# Patient Record
Sex: FEMALE | Race: OTHER | HISPANIC OR LATINO | Employment: OTHER | URBAN - METROPOLITAN AREA
[De-identification: names, ages, dates, MRNs, and addresses within clinical notes are randomized per-mention and may not be internally consistent; named-entity substitution may affect disease eponyms.]

---

## 2018-07-24 ENCOUNTER — APPOINTMENT (EMERGENCY)
Dept: CT IMAGING | Facility: HOSPITAL | Age: 83
DRG: 643 | End: 2018-07-24
Payer: MEDICARE

## 2018-07-24 ENCOUNTER — HOSPITAL ENCOUNTER (INPATIENT)
Facility: HOSPITAL | Age: 83
LOS: 2 days | Discharge: HOME | DRG: 643 | End: 2018-07-27
Attending: EMERGENCY MEDICINE | Admitting: INTERNAL MEDICINE
Payer: MEDICARE

## 2018-07-24 DIAGNOSIS — I10 ESSENTIAL HYPERTENSION: ICD-10-CM

## 2018-07-24 DIAGNOSIS — E87.1 HYPONATREMIA: Primary | ICD-10-CM

## 2018-07-24 DIAGNOSIS — G93.40 ACUTE ENCEPHALOPATHY: ICD-10-CM

## 2018-07-24 DIAGNOSIS — F41.9 ANXIETY: ICD-10-CM

## 2018-07-24 PROBLEM — E78.5 HYPERLIPIDEMIA: Status: ACTIVE | Noted: 2018-07-24

## 2018-07-24 LAB
ALBUMIN SERPL BCP-MCNC: 4.3 G/DL (ref 3.5–5)
ALP SERPL-CCNC: 95 U/L (ref 46–116)
ALT SERPL W P-5'-P-CCNC: 18 U/L (ref 12–78)
ANION GAP SERPL CALCULATED.3IONS-SCNC: 8 MMOL/L (ref 4–13)
AST SERPL W P-5'-P-CCNC: 33 U/L (ref 5–45)
BASOPHILS # BLD AUTO: 0 THOUSANDS/ΜL (ref 0–0.1)
BASOPHILS NFR BLD AUTO: 0 % (ref 0–1)
BILIRUB SERPL-MCNC: 0.72 MG/DL (ref 0.2–1)
BILIRUB UR QL STRIP: NEGATIVE
BILIRUB UR QL STRIP: NEGATIVE
BUN SERPL-MCNC: 12 MG/DL (ref 5–25)
CALCIUM SERPL-MCNC: 9.5 MG/DL (ref 8.3–10.1)
CHLORIDE SERPL-SCNC: 89 MMOL/L (ref 100–108)
CLARITY UR: CLEAR
CLARITY UR: CLEAR
CO2 SERPL-SCNC: 26 MMOL/L (ref 21–32)
COLOR UR: YELLOW
COLOR UR: YELLOW
CREAT SERPL-MCNC: 0.88 MG/DL (ref 0.6–1.3)
EOSINOPHIL # BLD AUTO: 0.05 THOUSAND/ΜL (ref 0–0.61)
EOSINOPHIL NFR BLD AUTO: 1 % (ref 0–6)
ERYTHROCYTE [DISTWIDTH] IN BLOOD BY AUTOMATED COUNT: 12.9 % (ref 11.6–15.1)
GFR SERPL CREATININE-BSD FRML MDRD: 60 ML/MIN/1.73SQ M
GLUCOSE SERPL-MCNC: 108 MG/DL (ref 65–140)
GLUCOSE UR STRIP-MCNC: NEGATIVE MG/DL
GLUCOSE UR STRIP-MCNC: NEGATIVE MG/DL
HCT VFR BLD AUTO: 38 % (ref 34.8–46.1)
HGB BLD-MCNC: 13.4 G/DL (ref 11.5–15.4)
HGB UR QL STRIP.AUTO: NEGATIVE
HGB UR QL STRIP.AUTO: NEGATIVE
KETONES UR STRIP-MCNC: NEGATIVE MG/DL
KETONES UR STRIP-MCNC: NEGATIVE MG/DL
LEUKOCYTE ESTERASE UR QL STRIP: NEGATIVE
LEUKOCYTE ESTERASE UR QL STRIP: NEGATIVE
LYMPHOCYTES # BLD AUTO: 1.32 THOUSANDS/ΜL (ref 0.6–4.47)
LYMPHOCYTES NFR BLD AUTO: 20 % (ref 14–44)
MCH RBC QN AUTO: 29.9 PG (ref 26.8–34.3)
MCHC RBC AUTO-ENTMCNC: 35.3 G/DL (ref 31.4–37.4)
MCV RBC AUTO: 85 FL (ref 82–98)
MONOCYTES # BLD AUTO: 0.47 THOUSAND/ΜL (ref 0.17–1.22)
MONOCYTES NFR BLD AUTO: 7 % (ref 4–12)
NEUTROPHILS # BLD AUTO: 4.66 THOUSANDS/ΜL (ref 1.85–7.62)
NEUTS SEG NFR BLD AUTO: 72 % (ref 43–75)
NITRITE UR QL STRIP: NEGATIVE
NITRITE UR QL STRIP: NEGATIVE
NRBC BLD AUTO-RTO: 0 /100 WBCS
PH UR STRIP.AUTO: 6 [PH] (ref 4.5–8)
PH UR STRIP.AUTO: 7 [PH] (ref 4.5–8)
PLATELET # BLD AUTO: 209 THOUSANDS/UL (ref 149–390)
PMV BLD AUTO: 9.2 FL (ref 8.9–12.7)
POTASSIUM SERPL-SCNC: 4.3 MMOL/L (ref 3.5–5.3)
PROT SERPL-MCNC: 7.9 G/DL (ref 6.4–8.2)
PROT UR STRIP-MCNC: NEGATIVE MG/DL
PROT UR STRIP-MCNC: NEGATIVE MG/DL
RBC # BLD AUTO: 4.48 MILLION/UL (ref 3.81–5.12)
SODIUM SERPL-SCNC: 123 MMOL/L (ref 136–145)
SP GR UR STRIP.AUTO: 1.01 (ref 1–1.03)
SP GR UR STRIP.AUTO: <=1.005 (ref 1–1.03)
TROPONIN I SERPL-MCNC: <0.02 NG/ML
UROBILINOGEN UR QL STRIP.AUTO: 0.2 E.U./DL
UROBILINOGEN UR QL STRIP.AUTO: 0.2 E.U./DL
WBC # BLD AUTO: 6.5 THOUSAND/UL (ref 4.31–10.16)

## 2018-07-24 PROCEDURE — 93005 ELECTROCARDIOGRAM TRACING: CPT

## 2018-07-24 PROCEDURE — 80053 COMPREHEN METABOLIC PANEL: CPT | Performed by: PHYSICIAN ASSISTANT

## 2018-07-24 PROCEDURE — 84484 ASSAY OF TROPONIN QUANT: CPT | Performed by: PHYSICIAN ASSISTANT

## 2018-07-24 PROCEDURE — 70450 CT HEAD/BRAIN W/O DYE: CPT

## 2018-07-24 PROCEDURE — 36415 COLL VENOUS BLD VENIPUNCTURE: CPT | Performed by: PHYSICIAN ASSISTANT

## 2018-07-24 PROCEDURE — 81003 URINALYSIS AUTO W/O SCOPE: CPT | Performed by: PHYSICIAN ASSISTANT

## 2018-07-24 PROCEDURE — 85025 COMPLETE CBC W/AUTO DIFF WBC: CPT | Performed by: PHYSICIAN ASSISTANT

## 2018-07-24 PROCEDURE — 81003 URINALYSIS AUTO W/O SCOPE: CPT

## 2018-07-24 PROCEDURE — 99285 EMERGENCY DEPT VISIT HI MDM: CPT

## 2018-07-24 RX ORDER — METOPROLOL SUCCINATE 100 MG/1
100 TABLET, EXTENDED RELEASE ORAL DAILY
COMMUNITY
End: 2018-09-14 | Stop reason: SDUPTHER

## 2018-07-24 RX ORDER — AMLODIPINE BESYLATE 5 MG/1
5 TABLET ORAL DAILY
Status: DISCONTINUED | OUTPATIENT
Start: 2018-07-25 | End: 2018-07-26

## 2018-07-24 RX ORDER — ASPIRIN 81 MG/1
81 TABLET ORAL DAILY
Status: DISCONTINUED | OUTPATIENT
Start: 2018-07-25 | End: 2018-07-27 | Stop reason: HOSPADM

## 2018-07-24 RX ORDER — FOLIC ACID/MULTIVIT,IRON,MINER .4-18-35
1 TABLET,CHEWABLE ORAL DAILY
COMMUNITY
End: 2020-05-26 | Stop reason: HOSPADM

## 2018-07-24 RX ORDER — METOPROLOL SUCCINATE 50 MG/1
100 TABLET, EXTENDED RELEASE ORAL DAILY
Status: DISCONTINUED | OUTPATIENT
Start: 2018-07-25 | End: 2018-07-27 | Stop reason: HOSPADM

## 2018-07-24 RX ORDER — AMLODIPINE BESYLATE 5 MG/1
5 TABLET ORAL DAILY
COMMUNITY
End: 2018-07-27 | Stop reason: HOSPADM

## 2018-07-24 RX ORDER — LOSARTAN POTASSIUM 50 MG/1
100 TABLET ORAL DAILY
Status: DISCONTINUED | OUTPATIENT
Start: 2018-07-25 | End: 2018-07-27 | Stop reason: HOSPADM

## 2018-07-24 RX ORDER — ATORVASTATIN CALCIUM 20 MG/1
20 TABLET, FILM COATED ORAL DAILY
COMMUNITY
End: 2018-10-18

## 2018-07-24 RX ORDER — ACETAMINOPHEN 325 MG/1
650 TABLET ORAL EVERY 6 HOURS PRN
Status: DISCONTINUED | OUTPATIENT
Start: 2018-07-24 | End: 2018-07-27 | Stop reason: HOSPADM

## 2018-07-24 RX ORDER — OLANZAPINE 5 MG/1
5 TABLET ORAL
Status: DISCONTINUED | OUTPATIENT
Start: 2018-07-24 | End: 2018-07-26

## 2018-07-24 RX ORDER — ACETAMINOPHEN 325 MG/1
650 TABLET ORAL EVERY 4 HOURS PRN
Status: DISCONTINUED | OUTPATIENT
Start: 2018-07-24 | End: 2018-07-25

## 2018-07-24 RX ORDER — AMOXICILLIN 250 MG
1 CAPSULE ORAL 2 TIMES DAILY
COMMUNITY
End: 2018-09-14 | Stop reason: SDUPTHER

## 2018-07-24 RX ORDER — AMOXICILLIN 250 MG
1 CAPSULE ORAL 2 TIMES DAILY
Status: DISCONTINUED | OUTPATIENT
Start: 2018-07-25 | End: 2018-07-27 | Stop reason: HOSPADM

## 2018-07-24 RX ORDER — OLANZAPINE 5 MG/1
5 TABLET ORAL
COMMUNITY
End: 2018-07-27 | Stop reason: HOSPADM

## 2018-07-24 RX ORDER — DULOXETIN HYDROCHLORIDE 60 MG/1
20 CAPSULE, DELAYED RELEASE ORAL DAILY
COMMUNITY
End: 2018-07-27 | Stop reason: HOSPADM

## 2018-07-24 RX ORDER — LABETALOL HYDROCHLORIDE 5 MG/ML
10 INJECTION, SOLUTION INTRAVENOUS ONCE
Status: COMPLETED | OUTPATIENT
Start: 2018-07-24 | End: 2018-07-24

## 2018-07-24 RX ORDER — DULOXETIN HYDROCHLORIDE 20 MG/1
20 CAPSULE, DELAYED RELEASE ORAL DAILY
Status: DISCONTINUED | OUTPATIENT
Start: 2018-07-25 | End: 2018-07-26

## 2018-07-24 RX ORDER — ASPIRIN 81 MG/1
81 TABLET ORAL DAILY
COMMUNITY
End: 2018-09-14 | Stop reason: SDUPTHER

## 2018-07-24 RX ORDER — ACETAMINOPHEN 325 MG/1
650 TABLET ORAL 2 TIMES DAILY PRN
COMMUNITY
End: 2019-07-31 | Stop reason: ALTCHOICE

## 2018-07-24 RX ORDER — LOSARTAN POTASSIUM 100 MG/1
100 TABLET ORAL DAILY
COMMUNITY
End: 2018-09-14 | Stop reason: SDUPTHER

## 2018-07-24 RX ORDER — ATORVASTATIN CALCIUM 20 MG/1
20 TABLET, FILM COATED ORAL DAILY
Status: DISCONTINUED | OUTPATIENT
Start: 2018-07-25 | End: 2018-07-27 | Stop reason: HOSPADM

## 2018-07-24 RX ORDER — ONDANSETRON 2 MG/ML
4 INJECTION INTRAMUSCULAR; INTRAVENOUS EVERY 6 HOURS PRN
Status: DISCONTINUED | OUTPATIENT
Start: 2018-07-24 | End: 2018-07-27

## 2018-07-24 RX ADMIN — ACETAMINOPHEN 650 MG: 325 TABLET, FILM COATED ORAL at 22:17

## 2018-07-24 RX ADMIN — LABETALOL 20 MG/4 ML (5 MG/ML) INTRAVENOUS SYRINGE 10 MG: at 21:43

## 2018-07-24 RX ADMIN — OLANZAPINE 5 MG: 5 TABLET, FILM COATED ORAL at 23:26

## 2018-07-24 NOTE — ED NOTES
Patient has had a recent medication change, klonopin discontinued and increased cymbalta to 60mg        Oksana Coy RN  07/24/18 5758

## 2018-07-25 LAB
ANION GAP SERPL CALCULATED.3IONS-SCNC: 10 MMOL/L (ref 4–13)
ANION GAP SERPL CALCULATED.3IONS-SCNC: 6 MMOL/L (ref 4–13)
ATRIAL RATE: 80 BPM
BUN SERPL-MCNC: 17 MG/DL (ref 5–25)
BUN SERPL-MCNC: 9 MG/DL (ref 5–25)
CALCIUM SERPL-MCNC: 8.7 MG/DL (ref 8.3–10.1)
CALCIUM SERPL-MCNC: 9 MG/DL (ref 8.3–10.1)
CHLORIDE SERPL-SCNC: 91 MMOL/L (ref 100–108)
CHLORIDE SERPL-SCNC: 93 MMOL/L (ref 100–108)
CO2 SERPL-SCNC: 27 MMOL/L (ref 21–32)
CO2 SERPL-SCNC: 29 MMOL/L (ref 21–32)
CREAT SERPL-MCNC: 0.9 MG/DL (ref 0.6–1.3)
CREAT SERPL-MCNC: 0.96 MG/DL (ref 0.6–1.3)
ERYTHROCYTE [DISTWIDTH] IN BLOOD BY AUTOMATED COUNT: 13 % (ref 11.6–15.1)
GFR SERPL CREATININE-BSD FRML MDRD: 54 ML/MIN/1.73SQ M
GFR SERPL CREATININE-BSD FRML MDRD: 58 ML/MIN/1.73SQ M
GLUCOSE SERPL-MCNC: 114 MG/DL (ref 65–140)
GLUCOSE SERPL-MCNC: 99 MG/DL (ref 65–140)
HCT VFR BLD AUTO: 37.3 % (ref 34.8–46.1)
HGB BLD-MCNC: 12.9 G/DL (ref 11.5–15.4)
MAGNESIUM SERPL-MCNC: 2.1 MG/DL (ref 1.6–2.6)
MCH RBC QN AUTO: 29.5 PG (ref 26.8–34.3)
MCHC RBC AUTO-ENTMCNC: 34.6 G/DL (ref 31.4–37.4)
MCV RBC AUTO: 85 FL (ref 82–98)
OSMOLALITY UR: 176 MMOL/KG
P AXIS: 57 DEGREES
PHOSPHATE SERPL-MCNC: 3.7 MG/DL (ref 2.3–4.1)
PLATELET # BLD AUTO: 223 THOUSANDS/UL (ref 149–390)
PMV BLD AUTO: 9.3 FL (ref 8.9–12.7)
POTASSIUM SERPL-SCNC: 3.9 MMOL/L (ref 3.5–5.3)
POTASSIUM SERPL-SCNC: 4.3 MMOL/L (ref 3.5–5.3)
PR INTERVAL: 182 MS
QRS AXIS: 70 DEGREES
QRSD INTERVAL: 86 MS
QT INTERVAL: 352 MS
QTC INTERVAL: 405 MS
RBC # BLD AUTO: 4.38 MILLION/UL (ref 3.81–5.12)
SODIUM 24H UR-SCNC: 57 MOL/L
SODIUM SERPL-SCNC: 128 MMOL/L (ref 136–145)
SODIUM SERPL-SCNC: 128 MMOL/L (ref 136–145)
T WAVE AXIS: 36 DEGREES
VENTRICULAR RATE: 80 BPM
WBC # BLD AUTO: 7.15 THOUSAND/UL (ref 4.31–10.16)

## 2018-07-25 PROCEDURE — G8988 SELF CARE GOAL STATUS: HCPCS

## 2018-07-25 PROCEDURE — G8987 SELF CARE CURRENT STATUS: HCPCS

## 2018-07-25 PROCEDURE — 99232 SBSQ HOSP IP/OBS MODERATE 35: CPT | Performed by: PSYCHIATRY & NEUROLOGY

## 2018-07-25 PROCEDURE — 84300 ASSAY OF URINE SODIUM: CPT | Performed by: PHYSICIAN ASSISTANT

## 2018-07-25 PROCEDURE — 80048 BASIC METABOLIC PNL TOTAL CA: CPT | Performed by: PHYSICIAN ASSISTANT

## 2018-07-25 PROCEDURE — 85027 COMPLETE CBC AUTOMATED: CPT | Performed by: PHYSICIAN ASSISTANT

## 2018-07-25 PROCEDURE — 97166 OT EVAL MOD COMPLEX 45 MIN: CPT

## 2018-07-25 PROCEDURE — 84100 ASSAY OF PHOSPHORUS: CPT | Performed by: PHYSICIAN ASSISTANT

## 2018-07-25 PROCEDURE — 83735 ASSAY OF MAGNESIUM: CPT | Performed by: PHYSICIAN ASSISTANT

## 2018-07-25 PROCEDURE — 93010 ELECTROCARDIOGRAM REPORT: CPT | Performed by: INTERNAL MEDICINE

## 2018-07-25 PROCEDURE — 99223 1ST HOSP IP/OBS HIGH 75: CPT | Performed by: INTERNAL MEDICINE

## 2018-07-25 PROCEDURE — 83935 ASSAY OF URINE OSMOLALITY: CPT | Performed by: PHYSICIAN ASSISTANT

## 2018-07-25 RX ORDER — HYDRALAZINE HYDROCHLORIDE 20 MG/ML
5 INJECTION INTRAMUSCULAR; INTRAVENOUS EVERY 6 HOURS PRN
Status: DISCONTINUED | OUTPATIENT
Start: 2018-07-25 | End: 2018-07-27

## 2018-07-25 RX ORDER — CLONAZEPAM 0.5 MG/1
0.5 TABLET ORAL 2 TIMES DAILY
Status: DISCONTINUED | OUTPATIENT
Start: 2018-07-25 | End: 2018-07-26

## 2018-07-25 RX ORDER — SODIUM CHLORIDE 1000 MG
1 TABLET, SOLUBLE MISCELLANEOUS
Status: DISCONTINUED | OUTPATIENT
Start: 2018-07-25 | End: 2018-07-27

## 2018-07-25 RX ORDER — SODIUM CHLORIDE, SODIUM LACTATE, POTASSIUM CHLORIDE, CALCIUM CHLORIDE 600; 310; 30; 20 MG/100ML; MG/100ML; MG/100ML; MG/100ML
75 INJECTION, SOLUTION INTRAVENOUS CONTINUOUS
Status: DISCONTINUED | OUTPATIENT
Start: 2018-07-25 | End: 2018-07-25

## 2018-07-25 RX ADMIN — Medication 1 TABLET: at 08:35

## 2018-07-25 RX ADMIN — AMLODIPINE BESYLATE 5 MG: 5 TABLET ORAL at 08:37

## 2018-07-25 RX ADMIN — SODIUM CHLORIDE TAB 1 GM 1 G: 1 TAB at 17:20

## 2018-07-25 RX ADMIN — METOPROLOL SUCCINATE 100 MG: 50 TABLET, EXTENDED RELEASE ORAL at 08:37

## 2018-07-25 RX ADMIN — SODIUM CHLORIDE, SODIUM LACTATE, POTASSIUM CHLORIDE, AND CALCIUM CHLORIDE 75 ML/HR: .6; .31; .03; .02 INJECTION, SOLUTION INTRAVENOUS at 04:45

## 2018-07-25 RX ADMIN — SODIUM CHLORIDE TAB 1 GM 1 G: 1 TAB at 13:00

## 2018-07-25 RX ADMIN — Medication 1 TABLET: at 17:20

## 2018-07-25 RX ADMIN — CLONAZEPAM 0.5 MG: 0.5 TABLET ORAL at 09:17

## 2018-07-25 RX ADMIN — SODIUM CHLORIDE TAB 1 GM 1 G: 1 TAB at 09:17

## 2018-07-25 RX ADMIN — LOSARTAN POTASSIUM 100 MG: 50 TABLET, FILM COATED ORAL at 08:37

## 2018-07-25 RX ADMIN — CLONAZEPAM 0.5 MG: 0.5 TABLET ORAL at 17:20

## 2018-07-25 RX ADMIN — DULOXETINE 20 MG: 20 CAPSULE, DELAYED RELEASE ORAL at 08:35

## 2018-07-25 RX ADMIN — ENOXAPARIN SODIUM 40 MG: 40 INJECTION SUBCUTANEOUS at 08:35

## 2018-07-25 RX ADMIN — ATORVASTATIN CALCIUM 20 MG: 20 TABLET, FILM COATED ORAL at 08:35

## 2018-07-25 RX ADMIN — ASPIRIN 81 MG: 81 TABLET, COATED ORAL at 08:35

## 2018-07-25 NOTE — PLAN OF CARE
Problem: OCCUPATIONAL THERAPY ADULT  Goal: Performs self-care activities at highest level of function for planned discharge setting  See evaluation for individualized goals  Treatment Interventions: ADL retraining, Functional transfer training, UE strengthening/ROM, Endurance training, Patient/family training, Equipment evaluation/education, Compensatory technique education, Continued evaluation, Energy conservation, Activityengagement          See flowsheet documentation for full assessment, interventions and recommendations  Limitation: Decreased ADL status, Decreased UE strength, Decreased endurance, Decreased self-care trans, Decreased high-level ADLs  Prognosis: Fair  Assessment: Pt is a 80 y o  female seen for OT evaluation s/p adm to [facility] [address] on 7/24/2018 feeling shaky, nauseated, and having auditory hallucinations and dx'd w/ Hyponatremia  Comorbidities affecting pts functional performance include a significant PMH of anxiety, HLD, and HTN  Pt with active OT orders and activity orders for Up with assistance  Pt lives alone in a one level apt with 0 POOJA and elevator access  Pt reports local family that is able to assist as needed  At baseline, pt was I w/ ADLs and functional mobility/transfers with use of SPC for household distances and RW for community mobility, required assist for IADLs, (-) , and reports 1 fall PTA  Upon evaluation, pt currently requires Mod-Min A for LB ADLs, Supervision for UB ADLs, Min A for toileting, Supervision for bed mobility, and Min A for functional mobility/transfers 2* the following deficits impacting occupational performance: weakness, decreased strength, decreased balance and decreased tolerance   These impairments, as well at pts limited home support, difficulty performing ADLS and difficulty performing IADLS  limit pts ability to safely engage in all baseline areas of occupation, including grooming, bathing, dressing, toileting, functional mobility/transfers, community mobility, house maintenance, social participation  and leisure activities   Pt scored overall 55/100 on the Barthel Index  Based on the aforementioned OT evaluation, functional performance deficits, and assessments, pt has been identified as a moderate complexity evaluation  Pt to continue to benefit from continued acute OT services during hospital stay to address defined deficits and to maximize level of functional independence in the following Occupational Performance areas: grooming, bathing/shower, toilet hygiene, dressing, socialization, health maintenance, functional mobility, community mobility, clothing management, social participation and transfers to common household surfaces  From OT standpoint, recommend STR vs Home OT pending progress upon D/C   OT will continue to follow pt 3-5x/wk to address the following goals to  w/in 10-14 days:     OT Discharge Recommendation: Short Term Rehab (vs Home OT pending progress)  OT - OK to Discharge: Yes (when medically cleared to STR)

## 2018-07-25 NOTE — H&P
History and Physical - [facility] Internal Medicine    Patient Information: Rin Gonzalez 80 y o  female MRN: [id number]  Unit/Bed#: E5 -01 Encounter: [id number]  Admitting Physician: Swapna Thurston PA-C  PCP: No primary care provider on file  Date of Admission:  07/25/18      Assessment:    Hyponatremia    Plan:    Hyponatremia  · Will slowly correct sodium  · Check urine sodium, serum osmolality, urine osmolality    Anxiety  · Poss  Due to hyponatremia vs  Medication change  · Attempt to clarify medication regimen in the am     Essential hypertension  · Continue Cozaar, Toprol  · Add hydralazine IV prn    Anxiety/depression  · Continue clonidine, Zyprexa  · Attempt to clarify doses in the a m  Hyperlipidemia  · Continue atorvastatin      HPI:   Jesusita Ruth is a 80 y o  female who reportedly was brought in by her daughter, which turned out to be a friend from [employer]  He was reporting that she was feeling shaky, nauseated and having auditory hallucinations  She does not speak [place]  It was reported by her friend as well as her son that her Klonopin was discontinued somewhere between 1 and 3 weeks ago and her Cymbalta was decreased  Her son reports that around the same time she began to have increasing anxiety  (history obtained from son and chart and patient is non-english speaking and functioning  phone could not be obtained)    Denies: Chest pain, Shortness of Breath, Nausea, Vomiting, Diarrhea, Dysuria    ROS:  A 12-point review of systems was done  Please see the HPI for the full details  All other systems negative  PMH:  Principal Problem:    Hyponatremia  Active Problems:    Hypertension    Anxiety    Hyperlipidemia    Acute encephalopathy      Past Medical History:   Diagnosis Date    Anxiety     Hyperlipidemia     Hypertension      Past Surgical History:   Procedure Laterality Date    CHOLECYSTECTOMY       Social History     Social History    Marital status:   Spouse name: N/A    Number of children: N/A    Years of education: N/A     Social History Main Topics    Smoking status: Never Smoker    Smokeless tobacco: Never Used    Alcohol use No    Drug use: No    Sexual activity: Not Asked     Other Topics Concern    None     Social History Narrative    None     History reviewed  No pertinent family history      MED/ALLERGIES:  Current Facility-Administered Medications   Medication Dose Route Frequency Provider Last Rate Last Dose    acetaminophen (TYLENOL) tablet 650 mg  650 mg Oral Q4H PRN STEPHEN Prieto-VICTOR MANUEL        acetaminophen (TYLENOL) tablet 650 mg  650 mg Oral Q6H PRN STEPHEN Prieto-C   650 mg at 07/24/18 2217    amLODIPine (NORVASC) tablet 5 mg  5 mg Oral Daily STEPHEN Miller-C        aspirin (ECOTRIN LOW STRENGTH) EC tablet 81 mg  81 mg Oral Daily STEPHEN Miller-C        atorvastatin (LIPITOR) tablet 20 mg  20 mg Oral Daily STEPHEN Miller-C        DULoxetine (CYMBALTA) delayed release capsule 20 mg  20 mg Oral Daily STEPHEN Miller-C        enoxaparin (LOVENOX) subcutaneous injection 40 mg  40 mg Subcutaneous Daily STEPHEN Miller-C        losartan (COZAAR) tablet 100 mg  100 mg Oral Daily STEPHEN Miller-C        metoprolol succinate (TOPROL-XL) 24 hr tablet 100 mg  100 mg Oral Daily STEPHEN Miller-C        OLANZapine (ZyPREXA) tablet 5 mg  5 mg Oral HS Gabi Chapin PA-C   5 mg at 07/24/18 2326    ondansetron (ZOFRAN) injection 4 mg  4 mg Intravenous Q6H PRN STEPHEN Miller-C        senna-docusate sodium (SENOKOT S) 8 6-50 mg per tablet 1 tablet  1 tablet Oral BID Gabi Chapin PA-C         No Known Allergies    OBJECTIVE:    Current Vitals:   Blood Pressure: (!) 178/88 (07/24/18 2342)  Pulse: 78 (07/24/18 2342)  Temperature: 98 °F (36 7 °C) (07/24/18 2342)  Temp Source: Temporal (07/24/18 1848)  Respirations: 18 (07/24/18 2342)  Height: 5' (152 4 cm) (07/24/18 2247)  Weight - Scale: 58 1 kg (128 lb 1 4 oz) (07/24/18 2247)  SpO2: 95 % (07/24/18 2342)    No intake or output data in the 24 hours ending 07/25/18 0312    Invasive Devices     Peripheral Intravenous Line            Peripheral IV 07/24/18 Left Antecubital less than 1 day                  Physical Exam   Constitutional: She appears well-developed and well-nourished  HENT:   Head: Normocephalic and atraumatic  Right Ear: External ear normal    Left Ear: External ear normal    Eyes: No scleral icterus  Neck: Normal range of motion  Neck supple  No thyromegaly present  Cardiovascular: Normal rate, regular rhythm and normal heart sounds  Pulmonary/Chest: Effort normal and breath sounds normal    Abdominal: Soft  Bowel sounds are normal  She exhibits no distension  There is no tenderness  Musculoskeletal: Normal range of motion  She exhibits no edema  Lymphadenopathy:     She has no cervical adenopathy  Neurological: She exhibits normal muscle tone  Skin: Skin is warm and dry  No erythema  Psychiatric: She has a normal mood and affect  Her behavior is normal                                                      Lab Results:   Results from last 7 days  Lab Units 07/24/18 2040   WBC Thousand/uL 6 50   HEMOGLOBIN g/dL 13 4   HEMATOCRIT % 38 0   PLATELETS Thousands/uL 209      Results from last 7 days  Lab Units 07/24/18 2040   SODIUM mmol/L 123*   POTASSIUM mmol/L 4 3   CHLORIDE mmol/L 89*   CO2 mmol/L 26   BUN mg/dL 12   CREATININE mg/dL 0 88   CALCIUM mg/dL 9 5   TOTAL PROTEIN g/dL 7 9   BILIRUBIN TOTAL mg/dL 0 72   ALK PHOS U/L 95   ALT U/L 18   AST U/L 33   GLUCOSE RANDOM mg/dL 108     Lab Results   Component Value Date    CKTOTAL 119 06/22/2014    TROPONINI <0 02 07/24/2018         Imaging:     CT BRAIN - WITHOUT CONTRAST:  No acute intracranial abnormality  Chronic microangiopathic changes  VTE Prophylaxis: Enoxaparin (Lovenox)    Code Status: FULL    Counseling / Coordination of Care:    Total floor / unit time spent today 45 minutes  Anticipated Length of Stay will be: MORE THAN 2 (TWO) Midnights:     Kymberly Spaulding PA-C    This note has been constructed using a voice recognition system

## 2018-07-25 NOTE — PROGRESS NOTES
Patient came out of room and was walking around the floor  Informed PCA that she was looking for someone  She is hard to redirect back to her room and is acting very confused  Patient does not know that she is in the hospital and where she is  Dr Annamarie banda and informed about changes  He is coming up to assess the patient

## 2018-07-25 NOTE — ED NOTES
Patient's son name Melvin Ulloa can be reached at [address] [place] [address]  07/24/18 2120

## 2018-07-25 NOTE — CASE MANAGEMENT
Initial Clinical Review    Admission: Date/Time/Statement:   OBSERVATION WRITTEN 07/24/18 @ 2137 CONVERTED TO INPATIENT ADMISSION 07/25/18 @ 0837 DUE TO FURTHER DIAGNOSTIC WORKUP REQUIRED FOR METABOLIC ENCEPHALOPATHY , REQUIRING AT LEAST A 2 MIDNIGHT STAY  Orders Placed This Encounter   Procedures     Admitting Physician DONNY BONNER    Level of Care Med Surg    Estimated length of stay More than 2 Midnights    Certification I certify that inpatient services are medically necessary for this patient for a duration of greater than two midnights  See H&P and MD Progress Notes for additional information about the patient's course of treatment  ED: Date/Time/Mode of Arrival:   ED Arrival Information     Expected Arrival Acuity Means of Arrival Escorted By Service Admission Type    - 7/24/2018 18:37 Urgent Walk-In Family Member General Medicine Urgent    Arrival Complaint    anxiety          Chief Complaint:   Chief Complaint   Patient presents with    Anxiety     Patient reports feeling anxious  x 3 days worsening  Denies cp  Reports sob  Reports recent medication change in cymbalta and klonopin x 3 weeks ago  Hx of anxiety  History of Illness: 81 yo F with PMH anxiety, HTN and hyperlipidemia presenting with SOB and 'feeling shaky all over'  Pt daughter is at bedside who provides history  Daughter reports that pt has been feeling like this for approximately 3 weeks when her pcp took her off of her 0 5mg klonipin daily and increased her cymbalta from 30mg to 60mg  Pt has been c/o SOB and feeling anxious  She reports she has been having new onset auditory hallucinations who have been calling her name  She denies the voices telling her to hurt herself or others  Daughter reports pt stating that she was going to run away because she was tired of the voices which prompted their visit to the ED  She denies any abdominal pain, n/v/d, fevers, chills, sweats, CP or palpitations      ED Vital Signs:   ED Triage Vitals   Temperature Pulse Respirations Blood Pressure SpO2   07/24/18 1848 07/24/18 1848 07/24/18 1848 07/24/18 1848 07/24/18 1848   98 4 °F (36 9 °C) 85 18 (!) 172/84 99 %      Temp Source Heart Rate Source Patient Position - Orthostatic VS BP Location FiO2 (%)   07/24/18 1848 07/24/18 2030 07/24/18 2030 07/24/18 2030 --   Temporal Monitor Lying Right arm       Pain Score       07/24/18 1848       No Pain        Wt Readings from Last 1 Encounters:   07/24/18 58 1 kg (128 lb 1 4 oz)       Vital Signs (abnormal):   Date/Time  BP   07/24/18 2342   178/88   07/24/18 2217   188/81   07/24/18 2145   178/76   07/24/18 2030   190/77     Abnormal Labs/Diagnostic Test Results:   SODIUM 123*   CHLORIDE 89*     CT BRAIN - WITHOUT CONTRAST:  No acute intracranial abnormality   Chronic microangiopathic changes  ED Treatment:   Medication Administration from 07/24/2018 1837 to 07/24/2018 2244       Date/Time Order Dose Route Action     07/24/2018 2143 labetalol (NORMODYNE) injection 10 mg 10 mg Intravenous Given     07/24/2018 2217 acetaminophen (TYLENOL) tablet 650 mg 650 mg Oral Given          Past Medical/Surgical History: Active Ambulatory Problems     Diagnosis Date Noted    No Active Ambulatory Problems     Resolved Ambulatory Problems     Diagnosis Date Noted    No Resolved Ambulatory Problems     Past Medical History:   Diagnosis Date    Anxiety     Hyperlipidemia     Hypertension        Admitting Diagnosis: Anxiety [F41 9]  Hyponatremia [E87 1]    Age/Sex: 80 y o  female    Assessment:  Hyponatremia     Plan:  Hyponatremia  · Will slowly correct sodium  · Check urine sodium, serum osmolality, urine osmolality     Anxiety  · Poss  Due to hyponatremia vs  Medication change    · Attempt to clarify medication regimen in the am      Essential hypertension  · Continue Cozaar, Toprol  · Add hydralazine IV prn     Anxiety/depression  · Continue clonidine, Zyprexa  · Attempt to clarify doses in the a m      Hyperlipidemia  · Continue atorvastatin  Expect greater than 2 midnight stay       Admission Orders:  Regular diet  OOB with assist  Pt, ot  Consult cm  Sequential compression device    Scheduled Meds:   Current Facility-Administered Medications:  acetaminophen 650 mg Oral Q4H PRN   acetaminophen 650 mg Oral Q6H PRN   amLODIPine 5 mg Oral Daily   aspirin 81 mg Oral Daily   atorvastatin 20 mg Oral Daily   DULoxetine 20 mg Oral Daily   enoxaparin 40 mg Subcutaneous Daily   hydrALAZINE 5 mg Intravenous Q6H PRN   lactated ringers 75 mL/hr Intravenous Continuous   losartan 100 mg Oral Daily   metoprolol succinate 100 mg Oral Daily   OLANZapine 5 mg Oral HS   ondansetron 4 mg Intravenous Q6H PRN   senna-docusate sodium 1 tablet Oral BID     Continuous Infusions:   lactated ringers 75 mL/hr Last Rate: 75 mL/hr (07/25/18 4975)     PRN Meds:   acetaminophen    acetaminophen    hydrALAZINE    ondansetron    Thank you,  Karl Meyers  [facility] Utilization Review Department  Phone: [phone]; Fax [phone]  ATTENTION: The Network Utilization Review Department is now centralized for our 9 Facilities  Make a note that we have a new phone and fax numbers for our Department  Please call with any questions or concerns to [phone] and carefully follow the prompts so that you are directed to the right person  All voicemails are confidential  Fax any determinations, approvals, denials, and requests for initial or continue stay review clinical to [phone]  Due to HIGH CALL volume, it would be easier if you could please send faxed requests to expedite your requests and in part, help us provide discharge notifications faster

## 2018-07-25 NOTE — ED PROVIDER NOTES
History  Chief Complaint   Patient presents with    Anxiety     Patient reports feeling anxious  x 3 days worsening  Denies cp  Reports sob  Reports recent medication change in cymbalta and klonopin x 3 weeks ago  Hx of anxiety  79 yo F with PMH anxiety, HTN and hyperlipidemia presenting with SOB and 'feeling shaky all over'  Pt daughter is at bedside who provides history  Daughter reports that pt has been feeling like this for approximately 3 weeks when her pcp took her off of her 0 5mg klonipin daily and increased her cymbalta from 30mg to 60mg  Pt has been c/o SOB and feeling anxious  She reports she has been having new onset auditory hallucinations who have been calling her name  She denies the voices telling her to hurt herself or others  Daughter reports pt stating that she was going to run away because she was tired of the voices which prompted their visit to the ED  She denies any abdominal pain, n/v/d, fevers, chills, sweats, CP or palpitations  Prior to Admission Medications   Prescriptions Last Dose Informant Patient Reported? Taking?    DULoxetine (CYMBALTA) 60 mg delayed release capsule   Yes Yes   Sig: Take 20 mg by mouth daily   OLANZapine (ZyPREXA) 5 mg tablet   Yes Yes   Sig: Take 5 mg by mouth daily at bedtime   acetaminophen (TYLENOL) 325 mg tablet   Yes Yes   Sig: Take 650 mg by mouth 2 (two) times a day as needed for mild pain   amLODIPine (NORVASC) 5 mg tablet   Yes Yes   Sig: Take 5 mg by mouth daily   aspirin (ECOTRIN LOW STRENGTH) 81 mg EC tablet   Yes Yes   Sig: Take 81 mg by mouth daily   atorvastatin (LIPITOR) 20 mg tablet   Yes Yes   Sig: Take 20 mg by mouth daily   losartan (COZAAR) 100 MG tablet   Yes Yes   Sig: Take 100 mg by mouth daily   metoprolol succinate (TOPROL-XL) 100 mg 24 hr tablet   Yes Yes   Sig: Take 100 mg by mouth daily   multivitamin-iron-minerals-folic acid (CENTRUM) chewable tablet   Yes Yes   Sig: Chew 1 tablet daily   senna-docusate sodium (SENOKOT S) 8 6-50 mg per tablet   Yes Yes   Sig: Take 1 tablet by mouth 2 (two) times a day      Facility-Administered Medications: None       Past Medical History:   Diagnosis Date    Anxiety     Hyperlipidemia     Hypertension        Past Surgical History:   Procedure Laterality Date    CHOLECYSTECTOMY         History reviewed  No pertinent family history  I have reviewed and agree with the history as documented  Social History   Substance Use Topics    Smoking status: Never Smoker    Smokeless tobacco: Never Used    Alcohol use No        Review of Systems   All other systems reviewed and are negative  Physical Exam  Physical Exam   Constitutional: She is oriented to person, place, and time  She appears well-developed and well-nourished  No distress  Pleasantly lying in bed, smiling   HENT:   Head: Normocephalic and atraumatic  Eyes: Conjunctivae are normal    EOM grossly intact   Neck: Normal range of motion  Neck supple  No JVD present  Cardiovascular: Normal rate  Pulmonary/Chest: Effort normal  No respiratory distress  She has no wheezes  She has no rales  Abdominal: Soft  Bowel sounds are normal    Musculoskeletal:   FROM, steady gait, cap refill brisk, strength and sensation grossly intact throughout   Neurological: She is alert and oriented to person, place, and time  Skin: Skin is warm and dry  Capillary refill takes less than 2 seconds  She is not diaphoretic  Psychiatric: She has a normal mood and affect  Her behavior is normal    Nursing note and vitals reviewed        Vital Signs  ED Triage Vitals   Temperature Pulse Respirations Blood Pressure SpO2   07/24/18 1848 07/24/18 1848 07/24/18 1848 07/24/18 1848 07/24/18 1848   98 4 °F (36 9 °C) 85 18 (!) 172/84 99 %      Temp Source Heart Rate Source Patient Position - Orthostatic VS BP Location FiO2 (%)   07/24/18 1848 07/24/18 2030 07/24/18 2030 07/24/18 2030 --   Temporal Monitor Lying Right arm       Pain Score 07/24/18 1848       No Pain           Vitals:    07/24/18 1848 07/24/18 2030   BP: (!) 172/84 (!) 190/77   Pulse: 85 73   Patient Position - Orthostatic VS:  Lying       Visual Acuity      ED Medications  Medications   labetalol (NORMODYNE) injection 10 mg (not administered)       Diagnostic Studies  Results Reviewed     Procedure Component Value Units Date/Time    UA w Reflex to Microscopic w Reflex to Culture [[id number]] Collected:  07/24/18 2119    Lab Status:  Final result Specimen:  Urine from Urine, Other Updated:  07/24/18 2125     Color, UA Yellow     Clarity, UA Clear     Specific Gravity, UA <=1 005     pH, UA 6 0     Leukocytes, UA Negative     Nitrite, UA Negative     Protein, UA Negative mg/dl      Glucose, UA Negative mg/dl      Ketones, UA Negative mg/dl      Urobilinogen, UA 0 2 E U /dl      Bilirubin, UA Negative     Blood, UA Negative    Troponin I [[id number]]  (Normal) Collected:  07/24/18 2040    Lab Status:  Final result Specimen:  Blood from Arm, Left Updated:  07/24/18 2108     Troponin I <0 02 ng/mL     Comprehensive metabolic panel [[id number]]  (Abnormal) Collected:  07/24/18 2040    Lab Status:  Final result Specimen:  Blood from Arm, Left Updated:  07/24/18 2108     Sodium 123 (L) mmol/L      Potassium 4 3 mmol/L      Chloride 89 (L) mmol/L      CO2 26 mmol/L      Anion Gap 8 mmol/L      BUN 12 mg/dL      Creatinine 0 88 mg/dL      Glucose 108 mg/dL      Calcium 9 5 mg/dL      AST 33 U/L      ALT 18 U/L      Alkaline Phosphatase 95 U/L      Total Protein 7 9 g/dL      Albumin 4 3 g/dL      Total Bilirubin 0 72 mg/dL      eGFR 60 ml/min/1 73sq m     Narrative:         National Kidney Disease Education Program recommendations are as follows:  GFR calculation is accurate only with a steady state creatinine  Chronic Kidney disease less than 60 ml/min/1 73 sq  meters  Kidney failure less than 15 ml/min/1 73 sq  meters      CBC and differential [[id number]] Collected:  07/24/18 2040    Lab Status: Final result Specimen:  Blood from Arm, Left Updated:  07/24/18 2056     WBC 6 50 Thousand/uL      RBC 4 48 Million/uL      Hemoglobin 13 4 g/dL      Hematocrit 38 0 %      MCV 85 fL      MCH 29 9 pg      MCHC 35 3 g/dL      RDW 12 9 %      MPV 9 2 fL      Platelets 487 Thousands/uL      nRBC 0 /100 WBCs      Neutrophils Relative 72 %      Lymphocytes Relative 20 %      Monocytes Relative 7 %      Eosinophils Relative 1 %      Basophils Relative 0 %      Neutrophils Absolute 4 66 Thousands/µL      Lymphocytes Absolute 1 32 Thousands/µL      Monocytes Absolute 0 47 Thousand/µL      Eosinophils Absolute 0 05 Thousand/µL      Basophils Absolute 0 00 Thousands/µL     ED Urine Macroscopic [[id number]] Collected:  07/24/18 2035    Lab Status:  Final result Specimen:  Urine Updated:  07/24/18 2029     Color, UA Yellow     Clarity, UA Clear     pH, UA 7 0     Leukocytes, UA Negative     Nitrite, UA Negative     Protein, UA Negative mg/dl      Glucose, UA Negative mg/dl      Ketones, UA Negative mg/dl      Urobilinogen, UA 0 2 E U /dl      Bilirubin, UA Negative     Blood, UA Negative     Specific Gravity, UA 1 010    Narrative:       [facility] RESULT                 CT head without contrast   Final Result by Yoel Blank MD (07/24 2101)      No acute intracranial abnormality  Chronic microangiopathic changes  Workstation performed: [id number]                    Procedures  Procedures       Phone Contacts  ED Phone Contact    ED Course                               MDM  Number of Diagnoses or Management Options  Hyponatremia:   Diagnosis management comments: Pt with sodium of 123, previous labs from 2014 show Na 139, will admit for hyponatremia  Discussed with family at bedside and they are agreeable to staying  All labs and imaging discussed with patient, Pt verbalizes understanding and agrees with plan         CritCare Time    Disposition  Final diagnoses:   Hyponatremia     Time reflects when diagnosis was documented in both MDM as applicable and the Disposition within this note     Time User Action Codes Description Comment    7/24/2018  9:25 PM Angela Hernandez Add [E87 1] Hyponatremia       ED Disposition     ED Disposition Condition Comment    Admit  Case was discussed with SLIM PA and the patient's admission status was agreed to be Admission Status: observation status to the service of Dr Cheryl Felix   Follow-up Information    None         Patient's Medications   Discharge Prescriptions    No medications on file     No discharge procedures on file      ED Provider  Electronically Signed by           Davida Chávez PA-C  07/24/18 7125

## 2018-07-25 NOTE — OCCUPATIONAL THERAPY NOTE
[address]  Evaluation     Patient Name: Catherine FAM Date: 7/25/2018  Problem List  Patient Active Problem List   Diagnosis    Hypertension    Anxiety    Hyperlipidemia    Hyponatremia    Acute encephalopathy     Past Medical History  Past Medical History:   Diagnosis Date    Anxiety     Hyperlipidemia     Hypertension      Past Surgical History  Past Surgical History:   Procedure Laterality Date    CHOLECYSTECTOMY           07/25/18 1119   Note Type   Note type Eval only   Restrictions/Precautions   Weight Bearing Precautions Per Order No   Other Precautions Fall Risk; Chair Alarm; Bed Alarm   Pain Assessment   Pain Assessment No/denies pain   Pain Score No Pain   Home Living   Type of Home Apartment   Home Layout One level;Elevator   Bathroom Shower/Tub Tub/shower unit   Bathroom Toilet Standard   Bathroom Equipment Grab bars in shower; Shower chair   Bathroom Accessibility Accessible   Home Equipment Walker;Cane   Additional Comments Pt lives alone in a one level apt with 0 POOJA and elevator access  Pt reports local family that is able to assist as needed  Prior Function   Level of [place] Independent with ADLs and functional mobility; Needs assistance with IADLs   Lives With Alone   Receives Help From Family   ADL Assistance Independent   IADLs Needs assistance   Falls in the last 6 months 1 to 4  (1 per pt report)   Vocational Retired   Comments At baseline, pt was I w/ ADLs and functional mobility/transfers with use of SPC for household distances and RW for community mobility, required assist for IADLs, (-) , and reports 1 fall PTA  Lifestyle   Autonomy At baseline, pt was I w/ ADLs and functional mobility/transfers with use of SPC for household distances and RW for community mobility, required assist for IADLs, (-) , and reports 1 fall PTA     Reciprocal Relationships Lives alone   Service to Others Retired   Intrinsic Gratification Going to [employer] [facility]   ADL   Eating Assistance 5  Supervision/Setup   Grooming Assistance 5  Supervision/Setup   UB Bathing Assistance 5  Supervision/Setup   LB Bathing Assistance 4  Minimal Assistance   UB Dressing Assistance 5  Supervision/Setup   LB Dressing Assistance 3  Moderate Assistance   Toileting Assistance  4  Minimal Assistance   Bed Mobility   Supine to Sit 5  Supervision   Additional items Assist x 1;HOB elevated; Bedrails; Increased time required;Verbal cues   Sit to Supine Unable to assess  (Pt seated OOB in chair at end of session)   Additional Comments Pt seated OOB in chair at end of session with call bell and phone within reach  All needs met and pt reports no further questions for OT at this time   Transfers   Sit to Stand 4  Minimal assistance   Additional items Assist x 1;Bedrails; Increased time required;Verbal cues   Stand to Sit 4  Minimal assistance   Additional items Assist x 1; Armrests; Increased time required;Verbal cues   Additional Comments Cues for safe technique and placement of hands during transfers   Functional Mobility   Functional Mobility 4  Minimal assistance   Additional Comments Assist x1   Additional items SPC   Balance   Static Sitting Good   Dynamic Sitting Fair +   Static Standing Fair   Dynamic Standing Fair -   Ambulatory Fair -   Activity Tolerance   Activity Tolerance Patient limited by fatigue   Nurse Made Aware Pt appropriate to be seen per ANJELICA Henry   RUE Assessment   RUE Assessment WFL   RUE Strength   RUE Overall Strength Within Functional Limits - able to perform ADL tasks with strength  (3+/5)   LUE Assessment   LUE Assessment WFL   LUE Strength   LUE Overall Strength Within Functional Limits - able to perform ADL tasks with strength  (3+/5)   Hand Function   Gross Motor Coordination Functional   Fine Motor Coordination Functional   Sensation   Light Touch No apparent deficits   Sharp/Dull No apparent deficits   Proprioception   Proprioception No apparent deficits   Vision - Complex Assessment   Ocular Range of Motion [facility]   Acuity Able to read clock/calendar on wall without difficulty   Perception   Inattention/Neglect Appears intact   Cognition   Overall Cognitive Status [facility]   Arousal/Participation Alert; Cooperative   Attention Within functional limits   Orientation Level Oriented X4   Memory Within functional limits   Following Commands Follows one step commands without difficulty   Assessment   Limitation Decreased ADL status; Decreased UE strength;Decreased endurance;Decreased self-care trans;Decreased high-level ADLs   Prognosis Fair   Assessment Pt is a 80 y o  female seen for OT evaluation s/p adm to [address] on 7/24/2018 feeling shaky, nauseated, and having auditory hallucinations and dx'd w/ Hyponatremia  Comorbidities affecting pts functional performance include a significant PMH of anxiety, HLD, and HTN  Pt with active OT orders and activity orders for Up with assistance  Pt lives alone in a one level apt with 0 POOJA and elevator access  Pt reports local family that is able to assist as needed  At baseline, pt was I w/ ADLs and functional mobility/transfers with use of SPC for household distances and RW for community mobility, required assist for IADLs, (-) , and reports 1 fall PTA  Upon evaluation, pt currently requires Mod-Min A for LB ADLs, Supervision for UB ADLs, Min A for toileting, Supervision for bed mobility, and Min A for functional mobility/transfers 2* the following deficits impacting occupational performance: weakness, decreased strength, decreased balance and decreased tolerance   These impairments, as well at pts limited home support, difficulty performing ADLS and difficulty performing IADLS  limit pts ability to safely engage in all baseline areas of occupation, including grooming, bathing, dressing, toileting, functional mobility/transfers, community mobility, house maintenance, social participation  and leisure activities   Pt scored overall 55/100 on the Barthel Index  Based on the aforementioned OT evaluation, functional performance deficits, and assessments, pt has been identified as a moderate complexity evaluation  Pt to continue to benefit from continued acute OT services during hospital stay to address defined deficits and to maximize level of functional independence in the following Occupational Performance areas: grooming, bathing/shower, toilet hygiene, dressing, socialization, health maintenance, functional mobility, community mobility, clothing management, social participation and transfers to common household surfaces  From OT standpoint, recommend STR vs Home OT pending progress upon D/C  OT will continue to follow pt 3-5x/wk to address the following goals to  w/in 10-14 days:   Goals   Patient Goals to get stronger   LTG Time Frame 10-   Long Term Goal Please refer to LTGs listed below   Plan   Treatment Interventions ADL retraining;Functional transfer training;UE strengthening/ROM; Endurance training;Patient/family training;Equipment evaluation/education; Compensatory technique education;Continued evaluation; Energy conservation; Activityengagement   Goal Expiration Date 18   Treatment Day 0   OT Frequency 3-5x/wk   Recommendation   OT Discharge Recommendation Short Term Rehab  (vs Home OT pending progress)   OT - OK to Discharge Yes  (when medically cleared to STR)   Barthel Index   Feeding 10   Bathing 0   Grooming Score 5   Dressing Score 5   Bladder Score 10   Bowels Score 10   Toilet Use Score 5   Transfers (Bed/Chair) Score 10   Mobility (Level Surface) Score 0   Stairs Score 0   Barthel Index Score 55   Modified [place] Scale   Modified [place] Scale 4        GOALS    1) Pt will improve activity tolerance to G for min 30 min txment sessions    2) Pt will complete UB/LB dressing/self care w/ mod I using adaptive device and DME as needed    3) Pt will complete bathing w/ Mod I w/ use of AE and DME as needed    4) Pt will complete toileting w/ mod I w/ G hygiene/thoroughness using DME as needed    5) Pt will improve functional transfers to Mod I on/off all surfaces using DME as needed w/ G balance/safety     6) Pt will improve functional mobility during ADL/IADL/leisure tasks to Mod I using DME as needed w/ G balance/safety     7) Pt will increase UE strength by 1 MM grade to increase independence in ADLs and transfers     8) Pt will engage in ongoing cognitive assessment w/ G participation w/ mod I to assist w/ safe d/c planning/recommendations    9) Pt will demonstrate G carryover of pt/caregiver education and training as appropriate w/ mod I w/o cues w/ good tolerance    10) Pt will demonstrate 100% carryover of energy conservation techniques w/ mod I t/o functional I/ADL/leisure tasks w/o cues s/p skilled education     11) Pt will be able to state 3 potential fall hazards in home environment and appropriate compensatory techniques to decrease fall risk in home environment      Ambrocio Tamez, OTR/L

## 2018-07-26 LAB
ANION GAP SERPL CALCULATED.3IONS-SCNC: 7 MMOL/L (ref 4–13)
BUN SERPL-MCNC: 20 MG/DL (ref 5–25)
CALCIUM SERPL-MCNC: 8.7 MG/DL (ref 8.3–10.1)
CHLORIDE SERPL-SCNC: 95 MMOL/L (ref 100–108)
CO2 SERPL-SCNC: 26 MMOL/L (ref 21–32)
CREAT SERPL-MCNC: 0.9 MG/DL (ref 0.6–1.3)
GFR SERPL CREATININE-BSD FRML MDRD: 58 ML/MIN/1.73SQ M
GLUCOSE SERPL-MCNC: 103 MG/DL (ref 65–140)
OSMOLALITY UR/SERPL-RTO: 268 MMOL/KG (ref 282–298)
POTASSIUM SERPL-SCNC: 3.7 MMOL/L (ref 3.5–5.3)
SODIUM SERPL-SCNC: 128 MMOL/L (ref 136–145)

## 2018-07-26 PROCEDURE — G8979 MOBILITY GOAL STATUS: HCPCS

## 2018-07-26 PROCEDURE — 83930 ASSAY OF BLOOD OSMOLALITY: CPT | Performed by: PHYSICIAN ASSISTANT

## 2018-07-26 PROCEDURE — 97163 PT EVAL HIGH COMPLEX 45 MIN: CPT

## 2018-07-26 PROCEDURE — 80048 BASIC METABOLIC PNL TOTAL CA: CPT | Performed by: PHYSICIAN ASSISTANT

## 2018-07-26 PROCEDURE — G8978 MOBILITY CURRENT STATUS: HCPCS

## 2018-07-26 PROCEDURE — 97116 GAIT TRAINING THERAPY: CPT

## 2018-07-26 PROCEDURE — 99232 SBSQ HOSP IP/OBS MODERATE 35: CPT | Performed by: INTERNAL MEDICINE

## 2018-07-26 RX ORDER — DULOXETIN HYDROCHLORIDE 30 MG/1
30 CAPSULE, DELAYED RELEASE ORAL DAILY
Status: DISCONTINUED | OUTPATIENT
Start: 2018-07-27 | End: 2018-07-27 | Stop reason: HOSPADM

## 2018-07-26 RX ORDER — AMLODIPINE BESYLATE 10 MG/1
10 TABLET ORAL DAILY
Status: DISCONTINUED | OUTPATIENT
Start: 2018-07-27 | End: 2018-07-27 | Stop reason: HOSPADM

## 2018-07-26 RX ORDER — OLANZAPINE 5 MG/1
7.5 TABLET ORAL
Status: DISCONTINUED | OUTPATIENT
Start: 2018-07-26 | End: 2018-07-27 | Stop reason: HOSPADM

## 2018-07-26 RX ADMIN — AMLODIPINE BESYLATE 5 MG: 5 TABLET ORAL at 08:01

## 2018-07-26 RX ADMIN — ATORVASTATIN CALCIUM 20 MG: 20 TABLET, FILM COATED ORAL at 08:01

## 2018-07-26 RX ADMIN — SODIUM CHLORIDE TAB 1 GM 1 G: 1 TAB at 18:15

## 2018-07-26 RX ADMIN — LOSARTAN POTASSIUM 100 MG: 50 TABLET, FILM COATED ORAL at 08:01

## 2018-07-26 RX ADMIN — DULOXETINE 20 MG: 20 CAPSULE, DELAYED RELEASE ORAL at 08:01

## 2018-07-26 RX ADMIN — CLONAZEPAM 0.5 MG: 0.5 TABLET ORAL at 08:01

## 2018-07-26 RX ADMIN — SODIUM CHLORIDE TAB 1 GM 1 G: 1 TAB at 12:23

## 2018-07-26 RX ADMIN — Medication 1 TABLET: at 08:01

## 2018-07-26 RX ADMIN — OLANZAPINE 7.5 MG: 5 TABLET, FILM COATED ORAL at 21:34

## 2018-07-26 RX ADMIN — SODIUM CHLORIDE TAB 1 GM 1 G: 1 TAB at 08:01

## 2018-07-26 RX ADMIN — METOPROLOL SUCCINATE 100 MG: 50 TABLET, EXTENDED RELEASE ORAL at 08:01

## 2018-07-26 RX ADMIN — ASPIRIN 81 MG: 81 TABLET, COATED ORAL at 07:57

## 2018-07-26 RX ADMIN — ENOXAPARIN SODIUM 40 MG: 40 INJECTION SUBCUTANEOUS at 07:57

## 2018-07-26 NOTE — CASE MANAGEMENT
Notification of Inpatient Admission/Inpatient Authorization Request  This is a Notification of Inpatient Admission/Request for Inpatient Authorization to our facility [address]  Please be advised that this patient is currently in our facility under Inpatient Status  Below you will find the Attending Physician and Facilitys information including NPI# and contact information for the Utilization  assigned to the [facility] where the patient is receiving services  Please feel free to contact the Utilization Review Department with any questions  Patient Information:  PATIENT NAME: Rom Fry  MRN: [id number]  YOB: 1931    PRESENTATION DATE: 7/24/2018  6:49 PM  IP ADMISSION DATE: 7/25/18 0836  DISCHARGE DATE: No discharge date for patient encounter  DISPOSITION: Home with [address] [facility]    Attending Physician:    Facility:  [address] [place] [address]  [phone]  Tax ID: [id number]  NPI: [id number]    Thank you,  Karl Herq  291 Utilization Review Department  Phone: [phone]; Fax [phone]  ATTENTION: The Network Utilization Review Department is now centralized for our 9 Facilities  Make a note that we have a new phone and fax numbers for our Department  Please call with any questions or concerns to [phone] and carefully follow the prompts so that you are directed to the right person  All voicemails are confidential  Fax any determinations, approvals, denials, and requests for initial or continue stay review clinical to [phone]  Due to HIGH CALL volume, it would be easier if you could please send faxed requests to expedite your requests and in part, help us provide discharge notifications faster

## 2018-07-26 NOTE — CONSULTS
Psychiatric Evaluation - Behavioral Health       Assessment/Plan  Principal Problem:  F05 Delirium NOS with Psychosis  F02 Dementia NOS  PLAN:   1) This writer agree with increase in Zyprexa 7 5 mg PO Q HS  2) Increase Cymbalta to 30 mg Po Q AM for depression  Herman Dawson 3) Patient may follow up with her out patient psychiatrist    4) Communicated with patients' medical team       Chief Complaint: "I am having hallucinations   "    History of Present Illness: This is a 61-year-old  female who was admitted with the complaints of shortness of breath and feeling shaky  Patient has previous history of anxiety high blood pressure hyperlipidemia  Patient's daughter who later found out to be patient's friend reported that patient recently was the doubt decreased on her medication that included Klonopin and Cymbalta  Patient's son during the course of her admission to the hospital also reported that patient is becoming increasingly confused and demented patient has history of schizophrenia in the past and now she is becoming more delusional and paranoid  Patient was observed to be confused and a a disoriented and started wandering the hallways during her stay  But she was easily redirectable  When this writer talked to patient patient is primarily [place] speaking but there was a staff member available who did the interpretation  Patient says that she feels okay now  She has depression and anxiety  She said she feels that her depression is increased because of the change in medication  She had reported to this writer that she also has been hearing voices telling her her name  She said that she has felt like that long time ago when she was in [place] and was given medication it went away however now she is feeling like that again she said she feels very anxious because of that    As per her son's history which was obtained via talking to Dr Oni Gómez and other staff members has son has reported to them that patient is becoming more confused and paranoid and this is increasingly getting worse  This writer also noted that patient has hard time her recollecting dates and certain incidences however she understood that she was in the hospital she is here because of medical problems she understood that she is having problem with remembering things this writer feels that patient has been memory deficits she is having increasing dementia and the currently having hallucinations she did not demonstrate any paranoia out to this writer  PAST PSYCH HISTORY:     Patient has previoushistory of schizophrenia was not several medication which she does not recall the name has son also does not recall the name she denied being ever inpatient psychiatric hospital however she said she saw psychiatrist in [facility]   She said she is off her medication for a while but recently her symptoms are coming back  Substance Abuse History:    Denied any substance abuse  Family Psychiatric History:   Denying any family history of psychotic or hit mood disorder  Social History:  Social History     Social History    Marital status:      Spouse name: N/A    Number of children: N/A    Years of education: N/A     Occupational History    Not on file  Social History Main Topics    Smoking status: Never Smoker    Smokeless tobacco: Never Used    Alcohol use No    Drug use: No    Sexual activity: Not on file     Other Topics Concern    Not on file     Social History Narrative    No narrative on file       Traumatic History:   Abuse: None  Other Traumatic Events: None  Past Medical History:   Diagnosis Date    Anxiety     Hyperlipidemia     Hypertension        Medical Review Of Systems:  All 12 point review of system is normal except for what is mention in medical hisotry    Scheduled Meds:  Current Facility-Administered Medications:  acetaminophen 650 mg Oral Q6H PRN Too Guzmán PA-C   [START ON 7/27/2018] amLODIPine 10 mg Oral Daily Kylee Chew, DO   aspirin 81 mg Oral Daily Gabi Chapin PA-C   atorvastatin 20 mg Oral Daily Gail Schwab [place]   [START ON 7/27/2018] DULoxetine 30 mg Oral Daily Vennie Oppenheim, MD   enoxaparin 40 mg Subcutaneous Daily Gabi Chapin PA-C   hydrALAZINE 5 mg Intravenous Q6H PRN Tashi Johnston PA-C   losartan 100 mg Oral Daily Gabi Chapin PA-C   metoprolol succinate 100 mg Oral Daily Gabi Chapin PA-C   OLANZapine 7 5 mg Oral HS Kylee Chew, DO   ondansetron 4 mg Intravenous Q6H PRN Gail Schwab PA-C   senna-docusate sodium 1 tablet Oral BID Gabi Chapin PA-C   sodium chloride 1 g Oral TID With Meals Kylee Hayden, DO     Continuous Infusions:   PRN Meds:   acetaminophen    hydrALAZINE    ondansetron     No Known Allergies     Vitals:    07/26/18 1513   BP: 151/67   Pulse: 80   Resp: 22   Temp: 98 2 °F (36 8 °C)   SpO2: 96%             Mental Status Evaluation:  Appearance:   appeared of age and had good hygiene    Behavior:   behavior was cooperative    Speech:   speech is normal goal directed    Mood:  Normal    Affect Anxious that   Language: naming objects and Goal directed  Thought Process:   logical and linear    Thought Content:  Normal   Perceptual Disturbances: Hallucination   Hear someone calling her name  Risk Potential: None   Sensorium:  Gets disoriented  Cognition:  Poor at times  Consciousness:   alert, awake, and oriented ×1  Attention: Poor  Intellect: Normal   Fund of Knowledge: Fair  Insight:  Fair  Judgment: Fair  Muscle Strength and Tone: Normal    Gait/Station:  gait was not assessed    Motor Activity: no abnormal movements     Lab Results: I have personally reviewed pertinent lab results  NOTE:  Total of 40 minutes were spent in talking to patient completing this medical record reviewing medical chart medical decision making    Vennie Oppenheim, MD

## 2018-07-26 NOTE — SOCIAL WORK
Met with pt's son Morena Brooks who provided information for initial assessment  Pt is primarily [place] speaking  Pt lives alone in an apt in a senior building  Prior to admission, pt independent with ambulation and ADLs  No hx of VNA/STR  Pt is part of [employer] and goes to a day program from 9am-2pm MWF and also attends another day program for [place] speaking resident on [address]  Pt is picked up and brought by the day programs by them  Son stated that pt is disenrolling in [employer] [facility] and it will only be effective til 7/31  Pt to be getting MA and son has applied for the waiver program for the pt  Sona Drivers from [facility] area of aging [phone] came today to meet with the son and fill out paperwork for the program  She stated that pt is appropriate for the Waiver program, however, it will take about 1-3 months for approval so their will be a gap once pt is disenrolled from   Pt's son appears to be very involved/supportive  Spoke with Susan Gotti from [employer] [phone] updating her on pt's status  Informed her that pt will need some form of assistance when discharged  Pt will be assessed for it by [employer], however, with her disenrolling towards the end of the month she will not be able to get services past 7/31  Brittnee Lara will follow-up with pt's son  Will continue to follow for discharge planning needs

## 2018-07-26 NOTE — PROGRESS NOTES
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, started sodium tablets, difficult to fluid restrict an elderly demented patient with a history of schizophrenia  Schizophrenia history   discussed with son await psychiatry consult continue Zyprexa    Hypertension    increase Norvasc for better control continue losartan and beta-blocker    Dyslipidemia    continue statin for LDL control    Ambulatory dysfunction  reviewed OT PT evaluations, discussed rehab with son who would prefer patient to remain home with services    Subjective:   Confused, but feels regular, denies chest pain shortness of breath nausea vomiting diarrhea no fevers appetite stable    Objective:     Vitals: Blood pressure 169/91, pulse 100, temperature (!) 97 4 °F (36 3 °C), temperature source Temporal, resp  rate 20, height 5' (1 524 m), weight 58 1 kg (128 lb 1 4 oz), SpO2 98 %  ,Body mass index is 25 02 kg/m²  Results from last 7 days  Lab Units 07/25/18  0441   WBC Thousand/uL 7 15   HEMOGLOBIN g/dL 12 9   HEMATOCRIT % 37 3   PLATELETS Thousands/uL 223       Results from last 7 days  Lab Units 07/26/18  0029  07/24/18  2040   SODIUM mmol/L 128*  < > 123*   POTASSIUM mmol/L 3 7  < > 4 3   CHLORIDE mmol/L 95*  < > 89*   CO2 mmol/L 26  < > 26   BUN mg/dL 20  < > 12   CREATININE mg/dL 0 90  < > 0 88   CALCIUM mg/dL 8 7  < > 9 5   TOTAL PROTEIN g/dL  --   --  7 9   BILIRUBIN TOTAL mg/dL  --   --  0 72   ALK PHOS U/L  --   --  95   ALT U/L  --   --  18   AST U/L  --   --  33   GLUCOSE RANDOM mg/dL 103  < > 108   < > = values in this interval not displayed      Scheduled Meds:    Current Facility-Administered Medications:  acetaminophen 650 mg Oral Q6H PRN Jasbir Zhang PA-C   [START ON 7/27/2018] amLODIPine 10 mg Oral Daily Jeannette Alanis,    aspirin 81 mg Oral Daily Gabi Piger, SANTOS   atorvastatin 20 mg Oral Daily Gabi Piger, PA-VICTOR MANUEL   DULoxetine 20 mg Oral Daily Gabi Piger, PA-VICTOR MANUEL   enoxaparin 40 mg Subcutaneous Daily Gabi Piger, PA-VICTOR MANUEL   hydrALAZINE 5 mg Intravenous Q6H PRN Adwoa Cowan, SANTOS   losartan 100 mg Oral Daily Gabi Piger, PA-C   metoprolol succinate 100 mg Oral Daily Gabi Piger, PA-VICTOR MANUEL   OLANZapine 7 5 mg Oral HS Jeannette Alanis,    ondansetron 4 mg Intravenous Q6H PRN Gabi Piger, SANTOS   senna-docusate sodium 1 tablet Oral BID Gabi Piger, SANTOS   sodium chloride 1 g Oral TID With Meals Jeannette Alanis, DO       Continuous Infusions:     Physcal exam:  General appearance:  Alert no distress confused poor interaction  Head/Eyes:  Nonicteric PERRL EOMI    Neck:  Supple  Lungs:  Decreased BS bilateral no wheezing rhonchi or rales  Heart: normal S1 S2 regular  Abdomen: Soft nontender with bowel sounds  Extremities: no edema  Skin: no rash    Invasive Devices     Peripheral Intravenous Line            Peripheral IV 07/24/18 Left Antecubital 1 day                  Counseling / Coordination of Care  Total floor / unit time spent today  30  minutes  Greater than 50% of total time was spent with the patient and / or family counseling and / or coordination of care    A description of the counseling / coordination of care:  Long discussion with son, exploring patient's history of schizophrenia, progressing dementia

## 2018-07-26 NOTE — PLAN OF CARE
Problem: DISCHARGE PLANNING - CARE MANAGEMENT  Goal: Discharge to post-acute care or home with appropriate resources  INTERVENTIONS:  - Conduct assessment to determine patient/family and health care team treatment goals, and need for post-acute services based on payer coverage, community resources, and patient preferences, and barriers to discharge  - Address psychosocial, clinical, and financial barriers to discharge as identified in assessment in conjunction with the patient/family and health care team  - Arrange appropriate level of post-acute services according to patients   needs and preference and payer coverage in collaboration with the physician and health care team  - Communicate with and update the patient/family, physician, and health care team regarding progress on the discharge plan  - Arrange appropriate transportation to post-acute venues  Outcome: Progressing  -Discharge plan at this time is home with home care services when medically cleared  Will continue to follow for discharge planning needs

## 2018-07-26 NOTE — PLAN OF CARE
Problem: PHYSICAL THERAPY ADULT  Goal: Performs mobility at highest level of function for planned discharge setting  See evaluation for individualized goals  Treatment/Interventions: Functional transfer training, LE strengthening/ROM, Therapeutic exercise, Endurance training, Patient/family training, Equipment eval/education, Bed mobility, Gait training, Compensatory technique education, Spoke to nursing  Equipment Recommended: Other (Comment) (RW, has rollator and SPC at home)       See flowsheet documentation for full assessment, interventions and recommendations  Outcome: Progressing  Prognosis: Good  Problem List: Decreased strength, Decreased endurance, Impaired balance, Decreased mobility, Decreased cognition, Impaired judgement, Decreased safety awareness, Impaired hearing  Assessment: Patient is an 80 y o female admitted to [address] on 7/24/18 with primary diagnosis of hyponatremia  Patient noted to have shakiness, nausea and confusion upon admission  PTA patient was (I) in ADLs and functional mobility with [facility] or [facility] in a one level apartment with elevator  Patient is primarily [address] speaking and displays mild confusion with questions from therapy and was slow at times to process questions/information  Patient's son present and providing much of her history  He reports that he has observed signs of decline in function beginning four years ago and has noted further declines related to nutrition and activity levels over the past two months  Patient receives some assistance with IADLs from her friend and participates with [facility] Monday, Wednesdays and Fridays and an additional senior community center on Tuesdays and Thursdays for approximately 4 hours a day  Her son reports that she will not be attending [facility] much longer as she has difficulty communicating there   He also reports that her friend may be relocating to [facility] and not available to assist patient any longer  He reports that he is seeking arrangements so that she is not home alone as often secondary to her cognitive decline and his concerns that she is not eating/drinking enough at home  Patient presents with decreased strength, balance, endurance, functional mobility and functional activity tolerance and would benefit from skilled PT services to address these deficits prior to d/c  Recommendation at this time would be for rehab at d/c due to her functional deficits but depending on progression she may be able to d/c to home if sufficien family/caregiver support is available and home PT services  Barriers to Discharge: Decreased caregiver support  Barriers to Discharge Comments: Lives alone  Recommendation: Short-term skilled PT, Home PT, Home with family support (Dependent on her progression)     PT - OK to Discharge: Yes    See flowsheet documentation for full assessment

## 2018-07-26 NOTE — PHYSICAL THERAPY NOTE
PHYSICAL THERAPY EVALUATION    Time in: 1145  Time out: 1205  Total time: 20 minutes    PT EVALUATION    80 y o     [id number]    Anxiety [F41 9]  Hyponatremia [E87 1]    Past Medical History:   Diagnosis Date    Anxiety     Hyperlipidemia     Hypertension          Past Surgical History:   Procedure Laterality Date    CHOLECYSTECTOMY        07/26/18 1216   Note Type   Note type Eval/Treat   Pain Assessment   Pain Assessment No/denies pain   Pain Score No Pain   Home Living   Type of Home Apartment   Home Layout One level   Bathroom Shower/Tub Tub/shower unit   Bathroom Toilet Standard   Bathroom Equipment Grab bars in shower; Shower chair   [facility]; Other (Comment)  (Rollator)   Additional Comments Patient lives alone and has a son and friend nearby to assist  Her friend visits reguarly but may be relocating to [facility] soon  She has been going to [employer] and an additional [employer] Tuesdays and Thursdays [facility] for 4 hours per day  Prior Function   Level of [place] Independent with ADLs and functional mobility   Lives With Alone   Receives Help From Family;Friend(s)   ADL Assistance Independent   IADLs Needs assistance  (Some assistance with household chores)   Falls in the last 6 months 1 to 4   Comments Patient was (I) with functional mobility using a SPC in her home and rollator for community outings  Restrictions/Precautions   Weight Bearing Precautions Per Order No   Other Precautions Fall Risk;Cognitive; Bed Alarm; Chair Alarm;Hard of hearing   General   Additional Pertinent History PT consulted with orders: up with assistance  PTA patient was independent with ADLs and functional mobility with [facility] and rollator in the community   Her son was present for evaluation and reports that he brought her here from [place] 4 years ago when he first began noticing functional deficits related to medication management  Pt's son reports that over the past 2 months he feels that she has had further decline in function and is worried that she is not eating/drinking enough at home  He is seeking arrangements through the [facility] for her to not be home alone as often  Family/Caregiver Present Yes  (Son, Enrique Blandon)   Cognition   Overall Cognitive Status Impaired   Arousal/Participation Responsive   Attention Attends with cues to redirect   Orientation Level Oriented to person;Oriented to place; Disoriented to time   Memory Within functional limits   Following Commands Follows one step commands with increased time or repetition   Comments Slowed processing of information/questions throughout  RUE Assessment   RUE Assessment WFL   LUE Assessment   LUE Assessment WFL   RLE Assessment   RLE Assessment X  (Grossly 4-/5)   LLE Assessment   LLE Assessment X  (Grossly 4-/5)   Coordination   Movements are Fluid and Coordinated 1   Sensation WFL   Light Touch   RLE Light Touch Grossly intact   LLE Light Touch Grossly intact   Bed Mobility   Supine to Sit 5  Supervision   Additional items Assist x 1;HOB elevated; Bedrails; Increased time required;Verbal cues   Transfers   Sit to Stand 4  Minimal assistance   Additional items Assist x 1; Increased time required;Verbal cues   Additional Comments Cues for safe technique and hand use   Ambulation/Elevation   Gait pattern Improper Weight shift; Inconsistent dustin; Short stride; Ataxia; Excessively slow  (Mildly ataxic, moderate lateral displacement R > L)   Gait Assistance 4  Minimal assist   Additional items Assist x 1;Verbal cues; Tactile cues  (1 x LOB requiring mod A from therapist to prevent overt LOB)   Assistive Device SPC   Distance 1 x 15ft with [facility]   Stair Management Assistance Not tested   Balance   Static Sitting Good   Dynamic Sitting Fair +   Static Standing Fair   Dynamic Standing [address],Flo[address],4, & 5 - Endurance Deficit   Endurance Deficit Yes   Endurance Deficit Description Fatigue   Activity Tolerance   Activity Tolerance Patient limited by fatigue;Patient tolerated treatment well   Nurse Made Aware Yes, Marissa Gan RN ok to see patient   Assessment   Prognosis Good   Problem List Decreased strength;Decreased endurance; Impaired balance;Decreased mobility; Decreased cognition; Impaired judgement;Decreased safety awareness; Impaired hearing   Assessment Patient is an 80 y o female admitted to [facility] on 7/24/18 with primary diagnosis of hyponatremia  Patient noted to have shakiness, nausea and confusion upon admission  PTA patient was (I) in ADLs and functional mobility with [facility] or [facility] in a one level apartment with elevator  Patient is primarily [address] speaking and displays mild confusion with questions from therapy and was slow at times to process questions/information  Patient's son present and providing much of her history  He reports that he has observed signs of decline in function beginning four years ago and has noted further declines related to nutrition and activity levels over the past two months  Patient receives some assistance with IADLs from her friend and participates with [employer] Monday, Wednesdays and Fridays and an additional [employer] community center on Tuesdays and Thursdays for approximately 4 hours a day  Her son reports that she will not be attending [employer] much longer as she has difficulty communicating there  He also reports that her friend may be relocating to [facility] and not available to assist patient any longer  He reports that he is seeking arrangements so that she is not home alone as often secondary to her cognitive decline and his concerns that she is not eating/drinking enough at home   Patient presents with decreased strength, balance, endurance, functional mobility and functional activity tolerance and would benefit from skilled PT services to address these deficits prior to d/c  Recommendation at this time would be for rehab at d/c due to her functional deficits but depending on progression she may be able to d/c to home if sufficien family/caregiver support is available and home PT services  Barriers to Discharge Decreased caregiver support   Barriers to Discharge Comments Lives alone   Goals   Patient Goals to get stronger   LTG Expiration Date 08/05/18   Long Term Goal #1 Patient to perform bed mobility at mod I level in order to be able to safely get in and out of bed at home  Patient will perform functional transfers using appropriate AD at mod I level in order to be able to perform safely at  home without assistance  Patient will ambulate 300ft with appropriate AD at mod I level in order to return to [address] [facility]   [address] without assistance  Patient will increase strength and balance by 1 grade in order to improve stability with gait and reduce her risk of falling  Treatment Day 1   Plan   Treatment/Interventions Functional transfer training;LE strengthening/ROM; Therapeutic exercise; Endurance training;Patient/family training;Equipment eval/education; Bed mobility;Gait training; Compensatory technique education;Spoke to nursing   PT Frequency (4-5x/wk)   Recommendation   Recommendation Short-term skilled PT; Home PT; Home with family support  (Dependent on her progression)   Equipment Recommended Other (Comment)  (RW, has rollator and SPC at home)   PT - OK to Discharge Yes   Additional Comments Yes to rehab when medically cleared, no to home until IPPT goals have been met     Modified Tipton Scale   Modified Tipton Scale 4   Barthel Index   Feeding 10   Bathing 0   Grooming Score 5   Dressing Score 5   Bladder Score 10   Bowels Score 10   Toilet Use Score 5   Transfers (Bed/Chair) Score 10   Mobility (Level Surface) Score 0   Stairs Score 0   Barthel Index Score 55       History: lives alone fall risk, use of assistive device, assist for iadl's, recent decline in function  Exam: impairments in locomotion, musculoskeletal, balance,posture, cognition, decreased strength, decreased mobility, decreased activity tolerance  Clinical: unstable/unpredictable (fall risk, need for more restrictive AD, functioning below baseline, trending lab values, Barthel 55, cognition, primarily Nigerian speaking)  Complexity:high    Time In:1205  Time Out:1216  Total Time: 11 minutes      S:  Patient agreeable to trial use of RW for gait  O:  Patient ambulated 1 x 60ft with RW at min A level with 1 x LOB requiring min A from therapist to prevent overt LOB  Patient then performs stand to sit transfer to recliner chair with min A and verbal cueing for proper hand use  Educated patient and patient's son on rehab's role towards bringing her back to [facility]  A:  Patient tolerates ambulation with RW fairly well with improved stability with gait versus use of SPC  Therapist assistance on RW to improve dustin which seemed to help improve her stability with gait further  Patient's son agreeable to therapy recommendations at this time for rehab vs home PT services depending on her progression  He is working with the [facility] currently to have assistance available for his mom at home so she is not home alone as often  Patient seated comfortably in chair with call bell, phone in reach and chair alarm active  Patient's son remained in room with her  P:  Continue with current POC      Nikia Clayton, SPT        Nikia Clayton          Patient Name: Gifty Teran  AIXA Date: 7/26/2018

## 2018-07-27 VITALS
HEART RATE: 98 BPM | WEIGHT: 128.09 LBS | RESPIRATION RATE: 18 BRPM | OXYGEN SATURATION: 98 % | BODY MASS INDEX: 25.15 KG/M2 | TEMPERATURE: 98.9 F | HEIGHT: 60 IN | SYSTOLIC BLOOD PRESSURE: 126 MMHG | DIASTOLIC BLOOD PRESSURE: 60 MMHG

## 2018-07-27 LAB
ANION GAP SERPL CALCULATED.3IONS-SCNC: 8 MMOL/L (ref 4–13)
BUN SERPL-MCNC: 16 MG/DL (ref 5–25)
CALCIUM SERPL-MCNC: 8.5 MG/DL (ref 8.3–10.1)
CHLORIDE SERPL-SCNC: 100 MMOL/L (ref 100–108)
CO2 SERPL-SCNC: 26 MMOL/L (ref 21–32)
CREAT SERPL-MCNC: 0.86 MG/DL (ref 0.6–1.3)
GFR SERPL CREATININE-BSD FRML MDRD: 61 ML/MIN/1.73SQ M
GLUCOSE SERPL-MCNC: 89 MG/DL (ref 65–140)
POTASSIUM SERPL-SCNC: 3.8 MMOL/L (ref 3.5–5.3)
SODIUM SERPL-SCNC: 134 MMOL/L (ref 136–145)

## 2018-07-27 PROCEDURE — 97116 GAIT TRAINING THERAPY: CPT

## 2018-07-27 PROCEDURE — 80048 BASIC METABOLIC PNL TOTAL CA: CPT | Performed by: INTERNAL MEDICINE

## 2018-07-27 PROCEDURE — 99239 HOSP IP/OBS DSCHRG MGMT >30: CPT | Performed by: INTERNAL MEDICINE

## 2018-07-27 RX ORDER — SODIUM CHLORIDE 1000 MG
1 TABLET, SOLUBLE MISCELLANEOUS 2 TIMES DAILY WITH MEALS
Status: DISCONTINUED | OUTPATIENT
Start: 2018-07-27 | End: 2018-07-27 | Stop reason: HOSPADM

## 2018-07-27 RX ORDER — SODIUM CHLORIDE 1000 MG
1 TABLET, SOLUBLE MISCELLANEOUS 2 TIMES DAILY WITH MEALS
Qty: 60 TABLET | Refills: 0 | Status: SHIPPED | OUTPATIENT
Start: 2018-07-27 | End: 2018-10-11

## 2018-07-27 RX ORDER — DULOXETIN HYDROCHLORIDE 30 MG/1
30 CAPSULE, DELAYED RELEASE ORAL DAILY
Qty: 30 CAPSULE | Refills: 0 | Status: SHIPPED | OUTPATIENT
Start: 2018-07-28 | End: 2018-09-14 | Stop reason: SDUPTHER

## 2018-07-27 RX ORDER — AMLODIPINE BESYLATE 10 MG/1
10 TABLET ORAL DAILY
Qty: 30 TABLET | Refills: 0 | Status: SHIPPED | OUTPATIENT
Start: 2018-07-28 | End: 2018-09-14 | Stop reason: SDUPTHER

## 2018-07-27 RX ORDER — OLANZAPINE 7.5 MG/1
7.5 TABLET ORAL
Qty: 30 TABLET | Refills: 0 | Status: SHIPPED | OUTPATIENT
Start: 2018-07-27 | End: 2018-09-14 | Stop reason: SDUPTHER

## 2018-07-27 RX ADMIN — METOPROLOL SUCCINATE 100 MG: 50 TABLET, EXTENDED RELEASE ORAL at 08:03

## 2018-07-27 RX ADMIN — ENOXAPARIN SODIUM 40 MG: 40 INJECTION SUBCUTANEOUS at 08:03

## 2018-07-27 RX ADMIN — SODIUM CHLORIDE TAB 1 GM 1 G: 1 TAB at 08:03

## 2018-07-27 RX ADMIN — LOSARTAN POTASSIUM 100 MG: 50 TABLET, FILM COATED ORAL at 08:03

## 2018-07-27 RX ADMIN — ASPIRIN 81 MG: 81 TABLET, COATED ORAL at 08:03

## 2018-07-27 RX ADMIN — Medication 1 TABLET: at 08:03

## 2018-07-27 RX ADMIN — ATORVASTATIN CALCIUM 20 MG: 20 TABLET, FILM COATED ORAL at 08:03

## 2018-07-27 RX ADMIN — AMLODIPINE BESYLATE 10 MG: 10 TABLET ORAL at 08:03

## 2018-07-27 RX ADMIN — DULOXETINE 30 MG: 30 CAPSULE, DELAYED RELEASE ORAL at 08:03

## 2018-07-27 NOTE — PROGRESS NOTES
- 1  Progress Note - Richelle Doctor 80 y o  female MRN: [id number]    Unit/Bed#: E5 -01 Encounter: [id number]    Assessment/Plan:    Hyponatremia   appears SIADH and benefit with sodium chloride tablets sodium 134    Hypertension   elevated with sodium chloride tablets, will add clonidine patch to losartan metoprolol and Norvasc    History of schizophrenia review psychiatry consult will continue Zyprexa and Cymbalta with slightly increased dose Zyprexa 7 5 Cymbalta 30    Dyslipidemia   continue statin for LDL control    Ambulatory dysfunction use walker with ambulation, son is looking into home services after seeing year old    Metabolic encephalopathy improving with increased psychiatric medication use and correction of low-sodium    Dementia this psychiatric features continue Zyprexa and Cymbalta strongly encourage close psychiatric follow-up outpatient    Subjective:   Feels bed because blood pressures high, denies chest pain shortness of breath nausea vomiting diarrhea no fevers chills appetite is stable     Objective:     Vitals: Blood pressure 141/84, pulse 98, temperature 98 9 °F (37 2 °C), temperature source Temporal, resp  rate 18, height 5' (1 524 m), weight 58 1 kg (128 lb 1 4 oz), SpO2 98 %  ,Body mass index is 25 02 kg/m²  Results from last 7 days  Lab Units 07/25/18  0441   WBC Thousand/uL 7 15   HEMOGLOBIN g/dL 12 9   HEMATOCRIT % 37 3   PLATELETS Thousands/uL 223       Results from last 7 days  Lab Units 07/27/18  0546  07/24/18  2040   SODIUM mmol/L 134*  < > 123*   POTASSIUM mmol/L 3 8  < > 4 3   CHLORIDE mmol/L 100  < > 89*   CO2 mmol/L 26  < > 26   BUN mg/dL 16  < > 12   CREATININE mg/dL 0 86  < > 0 88   CALCIUM mg/dL 8 5  < > 9 5   TOTAL PROTEIN g/dL  --   --  7 9   BILIRUBIN TOTAL mg/dL  --   --  0 72   ALK PHOS U/L  --   --  95   ALT U/L  --   --  18   AST U/L  --   --  33   GLUCOSE RANDOM mg/dL 89  < > 108   < > = values in this interval not displayed      Scheduled Meds:    Current Facility-Administered Medications:  acetaminophen 650 mg Oral Q6H PRN Yael Piña PA-C   amLODIPine 10 mg Oral Daily Yajaira Lopez DO   aspirin 81 mg Oral Daily Gabi Chapin PA-C   atorvastatin 20 mg Oral Daily Gabi Chapin, SANTOS   cloNIDine 0 2 mg Transdermal Weekly Yajaira Lopez DO   DULoxetine 30 mg Oral Daily Silvia Read MD   enoxaparin 40 mg Subcutaneous Daily Gabi Chapin, SANTOS   hydrALAZINE 5 mg Intravenous Q6H PRN William Nailer, SANTOS   losartan 100 mg Oral Daily Gabi Chapin, SANTOS   metoprolol succinate 100 mg Oral Daily Gabi Chapin, SANTOS   OLANZapine 7 5 mg Oral HS Yajaira Lopez DO   ondansetron 4 mg Intravenous Q6H PRN Gabi Chapin PA-C   senna-docusate sodium 1 tablet Oral BID Gabi Chapin, SANTOS   sodium chloride 1 g Oral TID With Meals Yajaira Lopez DO       Continuous Infusions:     Physical exam:  General appearance:  Alert no distress confused   Head/Eyes:  Nonicteric sluggish PERRL EOMI  Neck:  Supple  Lungs:  Decreased BS bilateral no wheezing rhonchi or rales  Heart: normal S1 S2 regular  Abdomen: Soft nontender with bowel sounds  Extremities: no edema  Skin: no rash    Invasive Devices     Peripheral Intravenous Line            Peripheral IV 07/24/18 Left Antecubital 2 days                  VTE Pharmacologic Prophylaxis:  Lovenox   VTE Mechanical Prophylaxis:  SCDs                    Counseling / Coordination of Care  Total floor / unit time spent today  30  minutes  Greater than 50% of total time was spent with the patient and / or family counseling and / or coordination of care    A description of the counseling / coordination of care:

## 2018-07-27 NOTE — SOCIAL WORK
Pt is cleared for discharge to home today  Spoke with Yehuda Dutton from [employer] who was in today to speak with pt and her son Benoit Found  Pt and her son Benoit Found have decided to extend [employer] for a month or at least until pt's waiver program is approved  Informed Yehuda Dutton that pt will be needing some home care and assistance when discharged  She stated that someone will be in on Monday to assess pt for further home needs  This weekend if there are any concerns family can call the 24hr on-call line for any assistance  Family will try to assist family this weekend  New Rx and d/c summary has been faxed over to [facility] at [phone] and to be delivered to pt's home this evening

## 2018-07-27 NOTE — NURSING NOTE
Discharge instructions reviewed with patient's family  Scripts sent to [employer]  Pt's son verbalized understanding of medication changes and additions, denies any further questions at this time  Will continue to monitor patient until discharge

## 2018-07-27 NOTE — DISCHARGE SUMMARY
Discharge Summary - Medical Oleg Ramos 80 y o  female MRN: [id number]    [address] Room / Bed: [facility] [address] 559/E5 -* Encounter: [id number]    BRIEF OVERVIEW    Admitting Provider: Veena Dawson DO  Discharge Provider: Veena Dawson DO  Admission Date: 7/24/2018     Discharge Date: No discharge date for patient encounter  Primary Care Physician at Discharge: No primary care provider on file  None    Primary Discharge Diagnosis  Acute metabolic encephalopathy  Hyponatremia    Other Problems Addressed  Patient Active Problem List    Diagnosis Date Noted    Hypertension 07/24/2018    Anxiety 07/24/2018    Hyperlipidemia 07/24/2018    Hyponatremia 07/24/2018    Acute encephalopathy 07/24/2018       Consulting Providers   Psychiatry Dr Lynne Grade    Discharge Disposition: home    Allergies  No Known Allergies  Diet restrictions:  low-salt   Activity restrictions:  use walker with all ambulation  Discharge Condition:  [facility] [address]   [facility]   Follow up with consulting providers  Psychiatry schedule through [address] [facility] [address]    Presenting Problem/History of Present Illness  Hyponatremia  Hospital Course  80year-old female presents shaking nauseated hallucination, reportedly more disoriented  Metabolic encephalopathy patient on presentation with low-sodium association with a history of dementia and schizophrenia  Sodium was corrected and her antipsychotic medications increase and she returned to baseline per family (son)    Hyponatremia   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    Fluid restriction considered but impractical in a patient who is demented and schizophrenic, she was provided sodium chloride tablets sodium improved from 128 to 134 on discharge, she will continue with sodium chloride tablets twice daily    History of schizophrenia  Zyprexa was increased to 7 5 milligram and Cymbalta to 30 milligrams  Recommended close follow-up with Psychiatry to evaluate after these dosage changes and further dose changes or medications as needed    Ambulatory dysfunction  patient was seen by Physical therapy with recommendation for rehab, this was discussed with son, who assisted his mother would not do well in rehab therefore she has returned home continue with [email or web address] daily program and use walker    Hypertension   blood pressure elevated with sodium chloride tablets, Norvasc increased to 10 milligrams and clonidine1  patch was added for better pressure control  Recommend daily blood pressure checks at [facility] to evaluate these medication changes    Discharge  Statement   I spent 45 minutes discharging the patient  This time was spent on the day of discharge  I had direct contact with the patient on the day of discharge  Additional documentation is required if more than 30 minutes were spent on discharge  Discussed discharge follow-up and condition with son  Discharge instructions/Information to patient and family:   See after visit summary for information provided to patient and family

## 2018-07-27 NOTE — PHYSICAL THERAPY NOTE
PHYSICAL THERAPY NOTE          Patient Name: Vicki HENRY Date: 7/27/2018 07/27/18 1103   Pain Assessment   Pain Assessment No/denies pain   Restrictions/Precautions   Weight Bearing Precautions Per Order No   Other Precautions Cognitive; Chair Alarm; Bed Alarm; Fall Risk;Hard of hearing  (language barrier)   General   Chart Reviewed Yes   Response to Previous Treatment Patient with no complaints from previous session  Family/Caregiver Present No   Cognition   Overall Cognitive Status Impaired   Arousal/Participation Alert   Attention Attends with cues to redirect   Comments [place], slowed responses to questioning  Subjective   Subjective AGreeable to therapy  Ramila Joshua Lewis Feels "mal" ( bad)  Notes is dizzy and concerned about her blood pressure being high  Bed Mobility   Supine to Sit 5  Supervision   Additional items Increased time required; Bedrails;HOB elevated   Sit to Supine 5  Supervision   Additional items HOB elevated; Increased time required   Additional Comments self boosts using UE and LE in bed unassisted  Transfers   Sit to Stand 5  Supervision   Additional items Increased time required;Verbal cues   Stand to Sit 4  Minimal assistance   Additional items Increased time required;Verbal cues  (cues to keep RW with her and for hand placement   )   Toilet transfer 4  Minimal assistance   Additional items Assist x 1; Increased time required;Verbal cues; Other  (grab bars  Guidance needed for descent, S for sit to stand )   Additional Comments cues for transitions for safety with hand placement  Performs pericare and undergarment management unassisted  Ambulation/Elevation   Gait pattern Improper Weight shift;Decreased foot clearance; Inconsistent dustin; Short stride;Decreased R stance  (R knee valgus )   Gait Assistance 4  Minimal assist  (CG)   Additional items Assist x 1   Assistive Device Rolling walker   Distance Amb 15'x1 to bathroom, then 5'x1 for hand hygeine, then progressing to 100'x2 with RW  Balance   Static Sitting Good   Dynamic Sitting Fair +   Static Standing Fair   Dynamic Standing Fair -   Ambulatory Fair -   Endurance Deficit   Endurance Deficit Yes   Endurance Deficit Description fatigue, dizziness  /98  RA O2 sat 97%   Activity Tolerance   Activity Tolerance Patient tolerated treatment well;Patient limited by fatigue;Treatment limited secondary to medical complications (Comment)  (dizziness)   Nurse Made Aware Yes, [place]  Assessment   Prognosis Good   Problem List Decreased strength;Decreased endurance; Impaired balance;Decreased range of motion;Decreased mobility; Decreased cognition; Impaired judgement;Decreased safety awareness; Impaired hearing  (posture)   Assessment Pt seen for progression of therapy  Improve overall mobility noted  Less assistance for bed mobilty, transfers and ambulation  Improved gait stabiltiy wiht use of RW and would continue to recommend use for fall reduction  Improved tolerance to ambualtion distances despite reports of dizziness  No overt LOB with ambulation  using RW but gait with inconsistencies in dustin and foot clearance  Despite progress may continue to benefit from STR prior to home ALONE  Family desire for pt to d/c to home  Defer to OT for level of supervision  At best home PT rec if family continues to refuse rhb  PT will cont to follow and progress  RW recommended  Barriers to Discharge Decreased caregiver support   Barriers to Discharge Comments Lives alone   Goals   Patient Goals get stronger   Treatment Day 2   Plan   Treatment/Interventions Functional transfer training;LE strengthening/ROM; Elevations; Therapeutic exercise;Patient/family training; Endurance training;Equipment eval/education; Bed mobility;Gait training; Compensatory technique education;Continued evaluation;Spoke to nursing   Progress Progressing toward goals   PT Frequency Other (Comment)  (4-5x/wk)   Recommendation   Recommendation Short-term skilled PT   Equipment Recommended Walker  (RW)   PT - OK to Discharge Yes  (to rhb when medically clear)   Ilana Rdz, PT

## 2018-07-30 NOTE — SOCIAL WORK
Pt discharged home 7/27  Call received from Heidi Alegria from [facility] requesting MA-51 and a psy eval as pt's son is now considering placement  Informed Kenny Najera that the attending that was assigned last week to pt is not on this week and there is no other physician that would be able to sign off on the MA-51 and that pt should see her PCP and have them sign off on it  Informed psy eval can be faxed over as requested  Eval faxed to requested fax # [phone]

## 2018-07-31 NOTE — CASE MANAGEMENT
Notification of Discharge  This is a Notification of Discharge from our facility [address]  Please be advised that this patient has been discharge from our facility  Below you will find the admission and discharge date and time including the patients disposition  PRESENTATION DATE: 7/24/2018  6:49 PM  IP ADMISSION DATE: 7/25/18 0836  DISCHARGE DATE: 7/27/2018  2:55 PM  DISPOSITION: [address] [place] [address] in [facility]  Phone: [phone]; Fax [phone]  ATTENTION: The Network Utilization Review Department is now centralized for our 9 Facilities  Make a note that we have a new phone and fax numbers for our Department  Please call with any questions or concerns to [phone] and carefully follow the prompts so that you are directed to the right person  All voicemails are confidential  Fax any determinations, approvals, denials, and requests for initial or continue stay review clinical to [phone]  Due to HIGH CALL volume, it would be easier if you could please send faxed requests to expedite your requests and in part, help us provide discharge notifications faster

## 2018-09-14 DIAGNOSIS — F41.9 ANXIETY: ICD-10-CM

## 2018-09-14 DIAGNOSIS — I10 ESSENTIAL HYPERTENSION: ICD-10-CM

## 2018-09-14 RX ORDER — METOPROLOL SUCCINATE 100 MG/1
100 TABLET, EXTENDED RELEASE ORAL DAILY
Qty: 30 TABLET | Refills: 0 | Status: SHIPPED | OUTPATIENT
Start: 2018-09-14 | End: 2018-10-15 | Stop reason: SDUPTHER

## 2018-09-14 RX ORDER — OLANZAPINE 7.5 MG/1
7.5 TABLET ORAL
Qty: 30 TABLET | Refills: 0 | Status: SHIPPED | OUTPATIENT
Start: 2018-09-14 | End: 2018-10-16 | Stop reason: SDUPTHER

## 2018-09-14 RX ORDER — DULOXETIN HYDROCHLORIDE 30 MG/1
30 CAPSULE, DELAYED RELEASE ORAL DAILY
Qty: 30 CAPSULE | Refills: 0 | Status: SHIPPED | OUTPATIENT
Start: 2018-09-14 | End: 2018-10-15 | Stop reason: SDUPTHER

## 2018-09-14 RX ORDER — ASPIRIN 81 MG/1
81 TABLET ORAL DAILY
Qty: 30 TABLET | Refills: 0 | Status: SHIPPED | OUTPATIENT
Start: 2018-09-14 | End: 2018-10-15 | Stop reason: SDUPTHER

## 2018-09-14 RX ORDER — CLONAZEPAM 0.5 MG/1
0.5 TABLET ORAL DAILY
Qty: 30 TABLET | Refills: 0 | Status: SHIPPED | OUTPATIENT
Start: 2018-09-14 | End: 2018-10-18 | Stop reason: SDUPTHER

## 2018-09-14 RX ORDER — AMOXICILLIN 250 MG
1 CAPSULE ORAL 2 TIMES DAILY
Qty: 60 TABLET | Refills: 0 | Status: SHIPPED | OUTPATIENT
Start: 2018-09-14 | End: 2018-10-18

## 2018-09-14 RX ORDER — LOSARTAN POTASSIUM 100 MG/1
100 TABLET ORAL DAILY
Qty: 30 TABLET | Refills: 0 | Status: SHIPPED | OUTPATIENT
Start: 2018-09-14 | End: 2018-10-16 | Stop reason: SDUPTHER

## 2018-09-14 RX ORDER — AMLODIPINE BESYLATE 10 MG/1
10 TABLET ORAL DAILY
Qty: 30 TABLET | Refills: 0 | Status: SHIPPED | OUTPATIENT
Start: 2018-09-14 | End: 2018-10-15 | Stop reason: SDUPTHER

## 2018-10-11 ENCOUNTER — OFFICE VISIT (OUTPATIENT)
Dept: INTERNAL MEDICINE CLINIC | Facility: CLINIC | Age: 83
End: 2018-10-11
Payer: COMMERCIAL

## 2018-10-11 VITALS
SYSTOLIC BLOOD PRESSURE: 131 MMHG | DIASTOLIC BLOOD PRESSURE: 75 MMHG | RESPIRATION RATE: 16 BRPM | HEIGHT: 60 IN | BODY MASS INDEX: 26.42 KG/M2 | WEIGHT: 134.6 LBS | TEMPERATURE: 94.3 F | HEART RATE: 78 BPM

## 2018-10-11 DIAGNOSIS — F41.9 ANXIETY: ICD-10-CM
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
## 2018-10-11 DIAGNOSIS — H91.93 HEARING PROBLEM OF BOTH EARS: ICD-10-CM

## 2018-10-11 DIAGNOSIS — F33.9 EPISODE OF RECURRENT MAJOR DEPRESSIVE DISORDER, UNSPECIFIED DEPRESSION EPISODE SEVERITY (HCC): ICD-10-CM

## 2018-10-11 DIAGNOSIS — E78.2 MIXED HYPERLIPIDEMIA: ICD-10-CM

## 2018-10-11 DIAGNOSIS — I10 ESSENTIAL HYPERTENSION: Primary | ICD-10-CM

## 2018-10-11 DIAGNOSIS — E87.1 HYPONATREMIA: ICD-10-CM

## 2018-10-11 DIAGNOSIS — F20.9 SCHIZOPHRENIA, UNSPECIFIED TYPE (HCC): ICD-10-CM

## 2018-10-11 DIAGNOSIS — G93.40 ACUTE ENCEPHALOPATHY: ICD-10-CM

## 2018-10-11 DIAGNOSIS — Z00.00 HEALTHCARE MAINTENANCE: ICD-10-CM

## 2018-10-11 PROBLEM — F23 BRIEF PSYCHOTIC DISORDER (HCC): Status: ACTIVE | Noted: 2018-10-11

## 2018-10-11 PROCEDURE — 93000 ELECTROCARDIOGRAM COMPLETE: CPT | Performed by: INTERNAL MEDICINE

## 2018-10-11 PROCEDURE — 3725F SCREEN DEPRESSION PERFORMED: CPT | Performed by: INTERNAL MEDICINE

## 2018-10-11 PROCEDURE — 99204 OFFICE O/P NEW MOD 45 MIN: CPT | Performed by: INTERNAL MEDICINE

## 2018-10-11 NOTE — PATIENT INSTRUCTIONS
He needs to follow up with Psychiatry   Do not take the salt tablets  Will check a basic metabolic panel with visiting nurses in 1 week   Taking moderate further hearing evaluation  Will see her back in 4 weeks

## 2018-10-11 NOTE — ASSESSMENT & PLAN NOTE
Very well control will continue the same medication will include the following   Losartan 100 mg   Metoprolol succinate 100 mg   Amlodipine 10 mg

## 2018-10-11 NOTE — ASSESSMENT & PLAN NOTE
At the present time she is doing well on medications Psychiatry in the hospital did some adjustment of her medication she is presently on the following  Zyprexa 7 5   Cymbalta 30 mg  Clonazepam 0 5 mg it was felt during the review of the history and physical when she was in July when they try to take her off the clonazepam and she developed withdrawn agitation and delirium

## 2018-10-11 NOTE — ASSESSMENT & PLAN NOTE
Was on sodium tablets am not sure she is taking those or not will check with the pharmacy and we will try to fluid restrict her and follow up her electrolytes

## 2018-10-11 NOTE — LETTER
October 11, 2018     Genna Kathleen MD  [address] [address]    Patient: Eugenio Maria   YOB: 1931   Date of Visit: 10/11/2018       Dear Dr Viky Garza:     Eugenio Maria to me for evaluation  Below are my notes for this consultation  I saw this patient today which is a patient of mine that I saw about 4 years ago the family wanted me to follow her again I review the hospitalization and I know you for saw her in the hospital in consultation could you please be so kind to give her a follow-up appointment they have not made a follow-up appointment and she needs psychiatric follow-up as you know she has a history of depression and  Schizophrenia    Thank you for your attention    Bk Marte        If you have questions, please do not hesitate to call me  I look forward to following your patient along with you  Sincerely,        Eddie Rausch MD        CC: No Recipients  Eddie Rausch MD  10/11/2018  1:54 PM  Sign at close encounter  Assessment/Plan: restarted herself in her plaque this will see her back in 4 weeks will have visiting nurses see her occupational physical therapy hearing evaluation and psychiatric evaluation  I did not give her any new medication I would like to withdraw the salt tablets and check the sodium on a regular basis at least every couple of weeks    Hypertension   Very well control will continue the same medication will include the following   Losartan 100 mg   Metoprolol succinate 100 mg   Amlodipine 10 mg        Acute encephalopathy   Resolved was secondary to hyponatremia this was back in July during admission    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[address] )   At the present time she is doing well on medications Psychiatry in the hospital did some adjustment of her medication she is presently on the following  Zyprexa 7 5   Cymbalta 30 mg  Clonazepam 0 5 mg it was felt during the review of the history and physical when she was in July when they try to take her off the clonazepam and she developed withdrawn agitation and delirium  Hyponatremia    Was on sodium tablets am not sure she is taking those or not will check with the pharmacy and we will try to fluid restrict her and follow up her electrolytes  Hyperlipidemia    She is taking Lipitor 20 mg per days no myalgias  Will continue the same medication       Diagnoses and all orders for this visit:    Essential hypertension  -     CBC and differential; Future  -     Magnesium; Future  -     Basic metabolic panel; Future    Anxiety  -     Ambulatory Referral to Home Health; Future  -     Ambulatory referral to Physical Therapy; Future  -     Ambulatory referral to Occupational Therapy; Future    Mixed hyperlipidemia    Hyponatremia  -     CBC and differential; Future  -     Magnesium; Future  -     Basic metabolic panel; Future  -     Ambulatory Referral to Home Health; Future  -     Ambulatory referral to Physical Therapy; Future  -     Ambulatory referral to Occupational Therapy; Future    Episode of recurrent major depressive disorder, unspecified depression episode severity ([facility] [address] )  -     Ambulatory referral to Psychiatry; Future  -     Ambulatory Referral to Home Health; Future  -     Ambulatory referral to Physical Therapy; Future  -     Ambulatory referral to Occupational Therapy; Future    Schizophrenia, unspecified type [facility])  -     Ambulatory referral to Psychiatry; Future  -     Ambulatory Referral to Home Health; Future  -     Ambulatory referral to Physical Therapy; Future  -     Ambulatory referral to Occupational Therapy; Future    Fall, initial encounter  -     Ambulatory Referral to [address] [place] [address] Madi Hart; Future  -     Ambulatory referral to Physical Therapy; Future  -     Ambulatory referral to Occupational Therapy; Future    Hearing problem of both ears  -     Comprehensive hearing evaluation;  Future    Acute encephalopathy    Healthcare maintenance  -     POCT ECG          Subjective:      Patient ID: Miriam Jenkins Byron Stout is a 80 y o  female  Chief Complaint   Patient presents with   [facility]      3/9/15 last seen  Restablished 10/11/2018    Ankle Swelling     Bilateral    Hearing Problem     Hearing Voices         Current Outpatient Prescriptions:     acetaminophen (TYLENOL) 325 mg tablet, Take 650 mg by mouth 2 (two) times a day as needed for mild pain, Disp: , Rfl:     amLODIPine (NORVASC) 10 mg tablet, Take 1 tablet (10 mg total) by mouth daily, Disp: 30 tablet, Rfl: 0    aspirin (ECOTRIN LOW STRENGTH) 81 mg EC tablet, Take 1 tablet (81 mg total) by mouth daily, Disp: 30 tablet, Rfl: 0    atorvastatin (LIPITOR) 20 mg tablet, Take 20 mg by mouth daily, Disp: , Rfl:     clonazePAM (KlonoPIN) 0 5 mg tablet, Take 1 tablet (0 5 mg total) by mouth daily, Disp: 30 tablet, Rfl: 0    DULoxetine (CYMBALTA) 30 mg delayed release capsule, Take 1 capsule (30 mg total) by mouth daily, Disp: 30 capsule, Rfl: 0    losartan (COZAAR) 100 MG tablet, Take 1 tablet (100 mg total) by mouth daily, Disp: 30 tablet, Rfl: 0    metoprolol succinate (TOPROL-XL) 100 mg 24 hr tablet, Take 1 tablet (100 mg total) by mouth daily, Disp: 30 tablet, Rfl: 0    multivitamin-iron-minerals-folic acid (CENTRUM) chewable tablet, Chew 1 tablet daily, Disp: , Rfl:     OLANZapine (ZyPREXA) 7 5 mg tablet, Take 1 tablet (7 5 mg total) by mouth daily at bedtime, Disp: 30 tablet, Rfl: 0    senna-docusate sodium (SENOKOT S) 8 6-50 mg per tablet, Take 1 tablet by mouth 2 (two) times a day, Disp: 60 tablet, Rfl: 0     Patient came in to hers restarted herself in the practice she was in the hospital for acute metabolic encephalopathy and hyponatremia she does have a background history of depression and schizophrenia  Admission date was July 24  She wanted to restart which herself in our practice the last time we saw her was about 3 4 years ago      Depression schizophrenia    Hyponatremia    Had her tension    Anxiety  Hyperlipidemia  And she was admitted with SIADH and hyponatremia with fluid restriction improved but the fellow was in plastic also they gave her sodium tablets and her sodium went from 128-130 for the problem now she has increasing edema probably from the sodium tablets and the amlodipine that is controlling her blood pressure some going to try to stop the sodium tablets and follow the basic metabolic panel closely at least on a weekly or biweekly basis to make sure she is stable will going to have visiting nurses as well as occupational therapy come in    Schizophrenia she is on Zyprexa and Cymbalta which will continue the same she has not had any psychiatric follow-up she needs to get that she was in the hospital in July she has not seen a psychiatrist we need to get her psychiatrist for follow-up  Ambulatory dysfunction when she was in the hospital was recommended rehab but at the present time she [facility] she uses a walker she apparently fell a few months ago she has cognitive decline we can tell really when she fell but will have Occupational therapy and Physical therapy work with her at home    The psychiatric evaluation in the hospital delirium with psychosis  Dementia  Date increase the Cymbalta to 30 mg   Increase the Zyprexa to 7 5   And they wanted to follow up psychiatrically which needs to be arranged  The following portions of the patient's history were reviewed and updated as appropriate: allergies, current medications, past family history, past medical history, past social history, past surgical history and problem list     Review of Systems   Constitutional: Negative  Negative for activity change, appetite change, fatigue, fever and unexpected weight change  HENT: Negative for congestion, ear pain, hearing loss, mouth sores, postnasal drip, rhinorrhea, sore throat, trouble swallowing and voice change  Eyes: Negative for pain, redness and visual disturbance  Respiratory: Negative for cough, chest tightness, shortness of breath and wheezing  Cardiovascular: Positive for leg swelling  Negative for chest pain and palpitations  Gastrointestinal: Negative for abdominal distention, abdominal pain, blood in stool, constipation, diarrhea and nausea  Endocrine: Negative for cold intolerance, heat intolerance, polydipsia, polyphagia and polyuria  Genitourinary: Negative for difficulty urinating, dysuria, flank pain, frequency, hematuria and urgency  Musculoskeletal: Negative for arthralgias, back pain, gait problem, joint swelling and myalgias  Skin: Negative for color change and pallor  Neurological: Negative for dizziness, tremors, seizures, syncope, weakness, numbness and headaches  Hematological: Negative for adenopathy  Does not bruise/bleed easily  Psychiatric/Behavioral: Negative  Negative for sleep disturbance  The patient is not nervous/anxious            Objective:    Results for orders placed or performed during the hospital encounter of 07/24/18   CBC and differential   Result Value Ref Range    WBC 6 50 4 31 - 10 16 Thousand/uL    RBC 4 48 3 81 - 5 12 Million/uL    Hemoglobin 13 4 11 5 - 15 4 g/dL    Hematocrit 38 0 34 8 - 46 1 %    MCV 85 82 - 98 fL    MCH 29 9 26 8 - 34 3 pg    MCHC 35 3 31 4 - 37 4 g/dL    RDW 12 9 11 6 - 15 1 %    MPV 9 2 8 9 - 12 7 fL    Platelets 239 157 - 395 Thousands/uL    nRBC 0 /100 WBCs    Neutrophils Relative 72 43 - 75 %    Lymphocytes Relative 20 14 - 44 %    Monocytes Relative 7 4 - 12 %    Eosinophils Relative 1 0 - 6 %    Basophils Relative 0 0 - 1 %    Neutrophils Absolute 4 66 1 85 - 7 62 Thousands/µL    Lymphocytes Absolute 1 32 0 60 - 4 47 Thousands/µL    Monocytes Absolute 0 47 0 17 - 1 22 Thousand/µL    Eosinophils Absolute 0 05 0 00 - 0 61 Thousand/µL    Basophils Absolute 0 00 0 00 - 0 10 Thousands/µL   Comprehensive metabolic panel   Result Value Ref Range    Sodium 123 (L) 136 - 145 mmol/L    Potassium 4 3 3 5 - 5 3 mmol/L    Chloride 89 (L) 100 - 108 mmol/L    CO2 26 21 - 32 mmol/L    ANION GAP 8 4 - 13 mmol/L    BUN 12 5 - 25 mg/dL    Creatinine 0 88 0 60 - 1 30 mg/dL    Glucose 108 65 - 140 mg/dL    Calcium 9 5 8 3 - 10 1 mg/dL    AST 33 5 - 45 U/L    ALT 18 12 - 78 U/L    Alkaline Phosphatase 95 46 - 116 U/L    Total Protein 7 9 6 4 - 8 2 g/dL    Albumin 4 3 3 5 - 5 0 g/dL    Total Bilirubin 0 72 0 20 - 1 00 mg/dL    eGFR 60 ml/min/1 73sq m   UA w Reflex to Microscopic w Reflex to Culture   Result Value Ref Range    Color, UA Yellow     Clarity, UA Clear     Specific Gravity, UA <=1 005 1 003 - 1 030    pH, UA 6 0 4 5 - 8 0    Leukocytes, UA Negative Negative    Nitrite, UA Negative Negative    Protein, UA Negative Negative mg/dl    Glucose, UA Negative Negative mg/dl    Ketones, UA Negative Negative mg/dl    Urobilinogen, UA 0 2 0 2, 1 0 E U /dl E U /dl    Bilirubin, UA Negative Negative    Blood, UA Negative >=2000 (4+), Trace-Intact, Negative   Troponin I   Result Value Ref Range    Troponin I <0 02 <=0 04 ng/mL   Sodium, urine, random   Result Value Ref Range    Sodium, Ur 57    Osmolality, urine   Result Value Ref Range    Osmolality, Ur 176 (L) 250 - 900 mmol/KG   Osmolality   Result Value Ref Range    Osmolality Serum 268 (L) 282 - 298 mmol/KG   Phosphorus   Result Value Ref Range    Phosphorus 3 7 2 3 - 4 1 mg/dL   Magnesium   Result Value Ref Range    Magnesium 2 1 1 6 - 2 6 mg/dL   CBC (With Platelets)   Result Value Ref Range    WBC 7 15 4 31 - 10 16 Thousand/uL    RBC 4 38 3 81 - 5 12 Million/uL    Hemoglobin 12 9 11 5 - 15 4 g/dL    Hematocrit 37 3 34 8 - 46 1 %    MCV 85 82 - 98 fL    MCH 29 5 26 8 - 34 3 pg    MCHC 34 6 31 4 - 37 4 g/dL    RDW 13 0 11 6 - 15 1 %    Platelets 752 187 - 388 Thousands/uL    MPV 9 3 8 9 - 12 7 fL   BMP Q8 hours X 3 (Hyponatremia monitoring)   Result Value Ref Range    Sodium 128 (L) 136 - 145 mmol/L    Potassium 3 9 3 5 - 5 3 mmol/L    Chloride 91 (L) 100 - 108 mmol/L    CO2 27 21 - 32 mmol/L    ANION GAP 10 4 - 13 mmol/L    BUN 9 5 - 25 mg/dL    Creatinine 0 90 0 60 - 1 30 mg/dL    Glucose 114 65 - 140 mg/dL    Calcium 9 0 8 3 - 10 1 mg/dL    eGFR 58 ml/min/1 73sq m   BMP Q8 hours X 3 (Hyponatremia monitoring)   Result Value Ref Range    Sodium 128 (L) 136 - 145 mmol/L    Potassium 4 3 3 5 - 5 3 mmol/L    Chloride 93 (L) 100 - 108 mmol/L    CO2 29 21 - 32 mmol/L    ANION GAP 6 4 - 13 mmol/L    BUN 17 5 - 25 mg/dL    Creatinine 0 96 0 60 - 1 30 mg/dL    Glucose 99 65 - 140 mg/dL    Calcium 8 7 8 3 - 10 1 mg/dL    eGFR 54 ml/min/1 73sq m   BMP Q8 hours X 3 (Hyponatremia monitoring)   Result Value Ref Range    Sodium 128 (L) 136 - 145 mmol/L    Potassium 3 7 3 5 - 5 3 mmol/L    Chloride 95 (L) 100 - 108 mmol/L    CO2 26 21 - 32 mmol/L    ANION GAP 7 4 - 13 mmol/L    BUN 20 5 - 25 mg/dL    Creatinine 0 90 0 60 - 1 30 mg/dL    Glucose 103 65 - 140 mg/dL    Calcium 8 7 8 3 - 10 1 mg/dL    eGFR 58 ml/min/1 73sq m   Basic metabolic panel   Result Value Ref Range    Sodium 134 (L) 136 - 145 mmol/L    Potassium 3 8 3 5 - 5 3 mmol/L    Chloride 100 100 - 108 mmol/L    CO2 26 21 - 32 mmol/L    ANION GAP 8 4 - 13 mmol/L    BUN 16 5 - 25 mg/dL    Creatinine 0 86 0 60 - 1 30 mg/dL    Glucose 89 65 - 140 mg/dL    Calcium 8 5 8 3 - 10 1 mg/dL    eGFR 61 ml/min/1 73sq m   ECG 12 lead   Result Value Ref Range    Ventricular Rate 80 BPM    Atrial Rate 80 BPM    WI Interval 182 ms    QRSD Interval 86 ms    QT Interval 352 ms    QTC Interval 405 ms    P Axis 57 degrees    QRS Axis 70 degrees    T Wave Axis 36 degrees   ED Urine Macroscopic   Result Value Ref Range    Color, UA Yellow     Clarity, UA Clear     pH, UA 7 0 4 5 - 8 0    Leukocytes, UA Negative Negative    Nitrite, UA Negative Negative    Protein, UA Negative Negative mg/dl    Glucose, UA Negative Negative mg/dl    Ketones, UA Negative Negative mg/dl    Urobilinogen, UA 0 2 0 2, 1 0 E U /dl E U /dl    Bilirubin, UA Negative Negative    Blood, UA Negative Negative    Specific Gravity, UA 1 010 1 003 - 1 030       /75 (BP Location: Left arm, Patient Position: Sitting, Cuff Size: Standard)   Pulse 78   Temp (!) 94 3 °F (34 6 °C) (Tympanic)   Resp 16   Ht 5' (1 524 m)   Wt 61 1 kg (134 lb 9 6 oz)   BMI 26 29 kg/m²       Physical Exam   Constitutional: She is oriented to person, place, and time  She appears well-developed and well-nourished  Elderly woman confused flat affect but does response to questions is here with the son supportive ambulates with a walker complaining of leg swelling   HENT:   Head: Normocephalic  Right Ear: External ear normal    Left Ear: External ear normal    Nose: Nose normal    Mouth/Throat: Oropharynx is clear and moist  No oropharyngeal exudate  Eyes: Pupils are equal, round, and reactive to light  Conjunctivae and EOM are normal    Neck: Normal range of motion  Neck supple  No thyromegaly present  Cardiovascular: Normal rate, regular rhythm, normal heart sounds and intact distal pulses  Exam reveals no gallop and no friction rub  No murmur heard  S1-S2 no gallops  Plus two edema in the lower extremities  Blood pressure 120/70 bilateral is seating and 120/70 standing left arm  Pulmonary/Chest: Effort normal and breath sounds normal  No respiratory distress  She has no wheezes  She has no rales  Lungs are clear no wheezing rales or rhonchi   Abdominal: Soft  Bowel sounds are normal  She exhibits no distension and no mass  There is no tenderness  There is no rebound and no guarding  Abdomen soft nontender   Genitourinary:   Genitourinary Comments: Breast examination no masses axilla no adenopathy   Musculoskeletal: Normal range of motion  She exhibits deformity  She exhibits no edema or tenderness  She has kyphoscoliosis of the spine   Lymphadenopathy:     She has no cervical adenopathy  Neurological: She is alert and oriented to person, place, and time   No cranial nerve deficit  Coordination abnormal    Skin: Skin is warm and dry  No rash noted  No erythema  No pallor  Psychiatric: She has a normal mood and affect  Judgment normal  She is slowed  Thought content is delusional  Cognition and memory are impaired  She exhibits abnormal recent memory  Flat affect does responds to questions poor memory She is inattentive  Nursing note and vitals reviewed

## 2018-10-11 NOTE — PROGRESS NOTES
Assessment/Plan: restarted herself in her plaque this will see her back in 4 weeks will have visiting nurses see her occupational physical therapy hearing evaluation and psychiatric evaluation  I did not give her any new medication I would like to withdraw the salt tablets and check the sodium on a regular basis at least every couple of weeks    EKG done in the office shows sinus rhythm rate of 70 normal tracing  Hypertension   Very well control will continue the same medication will include the following   Losartan 100 mg   Metoprolol succinate 100 mg   Amlodipine 10 mg  Acute encephalopathy   Resolved was secondary to hyponatremia this was back in July during admission    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[address] )   At the present time she is doing well on medications Psychiatry in the hospital did some adjustment of her medication she is presently on the following  Zyprexa 7 5   Cymbalta 30 mg  Clonazepam 0 5 mg it was felt during the review of the history and physical when she was in July when they try to take her off the clonazepam and she developed withdrawn agitation and delirium  Hyponatremia    Was on sodium tablets am not sure she is taking those or not will check with the pharmacy and we will try to fluid restrict her and follow up her electrolytes  Hyperlipidemia    She is taking Lipitor 20 mg per days no myalgias  Will continue the same medication       Diagnoses and all orders for this visit:    Essential hypertension  -     CBC and differential; Future  -     Magnesium; Future  -     Basic metabolic panel; Future    Anxiety  -     Ambulatory Referral to Home Health; Future  -     Ambulatory referral to Physical Therapy; Future  -     Ambulatory referral to Occupational Therapy; Future    Mixed hyperlipidemia    Hyponatremia  -     CBC and differential; Future  -     Magnesium; Future  -     Basic metabolic panel; Future  -     Ambulatory Referral to Home Health;  Future  -     Ambulatory referral to Physical Therapy; Future  -     Ambulatory referral to Occupational Therapy; Future    Episode of recurrent major depressive disorder, unspecified depression episode severity ([facility] [address] )  -     Ambulatory referral to Psychiatry; Future  -     Ambulatory Referral to Home Health; Future  -     Ambulatory referral to Physical Therapy; Future  -     Ambulatory referral to Occupational Therapy; Future    Schizophrenia, unspecified type [facility])  -     Ambulatory referral to Psychiatry; Future  -     Ambulatory Referral to Home Health; Future  -     Ambulatory referral to Physical Therapy; Future  -     Ambulatory referral to Occupational Therapy; Future    Fall, initial encounter  -     Ambulatory Referral to  [facility]; Future  -     Ambulatory referral to Physical Therapy; Future  -     Ambulatory referral to Occupational Therapy; Future    Hearing problem of both ears  -     Comprehensive hearing evaluation; Future    Acute encephalopathy    Healthcare maintenance  -     POCT ECG          Subjective:      Patient ID: Lito Lau is a 80 y o  female  Chief Complaint   Patient presents with   [facility]      3/9/15 last seen   Restablished 10/11/2018    Ankle Swelling     Bilateral    Hearing Problem     Hearing Voices         Current Outpatient Prescriptions:     acetaminophen (TYLENOL) 325 mg tablet, Take 650 mg by mouth 2 (two) times a day as needed for mild pain, Disp: , Rfl:     amLODIPine (NORVASC) 10 mg tablet, Take 1 tablet (10 mg total) by mouth daily, Disp: 30 tablet, Rfl: 0    aspirin (ECOTRIN LOW STRENGTH) 81 mg EC tablet, Take 1 tablet (81 mg total) by mouth daily, Disp: 30 tablet, Rfl: 0    atorvastatin (LIPITOR) 20 mg tablet, Take 20 mg by mouth daily, Disp: , Rfl:     clonazePAM (KlonoPIN) 0 5 mg tablet, Take 1 tablet (0 5 mg total) by mouth daily, Disp: 30 tablet, Rfl: 0    DULoxetine (CYMBALTA) 30 mg delayed release capsule, Take 1 capsule (30 mg total) by mouth daily, Disp: 30 capsule, Rfl: 0    losartan (COZAAR) 100 MG tablet, Take 1 tablet (100 mg total) by mouth daily, Disp: 30 tablet, Rfl: 0    metoprolol succinate (TOPROL-XL) 100 mg 24 hr tablet, Take 1 tablet (100 mg total) by mouth daily, Disp: 30 tablet, Rfl: 0    multivitamin-iron-minerals-folic acid (CENTRUM) chewable tablet, Chew 1 tablet daily, Disp: , Rfl:     OLANZapine (ZyPREXA) 7 5 mg tablet, Take 1 tablet (7 5 mg total) by mouth daily at bedtime, Disp: 30 tablet, Rfl: 0    senna-docusate sodium (SENOKOT S) 8 6-50 mg per tablet, Take 1 tablet by mouth 2 (two) times a day, Disp: 60 tablet, Rfl: 0     Patient came in to hers restarted herself in the practice she was in the hospital for acute metabolic encephalopathy and hyponatremia she does have a background history of depression and schizophrenia  Admission date was July 24  She wanted to restart which herself in our practice the last time we saw her was about 3 4 years ago  Depression schizophrenia    Hyponatremia    Had her tension    Anxiety  Hyperlipidemia  And she was admitted with SIADH and hyponatremia with fluid restriction improved but the fellow was in plastic also they gave her sodium tablets and her sodium went from 128-130 for the problem now she has increasing edema probably from the sodium tablets and the amlodipine that is controlling her blood pressure some going to try to stop the sodium tablets and follow the basic metabolic panel closely at least on a weekly or biweekly basis to make sure she is stable will going to have visiting nurses as well as occupational therapy come in    Schizophrenia she is on Zyprexa and Cymbalta which will continue the same she has not had any psychiatric follow-up she needs to get that she was in the hospital in July she has not seen a psychiatrist we need to get her psychiatrist for follow-up        Ambulatory dysfunction when she was in the hospital was recommended rehab but at the present time she wanted to return to [facility] daily program she uses a walker she apparently fell a few months ago she has cognitive decline we can tell really when she fell but will have Occupational therapy and Physical therapy work with her at home    The psychiatric evaluation in the hospital delirium with psychosis  Dementia  Date increase the Cymbalta to 30 mg   Increase the Zyprexa to 7 5   And they wanted to follow up psychiatrically which needs to be arranged  The following portions of the patient's history were reviewed and updated as appropriate: allergies, current medications, past family history, past medical history, past social history, past surgical history and problem list     Review of Systems   Constitutional: Negative  Negative for activity change, appetite change, fatigue, fever and unexpected weight change  HENT: Negative for congestion, ear pain, hearing loss, mouth sores, postnasal drip, rhinorrhea, sore throat, trouble swallowing and voice change  Eyes: Negative for pain, redness and visual disturbance  Respiratory: Negative for cough, chest tightness, shortness of breath and wheezing  Cardiovascular: Positive for leg swelling  Negative for chest pain and palpitations  Gastrointestinal: Negative for abdominal distention, abdominal pain, blood in stool, constipation, diarrhea and nausea  Endocrine: Negative for cold intolerance, heat intolerance, polydipsia, polyphagia and polyuria  Genitourinary: Negative for difficulty urinating, dysuria, flank pain, frequency, hematuria and urgency  Musculoskeletal: Negative for arthralgias, back pain, gait problem, joint swelling and myalgias  Skin: Negative for color change and pallor  Neurological: Negative for dizziness, tremors, seizures, syncope, weakness, numbness and headaches  Hematological: Negative for adenopathy  Does not bruise/bleed easily  Psychiatric/Behavioral: Negative  Negative for sleep disturbance   The patient is not nervous/anxious            Objective:    Results for orders placed or performed during the hospital encounter of 07/24/18   CBC and differential   Result Value Ref Range    WBC 6 50 4 31 - 10 16 Thousand/uL    RBC 4 48 3 81 - 5 12 Million/uL    Hemoglobin 13 4 11 5 - 15 4 g/dL    Hematocrit 38 0 34 8 - 46 1 %    MCV 85 82 - 98 fL    MCH 29 9 26 8 - 34 3 pg    MCHC 35 3 31 4 - 37 4 g/dL    RDW 12 9 11 6 - 15 1 %    MPV 9 2 8 9 - 12 7 fL    Platelets 222 343 - 415 Thousands/uL    nRBC 0 /100 WBCs    Neutrophils Relative 72 43 - 75 %    Lymphocytes Relative 20 14 - 44 %    Monocytes Relative 7 4 - 12 %    Eosinophils Relative 1 0 - 6 %    Basophils Relative 0 0 - 1 %    Neutrophils Absolute 4 66 1 85 - 7 62 Thousands/µL    Lymphocytes Absolute 1 32 0 60 - 4 47 Thousands/µL    Monocytes Absolute 0 47 0 17 - 1 22 Thousand/µL    Eosinophils Absolute 0 05 0 00 - 0 61 Thousand/µL    Basophils Absolute 0 00 0 00 - 0 10 Thousands/µL   Comprehensive metabolic panel   Result Value Ref Range    Sodium 123 (L) 136 - 145 mmol/L    Potassium 4 3 3 5 - 5 3 mmol/L    Chloride 89 (L) 100 - 108 mmol/L    CO2 26 21 - 32 mmol/L    ANION GAP 8 4 - 13 mmol/L    BUN 12 5 - 25 mg/dL    Creatinine 0 88 0 60 - 1 30 mg/dL    Glucose 108 65 - 140 mg/dL    Calcium 9 5 8 3 - 10 1 mg/dL    AST 33 5 - 45 U/L    ALT 18 12 - 78 U/L    Alkaline Phosphatase 95 46 - 116 U/L    Total Protein 7 9 6 4 - 8 2 g/dL    Albumin 4 3 3 5 - 5 0 g/dL    Total Bilirubin 0 72 0 20 - 1 00 mg/dL    eGFR 60 ml/min/1 73sq m   UA w Reflex to Microscopic w Reflex to Culture   Result Value Ref Range    Color, UA Yellow     Clarity, UA Clear     Specific Gravity, UA <=1 005 1 003 - 1 030    pH, UA 6 0 4 5 - 8 0    Leukocytes, UA Negative Negative    Nitrite, UA Negative Negative    Protein, UA Negative Negative mg/dl    Glucose, UA Negative Negative mg/dl    Ketones, UA Negative Negative mg/dl    Urobilinogen, UA 0 2 0 2, 1 0 E U /dl E U /dl    Bilirubin, UA Negative Negative Blood, UA Negative >=2000 (4+), Trace-Intact, Negative   Troponin I   Result Value Ref Range    Troponin I <0 02 <=0 04 ng/mL   Sodium, urine, random   Result Value Ref Range    Sodium, Ur 57    Osmolality, urine   Result Value Ref Range    Osmolality, Ur 176 (L) 250 - 900 mmol/KG   Osmolality   Result Value Ref Range    Osmolality Serum 268 (L) 282 - 298 mmol/KG   Phosphorus   Result Value Ref Range    Phosphorus 3 7 2 3 - 4 1 mg/dL   Magnesium   Result Value Ref Range    Magnesium 2 1 1 6 - 2 6 mg/dL   CBC (With Platelets)   Result Value Ref Range    WBC 7 15 4 31 - 10 16 Thousand/uL    RBC 4 38 3 81 - 5 12 Million/uL    Hemoglobin 12 9 11 5 - 15 4 g/dL    Hematocrit 37 3 34 8 - 46 1 %    MCV 85 82 - 98 fL    MCH 29 5 26 8 - 34 3 pg    MCHC 34 6 31 4 - 37 4 g/dL    RDW 13 0 11 6 - 15 1 %    Platelets 415 242 - 989 Thousands/uL    MPV 9 3 8 9 - 12 7 fL   BMP Q8 hours X 3 (Hyponatremia monitoring)   Result Value Ref Range    Sodium 128 (L) 136 - 145 mmol/L    Potassium 3 9 3 5 - 5 3 mmol/L    Chloride 91 (L) 100 - 108 mmol/L    CO2 27 21 - 32 mmol/L    ANION GAP 10 4 - 13 mmol/L    BUN 9 5 - 25 mg/dL    Creatinine 0 90 0 60 - 1 30 mg/dL    Glucose 114 65 - 140 mg/dL    Calcium 9 0 8 3 - 10 1 mg/dL    eGFR 58 ml/min/1 73sq m   BMP Q8 hours X 3 (Hyponatremia monitoring)   Result Value Ref Range    Sodium 128 (L) 136 - 145 mmol/L    Potassium 4 3 3 5 - 5 3 mmol/L    Chloride 93 (L) 100 - 108 mmol/L    CO2 29 21 - 32 mmol/L    ANION GAP 6 4 - 13 mmol/L    BUN 17 5 - 25 mg/dL    Creatinine 0 96 0 60 - 1 30 mg/dL    Glucose 99 65 - 140 mg/dL    Calcium 8 7 8 3 - 10 1 mg/dL    eGFR 54 ml/min/1 73sq m   BMP Q8 hours X 3 (Hyponatremia monitoring)   Result Value Ref Range    Sodium 128 (L) 136 - 145 mmol/L    Potassium 3 7 3 5 - 5 3 mmol/L    Chloride 95 (L) 100 - 108 mmol/L    CO2 26 21 - 32 mmol/L    ANION GAP 7 4 - 13 mmol/L    BUN 20 5 - 25 mg/dL    Creatinine 0 90 0 60 - 1 30 mg/dL    Glucose 103 65 - 140 mg/dL Calcium 8 7 8 3 - 10 1 mg/dL    eGFR 58 ml/min/1 73sq m   Basic metabolic panel   Result Value Ref Range    Sodium 134 (L) 136 - 145 mmol/L    Potassium 3 8 3 5 - 5 3 mmol/L    Chloride 100 100 - 108 mmol/L    CO2 26 21 - 32 mmol/L    ANION GAP 8 4 - 13 mmol/L    BUN 16 5 - 25 mg/dL    Creatinine 0 86 0 60 - 1 30 mg/dL    Glucose 89 65 - 140 mg/dL    Calcium 8 5 8 3 - 10 1 mg/dL    eGFR 61 ml/min/1 73sq m   ECG 12 lead   Result Value Ref Range    Ventricular Rate 80 BPM    Atrial Rate 80 BPM    FL Interval 182 ms    QRSD Interval 86 ms    QT Interval 352 ms    QTC Interval 405 ms    P Axis 57 degrees    QRS Axis 70 degrees    T Wave Axis 36 degrees   ED Urine Macroscopic   Result Value Ref Range    Color, UA Yellow     Clarity, UA Clear     pH, UA 7 0 4 5 - 8 0    Leukocytes, UA Negative Negative    Nitrite, UA Negative Negative    Protein, UA Negative Negative mg/dl    Glucose, UA Negative Negative mg/dl    Ketones, UA Negative Negative mg/dl    Urobilinogen, UA 0 2 0 2, 1 0 E U /dl E U /dl    Bilirubin, UA Negative Negative    Blood, UA Negative Negative    Specific Gravity, UA 1 010 1 003 - 1 030       /75 (BP Location: Left arm, Patient Position: Sitting, Cuff Size: Standard)   Pulse 78   Temp (!) 94 3 °F (34 6 °C) (Tympanic)   Resp 16   Ht 5' (1 524 m)   Wt 61 1 kg (134 lb 9 6 oz)   BMI 26 29 kg/m²      Physical Exam   Constitutional: She is oriented to person, place, and time  She appears well-developed and well-nourished  Elderly woman confused flat affect but does response to questions is here with the son supportive ambulates with a walker complaining of leg swelling   HENT:   Head: Normocephalic  Right Ear: External ear normal    Left Ear: External ear normal    Nose: Nose normal    Mouth/Throat: Oropharynx is clear and moist  No oropharyngeal exudate  Eyes: Pupils are equal, round, and reactive to light  Conjunctivae and EOM are normal    Neck: Normal range of motion  Neck supple   No thyromegaly present  Cardiovascular: Normal rate, regular rhythm, normal heart sounds and intact distal pulses  Exam reveals no gallop and no friction rub  No murmur heard  S1-S2 no gallops  Plus two edema in the lower extremities  Blood pressure 120/70 bilateral is seating and 120/70 standing left arm  Pulmonary/Chest: Effort normal and breath sounds normal  No respiratory distress  She has no wheezes  She has no rales  Lungs are clear no wheezing rales or rhonchi   Abdominal: Soft  Bowel sounds are normal  She exhibits no distension and no mass  There is no tenderness  There is no rebound and no guarding  Abdomen soft nontender   Genitourinary:   Genitourinary Comments: Breast examination no masses axilla no adenopathy   Musculoskeletal: Normal range of motion  She exhibits deformity  She exhibits no edema or tenderness  She has kyphoscoliosis of the spine   Lymphadenopathy:     She has no cervical adenopathy  Neurological: She is alert and oriented to person, place, and time  No cranial nerve deficit  Coordination abnormal    Skin: Skin is warm and dry  No rash noted  No erythema  No pallor  Psychiatric: She has a normal mood and affect  Judgment normal  She is slowed  Thought content is delusional  Cognition and memory are impaired  She exhibits abnormal recent memory  Flat affect does responds to questions poor memory She is inattentive  Nursing note and vitals reviewed

## 2018-10-15 DIAGNOSIS — I10 ESSENTIAL HYPERTENSION: ICD-10-CM

## 2018-10-15 DIAGNOSIS — F41.9 ANXIETY: ICD-10-CM

## 2018-10-15 RX ORDER — DULOXETIN HYDROCHLORIDE 30 MG/1
30 CAPSULE, DELAYED RELEASE ORAL DAILY
Qty: 30 CAPSULE | Refills: 2 | Status: SHIPPED | OUTPATIENT
Start: 2018-10-15 | End: 2018-10-18 | Stop reason: SDUPTHER

## 2018-10-15 RX ORDER — METOPROLOL SUCCINATE 100 MG/1
100 TABLET, EXTENDED RELEASE ORAL DAILY
Qty: 30 TABLET | Refills: 2 | Status: SHIPPED | OUTPATIENT
Start: 2018-10-15 | End: 2018-10-18 | Stop reason: SDUPTHER

## 2018-10-15 RX ORDER — AMLODIPINE BESYLATE 10 MG/1
10 TABLET ORAL DAILY
Qty: 30 TABLET | Refills: 2 | Status: SHIPPED | OUTPATIENT
Start: 2018-10-15 | End: 2018-10-18 | Stop reason: SDUPTHER

## 2018-10-15 RX ORDER — ASPIRIN 81 MG/1
81 TABLET ORAL DAILY
Qty: 30 TABLET | Refills: 2 | Status: SHIPPED | OUTPATIENT
Start: 2018-10-15 | End: 2018-10-18 | Stop reason: SDUPTHER

## 2018-10-16 DIAGNOSIS — F41.9 ANXIETY: ICD-10-CM

## 2018-10-16 DIAGNOSIS — I10 ESSENTIAL HYPERTENSION: ICD-10-CM

## 2018-10-16 RX ORDER — LOSARTAN POTASSIUM 100 MG/1
100 TABLET ORAL DAILY
Qty: 30 TABLET | Refills: 2 | Status: SHIPPED | OUTPATIENT
Start: 2018-10-16 | End: 2018-10-18 | Stop reason: SDUPTHER

## 2018-10-16 RX ORDER — OLANZAPINE 7.5 MG/1
7.5 TABLET ORAL
Qty: 30 TABLET | Refills: 0 | Status: SHIPPED | OUTPATIENT
Start: 2018-10-16 | End: 2018-10-18 | Stop reason: SDUPTHER

## 2018-10-18 ENCOUNTER — APPOINTMENT (OUTPATIENT)
Dept: LAB | Facility: HOSPITAL | Age: 83
End: 2018-10-18
Attending: INTERNAL MEDICINE
Payer: COMMERCIAL

## 2018-10-18 DIAGNOSIS — E87.1 HYPONATREMIA: ICD-10-CM

## 2018-10-18 DIAGNOSIS — F41.9 ANXIETY: ICD-10-CM

## 2018-10-18 DIAGNOSIS — I10 ESSENTIAL HYPERTENSION: ICD-10-CM

## 2018-10-18 LAB
ANION GAP SERPL CALCULATED.3IONS-SCNC: 6 MMOL/L (ref 4–13)
BASOPHILS # BLD AUTO: 0 THOUSANDS/ΜL (ref 0–0.1)
BASOPHILS NFR BLD AUTO: 0 % (ref 0–1)
BUN SERPL-MCNC: 27 MG/DL (ref 5–25)
CALCIUM SERPL-MCNC: 8.9 MG/DL (ref 8.3–10.1)
CHLORIDE SERPL-SCNC: 103 MMOL/L (ref 100–108)
CO2 SERPL-SCNC: 29 MMOL/L (ref 21–32)
CREAT SERPL-MCNC: 1.17 MG/DL (ref 0.6–1.3)
EOSINOPHIL # BLD AUTO: 0.21 THOUSAND/ΜL (ref 0–0.61)
EOSINOPHIL NFR BLD AUTO: 3 % (ref 0–6)
ERYTHROCYTE [DISTWIDTH] IN BLOOD BY AUTOMATED COUNT: 13.1 % (ref 11.6–15.1)
GFR SERPL CREATININE-BSD FRML MDRD: 42 ML/MIN/1.73SQ M
GLUCOSE SERPL-MCNC: 102 MG/DL (ref 65–140)
HCT VFR BLD AUTO: 36.6 % (ref 34.8–46.1)
HGB BLD-MCNC: 11.8 G/DL (ref 11.5–15.4)
IMM GRANULOCYTES # BLD AUTO: 0.02 THOUSAND/UL (ref 0–0.2)
IMM GRANULOCYTES NFR BLD AUTO: 0 % (ref 0–2)
LYMPHOCYTES # BLD AUTO: 1.24 THOUSANDS/ΜL (ref 0.6–4.47)
LYMPHOCYTES NFR BLD AUTO: 17 % (ref 14–44)
MAGNESIUM SERPL-MCNC: 2.3 MG/DL (ref 1.6–2.6)
MCH RBC QN AUTO: 29.4 PG (ref 26.8–34.3)
MCHC RBC AUTO-ENTMCNC: 32.2 G/DL (ref 31.4–37.4)
MCV RBC AUTO: 91 FL (ref 82–98)
MONOCYTES # BLD AUTO: 0.57 THOUSAND/ΜL (ref 0.17–1.22)
MONOCYTES NFR BLD AUTO: 8 % (ref 4–12)
NEUTROPHILS # BLD AUTO: 5.07 THOUSANDS/ΜL (ref 1.85–7.62)
NEUTS SEG NFR BLD AUTO: 72 % (ref 43–75)
NRBC BLD AUTO-RTO: 0 /100 WBCS
PLATELET # BLD AUTO: 261 THOUSANDS/UL (ref 149–390)
PMV BLD AUTO: 9.5 FL (ref 8.9–12.7)
POTASSIUM SERPL-SCNC: 4.3 MMOL/L (ref 3.5–5.3)
RBC # BLD AUTO: 4.02 MILLION/UL (ref 3.81–5.12)
SODIUM SERPL-SCNC: 138 MMOL/L (ref 136–145)
WBC # BLD AUTO: 7.11 THOUSAND/UL (ref 4.31–10.16)

## 2018-10-18 PROCEDURE — 83735 ASSAY OF MAGNESIUM: CPT

## 2018-10-18 PROCEDURE — 80048 BASIC METABOLIC PNL TOTAL CA: CPT

## 2018-10-18 PROCEDURE — 36415 COLL VENOUS BLD VENIPUNCTURE: CPT

## 2018-10-18 PROCEDURE — 85025 COMPLETE CBC W/AUTO DIFF WBC: CPT

## 2018-10-18 RX ORDER — CLONAZEPAM 0.5 MG/1
0.5 TABLET ORAL DAILY
Qty: 90 TABLET | Refills: 0 | Status: SHIPPED | OUTPATIENT
Start: 2018-10-18 | End: 2019-01-11 | Stop reason: SDUPTHER

## 2018-10-18 RX ORDER — OLANZAPINE 7.5 MG/1
7.5 TABLET ORAL
Qty: 90 TABLET | Refills: 0 | Status: SHIPPED | OUTPATIENT
Start: 2018-10-18 | End: 2019-01-11 | Stop reason: SDUPTHER

## 2018-10-18 RX ORDER — DULOXETIN HYDROCHLORIDE 30 MG/1
30 CAPSULE, DELAYED RELEASE ORAL DAILY
Qty: 90 CAPSULE | Refills: 1 | Status: SHIPPED | OUTPATIENT
Start: 2018-10-18 | End: 2020-05-26 | Stop reason: HOSPADM

## 2018-10-18 RX ORDER — METOPROLOL SUCCINATE 100 MG/1
100 TABLET, EXTENDED RELEASE ORAL DAILY
Qty: 90 TABLET | Refills: 1 | Status: SHIPPED | OUTPATIENT
Start: 2018-10-18 | End: 2019-03-08 | Stop reason: SDUPTHER

## 2018-10-18 RX ORDER — LOSARTAN POTASSIUM 100 MG/1
100 TABLET ORAL DAILY
Qty: 90 TABLET | Refills: 1 | Status: SHIPPED | OUTPATIENT
Start: 2018-10-18 | End: 2019-03-08 | Stop reason: SDUPTHER

## 2018-10-18 RX ORDER — AMLODIPINE BESYLATE 10 MG/1
10 TABLET ORAL DAILY
Qty: 90 TABLET | Refills: 1 | Status: SHIPPED | OUTPATIENT
Start: 2018-10-18 | End: 2019-03-08 | Stop reason: SDUPTHER

## 2018-10-18 RX ORDER — ASPIRIN 81 MG/1
81 TABLET ORAL DAILY
Qty: 90 TABLET | Refills: 1 | Status: SHIPPED | OUTPATIENT
Start: 2018-10-18 | End: 2019-03-08 | Stop reason: SDUPTHER

## 2018-10-23 ENCOUNTER — TELEPHONE (OUTPATIENT)
Dept: INTERNAL MEDICINE CLINIC | Facility: CLINIC | Age: 83
End: 2018-10-23

## 2018-10-23 ENCOUNTER — CLINICAL SUPPORT (OUTPATIENT)
Dept: INTERNAL MEDICINE CLINIC | Facility: CLINIC | Age: 83
End: 2018-10-23
Payer: COMMERCIAL

## 2018-10-23 DIAGNOSIS — Z23 NEED FOR TUBERCULOSIS VACCINATION: Primary | ICD-10-CM

## 2018-10-23 PROCEDURE — 86580 TB INTRADERMAL TEST: CPT

## 2018-10-23 NOTE — TELEPHONE ENCOUNTER
Palma Zhang from [address] [place] [address] PT stated that he tried to go to the patients home and the first time she stated that she wasn't feeling up to par and ask that he come another day  Palma Zhang was there again today and she was still very apprehensive  He spoke to the patient's son, Lord Ceballos, and he stated they were possibly going to get her into an adult  like [facility]  If they do, she could receive PT there    If we need PT to go out again we should contact the office

## 2018-10-25 LAB
INDURATION: 0 MM
TB SKIN TEST: NEGATIVE

## 2018-11-13 ENCOUNTER — OFFICE VISIT (OUTPATIENT)
Dept: INTERNAL MEDICINE CLINIC | Facility: CLINIC | Age: 83
End: 2018-11-13
Payer: COMMERCIAL

## 2018-11-13 VITALS
WEIGHT: 131.6 LBS | SYSTOLIC BLOOD PRESSURE: 123 MMHG | BODY MASS INDEX: 24.84 KG/M2 | DIASTOLIC BLOOD PRESSURE: 68 MMHG | HEIGHT: 61 IN | HEART RATE: 83 BPM | TEMPERATURE: 98.6 F | RESPIRATION RATE: 15 BRPM

## 2018-11-13 DIAGNOSIS — Z71.89 HEARING AID CONSULTATION: ICD-10-CM

## 2018-11-13 DIAGNOSIS — I10 ESSENTIAL HYPERTENSION: Primary | ICD-10-CM

## 2018-11-13 DIAGNOSIS — H61.22 CERUMEN DEBRIS ON TYMPANIC MEMBRANE OF LEFT EAR: ICD-10-CM

## 2018-11-13 DIAGNOSIS — E87.1 HYPONATREMIA: ICD-10-CM

## 2018-11-13 DIAGNOSIS — Z23 NEED FOR INFLUENZA VACCINATION: ICD-10-CM

## 2018-11-13 DIAGNOSIS — F41.9 ANXIETY: ICD-10-CM

## 2018-11-13 DIAGNOSIS — E78.2 MIXED HYPERLIPIDEMIA: ICD-10-CM

## 2018-11-13 PROCEDURE — 99214 OFFICE O/P EST MOD 30 MIN: CPT | Performed by: INTERNAL MEDICINE

## 2018-11-13 PROCEDURE — 90662 IIV NO PRSV INCREASED AG IM: CPT

## 2018-11-13 PROCEDURE — G0008 ADMIN INFLUENZA VIRUS VAC: HCPCS

## 2018-11-13 PROCEDURE — 1160F RVW MEDS BY RX/DR IN RCRD: CPT

## 2018-11-13 PROCEDURE — 1160F RVW MEDS BY RX/DR IN RCRD: CPT | Performed by: INTERNAL MEDICINE

## 2018-11-13 NOTE — PROGRESS NOTES
Assessment/Plan:    Hypertension  Blood pressure is excellent control I got a blood pressure 130/80 she is on the following medications  Amlodipine 10 mg  Losartan 100 mg  Metoprolol succinate 100 mg she does complain of little swelling in her legs which is probably the amlodipine at the present time I will not give her diuretic she had a history hyponatremia I reassured her that that is not a problem I will see her back in 8 weeks    Anxiety  Background history of schizophrenia Psychiatry evaluation she is on close now has a pan her anxiety is much better  She was admitted in July with withdrawal symptoms when she was taking off the clonazepam     Hyponatremia  Hyponatremia resolved  Hyperlipidemia  She is diet control at this particular time no myalgias       Diagnoses and all orders for this visit:    Essential hypertension  -     CBC and differential; Future  -     Comprehensive metabolic panel; Future  -     TSH, 3rd generation with Free T4 reflex; Future  -     Magnesium; Future    Anxiety  -     CBC and differential; Future  -     Comprehensive metabolic panel; Future  -     TSH, 3rd generation with Free T4 reflex; Future  -     Magnesium; Future    Hyponatremia  -     CBC and differential; Future  -     Comprehensive metabolic panel; Future  -     TSH, 3rd generation with Free T4 reflex; Future  -     Magnesium; Future    Mixed hyperlipidemia    Need for influenza vaccination  -     influenza vaccine, [id number], high-dose, PF 0 5 mL, for patients 65 yr+ (FLUZONE HIGH-DOSE)    Hearing aid consultation  -     Ambulatory Referral to Otolaryngology; Future    Cerumen debris on tympanic membrane of left ear  -     Ambulatory Referral to Otolaryngology; Future          Subjective:      Patient ID: Ophelia Pierre is a 80 y o  female  Chief Complaint   Patient presents with    Follow-up    Leg Pain     bilateral leg pain from the knees down for 6 months            Current Outpatient Prescriptions:    amLODIPine (NORVASC) 10 mg tablet, Take 1 tablet (10 mg total) by mouth daily, Disp: 90 tablet, Rfl: 1    aspirin (ECOTRIN LOW STRENGTH) 81 mg EC tablet, Take 1 tablet (81 mg total) by mouth daily, Disp: 90 tablet, Rfl: 1    clonazePAM (KlonoPIN) 0 5 mg tablet, Take 1 tablet (0 5 mg total) by mouth daily, Disp: 90 tablet, Rfl: 0    DULoxetine (CYMBALTA) 30 mg delayed release capsule, Take 1 capsule (30 mg total) by mouth daily, Disp: 90 capsule, Rfl: 1    losartan (COZAAR) 100 MG tablet, Take 1 tablet (100 mg total) by mouth daily, Disp: 90 tablet, Rfl: 1    metoprolol succinate (TOPROL-XL) 100 mg 24 hr tablet, Take 1 tablet (100 mg total) by mouth daily, Disp: 90 tablet, Rfl: 1    OLANZapine (ZyPREXA) 7 5 mg tablet, Take 1 tablet (7 5 mg total) by mouth daily at bedtime, Disp: 90 tablet, Rfl: 0    acetaminophen (TYLENOL) 325 mg tablet, Take 650 mg by mouth 2 (two) times a day as needed for mild pain, Disp: , Rfl:     multivitamin-iron-minerals-folic acid (CENTRUM) chewable tablet, Chew 1 tablet daily, Disp: , Rfl:     Patient came in for her routine office visit her blood pressure is control no chest pain palpitation shortness of breath she has difficulty in hearing and she apparently reads lips and able to understand me  But she has not been evaluated the audiometry is wanted her ears irrigated apparently she came last week and was nervous and moving with when able to irrigate her ears will try to day if we still had difficulty will refer her to ENT for full evaluation irrigate the ears as well as a hearing evaluation  She most likely needs hearing aids  Schizophrenia psychiatric follow-up she takes the medications now she looks well she is not anxious  We gave her the influenza vaccine today  Other health maintenance issues she will need the Pred amy  vaccine and will give that in the next office visit          The following portions of the patient's history were reviewed and updated as appropriate: allergies, current medications, past family history, past medical history, past social history, past surgical history and problem list     Review of Systems   Constitutional: Negative  Negative for activity change, appetite change, fatigue, fever and unexpected weight change  HENT: Positive for hearing loss  Negative for congestion, ear pain, mouth sores, postnasal drip, rhinorrhea, sore throat, trouble swallowing and voice change  Eyes: Negative for pain, redness and visual disturbance  Respiratory: Negative for cough, chest tightness, shortness of breath and wheezing  Cardiovascular: Negative for chest pain, palpitations and leg swelling  Gastrointestinal: Negative for abdominal distention, abdominal pain, blood in stool, constipation, diarrhea and nausea  Endocrine: Negative for cold intolerance, heat intolerance, polydipsia, polyphagia and polyuria  Genitourinary: Negative for difficulty urinating, dysuria, flank pain, frequency, hematuria and urgency  Musculoskeletal: Negative for arthralgias, back pain, gait problem, joint swelling and myalgias  Skin: Negative for color change and pallor  Neurological: Negative for dizziness, tremors, seizures, syncope, weakness, numbness and headaches  Hematological: Negative for adenopathy  Does not bruise/bleed easily  Psychiatric/Behavioral: Negative  Negative for sleep disturbance  The patient is not nervous/anxious  Objective:    Results for orders placed or performed in visit on 10/23/18   TB Skin Test   Result Value Ref Range    TB Skin Test Negative     Induration 0 mm       /68 (BP Location: Left arm, Patient Position: Sitting, Cuff Size: Standard)   Pulse 83   Temp 98 6 °F (37 °C)   Resp 15   Ht 5' 1" (1 549 m)   Wt 59 7 kg (131 lb 9 6 oz)   BMI 24 87 kg/m²      Physical Exam   Constitutional: She is oriented to person, place, and time  She appears well-developed and well-nourished     HENT:   Head: Normocephalic  Right Ear: External ear normal    Left Ear: External ear normal    Nose: Nose normal    Mouth/Throat: Oropharynx is clear and moist  No oropharyngeal exudate  Tympanic membrane on the right is clear  Tympanic membrane on the left shows some cerumen  But able to see the membrane   Eyes: Pupils are equal, round, and reactive to light  Conjunctivae and EOM are normal    Neck: Normal range of motion  Neck supple  No thyromegaly present  Cardiovascular: Normal rate, regular rhythm, normal heart sounds and intact distal pulses  Exam reveals no gallop and no friction rub  No murmur heard  S1-S2 no gallops regular rhythm  Blood pressure 130/80  Trace to +1 edema in the lower extremities no calf tenderness   Pulmonary/Chest: Effort normal and breath sounds normal  No respiratory distress  She has no wheezes  She has no rales  Lungs are clear no wheezing rales or rhonchi   Abdominal: Soft  Bowel sounds are normal  She exhibits no distension and no mass  There is no tenderness  There is no rebound and no guarding  Abdomen soft nontender   Musculoskeletal: Normal range of motion  Lymphadenopathy:     She has no cervical adenopathy  Neurological: She is alert and oriented to person, place, and time  Skin: Skin is warm and dry  Psychiatric: She has a normal mood and affect  Her behavior is normal  Judgment normal    Nursing note and vitals reviewed

## 2018-11-13 NOTE — ASSESSMENT & PLAN NOTE
Blood pressure is excellent control I got a blood pressure 130/80 she is on the following medications  Amlodipine 10 mg  Losartan 100 mg  Metoprolol succinate 100 mg she does complain of little swelling in her legs which is probably the amlodipine at the present time I will not give her diuretic she had a history hyponatremia I reassured her that that is not a problem I will see her back in 8 weeks

## 2018-11-13 NOTE — PATIENT INSTRUCTIONS
Problems hearing will irrigate her ears today make an appointment with the ENT or audiologist for hearing evaluation and treatment    Blood pressure is well controlled the reason for your leg swelling is because 1 of the medications for blood pressure amlodipine can give you some edema but is more important to control blood pressure    For your generalized pain most likely from osteoarthritis that will take Tylenol which will not affect her kidney function on your blood pressure Tylenol 325 mg he can take 1 or 2 tablets 3 times a day as needed which means you 1 be taking more than 2000 mg per day    I will see her back in 8 weeks    Will check her labs next year

## 2018-11-13 NOTE — ASSESSMENT & PLAN NOTE
Background history of schizophrenia Psychiatry evaluation she is on close now has a pan her anxiety is much better    She was admitted in July with withdrawal symptoms when she was taking off the clonazepam

## 2018-11-23 ENCOUNTER — HOSPITAL ENCOUNTER (EMERGENCY)
Facility: HOSPITAL | Age: 83
Discharge: HOME | End: 2018-11-23
Attending: EMERGENCY MEDICINE
Payer: COMMERCIAL

## 2018-11-23 ENCOUNTER — APPOINTMENT (EMERGENCY)
Dept: RADIOLOGY | Facility: HOSPITAL | Age: 83
End: 2018-11-23
Payer: COMMERCIAL

## 2018-11-23 ENCOUNTER — APPOINTMENT (EMERGENCY)
Dept: CT IMAGING | Facility: HOSPITAL | Age: 83
End: 2018-11-23
Payer: COMMERCIAL

## 2018-11-23 VITALS
HEART RATE: 70 BPM | TEMPERATURE: 98.3 F | DIASTOLIC BLOOD PRESSURE: 59 MMHG | OXYGEN SATURATION: 97 % | SYSTOLIC BLOOD PRESSURE: 117 MMHG | RESPIRATION RATE: 20 BRPM

## 2018-11-23 DIAGNOSIS — S80.812A ABRASION OF LEFT LEG, INITIAL ENCOUNTER: ICD-10-CM
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
## 2018-11-23 DIAGNOSIS — R26.2 AMBULATORY DYSFUNCTION: Primary | ICD-10-CM

## 2018-11-23 PROCEDURE — 70450 CT HEAD/BRAIN W/O DYE: CPT

## 2018-11-23 PROCEDURE — 73590 X-RAY EXAM OF LOWER LEG: CPT

## 2018-11-23 PROCEDURE — 93005 ELECTROCARDIOGRAM TRACING: CPT

## 2018-11-23 PROCEDURE — 99284 EMERGENCY DEPT VISIT MOD MDM: CPT

## 2018-11-23 PROCEDURE — 72125 CT NECK SPINE W/O DYE: CPT

## 2018-11-23 NOTE — ED PROVIDER NOTES
History  Chief Complaint   Patient presents with    Fall     Patient coming from home after losing her balance with her walker and falling  Patient Denies dizziness but reports hitting her head , no LOC  No thinners     60-year-old female with past medical history of hypertension and hyperlipidemia who is presenting after a fall at home  History is obtained by myself in French as well as with the use of [employer]  phone  Patient reports that she was getting up to adjust something on her television when she tripped over her feet and fell  Patient ambulates with a walker at baseline  Patient denies hitting her head or losing consciousness  She denies that this was a syncopal event  Patient denies any prodrome prior to falling including headache, vision changes, acute extremity numbness or weakness, chest pain, palpitations, shortness of breath  Patient denies any symptoms at this time apart from a cut on her left shin from the fall and peripheral edema which she states has been going on for some time  Review of records from the patient's PCP confirms that this is the case  Her peripheral edema was attributed to amlodipine and no further workup was ordered by her PCP  Patient denies taking any antiplatelets or anticoagulants  Review of systems is otherwise negative  Patient denies fever, chills, diaphoresis, unintentional weight loss, headache, vision changes, extremity numbness or weakness, chest pain or pressure, palpitations, shortness of breath, cough, nausea, vomiting, abdominal pain, diarrhea, constipation, melena, hematochezia, dysuria, urinary frequency, and hematuria  Assessment and plan:  60-year-old female status post mechanical fall at home  We will check CT head and CT cervical spine  We will also obtain an x-ray of the left shin given that patient has an abrasion of left lateral shin    We will have the patient ambulate and if he is able to do so discharge her home             Prior to Admission Medications   Prescriptions Last Dose Informant Patient Reported? Taking? DULoxetine (CYMBALTA) 30 mg delayed release capsule   No No   Sig: Take 1 capsule (30 mg total) by mouth daily   OLANZapine (ZyPREXA) 7 5 mg tablet   No No   Sig: Take 1 tablet (7 5 mg total) by mouth daily at bedtime   acetaminophen (TYLENOL) 325 mg tablet   Yes No   Sig: Take 650 mg by mouth 2 (two) times a day as needed for mild pain   amLODIPine (NORVASC) 10 mg tablet   No No   Sig: Take 1 tablet (10 mg total) by mouth daily   aspirin (ECOTRIN LOW STRENGTH) 81 mg EC tablet   No No   Sig: Take 1 tablet (81 mg total) by mouth daily   clonazePAM (KlonoPIN) 0 5 mg tablet   No No   Sig: Take 1 tablet (0 5 mg total) by mouth daily   losartan (COZAAR) 100 MG tablet   No No   Sig: Take 1 tablet (100 mg total) by mouth daily   metoprolol succinate (TOPROL-XL) 100 mg 24 hr tablet   No No   Sig: Take 1 tablet (100 mg total) by mouth daily   multivitamin-iron-minerals-folic acid (CENTRUM) chewable tablet   Yes No   Sig: Chew 1 tablet daily      Facility-Administered Medications: None       Past Medical History:   Diagnosis Date    Anxiety     Hyperlipidemia     Hypertension        Past Surgical History:   Procedure Laterality Date    APPENDECTOMY      BLADDER SURGERY      CHOLECYSTECTOMY      TOTAL ABDOMINAL HYSTERECTOMY W/ BILATERAL SALPINGOOPHORECTOMY      TOTAL KNEE ARTHROPLASTY Left        Family History   Problem Relation Age of Onset    Heart disease Mother         Cardiac disorder    Parkinsonism Father     Heart disease Sister         Cardiac disorder    Hypertension Sister     Diabetes Child         Diabetes Mellitus    Lung disease Child         Respiratory Disorder    Diabetes Son         Diabetes Mellitus     I have reviewed and agree with the history as documented      Social History   Substance Use Topics    Smoking status: Never Smoker    Smokeless tobacco: Never Used      Comment: Former smoker per [employer]    Alcohol use No        Review of Systems   Constitutional: Negative for diaphoresis, fever and unexpected weight change  HENT: Negative for congestion, rhinorrhea and sore throat  Eyes: Negative for pain, discharge and visual disturbance  Respiratory: Negative for cough, shortness of breath and wheezing  Cardiovascular: Positive for leg swelling (bilateral, chronic)  Negative for chest pain and palpitations  Gastrointestinal: Negative for abdominal pain, blood in stool, constipation, diarrhea, nausea and vomiting  Genitourinary: Negative for dysuria, flank pain and hematuria  Musculoskeletal: Negative for arthralgias and joint swelling  Skin: Positive for wound (left shin)  Negative for rash  Allergic/Immunologic: Negative for environmental allergies and food allergies  Neurological: Negative for dizziness, seizures, weakness and numbness  Hematological: Negative for adenopathy  Psychiatric/Behavioral: Negative for confusion and hallucinations  Physical Exam  ED Triage Vitals   Temperature Pulse Respirations Blood Pressure SpO2   11/23/18 1836 11/23/18 1836 11/23/18 1836 11/23/18 1846 11/23/18 1836   98 3 °F (36 8 °C) 84 16 126/51 94 %      Temp Source Heart Rate Source Patient Position - Orthostatic VS BP Location FiO2 (%)   11/23/18 1836 11/23/18 1836 11/23/18 1836 11/23/18 1836 --   Oral Monitor Sitting Right arm       Pain Score       --                  Orthostatic Vital Signs  Vitals:    11/23/18 1836 11/23/18 1846 11/23/18 2003   BP:  126/51 117/59   Pulse: 84  70   Patient Position - Orthostatic VS: Sitting Lying Lying       Physical Exam   Constitutional: She is oriented to person, place, and time  She appears well-developed and well-nourished  No distress  HENT:   Head: Normocephalic and atraumatic  Right Ear: External ear normal    Left Ear: External ear normal    No signs of head trauma  No hemotympanum     Eyes: Pupils are equal, round, and reactive to light  Conjunctivae and EOM are normal    Neck: Normal range of motion  Neck supple  No cervical spine tenderness to palpation  Cardiovascular: Normal rate, regular rhythm and normal heart sounds  No murmur heard  DP pulses intact bilaterally  Pulmonary/Chest: Effort normal and breath sounds normal  No respiratory distress  She has no wheezes  She has no rales  No chest wall tenderness  Abdominal: Soft  Bowel sounds are normal  She exhibits no distension  There is no tenderness  There is no guarding  Musculoskeletal: Normal range of motion  She exhibits edema  She exhibits no deformity  Patient has bilateral peripheral edema  No pain on passive hip range of motion  Neurological: She is alert and oriented to person, place, and time  Patient is alert and oriented to time, person, place, and situation  Speech is fluent with no aphasia or dysarthria  CN II-XII are intact  Strength is 5/5 in the upper and lower extremities bilaterally  Sensation grossly intact  No dysmetria on finger to nose testing  No pronator drift  Skin: Skin is warm and dry  Capillary refill takes less than 2 seconds  There is a small abrasion of the left lateral shin  No laceration  Bleeding controlled  Psychiatric: She has a normal mood and affect  Her behavior is normal  Thought content normal    Nursing note and vitals reviewed  ED Medications  Medications - No data to display    Diagnostic Studies  Results Reviewed     None                 XR tibia fibula 2 views LEFT   ED Interpretation by Smita Hernandez MD (11/23 1945)   Status post TKA  No acute bony abnormality noted  Final Result by Gladis Romo DO (11/24 7485)   No acute fracture or radiopaque foreign body  Workstation performed: [id number]         CT spine cervical without contrast   Final Result by Halley Martinez MD (11/23 1945)         1  No acute cervical spine fracture or traumatic malalignment     2  Incidental 1 2 cm right thyroid nodule identified on this CT  According to guidelines published in the February 2015 white paper on incidental thyroid nodules in the Journal of the [employer] of Radiology [facility]), because the nodule(s) are less    than 1 5 cm in size and without suspicious feature, no further evaluation is recommended  Workstation performed: [id number]         CT head without contrast   Final Result by Arin Bardales MD (11/23 1940)      No acute intracranial abnormality  Workstation performed: [id number]               Procedures  Procedures      Phone Consults  ED Phone Contact    ED Course  ED Course as of Nov 24 0942   [facility] Nov 23, 2018   2582 Temperature: 98 3 °F (36 8 °C)   1838 Pulse: 84   1838 Respirations: 16   1838 SpO2: 94 %   1944 No acute intracranial abnormality  CT head without contrast   1946 No acute traumatic malalignment or fracture  CT spine cervical without contrast   2007 Will have patient ambulate at this time  2029 Patient ambulated without difficulty  However, we are having difficulty reaching the patient's son and patient does not have an alternative means of transportation back home  Will continue trying to reach the patient's son  2051 RN was able to reach patient's son  He will come and pick the patient up at this time  MDM  Number of Diagnoses or Management Options  Abrasion of left leg, initial encounter: new and requires workup  Ambulatory dysfunction: established and worsening  Fall at home, initial encounter: new and requires workup  Diagnosis management comments: For patient presented after a mechanical fall at home  There were no elements of history to suggest that fall was a syncopal event  Both prior to the fall and on arrival to the ED, patient complained only of mild pain of the left lateral shin associated with an abrasion    Neurological examination was nonfocal   There is no evidence of head trauma or traumatic injuries anywhere else  We obtained a CT head, CT cervical spine, and x-ray of the left tibia and fibula  There were no acute abnormalities on these imaging studies  Patient ambulated with a walker and had no difficulty doing so  We therefore felt that patient was safe for return home  Patient was taken home by her son  Prior to her discharge, I provided return precautions to the patient who verbalized understanding  Amount and/or Complexity of Data Reviewed  Tests in the radiology section of CPT®: ordered and reviewed  Decide to obtain previous medical records or to obtain history from someone other than the patient: yes  Review and summarize past medical records: yes  Independent visualization of images, tracings, or specimens: yes    Risk of Complications, Morbidity, and/or Mortality  Presenting problems: moderate  Diagnostic procedures: minimal  Management options: minimal    Patient Progress  Patient progress: improved    CritCare Time    Disposition  Final diagnoses:   Fall at home, initial encounter   Ambulatory dysfunction   Abrasion of left leg, initial encounter     Time reflects when diagnosis was documented in both MDM as applicable and the Disposition within this note     Time User Action Codes Description Comment    11/23/2018  8:59 PM Lynne Block Add [W19  Eleanor Cassidy,  [id number]] Fall at home, initial encounter     11/23/2018  8:59 PM Lynne Block Add [R26 2] Ambulatory dysfunction     11/23/2018  8:59 PM Kisha Hernandes [L06  Eleanor Cassidy,  [id number]] Fall at home, initial encounter     11/23/2018  8:59 PM Kisha Hernandes [R26 2] Ambulatory dysfunction     11/23/2018  8:59 PM Lynne Block Add [[id number]] Abrasion of left leg, initial encounter       ED Disposition     ED Disposition Condition Comment    Discharge  Davis Nearing discharge to home/self care      Condition at discharge: Good        Follow-up Information     Follow up With Specialties Details Why Contact Info Additional Kodi Baumann MD Internal Medicine Call As needed  [address] [facility] Emergency Department Emergency Medicine Go to If symptoms worsen  [address] [facility] ED, [address] [place], [place], [address]          Discharge Medication List as of 11/23/2018  9:04 PM      CONTINUE these medications which have NOT CHANGED    Details   acetaminophen (TYLENOL) 325 mg tablet Take 650 mg by mouth 2 (two) times a day as needed for mild pain, Historical Med      amLODIPine (NORVASC) 10 mg tablet Take 1 tablet (10 mg total) by mouth daily, Starting Thu 10/18/2018, Normal      aspirin (ECOTRIN LOW STRENGTH) 81 mg EC tablet Take 1 tablet (81 mg total) by mouth daily, Starting Thu 10/18/2018, Normal      clonazePAM (KlonoPIN) 0 5 mg tablet Take 1 tablet (0 5 mg total) by mouth daily, Starting Thu 10/18/2018, Normal      DULoxetine (CYMBALTA) 30 mg delayed release capsule Take 1 capsule (30 mg total) by mouth daily, Starting Thu 10/18/2018, Normal      losartan (COZAAR) 100 MG tablet Take 1 tablet (100 mg total) by mouth daily, Starting Thu 10/18/2018, Normal      metoprolol succinate (TOPROL-XL) 100 mg 24 hr tablet Take 1 tablet (100 mg total) by mouth daily, Starting Thu 10/18/2018, Normal      multivitamin-iron-minerals-folic acid (CENTRUM) chewable tablet Chew 1 tablet daily, Historical Med      OLANZapine (ZyPREXA) 7 5 mg tablet Take 1 tablet (7 5 mg total) by mouth daily at bedtime, Starting Thu 10/18/2018, Normal           No discharge procedures on file  ED Provider  Attending physically available and evaluated Anselmo Lau I managed the patient along with the ED Attending      Electronically Signed by         Chanda Mack MD  11/24/18 2700

## 2018-11-24 LAB
ATRIAL RATE: 68 BPM
P AXIS: 58 DEGREES
PR INTERVAL: 176 MS
QRS AXIS: 56 DEGREES
QRSD INTERVAL: 86 MS
QT INTERVAL: 384 MS
QTC INTERVAL: 408 MS
T WAVE AXIS: 38 DEGREES
VENTRICULAR RATE: 68 BPM

## 2018-11-24 PROCEDURE — 93010 ELECTROCARDIOGRAM REPORT: CPT | Performed by: INTERNAL MEDICINE

## 2018-11-24 NOTE — ED ATTENDING ATTESTATION
Bud Parent, DO, saw and evaluated the patient  I have discussed the patient with the resident/non-physician practitioner and agree with the resident's/non-physician practitioner's findings, Plan of Care, and MDM as documented in the resident's/non-physician practitioner's note, except where noted  All available labs and Radiology studies were reviewed  At this point I agree with the current assessment done in the Emergency Department  I have conducted an independent evaluation of this patient a history and physical is as follows:    80 yof p/w fall  Mechanical  Tripped in her house  Struck head/shin  Ambulatory  No thinners  No cp/sob  Exam benign  A/P: ct head/spine, left tib/fib        Critical Care Time  CritCare Time    Procedures

## 2018-11-24 NOTE — ED NOTES
Per Dr Divina Braga patient is to be walked using a walker to ensure she is able to ambulate without difficulty  Patient was able to ambulate with steady gait using walker to end of hallway and back       Dav Lainez RN  11/23/18 2012

## 2018-11-24 NOTE — DISCHARGE INSTRUCTIONS
Prevención de caídas   LO QUE NECESITA SABER:   La prevención de caídas incluye formas de hacer más seguro crowell hogar y [place]  También incluye cómo moverse más cuidadosamente para evitar rj caída  Las condiciones médicas que provocan cambios en la presión arterial, la visión o la fuerza muscular y la coordinación pueden llegar a aumentar crowell riesgo de caídas  Los [employer] pueden aumentar crowell riesgo de caídas si le provocan mareos, debilidad o somnolencia  INSTRUCCIONES SOBRE EL LESLIE HOSPITALARIA:   Llame al 911 o pídale a alguien más que lo fabiana si:   · Se ha caído y está inconsciente  · Se ha caído y no puede  parte de crowell cuerpo  Pregúntele a crowell [place] vitaminas y minerales son adecuados para usted  · Se ha caído y tiene dolor en el cuerpo o dolor de [place]  · Usted tiene preguntas o inquietudes acerca de crowell condición o cuidado  Recomendaciones para prevenir caídas:   · Póngase de pie o siéntese despacio  Martha puede ayudarlo a mantener crowell equilibrio y prevenir rj caída  · Use dispositivos de apoyo griselda se le indique  Crowell médico le puede recomendar que use un bastón o un caminador para que lo asista en crowell equilibrio  Es posible que necesite que le instalen barandas en el baño cerca al inodoro o en la ducha  · Use calzado que le quede bianca y tenga suelas antideslizantes  Use zapatos dentro y fuera de casa  Utilice pantunflas con rj suela de buen agarre  No use zapatos con tacones altos  · Utilizar un dispositivo de [employer]  Codie es un dispositivo que puede llevar puesto y le permite llamar al 369 en srinivas que se Cordella Maddox y necesite ayuda  Pídale a crowell médico más información  · Manténgase activo  El ejercicio puede ayudar a fortalecer los músculos y mejorar crowell equilibrio  Crowell médico le puede recomendar hacer ejercicios aeróbicos acuáticos o caminar  También le puede recomendar la fisioterapia para mejorar crowell coordinación   Nunca comience un programa de ejercicios sin consultarlo karen con crowell médico      · Controle mirlande afecciones médicas  Cumpla con todas las citas con mirlande médicos  Visite al [employer] se le ha indicado  Recomendaciones para la seguridad en el hogar:   · Agregue elementos para prevenir caídas en el baño  Coloque tiras antideslizantes en el piso de la ducha o de la bañera para evitar resbalones  Use rj alfombra de baño si no tiene alfombra en el cuarto de baño  Vian evitará que se caiga cuando sale de la ducha o la bañera  Use un asiento de ducha de modo que no necesitará ponerse de pie [place] se [place]  [place] en el inodoro o en rj silla en el cuarto de baño para secarse y vestirse  Vian impedirá que pierda el equilibrio [place] se seca o se viste estando de pie  · Mantener los pasillos despejados  Despeje las vías y las escaleras por donde camina retirando los libros, los zapatos u otros objetos  Coloque los cables del teléfono y las lámparas fuera de crowell samir para que no tenga que caminar Read [employer]  Si no puede moverlos, sujételos con cinta adhesiva  Retire los tapetes pequeños  Si no puede quitar un tapete, sujételo con cinta de doble jose  Lo cual evitará que se tropiece con éstos  · Instale rj buena iluminación en crowell hogar  Use lamparillas de noche para ayudar a iluminar los pasillos al baño o a la cocina  Siempre encienda la idalmis antes de empezar a caminar  · Mantenga los objetos que [place] con frecuencia en estantes dentro de crowell alcance  No use un banquito para intentar alcanzar un elemento  · Pinte o coloque cinta reflectiva en los bordes de las escaleras  Lo cual puede ayudarle a kathleen mejor las escaleras  Acuda a mirlande consultas de control con crowell médico según le indicaron  Anote mirlande preguntas para que se acuerde de hacerlas sachi mirlande visitas  © 2017 [address] Information is for End User's use only and may not be sold, redistributed or otherwise used for commercial purposes   All illustrations and images included in CareNotes® are the copyrighted property of A D A M , Inc  or Jaime Bhat  Esta información es sólo para uso en educación  Crowell intención no es darle un consejo médico sobre enfermedades o tratamientos  Colsulte con crowell Mohit Brooks farmacéutico antes de seguir cualquier régimen médico para saber si es seguro y efectivo para usted

## 2018-11-24 NOTE — ED NOTES
Son called back after multiple attempts to contact  He is coming to transport her home       Hope Engel RN  11/23/18 2055

## 2018-11-26 ENCOUNTER — TELEPHONE (OUTPATIENT)
Dept: INTERNAL MEDICINE CLINIC | Facility: CLINIC | Age: 83
End: 2018-11-26

## 2018-11-26 NOTE — TELEPHONE ENCOUNTER
I spoke with Deidre Bermudez son this morning in regard to her recent  ED visit on 11/23/2018 for s/p fall at home  As per Deidre Bermudez only complaint is that she has some left leg discomfort  X-rays in ED according to [facility] were Negative  An appointment was offered, but [facility] stated that he would call if things change  She has Caregivers at her residence  [facility] was appreciative of our follow up call  Dr Billie Daniel has been  Notified

## 2018-12-08 ENCOUNTER — APPOINTMENT (EMERGENCY)
Dept: RADIOLOGY | Facility: HOSPITAL | Age: 83
End: 2018-12-08
Payer: COMMERCIAL

## 2018-12-08 ENCOUNTER — HOSPITAL ENCOUNTER (EMERGENCY)
Facility: HOSPITAL | Age: 83
Discharge: HOME | End: 2018-12-08
Attending: EMERGENCY MEDICINE
Payer: COMMERCIAL

## 2018-12-08 VITALS
HEART RATE: 64 BPM | SYSTOLIC BLOOD PRESSURE: 146 MMHG | OXYGEN SATURATION: 99 % | TEMPERATURE: 98 F | DIASTOLIC BLOOD PRESSURE: 66 MMHG | RESPIRATION RATE: 18 BRPM

## 2018-12-08 DIAGNOSIS — L03.116 CELLULITIS OF LEFT LOWER EXTREMITY: Primary | ICD-10-CM

## 2018-12-08 PROCEDURE — 99283 EMERGENCY DEPT VISIT LOW MDM: CPT

## 2018-12-08 PROCEDURE — 73610 X-RAY EXAM OF ANKLE: CPT

## 2018-12-08 RX ORDER — CEPHALEXIN 500 MG/1
500 CAPSULE ORAL EVERY 6 HOURS SCHEDULED
Qty: 40 CAPSULE | Refills: 0 | Status: SHIPPED | OUTPATIENT
Start: 2018-12-08 | End: 2018-12-18

## 2018-12-08 RX ORDER — ACETAMINOPHEN 325 MG/1
975 TABLET ORAL ONCE
Status: COMPLETED | OUTPATIENT
Start: 2018-12-08 | End: 2018-12-08

## 2018-12-08 RX ORDER — CEPHALEXIN 250 MG/1
500 CAPSULE ORAL ONCE
Status: COMPLETED | OUTPATIENT
Start: 2018-12-08 | End: 2018-12-08

## 2018-12-08 RX ORDER — ACETAMINOPHEN 500 MG
500 TABLET ORAL EVERY 6 HOURS PRN
Qty: 30 TABLET | Refills: 0 | Status: SHIPPED | OUTPATIENT
Start: 2018-12-08 | End: 2019-03-19

## 2018-12-08 RX ADMIN — ACETAMINOPHEN 975 MG: 325 TABLET ORAL at 14:56

## 2018-12-08 RX ADMIN — CEPHALEXIN 500 MG: 250 CAPSULE ORAL at 14:40

## 2018-12-08 NOTE — ED PROVIDER NOTES
History  Chief Complaint   Patient presents with    Leg Swelling     Patient fell two weeks ago injuring left leg  Family reports swelling and redness to left leg  Three abrasions noted, with swelling, redness and warmth to area  80-year-old female Latvian-speaking only with a past medical history of hypertension, dyslipidemia, and mild dementia presents with her daughter for evaluation of left lower extremity swelling and redness  Approximately 2 weeks ago the patient had fallen and sustained a laceration on the left lateral and anterior shin for which she was evaluated in the emergency department a CT head, CT neck and plain films of the left lower extremity  All imaging was negative that time  Patient states that over the past week she has started to have increased lower extremity swelling and redness over her lower extremity  Patient is able to ambulate with her walker, which is her baseline  Denies new trauma, fever, chills, nausea, vomiting, diarrhea, dysuria, hematuria, hematochezia, melena, genital discharge, abdominal pain, flank pain, chest pain, shortness of breath, dyspnea, pleuritic-type chest pain, history DVT/PE, recent travel, history cancer, hemoptysis, cough, rash, extremity weakness, facial droop, difficulty swallowing, suicidal ideation, homicide ideation, hallucinations  Prior to Admission Medications   Prescriptions Last Dose Informant Patient Reported? Taking?    DULoxetine (CYMBALTA) 30 mg delayed release capsule   No No   Sig: Take 1 capsule (30 mg total) by mouth daily   OLANZapine (ZyPREXA) 7 5 mg tablet   No No   Sig: Take 1 tablet (7 5 mg total) by mouth daily at bedtime   acetaminophen (TYLENOL) 325 mg tablet   Yes No   Sig: Take 650 mg by mouth 2 (two) times a day as needed for mild pain   amLODIPine (NORVASC) 10 mg tablet   No No   Sig: Take 1 tablet (10 mg total) by mouth daily   aspirin (ECOTRIN LOW STRENGTH) 81 mg EC tablet   No No   Sig: Take 1 tablet (81 mg total) by mouth daily   clonazePAM (KlonoPIN) 0 5 mg tablet   No No   Sig: Take 1 tablet (0 5 mg total) by mouth daily   losartan (COZAAR) 100 MG tablet   No No   Sig: Take 1 tablet (100 mg total) by mouth daily   metoprolol succinate (TOPROL-XL) 100 mg 24 hr tablet   No No   Sig: Take 1 tablet (100 mg total) by mouth daily   multivitamin-iron-minerals-folic acid (CENTRUM) chewable tablet   Yes No   Sig: Chew 1 tablet daily      Facility-Administered Medications: None       Past Medical History:   Diagnosis Date    Anxiety     Hyperlipidemia     Hypertension        Past Surgical History:   Procedure Laterality Date    APPENDECTOMY      BLADDER SURGERY      CHOLECYSTECTOMY      TOTAL ABDOMINAL HYSTERECTOMY W/ BILATERAL SALPINGOOPHORECTOMY      TOTAL KNEE ARTHROPLASTY Left        Family History   Problem Relation Age of Onset    Heart disease Mother         Cardiac disorder    Parkinsonism Father     Heart disease Sister         Cardiac disorder    Hypertension Sister     Diabetes Child         Diabetes Mellitus    Lung disease Child         Respiratory Disorder    Diabetes Son         Diabetes Mellitus     I have reviewed and agree with the history as documented  Social History   Substance Use Topics    Smoking status: Never Smoker    Smokeless tobacco: Never Used      Comment: Former smoker per [employer]    Alcohol use No        Review of Systems   Constitutional: Negative for chills, diaphoresis, fatigue and fever  HENT: Negative for congestion, ear discharge, facial swelling, hearing loss, rhinorrhea, sinus pain, sinus pressure, sneezing, sore throat, tinnitus and trouble swallowing  Eyes: Negative for pain, discharge and redness  Respiratory: Negative for cough, choking, chest tightness, shortness of breath, wheezing and stridor  Cardiovascular: Negative for chest pain, palpitations and leg swelling     Gastrointestinal: Negative for abdominal distention, abdominal pain, blood in stool, constipation, diarrhea, nausea and vomiting  Endocrine: Negative for cold intolerance, polydipsia and polyuria  Genitourinary: Negative for difficulty urinating, dysuria, enuresis, flank pain, frequency and hematuria  Musculoskeletal: Negative for arthralgias, back pain, gait problem and neck stiffness  Skin: Positive for rash and wound  Neurological: Negative for dizziness, seizures, syncope, weakness, numbness and headaches  Hematological: Negative for adenopathy  Psychiatric/Behavioral: Negative for agitation, confusion, hallucinations, sleep disturbance and suicidal ideas  All other systems reviewed and are negative  Physical Exam  ED Triage Vitals   Temperature Pulse Respirations Blood Pressure SpO2   12/08/18 1351 12/08/18 1351 12/08/18 1351 12/08/18 1353 12/08/18 1351   98 °F (36 7 °C) 72 18 146/66 98 %      Temp Source Heart Rate Source Patient Position - Orthostatic VS BP Location FiO2 (%)   12/08/18 1351 12/08/18 1351 12/08/18 1351 12/08/18 1351 --   Oral Monitor Sitting Left arm       Pain Score       12/08/18 1353       No Pain           Orthostatic Vital Signs  Vitals:    12/08/18 1351 12/08/18 1353 12/08/18 1415 12/08/18 1430   BP:  146/66     Pulse: 72  66 64   Patient Position - Orthostatic VS: Sitting          Physical Exam   Constitutional: She is oriented to person, place, and time  She appears well-developed and well-nourished  No distress  HENT:   Head: Normocephalic and atraumatic  Right Ear: External ear normal    Left Ear: External ear normal    Nose: No sinus tenderness  No epistaxis  Mouth/Throat: No oropharyngeal exudate  Eyes: Pupils are equal, round, and reactive to light  Conjunctivae and EOM are normal  Right eye exhibits no discharge  Left eye exhibits no discharge  Neck: No JVD present  Cardiovascular: Normal rate, regular rhythm, normal heart sounds and intact distal pulses  Exam reveals no gallop and no friction rub      No murmur heard   Pulmonary/Chest: Effort normal and breath sounds normal  No stridor  No respiratory distress  She has no wheezes  She has no rales  Abdominal: Soft  Bowel sounds are normal  She exhibits no distension and no mass  There is no tenderness  There is no rebound and no guarding  Musculoskeletal: Normal range of motion  She exhibits no edema, tenderness or deformity  Lymphadenopathy:     She has no cervical adenopathy  Neurological: She is alert and oriented to person, place, and time  She has normal strength  No cranial nerve deficit or sensory deficit  GCS eye subscore is 4  GCS verbal subscore is 5  GCS motor subscore is 6  Reflex Scores:       Patellar reflexes are 2+ on the right side and 2+ on the left side  UE and LE 5/5 strength, No focal neuro deficits  Skin: Skin is warm, dry and intact  Capillary refill takes less than 2 seconds  She is not diaphoretic  Psychiatric: She has a normal mood and affect  Her speech is normal and behavior is normal  Judgment and thought content normal    Nursing note and vitals reviewed  ED Medications  Medications   acetaminophen (TYLENOL) tablet 975 mg (not administered)   cephalexin (KEFLEX) capsule 500 mg (500 mg Oral Given 12/8/18 1440)       Diagnostic Studies  Results Reviewed     None                 XR ankle 3+ views LEFT   ED Interpretation by [facility], DO (12/08 1452)   No acute fractures, free air            Procedures  Procedures      Phone Consults  ED Phone Contact    ED Course                               MDM  Number of Diagnoses or Management Options  Cellulitis of left lower extremity: new and requires workup  Diagnosis management comments: 43-year-old female for evaluation of swollen, red, warm lower extremity after sustaining lacerations on the leg approximately 10 days ago  Patient is afebrile appears well likely cellulitis secondary to previous 1 infections      1  Cellulitis of the lower left leg  -Keflex- q 6 hours times 10 days  -Tylenol for discomfort  -close follow up with primary care physician on Monday  -ED if patient develops fever, chills, nausea, vomiting, increased lower extremity pain, increased swelling, increased redness    CritCare Time    Disposition  Final diagnoses:   Cellulitis of left lower extremity     Time reflects when diagnosis was documented in both MDM as applicable and the Disposition within this note     Time User Action Codes Description Comment    12/8/2018  2:53 PM Avery Bear Add [A16 [id number]] Cellulitis of left lower extremity       ED Disposition     ED Disposition Condition Comment    Discharge  Sosa Lemme discharge to home/self care  Condition at discharge: Good        Follow-up Information     Follow up With Specialties Details Why [address] [facility] Schedule an appointment as soon as possible for a visit in 2 days  [address]  [facility] [address] [address] Emergency Department Emergency Medicine Go to If symptoms worsen, As needed [address] [facility], [address] [place] [address], [id number]          Patient's Medications   Discharge Prescriptions    ACETAMINOPHEN (TYLENOL) 500 MG TABLET    Take 1 tablet (500 mg total) by mouth every 6 (six) hours as needed for mild pain       Start Date: 12/8/2018 End Date: --       Order Dose: 500 mg       Quantity: 30 tablet    Refills: 0    CEPHALEXIN (KEFLEX) 500 MG CAPSULE    Take 1 capsule (500 mg total) by mouth every 6 (six) hours for 10 days       Start Date: 12/8/2018 End Date: 12/18/2018       Order Dose: 500 mg       Quantity: 40 capsule    Refills: 0     No discharge procedures on file  ED Provider  Attending physically available and evaluated Sosa Lemme  I managed the patient along with the ED Attending      Electronically Signed by         Alisha Valerio DO  12/08/18 1455

## 2018-12-08 NOTE — DISCHARGE INSTRUCTIONS
Celulitis   INFORMACIÓN GENERAL:   La celulitis  es rj infección en la piel causada por bacterias  Los siguientes son los síntomas más comunes:   · Genevie [place]    · Un área rojiza, caliente e inflamada en crowell piel    · Dolor al tocar el área afectada    · Vejigas o ampollas (absceso) que podrían drenar pus    · Piel elevada y desigual que se siente griselda la cáscara de rj naranja  Busque atención inmediata al presentar los siguientes síntomas:   · Aumento del dolor, enrojecimiento, calor y tamaño del área     · An romeo que salen del área infectada    · Rj secreción leve, de color timur-marrón que supura del área de la piel infectada    · Usted siente sonidos crepitantes bajo la piel al tocarla    · Usted tiene puntos o protuberancias color leonor en crowell piel o nota mariluz debajo crowell piel    · Rj nueva inflamación y dolor en mirlande piernas    · Dificultad para respirar o dolor en el pecho repentinamente  El tratamiento para la celulitis  puede incluir medicamentos para tratar la infección bacteriana o disminuir el dolor  La limpieza de la infección puede se necesaria  Es posible que tengan que cortar el tejido que esté dañado, muerto o infectado para ayudar a que crowell herida cicatrice  Controle mirlande síntomas:   · Eleve crowell herida por encima del nivel de crowell corazón  tan frecuente griselda le sea posible  Orrin le ayudará a disminuir la inflamación y el dolor  [place]oye crowell herida sobre almohadas o cobijas dobladas para mantenerla elevada y cómoda  · Limpie crowell herida según indicaciones  Es probable que usted tenga que lavarse la herida con agua y [place]  Revise por signos de infección  · Use medias de presión según indicaciones  Las medias de presión son ajustadas y ejercen presión en mirlande piernas  Orrin mejora el flujo de mariluz y disminuye la inflamación  Prevenir la celulitis:   · Lavando mirlande yves a menudo  Use agua y Sharon  [employer] las yves después de usar el baño, cambiar pañales o estornudar   Lávese las yves antes de preparar o consumir alimentos  Use rj loción para prevenir la piel seca y agrietada  · No comparta artículos personales,  griselda toallas, ropa y navajas de afeitar  · Limpie el equipo de ejercicio  con un detergente desinfectante antes y después de usarlo  Programe rj mabel con dalal proveedor de [employer] se le haya indicado: Anote mirlande preguntas para que se acuerde de hacerlas sachi mirlande visitas  ACUERDOS SOBRE DALAL CUIDADO:   Usted tiene el derecho de participar en la planificación de dalal cuidado  Aprenda todo lo que pueda sobre dalal condición y griselda darle tratamiento  Discuta con mirlande médicos mirlande opciones de tratamiento para juntos decidir el cuidado que usted quiere recibir  Usted siempre tiene el derecho a rechazar dalal tratamiento  Esta información es sólo para uso en educación  Dalal intención no es darle un consejo médico sobre enfermedades o tratamientos  Colsulte con dalal Marleni Angst farmacéutico antes de seguir cualquier régimen médico para saber si es seguro y efectivo para usted  © 2014 [address] is for End User's use only and may not be sold, redistributed or otherwise used for commercial purposes  All illustrations and images included in CareNotes® are the copyrighted property of A D A M , Inc  or Jaime Bhat

## 2018-12-08 NOTE — ED ATTENDING ATTESTATION
Ranjith Santoro MD, saw and evaluated the patient  I have discussed the patient with the resident/non-physician practitioner and agree with the resident's/non-physician practitioner's findings, Plan of Care, and MDM as documented in the resident's/non-physician practitioner's note, except where noted  All available labs and Radiology studies were reviewed  At this point I agree with the current assessment done in the Emergency Department  I have conducted an independent evaluation of this patient a history and physical is as follows:  81 yo female, c/o redness and swelling of left leg, increasing since injury 2 weeks ago, at which time she was evaluated in the ED  There are 3 abrasions which incorporate some redness, and warmth, no purulence nor fluctuance  VS normal   L left lower leg is tender, red, mild edema, pulses normal   ED exam c/w cellulitis, treat with abx    Low suspicion for DVT by history and physical       Critical Care Time  CritCare Time    Procedures

## 2018-12-10 ENCOUNTER — OFFICE VISIT (OUTPATIENT)
Dept: INTERNAL MEDICINE CLINIC | Facility: CLINIC | Age: 83
End: 2018-12-10
Payer: COMMERCIAL

## 2018-12-10 VITALS
HEART RATE: 68 BPM | TEMPERATURE: 98.2 F | DIASTOLIC BLOOD PRESSURE: 70 MMHG | BODY MASS INDEX: 24.55 KG/M2 | HEIGHT: 61 IN | RESPIRATION RATE: 15 BRPM | WEIGHT: 130 LBS | SYSTOLIC BLOOD PRESSURE: 132 MMHG

## 2018-12-10 DIAGNOSIS — F41.9 ANXIETY: ICD-10-CM

## 2018-12-10 DIAGNOSIS — I10 ESSENTIAL HYPERTENSION: Primary | ICD-10-CM

## 2018-12-10 DIAGNOSIS — L03.116 CELLULITIS OF LEFT LOWER EXTREMITY: ICD-10-CM

## 2018-12-10 PROBLEM — L03.119 CELLULITIS OF EXTREMITY: Status: ACTIVE | Noted: 2018-12-10

## 2018-12-10 PROCEDURE — 4040F PNEUMOC VAC/ADMIN/RCVD: CPT | Performed by: INTERNAL MEDICINE

## 2018-12-10 PROCEDURE — 1036F TOBACCO NON-USER: CPT | Performed by: INTERNAL MEDICINE

## 2018-12-10 PROCEDURE — 1160F RVW MEDS BY RX/DR IN RCRD: CPT | Performed by: INTERNAL MEDICINE

## 2018-12-10 PROCEDURE — 99214 OFFICE O/P EST MOD 30 MIN: CPT | Performed by: INTERNAL MEDICINE

## 2018-12-10 NOTE — ASSESSMENT & PLAN NOTE
Traumatic seen in the ER was that her Keflex Saturday 48 hr ago looks like is improving no fever chills no pain continue with the same treatment will see her in January if there is increasing pain swelling or redness or fever come back to the office or go to the ER

## 2018-12-10 NOTE — PATIENT INSTRUCTIONS
You do have cellulitis treated by Trauma you're leg appropriately treated with Keflex 500 4 times a day continue taking it for total of 10 days  Will see you your follow-up visit in January if you develop fever or increasing redness come to the office or go to the emergency room

## 2018-12-10 NOTE — ASSESSMENT & PLAN NOTE
She is on clonazepam for her anxiety she is doing quite well and she is on duloxetine for her chronic pain and her depression    She also is on Zyprexa 7 5 mg per day at the direction of her Psychiatry

## 2018-12-10 NOTE — PROGRESS NOTES
Assessment/Plan:    Cellulitis of extremity  Traumatic seen in the ER was that her Keflex Saturday 48 hr ago looks like is improving no fever chills no pain continue with the same treatment will see her in January if there is increasing pain swelling or redness or fever come back to the office or go to the ER  Hypertension  Blood pressure is well controlled no chest palpitation shortness of breath she is on amlodipine 10 mg daily she is on losartan 100 mg per day she is on metoprolol succinate 100 mg daily  No change in medication    Anxiety  She is on clonazepam for her anxiety she is doing quite well and she is on duloxetine for her chronic pain and her depression  She also is on Zyprexa 7 5 mg per day at the direction of her Psychiatry       Diagnoses and all orders for this visit:    Essential hypertension    Cellulitis of left lower extremity    Anxiety          Subjective:      Patient ID: Eugenio Maria is a 80 y o  female      Chief Complaint   Patient presents with    Follow-up     ER 12/8/2018, cellulitis left lower extremity         Current Outpatient Prescriptions:     acetaminophen (TYLENOL) 325 mg tablet, Take 650 mg by mouth 2 (two) times a day as needed for mild pain, Disp: , Rfl:     acetaminophen (TYLENOL) 500 mg tablet, Take 1 tablet (500 mg total) by mouth every 6 (six) hours as needed for mild pain, Disp: 30 tablet, Rfl: 0    amLODIPine (NORVASC) 10 mg tablet, Take 1 tablet (10 mg total) by mouth daily, Disp: 90 tablet, Rfl: 1    aspirin (ECOTRIN LOW STRENGTH) 81 mg EC tablet, Take 1 tablet (81 mg total) by mouth daily, Disp: 90 tablet, Rfl: 1    cephalexin (KEFLEX) 500 mg capsule, Take 1 capsule (500 mg total) by mouth every 6 (six) hours for 10 days, Disp: 40 capsule, Rfl: 0    clonazePAM (KlonoPIN) 0 5 mg tablet, Take 1 tablet (0 5 mg total) by mouth daily, Disp: 90 tablet, Rfl: 0    DULoxetine (CYMBALTA) 30 mg delayed release capsule, Take 1 capsule (30 mg total) by mouth daily, Disp: 90 capsule, Rfl: 1    losartan (COZAAR) 100 MG tablet, Take 1 tablet (100 mg total) by mouth daily, Disp: 90 tablet, Rfl: 1    metoprolol succinate (TOPROL-XL) 100 mg 24 hr tablet, Take 1 tablet (100 mg total) by mouth daily, Disp: 90 tablet, Rfl: 1    multivitamin-iron-minerals-folic acid (CENTRUM) chewable tablet, Chew 1 tablet daily, Disp: , Rfl:     OLANZapine (ZyPREXA) 7 5 mg tablet, Take 1 tablet (7 5 mg total) by mouth daily at bedtime, Disp: 90 tablet, Rfl: 0    This is a follow-up from 2 ER visit the 1st visit she tripped and fell when she went to the ER the 1st time the did a CT scan of the neck which showed no fracture knows my alignment of the cervical spine the CT scan of the brain was unremarkable she was discharged unfortunately because of the trauma she developed cellulitis went back to the ER 48 hr ago was started her on Keflex 500 4 times a day I saw her in follow-up today redness is there but has improved according to the aide  She denies any pain or fever or chills will continue to observe and I will see her back in follow-up in January  She is tolerating the Keflex without any problem no diarrhea nausea or vomiting  The following portions of the patient's history were reviewed and updated as appropriate: allergies, current medications, past family history, past medical history, past social history, past surgical history and problem list     Review of Systems   Constitutional: Negative  Negative for activity change, appetite change, fatigue, fever and unexpected weight change  HENT: Negative for congestion, ear pain, hearing loss, mouth sores, postnasal drip, rhinorrhea, sore throat, trouble swallowing and voice change  Eyes: Negative for pain, redness and visual disturbance  Respiratory: Negative for cough, chest tightness, shortness of breath and wheezing  Cardiovascular: Negative for chest pain, palpitations and leg swelling     Gastrointestinal: Negative for abdominal distention, abdominal pain, blood in stool, constipation, diarrhea and nausea  Endocrine: Negative for cold intolerance, heat intolerance, polydipsia, polyphagia and polyuria  Genitourinary: Negative for difficulty urinating, dysuria, flank pain, frequency, hematuria and urgency  Musculoskeletal: Negative for arthralgias, back pain, gait problem, joint swelling and myalgias  Skin: Positive for rash  Negative for color change, pallor and wound  Traumatic cellulitis left leg   Neurological: Negative for dizziness, tremors, seizures, syncope, weakness, numbness and headaches  Hematological: Negative for adenopathy  Does not bruise/bleed easily  Psychiatric/Behavioral: Negative  Negative for sleep disturbance  The patient is not nervous/anxious  Objective:    /70 (BP Location: Left arm, Patient Position: Sitting, Cuff Size: Standard)   Pulse 68   Temp 98 2 °F (36 8 °C) (Tympanic)   Resp 15   Ht 5' 1" (1 549 m)   Wt 59 kg (130 lb)   BMI 24 56 kg/m²      Physical Exam   Constitutional: She is oriented to person, place, and time  She appears well-developed and well-nourished  HENT:   Head: Normocephalic  Right Ear: External ear normal    Left Ear: External ear normal    Nose: Nose normal    Mouth/Throat: Oropharynx is clear and moist  No oropharyngeal exudate  Eyes: Pupils are equal, round, and reactive to light  Conjunctivae and EOM are normal    Neck: Normal range of motion  Neck supple  No thyromegaly present  Cardiovascular: Normal rate, regular rhythm, normal heart sounds and intact distal pulses  Exam reveals no gallop and no friction rub  No murmur heard  Pulmonary/Chest: Effort normal and breath sounds normal  No respiratory distress  She has no wheezes  She has no rales  Abdominal: Soft  Bowel sounds are normal  She exhibits no distension and no mass  There is no tenderness  There is no rebound and no guarding     Musculoskeletal: Normal range of motion  Lymphadenopathy:     She has no cervical adenopathy  Neurological: She is alert and oriented to person, place, and time  Skin: Skin is warm and dry  Abrasion noted  No ecchymosis, no laceration and no petechiae noted  There is erythema  Psychiatric: She has a normal mood and affect  Her behavior is normal  Judgment normal    Nursing note and vitals reviewed

## 2018-12-10 NOTE — ASSESSMENT & PLAN NOTE
Blood pressure is well controlled no chest palpitation shortness of breath she is on amlodipine 10 mg daily she is on losartan 100 mg per day she is on metoprolol succinate 100 mg daily    No change in medication

## 2018-12-22 ENCOUNTER — APPOINTMENT (EMERGENCY)
Dept: RADIOLOGY | Facility: HOSPITAL | Age: 83
End: 2018-12-22
Payer: COMMERCIAL

## 2018-12-22 ENCOUNTER — APPOINTMENT (EMERGENCY)
Dept: NON INVASIVE DIAGNOSTICS | Facility: HOSPITAL | Age: 83
End: 2018-12-22
Payer: COMMERCIAL

## 2018-12-22 ENCOUNTER — HOSPITAL ENCOUNTER (EMERGENCY)
Facility: HOSPITAL | Age: 83
Discharge: HOME | End: 2018-12-22
Attending: EMERGENCY MEDICINE | Admitting: EMERGENCY MEDICINE
Payer: COMMERCIAL

## 2018-12-22 VITALS
SYSTOLIC BLOOD PRESSURE: 128 MMHG | RESPIRATION RATE: 16 BRPM | HEART RATE: 75 BPM | TEMPERATURE: 98.1 F | DIASTOLIC BLOOD PRESSURE: 61 MMHG | OXYGEN SATURATION: 96 %

## 2018-12-22 DIAGNOSIS — R52 PAIN: ICD-10-CM

## 2018-12-22 DIAGNOSIS — R60.9 SWELLING: ICD-10-CM

## 2018-12-22 DIAGNOSIS — M79.605 LEG PAIN, LEFT: Primary | ICD-10-CM

## 2018-12-22 LAB
ALBUMIN SERPL BCP-MCNC: 4 G/DL (ref 3.5–5)
ALP SERPL-CCNC: 102 U/L (ref 46–116)
ALT SERPL W P-5'-P-CCNC: 15 U/L (ref 12–78)
ANION GAP SERPL CALCULATED.3IONS-SCNC: 9 MMOL/L (ref 4–13)
AST SERPL W P-5'-P-CCNC: 23 U/L (ref 5–45)
BASOPHILS # BLD AUTO: 0 THOUSANDS/ΜL (ref 0–0.1)
BASOPHILS NFR BLD AUTO: 0 % (ref 0–1)
BILIRUB SERPL-MCNC: 0.47 MG/DL (ref 0.2–1)
BUN SERPL-MCNC: 24 MG/DL (ref 5–25)
CALCIUM SERPL-MCNC: 9.5 MG/DL (ref 8.3–10.1)
CHLORIDE SERPL-SCNC: 101 MMOL/L (ref 100–108)
CO2 SERPL-SCNC: 29 MMOL/L (ref 21–32)
CREAT SERPL-MCNC: 1.22 MG/DL (ref 0.6–1.3)
EOSINOPHIL # BLD AUTO: 0.23 THOUSAND/ΜL (ref 0–0.61)
EOSINOPHIL NFR BLD AUTO: 4 % (ref 0–6)
ERYTHROCYTE [DISTWIDTH] IN BLOOD BY AUTOMATED COUNT: 12.8 % (ref 11.6–15.1)
GFR SERPL CREATININE-BSD FRML MDRD: 40 ML/MIN/1.73SQ M
GLUCOSE SERPL-MCNC: 162 MG/DL (ref 65–140)
HCT VFR BLD AUTO: 40.1 % (ref 34.8–46.1)
HGB BLD-MCNC: 13.1 G/DL (ref 11.5–15.4)
IMM GRANULOCYTES # BLD AUTO: 0.02 THOUSAND/UL (ref 0–0.2)
IMM GRANULOCYTES NFR BLD AUTO: 0 % (ref 0–2)
LYMPHOCYTES # BLD AUTO: 1.22 THOUSANDS/ΜL (ref 0.6–4.47)
LYMPHOCYTES NFR BLD AUTO: 19 % (ref 14–44)
MCH RBC QN AUTO: 29.4 PG (ref 26.8–34.3)
MCHC RBC AUTO-ENTMCNC: 32.7 G/DL (ref 31.4–37.4)
MCV RBC AUTO: 90 FL (ref 82–98)
MONOCYTES # BLD AUTO: 0.51 THOUSAND/ΜL (ref 0.17–1.22)
MONOCYTES NFR BLD AUTO: 8 % (ref 4–12)
NEUTROPHILS # BLD AUTO: 4.46 THOUSANDS/ΜL (ref 1.85–7.62)
NEUTS SEG NFR BLD AUTO: 69 % (ref 43–75)
NRBC BLD AUTO-RTO: 0 /100 WBCS
PLATELET # BLD AUTO: 278 THOUSANDS/UL (ref 149–390)
PMV BLD AUTO: 9.8 FL (ref 8.9–12.7)
POTASSIUM SERPL-SCNC: 3.9 MMOL/L (ref 3.5–5.3)
PROT SERPL-MCNC: 7.9 G/DL (ref 6.4–8.2)
RBC # BLD AUTO: 4.46 MILLION/UL (ref 3.81–5.12)
SODIUM SERPL-SCNC: 139 MMOL/L (ref 136–145)
WBC # BLD AUTO: 6.44 THOUSAND/UL (ref 4.31–10.16)

## 2018-12-22 PROCEDURE — 80053 COMPREHEN METABOLIC PANEL: CPT | Performed by: FAMILY MEDICINE

## 2018-12-22 PROCEDURE — 36415 COLL VENOUS BLD VENIPUNCTURE: CPT | Performed by: FAMILY MEDICINE

## 2018-12-22 PROCEDURE — 93971 EXTREMITY STUDY: CPT

## 2018-12-22 PROCEDURE — 85025 COMPLETE CBC W/AUTO DIFF WBC: CPT | Performed by: FAMILY MEDICINE

## 2018-12-22 PROCEDURE — 73590 X-RAY EXAM OF LOWER LEG: CPT

## 2018-12-22 PROCEDURE — 99284 EMERGENCY DEPT VISIT MOD MDM: CPT

## 2018-12-22 NOTE — ED NOTES
Unable to confirm medications at this time, patient does not recall her pharmacy, care giver is unaware what pharmacy she uses  Neither patient nor caregiver has list of medications        Luis Carlos Hobson RN  12/22/18 9779

## 2018-12-22 NOTE — ED PROVIDER NOTES
History  Chief Complaint   Patient presents with    Leg Pain     [place] Interrupter used for triage  Caretaker only on Saturdays reports "Her leg continues to be bad " Reports has followed up, and given abx but leg remains, "The same " Care taker states she only cares for pt on Saturdays  Caretaker is a poor historian for pt, does not know PMHx, or allergies  Reports was sent in by son, not a physician  HPI  Kyrgyz-speaking only 43-year-old female past medical history of hypertension, hyperlipidemia, anxiety and mild dementia  History obtained from by me  Patient was recently treated for cellulitis with Keflex for 10 days status post a fall on 11/23 with injury to the left lower extremity  Patient presented to the ED on the day of the fall and her workup was negative  Patient presents to the ED today with her caregiver and reports she was sent in by her son to get her left lower extremity checked  Caregiver reports she only provides care to patient on Saturdays and noticed  left lower extremity swelling  Per caregiver this was not present when she last saw her  Patient ambulates with a walker at baseline  Patient however reports no pain, and no discomfort in the left lower extremity  She denies fever, chills, nausea and vomiting  She reports she ambulates with no difficulty  Patient reports chronic discomfort in the right knee  Prior to Admission Medications   Prescriptions Last Dose Informant Patient Reported? Taking?    DULoxetine (CYMBALTA) 30 mg delayed release capsule   No No   Sig: Take 1 capsule (30 mg total) by mouth daily   OLANZapine (ZyPREXA) 7 5 mg tablet   No No   Sig: Take 1 tablet (7 5 mg total) by mouth daily at bedtime   acetaminophen (TYLENOL) 325 mg tablet   Yes No   Sig: Take 650 mg by mouth 2 (two) times a day as needed for mild pain   acetaminophen (TYLENOL) 500 mg tablet   No No   Sig: Take 1 tablet (500 mg total) by mouth every 6 (six) hours as needed for mild pain amLODIPine (NORVASC) 10 mg tablet   No No   Sig: Take 1 tablet (10 mg total) by mouth daily   aspirin (ECOTRIN LOW STRENGTH) 81 mg EC tablet   No No   Sig: Take 1 tablet (81 mg total) by mouth daily   clonazePAM (KlonoPIN) 0 5 mg tablet   No No   Sig: Take 1 tablet (0 5 mg total) by mouth daily   losartan (COZAAR) 100 MG tablet   No No   Sig: Take 1 tablet (100 mg total) by mouth daily   metoprolol succinate (TOPROL-XL) 100 mg 24 hr tablet   No No   Sig: Take 1 tablet (100 mg total) by mouth daily   multivitamin-iron-minerals-folic acid (CENTRUM) chewable tablet   Yes No   Sig: Chew 1 tablet daily      Facility-Administered Medications: None       Past Medical History:   Diagnosis Date    Anxiety     Hyperlipidemia     Hypertension        Past Surgical History:   Procedure Laterality Date    APPENDECTOMY      BLADDER SURGERY      CHOLECYSTECTOMY      TOTAL ABDOMINAL HYSTERECTOMY W/ BILATERAL SALPINGOOPHORECTOMY      TOTAL KNEE ARTHROPLASTY Left        Family History   Problem Relation Age of Onset    Heart disease Mother         Cardiac disorder    Parkinsonism Father     Heart disease Sister         Cardiac disorder    Hypertension Sister     Diabetes Child         Diabetes Mellitus    Lung disease Child         Respiratory Disorder    Diabetes Son         Diabetes Mellitus     I have reviewed and agree with the history as documented  Social History   Substance Use Topics    Smoking status: Never Smoker    Smokeless tobacco: Never Used      Comment: Former smoker per [employer]    Alcohol use No        Review of Systems   Constitutional: Negative for chills and fever  HENT: Negative for congestion  Eyes: Negative for visual disturbance  Respiratory: Negative for cough and shortness of breath  Cardiovascular: Negative for chest pain  Gastrointestinal: Negative for nausea and vomiting  Genitourinary: Negative  Neurological: Negative for light-headedness         Physical Exam  ED Triage Vitals [12/22/18 1037]   Temperature Pulse Respirations Blood Pressure SpO2   98 1 °F (36 7 °C) 71 16 139/62 98 %      Temp Source Heart Rate Source Patient Position - Orthostatic VS BP Location FiO2 (%)   Temporal Monitor Sitting Right arm --      Pain Score       No Pain           Orthostatic Vital Signs  Vitals:    12/22/18 1037   BP: 139/62   Pulse: 71   Patient Position - Orthostatic VS: Sitting       Physical Exam   Constitutional: She is oriented to person, place, and time  She appears well-developed and well-nourished  HENT:   Head: Normocephalic and atraumatic  Eyes: EOM are normal    Neck: Normal range of motion  Neck supple  Cardiovascular: Normal rate, regular rhythm and normal heart sounds  Pulmonary/Chest: Breath sounds normal    Abdominal: Soft  Bowel sounds are normal    Musculoskeletal:   Mild swelling lateral aspect of left shin  Healed wound  No redness, no warmth to touch, nontender to palpation  Dorsalis pedis pulses palpable  Neurological: She is alert and oriented to person, place, and time  Skin: Skin is warm and dry  Vitals reviewed  ED Medications  Medications - No data to display    Diagnostic Studies  Results Reviewed     None                 No orders to display         Procedures  Procedures      Phone Consults  ED Phone Contact    ED Course                               MDM  CritCare Time    Disposition  Final diagnoses:   None     ED Disposition     None      Follow-up Information    None         Patient's Medications   Discharge Prescriptions    No medications on file     No discharge procedures on file  ED Provider  Attending physically available and evaluated Robert Conway I managed the patient along with the ED Attending      Electronically Signed by         Merleen Jeans, MD  12/22/18 7688

## 2018-12-22 NOTE — DISCHARGE INSTRUCTIONS
Dolor de piernas   LO QUE NECESITA SABER:   El dolor de piernas puede ser causado por rj variedad de afecciones de [place]  [place] exámenes no mostraron ningún hueso roto ni coágulos de [place]  INSTRUCCIONES SOBRE EL [facility] HOSPITALARIA:   Regrese a la david de emergencias si:   · Usted tiene fiebre  · Crowell pierna empieza a inflamarse    · Crowell dolor de [place]  · Usted tiene entumecimiento o cosquilleo en la pierna o los dedos del pie  · Usted es incapaz de  o soportar nada de peso sobre crowell pierna  Pregúntele a crowell Jazzy Kennedy vitaminas y minerales son adecuados para usted  · Crowell dolor no disminuye, aún después del [address]  · Usted tiene preguntas o inquietudes acerca de crowell condición o cuidado  Medicamentos:   · AINEs (Analgésicos antiinflamatorios no esteroides) griselda el ibuprofeno, ayudan a disminuir la inflamación, el dolor y la fiebre  Rubina medicamento esta disponible con o sin rj receta médica  Los AINEs pueden causar sangrado estomacal o problemas renales en ciertas personas  Si usted ronald un medicamento anticoagulante, siempre pregúntele a crowell médico si los ANA son seguros para usted  Siempre angelica la etiqueta de rubina medicamento y [place] instrucciones  · [place] medicamentos griselda se le haya indicado  Consulte con crowell médico si usted taryn que crowell medicamento no le está ayudando o si presenta efectos secundarios  Infórmele si es alérgico a algún medicamento  Mantenga rj lista actualizada de los [employer], las vitaminas y los productos herbales que ronald  Incluya los siguientes datos de los medicamentos: cantidad, frecuencia y motivo de administración  Traiga con usted la lista o los envases de la píldoras a mir[place]e citas de seguimiento  Lleve la lista de los medicamentos con usted en srinivas de rj emergencia  Acuda a mir[place]e consultas de control con crowell médico según le indicaron  Es posible que necesite más exámenes para determinar la causa de crowell dolor de pierna   Usted podría necesitar fisioterapia o consultar con un ortopedista especializado  Anote mir[place]e preguntas para que se acuerde de hacerlas sachi [place]e visitas  Lidiar con el dolor de pierna:   · Descanse  crowell pierna para que se recupere  Es posible que necesite un inmovilizador, aparato ortopédico o férula para limitar el movimiento de la pierna  Posiblemente deba evitar poner peso sobre crowell pierna sachi al menos 48 horas  Regrese a [place] actividades cotidianas según las indicaciones  · El hielo  al área afectada por 20 minutos cada 4 horas por un eulogio de 24 horas o según las indicaciones  Use un paquete de hielo o ponga hielo molido dentro de [employer]  Cúbrala con rj toalla para proteger crowell piel  El hielo ayuda a evitar daño al tejido y a disminuir la inflamación y el dolor  · Eleve  crowell pierna lesionada por encima del nivel de crowell corazón con la frecuencia que pueda  [place] va a disminuir inflamación y el dolor  Apoye la pierna sobre almohadas o mantas para mantener el área elevada de rj forma cómoda, si es posible  · Use dispositivos de apoyo griselda se le indique  Es posible que usted necesite usar un bastón o [place] rousae  Los dispositivos de asistencia pueden ayudar a disminuir el dolor y la presión que se ejerce en crowell pierna al caminar  Pregunte a crowell médico por más [employer] dispositivos de asistencia y cómo se usan de forma correcta  · Mantenga un peso saludable  El peso corporal adicional puede provocar presión y dolor en las articulaciones de crowell Sanya Tan y tobzara  Consulte con crowell médico cuánto debería pesar  Pida que le ayude a crear un plan para bajar de peso si usted tiene sobrepeso  © 2017 [address] Information is for End User's use only and may not be sold, redistributed or otherwise used for commercial purposes  All illustrations and images included in CareNotes® are the copyrighted property of A D A M , Inc  or Jaime Bhat    Esta información es sólo para uso en educación  Crowell intención no es darle un consejo médico sobre enfermedades o tratamientos  Colsulte con crowell [place] farmacéutico antes de seguir cualquier régimen médico para saber si es seguro y efectivo para usted

## 2018-12-22 NOTE — ED ATTENDING ATTESTATION
Rosalie Stover MD, saw and evaluated the patient  I have discussed the patient with the resident/non-physician practitioner and agree with the resident's/non-physician practitioner's findings, Plan of Care, and MDM as documented in the resident's/non-physician practitioner's note, except where noted  All available labs and Radiology studies were reviewed  At this point I agree with the current assessment done in the Emergency Department  I have conducted an independent evaluation of this patient a history and physical is as follows:  Patient with hx of fall in November, left ankle sprain, had swelling and Cellulitis of left sin/ankle, was treated for Cellulitis; today noticed that there is more swelling over the area  On exam, mild swelling of the left lateral apsect of leg  Differential diagnosis:  Cellulitis, rule out deep infection, DVT  Will check labs, x-ray, duplex lower extremity  Labs wnl, XR NAD  Case discussed in sign out with Dr Zelda Morris; Duplex pending; case under resident care      Critical Care Time  CritCare Time    Procedures

## 2018-12-23 PROCEDURE — 93971 EXTREMITY STUDY: CPT | Performed by: SURGERY

## 2019-01-11 DIAGNOSIS — I10 ESSENTIAL HYPERTENSION: ICD-10-CM

## 2019-01-11 DIAGNOSIS — F41.9 ANXIETY: ICD-10-CM

## 2019-01-11 RX ORDER — CLONAZEPAM 0.5 MG/1
0.5 TABLET ORAL DAILY
Qty: 30 TABLET | Refills: 1 | Status: SHIPPED | OUTPATIENT
Start: 2019-01-11 | End: 2019-03-19

## 2019-01-11 RX ORDER — OLANZAPINE 7.5 MG/1
7.5 TABLET ORAL
Qty: 30 TABLET | Refills: 1 | Status: SHIPPED | OUTPATIENT
Start: 2019-01-11 | End: 2019-07-31 | Stop reason: ALTCHOICE

## 2019-01-14 ENCOUNTER — OFFICE VISIT (OUTPATIENT)
Dept: INTERNAL MEDICINE CLINIC | Facility: CLINIC | Age: 84
End: 2019-01-14
Payer: COMMERCIAL

## 2019-01-14 VITALS
WEIGHT: 131 LBS | TEMPERATURE: 98.1 F | RESPIRATION RATE: 15 BRPM | BODY MASS INDEX: 24.73 KG/M2 | HEIGHT: 61 IN | SYSTOLIC BLOOD PRESSURE: 119 MMHG | DIASTOLIC BLOOD PRESSURE: 68 MMHG | HEART RATE: 67 BPM

## 2019-01-14 DIAGNOSIS — H91.93 HEARING PROBLEM OF BOTH EARS: ICD-10-CM

## 2019-01-14 DIAGNOSIS — I10 ESSENTIAL HYPERTENSION: Primary | ICD-10-CM

## 2019-01-14 DIAGNOSIS — K59.04 CHRONIC IDIOPATHIC CONSTIPATION: ICD-10-CM

## 2019-01-14 DIAGNOSIS — R60.0 LOCALIZED EDEMA: ICD-10-CM

## 2019-01-14 DIAGNOSIS — F41.9 ANXIETY: ICD-10-CM

## 2019-01-14 DIAGNOSIS — E78.2 MIXED HYPERLIPIDEMIA: ICD-10-CM

## 2019-01-14 DIAGNOSIS — L03.116 CELLULITIS OF LEFT LOWER EXTREMITY: ICD-10-CM

## 2019-01-14 PROCEDURE — 99214 OFFICE O/P EST MOD 30 MIN: CPT | Performed by: INTERNAL MEDICINE

## 2019-01-14 PROCEDURE — 1160F RVW MEDS BY RX/DR IN RCRD: CPT | Performed by: INTERNAL MEDICINE

## 2019-01-14 RX ORDER — DOCUSATE SODIUM 100 MG/1
100 CAPSULE, LIQUID FILLED ORAL 2 TIMES DAILY
Qty: 10 CAPSULE | Refills: 0 | Status: SHIPPED | OUTPATIENT
Start: 2019-01-14 | End: 2019-03-19

## 2019-01-14 NOTE — PATIENT INSTRUCTIONS
Blood pressure very well control  For your edema will try some Miguel Angel stockings that you're I health care will help you put them on  For your hearing will refer you to ENT for an evaluation  I will see her back in 8 weeks    The medication that is given you edema and your leg is call amlodipine 10 mg    For constipation I gave you Colace he can take 1 a day    For your dry skin you may put some Aveeno or Nutri derm cream to keep your legs from drying up

## 2019-01-14 NOTE — ASSESSMENT & PLAN NOTE
No evidence of cellulitis in the lower extremities now she does have some swelling she went to the ER venous duplex was negative specially on the left leg    She has dry skiing that has the tendency for her to scratch I told the caretaker to give her some moisturizer

## 2019-01-14 NOTE — PROGRESS NOTES
Assessment/Plan:    Hypertension  Blood pressure is well control she is on the following medications for blood pressure control amlodipine 10 mg  Losartan 100 mg  Metoprolol succinate 100 mg once a day  Hearing problem of both ears  Refer to ENT for hearing evaluation    Anxiety  Seems to be doing okay with the care taker    Cellulitis of extremity  No evidence of cellulitis in the lower extremities now she does have some swelling she went to the ER venous duplex was negative specially on the left leg    She has dry skiing that has the tendency for her to scratch I told the caretaker to give her some moisturizer       Diagnoses and all orders for this visit:    Essential hypertension    Anxiety    Mixed hyperlipidemia    Cellulitis of left lower extremity    Hearing problem of both ears  -     Ambulatory Referral to Otolaryngology; Future    Localized edema  -     TEDS Stockings    Chronic idiopathic constipation  -     docusate sodium (COLACE) 100 mg capsule; Take 1 capsule (100 mg total) by mouth 2 (two) times a day          Subjective:      Patient ID: Kelly Holt is a 80 y o  female  Chief Complaint   Patient presents with    Follow-up     Meds were not verified  DISCUSS PREVNAR 13       Fall     Fell 1 month ago - has pian in her left lower leg and foot, she di go to the ER             Current Outpatient Prescriptions:     acetaminophen (TYLENOL) 325 mg tablet, Take 650 mg by mouth 2 (two) times a day as needed for mild pain, Disp: , Rfl:     acetaminophen (TYLENOL) 500 mg tablet, Take 1 tablet (500 mg total) by mouth every 6 (six) hours as needed for mild pain, Disp: 30 tablet, Rfl: 0    amLODIPine (NORVASC) 10 mg tablet, Take 1 tablet (10 mg total) by mouth daily, Disp: 90 tablet, Rfl: 1    aspirin (ECOTRIN LOW STRENGTH) 81 mg EC tablet, Take 1 tablet (81 mg total) by mouth daily, Disp: 90 tablet, Rfl: 1    clonazePAM (KlonoPIN) 0 5 mg tablet, TAKE 1 TABLET (0 5 MG TOTAL) BY MOUTH DAILY, Disp: 30 tablet, Rfl: 1    docusate sodium (COLACE) 100 mg capsule, Take 1 capsule (100 mg total) by mouth 2 (two) times a day, Disp: 10 capsule, Rfl: 0    DULoxetine (CYMBALTA) 30 mg delayed release capsule, Take 1 capsule (30 mg total) by mouth daily, Disp: 90 capsule, Rfl: 1    losartan (COZAAR) 100 MG tablet, Take 1 tablet (100 mg total) by mouth daily, Disp: 90 tablet, Rfl: 1    metoprolol succinate (TOPROL-XL) 100 mg 24 hr tablet, Take 1 tablet (100 mg total) by mouth daily, Disp: 90 tablet, Rfl: 1    multivitamin-iron-minerals-folic acid (CENTRUM) chewable tablet, Chew 1 tablet daily, Disp: , Rfl:     OLANZapine (ZyPREXA) 7 5 mg tablet, TAKE 1 TABLET (7 5 MG TOTAL) BY MOUTH DAILY AT BEDTIME, Disp: 30 tablet, Rfl: 1    Came in with her caretaker for follow-up  Problem 1  Blood pressure well control on triple medication no chest palpitation shortness of breath continue the same she does have some dependent edema which IV leave is on the basis of the amlodipine 10 mg she is controlling the blood pressure well will order some Miguel Angel stocking elevate her legs  There is no evidence of heart failure on her examination she went to the ER 1 time she has cellulitis on the left leg which she fell in and lacerated her left leg her venous duplex on December 24 showed no evidence of deep vein thrombosis there is no evidence of cellulitis doing physical examination today  Cognitive decline anxiety seems to be stable medication she has psychiatric follow-up caretaker is here with her    She only complained of constipation I gave her Colace    She had no recent falls which is good news she ambulates with a rolling walker  The following portions of the patient's history were reviewed and updated as appropriate: allergies, current medications, past family history, past medical history, past social history, past surgical history and problem list     Review of Systems   Constitutional: Negative    Negative for activity change, appetite change, fatigue, fever and unexpected weight change  HENT: Negative for congestion, ear pain, hearing loss, mouth sores, postnasal drip, rhinorrhea, sore throat, trouble swallowing and voice change  Eyes: Negative for pain, redness and visual disturbance  Respiratory: Negative for cough, chest tightness, shortness of breath and wheezing  Cardiovascular: Negative for chest pain, palpitations and leg swelling  Gastrointestinal: Negative for abdominal distention, abdominal pain, blood in stool, constipation, diarrhea and nausea  Endocrine: Negative for cold intolerance, heat intolerance, polydipsia, polyphagia and polyuria  Genitourinary: Negative for difficulty urinating, dysuria, flank pain, frequency, hematuria and urgency  Musculoskeletal: Negative for arthralgias, back pain, gait problem, joint swelling and myalgias  Skin: Negative for color change and pallor  Neurological: Negative for dizziness, tremors, seizures, syncope, weakness, numbness and headaches  Hematological: Negative for adenopathy  Does not bruise/bleed easily  Psychiatric/Behavioral: Negative  Negative for sleep disturbance  The patient is not nervous/anxious            Objective:    Results for orders placed or performed during the hospital encounter of 12/22/18   CBC and differential   Result Value Ref Range    WBC 6 44 4 31 - 10 16 Thousand/uL    RBC 4 46 3 81 - 5 12 Million/uL    Hemoglobin 13 1 11 5 - 15 4 g/dL    Hematocrit 40 1 34 8 - 46 1 %    MCV 90 82 - 98 fL    MCH 29 4 26 8 - 34 3 pg    MCHC 32 7 31 4 - 37 4 g/dL    RDW 12 8 11 6 - 15 1 %    MPV 9 8 8 9 - 12 7 fL    Platelets 346 503 - 479 Thousands/uL    nRBC 0 /100 WBCs    Neutrophils Relative 69 43 - 75 %    Immat GRANS % 0 0 - 2 %    Lymphocytes Relative 19 14 - 44 %    Monocytes Relative 8 4 - 12 %    Eosinophils Relative 4 0 - 6 %    Basophils Relative 0 0 - 1 %    Neutrophils Absolute 4 46 1 85 - 7 62 Thousands/µL    Immature Grans Absolute 0 02 0 00 - 0 20 Thousand/uL    Lymphocytes Absolute 1 22 0 60 - 4 47 Thousands/µL    Monocytes Absolute 0 51 0 17 - 1 22 Thousand/µL    Eosinophils Absolute 0 23 0 00 - 0 61 Thousand/µL    Basophils Absolute 0 00 0 00 - 0 10 Thousands/µL   Comprehensive metabolic panel   Result Value Ref Range    Sodium 139 136 - 145 mmol/L    Potassium 3 9 3 5 - 5 3 mmol/L    Chloride 101 100 - 108 mmol/L    CO2 29 21 - 32 mmol/L    ANION GAP 9 4 - 13 mmol/L    BUN 24 5 - 25 mg/dL    Creatinine 1 22 0 60 - 1 30 mg/dL    Glucose 162 (H) 65 - 140 mg/dL    Calcium 9 5 8 3 - 10 1 mg/dL    AST 23 5 - 45 U/L    ALT 15 12 - 78 U/L    Alkaline Phosphatase 102 46 - 116 U/L    Total Protein 7 9 6 4 - 8 2 g/dL    Albumin 4 0 3 5 - 5 0 g/dL    Total Bilirubin 0 47 0 20 - 1 00 mg/dL    eGFR 40 ml/min/1 73sq m       /68 (BP Location: Left arm, Patient Position: Sitting, Cuff Size: Standard)   Pulse 67   Temp 98 1 °F (36 7 °C)   Resp 15   Ht 5' 1" (1 549 m)   Wt 59 4 kg (131 lb)   BMI 24 75 kg/m²      Physical Exam   Constitutional: She is oriented to person, place, and time  She appears well-developed and well-nourished  HENT:   Head: Normocephalic  Right Ear: External ear normal    Left Ear: External ear normal    Nose: Nose normal    Mouth/Throat: Oropharynx is clear and moist  No oropharyngeal exudate  She has some cerumen in both external auditory canal will refer to ENT because she has problems hearing   Eyes: Pupils are equal, round, and reactive to light  Conjunctivae and EOM are normal    Neck: Normal range of motion  Neck supple  No thyromegaly present  Cardiovascular: Normal rate, regular rhythm, normal heart sounds and intact distal pulses  Exam reveals no gallop and no friction rub  No murmur heard  S1-S2 no gallops regular rhythm  Extremity edema +2 bilaterally more on the left than the right   Pulmonary/Chest: Effort normal and breath sounds normal  No respiratory distress  She has no wheezes   She has no rales  Lungs are clear no wheezing rales or rhonchi   Abdominal: Soft  Bowel sounds are normal  She exhibits no distension and no mass  There is no tenderness  There is no rebound and no guarding  Abdomen soft nontender   Musculoskeletal: Normal range of motion  Lymphadenopathy:     She has no cervical adenopathy  Neurological: She is alert and oriented to person, place, and time  Skin: Skin is warm and dry  Psychiatric: She has a normal mood and affect  Her behavior is normal  Judgment normal    Nursing note and vitals reviewed

## 2019-01-14 NOTE — ASSESSMENT & PLAN NOTE
Blood pressure is well control she is on the following medications for blood pressure control amlodipine 10 mg  Losartan 100 mg  Metoprolol succinate 100 mg once a day

## 2019-03-07 DIAGNOSIS — F41.9 ANXIETY: ICD-10-CM

## 2019-03-07 DIAGNOSIS — I10 ESSENTIAL HYPERTENSION: ICD-10-CM

## 2019-03-08 DIAGNOSIS — I10 ESSENTIAL HYPERTENSION: ICD-10-CM

## 2019-03-08 RX ORDER — LOSARTAN POTASSIUM 100 MG/1
100 TABLET ORAL DAILY
Qty: 90 TABLET | Refills: 0 | Status: SHIPPED | OUTPATIENT
Start: 2019-03-08 | End: 2019-06-24 | Stop reason: SDUPTHER

## 2019-03-08 RX ORDER — OLANZAPINE 7.5 MG/1
TABLET ORAL
Qty: 30 TABLET | Refills: 1 | OUTPATIENT
Start: 2019-03-08

## 2019-03-08 RX ORDER — LOSARTAN POTASSIUM 100 MG/1
100 TABLET ORAL DAILY
Qty: 90 TABLET | Refills: 1 | OUTPATIENT
Start: 2019-03-08

## 2019-03-08 RX ORDER — AMLODIPINE BESYLATE 10 MG/1
10 TABLET ORAL DAILY
Qty: 90 TABLET | Refills: 1 | OUTPATIENT
Start: 2019-03-08

## 2019-03-08 RX ORDER — METOPROLOL SUCCINATE 100 MG/1
100 TABLET, EXTENDED RELEASE ORAL DAILY
Qty: 90 TABLET | Refills: 1 | OUTPATIENT
Start: 2019-03-08

## 2019-03-08 RX ORDER — DULOXETIN HYDROCHLORIDE 30 MG/1
30 CAPSULE, DELAYED RELEASE ORAL DAILY
Qty: 90 CAPSULE | Refills: 1 | OUTPATIENT
Start: 2019-03-08

## 2019-03-08 RX ORDER — METOPROLOL SUCCINATE 100 MG/1
100 TABLET, EXTENDED RELEASE ORAL DAILY
Qty: 90 TABLET | Refills: 0 | Status: SHIPPED | OUTPATIENT
Start: 2019-03-08 | End: 2019-06-24 | Stop reason: SDUPTHER

## 2019-03-08 RX ORDER — CLONAZEPAM 0.5 MG/1
0.5 TABLET ORAL DAILY
Qty: 30 TABLET | Refills: 1 | OUTPATIENT
Start: 2019-03-08

## 2019-03-08 RX ORDER — ASPIRIN 81 MG/1
81 TABLET ORAL DAILY
Qty: 90 TABLET | Refills: 0 | Status: SHIPPED | OUTPATIENT
Start: 2019-03-08 | End: 2019-03-18 | Stop reason: SDUPTHER

## 2019-03-08 RX ORDER — ASPIRIN 81 MG/1
81 TABLET ORAL DAILY
Qty: 30 TABLET | Refills: 3 | OUTPATIENT
Start: 2019-03-08

## 2019-03-08 RX ORDER — AMLODIPINE BESYLATE 10 MG/1
10 TABLET ORAL DAILY
Qty: 90 TABLET | Refills: 0 | Status: SHIPPED | OUTPATIENT
Start: 2019-03-08 | End: 2019-06-24 | Stop reason: SDUPTHER

## 2019-03-18 ENCOUNTER — OFFICE VISIT (OUTPATIENT)
Dept: INTERNAL MEDICINE CLINIC | Facility: CLINIC | Age: 84
End: 2019-03-18
Payer: COMMERCIAL

## 2019-03-18 VITALS
DIASTOLIC BLOOD PRESSURE: 67 MMHG | RESPIRATION RATE: 14 BRPM | BODY MASS INDEX: 24.17 KG/M2 | WEIGHT: 128 LBS | HEIGHT: 61 IN | SYSTOLIC BLOOD PRESSURE: 117 MMHG | HEART RATE: 82 BPM | TEMPERATURE: 98.2 F

## 2019-03-18 DIAGNOSIS — E78.2 MIXED HYPERLIPIDEMIA: ICD-10-CM

## 2019-03-18 DIAGNOSIS — I49.9 IRREGULARLY IRREGULAR PULSE RHYTHM: ICD-10-CM

## 2019-03-18 DIAGNOSIS — I10 ESSENTIAL HYPERTENSION: Primary | ICD-10-CM

## 2019-03-18 DIAGNOSIS — F22 DELUSIONAL DISORDER (HCC): ICD-10-CM

## 2019-03-18 DIAGNOSIS — F41.9 ANXIETY: ICD-10-CM

## 2019-03-18 PROBLEM — I48.91 ATRIAL FIBRILLATION (HCC): Status: ACTIVE | Noted: 2019-03-18

## 2019-03-18 PROCEDURE — 93000 ELECTROCARDIOGRAM COMPLETE: CPT | Performed by: INTERNAL MEDICINE

## 2019-03-18 PROCEDURE — 99215 OFFICE O/P EST HI 40 MIN: CPT | Performed by: INTERNAL MEDICINE

## 2019-03-18 PROCEDURE — 1160F RVW MEDS BY RX/DR IN RCRD: CPT | Performed by: INTERNAL MEDICINE

## 2019-03-18 RX ORDER — ASPIRIN 81 MG/1
81 TABLET ORAL DAILY
Qty: 90 TABLET | Refills: 0 | Status: SHIPPED | OUTPATIENT
Start: 2019-03-18 | End: 2019-03-18

## 2019-03-18 NOTE — ASSESSMENT & PLAN NOTE
New onset atrial fibrillation  She is rate control because she is on metoprolol 100 mg per day for embolic protection will start her on Eliquis 2 5 mg twice a day  A lower dose because of less than 60 kilos in weight and age over [de-identified]

## 2019-03-18 NOTE — PROGRESS NOTES
Assessment/Plan:  New medication Eliquis 2 5 mg twice a day dose adjusted stop the aspirin Cardiology Consul 2D echo a Holter monitor will see her back in 4 weeks  EKG atrial fibrillation rate of 70 no evidence of myocardial infarction or acute ischemia a change in rhythm from previously that she was in sinus rhythm  Nothing specific T-wave abnormalities  Since I hope saw her on this visit this is a note from Thursday March 21st she has been to the ER twice she has been more delusional and psychotic so I am going to stop the Eliquis on tell she is psychiatrically stable will call the family and ask them to take that off the medication list to avoid the risk of bleeding  Anxiety  Has delusions of people  [place]  her apartment she is here with a caregiver that denies that  Apparently some months the medications with discontinue we need to call the psychiatrist and reconciliation her meds    Hyperlipidemia  Check a lipid profile when she comes back she is off medication    Atrial fibrillation ([address] )  New onset atrial fibrillation  She is rate control because she is on metoprolol 100 mg per day for embolic protection will start her on Eliquis 2 5 mg twice a day  A lower dose because of less than 60 kilos in weight and age over [de-identified]  Diagnoses and all orders for this visit:    Essential hypertension  -     POCT ECG  -     Echo complete with contrast if indicated; Future  -     apixaban (ELIQUIS) 2 5 mg; Take 1 tablet (2 5 mg total) by mouth 2 (two) times a day for 30 days  -     Ambulatory referral to Cardiology; Future  -     Holter monitor - 48 hour; Future  -     Discontinue: aspirin (ECOTRIN LOW STRENGTH) 81 mg EC tablet; Take 1 tablet (81 mg total) by mouth daily    Anxiety  -     Echo complete with contrast if indicated; Future  -     apixaban (ELIQUIS) 2 5 mg; Take 1 tablet (2 5 mg total) by mouth 2 (two) times a day for 30 days  -     Ambulatory referral to Cardiology;  Future  -     Holter monitor - 48 hour; Future    Mixed hyperlipidemia  -     Echo complete with contrast if indicated; Future  -     apixaban (ELIQUIS) 2 5 mg; Take 1 tablet (2 5 mg total) by mouth 2 (two) times a day for 30 days  -     Ambulatory referral to Cardiology; Future  -     Holter monitor - 48 hour; Future    Irregularly irregular pulse rhythm  -     POCT ECG  -     Echo complete with contrast if indicated; Future  -     apixaban (ELIQUIS) 2 5 mg; Take 1 tablet (2 5 mg total) by mouth 2 (two) times a day for 30 days  -     Ambulatory referral to Cardiology; Future  -     Holter monitor - 48 hour; Future          Subjective:      Patient ID: Wendy Hollis is a 80 y o  female      Chief Complaint   Patient presents with    Follow-up         Current Outpatient Medications:     acetaminophen (TYLENOL) 325 mg tablet, Take 650 mg by mouth 2 (two) times a day as needed for mild pain, Disp: , Rfl:     acetaminophen (TYLENOL) 500 mg tablet, Take 1 tablet (500 mg total) by mouth every 6 (six) hours as needed for mild pain, Disp: 30 tablet, Rfl: 0    amLODIPine (NORVASC) 10 mg tablet, Take 1 tablet (10 mg total) by mouth daily, Disp: 90 tablet, Rfl: 0    apixaban (ELIQUIS) 2 5 mg, Take 1 tablet (2 5 mg total) by mouth 2 (two) times a day for 30 days, Disp: 60 tablet, Rfl: 1    clonazePAM (KlonoPIN) 0 5 mg tablet, TAKE 1 TABLET (0 5 MG TOTAL) BY MOUTH DAILY, Disp: 30 tablet, Rfl: 1    docusate sodium (COLACE) 100 mg capsule, Take 1 capsule (100 mg total) by mouth 2 (two) times a day, Disp: 10 capsule, Rfl: 0    DULoxetine (CYMBALTA) 30 mg delayed release capsule, Take 1 capsule (30 mg total) by mouth daily, Disp: 90 capsule, Rfl: 1    losartan (COZAAR) 100 MG tablet, Take 1 tablet (100 mg total) by mouth daily, Disp: 90 tablet, Rfl: 0    metoprolol succinate (TOPROL-XL) 100 mg 24 hr tablet, Take 1 tablet (100 mg total) by mouth daily, Disp: 90 tablet, Rfl: 0    multivitamin-iron-minerals-folic acid (CENTRUM) chewable tablet, Chew 1 tablet daily, Disp: , Rfl:     OLANZapine (ZyPREXA) 7 5 mg tablet, TAKE 1 TABLET (7 5 MG TOTAL) BY MOUTH DAILY AT BEDTIME, Disp: 30 tablet, Rfl: 1    Problem 1  High blood pressure very well controlled she actually on the low side is on multiple medications amlodipine 10 mg metoprolol succinate 100 mg losartan 100 mg will check an EKG today the rhythm is regular regular 1 make sure is not in AFib    Psychosis schizophrenia feels a people rubbing her apartment caretaker denies that apparently medications were changed for her  Psychiatrist we need to get the psychiatrist's name and get medications    Hyperlipidemia she is not taking any medications    Chart review a couple of visits in the ER 1 for leg pain the other 1 for fall of denies any discomfort now  The following portions of the patient's history were reviewed and updated as appropriate: allergies, current medications, past family history, past medical history, past social history, past surgical history and problem list     Review of Systems   Constitutional: Negative  Negative for activity change, appetite change, fatigue, fever and unexpected weight change  HENT: Negative for congestion, ear pain, hearing loss, mouth sores, postnasal drip, rhinorrhea, sore throat, trouble swallowing and voice change  Eyes: Negative for pain, redness and visual disturbance  Respiratory: Negative for cough, chest tightness, shortness of breath and wheezing  Cardiovascular: Negative for chest pain, palpitations and leg swelling  Gastrointestinal: Negative for abdominal distention, abdominal pain, blood in stool, constipation, diarrhea and nausea  Endocrine: Negative for cold intolerance, heat intolerance, polydipsia, polyphagia and polyuria  Genitourinary: Negative for difficulty urinating, dysuria, flank pain, frequency, hematuria and urgency  Musculoskeletal: Negative for arthralgias, back pain, gait problem, joint swelling and myalgias     Skin: Negative for color change and pallor  Neurological: Negative for dizziness, tremors, seizures, syncope, weakness, numbness and headaches  Hematological: Negative for adenopathy  Does not bruise/bleed easily  Psychiatric/Behavioral: Negative  Negative for sleep disturbance  The patient is not nervous/anxious            Objective:    Results for orders placed or performed during the hospital encounter of 12/22/18   CBC and differential   Result Value Ref Range    WBC 6 44 4 31 - 10 16 Thousand/uL    RBC 4 46 3 81 - 5 12 Million/uL    Hemoglobin 13 1 11 5 - 15 4 g/dL    Hematocrit 40 1 34 8 - 46 1 %    MCV 90 82 - 98 fL    MCH 29 4 26 8 - 34 3 pg    MCHC 32 7 31 4 - 37 4 g/dL    RDW 12 8 11 6 - 15 1 %    MPV 9 8 8 9 - 12 7 fL    Platelets 706 209 - 072 Thousands/uL    nRBC 0 /100 WBCs    Neutrophils Relative 69 43 - 75 %    Immat GRANS % 0 0 - 2 %    Lymphocytes Relative 19 14 - 44 %    Monocytes Relative 8 4 - 12 %    Eosinophils Relative 4 0 - 6 %    Basophils Relative 0 0 - 1 %    Neutrophils Absolute 4 46 1 85 - 7 62 Thousands/µL    Immature Grans Absolute 0 02 0 00 - 0 20 Thousand/uL    Lymphocytes Absolute 1 22 0 60 - 4 47 Thousands/µL    Monocytes Absolute 0 51 0 17 - 1 22 Thousand/µL    Eosinophils Absolute 0 23 0 00 - 0 61 Thousand/µL    Basophils Absolute 0 00 0 00 - 0 10 Thousands/µL   Comprehensive metabolic panel   Result Value Ref Range    Sodium 139 136 - 145 mmol/L    Potassium 3 9 3 5 - 5 3 mmol/L    Chloride 101 100 - 108 mmol/L    CO2 29 21 - 32 mmol/L    ANION GAP 9 4 - 13 mmol/L    BUN 24 5 - 25 mg/dL    Creatinine 1 22 0 60 - 1 30 mg/dL    Glucose 162 (H) 65 - 140 mg/dL    Calcium 9 5 8 3 - 10 1 mg/dL    AST 23 5 - 45 U/L    ALT 15 12 - 78 U/L    Alkaline Phosphatase 102 46 - 116 U/L    Total Protein 7 9 6 4 - 8 2 g/dL    Albumin 4 0 3 5 - 5 0 g/dL    Total Bilirubin 0 47 0 20 - 1 00 mg/dL    eGFR 40 ml/min/1 73sq m       /67 (BP Location: Left arm, Patient Position: Sitting, Cuff Size: Standard)   Pulse 82   Temp 98 2 °F (36 8 °C)   Resp 14   Ht 5' 1" (1 549 m)   Wt 58 1 kg (128 lb)   BMI 24 19 kg/m²      Physical Exam   Constitutional: She is oriented to person, place, and time  She appears well-developed and well-nourished  HENT:   Head: Normocephalic  Right Ear: External ear normal    Left Ear: External ear normal    Nose: Nose normal    Mouth/Throat: Oropharynx is clear and moist  No oropharyngeal exudate  Eyes: Pupils are equal, round, and reactive to light  Conjunctivae and EOM are normal    Neck: Normal range of motion  Neck supple  No thyromegaly present  Cardiovascular: Normal rate, regular rhythm, normal heart sounds and intact distal pulses  Exam reveals no gallop and no friction rub  No murmur heard  Blood pressure left arm sitting 100/60    Rhythm S1-S2 regular regular  Extremities no edema   Pulmonary/Chest: Effort normal and breath sounds normal  No respiratory distress  She has no wheezes  She has no rales  Lungs are clear no wheezing rales or rhonchi   Abdominal: Soft  Bowel sounds are normal  She exhibits no distension and no mass  There is no tenderness  There is no rebound and no guarding  Abdomen soft nontender   Musculoskeletal: Normal range of motion  Lymphadenopathy:     She has no cervical adenopathy  Neurological: She is alert and oriented to person, place, and time  Skin: Skin is warm and dry  Psychiatric: She has a normal mood and affect  Her behavior is normal  Judgment normal    Nursing note and vitals reviewed          Results for orders placed or performed during the hospital encounter of 12/22/18   CBC and differential   Result Value Ref Range    WBC 6 44 4 31 - 10 16 Thousand/uL    RBC 4 46 3 81 - 5 12 Million/uL    Hemoglobin 13 1 11 5 - 15 4 g/dL    Hematocrit 40 1 34 8 - 46 1 %    MCV 90 82 - 98 fL    MCH 29 4 26 8 - 34 3 pg    MCHC 32 7 31 4 - 37 4 g/dL    RDW 12 8 11 6 - 15 1 %    MPV 9 8 8 9 - 12 7 fL    Platelets 948 075 - 390 Thousands/uL    nRBC 0 /100 WBCs    Neutrophils Relative 69 43 - 75 %    Immat GRANS % 0 0 - 2 %    Lymphocytes Relative 19 14 - 44 %    Monocytes Relative 8 4 - 12 %    Eosinophils Relative 4 0 - 6 %    Basophils Relative 0 0 - 1 %    Neutrophils Absolute 4 46 1 85 - 7 62 Thousands/µL    Immature Grans Absolute 0 02 0 00 - 0 20 Thousand/uL    Lymphocytes Absolute 1 22 0 60 - 4 47 Thousands/µL    Monocytes Absolute 0 51 0 17 - 1 22 Thousand/µL    Eosinophils Absolute 0 23 0 00 - 0 61 Thousand/µL    Basophils Absolute 0 00 0 00 - 0 10 Thousands/µL   Comprehensive metabolic panel   Result Value Ref Range    Sodium 139 136 - 145 mmol/L    Potassium 3 9 3 5 - 5 3 mmol/L    Chloride 101 100 - 108 mmol/L    CO2 29 21 - 32 mmol/L    ANION GAP 9 4 - 13 mmol/L    BUN 24 5 - 25 mg/dL    Creatinine 1 22 0 60 - 1 30 mg/dL    Glucose 162 (H) 65 - 140 mg/dL    Calcium 9 5 8 3 - 10 1 mg/dL    AST 23 5 - 45 U/L    ALT 15 12 - 78 U/L    Alkaline Phosphatase 102 46 - 116 U/L    Total Protein 7 9 6 4 - 8 2 g/dL    Albumin 4 0 3 5 - 5 0 g/dL    Total Bilirubin 0 47 0 20 - 1 00 mg/dL    eGFR 40 ml/min/1 73sq m

## 2019-03-18 NOTE — ASSESSMENT & PLAN NOTE
Has delusions of people  [place]  her apartment she is here with a caregiver that denies that    Apparently some months the medications with discontinue we need to call the psychiatrist and reconciliation her meds

## 2019-03-18 NOTE — PATIENT INSTRUCTIONS
Need to get the name of her psychiatrist so we know what medications you're taking reconciliation medication list here  Will see you back in 6 weeks to check her blood pressure      Your EKG shows atrial fibrillation I am going to get a cardiologist to see you will going to stop the aspirin and give you Eliquis 2 5 mg twice a day take with breakfast and dinner it will prevent her from having a stroke if you have any bleeding you call or go to the emergency room immediately    I will see her back in 4 weeks    We stop the aspirin because her taking a blood thinner

## 2019-03-19 ENCOUNTER — APPOINTMENT (EMERGENCY)
Dept: RADIOLOGY | Facility: HOSPITAL | Age: 84
End: 2019-03-19
Payer: COMMERCIAL

## 2019-03-19 ENCOUNTER — HOSPITAL ENCOUNTER (EMERGENCY)
Facility: HOSPITAL | Age: 84
Discharge: HOME | End: 2019-03-19
Attending: EMERGENCY MEDICINE
Payer: COMMERCIAL

## 2019-03-19 VITALS
HEART RATE: 70 BPM | OXYGEN SATURATION: 97 % | SYSTOLIC BLOOD PRESSURE: 155 MMHG | TEMPERATURE: 97.7 F | RESPIRATION RATE: 18 BRPM | DIASTOLIC BLOOD PRESSURE: 72 MMHG

## 2019-03-19 DIAGNOSIS — R44.0 AUDITORY HALLUCINATIONS: ICD-10-CM

## 2019-03-19 DIAGNOSIS — R41.82 ALTERED MENTAL STATUS, UNSPECIFIED ALTERED MENTAL STATUS TYPE: Primary | ICD-10-CM

## 2019-03-19 DIAGNOSIS — F22 PARANOIA (HCC): ICD-10-CM

## 2019-03-19 DIAGNOSIS — F20.9 SCHIZOPHRENIA (HCC): ICD-10-CM

## 2019-03-19 LAB
ALBUMIN SERPL BCP-MCNC: 4.6 G/DL (ref 3.5–5)
ALP SERPL-CCNC: 116 U/L (ref 46–116)
ALT SERPL W P-5'-P-CCNC: 18 U/L (ref 12–78)
ANION GAP SERPL CALCULATED.3IONS-SCNC: 16 MMOL/L (ref 4–13)
APAP SERPL-MCNC: <2 UG/ML (ref 10–30)
APTT PPP: 32 SECONDS (ref 26–38)
AST SERPL W P-5'-P-CCNC: 32 U/L (ref 5–45)
BACTERIA UR QL AUTO: ABNORMAL /HPF
BASOPHILS # BLD AUTO: 0 THOUSANDS/ΜL (ref 0–0.1)
BASOPHILS NFR BLD AUTO: 0 % (ref 0–1)
BILIRUB SERPL-MCNC: 0.56 MG/DL (ref 0.2–1)
BILIRUB UR QL STRIP: NEGATIVE
BUN SERPL-MCNC: 24 MG/DL (ref 5–25)
CALCIUM SERPL-MCNC: 9.4 MG/DL (ref 8.3–10.1)
CHLORIDE SERPL-SCNC: 96 MMOL/L (ref 100–108)
CLARITY UR: ABNORMAL
CO2 SERPL-SCNC: 22 MMOL/L (ref 21–32)
COLOR UR: YELLOW
CREAT SERPL-MCNC: 1.3 MG/DL (ref 0.6–1.3)
EOSINOPHIL # BLD AUTO: 0.07 THOUSAND/ΜL (ref 0–0.61)
EOSINOPHIL NFR BLD AUTO: 1 % (ref 0–6)
ERYTHROCYTE [DISTWIDTH] IN BLOOD BY AUTOMATED COUNT: 13.4 % (ref 11.6–15.1)
ETHANOL SERPL-MCNC: <3 MG/DL (ref 0–3)
GFR SERPL CREATININE-BSD FRML MDRD: 37 ML/MIN/1.73SQ M
GLUCOSE SERPL-MCNC: 159 MG/DL (ref 65–140)
GLUCOSE UR STRIP-MCNC: NEGATIVE MG/DL
HCT VFR BLD AUTO: 38.3 % (ref 34.8–46.1)
HGB BLD-MCNC: 12.7 G/DL (ref 11.5–15.4)
HGB UR QL STRIP.AUTO: ABNORMAL
IMM GRANULOCYTES # BLD AUTO: 0.02 THOUSAND/UL (ref 0–0.2)
IMM GRANULOCYTES NFR BLD AUTO: 0 % (ref 0–2)
INR PPP: 1 (ref 0.86–1.17)
KETONES UR STRIP-MCNC: NEGATIVE MG/DL
LEUKOCYTE ESTERASE UR QL STRIP: NEGATIVE
LYMPHOCYTES # BLD AUTO: 1.29 THOUSANDS/ΜL (ref 0.6–4.47)
LYMPHOCYTES NFR BLD AUTO: 15 % (ref 14–44)
MAGNESIUM SERPL-MCNC: 2.2 MG/DL (ref 1.6–2.6)
MCH RBC QN AUTO: 28.9 PG (ref 26.8–34.3)
MCHC RBC AUTO-ENTMCNC: 33.2 G/DL (ref 31.4–37.4)
MCV RBC AUTO: 87 FL (ref 82–98)
MONOCYTES # BLD AUTO: 0.65 THOUSAND/ΜL (ref 0.17–1.22)
MONOCYTES NFR BLD AUTO: 8 % (ref 4–12)
NEUTROPHILS # BLD AUTO: 6.32 THOUSANDS/ΜL (ref 1.85–7.62)
NEUTS SEG NFR BLD AUTO: 76 % (ref 43–75)
NITRITE UR QL STRIP: NEGATIVE
NON-SQ EPI CELLS URNS QL MICRO: ABNORMAL /HPF
NRBC BLD AUTO-RTO: 0 /100 WBCS
PH UR STRIP.AUTO: 7 [PH] (ref 4.5–8)
PLATELET # BLD AUTO: 305 THOUSANDS/UL (ref 149–390)
PMV BLD AUTO: 9.7 FL (ref 8.9–12.7)
POTASSIUM SERPL-SCNC: 4.6 MMOL/L (ref 3.5–5.3)
PROT SERPL-MCNC: 7.7 G/DL (ref 6.4–8.2)
PROT UR STRIP-MCNC: NEGATIVE MG/DL
PROTHROMBIN TIME: 13.3 SECONDS (ref 11.8–14.2)
RBC # BLD AUTO: 4.39 MILLION/UL (ref 3.81–5.12)
RBC #/AREA URNS AUTO: ABNORMAL /HPF
SALICYLATES SERPL-MCNC: <3 MG/DL (ref 3–20)
SODIUM SERPL-SCNC: 134 MMOL/L (ref 136–145)
SP GR UR STRIP.AUTO: 1.02 (ref 1–1.03)
TROPONIN I SERPL-MCNC: <0.02 NG/ML
UROBILINOGEN UR QL STRIP.AUTO: 0.2 E.U./DL
WBC # BLD AUTO: 8.35 THOUSAND/UL (ref 4.31–10.16)
WBC #/AREA URNS AUTO: ABNORMAL /HPF

## 2019-03-19 PROCEDURE — 99285 EMERGENCY DEPT VISIT HI MDM: CPT

## 2019-03-19 PROCEDURE — 80053 COMPREHEN METABOLIC PANEL: CPT | Performed by: EMERGENCY MEDICINE

## 2019-03-19 PROCEDURE — 36415 COLL VENOUS BLD VENIPUNCTURE: CPT | Performed by: EMERGENCY MEDICINE

## 2019-03-19 PROCEDURE — 96361 HYDRATE IV INFUSION ADD-ON: CPT

## 2019-03-19 PROCEDURE — 85610 PROTHROMBIN TIME: CPT | Performed by: EMERGENCY MEDICINE

## 2019-03-19 PROCEDURE — 80329 ANALGESICS NON-OPIOID 1 OR 2: CPT | Performed by: EMERGENCY MEDICINE

## 2019-03-19 PROCEDURE — 85025 COMPLETE CBC W/AUTO DIFF WBC: CPT | Performed by: EMERGENCY MEDICINE

## 2019-03-19 PROCEDURE — 83735 ASSAY OF MAGNESIUM: CPT | Performed by: EMERGENCY MEDICINE

## 2019-03-19 PROCEDURE — 80320 DRUG SCREEN QUANTALCOHOLS: CPT | Performed by: EMERGENCY MEDICINE

## 2019-03-19 PROCEDURE — 84484 ASSAY OF TROPONIN QUANT: CPT | Performed by: EMERGENCY MEDICINE

## 2019-03-19 PROCEDURE — 93005 ELECTROCARDIOGRAM TRACING: CPT

## 2019-03-19 PROCEDURE — 81003 URINALYSIS AUTO W/O SCOPE: CPT

## 2019-03-19 PROCEDURE — 85730 THROMBOPLASTIN TIME PARTIAL: CPT | Performed by: EMERGENCY MEDICINE

## 2019-03-19 PROCEDURE — 96360 HYDRATION IV INFUSION INIT: CPT

## 2019-03-19 PROCEDURE — 71045 X-RAY EXAM CHEST 1 VIEW: CPT

## 2019-03-19 PROCEDURE — 81001 URINALYSIS AUTO W/SCOPE: CPT

## 2019-03-19 RX ORDER — OLANZAPINE 5 MG/1
2.5 TABLET ORAL
Status: DISCONTINUED | OUTPATIENT
Start: 2019-03-19 | End: 2019-03-20 | Stop reason: HOSPADM

## 2019-03-19 RX ORDER — OLANZAPINE 2.5 MG/1
2.5 TABLET ORAL
Qty: 30 TABLET | Refills: 0 | Status: SHIPPED | OUTPATIENT
Start: 2019-03-19 | End: 2019-07-31 | Stop reason: ALTCHOICE

## 2019-03-19 RX ADMIN — SODIUM CHLORIDE 1000 ML: 0.9 INJECTION, SOLUTION INTRAVENOUS at 19:53

## 2019-03-19 RX ADMIN — OLANZAPINE 2.5 MG: 5 TABLET, FILM COATED ORAL at 22:59

## 2019-03-19 NOTE — ED PROVIDER NOTES
History  Chief Complaint   Patient presents with    Altered Mental Status     Patient brought by EMS  Per family patient seems anxious, confused  Hallucinating that people are following her  Pt is a 80year old female with a PMH of hypertension, hyperlipidemia, psychotic disorder, history of acute encephalopathy presenting with altered mental status  Caretaker states that the pt has been acting strange over the past 2 days, and states people are following her  Today, she returned from a program where she told the caretaker people from the group are following her and she is nervous  Caretaker states she has been going from room to room wandering, and yelled at the caretaker to stop following her  She has been "hearing a radio playing that is not actually occurring"  Caretaker states this is not baseline for the pt  She has a psychiatric disorder, but she cannot specify  She takes Zyprexa and Klonopin daily  Caretaker denies recent illnesses, fever, cough, urinary issues, vomiting, diarrhea  Prior to Admission Medications   Prescriptions Last Dose Informant Patient Reported? Taking?    DULoxetine (CYMBALTA) 30 mg delayed release capsule   No No   Sig: Take 1 capsule (30 mg total) by mouth daily   OLANZapine (ZyPREXA) 7 5 mg tablet   No No   Sig: TAKE 1 TABLET (7 5 MG TOTAL) BY MOUTH DAILY AT BEDTIME   acetaminophen (TYLENOL) 325 mg tablet   Yes No   Sig: Take 650 mg by mouth 2 (two) times a day as needed for mild pain   acetaminophen (TYLENOL) 500 mg tablet   No No   Sig: Take 1 tablet (500 mg total) by mouth every 6 (six) hours as needed for mild pain   amLODIPine (NORVASC) 10 mg tablet   No No   Sig: Take 1 tablet (10 mg total) by mouth daily   apixaban (ELIQUIS) 2 5 mg   No No   Sig: Take 1 tablet (2 5 mg total) by mouth 2 (two) times a day for 30 days   clonazePAM (KlonoPIN) 0 5 mg tablet   No No   Sig: TAKE 1 TABLET (0 5 MG TOTAL) BY MOUTH DAILY   docusate sodium (COLACE) 100 mg capsule   No No Sig: Take 1 capsule (100 mg total) by mouth 2 (two) times a day   losartan (COZAAR) 100 MG tablet   No No   Sig: Take 1 tablet (100 mg total) by mouth daily   metoprolol succinate (TOPROL-XL) 100 mg 24 hr tablet   No No   Sig: Take 1 tablet (100 mg total) by mouth daily   multivitamin-iron-minerals-folic acid (CENTRUM) chewable tablet   Yes No   Sig: Chew 1 tablet daily      Facility-Administered Medications: None       Past Medical History:   Diagnosis Date    Anxiety     Hyperlipidemia     Hypertension        Past Surgical History:   Procedure Laterality Date    APPENDECTOMY      BLADDER SURGERY      CHOLECYSTECTOMY      TOTAL ABDOMINAL HYSTERECTOMY W/ BILATERAL SALPINGOOPHORECTOMY      TOTAL KNEE ARTHROPLASTY Left        Family History   Problem Relation Age of Onset    Heart disease Mother         Cardiac disorder    Parkinsonism Father     Heart disease Sister         Cardiac disorder    Hypertension Sister     Diabetes Child         Diabetes Mellitus    Lung disease Child         Respiratory Disorder    Diabetes Son         Diabetes Mellitus     I have reviewed and agree with the history as documented  Social History     Tobacco Use    Smoking status: Never Smoker    Smokeless tobacco: Never Used    Tobacco comment: Former smoker per [employer]   Substance Use Topics    Alcohol use: No    Drug use: No        Review of Systems   Unable to perform ROS: Mental status change       Physical Exam  Physical Exam   Constitutional: She appears well-developed and well-nourished  Non-toxic appearance  She does not appear ill  No distress  HENT:   Head: Normocephalic and atraumatic  Mouth/Throat: Oropharynx is clear and moist    Eyes: Pupils are equal, round, and reactive to light  Conjunctivae and EOM are normal  No scleral icterus  Right eye exhibits normal extraocular motion and no nystagmus  Left eye exhibits normal extraocular motion and no nystagmus  Right pupil is round and reactive  Left pupil is round and reactive  Pupils are equal    Neck: Normal range of motion  Neck supple  No JVD present  No neck rigidity  No tracheal deviation present  Cardiovascular: Normal rate, regular rhythm, normal heart sounds and intact distal pulses  Pulmonary/Chest: Effort normal and breath sounds normal    Abdominal: Soft  Bowel sounds are normal  She exhibits no distension  There is no tenderness  Musculoskeletal: Normal range of motion  Lymphadenopathy:     She has no cervical adenopathy  Neurological: She is alert  She is disoriented  No cranial nerve deficit or sensory deficit  GCS eye subscore is 4  GCS verbal subscore is 5  GCS motor subscore is 6  Skin: Skin is warm and dry  Capillary refill takes less than 2 seconds  No rash noted  She is not diaphoretic  Psychiatric: Her speech is normal  Her mood appears not anxious  Her affect is not angry, not blunt, not labile and not inappropriate  She is actively hallucinating  She is not agitated, not aggressive, not hyperactive, not slowed, not withdrawn and not combative  Thought content is paranoid  Thought content is not delusional  Cognition and memory are impaired  She does not exhibit a depressed mood  She expresses no homicidal and no suicidal ideation  She expresses no suicidal plans and no homicidal plans  She is attentive         Vital Signs  ED Triage Vitals [03/19/19 1903]   Temperature Pulse Respirations Blood Pressure SpO2   97 7 °F (36 5 °C) 77 18 169/77 99 %      Temp Source Heart Rate Source Patient Position - Orthostatic VS BP Location FiO2 (%)   Oral Monitor Lying Right arm --      Pain Score       --           Vitals:    03/19/19 1903   BP: 169/77   Pulse: 77   Patient Position - Orthostatic VS: Lying         Visual Acuity      ED Medications  Medications   sodium chloride 0 9 % bolus 1,000 mL (has no administration in time range)       Diagnostic Studies  Results Reviewed     Procedure Component Value Units Date/Time Salicylate level [[id number]]     Lab Status:  No result Specimen:  Blood     Acetaminophen level [[id number]]     Lab Status:  No result Specimen:  Blood     CBC and differential [[id number]]     Lab Status:  No result Specimen:  Blood     Protime-INR [[id number]]     Lab Status:  No result Specimen:  Blood     APTT [[id number]]     Lab Status:  No result Specimen:  Blood     Comprehensive metabolic panel [[id number]]     Lab Status:  No result Specimen:  Blood     Magnesium [[id number]]     Lab Status:  No result Specimen:  Blood     Troponin I [[id number]]     Lab Status:  No result Specimen:  Blood     UA w Reflex to Microscopic w Reflex to Culture [[id number]]     Lab Status:  No result Specimen:  Urine     Ethanol [[id number]]     Lab Status:  No result Specimen:  Blood                  XR chest 1 view portable    (Results Pending)              Procedures  ECG 12 Lead Documentation  Date/Time: 3/19/2019 7:51 PM  Performed by: Kenny Noyola PA-C  Authorized by: Kenny Noyola PA-C     ECG reviewed by me, the ED Provider: yes    Patient location:  ED  Previous ECG:     Previous ECG:  Compared to current    Similarity:  Changes noted    Comparison to cardiac monitor: No    Interpretation:     Interpretation: non-specific    Quality:     Tracing quality:  Limited by artifact  Rate:     ECG rate:  82    ECG rate assessment: normal    Rhythm:     Rhythm: sinus rhythm    Ectopy:     Ectopy: none    QRS:     QRS axis:  Normal    QRS intervals:  Normal  Conduction:     Conduction: normal    ST segments:     ST segments:  Normal  T waves:     T waves: non-specific             Phone Contacts  ED Phone Contact    ED Course                               MDM  Number of Diagnoses or Management Options  Altered mental status, unspecified altered mental status type: Auditory hallucinations:   Paranoia ([facility] [address] ):   Schizophrenia [facility]):   Diagnosis management comments:  There are no infectious or metabolic reasons for the patient to be having AMS  CXR negative, UA negative  No other signs of infection  Coma panel negative  Patient has known history of schizophrenia with paranoia and hallucinations  She has recently been decreased to 5 mg Zyprexa PO from 7 5 mg 1 week prior  I will increase her medication back to 7 5, and have the patient follow up with psychiatry as outpatient  This was the recommendations of her PCP who saw her 3/18/19  She has been admitted in the past for these reasons, and her medication dosage was increased then as well  I do not feel as admission is warranted at this time  Educated on return precautions  Stable and ready for discharge  Disposition  Final diagnoses:   None     ED Disposition     None      Follow-up Information    None         Patient's Medications   Discharge Prescriptions    No medications on file     No discharge procedures on file      ED Provider  Electronically Signed by           Raquel Quan PA-C  03/19/19 9169

## 2019-03-20 ENCOUNTER — HOSPITAL ENCOUNTER (EMERGENCY)
Facility: HOSPITAL | Age: 84
Discharge: HOME | End: 2019-03-20
Attending: EMERGENCY MEDICINE
Payer: COMMERCIAL

## 2019-03-20 VITALS
TEMPERATURE: 98.5 F | HEART RATE: 89 BPM | BODY MASS INDEX: 25.03 KG/M2 | RESPIRATION RATE: 16 BRPM | SYSTOLIC BLOOD PRESSURE: 138 MMHG | OXYGEN SATURATION: 98 % | WEIGHT: 132.5 LBS | DIASTOLIC BLOOD PRESSURE: 69 MMHG

## 2019-03-20 DIAGNOSIS — F41.9 ANXIETY: Primary | ICD-10-CM

## 2019-03-20 LAB
ATRIAL RATE: 82 BPM
P AXIS: 49 DEGREES
PR INTERVAL: 150 MS
QRS AXIS: 66 DEGREES
QRSD INTERVAL: 68 MS
QT INTERVAL: 344 MS
QTC INTERVAL: 401 MS
T WAVE AXIS: 19 DEGREES
VENTRICULAR RATE: 82 BPM

## 2019-03-20 PROCEDURE — 99285 EMERGENCY DEPT VISIT HI MDM: CPT

## 2019-03-20 PROCEDURE — 93010 ELECTROCARDIOGRAM REPORT: CPT | Performed by: INTERNAL MEDICINE

## 2019-03-20 RX ORDER — CLONAZEPAM 0.5 MG/1
0.5 TABLET ORAL
Qty: 30 TABLET | Refills: 0 | Status: SHIPPED | OUTPATIENT
Start: 2019-03-20 | End: 2020-05-26 | Stop reason: HOSPADM

## 2019-03-20 NOTE — ED NOTES
The patient does not require an inpatient admissions at this time  Her previous provider did not set her up with a new psychiatrist  Family doctor has been prescribing her psychiatric medication but did not continue clonazepam  This was written by the ED physician  Instructions were written for the patient's son to call the mental health number on the back of the patient's insurance card to secure an in-network psychiatrist  The caregiver expressed understanding of these instructions

## 2019-03-20 NOTE — ED PROVIDER NOTES
History  Chief Complaint   Patient presents with    Altered Mental Status     Per aid, pt has hand increase in anxiety and aggression x two weeks since a medication change  Pt denies SI/HI/VH  Pt reports AH, says she hears somebody speaking and a radio playing  Pt calm and cooperative during triage  Patient is a 79-year-old female with history anxiety who was brought in by her caretaker for increasing agitation and aggression over last few days  Patient is actually just seen last night at [facility] on for increased anxiety  Had full medical workup there but was released  Caretaker states that she recently had 1 medications dropped by her psychiatrist, but cannot think or state which medication was changed  Since then patient has had increased episodes of agitation aggression  Denies any physical violence from the patient  Patient denies any suicidal homicidal ideations no auditory/visual hallucinations  On decreased sleep decreased appetite  Patient here for possible placement  Prior to Admission Medications   Prescriptions Last Dose Informant Patient Reported? Taking?    DULoxetine (CYMBALTA) 30 mg delayed release capsule   No No   Sig: Take 1 capsule (30 mg total) by mouth daily   OLANZapine (ZyPREXA) 2 5 mg tablet   No No   Sig: Take 1 tablet (2 5 mg total) by mouth daily at bedtime   OLANZapine (ZyPREXA) 7 5 mg tablet   No No   Sig: TAKE 1 TABLET (7 5 MG TOTAL) BY MOUTH DAILY AT BEDTIME   acetaminophen (TYLENOL) 325 mg tablet   Yes No   Sig: Take 650 mg by mouth 2 (two) times a day as needed for mild pain   amLODIPine (NORVASC) 10 mg tablet   No No   Sig: Take 1 tablet (10 mg total) by mouth daily   apixaban (ELIQUIS) 2 5 mg   No No   Sig: Take 1 tablet (2 5 mg total) by mouth 2 (two) times a day for 30 days   losartan (COZAAR) 100 MG tablet   No No   Sig: Take 1 tablet (100 mg total) by mouth daily   metoprolol succinate (TOPROL-XL) 100 mg 24 hr tablet   No No   Sig: Take 1 tablet (100 mg total) by mouth daily   multivitamin-iron-minerals-folic acid (CENTRUM) chewable tablet   Yes No   Sig: Chew 1 tablet daily      Facility-Administered Medications: None       Past Medical History:   Diagnosis Date    Anxiety     Hyperlipidemia     Hypertension        Past Surgical History:   Procedure Laterality Date    APPENDECTOMY      BLADDER SURGERY      CHOLECYSTECTOMY      TOTAL ABDOMINAL HYSTERECTOMY W/ BILATERAL SALPINGOOPHORECTOMY      TOTAL KNEE ARTHROPLASTY Left        Family History   Problem Relation Age of Onset    Heart disease Mother         Cardiac disorder    Parkinsonism Father     Heart disease Sister         Cardiac disorder    Hypertension Sister     Diabetes Child         Diabetes Mellitus    Lung disease Child         Respiratory Disorder    Diabetes Son         Diabetes Mellitus     I have reviewed and agree with the history as documented  Social History     Tobacco Use    Smoking status: Never Smoker    Smokeless tobacco: Never Used    Tobacco comment: Former smoker per [employer]   Substance Use Topics    Alcohol use: No    Drug use: No        Review of Systems   Constitutional: Negative  HENT: Negative  Eyes: Negative  Respiratory: Negative  Cardiovascular: Negative  Gastrointestinal: Negative  Endocrine: Negative  Genitourinary: Negative  Musculoskeletal: Negative  Skin: Negative  Allergic/Immunologic: Negative  Neurological: Negative  Hematological: Negative  Psychiatric/Behavioral: Positive for agitation and dysphoric mood  All other systems reviewed and are negative  Physical Exam  Physical Exam   Constitutional: She is oriented to person, place, and time  She appears well-developed and well-nourished  HENT:   Head: Normocephalic  Mouth/Throat: Oropharynx is clear and moist    Eyes: Pupils are equal, round, and reactive to light  Neck: Normal range of motion  Neck supple     Cardiovascular: Normal rate, regular rhythm, normal heart sounds and intact distal pulses  Pulmonary/Chest: Effort normal and breath sounds normal    Abdominal: Soft  Bowel sounds are normal    Musculoskeletal: Normal range of motion  Neurological: She is alert and oriented to person, place, and time  She has normal strength  GCS eye subscore is 4  GCS verbal subscore is 5  Skin: Skin is warm and dry  Capillary refill takes less than 2 seconds  Psychiatric: Her affect is blunt  Her speech is delayed  She is slowed and withdrawn  She is not actively hallucinating  She exhibits a depressed mood  She is attentive  Nursing note and vitals reviewed  Vital Signs  ED Triage Vitals [03/20/19 1458]   Temperature Pulse Respirations Blood Pressure SpO2   98 5 °F (36 9 °C) 89 16 138/69 98 %      Temp Source Heart Rate Source Patient Position - Orthostatic VS BP Location FiO2 (%)   Tympanic Monitor Sitting Left arm --      Pain Score       No Pain           Vitals:    03/20/19 1458   BP: 138/69   Pulse: 89   Patient Position - Orthostatic VS: Sitting         Visual Acuity      ED Medications  Medications - No data to display    Diagnostic Studies  Results Reviewed     None                 No orders to display              Procedures  Procedures       Phone Contacts  ED Phone Contact    ED Course  ED Course as of Mar 20 1803   Wed Mar 20, 2019   1607 Seen by [facility]  No criteria for inpatient [place]  Patient was written for Cymbalta land pain  But not read for clonazepam 0 5 mg at night  Main issues with patient are at night  Will provide script for clonazepam here  Follow up with Psychiatry                                    MDM  Number of Diagnoses or Management Options  Anxiety:      Amount and/or Complexity of Data Reviewed  Obtain history from someone other than the patient: yes  Review and summarize past medical records: yes        Disposition  Final diagnoses:   None     ED Disposition     None      Follow-up Information None         Patient's Medications   Discharge Prescriptions    No medications on file     No discharge procedures on file      ED Provider  Electronically Signed by           Cory Smith MD  03/20/19 8392

## 2019-03-21 DIAGNOSIS — Z78.9 NEED FOR FOLLOW-UP BY SOCIAL WORKER: Primary | ICD-10-CM

## 2019-04-02 ENCOUNTER — CONSULT (OUTPATIENT)
Dept: CARDIOLOGY CLINIC | Facility: CLINIC | Age: 84
End: 2019-04-02
Payer: COMMERCIAL

## 2019-04-02 VITALS
DIASTOLIC BLOOD PRESSURE: 70 MMHG | HEART RATE: 75 BPM | WEIGHT: 122 LBS | HEIGHT: 61 IN | SYSTOLIC BLOOD PRESSURE: 118 MMHG | BODY MASS INDEX: 23.03 KG/M2

## 2019-04-02 DIAGNOSIS — I49.9 IRREGULARLY IRREGULAR PULSE RHYTHM: ICD-10-CM

## 2019-04-02 DIAGNOSIS — E78.2 MIXED HYPERLIPIDEMIA: ICD-10-CM

## 2019-04-02 DIAGNOSIS — F41.9 ANXIETY: ICD-10-CM

## 2019-04-02 DIAGNOSIS — I48.0 PAROXYSMAL ATRIAL FIBRILLATION (HCC): Primary | ICD-10-CM

## 2019-04-02 DIAGNOSIS — I10 ESSENTIAL HYPERTENSION: ICD-10-CM

## 2019-04-02 PROCEDURE — 99203 OFFICE O/P NEW LOW 30 MIN: CPT | Performed by: INTERNAL MEDICINE

## 2019-04-02 PROCEDURE — 93000 ELECTROCARDIOGRAM COMPLETE: CPT | Performed by: INTERNAL MEDICINE

## 2019-04-03 ENCOUNTER — PATIENT OUTREACH (OUTPATIENT)
Dept: INTERNAL MEDICINE CLINIC | Facility: CLINIC | Age: 84
End: 2019-04-03

## 2019-04-04 ENCOUNTER — PATIENT OUTREACH (OUTPATIENT)
Dept: FAMILY MEDICINE CLINIC | Facility: CLINIC | Age: 84
End: 2019-04-04

## 2019-04-12 ENCOUNTER — HOSPITAL ENCOUNTER (EMERGENCY)
Facility: HOSPITAL | Age: 84
Discharge: HOME | End: 2019-04-12
Attending: EMERGENCY MEDICINE
Payer: COMMERCIAL

## 2019-04-12 VITALS
BODY MASS INDEX: 24.75 KG/M2 | RESPIRATION RATE: 18 BRPM | SYSTOLIC BLOOD PRESSURE: 147 MMHG | OXYGEN SATURATION: 99 % | DIASTOLIC BLOOD PRESSURE: 70 MMHG | TEMPERATURE: 97.3 F | HEART RATE: 70 BPM | WEIGHT: 131 LBS

## 2019-04-12 DIAGNOSIS — Z76.0 ENCOUNTER FOR MEDICATION REFILL: Primary | ICD-10-CM

## 2019-04-12 PROCEDURE — 99282 EMERGENCY DEPT VISIT SF MDM: CPT | Performed by: EMERGENCY MEDICINE

## 2019-04-12 PROCEDURE — 99283 EMERGENCY DEPT VISIT LOW MDM: CPT

## 2019-04-12 RX ORDER — CLONAZEPAM 0.5 MG/1
0.5 TABLET ORAL
Qty: 5 TABLET | Refills: 0 | Status: SHIPPED | OUTPATIENT
Start: 2019-04-12 | End: 2019-10-18 | Stop reason: SDUPTHER

## 2019-04-25 ENCOUNTER — HOSPITAL ENCOUNTER (OUTPATIENT)
Dept: NON INVASIVE DIAGNOSTICS | Facility: CLINIC | Age: 84
Discharge: HOME | End: 2019-04-25
Payer: COMMERCIAL

## 2019-04-25 DIAGNOSIS — I49.9 IRREGULARLY IRREGULAR PULSE RHYTHM: ICD-10-CM

## 2019-04-25 DIAGNOSIS — F41.9 ANXIETY: ICD-10-CM

## 2019-04-25 DIAGNOSIS — I10 ESSENTIAL HYPERTENSION: ICD-10-CM

## 2019-04-25 DIAGNOSIS — E78.2 MIXED HYPERLIPIDEMIA: ICD-10-CM

## 2019-04-25 PROCEDURE — 93306 TTE W/DOPPLER COMPLETE: CPT | Performed by: INTERNAL MEDICINE

## 2019-04-25 PROCEDURE — 93306 TTE W/DOPPLER COMPLETE: CPT

## 2019-04-25 PROCEDURE — 93226 XTRNL ECG REC<48 HR SCAN A/R: CPT

## 2019-04-25 PROCEDURE — 93225 XTRNL ECG REC<48 HRS REC: CPT

## 2019-04-29 ENCOUNTER — OFFICE VISIT (OUTPATIENT)
Dept: INTERNAL MEDICINE CLINIC | Facility: CLINIC | Age: 84
End: 2019-04-29
Payer: COMMERCIAL

## 2019-04-29 VITALS
HEIGHT: 61 IN | BODY MASS INDEX: 23.6 KG/M2 | SYSTOLIC BLOOD PRESSURE: 112 MMHG | DIASTOLIC BLOOD PRESSURE: 73 MMHG | TEMPERATURE: 97.8 F | WEIGHT: 125 LBS | HEART RATE: 91 BPM | RESPIRATION RATE: 14 BRPM

## 2019-04-29 DIAGNOSIS — I48.0 PAROXYSMAL ATRIAL FIBRILLATION (HCC): Primary | ICD-10-CM

## 2019-04-29 DIAGNOSIS — I10 ESSENTIAL HYPERTENSION: ICD-10-CM

## 2019-04-29 DIAGNOSIS — F23 BRIEF PSYCHOTIC DISORDER (HCC): ICD-10-CM

## 2019-04-29 DIAGNOSIS — E78.2 MIXED HYPERLIPIDEMIA: ICD-10-CM

## 2019-04-29 DIAGNOSIS — H91.93 HEARING DECREASED, BILATERAL: ICD-10-CM

## 2019-04-29 PROCEDURE — 99214 OFFICE O/P EST MOD 30 MIN: CPT | Performed by: INTERNAL MEDICINE

## 2019-04-29 PROCEDURE — 1160F RVW MEDS BY RX/DR IN RCRD: CPT | Performed by: INTERNAL MEDICINE

## 2019-04-30 ENCOUNTER — TELEPHONE (OUTPATIENT)
Dept: INTERNAL MEDICINE CLINIC | Facility: CLINIC | Age: 84
End: 2019-04-30

## 2019-04-30 PROCEDURE — 93227 XTRNL ECG REC<48 HR R&I: CPT | Performed by: INTERNAL MEDICINE

## 2019-05-16 ENCOUNTER — TELEPHONE (OUTPATIENT)
Dept: INTERNAL MEDICINE CLINIC | Facility: CLINIC | Age: 84
End: 2019-05-16

## 2019-05-16 DIAGNOSIS — M25.561 ACUTE PAIN OF RIGHT KNEE: Primary | ICD-10-CM

## 2019-06-03 ENCOUNTER — CONSULT (OUTPATIENT)
Dept: OBGYN CLINIC | Facility: MEDICAL CENTER | Age: 84
End: 2019-06-03
Payer: COMMERCIAL

## 2019-06-03 ENCOUNTER — APPOINTMENT (OUTPATIENT)
Dept: RADIOLOGY | Facility: CLINIC | Age: 84
End: 2019-06-03
Payer: COMMERCIAL

## 2019-06-03 VITALS
BODY MASS INDEX: 23.6 KG/M2 | HEIGHT: 61 IN | DIASTOLIC BLOOD PRESSURE: 64 MMHG | WEIGHT: 125 LBS | SYSTOLIC BLOOD PRESSURE: 105 MMHG | HEART RATE: 71 BPM

## 2019-06-03 DIAGNOSIS — M25.561 RIGHT KNEE PAIN, UNSPECIFIED CHRONICITY: ICD-10-CM

## 2019-06-03 DIAGNOSIS — M25.561 ACUTE PAIN OF RIGHT KNEE: ICD-10-CM

## 2019-06-03 DIAGNOSIS — M17.11 PRIMARY OSTEOARTHRITIS OF RIGHT KNEE: ICD-10-CM

## 2019-06-03 DIAGNOSIS — M25.561 RIGHT KNEE PAIN, UNSPECIFIED CHRONICITY: Primary | ICD-10-CM

## 2019-06-03 DIAGNOSIS — M21.061 ACQUIRED VALGUS DEFORMITY OF KNEE, RIGHT: ICD-10-CM

## 2019-06-03 PROCEDURE — 99203 OFFICE O/P NEW LOW 30 MIN: CPT | Performed by: ORTHOPAEDIC SURGERY

## 2019-06-03 PROCEDURE — 20610 DRAIN/INJ JOINT/BURSA W/O US: CPT | Performed by: ORTHOPAEDIC SURGERY

## 2019-06-03 PROCEDURE — 73562 X-RAY EXAM OF KNEE 3: CPT

## 2019-06-03 PROCEDURE — 73564 X-RAY EXAM KNEE 4 OR MORE: CPT

## 2019-06-03 RX ORDER — METHYLPREDNISOLONE ACETATE 40 MG/ML
2 INJECTION, SUSPENSION INTRA-ARTICULAR; INTRALESIONAL; INTRAMUSCULAR; SOFT TISSUE
Status: COMPLETED | OUTPATIENT
Start: 2019-06-03 | End: 2019-06-03

## 2019-06-03 RX ORDER — LIDOCAINE HYDROCHLORIDE 10 MG/ML
2 INJECTION, SOLUTION INFILTRATION; PERINEURAL
Status: COMPLETED | OUTPATIENT
Start: 2019-06-03 | End: 2019-06-03

## 2019-06-03 RX ADMIN — LIDOCAINE HYDROCHLORIDE 2 ML: 10 INJECTION, SOLUTION INFILTRATION; PERINEURAL at 14:35

## 2019-06-03 RX ADMIN — METHYLPREDNISOLONE ACETATE 2 ML: 40 INJECTION, SUSPENSION INTRA-ARTICULAR; INTRALESIONAL; INTRAMUSCULAR; SOFT TISSUE at 14:35

## 2019-06-11 ENCOUNTER — EVALUATION (OUTPATIENT)
Dept: PHYSICAL THERAPY | Facility: CLINIC | Age: 84
End: 2019-06-11
Payer: COMMERCIAL

## 2019-06-11 DIAGNOSIS — M21.061 ACQUIRED VALGUS DEFORMITY OF KNEE, RIGHT: ICD-10-CM

## 2019-06-11 DIAGNOSIS — M17.11 PRIMARY OSTEOARTHRITIS OF RIGHT KNEE: ICD-10-CM

## 2019-06-11 PROCEDURE — 97162 PT EVAL MOD COMPLEX 30 MIN: CPT | Performed by: PHYSICAL MEDICINE & REHABILITATION

## 2019-06-18 ENCOUNTER — OFFICE VISIT (OUTPATIENT)
Dept: PHYSICAL THERAPY | Facility: CLINIC | Age: 84
End: 2019-06-18
Payer: COMMERCIAL

## 2019-06-18 DIAGNOSIS — M17.11 PRIMARY OSTEOARTHRITIS OF RIGHT KNEE: Primary | ICD-10-CM

## 2019-06-18 DIAGNOSIS — M21.061 ACQUIRED VALGUS DEFORMITY OF KNEE, RIGHT: ICD-10-CM

## 2019-06-18 PROCEDURE — 97140 MANUAL THERAPY 1/> REGIONS: CPT | Performed by: PHYSICAL MEDICINE & REHABILITATION

## 2019-06-18 PROCEDURE — 97110 THERAPEUTIC EXERCISES: CPT | Performed by: PHYSICAL MEDICINE & REHABILITATION

## 2019-06-18 PROCEDURE — 97112 NEUROMUSCULAR REEDUCATION: CPT | Performed by: PHYSICAL MEDICINE & REHABILITATION

## 2019-06-21 ENCOUNTER — APPOINTMENT (OUTPATIENT)
Dept: PHYSICAL THERAPY | Facility: CLINIC | Age: 84
End: 2019-06-21
Payer: COMMERCIAL

## 2019-06-24 DIAGNOSIS — I10 ESSENTIAL HYPERTENSION: ICD-10-CM

## 2019-06-24 RX ORDER — LOSARTAN POTASSIUM 100 MG/1
100 TABLET ORAL DAILY
Qty: 30 TABLET | Refills: 1 | Status: SHIPPED | OUTPATIENT
Start: 2019-06-24 | End: 2019-08-15 | Stop reason: SDUPTHER

## 2019-06-24 RX ORDER — METOPROLOL SUCCINATE 100 MG/1
100 TABLET, EXTENDED RELEASE ORAL DAILY
Qty: 90 TABLET | Refills: 0 | Status: SHIPPED | OUTPATIENT
Start: 2019-06-24 | End: 2019-08-15 | Stop reason: SDUPTHER

## 2019-06-24 RX ORDER — AMLODIPINE BESYLATE 10 MG/1
10 TABLET ORAL DAILY
Qty: 90 TABLET | Refills: 0 | Status: SHIPPED | OUTPATIENT
Start: 2019-06-24 | End: 2019-08-15 | Stop reason: SDUPTHER

## 2019-06-25 ENCOUNTER — OFFICE VISIT (OUTPATIENT)
Dept: PHYSICAL THERAPY | Facility: CLINIC | Age: 84
End: 2019-06-25
Payer: COMMERCIAL

## 2019-06-25 DIAGNOSIS — M21.061 ACQUIRED VALGUS DEFORMITY OF KNEE, RIGHT: ICD-10-CM

## 2019-06-25 DIAGNOSIS — M17.11 PRIMARY OSTEOARTHRITIS OF RIGHT KNEE: Primary | ICD-10-CM

## 2019-06-25 PROCEDURE — 97112 NEUROMUSCULAR REEDUCATION: CPT

## 2019-06-25 PROCEDURE — 97110 THERAPEUTIC EXERCISES: CPT

## 2019-06-27 ENCOUNTER — APPOINTMENT (OUTPATIENT)
Dept: LAB | Facility: HOSPITAL | Age: 84
End: 2019-06-27
Payer: COMMERCIAL

## 2019-06-27 ENCOUNTER — TRANSCRIBE ORDERS (OUTPATIENT)
Dept: ADMINISTRATIVE | Facility: HOSPITAL | Age: 84
End: 2019-06-27

## 2019-06-27 DIAGNOSIS — F41.9 ANXIETY: ICD-10-CM

## 2019-06-27 DIAGNOSIS — Z79.899 ENCOUNTER FOR LONG-TERM (CURRENT) USE OF OTHER MEDICATIONS: ICD-10-CM

## 2019-06-27 DIAGNOSIS — E87.1 HYPONATREMIA: ICD-10-CM

## 2019-06-27 DIAGNOSIS — Z79.899 ENCOUNTER FOR LONG-TERM (CURRENT) USE OF OTHER MEDICATIONS: Primary | ICD-10-CM

## 2019-06-27 DIAGNOSIS — I10 ESSENTIAL HYPERTENSION: ICD-10-CM

## 2019-06-27 LAB
25(OH)D3 SERPL-MCNC: 40.4 NG/ML (ref 30–100)
ALBUMIN SERPL BCP-MCNC: 4.5 G/DL (ref 3–5.2)
ALP SERPL-CCNC: 77 U/L (ref 43–122)
ALT SERPL W P-5'-P-CCNC: 13 U/L (ref 9–52)
ANION GAP SERPL CALCULATED.3IONS-SCNC: 11 MMOL/L (ref 5–14)
AST SERPL W P-5'-P-CCNC: 24 U/L (ref 14–36)
BASOPHILS # BLD AUTO: 0 THOUSANDS/ΜL (ref 0–0.1)
BASOPHILS NFR BLD AUTO: 0 % (ref 0–1)
BILIRUB SERPL-MCNC: 0.8 MG/DL
BUN SERPL-MCNC: 18 MG/DL (ref 5–25)
CALCIUM SERPL-MCNC: 9.6 MG/DL (ref 8.4–10.2)
CHLORIDE SERPL-SCNC: 96 MMOL/L (ref 97–108)
CHOLEST SERPL-MCNC: 200 MG/DL
CO2 SERPL-SCNC: 26 MMOL/L (ref 22–30)
CREAT SERPL-MCNC: 1.04 MG/DL (ref 0.6–1.2)
EOSINOPHIL # BLD AUTO: 0.1 THOUSAND/ΜL (ref 0–0.4)
EOSINOPHIL NFR BLD AUTO: 1 % (ref 0–6)
ERYTHROCYTE [DISTWIDTH] IN BLOOD BY AUTOMATED COUNT: 14 %
EST. AVERAGE GLUCOSE BLD GHB EST-MCNC: 120 MG/DL
GFR SERPL CREATININE-BSD FRML MDRD: 48 ML/MIN/1.73SQ M
GLUCOSE SERPL-MCNC: 98 MG/DL (ref 70–99)
HBA1C MFR BLD: 5.8 % (ref 4.2–6.3)
HCT VFR BLD AUTO: 38.3 % (ref 36–46)
HDLC SERPL-MCNC: 64 MG/DL (ref 40–59)
HGB BLD-MCNC: 13.1 G/DL (ref 12–16)
LDLC SERPL CALC-MCNC: 118 MG/DL
LYMPHOCYTES # BLD AUTO: 1.3 THOUSANDS/ΜL (ref 0.5–4)
LYMPHOCYTES NFR BLD AUTO: 18 % (ref 25–45)
MAGNESIUM SERPL-MCNC: 2.1 MG/DL (ref 1.6–2.3)
MCH RBC QN AUTO: 29.4 PG (ref 26.8–34.3)
MCHC RBC AUTO-ENTMCNC: 34.2 G/DL (ref 31.4–37.4)
MCV RBC AUTO: 86 FL (ref 80–100)
MONOCYTES # BLD AUTO: 0.5 THOUSAND/ΜL (ref 0.2–0.9)
MONOCYTES NFR BLD AUTO: 7 % (ref 1–10)
NEUTROPHILS # BLD AUTO: 5.2 THOUSANDS/ΜL (ref 1.8–7.8)
NEUTS SEG NFR BLD AUTO: 73 % (ref 45–65)
NONHDLC SERPL-MCNC: 136 MG/DL
PLATELET # BLD AUTO: 324 THOUSANDS/UL (ref 150–450)
PMV BLD AUTO: 7.7 FL (ref 8.9–12.7)
POTASSIUM SERPL-SCNC: 4.3 MMOL/L (ref 3.6–5)
PROLACTIN SERPL-MCNC: 40.3 NG/ML
PROT SERPL-MCNC: 7.3 G/DL (ref 5.9–8.4)
RBC # BLD AUTO: 4.45 MILLION/UL (ref 4–5.2)
SODIUM SERPL-SCNC: 133 MMOL/L (ref 137–147)
TRIGL SERPL-MCNC: 92 MG/DL
TSH SERPL DL<=0.05 MIU/L-ACNC: 0.91 UIU/ML (ref 0.47–4.68)
WBC # BLD AUTO: 7.2 THOUSAND/UL (ref 4.31–10.16)

## 2019-06-27 PROCEDURE — 84146 ASSAY OF PROLACTIN: CPT

## 2019-06-27 PROCEDURE — 80053 COMPREHEN METABOLIC PANEL: CPT

## 2019-06-27 PROCEDURE — 85025 COMPLETE CBC W/AUTO DIFF WBC: CPT

## 2019-06-27 PROCEDURE — 83036 HEMOGLOBIN GLYCOSYLATED A1C: CPT

## 2019-06-27 PROCEDURE — 36415 COLL VENOUS BLD VENIPUNCTURE: CPT

## 2019-06-27 PROCEDURE — 80061 LIPID PANEL: CPT

## 2019-06-27 PROCEDURE — 82306 VITAMIN D 25 HYDROXY: CPT

## 2019-06-27 PROCEDURE — 84443 ASSAY THYROID STIM HORMONE: CPT

## 2019-06-27 PROCEDURE — 83735 ASSAY OF MAGNESIUM: CPT

## 2019-06-28 ENCOUNTER — APPOINTMENT (OUTPATIENT)
Dept: PHYSICAL THERAPY | Facility: CLINIC | Age: 84
End: 2019-06-28
Payer: COMMERCIAL

## 2019-07-05 ENCOUNTER — APPOINTMENT (OUTPATIENT)
Dept: PHYSICAL THERAPY | Facility: CLINIC | Age: 84
End: 2019-07-05
Payer: COMMERCIAL

## 2019-07-09 ENCOUNTER — OFFICE VISIT (OUTPATIENT)
Dept: PHYSICAL THERAPY | Facility: CLINIC | Age: 84
End: 2019-07-09
Payer: COMMERCIAL

## 2019-07-09 DIAGNOSIS — M17.11 PRIMARY OSTEOARTHRITIS OF RIGHT KNEE: Primary | ICD-10-CM

## 2019-07-09 DIAGNOSIS — M21.061 ACQUIRED VALGUS DEFORMITY OF KNEE, RIGHT: ICD-10-CM

## 2019-07-09 PROCEDURE — 97110 THERAPEUTIC EXERCISES: CPT

## 2019-07-09 NOTE — PROGRESS NOTES
Daily Note     Today's date: 2019  Patient name: Binta Castaneda  : 1931  MRN: [id number]  Referring provider: Zina Young DO  Dx:   Encounter Diagnosis     ICD-10-CM    1  Primary osteoarthritis of right knee M17 11    2  Acquired valgus deformity of knee, right M21 061        Start Time: 1455  Stop Time: 1530  Total time in clinic (min): 35 minutes    Subjective: Pt reports she is feeling better, no further subjective offered  Objective: See treatment diary below         Precautions: FALL RISK, Atrial fibrillation, HTN, Acute encephalopathy, Anxiety, Hyperlipidemia, Hyponatremia, psychosis, Schizoaffective disorder, Sleep difficulties     Manual                     PROM R knee  NC  -                                              Exercise Diary                    Seated marches  20  20x ea 20x ea                  Quad sets  10 x 10" 5"x20  20x5"                  SAQ  20 c 5" hold  5"x20 20x5"                  Heel slides  20  10"x10 10x10"                 Seated hip abd c TB  20 c 3" hold  OTB 20x GTB  GTB 20x                  Seated hip add   20 c 5" hold 20x ball   20x                 Seated HR/TR  20 HR  10 TR  20x ea 20x ea                  NBOS   5 x 10" 30"x2  2x30"                  HS curl  20 R  20x R  20x R                   Tandem on foam  5 x 10" ea 30"x2 ea  2x30"                                                                                                                                                                                                                                                                     Modalities                    MH 10'  10' pre  10'                                            Assessment: Tolerated treatment well   Patient demonstrated fatigue post treatment, exhibited good technique with therapeutic exercises and would benefit from continued PT to address continued strength and pain deficits as much as able considering valgus deformity  Pt needed VC/TCing for proper form and technique with exercises  Continue to progress as able  Plan: Continue per plan of care  Progress treatment as tolerated        Elaine Mendez

## 2019-07-11 ENCOUNTER — APPOINTMENT (OUTPATIENT)
Dept: PHYSICAL THERAPY | Facility: CLINIC | Age: 84
End: 2019-07-11
Payer: COMMERCIAL

## 2019-07-16 ENCOUNTER — OFFICE VISIT (OUTPATIENT)
Dept: PHYSICAL THERAPY | Facility: CLINIC | Age: 84
End: 2019-07-16
Payer: COMMERCIAL

## 2019-07-16 DIAGNOSIS — M21.061 ACQUIRED VALGUS DEFORMITY OF KNEE, RIGHT: ICD-10-CM

## 2019-07-16 DIAGNOSIS — M17.11 PRIMARY OSTEOARTHRITIS OF RIGHT KNEE: Primary | ICD-10-CM

## 2019-07-16 PROCEDURE — 97110 THERAPEUTIC EXERCISES: CPT

## 2019-07-16 NOTE — PROGRESS NOTES
Daily Note     Today's date: 2019  Patient name: Natalya Gallardo  : 1931  MRN: [id number]  Referring provider: Hunter Marin DO  Dx:   Encounter Diagnosis     ICD-10-CM    1  Primary osteoarthritis of right knee M17 11    2  Acquired valgus deformity of knee, right M21 061                   Subjective: Pt reports no change in knee pain since LV  Objective: See treatment diary below     Precautions: FALL RISK, Atrial fibrillation, HTN, Acute encephalopathy, Anxiety, Hyperlipidemia, Hyponatremia, psychosis, Schizoaffective disorder, Sleep difficulties     Manual                   PROM R knee  NC  -                                              Exercise Diary                   Seated marches  20  20x ea 20x ea  20x ea                Quad sets  10 x 10" 5"x20  20x5"  5"x20                SAQ  20 c 5" hold  5"x20 20x5"  5"x20                Heel slides  20  10"x10 10x10" missed                Seated hip abd c TB  20 c 3"  OTB 20x GTB  GTB 20x  GTB 5"x20                Seated hip add   20 c 5" hold 20x ball   20x 5"x20                Seated HR/TR  20 HR  10 TR  20x ea 20x ea  20x ea                NBOS   5 x 10" 30"x2  2x30" 30"x2                HS curl  20 R  20x R  20x R  20x R                 Tandem on foam  5 x 10" ea 30"x2 ea  2x30" 30"x2 ea                                                                                            Modalities                 MHP 10'  10' pre  10'  10' pre                                           Assessment: Tolerated treatment well  Patient demonstrated fatigue post treatment, exhibited good technique with therapeutic exercises and would benefit from continued PT to improve strength and mobility  Plan: Continue per plan of care  Progress treatment as tolerated      Checo Pisano PTA

## 2019-07-18 ENCOUNTER — OFFICE VISIT (OUTPATIENT)
Dept: PHYSICAL THERAPY | Facility: CLINIC | Age: 84
End: 2019-07-18
Payer: COMMERCIAL

## 2019-07-18 DIAGNOSIS — M21.061 ACQUIRED VALGUS DEFORMITY OF KNEE, RIGHT: ICD-10-CM

## 2019-07-18 DIAGNOSIS — M17.11 PRIMARY OSTEOARTHRITIS OF RIGHT KNEE: Primary | ICD-10-CM

## 2019-07-18 PROCEDURE — 97110 THERAPEUTIC EXERCISES: CPT

## 2019-07-18 PROCEDURE — 97112 NEUROMUSCULAR REEDUCATION: CPT

## 2019-07-18 NOTE — PROGRESS NOTES
Daily Note     Today's date: 2019  Patient name: Gifty Teran  : 1931  MRN: [id number]  Referring provider: Jared Vazquez DO  Dx:   Encounter Diagnosis     ICD-10-CM    1  Primary osteoarthritis of right knee M17 11    2  Acquired valgus deformity of knee, right M21 061        Start Time: 1530  Stop Time: 1600  Total time in clinic (min): 30 minutes    Subjective: Pt reports no change in knee pain  Pt states she has pain in the R knee, but did not rate it  Objective: See treatment diary below     Precautions: FALL RISK, Atrial fibrillation, HTN, Acute encephalopathy, Anxiety, Hyperlipidemia, Hyponatremia, psychosis, Schizoaffective disorder, Sleep difficulties     Manual                 PROM R knee  NC  -                                              Exercise Diary                Seated marches  20  20x ea 20x ea  20x ea  20x ea              Quad sets  10 x 10" 5"x20  20x5"  5"x20  20x5"              SAQ  20 c 5" hold  5"x20 20x5"  5"x20  20x5"             Heel slides  20  10"x10 10x10" missed                Seated hip abd c TB  20 c 3"  OTB 20x GTB  GTB 20x  GTB 5"x20  GTB 5"x20              Seated hip add   20 c 5" hold 20x ball   20x 5"x20  20x5"              Seated HR/TR  20 HR  10 TR  20x ea 20x ea  20x ea  20x ea             NBOS   5 x 10" 30"x2  2x30" 30"x2 2x30"               HS curl  20 R  20x R  20x R  20x R  20x R               Tandem on foam  5 x 10" ea 30"x2 ea  2x30" 30"x2 ea  2x30"                                                                                          Modalities                MHP 10'  10' pre  10'  10' pre  10' pre                                         Assessment: Tolerated treatment well  Patient demonstrated fatigue post treatment, exhibited good technique with therapeutic exercises and would benefit from continued PT to improve strength and mobility   Pt reports reduction in symptoms post Tx  Pt shows increased function with reduced time of exercises  Plan: Continue per plan of care  Progress treatment as tolerated      Tamika Cruz

## 2019-07-23 ENCOUNTER — OFFICE VISIT (OUTPATIENT)
Dept: PHYSICAL THERAPY | Facility: CLINIC | Age: 84
End: 2019-07-23
Payer: COMMERCIAL

## 2019-07-23 DIAGNOSIS — M21.061 ACQUIRED VALGUS DEFORMITY OF KNEE, RIGHT: ICD-10-CM

## 2019-07-23 DIAGNOSIS — M17.11 PRIMARY OSTEOARTHRITIS OF RIGHT KNEE: Primary | ICD-10-CM

## 2019-07-23 PROCEDURE — 97112 NEUROMUSCULAR REEDUCATION: CPT

## 2019-07-23 PROCEDURE — 97110 THERAPEUTIC EXERCISES: CPT

## 2019-07-23 NOTE — PROGRESS NOTES
Daily Note     Today's date: 2019  Patient name: Danelle Pérez  : 1931  MRN: [id number]  Referring provider: Ronda Calzada DO  Dx:   Encounter Diagnosis     ICD-10-CM    1  Primary osteoarthritis of right knee M17 11    2  Acquired valgus deformity of knee, right M21 061                   Subjective: Pt reports increased R medial knee pain that is exacerbated by walking  She notes slight decrease in pain following session  Objective: See treatment diary below     Precautions: FALL RISK, Atrial fibrillation, HTN, Acute encephalopathy, Anxiety, Hyperlipidemia, Hyponatremia, psychosis, Schizoaffective disorder, Sleep difficulties     Manual                [facility]  -                                              Exercise Diary               Seated marches  20  20x ea 20x ea  20x ea  20x ea  20x ea           Quad sets  10 x 10" 5"x20  20x5"  5"x20  20x5"  5"x20           SAQ  20 c 5" hold  5"x20 20x5"  5"x20  20x5" LAQ 5"x10 ea           Heel slides  20  10"x10 10x10" missed   - -           Seated hip abd c TB  20 c 3"  OTB 20x GTB  GTB 20x  GTB 5"x20  GTB 5"x20  GTB 5"x20           Seated hip add   20 c 5" hold 20x ball   20x 5"x20  20x5"  5"x20           Seated HR/TR  20 HR  10 TR  20x ea 20x ea  20x ea  20x ea 30x ea           NBOS   5 x 10" 30"x2  2x30" 30"x2 2x30"  -            HS curl  20 R  20x R  20x R  20x R  20x R  -            Tandem on foam  5 x 10" ea 30"x2 ea  2x30" 30"x2 ea  2x30"  -            gastroc stretch stool/strap           30"x3           HS stretch           Seated w/stool 30"x3                                       Modalities             MHP 10'  10' pre  10'  10' pre  10' pre  10' pre                                       Assessment: Tolerated treatment well   Patient demonstrated fatigue post treatment, exhibited good technique with therapeutic exercises and would benefit from continued PT to address strength and balance deficits  Plan: Continue per plan of care  Progress treatment as tolerated      Kori Certain, PTA

## 2019-07-30 ENCOUNTER — EVALUATION (OUTPATIENT)
Dept: PHYSICAL THERAPY | Facility: CLINIC | Age: 84
End: 2019-07-30
Payer: COMMERCIAL

## 2019-07-30 DIAGNOSIS — M17.11 PRIMARY OSTEOARTHRITIS OF RIGHT KNEE: Primary | ICD-10-CM

## 2019-07-30 DIAGNOSIS — M21.061 ACQUIRED VALGUS DEFORMITY OF KNEE, RIGHT: ICD-10-CM

## 2019-07-30 PROCEDURE — 97110 THERAPEUTIC EXERCISES: CPT | Performed by: PHYSICAL MEDICINE & REHABILITATION

## 2019-07-30 PROCEDURE — 97140 MANUAL THERAPY 1/> REGIONS: CPT | Performed by: PHYSICAL MEDICINE & REHABILITATION

## 2019-07-30 NOTE — PROGRESS NOTES
PT Evaluation     Today's date: 2019  Patient name: Salomón Gonzalez  : 1931  MRN: [id number]  Referring provider: Letty Ortiz DO  Dx:   Encounter Diagnosis     ICD-10-CM    1  Primary osteoarthritis of right knee M17 11    2  Acquired valgus deformity of knee, right M21 061        Start Time: 1445  Stop Time: 1520  Total time in clinic (min): 35 minutes    Assessment  Assessment details: Kyle Hazel has been attending outpatient physical therapy with Primary osteoarthritis of right knee, Acquired valgus deformity of knee, right   Since the last assessment, patient demonstrates limited improvement in pain levels  There is an improvement in strength, transfer ability, and gait mechanics  Pt provided HEP to be performed with assistance from home aide  Impairments: abnormal gait, abnormal or restricted ROM, impaired balance, impaired physical strength, lacks appropriate home exercise program, pain with function and poor posture   Other impairment: aqcuired valgus deformity  Understanding of Dx/Px/POC: poor   Prognosis: poor    Goals  Goals not met  ST  Patient will report 25% decrease in pain in 4 weeks  2  Patient will demonstrate 25% improvement in ROM in 4 weeks  3  Patient will demonstrate 1/2 grade improvement in strength in 4 weeks  LT  Patient will be able to perform IADLS without restriction or pain by discharge  2  Patient will be independent in HEP by discharge  3  Patient will be able to return to recreational/work duties without restriction or pain by discharge  Plan  Plan details: D/C to HEP  Planned therapy interventions: home exercise program  Treatment plan discussed with: patient        Subjective Evaluation    History of Present Illness  Mechanism of injury: Pt states that she has increased pain currently  She is scheduled for appointment with her PCP 19  Pt does not understand the pain scale, but reports minimal improvement in pain level   She is experiencing increased sxs while ambulating and states that there is increased "cracking" in the knee  Lying on her L side while sleeping increases sxs  Pain  Current pain ratin  At best pain ratin  At worst pain ratin  Progression: no change    Patient Goals  Patient goals for therapy: decreased pain  Patient goal: NOT MET        Objective     Static Posture     Comments  R knee valgus deformity    Passive Range of Motion     Right Hip   External rotation (90/90): 45 degrees   Internal rotation (90/90): 25 degrees     Right Knee   Flexion: 120 degrees with pain  Extension: 0 degrees with pain    Additional Passive Range of Motion Details  Pain in the posterolateral knee  Mobility   Patellar Mobility:     Right Knee   WFL: medial and lateral  Hypomobile: superior and inferior     Strength/Myotome Testing     Additional Strength Details  Pt did not understand MMT testing      Ambulation   Weight-Bearing Status   Assistive device used: two-wheeled walker    Observational Gait   Gait: antalgic   Decreased right stance time and right step length  Functional Assessment        Comments  Sit to stand IE: lateral trunk lean to L, B UE assist  (19): No UE assist, L LE pushing against seat, R LE increased valgus               Precautions: FALL RISK, Atrial fibrillation, HTN, Acute encephalopathy, Anxiety, Hyperlipidemia, Hyponatremia, psychosis, Schizoaffective disorder, Sleep difficulties     Manual             PROM R knee  NC  -          NC                                    Exercise Diary            Seated marches  20  20x ea 20x ea  20x ea  20x ea  20x ea 20 ea         Quad sets  10 x 10" 5"x20  20x5"  5"x20  20x5"  5"x20  5" x20         SAQ  20 c 5" hold  5"x20 20x5"  5"x20  20x5" LAQ 5"x10 ea           Heel slides  20  10"x10 10x10" missed   - -  seated   20          Seated hip abd c TB  20 c 3"  OTB 20x GTB  GTB 20x  GTB 5"x20  GTB 5"x20  GTB 5"x20           Seated hip add   20 c 5" hold 20x ball   20x 5"x20  20x5"  5"x20  5" x 20         Seated HR/TR  20 HR  10 TR  20x ea 20x ea  20x ea  20x ea 30x ea  30 ea         NBOS   5 x 10" 30"x2  2x30" 30"x2 2x30"  -            HS curl  20 R  20x R  20x R  20x R  20x R  -            Tandem on foam  5 x 10" ea 30"x2 ea  2x30" 30"x2 ea  2x30"  -            gastroc stretch stool/strap           30"x3  3 x 30"         HS stretch            Seated w/stool 30"x3  3 x 30"                                     Modalities  6/11 6/25  7/9   7/16 7/18   7/23           MHP 10'  10' pre  10'  10' pre  10' pre  10' pre

## 2019-07-31 ENCOUNTER — OFFICE VISIT (OUTPATIENT)
Dept: INTERNAL MEDICINE CLINIC | Facility: CLINIC | Age: 84
End: 2019-07-31
Payer: COMMERCIAL

## 2019-07-31 VITALS
TEMPERATURE: 97.8 F | HEART RATE: 76 BPM | BODY MASS INDEX: 23.03 KG/M2 | RESPIRATION RATE: 14 BRPM | HEIGHT: 61 IN | DIASTOLIC BLOOD PRESSURE: 78 MMHG | SYSTOLIC BLOOD PRESSURE: 136 MMHG | WEIGHT: 122 LBS

## 2019-07-31 DIAGNOSIS — I48.0 PAROXYSMAL ATRIAL FIBRILLATION (HCC): ICD-10-CM

## 2019-07-31 DIAGNOSIS — F23 BRIEF PSYCHOTIC DISORDER (HCC): ICD-10-CM

## 2019-07-31 DIAGNOSIS — I10 ESSENTIAL HYPERTENSION: Primary | ICD-10-CM

## 2019-07-31 DIAGNOSIS — F41.9 ANXIETY: ICD-10-CM

## 2019-07-31 DIAGNOSIS — E78.2 MIXED HYPERLIPIDEMIA: ICD-10-CM

## 2019-07-31 PROCEDURE — 1036F TOBACCO NON-USER: CPT | Performed by: INTERNAL MEDICINE

## 2019-07-31 PROCEDURE — 99214 OFFICE O/P EST MOD 30 MIN: CPT | Performed by: INTERNAL MEDICINE

## 2019-07-31 PROCEDURE — 3725F SCREEN DEPRESSION PERFORMED: CPT | Performed by: INTERNAL MEDICINE

## 2019-07-31 RX ORDER — OLANZAPINE 5 MG/1
5 TABLET ORAL
Status: ON HOLD | COMMUNITY
End: 2020-05-26 | Stop reason: SDUPTHER

## 2019-07-31 NOTE — PROGRESS NOTES
Assessment/Plan:  Not psychotic has not gone to the ER otherwise doing well blood pressure control no change in meds will see her back in 8 weeks labs to be check    Problem 1  Blood pressure very well control on a combination of amlodipine 10 mg losartan 100 mg and metoprolol succinate 100 mg  No change in medication denies any headache palpitation shortness of breath  Continue the same renal function stable was review from June that will be repeated when she comes back  Problem 2  Depression psychosis seems to be stable on Zyprexa and clonazepam   She also takes duloxetine that helps her nerve and her chronic pain she did not complain of pain at this time she wanted  move medication for her nerves but she looks good I told her to pray to use behavior modifications she looks to me much better she has psychiatric follow-up will see her back in 8 weeks    Problem 3  Hyperlipidemia at the present time will observe with her age and comorbidity will hold off on any medication intervention    Problem 4  Scoliosis ambulates with a walker has not had any ER visits or falls she does not have any tremors  Or any evidence of extrapyramidal problems from this psychotropic    No problem-specific Assessment & Plan notes found for this encounter  Diagnoses and all orders for this visit:    Essential hypertension  -     CBC and differential; Future  -     Comprehensive metabolic panel; Future  -     TSH, 3rd generation with Free T4 reflex; Future  -     Vitamin D 25 hydroxy; Future  -     Gamma GT; Future  -     Prolactin; Future    Paroxysmal atrial fibrillation (HCC)  -     CBC and differential; Future  -     Comprehensive metabolic panel; Future  -     TSH, 3rd generation with Free T4 reflex; Future  -     Vitamin D 25 hydroxy; Future  -     Gamma GT; Future  -     Prolactin; Future    Anxiety  -     CBC and differential; Future  -     Comprehensive metabolic panel;  Future  -     TSH, 3rd generation with Free T4 reflex; Future  -     Vitamin D 25 hydroxy; Future  -     Gamma GT; Future  -     Prolactin; Future    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[address] )  -     CBC and differential; Future  -     Comprehensive metabolic panel; Future  -     TSH, 3rd generation with Free T4 reflex; Future  -     Vitamin D 25 hydroxy; Future  -     Gamma GT; Future  -     Prolactin; Future    Mixed hyperlipidemia    Other orders  -     OLANZapine (ZyPREXA) 5 mg tablet; Take 5 mg by mouth daily at bedtime          Subjective:      Patient ID: Gifty Teran is a 80 y o  female  Chief Complaint   Patient presents with    Follow-up     Needs dexa scan         Current Outpatient Medications:     amLODIPine (NORVASC) 10 mg tablet, TAKE 1 TABLET (10 MG TOTAL) BY MOUTH DAILY, Disp: 90 tablet, Rfl: 0    DULoxetine (CYMBALTA) 30 mg delayed release capsule, Take 1 capsule (30 mg total) by mouth daily, Disp: 90 capsule, Rfl: 1    losartan (COZAAR) 100 MG tablet, TAKE 1 TABLET (100 MG TOTAL) BY MOUTH DAILY, Disp: 30 tablet, Rfl: 1    metoprolol succinate (TOPROL-XL) 100 mg 24 hr tablet, TAKE 1 TABLET (100 MG TOTAL) BY MOUTH DAILY, Disp: 90 tablet, Rfl: 0    OLANZapine (ZyPREXA) 5 mg tablet, Take 5 mg by mouth daily at bedtime, Disp: , Rfl:     clonazePAM (KlonoPIN) 0 5 mg tablet, Take 1 tablet (0 5 mg total) by mouth daily at bedtime, Disp: 30 tablet, Rfl: 0    clonazePAM (KlonoPIN) 0 5 mg tablet, Take 1 tablet (0 5 mg total) by mouth daily at bedtime for 5 days, Disp: 5 tablet, Rfl: 0    multivitamin-iron-minerals-folic acid (CENTRUM) chewable tablet, Chew 1 tablet daily, Disp: , Rfl:     HPI    The following portions of the patient's history were reviewed and updated as appropriate: allergies, current medications, past family history, past medical history, past social history, past surgical history and problem list     Review of Systems   Constitutional: Negative    Negative for activity change, appetite change, fatigue, fever and unexpected weight change  HENT: Negative for congestion, ear pain, hearing loss, mouth sores, postnasal drip, rhinorrhea, sore throat, trouble swallowing and voice change  Eyes: Negative for pain, redness and visual disturbance  Respiratory: Negative for cough, chest tightness, shortness of breath and wheezing  Cardiovascular: Negative for chest pain, palpitations and leg swelling  Gastrointestinal: Negative for abdominal distention, abdominal pain, blood in stool, constipation, diarrhea and nausea  Endocrine: Negative for cold intolerance, heat intolerance, polydipsia, polyphagia and polyuria  Genitourinary: Negative for difficulty urinating, dysuria, flank pain, frequency, hematuria and urgency  Musculoskeletal: Negative for arthralgias, back pain, gait problem, joint swelling and myalgias  Skin: Negative for color change and pallor  Neurological: Negative for dizziness, tremors, seizures, syncope, weakness, numbness and headaches  Hematological: Negative for adenopathy  Does not bruise/bleed easily  Psychiatric/Behavioral: Negative for sleep disturbance  The patient is nervous/anxious  Objective:    Results for orders placed or performed in visit on 06/27/19   Lipid panel   Result Value Ref Range    Cholesterol 200 (H) <200 mg/dL    Triglycerides 92 <150 mg/dL    HDL, Direct 64 (H) 40 - 59 mg/dL    LDL Calculated 118 <130 mg/dL    Non-HDL-Chol (CHOL-HDL) 136 mg/dl   Hemoglobin A1C   Result Value Ref Range    Hemoglobin A1C 5 8 4 2 - 6 3 %     mg/dl   Vitamin D 25 hydroxy   Result Value Ref Range    Vit D, 25-Hydroxy 40 4 30 0 - 100 0 ng/mL   Prolactin   Result Value Ref Range    Prolactin 40 3   ng/mL   CBC and differential   Result Value Ref Range    WBC 7 20 4  31 - 10 16 Thousand/uL    RBC 4 45 4 00 - 5 20 Million/uL    Hemoglobin 13 1 12 0 - 16 0 g/dL    Hematocrit 38 3 36 0 - 46 0 %    MCV 86 80 - 100 fL    MCH 29 4 26 8 - 34 3 pg    MCHC 34 2 31 4 - 37 4 g/dL    RDW 14 0 <15 3 % MPV 7 7 (L) 8 9 - 12 7 fL    Platelets 346 747 - 717 Thousands/uL    Neutrophils Relative 73 (H) 45 - 65 %    Lymphocytes Relative 18 (L) 25 - 45 %    Monocytes Relative 7 1 - 10 %    Eosinophils Relative 1 0 - 6 %    Basophils Relative 0 0 - 1 %    Neutrophils Absolute 5 20 1 80 - 7 80 Thousands/µL    Lymphocytes Absolute 1 30 0 50 - 4 00 Thousands/µL    Monocytes Absolute 0 50 0 20 - 0 90 Thousand/µL    Eosinophils Absolute 0 10 0 00 - 0 40 Thousand/µL    Basophils Absolute 0 00 0 00 - 0 10 Thousands/µL   Comprehensive metabolic panel   Result Value Ref Range    Sodium 133 (L) 137 - 147 mmol/L    Potassium 4 3 3 6 - 5 0 mmol/L    Chloride 96 (L) 97 - 108 mmol/L    CO2 26 22 - 30 mmol/L    ANION GAP 11 5 - 14 mmol/L    BUN 18 5 - 25 mg/dL    Creatinine 1 04 0 60 - 1 20 mg/dL    Glucose 98 70 - 99 mg/dL    Calcium 9 6 8 4 - 10 2 mg/dL    AST 24 14 - 36 U/L    ALT 13 9 - 52 U/L    Alkaline Phosphatase 77 43 - 122 U/L    Total Protein 7 3 5 9 - 8 4 g/dL    Albumin 4 5 3 0 - 5 2 g/dL    Total Bilirubin 0 80 <1 30 mg/dL    eGFR 48 (L) >60 ml/min/1 73sq m   TSH, 3rd generation with Free T4 reflex   Result Value Ref Range    TSH 3RD GENERATON 0 907 0 465 - 4 680 uIU/mL   Magnesium   Result Value Ref Range    Magnesium 2 1 1 6 - 2 3 mg/dL       /78 (BP Location: Left arm, Patient Position: Sitting, Cuff Size: Standard)   Pulse 76   Temp 97 8 °F (36 6 °C)   Resp 14   Ht 5' 1" (1 549 m)   Wt 55 3 kg (122 lb)   BMI 23 05 kg/m²      Physical Exam   Constitutional: She is oriented to person, place, and time  She appears well-developed and well-nourished  HENT:   Head: Normocephalic  Right Ear: External ear normal    Left Ear: External ear normal    Nose: Nose normal    Mouth/Throat: Oropharynx is clear and moist  No oropharyngeal exudate  Eyes: Pupils are equal, round, and reactive to light  Conjunctivae and EOM are normal    Neck: Normal range of motion  Neck supple  No thyromegaly present     Cardiovascular: Normal rate, regular rhythm, normal heart sounds and intact distal pulses  Exam reveals no gallop and no friction rub  No murmur heard  S1-S2 regular rhythm  Extremities no edema  Blood pressure 110/70 left arm sitting   Pulmonary/Chest: Effort normal and breath sounds normal  No respiratory distress  She has no wheezes  She has no rales  Lungs are clear no wheezing rales or rhonchi   Abdominal: Soft  Bowel sounds are normal  She exhibits no distension and no mass  There is no tenderness  There is no rebound and no guarding  Abdomen soft nontender   Musculoskeletal: Normal range of motion  Moderate scoliosis no spine tenderness   Lymphadenopathy:     She has no cervical adenopathy  Neurological: She is alert and oriented to person, place, and time  Skin: Skin is warm and dry  Psychiatric: She has a normal mood and affect  Her behavior is normal  Judgment normal    Anxious but appears in no distress I reassured her I told her no more medications follow-up with Psychiatry   Nursing note and vitals reviewed

## 2019-07-31 NOTE — PATIENT INSTRUCTIONS
You doing quite well at with that make any medication changes I think the medications for your nerves are appropriate continue follow-up with the psychiatrist  Your blood pressure is very well control your labs in June is are stable  Will see her back in 8 weeks and then we can give you the influenza vaccine get her ready for the winter

## 2019-08-15 DIAGNOSIS — I10 ESSENTIAL HYPERTENSION: ICD-10-CM

## 2019-08-15 RX ORDER — METOPROLOL SUCCINATE 100 MG/1
100 TABLET, EXTENDED RELEASE ORAL DAILY
Qty: 90 TABLET | Refills: 0 | Status: SHIPPED | OUTPATIENT
Start: 2019-08-15 | End: 2019-12-12 | Stop reason: SDUPTHER

## 2019-08-15 RX ORDER — LOSARTAN POTASSIUM 100 MG/1
100 TABLET ORAL DAILY
Qty: 90 TABLET | Refills: 0 | Status: SHIPPED | OUTPATIENT
Start: 2019-08-15 | End: 2019-10-18 | Stop reason: SDUPTHER

## 2019-08-15 RX ORDER — AMLODIPINE BESYLATE 10 MG/1
10 TABLET ORAL DAILY
Qty: 90 TABLET | Refills: 0 | Status: SHIPPED | OUTPATIENT
Start: 2019-08-15 | End: 2019-10-18 | Stop reason: SDUPTHER

## 2019-10-01 ENCOUNTER — OFFICE VISIT (OUTPATIENT)
Dept: INTERNAL MEDICINE CLINIC | Facility: CLINIC | Age: 84
End: 2019-10-01
Payer: COMMERCIAL

## 2019-10-01 VITALS
HEIGHT: 61 IN | WEIGHT: 124 LBS | TEMPERATURE: 97.6 F | RESPIRATION RATE: 14 BRPM | BODY MASS INDEX: 23.41 KG/M2 | DIASTOLIC BLOOD PRESSURE: 70 MMHG | HEART RATE: 67 BPM | SYSTOLIC BLOOD PRESSURE: 140 MMHG

## 2019-10-01 DIAGNOSIS — I48.0 PAROXYSMAL ATRIAL FIBRILLATION (HCC): Primary | ICD-10-CM

## 2019-10-01 DIAGNOSIS — E78.2 MIXED HYPERLIPIDEMIA: ICD-10-CM

## 2019-10-01 DIAGNOSIS — I10 ORTHOSTATIC HYPERTENSION: ICD-10-CM

## 2019-10-01 DIAGNOSIS — I10 ESSENTIAL HYPERTENSION: ICD-10-CM

## 2019-10-01 DIAGNOSIS — Z23 ENCOUNTER FOR IMMUNIZATION: ICD-10-CM

## 2019-10-01 DIAGNOSIS — F23 BRIEF PSYCHOTIC DISORDER (HCC): ICD-10-CM

## 2019-10-01 PROCEDURE — 99214 OFFICE O/P EST MOD 30 MIN: CPT | Performed by: INTERNAL MEDICINE

## 2019-10-01 PROCEDURE — 90662 IIV NO PRSV INCREASED AG IM: CPT | Performed by: INTERNAL MEDICINE

## 2019-10-01 PROCEDURE — G0008 ADMIN INFLUENZA VIRUS VAC: HCPCS | Performed by: INTERNAL MEDICINE

## 2019-10-01 NOTE — PROGRESS NOTES
Assessment/Plan:       Of problem 1  Blood pressure very well control she has orthostatic changes  I got a blood pressure 140/70 standing 120 over 70  Asymptomatic  She is on the following medications  Amlodipine 10 mg  Losartan 100  Metoprolol succinate 100 mg        Problem 2 psychosis stable has psychiatric follow-up she is on clonazepam and Zyprexa  Problem 3  Hyperlipidemia on behavior modification due to the comorbidity had a 1 S start a statin  Problem 4  Hyponatremia she is overdue for lab she will be going for labs today  Problem 5  Paroxysmal atrial fibrillation she is in sinus rhythm rate is control  Continue on beta-blockers  Review of system is negative but not reliable             No problem-specific Assessment & Plan notes found for this encounter  Diagnoses and all orders for this visit:    Paroxysmal atrial fibrillation ([facility] [address] )    Essential hypertension    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[facility] [address] )    Mixed hyperlipidemia    Encounter for immunization  -     FLUZONE HIGH-DOSE: influenza vaccine, high-dose, preservative-free 0 5 mL    Orthostatic hypertension          Subjective:      Patient ID: Anjel Mortensen is a 80 y o  female  Chief Complaint   Patient presents with    Follow-up     Meds not verified  Needs AWV, dexa scan             Current Outpatient Medications:     amLODIPine (NORVASC) 10 mg tablet, TAKE 1 TABLET (10 MG TOTAL) BY MOUTH DAILY, Disp: 90 tablet, Rfl: 0    clonazePAM (KlonoPIN) 0 5 mg tablet, Take 1 tablet (0 5 mg total) by mouth daily at bedtime, Disp: 30 tablet, Rfl: 0    clonazePAM (KlonoPIN) 0 5 mg tablet, Take 1 tablet (0 5 mg total) by mouth daily at bedtime for 5 days, Disp: 5 tablet, Rfl: 0    DULoxetine (CYMBALTA) 30 mg delayed release capsule, Take 1 capsule (30 mg total) by mouth daily, Disp: 90 capsule, Rfl: 1    losartan (COZAAR) 100 MG tablet, TAKE 1 TABLET (100 MG TOTAL) BY MOUTH DAILY, Disp: 90 tablet, Rfl: 0    metoprolol succinate (TOPROL-XL) 100 mg 24 hr tablet, TAKE 1 TABLET (100 MG TOTAL) BY MOUTH DAILY, Disp: 90 tablet, Rfl: 0    multivitamin-iron-minerals-folic acid (CENTRUM) chewable tablet, Chew 1 tablet daily, Disp: , Rfl:     OLANZapine (ZyPREXA) 5 mg tablet, Take 5 mg by mouth daily at bedtime, Disp: , Rfl:     HPI    The following portions of the patient's history were reviewed and updated as appropriate: allergies, current medications, past family history, past medical history, past social history, past surgical history and problem list     Review of Systems   Constitutional: Negative  Negative for activity change, appetite change, fatigue, fever and unexpected weight change  HENT: Negative for congestion, ear pain, hearing loss, mouth sores, postnasal drip, rhinorrhea, sore throat, trouble swallowing and voice change  Eyes: Negative for pain, redness and visual disturbance  Respiratory: Negative for cough, chest tightness, shortness of breath and wheezing  Cardiovascular: Negative for chest pain, palpitations and leg swelling  Gastrointestinal: Negative for abdominal distention, abdominal pain, blood in stool, constipation, diarrhea and nausea  Endocrine: Negative for cold intolerance, heat intolerance, polydipsia, polyphagia and polyuria  Genitourinary: Negative for difficulty urinating, dysuria, flank pain, frequency, hematuria and urgency  Musculoskeletal: Negative for arthralgias, back pain, gait problem, joint swelling and myalgias  Skin: Negative for color change and pallor  Neurological: Negative for dizziness, tremors, seizures, syncope, weakness, numbness and headaches  Hematological: Negative for adenopathy  Does not bruise/bleed easily  Psychiatric/Behavioral: Negative  Negative for sleep disturbance  The patient is not nervous/anxious            Objective:    Results for orders placed or performed in visit on 06/27/19   Lipid panel   Result Value Ref Range    Cholesterol 200 (H) <200 mg/dL Triglycerides 92 <150 mg/dL    HDL, Direct 64 (H) 40 - 59 mg/dL    LDL Calculated 118 <130 mg/dL    Non-HDL-Chol (CHOL-HDL) 136 mg/dl   Hemoglobin A1C   Result Value Ref Range    Hemoglobin A1C 5 8 4 2 - 6 3 %     mg/dl   Vitamin D 25 hydroxy   Result Value Ref Range    Vit D, 25-Hydroxy 40 4 30 0 - 100 0 ng/mL   Prolactin   Result Value Ref Range    Prolactin 40 3   ng/mL   CBC and differential   Result Value Ref Range    WBC 7 20 4  31 - 10 16 Thousand/uL    RBC 4 45 4 00 - 5 20 Million/uL    Hemoglobin 13 1 12 0 - 16 0 g/dL    Hematocrit 38 3 36 0 - 46 0 %    MCV 86 80 - 100 fL    MCH 29 4 26 8 - 34 3 pg    MCHC 34 2 31 4 - 37 4 g/dL    RDW 14 0 <15 3 %    MPV 7 7 (L) 8 9 - 12 7 fL    Platelets 003 755 - 406 Thousands/uL    Neutrophils Relative 73 (H) 45 - 65 %    Lymphocytes Relative 18 (L) 25 - 45 %    Monocytes Relative 7 1 - 10 %    Eosinophils Relative 1 0 - 6 %    Basophils Relative 0 0 - 1 %    Neutrophils Absolute 5 20 1 80 - 7 80 Thousands/µL    Lymphocytes Absolute 1 30 0 50 - 4 00 Thousands/µL    Monocytes Absolute 0 50 0 20 - 0 90 Thousand/µL    Eosinophils Absolute 0 10 0 00 - 0 40 Thousand/µL    Basophils Absolute 0 00 0 00 - 0 10 Thousands/µL   Comprehensive metabolic panel   Result Value Ref Range    Sodium 133 (L) 137 - 147 mmol/L    Potassium 4 3 3 6 - 5 0 mmol/L    Chloride 96 (L) 97 - 108 mmol/L    CO2 26 22 - 30 mmol/L    ANION GAP 11 5 - 14 mmol/L    BUN 18 5 - 25 mg/dL    Creatinine 1 04 0 60 - 1 20 mg/dL    Glucose 98 70 - 99 mg/dL    Calcium 9 6 8 4 - 10 2 mg/dL    AST 24 14 - 36 U/L    ALT 13 9 - 52 U/L    Alkaline Phosphatase 77 43 - 122 U/L    Total Protein 7 3 5 9 - 8 4 g/dL    Albumin 4 5 3 0 - 5 2 g/dL    Total Bilirubin 0 80 <1 30 mg/dL    eGFR 48 (L) >60 ml/min/1 73sq m   TSH, 3rd generation with Free T4 reflex   Result Value Ref Range    TSH 3RD GENERATON 0 907 0 465 - 4 680 uIU/mL   Magnesium   Result Value Ref Range    Magnesium 2 1 1 6 - 2 3 mg/dL       /70 (BP Location: Left arm, Patient Position: Sitting)   Pulse 67   Temp 97 6 °F (36 4 °C)   Resp 14   Ht 5' 1" (1 549 m)   Wt 56 2 kg (124 lb)   BMI 23 43 kg/m²      Physical Exam   Constitutional: She is oriented to person, place, and time  She appears well-developed and well-nourished  HENT:   Head: Normocephalic  Right Ear: External ear normal    Left Ear: External ear normal    Nose: Nose normal    Mouth/Throat: Oropharynx is clear and moist  No oropharyngeal exudate  Eyes: Pupils are equal, round, and reactive to light  Conjunctivae and EOM are normal    Neck: Normal range of motion  Neck supple  No thyromegaly present  Cardiovascular: Normal rate, regular rhythm, normal heart sounds and intact distal pulses  Exam reveals no gallop and no friction rub  No murmur heard  S1-S2 regular rhythm  Extremities no edema  Blood pressure 140/70 left arm sitting 120/70 standing asymptomatic   Pulmonary/Chest: Effort normal and breath sounds normal  No respiratory distress  She has no wheezes  She has no rales  Lungs are clear no wheezing rales or rhonchi   Abdominal: Soft  Bowel sounds are normal  She exhibits no distension and no mass  There is no tenderness  There is no rebound and no guarding  Abdomen soft nontender   Musculoskeletal: Normal range of motion  Lymphadenopathy:     She has no cervical adenopathy  Neurological: She is alert and oriented to person, place, and time  Skin: Skin is warm and dry  Psychiatric: She has a normal mood and affect  Her behavior is normal  Judgment normal    Nursing note and vitals reviewed

## 2019-10-01 NOTE — PATIENT INSTRUCTIONS
Your blood pressure is well control continue the same medications  You're psychosis is stable continue psychiatric follow-up  Urine sinus rhythm  Continue your lipids medications  Will see her back in 4 months  I will give you the flu vaccine today      Go for your lab work today

## 2019-10-18 DIAGNOSIS — I10 ESSENTIAL HYPERTENSION: ICD-10-CM

## 2019-10-18 DIAGNOSIS — Z76.0 ENCOUNTER FOR MEDICATION REFILL: ICD-10-CM

## 2019-10-19 RX ORDER — AMLODIPINE BESYLATE 10 MG/1
10 TABLET ORAL DAILY
Qty: 90 TABLET | Refills: 0 | Status: SHIPPED | OUTPATIENT
Start: 2019-10-19 | End: 2020-02-04

## 2019-10-19 RX ORDER — CLONAZEPAM 0.5 MG/1
0.5 TABLET ORAL
Qty: 5 TABLET | Refills: 0 | Status: SHIPPED | OUTPATIENT
Start: 2019-10-19 | End: 2020-05-26 | Stop reason: HOSPADM

## 2019-10-19 RX ORDER — LOSARTAN POTASSIUM 100 MG/1
100 TABLET ORAL DAILY
Qty: 90 TABLET | Refills: 0 | Status: SHIPPED | OUTPATIENT
Start: 2019-10-19 | End: 2019-12-12 | Stop reason: SDUPTHER

## 2019-10-31 ENCOUNTER — APPOINTMENT (OUTPATIENT)
Dept: LAB | Facility: HOSPITAL | Age: 84
End: 2019-10-31
Payer: COMMERCIAL

## 2019-10-31 DIAGNOSIS — F23 BRIEF PSYCHOTIC DISORDER (HCC): ICD-10-CM

## 2019-10-31 DIAGNOSIS — I48.0 PAROXYSMAL ATRIAL FIBRILLATION (HCC): ICD-10-CM

## 2019-10-31 DIAGNOSIS — I10 ESSENTIAL HYPERTENSION: ICD-10-CM

## 2019-10-31 DIAGNOSIS — F41.9 ANXIETY: ICD-10-CM

## 2019-10-31 LAB
25(OH)D3 SERPL-MCNC: 32.4 NG/ML (ref 30–100)
ALBUMIN SERPL BCP-MCNC: 4.4 G/DL (ref 3–5.2)
ALP SERPL-CCNC: 71 U/L (ref 43–122)
ALT SERPL W P-5'-P-CCNC: 11 U/L (ref 9–52)
ANION GAP SERPL CALCULATED.3IONS-SCNC: 10 MMOL/L (ref 5–14)
AST SERPL W P-5'-P-CCNC: 30 U/L (ref 14–36)
BASOPHILS # BLD AUTO: 0 THOUSANDS/ΜL (ref 0–0.1)
BASOPHILS NFR BLD AUTO: 0 % (ref 0–1)
BILIRUB SERPL-MCNC: 0.6 MG/DL
BUN SERPL-MCNC: 23 MG/DL (ref 5–25)
CALCIUM SERPL-MCNC: 9.1 MG/DL (ref 8.4–10.2)
CHLORIDE SERPL-SCNC: 100 MMOL/L (ref 97–108)
CO2 SERPL-SCNC: 27 MMOL/L (ref 22–30)
CREAT SERPL-MCNC: 1.17 MG/DL (ref 0.6–1.2)
EOSINOPHIL # BLD AUTO: 0.2 THOUSAND/ΜL (ref 0–0.4)
EOSINOPHIL NFR BLD AUTO: 3 % (ref 0–6)
ERYTHROCYTE [DISTWIDTH] IN BLOOD BY AUTOMATED COUNT: 13.6 %
GFR SERPL CREATININE-BSD FRML MDRD: 42 ML/MIN/1.73SQ M
GGT SERPL-CCNC: 11 U/L (ref 5–85)
GLUCOSE SERPL-MCNC: 69 MG/DL (ref 70–99)
HCT VFR BLD AUTO: 39.6 % (ref 36–46)
HGB BLD-MCNC: 13.4 G/DL (ref 12–16)
LYMPHOCYTES # BLD AUTO: 1.3 THOUSANDS/ΜL (ref 0.5–4)
LYMPHOCYTES NFR BLD AUTO: 24 % (ref 25–45)
MCH RBC QN AUTO: 29.5 PG (ref 26.8–34.3)
MCHC RBC AUTO-ENTMCNC: 33.8 G/DL (ref 31.4–37.4)
MCV RBC AUTO: 87 FL (ref 80–100)
MONOCYTES # BLD AUTO: 0.4 THOUSAND/ΜL (ref 0.2–0.9)
MONOCYTES NFR BLD AUTO: 8 % (ref 1–10)
NEUTROPHILS # BLD AUTO: 3.6 THOUSANDS/ΜL (ref 1.8–7.8)
NEUTS SEG NFR BLD AUTO: 65 % (ref 45–65)
PLATELET # BLD AUTO: 265 THOUSANDS/UL (ref 150–450)
PMV BLD AUTO: 7.7 FL (ref 8.9–12.7)
POTASSIUM SERPL-SCNC: 4.5 MMOL/L (ref 3.6–5)
PROLACTIN SERPL-MCNC: 32.8 NG/ML
PROT SERPL-MCNC: 7.1 G/DL (ref 5.9–8.4)
RBC # BLD AUTO: 4.53 MILLION/UL (ref 4–5.2)
SODIUM SERPL-SCNC: 137 MMOL/L (ref 137–147)
TSH SERPL DL<=0.05 MIU/L-ACNC: 0.82 UIU/ML (ref 0.47–4.68)
WBC # BLD AUTO: 5.5 THOUSAND/UL (ref 4.31–10.16)

## 2019-10-31 PROCEDURE — 80053 COMPREHEN METABOLIC PANEL: CPT

## 2019-10-31 PROCEDURE — 84443 ASSAY THYROID STIM HORMONE: CPT

## 2019-10-31 PROCEDURE — 36415 COLL VENOUS BLD VENIPUNCTURE: CPT

## 2019-10-31 PROCEDURE — 84146 ASSAY OF PROLACTIN: CPT

## 2019-10-31 PROCEDURE — 82977 ASSAY OF GGT: CPT

## 2019-10-31 PROCEDURE — 82306 VITAMIN D 25 HYDROXY: CPT

## 2019-10-31 PROCEDURE — 85025 COMPLETE CBC W/AUTO DIFF WBC: CPT

## 2019-12-12 DIAGNOSIS — I10 ESSENTIAL HYPERTENSION: ICD-10-CM

## 2019-12-12 RX ORDER — METOPROLOL SUCCINATE 100 MG/1
100 TABLET, EXTENDED RELEASE ORAL DAILY
Qty: 90 TABLET | Refills: 0 | Status: SHIPPED | OUTPATIENT
Start: 2019-12-12 | End: 2020-02-04

## 2019-12-12 RX ORDER — LOSARTAN POTASSIUM 100 MG/1
100 TABLET ORAL DAILY
Qty: 90 TABLET | Refills: 1 | Status: SHIPPED | OUTPATIENT
Start: 2019-12-12 | End: 2020-04-15 | Stop reason: HOSPADM

## 2020-02-04 ENCOUNTER — OFFICE VISIT (OUTPATIENT)
Dept: INTERNAL MEDICINE CLINIC | Facility: CLINIC | Age: 85
End: 2020-02-04
Payer: COMMERCIAL

## 2020-02-04 VITALS
WEIGHT: 124.2 LBS | RESPIRATION RATE: 14 BRPM | HEIGHT: 61 IN | SYSTOLIC BLOOD PRESSURE: 118 MMHG | HEART RATE: 60 BPM | BODY MASS INDEX: 23.45 KG/M2 | DIASTOLIC BLOOD PRESSURE: 60 MMHG | TEMPERATURE: 98 F

## 2020-02-04 DIAGNOSIS — I10 ESSENTIAL HYPERTENSION: ICD-10-CM

## 2020-02-04 DIAGNOSIS — F23 BRIEF PSYCHOTIC DISORDER (HCC): ICD-10-CM

## 2020-02-04 DIAGNOSIS — I10 ORTHOSTATIC HYPERTENSION: ICD-10-CM

## 2020-02-04 DIAGNOSIS — Z23 ENCOUNTER FOR IMMUNIZATION: ICD-10-CM

## 2020-02-04 DIAGNOSIS — I48.0 PAROXYSMAL ATRIAL FIBRILLATION (HCC): Primary | ICD-10-CM

## 2020-02-04 DIAGNOSIS — F41.9 ANXIETY: ICD-10-CM

## 2020-02-04 DIAGNOSIS — Z13.820 OSTEOPOROSIS SCREENING: ICD-10-CM

## 2020-02-04 DIAGNOSIS — K59.04 CHRONIC IDIOPATHIC CONSTIPATION: ICD-10-CM
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
## 2020-02-04 DIAGNOSIS — F20.5 RESIDUAL SCHIZOPHRENIA (HCC): ICD-10-CM

## 2020-02-04 DIAGNOSIS — E78.2 MIXED HYPERLIPIDEMIA: ICD-10-CM

## 2020-02-04 PROCEDURE — 3008F BODY MASS INDEX DOCD: CPT | Performed by: INTERNAL MEDICINE

## 2020-02-04 PROCEDURE — 1160F RVW MEDS BY RX/DR IN RCRD: CPT | Performed by: INTERNAL MEDICINE

## 2020-02-04 PROCEDURE — 4040F PNEUMOC VAC/ADMIN/RCVD: CPT | Performed by: INTERNAL MEDICINE

## 2020-02-04 PROCEDURE — 3074F SYST BP LT 130 MM HG: CPT | Performed by: INTERNAL MEDICINE

## 2020-02-04 PROCEDURE — 3078F DIAST BP <80 MM HG: CPT | Performed by: INTERNAL MEDICINE

## 2020-02-04 PROCEDURE — 90670 PCV13 VACCINE IM: CPT | Performed by: INTERNAL MEDICINE

## 2020-02-04 PROCEDURE — G0009 ADMIN PNEUMOCOCCAL VACCINE: HCPCS | Performed by: INTERNAL MEDICINE

## 2020-02-04 PROCEDURE — 3288F FALL RISK ASSESSMENT DOCD: CPT | Performed by: INTERNAL MEDICINE

## 2020-02-04 PROCEDURE — 99214 OFFICE O/P EST MOD 30 MIN: CPT | Performed by: INTERNAL MEDICINE

## 2020-02-04 PROCEDURE — 1036F TOBACCO NON-USER: CPT | Performed by: INTERNAL MEDICINE

## 2020-02-04 PROCEDURE — 1101F PT FALLS ASSESS-DOCD LE1/YR: CPT | Performed by: INTERNAL MEDICINE

## 2020-02-04 RX ORDER — AMLODIPINE BESYLATE 10 MG/1
10 TABLET ORAL DAILY
Qty: 90 TABLET | Refills: 0 | Status: SHIPPED | OUTPATIENT
Start: 2020-02-04 | End: 2020-04-15 | Stop reason: HOSPADM

## 2020-02-04 RX ORDER — SENNA AND DOCUSATE SODIUM 50; 8.6 MG/1; MG/1
1 TABLET, FILM COATED ORAL DAILY
Qty: 30 TABLET | Refills: 4 | Status: SHIPPED | OUTPATIENT
Start: 2020-02-04 | End: 2020-05-26 | Stop reason: HOSPADM

## 2020-02-04 RX ORDER — METOPROLOL SUCCINATE 100 MG/1
100 TABLET, EXTENDED RELEASE ORAL DAILY
Qty: 90 TABLET | Refills: 0 | Status: SHIPPED | OUTPATIENT
Start: 2020-02-04 | End: 2020-05-26 | Stop reason: HOSPADM

## 2020-02-04 RX ORDER — POLYETHYLENE GLYCOL 3350 17 G/17G
17 POWDER, FOR SOLUTION ORAL DAILY
Qty: 507 G | Refills: 1 | Status: SHIPPED | OUTPATIENT
Start: 2020-02-04 | End: 2020-05-26 | Stop reason: HOSPADM

## 2020-02-04 NOTE — PATIENT INSTRUCTIONS
For your constipation will going to do the following  Take Senokot 1 or 2 tablets daily  Use MiraLax daily until you move your bowels when you're having a normal bowel movement may take MiraLax Monday Wednesday and Friday weekly or as needed if you do not have a bowel movement in 2 or 3 days

## 2020-02-04 NOTE — PROGRESS NOTES
Assessment/Plan:  New medication MiraLax and Senokot    DEXA scan     Pneumococcal vaccine Prevnar 13  Problem 1  Blood pressure well controlled no chest palpitation shortness of breath  Problem 2  History of atrial fibrillation appears to be in sinus rhythm  Not anticoagulated due to the history of frequent falls he she has not had a fall since last year in October she is here with her caretaker    Problem 3  Psychosis had been in remission she is seeing a psychiatrist now shin is a combination of the following medication  Zyprexa 5 mg  Duloxetine 30 mg  Clonazepam 0 5 no change in med she is doing quite well reviewing the chart she has not had any ER visits or any other issues    Problem 4  Constipation at the present time will going to do the following she is taking milk of magnesia which is not an adequate thing for her to do since is the a moderate laxative will going to give her MiraLax if she can take until she moves her bowels she start taking Senokot on a daily basis and maybe the MiraLax she can take 2 or 3 times per week  Will check her labs and will see her back in 3 months    Abdominal exam is unremarkable I did a rectal is exam that showed no evidence of fecal impaction and her stools for COURTNEY test were negative     No problem-specific Assessment & Plan notes found for this encounter  There are no diagnoses linked to this encounter  Subjective:      Patient ID: Beck Bailon is a 80 y o  female  No chief complaint on file          Current Outpatient Medications:     amLODIPine (NORVASC) 10 mg tablet, Take 1 tablet (10 mg total) by mouth daily, Disp: 90 tablet, Rfl: 0    clonazePAM (KlonoPIN) 0 5 mg tablet, Take 1 tablet (0 5 mg total) by mouth daily at bedtime, Disp: 30 tablet, Rfl: 0    clonazePAM (KlonoPIN) 0 5 mg tablet, Take 1 tablet (0 5 mg total) by mouth daily at bedtime for 5 days, Disp: 5 tablet, Rfl: 0    DULoxetine (CYMBALTA) 30 mg delayed release capsule, Take 1 capsule (30 mg total) by mouth daily, Disp: 90 capsule, Rfl: 1    losartan (COZAAR) 100 MG tablet, TAKE 1 TABLET (100 MG TOTAL) BY MOUTH DAILY, Disp: 90 tablet, Rfl: 1    metoprolol succinate (TOPROL-XL) 100 mg 24 hr tablet, TAKE 1 TABLET (100 MG TOTAL) BY MOUTH DAILY, Disp: 90 tablet, Rfl: 0    multivitamin-iron-minerals-folic acid (CENTRUM) chewable tablet, Chew 1 tablet daily, Disp: , Rfl:     OLANZapine (ZyPREXA) 5 mg tablet, Take 5 mg by mouth daily at bedtime, Disp: , Rfl:     HPI    The following portions of the patient's history were reviewed and updated as appropriate: allergies, current medications, past family history, past medical history, past social history, past surgical history and problem list     Review of Systems   Constitutional: Negative  Negative for activity change, appetite change, fatigue, fever and unexpected weight change  HENT: Negative for congestion, ear pain, hearing loss, mouth sores, postnasal drip, rhinorrhea, sore throat, trouble swallowing and voice change  Eyes: Negative for pain, redness and visual disturbance  Respiratory: Negative for cough, chest tightness, shortness of breath and wheezing  Cardiovascular: Negative for chest pain, palpitations and leg swelling  Gastrointestinal: Negative for abdominal distention, abdominal pain, blood in stool, constipation, diarrhea and nausea  Endocrine: Negative for cold intolerance, heat intolerance, polydipsia, polyphagia and polyuria  Genitourinary: Negative for difficulty urinating, dysuria, flank pain, frequency, hematuria and urgency  Musculoskeletal: Negative for arthralgias, back pain, gait problem, joint swelling and myalgias  Skin: Negative for color change and pallor  Neurological: Negative for dizziness, tremors, seizures, syncope, weakness, numbness and headaches  Hematological: Negative for adenopathy  Does not bruise/bleed easily  Psychiatric/Behavioral: Negative    Negative for sleep disturbance  The patient is not nervous/anxious  Objective:    Results for orders placed or performed in visit on 10/31/19   CBC and differential   Result Value Ref Range    WBC 5 50 4 31 - 10 16 Thousand/uL    RBC 4 53 4 00 - 5 20 Million/uL    Hemoglobin 13 4 12 0 - 16 0 g/dL    Hematocrit 39 6 36 0 - 46 0 %    MCV 87 80 - 100 fL    MCH 29 5 26 8 - 34 3 pg    MCHC 33 8 31 4 - 37 4 g/dL    RDW 13 6 <15 3 %    MPV 7 7 (L) 8 9 - 12 7 fL    Platelets 174 695 - 866 Thousands/uL    Neutrophils Relative 65 45 - 65 %    Lymphocytes Relative 24 (L) 25 - 45 %    Monocytes Relative 8 1 - 10 %    Eosinophils Relative 3 0 - 6 %    Basophils Relative 0 0 - 1 %    Neutrophils Absolute 3 60 1 80 - 7 80 Thousands/µL    Lymphocytes Absolute 1 30 0 50 - 4 00 Thousands/µL    Monocytes Absolute 0 40 0 20 - 0 90 Thousand/µL    Eosinophils Absolute 0 20 0 00 - 0 40 Thousand/µL    Basophils Absolute 0 00 0 00 - 0 10 Thousands/µL   Comprehensive metabolic panel   Result Value Ref Range    Sodium 137 137 - 147 mmol/L    Potassium 4 5 3 6 - 5 0 mmol/L    Chloride 100 97 - 108 mmol/L    CO2 27 22 - 30 mmol/L    ANION GAP 10 5 - 14 mmol/L    BUN 23 5 - 25 mg/dL    Creatinine 1 17 0 60 - 1 20 mg/dL    Glucose 69 (L) 70 - 99 mg/dL    Calcium 9 1 8 4 - 10 2 mg/dL    AST 30 14 - 36 U/L    ALT 11 9 - 52 U/L    Alkaline Phosphatase 71 43 - 122 U/L    Total Protein 7 1 5 9 - 8 4 g/dL    Albumin 4 4 3 0 - 5 2 g/dL    Total Bilirubin 0 60 <1 30 mg/dL    eGFR 42 (L) >60 ml/min/1 73sq m   TSH, 3rd generation with Free T4 reflex   Result Value Ref Range    TSH 3RD GENERATON 0 818 0 465 - 4 680 uIU/mL   Vitamin D 25 hydroxy   Result Value Ref Range    Vit D, 25-Hydroxy 32 4 30 0 - 100 0 ng/mL   Gamma GT   Result Value Ref Range    GGT 11 5 - 85 U/L   Prolactin   Result Value Ref Range    Prolactin 32 8   ng/mL       There were no vitals taken for this visit  Physical Exam   Constitutional: She is oriented to person, place, and time   She appears well-developed and well-nourished  HENT:   Head: Normocephalic  Right Ear: External ear normal    Left Ear: External ear normal    Nose: Nose normal    Mouth/Throat: Oropharynx is clear and moist  No oropharyngeal exudate  Eyes: Pupils are equal, round, and reactive to light  Conjunctivae and EOM are normal    Neck: Normal range of motion  Neck supple  No thyromegaly present  Cardiovascular: Normal rate, regular rhythm, normal heart sounds and intact distal pulses  Exam reveals no gallop and no friction rub  No murmur heard  S1-S2 regular rhythm  Extremities no edema or calf tenderness   Pulmonary/Chest: Effort normal and breath sounds normal  No respiratory distress  She has no wheezes  She has no rales  Lungs are clear no wheezing rales or rhonchi   Abdominal: Soft  Bowel sounds are normal  She exhibits no distension and no mass  There is no tenderness  There is no rebound and no guarding  Abdomen soft nontender no masses no abdominal pulsations scars from previous surgery   Musculoskeletal: Normal range of motion  Ambulates with a walker   Lymphadenopathy:     She has no cervical adenopathy  Neurological: She is alert and oriented to person, place, and time  Skin: Skin is warm and dry  Psychiatric: She has a normal mood and affect  Her behavior is normal  Judgment normal    Nursing note and vitals reviewed

## 2020-02-10 ENCOUNTER — TELEPHONE (OUTPATIENT)
Dept: INTERNAL MEDICINE CLINIC | Facility: CLINIC | Age: 85
End: 2020-02-10

## 2020-02-10 NOTE — TELEPHONE ENCOUNTER
Phone call from Masha Hernandez:  Thelma Medellin was vomiting yesterday  Today she has diarrhea  She has no fever  I instructed him to give her clear liquids for the next 24 hours then slowly introduce solid food  He will call with any questions or concerns

## 2020-02-12 ENCOUNTER — APPOINTMENT (OUTPATIENT)
Dept: LAB | Facility: HOSPITAL | Age: 85
End: 2020-02-12
Attending: INTERNAL MEDICINE
Payer: COMMERCIAL

## 2020-02-12 DIAGNOSIS — E78.2 MIXED HYPERLIPIDEMIA: ICD-10-CM

## 2020-02-12 DIAGNOSIS — F20.5 RESIDUAL SCHIZOPHRENIA (HCC): ICD-10-CM

## 2020-02-12 DIAGNOSIS — I10 ESSENTIAL HYPERTENSION: ICD-10-CM

## 2020-02-12 DIAGNOSIS — K59.04 CHRONIC IDIOPATHIC CONSTIPATION: ICD-10-CM

## 2020-02-12 DIAGNOSIS — I48.0 PAROXYSMAL ATRIAL FIBRILLATION (HCC): ICD-10-CM

## 2020-02-12 DIAGNOSIS — I10 ORTHOSTATIC HYPERTENSION: ICD-10-CM

## 2020-02-12 LAB
25(OH)D3 SERPL-MCNC: 53.3 NG/ML (ref 30–100)
ALBUMIN SERPL BCP-MCNC: 4.2 G/DL (ref 3.5–5)
ALP SERPL-CCNC: 88 U/L (ref 46–116)
ALT SERPL W P-5'-P-CCNC: 13 U/L (ref 12–78)
ANION GAP SERPL CALCULATED.3IONS-SCNC: 11 MMOL/L (ref 4–13)
AST SERPL W P-5'-P-CCNC: 22 U/L (ref 5–45)
BASOPHILS # BLD AUTO: 0 THOUSANDS/ΜL (ref 0–0.1)
BASOPHILS NFR BLD AUTO: 0 % (ref 0–1)
BILIRUB SERPL-MCNC: 0.62 MG/DL (ref 0.2–1)
BUN SERPL-MCNC: 21 MG/DL (ref 5–25)
CALCIUM SERPL-MCNC: 9.4 MG/DL (ref 8.3–10.1)
CHLORIDE SERPL-SCNC: 102 MMOL/L (ref 100–108)
CHOLEST SERPL-MCNC: 203 MG/DL (ref 50–200)
CO2 SERPL-SCNC: 27 MMOL/L (ref 21–32)
CREAT SERPL-MCNC: 1.09 MG/DL (ref 0.6–1.3)
EOSINOPHIL # BLD AUTO: 0.2 THOUSAND/ΜL (ref 0–0.61)
EOSINOPHIL NFR BLD AUTO: 3 % (ref 0–6)
ERYTHROCYTE [DISTWIDTH] IN BLOOD BY AUTOMATED COUNT: 12.4 % (ref 11.6–15.1)
FOLATE SERPL-MCNC: 18.5 NG/ML (ref 3.1–17.5)
GFR SERPL CREATININE-BSD FRML MDRD: 45 ML/MIN/1.73SQ M
GGT SERPL-CCNC: 16 U/L (ref 5–85)
GLUCOSE P FAST SERPL-MCNC: 114 MG/DL (ref 65–99)
HCT VFR BLD AUTO: 41.5 % (ref 34.8–46.1)
HDLC SERPL-MCNC: 63 MG/DL
HGB BLD-MCNC: 13.5 G/DL (ref 11.5–15.4)
IMM GRANULOCYTES # BLD AUTO: 0.01 THOUSAND/UL (ref 0–0.2)
IMM GRANULOCYTES NFR BLD AUTO: 0 % (ref 0–2)
LDLC SERPL CALC-MCNC: 125 MG/DL (ref 0–100)
LYMPHOCYTES # BLD AUTO: 1.25 THOUSANDS/ΜL (ref 0.6–4.47)
LYMPHOCYTES NFR BLD AUTO: 18 % (ref 14–44)
MAGNESIUM SERPL-MCNC: 2 MG/DL (ref 1.6–2.6)
MCH RBC QN AUTO: 29.3 PG (ref 26.8–34.3)
MCHC RBC AUTO-ENTMCNC: 32.5 G/DL (ref 31.4–37.4)
MCV RBC AUTO: 90 FL (ref 82–98)
MONOCYTES # BLD AUTO: 0.5 THOUSAND/ΜL (ref 0.17–1.22)
MONOCYTES NFR BLD AUTO: 7 % (ref 4–12)
NEUTROPHILS # BLD AUTO: 5.02 THOUSANDS/ΜL (ref 1.85–7.62)
NEUTS SEG NFR BLD AUTO: 72 % (ref 43–75)
NRBC BLD AUTO-RTO: 0 /100 WBCS
PLATELET # BLD AUTO: 271 THOUSANDS/UL (ref 149–390)
PMV BLD AUTO: 9.3 FL (ref 8.9–12.7)
POTASSIUM SERPL-SCNC: 4.1 MMOL/L (ref 3.5–5.3)
PROT SERPL-MCNC: 7.5 G/DL (ref 6.4–8.2)
RBC # BLD AUTO: 4.6 MILLION/UL (ref 3.81–5.12)
SODIUM SERPL-SCNC: 140 MMOL/L (ref 136–145)
TRIGL SERPL-MCNC: 76 MG/DL
TSH SERPL DL<=0.05 MIU/L-ACNC: 1.47 UIU/ML (ref 0.36–3.74)
VIT B12 SERPL-MCNC: 545 PG/ML (ref 100–900)
WBC # BLD AUTO: 6.98 THOUSAND/UL (ref 4.31–10.16)

## 2020-02-12 PROCEDURE — 83735 ASSAY OF MAGNESIUM: CPT

## 2020-02-12 PROCEDURE — 85025 COMPLETE CBC W/AUTO DIFF WBC: CPT

## 2020-02-12 PROCEDURE — 84443 ASSAY THYROID STIM HORMONE: CPT

## 2020-02-12 PROCEDURE — 83918 ORGANIC ACIDS TOTAL QUANT: CPT

## 2020-02-12 PROCEDURE — 82306 VITAMIN D 25 HYDROXY: CPT

## 2020-02-12 PROCEDURE — 82977 ASSAY OF GGT: CPT

## 2020-02-12 PROCEDURE — 82607 VITAMIN B-12: CPT

## 2020-02-12 PROCEDURE — 36415 COLL VENOUS BLD VENIPUNCTURE: CPT

## 2020-02-12 PROCEDURE — 80061 LIPID PANEL: CPT

## 2020-02-12 PROCEDURE — 80053 COMPREHEN METABOLIC PANEL: CPT

## 2020-02-12 PROCEDURE — 82746 ASSAY OF FOLIC ACID SERUM: CPT

## 2020-02-13 ENCOUNTER — APPOINTMENT (OUTPATIENT)
Dept: LAB | Facility: HOSPITAL | Age: 85
End: 2020-02-13
Attending: INTERNAL MEDICINE
Payer: COMMERCIAL

## 2020-02-13 ENCOUNTER — TRANSCRIBE ORDERS (OUTPATIENT)
Dept: ADMINISTRATIVE | Facility: HOSPITAL | Age: 85
End: 2020-02-13

## 2020-02-13 LAB
BILIRUB UR QL STRIP: NEGATIVE
CLARITY UR: CLEAR
COLOR UR: YELLOW
GLUCOSE UR STRIP-MCNC: NEGATIVE MG/DL
HGB UR QL STRIP.AUTO: NEGATIVE
KETONES UR STRIP-MCNC: NEGATIVE MG/DL
LEUKOCYTE ESTERASE UR QL STRIP: NEGATIVE
NITRITE UR QL STRIP: NEGATIVE
PH UR STRIP.AUTO: 6.5 [PH]
PROT UR STRIP-MCNC: NEGATIVE MG/DL
SP GR UR STRIP.AUTO: <=1.005 (ref 1–1.03)
UROBILINOGEN UR QL STRIP.AUTO: 0.2 E.U./DL

## 2020-02-13 PROCEDURE — 81003 URINALYSIS AUTO W/O SCOPE: CPT | Performed by: INTERNAL MEDICINE

## 2020-02-14 LAB
METHYLMALONATE SERPL-SCNC: 187 NMOL/L (ref 0–378)
SL AMB DISCLAIMER: NORMAL

## 2020-02-24 ENCOUNTER — HOSPITAL ENCOUNTER (OUTPATIENT)
Dept: BONE DENSITY | Facility: CLINIC | Age: 85
Discharge: HOME | End: 2020-02-24
Payer: COMMERCIAL
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
## 2020-02-24 DIAGNOSIS — Z13.820 OSTEOPOROSIS SCREENING: ICD-10-CM

## 2020-02-24 PROCEDURE — 77080 DXA BONE DENSITY AXIAL: CPT

## 2020-04-08 ENCOUNTER — TELEMEDICINE (OUTPATIENT)
Dept: INTERNAL MEDICINE CLINIC | Facility: CLINIC | Age: 85
End: 2020-04-08
Payer: COMMERCIAL

## 2020-04-08 DIAGNOSIS — F20.5 RESIDUAL SCHIZOPHRENIA (HCC): ICD-10-CM

## 2020-04-08 DIAGNOSIS — I10 ESSENTIAL HYPERTENSION: Primary | ICD-10-CM

## 2020-04-08 DIAGNOSIS — F41.9 ANXIETY: ICD-10-CM

## 2020-04-08 DIAGNOSIS — I48.0 PAROXYSMAL ATRIAL FIBRILLATION (HCC): ICD-10-CM

## 2020-04-08 PROCEDURE — G2012 BRIEF CHECK IN BY MD/QHP: HCPCS | Performed by: INTERNAL MEDICINE

## 2020-04-10 ENCOUNTER — APPOINTMENT (EMERGENCY)
Dept: RADIOLOGY | Facility: HOSPITAL | Age: 85
DRG: 178 | End: 2020-04-10
Payer: COMMERCIAL

## 2020-04-10 ENCOUNTER — HOSPITAL ENCOUNTER (INPATIENT)
Facility: HOSPITAL | Age: 85
LOS: 5 days | Discharge: HOME | DRG: 178 | End: 2020-04-15
Attending: EMERGENCY MEDICINE | Admitting: INTERNAL MEDICINE
Payer: COMMERCIAL

## 2020-04-10 ENCOUNTER — APPOINTMENT (EMERGENCY)
Dept: CT IMAGING | Facility: HOSPITAL | Age: 85
DRG: 178 | End: 2020-04-10
Payer: COMMERCIAL

## 2020-04-10 DIAGNOSIS — M54.50 LOW BACK PAIN: ICD-10-CM

## 2020-04-10 DIAGNOSIS — R26.2 AMBULATORY DYSFUNCTION: ICD-10-CM

## 2020-04-10 DIAGNOSIS — Z20.822 SUSPECTED COVID-19 VIRUS INFECTION: ICD-10-CM

## 2020-04-10 DIAGNOSIS — N17.9 AKI (ACUTE KIDNEY INJURY) (HCC): Primary | ICD-10-CM

## 2020-04-10 DIAGNOSIS — F20.5 RESIDUAL SCHIZOPHRENIA (HCC): ICD-10-CM

## 2020-04-10 DIAGNOSIS — I47.1 SVT (SUPRAVENTRICULAR TACHYCARDIA) (HCC): ICD-10-CM

## 2020-04-10 DIAGNOSIS — R29.6 FREQUENT FALLS: ICD-10-CM

## 2020-04-10 DIAGNOSIS — I48.91 RAPID ATRIAL FIBRILLATION (HCC): ICD-10-CM

## 2020-04-10 DIAGNOSIS — S09.90XA INJURY OF HEAD, INITIAL ENCOUNTER: ICD-10-CM

## 2020-04-10 LAB
ALBUMIN SERPL BCP-MCNC: 4.6 G/DL (ref 3.5–5)
ALP SERPL-CCNC: 90 U/L (ref 46–116)
ALT SERPL W P-5'-P-CCNC: 15 U/L (ref 12–78)
ANION GAP SERPL CALCULATED.3IONS-SCNC: 15 MMOL/L (ref 4–13)
APTT PPP: 30 SECONDS (ref 23–37)
AST SERPL W P-5'-P-CCNC: 36 U/L (ref 5–45)
BACTERIA UR QL AUTO: ABNORMAL /HPF
BASOPHILS # BLD AUTO: 0.02 THOUSANDS/ΜL (ref 0–0.1)
BASOPHILS NFR BLD AUTO: 0 % (ref 0–1)
BILIRUB SERPL-MCNC: 1.48 MG/DL (ref 0.2–1)
BILIRUB UR QL STRIP: NEGATIVE
BUN SERPL-MCNC: 33 MG/DL (ref 5–25)
CALCIUM SERPL-MCNC: 9.9 MG/DL (ref 8.3–10.1)
CHLORIDE SERPL-SCNC: 94 MMOL/L (ref 100–108)
CLARITY UR: CLEAR
CO2 SERPL-SCNC: 23 MMOL/L (ref 21–32)
COLOR UR: YELLOW
COLOR, POC: NORMAL
CREAT SERPL-MCNC: 1.44 MG/DL (ref 0.6–1.3)
EOSINOPHIL # BLD AUTO: 0.03 THOUSAND/ΜL (ref 0–0.61)
EOSINOPHIL NFR BLD AUTO: 0 % (ref 0–6)
ERYTHROCYTE [DISTWIDTH] IN BLOOD BY AUTOMATED COUNT: 13 % (ref 11.6–15.1)
GFR SERPL CREATININE-BSD FRML MDRD: 32 ML/MIN/1.73SQ M
GLUCOSE SERPL-MCNC: 121 MG/DL (ref 65–140)
GLUCOSE UR STRIP-MCNC: NEGATIVE MG/DL
HCT VFR BLD AUTO: 39 % (ref 34.8–46.1)
HGB BLD-MCNC: 13.4 G/DL (ref 11.5–15.4)
HGB UR QL STRIP.AUTO: ABNORMAL
IMM GRANULOCYTES # BLD AUTO: 0.05 THOUSAND/UL (ref 0–0.2)
IMM GRANULOCYTES NFR BLD AUTO: 1 % (ref 0–2)
INR PPP: 0.98 (ref 0.84–1.19)
KETONES UR STRIP-MCNC: NEGATIVE MG/DL
LEUKOCYTE ESTERASE UR QL STRIP: ABNORMAL
LYMPHOCYTES # BLD AUTO: 0.94 THOUSANDS/ΜL (ref 0.6–4.47)
LYMPHOCYTES NFR BLD AUTO: 9 % (ref 14–44)
MCH RBC QN AUTO: 29.6 PG (ref 26.8–34.3)
MCHC RBC AUTO-ENTMCNC: 34.4 G/DL (ref 31.4–37.4)
MCV RBC AUTO: 86 FL (ref 82–98)
MONOCYTES # BLD AUTO: 0.63 THOUSAND/ΜL (ref 0.17–1.22)
MONOCYTES NFR BLD AUTO: 6 % (ref 4–12)
NEUTROPHILS # BLD AUTO: 8.76 THOUSANDS/ΜL (ref 1.85–7.62)
NEUTS SEG NFR BLD AUTO: 84 % (ref 43–75)
NITRITE UR QL STRIP: NEGATIVE
NON-SQ EPI CELLS URNS QL MICRO: ABNORMAL /HPF
NRBC BLD AUTO-RTO: 0 /100 WBCS
NT-PROBNP SERPL-MCNC: 882 PG/ML
PH UR STRIP.AUTO: 7 [PH] (ref 4.5–8)
PLATELET # BLD AUTO: 249 THOUSANDS/UL (ref 149–390)
PMV BLD AUTO: 9.2 FL (ref 8.9–12.7)
POTASSIUM SERPL-SCNC: 4 MMOL/L (ref 3.5–5.3)
PROT SERPL-MCNC: 7.8 G/DL (ref 6.4–8.2)
PROT UR STRIP-MCNC: NEGATIVE MG/DL
PROTHROMBIN TIME: 13.1 SECONDS (ref 11.6–14.5)
RBC # BLD AUTO: 4.53 MILLION/UL (ref 3.81–5.12)
RBC #/AREA URNS AUTO: ABNORMAL /HPF
SARS-COV-2 RNA RESP QL NAA+PROBE: POSITIVE
SODIUM SERPL-SCNC: 132 MMOL/L (ref 136–145)
SP GR UR STRIP.AUTO: 1.01 (ref 1–1.03)
TROPONIN I SERPL-MCNC: <0.02 NG/ML
UROBILINOGEN UR QL STRIP.AUTO: 0.2 E.U./DL
WBC # BLD AUTO: 10.43 THOUSAND/UL (ref 4.31–10.16)
WBC #/AREA URNS AUTO: ABNORMAL /HPF

## 2020-04-10 PROCEDURE — 99285 EMERGENCY DEPT VISIT HI MDM: CPT

## 2020-04-10 PROCEDURE — 72125 CT NECK SPINE W/O DYE: CPT

## 2020-04-10 PROCEDURE — 85610 PROTHROMBIN TIME: CPT | Performed by: EMERGENCY MEDICINE

## 2020-04-10 PROCEDURE — 85730 THROMBOPLASTIN TIME PARTIAL: CPT | Performed by: EMERGENCY MEDICINE

## 2020-04-10 PROCEDURE — 84300 ASSAY OF URINE SODIUM: CPT | Performed by: STUDENT IN AN ORGANIZED HEALTH CARE EDUCATION/TRAINING PROGRAM

## 2020-04-10 PROCEDURE — 99285 EMERGENCY DEPT VISIT HI MDM: CPT | Performed by: EMERGENCY MEDICINE

## 2020-04-10 PROCEDURE — 85025 COMPLETE CBC W/AUTO DIFF WBC: CPT | Performed by: EMERGENCY MEDICINE

## 2020-04-10 PROCEDURE — 81003 URINALYSIS AUTO W/O SCOPE: CPT

## 2020-04-10 PROCEDURE — 83880 ASSAY OF NATRIURETIC PEPTIDE: CPT | Performed by: EMERGENCY MEDICINE

## 2020-04-10 PROCEDURE — 81001 URINALYSIS AUTO W/SCOPE: CPT

## 2020-04-10 PROCEDURE — 99223 1ST HOSP IP/OBS HIGH 75: CPT | Performed by: PHYSICIAN ASSISTANT

## 2020-04-10 PROCEDURE — 96360 HYDRATION IV INFUSION INIT: CPT

## 2020-04-10 PROCEDURE — 70450 CT HEAD/BRAIN W/O DYE: CPT

## 2020-04-10 PROCEDURE — 71045 X-RAY EXAM CHEST 1 VIEW: CPT

## 2020-04-10 PROCEDURE — 84484 ASSAY OF TROPONIN QUANT: CPT | Performed by: EMERGENCY MEDICINE

## 2020-04-10 PROCEDURE — 82570 ASSAY OF URINE CREATININE: CPT | Performed by: STUDENT IN AN ORGANIZED HEALTH CARE EDUCATION/TRAINING PROGRAM

## 2020-04-10 PROCEDURE — 84540 ASSAY OF URINE/UREA-N: CPT | Performed by: STUDENT IN AN ORGANIZED HEALTH CARE EDUCATION/TRAINING PROGRAM

## 2020-04-10 PROCEDURE — 87635 SARS-COV-2 COVID-19 AMP PRB: CPT | Performed by: EMERGENCY MEDICINE

## 2020-04-10 PROCEDURE — 72131 CT LUMBAR SPINE W/O DYE: CPT

## 2020-04-10 PROCEDURE — 80053 COMPREHEN METABOLIC PANEL: CPT | Performed by: EMERGENCY MEDICINE

## 2020-04-10 PROCEDURE — 83935 ASSAY OF URINE OSMOLALITY: CPT | Performed by: STUDENT IN AN ORGANIZED HEALTH CARE EDUCATION/TRAINING PROGRAM

## 2020-04-10 PROCEDURE — 93005 ELECTROCARDIOGRAM TRACING: CPT

## 2020-04-10 PROCEDURE — 36415 COLL VENOUS BLD VENIPUNCTURE: CPT | Performed by: EMERGENCY MEDICINE

## 2020-04-10 RX ORDER — DULOXETIN HYDROCHLORIDE 30 MG/1
30 CAPSULE, DELAYED RELEASE ORAL DAILY
Status: DISCONTINUED | OUTPATIENT
Start: 2020-04-11 | End: 2020-04-15 | Stop reason: HOSPADM

## 2020-04-10 RX ORDER — SODIUM CHLORIDE 9 MG/ML
50 INJECTION, SOLUTION INTRAVENOUS CONTINUOUS
Status: DISCONTINUED | OUTPATIENT
Start: 2020-04-10 | End: 2020-04-14

## 2020-04-10 RX ORDER — OLANZAPINE 5 MG/1
5 TABLET ORAL
Status: DISCONTINUED | OUTPATIENT
Start: 2020-04-10 | End: 2020-04-15 | Stop reason: HOSPADM

## 2020-04-10 RX ORDER — AMLODIPINE BESYLATE 10 MG/1
10 TABLET ORAL DAILY
Status: DISCONTINUED | OUTPATIENT
Start: 2020-04-11 | End: 2020-04-13

## 2020-04-10 RX ORDER — CLONAZEPAM 0.5 MG/1
0.5 TABLET ORAL
Status: DISCONTINUED | OUTPATIENT
Start: 2020-04-10 | End: 2020-04-15 | Stop reason: HOSPADM

## 2020-04-10 RX ORDER — METOPROLOL SUCCINATE 50 MG/1
100 TABLET, EXTENDED RELEASE ORAL DAILY
Status: DISCONTINUED | OUTPATIENT
Start: 2020-04-11 | End: 2020-04-15 | Stop reason: HOSPADM

## 2020-04-10 RX ORDER — ACETAMINOPHEN 325 MG/1
650 TABLET ORAL EVERY 6 HOURS PRN
Status: DISCONTINUED | OUTPATIENT
Start: 2020-04-10 | End: 2020-04-15 | Stop reason: HOSPADM

## 2020-04-10 RX ORDER — SENNOSIDES 8.6 MG
1 TABLET ORAL DAILY
Status: DISCONTINUED | OUTPATIENT
Start: 2020-04-11 | End: 2020-04-15 | Stop reason: HOSPADM

## 2020-04-10 RX ORDER — MAGNESIUM HYDROXIDE/ALUMINUM HYDROXICE/SIMETHICONE 120; 1200; 1200 MG/30ML; MG/30ML; MG/30ML
30 SUSPENSION ORAL EVERY 6 HOURS PRN
Status: DISCONTINUED | OUTPATIENT
Start: 2020-04-10 | End: 2020-04-15 | Stop reason: HOSPADM

## 2020-04-10 RX ORDER — HEPARIN SODIUM 5000 [USP'U]/ML
5000 INJECTION, SOLUTION INTRAVENOUS; SUBCUTANEOUS EVERY 8 HOURS SCHEDULED
Status: DISCONTINUED | OUTPATIENT
Start: 2020-04-10 | End: 2020-04-15 | Stop reason: HOSPADM

## 2020-04-10 RX ADMIN — SODIUM CHLORIDE 1000 ML: 0.9 INJECTION, SOLUTION INTRAVENOUS at 18:39

## 2020-04-10 RX ADMIN — HEPARIN SODIUM 5000 UNITS: 5000 INJECTION INTRAVENOUS; SUBCUTANEOUS at 22:40

## 2020-04-10 NOTE — ED PROVIDER NOTES
History  Chief Complaint   Patient presents with    Medical Problem     per ems patient has had increasing falls  patient with bruising on face  patient has been around covid + caretakers  patient unable to communicate with ED staff or video  from [facility]  family stated to medics that patient has stated lost sense of taste and also c/o low back pain  An 63-year-old female with past medical history of AFib, dementia, depression, hyperlipidemia, hypertension and schizoaffective disorder; BIBA for an unclear reason  EMS report that the patient has had increasing falls over the past several days  Patient has been complaining of increasing low back pain secondary to fall  EMS also report that patient's caregiver tested positive for COVID, however the patient does not have a fever or cough  Upon arrival to the emergency department, the patient is awake and alert however nonverbal   Attempted to communicate with the patient via [facility] video chat however unsuccessful  Attempted to contact the patient's family however unsuccessful  Complete history review of systems are unobtainable from the patient secondary to her altered mental status, unclear if this is patient's baseline  A/P:  Altered mental status, patient with bruising over the left side of her face and bilateral upper and lower extremities  Patient otherwise in no acute distress  Vitals are within normal limits  Will check lab work for electrolyte abnormality, renal impairment, anemia and cardiac event  Will check urine for infection  Will also screen for COVID given sick contacts  Will obtain imaging to rule out traumatic injury  History provided by:  Medical records and EMS personnel  History limited by:  Mental status change  Medical Problem       Prior to Admission Medications   Prescriptions Last Dose Informant Patient Reported? Taking?    DULoxetine (CYMBALTA) 30 mg delayed release capsule  Family Member No No   Sig: Take 1 capsule (30 mg total) by mouth daily   OLANZapine (ZyPREXA) 5 mg tablet   Yes No   Sig: Take 5 mg by mouth daily at bedtime   amLODIPine (NORVASC) 10 mg tablet   No No   Sig: TAKE 1 TABLET (10 MG TOTAL) BY MOUTH DAILY   clonazePAM (KlonoPIN) 0 5 mg tablet  Family Member No No   Sig: Take 1 tablet (0 5 mg total) by mouth daily at bedtime   clonazePAM (KlonoPIN) 0 5 mg tablet   No No   Sig: Take 1 tablet (0 5 mg total) by mouth daily at bedtime for 5 days   losartan (COZAAR) 100 MG tablet   No No   Sig: TAKE 1 TABLET (100 MG TOTAL) BY MOUTH DAILY   metoprolol succinate (TOPROL-XL) 100 mg 24 hr tablet   No No   Sig: TAKE 1 TABLET (100 MG TOTAL) BY MOUTH DAILY   multivitamin-iron-minerals-folic acid (CENTRUM) chewable tablet  Family Member Yes No   Sig: Chew 1 tablet daily   polyethylene glycol (GLYCOLAX) powder   No No   Sig: Take 17 g by mouth daily   senna-docusate sodium (SENOKOT-S) 8 6-50 mg per tablet   No No   Sig: Take 1 tablet by mouth daily      Facility-Administered Medications: None       Past Medical History:   Diagnosis Date    Anxiety     Cognitive impairment     D[facility]     Depression     Hallucination     Hyperlipidemia     Hypertension     Memory loss     Psychiatric illness     Psychosis [facility]     Schizoaffective disorder ([facility]     Sleep difficulties        Past Surgical History:   Procedure Laterality Date    APPENDECTOMY      BLADDER SURGERY      CHOLECYSTECTOMY      TOTAL ABDOMINAL HYSTERECTOMY W/ BILATERAL SALPINGOOPHORECTOMY      TOTAL KNEE ARTHROPLASTY Left        Family History   Problem Relation Age of Onset    Heart disease Mother         Cardiac disorder    Parkinsonism Father     Heart disease Sister         Cardiac disorder    Hypertension Sister     Diabetes Child         Diabetes Mellitus    Lung disease Child         Respiratory Disorder    Diabetes Son         Diabetes Mellitus     I have reviewed and agree with the history as documented  E-Cigarette/Vaping     E-Cigarette/Vaping Substances     Social History     Tobacco Use    Smoking status: Never Smoker    Smokeless tobacco: Never Used    Tobacco comment: Former smoker per [employer]   Substance Use Topics    Alcohol use: No    Drug use: No       Review of Systems   Unable to perform ROS: Mental status change       Physical Exam  Physical Exam  General: awake, alert, no acute distress  Pt not verbalizing any complaints  Head: normocephalic, ecchymosis over left periorbital region and cheek  Eyes: no scleral icterus  Ears: external ears normal, hearing grossly intact  Nose: external exam grossly normal, negative nasal discharge  Neck: symmetric, No JVD noted, trachea midline  No midline cervical spine tenderness  Pulmonary: no respiratory distress, no tachypnea noted  Cardiovascular: appears well perfused  Abdomen:  Abdomen soft, no distention noted, nontender  Musculoskeletal:  Multiple areas of ecchymosis over the bilateral upper and lower extremities in various stages of healing  Extremities are nontender with full passive range of motion  No midline spinal tenderness    No deformities noted, tone normal  Neuro: pt voluntarily moving bilateral upper and lower extremities equally and purposefully  Psych:  Unable to assess secondary to patient being nonverbal      Vital Signs  ED Triage Vitals [04/10/20 1658]   Temperature Pulse Respirations Blood Pressure SpO2   98 5 °F (36 9 °C) 80 18 150/68 99 %      Temp Source Heart Rate Source Patient Position - Orthostatic VS BP Location FiO2 (%)   Oral Monitor Lying Right arm --      Pain Score       --           Vitals:    04/10/20 1658 04/10/20 1930 04/10/20 2145   BP: 150/68 150/65 157/66   Pulse: 80 84 72   Patient Position - Orthostatic VS: Lying Lying          Visual Acuity      ED Medications  Medications   amLODIPine (NORVASC) tablet 10 mg (has no administration in time range)   clonazePAM (KlonoPIN) tablet 0 5 mg (has no administration in time range)   DULoxetine (CYMBALTA) delayed release capsule 30 mg (has no administration in time range)   metoprolol succinate (TOPROL-XL) 24 hr tablet 100 mg (has no administration in time range)   OLANZapine (ZyPREXA) tablet 5 mg (has no administration in time range)   sodium chloride 0 9 % infusion (has no administration in time range)   senna (SENOKOT) tablet 8 6 mg (has no administration in time range)   aluminum-magnesium hydroxide-simethicone (MYLANTA) 200-200-20 mg/5 mL oral suspension 30 mL (has no administration in time range)   heparin (porcine) subcutaneous injection 5,000 Units (has no administration in time range)   acetaminophen (TYLENOL) tablet 650 mg (has no administration in time range)   sodium chloride 0 9 % bolus 1,000 mL (1,000 mL Intravenous New Bag 4/10/20 1839)       Diagnostic Studies  Results Reviewed     Procedure Component Value Units Date/Time    Platelet count [[id number]]     Lab Status:  No result Specimen:  Blood     Novel Coronavirus [facility] [[id number]] Collected:  04/10/20 1916    Lab Status:   In process Specimen:  Nasopharyngeal Swab Updated:  04/10/20 1932    Urine Microscopic [[id number]]  (Abnormal) Collected:  04/10/20 1852    Lab Status:  Final result Specimen:  Urine, Other Updated:  04/10/20 1930     RBC, UA 0-1 /hpf      WBC, UA 0-1 /hpf      Epithelial Cells Moderate /hpf      Bacteria, UA Occasional /hpf     POCT urinalysis dipstick [[id number]]  (Normal) Resulted:  04/10/20 1855    Lab Status:  Final result Specimen:  Urine Updated:  04/10/20 1855     Color, UA Jaleesa    Urine Macroscopic, POC [[id number]]  (Abnormal) Collected:  04/10/20 1852    Lab Status:  Final result Specimen:  Urine Updated:  04/10/20 1853     Color, UA Yellow     Clarity, UA Clear     pH, UA 7 0     Leukocytes, UA Trace     Nitrite, UA Negative     Protein, UA Negative mg/dl      Glucose, UA Negative mg/dl      Ketones, UA Negative mg/dl      Urobilinogen, UA 0 2 E U /dl      Bilirubin, UA Negative     Blood, UA Trace     Specific [place], UA 1 015    Narrative:       CLINITEK RESULT    NT-BNP PRO [[id number]]  (Abnormal) Collected:  04/10/20 1734    Lab Status:  Final result Specimen:  Blood from Arm, Left Updated:  04/10/20 1829     NT-proBNP 882 pg/mL     Troponin I [[id number]]  (Normal) Collected:  04/10/20 1734    Lab Status:  Final result Specimen:  Blood from Arm, Left Updated:  04/10/20 1822     Troponin I <0 02 ng/mL     Comprehensive metabolic panel [[id number]]  (Abnormal) Collected:  04/10/20 1734    Lab Status:  Final result Specimen:  Blood from Arm, Left Updated:  04/10/20 1822     Sodium 132 mmol/L      Potassium 4 0 mmol/L      Chloride 94 mmol/L      CO2 23 mmol/L      ANION GAP 15 mmol/L      BUN 33 mg/dL      Creatinine 1 44 mg/dL      Glucose 121 mg/dL      Calcium 9 9 mg/dL      AST 36 U/L      ALT 15 U/L      Alkaline Phosphatase 90 U/L      Total Protein 7 8 g/dL      Albumin 4 6 g/dL      Total Bilirubin 1 48 mg/dL      eGFR 32 ml/min/1 73sq m     Narrative:       [place] guidelines for Chronic Kidney Disease (CKD):     Stage 1 with normal or high GFR (GFR > 90 mL/min/1 73 square meters)    Stage 2 Mild CKD (GFR = 60-89 mL/min/1 73 square meters)    Stage 3A Moderate CKD (GFR = 45-59 mL/min/1 73 square meters)    Stage 3B Moderate CKD (GFR = 30-44 mL/min/1 73 square meters)    Stage 4 Severe CKD (GFR = 15-29 mL/min/1 73 square meters)    Stage 5 End Stage CKD (GFR <15 mL/min/1 73 square meters)  Note: GFR calculation is accurate only with a steady state creatinine    Protime-INR [[id number]]  (Normal) Collected:  04/10/20 1734    Lab Status:  Final result Specimen:  Blood from Arm, Left Updated:  04/10/20 1811     Protime 13 1 seconds      INR 0 98    APTT [[id number]]  (Normal) Collected:  04/10/20 1734    Lab Status:  Final result Specimen:  Blood from Arm, Left Updated:  04/10/20 1811     PTT 30 seconds     CBC and differential [[id number]]  (Abnormal) Collected:  04/10/20 1734    Lab Status:  Final result Specimen:  Blood from Arm, Left Updated:  04/10/20 1750     WBC 10 43 Thousand/uL      RBC 4 53 Million/uL      Hemoglobin 13 4 g/dL      Hematocrit 39 0 %      MCV 86 fL      MCH 29 6 pg      MCHC 34 4 g/dL      RDW 13 0 %      MPV 9 2 fL      Platelets 222 Thousands/uL      nRBC 0 /100 WBCs      Neutrophils Relative 84 %      Immat GRANS % 1 %      Lymphocytes Relative 9 %      Monocytes Relative 6 %      Eosinophils Relative 0 %      Basophils Relative 0 %      Neutrophils Absolute 8 76 Thousands/µL      Immature Grans Absolute 0 05 Thousand/uL      Lymphocytes Absolute 0 94 Thousands/µL      Monocytes Absolute 0 63 Thousand/µL      Eosinophils Absolute 0 03 Thousand/µL      Basophils Absolute 0 02 Thousands/µL                  CT lumbar spine without contrast   Final Result by Nathalia Hinton MD (04/10 1848)      No acute fracture  Severe scoliosis  Workstation performed: [id number]         CT head without contrast   Final Result by Nathalia Hinton MD (04/10 1837)      No acute intracranial abnormality  Workstation performed: [id number]         CT cervical spine without contrast   Final Result by Nathalia Hinton MD (04/10 1842)      No cervical spine fracture or traumatic malalignment  Workstation performed: [id number]         XR chest 1 view portable   ED Interpretation by [id number] Keith Naik DO (04/10 1725)   MARLEN opacity  Cardiomegaly  Severe scoliosis      Final Result by Nathalia Hinton MD (04/10 1812)      No acute cardiopulmonary disease              Workstation performed: [id number]                    Procedures  Procedures   ECG 12 Lead Documentation  Date/Time: today/date: 4/10/2020  Performed by: Anton     ECG reviewed by me, the ED Provider: yes    Patient location:  ED   Previous ECG:  Compared to current, no change   Rate:  78  ECG rate assessment: normal    Rhythm: sinus rhythm    Ectopy:  none    QRS axis:  Normal  Intervals: normal   Q waves: None   ST segments:  Normal  T waves: normal      Impression: Normal EKG          ED Course  ED Course as of Apr 10 2221   Fri Apr 10, 2020   1828 Slightly worse than baseline  Given elevated BUN, suspect OMEGA  Will give IV fluids   Creatinine(!): 1 44   1853 Imaging negative for acute injury      1854 Remainder of labs within normal limits      1904 Spoke with Jarret Melton, pt's son  He reports that patient lives alone and has visiting care givers that are with her during the day  Pt's primary care giver tested positive for COVID and her replacement is now experiencing symptoms  [facility] is now unable to provide any additional care givers  Pt's son was recently just discharged from the hospital with COVID, and therefore unable to safely take care of her mother  Son reports that patient has fallen several times in the past few days, and has been complaining of lack of smell/taste, low back pain and cold-like symptoms  Pt does have dementia, however is able to hold a regular conversation  Pt is hard of hearing and [place] speaking  Given lack of appropriate care and that pt is unsafe to return home alone, will proceed with admission                                            MDM      Disposition  Final diagnoses:   OMEGA (acute kidney injury) ([facility] )   Suspected Covid-19 Virus Infection   Injury of head, initial encounter   Low back pain   Frequent falls     Time reflects when diagnosis was documented in both MDM as applicable and the Disposition within this note     Time User Action Codes Description Comment    4/10/2020  7:37 PM Jeanette Guo Add [N17 9] OMEGA (acute kidney injury) ([facility] )     4/10/2020  7:37 PM Theodore Bender [R68 89] Suspected Covid-19 Virus Infection     4/10/2020  7:37 PM Theodore Bender [X31 [id number]] Injury of head, initial encounter     4/10/2020  7:37 PM Maria L Guo [address] [M54 5] Low back pain     4/10/2020  7:38 PM Jnona Guo Add [R29 6] Frequent falls       ED Disposition     ED Disposition Condition Date/Time Comment    Admit Stable Fri Apr 10, 2020  7:37 PM Case was discussed with ESTER and the patient's admission status was agreed to be Admission Status: inpatient status to the service of Dr Nils Hernandez   Follow-up Information    None         Patient's Medications   Discharge Prescriptions    No medications on file     No discharge procedures on file      PDMP Review     None          ED Provider  Electronically Signed by           Antonio Villatoro DO  04/10/20 2220

## 2020-04-11 LAB
ALBUMIN SERPL BCP-MCNC: 4.5 G/DL (ref 3.5–5)
ALP SERPL-CCNC: 94 U/L (ref 46–116)
ALT SERPL W P-5'-P-CCNC: 31 U/L (ref 12–78)
ANION GAP SERPL CALCULATED.3IONS-SCNC: 14 MMOL/L (ref 4–13)
AST SERPL W P-5'-P-CCNC: 77 U/L (ref 5–45)
ATRIAL RATE: 78 BPM
BASOPHILS # BLD AUTO: 0 THOUSANDS/ΜL (ref 0–0.1)
BASOPHILS NFR BLD AUTO: 0 % (ref 0–1)
BILIRUB SERPL-MCNC: 1.34 MG/DL (ref 0.2–1)
BUN SERPL-MCNC: 21 MG/DL (ref 5–25)
CALCIUM SERPL-MCNC: 9.3 MG/DL (ref 8.3–10.1)
CHLORIDE SERPL-SCNC: 98 MMOL/L (ref 100–108)
CO2 SERPL-SCNC: 23 MMOL/L (ref 21–32)
CREAT SERPL-MCNC: 1.15 MG/DL (ref 0.6–1.3)
CREAT UR-MCNC: 69.4 MG/DL
EOSINOPHIL # BLD AUTO: 0.01 THOUSAND/ΜL (ref 0–0.61)
EOSINOPHIL NFR BLD AUTO: 0 % (ref 0–6)
ERYTHROCYTE [DISTWIDTH] IN BLOOD BY AUTOMATED COUNT: 13.1 % (ref 11.6–15.1)
FOLATE SERPL-MCNC: >20 NG/ML (ref 3.1–17.5)
GFR SERPL CREATININE-BSD FRML MDRD: 43 ML/MIN/1.73SQ M
GLUCOSE SERPL-MCNC: 101 MG/DL (ref 65–140)
HCT VFR BLD AUTO: 41.1 % (ref 34.8–46.1)
HGB BLD-MCNC: 13.7 G/DL (ref 11.5–15.4)
IMM GRANULOCYTES # BLD AUTO: 0.02 THOUSAND/UL (ref 0–0.2)
IMM GRANULOCYTES NFR BLD AUTO: 0 % (ref 0–2)
LYMPHOCYTES # BLD AUTO: 0.65 THOUSANDS/ΜL (ref 0.6–4.47)
LYMPHOCYTES NFR BLD AUTO: 7 % (ref 14–44)
MCH RBC QN AUTO: 29.2 PG (ref 26.8–34.3)
MCHC RBC AUTO-ENTMCNC: 33.3 G/DL (ref 31.4–37.4)
MCV RBC AUTO: 88 FL (ref 82–98)
MONOCYTES # BLD AUTO: 0.47 THOUSAND/ΜL (ref 0.17–1.22)
MONOCYTES NFR BLD AUTO: 5 % (ref 4–12)
NEUTROPHILS # BLD AUTO: 8.59 THOUSANDS/ΜL (ref 1.85–7.62)
NEUTS SEG NFR BLD AUTO: 88 % (ref 43–75)
NRBC BLD AUTO-RTO: 0 /100 WBCS
OSMOLALITY UR: 344 MMOL/KG
P AXIS: 46 DEGREES
PLATELET # BLD AUTO: 264 THOUSANDS/UL (ref 149–390)
PMV BLD AUTO: 9.9 FL (ref 8.9–12.7)
POTASSIUM SERPL-SCNC: 3.3 MMOL/L (ref 3.5–5.3)
PR INTERVAL: 160 MS
PROCALCITONIN SERPL-MCNC: 0.07 NG/ML
PROT SERPL-MCNC: 8 G/DL (ref 6.4–8.2)
QRS AXIS: 53 DEGREES
QRSD INTERVAL: 74 MS
QT INTERVAL: 366 MS
QTC INTERVAL: 417 MS
RBC # BLD AUTO: 4.69 MILLION/UL (ref 3.81–5.12)
SODIUM 24H UR-SCNC: 40 MOL/L
SODIUM SERPL-SCNC: 135 MMOL/L (ref 136–145)
T WAVE AXIS: 23 DEGREES
UUN 24H UR-MCNC: 483 MG/DL
VENTRICULAR RATE: 78 BPM
VIT B12 SERPL-MCNC: 508 PG/ML (ref 100–900)
WBC # BLD AUTO: 9.74 THOUSAND/UL (ref 4.31–10.16)

## 2020-04-11 PROCEDURE — 82607 VITAMIN B-12: CPT | Performed by: INTERNAL MEDICINE

## 2020-04-11 PROCEDURE — 93010 ELECTROCARDIOGRAM REPORT: CPT | Performed by: INTERNAL MEDICINE

## 2020-04-11 PROCEDURE — 99232 SBSQ HOSP IP/OBS MODERATE 35: CPT | Performed by: STUDENT IN AN ORGANIZED HEALTH CARE EDUCATION/TRAINING PROGRAM

## 2020-04-11 PROCEDURE — 84145 PROCALCITONIN (PCT): CPT | Performed by: INTERNAL MEDICINE

## 2020-04-11 PROCEDURE — 80053 COMPREHEN METABOLIC PANEL: CPT | Performed by: INTERNAL MEDICINE

## 2020-04-11 PROCEDURE — 82652 VIT D 1 25-DIHYDROXY: CPT | Performed by: INTERNAL MEDICINE

## 2020-04-11 PROCEDURE — 93005 ELECTROCARDIOGRAM TRACING: CPT

## 2020-04-11 PROCEDURE — 83520 IMMUNOASSAY QUANT NOS NONAB: CPT | Performed by: INTERNAL MEDICINE

## 2020-04-11 PROCEDURE — 85025 COMPLETE CBC W/AUTO DIFF WBC: CPT | Performed by: INTERNAL MEDICINE

## 2020-04-11 PROCEDURE — 82746 ASSAY OF FOLIC ACID SERUM: CPT | Performed by: INTERNAL MEDICINE

## 2020-04-11 PROCEDURE — 84425 ASSAY OF VITAMIN B-1: CPT | Performed by: INTERNAL MEDICINE

## 2020-04-11 RX ADMIN — OLANZAPINE 5 MG: 5 TABLET, FILM COATED ORAL at 21:35

## 2020-04-11 RX ADMIN — STANDARDIZED SENNA CONCENTRATE 8.6 MG: 8.6 TABLET ORAL at 10:02

## 2020-04-11 RX ADMIN — HEPARIN SODIUM 5000 UNITS: 5000 INJECTION INTRAVENOUS; SUBCUTANEOUS at 05:31

## 2020-04-11 RX ADMIN — HEPARIN SODIUM 5000 UNITS: 5000 INJECTION INTRAVENOUS; SUBCUTANEOUS at 14:53

## 2020-04-11 RX ADMIN — DULOXETINE HYDROCHLORIDE 30 MG: 30 CAPSULE, DELAYED RELEASE ORAL at 10:02

## 2020-04-11 RX ADMIN — AMLODIPINE BESYLATE 10 MG: 10 TABLET ORAL at 10:02

## 2020-04-11 RX ADMIN — CLONAZEPAM 0.5 MG: 0.5 TABLET ORAL at 21:36

## 2020-04-11 RX ADMIN — HEPARIN SODIUM 5000 UNITS: 5000 INJECTION INTRAVENOUS; SUBCUTANEOUS at 21:35

## 2020-04-11 RX ADMIN — SODIUM CHLORIDE 50 ML/HR: 0.9 INJECTION, SOLUTION INTRAVENOUS at 01:20

## 2020-04-11 RX ADMIN — SODIUM CHLORIDE, SODIUM LACTATE, POTASSIUM CHLORIDE, AND CALCIUM CHLORIDE 500 ML: .6; .31; .03; .02 INJECTION, SOLUTION INTRAVENOUS at 23:57

## 2020-04-11 RX ADMIN — METOPROLOL SUCCINATE 100 MG: 50 TABLET, EXTENDED RELEASE ORAL at 10:02

## 2020-04-11 NOTE — ASSESSMENT & PLAN NOTE
Patient noted to have multiple bruises over entire body   Son notes patient frequently gets out of bed at night and may have sustained falls  Patient with care throughout the day, but alone at night  Fall precautions   Will likely benefit from PT/OT eval once COVID testing resulted

## 2020-04-11 NOTE — ASSESSMENT & PLAN NOTE
Creatinine 1 44 POA, baseline 1-1 1  Possibly due to prerenal etiology  Hold losartan  Recheck BMP in a m    Gentle IV fluid hydration

## 2020-04-11 NOTE — ASSESSMENT & PLAN NOTE
Patient present falls over the last several days with evidence of scattered ecchymoses on face and extremities  She is currently a poor historian, very hard of hearing, unable to answer many questions   Called son who reports patient was being cared for by home health caretaker who tested positive for COVID, son also discharged today following week long stay for COVID, patient unable to care for herself alone at home   Son reports he believes patient may have been experiencing some "cold symptoms" and did note lack of taste, but denies fever or shortness of breath  CT head and C-spine-  negative  CT lumbar spine- negative for fracture but show small posterior disc bulge L4-L5, L5-S1  Once COVID ruled out, would appreciate PT/OT evaluations  Check orthostatic vital signs - she does have hx of this   IV fluid hydration

## 2020-04-11 NOTE — UTILIZATION REVIEW
Initial Clinical Review    Admission: Date/Time/Statement: Admission Orders (From admission, onward)     Ordered        04/10/20 1938  Inpatient Admission  Once                   Orders Placed This Encounter   Procedures    Inpatient Admission     Standing Status:   Standing     Number of Occurrences:   1     Order Specific Question:   Admitting Physician     Answer:   Richrd Kawasaki [[id number]]     Order Specific Question:   Level of Care     Answer:   Med Surg [16]     Order Specific Question:   Estimated length of stay     Answer:   More than 2 Midnights     Order Specific Question:   Certification     Answer:   I certify that inpatient services are medically necessary for this patient for a duration of greater than two midnights  See H&P and MD Progress Notes for additional information about the patient's course of treatment  ED Arrival Information     Expected Arrival Acuity Means of Arrival Escorted By Service Admission Type    - 4/10/2020 16:46 Urgent Ambulance [facility] ([address]) General Medicine Urgent    Arrival Complaint    Fever        Chief Complaint   Patient presents with   [facility] Medical Problem     per ems patient has had increasing falls  patient with bruising on face  patient has been around covid + caretakers  patient unable to communicate with ED staff or video  from [facility]  family stated to medics that patient has stated lost sense of taste and also c/o low back pain  Assessment/Plan: 79 yo female w/hx cognitive impairment, hard of hearing, schizophrenia, a fib, htn to ED from home by EMS admitted as inpatient due to Deb 19, OMEGA, acute encephalopathy, and ambulatory dysfunction  Presented after multiple falls, low back pain, and loss of taste  Lives with son who is hospitalized due to [place], and caregiver who was exposed  Neither is able to care for her  Tele visit with PCP on 4/8 due to anxiety after son admitted    Exam reveals ability to follow commands, no verbal responses, multiple body bruises  Spoke w/son, he noted pt has cold like sx and voice change  Imaging negative, EKG nsr  IVF, COVID testing in progress with contact & airborne isolation  Holding losartan  4/11: confused & agitated, COVID testing came back positive  Lung sounds are normal  Afebrile  Requires PT/OT eval once medical condition has stabilized  Anticipating difficulty disposition due to living in an apartment with both caregivers currently sick       ED Triage Vitals [04/10/20 1658]   Temperature Pulse Respirations Blood Pressure SpO2   98 5 °F (36 9 °C) 80 18 150/68 99 %      Temp Source Heart Rate Source Patient Position - Orthostatic VS BP Location FiO2 (%)   Oral Monitor Lying Right arm --      Pain Score       --        Wt Readings from Last 1 Encounters:   04/10/20 55 kg (121 lb 4 1 oz)     Additional Vital Signs:   Date/Time  Temp  Pulse  Resp  BP  MAP (mmHg)  SpO2  O2 Device  Patient Position - Orthostatic VS   04/11/20 10:27:52  98 °F (36 7 °C)  84  17  149/98  115  99 %       04/11/20 00:48:30  98 8 °F (37 1 °C)  84  22  154/77  103  99 %  None (Room air)     04/11/20 00:03:21        127/105Abnormal   112         04/10/20 2145    72  18  157/66  95  98 %  None (Room air)     04/10/20 1930    84  18  150/65  94  98 %  None (Room air)  Lying       Pertinent Labs/Diagnostic Test Results:   4/10 PCXR: nothing acute  4/10 CT head: nothing acute  4/10 CT c spine: no fx  4/10 CT L spine: no fx, severe scoliosis  4/10 EKG: Sinus rhythm with Premature atrial complexes;   QT Interval ms 366    QTC Interval ms 417        Results from last 7 days   Lab Units 04/10/20   Last Resulted: 04/10/20 23:36  1916   SARS-COV-2  Positive*     Pending tests:  04/12/20 0600  CBC and differential Morning draw        04/12/20 6097  Basic metabolic panel Morning draw        04/11/20 1110  Creatinine, urine, random Once        04/11/20 1110  Sodium, urine, random Once        04/11/20 1110  Urea nitrogen, urine Once        04/11/20 1110  Osmolality, urine Once        04/11/20 0659  Interleukin-6,Serum Once        04/11/20 0658  Folate Once        04/11/20 0658  Procalcitonin Once        04/11/20 0657  Vitamin B12 Once        04/11/20 0656  Vitamin D 1,25 dihydroxy Once        04/11/20 0656  Vitamin B1, whole blood Once        04/10/20 2219  Platelet count (IP MEDS HEPARIN/CBC DVT/VTE PANEL) Once     "And" Linked Group Details        Results from last 7 days   Lab Units 04/11/20  1044 04/10/20  1734   WBC Thousand/uL 9 74 10 43*   HEMOGLOBIN g/dL 13 7 13 4   HEMATOCRIT % 41 1 39 0   PLATELETS Thousands/uL 264 249   NEUTROS ABS Thousands/µL 8 59* 8 76*         Results from last 7 days   Lab Units 04/11/20  1044 04/10/20  1734   SODIUM mmol/L 135* 132*   POTASSIUM mmol/L 3 3* 4 0   CHLORIDE mmol/L 98* 94*   CO2 mmol/L 23 23   ANION GAP mmol/L 14* 15*   BUN mg/dL 21 33*   CREATININE mg/dL 1 15 1 44*   EGFR ml/min/1 73sq m 43 32   CALCIUM mg/dL 9 3 9 9     Results from last 7 days   Lab Units 04/11/20  1044 04/10/20  1734   AST U/L 77* 36   ALT U/L 31 15   ALK PHOS U/L 94 90   TOTAL PROTEIN g/dL 8 0 7 8   ALBUMIN g/dL 4 5 4 6   TOTAL BILIRUBIN mg/dL 1 34* 1 48*         Results from last 7 days   Lab Units 04/11/20  1044 04/10/20  1734   GLUCOSE RANDOM mg/dL 101 121       Results from last 7 days   Lab Units 04/10/20  1734   TROPONIN I ng/mL <0 02         Results from last 7 days   Lab Units 04/10/20  1734   PROTIME seconds 13 1   INR  0 98   PTT seconds 30     Results from last 7 days   Lab Units 04/10/20  1734   NT-PRO BNP pg/mL 882*       Results from last 7 days   Lab Units 04/10/20  1855 04/10/20  1852   CLARITY UA   --  Clear   COLOR UA  Jaleesa Yellow   SPEC GRAV UA   --  1 015   PH UA   --  7 0   GLUCOSE UA mg/dl  --  Negative   KETONES UA mg/dl  --  Negative   BLOOD UA   --  Trace*   PROTEIN UA mg/dl  --  Negative   NITRITE UA   --  Negative   BILIRUBIN UA   --  Negative   UROBILINOGEN UA E U /dl  --  0 2 LEUKOCYTES UA   --  Trace*   WBC UA /hpf  --  0-1*   RBC UA /hpf  --  0-1*   BACTERIA UA /hpf  --  Occasional   EPITHELIAL CELLS WET PREP /hpf  --  Moderate*     ED Treatment:   Medication Administration from 04/10/2020 1645 to 04/10/2020 2225       Date/Time Order Dose Route Action     04/10/2020 1839 sodium chloride 0 9 % bolus 1,000 mL 1,000 mL Intravenous New Bag        Past Medical History:   Diagnosis Date    Anxiety     Cognitive impairment     Dementia ([facility] )     Depression     Hallucination     Hyperlipidemia     Hypertension     Memory loss     Psychiatric illness     Psychosis ([facility] )     Schizoaffective disorder ([place] [address] )     Sleep difficulties      Present on Admission:   Acute encephalopathy   Anxiety   Atrial fibrillation ([facility] )   Fall   Hyperlipidemia   Essential hypertension   Hyponatremia   Orthostatic hypertension   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HCC)      Admitting Diagnosis: Low back pain [M54 5]  Fever [R50 9]  OMEGA (acute kidney injury) ([facility] ) [N17 9]  Frequent falls [R29 6]  Injury of head, initial encounter [S09 90XA]  Suspected Covid-19 Virus Infection [R68 89]  Age/Sex: 80 y o  female  Admission Orders:  Scheduled Medications:  Medications:  amLODIPine 10 mg Oral Daily   clonazePAM 0 5 mg Oral HS   DULoxetine 30 mg Oral Daily   heparin (porcine) 5,000 Units Subcutaneous Q8H [facility]   metoprolol succinate 100 mg Oral Daily   OLANZapine 5 mg Oral HS   senna 1 tablet Oral Daily   Continuous IV Infusions:  sodium chloride 50 mL/hr Intravenous Continuous     PRN Meds:  acetaminophen 650 mg Oral Q6H PRN   aluminum-magnesium hydroxide-simethicone 30 mL Oral Q6H PRN       Restraints to avoid self injury  SCD's      Network Utilization Review Department  [email or web address]  ATTENTION: Please call with any questions or concerns to [phone] and carefully listen to the prompts so that you are directed to the right person   All voicemails are confidential   Mata linton requests for admission clinical reviews, approved or denied determinations and any other requests to dedicated fax number below belonging to the campus where the patient is receiving treatment   List of dedicated fax numbers for the Facilities:  [address] [place] [address] DENIALS (Administrative/Medical Necessity) [phone]   [address]  (Maternity/NICU/Pediatrics) [phone]   [facility] [phone]   [facility] [phone]   [facility] [phone]   [facility] [phone]   Formerly named [facility] [address] [phone]   [facility]  [phone]   [address] [place] [address], [facility]  [address] [facility] [address] [facility] [phone]

## 2020-04-11 NOTE — PROGRESS NOTES
Progress Note - Colon Hives 11/20/1931, 80 y o  female MRN: [id number]    Unit/Bed#: [place] [address] -01 Encounter: [id number]    Primary Care Provider: Lorrain Scheuermann, MD   Date and time admitted to hospital: 4/10/2020  4:46 PM        * Ambulatory dysfunction  Assessment & Plan  80-year-old female presenting with multiple falls  Poor historian  Spoke to her son today who expressed concerns that her caregivers including him are all corona virus positive  COVID positive  - Once medical issues resolve  PT/OT will be requested  Might be a difficult dispo  She lives in an apartment and her two caregivers are currently sick (one has confirmed, other is exposed)  Acute kidney injury [facility])  Assessment & Plan  Posssibly prerenal  Gentle IV hydration  Continue monitoring  Recent Labs     04/10/20  1734   BUN 33*   CREATININE 1 44*       Hyponatremia  Assessment & Plan  Continue monitoring    Recent Labs     04/10/20  1734   SODIUM 132*         Essential hypertension  Assessment & Plan  Continue anti-hypertensives    Hyperlipidemia  Assessment & Plan  Continue Statin    Atrial fibrillation (HCC)  Assessment & Plan  History of atrial fibrillation not on anticoagulation due to frequency of falls  - Continue rate control agents      VTE Pharmacologic Prophylaxis:   Pharmacologic: Heparin  Mechanical VTE Prophylaxis in Place: Yes    Patient Centered Rounds: I have performed bedside rounds with nursing staff today  Discussions with Specialists or Other Care Team Provider: Nursing    Education and Discussions with Family / Patient: Patient    Time Spent for Care: 30 minutes  More than 50% of total time spent on counseling and coordination of care as described above      Current Length of Stay: 1 day(s)    Current Patient Status: Inpatient   Certification Statement: The patient will continue to require additional inpatient hospital stay due to IV hydration, amber management, covid management, PT eval    Discharge Plan: Still active    Code Status: Level 1 - Full Code      Subjective:   Patient seen and examined at bedside  Confused  Objective:     Vitals:   Temp (24hrs), Av 4 °F (36 9 °C), Min:98 °F (36 7 °C), Max:98 8 °F (37 1 °C)    Temp:  [98 °F (36 7 °C)-98 8 °F (37 1 °C)] 98 °F (36 7 °C)  HR:  [72-84] 84  Resp:  [17-22] 17  BP: (127-157)/() 149/98  SpO2:  [98 %-99 %] 99 %  Body mass index is 22 91 kg/m²  Input and Output Summary (last 24 hours): Intake/Output Summary (Last 24 hours) at 2020 1113  Last data filed at 4/10/2020 2221  Gross per 24 hour   Intake 1000 ml   Output    Net 1000 ml       Physical Exam:     Physical Exam   Constitutional: No distress  HENT:   Head: Normocephalic and atraumatic  Eyes: Pupils are equal, round, and reactive to light  EOM are normal    Neck: Neck supple  Cardiovascular: Normal rate and regular rhythm  Pulmonary/Chest: Effort normal and breath sounds normal    Abdominal: Soft  Bowel sounds are normal    Neurological:   Awake alert confused   Skin: Skin is warm and dry  Psychiatric:   Agitated   Vitals reviewed  Additional Data:     Labs:    Results from last 7 days   Lab Units 20  1044   WBC Thousand/uL 9 74   HEMOGLOBIN g/dL 13 7   HEMATOCRIT % 41 1   PLATELETS Thousands/uL 264   NEUTROS PCT % 88*   LYMPHS PCT % 7*   MONOS PCT % 5   EOS PCT % 0     Results from last 7 days   Lab Units 04/10/20  1734   SODIUM mmol/L 132*   POTASSIUM mmol/L 4 0   CHLORIDE mmol/L 94*   CO2 mmol/L 23   BUN mg/dL 33*   CREATININE mg/dL 1 44*   ANION GAP mmol/L 15*   CALCIUM mg/dL 9 9   ALBUMIN g/dL 4 6   TOTAL BILIRUBIN mg/dL 1 48*   ALK PHOS U/L 90   ALT U/L 15   AST U/L 36   GLUCOSE RANDOM mg/dL 121     Results from last 7 days   Lab Units 04/10/20  1734   INR  0 98                       * I Have Reviewed All Lab Data Listed Above  * Additional Pertinent Lab Tests Reviewed:  Wolfgang [address] Admission Reviewed    Imaging:    Imaging Reports Reviewed Today Include: XR Chest    Recent Cultures (last 7 days):           Last 24 Hours Medication List:     Current Facility-Administered Medications:  acetaminophen 650 mg Oral Q6H PRN Sandro Zayas PA-C    aluminum-magnesium hydroxide-simethicone 30 mL Oral Q6H PRN Sandro Zayas, PA-[facility]    amLODIPine 10 mg Oral Daily Sandro Zayas, PA-[facility]    clonazePAM 0 5 mg Oral HS Sandro Zayas PA-C    DULoxetine 30 mg Oral Daily Sandro Zayas PA-C    heparin (porcine) 5,000 Units Subcutaneous Q8H [address] Sandro Zayas PA-C    metoprolol succinate 100 mg Oral Daily Sandro Zayas, SANTOS    OLANZapine 5 mg Oral HS Sandro Zayas PA-C    senna 1 tablet Oral Daily Sandro Zayas, PA-[facility]    sodium chloride 50 mL/hr Intravenous Continuous Sandro Zayas PA-C Last Rate: 50 mL/hr (04/11/20 0120)        Today, Patient Was Seen By: Jocelyn Cavanaugh MD    ** Please Note: Dictation voice to text software may have been used in the creation of this document   **

## 2020-04-11 NOTE — ASSESSMENT & PLAN NOTE
History of atrial fibrillation not on anticoagulation due to frequency of falls  - Continue rate control agents

## 2020-04-11 NOTE — NURSING NOTE
On admission patient confused,combative,impulsive,difficult to  Redirect  Paged Cookie MITCHELL  Order  Obtained for 4 point soft restraint  Will continue to monitor

## 2020-04-11 NOTE — ASSESSMENT & PLAN NOTE
Posssibly prerenal  Gentle IV hydration  Continue monitoring      Recent Labs     04/10/20  1734   BUN 33*   CREATININE 1 44*

## 2020-04-11 NOTE — ASSESSMENT & PLAN NOTE
51-year-old female presenting with multiple falls  Poor historian  Spoke to her son today who expressed concerns that her caregivers including him are all corona virus positive  COVID positive  - Once medical issues resolve  PT/OT will be requested  Might be a difficult dispo  She lives in an apartment and her two caregivers are currently sick (one has confirmed, other is exposed)

## 2020-04-11 NOTE — ASSESSMENT & PLAN NOTE
Patient presents with multiple falls, and altered mental status   Son reports patient is typically alert and oriented at baseline, lives alone with 18 hour care from home health aides, patient does have history of dementia, depression and schizoaffective disorder   Would continue psychotropic medications at this time  Monitor mental status closely  Due to multiple sick contacts positive for COVID-19 including her caregiver and her son, COVID-19 PCR obtained in ER  She is afebrile, leukocytosis is mild, chest x-ray is normal   If positive will initiate treatment and further workup    Contact precautions  IV fluid hydration

## 2020-04-11 NOTE — ASSESSMENT & PLAN NOTE
History of atrial fibrillation not on anticoagulation due to frequency of falls  Continue Toprol- mg daily

## 2020-04-11 NOTE — H&P
[facility]  H&P- La Nena Mcconnell 11/20/1931, 80 y o  female MRN: [id number]  Unit/Bed#: ED 23 Encounter: [id number] DOS: 4/10/2020  Primary Care Provider: Stuart Najera MD   Date and time admitted to hospital: 4/10/2020  4:46 PM    * Ambulatory dysfunction  Assessment & Plan  Patient present falls over the last several days with evidence of scattered ecchymoses on face and extremities  She is currently a poor historian and unable to get in touch with patient's family for further information  CT head and C-spine-  negative  CT lumbar spine- negative for fracture but show small posterior disc bulge L4-L5, L5-S1  Once COVID ruled out, would appreciate PT/OT evaluations  Check orthostatic vital signs - she does have hx of this   IV fluid hydration    Acute encephalopathy  Assessment & Plan  Patient presents with multiple falls, and altered mental status   Unknown baseline mental status, although patient does have history of dementia, depression and schizoaffective disorder   Would continue psychotropic medications at this time  Monitor mental status closely, consider psych c/s if no improvement   Due to multiple sick contacts positive for COVID-19 including her caregiver and her son, COVID-19 PCR obtained in ER  She is afebrile, leukocytosis is mild, chest x-ray is normal and per chart review she has not had symptoms to suggest viral infection  If positive will initiate treatment and further workup  Contact precautions  IV fluid hydration    Acute kidney injury ([facility] [address] )  Assessment & Plan  Creatinine 1 44 POA, baseline 1-1 1  Possibly due to prerenal etiology  Hold losartan  Recheck BMP in a m  Gentle IV fluid hydration    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HCC)  Assessment & Plan  History of psychosis and follow psychiatrist  Continue Cymbalta 30 mg daily, Zyprexa 5 mg q h s       Hypertension  Assessment & Plan  Continue Norvasc 10 mg daily, Toprol- mg daily  Cozaar 100 mg daily on hold due to OMEGA    Atrial fibrillation [facility])  Assessment & Plan  History of atrial fibrillation not on anticoagulation due to frequency of falls  Continue Toprol- mg daily    Hyponatremia  Assessment & Plan  Sodium 132, this appears chronic    Anxiety  Assessment & Plan  Continue Klonopin 0 5 mg q h s  P r n  VTE Prophylaxis: Heparin  / sequential compression device   Code Status: full code  POLST: POLST form is not discussed and not completed at this time  Discussion with family: discussed with Son Lyssa Latham over the phone    Anticipated Length of Stay:  Patient will be admitted on an Inpatient basis with an anticipated length of stay of  greater than 2 midnights  Justification for Hospital Stay:  Inability to care for herself at home due to sick contacts positive for COVID, a KI, altered mental status    Total Time for Visit, including Counseling / Coordination of Care: 45 minutes  Greater than 50% of this total time spent on direct patient counseling and coordination of care  Chief Complaint:  Multiple falls    History of Present Illness:    Kena Mo is a 80 y o  female with past medical history significant for cognitive impairment , anxiety, depression, psychosis/schizophrenia, atrial fibrillation, hyperlipidemia, hypertension who presents with increasing falls  Patient reportedly lives at home with caregivers however patient's caregiver as well as her son a positive for COVID and are currently unable to care for her  She presented complaining of loss sense of taste and lower back pain  She is difficult to obtain history from but per chart review, patient has not had a fever or cough, shortness of breath  Attempted to interview patient with use of , patient does not answer questions verbally but does follow commands  She was recently evaluated by her PCP 4/8 via telemedicine for routine follow-up and complained of increasing anxiety due to her son being ill and hospitalized   Per son, patient has been complaining of back pain likely from a fall as she is now home alone as company was unable to provide a new home health aide at this time  Patient does have multiple bruises over her body which he attributes to her falling  Patient notes he facetimed his mother today and believes she may be having some cold like symptoms reporting change in her voice, but denied coughing or shortness of breath  Does report she was complaining of decreased taste which is abnormal for her  Per son, patient is very hard of hearing and [place]ian speaking only  Review of Systems:    Review of Systems   Unable to perform ROS: Patient nonverbal       Past Medical and Surgical History:     Past Medical History:   Diagnosis Date    Anxiety     Cognitive impairment     Dementia ([facility] [address] )     Depression     Hallucination     Hyperlipidemia     Hypertension     Memory loss     Psychiatric illness     Psychosis ([facility] [address] )     Schizoaffective disorder ([facility] [address] )     Sleep difficulties        Past Surgical History:   Procedure Laterality Date    APPENDECTOMY      BLADDER SURGERY      CHOLECYSTECTOMY      TOTAL ABDOMINAL HYSTERECTOMY W/ BILATERAL SALPINGOOPHORECTOMY      TOTAL KNEE ARTHROPLASTY Left      Meds/Allergies:    Prior to Admission medications    Medication Sig Start Date End Date Taking?  Authorizing Provider   amLODIPine (NORVASC) 10 mg tablet TAKE 1 TABLET (10 MG TOTAL) BY MOUTH DAILY 2/4/20   Jose D Hernandez MD   clonazePAM (KlonoPIN) 0 5 mg tablet Take 1 tablet (0 5 mg total) by mouth daily at bedtime 3/20/19   Prince Fofana MD   clonazePAM (KlonoPIN) 0 5 mg tablet Take 1 tablet (0 5 mg total) by mouth daily at bedtime for 5 days 10/19/19 10/24/19  Jose D Hernandez MD   DULoxetine (CYMBALTA) 30 mg delayed release capsule Take 1 capsule (30 mg total) by mouth daily 10/18/18   Jose D Hernandez MD   losartan (COZAAR) 100 MG tablet TAKE 1 TABLET (100 MG TOTAL) BY MOUTH DAILY 12/12/19   Jose D Hernandez MD   metoprolol succinate (TOPROL-XL) 100 mg 24 hr tablet TAKE 1 TABLET (100 MG TOTAL) BY MOUTH DAILY 2/4/20   Wilfredo Escobar MD   multivitamin-iron-minerals-folic acid (CENTRUM) chewable tablet Chew 1 tablet daily    Historical Provider, MD   OLANZapine (ZyPREXA) 5 mg tablet Take 5 mg by mouth daily at bedtime    Historical Provider, MD   polyethylene glycol (GLYCOLAX) powder Take 17 g by mouth daily 2/4/20 3/5/20  Wilfredo Escobar MD   senna-docusate sodium (SENOKOT-S) 8 6-50 mg per tablet Take 1 tablet by mouth daily 2/4/20 3/5/20  Wilfredo Escobar MD     I have reviewed home medications with patient personally  Allergies: No Known Allergies    Social History:     Marital Status:    Occupation: unknown  Patient Pre-hospital Living Situation: home alone, has home health aides during daytime, alone at night  Patient Pre-hospital Level of Mobility: walker   Patient Pre-hospital Diet Restrictions: none   Substance Use History:   Social History     Substance and Sexual Activity   Alcohol Use No     Social History     Tobacco Use   Smoking Status Never Smoker   Smokeless Tobacco Never Used   Tobacco Comment    Former smoker per All[employer]     Social History     Substance and Sexual Activity   Drug Use No       Family History:    Family History   Problem Relation Age of Onset    Heart disease Mother         Cardiac disorder    Parkinsonism Father     Heart disease Sister         Cardiac disorder    Hypertension Sister     Diabetes Child         Diabetes Mellitus    Lung disease Child         Respiratory Disorder    Diabetes Son         Diabetes Mellitus       Physical Exam:     Vitals:   Blood Pressure: 150/65 (04/10/20 1930)  Pulse: 84 (04/10/20 1930)  Temperature: 98 5 °F (36 9 °C) (04/10/20 1658)  Temp Source: Oral (04/10/20 1658)  Respirations: 18 (04/10/20 1930)  Weight - Scale: 55 kg (121 lb 4 1 oz) (04/10/20 1658)  SpO2: 98 % (04/10/20 1930)    Physical Exam   Constitutional: No distress  HENT:   Head: Normocephalic and atraumatic     Eyes: Conjunctivae are normal  No scleral icterus  Neck: Neck supple  Cardiovascular: Normal rate and normal heart sounds  No murmur heard  Pulmonary/Chest: Effort normal  No respiratory distress  She has no wheezes  She has no rales  Abdominal: Soft  Bowel sounds are normal  She exhibits no distension  There is no tenderness  Musculoskeletal: Normal range of motion  She exhibits no edema  Neurological: She is alert  Follows commands to squeeze hands, appropriate strength   Skin: Skin is warm and dry  Multiple bruises over arms, legs and face   Psychiatric: She has a normal mood and affect  Her behavior is normal  Thought content normal  She is noncommunicative  Vitals reviewed  Additional Data:     Lab Results: I have personally reviewed pertinent reports  Results from last 7 days   Lab Units 04/10/20  1734   WBC Thousand/uL 10 43*   HEMOGLOBIN g/dL 13 4   HEMATOCRIT % 39 0   PLATELETS Thousands/uL 249   NEUTROS PCT % 84*   LYMPHS PCT % 9*   MONOS PCT % 6   EOS PCT % 0     Results from last 7 days   Lab Units 04/10/20  1734   SODIUM mmol/L 132*   POTASSIUM mmol/L 4 0   CHLORIDE mmol/L 94*   CO2 mmol/L 23   BUN mg/dL 33*   CREATININE mg/dL 1 44*   ANION GAP mmol/L 15*   CALCIUM mg/dL 9 9   ALBUMIN g/dL 4 6   TOTAL BILIRUBIN mg/dL 1 48*   ALK PHOS U/L 90   ALT U/L 15   AST U/L 36   GLUCOSE RANDOM mg/dL 121     Results from last 7 days   Lab Units 04/10/20  1734   INR  0 98                   Imaging: I have personally reviewed pertinent reports  CT lumbar spine without contrast   Final Result by Hugo Liz MD (04/10 1848)      No acute fracture  Severe scoliosis  Workstation performed: [id number]         CT head without contrast   Final Result by Hugo Liz MD (04/10 1837)      No acute intracranial abnormality                    Workstation performed: [id number]         CT cervical spine without contrast   Final Result by Hugo Liz MD (04/10 1842)      No cervical spine fracture or traumatic malalignment  Workstation performed: [id number]         XR chest 1 view portable   ED Interpretation by [id number] Keith Naik DO (04/10 1725)   MARLEN opacity  Cardiomegaly  Severe scoliosis      Final Result by Stormy Connelly MD (04/10 1812)      No acute cardiopulmonary disease  Workstation performed: [id number]             EKG, Pathology, and Other Studies Reviewed on Admission:   · EKG:  Normal sinus rhythm with premature atrial complexes,     Allscripts / Epic Records Reviewed: Yes     ** Please Note: This note has been constructed using a voice recognition system   **

## 2020-04-11 NOTE — PLAN OF CARE
Problem: Prexisting or High Potential for Compromised Skin Integrity  Goal: Skin integrity is maintained or improved  Description  INTERVENTIONS:  - Identify patients at risk for skin breakdown  - Assess and monitor skin integrity  - Assess and monitor nutrition and hydration status  - Monitor labs   - Assess for incontinence   - Turn and reposition patient  - Assist with mobility/ambulation  - Relieve pressure over bony prominences  - Avoid friction and shearing  - Provide appropriate hygiene as needed including keeping skin clean and dry  - Evaluate need for skin moisturizer/barrier cream  - Collaborate with interdisciplinary team   - Patient/family teaching  - Consider wound care consult   Outcome: Progressing     Problem: Potential for Falls  Goal: Patient will remain free of falls  Description  INTERVENTIONS:  - Assess patient frequently for physical needs  -  Identify cognitive and physical deficits and behaviors that affect risk of falls    -  [place] fall precautions as indicated by assessment   - Educate patient/family on patient safety including physical limitations  - Instruct patient to call for assistance with activity based on assessment  - Modify environment to reduce risk of injury  - Consider OT/PT consult to assist with strengthening/mobility  Outcome: Progressing     Problem: PAIN - ADULT  Goal: Verbalizes/displays adequate comfort level or baseline comfort level  Description  Interventions:  - Encourage patient to monitor pain and request assistance  - Assess pain using appropriate pain scale  - Administer analgesics based on type and severity of pain and evaluate response  - Implement non-pharmacological measures as appropriate and evaluate response  - Consider cultural and social influences on pain and pain management  - Notify physician/advanced practitioner if interventions unsuccessful or patient reports new pain  Outcome: Progressing     Problem: INFECTION - ADULT  Goal: Absence or prevention of progression during hospitalization  Description  INTERVENTIONS:  - Assess and monitor for signs and symptoms of infection  - Monitor lab/diagnostic results  - Monitor all insertion sites, i e  indwelling lines, tubes, and drains  - Monitor endotracheal if appropriate and nasal secretions for changes in amount and color  - [place] appropriate cooling/warming therapies per order  - Administer medications as ordered  - Instruct and encourage patient and family to use good hand hygiene technique  - Identify and instruct in appropriate isolation precautions for identified infection/condition  Outcome: Progressing  Goal: Absence of fever/infection during neutropenic period  Description  INTERVENTIONS:  - Monitor WBC    Outcome: Progressing     Problem: SAFETY ADULT  Goal: Maintain or return to baseline ADL function  Description  INTERVENTIONS:  -  Assess patient's ability to carry out ADLs; assess patient's baseline for ADL function and identify physical deficits which impact ability to perform ADLs (bathing, care of mouth/teeth, toileting, grooming, dressing, etc )  - Assess/evaluate cause of self-care deficits   - Assess range of motion  - Assess patient's mobility; develop plan if impaired  - Assess patient's need for assistive devices and provide as appropriate  - Encourage maximum independence but intervene and supervise when necessary  - Involve family in performance of ADLs  - Assess for home care needs following discharge   - Consider OT consult to assist with ADL evaluation and planning for discharge  - Provide patient education as appropriate  Outcome: Progressing  Goal: Maintain or return mobility status to optimal level  Description  INTERVENTIONS:  - Assess patient's baseline mobility status (ambulation, transfers, stairs, etc )    - Identify cognitive and physical deficits and behaviors that affect mobility  - Identify mobility aids required to assist with transfers and/or ambulation (gait belt, sit-to-stand, lift, walker, cane, etc )  - [place] fall precautions as indicated by assessment  - Record patient progress and toleration of activity level on Mobility SBAR; progress patient to next Phase/Stage  - Instruct patient to call for assistance with activity based on assessment  - Consider rehabilitation consult to assist with strengthening/weightbearing, etc   Outcome: Progressing     Problem: DISCHARGE PLANNING  Goal: Discharge to home or other facility with appropriate resources  Description  INTERVENTIONS:  - Identify barriers to discharge w/patient and caregiver  - Arrange for needed discharge resources and transportation as appropriate  - Identify discharge learning needs (meds, wound care, etc )  - Arrange for interpretive services to assist at discharge as needed  - Refer to Case Management Department for coordinating discharge planning if the patient needs post-hospital services based on physician/advanced practitioner order or complex needs related to functional status, cognitive ability, or social support system  Outcome: Progressing     Problem: Knowledge Deficit  Goal: Patient/family/caregiver demonstrates understanding of disease process, treatment plan, medications, and discharge instructions  Description  Complete learning assessment and assess knowledge base    Interventions:  - Provide teaching at level of understanding  - Provide teaching via preferred learning methods  Outcome: Progressing     Problem: NEUROSENSORY - ADULT  Goal: Achieves stable or improved neurological status  Description  INTERVENTIONS  - Monitor and report changes in neurological status  - Monitor vital signs such as temperature, blood pressure, glucose, and any other labs ordered   - Initiate measures to prevent increased intracranial pressure  - Monitor for seizure activity and implement precautions if appropriate      Outcome: Progressing     Problem: METABOLIC, FLUID AND ELECTROLYTES - ADULT  Goal: Electrolytes maintained within normal limits  Description  INTERVENTIONS:  - Monitor labs and assess patient for signs and symptoms of electrolyte imbalances  - Administer electrolyte replacement as ordered  - Monitor response to electrolyte replacements, including repeat lab results as appropriate  - Instruct patient on fluid and nutrition as appropriate  Outcome: Progressing  Goal: Fluid balance maintained  Description  INTERVENTIONS:  - Monitor labs   - Monitor I/O and WT  - Instruct patient on fluid and nutrition as appropriate  - Assess for signs & symptoms of volume excess or deficit  Outcome: Progressing

## 2020-04-12 PROBLEM — E87.1 HYPONATREMIA: Status: RESOLVED | Noted: 2018-07-24 | Resolved: 2020-04-12

## 2020-04-12 PROBLEM — N17.9 ACUTE KIDNEY INJURY (HCC): Status: RESOLVED | Noted: 2020-04-10 | Resolved: 2020-04-12

## 2020-04-12 LAB
ANION GAP SERPL CALCULATED.3IONS-SCNC: 15 MMOL/L (ref 4–13)
BASOPHILS # BLD AUTO: 0.01 THOUSANDS/ΜL (ref 0–0.1)
BASOPHILS NFR BLD AUTO: 0 % (ref 0–1)
BUN SERPL-MCNC: 20 MG/DL (ref 5–25)
CALCIUM SERPL-MCNC: 8 MG/DL (ref 8.3–10.1)
CHLORIDE SERPL-SCNC: 106 MMOL/L (ref 100–108)
CO2 SERPL-SCNC: 20 MMOL/L (ref 21–32)
CREAT SERPL-MCNC: 0.78 MG/DL (ref 0.6–1.3)
EOSINOPHIL # BLD AUTO: 0 THOUSAND/ΜL (ref 0–0.61)
EOSINOPHIL NFR BLD AUTO: 0 % (ref 0–6)
ERYTHROCYTE [DISTWIDTH] IN BLOOD BY AUTOMATED COUNT: 13.3 % (ref 11.6–15.1)
GFR SERPL CREATININE-BSD FRML MDRD: 68 ML/MIN/1.73SQ M
GLUCOSE SERPL-MCNC: 115 MG/DL (ref 65–140)
HCT VFR BLD AUTO: 36.8 % (ref 34.8–46.1)
HGB BLD-MCNC: 12.4 G/DL (ref 11.5–15.4)
IMM GRANULOCYTES # BLD AUTO: 0.01 THOUSAND/UL (ref 0–0.2)
IMM GRANULOCYTES NFR BLD AUTO: 0 % (ref 0–2)
LYMPHOCYTES # BLD AUTO: 0.6 THOUSANDS/ΜL (ref 0.6–4.47)
LYMPHOCYTES NFR BLD AUTO: 11 % (ref 14–44)
MCH RBC QN AUTO: 29.7 PG (ref 26.8–34.3)
MCHC RBC AUTO-ENTMCNC: 33.7 G/DL (ref 31.4–37.4)
MCV RBC AUTO: 88 FL (ref 82–98)
MONOCYTES # BLD AUTO: 0.91 THOUSAND/ΜL (ref 0.17–1.22)
MONOCYTES NFR BLD AUTO: 16 % (ref 4–12)
NEUTROPHILS # BLD AUTO: 4.19 THOUSANDS/ΜL (ref 1.85–7.62)
NEUTS SEG NFR BLD AUTO: 73 % (ref 43–75)
NRBC BLD AUTO-RTO: 0 /100 WBCS
PLATELET # BLD AUTO: 233 THOUSANDS/UL (ref 149–390)
PMV BLD AUTO: 9.8 FL (ref 8.9–12.7)
POTASSIUM SERPL-SCNC: 2.9 MMOL/L (ref 3.5–5.3)
QRS AXIS: 65 DEGREES
QRS AXIS: 71 DEGREES
QRSD INTERVAL: 66 MS
QRSD INTERVAL: 86 MS
QT INTERVAL: 316 MS
QT INTERVAL: 334 MS
QTC INTERVAL: 456 MS
QTC INTERVAL: 462 MS
RBC # BLD AUTO: 4.17 MILLION/UL (ref 3.81–5.12)
SODIUM SERPL-SCNC: 141 MMOL/L (ref 136–145)
T WAVE AXIS: 252 DEGREES
T WAVE AXIS: 257 DEGREES
VENTRICULAR RATE: 115 BPM
VENTRICULAR RATE: 125 BPM
WBC # BLD AUTO: 5.72 THOUSAND/UL (ref 4.31–10.16)

## 2020-04-12 PROCEDURE — 93010 ELECTROCARDIOGRAM REPORT: CPT | Performed by: INTERNAL MEDICINE

## 2020-04-12 PROCEDURE — 85025 COMPLETE CBC W/AUTO DIFF WBC: CPT | Performed by: STUDENT IN AN ORGANIZED HEALTH CARE EDUCATION/TRAINING PROGRAM

## 2020-04-12 PROCEDURE — 80048 BASIC METABOLIC PNL TOTAL CA: CPT | Performed by: STUDENT IN AN ORGANIZED HEALTH CARE EDUCATION/TRAINING PROGRAM

## 2020-04-12 PROCEDURE — 99232 SBSQ HOSP IP/OBS MODERATE 35: CPT | Performed by: STUDENT IN AN ORGANIZED HEALTH CARE EDUCATION/TRAINING PROGRAM

## 2020-04-12 RX ORDER — SODIUM CHLORIDE AND POTASSIUM CHLORIDE .9; .15 G/100ML; G/100ML
75 SOLUTION INTRAVENOUS ONCE
Status: COMPLETED | OUTPATIENT
Start: 2020-04-12 | End: 2020-04-12

## 2020-04-12 RX ORDER — POTASSIUM CHLORIDE 20MEQ/15ML
20 LIQUID (ML) ORAL
Status: DISCONTINUED | OUTPATIENT
Start: 2020-04-12 | End: 2020-04-12

## 2020-04-12 RX ADMIN — SODIUM CHLORIDE AND POTASSIUM CHLORIDE 75 ML/HR: .9; .15 SOLUTION INTRAVENOUS at 12:39

## 2020-04-12 RX ADMIN — HEPARIN SODIUM 5000 UNITS: 5000 INJECTION INTRAVENOUS; SUBCUTANEOUS at 14:26

## 2020-04-12 RX ADMIN — CLONAZEPAM 0.5 MG: 0.5 TABLET ORAL at 21:06

## 2020-04-12 RX ADMIN — SODIUM CHLORIDE 50 ML/HR: 0.9 INJECTION, SOLUTION INTRAVENOUS at 23:15

## 2020-04-12 RX ADMIN — HEPARIN SODIUM 5000 UNITS: 5000 INJECTION INTRAVENOUS; SUBCUTANEOUS at 06:09

## 2020-04-12 RX ADMIN — OLANZAPINE 5 MG: 5 TABLET, FILM COATED ORAL at 21:06

## 2020-04-12 RX ADMIN — HEPARIN SODIUM 5000 UNITS: 5000 INJECTION INTRAVENOUS; SUBCUTANEOUS at 21:06

## 2020-04-12 NOTE — ASSESSMENT & PLAN NOTE
Resolved    Recent Labs     04/10/20  1734 04/11/20  1044 04/12/20  0608   BUN 33* 21 20   CREATININE 1 44* 1 15 0 78

## 2020-04-12 NOTE — ASSESSMENT & PLAN NOTE
Resolved with IV hydration    Recent Labs     04/10/20  1734 04/11/20  1044 04/12/20  0608   SODIUM 132* 135* 141

## 2020-04-12 NOTE — PROGRESS NOTES
Progress Note - Thomas Harrison 11/20/1931, 80 y o  female MRN: [id number]    Unit/Bed#: [place] [address] -01 Encounter: [id number]    Primary Care Provider: Richard Lewis MD   Date and time admitted to hospital: 4/10/2020  4:46 PM        * Ambulatory dysfunction  Assessment & Plan  19-year-old female presenting with multiple falls  Poor historian  Spoke to her son today who expressed concerns that her caregivers including him are all corona virus positive  COVID positive   - PT/OT consulted  Awaiting discharge recommendations  - Might be a difficult dispo  She lives in an apartment and her two caregivers are currently sick (one has confirmed, other is exposed)  Acute kidney injury (HCC)resolved as of 4/12/2020  Assessment & Plan  Resolved    Recent Labs     04/10/20  1734 04/11/20  1044 04/12/20  0608   BUN 33* 21 20   CREATININE 1 44* 1 15 0 78       Hyponatremiaresolved as of 4/12/2020  Assessment & Plan  Resolved with IV hydration    Recent Labs     04/10/20  1734 04/11/20  1044 04/12/20  0608   SODIUM 132* 135* 141         Essential hypertension  Assessment & Plan  Continue anti-hypertensives    Atrial fibrillation (HCC)  Assessment & Plan  History of atrial fibrillation not on anticoagulation due to frequency of falls  - Continue rate control agents      VTE Pharmacologic Prophylaxis:   Pharmacologic: Heparin  Mechanical VTE Prophylaxis in Place: Yes    Patient Centered Rounds: I have performed bedside rounds with nursing staff today  Discussions with Specialists or Other Care Team Provider: Nursing    Education and Discussions with Family / Patient: Patient    Time Spent for Care: 30 minutes  More than 50% of total time spent on counseling and coordination of care as described above      Current Length of Stay: 2 day(s)    Current Patient Status: Inpatient   Certification Statement: The patient will continue to require additional inpatient hospital stay due to PT/OT eval, DC    Discharge Plan: Awaiting PT OT eval    Code Status: Level 1 - Full Code      Subjective:   Patient seen and examined at bedside  No acute events or complaints overnight  Currently breathing room air  Objective:     Vitals:   Temp (24hrs), Av 5 °F (36 4 °C), Min:96 5 °F (35 8 °C), Max:98 4 °F (36 9 °C)    Temp:  [96 5 °F (35 8 °C)-98 4 °F (36 9 °C)] 98 4 °F (36 9 °C)  HR:  [] 67  Resp:  [17-19] 19  BP: (138-153)/(69-75) 141/69  SpO2:  [98 %-100 %] 98 %  Body mass index is 22 91 kg/m²  Input and Output Summary (last 24 hours):     No intake or output data in the 24 hours ending 20 1113    Physical Exam:     Physical Exam   Constitutional: No distress  Comfortable   HENT:   Head: Normocephalic  Eyes: Pupils are equal, round, and reactive to light  EOM are normal    Neck: Neck supple  Cardiovascular: Normal rate  Pulmonary/Chest: Effort normal  No respiratory distress  Neurological: She is alert  Skin: Skin is warm and dry  Vitals reviewed  Additional Data:     Labs:    Results from last 7 days   Lab Units 20  0608   WBC Thousand/uL 5 72   HEMOGLOBIN g/dL 12 4   HEMATOCRIT % 36 8   PLATELETS Thousands/uL 233   NEUTROS PCT % 73   LYMPHS PCT % 11*   MONOS PCT % 16*   EOS PCT % 0     Results from last 7 days   Lab Units 20  0608 20  1044   SODIUM mmol/L 141 135*   POTASSIUM mmol/L 2 9* 3 3*   CHLORIDE mmol/L 106 98*   CO2 mmol/L 20* 23   BUN mg/dL 20 21   CREATININE mg/dL 0 78 1 15   ANION GAP mmol/L 15* 14*   CALCIUM mg/dL 8 0* 9 3   ALBUMIN g/dL  --  4 5   TOTAL BILIRUBIN mg/dL  --  1 34*   ALK PHOS U/L  --  94   ALT U/L  --  31   AST U/L  --  77*   GLUCOSE RANDOM mg/dL 115 101     Results from last 7 days   Lab Units 04/10/20  1734   INR  0 98             Results from last 7 days   Lab Units 20  1044   PROCALCITONIN ng/ml 0 07           * I Have Reviewed All Lab Data Listed Above  * Additional Pertinent Lab Tests Reviewed:  [address] Admission Reviewed    Imaging:    Imaging Reports Reviewed Today Include: No new imaging    Recent Cultures (last 7 days):           Last 24 Hours Medication List:     Current Facility-Administered Medications:  acetaminophen 650 mg Oral Q6H PRN Sandro Zayas PA-C    aluminum-magnesium hydroxide-simethicone 30 mL Oral Q6H PRN STEPHEN Hunt-VICTOR MANUEL    amLODIPine 10 mg Oral Daily STEPHEN Hunt-VICTOR MANUEL    clonazePAM 0 5 mg Oral HS Sandro Zayas PA-C    DULoxetine 30 mg Oral Daily STEPHEN Hunt-VICTOR MANUEL    heparin (porcine) 5,000 Units Subcutaneous Q8H [address] Sandro Zayas PA-C    metoprolol succinate 100 mg Oral Daily Sandro Zayas PA-C    OLANZapine 5 mg Oral HS Sandro Zayas PA-C    senna 1 tablet Oral Daily STEPHEN Hunt-VICTOR MANUEL    sodium chloride 50 mL/hr Intravenous Continuous Sandro Zayas PA-C Last Rate: 50 mL/hr (04/11/20 0120)   sodium chloride 0 9 % with KCl 20 mEq/L 75 mL/hr Intravenous Once Tip Izaguirre MD         Today, Patient Was Seen By: Ludmila Richmond MD    ** Please Note: Dictation voice to text software may have been used in the creation of this document   **

## 2020-04-12 NOTE — QUICK NOTE
Urinary retention of 700+ml  Straight cath requested  Spoke to [email or web address] who will pass on to the night staff later to bladder scan her again

## 2020-04-12 NOTE — ASSESSMENT & PLAN NOTE
72-year-old female presenting with multiple falls  Poor historian  Spoke to her son today who expressed concerns that her caregivers including him are all corona virus positive  COVID positive   - PT/OT consulted  Awaiting discharge recommendations  - Might be a difficult dispo  She lives in an apartment and her two caregivers are currently sick (one has confirmed, other is exposed)

## 2020-04-13 PROBLEM — U07.1 COVID-19 VIRUS INFECTION: Status: ACTIVE | Noted: 2020-04-13

## 2020-04-13 LAB
1,25(OH)2D3 SERPL-MCNC: 66.3 PG/ML (ref 19.9–79.3)
ANION GAP SERPL CALCULATED.3IONS-SCNC: 12 MMOL/L (ref 4–13)
BUN SERPL-MCNC: 20 MG/DL (ref 5–25)
CALCIUM SERPL-MCNC: 8.9 MG/DL (ref 8.3–10.1)
CHLORIDE SERPL-SCNC: 105 MMOL/L (ref 100–108)
CO2 SERPL-SCNC: 23 MMOL/L (ref 21–32)
CREAT SERPL-MCNC: 0.91 MG/DL (ref 0.6–1.3)
GFR SERPL CREATININE-BSD FRML MDRD: 57 ML/MIN/1.73SQ M
GLUCOSE SERPL-MCNC: 125 MG/DL (ref 65–140)
IL6 SERPL-MCNC: 30.1 PG/ML (ref 0–15.5)
MAGNESIUM SERPL-MCNC: 2 MG/DL (ref 1.6–2.6)
POTASSIUM SERPL-SCNC: 3.6 MMOL/L (ref 3.5–5.3)
SODIUM SERPL-SCNC: 140 MMOL/L (ref 136–145)

## 2020-04-13 PROCEDURE — 97167 OT EVAL HIGH COMPLEX 60 MIN: CPT

## 2020-04-13 PROCEDURE — 97163 PT EVAL HIGH COMPLEX 45 MIN: CPT

## 2020-04-13 PROCEDURE — 83735 ASSAY OF MAGNESIUM: CPT | Performed by: PHYSICIAN ASSISTANT

## 2020-04-13 PROCEDURE — 80048 BASIC METABOLIC PNL TOTAL CA: CPT | Performed by: STUDENT IN AN ORGANIZED HEALTH CARE EDUCATION/TRAINING PROGRAM

## 2020-04-13 PROCEDURE — 99222 1ST HOSP IP/OBS MODERATE 55: CPT | Performed by: PHYSICIAN ASSISTANT

## 2020-04-13 PROCEDURE — 97530 THERAPEUTIC ACTIVITIES: CPT

## 2020-04-13 PROCEDURE — 99232 SBSQ HOSP IP/OBS MODERATE 35: CPT | Performed by: FAMILY MEDICINE

## 2020-04-13 RX ORDER — AMLODIPINE BESYLATE 10 MG/1
10 TABLET ORAL DAILY
Status: DISCONTINUED | OUTPATIENT
Start: 2020-04-14 | End: 2020-04-13

## 2020-04-13 RX ORDER — METOPROLOL TARTRATE 5 MG/5ML
5 INJECTION INTRAVENOUS ONCE
Status: COMPLETED | OUTPATIENT
Start: 2020-04-13 | End: 2020-04-13

## 2020-04-13 RX ADMIN — DILTIAZEM HYDROCHLORIDE 30 MG: 30 TABLET, FILM COATED ORAL at 23:21

## 2020-04-13 RX ADMIN — CLONAZEPAM 0.5 MG: 0.5 TABLET ORAL at 21:37

## 2020-04-13 RX ADMIN — STANDARDIZED SENNA CONCENTRATE 8.6 MG: 8.6 TABLET ORAL at 09:45

## 2020-04-13 RX ADMIN — METOPROLOL TARTRATE 5 MG: 5 INJECTION INTRAVENOUS at 10:39

## 2020-04-13 RX ADMIN — AMLODIPINE BESYLATE 10 MG: 10 TABLET ORAL at 09:45

## 2020-04-13 RX ADMIN — HEPARIN SODIUM 5000 UNITS: 5000 INJECTION INTRAVENOUS; SUBCUTANEOUS at 21:37

## 2020-04-13 RX ADMIN — METOPROLOL SUCCINATE 100 MG: 50 TABLET, EXTENDED RELEASE ORAL at 09:45

## 2020-04-13 RX ADMIN — DULOXETINE HYDROCHLORIDE 30 MG: 30 CAPSULE, DELAYED RELEASE ORAL at 09:45

## 2020-04-13 RX ADMIN — OLANZAPINE 5 MG: 5 TABLET, FILM COATED ORAL at 21:37

## 2020-04-13 RX ADMIN — HEPARIN SODIUM 5000 UNITS: 5000 INJECTION INTRAVENOUS; SUBCUTANEOUS at 13:58

## 2020-04-13 RX ADMIN — HEPARIN SODIUM 5000 UNITS: 5000 INJECTION INTRAVENOUS; SUBCUTANEOUS at 06:05

## 2020-04-13 RX ADMIN — DILTIAZEM HYDROCHLORIDE 30 MG: 30 TABLET, FILM COATED ORAL at 10:39

## 2020-04-13 RX ADMIN — METOPROLOL TARTRATE 5 MG: 5 INJECTION INTRAVENOUS at 09:44

## 2020-04-13 RX ADMIN — DILTIAZEM HYDROCHLORIDE 30 MG: 30 TABLET, FILM COATED ORAL at 17:01

## 2020-04-13 NOTE — PHYSICAL THERAPY NOTE
PHYSICAL THERAPY EVALUATION          Patient Name: Kena Mo  [id number] Date: 4/13/2020   PT EVALUATION  Time In: 200 Time Out: 1211  80 y o     [id number]    Low back pain [M54 5]  Fever [R50 9]  OMEGA (acute kidney injury) ([facility] [address] ) [N17 9]  Frequent falls [R29 6]  Injury of head, initial encounter [S09 90XA]  Suspected Covid-19 Virus Infection [R68 89]    Past Medical History:   Diagnosis Date    Anxiety     Cognitive impairment     [facility]     Depression     Hallucination     Hyperlipidemia     Hypertension     Memory loss     Psychiatric illness     Psychosis ([facility] )     Schizoaffective disorder ([facility] )     Sleep difficulties      Past Surgical History:   Procedure Laterality Date    APPENDECTOMY      BLADDER SURGERY      CHOLECYSTECTOMY      TOTAL ABDOMINAL HYSTERECTOMY W/ BILATERAL SALPINGOOPHORECTOMY      TOTAL KNEE ARTHROPLASTY Left         04/13/20 1219   Note Type   Note type Eval/Treat   Pain Assessment   Pain Assessment Tool FLACC   Pain Rating: FLACC (Rest) - Face 0   Pain Rating: FLACC (Rest) - Legs 0   Pain Rating: FLACC (Rest) - Activity 0   Pain Rating: FLACC (Rest) - Cry 1  (regarding IV site)   Pain Rating: FLACC (Rest) - Consolability 0   Score: FLACC (Rest) 1   Pain Rating: FLACC (Activity) - Face 0   Pain Rating: FLACC (Activity) - Legs 0   Pain Rating: FLACC (Activity) - Activity 0   Pain Rating: FLACC (Activity) - Cry 1   Pain Rating: FLACC (Activity) - Consolability 0   Score: FLACC (Activity) 1   Home Living   Type of Home Apartment   Home Equipment Other (Comment)  (Rollator?)   Additional Comments brief social hx obtained via chart review   Prior Function   Receives Help From Personal care attendant   Falls in the last 6 months 1 to 4   Comments brief PLOF obtained via chart reivew   Restrictions/Precautions   Weight Bearing Precautions Per Order No   Other Precautions Contact/isolation; Airborne/isolation; Chair Alarm;Cognitive; Bed Alarm; Restraints;Multiple lines;Telemetry; Fall Risk   General   Additional Pertinent History Telugu speaking only  hx of psych disorder  poor historian   Cognition   Overall Cognitive Status Impaired   Arousal/Participation Responsive   Orientation Level Oriented to person; Unable to assess   Memory Unable to assess   Following Commands Follows one step commands with increased time or repetition   RLE Assessment   RLE Assessment X  (PROM hip flexion, knee flex/ext, ankle DF/PF wnl)   Strength RLE   RLE Overall Strength 3/5  (via functional assessment)   LLE Assessment   LLE Assessment X  (PROM hip flexion, knee flex/ext, ankle DF/PF wnl)   Strength LLE   LLE Overall Strength 3/5  (via functional assessment)   Bed Mobility   Supine to Sit 3  Moderate assistance   Additional items Assist x 2; Increased time required;HOB elevated; Other  (trunk support)   Additional Comments A to reposition at EOB  use of gesturing given difficulty communicating w pt due to face mask, language barrier, noise from air vent   Transfers   Sit to Stand 4  Minimal assistance   Additional items Assist x 2; Increased time required;Verbal cues; Other  (RW)   Stand to Sit 4  Minimal assistance   Additional items Assist x 2; Increased time required;Verbal cues; Other  (RW)   Additional Comments pt tolerated standing 1' w RW for support and min A for safety/support  Ambulation/Elevation   Gait pattern Not appropriate  (pt tachycardic standing at EOB)   Balance   Static Sitting Fair -   Static Standing Poor +   Endurance Deficit   Endurance Deficit Yes   Endurance Deficit Description O2 desat to 70s w static standing-?accuracy given monitor on pt's foot  episodes of brief tachycardia, fluctuating to 150s   pt appeared in no physical distress, A back to bed   Activity Tolerance   Activity Tolerance Treatment limited secondary to medical complications (Comment)  (tachycardic)   Medical Staff [facility] OT   Nurse Made Aware Miguel Angel Restrepo RN   Assessment   Prognosis Fair   Problem List Decreased strength;Decreased endurance; Impaired balance;Decreased mobility; Decreased cognition; Impaired judgement;Decreased safety awareness;Decreased skin integrity   Assessment Kim East is a 80 y o  female admitted to [facility] on 4/10/2020 for Ambulatory dysfunction  +covid  Pt  has a past medical history of Anxiety, Cognitive impairment, Dementia ([facility] [address] ), Depression, Hallucination, Hyperlipidemia, Hypertension, Memory loss, Psychiatric illness, Psychosis ([facility] [address] ), Schizoaffective disorder ([facility] [address] ), and Sleep difficulties    PT was consulted and pt was seen on 4/13/2020 for mobility assessment and d/c planning  Pt presents high fall risk, contact and airborne precautions, PIV, telemetry, soft restraints, monitoring abn vitals (tachycardic)  Pt is currently functioning at a moderate assistance x2 level for bed mobility, minimum assistance x2 level for transfers  ? Impaired cognition compared to baseline vs additional impact of language barrier  Pt mildly tachycardic in supine position, upon standing EOB pt w increased episodes of tachycardia fluctuating to 150s and O2 desat to 70s (?accuracy given location of monitor)  Pt will benefit from continued skilled IP PT to address the above mentioned impairments  in order to maximize recovery and increase functional independence when completing mobility and ADLs  At this time PT recommendations for d/c are STR v home w family support, 24/7 supervision and HHPT  Barriers to Discharge Inaccessible home environment;Decreased caregiver support   Barriers to Discharge Comments per chart review caregivers unwell and unable to A, decreased mobility   Goals   Patient Goals none stated   [facility] Expiration Date 04/23/20   Short Term Goal #1 1)  Pt will perform bed mobility with min A demonstrating appropriate technique 100% of the time in order to improve function  2)  Perform all transfers with Gia- supervision demonstrating safe and appropriate technique 100% of the time in order to improve ability to negotiate safely in home environment  3) PT to see to assess amb and update goals as appropriate  4)  Improve overall strength and balance 1/2 grade in order to optimize ability to perform functional tasks and reduce fall risk  5) Increase activity tolerance to 45 minutes in order to improve endurance to functional tasks  6) PT for ongoing patient and family/caregiver education, DME needs and d/c planning in order to promote highest level of function in least restrictive environment  PT Treatment Day 1   Plan   Treatment/Interventions Functional transfer training;LE strengthening/ROM; Therapeutic exercise; Endurance training;Cognitive reorientation;Patient/family training;Equipment eval/education; Bed mobility;Gait training;Continued evaluation;Spoke to nursing;OT   PT Frequency Other (Comment)  (3-5/wk)   Recommendation   Recommendation Short-term skilled PT; Home with family support;24 hour supervision/assist;Home PT  (STR v home w 24/7 sup and HHPT)   PT - OK to Discharge Yes   Additional Comments to STR, no to home pending progress   Modified Gardner Scale   Modified Domo Scale 4   Barthel Index   Feeding 5   Bathing 0   Grooming Score 0   Dressing Score 5   Bladder Score 0   Bowels Score 0   Toilet Use Score 5   Transfers (Bed/Chair) Score 10   Mobility (Level Surface) Score 0   Stairs Score 0   Barthel Index Score 25   History: co - morbidities, age, social background, fall risk, use of assistive device, assist for adl's, cognition, multiple lines  Exam: impairments in systems including musculoskeletal (ROM, strength, posture), neuromuscular (balance, transfers, motor function), integumentary (facial bruising) cardiac, pulmonary, cognition  Clinical: unstable/unpredictable  Complexity:high Valentín Guzman, PT     PT Progress Note  Time In: 1211 Time Out: 1219  S: Pt w episode of bowel incontinence, additional time spent to assist w personal hygiene and sanaz care  O: Pt returned to supine position from EOB w mod Ax2  Able to roll L/R w mod Ax1  End of session pt repositioned in bed, bed alarm engaged, soft restraints in place and call bell in reach  A: Pt w decreased activity tolerance due to abn vitals, fatigue  ?if pt is functioning near to baseline however w limited standing tolerance and not appropriate for further OOB mobility this session  Frequent cues needed for safety  A to perform sanaz care and personal hygiene, limited participation due to cognition  P: See IE for POC and d/c recommendations

## 2020-04-13 NOTE — PROGRESS NOTES
Progress Note - Kena Pert 11/20/1931, 80 y o  female MRN: [id number]    Unit/Bed#: [place] [address] -01 Encounter: [id number]    Primary Care Provider: Honorio Atwood MD   Date and time admitted to hospital: 4/10/2020  4:46 PM        * COVID-19 virus infection  Assessment & Plan  Detected 4/10  Not on treatment - will d/w ID if role to initiate  No oxygen requirements  Monitor CBC, CMP, obtain inflammatory markers     Ambulatory dysfunction  Assessment & Plan  59-year-old female presenting with multiple falls  Poor historian  Spoke to her son today who expressed concerns that her caregivers including him are all corona virus positive  COVID positive   - PT/OT consulted  Awaiting discharge recommendations  - Might be a difficult dispo  She lives in an apartment and her two caregivers are currently sick (one has confirmed, other is exposed)  Acute encephalopathy  Assessment & Plan  Patient presents with multiple falls, and altered mental status   Son reports patient is typically alert and oriented at baseline, lives alone with 18 hour care from home health aides, patient does have history of dementia, depression and schizoaffective disorder   Continue psychotropic medications at this time  Monitor mental status closely  Due to multiple sick contacts positive for COVID-19 including her caregiver and her son, COVID-19 PCR obtained in ER  She is afebrile, leukocytosis is mild, chest x-ray is normal   If positive will initiate treatment and further workup  Contact precautions  IV fluid hydration    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[facility] [address] )  Assessment & Plan  History of psychosis and follow psychiatrist  Continue Cymbalta 30 mg daily, Zyprexa 5 mg q h s       Rapid atrial fibrillation (HCC)  Assessment & Plan  Developed rapid a fib HR reaching 180's  Not on anticoagulation due to frequency of falls  Cardiology input appreciated, continue metoprolol, diltiazem added to therapy     Essential hypertension  Assessment & Plan  Due to episode of rapid afib today, amlodipine discontinued and Cardizem added to therapy, metoprolol dosing increased  Monitor BP trend       VTE Pharmacologic Prophylaxis:   Pharmacologic: Heparin  Mechanical VTE Prophylaxis in Place: Yes    Patient Centered Rounds: I have performed bedside rounds with nursing staff today  Discussions with Specialists or Other Care Team Provider: SARAN    Education and Discussions with Family / Patient: karri John    Time Spent for Care: 30 minutes  More than 50% of total time spent on counseling and coordination of care as described above  Current Length of Stay: 3 day(s)    Current Patient Status: Inpatient   Certification Statement: The patient will continue to require additional inpatient hospital stay due to need for close monitoring     Discharge Plan: TBD    Code Status: Level 1 - Full Code      Subjective:   Patient seen and examined  She does not appear in distress  No complaints  Noted for rapid a fib of which she was not symptomatic  Objective:     Vitals:   Temp (24hrs), Av 4 °F (36 9 °C), Min:97 9 °F (36 6 °C), Max:98 7 °F (37 1 °C)    Temp:  [97 9 °F (36 6 °C)-98 7 °F (37 1 °C)] 98 5 °F (36 9 °C)  HR:  [] 95  Resp:  [20] 20  BP: (113-148)/() 134/68  SpO2:  [96 %-100 %] 96 %  Body mass index is 22 91 kg/m²  Input and Output Summary (last 24 hours): Intake/Output Summary (Last 24 hours) at 2020 1607  Last data filed at 2020 1300  Gross per 24 hour   Intake 1020 ml   Output 1200 ml   Net -180 ml       Physical Exam:     Physical Exam   Constitutional: No distress  HENT:   Head: Normocephalic and atraumatic  Eyes: Conjunctivae are normal    Neck: No JVD present  Cardiovascular: An irregularly irregular rhythm present  Tachycardia present  No murmur heard  Pulmonary/Chest: Effort normal  No respiratory distress  She has no wheezes  She has no rales  Abdominal: Soft  She exhibits no distension   There is no tenderness  There is no guarding  Musculoskeletal: She exhibits no edema  Neurological: She is alert  Skin: Skin is warm and dry  Psychiatric: Cognition and memory are impaired  Additional Data:     Labs:    Results from last 7 days   Lab Units 04/12/20  0608   WBC Thousand/uL 5 72   HEMOGLOBIN g/dL 12 4   HEMATOCRIT % 36 8   PLATELETS Thousands/uL 233   NEUTROS PCT % 73   LYMPHS PCT % 11*   MONOS PCT % 16*   EOS PCT % 0     Results from last 7 days   Lab Units 04/13/20  0604  04/11/20  1044   SODIUM mmol/L 140   < > 135*   POTASSIUM mmol/L 3 6   < > 3 3*   CHLORIDE mmol/L 105   < > 98*   CO2 mmol/L 23   < > 23   BUN mg/dL 20   < > 21   CREATININE mg/dL 0 91   < > 1 15   ANION GAP mmol/L 12   < > 14*   CALCIUM mg/dL 8 9   < > 9 3   ALBUMIN g/dL  --   --  4 5   TOTAL BILIRUBIN mg/dL  --   --  1 34*   ALK PHOS U/L  --   --  94   ALT U/L  --   --  31   AST U/L  --   --  77*   GLUCOSE RANDOM mg/dL 125   < > 101    < > = values in this interval not displayed  Results from last 7 days   Lab Units 04/10/20  1734   INR  0 98             Results from last 7 days   Lab Units 04/11/20  1044   PROCALCITONIN ng/ml 0 07         * I Have Reviewed All Lab Data Listed Above  * Additional Pertinent Lab Tests Reviewed:  [address] Admission Reviewed    Imaging:    Imaging Reports Reviewed Today Include: no new    Recent Cultures (last 7 days):           Last 24 Hours Medication List:     Current Facility-Administered Medications:  acetaminophen 650 mg Oral Q6H PRN Sandro Zayas PA-C    aluminum-magnesium hydroxide-simethicone 30 mL Oral Q6H PRN Sandro Zayas PA-C    clonazePAM 0 5 mg Oral HS Sandro Zayas PA-C    diltiazem 30 mg Oral Q6H [address] Superiorkeyla Hurst DO    DULoxetine 30 mg Oral Daily Sandro Zayas PA-C    heparin (porcine) 5,000 Units Subcutaneous Q8H [address] Sandro Zayas PA-C    metoprolol succinate 100 mg Oral Daily Sandro Zayas PA-C    OLANZapine 5 mg Oral HS Sandro Zayas SANTOS    senna 1 tablet Oral Daily Sandro Zayas PA-C    sodium chloride 50 mL/hr Intravenous Continuous Sandro Zayas PA-C Last Rate: 50 mL/hr (04/12/20 8403)        Today, Patient Was Seen By: Gabrielle Kennedy MD    ** Please Note: Dictation voice to text software may have been used in the creation of this document   **

## 2020-04-13 NOTE — PLAN OF CARE
Problem: Prexisting or High Potential for Compromised Skin Integrity  Goal: Skin integrity is maintained or improved  Description  INTERVENTIONS:  - Identify patients at risk for skin breakdown  - Assess and monitor skin integrity  - Assess and monitor nutrition and hydration status  - Monitor labs   - Assess for incontinence   - Turn and reposition patient  - Assist with mobility/ambulation  - Relieve pressure over bony prominences  - Avoid friction and shearing  - Provide appropriate hygiene as needed including keeping skin clean and dry  - Evaluate need for skin moisturizer/barrier cream  - Collaborate with interdisciplinary team   - Patient/family teaching  - Consider wound care consult   Outcome: Progressing     Problem: Potential for Falls  Goal: Patient will remain free of falls  Description  INTERVENTIONS:  - Assess patient frequently for physical needs  -  Identify cognitive and physical deficits and behaviors that affect risk of falls    -  [place] fall precautions as indicated by assessment   - Educate patient/family on patient safety including physical limitations  - Instruct patient to call for assistance with activity based on assessment  - Modify environment to reduce risk of injury  - Consider OT/PT consult to assist with strengthening/mobility  Outcome: Progressing     Problem: PAIN - ADULT  Goal: Verbalizes/displays adequate comfort level or baseline comfort level  Description  Interventions:  - Encourage patient to monitor pain and request assistance  - Assess pain using appropriate pain scale  - Administer analgesics based on type and severity of pain and evaluate response  - Implement non-pharmacological measures as appropriate and evaluate response  - Consider cultural and social influences on pain and pain management  - Notify physician/advanced practitioner if interventions unsuccessful or patient reports new pain  Outcome: Progressing     Problem: INFECTION - ADULT  Goal: Absence or prevention of progression during hospitalization  Description  INTERVENTIONS:  - Assess and monitor for signs and symptoms of infection  - Monitor lab/diagnostic results  - Monitor all insertion sites, i e  indwelling lines, tubes, and drains  - Monitor endotracheal if appropriate and nasal secretions for changes in amount and color  - [place] appropriate cooling/warming therapies per order  - Administer medications as ordered  - Instruct and encourage patient and family to use good hand hygiene technique  - Identify and instruct in appropriate isolation precautions for identified infection/condition  Outcome: Progressing  Goal: Absence of fever/infection during neutropenic period  Description  INTERVENTIONS:  - Monitor WBC    Outcome: Progressing     Problem: SAFETY ADULT  Goal: Maintain or return to baseline ADL function  Description  INTERVENTIONS:  -  Assess patient's ability to carry out ADLs; assess patient's baseline for ADL function and identify physical deficits which impact ability to perform ADLs (bathing, care of mouth/teeth, toileting, grooming, dressing, etc )  - Assess/evaluate cause of self-care deficits   - Assess range of motion  - Assess patient's mobility; develop plan if impaired  - Assess patient's need for assistive devices and provide as appropriate  - Encourage maximum independence but intervene and supervise when necessary  - Involve family in performance of ADLs  - Assess for home care needs following discharge   - Consider OT consult to assist with ADL evaluation and planning for discharge  - Provide patient education as appropriate  Outcome: Progressing  Goal: Maintain or return mobility status to optimal level  Description  INTERVENTIONS:  - Assess patient's baseline mobility status (ambulation, transfers, stairs, etc )    - Identify cognitive and physical deficits and behaviors that affect mobility  - Identify mobility aids required to assist with transfers and/or ambulation (gait belt, sit-to-stand, lift, walker, cane, etc )  - [place] fall precautions as indicated by assessment  - Record patient progress and toleration of activity level on Mobility SBAR; progress patient to next Phase/Stage  - Instruct patient to call for assistance with activity based on assessment  - Consider rehabilitation consult to assist with strengthening/weightbearing, etc   Outcome: Progressing     Problem: DISCHARGE PLANNING  Goal: Discharge to home or other facility with appropriate resources  Description  INTERVENTIONS:  - Identify barriers to discharge w/patient and caregiver  - Arrange for needed discharge resources and transportation as appropriate  - Identify discharge learning needs (meds, wound care, etc )  - Arrange for interpretive services to assist at discharge as needed  - Refer to Case Management Department for coordinating discharge planning if the patient needs post-hospital services based on physician/advanced practitioner order or complex needs related to functional status, cognitive ability, or social support system  Outcome: Progressing     Problem: Knowledge Deficit  Goal: Patient/family/caregiver demonstrates understanding of disease process, treatment plan, medications, and discharge instructions  Description  Complete learning assessment and assess knowledge base    Interventions:  - Provide teaching at level of understanding  - Provide teaching via preferred learning methods  Outcome: Progressing     Problem: NEUROSENSORY - ADULT  Goal: Achieves stable or improved neurological status  Description  INTERVENTIONS  - Monitor and report changes in neurological status  - Monitor vital signs such as temperature, blood pressure, glucose, and any other labs ordered   - Initiate measures to prevent increased intracranial pressure  - Monitor for seizure activity and implement precautions if appropriate      Outcome: Progressing     Problem: METABOLIC, FLUID AND ELECTROLYTES - ADULT  Goal: Electrolytes maintained within normal limits  Description  INTERVENTIONS:  - Monitor labs and assess patient for signs and symptoms of electrolyte imbalances  - Administer electrolyte replacement as ordered  - Monitor response to electrolyte replacements, including repeat lab results as appropriate  - Instruct patient on fluid and nutrition as appropriate  Outcome: Progressing  Goal: Fluid balance maintained  Description  INTERVENTIONS:  - Monitor labs   - Monitor I/O and WT  - Instruct patient on fluid and nutrition as appropriate  - Assess for signs & symptoms of volume excess or deficit  Outcome: Progressing     Problem: Nutrition/Hydration-ADULT  Goal: Nutrient/Hydration intake appropriate for improving, restoring or maintaining nutritional needs  Description  Monitor and assess patient's nutrition/hydration status for malnutrition  Collaborate with interdisciplinary team and initiate plan and interventions as ordered  Monitor patient's weight and dietary intake as ordered or per policy  Utilize nutrition screening tool and intervene as necessary  Determine patient's food preferences and provide high-protein, high-caloric foods as appropriate       INTERVENTIONS:  - Monitor oral intake, urinary output, labs, and treatment plans  - Assess nutrition and hydration status and recommend course of action  - Evaluate amount of meals eaten  - Assist patient with eating if necessary   - Allow adequate time for meals  - Recommend/ encourage appropriate diets, oral nutritional supplements, and vitamin/mineral supplements  - Order, calculate, and assess calorie counts as needed  - Recommend, monitor, and adjust tube feedings and TPN/PPN based on assessed needs  - Assess need for intravenous fluids  - Provide specific nutrition/hydration education as appropriate  - Include patient/family/caregiver in decisions related to nutrition  Outcome: Progressing

## 2020-04-13 NOTE — PLAN OF CARE
Problem: Prexisting or High Potential for Compromised Skin Integrity  Goal: Skin integrity is maintained or improved  Description  INTERVENTIONS:  - Identify patients at risk for skin breakdown  - Assess and monitor skin integrity  - Assess and monitor nutrition and hydration status  - Monitor labs   - Assess for incontinence   - Turn and reposition patient  - Assist with mobility/ambulation  - Relieve pressure over bony prominences  - Avoid friction and shearing  - Provide appropriate hygiene as needed including keeping skin clean and dry  - Evaluate need for skin moisturizer/barrier cream  - Collaborate with interdisciplinary team   - Patient/family teaching  - Consider wound care consult   Outcome: Progressing     Problem: Potential for Falls  Goal: Patient will remain free of falls  Description  INTERVENTIONS:  - Assess patient frequently for physical needs  -  Identify cognitive and physical deficits and behaviors that affect risk of falls    -  [place] fall precautions as indicated by assessment   - Educate patient/family on patient safety including physical limitations  - Instruct patient to call for assistance with activity based on assessment  - Modify environment to reduce risk of injury  - Consider OT/PT consult to assist with strengthening/mobility  Outcome: Progressing     Problem: PAIN - ADULT  Goal: Verbalizes/displays adequate comfort level or baseline comfort level  Description  Interventions:  - Encourage patient to monitor pain and request assistance  - Assess pain using appropriate pain scale  - Administer analgesics based on type and severity of pain and evaluate response  - Implement non-pharmacological measures as appropriate and evaluate response  - Consider cultural and social influences on pain and pain management  - Notify physician/advanced practitioner if interventions unsuccessful or patient reports new pain  Outcome: Progressing     Problem: INFECTION - ADULT  Goal: Absence or prevention of progression during hospitalization  Description  INTERVENTIONS:  - Assess and monitor for signs and symptoms of infection  - Monitor lab/diagnostic results  - Monitor all insertion sites, i e  indwelling lines, tubes, and drains  - Monitor endotracheal if appropriate and nasal secretions for changes in amount and color  - [place] appropriate cooling/warming therapies per order  - Administer medications as ordered  - Instruct and encourage patient and family to use good hand hygiene technique  - Identify and instruct in appropriate isolation precautions for identified infection/condition  Outcome: Progressing  Goal: Absence of fever/infection during neutropenic period  Description  INTERVENTIONS:  - Monitor WBC    Outcome: Progressing     Problem: SAFETY ADULT  Goal: Maintain or return to baseline ADL function  Description  INTERVENTIONS:  -  Assess patient's ability to carry out ADLs; assess patient's baseline for ADL function and identify physical deficits which impact ability to perform ADLs (bathing, care of mouth/teeth, toileting, grooming, dressing, etc )  - Assess/evaluate cause of self-care deficits   - Assess range of motion  - Assess patient's mobility; develop plan if impaired  - Assess patient's need for assistive devices and provide as appropriate  - Encourage maximum independence but intervene and supervise when necessary  - Involve family in performance of ADLs  - Assess for home care needs following discharge   - Consider OT consult to assist with ADL evaluation and planning for discharge  - Provide patient education as appropriate  Outcome: Progressing  Goal: Maintain or return mobility status to optimal level  Description  INTERVENTIONS:  - Assess patient's baseline mobility status (ambulation, transfers, stairs, etc )    - Identify cognitive and physical deficits and behaviors that affect mobility  - Identify mobility aids required to assist with transfers and/or ambulation (gait belt, sit-to-stand, lift, walker, cane, etc )  - [place] fall precautions as indicated by assessment  - Record patient progress and toleration of activity level on Mobility SBAR; progress patient to next Phase/Stage  - Instruct patient to call for assistance with activity based on assessment  - Consider rehabilitation consult to assist with strengthening/weightbearing, etc   Outcome: Progressing     Problem: DISCHARGE PLANNING  Goal: Discharge to home or other facility with appropriate resources  Description  INTERVENTIONS:  - Identify barriers to discharge w/patient and caregiver  - Arrange for needed discharge resources and transportation as appropriate  - Identify discharge learning needs (meds, wound care, etc )  - Arrange for interpretive services to assist at discharge as needed  - Refer to Case Management Department for coordinating discharge planning if the patient needs post-hospital services based on physician/advanced practitioner order or complex needs related to functional status, cognitive ability, or social support system  Outcome: Progressing     Problem: Knowledge Deficit  Goal: Patient/family/caregiver demonstrates understanding of disease process, treatment plan, medications, and discharge instructions  Description  Complete learning assessment and assess knowledge base    Interventions:  - Provide teaching at level of understanding  - Provide teaching via preferred learning methods  Outcome: Progressing     Problem: NEUROSENSORY - ADULT  Goal: Achieves stable or improved neurological status  Description  INTERVENTIONS  - Monitor and report changes in neurological status  - Monitor vital signs such as temperature, blood pressure, glucose, and any other labs ordered   - Initiate measures to prevent increased intracranial pressure  - Monitor for seizure activity and implement precautions if appropriate      Outcome: Progressing     Problem: METABOLIC, FLUID AND ELECTROLYTES - ADULT  Goal: Electrolytes maintained within normal limits  Description  INTERVENTIONS:  - Monitor labs and assess patient for signs and symptoms of electrolyte imbalances  - Administer electrolyte replacement as ordered  - Monitor response to electrolyte replacements, including repeat lab results as appropriate  - Instruct patient on fluid and nutrition as appropriate  Outcome: Progressing  Goal: Fluid balance maintained  Description  INTERVENTIONS:  - Monitor labs   - Monitor I/O and WT  - Instruct patient on fluid and nutrition as appropriate  - Assess for signs & symptoms of volume excess or deficit  Outcome: Progressing     Problem: Nutrition/Hydration-ADULT  Goal: Nutrient/Hydration intake appropriate for improving, restoring or maintaining nutritional needs  Description  Monitor and assess patient's nutrition/hydration status for malnutrition  Collaborate with interdisciplinary team and initiate plan and interventions as ordered  Monitor patient's weight and dietary intake as ordered or per policy  Utilize nutrition screening tool and intervene as necessary  Determine patient's food preferences and provide high-protein, high-caloric foods as appropriate       INTERVENTIONS:  - Monitor oral intake, urinary output, labs, and treatment plans  - Assess nutrition and hydration status and recommend course of action  - Evaluate amount of meals eaten  - Assist patient with eating if necessary   - Allow adequate time for meals  - Recommend/ encourage appropriate diets, oral nutritional supplements, and vitamin/mineral supplements  - Order, calculate, and assess calorie counts as needed  - Recommend, monitor, and adjust tube feedings and TPN/PPN based on assessed needs  - Assess need for intravenous fluids  - Provide specific nutrition/hydration education as appropriate  - Include patient/family/caregiver in decisions related to nutrition  Outcome: Progressing

## 2020-04-13 NOTE — ASSESSMENT & PLAN NOTE
Developed rapid a fib HR reaching 180's  Not on anticoagulation due to frequency of falls  Cardiology input appreciated, continue metoprolol, diltiazem added to therapy

## 2020-04-13 NOTE — ASSESSMENT & PLAN NOTE
Patient presents with multiple falls, and altered mental status   Son reports patient is typically alert and oriented at baseline, lives alone with 18 hour care from home health aides, patient does have history of dementia, depression and schizoaffective disorder   Continue psychotropic medications at this time  Monitor mental status closely  Due to multiple sick contacts positive for COVID-19 including her caregiver and her son, COVID-19 PCR obtained in ER  She is afebrile, leukocytosis is mild, chest x-ray is normal   If positive will initiate treatment and further workup    Contact precautions  IV fluid hydration

## 2020-04-13 NOTE — UTILIZATION REVIEW
Initial Clinical Review    Admission: Date/Time/Statement:   Admission Orders (From admission, onward)     Ordered        04/10/20 1938  Inpatient Admission  Once                   Orders Placed This Encounter   Procedures    Inpatient Admission     Standing Status:   Standing     Number of Occurrences:   1     Order Specific Question:   Admitting Physician     Answer:   Milana Olszewski [[id number]]     Order Specific Question:   Level of Care     Answer:   Med Surg [[id number]]     Order Specific Question:   Estimated length of stay     Answer:   More than 2 Midnights     Order Specific Question:   Certification     Answer:   I certify that inpatient services are medically necessary for this patient for a duration of greater than two midnights  See H&P and MD Progress Notes for additional information about the patient's course of treatment  ED Arrival Information     Expected Arrival Acuity Means of Arrival Escorted By Service Admission Type    - 4/10/2020 16:46 Urgent Ambulance [facility] ([address]) General Medicine Urgent    Arrival Complaint    Fever        Chief Complaint   Patient presents with   Erin Haley Medical Problem     per ems patient has had increasing falls  patient with bruising on face  patient has been around covid + caretakers  patient unable to communicate with ED staff or video  from [employer]  family stated to medics that patient has stated lost sense of taste and also c/o low back pain  Assessment/Plan: 79 yo female w/hx cognitive impairment, hard of hearing, schizophrenia, a fib, htn to ED from home by EMS admitted as inpatient due to Marianna Gonzalez 19, OMEGA, acute encephalopathy, and ambulatory dysfunction  Presented after multiple falls, low back pain, and loss of taste  Lives with son who is hospitalized due to Marianna Gonzalez, and caregiver who was exposed  Neither is able to care for her  Tele visit with PCP on 4/8 due to anxiety after son admitted    Exam reveals ability to follow commands, no verbal responses, multiple body bruises  Spoke w/son, he noted pt has cold like sx and voice change  Imaging negative, EKG nsr  IVF, COVID testing in progress with contact & airborne isolation  Holding losartan  4/11: confused & agitated, COVID testing came back positive  Lung sounds are normal  Afebrile  Requires PT/OT eval once medical condition has stabilized  Anticipating difficulty disposition due to living in an apartment with both caregivers currently sick       ED Triage Vitals   Temperature Pulse Respirations Blood Pressure SpO2   04/10/20 1658 04/10/20 1658 04/10/20 1658 04/10/20 1658 04/10/20 1658   98 5 °F (36 9 °C) 80 18 150/68 99 %      Temp Source Heart Rate Source Patient Position - Orthostatic VS BP Location FiO2 (%)   04/10/20 1658 04/10/20 1658 04/10/20 1658 04/10/20 1658 --   Oral Monitor Lying Right arm       Pain Score       04/11/20 0930       No Pain          Wt Readings from Last 1 Encounters:   04/10/20 55 kg (121 lb 4 1 oz)     Additional Vital Signs:   Date/Time  Temp  Pulse  Resp  BP  MAP (mmHg)  SpO2  O2 Device  Patient Position - Orthostatic VS   04/11/20 10:27:52  98 °F (36 7 °C)  84  17  149/98  115  99 %       04/11/20 00:48:30  98 8 °F (37 1 °C)  84  22  154/77  103  99 %  None (Room air)     04/11/20 00:03:21        127/105Abnormal   112         04/10/20 2145    72  18  157/66  95  98 %  None (Room air)     04/10/20 1930    84  18  150/65  94  98 %  None (Room air)  Lying       Pertinent Labs/Diagnostic Test Results:   4/10 PCXR: nothing acute  4/10 CT head: nothing acute  4/10 CT c spine: no fx  4/10 CT L spine: no fx, severe scoliosis  4/10 EKG: Sinus rhythm with Premature atrial complexes;   QT Interval ms 366    QTC Interval ms 417        Results from last 7 days   Lab Units 04/10/20   Last Resulted: 04/10/20 23:36  1916   SARS-COV-2  Positive*     Pending tests:  04/12/20 0600  CBC and differential Morning draw        04/12/20 2150  Basic metabolic panel Morning draw        04/11/20 1110  Creatinine, urine, random Once        04/11/20 1110  Sodium, urine, random Once        04/11/20 1110  Urea nitrogen, urine Once        04/11/20 1110  Osmolality, urine Once        04/11/20 0659  Interleukin-6,Serum Once        04/11/20 0658  Folate Once        04/11/20 0658  Procalcitonin Once        04/11/20 0657  Vitamin B12 Once        04/11/20 0656  Vitamin D 1,25 dihydroxy Once        04/11/20 0656  Vitamin B1, whole blood Once        04/10/20 2219  Platelet count (IP MEDS HEPARIN/CBC DVT/VTE PANEL) Once     "And" Linked Group Details        Results from last 7 days   Lab Units 04/12/20  0608 04/11/20  1044 04/10/20  1734   WBC Thousand/uL 5 72 9 74 10 43*   HEMOGLOBIN g/dL 12 4 13 7 13 4   HEMATOCRIT % 36 8 41 1 39 0   PLATELETS Thousands/uL 233 264 249   NEUTROS ABS Thousands/µL 4 19 8 59* 8 76*         Results from last 7 days   Lab Units 04/13/20  0604 04/12/20  0608 04/11/20  1044 04/10/20  1734   SODIUM mmol/L 140 141 135* 132*   POTASSIUM mmol/L 3 6 2 9* 3 3* 4 0   CHLORIDE mmol/L 105 106 98* 94*   CO2 mmol/L 23 20* 23 23   ANION GAP mmol/L 12 15* 14* 15*   BUN mg/dL 20 20 21 33*   CREATININE mg/dL 0 91 0 78 1 15 1 44*   EGFR ml/min/1 73sq m 57 68 43 32   CALCIUM mg/dL 8 9 8 0* 9 3 9 9   MAGNESIUM mg/dL 2 0  --   --   --      Results from last 7 days   Lab Units 04/11/20  1044 04/10/20  1734   AST U/L 77* 36   ALT U/L 31 15   ALK PHOS U/L 94 90   TOTAL PROTEIN g/dL 8 0 7 8   ALBUMIN g/dL 4 5 4 6   TOTAL BILIRUBIN mg/dL 1 34* 1 48*         Results from last 7 days   Lab Units 04/13/20  0604 04/12/20  0608 04/11/20  1044 04/10/20  1734   GLUCOSE RANDOM mg/dL 125 115 101 121       Results from last 7 days   Lab Units 04/10/20  1734   TROPONIN I ng/mL <0 02         Results from last 7 days   Lab Units 04/10/20  1734   PROTIME seconds 13 1   INR  0 98   PTT seconds 30     Results from last 7 days   Lab Units 04/10/20  1734   NT-PRO BNP pg/mL 882*       Results from last 7 days   Lab Units 04/10/20  1855 04/10/20  1852   CLARITY UA   --  Clear   COLOR UA  [place]ale[place] Yellow   SPEC GRAV UA   --  1 015   PH UA   --  7 0   GLUCOSE UA mg/dl  --  Negative   KETONES UA mg/dl  --  Negative   BLOOD UA   --  Trace*   PROTEIN UA mg/dl  --  Negative   NITRITE UA   --  Negative   BILIRUBIN UA   --  Negative   UROBILINOGEN UA E U /dl  --  0 2   LEUKOCYTES UA   --  Trace*   WBC UA /hpf  --  0-1*   RBC UA /hpf  --  0-1*   BACTERIA UA /hpf  --  Occasional   EPITHELIAL CELLS WET PREP /hpf  --  Moderate*   SODIUM UR   --  40   CREATININE UR mg/dL  --  69 4     ED Treatment:   Medication Administration from 04/10/2020 1645 to 04/10/2020 2225       Date/Time Order Dose Route Action     04/10/2020 1839 sodium chloride 0 9 % bolus 1,000 mL 1,000 mL Intravenous New Bag        Past Medical History:   Diagnosis Date    Anxiety     Cognitive impairment     Dementia ([facility] )     Depression     Hallucination     Hyperlipidemia     Hypertension     Memory loss     Psychiatric illness     Psychosis ([facility]     Schizoaffective disorder ([facility] )     Sleep difficulties      Present on Admission:   Acute encephalopathy   Anxiety   Rapid atrial fibrillation (HCC)   Fall   Hyperlipidemia   Essential hypertension   (Resolved) Hyponatremia   Orthostatic hypertension   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HCC)      Admitting Diagnosis: Low back pain [M54 5]  Fever [R50 9]  OMEGA (acute kidney injury) ([facility] ) [N17 9]  Frequent falls [R29 6]  Injury of head, initial encounter [S09 90XA]  Suspected Covid-19 Virus Infection [R68 89]  Age/Sex: 80 y o  female  Admission Orders:  Scheduled Medications:    Medications:  clonazePAM 0 5 mg Oral HS   diltiazem 30 mg Oral Q6H LYNDSEY   DULoxetine 30 mg Oral Daily   heparin (porcine) 5,000 Units Subcutaneous Q8H [address]   metoprolol succinate 100 mg Oral Daily   OLANZapine 5 mg Oral HS   senna 1 tablet Oral Daily   Continuous IV Infusions:    sodium chloride 50 mL/hr Intravenous Continuous     PRN Meds:    acetaminophen 650 mg Oral Q6H PRN   aluminum-magnesium hydroxide-simethicone 30 mL Oral Q6H PRN       Restraints to avoid self injury  SCD's      Network Utilization Review Department  [email or web address]  ATTENTION: Please call with any questions or concerns to [phone] and carefully listen to the prompts so that you are directed to the right person  All voicemails are confidential   Myke Briceno all requests for admission clinical reviews, approved or denied determinations and any other requests to dedicated fax number below belonging to the campus where the patient is receiving treatment   List of dedicated fax numbers for the Facilities:  [address] DENIALS (Administrative/Medical Necessity) [phone]   [address] (Maternity/NICU/Pediatrics) [phone]   Godwin Ha [phone]     [address] [phone]   Salvatore Roman [phone]   Carissa Suggs [phone]   [address] [place] [address] [phone]   [facility]  [phone]   [address] [facility]  [address] [facility] [address] [facility] [phone]

## 2020-04-13 NOTE — PLAN OF CARE
Problem: OCCUPATIONAL THERAPY ADULT  Goal: Performs self-care activities at highest level of function for planned discharge setting  See evaluation for individualized goals  Description  Treatment Interventions: ADL retraining, Functional transfer training, Cognitive reorientation, Equipment evaluation/education, Patient/family training, Endurance training, UE strengthening/ROM, Compensatory technique education, Energy conservation, Activityengagement          See flowsheet documentation for full assessment, interventions and recommendations  Note:   Limitation: Decreased ADL status, Decreased UE strength, Decreased Safe judgement during ADL, Decreased cognition, Decreased endurance, Decreased high-level ADLs, Decreased self-care trans, Decreased sensation  Prognosis: Fair  Assessment: Pt is a 80 y o  female seen for OT evaluation s/p admit to [facility] on [facility]/10[facility]2020 w/ Ambulatory dysfunction and fall, cold like symptoms  Ct head and spine (-) CT lumbar spine- negative for fracture but show small posterior disc bulge L4-L5, L5-S1  Pt currently w/ soft wrist restraints and in place end of session  Comorbidities affecting pt's functional performance at time of assessment include: COVID-19, anxiety, HTN, a-fib, residual schizophrenia, OMEGA, encephalopathy, h/o Dementia  Personal factors affecting pt at time of IE include: pt son and 2 caregivers are also positive w/ COVID-19 and unable to assist her  Prior to admission: lives alone w/ caregivers from 9am-6pm to assist w/ LB ADLS, supervision w/ toileting, supervision w/ functional transfers and mobility w/ Rw, assist w/ IADLs   Upon evaluation: Pt requires MOD assist x2 supine>sit bed mobility w/ increased time to complete, MIN assist x2 sit<>stand w/ VCs for hand placement and positioning, MIN assist x2 functional mobility w/ sidestepping to [facility], MAX assist LB ADLs, MOD assist UB ADLs, MAX assist toileting 2* the following deficits impacting occupational performance: decreased strength and endurance, impaired balance, impaired activity tolerance, impaired cognition (impaired insight and safety awareness, decreased STM), multiple lines, [place] speaking only, tachycardia, dyspnea on exertion  Pt to benefit from continued skilled OT tx while in the hospital to address deficits as defined above and maximize level of functional independence w ADL's and functional mobility  Occupational Performance areas to address include: grooming, bathing/shower, toilet hygiene, dressing, health maintenance, functional mobility, clothing management, cleaning and meal prep, formal cognitive assessment, EC education  From OT standpoint, recommendation at time of d/c would be short term rehab       OT Discharge Recommendation: Short Term Rehab  OT - OK to Discharge: (to rehab when medically stable)

## 2020-04-13 NOTE — OCCUPATIONAL THERAPY NOTE
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[facility] )    Past Medical History  Past Medical History:   Diagnosis Date    Anxiety     Cognitive impairment     Dementia ([facility] )     Depression     Hallucination     Hyperlipidemia     Hypertension     Memory loss     Psychiatric illness     Psychosis [facility]     [facility] disorder ([facility] )     Sleep difficulties      Past Surgical History  Past Surgical History:   Procedure Laterality Date    APPENDECTOMY      BLADDER SURGERY      CHOLECYSTECTOMY      TOTAL ABDOMINAL HYSTERECTOMY W/ BILATERAL SALPINGOOPHORECTOMY      TOTAL KNEE ARTHROPLASTY Left              04/13/20 1218   Note Type   Note type Eval/Treat   Restrictions/Precautions   Weight Bearing Precautions Per Order No   Other Precautions Contact/isolation; Airborne/isolation;Cognitive; Chair Alarm; Bed Alarm; Fall Risk;Pain;Restraints  (soft wrist restraints, [place]er speaking only)   Pain Assessment   Pain Assessment Tool 0-10   Pain Score No Pain   Home Living   Type of Home Apartment   Home Layout One level;Elevator   Bathroom Shower/Tub Tub/shower unit   Bathroom Toilet Standard   Bathroom Equipment Shower chair;Grab bars in shower   [address] Walker;Cane   Additional Comments pt poor historian and [place] speaking only; information taken from chart previously; pt has homeaides 9am-6pm and then home alone at night   Prior Function   Level of [place] Independent with ADLs and functional mobility; Needs assistance with IADLs; Needs assistance with ADLs and functional mobility   Receives Help From Personal care attendant   ADL Assistance Needs assistance   IADLs Needs assistance   Falls in the last 6 months 1 to 4   Vocational Retired   Comments per chart pt has 2 caregivers and one has COVID and the other was exposed and pt son recently w/ COVID   Lifestyle   Autonomy per chart pt independent w/ grooming and UB ADLS, assist w/ LB ADLS and bathing, supervision mobility w/ RW, assist w/ IADLS   Reciprocal Relationships son and caregivers   Service to Others retired    Intrinsic Gratification watching tv   ADL   Where Assessed Chair   Eating Assistance 5  [address] [facility] 4  [address] [facility] 3  Moderate Assistance   LB [address] 2  [address] 3  Moderate Assistance   [address] 2  [address]  2  [address]; Increased time to complete;Clothing management up;Clothing management down;Perineal hygiene   Additional Comments incontinent of bowel upon arrival   Bed Mobility   Rolling R 3  Moderate assistance   Additional items Assist x 1; Increased time required;Verbal cues;LE management; Bedrails   Rolling L 3  Moderate assistance   Additional items Assist x 1; Increased time required;LE management;Verbal cues; Bedrails   Supine to Sit 3  Moderate assistance   Additional items Assist x 2; Increased time required;Verbal cues;LE management; Bedrails;HOB elevated   Sit to Supine 3  Moderate assistance   Additional items Assist x 2; Increased time required;Verbal cues;LE management; Bedrails   Additional Comments increased time to complete; cues for safety   Transfers   Sit to Stand 4  Minimal assistance   Additional items Assist x 2; Increased time required;Verbal cues;Armrests   Stand to Sit 4  Minimal assistance   Additional items Assist x 2; Increased time required;Armrests; Verbal cues   Additional Comments pt w/ min assist steadying assist support w/ RW while 2nd person completed hygiene cleanup w/ MAX assist    Functional Mobility   Functional Mobility 4  Minimal assistance   Additional Comments assist x2 side steps to [facility]   Additional items Rolling walker   Balance   Static Sitting Fair -   Dynamic Sitting Fair -   Static Standing Poor +   Dynamic Standing Poor   Ambulatory Poor   Activity Tolerance   Activity Tolerance Patient limited by fatigue;Treatment limited secondary to medical complications (Comment)  (tachycardia)   Medical Staff Made Aware PT [facility]   Nurse Made Aware appropriate to see per Arianne DEJESUS Assessment   RUE Assessment WFL  (grossly 3+/5)   LUE Assessment   LUE Assessment WFL  (grossly 3+/5)   Hand Function   Gross Motor Coordination Functional   Fine Motor Coordination Functional   Sensation   Light Touch No apparent deficits   Proprioception   Proprioception No apparent deficits   Vision-Basic Assessment   Current Vision No visual deficits   Vision - Complex Assessment   Ocular Range of Motion WFL   Acuity Able to read clock/calendar on wall without difficulty   Cognition   Overall Cognitive Status Impaired   Arousal/Participation Responsive; Cooperative   Attention Difficulty attending to directions   Orientation Level Oriented to person;Disoriented to situation;Disoriented to time;Disoriented to place   Memory Unable to assess   Following Commands Follows one step commands with increased time or repetition   Comments pt primarily [place] citizen speaking, difficulty understanding simple commands in [place] citizen cues for safety awareness; Dementia At baseline   Assessment   Limitation Decreased ADL status; Decreased UE strength;Decreased Safe judgement during ADL;Decreased cognition;Decreased endurance;Decreased high-level ADLs; Decreased self-care trans;Decreased sensation   Prognosis Fair   Assessment Pt is a 80 y o  female seen for OT evaluation s/p admit to [facility] on 4/10/2020 w/ Ambulatory dysfunction and fall, cold like symptoms    Ct head and spine (-) CT lumbar spine- negative for fracture but show small posterior disc bulge L4-L5, L5-S1  Pt currently w/ soft wrist restraints  Comorbidities affecting pt's functional performance at time of assessment include: COVID-19, anxiety, HTN, a-fib, residual schizophrenia, OMEGA, encephalopathy, h/o Dementia  Personal factors affecting pt at time of IE include: pt son and 2 caregivers are also positive w/ COVID-19 and unable to assist her  Prior to admission: lives alone w/ caregivers from 9am-6pm to assist w/ LB ADLS, supervision w/ toileting, supervision w/ functional transfers and mobility w/ Rw, assist w/ IADLs  Upon evaluation: Pt requires MOD assist x2 supine>sit bed mobility w/ increased time to complete, MIN assist x2 sit<>stand w/ VCs for hand placement and positioning, MIN assist x2 functional mobility w/ sidestepping to [facility], MAX assist LB ADLs, MOD assist UB ADLs, MAX assist toileting 2* the following deficits impacting occupational performance: decreased strength and endurance, impaired balance, impaired activity tolerance, impaired cognition (impaired insight and safety awareness, decreased STM), multiple lines, [place] speaking only, tachycardia, dyspnea on exertion  Pt to benefit from continued skilled OT tx while in the hospital to address deficits as defined above and maximize level of functional independence w ADL's and functional mobility  Occupational Performance areas to address include: grooming, bathing/shower, toilet hygiene, dressing, health maintenance, functional mobility, clothing management, cleaning and meal prep, formal cognitive assessment, EC education  From OT standpoint, recommendation at time of d/c would be short term rehab  Goals   Patient Goals none expressed at this time   LTG Time Frame 10-14   Long Term Goal please see below goals   Plan   Treatment Interventions ADL retraining;Functional transfer training;Cognitive reorientation;Equipment evaluation/education;Patient/family training; Endurance training;UE strengthening/ROM; Compensatory technique education; Energy conservation; Activityengagement   Goal Expiration Date 04/27/20   OT Frequency 3-5x/wk   Recommendation   OT Discharge Recommendation Short Term Rehab   OT - OK to Discharge   (to rehab when medically stable)   Barthel Index   Feeding 5   Bathing 0   Grooming Score 0   Dressing Score 5   Bladder Score 0   Bowels Score 0   Toilet Use Score 5   Transfers (Bed/Chair) Score 5   Mobility (Level Surface) Score 0   Stairs Score 0   Barthel Index Score 20   Modified [place] Scale   Modified [place] Scale 4     Occupational Therapy Goals to be met in 10-14 days:  1) Pt will improve activity tolerance to G for 30 min txment sessions to enhance ADLs  2) Pt will complete UB ADLs/self care w/ setup and min assist LB ADLs  3) Pt will complete toileting w/ min assist w/ G hygiene/thoroughness using DME PRN  4) Pt will improve functional transfers on/off all surfaces using DME PRN w/ G balance/safety including toileting w/ supervision  5) Pt will improve fx'l mobility during I/ADl/leisure tasks using DME PRN w/ g balance/safety w/ supervision  6) Pt will engage in ongoing cognitive assessment w/ G participation to A w/ safe d/c planning/recommendations  7) Pt will demonstrate G carryover of pt/caregiver education and training as appropriate w/ mod I  w/ G tolerance  8) Pt will engage in depression screen/leisure interest checklist w/ G participation to monitor s/s depression and ID 3 positive coping strategies to A w/ emotional regulation and management  9) Pt will demonstrate 100% carryover of E C  techniques w/ mod I t/o fx'l I/ADL/leisure tasks w/o cues s/p skilled education  10) Pt will demonstrate improved bed mobility to supervision to enhance ADLs  11) Pt will demonstrate improved dynamic standing balance to Fair + during functional tasks  to enhance ADL performance  11) Pt will demonstrate improved b/l UE strength by 1 MMT grade to enhance ADLS and functional transfers     Documentation completed by: Constantin Swift MS, OTR/L

## 2020-04-13 NOTE — ASSESSMENT & PLAN NOTE
Detected 4/10  Not on treatment - will d/w ID if role to initiate  No oxygen requirements  Monitor CBC, CMP, obtain inflammatory markers

## 2020-04-13 NOTE — ASSESSMENT & PLAN NOTE
Due to episode of rapid afib today, amlodipine discontinued and Cardizem added to therapy, metoprolol dosing increased  Monitor BP trend

## 2020-04-13 NOTE — PLAN OF CARE
Problem: PHYSICAL THERAPY ADULT  Goal: Performs mobility at highest level of function for planned discharge setting  See evaluation for individualized goals  Description  Treatment/Interventions: Functional transfer training, LE strengthening/ROM, Therapeutic exercise, Endurance training, Cognitive reorientation, Patient/family training, Equipment eval/education, Bed mobility, Gait training, Continued evaluation, Spoke to nursing, OT          See flowsheet documentation for full assessment, interventions and recommendations  Note:   Prognosis: Fair  Problem List: Decreased strength, Decreased endurance, Impaired balance, Decreased mobility, Decreased cognition, Impaired judgement, Decreased safety awareness, Decreased skin integrity  Assessment: Warren Smith is a 80 y o  female admitted to [facility] on 4/10/2020 for Ambulatory dysfunction  +covid  Pt  has a past medical history of Anxiety, Cognitive impairment, Dementia ([facility] ), Depression, Hallucination, Hyperlipidemia, Hypertension, Memory loss, Psychiatric illness, Psychosis ([facility] ), [facility] ([facility] ), and Sleep difficulties    PT was consulted and pt was seen on 4/13/2020 for mobility assessment and d/c planning  Pt presents high fall risk, contact and airborne precautions, PIV, telemetry, soft restraints, monitoring abn vitals (tachycardic)  Pt is currently functioning at a moderate assistance x2 level for bed mobility, minimum assistance x2 level for transfers  ? Impaired cognition compared to baseline vs additional impact of language barrier  Pt mildly tachycardic in supine position, upon standing EOB pt w increased episodes of tachycardia fluctuating to 150s and O2 desat to 70s (?accuracy given location of monitor)  Pt will benefit from continued skilled IP PT to address the above mentioned impairments  in order to maximize recovery and increase functional independence when completing mobility and ADLs   At this time PT recommendations for d/c are STR v home w family support, 24/7 supervision and HHPT  Barriers to Discharge: Inaccessible home environment, Decreased caregiver support  Barriers to Discharge Comments: per chart review caregivers unwell and unable to A, decreased mobility  Recommendation: Short-term skilled PT, Home with family support, 24 hour supervision/assist, Home PT(STR v home w 24/7 sup and HHPT)     PT - OK to Discharge: Yes    See flowsheet documentation for full assessment

## 2020-04-13 NOTE — NURSING NOTE
Pt 's on monitor  On assessment asymptomatic  Dr Sarita Amaral notified  Pt given 5mg IV Metoprolol  's  Second dose of 5Mg IV metoprolol given at 1039 along with 30mg PO cardizem  Heart rate in 80's  Addendum: 0453 HR  at rest  HR increase to 150's with activity  Will continue to monitor

## 2020-04-13 NOTE — CONSULTS
Consult - Cardiology   Matti Flor 80 y o  female MRN: [id number]  Unit/Bed#: [facility] 2 -01 Encounter: [id number]        Reason For Consult: SVT vs Rapid atrial fibrillation                 ASSESSMENT:  1  Tachycardia, probably rapid A-fib, maybe SVT:    SVT? Too quick to tell if fib vs svt      After 5 mg IV Lopressor- atrial fibrillation      2  Acute CoVid 19 infection:   -fortunately oxygenating well on room air   -she came to the hospital for unrelated reasons   -she got tested due to multiple sick contacts    3  History of paroxysmal atrial fibrillation:   -no pre-hospital AC due to frequent falls   -rate control with metoprolol succinate 100 mg daily    4  Frequent falls, ambulatory dysfunction:   -primary reason she came to the ED    5  Dementia, psychiatric issues (psychosis?)    6  TTE 04/2019:   -LVEF 70%, hyperdynamic function   -mildly dilated left atrium   -moderate aortic regurgitation    7  Holter monitor 48 hours 04/2019:   -prominently NSR   -No SVT, atrial fibrillation, or atrial flutter   -brief SVT, longest 3 beats      PLAN/ DISCUSSION:     Her heart rate responded favorably to IV Lopressor decreasing down to the 120-130 range showing rapid atrial fibrillation  She does have a history of paroxysmal atrial fibrillation  She is Co-Vid 19 positive however came to the hospital mostly for unrelated reasons (frequent falls, ambulatory dysfunction)    1  Agree with IV Lopressor (5 mg) this morning and give her usual dose of metoprolol succinate (100 mg)    2  Will keep an eye on telemetry over the next hour and if she does not respond well will start Cardizem 30 mg Q 6    3  Pending heart rate trend will consider adding an evening dose of metoprolol succinate    4  No anticoagulation due to frequent falls    5   Increase parameter for amlodipine (hold if SBP < 130) to keep her BP a little bit higher so she can tolerate increase AV jostin blocking medications    *due to the coronavirus epidemic the patient was not personally examined by me today in an effort to conserve PPE  Case was discussed with hospitalist and RN*    History Of Present Illness: This is an 60-year-old female with a cardiac past medical history significant for hypertension, dyslipidemia, and paroxysmal atrial fibrillation  In April of 2019 she was seen in consultation by Dr Mary Baker for newly discovered atrial fibrillation on a routine echocardiogram at her primary care office in March 2019  She was for a brief period of time put on anticoagulation  She has for the most part been maintaining sinus rhythm since her diagnosis  She did have a Holter monitor which showed no AFib  She has had multiple EKGs which up until this hospitalization showed normal sinus rhythm with frequent PACs  She has some psychiatric issues, history of dementia, and frequent falls  For all of these reasons she was taken off of anticoagulation due to bleeding risk by her primary care physician  She has not followed up with Cardiology  This patient was brought to the hospital on April 10, 2020 via ambulance  It is unclear who actually called the ambulance  Communication seems to be limited as the patient is English-speaking only, has dementia, and has some underlying psychiatric issues  According to charts she has been having increasing falls at home and multiple family members and caregivers have tested positive for CoVid 19  Out of concern that she is falling and has been around a lot of sick contacts someone (family?)  Called the ambulance to go to the hospital   On the morning of April 13th 2020 she went into rapid atrial fibrillation for which we are asked to see her in consultation  She is reportedly not symptomatic from this      *HPI was constructed from charts and communication with hospitalist*    Past Medical History:        Past Medical History:   Diagnosis Date    Anxiety     Cognitive impairment     Dementia [facility] [address] [facility]     Depression     Hallucination     Hyperlipidemia     Hypertension     Memory loss     Psychiatric illness     Psychosis ([facility]     Schizoaffective disorder ([facility] [facility]     Sleep difficulties       Past Surgical History:   Procedure Laterality Date    APPENDECTOMY      BLADDER SURGERY      CHOLECYSTECTOMY      TOTAL ABDOMINAL HYSTERECTOMY W/ BILATERAL SALPINGOOPHORECTOMY      TOTAL KNEE ARTHROPLASTY Left         Allergy:        No Known Allergies    Medications:       Prior to Admission medications    Medication Sig Start Date End Date Taking?  Authorizing Provider   amLODIPine (NORVASC) 10 mg tablet TAKE 1 TABLET (10 MG TOTAL) BY MOUTH DAILY 2/4/20   Slava Perez MD   clonazePAM (KlonoPIN) 0 5 mg tablet Take 1 tablet (0 5 mg total) by mouth daily at bedtime 3/20/19   Milly Meadows MD   clonazePAM (KlonoPIN) 0 5 mg tablet Take 1 tablet (0 5 mg total) by mouth daily at bedtime for 5 days 10/19/19 10/24/19  Slava Perez MD   DULoxetine (CYMBALTA) 30 mg delayed release capsule Take 1 capsule (30 mg total) by mouth daily 10/18/18   Slava Perez MD   losartan (COZAAR) 100 MG tablet TAKE 1 TABLET (100 MG TOTAL) BY MOUTH DAILY 12/12/19   Slava Perez MD   metoprolol succinate (TOPROL-XL) 100 mg 24 hr tablet TAKE 1 TABLET (100 MG TOTAL) BY MOUTH DAILY 2/4/20   Slava Perez MD   multivitamin-iron-minerals-folic acid (CENTRUM) chewable tablet Chew 1 tablet daily    Historical Provider, MD   OLANZapine (ZyPREXA) 5 mg tablet Take 5 mg by mouth daily at bedtime    Historical Provider, MD   polyethylene glycol (GLYCOLAX) powder Take 17 g by mouth daily 2/4/20 3/5/20  Slava Perez MD   senna-docusate sodium (SENOKOT-S) 8 6-50 mg per tablet Take 1 tablet by mouth daily 2/4/20 3/5/20  Slava Perez MD       Family History:     Family History   Problem Relation Age of Onset    Heart disease Mother         Cardiac disorder    Parkinsonism Father     Heart disease Sister         Cardiac disorder    Hypertension Sister    [facility] Diabetes Child         Diabetes Mellitus    Lung disease Child         Respiratory Disorder    Diabetes Son         Diabetes Mellitus        Social History:       Social History     Socioeconomic History    Marital status:      Spouse name: None    Number of children: None    Years of education: None    Highest education level: None   Occupational History    None   Social Needs    Financial resource strain: None    Food insecurity:     Worry: None     Inability: None    Transportation needs:     Medical: None     Non-medical: None   Tobacco Use    Smoking status: Never Smoker    Smokeless tobacco: Never Used    Tobacco comment: Former smoker per [employer]   Substance and Sexual Activity    Alcohol use: No    Drug use: No    Sexual activity: None   Lifestyle    Physical activity:     Days per week: None     Minutes per session: None    Stress: None   Relationships    Social connections:     Talks on phone: None     Gets together: None     Attends [employer] service: None     Active member of club or organization: None     Attends meetings of clubs or organizations: None     Relationship status: None    Intimate partner violence:     Fear of current or ex partner: None     Emotionally abused: None     Physically abused: None     Forced sexual activity: None   Other Topics Concern    None   Social History Narrative    Drinks coffee       ROS:  Review of systems was not performed for the reasons mentioned above under assessment plan    Exam:  Deferred    DATA:      ECG:      Atrial fibrillation with rapid ventricular response                 Telemetry: Atrial fibrillation  -170  See telemetry strips above          Echocardiogram:         April 2019:  SUMMARY     LEFT VENTRICLE:  Systolic function was hyperdynamic  Ejection fraction was estimated to be 72 %    There were no regional wall motion abnormalities      LEFT ATRIUM:  The atrium was mildly dilated      AORTIC VALVE:  There was mild to moderate regurgitation      HISTORY: PRIOR HISTORY: HTN, AFIB, HLD, Dementia, fam Hx, former smoker  HTN, AFIB, HLD, dementia, fam hx, former smoker      PROCEDURE: The study was performed in the [address] [place] [address]  This was a routine study  The transthoracic approach was used  The study included complete 2D imaging, M-mode, complete spectral Doppler, and color Doppler  The  heart rate was 74 bpm, at the start of the study  Images were obtained from the parasternal, apical, subcostal, and suprasternal notch acoustic windows  Echocardiographic views were limited due to poor patient compliance, high windows, and  lung interference  This was a technically difficult study      LEFT VENTRICLE: Size was normal  Systolic function was hyperdynamic  Ejection fraction was estimated to be 72 %  There were no regional wall motion abnormalities  Wall thickness was normal  DOPPLER: There was an increased relative  contribution of atrial contraction to ventricular filling  The deceleration time of the early transmitral flow velocity was increased      RIGHT VENTRICLE: The size was normal  Systolic function was normal  Wall thickness was normal      LEFT ATRIUM: The atrium was mildly dilated      RIGHT ATRIUM: Size was normal      MITRAL VALVE: There was annular calcification  DOPPLER: There was no evidence for stenosis  There was no significant regurgitation      AORTIC VALVE: The valve was trileaflet  Leaflets exhibited mildly increased thickness  DOPPLER: There was no evidence for stenosis  There was mild to moderate regurgitation      TRICUSPID VALVE: The valve structure was normal  There was normal leaflet separation  DOPPLER: The transtricuspid velocity was within the normal range  There was no evidence for stenosis  There was no significant regurgitation      PULMONIC VALVE: Leaflets exhibited normal thickness, no calcification, and normal cuspal separation   DOPPLER: The transpulmonic velocity was within the normal range  There was no regurgitation      PERICARDIUM: There was no pericardial effusion  The pericardium was normal in appearance      AORTA: The root exhibited normal size      SYSTEMIC VEINS: IVC: The inferior vena cava was not well visualized      MEASUREMENT TABLES     2D MEASUREMENTS  Aorta   (Reference normals)  AAo AP diam   33 mm   (--)     SYSTEM MEASUREMENT TABLES     2D  Ao Diam: 3 2 cm  IVSd: 0 9 cm  LVIDd: 3 cm  LVIDs: 2 1 cm  LVPWd: 0 9 cm     CW  AR Dec [place]: 2 1 m/s2  AR Dec Time: 1370 1 ms  AR PHT: 397 3 ms  AR Vmax: 2 7 m/s  AR maxP mmHg     PW  MV A [place]: 0 9 m/s  MV Dec [place]: 2 5 m/s2  MV DecT: 217 5 ms  MV E [place]: 0 5 m/s  MV E/A Ratio: 0 6  MV PHT: 63 1 ms  MVA By PHT: 3 5 cm2         Weights: Wt Readings from Last 3 Encounters:   04/10/20 55 kg (121 lb 4 1 oz)   20 56 3 kg (124 lb 3 2 oz)   10/01/19 56 2 kg (124 lb)   , Body mass index is 22 91 kg/m²           Lab Studies:    Results from last 7 days   Lab Units 04/10/20  1734   TROPONIN I ng/mL <0 02          Results from last 7 days   Lab Units 20  0608 20  1044 04/10/20  1734   WBC Thousand/uL 5 72 9 74 10 43*   HEMOGLOBIN g/dL 12 4 13 7 13 4   HEMATOCRIT % 36 8 41 1 39 0   PLATELETS Thousands/uL 233 264 249   ,   Results from last 7 days   Lab Units 20  0604 20  0608 20  1044 04/10/20  1734   POTASSIUM mmol/L 3 6 2 9* 3 3* 4 0   CHLORIDE mmol/L 105 106 98* 94*   CO2 mmol/L 23 20* 23 23   BUN mg/dL 20 20 21 33*   CREATININE mg/dL 0 91 0 78 1 15 1 44*   CALCIUM mg/dL 8 9 8 0* 9 3 9 9   ALK PHOS U/L  --   --  94 90   ALT U/L  --   --  31 15   AST U/L  --   --  77* 36

## 2020-04-13 NOTE — SOCIAL WORK
CM called the patients son, Mandeep Forbes, to do a general SW assessment:     The patient lives alone in an apartment  She has HHA services from 9a-6p everyday, the son is not sure which HHA agency provides the services but provided the name and number for the woman who schedules the HHA to go out to the pts home  The patient receives assistance with all ADLs from 9a-6p  No VNA  NO hx of STR  Her PCP is Dr Candice Steen  She uses the [facility] on [address] for rx needs  No formal POA  Her son reports he is the only child, but that the patient is independent in making her own decisions  He would be her healthcare agent in the event that she were not able to make decisions for herself  No D&A history  The pt has a dx of schizophrenia, he reports one stay at [facility] ago for suicidal ideations  PT recommendations noted today to be "Short-term skilled PT; Home with family support;24 hour supervision/assist;Home PT"     CM attempted to discuss recommendations with the patients son, he notes "the best place my mom can be is in her apartment, her caretgivers are infected, so it won't make anyone any worse for them to come out to care for her once they are better " he reports requesting this from the [facility] provider - Baldo Shaw [phone]  CM called Dahlia Holter and left a  requesting a return call  About SNF he says "my mom is not meant to be in a place like that, the best thing for her is to return to her apartment "     The son was recently d/c from our facility with [address] and is still quarantined for approx 1wk and a half and on home O2 and new insulin  He is not able to assist as a caregiver to his mother at this time in the evening hours when there is no HHA in the home  CM notified him that we will leave all options for discharge open at this time and Cm will be in touch with Dahlia Holter  Cm following

## 2020-04-13 NOTE — PLAN OF CARE
Problem: Prexisting or High Potential for Compromised Skin Integrity  Goal: Skin integrity is maintained or improved  Description  INTERVENTIONS:  - Identify patients at risk for skin breakdown  - Assess and monitor skin integrity  - Assess and monitor nutrition and hydration status  - Monitor labs   - Assess for incontinence   - Turn and reposition patient  - Assist with mobility/ambulation  - Relieve pressure over bony prominences  - Avoid friction and shearing  - Provide appropriate hygiene as needed including keeping skin clean and dry  - Evaluate need for skin moisturizer/barrier cream  - Collaborate with interdisciplinary team   - Patient/family teaching  - Consider wound care consult   4/13/2020 1150 by Majo Toth RN  Outcome: Progressing  4/13/2020 1135 by Mjao Toth RN  Outcome: Progressing     Problem: Potential for Falls  Goal: Patient will remain free of falls  Description  INTERVENTIONS:  - Assess patient frequently for physical needs  -  Identify cognitive and physical deficits and behaviors that affect risk of falls    -  [place] fall precautions as indicated by assessment   - Educate patient/family on patient safety including physical limitations  - Instruct patient to call for assistance with activity based on assessment  - Modify environment to reduce risk of injury  - Consider OT/PT consult to assist with strengthening/mobility  4/13/2020 1150 by Majo Toth RN  Outcome: Progressing  4/13/2020 1135 by Majo Toth RN  Outcome: Progressing     Problem: PAIN - ADULT  Goal: Verbalizes/displays adequate comfort level or baseline comfort level  Description  Interventions:  - Encourage patient to monitor pain and request assistance  - Assess pain using appropriate pain scale  - Administer analgesics based on type and severity of pain and evaluate response  - Implement non-pharmacological measures as appropriate and evaluate response  - Consider cultural and social influences on pain and pain management  - Notify physician/advanced practitioner if interventions unsuccessful or patient reports new pain  4/13/2020 1150 by Fidel Serrano RN  Outcome: Progressing  4/13/2020 1135 by Fidel Serrano RN  Outcome: Progressing     Problem: INFECTION - ADULT  Goal: Absence or prevention of progression during hospitalization  Description  INTERVENTIONS:  - Assess and monitor for signs and symptoms of infection  - Monitor lab/diagnostic results  - Monitor all insertion sites, i e  indwelling lines, tubes, and drains  - Monitor endotracheal if appropriate and nasal secretions for changes in amount and color  - [place] appropriate cooling/warming therapies per order  - Administer medications as ordered  - Instruct and encourage patient and family to use good hand hygiene technique  - Identify and instruct in appropriate isolation precautions for identified infection/condition  4/13/2020 1150 by Fidel Serrano RN  Outcome: Progressing  4/13/2020 1135 by Fidel Serrano RN  Outcome: Progressing  Goal: Absence of fever/infection during neutropenic period  Description  INTERVENTIONS:  - Monitor WBC    4/13/2020 1150 by Fidel Serrano RN  Outcome: Progressing  4/13/2020 1135 by Fidel Serrano RN  Outcome: Progressing     Problem: SAFETY ADULT  Goal: Maintain or return to baseline ADL function  Description  INTERVENTIONS:  -  Assess patient's ability to carry out ADLs; assess patient's baseline for ADL function and identify physical deficits which impact ability to perform ADLs (bathing, care of mouth/teeth, toileting, grooming, dressing, etc )  - Assess/evaluate cause of self-care deficits   - Assess range of motion  - Assess patient's mobility; develop plan if impaired  - Assess patient's need for assistive devices and provide as appropriate  - Encourage maximum independence but intervene and supervise when necessary  - Involve family in performance of ADLs  - Assess for home care needs following discharge   - Consider OT consult to assist with ADL evaluation and planning for discharge  - Provide patient education as appropriate  4/13/2020 1150 by Candice Tompkins RN  Outcome: Progressing  4/13/2020 1135 by Candice Tompkins RN  Outcome: Progressing  Goal: Maintain or return mobility status to optimal level  Description  INTERVENTIONS:  - Assess patient's baseline mobility status (ambulation, transfers, stairs, etc )    - Identify cognitive and physical deficits and behaviors that affect mobility  - Identify mobility aids required to assist with transfers and/or ambulation (gait belt, sit-to-stand, lift, walker, cane, etc )  - [place] fall precautions as indicated by assessment  - Record patient progress and toleration of activity level on Mobility SBAR; progress patient to next Phase/Stage  - Instruct patient to call for assistance with activity based on assessment  - Consider rehabilitation consult to assist with strengthening/weightbearing, etc   4/13/2020 1150 by Candice Tompkins RN  Outcome: Progressing  4/13/2020 1135 by Candice Tompkins RN  Outcome: Progressing     Problem: DISCHARGE PLANNING  Goal: Discharge to home or other facility with appropriate resources  Description  INTERVENTIONS:  - Identify barriers to discharge w/patient and caregiver  - Arrange for needed discharge resources and transportation as appropriate  - Identify discharge learning needs (meds, wound care, etc )  - Arrange for interpretive services to assist at discharge as needed  - Refer to Case Management Department for coordinating discharge planning if the patient needs post-hospital services based on physician/advanced practitioner order or complex needs related to functional status, cognitive ability, or social support system  4/13/2020 1150 by Candice Tompkins RN  Outcome: Progressing  4/13/2020 1135 by Candice Tompkins RN  Outcome: Progressing     Problem: Knowledge Deficit  Goal: Patient/family/caregiver demonstrates understanding of disease process, treatment plan, medications, and discharge instructions  Description  Complete learning assessment and assess knowledge base  Interventions:  - Provide teaching at level of understanding  - Provide teaching via preferred learning methods  4/13/2020 1150 by Brittnee Abraham RN  Outcome: Progressing  4/13/2020 1135 by Brittnee Abraham RN  Outcome: Progressing     Problem: METABOLIC, FLUID AND ELECTROLYTES - ADULT  Goal: Electrolytes maintained within normal limits  Description  INTERVENTIONS:  - Monitor labs and assess patient for signs and symptoms of electrolyte imbalances  - Administer electrolyte replacement as ordered  - Monitor response to electrolyte replacements, including repeat lab results as appropriate  - Instruct patient on fluid and nutrition as appropriate  4/13/2020 1150 by Brittnee Abraham RN  Outcome: Progressing  4/13/2020 1135 by Brittnee Abraham RN  Outcome: Progressing  Goal: Fluid balance maintained  Description  INTERVENTIONS:  - Monitor labs   - Monitor I/O and WT  - Instruct patient on fluid and nutrition as appropriate  - Assess for signs & symptoms of volume excess or deficit  4/13/2020 1150 by Brittnee Abraham RN  Outcome: Progressing  4/13/2020 1135 by Brittnee Abraham RN  Outcome: Progressing     Problem: Nutrition/Hydration-ADULT  Goal: Nutrient/Hydration intake appropriate for improving, restoring or maintaining nutritional needs  Description  Monitor and assess patient's nutrition/hydration status for malnutrition  Collaborate with interdisciplinary team and initiate plan and interventions as ordered  Monitor patient's weight and dietary intake as ordered or per policy  Utilize nutrition screening tool and intervene as necessary  Determine patient's food preferences and provide high-protein, high-caloric foods as appropriate       INTERVENTIONS:  - Monitor oral intake, urinary output, labs, and treatment plans  - Assess nutrition and hydration status and recommend course of action  - Evaluate amount of meals eaten  - Assist patient with eating if necessary   - Allow adequate time for meals  - Recommend/ encourage appropriate diets, oral nutritional supplements, and vitamin/mineral supplements  - Order, calculate, and assess calorie counts as needed  - Recommend, monitor, and adjust tube feedings and TPN/PPN based on assessed needs  - Assess need for intravenous fluids  - Provide specific nutrition/hydration education as appropriate  - Include patient/family/caregiver in decisions related to nutrition  4/13/2020 1150 by Rick Bryant RN  Outcome: Progressing  4/13/2020 1135 by Rick Bryant RN  Outcome: Progressing     Problem: COPING  Goal: Pt/Family able to verbalize concerns and demonstrate effective coping strategies  Description  INTERVENTIONS:  - Assist patient/family to identify coping skills, available support systems and cultural and spiritual values  - Provide emotional support, including active listening and acknowledgement of concerns of patient and caregivers  - Reduce environmental stimuli, as able  - Provide patient education  - Assess for spiritual pain/suffering and initiate spiritual care, including notification of Pastoral Care or yusuf based community as needed  - Assess effectiveness of coping strategies  Outcome: Progressing  Goal: Will report anxiety at manageable levels  Description  INTERVENTIONS:  - Administer medication as ordered  - Teach and encourage coping skills  - Provide emotional support  - Assess patient/family for anxiety and ability to cope  Outcome: Progressing     Problem: CONFUSION/THOUGHT DISTURBANCE  Goal: Thought disturbances (confusion, delirium, depression, dementia or psychosis) are managed to maintain or return to baseline mental status and functional level  Description  INTERVENTIONS:  - Assess for possible contributors to thought disturbance, including but not limited to medications, infection, impaired vision or hearing, underlying metabolic abnormalities, dehydration, respiratory compromise,  psychiatric diagnoses and notify attending PHYSICAN/AP  - Monitor and intervene to maintain adequate nutrition, hydration, elimination, sleep and activity  - Decrease environmental stimuli, including noise as appropriate  - Provide frequent contacts to provide refocusing, direction and reassurance as needed  Approach patient calmly with eye contact and at their level  - [place] high risk fall precautions, aspiration precautions and other safety measures, as indicated  - If delirium suspected, notify physician/AP of change in condition and request immediate in-person evaluation  - Pursue consults as appropriate including Geriatric (campus dependent), OT for cognitive evaluation/activity planning, psychiatric, pastoral care, etc   Outcome: Progressing     Problem: BEHAVIOR  Goal: Pt/Family maintain appropriate behavior and adhere to behavioral management agreement, if implemented  Description  INTERVENTIONS:  - Assess the family dynamic   - Encourage verbalization of thoughts and concerns in a socially appropriate manner  - Assess patient/family's coping skills and non-compliant behavior (including use of illegal substances)  - Utilize positive, consistent limit setting strategies supporting safety of patient, staff and others  - Initiate consult with Case Management, Spiritual Care or other ancillary services as appropriate  - If a patient's/visitor's behavior jeopardizes the safety of the patient, staff, or others, refer to organization procedure     - Notify Security of behavior or suspected illegal substances which indicate the need for search of the patient and/or belongings  - Encourage participation in the decision making process about a behavioral management agreement; implement if patient meets criteria  Outcome: Progressing

## 2020-04-13 NOTE — ASSESSMENT & PLAN NOTE
80-year-old female presenting with multiple falls  Poor historian  Spoke to her son today who expressed concerns that her caregivers including him are all corona virus positive  COVID positive   - PT/OT consulted  Awaiting discharge recommendations  - Might be a difficult dispo  She lives in an apartment and her two caregivers are currently sick (one has confirmed, other is exposed)

## 2020-04-14 LAB
ALBUMIN SERPL BCP-MCNC: 2.6 G/DL (ref 3.5–5)
ALP SERPL-CCNC: 58 U/L (ref 46–116)
ALT SERPL W P-5'-P-CCNC: 22 U/L (ref 12–78)
ANION GAP SERPL CALCULATED.3IONS-SCNC: 10 MMOL/L (ref 4–13)
AST SERPL W P-5'-P-CCNC: 40 U/L (ref 5–45)
BASOPHILS # BLD AUTO: 0.01 THOUSANDS/ΜL (ref 0–0.1)
BASOPHILS NFR BLD AUTO: 0 % (ref 0–1)
BILIRUB SERPL-MCNC: 0.38 MG/DL (ref 0.2–1)
BUN SERPL-MCNC: 18 MG/DL (ref 5–25)
CALCIUM SERPL-MCNC: 8.1 MG/DL (ref 8.3–10.1)
CHLORIDE SERPL-SCNC: 106 MMOL/L (ref 100–108)
CO2 SERPL-SCNC: 22 MMOL/L (ref 21–32)
CREAT SERPL-MCNC: 1.03 MG/DL (ref 0.6–1.3)
CRP SERPL QL: 67.4 MG/L
EOSINOPHIL # BLD AUTO: 0.06 THOUSAND/ΜL (ref 0–0.61)
EOSINOPHIL NFR BLD AUTO: 1 % (ref 0–6)
ERYTHROCYTE [DISTWIDTH] IN BLOOD BY AUTOMATED COUNT: 13.7 % (ref 11.6–15.1)
FERRITIN SERPL-MCNC: 188 NG/ML (ref 8–388)
GFR SERPL CREATININE-BSD FRML MDRD: 49 ML/MIN/1.73SQ M
GLUCOSE SERPL-MCNC: 86 MG/DL (ref 65–140)
HCT VFR BLD AUTO: 36.7 % (ref 34.8–46.1)
HGB BLD-MCNC: 11.8 G/DL (ref 11.5–15.4)
IMM GRANULOCYTES # BLD AUTO: 0.04 THOUSAND/UL (ref 0–0.2)
IMM GRANULOCYTES NFR BLD AUTO: 1 % (ref 0–2)
LYMPHOCYTES # BLD AUTO: 1.4 THOUSANDS/ΜL (ref 0.6–4.47)
LYMPHOCYTES NFR BLD AUTO: 29 % (ref 14–44)
MCH RBC QN AUTO: 29.6 PG (ref 26.8–34.3)
MCHC RBC AUTO-ENTMCNC: 32.2 G/DL (ref 31.4–37.4)
MCV RBC AUTO: 92 FL (ref 82–98)
MONOCYTES # BLD AUTO: 0.64 THOUSAND/ΜL (ref 0.17–1.22)
MONOCYTES NFR BLD AUTO: 13 % (ref 4–12)
NEUTROPHILS # BLD AUTO: 2.66 THOUSANDS/ΜL (ref 1.85–7.62)
NEUTS SEG NFR BLD AUTO: 56 % (ref 43–75)
NRBC BLD AUTO-RTO: 0 /100 WBCS
PLATELET # BLD AUTO: 242 THOUSANDS/UL (ref 149–390)
PMV BLD AUTO: 10.4 FL (ref 8.9–12.7)
POTASSIUM SERPL-SCNC: 3.6 MMOL/L (ref 3.5–5.3)
PROT SERPL-MCNC: 5.7 G/DL (ref 6.4–8.2)
RBC # BLD AUTO: 3.98 MILLION/UL (ref 3.81–5.12)
SODIUM SERPL-SCNC: 138 MMOL/L (ref 136–145)
VIT B1 BLD-SCNC: 181.4 NMOL/L (ref 66.5–200)
WBC # BLD AUTO: 4.81 THOUSAND/UL (ref 4.31–10.16)

## 2020-04-14 PROCEDURE — 99232 SBSQ HOSP IP/OBS MODERATE 35: CPT | Performed by: FAMILY MEDICINE

## 2020-04-14 PROCEDURE — 86140 C-REACTIVE PROTEIN: CPT | Performed by: FAMILY MEDICINE

## 2020-04-14 PROCEDURE — 80053 COMPREHEN METABOLIC PANEL: CPT | Performed by: FAMILY MEDICINE

## 2020-04-14 PROCEDURE — 85025 COMPLETE CBC W/AUTO DIFF WBC: CPT | Performed by: FAMILY MEDICINE

## 2020-04-14 PROCEDURE — 99232 SBSQ HOSP IP/OBS MODERATE 35: CPT

## 2020-04-14 PROCEDURE — 82728 ASSAY OF FERRITIN: CPT | Performed by: FAMILY MEDICINE

## 2020-04-14 RX ORDER — DILTIAZEM HYDROCHLORIDE 120 MG/1
120 CAPSULE, COATED, EXTENDED RELEASE ORAL DAILY
Status: DISCONTINUED | OUTPATIENT
Start: 2020-04-14 | End: 2020-04-15 | Stop reason: HOSPADM

## 2020-04-14 RX ORDER — HYDROXYCHLOROQUINE SULFATE 200 MG/1
200 TABLET, FILM COATED ORAL 2 TIMES DAILY
Status: DISCONTINUED | OUTPATIENT
Start: 2020-04-15 | End: 2020-04-14

## 2020-04-14 RX ORDER — DILTIAZEM HYDROCHLORIDE 120 MG/1
120 CAPSULE, COATED, EXTENDED RELEASE ORAL DAILY
Qty: 30 CAPSULE | Refills: 0 | Status: SHIPPED | OUTPATIENT
Start: 2020-04-15 | End: 2020-05-26 | Stop reason: HOSPADM

## 2020-04-14 RX ORDER — HYDROXYCHLOROQUINE SULFATE 200 MG/1
400 TABLET, FILM COATED ORAL 2 TIMES DAILY
Status: DISCONTINUED | OUTPATIENT
Start: 2020-04-14 | End: 2020-04-14

## 2020-04-14 RX ADMIN — HEPARIN SODIUM 5000 UNITS: 5000 INJECTION INTRAVENOUS; SUBCUTANEOUS at 21:10

## 2020-04-14 RX ADMIN — CLONAZEPAM 0.5 MG: 0.5 TABLET ORAL at 21:10

## 2020-04-14 RX ADMIN — OLANZAPINE 5 MG: 5 TABLET, FILM COATED ORAL at 21:10

## 2020-04-14 RX ADMIN — SODIUM CHLORIDE 50 ML/HR: 0.9 INJECTION, SOLUTION INTRAVENOUS at 02:40

## 2020-04-14 RX ADMIN — HEPARIN SODIUM 5000 UNITS: 5000 INJECTION INTRAVENOUS; SUBCUTANEOUS at 05:22

## 2020-04-14 RX ADMIN — HEPARIN SODIUM 5000 UNITS: 5000 INJECTION INTRAVENOUS; SUBCUTANEOUS at 13:40

## 2020-04-14 RX ADMIN — DILTIAZEM HYDROCHLORIDE 30 MG: 30 TABLET, FILM COATED ORAL at 05:22

## 2020-04-14 NOTE — ASSESSMENT & PLAN NOTE
Developed rapid a fib HR reaching 180's 4/13 - HR now controlled   Not on anticoagulation due to frequency of falls  Cardiology input appreciated, continue metoprolol, diltiazem added to therapy

## 2020-04-14 NOTE — PLAN OF CARE
Problem: Prexisting or High Potential for Compromised Skin Integrity  Goal: Skin integrity is maintained or improved  Description  INTERVENTIONS:  - Identify patients at risk for skin breakdown  - Assess and monitor skin integrity  - Assess and monitor nutrition and hydration status  - Monitor labs   - Assess for incontinence   - Turn and reposition patient  - Assist with mobility/ambulation  - Relieve pressure over bony prominences  - Avoid friction and shearing  - Provide appropriate hygiene as needed including keeping skin clean and dry  - Evaluate need for skin moisturizer/barrier cream  - Collaborate with interdisciplinary team   - Patient/family teaching  - Consider wound care consult   Outcome: Progressing     Problem: Potential for Falls  Goal: Patient will remain free of falls  Description  INTERVENTIONS:  - Assess patient frequently for physical needs  -  Identify cognitive and physical deficits and behaviors that affect risk of falls    -  [place] fall precautions as indicated by assessment   - Educate patient/family on patient safety including physical limitations  - Instruct patient to call for assistance with activity based on assessment  - Modify environment to reduce risk of injury  - Consider OT/PT consult to assist with strengthening/mobility  Outcome: Progressing     Problem: PAIN - ADULT  Goal: Verbalizes/displays adequate comfort level or baseline comfort level  Description  Interventions:  - Encourage patient to monitor pain and request assistance  - Assess pain using appropriate pain scale  - Administer analgesics based on type and severity of pain and evaluate response  - Implement non-pharmacological measures as appropriate and evaluate response  - Consider cultural and social influences on pain and pain management  - Notify physician/advanced practitioner if interventions unsuccessful or patient reports new pain  Outcome: Progressing     Problem: INFECTION - ADULT  Goal: Absence or prevention of progression during hospitalization  Description  INTERVENTIONS:  - Assess and monitor for signs and symptoms of infection  - Monitor lab/diagnostic results  - Monitor all insertion sites, i e  indwelling lines, tubes, and drains  - Monitor endotracheal if appropriate and nasal secretions for changes in amount and color  - [place] appropriate cooling/warming therapies per order  - Administer medications as ordered  - Instruct and encourage patient and family to use good hand hygiene technique  - Identify and instruct in appropriate isolation precautions for identified infection/condition  Outcome: Progressing  Goal: Absence of fever/infection during neutropenic period  Description  INTERVENTIONS:  - Monitor WBC    Outcome: Progressing     Problem: SAFETY ADULT  Goal: Maintain or return to baseline ADL function  Description  INTERVENTIONS:  -  Assess patient's ability to carry out ADLs; assess patient's baseline for ADL function and identify physical deficits which impact ability to perform ADLs (bathing, care of mouth/teeth, toileting, grooming, dressing, etc )  - Assess/evaluate cause of self-care deficits   - Assess range of motion  - Assess patient's mobility; develop plan if impaired  - Assess patient's need for assistive devices and provide as appropriate  - Encourage maximum independence but intervene and supervise when necessary  - Involve family in performance of ADLs  - Assess for home care needs following discharge   - Consider OT consult to assist with ADL evaluation and planning for discharge  - Provide patient education as appropriate  Outcome: Progressing  Goal: Maintain or return mobility status to optimal level  Description  INTERVENTIONS:  - Assess patient's baseline mobility status (ambulation, transfers, stairs, etc )    - Identify cognitive and physical deficits and behaviors that affect mobility  - Identify mobility aids required to assist with transfers and/or ambulation (gait belt, sit-to-stand, lift, walker, cane, etc )  - [place] fall precautions as indicated by assessment  - Record patient progress and toleration of activity level on Mobility SBAR; progress patient to next Phase/Stage  - Instruct patient to call for assistance with activity based on assessment  - Consider rehabilitation consult to assist with strengthening/weightbearing, etc   Outcome: Progressing     Problem: DISCHARGE PLANNING  Goal: Discharge to home or other facility with appropriate resources  Description  INTERVENTIONS:  - Identify barriers to discharge w/patient and caregiver  - Arrange for needed discharge resources and transportation as appropriate  - Identify discharge learning needs (meds, wound care, etc )  - Arrange for interpretive services to assist at discharge as needed  - Refer to Case Management Department for coordinating discharge planning if the patient needs post-hospital services based on physician/advanced practitioner order or complex needs related to functional status, cognitive ability, or social support system  Outcome: Progressing     Problem: Knowledge Deficit  Goal: Patient/family/caregiver demonstrates understanding of disease process, treatment plan, medications, and discharge instructions  Description  Complete learning assessment and assess knowledge base    Interventions:  - Provide teaching at level of understanding  - Provide teaching via preferred learning methods  Outcome: Progressing     Problem: NEUROSENSORY - ADULT  Goal: Achieves stable or improved neurological status  Description  INTERVENTIONS  - Monitor and report changes in neurological status  - Monitor vital signs such as temperature, blood pressure, glucose, and any other labs ordered   - Initiate measures to prevent increased intracranial pressure  - Monitor for seizure activity and implement precautions if appropriate      Outcome: Progressing     Problem: METABOLIC, FLUID AND ELECTROLYTES - ADULT  Goal: Electrolytes maintained within normal limits  Description  INTERVENTIONS:  - Monitor labs and assess patient for signs and symptoms of electrolyte imbalances  - Administer electrolyte replacement as ordered  - Monitor response to electrolyte replacements, including repeat lab results as appropriate  - Instruct patient on fluid and nutrition as appropriate  Outcome: Progressing  Goal: Fluid balance maintained  Description  INTERVENTIONS:  - Monitor labs   - Monitor I/O and WT  - Instruct patient on fluid and nutrition as appropriate  - Assess for signs & symptoms of volume excess or deficit  Outcome: Progressing     Problem: Nutrition/Hydration-ADULT  Goal: Nutrient/Hydration intake appropriate for improving, restoring or maintaining nutritional needs  Description  Monitor and assess patient's nutrition/hydration status for malnutrition  Collaborate with interdisciplinary team and initiate plan and interventions as ordered  Monitor patient's weight and dietary intake as ordered or per policy  Utilize nutrition screening tool and intervene as necessary  Determine patient's food preferences and provide high-protein, high-caloric foods as appropriate       INTERVENTIONS:  - Monitor oral intake, urinary output, labs, and treatment plans  - Assess nutrition and hydration status and recommend course of action  - Evaluate amount of meals eaten  - Assist patient with eating if necessary   - Allow adequate time for meals  - Recommend/ encourage appropriate diets, oral nutritional supplements, and vitamin/mineral supplements  - Order, calculate, and assess calorie counts as needed  - Recommend, monitor, and adjust tube feedings and TPN/PPN based on assessed needs  - Assess need for intravenous fluids  - Provide specific nutrition/hydration education as appropriate  - Include patient/family/caregiver in decisions related to nutrition  Outcome: Progressing     Problem: COPING  Goal: Pt/Family able to verbalize concerns and demonstrate effective coping strategies  Description  INTERVENTIONS:  - Assist patient/family to identify coping skills, available support systems and cultural and spiritual values  - Provide emotional support, including active listening and acknowledgement of concerns of patient and caregivers  - Reduce environmental stimuli, as able  - Provide patient education  - Assess for spiritual pain/suffering and initiate spiritual care, including notification of Pastoral Care or yusuf based community as needed  - Assess effectiveness of coping strategies  Outcome: Progressing  Goal: Will report anxiety at manageable levels  Description  INTERVENTIONS:  - Administer medication as ordered  - Teach and encourage coping skills  - Provide emotional support  - Assess patient/family for anxiety and ability to cope  Outcome: Progressing     Problem: CONFUSION/THOUGHT DISTURBANCE  Goal: Thought disturbances (confusion, delirium, depression, dementia or psychosis) are managed to maintain or return to baseline mental status and functional level  Description  INTERVENTIONS:  - Assess for possible contributors to  thought disturbance, including but not limited to medications, infection, impaired vision or hearing, underlying metabolic abnormalities, dehydration, respiratory compromise,  psychiatric diagnoses and notify attending PHYSICAN/AP  - Monitor and intervene to maintain adequate nutrition, hydration, elimination, sleep and activity  - Decrease environmental stimuli, including noise as appropriate  - Provide frequent contacts to provide refocusing, direction and reassurance as needed  Approach patient calmly with eye contact and at their level    - [place] high risk fall precautions, aspiration precautions and other safety measures, as indicated  - If delirium suspected, notify physician/AP of change in condition and request immediate in-person evaluation  - Pursue consults as appropriate including Geriatric (campus dependent), OT for cognitive evaluation/activity planning, psychiatric, pastoral care, etc   Outcome: Progressing     Problem: BEHAVIOR  Goal: Pt/Family maintain appropriate behavior and adhere to behavioral management agreement, if implemented  Description  INTERVENTIONS:  - Assess the family dynamic   - Encourage verbalization of thoughts and concerns in a socially appropriate manner  - Assess patient/family's coping skills and non-compliant behavior (including use of illegal substances)  - Utilize positive, consistent limit setting strategies supporting safety of patient, staff and others  - Initiate consult with Case Management, Spiritual Care or other ancillary services as appropriate  - If a patient's/visitor's behavior jeopardizes the safety of the patient, staff, or others, refer to organization procedure     - Notify Security of behavior or suspected illegal substances which indicate the need for search of the patient and/or belongings  - Encourage participation in the decision making process about a behavioral management agreement; implement if patient meets criteria  Outcome: Progressing

## 2020-04-14 NOTE — SOCIAL WORK
Informed anticipated d/c for Wednesday if pt remains stable- While son opted for pt to return home he also voiced inability to be the caregiver for pt  Talked to Dahlia Holter with HHA's and pt's HHA is currently ill and unable to care for pt nor are others permitted to enter pt's home to provide care  There is possibility of providing meals for pt and setting her ADL needs  Since therapies have recommended STR will make referrals proactively should they be able to accept and pts son being agreeable to same  Will continue to follow to assist with dc poc  Addendum [phone]:  Son called this CM and discussed dc planning anticipated for Wednesday- after in depth discussion, he made arrangements with the [facility] service to have ample care in the home for pt and asks transportation be arranged for after lunch  Discussed IMM#2- verbal agreement given to have signed  Transportation arranged for Wednesday 4/15/2020 via [facility] @ 1300- son/attending/RN made aware of same  To obtain dc medications from [facility] prior to pt's d/c (milestone added to EHR)  No further d/c needs identified

## 2020-04-14 NOTE — PROGRESS NOTES
Progress Note - Nilton Flood 11/20/1931, 80 y o  female MRN: [id number]    Unit/Bed#: [place] [address] -01 Encounter: [id number]    Primary Care Provider: Didi Sams MD   Date and time admitted to hospital: 4/10/2020  4:46 PM        * COVID-19 virus infection  Assessment & Plan  Detected 4/10  Not on treatment - discussed with ID - not recommending initiating at this time   No oxygen requirements  Monitor symptoms    Ambulatory dysfunction  Assessment & Plan  - At baseline  - Patient will be discharged home with 18 hour/day services tomorrow         Acute encephalopathy  Assessment & Plan  Patient presents with multiple falls, and altered mental status - currently appears at baseline  Son reports patient is typically alert and oriented at baseline, lives alone with 18 hour care from home health aides, patient does have history of dementia, depression and schizoaffective disorder   Continue psychotropic medications per home regimen  Due to multiple sick contacts positive for COVID-19 including her caregiver and her son, COVID-19 PCR obtained in ER, noted to be positive 4/10  She is afebrile, leukocytosis is mild, chest x-ray is normal  Discussed with ID - will not be initiating treatment at this time  COVID precautions added to discharge instructions       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[facility])  Assessment & Plan  History of psychosis and follow psychiatrist  Continue Cymbalta 30 mg daily, Zyprexa 5 mg q h s     Noted that the patient was refusing to take pills this morning - continue to encourage per patient's home care     Rapid atrial fibrillation [facility])  Assessment & Plan  Developed rapid a fib HR reaching 180's 4/13 - HR now controlled   Not on anticoagulation due to frequency of falls  Cardiology input appreciated, continue metoprolol, diltiazem added to therapy     Essential hypertension  Assessment & Plan  Due to episode of rapid afib 4/13, amlodipine discontinued and Cardizem added to therapy, metoprolol continued Monitor BP upon discharge        VTE Pharmacologic Prophylaxis:   Pharmacologic: Heparin  Mechanical VTE Prophylaxis in Place: Yes    Patient Centered Rounds: I have performed bedside rounds with nursing staff today  Discussions with Specialists or Other Care Team Provider: BERNA NOONAN    Education and Discussions with Family / Patient: ongoing update of patient's son    Time Spent for Care: 30 minutes  More than 50% of total time spent on counseling and coordination of care as described above  Current Length of Stay: 4 day(s)    Current Patient Status: Inpatient   Certification Statement: The patient will continue to require additional inpatient hospital stay due to need for close monitoring     Discharge Plan: tomorrow    Code Status: Level 1 - Full Code      Subjective:   Patient seen and examined  She voices no complaints  No o/n events  Objective:     Vitals:   Temp (24hrs), Av 1 °F (36 7 °C), Min:97 8 °F (36 6 °C), Max:98 5 °F (36 9 °C)    Temp:  [97 8 °F (36 6 °C)-98 5 °F (36 9 °C)] 98 °F (36 7 °C)  HR:  [] 118  Resp:  [16-20] 16  BP: (122-144)/(64-69) 144/65  SpO2:  [94 %-98 %] 98 %  Body mass index is 22 91 kg/m²  Input and Output Summary (last 24 hours): Intake/Output Summary (Last 24 hours) at 2020 1413  Last data filed at 2020 0240  Gross per 24 hour   Intake 1370 83 ml   Output 406 ml   Net 964 83 ml       Physical Exam:     Physical Exam   Constitutional: She is oriented to person, place, and time  No distress  HENT:   Head: Normocephalic and atraumatic  Eyes: Conjunctivae are normal    Neck: No JVD present  Cardiovascular: Normal rate  An irregularly irregular rhythm present  No murmur heard  Pulmonary/Chest: Effort normal  No respiratory distress  She has no wheezes  She has no rales  Abdominal: Soft  She exhibits no distension  There is no tenderness  There is no guarding  Musculoskeletal: She exhibits no edema     Neurological: She is alert and oriented to person, place, and time  Skin: Skin is warm and dry  Psychiatric: She has a normal mood and affect  Additional Data:     Labs:    Results from last 7 days   Lab Units 04/14/20  0521   WBC Thousand/uL 4 81   HEMOGLOBIN g/dL 11 8   HEMATOCRIT % 36 7   PLATELETS Thousands/uL 242   NEUTROS PCT % 56   LYMPHS PCT % 29   MONOS PCT % 13*   EOS PCT % 1     Results from last 7 days   Lab Units 04/14/20  0521   SODIUM mmol/L 138   POTASSIUM mmol/L 3 6   CHLORIDE mmol/L 106   CO2 mmol/L 22   BUN mg/dL 18   CREATININE mg/dL 1 03   ANION GAP mmol/L 10   CALCIUM mg/dL 8 1*   ALBUMIN g/dL 2 6*   TOTAL BILIRUBIN mg/dL 0 38   ALK PHOS U/L 58   ALT U/L 22   AST U/L 40   GLUCOSE RANDOM mg/dL 86     Results from last 7 days   Lab Units 04/10/20  1734   INR  0 98             Results from last 7 days   Lab Units 04/11/20  1044   PROCALCITONIN ng/ml 0 07           * I Have Reviewed All Lab Data Listed Above  * Additional Pertinent Lab Tests Reviewed:  Wolfgang 66 Admission Reviewed    Imaging:    Imaging Reports Reviewed Today Include: No new      Recent Cultures (last 7 days):           Last 24 Hours Medication List:     Current Facility-Administered Medications:  acetaminophen 650 mg Oral Q6H PRN Sandro Zayas PA-C    aluminum-magnesium hydroxide-simethicone 30 mL Oral Q6H PRN Sandro Zayas PA-C    clonazePAM 0 5 mg Oral HS Sandro Zayas PA-C    diltiazem 120 mg Oral Daily Maricruz Nguyen DO    DULoxetine 30 mg Oral Daily Sandro Zayas PA-C    heparin (porcine) 5,000 Units Subcutaneous Q8H [facility] Sandro Zayas PA-C    metoprolol succinate 100 mg Oral Daily Sandro Zayas PA-C    OLANZapine 5 mg Oral HS Sandro Zayas PA-C    senna 1 tablet Oral Daily Sandro Zayas PA-C    sodium chloride 50 mL/hr Intravenous Continuous Sandro Zayas PA-C Last Rate: 50 mL/hr (04/14/20 0240)        Today, Patient Was Seen By: Shayla Donato MD    ** Please Note: Dictation voice to text software may have been used in the creation of this document   **

## 2020-04-14 NOTE — PROGRESS NOTES
Progress Note - Cardiology   Cesar Sandhu 80 y o  female MRN: [id number]  Unit/Bed#: [place] [address] -01 Encounter: [id number]      Assessment/Recommendations/Discussion:   1  Paroxysmal atrial fibrillation, resolved, now sinus rhythm, not on anticoagulation due to recurring falls  2  Acute COVID 19 infection  3  EF 70% with hyperdynamic LV function  4  Moderate aortic regurgitation  5  Ambulatory dysfunction with recurrent falls  6  Acute kidney injury resolved  7  Hypertension  8  Hypernatremia, resolved  9  Dementia    PLAN   She has converted back to sinus rhythm   Convert diltiazem to long-acting dosage, 120 mg daily   Continue p o  Metoprolol succinate 100 mg daily   Will defer anticoagulation because of high fall risk   Would keep on telemetry for now as she has active COVID 19 infection, is on hydroxychloroquine as well as multiple other QT prolonging medications  QTC yesterday was calculated at 462 milliseconds        Subjective:   HPI  Tele was reviewed, she converted to sinus rhythm at 6:40 a m  Review of Systems: As noted in HPI  Rest of ROS is negative  Vitals:   /65   Pulse 85   Temp 98 °F (36 7 °C)   Resp 16   Wt 55 kg (121 lb 4 1 oz)   SpO2 97%   BMI 22 91 kg/m²   Vitals:    04/10/20 1658   Weight: 55 kg (121 lb 4 1 oz)       Intake/Output Summary (Last 24 hours) at 4/14/2020 0859  Last data filed at 4/14/2020 0240  Gross per 24 hour   Intake 2090 83 ml   Output 406 ml   Net 1684 83 ml       Physical Exam:  Physical exam was deferred today in order to save exposure and PPE during the coronavirus pandemic  Please see the hospitalist's note for physical exam today  TELEMETRY:  Overnight atrial fibrillation with controlled ventricular response, converted to sinus rhythm at 6:40 a m   Today    Lab Results:  Results from last 7 days   Lab Units 04/14/20  0521   WBC Thousand/uL 4 81   HEMOGLOBIN g/dL 11 8   HEMATOCRIT % 36 7   PLATELETS Thousands/uL 242     Results from last 7 days   Lab Units 04/14/20  0521   POTASSIUM mmol/L 3 6   CHLORIDE mmol/L 106   CO2 mmol/L 22   BUN mg/dL 18   CREATININE mg/dL 1 03   CALCIUM mg/dL 8 1*   ALK PHOS U/L 58   ALT U/L 22   AST U/L 40     Results from last 7 days   Lab Units 04/14/20  0521   POTASSIUM mmol/L 3 6   CHLORIDE mmol/L 106   CO2 mmol/L 22   BUN mg/dL 18   CREATININE mg/dL 1 03   CALCIUM mg/dL 8 1*           Medications:    Current Facility-Administered Medications:     acetaminophen (TYLENOL) tablet 650 mg, 650 mg, Oral, Q6H PRN, Sandro Zayas PA-C    aluminum-magnesium hydroxide-simethicone (MYLANTA) 200-200-20 mg/5 mL oral suspension 30 mL, 30 mL, Oral, Q6H PRN, Sandro Zayas PA-C    clonazePAM (KlonoPIN) tablet 0 5 mg, 0 5 mg, Oral, HS, Sandro Zayas PA-C, 0 5 mg at 04/13/20 2137    diltiazem (CARDIZEM) tablet 30 mg, 30 mg, Oral, Q6H [address], Wash Beath, DO, 30 mg at 04/14/20 0522    DULoxetine (CYMBALTA) delayed release capsule 30 mg, 30 mg, Oral, Daily, Sandro Zayas PA-C, 30 mg at 04/13/20 0945    heparin (porcine) subcutaneous injection 5,000 Units, 5,000 Units, Subcutaneous, Q8H [address], 5,000 Units at 04/14/20 0522 **AND** Platelet count, , , Once, Sandro Zayas PA-C    [START ON 4/15/2020] hydroxychloroquine (PLAQUENIL) tablet 200 mg, 200 mg, Oral, BID, Luis A Davidson MD    hydroxychloroquine (PLAQUENIL) tablet 400 mg, 400 mg, Oral, BID, Luis A Davidson MD    metoprolol succinate (TOPROL-XL) 24 hr tablet 100 mg, 100 mg, Oral, Daily, Sandro Zayas PA-C, 100 mg at 04/13/20 0945    OLANZapine (ZyPREXA) tablet 5 mg, 5 mg, Oral, HS, Sandro Zayas PA-C, 5 mg at 04/13/20 2137    senna (SENOKOT) tablet 8 6 mg, 1 tablet, Oral, Daily, Sandro Zayas PA-C, 8 6 mg at 04/13/20 0945    sodium chloride 0 9 % infusion, 50 mL/hr, Intravenous, Continuous, Sandro Zayas PA-C, Last Rate: 50 mL/hr at 04/14/20 0240, 50 mL/hr at 04/14/20 0240    This note was completed in part utilizing [employer] Direct Software    Grammatical errors, random word insertions, spelling mistakes, and incomplete sentences may be an occasional consequence of this system secondary to software limitations, ambient noise, and hardware issues  If you have any questions or concerns about the content, text, or information contained within the body of this dictation, please contact the provider for clarification        Bri Meyers DO, [facility]  4/14/2020 8:59 AM

## 2020-04-14 NOTE — ASSESSMENT & PLAN NOTE
Patient presents with multiple falls, and altered mental status - currently appears at baseline  Son reports patient is typically alert and oriented at baseline, lives alone with 18 hour care from home health aides, patient does have history of dementia, depression and schizoaffective disorder   Continue psychotropic medications per home regimen  Due to multiple sick contacts positive for COVID-19 including her caregiver and her son, COVID-19 PCR obtained in ER, noted to be positive 4/10    She is afebrile, leukocytosis is mild, chest x-ray is normal  Discussed with ID - will not be initiating treatment at this time  COVID precautions added to discharge instructions

## 2020-04-14 NOTE — DISCHARGE INSTRUCTIONS

## 2020-04-14 NOTE — ASSESSMENT & PLAN NOTE
Detected 4/10  Not on treatment - discussed with ID - not recommending initiating at this time   No oxygen requirements  Monitor symptoms

## 2020-04-14 NOTE — DISCHARGE SUMMARY
Discharge- Arnulfo Queenie 11/20/1931, 80 y o  female MRN: [id number]    Unit/Bed#: [place] -01 Encounter: [id number]    Primary Care Provider: Deb Salcedo MD   Date and time admitted to hospital: 4/10/2020  4:46 PM        * COVID-19 virus infection  Assessment & Plan  Detected 4/10  Not on treatment - discussed with ID - initiated on Plaquenil - hoping patient would be agreeable to take  No oxygen requirements  Monitor symptoms/return precautions information provided    Ambulatory dysfunction  Assessment & Plan  - At baseline  - Patient will be discharged home with 18 hour/day services        Acute encephalopathy  Assessment & Plan  Patient presents with multiple falls, and altered mental status - currently appears at baseline  Son reports patient is typically alert and oriented at baseline, lives alone with 18 hour care from home health aides, patient does have history of dementia, depression and schizoaffective disorder   Continue psychotropic medications per home regimen  Due to multiple sick contacts positive for COVID-19 including her caregiver and her son, COVID-19 PCR obtained in ER, noted to be positive 4/10  She is afebrile, leukocytosis is mild, chest x-ray is normal  Discussed with ID - will not be initiating treatment at this time  COVID precautions added to discharge instructions       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[facility])  Assessment & Plan  History of psychosis and follow psychiatrist  Continue Cymbalta 30 mg daily, Zyprexa 5 mg q h s     Noted that the patient was refusing to take pills this morning - continue to encourage per patient's home care     Rapid atrial fibrillation [facility])  Assessment & Plan  Developed rapid a fib HR reaching 180's 4/13 - HR now controlled   Not on anticoagulation due to frequency of falls  Cardiology input appreciated, continue metoprolol, diltiazem added to therapy     Anxiety  Assessment & Plan  Continue home regimen     Essential hypertension  Assessment & Plan  Due to episode of rapid afib 4/13, amlodipine discontinued and Cardizem added to therapy, metoprolol continued   Monitor BP upon discharge          Discharging Physician / Practitioner: Taylor Rowe MD  PCP: Zak Abreu MD  Admission Date:   Admission Orders (From admission, onward)     Ordered        04/10/20 1938  Inpatient Admission  Once                   Discharge Date: 04/15/20    Resolved Problems  Date Reviewed: 4/14/2020          Resolved    Hyponatremia 4/12/2020     Resolved by  Jennifer Guadarrama MD    Acute kidney injury [facility]) 4/12/2020     Resolved by  Jennifer Guadarrama MD          Consultations During Hospital Stay:  · Cardiology, Case management    Procedures Performed:   CT lumbar spine without contrast   Final Result by Leidy Riddle MD (04/10 1848)      No acute fracture  Severe scoliosis  Workstation performed: [id number]         CT head without contrast   Final Result by Leidy Riddle MD (04/10 1837)      No acute intracranial abnormality  Workstation performed: [id number]         CT cervical spine without contrast   Final Result by Leidy Riddle MD (04/10 1842)      No cervical spine fracture or traumatic malalignment  Workstation performed: [id number]         XR chest 1 view portable   ED Interpretation by García Acosta DO (04/10 1725)   MARLEN opacity  Cardiomegaly  Severe scoliosis      Final Result by Leidy Riddle MD (04/10 1812)      No acute cardiopulmonary disease              Workstation performed: [id number]               Significant Findings / Test Results:   Results from last 7 days   Lab Units 04/14/20  0521   WBC Thousand/uL 4 81   HEMOGLOBIN g/dL 11 8   HEMATOCRIT % 36 7   PLATELETS Thousands/uL 242     Results from last 7 days   Lab Units 04/14/20  0521   SODIUM mmol/L 138   POTASSIUM mmol/L 3 6   CHLORIDE mmol/L 106   CO2 mmol/L 22   BUN mg/dL 18   CREATININE mg/dL 1 03   CALCIUM mg/dL 8 1*       Incidental Findings: · None     Test Results Pending at Discharge (will require follow up): · None     Outpatient Tests Requested:  · None    Complications:  None    Reason for Admission: frequent falls     Hospital Course:     Magno Shannon is a 80 y o  female patient with history of schizophrenia, dementia, a fib and ambulatory dysfunction who originally presented to the hospital on 4/10/2020 due to frequent falls  Due to known exposure to COVID-19, the patient was tested with the test returning positive for COVID-19  The patient remained asymptomatic of the virus with normal oxygen saturations on room air and afebrile, therefore, no treatment was initiated  During patient's hospitalization she was noted for an episode of asymptomatic rapid afib, she was evaluated by Cardiology and diltiazem was added to the regimen  The patient's heart rate subsequently remained controlled as she remained hemodynamically stable  Prior to discharge the patient was evaluated by PT/OT and felt safe to discharge home with services  Case management arranged for safe discharge planning  Please see above list of diagnoses and related plan for additional information  Condition at Discharge: stable     Discharge Day Visit / Exam:     Subjective:  Patient seen and examined  She reports feeling well and voices no complaints  Afebrile  No o/n events  Refused to take medications this morning  Vitals: Blood Pressure: 144/65 (04/14/20 0702)  Pulse: 85 (04/14/20 0702)  Temperature: 98 °F (36 7 °C) (04/14/20 0702)  Temp Source: Oral (04/13/20 2330)  Respirations: 16 (04/14/20 0702)  Weight - Scale: 55 kg (121 lb 4 1 oz) (04/10/20 1658)  SpO2: 97 % (04/14/20 0702)    Exam:     Physical Exam   Constitutional: She is oriented to person, place, and time  No distress  HENT:   Head: Normocephalic and atraumatic  Eyes: Conjunctivae are normal    Neck: No JVD present  Cardiovascular: Normal rate  An irregularly irregular rhythm present     No murmur heard   Pulmonary/Chest: Effort normal  No respiratory distress  She has no wheezes  She has no rales  Abdominal: Soft  She exhibits no distension  There is no tenderness  There is no guarding  Musculoskeletal: She exhibits no edema  Neurological: She is alert and oriented to person, place, and time  Skin: Skin is warm and dry  Psychiatric: She has a normal mood and affect  Discussion with Family: son earlier    Discharge instructions/Information to patient and family:   See after visit summary for information provided to patient and family  Provisions for Follow-Up Care:  See after visit summary for information related to follow-up care and any pertinent home health orders  Disposition:     Home with VNA Services (Reminder: Complete face to face encounter)    For Discharges to [facility] SNF:   · Not Applicable to this Patient - Not Applicable to this Patient    Planned Readmission: No     Discharge Statement:  I spent 35 minutes discharging the patient  This time was spent on the day of discharge  I had direct contact with the patient on the day of discharge  Greater than 50% of the total time was spent examining patient, answering all patient questions, arranging and discussing plan of care with patient as well as directly providing post-discharge instructions  Additional time then spent on discharge activities  Discharge Medications:  See after visit summary for reconciled discharge medications provided to patient and family        ** Please Note: This note has been constructed using a voice recognition system **

## 2020-04-14 NOTE — ASSESSMENT & PLAN NOTE
History of psychosis and follow psychiatrist  Continue Cymbalta 30 mg daily, Zyprexa 5 mg q h s     Noted that the patient was refusing to take pills this morning - continue to encourage per patient's home care

## 2020-04-14 NOTE — ASSESSMENT & PLAN NOTE
Due to episode of rapid afib 4/13, amlodipine discontinued and Cardizem added to therapy, metoprolol continued   Monitor BP upon discharge Telephone Encounter by Mariel Colbert DO at 10/22/18 10:50 AM     Author:  Mariel Colbert DO Service:  (none) Author Type:  Physician     Filed:  10/22/18 10:53 AM Encounter Date:  10/22/2018 Status:  Signed     :  Mariel Colbert DO (Physician)            Mr. Allen was seen urgently during stress test today due to his angina equivalent symptoms   EKG changes seen, treated with sl ntg with SE of transient hypotension. Aspirin given   Perfusion images are wnl, no gating due to frequent ectopy seen  Per patient and Dr. Dias, his symptoms of angina are chronic and stable  He has no occlusive CAD per cath 1/2018  Advised follow-up with Dr. Dias this week, may need further titration of his medical regimen, symptoms maybe due to micro-vascular disease     FYI to Dr. Dias, Idalmis and Alejandro[FH1.1M]    Electronically Signed by:    Mariel Colbert DO , 10/22/2018[FH1.2T]          Revision History        User Key Date/Time User Provider Type Action    > FH1.2 10/22/18 10:53 AM Mariel Colbert DO Physician Sign     FH1.1 10/22/18 10:50 AM Mariel Colbert DO Physician     M - Manual, T - Template

## 2020-04-14 NOTE — ASSESSMENT & PLAN NOTE
Due to episode of rapid afib 4/13, amlodipine discontinued and Cardizem added to therapy, metoprolol continued   Monitor BP upon discharge

## 2020-04-15 VITALS
TEMPERATURE: 98.4 F | RESPIRATION RATE: 18 BRPM | SYSTOLIC BLOOD PRESSURE: 145 MMHG | HEART RATE: 89 BPM | WEIGHT: 121.25 LBS | DIASTOLIC BLOOD PRESSURE: 75 MMHG | BODY MASS INDEX: 22.91 KG/M2 | OXYGEN SATURATION: 95 %

## 2020-04-15 LAB
ANION GAP SERPL CALCULATED.3IONS-SCNC: 12 MMOL/L (ref 4–13)
BASOPHILS # BLD AUTO: 0.01 THOUSANDS/ΜL (ref 0–0.1)
BASOPHILS NFR BLD AUTO: 0 % (ref 0–1)
BUN SERPL-MCNC: 12 MG/DL (ref 5–25)
CALCIUM SERPL-MCNC: 8.3 MG/DL (ref 8.3–10.1)
CHLORIDE SERPL-SCNC: 103 MMOL/L (ref 100–108)
CO2 SERPL-SCNC: 23 MMOL/L (ref 21–32)
CREAT SERPL-MCNC: 0.84 MG/DL (ref 0.6–1.3)
EOSINOPHIL # BLD AUTO: 0.05 THOUSAND/ΜL (ref 0–0.61)
EOSINOPHIL NFR BLD AUTO: 1 % (ref 0–6)
ERYTHROCYTE [DISTWIDTH] IN BLOOD BY AUTOMATED COUNT: 13.2 % (ref 11.6–15.1)
GFR SERPL CREATININE-BSD FRML MDRD: 62 ML/MIN/1.73SQ M
GLUCOSE SERPL-MCNC: 91 MG/DL (ref 65–140)
HCT VFR BLD AUTO: 37.9 % (ref 34.8–46.1)
HGB BLD-MCNC: 12.7 G/DL (ref 11.5–15.4)
IMM GRANULOCYTES # BLD AUTO: 0.09 THOUSAND/UL (ref 0–0.2)
IMM GRANULOCYTES NFR BLD AUTO: 2 % (ref 0–2)
LYMPHOCYTES # BLD AUTO: 1.21 THOUSANDS/ΜL (ref 0.6–4.47)
LYMPHOCYTES NFR BLD AUTO: 21 % (ref 14–44)
MAGNESIUM SERPL-MCNC: 1.6 MG/DL (ref 1.6–2.6)
MCH RBC QN AUTO: 29.5 PG (ref 26.8–34.3)
MCHC RBC AUTO-ENTMCNC: 33.5 G/DL (ref 31.4–37.4)
MCV RBC AUTO: 88 FL (ref 82–98)
MONOCYTES # BLD AUTO: 0.79 THOUSAND/ΜL (ref 0.17–1.22)
MONOCYTES NFR BLD AUTO: 14 % (ref 4–12)
NEUTROPHILS # BLD AUTO: 3.62 THOUSANDS/ΜL (ref 1.85–7.62)
NEUTS SEG NFR BLD AUTO: 62 % (ref 43–75)
NRBC BLD AUTO-RTO: 0 /100 WBCS
PLATELET # BLD AUTO: 247 THOUSANDS/UL (ref 149–390)
PMV BLD AUTO: 9.5 FL (ref 8.9–12.7)
POTASSIUM SERPL-SCNC: 3 MMOL/L (ref 3.5–5.3)
RBC # BLD AUTO: 4.31 MILLION/UL (ref 3.81–5.12)
SODIUM SERPL-SCNC: 138 MMOL/L (ref 136–145)
WBC # BLD AUTO: 5.77 THOUSAND/UL (ref 4.31–10.16)

## 2020-04-15 PROCEDURE — 80048 BASIC METABOLIC PNL TOTAL CA: CPT | Performed by: FAMILY MEDICINE

## 2020-04-15 PROCEDURE — 83735 ASSAY OF MAGNESIUM: CPT | Performed by: FAMILY MEDICINE

## 2020-04-15 PROCEDURE — 85025 COMPLETE CBC W/AUTO DIFF WBC: CPT | Performed by: FAMILY MEDICINE

## 2020-04-15 RX ORDER — POTASSIUM CHLORIDE 20 MEQ/1
40 TABLET, EXTENDED RELEASE ORAL ONCE
Status: COMPLETED | OUTPATIENT
Start: 2020-04-15 | End: 2020-04-15

## 2020-04-15 RX ADMIN — DULOXETINE HYDROCHLORIDE 30 MG: 30 CAPSULE, DELAYED RELEASE ORAL at 08:42

## 2020-04-15 RX ADMIN — DILTIAZEM HYDROCHLORIDE 120 MG: 120 CAPSULE, COATED, EXTENDED RELEASE ORAL at 08:42

## 2020-04-15 RX ADMIN — HEPARIN SODIUM 5000 UNITS: 5000 INJECTION INTRAVENOUS; SUBCUTANEOUS at 05:32

## 2020-04-15 RX ADMIN — METOPROLOL SUCCINATE 100 MG: 50 TABLET, EXTENDED RELEASE ORAL at 08:42

## 2020-04-15 RX ADMIN — POTASSIUM CHLORIDE 40 MEQ: 1500 TABLET, EXTENDED RELEASE ORAL at 08:42

## 2020-04-15 NOTE — NURSING NOTE
Pt  is confused and is constantly getting up, walking into hallway  Several attempts made to reorient pt  Bathroom and snack offered  Anthony Jerez, RN assisted and spoke to pt  in [address]  Zyprexa 5 mg scheduled at bedtime already administered  Spoke to son Morro Wagoner  Updated on care  He agreed with RN to place soft limb restraint back on pt  SLIM also made aware  Will continue to monitor

## 2020-04-15 NOTE — PLAN OF CARE
Problem: Prexisting or High Potential for Compromised Skin Integrity  Goal: Skin integrity is maintained or improved  Description  INTERVENTIONS:  - Identify patients at risk for skin breakdown  - Assess and monitor skin integrity  - Assess and monitor nutrition and hydration status  - Monitor labs   - Assess for incontinence   - Turn and reposition patient  - Assist with mobility/ambulation  - Relieve pressure over bony prominences  - Avoid friction and shearing  - Provide appropriate hygiene as needed including keeping skin clean and dry  - Evaluate need for skin moisturizer/barrier cream  - Collaborate with interdisciplinary team   - Patient/family teaching  - Consider wound care consult   Outcome: Progressing     Problem: Potential for Falls  Goal: Patient will remain free of falls  Description  INTERVENTIONS:  - Assess patient frequently for physical needs  -  Identify cognitive and physical deficits and behaviors that affect risk of falls    -  [place] fall precautions as indicated by assessment   - Educate patient/family on patient safety including physical limitations  - Instruct patient to call for assistance with activity based on assessment  - Modify environment to reduce risk of injury  - Consider OT/PT consult to assist with strengthening/mobility  Outcome: Progressing     Problem: PAIN - ADULT  Goal: Verbalizes/displays adequate comfort level or baseline comfort level  Description  Interventions:  - Encourage patient to monitor pain and request assistance  - Assess pain using appropriate pain scale  - Administer analgesics based on type and severity of pain and evaluate response  - Implement non-pharmacological measures as appropriate and evaluate response  - Consider cultural and social influences on pain and pain management  - Notify physician/advanced practitioner if interventions unsuccessful or patient reports new pain  Outcome: Progressing     Problem: INFECTION - ADULT  Goal: Absence or prevention of progression during hospitalization  Description  INTERVENTIONS:  - Assess and monitor for signs and symptoms of infection  - Monitor lab/diagnostic results  - Monitor all insertion sites, i e  indwelling lines, tubes, and drains  - Monitor endotracheal if appropriate and nasal secretions for changes in amount and color  - [place] appropriate cooling/warming therapies per order  - Administer medications as ordered  - Instruct and encourage patient and family to use good hand hygiene technique  - Identify and instruct in appropriate isolation precautions for identified infection/condition  Outcome: Progressing  Goal: Absence of fever/infection during neutropenic period  Description  INTERVENTIONS:  - Monitor WBC    Outcome: Progressing     Problem: SAFETY ADULT  Goal: Maintain or return to baseline ADL function  Description  INTERVENTIONS:  -  Assess patient's ability to carry out ADLs; assess patient's baseline for ADL function and identify physical deficits which impact ability to perform ADLs (bathing, care of mouth/teeth, toileting, grooming, dressing, etc )  - Assess/evaluate cause of self-care deficits   - Assess range of motion  - Assess patient's mobility; develop plan if impaired  - Assess patient's need for assistive devices and provide as appropriate  - Encourage maximum independence but intervene and supervise when necessary  - Involve family in performance of ADLs  - Assess for home care needs following discharge   - Consider OT consult to assist with ADL evaluation and planning for discharge  - Provide patient education as appropriate  Outcome: Progressing  Goal: Maintain or return mobility status to optimal level  Description  INTERVENTIONS:  - Assess patient's baseline mobility status (ambulation, transfers, stairs, etc )    - Identify cognitive and physical deficits and behaviors that affect mobility  - Identify mobility aids required to assist with transfers and/or ambulation (gait belt, sit-to-stand, lift, walker, cane, etc )  - [place] fall precautions as indicated by assessment  - Record patient progress and toleration of activity level on Mobility SBAR; progress patient to next Phase/Stage  - Instruct patient to call for assistance with activity based on assessment  - Consider rehabilitation consult to assist with strengthening/weightbearing, etc   Outcome: Progressing     Problem: DISCHARGE PLANNING  Goal: Discharge to home or other facility with appropriate resources  Description  INTERVENTIONS:  - Identify barriers to discharge w/patient and caregiver  - Arrange for needed discharge resources and transportation as appropriate  - Identify discharge learning needs (meds, wound care, etc )  - Arrange for interpretive services to assist at discharge as needed  - Refer to Case Management Department for coordinating discharge planning if the patient needs post-hospital services based on physician/advanced practitioner order or complex needs related to functional status, cognitive ability, or social support system  Outcome: Progressing     Problem: Knowledge Deficit  Goal: Patient/family/caregiver demonstrates understanding of disease process, treatment plan, medications, and discharge instructions  Description  Complete learning assessment and assess knowledge base    Interventions:  - Provide teaching at level of understanding  - Provide teaching via preferred learning methods  Outcome: Progressing     Problem: NEUROSENSORY - ADULT  Goal: Achieves stable or improved neurological status  Description  INTERVENTIONS  - Monitor and report changes in neurological status  - Monitor vital signs such as temperature, blood pressure, glucose, and any other labs ordered   - Initiate measures to prevent increased intracranial pressure  - Monitor for seizure activity and implement precautions if appropriate      Outcome: Progressing     Problem: METABOLIC, FLUID AND ELECTROLYTES - ADULT  Goal: Electrolytes maintained within normal limits  Description  INTERVENTIONS:  - Monitor labs and assess patient for signs and symptoms of electrolyte imbalances  - Administer electrolyte replacement as ordered  - Monitor response to electrolyte replacements, including repeat lab results as appropriate  - Instruct patient on fluid and nutrition as appropriate  Outcome: Progressing  Goal: Fluid balance maintained  Description  INTERVENTIONS:  - Monitor labs   - Monitor I/O and WT  - Instruct patient on fluid and nutrition as appropriate  - Assess for signs & symptoms of volume excess or deficit  Outcome: Progressing     Problem: Nutrition/Hydration-ADULT  Goal: Nutrient/Hydration intake appropriate for improving, restoring or maintaining nutritional needs  Description  Monitor and assess patient's nutrition/hydration status for malnutrition  Collaborate with interdisciplinary team and initiate plan and interventions as ordered  Monitor patient's weight and dietary intake as ordered or per policy  Utilize nutrition screening tool and intervene as necessary  Determine patient's food preferences and provide high-protein, high-caloric foods as appropriate       INTERVENTIONS:  - Monitor oral intake, urinary output, labs, and treatment plans  - Assess nutrition and hydration status and recommend course of action  - Evaluate amount of meals eaten  - Assist patient with eating if necessary   - Allow adequate time for meals  - Recommend/ encourage appropriate diets, oral nutritional supplements, and vitamin/mineral supplements  - Order, calculate, and assess calorie counts as needed  - Recommend, monitor, and adjust tube feedings and TPN/PPN based on assessed needs  - Assess need for intravenous fluids  - Provide specific nutrition/hydration education as appropriate  - Include patient/family/caregiver in decisions related to nutrition  Outcome: Progressing     Problem: COPING  Goal: Pt/Family able to verbalize concerns and demonstrate effective coping strategies  Description  INTERVENTIONS:  - Assist patient/family to identify coping skills, available support systems and cultural and spiritual values  - Provide emotional support, including active listening and acknowledgement of concerns of patient and caregivers  - Reduce environmental stimuli, as able  - Provide patient education  - Assess for spiritual pain/suffering and initiate spiritual care, including notification of Pastoral Care or yusuf based community as needed  - Assess effectiveness of coping strategies  Outcome: Progressing  Goal: Will report anxiety at manageable levels  Description  INTERVENTIONS:  - Administer medication as ordered  - Teach and encourage coping skills  - Provide emotional support  - Assess patient/family for anxiety and ability to cope  Outcome: Progressing     Problem: CONFUSION/THOUGHT DISTURBANCE  Goal: Thought disturbances (confusion, delirium, depression, dementia or psychosis) are managed to maintain or return to baseline mental status and functional level  Description  INTERVENTIONS:  - Assess for possible contributors to  thought disturbance, including but not limited to medications, infection, impaired vision or hearing, underlying metabolic abnormalities, dehydration, respiratory compromise,  psychiatric diagnoses and notify attending PHYSICAN/AP  - Monitor and intervene to maintain adequate nutrition, hydration, elimination, sleep and activity  - Decrease environmental stimuli, including noise as appropriate  - Provide frequent contacts to provide refocusing, direction and reassurance as needed  Approach patient calmly with eye contact and at their level    - [place] high risk fall precautions, aspiration precautions and other safety measures, as indicated  - If delirium suspected, notify physician/AP of change in condition and request immediate in-person evaluation  - Pursue consults as appropriate including Geriatric (campus dependent), OT for cognitive evaluation/activity planning, psychiatric, pastoral care, etc   Outcome: Progressing     Problem: BEHAVIOR  Goal: Pt/Family maintain appropriate behavior and adhere to behavioral management agreement, if implemented  Description  INTERVENTIONS:  - Assess the family dynamic   - Encourage verbalization of thoughts and concerns in a socially appropriate manner  - Assess patient/family's coping skills and non-compliant behavior (including use of illegal substances)  - Utilize positive, consistent limit setting strategies supporting safety of patient, staff and others  - Initiate consult with Case Management, Spiritual Care or other ancillary services as appropriate  - If a patient's/visitor's behavior jeopardizes the safety of the patient, staff, or others, refer to organization procedure     - Notify Security of behavior or suspected illegal substances which indicate the need for search of the patient and/or belongings  - Encourage participation in the decision making process about a behavioral management agreement; implement if patient meets criteria  Outcome: Progressing

## 2020-04-16 ENCOUNTER — APPOINTMENT (EMERGENCY)
Dept: RADIOLOGY | Facility: HOSPITAL | Age: 85
DRG: 308 | End: 2020-04-16
Payer: COMMERCIAL

## 2020-04-16 ENCOUNTER — TRANSITIONAL CARE MANAGEMENT (OUTPATIENT)
Dept: INTERNAL MEDICINE CLINIC | Facility: CLINIC | Age: 85
End: 2020-04-16

## 2020-04-16 ENCOUNTER — TELEMEDICINE (OUTPATIENT)
Dept: INTERNAL MEDICINE CLINIC | Facility: CLINIC | Age: 85
End: 2020-04-16
Payer: COMMERCIAL

## 2020-04-16 ENCOUNTER — HOSPITAL ENCOUNTER (INPATIENT)
Facility: HOSPITAL | Age: 85
LOS: 2 days | DRG: 308 | End: 2020-04-18
Attending: EMERGENCY MEDICINE | Admitting: FAMILY MEDICINE
Payer: COMMERCIAL

## 2020-04-16 DIAGNOSIS — F03.90 DEMENTIA (HCC): ICD-10-CM

## 2020-04-16 DIAGNOSIS — R41.82 ALTERED MENTAL STATUS: ICD-10-CM

## 2020-04-16 DIAGNOSIS — F23 BRIEF PSYCHOTIC DISORDER (HCC): ICD-10-CM

## 2020-04-16 DIAGNOSIS — F41.9 ANXIETY: ICD-10-CM

## 2020-04-16 DIAGNOSIS — I48.91 ATRIAL FIBRILLATION WITH RVR (HCC): Primary | ICD-10-CM

## 2020-04-16 DIAGNOSIS — G93.40 ACUTE ENCEPHALOPATHY: ICD-10-CM

## 2020-04-16 DIAGNOSIS — U07.1 COVID-19 VIRUS INFECTION: ICD-10-CM

## 2020-04-16 DIAGNOSIS — I48.91 RAPID ATRIAL FIBRILLATION (HCC): ICD-10-CM

## 2020-04-16 DIAGNOSIS — E78.2 MIXED HYPERLIPIDEMIA: ICD-10-CM

## 2020-04-16 DIAGNOSIS — I10 ESSENTIAL HYPERTENSION: Primary | ICD-10-CM

## 2020-04-16 DIAGNOSIS — R77.8 ELEVATED TROPONIN: ICD-10-CM

## 2020-04-16 PROBLEM — R79.89 ELEVATED TROPONIN: Status: ACTIVE | Noted: 2020-04-16

## 2020-04-16 PROBLEM — R32 URINARY INCONTINENCE: Status: ACTIVE | Noted: 2020-04-16

## 2020-04-16 LAB
ALBUMIN SERPL BCP-MCNC: 3.6 G/DL (ref 3–5.2)
ALP SERPL-CCNC: 64 U/L (ref 43–122)
ALT SERPL W P-5'-P-CCNC: 39 U/L (ref 9–52)
ANION GAP SERPL CALCULATED.3IONS-SCNC: 19 MMOL/L (ref 5–14)
APTT PPP: 35 SECONDS (ref 23–37)
AST SERPL W P-5'-P-CCNC: 63 U/L (ref 14–36)
BASOPHILS # BLD AUTO: 0 THOUSANDS/ΜL (ref 0–0.1)
BASOPHILS NFR BLD AUTO: 0 % (ref 0–1)
BILIRUB SERPL-MCNC: 0.5 MG/DL
BUN SERPL-MCNC: 15 MG/DL (ref 5–25)
CALCIUM SERPL-MCNC: 8.3 MG/DL (ref 8.4–10.2)
CHLORIDE SERPL-SCNC: 104 MMOL/L (ref 97–108)
CO2 SERPL-SCNC: 16 MMOL/L (ref 22–30)
CREAT SERPL-MCNC: 1.11 MG/DL (ref 0.6–1.2)
EOSINOPHIL # BLD AUTO: 0 THOUSAND/ΜL (ref 0–0.4)
EOSINOPHIL NFR BLD AUTO: 0 % (ref 0–6)
ERYTHROCYTE [DISTWIDTH] IN BLOOD BY AUTOMATED COUNT: 13.6 %
GFR SERPL CREATININE-BSD FRML MDRD: 44 ML/MIN/1.73SQ M
GLUCOSE SERPL-MCNC: 91 MG/DL (ref 70–99)
HCT VFR BLD AUTO: 39.2 % (ref 36–46)
HGB BLD-MCNC: 13.5 G/DL (ref 12–16)
INR PPP: 0.98 (ref 0.84–1.19)
LACTATE SERPL-SCNC: 1.7 MMOL/L (ref 0.7–2)
LYMPHOCYTES # BLD AUTO: 1.3 THOUSANDS/ΜL (ref 0.5–4)
LYMPHOCYTES NFR BLD AUTO: 17 % (ref 25–45)
MCH RBC QN AUTO: 30.1 PG (ref 26.8–34.3)
MCHC RBC AUTO-ENTMCNC: 34.5 G/DL (ref 31.4–37.4)
MCV RBC AUTO: 87 FL (ref 80–100)
MONOCYTES # BLD AUTO: 0.7 THOUSAND/ΜL (ref 0.2–0.9)
MONOCYTES NFR BLD AUTO: 10 % (ref 1–10)
NEUTROPHILS # BLD AUTO: 5.4 THOUSANDS/ΜL (ref 1.8–7.8)
NEUTS SEG NFR BLD AUTO: 73 % (ref 45–65)
NT-PROBNP SERPL-MCNC: 941 PG/ML (ref 0–299)
PLATELET # BLD AUTO: 266 THOUSANDS/UL (ref 150–450)
PMV BLD AUTO: 7.8 FL (ref 8.9–12.7)
POTASSIUM SERPL-SCNC: 3.4 MMOL/L (ref 3.6–5)
PROT SERPL-MCNC: 6.2 G/DL (ref 5.9–8.4)
PROTHROMBIN TIME: 12.4 SECONDS (ref 11.6–14.5)
RBC # BLD AUTO: 4.5 MILLION/UL (ref 4–5.2)
SODIUM SERPL-SCNC: 139 MMOL/L (ref 137–147)
TROPONIN I SERPL-MCNC: 0.04 NG/ML (ref 0–0.03)
TROPONIN I SERPL-MCNC: 0.05 NG/ML (ref 0–0.03)
WBC # BLD AUTO: 7.4 THOUSAND/UL (ref 4.31–10.16)

## 2020-04-16 PROCEDURE — 80053 COMPREHEN METABOLIC PANEL: CPT | Performed by: PHYSICIAN ASSISTANT

## 2020-04-16 PROCEDURE — 85730 THROMBOPLASTIN TIME PARTIAL: CPT | Performed by: PHYSICIAN ASSISTANT

## 2020-04-16 PROCEDURE — 99223 1ST HOSP IP/OBS HIGH 75: CPT | Performed by: PHYSICIAN ASSISTANT

## 2020-04-16 PROCEDURE — 99214 OFFICE O/P EST MOD 30 MIN: CPT | Performed by: INTERNAL MEDICINE

## 2020-04-16 PROCEDURE — 1111F DSCHRG MED/CURRENT MED MERGE: CPT | Performed by: INTERNAL MEDICINE

## 2020-04-16 PROCEDURE — 36415 COLL VENOUS BLD VENIPUNCTURE: CPT | Performed by: PHYSICIAN ASSISTANT

## 2020-04-16 PROCEDURE — 87040 BLOOD CULTURE FOR BACTERIA: CPT | Performed by: PHYSICIAN ASSISTANT

## 2020-04-16 PROCEDURE — 84484 ASSAY OF TROPONIN QUANT: CPT | Performed by: PHYSICIAN ASSISTANT

## 2020-04-16 PROCEDURE — 96376 TX/PRO/DX INJ SAME DRUG ADON: CPT

## 2020-04-16 PROCEDURE — 85610 PROTHROMBIN TIME: CPT | Performed by: PHYSICIAN ASSISTANT

## 2020-04-16 PROCEDURE — 85025 COMPLETE CBC W/AUTO DIFF WBC: CPT | Performed by: PHYSICIAN ASSISTANT

## 2020-04-16 PROCEDURE — 83605 ASSAY OF LACTIC ACID: CPT | Performed by: PHYSICIAN ASSISTANT

## 2020-04-16 PROCEDURE — 96365 THER/PROPH/DIAG IV INF INIT: CPT

## 2020-04-16 PROCEDURE — 99285 EMERGENCY DEPT VISIT HI MDM: CPT | Performed by: PHYSICIAN ASSISTANT

## 2020-04-16 PROCEDURE — 71045 X-RAY EXAM CHEST 1 VIEW: CPT

## 2020-04-16 PROCEDURE — 83880 ASSAY OF NATRIURETIC PEPTIDE: CPT | Performed by: PHYSICIAN ASSISTANT

## 2020-04-16 PROCEDURE — 93005 ELECTROCARDIOGRAM TRACING: CPT

## 2020-04-16 PROCEDURE — 99285 EMERGENCY DEPT VISIT HI MDM: CPT

## 2020-04-16 RX ORDER — DOCUSATE SODIUM 100 MG/1
100 CAPSULE, LIQUID FILLED ORAL 2 TIMES DAILY
Status: DISCONTINUED | OUTPATIENT
Start: 2020-04-16 | End: 2020-04-18 | Stop reason: HOSPADM

## 2020-04-16 RX ORDER — DILTIAZEM HYDROCHLORIDE 120 MG/1
120 CAPSULE, COATED, EXTENDED RELEASE ORAL DAILY
Status: DISCONTINUED | OUTPATIENT
Start: 2020-04-17 | End: 2020-04-18 | Stop reason: HOSPADM

## 2020-04-16 RX ORDER — CLONAZEPAM 0.5 MG/1
0.5 TABLET ORAL
Status: DISCONTINUED | OUTPATIENT
Start: 2020-04-16 | End: 2020-04-17

## 2020-04-16 RX ORDER — OLANZAPINE 5 MG/1
5 TABLET ORAL
Status: DISCONTINUED | OUTPATIENT
Start: 2020-04-16 | End: 2020-04-18 | Stop reason: HOSPADM

## 2020-04-16 RX ORDER — OLANZAPINE 10 MG/1
5 INJECTION, POWDER, LYOPHILIZED, FOR SOLUTION INTRAMUSCULAR
Status: DISCONTINUED | OUTPATIENT
Start: 2020-04-16 | End: 2020-04-18 | Stop reason: HOSPADM

## 2020-04-16 RX ORDER — ONDANSETRON 2 MG/ML
4 INJECTION INTRAMUSCULAR; INTRAVENOUS EVERY 6 HOURS PRN
Status: DISCONTINUED | OUTPATIENT
Start: 2020-04-16 | End: 2020-04-18 | Stop reason: HOSPADM

## 2020-04-16 RX ORDER — DILTIAZEM HYDROCHLORIDE 5 MG/ML
10 INJECTION INTRAVENOUS ONCE
Status: COMPLETED | OUTPATIENT
Start: 2020-04-16 | End: 2020-04-16

## 2020-04-16 RX ORDER — METOPROLOL TARTRATE 5 MG/5ML
5 INJECTION INTRAVENOUS EVERY 6 HOURS PRN
Status: DISCONTINUED | OUTPATIENT
Start: 2020-04-16 | End: 2020-04-18 | Stop reason: HOSPADM

## 2020-04-16 RX ORDER — ACETAMINOPHEN 325 MG/1
650 TABLET ORAL EVERY 6 HOURS PRN
Status: DISCONTINUED | OUTPATIENT
Start: 2020-04-16 | End: 2020-04-18 | Stop reason: HOSPADM

## 2020-04-16 RX ORDER — METOPROLOL SUCCINATE 50 MG/1
100 TABLET, EXTENDED RELEASE ORAL DAILY
Status: DISCONTINUED | OUTPATIENT
Start: 2020-04-17 | End: 2020-04-18 | Stop reason: HOSPADM

## 2020-04-16 RX ORDER — CLONAZEPAM 0.5 MG/1
0.5 TABLET ORAL
Status: DISCONTINUED | OUTPATIENT
Start: 2020-04-16 | End: 2020-04-17 | Stop reason: SDUPTHER

## 2020-04-16 RX ORDER — AMOXICILLIN 250 MG
1 CAPSULE ORAL DAILY
Status: DISCONTINUED | OUTPATIENT
Start: 2020-04-17 | End: 2020-04-18 | Stop reason: HOSPADM

## 2020-04-16 RX ADMIN — DILTIAZEM HYDROCHLORIDE 10 MG: 5 INJECTION INTRAVENOUS at 16:33

## 2020-04-16 RX ADMIN — DILTIAZEM HYDROCHLORIDE 3 MG/HR: 5 INJECTION INTRAVENOUS at 16:35

## 2020-04-16 RX ADMIN — CLONAZEPAM 0.5 MG: 0.5 TABLET ORAL at 23:06

## 2020-04-16 RX ADMIN — DOCUSATE SODIUM 100 MG: 100 CAPSULE, LIQUID FILLED ORAL at 23:06

## 2020-04-17 LAB
ANION GAP SERPL CALCULATED.3IONS-SCNC: 13 MMOL/L (ref 5–14)
ATRIAL RATE: 100 BPM
ATRIAL RATE: 122 BPM
ATRIAL RATE: 156 BPM
BUN SERPL-MCNC: 17 MG/DL (ref 5–25)
CALCIUM SERPL-MCNC: 8.2 MG/DL (ref 8.4–10.2)
CHLORIDE SERPL-SCNC: 105 MMOL/L (ref 97–108)
CO2 SERPL-SCNC: 19 MMOL/L (ref 22–30)
CREAT SERPL-MCNC: 1.09 MG/DL (ref 0.6–1.2)
ERYTHROCYTE [DISTWIDTH] IN BLOOD BY AUTOMATED COUNT: 13.6 %
FERRITIN SERPL-MCNC: 286 NG/ML (ref 8–388)
GFR SERPL CREATININE-BSD FRML MDRD: 45 ML/MIN/1.73SQ M
GLUCOSE SERPL-MCNC: 69 MG/DL (ref 70–99)
HCT VFR BLD AUTO: 37.1 % (ref 36–46)
HGB BLD-MCNC: 12.5 G/DL (ref 12–16)
LYMPHOCYTES # BLD AUTO: 1.22 THOUSAND/UL (ref 0.5–4)
LYMPHOCYTES # BLD AUTO: 24 % (ref 25–45)
MAGNESIUM SERPL-MCNC: 1.8 MG/DL (ref 1.6–2.3)
MCH RBC QN AUTO: 29 PG (ref 26.8–34.3)
MCHC RBC AUTO-ENTMCNC: 33.8 G/DL (ref 31.4–37.4)
MCV RBC AUTO: 86 FL (ref 80–100)
MONOCYTES # BLD AUTO: 0.26 THOUSAND/UL (ref 0.2–0.9)
MONOCYTES NFR BLD AUTO: 5 % (ref 1–10)
NEUTS BAND NFR BLD MANUAL: 3 % (ref 0–8)
NEUTS SEG # BLD: 3.62 THOUSAND/UL (ref 1.8–7.8)
NEUTS SEG NFR BLD AUTO: 68 %
P AXIS: 38 DEGREES
PHOSPHATE SERPL-MCNC: 4.1 MG/DL (ref 2.5–4.8)
PLATELET # BLD AUTO: 236 THOUSANDS/UL (ref 150–450)
PLATELET # BLD AUTO: 264 THOUSANDS/UL (ref 150–450)
PLATELET BLD QL SMEAR: ADEQUATE
PMV BLD AUTO: 8.1 FL (ref 8.9–12.7)
POTASSIUM SERPL-SCNC: 3.2 MMOL/L (ref 3.6–5)
PR INTERVAL: 162 MS
PROCALCITONIN SERPL-MCNC: 0.1 NG/ML
QRS AXIS: 50 DEGREES
QRS AXIS: 51 DEGREES
QRS AXIS: 52 DEGREES
QRSD INTERVAL: 80 MS
QRSD INTERVAL: 86 MS
QRSD INTERVAL: 88 MS
QT INTERVAL: 308 MS
QT INTERVAL: 336 MS
QT INTERVAL: 354 MS
QTC INTERVAL: 430 MS
QTC INTERVAL: 433 MS
QTC INTERVAL: 482 MS
RBC # BLD AUTO: 4.32 MILLION/UL (ref 4–5.2)
RBC MORPH BLD: NORMAL
SODIUM SERPL-SCNC: 137 MMOL/L (ref 137–147)
T WAVE AXIS: -88 DEGREES
T WAVE AXIS: 173 DEGREES
T WAVE AXIS: 226 DEGREES
TOTAL CELLS COUNTED SPEC: 100
VENTRICULAR RATE: 100 BPM
VENTRICULAR RATE: 147 BPM
VENTRICULAR RATE: 89 BPM
WBC # BLD AUTO: 5.1 THOUSAND/UL (ref 4.31–10.16)

## 2020-04-17 PROCEDURE — 83735 ASSAY OF MAGNESIUM: CPT | Performed by: PHYSICIAN ASSISTANT

## 2020-04-17 PROCEDURE — 93005 ELECTROCARDIOGRAM TRACING: CPT

## 2020-04-17 PROCEDURE — NC001 PR NO CHARGE: Performed by: PSYCHIATRY & NEUROLOGY

## 2020-04-17 PROCEDURE — 82728 ASSAY OF FERRITIN: CPT | Performed by: INTERNAL MEDICINE

## 2020-04-17 PROCEDURE — 99232 SBSQ HOSP IP/OBS MODERATE 35: CPT | Performed by: INTERNAL MEDICINE

## 2020-04-17 PROCEDURE — 93010 ELECTROCARDIOGRAM REPORT: CPT | Performed by: INTERNAL MEDICINE

## 2020-04-17 PROCEDURE — 85027 COMPLETE CBC AUTOMATED: CPT | Performed by: PHYSICIAN ASSISTANT

## 2020-04-17 PROCEDURE — 80048 BASIC METABOLIC PNL TOTAL CA: CPT | Performed by: PHYSICIAN ASSISTANT

## 2020-04-17 PROCEDURE — 85049 AUTOMATED PLATELET COUNT: CPT | Performed by: PHYSICIAN ASSISTANT

## 2020-04-17 PROCEDURE — 99221 1ST HOSP IP/OBS SF/LOW 40: CPT | Performed by: INTERNAL MEDICINE

## 2020-04-17 PROCEDURE — 85007 BL SMEAR W/DIFF WBC COUNT: CPT | Performed by: PHYSICIAN ASSISTANT

## 2020-04-17 PROCEDURE — 84100 ASSAY OF PHOSPHORUS: CPT | Performed by: PHYSICIAN ASSISTANT

## 2020-04-17 PROCEDURE — 84145 PROCALCITONIN (PCT): CPT | Performed by: PHYSICIAN ASSISTANT

## 2020-04-17 RX ORDER — POTASSIUM CHLORIDE 20 MEQ/1
40 TABLET, EXTENDED RELEASE ORAL ONCE
Status: COMPLETED | OUTPATIENT
Start: 2020-04-17 | End: 2020-04-17

## 2020-04-17 RX ADMIN — SENNOSIDES AND DOCUSATE SODIUM 1 TABLET: 8.6; 5 TABLET ORAL at 09:04

## 2020-04-17 RX ADMIN — OLANZAPINE 5 MG: 5 TABLET, FILM COATED ORAL at 00:53

## 2020-04-17 RX ADMIN — DILTIAZEM HYDROCHLORIDE 120 MG: 120 CAPSULE, COATED, EXTENDED RELEASE ORAL at 09:14

## 2020-04-17 RX ADMIN — METOPROLOL SUCCINATE 100 MG: 50 TABLET, EXTENDED RELEASE ORAL at 09:13

## 2020-04-17 RX ADMIN — POTASSIUM CHLORIDE 40 MEQ: 20 TABLET, EXTENDED RELEASE ORAL at 12:16

## 2020-04-17 RX ADMIN — DOCUSATE SODIUM 100 MG: 100 CAPSULE, LIQUID FILLED ORAL at 09:03

## 2020-04-17 RX ADMIN — ENOXAPARIN SODIUM 40 MG: 100 INJECTION SUBCUTANEOUS at 09:03

## 2020-04-17 RX ADMIN — ACETAMINOPHEN 650 MG: 325 TABLET ORAL at 12:34

## 2020-04-17 RX ADMIN — OLANZAPINE 5 MG: 5 TABLET, FILM COATED ORAL at 23:43

## 2020-04-18 ENCOUNTER — HOSPITAL ENCOUNTER (INPATIENT)
Facility: HOSPITAL | Age: 85
LOS: 38 days | Discharge: HOME HEALTH | DRG: 885 | End: 2020-05-26
Attending: PSYCHIATRY & NEUROLOGY | Admitting: PSYCHIATRY & NEUROLOGY
Payer: COMMERCIAL

## 2020-04-18 VITALS
HEART RATE: 100 BPM | BODY MASS INDEX: 21.29 KG/M2 | RESPIRATION RATE: 20 BRPM | OXYGEN SATURATION: 100 % | WEIGHT: 112.66 LBS | SYSTOLIC BLOOD PRESSURE: 155 MMHG | DIASTOLIC BLOOD PRESSURE: 75 MMHG | TEMPERATURE: 98.7 F

## 2020-04-18 DIAGNOSIS — E87.1 HYPONATREMIA: ICD-10-CM

## 2020-04-18 DIAGNOSIS — F20.5 RESIDUAL SCHIZOPHRENIA (HCC): Primary | Chronic | ICD-10-CM

## 2020-04-18 DIAGNOSIS — J98.01 BRONCHOSPASM: ICD-10-CM

## 2020-04-18 DIAGNOSIS — G93.40 ACUTE ENCEPHALOPATHY: ICD-10-CM

## 2020-04-18 DIAGNOSIS — F03.90 MAJOR NEUROCOGNITIVE DISORDER (HCC): Chronic | ICD-10-CM

## 2020-04-18 DIAGNOSIS — I10 ESSENTIAL HYPERTENSION: ICD-10-CM

## 2020-04-18 DIAGNOSIS — R41.82 ALTERED MENTAL STATUS: ICD-10-CM

## 2020-04-18 DIAGNOSIS — U07.1 COVID-19 VIRUS INFECTION: ICD-10-CM

## 2020-04-18 DIAGNOSIS — Z78.9 TAKES DIETARY SUPPLEMENTS: ICD-10-CM

## 2020-04-18 DIAGNOSIS — L60.9 NAIL ABNORMALITIES: ICD-10-CM

## 2020-04-18 DIAGNOSIS — K59.00 CONSTIPATION: ICD-10-CM

## 2020-04-18 DIAGNOSIS — F41.9 ANXIETY: ICD-10-CM

## 2020-04-18 DIAGNOSIS — I48.91 ATRIAL FIBRILLATION WITH RVR (HCC): ICD-10-CM

## 2020-04-18 LAB
ALBUMIN SERPL BCP-MCNC: 3 G/DL (ref 3–5.2)
ALP SERPL-CCNC: 62 U/L (ref 43–122)
ALT SERPL W P-5'-P-CCNC: 35 U/L (ref 9–52)
ANION GAP SERPL CALCULATED.3IONS-SCNC: 7 MMOL/L (ref 5–14)
AST SERPL W P-5'-P-CCNC: 51 U/L (ref 14–36)
BACTERIA UR QL AUTO: ABNORMAL /HPF
BASOPHILS # BLD AUTO: 0 THOUSANDS/ΜL (ref 0–0.1)
BASOPHILS NFR BLD AUTO: 0 % (ref 0–1)
BILIRUB SERPL-MCNC: 0.3 MG/DL
BILIRUB UR QL STRIP: NEGATIVE
BUN SERPL-MCNC: 17 MG/DL (ref 5–25)
CALCIUM SERPL-MCNC: 8.2 MG/DL (ref 8.4–10.2)
CHLORIDE SERPL-SCNC: 103 MMOL/L (ref 97–108)
CLARITY UR: ABNORMAL
CO2 SERPL-SCNC: 25 MMOL/L (ref 22–30)
COLOR UR: YELLOW
CREAT SERPL-MCNC: 0.86 MG/DL (ref 0.6–1.2)
EOSINOPHIL # BLD AUTO: 0.2 THOUSAND/ΜL (ref 0–0.4)
EOSINOPHIL NFR BLD AUTO: 4 % (ref 0–6)
ERYTHROCYTE [DISTWIDTH] IN BLOOD BY AUTOMATED COUNT: 13.9 %
GFR SERPL CREATININE-BSD FRML MDRD: 61 ML/MIN/1.73SQ M
GLUCOSE SERPL-MCNC: 89 MG/DL (ref 70–99)
GLUCOSE UR STRIP-MCNC: NEGATIVE MG/DL
HCT VFR BLD AUTO: 34.7 % (ref 36–46)
HGB BLD-MCNC: 11.9 G/DL (ref 12–16)
HGB UR QL STRIP.AUTO: 25
KETONES UR STRIP-MCNC: ABNORMAL MG/DL
LEUKOCYTE ESTERASE UR QL STRIP: ABNORMAL
LYMPHOCYTES # BLD AUTO: 0.9 THOUSANDS/ΜL (ref 0.5–4)
LYMPHOCYTES NFR BLD AUTO: 20 % (ref 25–45)
MCH RBC QN AUTO: 29.3 PG (ref 26.8–34.3)
MCHC RBC AUTO-ENTMCNC: 34.2 G/DL (ref 31.4–37.4)
MCV RBC AUTO: 86 FL (ref 80–100)
MONOCYTES # BLD AUTO: 0.5 THOUSAND/ΜL (ref 0.2–0.9)
MONOCYTES NFR BLD AUTO: 11 % (ref 1–10)
NEUTROPHILS # BLD AUTO: 2.9 THOUSANDS/ΜL (ref 1.8–7.8)
NEUTS SEG NFR BLD AUTO: 65 % (ref 45–65)
NITRITE UR QL STRIP: POSITIVE
NON-SQ EPI CELLS URNS QL MICRO: ABNORMAL /HPF
PH UR STRIP.AUTO: 6.5 [PH]
PLATELET # BLD AUTO: 211 THOUSANDS/UL (ref 150–450)
PMV BLD AUTO: 7.8 FL (ref 8.9–12.7)
POTASSIUM SERPL-SCNC: 3.5 MMOL/L (ref 3.6–5)
PROT SERPL-MCNC: 5.5 G/DL (ref 5.9–8.4)
PROT UR STRIP-MCNC: ABNORMAL MG/DL
RBC # BLD AUTO: 4.05 MILLION/UL (ref 4–5.2)
RBC #/AREA URNS AUTO: ABNORMAL /HPF
SODIUM SERPL-SCNC: 135 MMOL/L (ref 137–147)
SP GR UR STRIP.AUTO: 1.01 (ref 1–1.03)
UROBILINOGEN UR QL STRIP.AUTO: 0.2 E.U./DL
WBC # BLD AUTO: 4.5 THOUSAND/UL (ref 4.31–10.16)
WBC #/AREA URNS AUTO: ABNORMAL /HPF

## 2020-04-18 PROCEDURE — 99232 SBSQ HOSP IP/OBS MODERATE 35: CPT | Performed by: INTERNAL MEDICINE

## 2020-04-18 PROCEDURE — 85025 COMPLETE CBC W/AUTO DIFF WBC: CPT | Performed by: INTERNAL MEDICINE

## 2020-04-18 PROCEDURE — 87186 SC STD MICRODIL/AGAR DIL: CPT | Performed by: INTERNAL MEDICINE

## 2020-04-18 PROCEDURE — NC001 PR NO CHARGE: Performed by: PSYCHIATRY & NEUROLOGY

## 2020-04-18 PROCEDURE — 87077 CULTURE AEROBIC IDENTIFY: CPT | Performed by: INTERNAL MEDICINE

## 2020-04-18 PROCEDURE — 80053 COMPREHEN METABOLIC PANEL: CPT | Performed by: INTERNAL MEDICINE

## 2020-04-18 PROCEDURE — 87086 URINE CULTURE/COLONY COUNT: CPT | Performed by: INTERNAL MEDICINE

## 2020-04-18 PROCEDURE — 81001 URINALYSIS AUTO W/SCOPE: CPT | Performed by: INTERNAL MEDICINE

## 2020-04-18 PROCEDURE — 94761 N-INVAS EAR/PLS OXIMETRY MLT: CPT

## 2020-04-18 RX ORDER — METOPROLOL SUCCINATE 50 MG/1
100 TABLET, EXTENDED RELEASE ORAL DAILY
Status: DISCONTINUED | OUTPATIENT
Start: 2020-04-19 | End: 2020-05-26 | Stop reason: HOSPADM

## 2020-04-18 RX ORDER — TRAZODONE HYDROCHLORIDE 50 MG/1
25 TABLET ORAL
Status: DISCONTINUED | OUTPATIENT
Start: 2020-04-18 | End: 2020-04-29

## 2020-04-18 RX ORDER — POTASSIUM CHLORIDE 20 MEQ/1
40 TABLET, EXTENDED RELEASE ORAL ONCE
Status: COMPLETED | OUTPATIENT
Start: 2020-04-18 | End: 2020-04-18

## 2020-04-18 RX ORDER — OLANZAPINE 5 MG/1
5 TABLET ORAL
Status: DISCONTINUED | OUTPATIENT
Start: 2020-04-18 | End: 2020-04-20

## 2020-04-18 RX ORDER — ACETAMINOPHEN 325 MG/1
650 TABLET ORAL EVERY 6 HOURS PRN
Status: CANCELLED | OUTPATIENT
Start: 2020-04-18

## 2020-04-18 RX ORDER — AMOXICILLIN 250 MG
1 CAPSULE ORAL DAILY
Status: DISCONTINUED | OUTPATIENT
Start: 2020-04-19 | End: 2020-04-30

## 2020-04-18 RX ORDER — ACETAMINOPHEN 325 MG/1
975 TABLET ORAL EVERY 6 HOURS PRN
Status: CANCELLED | OUTPATIENT
Start: 2020-04-18

## 2020-04-18 RX ORDER — OLANZAPINE 10 MG/1
2.5 INJECTION, POWDER, LYOPHILIZED, FOR SOLUTION INTRAMUSCULAR
Status: CANCELLED | OUTPATIENT
Start: 2020-04-18

## 2020-04-18 RX ORDER — ACETAMINOPHEN 325 MG/1
650 TABLET ORAL EVERY 6 HOURS PRN
Status: DISCONTINUED | OUTPATIENT
Start: 2020-04-18 | End: 2020-05-26 | Stop reason: HOSPADM

## 2020-04-18 RX ORDER — DILTIAZEM HYDROCHLORIDE 120 MG/1
120 CAPSULE, COATED, EXTENDED RELEASE ORAL DAILY
Status: DISCONTINUED | OUTPATIENT
Start: 2020-04-19 | End: 2020-04-24

## 2020-04-18 RX ORDER — ONDANSETRON 2 MG/ML
4 INJECTION INTRAMUSCULAR; INTRAVENOUS EVERY 6 HOURS PRN
Status: DISCONTINUED | OUTPATIENT
Start: 2020-04-18 | End: 2020-05-20

## 2020-04-18 RX ORDER — OLANZAPINE 10 MG/1
2.5 INJECTION, POWDER, LYOPHILIZED, FOR SOLUTION INTRAMUSCULAR
Status: DISCONTINUED | OUTPATIENT
Start: 2020-04-18 | End: 2020-04-19

## 2020-04-18 RX ORDER — HYDROXYZINE HYDROCHLORIDE 10 MG/1
10 TABLET, FILM COATED ORAL EVERY 6 HOURS PRN
Status: CANCELLED | OUTPATIENT
Start: 2020-04-18

## 2020-04-18 RX ORDER — ONDANSETRON 2 MG/ML
4 INJECTION INTRAMUSCULAR; INTRAVENOUS EVERY 6 HOURS PRN
Status: CANCELLED | OUTPATIENT
Start: 2020-04-18

## 2020-04-18 RX ORDER — METOPROLOL TARTRATE 5 MG/5ML
5 INJECTION INTRAVENOUS EVERY 6 HOURS PRN
Status: CANCELLED | OUTPATIENT
Start: 2020-04-18

## 2020-04-18 RX ORDER — OLANZAPINE 5 MG/1
5 TABLET ORAL
Status: CANCELLED | OUTPATIENT
Start: 2020-04-18

## 2020-04-18 RX ORDER — OLANZAPINE 5 MG/1
5 TABLET ORAL
Status: DISCONTINUED | OUTPATIENT
Start: 2020-04-18 | End: 2020-05-26 | Stop reason: HOSPADM

## 2020-04-18 RX ORDER — METOPROLOL TARTRATE 5 MG/5ML
5 INJECTION INTRAVENOUS EVERY 6 HOURS PRN
Status: DISCONTINUED | OUTPATIENT
Start: 2020-04-18 | End: 2020-04-19

## 2020-04-18 RX ORDER — TRAZODONE HYDROCHLORIDE 50 MG/1
25 TABLET ORAL
Status: CANCELLED | OUTPATIENT
Start: 2020-04-18

## 2020-04-18 RX ORDER — ACETAMINOPHEN 325 MG/1
650 TABLET ORAL EVERY 4 HOURS PRN
Status: CANCELLED | OUTPATIENT
Start: 2020-04-18

## 2020-04-18 RX ORDER — ACETAMINOPHEN 325 MG/1
650 TABLET ORAL EVERY 4 HOURS PRN
Status: DISCONTINUED | OUTPATIENT
Start: 2020-04-18 | End: 2020-05-26 | Stop reason: HOSPADM

## 2020-04-18 RX ORDER — METOPROLOL SUCCINATE 50 MG/1
100 TABLET, EXTENDED RELEASE ORAL DAILY
Status: CANCELLED | OUTPATIENT
Start: 2020-04-19

## 2020-04-18 RX ORDER — HYDROXYZINE HYDROCHLORIDE 10 MG/1
10 TABLET, FILM COATED ORAL EVERY 6 HOURS PRN
Status: DISCONTINUED | OUTPATIENT
Start: 2020-04-18 | End: 2020-05-04

## 2020-04-18 RX ORDER — AMOXICILLIN 250 MG
1 CAPSULE ORAL DAILY
Status: CANCELLED | OUTPATIENT
Start: 2020-04-19

## 2020-04-18 RX ORDER — ACETAMINOPHEN 325 MG/1
975 TABLET ORAL EVERY 6 HOURS PRN
Status: DISCONTINUED | OUTPATIENT
Start: 2020-04-18 | End: 2020-05-26 | Stop reason: HOSPADM

## 2020-04-18 RX ORDER — DOCUSATE SODIUM 100 MG/1
100 CAPSULE, LIQUID FILLED ORAL 2 TIMES DAILY
Status: DISCONTINUED | OUTPATIENT
Start: 2020-04-19 | End: 2020-04-30

## 2020-04-18 RX ORDER — DOCUSATE SODIUM 100 MG/1
100 CAPSULE, LIQUID FILLED ORAL 2 TIMES DAILY
Status: CANCELLED | OUTPATIENT
Start: 2020-04-18

## 2020-04-18 RX ORDER — DILTIAZEM HYDROCHLORIDE 120 MG/1
120 CAPSULE, COATED, EXTENDED RELEASE ORAL DAILY
Status: CANCELLED | OUTPATIENT
Start: 2020-04-19

## 2020-04-18 RX ADMIN — METOPROLOL SUCCINATE 100 MG: 50 TABLET, EXTENDED RELEASE ORAL at 09:07

## 2020-04-18 RX ADMIN — ENOXAPARIN SODIUM 40 MG: 100 INJECTION SUBCUTANEOUS at 09:06

## 2020-04-18 RX ADMIN — DILTIAZEM HYDROCHLORIDE 120 MG: 120 CAPSULE, COATED, EXTENDED RELEASE ORAL at 09:09

## 2020-04-18 RX ADMIN — POTASSIUM CHLORIDE 40 MEQ: 20 TABLET, EXTENDED RELEASE ORAL at 12:26

## 2020-04-18 RX ADMIN — TRAZODONE HYDROCHLORIDE 25 MG: 50 TABLET ORAL at 21:03

## 2020-04-18 RX ADMIN — ACETAMINOPHEN 650 MG: 325 TABLET ORAL at 09:07

## 2020-04-18 RX ADMIN — OLANZAPINE 5 MG: 5 TABLET, FILM COATED ORAL at 21:02

## 2020-04-18 RX ADMIN — DOCUSATE SODIUM 100 MG: 100 CAPSULE, LIQUID FILLED ORAL at 09:06

## 2020-04-18 RX ADMIN — SENNOSIDES AND DOCUSATE SODIUM 1 TABLET: 8.6; 5 TABLET ORAL at 09:06

## 2020-04-19 PROBLEM — F03.90 MAJOR NEUROCOGNITIVE DISORDER (HCC): Chronic | Status: ACTIVE | Noted: 2020-04-19

## 2020-04-19 PROBLEM — F20.5 RESIDUAL SCHIZOPHRENIA (HCC): Chronic | Status: ACTIVE | Noted: 2020-02-04

## 2020-04-19 PROBLEM — F20.3 UNDIFFERENTIATED SCHIZOPHRENIA (HCC): Chronic | Status: ACTIVE | Noted: 2020-04-19

## 2020-04-19 PROBLEM — N30.00 ACUTE CYSTITIS WITHOUT HEMATURIA: Status: ACTIVE | Noted: 2020-04-19

## 2020-04-19 LAB
ANION GAP SERPL CALCULATED.3IONS-SCNC: 8 MMOL/L (ref 5–14)
BUN SERPL-MCNC: 11 MG/DL (ref 5–25)
CALCIUM SERPL-MCNC: 8.8 MG/DL (ref 8.4–10.2)
CHLORIDE SERPL-SCNC: 102 MMOL/L (ref 97–108)
CO2 SERPL-SCNC: 26 MMOL/L (ref 22–30)
CREAT SERPL-MCNC: 0.76 MG/DL (ref 0.6–1.2)
ERYTHROCYTE [DISTWIDTH] IN BLOOD BY AUTOMATED COUNT: 13.6 %
GFR SERPL CREATININE-BSD FRML MDRD: 70 ML/MIN/1.73SQ M
GLUCOSE SERPL-MCNC: 113 MG/DL (ref 70–99)
HCT VFR BLD AUTO: 38.9 % (ref 36–46)
HGB BLD-MCNC: 13.3 G/DL (ref 12–16)
MCH RBC QN AUTO: 29.6 PG (ref 26.8–34.3)
MCHC RBC AUTO-ENTMCNC: 34.1 G/DL (ref 31.4–37.4)
MCV RBC AUTO: 87 FL (ref 80–100)
PLATELET # BLD AUTO: 240 THOUSANDS/UL (ref 150–450)
PMV BLD AUTO: 7.7 FL (ref 8.9–12.7)
POTASSIUM SERPL-SCNC: 3.7 MMOL/L (ref 3.6–5)
RBC # BLD AUTO: 4.49 MILLION/UL (ref 4–5.2)
SODIUM SERPL-SCNC: 136 MMOL/L (ref 137–147)
WBC # BLD AUTO: 6.4 THOUSAND/UL (ref 4.31–10.16)

## 2020-04-19 PROCEDURE — 99222 1ST HOSP IP/OBS MODERATE 55: CPT | Performed by: PSYCHIATRY & NEUROLOGY

## 2020-04-19 PROCEDURE — 99253 IP/OBS CNSLTJ NEW/EST LOW 45: CPT | Performed by: FAMILY MEDICINE

## 2020-04-19 PROCEDURE — 85027 COMPLETE CBC AUTOMATED: CPT | Performed by: FAMILY MEDICINE

## 2020-04-19 PROCEDURE — 80048 BASIC METABOLIC PNL TOTAL CA: CPT | Performed by: FAMILY MEDICINE

## 2020-04-19 RX ORDER — OLANZAPINE 10 MG/1
5 INJECTION, POWDER, LYOPHILIZED, FOR SOLUTION INTRAMUSCULAR EVERY 6 HOURS PRN
Status: DISCONTINUED | OUTPATIENT
Start: 2020-04-19 | End: 2020-04-29

## 2020-04-19 RX ORDER — CEPHALEXIN 500 MG/1
500 CAPSULE ORAL EVERY 8 HOURS SCHEDULED
Status: DISCONTINUED | OUTPATIENT
Start: 2020-04-19 | End: 2020-04-21

## 2020-04-19 RX ORDER — LORAZEPAM 1 MG/1
1 TABLET ORAL EVERY 8 HOURS PRN
Status: DISCONTINUED | OUTPATIENT
Start: 2020-04-19 | End: 2020-04-29 | Stop reason: DRUGHIGH

## 2020-04-19 RX ORDER — LORAZEPAM 2 MG/ML
1 INJECTION INTRAMUSCULAR EVERY 8 HOURS PRN
Status: DISCONTINUED | OUTPATIENT
Start: 2020-04-19 | End: 2020-04-29 | Stop reason: DRUGHIGH

## 2020-04-19 RX ADMIN — ACETAMINOPHEN 650 MG: 325 TABLET ORAL at 22:45

## 2020-04-19 RX ADMIN — OLANZAPINE 2.5 MG: 10 INJECTION, POWDER, FOR SOLUTION INTRAMUSCULAR at 01:02

## 2020-04-19 RX ADMIN — ACETAMINOPHEN 650 MG: 325 TABLET ORAL at 07:58

## 2020-04-19 RX ADMIN — CEPHALEXIN 500 MG: 500 CAPSULE ORAL at 21:16

## 2020-04-19 RX ADMIN — DILTIAZEM HYDROCHLORIDE 120 MG: 120 CAPSULE, COATED, EXTENDED RELEASE ORAL at 08:13

## 2020-04-19 RX ADMIN — CEPHALEXIN 500 MG: 500 CAPSULE ORAL at 08:18

## 2020-04-19 RX ADMIN — TRAZODONE HYDROCHLORIDE 25 MG: 50 TABLET ORAL at 21:16

## 2020-04-19 RX ADMIN — OLANZAPINE 5 MG: 10 INJECTION, POWDER, FOR SOLUTION INTRAMUSCULAR at 13:32

## 2020-04-19 RX ADMIN — ENOXAPARIN SODIUM 40 MG: 100 INJECTION SUBCUTANEOUS at 08:13

## 2020-04-19 RX ADMIN — OLANZAPINE 5 MG: 5 TABLET, FILM COATED ORAL at 21:15

## 2020-04-19 RX ADMIN — METOPROLOL SUCCINATE 100 MG: 50 TABLET, EXTENDED RELEASE ORAL at 08:13

## 2020-04-19 RX ADMIN — LORAZEPAM 1 MG: 1 TABLET ORAL at 14:32

## 2020-04-19 RX ADMIN — DOCUSATE SODIUM 100 MG: 100 CAPSULE, LIQUID FILLED ORAL at 08:13

## 2020-04-19 RX ADMIN — CEPHALEXIN 500 MG: 500 CAPSULE ORAL at 14:32

## 2020-04-19 RX ADMIN — SENNOSIDES AND DOCUSATE SODIUM 1 TABLET: 8.6; 5 TABLET ORAL at 08:13

## 2020-04-20 PROCEDURE — 99232 SBSQ HOSP IP/OBS MODERATE 35: CPT | Performed by: PSYCHIATRY & NEUROLOGY

## 2020-04-20 RX ADMIN — CEPHALEXIN 500 MG: 500 CAPSULE ORAL at 21:22

## 2020-04-20 RX ADMIN — ENOXAPARIN SODIUM 40 MG: 100 INJECTION SUBCUTANEOUS at 10:27

## 2020-04-20 RX ADMIN — METOPROLOL SUCCINATE 100 MG: 50 TABLET, EXTENDED RELEASE ORAL at 10:29

## 2020-04-20 RX ADMIN — TRAZODONE HYDROCHLORIDE 25 MG: 50 TABLET ORAL at 21:22

## 2020-04-20 RX ADMIN — DILTIAZEM HYDROCHLORIDE 120 MG: 120 CAPSULE, COATED, EXTENDED RELEASE ORAL at 10:27

## 2020-04-20 RX ADMIN — OLANZAPINE 5 MG: 5 TABLET, FILM COATED ORAL at 21:22

## 2020-04-20 RX ADMIN — SENNOSIDES AND DOCUSATE SODIUM 1 TABLET: 8.6; 5 TABLET ORAL at 10:28

## 2020-04-20 RX ADMIN — CEPHALEXIN 500 MG: 500 CAPSULE ORAL at 10:27

## 2020-04-20 RX ADMIN — DOCUSATE SODIUM 100 MG: 100 CAPSULE, LIQUID FILLED ORAL at 10:29

## 2020-04-20 RX ADMIN — DOCUSATE SODIUM 100 MG: 100 CAPSULE, LIQUID FILLED ORAL at 18:09

## 2020-04-20 RX ADMIN — ACETAMINOPHEN 650 MG: 325 TABLET ORAL at 21:28

## 2020-04-21 ENCOUNTER — TRANSITIONAL CARE MANAGEMENT (OUTPATIENT)
Dept: INTERNAL MEDICINE CLINIC | Facility: CLINIC | Age: 85
End: 2020-04-21

## 2020-04-21 LAB
BACTERIA BLD CULT: NORMAL
BACTERIA BLD CULT: NORMAL
BACTERIA UR CULT: ABNORMAL
BACTERIA UR CULT: ABNORMAL

## 2020-04-21 PROCEDURE — 99232 SBSQ HOSP IP/OBS MODERATE 35: CPT | Performed by: FAMILY MEDICINE

## 2020-04-21 PROCEDURE — 99232 SBSQ HOSP IP/OBS MODERATE 35: CPT | Performed by: PSYCHIATRY & NEUROLOGY

## 2020-04-21 RX ORDER — CEPHALEXIN 500 MG/1
500 CAPSULE ORAL EVERY 12 HOURS SCHEDULED
Status: DISCONTINUED | OUTPATIENT
Start: 2020-04-21 | End: 2020-04-25

## 2020-04-21 RX ORDER — SODIUM CHLORIDE 9 MG/ML
75 INJECTION, SOLUTION INTRAVENOUS ONCE
Status: DISCONTINUED | OUTPATIENT
Start: 2020-04-21 | End: 2020-04-21

## 2020-04-21 RX ORDER — PREDNISONE 20 MG/1
40 TABLET ORAL DAILY
Status: DISCONTINUED | OUTPATIENT
Start: 2020-04-21 | End: 2020-04-25

## 2020-04-21 RX ORDER — ZINC SULFATE 50(220)MG
220 CAPSULE ORAL DAILY
Status: DISCONTINUED | OUTPATIENT
Start: 2020-04-21 | End: 2020-05-05

## 2020-04-21 RX ORDER — CEFAZOLIN SODIUM 1 G/50ML
1000 SOLUTION INTRAVENOUS EVERY 8 HOURS
Status: DISCONTINUED | OUTPATIENT
Start: 2020-04-21 | End: 2020-04-21

## 2020-04-21 RX ORDER — ASCORBIC ACID 500 MG
1000 TABLET ORAL 2 TIMES DAILY
Status: DISCONTINUED | OUTPATIENT
Start: 2020-04-21 | End: 2020-05-26 | Stop reason: HOSPADM

## 2020-04-21 RX ORDER — ZINC SULFATE 50(220)MG
220 CAPSULE ORAL DAILY
Status: DISCONTINUED | OUTPATIENT
Start: 2020-04-21 | End: 2020-04-21 | Stop reason: SDUPTHER

## 2020-04-21 RX ORDER — CEPHALEXIN 500 MG/1
500 CAPSULE ORAL EVERY 12 HOURS SCHEDULED
Status: DISCONTINUED | OUTPATIENT
Start: 2020-04-21 | End: 2020-04-21 | Stop reason: SDUPTHER

## 2020-04-21 RX ORDER — PREDNISONE 20 MG/1
40 TABLET ORAL DAILY
Status: DISCONTINUED | OUTPATIENT
Start: 2020-04-21 | End: 2020-04-21 | Stop reason: SDUPTHER

## 2020-04-21 RX ADMIN — METOPROLOL SUCCINATE 100 MG: 50 TABLET, EXTENDED RELEASE ORAL at 08:58

## 2020-04-21 RX ADMIN — Medication 220 MG: at 20:35

## 2020-04-21 RX ADMIN — CEPHALEXIN 500 MG: 500 CAPSULE ORAL at 06:27

## 2020-04-21 RX ADMIN — CEPHALEXIN 500 MG: 500 CAPSULE ORAL at 13:45

## 2020-04-21 RX ADMIN — SENNOSIDES AND DOCUSATE SODIUM 1 TABLET: 8.6; 5 TABLET ORAL at 08:58

## 2020-04-21 RX ADMIN — ZINC SULFATE 220 MG (50 MG) CAPSULE 220 MG: CAPSULE at 17:09

## 2020-04-21 RX ADMIN — Medication 1000 MG: at 17:09

## 2020-04-21 RX ADMIN — DILTIAZEM HYDROCHLORIDE 120 MG: 120 CAPSULE, COATED, EXTENDED RELEASE ORAL at 08:58

## 2020-04-21 RX ADMIN — DOCUSATE SODIUM 100 MG: 100 CAPSULE, LIQUID FILLED ORAL at 17:09

## 2020-04-21 RX ADMIN — PREDNISONE 40 MG: 20 TABLET ORAL at 17:10

## 2020-04-21 RX ADMIN — ENOXAPARIN SODIUM 40 MG: 100 INJECTION SUBCUTANEOUS at 08:58

## 2020-04-21 RX ADMIN — DOCUSATE SODIUM 100 MG: 100 CAPSULE, LIQUID FILLED ORAL at 08:58

## 2020-04-21 RX ADMIN — PREDNISONE 40 MG: 20 TABLET ORAL at 20:34

## 2020-04-21 RX ADMIN — TRAZODONE HYDROCHLORIDE 25 MG: 50 TABLET ORAL at 21:02

## 2020-04-21 RX ADMIN — ACETAMINOPHEN 650 MG: 325 TABLET ORAL at 08:57

## 2020-04-21 RX ADMIN — OLANZAPINE 5 MG: 5 TABLET, FILM COATED ORAL at 21:01

## 2020-04-22 LAB
ALBUMIN SERPL BCP-MCNC: 3.3 G/DL (ref 3–5.2)
ALP SERPL-CCNC: 77 U/L (ref 43–122)
ALT SERPL W P-5'-P-CCNC: 41 U/L (ref 9–52)
ANION GAP SERPL CALCULATED.3IONS-SCNC: 7 MMOL/L (ref 5–14)
AST SERPL W P-5'-P-CCNC: 58 U/L (ref 14–36)
BASOPHILS # BLD AUTO: 0 THOUSANDS/ΜL (ref 0–0.1)
BASOPHILS NFR BLD AUTO: 2 % (ref 0–1)
BILIRUB SERPL-MCNC: 0.4 MG/DL
BUN SERPL-MCNC: 14 MG/DL (ref 5–25)
CALCIUM SERPL-MCNC: 8.3 MG/DL (ref 8.4–10.2)
CHLORIDE SERPL-SCNC: 101 MMOL/L (ref 97–108)
CO2 SERPL-SCNC: 27 MMOL/L (ref 22–30)
CREAT SERPL-MCNC: 0.84 MG/DL (ref 0.6–1.2)
CRP SERPL QL: 63.6 MG/L
EOSINOPHIL # BLD AUTO: 0 THOUSAND/ΜL (ref 0–0.4)
EOSINOPHIL NFR BLD AUTO: 0 % (ref 0–6)
ERYTHROCYTE [DISTWIDTH] IN BLOOD BY AUTOMATED COUNT: 13.7 %
ERYTHROCYTE [DISTWIDTH] IN BLOOD BY AUTOMATED COUNT: 13.8 %
FERRITIN SERPL-MCNC: 271 NG/ML (ref 8–388)
GFR SERPL CREATININE-BSD FRML MDRD: 62 ML/MIN/1.73SQ M
GLUCOSE SERPL-MCNC: 154 MG/DL (ref 70–99)
HCT VFR BLD AUTO: 34.4 % (ref 36–46)
HCT VFR BLD AUTO: 37.3 % (ref 36–46)
HGB BLD-MCNC: 11.8 G/DL (ref 12–16)
HGB BLD-MCNC: 12.6 G/DL (ref 12–16)
LYMPHOCYTES # BLD AUTO: 0.6 THOUSANDS/ΜL (ref 0.5–4)
LYMPHOCYTES NFR BLD AUTO: 20 % (ref 25–45)
MCH RBC QN AUTO: 29.5 PG (ref 26.8–34.3)
MCH RBC QN AUTO: 29.8 PG (ref 26.8–34.3)
MCHC RBC AUTO-ENTMCNC: 33.9 G/DL (ref 31.4–37.4)
MCHC RBC AUTO-ENTMCNC: 34.3 G/DL (ref 31.4–37.4)
MCV RBC AUTO: 87 FL (ref 80–100)
MCV RBC AUTO: 87 FL (ref 80–100)
MONOCYTES # BLD AUTO: 0.2 THOUSAND/ΜL (ref 0.2–0.9)
MONOCYTES NFR BLD AUTO: 9 % (ref 1–10)
NEUTROPHILS # BLD AUTO: 1.9 THOUSANDS/ΜL (ref 1.8–7.8)
NEUTS SEG NFR BLD AUTO: 69 % (ref 45–65)
PLATELET # BLD AUTO: 209 THOUSANDS/UL (ref 150–450)
PLATELET # BLD AUTO: 211 THOUSANDS/UL (ref 150–450)
PMV BLD AUTO: 7.5 FL (ref 8.9–12.7)
PMV BLD AUTO: 8.2 FL (ref 8.9–12.7)
POTASSIUM SERPL-SCNC: 4.1 MMOL/L (ref 3.6–5)
PROT SERPL-MCNC: 6.2 G/DL (ref 5.9–8.4)
RBC # BLD AUTO: 3.96 MILLION/UL (ref 4–5.2)
RBC # BLD AUTO: 4.28 MILLION/UL (ref 4–5.2)
SODIUM SERPL-SCNC: 135 MMOL/L (ref 137–147)
WBC # BLD AUTO: 1.6 THOUSAND/UL (ref 4.31–10.16)
WBC # BLD AUTO: 2.7 THOUSAND/UL (ref 4.31–10.16)

## 2020-04-22 PROCEDURE — 99232 SBSQ HOSP IP/OBS MODERATE 35: CPT | Performed by: PSYCHIATRY & NEUROLOGY

## 2020-04-22 PROCEDURE — 85027 COMPLETE CBC AUTOMATED: CPT | Performed by: FAMILY MEDICINE

## 2020-04-22 PROCEDURE — 82728 ASSAY OF FERRITIN: CPT | Performed by: FAMILY MEDICINE

## 2020-04-22 PROCEDURE — 85025 COMPLETE CBC W/AUTO DIFF WBC: CPT | Performed by: PSYCHIATRY & NEUROLOGY

## 2020-04-22 PROCEDURE — 86140 C-REACTIVE PROTEIN: CPT | Performed by: FAMILY MEDICINE

## 2020-04-22 PROCEDURE — 80053 COMPREHEN METABOLIC PANEL: CPT | Performed by: FAMILY MEDICINE

## 2020-04-22 RX ADMIN — DILTIAZEM HYDROCHLORIDE 120 MG: 120 CAPSULE, COATED, EXTENDED RELEASE ORAL at 08:21

## 2020-04-22 RX ADMIN — OLANZAPINE 5 MG: 5 TABLET, FILM COATED ORAL at 21:16

## 2020-04-22 RX ADMIN — Medication 1000 MG: at 17:11

## 2020-04-22 RX ADMIN — ENOXAPARIN SODIUM 40 MG: 100 INJECTION SUBCUTANEOUS at 08:22

## 2020-04-22 RX ADMIN — CEPHALEXIN 500 MG: 500 CAPSULE ORAL at 08:22

## 2020-04-22 RX ADMIN — SENNOSIDES AND DOCUSATE SODIUM 1 TABLET: 8.6; 5 TABLET ORAL at 08:22

## 2020-04-22 RX ADMIN — DOCUSATE SODIUM 100 MG: 100 CAPSULE, LIQUID FILLED ORAL at 08:21

## 2020-04-22 RX ADMIN — PREDNISONE 40 MG: 20 TABLET ORAL at 08:22

## 2020-04-22 RX ADMIN — ZINC SULFATE 220 MG (50 MG) CAPSULE 220 MG: CAPSULE at 08:21

## 2020-04-22 RX ADMIN — TRAZODONE HYDROCHLORIDE 25 MG: 50 TABLET ORAL at 21:16

## 2020-04-22 RX ADMIN — Medication 1000 MG: at 08:22

## 2020-04-22 RX ADMIN — DOCUSATE SODIUM 100 MG: 100 CAPSULE, LIQUID FILLED ORAL at 17:11

## 2020-04-22 RX ADMIN — METOPROLOL SUCCINATE 100 MG: 50 TABLET, EXTENDED RELEASE ORAL at 08:21

## 2020-04-22 RX ADMIN — CEPHALEXIN 500 MG: 500 CAPSULE ORAL at 21:16

## 2020-04-23 PROCEDURE — 99232 SBSQ HOSP IP/OBS MODERATE 35: CPT | Performed by: PSYCHIATRY & NEUROLOGY

## 2020-04-23 RX ADMIN — Medication 1000 MG: at 08:48

## 2020-04-23 RX ADMIN — ZINC SULFATE 220 MG (50 MG) CAPSULE 220 MG: CAPSULE at 08:48

## 2020-04-23 RX ADMIN — CEPHALEXIN 500 MG: 500 CAPSULE ORAL at 21:27

## 2020-04-23 RX ADMIN — PREDNISONE 40 MG: 20 TABLET ORAL at 08:47

## 2020-04-23 RX ADMIN — OLANZAPINE 5 MG: 5 TABLET, FILM COATED ORAL at 21:27

## 2020-04-23 RX ADMIN — METOPROLOL SUCCINATE 100 MG: 50 TABLET, EXTENDED RELEASE ORAL at 08:47

## 2020-04-23 RX ADMIN — DOCUSATE SODIUM 100 MG: 100 CAPSULE, LIQUID FILLED ORAL at 08:48

## 2020-04-23 RX ADMIN — Medication 1000 MG: at 17:04

## 2020-04-23 RX ADMIN — TRAZODONE HYDROCHLORIDE 25 MG: 50 TABLET ORAL at 21:27

## 2020-04-23 RX ADMIN — SENNOSIDES AND DOCUSATE SODIUM 1 TABLET: 8.6; 5 TABLET ORAL at 08:48

## 2020-04-23 RX ADMIN — DILTIAZEM HYDROCHLORIDE 120 MG: 120 CAPSULE, COATED, EXTENDED RELEASE ORAL at 08:48

## 2020-04-23 RX ADMIN — CEPHALEXIN 500 MG: 500 CAPSULE ORAL at 08:48

## 2020-04-23 RX ADMIN — DOCUSATE SODIUM 100 MG: 100 CAPSULE, LIQUID FILLED ORAL at 17:04

## 2020-04-23 RX ADMIN — ENOXAPARIN SODIUM 40 MG: 100 INJECTION SUBCUTANEOUS at 08:47

## 2020-04-24 PROBLEM — D72.819 LEUKOPENIA: Status: ACTIVE | Noted: 2020-04-24

## 2020-04-24 LAB
BASOPHILS # BLD AUTO: 0.1 THOUSANDS/ΜL (ref 0–0.1)
BASOPHILS NFR BLD AUTO: 1 % (ref 0–1)
EOSINOPHIL # BLD AUTO: 0 THOUSAND/ΜL (ref 0–0.4)
EOSINOPHIL NFR BLD AUTO: 0 % (ref 0–6)
ERYTHROCYTE [DISTWIDTH] IN BLOOD BY AUTOMATED COUNT: 13.8 %
GLUCOSE SERPL-MCNC: 221 MG/DL (ref 65–140)
HCT VFR BLD AUTO: 39.3 % (ref 36–46)
HGB BLD-MCNC: 13.4 G/DL (ref 12–16)
LYMPHOCYTES # BLD AUTO: 0.6 THOUSANDS/ΜL (ref 0.5–4)
LYMPHOCYTES NFR BLD AUTO: 7 % (ref 25–45)
MCH RBC QN AUTO: 29.5 PG (ref 26.8–34.3)
MCHC RBC AUTO-ENTMCNC: 34.2 G/DL (ref 31.4–37.4)
MCV RBC AUTO: 86 FL (ref 80–100)
MONOCYTES # BLD AUTO: 0.3 THOUSAND/ΜL (ref 0.2–0.9)
MONOCYTES NFR BLD AUTO: 3 % (ref 1–10)
NEUTROPHILS # BLD AUTO: 7 THOUSANDS/ΜL (ref 1.8–7.8)
NEUTS SEG NFR BLD AUTO: 88 % (ref 45–65)
PLATELET # BLD AUTO: 565 THOUSANDS/UL (ref 150–450)
PMV BLD AUTO: 7.4 FL (ref 8.9–12.7)
RBC # BLD AUTO: 4.55 MILLION/UL (ref 4–5.2)
WBC # BLD AUTO: 7.9 THOUSAND/UL (ref 4.31–10.16)

## 2020-04-24 PROCEDURE — 82948 REAGENT STRIP/BLOOD GLUCOSE: CPT

## 2020-04-24 PROCEDURE — 99232 SBSQ HOSP IP/OBS MODERATE 35: CPT | Performed by: FAMILY MEDICINE

## 2020-04-24 PROCEDURE — 85025 COMPLETE CBC W/AUTO DIFF WBC: CPT | Performed by: PSYCHIATRY & NEUROLOGY

## 2020-04-24 PROCEDURE — 99232 SBSQ HOSP IP/OBS MODERATE 35: CPT | Performed by: PSYCHIATRY & NEUROLOGY

## 2020-04-24 RX ORDER — DILTIAZEM HYDROCHLORIDE 180 MG/1
180 CAPSULE, COATED, EXTENDED RELEASE ORAL DAILY
Status: DISCONTINUED | OUTPATIENT
Start: 2020-04-25 | End: 2020-05-26 | Stop reason: HOSPADM

## 2020-04-24 RX ADMIN — SENNOSIDES AND DOCUSATE SODIUM 1 TABLET: 8.6; 5 TABLET ORAL at 08:24

## 2020-04-24 RX ADMIN — PREDNISONE 40 MG: 20 TABLET ORAL at 08:24

## 2020-04-24 RX ADMIN — CEPHALEXIN 500 MG: 500 CAPSULE ORAL at 08:24

## 2020-04-24 RX ADMIN — DOCUSATE SODIUM 100 MG: 100 CAPSULE, LIQUID FILLED ORAL at 08:24

## 2020-04-24 RX ADMIN — METOPROLOL SUCCINATE 100 MG: 50 TABLET, EXTENDED RELEASE ORAL at 08:24

## 2020-04-24 RX ADMIN — ENOXAPARIN SODIUM 40 MG: 100 INJECTION SUBCUTANEOUS at 08:26

## 2020-04-24 RX ADMIN — TRAZODONE HYDROCHLORIDE 25 MG: 50 TABLET ORAL at 21:04

## 2020-04-24 RX ADMIN — Medication 1000 MG: at 08:24

## 2020-04-24 RX ADMIN — OLANZAPINE 5 MG: 5 TABLET, FILM COATED ORAL at 21:04

## 2020-04-24 RX ADMIN — DILTIAZEM HYDROCHLORIDE 120 MG: 120 CAPSULE, COATED, EXTENDED RELEASE ORAL at 08:24

## 2020-04-24 RX ADMIN — ZINC SULFATE 220 MG (50 MG) CAPSULE 220 MG: CAPSULE at 08:24

## 2020-04-24 RX ADMIN — CEPHALEXIN 500 MG: 500 CAPSULE ORAL at 20:56

## 2020-04-25 PROCEDURE — 99232 SBSQ HOSP IP/OBS MODERATE 35: CPT | Performed by: PSYCHIATRY & NEUROLOGY

## 2020-04-25 PROCEDURE — 99232 SBSQ HOSP IP/OBS MODERATE 35: CPT | Performed by: FAMILY MEDICINE

## 2020-04-25 RX ADMIN — PREDNISONE 40 MG: 20 TABLET ORAL at 08:01

## 2020-04-25 RX ADMIN — DOCUSATE SODIUM 100 MG: 100 CAPSULE, LIQUID FILLED ORAL at 17:28

## 2020-04-25 RX ADMIN — METOPROLOL SUCCINATE 100 MG: 50 TABLET, EXTENDED RELEASE ORAL at 08:02

## 2020-04-25 RX ADMIN — DILTIAZEM HYDROCHLORIDE 180 MG: 180 CAPSULE, COATED, EXTENDED RELEASE ORAL at 08:01

## 2020-04-25 RX ADMIN — Medication 1000 MG: at 17:27

## 2020-04-25 RX ADMIN — CEPHALEXIN 500 MG: 500 CAPSULE ORAL at 08:02

## 2020-04-26 LAB
ALBUMIN SERPL BCP-MCNC: 3.5 G/DL (ref 3–5.2)
ALP SERPL-CCNC: 83 U/L (ref 43–122)
ALT SERPL W P-5'-P-CCNC: 39 U/L (ref 9–52)
ANION GAP SERPL CALCULATED.3IONS-SCNC: 7 MMOL/L (ref 5–14)
AST SERPL W P-5'-P-CCNC: 37 U/L (ref 14–36)
BILIRUB SERPL-MCNC: 0.5 MG/DL
BUN SERPL-MCNC: 23 MG/DL (ref 5–25)
CALCIUM SERPL-MCNC: 8.5 MG/DL (ref 8.4–10.2)
CHLORIDE SERPL-SCNC: 99 MMOL/L (ref 97–108)
CO2 SERPL-SCNC: 28 MMOL/L (ref 22–30)
CREAT SERPL-MCNC: 1.01 MG/DL (ref 0.6–1.2)
CRP SERPL QL: 40.6 MG/L
ERYTHROCYTE [DISTWIDTH] IN BLOOD BY AUTOMATED COUNT: 13.7 %
GFR SERPL CREATININE-BSD FRML MDRD: 50 ML/MIN/1.73SQ M
GLUCOSE SERPL-MCNC: 108 MG/DL (ref 70–99)
HCT VFR BLD AUTO: 35.6 % (ref 36–46)
HGB BLD-MCNC: 12.2 G/DL (ref 12–16)
MCH RBC QN AUTO: 29.7 PG (ref 26.8–34.3)
MCHC RBC AUTO-ENTMCNC: 34.3 G/DL (ref 31.4–37.4)
MCV RBC AUTO: 87 FL (ref 80–100)
PLATELET # BLD AUTO: 457 THOUSANDS/UL (ref 150–450)
PMV BLD AUTO: 7.2 FL (ref 8.9–12.7)
POTASSIUM SERPL-SCNC: 3.5 MMOL/L (ref 3.6–5)
PROT SERPL-MCNC: 6.5 G/DL (ref 5.9–8.4)
RBC # BLD AUTO: 4.11 MILLION/UL (ref 4–5.2)
SODIUM SERPL-SCNC: 134 MMOL/L (ref 137–147)
WBC # BLD AUTO: 8.4 THOUSAND/UL (ref 4.31–10.16)

## 2020-04-26 PROCEDURE — 85027 COMPLETE CBC AUTOMATED: CPT | Performed by: FAMILY MEDICINE

## 2020-04-26 PROCEDURE — 80053 COMPREHEN METABOLIC PANEL: CPT | Performed by: FAMILY MEDICINE

## 2020-04-26 PROCEDURE — 99232 SBSQ HOSP IP/OBS MODERATE 35: CPT | Performed by: PSYCHIATRY & NEUROLOGY

## 2020-04-26 PROCEDURE — 86140 C-REACTIVE PROTEIN: CPT | Performed by: FAMILY MEDICINE

## 2020-04-26 RX ORDER — POTASSIUM CHLORIDE 20 MEQ/1
20 TABLET, EXTENDED RELEASE ORAL ONCE
Status: COMPLETED | OUTPATIENT
Start: 2020-04-26 | End: 2020-04-26

## 2020-04-26 RX ADMIN — METOPROLOL SUCCINATE 100 MG: 50 TABLET, EXTENDED RELEASE ORAL at 08:49

## 2020-04-26 RX ADMIN — Medication 1000 MG: at 18:57

## 2020-04-26 RX ADMIN — TRAZODONE HYDROCHLORIDE 25 MG: 50 TABLET ORAL at 21:08

## 2020-04-26 RX ADMIN — ZINC SULFATE 220 MG (50 MG) CAPSULE 220 MG: CAPSULE at 12:00

## 2020-04-26 RX ADMIN — OLANZAPINE 5 MG: 5 TABLET, FILM COATED ORAL at 21:08

## 2020-04-26 RX ADMIN — DOCUSATE SODIUM 100 MG: 100 CAPSULE, LIQUID FILLED ORAL at 18:57

## 2020-04-26 RX ADMIN — Medication 1000 MG: at 08:48

## 2020-04-26 RX ADMIN — POTASSIUM CHLORIDE 20 MEQ: 1500 TABLET, EXTENDED RELEASE ORAL at 11:33

## 2020-04-26 RX ADMIN — PREDNISONE 30 MG: 20 TABLET ORAL at 08:48

## 2020-04-26 RX ADMIN — DOCUSATE SODIUM 100 MG: 100 CAPSULE, LIQUID FILLED ORAL at 08:49

## 2020-04-26 RX ADMIN — SENNOSIDES AND DOCUSATE SODIUM 1 TABLET: 8.6; 5 TABLET ORAL at 08:49

## 2020-04-26 RX ADMIN — DILTIAZEM HYDROCHLORIDE 180 MG: 180 CAPSULE, COATED, EXTENDED RELEASE ORAL at 08:48

## 2020-04-27 PROCEDURE — 99232 SBSQ HOSP IP/OBS MODERATE 35: CPT | Performed by: PSYCHIATRY & NEUROLOGY

## 2020-04-27 RX ADMIN — OLANZAPINE 5 MG: 5 TABLET, FILM COATED ORAL at 21:00

## 2020-04-27 RX ADMIN — METOPROLOL SUCCINATE 100 MG: 50 TABLET, EXTENDED RELEASE ORAL at 08:42

## 2020-04-27 RX ADMIN — ZINC SULFATE 220 MG (50 MG) CAPSULE 220 MG: CAPSULE at 12:49

## 2020-04-27 RX ADMIN — ACETAMINOPHEN 650 MG: 325 TABLET ORAL at 19:54

## 2020-04-27 RX ADMIN — PREDNISONE 30 MG: 20 TABLET ORAL at 08:42

## 2020-04-27 RX ADMIN — DOCUSATE SODIUM 100 MG: 100 CAPSULE, LIQUID FILLED ORAL at 17:21

## 2020-04-27 RX ADMIN — TRAZODONE HYDROCHLORIDE 25 MG: 50 TABLET ORAL at 21:00

## 2020-04-27 RX ADMIN — ENOXAPARIN SODIUM 40 MG: 100 INJECTION SUBCUTANEOUS at 09:08

## 2020-04-27 RX ADMIN — DILTIAZEM HYDROCHLORIDE 180 MG: 180 CAPSULE, COATED, EXTENDED RELEASE ORAL at 08:43

## 2020-04-27 RX ADMIN — SENNOSIDES AND DOCUSATE SODIUM 1 TABLET: 8.6; 5 TABLET ORAL at 08:43

## 2020-04-27 RX ADMIN — Medication 1000 MG: at 08:43

## 2020-04-27 RX ADMIN — Medication 1000 MG: at 17:21

## 2020-04-27 RX ADMIN — DOCUSATE SODIUM 100 MG: 100 CAPSULE, LIQUID FILLED ORAL at 09:02

## 2020-04-28 PROBLEM — E44.0 MODERATE PROTEIN-CALORIE MALNUTRITION (HCC): Status: ACTIVE | Noted: 2020-04-28

## 2020-04-28 PROCEDURE — 99232 SBSQ HOSP IP/OBS MODERATE 35: CPT | Performed by: PSYCHIATRY & NEUROLOGY

## 2020-04-28 RX ORDER — BISACODYL 10 MG
10 SUPPOSITORY, RECTAL RECTAL DAILY PRN
Status: DISCONTINUED | OUTPATIENT
Start: 2020-04-28 | End: 2020-05-26 | Stop reason: HOSPADM

## 2020-04-28 RX ORDER — ALBUTEROL SULFATE 90 UG/1
2 AEROSOL, METERED RESPIRATORY (INHALATION) EVERY 4 HOURS PRN
Status: DISCONTINUED | OUTPATIENT
Start: 2020-04-28 | End: 2020-05-04

## 2020-04-28 RX ADMIN — BISACODYL 10 MG: 10 SUPPOSITORY RECTAL at 18:52

## 2020-04-28 RX ADMIN — Medication 1000 MG: at 09:59

## 2020-04-28 RX ADMIN — DILTIAZEM HYDROCHLORIDE 180 MG: 180 CAPSULE, COATED, EXTENDED RELEASE ORAL at 09:59

## 2020-04-28 RX ADMIN — DOCUSATE SODIUM 100 MG: 100 CAPSULE, LIQUID FILLED ORAL at 09:59

## 2020-04-28 RX ADMIN — Medication 1000 MG: at 18:39

## 2020-04-28 RX ADMIN — SENNOSIDES AND DOCUSATE SODIUM 1 TABLET: 8.6; 5 TABLET ORAL at 09:59

## 2020-04-28 RX ADMIN — TRAZODONE HYDROCHLORIDE 25 MG: 50 TABLET ORAL at 21:44

## 2020-04-28 RX ADMIN — OLANZAPINE 5 MG: 5 TABLET, FILM COATED ORAL at 21:44

## 2020-04-28 RX ADMIN — LORAZEPAM 1 MG: 1 TABLET ORAL at 01:05

## 2020-04-28 RX ADMIN — DOCUSATE SODIUM 100 MG: 100 CAPSULE, LIQUID FILLED ORAL at 18:39

## 2020-04-28 RX ADMIN — ZINC SULFATE 220 MG (50 MG) CAPSULE 220 MG: CAPSULE at 13:00

## 2020-04-28 RX ADMIN — METOPROLOL SUCCINATE 100 MG: 50 TABLET, EXTENDED RELEASE ORAL at 09:59

## 2020-04-28 RX ADMIN — ENOXAPARIN SODIUM 40 MG: 100 INJECTION SUBCUTANEOUS at 09:59

## 2020-04-29 PROCEDURE — 99232 SBSQ HOSP IP/OBS MODERATE 35: CPT | Performed by: PSYCHIATRY & NEUROLOGY

## 2020-04-29 RX ORDER — TRAZODONE HYDROCHLORIDE 50 MG/1
50 TABLET ORAL
Status: DISCONTINUED | OUTPATIENT
Start: 2020-04-29 | End: 2020-04-29

## 2020-04-29 RX ORDER — OLANZAPINE 10 MG/1
5 INJECTION, POWDER, LYOPHILIZED, FOR SOLUTION INTRAMUSCULAR EVERY 6 HOURS PRN
Status: DISCONTINUED | OUTPATIENT
Start: 2020-04-29 | End: 2020-05-26 | Stop reason: HOSPADM

## 2020-04-29 RX ORDER — OLANZAPINE 5 MG/1
5 TABLET, ORALLY DISINTEGRATING ORAL EVERY 8 HOURS PRN
Status: DISCONTINUED | OUTPATIENT
Start: 2020-04-29 | End: 2020-05-26 | Stop reason: HOSPADM

## 2020-04-29 RX ORDER — TRAZODONE HYDROCHLORIDE 50 MG/1
75 TABLET ORAL
Status: DISCONTINUED | OUTPATIENT
Start: 2020-04-29 | End: 2020-05-07

## 2020-04-29 RX ORDER — LORAZEPAM 1 MG/1
1 TABLET ORAL EVERY 8 HOURS PRN
Status: DISCONTINUED | OUTPATIENT
Start: 2020-04-29 | End: 2020-05-04

## 2020-04-29 RX ORDER — LORAZEPAM 2 MG/ML
1 INJECTION INTRAMUSCULAR EVERY 6 HOURS PRN
Status: DISCONTINUED | OUTPATIENT
Start: 2020-04-29 | End: 2020-05-04

## 2020-04-29 RX ORDER — LORAZEPAM 1 MG/1
1 TABLET ORAL EVERY 8 HOURS PRN
Status: DISCONTINUED | OUTPATIENT
Start: 2020-04-29 | End: 2020-05-10

## 2020-04-29 RX ADMIN — DOCUSATE SODIUM 100 MG: 100 CAPSULE, LIQUID FILLED ORAL at 08:16

## 2020-04-29 RX ADMIN — DILTIAZEM HYDROCHLORIDE 180 MG: 180 CAPSULE, COATED, EXTENDED RELEASE ORAL at 08:16

## 2020-04-29 RX ADMIN — ZINC SULFATE 220 MG (50 MG) CAPSULE 220 MG: CAPSULE at 15:23

## 2020-04-29 RX ADMIN — Medication 1000 MG: at 08:16

## 2020-04-29 RX ADMIN — LORAZEPAM 1 MG: 1 TABLET ORAL at 19:35

## 2020-04-29 RX ADMIN — Medication 1000 MG: at 18:34

## 2020-04-29 RX ADMIN — ENOXAPARIN SODIUM 40 MG: 100 INJECTION SUBCUTANEOUS at 08:15

## 2020-04-29 RX ADMIN — SENNOSIDES AND DOCUSATE SODIUM 1 TABLET: 8.6; 5 TABLET ORAL at 08:16

## 2020-04-29 RX ADMIN — METOPROLOL SUCCINATE 100 MG: 50 TABLET, EXTENDED RELEASE ORAL at 08:16

## 2020-04-29 RX ADMIN — OLANZAPINE 5 MG: 5 TABLET, FILM COATED ORAL at 21:15

## 2020-04-29 RX ADMIN — TRAZODONE HYDROCHLORIDE 75 MG: 50 TABLET ORAL at 21:14

## 2020-04-29 RX ADMIN — ALBUTEROL SULFATE 2 PUFF: 90 AEROSOL, METERED RESPIRATORY (INHALATION) at 02:27

## 2020-04-30 PROCEDURE — 99231 SBSQ HOSP IP/OBS SF/LOW 25: CPT | Performed by: PSYCHIATRY & NEUROLOGY

## 2020-04-30 RX ORDER — DOCUSATE SODIUM 100 MG/1
100 CAPSULE, LIQUID FILLED ORAL 2 TIMES DAILY
Status: DISCONTINUED | OUTPATIENT
Start: 2020-04-30 | End: 2020-05-01

## 2020-04-30 RX ADMIN — DILTIAZEM HYDROCHLORIDE 180 MG: 180 CAPSULE, COATED, EXTENDED RELEASE ORAL at 08:46

## 2020-04-30 RX ADMIN — SENNOSIDES AND DOCUSATE SODIUM 1 TABLET: 8.6; 5 TABLET ORAL at 08:46

## 2020-04-30 RX ADMIN — ZINC SULFATE 220 MG (50 MG) CAPSULE 220 MG: CAPSULE at 17:59

## 2020-04-30 RX ADMIN — DOCUSATE SODIUM 100 MG: 100 CAPSULE, LIQUID FILLED ORAL at 08:46

## 2020-04-30 RX ADMIN — OLANZAPINE 5 MG: 5 TABLET, FILM COATED ORAL at 21:25

## 2020-04-30 RX ADMIN — Medication 1000 MG: at 17:58

## 2020-04-30 RX ADMIN — DOCUSATE SODIUM 100 MG: 100 CAPSULE, LIQUID FILLED ORAL at 17:58

## 2020-04-30 RX ADMIN — TRAZODONE HYDROCHLORIDE 75 MG: 50 TABLET ORAL at 21:25

## 2020-04-30 RX ADMIN — Medication 1000 MG: at 08:46

## 2020-04-30 RX ADMIN — METOPROLOL SUCCINATE 100 MG: 50 TABLET, EXTENDED RELEASE ORAL at 08:46

## 2020-05-01 PROCEDURE — 99232 SBSQ HOSP IP/OBS MODERATE 35: CPT | Performed by: PSYCHIATRY & NEUROLOGY

## 2020-05-01 RX ADMIN — OLANZAPINE 5 MG: 5 TABLET, FILM COATED ORAL at 21:01

## 2020-05-01 RX ADMIN — TRAZODONE HYDROCHLORIDE 75 MG: 50 TABLET ORAL at 21:00

## 2020-05-01 RX ADMIN — LORAZEPAM 1 MG: 1 TABLET ORAL at 16:18

## 2020-05-01 RX ADMIN — Medication 1000 MG: at 18:52

## 2020-05-01 RX ADMIN — ZINC SULFATE 220 MG (50 MG) CAPSULE 220 MG: CAPSULE at 14:59

## 2020-05-01 RX ADMIN — ALBUTEROL SULFATE 2 PUFF: 90 AEROSOL, METERED RESPIRATORY (INHALATION) at 16:41

## 2020-05-02 PROCEDURE — 99232 SBSQ HOSP IP/OBS MODERATE 35: CPT | Performed by: NURSE PRACTITIONER

## 2020-05-02 RX ADMIN — METOPROLOL SUCCINATE 100 MG: 50 TABLET, EXTENDED RELEASE ORAL at 08:27

## 2020-05-02 RX ADMIN — Medication 1000 MG: at 18:12

## 2020-05-02 RX ADMIN — ALBUTEROL SULFATE 2 PUFF: 90 AEROSOL, METERED RESPIRATORY (INHALATION) at 08:45

## 2020-05-02 RX ADMIN — DILTIAZEM HYDROCHLORIDE 180 MG: 180 CAPSULE, COATED, EXTENDED RELEASE ORAL at 08:28

## 2020-05-02 RX ADMIN — Medication 1000 MG: at 08:27

## 2020-05-02 RX ADMIN — TRAZODONE HYDROCHLORIDE 75 MG: 50 TABLET ORAL at 21:01

## 2020-05-02 RX ADMIN — OLANZAPINE 5 MG: 5 TABLET, FILM COATED ORAL at 21:01

## 2020-05-02 RX ADMIN — ZINC SULFATE 220 MG (50 MG) CAPSULE 220 MG: CAPSULE at 13:16

## 2020-05-03 PROCEDURE — 99232 SBSQ HOSP IP/OBS MODERATE 35: CPT | Performed by: NURSE PRACTITIONER

## 2020-05-03 RX ADMIN — ZINC SULFATE 220 MG (50 MG) CAPSULE 220 MG: CAPSULE at 15:30

## 2020-05-03 RX ADMIN — Medication 1000 MG: at 08:39

## 2020-05-03 RX ADMIN — DILTIAZEM HYDROCHLORIDE 180 MG: 180 CAPSULE, COATED, EXTENDED RELEASE ORAL at 08:39

## 2020-05-03 RX ADMIN — METOPROLOL SUCCINATE 100 MG: 50 TABLET, EXTENDED RELEASE ORAL at 08:39

## 2020-05-03 RX ADMIN — TRAZODONE HYDROCHLORIDE 75 MG: 50 TABLET ORAL at 21:20

## 2020-05-03 RX ADMIN — Medication 1000 MG: at 18:52

## 2020-05-03 RX ADMIN — OLANZAPINE 5 MG: 5 TABLET, FILM COATED ORAL at 21:21

## 2020-05-04 PROCEDURE — 99232 SBSQ HOSP IP/OBS MODERATE 35: CPT | Performed by: PSYCHIATRY & NEUROLOGY

## 2020-05-04 RX ADMIN — LORAZEPAM 1 MG: 1 TABLET ORAL at 22:07

## 2020-05-04 RX ADMIN — TRAZODONE HYDROCHLORIDE 75 MG: 50 TABLET ORAL at 21:00

## 2020-05-04 RX ADMIN — HYDROXYZINE HYDROCHLORIDE 10 MG: 10 TABLET ORAL at 09:14

## 2020-05-04 RX ADMIN — Medication 1000 MG: at 17:00

## 2020-05-04 RX ADMIN — ALBUTEROL SULFATE 2 PUFF: 90 AEROSOL, METERED RESPIRATORY (INHALATION) at 05:49

## 2020-05-04 RX ADMIN — ZINC SULFATE 220 MG (50 MG) CAPSULE 220 MG: CAPSULE at 13:19

## 2020-05-04 RX ADMIN — DILTIAZEM HYDROCHLORIDE 180 MG: 180 CAPSULE, COATED, EXTENDED RELEASE ORAL at 08:33

## 2020-05-04 RX ADMIN — OLANZAPINE 5 MG: 5 TABLET, FILM COATED ORAL at 21:00

## 2020-05-04 RX ADMIN — Medication 1000 MG: at 08:33

## 2020-05-04 RX ADMIN — ACETAMINOPHEN 650 MG: 325 TABLET ORAL at 22:21

## 2020-05-04 RX ADMIN — METOPROLOL SUCCINATE 100 MG: 50 TABLET, EXTENDED RELEASE ORAL at 08:33

## 2020-05-05 PROBLEM — I48.0 PAROXYSMAL A-FIB (HCC): Status: ACTIVE | Noted: 2019-03-18

## 2020-05-05 LAB
ALBUMIN SERPL BCP-MCNC: 3.2 G/DL (ref 3–5.2)
ALP SERPL-CCNC: 91 U/L (ref 43–122)
ALT SERPL W P-5'-P-CCNC: 33 U/L (ref 9–52)
ANION GAP SERPL CALCULATED.3IONS-SCNC: 6 MMOL/L (ref 5–14)
AST SERPL W P-5'-P-CCNC: 22 U/L (ref 14–36)
BILIRUB SERPL-MCNC: 0.6 MG/DL
BUN SERPL-MCNC: 22 MG/DL (ref 5–25)
CALCIUM SERPL-MCNC: 8.3 MG/DL (ref 8.4–10.2)
CHLORIDE SERPL-SCNC: 99 MMOL/L (ref 97–108)
CO2 SERPL-SCNC: 26 MMOL/L (ref 22–30)
CREAT SERPL-MCNC: 0.96 MG/DL (ref 0.6–1.2)
ERYTHROCYTE [DISTWIDTH] IN BLOOD BY AUTOMATED COUNT: 14.2 %
GFR SERPL CREATININE-BSD FRML MDRD: 53 ML/MIN/1.73SQ M
GLUCOSE SERPL-MCNC: 94 MG/DL (ref 70–99)
HCT VFR BLD AUTO: 30.4 % (ref 36–46)
HGB BLD-MCNC: 10.3 G/DL (ref 12–16)
MCH RBC QN AUTO: 29.1 PG (ref 26.8–34.3)
MCHC RBC AUTO-ENTMCNC: 33.8 G/DL (ref 31.4–37.4)
MCV RBC AUTO: 86 FL (ref 80–100)
PLATELET # BLD AUTO: 349 THOUSANDS/UL (ref 150–450)
PMV BLD AUTO: 7.8 FL (ref 8.9–12.7)
POTASSIUM SERPL-SCNC: 4.6 MMOL/L (ref 3.6–5)
PROT SERPL-MCNC: 5.9 G/DL (ref 5.9–8.4)
RBC # BLD AUTO: 3.53 MILLION/UL (ref 4–5.2)
SODIUM SERPL-SCNC: 131 MMOL/L (ref 137–147)
WBC # BLD AUTO: 7 THOUSAND/UL (ref 4.31–10.16)

## 2020-05-05 PROCEDURE — 85027 COMPLETE CBC AUTOMATED: CPT | Performed by: FAMILY MEDICINE

## 2020-05-05 PROCEDURE — 99232 SBSQ HOSP IP/OBS MODERATE 35: CPT | Performed by: PSYCHIATRY & NEUROLOGY

## 2020-05-05 PROCEDURE — 80053 COMPREHEN METABOLIC PANEL: CPT | Performed by: FAMILY MEDICINE

## 2020-05-05 PROCEDURE — 99232 SBSQ HOSP IP/OBS MODERATE 35: CPT | Performed by: FAMILY MEDICINE

## 2020-05-05 RX ORDER — SODIUM CHLORIDE 1000 MG
1 TABLET, SOLUBLE MISCELLANEOUS 2 TIMES DAILY WITH MEALS
Status: DISCONTINUED | OUTPATIENT
Start: 2020-05-05 | End: 2020-05-26 | Stop reason: HOSPADM

## 2020-05-05 RX ORDER — ZINC SULFATE 50(220)MG
220 CAPSULE ORAL DAILY
Status: DISCONTINUED | OUTPATIENT
Start: 2020-05-06 | End: 2020-05-26 | Stop reason: HOSPADM

## 2020-05-05 RX ADMIN — ZINC SULFATE 220 MG (50 MG) CAPSULE 220 MG: CAPSULE at 17:10

## 2020-05-05 RX ADMIN — Medication 1000 MG: at 08:16

## 2020-05-05 RX ADMIN — SODIUM CHLORIDE TAB 1 GM 1 G: 1 TAB at 17:11

## 2020-05-05 RX ADMIN — METOPROLOL SUCCINATE 100 MG: 50 TABLET, EXTENDED RELEASE ORAL at 08:16

## 2020-05-05 RX ADMIN — OLANZAPINE 5 MG: 5 TABLET, FILM COATED ORAL at 21:30

## 2020-05-05 RX ADMIN — TRAZODONE HYDROCHLORIDE 75 MG: 50 TABLET ORAL at 21:29

## 2020-05-05 RX ADMIN — Medication 1000 MG: at 17:11

## 2020-05-05 RX ADMIN — DILTIAZEM HYDROCHLORIDE 180 MG: 180 CAPSULE, COATED, EXTENDED RELEASE ORAL at 08:16

## 2020-05-06 PROCEDURE — 99232 SBSQ HOSP IP/OBS MODERATE 35: CPT | Performed by: PSYCHIATRY & NEUROLOGY

## 2020-05-06 RX ORDER — MAGNESIUM HYDROXIDE/ALUMINUM HYDROXICE/SIMETHICONE 120; 1200; 1200 MG/30ML; MG/30ML; MG/30ML
30 SUSPENSION ORAL EVERY 4 HOURS PRN
Status: DISCONTINUED | OUTPATIENT
Start: 2020-05-06 | End: 2020-05-26 | Stop reason: HOSPADM

## 2020-05-06 RX ADMIN — TRAZODONE HYDROCHLORIDE 75 MG: 50 TABLET ORAL at 20:58

## 2020-05-06 RX ADMIN — DILTIAZEM HYDROCHLORIDE 180 MG: 180 CAPSULE, COATED, EXTENDED RELEASE ORAL at 18:07

## 2020-05-06 RX ADMIN — ALUMINUM HYDROXIDE, MAGNESIUM HYDROXIDE, AND SIMETHICONE 30 ML: 200; 200; 20 SUSPENSION ORAL at 16:54

## 2020-05-06 RX ADMIN — SODIUM CHLORIDE TAB 1 GM 1 G: 1 TAB at 16:26

## 2020-05-06 RX ADMIN — LORAZEPAM 1 MG: 1 TABLET ORAL at 19:15

## 2020-05-06 RX ADMIN — OLANZAPINE 5 MG: 5 TABLET, FILM COATED ORAL at 20:57

## 2020-05-06 RX ADMIN — Medication 1000 MG: at 16:27

## 2020-05-06 RX ADMIN — ACETAMINOPHEN 975 MG: 325 TABLET ORAL at 06:11

## 2020-05-06 RX ADMIN — METOPROLOL SUCCINATE 100 MG: 50 TABLET, EXTENDED RELEASE ORAL at 18:07

## 2020-05-07 LAB
ANION GAP SERPL CALCULATED.3IONS-SCNC: 5 MMOL/L (ref 5–14)
BUN SERPL-MCNC: 21 MG/DL (ref 5–25)
CALCIUM SERPL-MCNC: 8.5 MG/DL (ref 8.4–10.2)
CHLORIDE SERPL-SCNC: 105 MMOL/L (ref 97–108)
CO2 SERPL-SCNC: 23 MMOL/L (ref 22–30)
CREAT SERPL-MCNC: 0.94 MG/DL (ref 0.6–1.2)
GFR SERPL CREATININE-BSD FRML MDRD: 54 ML/MIN/1.73SQ M
GLUCOSE P FAST SERPL-MCNC: 90 MG/DL (ref 70–99)
GLUCOSE SERPL-MCNC: 90 MG/DL (ref 70–99)
POTASSIUM SERPL-SCNC: 4.2 MMOL/L (ref 3.6–5)
SODIUM SERPL-SCNC: 133 MMOL/L (ref 137–147)

## 2020-05-07 PROCEDURE — 99232 SBSQ HOSP IP/OBS MODERATE 35: CPT | Performed by: PSYCHIATRY & NEUROLOGY

## 2020-05-07 PROCEDURE — 80048 BASIC METABOLIC PNL TOTAL CA: CPT | Performed by: FAMILY MEDICINE

## 2020-05-07 RX ORDER — TRAZODONE HYDROCHLORIDE 50 MG/1
50 TABLET ORAL
Status: DISCONTINUED | OUTPATIENT
Start: 2020-05-07 | End: 2020-05-13

## 2020-05-07 RX ORDER — LANOLIN ALCOHOL/MO/W.PET/CERES
3 CREAM (GRAM) TOPICAL
Status: DISCONTINUED | OUTPATIENT
Start: 2020-05-07 | End: 2020-05-26 | Stop reason: HOSPADM

## 2020-05-07 RX ADMIN — Medication 1000 MG: at 10:12

## 2020-05-07 RX ADMIN — METOPROLOL SUCCINATE 100 MG: 50 TABLET, EXTENDED RELEASE ORAL at 10:11

## 2020-05-07 RX ADMIN — ZINC SULFATE 220 MG (50 MG) CAPSULE 220 MG: CAPSULE at 10:11

## 2020-05-07 RX ADMIN — MELATONIN TAB 3 MG 3 MG: 3 TAB at 21:19

## 2020-05-07 RX ADMIN — DILTIAZEM HYDROCHLORIDE 180 MG: 180 CAPSULE, COATED, EXTENDED RELEASE ORAL at 10:11

## 2020-05-07 RX ADMIN — SODIUM CHLORIDE TAB 1 GM 1 G: 1 TAB at 06:30

## 2020-05-07 RX ADMIN — OLANZAPINE 5 MG: 5 TABLET, FILM COATED ORAL at 21:21

## 2020-05-07 RX ADMIN — TRAZODONE HYDROCHLORIDE 50 MG: 50 TABLET ORAL at 21:19

## 2020-05-07 RX ADMIN — Medication 1000 MG: at 16:48

## 2020-05-07 RX ADMIN — SODIUM CHLORIDE TAB 1 GM 1 G: 1 TAB at 16:48

## 2020-05-08 LAB — SARS-COV-2 RNA RESP QL NAA+PROBE: POSITIVE

## 2020-05-08 PROCEDURE — 87635 SARS-COV-2 COVID-19 AMP PRB: CPT | Performed by: PSYCHIATRY & NEUROLOGY

## 2020-05-08 PROCEDURE — 99232 SBSQ HOSP IP/OBS MODERATE 35: CPT | Performed by: PSYCHIATRY & NEUROLOGY

## 2020-05-08 RX ADMIN — METOPROLOL SUCCINATE 100 MG: 50 TABLET, EXTENDED RELEASE ORAL at 08:17

## 2020-05-08 RX ADMIN — Medication 1000 MG: at 08:17

## 2020-05-08 RX ADMIN — SODIUM CHLORIDE TAB 1 GM 1 G: 1 TAB at 17:16

## 2020-05-08 RX ADMIN — Medication 1000 MG: at 17:16

## 2020-05-08 RX ADMIN — OLANZAPINE 5 MG: 5 TABLET, FILM COATED ORAL at 21:00

## 2020-05-08 RX ADMIN — MELATONIN TAB 3 MG 3 MG: 3 TAB at 21:00

## 2020-05-08 RX ADMIN — SODIUM CHLORIDE TAB 1 GM 1 G: 1 TAB at 06:34

## 2020-05-08 RX ADMIN — ZINC SULFATE 220 MG (50 MG) CAPSULE 220 MG: CAPSULE at 08:18

## 2020-05-08 RX ADMIN — TRAZODONE HYDROCHLORIDE 50 MG: 50 TABLET ORAL at 21:00

## 2020-05-08 RX ADMIN — DILTIAZEM HYDROCHLORIDE 180 MG: 180 CAPSULE, COATED, EXTENDED RELEASE ORAL at 08:18

## 2020-05-09 PROCEDURE — 99232 SBSQ HOSP IP/OBS MODERATE 35: CPT | Performed by: PHYSICIAN ASSISTANT

## 2020-05-09 PROCEDURE — NC001 PR NO CHARGE: Performed by: PHYSICIAN ASSISTANT

## 2020-05-09 RX ADMIN — SODIUM CHLORIDE TAB 1 GM 1 G: 1 TAB at 08:00

## 2020-05-09 RX ADMIN — DILTIAZEM HYDROCHLORIDE 180 MG: 180 CAPSULE, COATED, EXTENDED RELEASE ORAL at 08:00

## 2020-05-09 RX ADMIN — OLANZAPINE 5 MG: 5 TABLET, FILM COATED ORAL at 21:36

## 2020-05-09 RX ADMIN — ZINC SULFATE 220 MG (50 MG) CAPSULE 220 MG: CAPSULE at 08:00

## 2020-05-09 RX ADMIN — SODIUM CHLORIDE TAB 1 GM 1 G: 1 TAB at 17:20

## 2020-05-09 RX ADMIN — METOPROLOL SUCCINATE 100 MG: 50 TABLET, EXTENDED RELEASE ORAL at 08:00

## 2020-05-09 RX ADMIN — ALUMINUM HYDROXIDE, MAGNESIUM HYDROXIDE, AND SIMETHICONE 30 ML: 200; 200; 20 SUSPENSION ORAL at 08:31

## 2020-05-09 RX ADMIN — Medication 1000 MG: at 08:00

## 2020-05-09 RX ADMIN — TRAZODONE HYDROCHLORIDE 50 MG: 50 TABLET ORAL at 21:37

## 2020-05-09 RX ADMIN — LORAZEPAM 1 MG: 1 TABLET ORAL at 08:37

## 2020-05-09 RX ADMIN — MELATONIN TAB 3 MG 3 MG: 3 TAB at 21:36

## 2020-05-09 RX ADMIN — Medication 1000 MG: at 17:20

## 2020-05-10 PROCEDURE — 99232 SBSQ HOSP IP/OBS MODERATE 35: CPT | Performed by: PHYSICIAN ASSISTANT

## 2020-05-10 RX ORDER — HYDROXYZINE HYDROCHLORIDE 10 MG/1
10 TABLET, FILM COATED ORAL EVERY 8 HOURS PRN
Status: DISCONTINUED | OUTPATIENT
Start: 2020-05-10 | End: 2020-05-13

## 2020-05-10 RX ORDER — HYDROXYZINE HYDROCHLORIDE 25 MG/1
25 TABLET, FILM COATED ORAL EVERY 8 HOURS PRN
Status: DISCONTINUED | OUTPATIENT
Start: 2020-05-10 | End: 2020-05-13

## 2020-05-10 RX ADMIN — Medication 1000 MG: at 08:39

## 2020-05-10 RX ADMIN — DILTIAZEM HYDROCHLORIDE 180 MG: 180 CAPSULE, COATED, EXTENDED RELEASE ORAL at 08:38

## 2020-05-10 RX ADMIN — METOPROLOL SUCCINATE 100 MG: 50 TABLET, EXTENDED RELEASE ORAL at 08:39

## 2020-05-10 RX ADMIN — ACETAMINOPHEN 650 MG: 325 TABLET ORAL at 09:05

## 2020-05-10 RX ADMIN — OLANZAPINE 5 MG: 5 TABLET, FILM COATED ORAL at 21:03

## 2020-05-10 RX ADMIN — MELATONIN TAB 3 MG 3 MG: 3 TAB at 21:03

## 2020-05-10 RX ADMIN — Medication 1000 MG: at 17:10

## 2020-05-10 RX ADMIN — SODIUM CHLORIDE TAB 1 GM 1 G: 1 TAB at 08:39

## 2020-05-10 RX ADMIN — ZINC SULFATE 220 MG (50 MG) CAPSULE 220 MG: CAPSULE at 08:39

## 2020-05-10 RX ADMIN — SODIUM CHLORIDE TAB 1 GM 1 G: 1 TAB at 17:11

## 2020-05-10 RX ADMIN — TRAZODONE HYDROCHLORIDE 50 MG: 50 TABLET ORAL at 21:03

## 2020-05-11 PROCEDURE — 99232 SBSQ HOSP IP/OBS MODERATE 35: CPT | Performed by: PSYCHIATRY & NEUROLOGY

## 2020-05-11 RX ADMIN — Medication 1000 MG: at 17:01

## 2020-05-11 RX ADMIN — Medication 1000 MG: at 08:17

## 2020-05-11 RX ADMIN — DILTIAZEM HYDROCHLORIDE 180 MG: 180 CAPSULE, COATED, EXTENDED RELEASE ORAL at 08:17

## 2020-05-11 RX ADMIN — OLANZAPINE 5 MG: 5 TABLET, FILM COATED ORAL at 21:42

## 2020-05-11 RX ADMIN — SODIUM CHLORIDE TAB 1 GM 1 G: 1 TAB at 08:17

## 2020-05-11 RX ADMIN — ALUMINUM HYDROXIDE, MAGNESIUM HYDROXIDE, AND SIMETHICONE 30 ML: 200; 200; 20 SUSPENSION ORAL at 12:27

## 2020-05-11 RX ADMIN — METOPROLOL SUCCINATE 100 MG: 50 TABLET, EXTENDED RELEASE ORAL at 08:17

## 2020-05-11 RX ADMIN — ZINC SULFATE 220 MG (50 MG) CAPSULE 220 MG: CAPSULE at 08:17

## 2020-05-11 RX ADMIN — MELATONIN TAB 3 MG 3 MG: 3 TAB at 21:43

## 2020-05-11 RX ADMIN — TRAZODONE HYDROCHLORIDE 50 MG: 50 TABLET ORAL at 21:42

## 2020-05-11 RX ADMIN — SODIUM CHLORIDE TAB 1 GM 1 G: 1 TAB at 17:01

## 2020-05-12 PROCEDURE — 99232 SBSQ HOSP IP/OBS MODERATE 35: CPT | Performed by: PSYCHIATRY & NEUROLOGY

## 2020-05-12 RX ADMIN — SODIUM CHLORIDE TAB 1 GM 1 G: 1 TAB at 16:52

## 2020-05-12 RX ADMIN — DILTIAZEM HYDROCHLORIDE 180 MG: 180 CAPSULE, COATED, EXTENDED RELEASE ORAL at 08:41

## 2020-05-12 RX ADMIN — OLANZAPINE 5 MG: 5 TABLET, FILM COATED ORAL at 21:12

## 2020-05-12 RX ADMIN — ACETAMINOPHEN 650 MG: 325 TABLET ORAL at 21:12

## 2020-05-12 RX ADMIN — Medication 1000 MG: at 08:40

## 2020-05-12 RX ADMIN — MELATONIN TAB 3 MG 3 MG: 3 TAB at 21:12

## 2020-05-12 RX ADMIN — ZINC SULFATE 220 MG (50 MG) CAPSULE 220 MG: CAPSULE at 08:41

## 2020-05-12 RX ADMIN — SODIUM CHLORIDE TAB 1 GM 1 G: 1 TAB at 08:40

## 2020-05-12 RX ADMIN — Medication 1000 MG: at 17:00

## 2020-05-12 RX ADMIN — TRAZODONE HYDROCHLORIDE 50 MG: 50 TABLET ORAL at 21:12

## 2020-05-12 RX ADMIN — METOPROLOL SUCCINATE 100 MG: 50 TABLET, EXTENDED RELEASE ORAL at 08:40

## 2020-05-13 LAB — GLUCOSE SERPL-MCNC: 128 MG/DL (ref 65–140)

## 2020-05-13 PROCEDURE — 99232 SBSQ HOSP IP/OBS MODERATE 35: CPT | Performed by: PSYCHIATRY & NEUROLOGY

## 2020-05-13 PROCEDURE — 82948 REAGENT STRIP/BLOOD GLUCOSE: CPT

## 2020-05-13 RX ORDER — ALBUTEROL SULFATE 2.5 MG/3ML
2.5 SOLUTION RESPIRATORY (INHALATION) EVERY 4 HOURS PRN
Status: DISCONTINUED | OUTPATIENT
Start: 2020-05-13 | End: 2020-05-26 | Stop reason: HOSPADM

## 2020-05-13 RX ORDER — TRAZODONE HYDROCHLORIDE 50 MG/1
25 TABLET ORAL
Status: DISCONTINUED | OUTPATIENT
Start: 2020-05-13 | End: 2020-05-17

## 2020-05-13 RX ORDER — DOCUSATE SODIUM 100 MG/1
100 CAPSULE, LIQUID FILLED ORAL EVERY 12 HOURS SCHEDULED
Status: DISCONTINUED | OUTPATIENT
Start: 2020-05-13 | End: 2020-05-26 | Stop reason: HOSPADM

## 2020-05-13 RX ORDER — GABAPENTIN 100 MG/1
100 CAPSULE ORAL EVERY 4 HOURS PRN
Status: DISCONTINUED | OUTPATIENT
Start: 2020-05-13 | End: 2020-05-26 | Stop reason: HOSPADM

## 2020-05-13 RX ADMIN — Medication 1000 MG: at 08:09

## 2020-05-13 RX ADMIN — HYDROXYZINE HYDROCHLORIDE 25 MG: 25 TABLET, FILM COATED ORAL at 05:21

## 2020-05-13 RX ADMIN — DILTIAZEM HYDROCHLORIDE 180 MG: 180 CAPSULE, COATED, EXTENDED RELEASE ORAL at 08:09

## 2020-05-13 RX ADMIN — DOCUSATE SODIUM 100 MG: 100 CAPSULE, LIQUID FILLED ORAL at 08:59

## 2020-05-13 RX ADMIN — SODIUM CHLORIDE TAB 1 GM 1 G: 1 TAB at 08:09

## 2020-05-13 RX ADMIN — TRAZODONE HYDROCHLORIDE 25 MG: 50 TABLET ORAL at 21:04

## 2020-05-13 RX ADMIN — SODIUM CHLORIDE TAB 1 GM 1 G: 1 TAB at 16:54

## 2020-05-13 RX ADMIN — ACETAMINOPHEN 650 MG: 325 TABLET ORAL at 21:04

## 2020-05-13 RX ADMIN — METOPROLOL SUCCINATE 100 MG: 50 TABLET, EXTENDED RELEASE ORAL at 08:09

## 2020-05-13 RX ADMIN — Medication 1000 MG: at 16:54

## 2020-05-13 RX ADMIN — ZINC SULFATE 220 MG (50 MG) CAPSULE 220 MG: CAPSULE at 08:09

## 2020-05-13 RX ADMIN — MELATONIN TAB 3 MG 3 MG: 3 TAB at 21:04

## 2020-05-13 RX ADMIN — OLANZAPINE 5 MG: 5 TABLET, FILM COATED ORAL at 21:04

## 2020-05-13 RX ADMIN — DOCUSATE SODIUM 100 MG: 100 CAPSULE, LIQUID FILLED ORAL at 21:04

## 2020-05-14 LAB
ANION GAP SERPL CALCULATED.3IONS-SCNC: 5 MMOL/L (ref 5–14)
BUN SERPL-MCNC: 20 MG/DL (ref 5–25)
CALCIUM SERPL-MCNC: 8.8 MG/DL (ref 8.4–10.2)
CHLORIDE SERPL-SCNC: 106 MMOL/L (ref 97–108)
CO2 SERPL-SCNC: 25 MMOL/L (ref 22–30)
CREAT SERPL-MCNC: 0.77 MG/DL (ref 0.6–1.2)
GFR SERPL CREATININE-BSD FRML MDRD: 69 ML/MIN/1.73SQ M
GLUCOSE P FAST SERPL-MCNC: 107 MG/DL (ref 70–99)
GLUCOSE SERPL-MCNC: 107 MG/DL (ref 70–99)
POTASSIUM SERPL-SCNC: 4 MMOL/L (ref 3.6–5)
SODIUM SERPL-SCNC: 136 MMOL/L (ref 137–147)

## 2020-05-14 PROCEDURE — 80048 BASIC METABOLIC PNL TOTAL CA: CPT | Performed by: PSYCHIATRY & NEUROLOGY

## 2020-05-14 PROCEDURE — 99232 SBSQ HOSP IP/OBS MODERATE 35: CPT | Performed by: PSYCHIATRY & NEUROLOGY

## 2020-05-14 RX ADMIN — METOPROLOL SUCCINATE 100 MG: 50 TABLET, EXTENDED RELEASE ORAL at 08:18

## 2020-05-14 RX ADMIN — DOCUSATE SODIUM 100 MG: 100 CAPSULE, LIQUID FILLED ORAL at 08:18

## 2020-05-14 RX ADMIN — SODIUM CHLORIDE TAB 1 GM 1 G: 1 TAB at 08:18

## 2020-05-14 RX ADMIN — SODIUM CHLORIDE TAB 1 GM 1 G: 1 TAB at 17:00

## 2020-05-14 RX ADMIN — MELATONIN TAB 3 MG 3 MG: 3 TAB at 21:00

## 2020-05-14 RX ADMIN — TRAZODONE HYDROCHLORIDE 25 MG: 50 TABLET ORAL at 21:01

## 2020-05-14 RX ADMIN — Medication 1000 MG: at 17:00

## 2020-05-14 RX ADMIN — DILTIAZEM HYDROCHLORIDE 180 MG: 180 CAPSULE, COATED, EXTENDED RELEASE ORAL at 08:18

## 2020-05-14 RX ADMIN — DOCUSATE SODIUM 100 MG: 100 CAPSULE, LIQUID FILLED ORAL at 21:00

## 2020-05-14 RX ADMIN — ZINC SULFATE 220 MG (50 MG) CAPSULE 220 MG: CAPSULE at 08:18

## 2020-05-14 RX ADMIN — OLANZAPINE 5 MG: 5 TABLET, FILM COATED ORAL at 21:00

## 2020-05-14 RX ADMIN — ACETAMINOPHEN 650 MG: 325 TABLET ORAL at 21:29

## 2020-05-14 RX ADMIN — Medication 1000 MG: at 08:18

## 2020-05-15 PROCEDURE — 99232 SBSQ HOSP IP/OBS MODERATE 35: CPT | Performed by: PSYCHIATRY & NEUROLOGY

## 2020-05-15 RX ORDER — ALBUTEROL SULFATE 90 UG/1
2 AEROSOL, METERED RESPIRATORY (INHALATION) EVERY 4 HOURS PRN
Status: DISCONTINUED | OUTPATIENT
Start: 2020-05-15 | End: 2020-05-26 | Stop reason: HOSPADM

## 2020-05-15 RX ADMIN — Medication 1000 MG: at 09:05

## 2020-05-15 RX ADMIN — DOCUSATE SODIUM 100 MG: 100 CAPSULE, LIQUID FILLED ORAL at 21:23

## 2020-05-15 RX ADMIN — OLANZAPINE 5 MG: 5 TABLET, FILM COATED ORAL at 21:23

## 2020-05-15 RX ADMIN — ALBUTEROL SULFATE 2 PUFF: 90 AEROSOL, METERED RESPIRATORY (INHALATION) at 09:42

## 2020-05-15 RX ADMIN — GABAPENTIN 100 MG: 100 CAPSULE ORAL at 09:58

## 2020-05-15 RX ADMIN — ZINC SULFATE 220 MG (50 MG) CAPSULE 220 MG: CAPSULE at 09:05

## 2020-05-15 RX ADMIN — MELATONIN TAB 3 MG 3 MG: 3 TAB at 21:23

## 2020-05-15 RX ADMIN — TRAZODONE HYDROCHLORIDE 25 MG: 50 TABLET ORAL at 21:23

## 2020-05-15 RX ADMIN — SODIUM CHLORIDE TAB 1 GM 1 G: 1 TAB at 17:11

## 2020-05-15 RX ADMIN — Medication 1000 MG: at 17:11

## 2020-05-15 RX ADMIN — DOCUSATE SODIUM 100 MG: 100 CAPSULE, LIQUID FILLED ORAL at 09:05

## 2020-05-15 RX ADMIN — SODIUM CHLORIDE TAB 1 GM 1 G: 1 TAB at 09:05

## 2020-05-16 PROCEDURE — 99232 SBSQ HOSP IP/OBS MODERATE 35: CPT | Performed by: PSYCHIATRY & NEUROLOGY

## 2020-05-16 PROCEDURE — 93005 ELECTROCARDIOGRAM TRACING: CPT

## 2020-05-16 PROCEDURE — NC001 PR NO CHARGE: Performed by: PSYCHIATRY & NEUROLOGY

## 2020-05-16 RX ADMIN — METOPROLOL SUCCINATE 100 MG: 50 TABLET, EXTENDED RELEASE ORAL at 08:16

## 2020-05-16 RX ADMIN — DOCUSATE SODIUM 100 MG: 100 CAPSULE, LIQUID FILLED ORAL at 08:16

## 2020-05-16 RX ADMIN — Medication 1000 MG: at 17:04

## 2020-05-16 RX ADMIN — MELATONIN TAB 3 MG 3 MG: 3 TAB at 21:18

## 2020-05-16 RX ADMIN — TRAZODONE HYDROCHLORIDE 25 MG: 50 TABLET ORAL at 21:18

## 2020-05-16 RX ADMIN — DOCUSATE SODIUM 100 MG: 100 CAPSULE, LIQUID FILLED ORAL at 21:18

## 2020-05-16 RX ADMIN — SODIUM CHLORIDE TAB 1 GM 1 G: 1 TAB at 08:16

## 2020-05-16 RX ADMIN — SODIUM CHLORIDE TAB 1 GM 1 G: 1 TAB at 17:05

## 2020-05-16 RX ADMIN — Medication 1000 MG: at 08:16

## 2020-05-16 RX ADMIN — ZINC SULFATE 220 MG (50 MG) CAPSULE 220 MG: CAPSULE at 08:16

## 2020-05-16 RX ADMIN — DILTIAZEM HYDROCHLORIDE 180 MG: 180 CAPSULE, COATED, EXTENDED RELEASE ORAL at 08:17

## 2020-05-16 RX ADMIN — OLANZAPINE 5 MG: 5 TABLET, FILM COATED ORAL at 21:18

## 2020-05-17 LAB
ATRIAL RATE: 84 BPM
P AXIS: 9 DEGREES
PR INTERVAL: 130 MS
QRS AXIS: 48 DEGREES
QRSD INTERVAL: 70 MS
QT INTERVAL: 382 MS
QTC INTERVAL: 451 MS
T WAVE AXIS: 53 DEGREES
VENTRICULAR RATE: 84 BPM

## 2020-05-17 PROCEDURE — 93010 ELECTROCARDIOGRAM REPORT: CPT | Performed by: INTERNAL MEDICINE

## 2020-05-17 PROCEDURE — 99232 SBSQ HOSP IP/OBS MODERATE 35: CPT | Performed by: PSYCHIATRY & NEUROLOGY

## 2020-05-17 RX ADMIN — DOCUSATE SODIUM 100 MG: 100 CAPSULE, LIQUID FILLED ORAL at 21:04

## 2020-05-17 RX ADMIN — MELATONIN TAB 3 MG 3 MG: 3 TAB at 21:04

## 2020-05-17 RX ADMIN — ZINC SULFATE 220 MG (50 MG) CAPSULE 220 MG: CAPSULE at 08:23

## 2020-05-17 RX ADMIN — Medication 1000 MG: at 17:06

## 2020-05-17 RX ADMIN — OLANZAPINE 5 MG: 5 TABLET, FILM COATED ORAL at 21:04

## 2020-05-17 RX ADMIN — Medication 1000 MG: at 08:22

## 2020-05-17 RX ADMIN — DILTIAZEM HYDROCHLORIDE 180 MG: 180 CAPSULE, COATED, EXTENDED RELEASE ORAL at 08:23

## 2020-05-17 RX ADMIN — DOCUSATE SODIUM 100 MG: 100 CAPSULE, LIQUID FILLED ORAL at 08:22

## 2020-05-17 RX ADMIN — SODIUM CHLORIDE TAB 1 GM 1 G: 1 TAB at 08:23

## 2020-05-17 RX ADMIN — METOPROLOL SUCCINATE 100 MG: 50 TABLET, EXTENDED RELEASE ORAL at 08:23

## 2020-05-17 RX ADMIN — SODIUM CHLORIDE TAB 1 GM 1 G: 1 TAB at 17:06

## 2020-05-18 PROCEDURE — 99232 SBSQ HOSP IP/OBS MODERATE 35: CPT | Performed by: PSYCHIATRY & NEUROLOGY

## 2020-05-18 RX ADMIN — MELATONIN TAB 3 MG 3 MG: 3 TAB at 21:09

## 2020-05-18 RX ADMIN — SODIUM CHLORIDE TAB 1 GM 1 G: 1 TAB at 17:06

## 2020-05-18 RX ADMIN — OLANZAPINE 5 MG: 5 TABLET, FILM COATED ORAL at 21:09

## 2020-05-18 RX ADMIN — GABAPENTIN 100 MG: 100 CAPSULE ORAL at 01:30

## 2020-05-18 RX ADMIN — Medication 1000 MG: at 17:06

## 2020-05-18 RX ADMIN — ZINC SULFATE 220 MG (50 MG) CAPSULE 220 MG: CAPSULE at 09:09

## 2020-05-18 RX ADMIN — DOCUSATE SODIUM 100 MG: 100 CAPSULE, LIQUID FILLED ORAL at 21:09

## 2020-05-18 RX ADMIN — ALBUTEROL SULFATE 2 PUFF: 90 AEROSOL, METERED RESPIRATORY (INHALATION) at 01:25

## 2020-05-18 RX ADMIN — DILTIAZEM HYDROCHLORIDE 180 MG: 180 CAPSULE, COATED, EXTENDED RELEASE ORAL at 09:09

## 2020-05-18 RX ADMIN — SODIUM CHLORIDE TAB 1 GM 1 G: 1 TAB at 09:10

## 2020-05-18 RX ADMIN — Medication 1000 MG: at 09:10

## 2020-05-18 RX ADMIN — METOPROLOL SUCCINATE 100 MG: 50 TABLET, EXTENDED RELEASE ORAL at 09:09

## 2020-05-18 RX ADMIN — DOCUSATE SODIUM 100 MG: 100 CAPSULE, LIQUID FILLED ORAL at 09:09

## 2020-05-19 PROCEDURE — 99232 SBSQ HOSP IP/OBS MODERATE 35: CPT | Performed by: PSYCHIATRY & NEUROLOGY

## 2020-05-19 RX ADMIN — DOCUSATE SODIUM 100 MG: 100 CAPSULE, LIQUID FILLED ORAL at 21:16

## 2020-05-19 RX ADMIN — Medication 1000 MG: at 17:26

## 2020-05-19 RX ADMIN — ZINC SULFATE 220 MG (50 MG) CAPSULE 220 MG: CAPSULE at 08:56

## 2020-05-19 RX ADMIN — METOPROLOL SUCCINATE 100 MG: 50 TABLET, EXTENDED RELEASE ORAL at 08:55

## 2020-05-19 RX ADMIN — Medication 1000 MG: at 08:55

## 2020-05-19 RX ADMIN — DILTIAZEM HYDROCHLORIDE 180 MG: 180 CAPSULE, COATED, EXTENDED RELEASE ORAL at 08:56

## 2020-05-19 RX ADMIN — BISACODYL 10 MG: 10 SUPPOSITORY RECTAL at 15:47

## 2020-05-19 RX ADMIN — MELATONIN TAB 3 MG 3 MG: 3 TAB at 21:16

## 2020-05-19 RX ADMIN — SODIUM CHLORIDE TAB 1 GM 1 G: 1 TAB at 17:27

## 2020-05-19 RX ADMIN — OLANZAPINE 5 MG: 5 TABLET, FILM COATED ORAL at 21:16

## 2020-05-19 RX ADMIN — DOCUSATE SODIUM 100 MG: 100 CAPSULE, LIQUID FILLED ORAL at 08:55

## 2020-05-19 RX ADMIN — SODIUM CHLORIDE TAB 1 GM 1 G: 1 TAB at 08:55

## 2020-05-20 PROCEDURE — 99231 SBSQ HOSP IP/OBS SF/LOW 25: CPT | Performed by: PSYCHIATRY & NEUROLOGY

## 2020-05-20 RX ORDER — POLYETHYLENE GLYCOL 3350 17 G/17G
17 POWDER, FOR SOLUTION ORAL DAILY PRN
Status: DISCONTINUED | OUTPATIENT
Start: 2020-05-20 | End: 2020-05-26 | Stop reason: HOSPADM

## 2020-05-20 RX ADMIN — DOCUSATE SODIUM 100 MG: 100 CAPSULE, LIQUID FILLED ORAL at 21:25

## 2020-05-20 RX ADMIN — DILTIAZEM HYDROCHLORIDE 180 MG: 180 CAPSULE, COATED, EXTENDED RELEASE ORAL at 08:30

## 2020-05-20 RX ADMIN — ACETAMINOPHEN 650 MG: 325 TABLET ORAL at 16:15

## 2020-05-20 RX ADMIN — MELATONIN TAB 3 MG 3 MG: 3 TAB at 21:25

## 2020-05-20 RX ADMIN — ZINC SULFATE 220 MG (50 MG) CAPSULE 220 MG: CAPSULE at 08:29

## 2020-05-20 RX ADMIN — METOPROLOL SUCCINATE 100 MG: 50 TABLET, EXTENDED RELEASE ORAL at 08:30

## 2020-05-20 RX ADMIN — SODIUM CHLORIDE TAB 1 GM 1 G: 1 TAB at 08:29

## 2020-05-20 RX ADMIN — ACETAMINOPHEN 650 MG: 325 TABLET ORAL at 10:53

## 2020-05-20 RX ADMIN — Medication 1000 MG: at 18:12

## 2020-05-20 RX ADMIN — DOCUSATE SODIUM 100 MG: 100 CAPSULE, LIQUID FILLED ORAL at 08:30

## 2020-05-20 RX ADMIN — SODIUM CHLORIDE TAB 1 GM 1 G: 1 TAB at 16:12

## 2020-05-20 RX ADMIN — Medication 1000 MG: at 08:30

## 2020-05-20 RX ADMIN — OLANZAPINE 5 MG: 5 TABLET, FILM COATED ORAL at 21:25

## 2020-05-21 LAB — SARS-COV-2 RNA RESP QL NAA+PROBE: NEGATIVE

## 2020-05-21 PROCEDURE — 87635 SARS-COV-2 COVID-19 AMP PRB: CPT | Performed by: PSYCHIATRY & NEUROLOGY

## 2020-05-21 PROCEDURE — 99232 SBSQ HOSP IP/OBS MODERATE 35: CPT | Performed by: PSYCHIATRY & NEUROLOGY

## 2020-05-21 RX ORDER — OXYMETAZOLINE HYDROCHLORIDE 0.05 G/100ML
2 SPRAY NASAL EVERY 12 HOURS SCHEDULED
Status: DISPENSED | OUTPATIENT
Start: 2020-05-21 | End: 2020-05-24

## 2020-05-21 RX ADMIN — Medication 1000 MG: at 08:03

## 2020-05-21 RX ADMIN — DOCUSATE SODIUM 100 MG: 100 CAPSULE, LIQUID FILLED ORAL at 08:03

## 2020-05-21 RX ADMIN — ZINC SULFATE 220 MG (50 MG) CAPSULE 220 MG: CAPSULE at 08:04

## 2020-05-21 RX ADMIN — METOPROLOL SUCCINATE 100 MG: 50 TABLET, EXTENDED RELEASE ORAL at 08:03

## 2020-05-21 RX ADMIN — SODIUM CHLORIDE TAB 1 GM 1 G: 1 TAB at 08:04

## 2020-05-21 RX ADMIN — DILTIAZEM HYDROCHLORIDE 180 MG: 180 CAPSULE, COATED, EXTENDED RELEASE ORAL at 08:04

## 2020-05-22 PROBLEM — D72.819 LEUKOPENIA: Status: RESOLVED | Noted: 2020-04-24 | Resolved: 2020-05-22

## 2020-05-22 PROBLEM — N30.00 ACUTE CYSTITIS WITHOUT HEMATURIA: Status: RESOLVED | Noted: 2020-04-19 | Resolved: 2020-05-22

## 2020-05-22 LAB — SARS-COV-2 RNA RESP QL NAA+PROBE: NEGATIVE

## 2020-05-22 PROCEDURE — 99232 SBSQ HOSP IP/OBS MODERATE 35: CPT | Performed by: PSYCHIATRY & NEUROLOGY

## 2020-05-22 PROCEDURE — 87635 SARS-COV-2 COVID-19 AMP PRB: CPT | Performed by: FAMILY MEDICINE

## 2020-05-22 RX ADMIN — MELATONIN TAB 3 MG 3 MG: 3 TAB at 21:14

## 2020-05-22 RX ADMIN — DOCUSATE SODIUM 100 MG: 100 CAPSULE, LIQUID FILLED ORAL at 21:15

## 2020-05-22 RX ADMIN — OLANZAPINE 5 MG: 5 TABLET, FILM COATED ORAL at 21:15

## 2020-05-22 RX ADMIN — OXYMETAZOLINE HYDROCHLORIDE 2 SPRAY: 0.05 SPRAY NASAL at 08:15

## 2020-05-22 RX ADMIN — GABAPENTIN 100 MG: 100 CAPSULE ORAL at 00:49

## 2020-05-23 PROCEDURE — 99232 SBSQ HOSP IP/OBS MODERATE 35: CPT | Performed by: PSYCHIATRY & NEUROLOGY

## 2020-05-23 RX ADMIN — OLANZAPINE 5 MG: 5 TABLET, FILM COATED ORAL at 21:06

## 2020-05-23 RX ADMIN — ACETAMINOPHEN 975 MG: 325 TABLET ORAL at 08:16

## 2020-05-23 RX ADMIN — MELATONIN TAB 3 MG 3 MG: 3 TAB at 21:06

## 2020-05-23 RX ADMIN — DOCUSATE SODIUM 100 MG: 100 CAPSULE, LIQUID FILLED ORAL at 21:06

## 2020-05-23 RX ADMIN — OXYMETAZOLINE HYDROCHLORIDE 2 SPRAY: 0.05 SPRAY NASAL at 08:16

## 2020-05-24 PROCEDURE — 99232 SBSQ HOSP IP/OBS MODERATE 35: CPT | Performed by: PSYCHIATRY & NEUROLOGY

## 2020-05-24 RX ADMIN — METOPROLOL SUCCINATE 100 MG: 50 TABLET, EXTENDED RELEASE ORAL at 08:22

## 2020-05-24 RX ADMIN — ALUMINUM HYDROXIDE, MAGNESIUM HYDROXIDE, AND SIMETHICONE 30 ML: 200; 200; 20 SUSPENSION ORAL at 17:08

## 2020-05-24 RX ADMIN — DILTIAZEM HYDROCHLORIDE 180 MG: 180 CAPSULE, COATED, EXTENDED RELEASE ORAL at 08:22

## 2020-05-25 PROCEDURE — 99232 SBSQ HOSP IP/OBS MODERATE 35: CPT | Performed by: PSYCHIATRY & NEUROLOGY

## 2020-05-25 RX ADMIN — METOPROLOL SUCCINATE 100 MG: 50 TABLET, EXTENDED RELEASE ORAL at 08:42

## 2020-05-25 RX ADMIN — OLANZAPINE 5 MG: 5 TABLET, FILM COATED ORAL at 21:03

## 2020-05-25 RX ADMIN — SODIUM CHLORIDE TAB 1 GM 1 G: 1 TAB at 16:53

## 2020-05-25 RX ADMIN — ZINC SULFATE 220 MG (50 MG) CAPSULE 220 MG: CAPSULE at 08:41

## 2020-05-25 RX ADMIN — SODIUM CHLORIDE TAB 1 GM 1 G: 1 TAB at 08:43

## 2020-05-25 RX ADMIN — MELATONIN TAB 3 MG 3 MG: 3 TAB at 21:04

## 2020-05-25 RX ADMIN — DILTIAZEM HYDROCHLORIDE 180 MG: 180 CAPSULE, COATED, EXTENDED RELEASE ORAL at 08:41

## 2020-05-25 RX ADMIN — Medication 1000 MG: at 08:43

## 2020-05-25 RX ADMIN — DOCUSATE SODIUM 100 MG: 100 CAPSULE, LIQUID FILLED ORAL at 08:43

## 2020-05-25 RX ADMIN — DOCUSATE SODIUM 100 MG: 100 CAPSULE, LIQUID FILLED ORAL at 21:03

## 2020-05-25 RX ADMIN — ACETAMINOPHEN 650 MG: 325 TABLET ORAL at 16:53

## 2020-05-26 VITALS
RESPIRATION RATE: 18 BRPM | BODY MASS INDEX: 24.22 KG/M2 | HEIGHT: 59 IN | OXYGEN SATURATION: 97 % | SYSTOLIC BLOOD PRESSURE: 149 MMHG | WEIGHT: 120.15 LBS | TEMPERATURE: 98.5 F | HEART RATE: 75 BPM | DIASTOLIC BLOOD PRESSURE: 65 MMHG

## 2020-05-26 PROCEDURE — 99238 HOSP IP/OBS DSCHRG MGMT 30/<: CPT | Performed by: PSYCHIATRY & NEUROLOGY

## 2020-05-26 RX ORDER — ALBUTEROL SULFATE 2.5 MG/3ML
2.5 SOLUTION RESPIRATORY (INHALATION) EVERY 4 HOURS PRN
Qty: 1 VIAL | Refills: 0 | Status: SHIPPED | OUTPATIENT
Start: 2020-05-26 | End: 2020-06-17 | Stop reason: SDUPTHER

## 2020-05-26 RX ORDER — SODIUM CHLORIDE 1000 MG
1 TABLET, SOLUBLE MISCELLANEOUS 2 TIMES DAILY WITH MEALS
Qty: 60 TABLET | Refills: 0 | Status: SHIPPED | OUTPATIENT
Start: 2020-05-26 | End: 2020-06-04

## 2020-05-26 RX ORDER — LANOLIN ALCOHOL/MO/W.PET/CERES
3 CREAM (GRAM) TOPICAL
Qty: 30 TABLET | Refills: 0 | Status: SHIPPED | OUTPATIENT
Start: 2020-05-26 | End: 2020-05-29 | Stop reason: ALTCHOICE

## 2020-05-26 RX ORDER — POLYETHYLENE GLYCOL 3350 17 G/17G
17 POWDER, FOR SOLUTION ORAL DAILY PRN
Qty: 14 EACH | Refills: 0 | Status: SHIPPED | OUTPATIENT
Start: 2020-05-26 | End: 2020-06-17 | Stop reason: SDUPTHER

## 2020-05-26 RX ORDER — METOPROLOL SUCCINATE 100 MG/1
100 TABLET, EXTENDED RELEASE ORAL DAILY
Qty: 30 TABLET | Refills: 0 | Status: SHIPPED | OUTPATIENT
Start: 2020-05-27 | End: 2020-06-17 | Stop reason: SDUPTHER

## 2020-05-26 RX ORDER — ALBUTEROL SULFATE 90 UG/1
2 AEROSOL, METERED RESPIRATORY (INHALATION) EVERY 4 HOURS PRN
Qty: 1 INHALER | Refills: 0 | Status: SHIPPED | OUTPATIENT
Start: 2020-05-26 | End: 2020-06-17 | Stop reason: SDUPTHER

## 2020-05-26 RX ORDER — OLANZAPINE 5 MG/1
5 TABLET ORAL
Qty: 30 TABLET | Refills: 1 | Status: SHIPPED | OUTPATIENT
Start: 2020-05-26

## 2020-05-26 RX ORDER — ZINC SULFATE 50(220)MG
220 CAPSULE ORAL DAILY
Qty: 30 CAPSULE | Refills: 0 | Status: SHIPPED | OUTPATIENT
Start: 2020-05-27 | End: 2020-06-04

## 2020-05-26 RX ORDER — DOCUSATE SODIUM 100 MG/1
100 CAPSULE, LIQUID FILLED ORAL EVERY 12 HOURS SCHEDULED
Qty: 10 CAPSULE | Refills: 0 | Status: SHIPPED | OUTPATIENT
Start: 2020-05-26 | End: 2021-08-27

## 2020-05-26 RX ORDER — DILTIAZEM HYDROCHLORIDE 180 MG/1
180 CAPSULE, COATED, EXTENDED RELEASE ORAL DAILY
Qty: 30 CAPSULE | Refills: 0 | Status: SHIPPED | OUTPATIENT
Start: 2020-05-27 | End: 2020-06-17 | Stop reason: SDUPTHER

## 2020-05-26 RX ADMIN — DOCUSATE SODIUM 100 MG: 100 CAPSULE, LIQUID FILLED ORAL at 08:42

## 2020-05-26 RX ADMIN — DILTIAZEM HYDROCHLORIDE 180 MG: 180 CAPSULE, COATED, EXTENDED RELEASE ORAL at 08:42

## 2020-05-26 RX ADMIN — ZINC SULFATE 220 MG (50 MG) CAPSULE 220 MG: CAPSULE at 08:42

## 2020-05-26 RX ADMIN — SODIUM CHLORIDE TAB 1 GM 1 G: 1 TAB at 08:42

## 2020-05-26 RX ADMIN — METOPROLOL SUCCINATE 100 MG: 50 TABLET, EXTENDED RELEASE ORAL at 08:42

## 2020-05-26 RX ADMIN — Medication 1000 MG: at 08:42

## 2020-05-28 ENCOUNTER — TELEMEDICINE (OUTPATIENT)
Dept: INTERNAL MEDICINE CLINIC | Facility: CLINIC | Age: 85
End: 2020-05-28
Payer: COMMERCIAL

## 2020-05-28 DIAGNOSIS — I10 ESSENTIAL HYPERTENSION: Primary | ICD-10-CM

## 2020-05-28 DIAGNOSIS — F03.90 MAJOR NEUROCOGNITIVE DISORDER (HCC): Chronic | ICD-10-CM

## 2020-05-28 DIAGNOSIS — R26.2 AMBULATORY DYSFUNCTION: ICD-10-CM

## 2020-05-28 DIAGNOSIS — I48.0 PAROXYSMAL A-FIB (HCC): ICD-10-CM

## 2020-05-28 DIAGNOSIS — F20.5 RESIDUAL SCHIZOPHRENIA (HCC): Chronic | ICD-10-CM

## 2020-05-28 PROCEDURE — 3078F DIAST BP <80 MM HG: CPT | Performed by: INTERNAL MEDICINE

## 2020-05-28 PROCEDURE — 99214 OFFICE O/P EST MOD 30 MIN: CPT | Performed by: INTERNAL MEDICINE

## 2020-05-28 PROCEDURE — 4040F PNEUMOC VAC/ADMIN/RCVD: CPT | Performed by: INTERNAL MEDICINE

## 2020-05-28 PROCEDURE — 1036F TOBACCO NON-USER: CPT | Performed by: INTERNAL MEDICINE

## 2020-05-28 PROCEDURE — 1160F RVW MEDS BY RX/DR IN RCRD: CPT | Performed by: INTERNAL MEDICINE

## 2020-05-28 PROCEDURE — 3077F SYST BP >= 140 MM HG: CPT | Performed by: INTERNAL MEDICINE

## 2020-05-28 PROCEDURE — 1111F DSCHRG MED/CURRENT MED MERGE: CPT | Performed by: INTERNAL MEDICINE

## 2020-05-29 RX ORDER — DULOXETIN HYDROCHLORIDE 30 MG/1
30 CAPSULE, DELAYED RELEASE ORAL
COMMUNITY

## 2020-05-29 RX ORDER — LANOLIN ALCOHOL/MO/W.PET/CERES
3 CREAM (GRAM) TOPICAL
COMMUNITY
End: 2022-04-05

## 2020-06-02 ENCOUNTER — TELEPHONE (OUTPATIENT)
Dept: INTERNAL MEDICINE CLINIC | Facility: CLINIC | Age: 85
End: 2020-06-02

## 2020-06-02 ENCOUNTER — HOSPITAL ENCOUNTER (EMERGENCY)
Facility: HOSPITAL | Age: 85
Discharge: HOME | End: 2020-06-02
Attending: EMERGENCY MEDICINE | Admitting: EMERGENCY MEDICINE
Payer: COMMERCIAL

## 2020-06-02 ENCOUNTER — APPOINTMENT (EMERGENCY)
Dept: RADIOLOGY | Facility: HOSPITAL | Age: 85
End: 2020-06-02
Payer: COMMERCIAL

## 2020-06-02 VITALS
HEART RATE: 71 BPM | OXYGEN SATURATION: 97 % | DIASTOLIC BLOOD PRESSURE: 98 MMHG | WEIGHT: 118 LBS | RESPIRATION RATE: 19 BRPM | TEMPERATURE: 97.8 F | SYSTOLIC BLOOD PRESSURE: 185 MMHG | BODY MASS INDEX: 23.83 KG/M2

## 2020-06-02 DIAGNOSIS — R06.02 SOB (SHORTNESS OF BREATH): ICD-10-CM

## 2020-06-02 DIAGNOSIS — R42 LIGHTHEADEDNESS: Primary | ICD-10-CM

## 2020-06-02 LAB
ANION GAP SERPL CALCULATED.3IONS-SCNC: 11 MMOL/L (ref 5–14)
BASOPHILS # BLD AUTO: 0 THOUSANDS/ΜL (ref 0–0.1)
BASOPHILS NFR BLD AUTO: 1 % (ref 0–1)
BUN SERPL-MCNC: 17 MG/DL (ref 5–25)
CALCIUM SERPL-MCNC: 9.6 MG/DL (ref 8.4–10.2)
CHLORIDE SERPL-SCNC: 99 MMOL/L (ref 97–108)
CO2 SERPL-SCNC: 26 MMOL/L (ref 22–30)
CREAT SERPL-MCNC: 0.85 MG/DL (ref 0.6–1.2)
EOSINOPHIL # BLD AUTO: 0.1 THOUSAND/ΜL (ref 0–0.4)
EOSINOPHIL NFR BLD AUTO: 1 % (ref 0–6)
ERYTHROCYTE [DISTWIDTH] IN BLOOD BY AUTOMATED COUNT: 14.8 %
GFR SERPL CREATININE-BSD FRML MDRD: 61 ML/MIN/1.73SQ M
GLUCOSE SERPL-MCNC: 111 MG/DL (ref 70–99)
HCT VFR BLD AUTO: 37.8 % (ref 36–46)
HGB BLD-MCNC: 12.7 G/DL (ref 12–16)
LYMPHOCYTES # BLD AUTO: 1.2 THOUSANDS/ΜL (ref 0.5–4)
LYMPHOCYTES NFR BLD AUTO: 22 % (ref 25–45)
MCH RBC QN AUTO: 28.8 PG (ref 26.8–34.3)
MCHC RBC AUTO-ENTMCNC: 33.7 G/DL (ref 31.4–37.4)
MCV RBC AUTO: 86 FL (ref 80–100)
MONOCYTES # BLD AUTO: 0.5 THOUSAND/ΜL (ref 0.2–0.9)
MONOCYTES NFR BLD AUTO: 9 % (ref 1–10)
NEUTROPHILS # BLD AUTO: 3.8 THOUSANDS/ΜL (ref 1.8–7.8)
NEUTS SEG NFR BLD AUTO: 68 % (ref 45–65)
PLATELET # BLD AUTO: 311 THOUSANDS/UL (ref 150–450)
PMV BLD AUTO: 8.1 FL (ref 8.9–12.7)
POTASSIUM SERPL-SCNC: 4.3 MMOL/L (ref 3.6–5)
RBC # BLD AUTO: 4.42 MILLION/UL (ref 4–5.2)
SODIUM SERPL-SCNC: 136 MMOL/L (ref 137–147)
TROPONIN I SERPL-MCNC: <0.01 NG/ML (ref 0–0.03)
WBC # BLD AUTO: 5.6 THOUSAND/UL (ref 4.31–10.16)

## 2020-06-02 PROCEDURE — 84484 ASSAY OF TROPONIN QUANT: CPT | Performed by: EMERGENCY MEDICINE

## 2020-06-02 PROCEDURE — 71045 X-RAY EXAM CHEST 1 VIEW: CPT

## 2020-06-02 PROCEDURE — 85025 COMPLETE CBC W/AUTO DIFF WBC: CPT | Performed by: EMERGENCY MEDICINE

## 2020-06-02 PROCEDURE — 93005 ELECTROCARDIOGRAM TRACING: CPT

## 2020-06-02 PROCEDURE — 99285 EMERGENCY DEPT VISIT HI MDM: CPT

## 2020-06-02 PROCEDURE — 80048 BASIC METABOLIC PNL TOTAL CA: CPT | Performed by: EMERGENCY MEDICINE

## 2020-06-02 PROCEDURE — 36415 COLL VENOUS BLD VENIPUNCTURE: CPT | Performed by: EMERGENCY MEDICINE

## 2020-06-02 PROCEDURE — 99285 EMERGENCY DEPT VISIT HI MDM: CPT | Performed by: EMERGENCY MEDICINE

## 2020-06-02 RX ORDER — OLANZAPINE 5 MG/1
5 TABLET, ORALLY DISINTEGRATING ORAL ONCE
Status: COMPLETED | OUTPATIENT
Start: 2020-06-02 | End: 2020-06-02

## 2020-06-02 RX ADMIN — OLANZAPINE 5 MG: 5 TABLET, ORALLY DISINTEGRATING ORAL at 12:51

## 2020-06-03 LAB
ATRIAL RATE: 66 BPM
P AXIS: 34 DEGREES
PR INTERVAL: 158 MS
QRS AXIS: 51 DEGREES
QRSD INTERVAL: 70 MS
QT INTERVAL: 404 MS
QTC INTERVAL: 423 MS
T WAVE AXIS: 62 DEGREES
VENTRICULAR RATE: 66 BPM

## 2020-06-03 PROCEDURE — 93010 ELECTROCARDIOGRAM REPORT: CPT | Performed by: INTERNAL MEDICINE

## 2020-06-04 ENCOUNTER — OFFICE VISIT (OUTPATIENT)
Dept: INTERNAL MEDICINE CLINIC | Facility: CLINIC | Age: 85
End: 2020-06-04
Payer: COMMERCIAL

## 2020-06-04 VITALS
TEMPERATURE: 98.3 F | RESPIRATION RATE: 12 BRPM | DIASTOLIC BLOOD PRESSURE: 78 MMHG | HEART RATE: 80 BPM | BODY MASS INDEX: 24.12 KG/M2 | SYSTOLIC BLOOD PRESSURE: 162 MMHG | WEIGHT: 119.4 LBS

## 2020-06-04 DIAGNOSIS — U07.1 COVID-19 VIRUS INFECTION: ICD-10-CM

## 2020-06-04 DIAGNOSIS — G93.40 ACUTE ENCEPHALOPATHY: ICD-10-CM

## 2020-06-04 DIAGNOSIS — F03.90 MAJOR NEUROCOGNITIVE DISORDER (HCC): Chronic | ICD-10-CM

## 2020-06-04 DIAGNOSIS — I10 ESSENTIAL HYPERTENSION: ICD-10-CM

## 2020-06-04 DIAGNOSIS — I48.0 PAROXYSMAL A-FIB (HCC): Primary | ICD-10-CM

## 2020-06-04 PROCEDURE — 4040F PNEUMOC VAC/ADMIN/RCVD: CPT | Performed by: INTERNAL MEDICINE

## 2020-06-04 PROCEDURE — 1160F RVW MEDS BY RX/DR IN RCRD: CPT | Performed by: INTERNAL MEDICINE

## 2020-06-04 PROCEDURE — 3078F DIAST BP <80 MM HG: CPT | Performed by: INTERNAL MEDICINE

## 2020-06-04 PROCEDURE — 99215 OFFICE O/P EST HI 40 MIN: CPT | Performed by: INTERNAL MEDICINE

## 2020-06-04 PROCEDURE — 3077F SYST BP >= 140 MM HG: CPT | Performed by: INTERNAL MEDICINE

## 2020-06-04 PROCEDURE — 1111F DSCHRG MED/CURRENT MED MERGE: CPT | Performed by: INTERNAL MEDICINE

## 2020-06-04 PROCEDURE — 1036F TOBACCO NON-USER: CPT | Performed by: INTERNAL MEDICINE

## 2020-06-05 ENCOUNTER — TELEPHONE (OUTPATIENT)
Dept: LAB | Facility: HOSPITAL | Age: 85
End: 2020-06-05

## 2020-06-08 ENCOUNTER — TELEMEDICINE (OUTPATIENT)
Dept: INTERNAL MEDICINE CLINIC | Facility: CLINIC | Age: 85
End: 2020-06-08
Payer: COMMERCIAL

## 2020-06-08 ENCOUNTER — TELEPHONE (OUTPATIENT)
Dept: LAB | Facility: HOSPITAL | Age: 85
End: 2020-06-08

## 2020-06-08 DIAGNOSIS — U07.1 COVID-19 VIRUS INFECTION: ICD-10-CM

## 2020-06-08 DIAGNOSIS — I10 ESSENTIAL HYPERTENSION: ICD-10-CM

## 2020-06-08 DIAGNOSIS — F20.3 UNDIFFERENTIATED SCHIZOPHRENIA (HCC): Chronic | ICD-10-CM

## 2020-06-08 DIAGNOSIS — M81.0 AGE-RELATED OSTEOPOROSIS WITHOUT CURRENT PATHOLOGICAL FRACTURE: ICD-10-CM

## 2020-06-08 DIAGNOSIS — I48.0 PAF (PAROXYSMAL ATRIAL FIBRILLATION) (HCC): Primary | ICD-10-CM

## 2020-06-08 DIAGNOSIS — F41.9 ANXIETY: ICD-10-CM

## 2020-06-08 DIAGNOSIS — E78.2 MIXED HYPERLIPIDEMIA: ICD-10-CM

## 2020-06-08 PROCEDURE — 99214 OFFICE O/P EST MOD 30 MIN: CPT | Performed by: INTERNAL MEDICINE

## 2020-06-08 PROCEDURE — 4040F PNEUMOC VAC/ADMIN/RCVD: CPT | Performed by: INTERNAL MEDICINE

## 2020-06-08 PROCEDURE — 1160F RVW MEDS BY RX/DR IN RCRD: CPT | Performed by: INTERNAL MEDICINE

## 2020-06-10 ENCOUNTER — APPOINTMENT (OUTPATIENT)
Dept: LAB | Facility: HOSPITAL | Age: 85
End: 2020-06-10
Attending: INTERNAL MEDICINE
Payer: COMMERCIAL

## 2020-06-10 ENCOUNTER — TELEPHONE (OUTPATIENT)
Dept: INTERNAL MEDICINE CLINIC | Facility: CLINIC | Age: 85
End: 2020-06-10

## 2020-06-10 DIAGNOSIS — I48.0 PAROXYSMAL A-FIB (HCC): ICD-10-CM

## 2020-06-10 DIAGNOSIS — U07.1 COVID-19 VIRUS INFECTION: ICD-10-CM

## 2020-06-10 DIAGNOSIS — I10 ESSENTIAL HYPERTENSION: ICD-10-CM

## 2020-06-10 DIAGNOSIS — F03.90 MAJOR NEUROCOGNITIVE DISORDER (HCC): Chronic | ICD-10-CM

## 2020-06-10 DIAGNOSIS — G93.40 ACUTE ENCEPHALOPATHY: ICD-10-CM

## 2020-06-10 LAB
25(OH)D3 SERPL-MCNC: 36.4 NG/ML (ref 30–100)
ALBUMIN SERPL BCP-MCNC: 3.5 G/DL (ref 3.5–5)
ALP SERPL-CCNC: 89 U/L (ref 46–116)
ALT SERPL W P-5'-P-CCNC: 14 U/L (ref 12–78)
ANION GAP SERPL CALCULATED.3IONS-SCNC: 5 MMOL/L (ref 4–13)
AST SERPL W P-5'-P-CCNC: 16 U/L (ref 5–45)
BASOPHILS # BLD AUTO: 0 THOUSANDS/ΜL (ref 0–0.1)
BASOPHILS NFR BLD AUTO: 0 % (ref 0–1)
BILIRUB SERPL-MCNC: 0.52 MG/DL (ref 0.2–1)
BUN SERPL-MCNC: 19 MG/DL (ref 5–25)
CALCIUM SERPL-MCNC: 9 MG/DL (ref 8.3–10.1)
CHLORIDE SERPL-SCNC: 102 MMOL/L (ref 100–108)
CO2 SERPL-SCNC: 27 MMOL/L (ref 21–32)
CREAT SERPL-MCNC: 0.91 MG/DL (ref 0.6–1.3)
CRP SERPL QL: <3 MG/L
EOSINOPHIL # BLD AUTO: 0.1 THOUSAND/ΜL (ref 0–0.61)
EOSINOPHIL NFR BLD AUTO: 2 % (ref 0–6)
ERYTHROCYTE [DISTWIDTH] IN BLOOD BY AUTOMATED COUNT: 13.8 % (ref 11.6–15.1)
ERYTHROCYTE [SEDIMENTATION RATE] IN BLOOD: 4 MM/HOUR (ref 0–20)
FERRITIN SERPL-MCNC: 37 NG/ML (ref 8–388)
GFR SERPL CREATININE-BSD FRML MDRD: 57 ML/MIN/1.73SQ M
GLUCOSE SERPL-MCNC: 80 MG/DL (ref 65–140)
HCT VFR BLD AUTO: 37.7 % (ref 34.8–46.1)
HGB BLD-MCNC: 12 G/DL (ref 11.5–15.4)
IMM GRANULOCYTES # BLD AUTO: 0.02 THOUSAND/UL (ref 0–0.2)
IMM GRANULOCYTES NFR BLD AUTO: 0 % (ref 0–2)
LDH SERPL-CCNC: 220 U/L (ref 81–234)
LYMPHOCYTES # BLD AUTO: 1.26 THOUSANDS/ΜL (ref 0.6–4.47)
LYMPHOCYTES NFR BLD AUTO: 22 % (ref 14–44)
MAGNESIUM SERPL-MCNC: 2.4 MG/DL (ref 1.6–2.6)
MCH RBC QN AUTO: 28.6 PG (ref 26.8–34.3)
MCHC RBC AUTO-ENTMCNC: 31.8 G/DL (ref 31.4–37.4)
MCV RBC AUTO: 90 FL (ref 82–98)
MONOCYTES # BLD AUTO: 0.58 THOUSAND/ΜL (ref 0.17–1.22)
MONOCYTES NFR BLD AUTO: 10 % (ref 4–12)
NEUTROPHILS # BLD AUTO: 3.8 THOUSANDS/ΜL (ref 1.85–7.62)
NEUTS SEG NFR BLD AUTO: 66 % (ref 43–75)
NRBC BLD AUTO-RTO: 0 /100 WBCS
PLATELET # BLD AUTO: 245 THOUSANDS/UL (ref 149–390)
PMV BLD AUTO: 9.9 FL (ref 8.9–12.7)
POTASSIUM SERPL-SCNC: 4.2 MMOL/L (ref 3.5–5.3)
PROT SERPL-MCNC: 6.5 G/DL (ref 6.4–8.2)
RBC # BLD AUTO: 4.2 MILLION/UL (ref 3.81–5.12)
SODIUM SERPL-SCNC: 134 MMOL/L (ref 136–145)
TSH SERPL DL<=0.05 MIU/L-ACNC: 0.67 UIU/ML (ref 0.36–3.74)
WBC # BLD AUTO: 5.76 THOUSAND/UL (ref 4.31–10.16)

## 2020-06-10 PROCEDURE — 82306 VITAMIN D 25 HYDROXY: CPT

## 2020-06-10 PROCEDURE — 83615 LACTATE (LD) (LDH) ENZYME: CPT

## 2020-06-10 PROCEDURE — 84443 ASSAY THYROID STIM HORMONE: CPT

## 2020-06-10 PROCEDURE — 80053 COMPREHEN METABOLIC PANEL: CPT

## 2020-06-10 PROCEDURE — 36415 COLL VENOUS BLD VENIPUNCTURE: CPT

## 2020-06-10 PROCEDURE — 86140 C-REACTIVE PROTEIN: CPT

## 2020-06-10 PROCEDURE — 85025 COMPLETE CBC W/AUTO DIFF WBC: CPT

## 2020-06-10 PROCEDURE — 83735 ASSAY OF MAGNESIUM: CPT

## 2020-06-10 PROCEDURE — 82728 ASSAY OF FERRITIN: CPT

## 2020-06-10 PROCEDURE — 85652 RBC SED RATE AUTOMATED: CPT

## 2020-06-16 ENCOUNTER — TRANSCRIBE ORDERS (OUTPATIENT)
Dept: ADMINISTRATIVE | Facility: HOSPITAL | Age: 85
End: 2020-06-16

## 2020-06-16 ENCOUNTER — APPOINTMENT (OUTPATIENT)
Dept: LAB | Facility: HOSPITAL | Age: 85
End: 2020-06-16
Attending: INTERNAL MEDICINE
Payer: COMMERCIAL

## 2020-06-16 DIAGNOSIS — I10 ESSENTIAL HYPERTENSION: ICD-10-CM

## 2020-06-16 DIAGNOSIS — U07.1 INFECTION DUE TO WUHAN CORONAVIRUS: ICD-10-CM

## 2020-06-16 DIAGNOSIS — F01.50 VASCULAR DEMENTIA, UNCOMPLICATED (HCC): ICD-10-CM

## 2020-06-16 DIAGNOSIS — I10 ESSENTIAL HYPERTENSION, MALIGNANT: ICD-10-CM

## 2020-06-16 DIAGNOSIS — I48.0 PAROXYSMAL ATRIAL FIBRILLATION (HCC): Primary | ICD-10-CM

## 2020-06-16 DIAGNOSIS — G93.40 ENCEPHALOPATHY, UNSPECIFIED: ICD-10-CM

## 2020-06-16 DIAGNOSIS — J98.01 BRONCHOSPASM: ICD-10-CM

## 2020-06-16 DIAGNOSIS — K59.00 CONSTIPATION: ICD-10-CM

## 2020-06-16 LAB
BACTERIA UR QL AUTO: ABNORMAL /HPF
BILIRUB UR QL STRIP: NEGATIVE
CLARITY UR: ABNORMAL
COLOR UR: YELLOW
GLUCOSE UR STRIP-MCNC: NEGATIVE MG/DL
HGB UR QL STRIP.AUTO: NEGATIVE
KETONES UR STRIP-MCNC: NEGATIVE MG/DL
LEUKOCYTE ESTERASE UR QL STRIP: NEGATIVE
NITRITE UR QL STRIP: POSITIVE
NON-SQ EPI CELLS URNS QL MICRO: ABNORMAL /HPF
PH UR STRIP.AUTO: 5.5 [PH]
PROT UR STRIP-MCNC: NEGATIVE MG/DL
RBC #/AREA URNS AUTO: ABNORMAL /HPF
SP GR UR STRIP.AUTO: 1.01 (ref 1–1.03)
UROBILINOGEN UR QL STRIP.AUTO: 0.2 E.U./DL
WBC #/AREA URNS AUTO: ABNORMAL /HPF

## 2020-06-16 PROCEDURE — 81001 URINALYSIS AUTO W/SCOPE: CPT | Performed by: INTERNAL MEDICINE

## 2020-06-16 RX ORDER — MULTIVIT WITH MINERALS/LUTEIN
1000 TABLET ORAL 2 TIMES DAILY
COMMUNITY
End: 2020-06-17 | Stop reason: ALTCHOICE

## 2020-06-16 RX ORDER — SODIUM CHLORIDE 1000 MG
1 TABLET, SOLUBLE MISCELLANEOUS 3 TIMES DAILY
COMMUNITY
End: 2020-06-17 | Stop reason: ALTCHOICE

## 2020-06-16 RX ORDER — ZINC SULFATE 50(220)MG
220 CAPSULE ORAL DAILY
COMMUNITY
End: 2020-06-17 | Stop reason: ALTCHOICE

## 2020-06-17 RX ORDER — DILTIAZEM HYDROCHLORIDE 180 MG/1
180 CAPSULE, COATED, EXTENDED RELEASE ORAL DAILY
Qty: 30 CAPSULE | Refills: 0 | OUTPATIENT
Start: 2020-06-17

## 2020-06-17 RX ORDER — ZINC SULFATE 50(220)MG
220 CAPSULE ORAL DAILY
OUTPATIENT
Start: 2020-06-17

## 2020-06-17 RX ORDER — ALBUTEROL SULFATE 2.5 MG/3ML
2.5 SOLUTION RESPIRATORY (INHALATION) EVERY 4 HOURS PRN
Qty: 1 VIAL | Refills: 0 | Status: SHIPPED | OUTPATIENT
Start: 2020-06-17

## 2020-06-17 RX ORDER — METOPROLOL SUCCINATE 100 MG/1
100 TABLET, EXTENDED RELEASE ORAL DAILY
Qty: 30 TABLET | Refills: 0 | OUTPATIENT
Start: 2020-06-17

## 2020-06-17 RX ORDER — POLYETHYLENE GLYCOL 3350 17 G/17G
17 POWDER, FOR SOLUTION ORAL DAILY PRN
Qty: 14 EACH | Refills: 0 | OUTPATIENT
Start: 2020-06-17

## 2020-06-17 RX ORDER — POLYETHYLENE GLYCOL 3350 17 G/17G
17 POWDER, FOR SOLUTION ORAL DAILY PRN
Qty: 14 EACH | Refills: 0 | Status: SHIPPED | OUTPATIENT
Start: 2020-06-17

## 2020-06-17 RX ORDER — MULTIVIT WITH MINERALS/LUTEIN
1000 TABLET ORAL 2 TIMES DAILY
OUTPATIENT
Start: 2020-06-17

## 2020-06-17 RX ORDER — SODIUM CHLORIDE 1000 MG
1 TABLET, SOLUBLE MISCELLANEOUS 3 TIMES DAILY
OUTPATIENT
Start: 2020-06-17

## 2020-06-17 RX ORDER — ALBUTEROL SULFATE 90 UG/1
2 AEROSOL, METERED RESPIRATORY (INHALATION) EVERY 4 HOURS PRN
Qty: 1 INHALER | Refills: 0 | Status: SHIPPED | OUTPATIENT
Start: 2020-06-17 | End: 2022-04-05

## 2020-06-17 RX ORDER — ALBUTEROL SULFATE 90 UG/1
2 AEROSOL, METERED RESPIRATORY (INHALATION) EVERY 4 HOURS PRN
Qty: 1 INHALER | Refills: 0 | OUTPATIENT
Start: 2020-06-17

## 2020-06-17 RX ORDER — DILTIAZEM HYDROCHLORIDE 180 MG/1
180 CAPSULE, COATED, EXTENDED RELEASE ORAL DAILY
Qty: 30 CAPSULE | Refills: 0 | Status: SHIPPED | OUTPATIENT
Start: 2020-06-17 | End: 2020-07-14

## 2020-06-17 RX ORDER — ALBUTEROL SULFATE 2.5 MG/3ML
2.5 SOLUTION RESPIRATORY (INHALATION) EVERY 4 HOURS PRN
Qty: 1 VIAL | Refills: 0 | OUTPATIENT
Start: 2020-06-17

## 2020-06-17 RX ORDER — METOPROLOL SUCCINATE 100 MG/1
100 TABLET, EXTENDED RELEASE ORAL DAILY
Qty: 30 TABLET | Refills: 0 | Status: SHIPPED | OUTPATIENT
Start: 2020-06-17 | End: 2020-07-14

## 2020-06-18 ENCOUNTER — TELEPHONE (OUTPATIENT)
Dept: INTERNAL MEDICINE CLINIC | Facility: CLINIC | Age: 85
End: 2020-06-18

## 2020-06-26 PROBLEM — M81.0 AGE-RELATED OSTEOPOROSIS WITHOUT CURRENT PATHOLOGICAL FRACTURE: Status: ACTIVE | Noted: 2020-06-26

## 2020-07-07 ENCOUNTER — OFFICE VISIT (OUTPATIENT)
Dept: INTERNAL MEDICINE CLINIC | Facility: CLINIC | Age: 85
End: 2020-07-07
Payer: COMMERCIAL

## 2020-07-07 VITALS
HEIGHT: 61 IN | BODY MASS INDEX: 21.83 KG/M2 | DIASTOLIC BLOOD PRESSURE: 64 MMHG | TEMPERATURE: 97.4 F | WEIGHT: 115.6 LBS | RESPIRATION RATE: 12 BRPM | SYSTOLIC BLOOD PRESSURE: 124 MMHG | HEART RATE: 60 BPM

## 2020-07-07 DIAGNOSIS — I10 ESSENTIAL HYPERTENSION: ICD-10-CM

## 2020-07-07 DIAGNOSIS — I48.0 PAF (PAROXYSMAL ATRIAL FIBRILLATION) (HCC): Primary | ICD-10-CM

## 2020-07-07 DIAGNOSIS — H61.23 CERUMEN DEBRIS ON TYMPANIC MEMBRANE OF BOTH EARS: ICD-10-CM

## 2020-07-07 DIAGNOSIS — E78.2 MIXED HYPERLIPIDEMIA: ICD-10-CM

## 2020-07-07 DIAGNOSIS — I10 ORTHOSTATIC HYPERTENSION: ICD-10-CM

## 2020-07-07 DIAGNOSIS — M81.0 AGE-RELATED OSTEOPOROSIS WITHOUT CURRENT PATHOLOGICAL FRACTURE: ICD-10-CM

## 2020-07-07 DIAGNOSIS — F20.3 UNDIFFERENTIATED SCHIZOPHRENIA (HCC): Chronic | ICD-10-CM

## 2020-07-07 DIAGNOSIS — U07.1 COVID-19 VIRUS INFECTION: ICD-10-CM

## 2020-07-07 DIAGNOSIS — H91.93 HEARING DECREASED, BILATERAL: ICD-10-CM

## 2020-07-07 PROCEDURE — 1111F DSCHRG MED/CURRENT MED MERGE: CPT | Performed by: INTERNAL MEDICINE

## 2020-07-07 PROCEDURE — 1036F TOBACCO NON-USER: CPT | Performed by: INTERNAL MEDICINE

## 2020-07-07 PROCEDURE — 3074F SYST BP LT 130 MM HG: CPT | Performed by: INTERNAL MEDICINE

## 2020-07-07 PROCEDURE — 3078F DIAST BP <80 MM HG: CPT | Performed by: INTERNAL MEDICINE

## 2020-07-07 PROCEDURE — 99214 OFFICE O/P EST MOD 30 MIN: CPT | Performed by: INTERNAL MEDICINE

## 2020-07-07 PROCEDURE — 3008F BODY MASS INDEX DOCD: CPT | Performed by: INTERNAL MEDICINE

## 2020-07-07 PROCEDURE — 4040F PNEUMOC VAC/ADMIN/RCVD: CPT | Performed by: INTERNAL MEDICINE

## 2020-07-07 PROCEDURE — 1160F RVW MEDS BY RX/DR IN RCRD: CPT | Performed by: INTERNAL MEDICINE

## 2020-07-07 NOTE — PATIENT INSTRUCTIONS
Blood pressure is control  Your labs are stable  Will continue the same medications  Will call the pharmacy to update the medications her taking from the psychiatrist  Will see her back in 3 months

## 2020-07-07 NOTE — PROGRESS NOTES
Assessment/Plan:  Decrease hearing will refer to ENT for evaluation and cerumen removal     Problem 1  Schizophrenia doing better we need to reconsider  the medication that on 0 1 she is taking last doses Zyprexa was 5 mg    Problem 2  High blood pressure very well control on metoprolol succinate and Cardizem she appears to be in sinus rhythm clinically  No tachycardia    Problem 3  Paroxysmal atrial fibrillation in sinus rhythm no embolic protection because of her schizophrenia risk of falling    Problem 4  Schizophrenia takes Zyprexa also takes Cymbalta for chronic pain  Will continue the same may take Tylenol for neck pain at times neck pain continues will get an x-ray of the C-spine CT scan of the neck done in April this year shows severe scoliosis no fractures in the lumbar area in the cervical area there was no spinal fracture or traumatic malalignment which is good news    No problem-specific Assessment & Plan notes found for this encounter  Diagnoses and all orders for this visit:    PAF (paroxysmal atrial fibrillation) ([facility] [address] )    Essential hypertension    Age-related osteoporosis without current pathological fracture    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HCC)    Orthostatic hypertension    Mixed hyperlipidemia    COVID-19 virus infection          Subjective:      Patient ID: Mirna Alegre is a 80 y o  female  No chief complaint on file          Current Outpatient Medications:     albuterol (2 5 mg/3 mL) 0 083 % nebulizer solution, Take 1 vial (2 5 mg total) by nebulization every 4 (four) hours as needed for wheezing or shortness of breath, Disp: 1 vial, Rfl: 0    albuterol (PROVENTIL HFA,VENTOLIN HFA) 90 mcg/act inhaler, Inhale 2 puffs every 4 (four) hours as needed for wheezing, Disp: 1 Inhaler, Rfl: 0    diltiazem (CARDIZEM CD) 180 mg 24 hr capsule, Take 1 capsule (180 mg total) by mouth daily, Disp: 30 capsule, Rfl: 0    docusate sodium (COLACE) 100 mg capsule, Take 1 capsule (100 mg total) by mouth every 12 (twelve) hours, Disp: 10 capsule, Rfl: 0    DULoxetine (CYMBALTA) 30 mg delayed release capsule, Take 30 mg by mouth daily at bedtime, Disp: , Rfl:     melatonin 3 mg, Take 3 mg by mouth daily at bedtime Take 2 tablets at bedtime, Disp: , Rfl:     metoprolol succinate (TOPROL-XL) 100 mg 24 hr tablet, Take 1 tablet (100 mg total) by mouth daily, Disp: 30 tablet, Rfl: 0    OLANZapine (ZyPREXA) 5 mg tablet, Take 1 tablet (5 mg total) by mouth daily at bedtime, Disp: 30 tablet, Rfl: 1    polyethylene glycol (MIRALAX) 17 g packet, Take 17 g by mouth daily as needed (constipation first line), Disp: 14 each, Rfl: 0    HPI    The following portions of the patient's history were reviewed and updated as appropriate: allergies, current medications, past family history, past medical history, past social history, past surgical history and problem list     Review of Systems   Constitutional: Negative  Negative for activity change, appetite change, fatigue, fever and unexpected weight change  HENT: Negative for congestion, ear pain, hearing loss, mouth sores, postnasal drip, rhinorrhea, sore throat, trouble swallowing and voice change  Eyes: Negative for pain, redness and visual disturbance  Respiratory: Negative for cough, chest tightness, shortness of breath and wheezing  Cardiovascular: Negative for chest pain, palpitations and leg swelling  Gastrointestinal: Negative for abdominal distention, abdominal pain, blood in stool, constipation, diarrhea and nausea  Endocrine: Negative for cold intolerance, heat intolerance, polydipsia, polyphagia and polyuria  Genitourinary: Negative for difficulty urinating, dysuria, flank pain, frequency, hematuria and urgency  Musculoskeletal: Negative for arthralgias, back pain, gait problem, joint swelling and myalgias  Skin: Negative for color change and pallor     Neurological: Negative for dizziness, tremors, seizures, syncope, weakness, numbness and headaches  Hematological: Negative for adenopathy  Does not bruise/bleed easily  Psychiatric/Behavioral: Negative  Negative for sleep disturbance  The patient is not nervous/anxious  Objective:    Results for orders placed or performed in visit on 06/16/20   UA (URINE) with reflex to Scope   Result Value Ref Range    Color, UA Yellow     Clarity, UA Slightly Cloudy     Specific [place], UA 1 015 1 003 - 1 030    pH, UA 5 5 4 5, 5 0, 5 5, 6 0, 6 5, 7 0, 7 5, 8 0    Leukocytes, UA Negative Negative    Nitrite, UA Positive (A) Negative    Protein, UA Negative Negative mg/dl    Glucose, UA Negative Negative mg/dl    Ketones, UA Negative Negative mg/dl    Urobilinogen, UA 0 2 0 2, 1 0 E U /dl E U /dl    Bilirubin, UA Negative Negative    Blood, UA Negative Negative   Urine Microscopic   Result Value Ref Range    RBC, UA 0-1 (A) None Seen, 0-5 /hpf    WBC, UA 1-2 (A) None Seen, 0-5, 5-55, 5-65 /hpf    Epithelial Cells Innumerable (A) None Seen, Occasional /hpf    Bacteria, UA Moderate (A) None Seen, Occasional /hpf       There were no vitals taken for this visit  Physical Exam   Constitutional: She is oriented to person, place, and time  She appears well-developed and well-nourished  HENT:   Head: Normocephalic  Right Ear: External ear normal    Left Ear: External ear normal    Nose: Nose normal    Mouth/Throat: Oropharynx is clear and moist  No oropharyngeal exudate  Eyes: Pupils are equal, round, and reactive to light  Conjunctivae and EOM are normal    Neck: Normal range of motion  Neck supple  No thyromegaly present  Cardiovascular: Normal rate, regular rhythm, normal heart sounds and intact distal pulses  Exam reveals no gallop and no friction rub  No murmur heard  S1-S2 regular rhythm  Extremities no edema   Pulmonary/Chest: Effort normal and breath sounds normal  No respiratory distress  She has no wheezes  She has no rales     Lungs are clear no wheezing rales or rhonchi   Abdominal: Soft  Bowel sounds are normal  She exhibits no distension and no mass  There is no tenderness  There is no rebound and no guarding  Abdomen soft nontender   Musculoskeletal: Normal range of motion  Lymphadenopathy:     She has no cervical adenopathy  Neurological: She is alert and oriented to person, place, and time  Skin: Skin is warm and dry  Psychiatric: She has a normal mood and affect  Her behavior is normal  Judgment normal    She is hard of hearing so will have ENT evaluate  She is less agitated she looks well   Nursing note and vitals reviewed

## 2020-07-13 DIAGNOSIS — I10 ESSENTIAL HYPERTENSION: ICD-10-CM

## 2020-07-14 RX ORDER — DILTIAZEM HYDROCHLORIDE 180 MG/1
180 CAPSULE, COATED, EXTENDED RELEASE ORAL DAILY
Qty: 30 CAPSULE | Refills: 0 | Status: SHIPPED | OUTPATIENT
Start: 2020-07-14 | End: 2020-08-11

## 2020-07-14 RX ORDER — METOPROLOL SUCCINATE 100 MG/1
100 TABLET, EXTENDED RELEASE ORAL DAILY
Qty: 30 TABLET | Refills: 0 | Status: SHIPPED | OUTPATIENT
Start: 2020-07-14 | End: 2020-08-12

## 2020-08-11 DIAGNOSIS — I10 ESSENTIAL HYPERTENSION: ICD-10-CM

## 2020-08-11 RX ORDER — DILTIAZEM HYDROCHLORIDE 180 MG/1
180 CAPSULE, COATED, EXTENDED RELEASE ORAL DAILY
Qty: 30 CAPSULE | Refills: 0 | Status: SHIPPED | OUTPATIENT
Start: 2020-08-11 | End: 2020-09-03

## 2020-08-12 DIAGNOSIS — I10 ESSENTIAL HYPERTENSION: ICD-10-CM

## 2020-08-12 RX ORDER — METOPROLOL SUCCINATE 100 MG/1
100 TABLET, EXTENDED RELEASE ORAL DAILY
Qty: 30 TABLET | Refills: 0 | Status: SHIPPED | OUTPATIENT
Start: 2020-08-12 | End: 2020-09-03

## 2020-08-19 DIAGNOSIS — I10 ESSENTIAL HYPERTENSION: ICD-10-CM

## 2020-08-19 RX ORDER — LOSARTAN POTASSIUM 100 MG/1
100 TABLET ORAL DAILY
Qty: 90 TABLET | Refills: 0 | Status: SHIPPED | OUTPATIENT
Start: 2020-08-19 | End: 2020-11-25

## 2020-08-23 ENCOUNTER — APPOINTMENT (EMERGENCY)
Dept: RADIOLOGY | Facility: HOSPITAL | Age: 85
End: 2020-08-23
Payer: COMMERCIAL

## 2020-08-23 ENCOUNTER — HOSPITAL ENCOUNTER (OUTPATIENT)
Facility: HOSPITAL | Age: 85
Setting detail: OBSERVATION
Discharge: HOME | End: 2020-08-23
Attending: SURGERY | Admitting: SURGERY
Payer: COMMERCIAL

## 2020-08-23 ENCOUNTER — APPOINTMENT (EMERGENCY)
Dept: CT IMAGING | Facility: HOSPITAL | Age: 85
End: 2020-08-23
Payer: COMMERCIAL

## 2020-08-23 ENCOUNTER — HOSPITAL ENCOUNTER (EMERGENCY)
Facility: HOSPITAL | Age: 85
End: 2020-08-23
Attending: EMERGENCY MEDICINE | Admitting: EMERGENCY MEDICINE
Payer: COMMERCIAL

## 2020-08-23 VITALS
WEIGHT: 126.98 LBS | HEART RATE: 69 BPM | DIASTOLIC BLOOD PRESSURE: 87 MMHG | HEIGHT: 58 IN | RESPIRATION RATE: 18 BRPM | OXYGEN SATURATION: 97 % | TEMPERATURE: 97.6 F | BODY MASS INDEX: 26.66 KG/M2 | SYSTOLIC BLOOD PRESSURE: 178 MMHG

## 2020-08-23 VITALS
HEART RATE: 87 BPM | OXYGEN SATURATION: 99 % | RESPIRATION RATE: 17 BRPM | BODY MASS INDEX: 24.04 KG/M2 | SYSTOLIC BLOOD PRESSURE: 170 MMHG | WEIGHT: 127.21 LBS | DIASTOLIC BLOOD PRESSURE: 78 MMHG | TEMPERATURE: 97.8 F

## 2020-08-23 DIAGNOSIS — S02.31XA CLOSED FRACTURE OF RIGHT ORBITAL FLOOR, INITIAL ENCOUNTER (HCC): Primary | ICD-10-CM

## 2020-08-23 DIAGNOSIS — S09.90XA CLOSED HEAD INJURY, INITIAL ENCOUNTER: ICD-10-CM
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
## 2020-08-23 DIAGNOSIS — H05.239 RETROBULBAR HEMORRHAGE: ICD-10-CM

## 2020-08-23 DIAGNOSIS — S02.31XD CLOSED FRACTURE OF RIGHT ORBITAL FLOOR WITH ROUTINE HEALING, SUBSEQUENT ENCOUNTER: ICD-10-CM
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
## 2020-08-23 LAB
ABO GROUP BLD: NORMAL
ABO GROUP BLD: NORMAL
ALBUMIN SERPL BCP-MCNC: 3.8 G/DL (ref 3.5–5)
ALP SERPL-CCNC: 134 U/L (ref 46–116)
ALT SERPL W P-5'-P-CCNC: 21 U/L (ref 12–78)
ANION GAP SERPL CALCULATED.3IONS-SCNC: 7 MMOL/L (ref 4–13)
ANION GAP SERPL CALCULATED.3IONS-SCNC: 9 MMOL/L (ref 4–13)
APTT PPP: 28 SECONDS (ref 23–37)
AST SERPL W P-5'-P-CCNC: 29 U/L (ref 5–45)
BASOPHILS # BLD AUTO: 0 THOUSANDS/ΜL (ref 0–0.1)
BASOPHILS NFR BLD AUTO: 0 % (ref 0–1)
BILIRUB SERPL-MCNC: 0.34 MG/DL (ref 0.2–1)
BLD GP AB SCN SERPL QL: NEGATIVE
BUN SERPL-MCNC: 24 MG/DL (ref 5–25)
BUN SERPL-MCNC: 32 MG/DL (ref 5–25)
CALCIUM SERPL-MCNC: 8.9 MG/DL (ref 8.3–10.1)
CALCIUM SERPL-MCNC: 9.2 MG/DL (ref 8.3–10.1)
CHLORIDE SERPL-SCNC: 102 MMOL/L (ref 100–108)
CHLORIDE SERPL-SCNC: 103 MMOL/L (ref 100–108)
CO2 SERPL-SCNC: 28 MMOL/L (ref 21–32)
CO2 SERPL-SCNC: 28 MMOL/L (ref 21–32)
CREAT SERPL-MCNC: 0.96 MG/DL (ref 0.6–1.3)
CREAT SERPL-MCNC: 1.28 MG/DL (ref 0.6–1.3)
EOSINOPHIL # BLD AUTO: 0.18 THOUSAND/ΜL (ref 0–0.61)
EOSINOPHIL NFR BLD AUTO: 3 % (ref 0–6)
ERYTHROCYTE [DISTWIDTH] IN BLOOD BY AUTOMATED COUNT: 13.8 % (ref 11.6–15.1)
GFR SERPL CREATININE-BSD FRML MDRD: 37 ML/MIN/1.73SQ M
GFR SERPL CREATININE-BSD FRML MDRD: 53 ML/MIN/1.73SQ M
GLUCOSE P FAST SERPL-MCNC: 110 MG/DL (ref 65–99)
GLUCOSE SERPL-MCNC: 110 MG/DL (ref 65–140)
GLUCOSE SERPL-MCNC: 99 MG/DL (ref 65–140)
HCT VFR BLD AUTO: 40 % (ref 34.8–46.1)
HGB BLD-MCNC: 12.9 G/DL (ref 11.5–15.4)
IMM GRANULOCYTES # BLD AUTO: 0.02 THOUSAND/UL (ref 0–0.2)
IMM GRANULOCYTES NFR BLD AUTO: 0 % (ref 0–2)
INR PPP: 0.9 (ref 0.84–1.19)
LYMPHOCYTES # BLD AUTO: 2.04 THOUSANDS/ΜL (ref 0.6–4.47)
LYMPHOCYTES NFR BLD AUTO: 32 % (ref 14–44)
MCH RBC QN AUTO: 28.4 PG (ref 26.8–34.3)
MCHC RBC AUTO-ENTMCNC: 32.3 G/DL (ref 31.4–37.4)
MCV RBC AUTO: 88 FL (ref 82–98)
MONOCYTES # BLD AUTO: 0.58 THOUSAND/ΜL (ref 0.17–1.22)
MONOCYTES NFR BLD AUTO: 9 % (ref 4–12)
NEUTROPHILS # BLD AUTO: 3.65 THOUSANDS/ΜL (ref 1.85–7.62)
NEUTS SEG NFR BLD AUTO: 56 % (ref 43–75)
NRBC BLD AUTO-RTO: 0 /100 WBCS
PLATELET # BLD AUTO: 251 THOUSANDS/UL (ref 149–390)
PMV BLD AUTO: 10 FL (ref 8.9–12.7)
POTASSIUM SERPL-SCNC: 3.9 MMOL/L (ref 3.5–5.3)
POTASSIUM SERPL-SCNC: 4.8 MMOL/L (ref 3.5–5.3)
PROT SERPL-MCNC: 6.8 G/DL (ref 6.4–8.2)
PROTHROMBIN TIME: 12 SECONDS (ref 11.6–14.5)
RBC # BLD AUTO: 4.54 MILLION/UL (ref 3.81–5.12)
RH BLD: NEGATIVE
RH BLD: NEGATIVE
SARS-COV-2 RNA RESP QL NAA+PROBE: NEGATIVE
SODIUM SERPL-SCNC: 138 MMOL/L (ref 136–145)
SODIUM SERPL-SCNC: 139 MMOL/L (ref 136–145)
SPECIMEN EXPIRATION DATE: NORMAL
WBC # BLD AUTO: 6.47 THOUSAND/UL (ref 4.31–10.16)

## 2020-08-23 PROCEDURE — 70486 CT MAXILLOFACIAL W/O DYE: CPT

## 2020-08-23 PROCEDURE — 99285 EMERGENCY DEPT VISIT HI MDM: CPT

## 2020-08-23 PROCEDURE — U0003 INFECTIOUS AGENT DETECTION BY NUCLEIC ACID (DNA OR RNA); SEVERE ACUTE RESPIRATORY SYNDROME CORONAVIRUS 2 (SARS-COV-2) (CORONAVIRUS DISEASE [COVID-19]), AMPLIFIED PROBE TECHNIQUE, MAKING USE OF HIGH THROUGHPUT TECHNOLOGIES AS DESCRIBED BY CMS-2020-01-R: HCPCS

## 2020-08-23 PROCEDURE — 73521 X-RAY EXAM HIPS BI 2 VIEWS: CPT

## 2020-08-23 PROCEDURE — 72125 CT NECK SPINE W/O DYE: CPT

## 2020-08-23 PROCEDURE — 86901 BLOOD TYPING SEROLOGIC RH(D): CPT | Performed by: EMERGENCY MEDICINE

## 2020-08-23 PROCEDURE — 97166 OT EVAL MOD COMPLEX 45 MIN: CPT

## 2020-08-23 PROCEDURE — 71045 X-RAY EXAM CHEST 1 VIEW: CPT

## 2020-08-23 PROCEDURE — 80053 COMPREHEN METABOLIC PANEL: CPT | Performed by: EMERGENCY MEDICINE

## 2020-08-23 PROCEDURE — 99285 EMERGENCY DEPT VISIT HI MDM: CPT | Performed by: EMERGENCY MEDICINE

## 2020-08-23 PROCEDURE — 97163 PT EVAL HIGH COMPLEX 45 MIN: CPT

## 2020-08-23 PROCEDURE — 85025 COMPLETE CBC W/AUTO DIFF WBC: CPT | Performed by: EMERGENCY MEDICINE

## 2020-08-23 PROCEDURE — 86900 BLOOD TYPING SEROLOGIC ABO: CPT | Performed by: EMERGENCY MEDICINE

## 2020-08-23 PROCEDURE — 85730 THROMBOPLASTIN TIME PARTIAL: CPT | Performed by: EMERGENCY MEDICINE

## 2020-08-23 PROCEDURE — 70450 CT HEAD/BRAIN W/O DYE: CPT

## 2020-08-23 PROCEDURE — 86850 RBC ANTIBODY SCREEN: CPT | Performed by: EMERGENCY MEDICINE

## 2020-08-23 PROCEDURE — NC001 PR NO CHARGE: Performed by: NURSE PRACTITIONER

## 2020-08-23 PROCEDURE — G1004 CDSM NDSC: HCPCS

## 2020-08-23 PROCEDURE — 99236 HOSP IP/OBS SAME DATE HI 85: CPT | Performed by: SURGERY

## 2020-08-23 PROCEDURE — 36415 COLL VENOUS BLD VENIPUNCTURE: CPT | Performed by: EMERGENCY MEDICINE

## 2020-08-23 PROCEDURE — 85610 PROTHROMBIN TIME: CPT | Performed by: EMERGENCY MEDICINE

## 2020-08-23 PROCEDURE — 80048 BASIC METABOLIC PNL TOTAL CA: CPT | Performed by: NURSE PRACTITIONER

## 2020-08-23 RX ORDER — ACETAMINOPHEN 325 MG/1
650 TABLET ORAL EVERY 6 HOURS PRN
Qty: 30 TABLET | Refills: 0
Start: 2020-08-23

## 2020-08-23 RX ORDER — DOCUSATE SODIUM 100 MG/1
100 CAPSULE, LIQUID FILLED ORAL EVERY 12 HOURS SCHEDULED
Status: DISCONTINUED | OUTPATIENT
Start: 2020-08-23 | End: 2020-08-23 | Stop reason: HOSPADM

## 2020-08-23 RX ORDER — LOSARTAN POTASSIUM 50 MG/1
100 TABLET ORAL DAILY
Status: DISCONTINUED | OUTPATIENT
Start: 2020-08-23 | End: 2020-08-23 | Stop reason: HOSPADM

## 2020-08-23 RX ORDER — LANOLIN ALCOHOL/MO/W.PET/CERES
3 CREAM (GRAM) TOPICAL
Status: DISCONTINUED | OUTPATIENT
Start: 2020-08-23 | End: 2020-08-23 | Stop reason: HOSPADM

## 2020-08-23 RX ORDER — DULOXETIN HYDROCHLORIDE 30 MG/1
30 CAPSULE, DELAYED RELEASE ORAL
Status: DISCONTINUED | OUTPATIENT
Start: 2020-08-23 | End: 2020-08-23 | Stop reason: HOSPADM

## 2020-08-23 RX ORDER — DILTIAZEM HYDROCHLORIDE 180 MG/1
180 CAPSULE, COATED, EXTENDED RELEASE ORAL DAILY
Status: DISCONTINUED | OUTPATIENT
Start: 2020-08-23 | End: 2020-08-23 | Stop reason: HOSPADM

## 2020-08-23 RX ORDER — POLYETHYLENE GLYCOL 3350 17 G/17G
17 POWDER, FOR SOLUTION ORAL DAILY PRN
Status: DISCONTINUED | OUTPATIENT
Start: 2020-08-23 | End: 2020-08-23 | Stop reason: HOSPADM

## 2020-08-23 RX ORDER — ACETAMINOPHEN 325 MG/1
975 TABLET ORAL EVERY 8 HOURS SCHEDULED
Status: DISCONTINUED | OUTPATIENT
Start: 2020-08-23 | End: 2020-08-23 | Stop reason: HOSPADM

## 2020-08-23 RX ORDER — ALBUTEROL SULFATE 90 UG/1
2 AEROSOL, METERED RESPIRATORY (INHALATION) EVERY 4 HOURS PRN
Status: DISCONTINUED | OUTPATIENT
Start: 2020-08-23 | End: 2020-08-23 | Stop reason: HOSPADM

## 2020-08-23 RX ORDER — AMOXICILLIN 250 MG
1 CAPSULE ORAL
Status: DISCONTINUED | OUTPATIENT
Start: 2020-08-23 | End: 2020-08-23 | Stop reason: HOSPADM

## 2020-08-23 RX ORDER — ACETAMINOPHEN 325 MG/1
975 TABLET ORAL ONCE
Status: COMPLETED | OUTPATIENT
Start: 2020-08-23 | End: 2020-08-23

## 2020-08-23 RX ORDER — OXYCODONE HYDROCHLORIDE 5 MG/1
5 TABLET ORAL EVERY 4 HOURS PRN
Status: DISCONTINUED | OUTPATIENT
Start: 2020-08-23 | End: 2020-08-23 | Stop reason: HOSPADM

## 2020-08-23 RX ORDER — OXYCODONE HYDROCHLORIDE 5 MG/1
2.5 TABLET ORAL EVERY 4 HOURS PRN
Status: DISCONTINUED | OUTPATIENT
Start: 2020-08-23 | End: 2020-08-23 | Stop reason: HOSPADM

## 2020-08-23 RX ORDER — ONDANSETRON 2 MG/ML
4 INJECTION INTRAMUSCULAR; INTRAVENOUS EVERY 4 HOURS PRN
Status: DISCONTINUED | OUTPATIENT
Start: 2020-08-23 | End: 2020-08-23 | Stop reason: HOSPADM

## 2020-08-23 RX ORDER — OLANZAPINE 5 MG/1
5 TABLET ORAL
Status: DISCONTINUED | OUTPATIENT
Start: 2020-08-23 | End: 2020-08-23 | Stop reason: HOSPADM

## 2020-08-23 RX ORDER — METOPROLOL SUCCINATE 100 MG/1
100 TABLET, EXTENDED RELEASE ORAL DAILY
Status: DISCONTINUED | OUTPATIENT
Start: 2020-08-23 | End: 2020-08-23 | Stop reason: HOSPADM

## 2020-08-23 RX ORDER — HYDROMORPHONE HCL/PF 1 MG/ML
0.2 SYRINGE (ML) INJECTION EVERY 4 HOURS PRN
Status: DISCONTINUED | OUTPATIENT
Start: 2020-08-23 | End: 2020-08-23 | Stop reason: HOSPADM

## 2020-08-23 RX ADMIN — METOPROLOL SUCCINATE 100 MG: 100 TABLET, EXTENDED RELEASE ORAL at 11:47

## 2020-08-23 RX ADMIN — LOSARTAN POTASSIUM 100 MG: 50 TABLET, FILM COATED ORAL at 11:46

## 2020-08-23 RX ADMIN — DOCUSATE SODIUM 100 MG: 100 CAPSULE, LIQUID FILLED ORAL at 11:46

## 2020-08-23 RX ADMIN — ACETAMINOPHEN 975 MG: 325 TABLET, FILM COATED ORAL at 15:15

## 2020-08-23 RX ADMIN — DILTIAZEM HYDROCHLORIDE 180 MG: 180 CAPSULE, COATED, EXTENDED RELEASE ORAL at 11:46

## 2020-08-23 RX ADMIN — ACETAMINOPHEN 975 MG: 325 TABLET ORAL at 02:39

## 2020-08-23 NOTE — DISCHARGE INSTRUCTIONS
Your eye may have some pain and swelling  WE recommend using ice for 20 minutes at a time for the first 24 - 48 hours and tylenol as needed for any pain  If you have any severe eye pain or vision changes return to the ER  Fall Prevention   WHAT YOU NEED TO KNOW:   Fall prevention includes ways to make your home and other areas safer  It also includes ways you can move more carefully to prevent a fall  Health conditions that cause changes in your blood pressure, vision, or muscle strength and coordination may increase your risk for falls  Medicines may also increase your risk for falls if they make you dizzy, weak, or sleepy  DISCHARGE INSTRUCTIONS:   Call 911 or have someone else call if:   · You have fallen and are unconscious  · You have fallen and cannot move part of your body  Contact your healthcare provider if:   · You have fallen and have pain or a headache  · You have questions or concerns about your condition or care  Fall prevention tips:   · Stand or sit up slowly  This may help you keep your balance and prevent falls  · Use assistive devices as directed  Your healthcare provider may suggest that you use a cane or walker to help you keep your balance  You may need to have grab bars put in your bathroom near the toilet or in the shower  · Wear shoes that fit well and have soles that   Wear shoes both inside and outside  Use slippers with good   Do not wear shoes with high heels  · Wear a personal alarm  This is a device that allows you to call 911 if you fall and need help  Ask your healthcare provider for more information  · Stay active  Exercise can help strengthen your muscles and improve your balance  Your healthcare provider may recommend water aerobics or walking  He or she may also recommend physical therapy to improve your coordination  Never start an exercise program without talking to your healthcare provider first      · Manage your medical conditions  Keep all appointments with your healthcare providers  Visit your eye doctor as directed  Home safety tips:   · Add items to prevent falls in the bathroom  Put nonslip strips on your bath or shower floor to prevent you from slipping  Use a bath mat if you do not have carpet in the bathroom  This will prevent you from falling when you step out of the bath or shower  Use a shower seat so you do not need to stand while you shower  Sit on the toilet or a chair in your bathroom to dry yourself and put on clothing  This will prevent you from losing your balance from drying or dressing yourself while you are standing  · Keep paths clear  Remove books, shoes, and other objects from walkways and stairs  Place cords for telephones and lamps out of the way so that you do not need to walk over them  Tape them down if you cannot move them  Remove small rugs  If you cannot remove a rug, secure it with double-sided tape  This will prevent you from tripping  · Install bright lights in your home  Use night lights to help light paths to the bathroom or kitchen  Always turn on the light before you start walking  · Keep items you use often on shelves within reach  Do not use a step stool to help you reach an item  · Paint or place reflective tape on the edges of your stairs  This will help you see the stairs better  Follow up with your healthcare provider as directed:  Write down your questions so you remember to ask them during your visits  © 2017 [address] [employer] Information is for End User's use only and may not be sold, redistributed or otherwise used for commercial purposes  All illustrations and images included in CareNotes® are the copyrighted property of [employer]  or Jaime Bhat  The above information is an  only  It is not intended as medical advice for individual conditions or treatments   Talk to your doctor, nurse or pharmacist before following any medical regimen to see if it is safe and effective for you

## 2020-08-23 NOTE — PLAN OF CARE
Problem: Potential for Falls  Goal: Patient will remain free of falls  Description: INTERVENTIONS:  - Assess patient frequently for physical needs  -  Identify cognitive and physical deficits and behaviors that affect risk of falls    -  [place] fall precautions as indicated by assessment   - Educate patient/family on patient safety including physical limitations  - Instruct patient to call for assistance with activity based on assessment  - Modify environment to reduce risk of injury  - Consider OT/PT consult to assist with strengthening/mobility  Outcome: Progressing     Problem: PAIN - ADULT  Goal: Verbalizes/displays adequate comfort level or baseline comfort level  Description: Interventions:  - Encourage patient to monitor pain and request assistance  - Assess pain using appropriate pain scale  - Administer analgesics based on type and severity of pain and evaluate response  - Implement non-pharmacological measures as appropriate and evaluate response  - Consider cultural and social influences on pain and pain management  - Notify physician/advanced practitioner if interventions unsuccessful or patient reports new pain  Outcome: Progressing     Problem: INFECTION - ADULT  Goal: Absence or prevention of progression during hospitalization  Description: INTERVENTIONS:  - Assess and monitor for signs and symptoms of infection  - Monitor lab/diagnostic results  - Monitor all insertion sites, i e  indwelling lines, tubes, and drains  - Monitor endotracheal if appropriate and nasal secretions for changes in amount and color  - [place] appropriate cooling/warming therapies per order  - Administer medications as ordered  - Instruct and encourage patient and family to use good hand hygiene technique  - Identify and instruct in appropriate isolation precautions for identified infection/condition  Outcome: Progressing  Goal: Absence of fever/infection during neutropenic period  Description: INTERVENTIONS:  - Monitor WBC    Outcome: Progressing     Problem: SAFETY ADULT  Goal: Patient will remain free of falls  Description: INTERVENTIONS:  - Assess patient frequently for physical needs  -  Identify cognitive and physical deficits and behaviors that affect risk of falls    -  [place] fall precautions as indicated by assessment   - Educate patient/family on patient safety including physical limitations  - Instruct patient to call for assistance with activity based on assessment  - Modify environment to reduce risk of injury  - Consider OT/PT consult to assist with strengthening/mobility  Outcome: Progressing  Goal: Maintain or return to baseline ADL function  Description: INTERVENTIONS:  -  Assess patient's ability to carry out ADLs; assess patient's baseline for ADL function and identify physical deficits which impact ability to perform ADLs (bathing, care of mouth/teeth, toileting, grooming, dressing, etc )  - Assess/evaluate cause of self-care deficits   - Assess range of motion  - Assess patient's mobility; develop plan if impaired  - Assess patient's need for assistive devices and provide as appropriate  - Encourage maximum independence but intervene and supervise when necessary  - Involve family in performance of ADLs  - Assess for home care needs following discharge   - Consider OT consult to assist with ADL evaluation and planning for discharge  - Provide patient education as appropriate  Outcome: Progressing  Goal: Maintain or return mobility status to optimal level  Description: INTERVENTIONS:  - Assess patient's baseline mobility status (ambulation, transfers, stairs, etc )    - Identify cognitive and physical deficits and behaviors that affect mobility  - Identify mobility aids required to assist with transfers and/or ambulation (gait belt, sit-to-stand, lift, walker, cane, etc )  - [place] fall precautions as indicated by assessment  - Record patient progress and toleration of activity level on Mobility SBAR; progress patient to next Phase/Stage  - Instruct patient to call for assistance with activity based on assessment  - Consider rehabilitation consult to assist with strengthening/weightbearing, etc   Outcome: Progressing     Problem: DISCHARGE PLANNING  Goal: Discharge to home or other facility with appropriate resources  Description: INTERVENTIONS:  - Identify barriers to discharge w/patient and caregiver  - Arrange for needed discharge resources and transportation as appropriate  - Identify discharge learning needs (meds, wound care, etc )  - Arrange for interpretive services to assist at discharge as needed  - Refer to Case Management Department for coordinating discharge planning if the patient needs post-hospital services based on physician/advanced practitioner order or complex needs related to functional status, cognitive ability, or social support system  Outcome: Progressing     Problem: Knowledge Deficit  Goal: Patient/family/caregiver demonstrates understanding of disease process, treatment plan, medications, and discharge instructions  Description: Complete learning assessment and assess knowledge base    Interventions:  - Provide teaching at level of understanding  - Provide teaching via preferred learning methods  Outcome: Progressing

## 2020-08-23 NOTE — OCCUPATIONAL THERAPY NOTE
Occupational Therapy Evaluation     Patient Name: Antonio Number  KDBUI'U Date: 8/23/2020  Problem List  Principal Problem:    Closed fracture of right orbital floor [facility])  Active Problems:    Fall from ground level    Past Medical History  Past Medical History:   Diagnosis Date    Anxiety     Cognitive impairment     D[facility] [address] [facility]     Depression     Hallucination     Hyperlipidemia     Hypertension     Memory loss     Psychiatric illness     Psychosis ([facility]     [facility] ([facility]     Sleep difficulties      Past Surgical History  Past Surgical History:   Procedure Laterality Date    APPENDECTOMY      BLADDER SURGERY      CHOLECYSTECTOMY      TOTAL ABDOMINAL HYSTERECTOMY W/ BILATERAL SALPINGOOPHORECTOMY      TOTAL KNEE ARTHROPLASTY Left            08/23/20 1441   Note Type   Note type Eval only   Restrictions/Precautions   Weight Bearing Precautions Per Order No   Other Precautions Cognitive; Chair Alarm; Bed Alarm; Fall Risk   Pain Assessment   Pain Assessment Tool FLACC   Pain Score No Pain   Pain Rating: FLACC (Rest) - Face 0   Pain Rating: FLACC (Rest) - Legs 0   Pain Rating: FLACC (Rest) - Activity 0   Pain Rating: FLACC (Rest) - Cry 0   Pain Rating: FLACC (Rest) - Consolability 0   Score: FLACC (Rest) 0   Pain Rating: FLACC (Activity) - Face 0   Pain Rating: FLACC (Activity) - Legs 0   Pain Rating: FLACC (Activity) - Activity 0   Pain Rating: FLACC (Activity) - Cry 0   Pain Rating: FLACC (Activity) - Consolability 0   Score: FLACC (Activity) 0   Home Living   Type of Home House   Additional Comments Pt unable to provide information - pt RN pt lives with son in home with 24/7 assist    Prior Function   Level of [place] Other (Comment)  (has 24/7 assist )   Lives With Awanda Bernheim Help From Personal care attendant; Family   ADL Assistance Needs assistance   IADLs Needs assistance   Falls in the last 6 months 1 to 4   Vocational Retired   Lifestyle   Autonomy unknown - pt has 24/7 caretakers (family/hired caretaker)   Reciprocal Relationships supportive family    Service to Others retired   Intrinsic Gratification unable to id    Subjective   Subjective "these apartments are grand, it must be expensive to live here"   ADL   Eating Assistance 5  Supervision/Setup   Grooming Assistance 4  Minimal Assistance   [address] 4  Minimal Assistance   [address] 4  [address] [employer] [address]  [address]  4  Minimal Assistance   Bed Mobility   Supine to Sit 4  Minimal assistance   Transfers   Sit to Stand 4  Minimal assistance   Stand to Sit 4  Minimal assistance   Stand pivot 4  Minimal assistance   Functional Mobility   Functional Mobility 4  Minimal assistance   Additional items Rolling walker   Balance   Static Sitting Fair   Dynamic Sitting Fair -   Static Standing Poor +   Dynamic Standing Poor +   Ambulatory Poor +   Activity Tolerance   Activity Tolerance Patient limited by fatigue;Patient limited by pain   RUE Assessment   RUE Assessment WFL   LUE Assessment   LUE Assessment WFL   Cognition   Overall Cognitive Status Impaired   Arousal/Participation Arousable; Cooperative   Attention Attends with cues to redirect   Orientation Level Oriented to person   Memory Decreased long term memory;Decreased recall of biographical information;Decreased short term memory;Decreased recall of recent events;Decreased recall of precautions   Following Commands Follows one step commands inconsistently   Assessment   Limitation Decreased ADL status; Decreased Safe judgement during ADL;Decreased cognition;Decreased endurance;Decreased self-care trans;Decreased high-level ADLs   Prognosis Fair   Assessment Pt is a 80 y o  female who was admitted to [facility] on 8/23/2020 with Closed fracture of right orbital floor ([facility] [address] ) s/p fall OOB   Pt's problem list also includes PMH of HTN and dementia  At baseline pt was completing adls/mobility with assist from family and caretaker - family manages iadls  Pt lives with son - has 24/7 assist from family and caretaker per reports  Currently pt requires min assist for overall ADLS and min assist for functional mobility/transfers  Pt currently presents with impairments in the following categories -difficulty performing ADLS, difficulty performing IADLS  and limited insight into deficits activity tolerance, endurance, standing balance/tolerance, memory, insight, safety , judgement , attention  and sequencing    These impairments, as well as pt's risk for falls  limit pt's ability to safely engage in all baseline areas of occupation, includingbathing, dressing, toileting, functional mobility/transfers, community mobility, social participation  and leisure activities  From OT standpoint, recommend home with family support as pt appears to be at baseline level of function and has 24/7 assist/support PTA D/C - No further acute OT needs indicated at this time - OK for d/c home when medically cleared- d/c from [facility]

## 2020-08-23 NOTE — INCIDENTAL FINDINGS
The following findings require follow up:  Radiographic finding   Finding: Incidental thyroid nodule(s) for which nonemergent thyroid ultrasound is recommended      Follow up required: routine follow-up with PCP   Follow up should be done within 2-4 week(s)

## 2020-08-23 NOTE — EMTALA/ACUTE CARE TRANSFER
[facility]  [address] [facility] [address]  Dept: [phone]      EMTALA TRANSFER CONSENT    NAME Mary Kelley                                         1931                              MRN [id number]    I have been informed of my rights regarding examination, treatment, and transfer   by Dr Billie Vicente DO    Benefits: Specialized equipment and/or services available at the receiving facility (Include comment)________________________    Risks: Potential for delay in receiving treatment      Consent for Transfer:  I acknowledge that my medical condition has been evaluated and explained to me by the emergency department physician or other qualified medical person and/or my attending physician, who has recommended that I be transferred to the service of  Accepting Physician: Dr Lion Mckee at [address] [place] [address] Name, [address] : [address]  The above potential benefits of such transfer, the potential risks associated with such transfer, and the probable risks of not being transferred have been explained to me, and I fully understand them  The doctor has explained that, in my case, the benefits of transfer outweigh the risks  I agree to be transferred  I authorize the performance of emergency medical procedures and treatments upon me in both transit and upon arrival at the receiving facility  Additionally, I authorize the release of any and all medical records to the receiving facility and request they be transported with me, if possible  I understand that the safest mode of transportation during a medical emergency is an ambulance and that the Hospital advocates the use of this mode of transport  Risks of traveling to the receiving facility by car, including absence of medical control, life sustaining equipment, such as oxygen, and medical personnel has been explained to me and I fully understand them      ([place] BELOW)  [  ]  I consent to the stated transfer and to be transported by ambulance/helicopter  [  ]  I consent to the stated transfer, but refuse transportation by ambulance and accept full responsibility for my transportation by car  I understand the risks of non-ambulance transfers and I exonerate the Hospital and its staff from any deterioration in my condition that results from this refusal     X___________________________________________    DATE  20  TIME________  Signature of patient or legally responsible individual signing on patient behalf           RELATIONSHIP TO PATIENT_________________________          Provider Certification    NAME Eugenio Maria                                        [facility] 1931                              MRN [id number]    A medical screening exam was performed on the above named patient  Based on the examination:    Condition Necessitating Transfer The primary encounter diagnosis was Closed fracture of right orbital floor, initial encounter ([facility] [address] )  Diagnoses of Retrobulbar hemorrhage, Fall, initial encounter, and Closed head injury, initial encounter were also pertinent to this visit      Patient Condition: The patient has been stabilized such that within reasonable medical probability, no material deterioration of the patient condition or the condition of the unborn child(ravi) is likely to result from the transfer    Reason for Transfer: Level of Care needed not available at this facility    Transfer Requirements: [facility]   · Space available and qualified personnel available for treatment as acknowledged by    · Agreed to accept transfer and to provide appropriate medical treatment as acknowledged by       Dr Loree Engel  · Appropriate medical records of the examination and treatment of the patient are provided at the time of transfer   [address], [address] _______  · Transfer will be performed by qualified personnel from    and appropriate transfer equipment as required, including the use of necessary and appropriate life support measures  Provider Certification: I have examined the patient and explained the following risks and benefits of being transferred/refusing transfer to the patient/family:  General risk, such as traffic hazards, adverse weather conditions, rough terrain or turbulence, possible failure of equipment (including vehicle or aircraft), or consequences of actions of persons outside the control of the transport personnel      Based on these reasonable risks and benefits to the patient and/or the unborn child(ravi), and based upon the information available at the time of the patients examination, I certify that the medical benefits reasonably to be expected from the provision of appropriate medical treatments at another medical facility outweigh the increasing risks, if any, to the individuals medical condition, and in the case of labor to the unborn child, from effecting the transfer      X____________________________________________ DATE 08/23/20        TIME_______      ORIGINAL - SEND TO MEDICAL RECORDS   COPY - SEND WITH PATIENT DURING TRANSFER

## 2020-08-23 NOTE — PHYSICAL THERAPY NOTE
PHYSICAL THERAPY EVALUATION          Patient Name: Yamilet Queen  TLUPU'M Date: 8/23/2020 08/23/20 6955   Note Type   Note type Eval only   Pain Assessment   Pain Assessment Tool FLACC   Pain Location/Orientation Location: Head   Pain Rating: FLACC (Rest) - Face 0   Pain Rating: FLACC (Rest) - Legs 0   Pain Rating: FLACC (Rest) - Activity 0   Pain Rating: FLACC (Rest) - Cry 0   Pain Rating: FLACC (Rest) - Consolability 0   Score: FLACC (Rest) 0   Pain Rating: FLACC (Activity) - Face 0   Pain Rating: FLACC (Activity) - Legs 0   Pain Rating: FLACC (Activity) - Activity 0   Pain Rating: FLACC (Activity) - Cry 0   Pain Rating: FLACC (Activity) - Consolability 0   Score: FLACC (Activity) 0   Home Living   Type of Home House   Additional Comments Pt's history limited by impaired cognition and language barrier  Pt is Salvad[place] speaking only  PT/OT attempted to utilize PCA for translation, however, PCA reports pt is answering inappropriately  PT/OT spoke with nursing who confirmed pt comes from home and has 24/7 supervision  Prior Function   Level of [place] Independent with ADLs and functional mobility   Lives With Son   Receives Help From Family   ADL Assistance Independent   IADLs Independent   Falls in the last 6 months 1 to 4   Vocational Retired   Restrictions/Precautions   [place] Bearing Precautions Per Order No   Other Precautions Cognitive; Chair Alarm; Bed Alarm; Fall Risk;Pain   General   Family/Caregiver Present No   Cognition   Overall Cognitive Status Impaired   Arousal/Participation Alert   Orientation Level Disoriented X4   Memory Unable to assess   Following Commands Follows one step commands inconsistently   RLE Assessment   RLE Assessment WFL   Strength RLE   RLE Overall Strength 3+/5   LLE Assessment   LLE Assessment WFL   Strength LLE   LLE Overall Strength 3+/5   Bed Mobility   Supine to Sit 4  Minimal assistance Additional items Assist x 1;Bedrails; Increased time required;Verbal cues   Transfers   Sit to Stand 4  Minimal assistance   Additional items Assist x 1; Increased time required;Verbal cues   Stand to Sit 4  Minimal assistance   Additional items Assist x 1; Armrests; Increased time required;Verbal cues   Additional Comments Pt performed sit to stand transfer without use of DME and stand to sit transfer using RW  Ambulation/Elevation   Gait pattern Excessively slow; Step to;Decreased foot clearance   Gait Assistance 4  Minimal assist   Additional items Assist x 1;Verbal cues; Tactile cues   Assistive Device Rolling walker   Distance 40 ft x2   Stair Management Assistance Not tested   Balance   Static Sitting Fair -   Static Standing Poor +   Ambulatory Poor +   Endurance Deficit   Endurance Deficit Yes   Endurance Deficit Description deteriorating posture, standing rest breaks   Activity Tolerance   Activity Tolerance Patient limited by fatigue;Patient limited by pain   Medical Staff Made Aware Pt ok to mobilize per nsg   Nurse Made Aware Yes   Assessment   Prognosis Fair   Problem List Decreased strength;Decreased endurance; Impaired balance;Decreased mobility; Decreased cognition; Impaired judgement;Decreased safety awareness;Pain   Assessment Pt is an 80 y o  female presenting to [place] [address] as a transfer from [facility] s/p fall at home  Pt was admitted with a primary diagnosis of closed fracture of R orbital floor  Pt's PMH affecting eval includes anxiety, dementia, depression, hallucination, hyperlipidemia, HTN, memory loss, schizoaffective disorder, sleep difficulties and personal factors including impaired cognition and assistance needed at baseline   Pt seen for high complexity PT eval due to ongoing medical management of admitting diagnosis, decreased activity tolerance and functional ability compared to baseline, increased reliance on more restrictive AD, continuous pulse oximetry monitoring, pain and fall risk  Upon eval, pt was resting in bed  Pt poor historian at this time 2* dementia  PCA attempted to translate, however, pt giving inappropriate responses to questions  Pt required Min Ax1 with bed mobility, transfers and ambulation  Pt initially ambulated w/o use of AD to bathroom and was given RW for further ambulation due to unsteadiness  Pt needed tactile cues for progressing walker forward due to decreased initiation of tasks  Based on PT eval, pt is currently limited by decreased BLE strength, decreased endurance, impaired balance, decreased mobility, impaired cognition, pain and fall risk  PT will continue to follow pt for remainder of hospital stay to address these impairments  At conclusion of session pt was left up in chair with all needs within reach  Need to clarify level of support at home  If pt has 24/7 support, pt ok to D/C home with home PT  If unable to have appropriate support, PT recommending rehab  Barriers to Discharge Decreased caregiver support; Inaccessible home environment   Goals   Patient Goals None stated 2* impaired cognition   STG Expiration Date 09/02/20   Short Term Goal #1 In 10 days, pt will  Isa Francis 1) perform all aspects of bed mobility with supervision in order to reduce caregiver burden  2) perform transfers with supervision in order to increase functional independence  3) ambulate 150 ft with supervision and least restrictive AD in order to return to PLOF  4) increase BLE strength by 1/2 grade in order to perform transfers with greater ease  5) improve ambulatory balance by 1 grade in order to reduce risk of falls  Plan   Treatment/Interventions Functional transfer training;LE strengthening/ROM; Elevations; Therapeutic exercise; Endurance training;Patient/family training;Equipment eval/education; Bed mobility;Gait training;Spoke to nursing;OT   PT Frequency Other (Comment)  (3-6x/wk)   Recommendation   PT Discharge Recommendation Other (Comment)  (If pt has 24/7 S & assist, rec HHPT, if not, rec rehab)   Equipment Recommended Walker  (RW)   Modified [place] Scale   Modified [place] Scale 4   [place] Index   Feeding 10   Bathing 0   Grooming Score 0   Dressing Score 5   Bladder Score 10   Bowels Score 10   Toilet Use Score 5   Transfers (Bed/Chair) Score 10   Mobility (Level Surface) Score 0   Stairs Score 0   Barthel Index Score 50     Jose Carlos Mark, SPT

## 2020-08-23 NOTE — EMTALA/ACUTE CARE TRANSFER
[facility]  [address] [facility] [address]  Dept: [phone]      EMTALA TRANSFER CONSENT    NAME Yamilet Queen                                         1931                              MRN [id number]    I have been informed of my rights regarding examination, treatment, and transfer   by Dr Bill Colon DO    Benefits: Specialized equipment and/or services available at the receiving facility (Include comment)________________________    Risks: Potential for delay in receiving treatment      Consent for Transfer:  I acknowledge that my medical condition has been evaluated and explained to me by the emergency department physician or other qualified medical person and/or my attending physician, who has recommended that I be transferred to the service of  Accepting Physician: Dr Alexandria Weiss at [address] Name, [address] : [facility]  The above potential benefits of such transfer, the potential risks associated with such transfer, and the probable risks of not being transferred have been explained to me, and I fully understand them  The doctor has explained that, in my case, the benefits of transfer outweigh the risks  I agree to be transferred  I authorize the performance of emergency medical procedures and treatments upon me in both transit and upon arrival at the receiving facility  Additionally, I authorize the release of any and all medical records to the receiving facility and request they be transported with me, if possible  I understand that the safest mode of transportation during a medical emergency is an ambulance and that the Hospital advocates the use of this mode of transport  Risks of traveling to the receiving facility by car, including absence of medical control, life sustaining equipment, such as oxygen, and medical personnel has been explained to me and I fully understand them      ([place] BELOW)  [  ]  I consent to the stated transfer and to be transported by ambulance/helicopter  [  ]  I consent to the stated transfer, but refuse transportation by ambulance and accept full responsibility for my transportation by car  I understand the risks of non-ambulance transfers and I exonerate the Hospital and its staff from any deterioration in my condition that results from this refusal     X___________________________________________    DATE  20  TIME________  Signature of patient or legally responsible individual signing on patient behalf           RELATIONSHIP TO PATIENT_________________________          Provider Certification    NAME Robert Conway                                         1931                              MRN [id number]    A medical screening exam was performed on the above named patient  Based on the examination:    Condition Necessitating Transfer The primary encounter diagnosis was Closed fracture of right orbital floor, initial encounter ([facility] [address] )  Diagnoses of Retrobulbar hemorrhage, Fall, initial encounter, and Closed head injury, initial encounter were also pertinent to this visit      Patient Condition: The patient has been stabilized such that within reasonable medical probability, no material deterioration of the patient condition or the condition of the unborn child(ravi) is likely to result from the transfer    Reason for Transfer: Level of Care needed not available at this facility    Transfer Requirements: [facility]   · Space available and qualified personnel available for treatment as acknowledged by    · Agreed to accept transfer and to provide appropriate medical treatment as acknowledged by       Dr Federico Jerez  · Appropriate medical records of the examination and treatment of the patient are provided at the time of transfer   [address], [address] _______  · Transfer will be performed by qualified personnel from    and appropriate transfer equipment as required, including the use of necessary and appropriate life support measures  Provider Certification: I have examined the patient and explained the following risks and benefits of being transferred/refusing transfer to the patient/family:  General risk, such as traffic hazards, adverse weather conditions, rough terrain or turbulence, possible failure of equipment (including vehicle or aircraft), or consequences of actions of persons outside the control of the transport personnel      Based on these reasonable risks and benefits to the patient and/or the unborn child(ravi), and based upon the information available at the time of the patients examination, I certify that the medical benefits reasonably to be expected from the provision of appropriate medical treatments at another medical facility outweigh the increasing risks, if any, to the individuals medical condition, and in the case of labor to the unborn child, from effecting the transfer      X____________________________________________ DATE 08/23/20        TIME_______      ORIGINAL - SEND TO MEDICAL RECORDS   COPY - SEND WITH PATIENT DURING TRANSFER

## 2020-08-23 NOTE — ED PROVIDER NOTES
History  Chief Complaint   Patient presents with    Fall     Per ems pt fell and hit head on nightstand  Pt has bruising and swelling of right eye and face  Patient presents via EMS from home for a fall  History limited due to patient's dementia  History was given to EMS by the patient's caregiver who is currently on the way  Patient does speak only Irish and  was used but limited because the patient does seem confused  Patient has obvious trauma to the face along the right eye  Was complaining of left arm discomfort which she described to us as heaviness  Patient denies any other injuries or symptoms at this time  History provided by:  Patient  History limited by:  Dementia   used: Yes    Fall   Mechanism of injury: fall    Injury location:  Head/neck and face  Facial injury location:  Face  Incident location:  Home  Arrived directly from scene: yes    Fall:     Fall occurred:  Unable to specify    Impact surface:  Unable to specify    Point of impact:  Unable to specify    Entrapped after fall: no    Protective equipment: none    Prior to arrival data:     Bystander interventions:  None    Airway interventions:  None    Breathing interventions:  None  Risk factors: no anticoagulation therapy        Prior to Admission Medications   Prescriptions Last Dose Informant Patient Reported? Taking?    DULoxetine (CYMBALTA) 30 mg delayed release capsule   Yes No   Sig: Take 30 mg by mouth daily at bedtime   OLANZapine (ZyPREXA) 5 mg tablet   No No   Sig: Take 1 tablet (5 mg total) by mouth daily at bedtime   albuterol (2 5 mg/3 mL) 0 083 % nebulizer solution   No No   Sig: Take 1 vial (2 5 mg total) by nebulization every 4 (four) hours as needed for wheezing or shortness of breath   albuterol (PROVENTIL HFA,VENTOLIN HFA) 90 mcg/act inhaler   No No   Sig: Inhale 2 puffs every 4 (four) hours as needed for wheezing   diltiazem (CARDIZEM CD) 180 mg 24 hr capsule   No No Sig: TAKE 1 CAPSULE (180 MG TOTAL) BY MOUTH DAILY   docusate sodium (COLACE) 100 mg capsule   No No   Sig: Take 1 capsule (100 mg total) by mouth every 12 (twelve) hours   losartan (COZAAR) 100 MG tablet   No No   Sig: TAKE 1 TABLET (100 MG TOTAL) BY MOUTH DAILY   melatonin 3 mg   Yes No   Sig: Take 3 mg by mouth daily at bedtime Take 2 tablets at bedtime   metoprolol succinate (TOPROL-XL) 100 mg 24 hr tablet   No No   Sig: TAKE 1 TABLET (100 MG TOTAL) BY MOUTH DAILY   polyethylene glycol (MIRALAX) 17 g packet   No No   Sig: Take 17 g by mouth daily as needed (constipation first line)      Facility-Administered Medications: None       Past Medical History:   Diagnosis Date    Anxiety     Cognitive impairment     Dementia (HCC)     Depression     Hallucination     Hyperlipidemia     Hypertension     Memory loss     Psychiatric illness     Psychosis ([facility] [address] )     Schizoaffective disorder ([facility] [address] )     Sleep difficulties        Past Surgical History:   Procedure Laterality Date    APPENDECTOMY      BLADDER SURGERY      CHOLECYSTECTOMY      TOTAL ABDOMINAL HYSTERECTOMY W/ BILATERAL SALPINGOOPHORECTOMY      TOTAL KNEE ARTHROPLASTY Left        Family History   Problem Relation Age of Onset    Heart disease Mother         Cardiac disorder    Parkinsonism Father     Heart disease Sister         Cardiac disorder    Hypertension Sister     Diabetes Child         Diabetes Mellitus    Lung disease Child         Respiratory Disorder    Diabetes Son         Diabetes Mellitus     I have reviewed and agree with the history as documented      E-Cigarette/Vaping     E-Cigarette/Vaping Substances     Social History     Tobacco Use    Smoking status: Never Smoker    Smokeless tobacco: Never Used    Tobacco comment: Former smoker per [employer]   Substance Use Topics    Alcohol use: Never    Drug use: No       Review of Systems   Unable to perform ROS: Dementia       Physical Exam  Physical Exam  Vitals signs and nursing note reviewed  Constitutional:       General: She is awake  She is not in acute distress  Appearance: She is well-developed  She is not ill-appearing, toxic-appearing or diaphoretic  HENT:      Head: Normocephalic  Abrasion and contusion present  No laceration  Jaw: No trismus, tenderness, swelling, pain on movement or malocclusion  Right Ear: External ear normal       Left Ear: External ear normal       Nose: Nose normal  No nasal deformity or laceration  Right Sinus: No maxillary sinus tenderness or frontal sinus tenderness  Left Sinus: No maxillary sinus tenderness or frontal sinus tenderness  Eyes:      General: Lids are normal  Vision grossly intact  Extraocular Movements:      Right eye: Normal extraocular motion  Left eye: Normal extraocular motion  Conjunctiva/sclera:      Right eye: Chemosis and hemorrhage present  Left eye: Left conjunctiva is not injected  Pupils: Pupils are equal, round, and reactive to light  Pupils are equal    Neck:      Musculoskeletal: Normal range of motion and neck supple  No neck rigidity, spinous process tenderness or muscular tenderness  Trachea: No tracheal tenderness or tracheal deviation  Cardiovascular:      Rate and Rhythm: Normal rate  Pulses: Normal pulses  Pulmonary:      Effort: Pulmonary effort is normal  No tachypnea, accessory muscle usage, respiratory distress or retractions  Breath sounds: Normal breath sounds  No stridor  Chest:      Chest wall: No lacerations, deformity, tenderness or crepitus  Abdominal:      General: There is no distension  Palpations: Abdomen is soft  Tenderness: There is no abdominal tenderness  There is no guarding or rebound  Musculoskeletal: Normal range of motion  General: No deformity        Right shoulder: Normal       Left shoulder: Normal       Right elbow: Normal      Left elbow: Normal       Right wrist: Normal       Left wrist: Normal       Right hip: Normal       Left hip: Normal       Right knee: Normal       Left knee: Normal       Right ankle: Normal       Left ankle: Normal       Cervical back: Normal  She exhibits normal range of motion, no tenderness, no bony tenderness, no swelling, no deformity and no laceration  Thoracic back: Normal  She exhibits normal range of motion, no tenderness, no bony tenderness, no deformity and no laceration  Lumbar back: Normal  She exhibits normal range of motion, no tenderness, no bony tenderness, no deformity and no laceration  Skin:     General: Skin is warm and dry  Coloration: Skin is not pale  Neurological:      Mental Status: She is alert and oriented to person, place, and time               Vital Signs  ED Triage Vitals   Temperature Pulse Respirations Blood Pressure SpO2   08/23/20 0419 08/23/20 0133 08/23/20 0133 08/23/20 0133 08/23/20 0133   97 8 °F (36 6 °C) 68 18 164/74 98 %      Temp Source Heart Rate Source Patient Position - Orthostatic VS BP Location FiO2 (%)   08/23/20 0419 08/23/20 0133 08/23/20 0242 08/23/20 0133 --   Temporal Monitor Sitting Left arm       Pain Score       --                  Vitals:    08/23/20 0133 08/23/20 0242 08/23/20 0315 08/23/20 0419   BP: 164/74 (!) 182/72 (!) 192/87 170/78   Pulse: 68 69 70 87   Patient Position - Orthostatic VS:  Sitting Lying Lying         Visual Acuity  Visual Acuity      Most Recent Value   L Pupil Size (mm)  3   R Pupil Size (mm)  1          ED Medications  Medications   acetaminophen (TYLENOL) tablet 975 mg (975 mg Oral Given 8/23/20 0239)       Diagnostic Studies  Results Reviewed     Procedure Component Value Units Date/Time    Comprehensive metabolic panel [[id number]]  (Abnormal) Collected:  08/23/20 0349    Lab Status:  Final result Specimen:  Blood from Arm, Right Updated:  08/23/20 0409     Sodium 139 mmol/L      Potassium 4 8 mmol/L      Chloride 102 mmol/L      CO2 28 mmol/L      ANION GAP 9 mmol/L BUN 32 mg/dL      Creatinine 1 28 mg/dL      Glucose 99 mg/dL      Calcium 8 9 mg/dL      AST 29 U/L      ALT 21 U/L      Alkaline Phosphatase 134 U/L      Total Protein 6 8 g/dL      Albumin 3 8 g/dL      Total Bilirubin 0 34 mg/dL      eGFR 37 ml/min/1 73sq m     Narrative:       National Kidney Disease Foundation guidelines for Chronic Kidney Disease (CKD):     Stage 1 with normal or high GFR (GFR > 90 mL/min/1 73 square meters)    Stage 2 Mild CKD (GFR = 60-89 mL/min/1 73 square meters)    Stage 3A Moderate CKD (GFR = 45-59 mL/min/1 73 square meters)    Stage 3B Moderate CKD (GFR = 30-44 mL/min/1 73 square meters)    Stage 4 Severe CKD (GFR = 15-29 mL/min/1 73 square meters)    Stage 5 End Stage CKD (GFR <15 mL/min/1 73 square meters)  Note: GFR calculation is accurate only with a steady state creatinine    Protime-INR [[id number]]  (Normal) Collected:  08/23/20 0349    Lab Status:  Final result Specimen:  Blood from Arm, Right Updated:  08/23/20 0404     Protime 12 0 seconds      INR 0 90    APTT [[id number]]  (Normal) Collected:  08/23/20 0349    Lab Status:  Final result Specimen:  Blood from Arm, Right Updated:  08/23/20 0404     PTT 28 seconds     CBC and differential [[id number]] Collected:  08/23/20 0349    Lab Status:  Final result Specimen:  Blood from Arm, Right Updated:  08/23/20 0354     WBC 6 47 Thousand/uL      RBC 4 54 Million/uL      Hemoglobin 12 9 g/dL      Hematocrit 40 0 %      MCV 88 fL      MCH 28 4 pg      MCHC 32 3 g/dL      RDW 13 8 %      MPV 10 0 fL      Platelets 531 Thousands/uL      nRBC 0 /100 WBCs      Neutrophils Relative 56 %      Immat GRANS % 0 %      Lymphocytes Relative 32 %      Monocytes Relative 9 %      Eosinophils Relative 3 %      Basophils Relative 0 %      Neutrophils Absolute 3 65 Thousands/µL      Immature Grans Absolute 0 02 Thousand/uL      Lymphocytes Absolute 2 04 Thousands/µL      Monocytes Absolute 0 58 Thousand/µL      Eosinophils Absolute 0 18 Thousand/µL      Basophils Absolute 0 00 Thousands/µL                  XR chest 1 view portable   ED Interpretation by Amparo Hale DO (08/23 0326)   No acute traumatic injury  Limited due to poor inspiratory effort and rotation  XR hips bilateral with ap pelvis 2 vw   ED Interpretation by Amparo Hale DO (08/23 5252)   Arthritis without definitive evidence of fracture or dislocation      CT head without contrast   Final Result by Jayne Sahni DO (08/23 1607)      No acute intracranial abnormality  Workstation performed: [id number]         CT facial bones without contrast   Final Result by Jayne Sahni DO (08/23 0300)      Mildly displaced fracture of the right inferior orbital wall  Increased density within the right retrobulbar region  Recommend direct visualization               Workstation performed: [id number]         CT cervical spine without contrast   Final Result by Jayne Sahni DO (08/23 1229)      No cervical spine fracture or traumatic malalignment  Incidental thyroid nodule(s) for which nonemergent thyroid ultrasound is recommended  Workstation performed: [id number]                    Procedures  Procedures         ED Course                                             MDM  Number of Diagnoses or Management Options  Closed fracture of right orbital floor, initial encounter [facility]): new and requires workup  Closed head injury, initial encounter: new and requires workup  Fall, initial encounter: new and requires workup  Retrobulbar hemorrhage: new and requires workup  Diagnosis management comments: Patient presents for a fall from home tonight  History limited due to dementia  Does have obvious trauma to the face and right eye with a subconjunctival hemorrhage  Will start with scans and x-rays and await for the caregiver to arrive for further history  2:01 AM  Patient's caregiver now here in at bedside    She tells me that the patient got up tonight and fell and hit her night stand  Caregiver was close by and does not report a loss of consciousness  Patient was able to get up and ambulatory  She told the caregiver that she had headache and was complaining of left arm pain  Explained to the caregiver that we are obtaining CT scans of her head, face and cervical spine  Her physical exam of the left shoulder appeared well aligned without obvious deformity  I am getting x-rays of the chest and hips as well  The fall seems mechanical in nature so I do not feel that labs are needed  Caregiver agrees with this plan of care  Will give Tylenol for headache      3:28 AM  CTs noted  Will discuss with trauma  3:40 AM  Spoke with Dr Julio Blaneknship from trauma  CT is reviewed with him  He agrees on transfer to [facility]  Amount and/or Complexity of Data Reviewed  Clinical lab tests: ordered and reviewed  Tests in the radiology section of CPT®: ordered and reviewed  Tests in the medicine section of CPT®: reviewed and ordered  Obtain history from someone other than the patient: yes  Review and summarize past medical records: yes  Independent visualization of images, tracings, or specimens: yes          Disposition  Final diagnoses:   Closed fracture of right orbital floor, initial encounter [facility])   Retrobulbar hemorrhage   Fall, initial encounter   Closed head injury, initial encounter     Time reflects when diagnosis was documented in both MDM as applicable and the Disposition within this note     Time User Action Codes Description Comment    8/23/2020  3:37 AM Tiana Mcneal Add [S02 31XA] Closed fracture of right orbital floor, initial encounter ([facility] [address] )     8/23/2020  3:38 AM Minerva Ceja Add [H05 239] Retrobulbar hemorrhage     8/23/2020  3:38 AM Tiana Mcneal Add [X34  SQPU] Fall, initial encounter     8/23/2020  3:38 AM Tiana Mcneal Add [S09 90XA] Closed head injury, initial encounter       ED Disposition     ED Disposition Condition Date/Time Comment    Transfer to Another Facility-In Network  [place] Aug 23, 2020  3:37 AM Robert Conway should be transferred out to [facility]          MD Documentation      Most Recent Value   Patient Condition  The patient has been stabilized such that within reasonable medical probability, no material deterioration of the patient condition or the condition of the unborn child(ravi) is likely to result from the transfer   Reason for Transfer  Level of Care needed not available at this facility   Benefits of Transfer  Specialized equipment and/or services available at the receiving facility (Include comment)________________________   Risks of Transfer  Potential for delay in receiving treatment   Accepting Physician  Dr John Paez Name, Behzad Lopez   Sending MD Dr Ham Coy   Provider Certification  General risk, such as traffic hazards, adverse weather conditions, rough terrain or turbulence, possible failure of equipment (including vehicle or aircraft), or consequences of actions of persons outside the control of the transport personnel      RN Documentation      [facility] Name, Behzad Lopez   Transport Mode  Ambulance   Level of Care  Basic life support      Follow-up Information    None         Discharge Medication List as of 8/23/2020  4:25 AM      CONTINUE these medications which have NOT CHANGED    Details   albuterol (2 5 mg/3 mL) 0 083 % nebulizer solution Take 1 vial (2 5 mg total) by nebulization every 4 (four) hours as needed for wheezing or shortness of breath, Starting Wed 6/17/2020, Normal      albuterol (PROVENTIL HFA,VENTOLIN HFA) 90 mcg/act inhaler Inhale 2 puffs every 4 (four) hours as needed for wheezing, Starting Wed 6/17/2020, Normal      diltiazem (CARDIZEM CD) 180 mg 24 hr capsule TAKE 1 CAPSULE (180 MG TOTAL) BY MOUTH DAILY, Starting Tue 8/11/2020, Normal docusate sodium (COLACE) 100 mg capsule Take 1 capsule (100 mg total) by mouth every 12 (twelve) hours, Starting Tue 5/26/2020, Normal      DULoxetine (CYMBALTA) 30 mg delayed release capsule Take 30 mg by mouth daily at bedtime, Historical Med      losartan (COZAAR) 100 MG tablet TAKE 1 TABLET (100 MG TOTAL) BY MOUTH DAILY, Starting Wed 8/19/2020, Normal      melatonin 3 mg Take 3 mg by mouth daily at bedtime Take 2 tablets at bedtime, Historical Med      metoprolol succinate (TOPROL-XL) 100 mg 24 hr tablet TAKE 1 TABLET (100 MG TOTAL) BY MOUTH DAILY, Starting Wed 8/12/2020, Normal      OLANZapine (ZyPREXA) 5 mg tablet Take 1 tablet (5 mg total) by mouth daily at bedtime, Starting Tue 5/26/2020, Normal      polyethylene glycol (MIRALAX) 17 g packet Take 17 g by mouth daily as needed (constipation first line), Starting Wed 6/17/2020, Normal           No discharge procedures on file      PDMP Review     None          ED Provider  Electronically Signed by           Darrold Shone , DO  08/23/20 3569

## 2020-08-23 NOTE — PLAN OF CARE
Problem: PHYSICAL THERAPY ADULT  Goal: Performs mobility at highest level of function for planned discharge setting  See evaluation for individualized goals  Description: Treatment/Interventions: Functional transfer training, LE strengthening/ROM, Elevations, Therapeutic exercise, Endurance training, Patient/family training, Equipment eval/education, Bed mobility, Gait training, Spoke to nursing, OT  Equipment Recommended: Walker(RW)       See flowsheet documentation for full assessment, interventions and recommendations  Note: Prognosis: Fair  Problem List: Decreased strength, Decreased endurance, Impaired balance, Decreased mobility, Decreased cognition, Impaired judgement, Decreased safety awareness, Pain  Assessment: Pt is an 80 y o  female presenting to [place] [address] as a transfer from [facility] s/p fall at home  Pt was admitted with a primary diagnosis of closed fracture of R orbital floor  Pt's PMH affecting eval includes anxiety, dementia, depression, hallucination, hyperlipidemia, HTN, memory loss, schizoaffective disorder, sleep difficulties and personal factors including impaired cognition and assistance needed at baseline  Pt seen for high complexity PT eval due to ongoing medical management of admitting diagnosis, decreased activity tolerance and functional ability compared to baseline, increased reliance on more restrictive AD, continuous pulse oximetry monitoring, pain and fall risk  Upon eval, pt was resting in bed  Pt poor historian at this time 2* dementia  PCA attempted to translate, however, pt giving inappropriate responses to questions  Pt required Min Ax1 with bed mobility, transfers and ambulation  Pt initially ambulated w/o use of AD to bathroom and was given RW for further ambulation due to unsteadiness  Pt needed tactile cues for progressing walker forward due to decreased initiation of tasks   Based on PT eval, pt is currently limited by decreased BLE strength, decreased endurance, impaired balance, decreased mobility, impaired cognition, pain and fall risk  PT will continue to follow pt for remainder of hospital stay to address these impairments  At conclusion of session pt was left up in chair with all needs within reach  Need to clarify level of support at home  If pt has 24/7 support, pt ok to D/C home with home PT  If unable to have appropriate support, PT recommending rehab  Barriers to Discharge: Decreased caregiver support, Inaccessible home environment     PT Discharge Recommendation: Other (Comment)(If pt has 24/7 S & assist, rec HHPT, if not, rec rehab Simultaneous filing  User may not have seen previous data )     PT - OK to Discharge: (Simultaneous filing  User may not have seen previous data )    See flowsheet documentation for full assessment

## 2020-08-23 NOTE — CONSULTS
Oral and Maxillofacial Surgery Consult    Pt seen 08/23/20 12:27 PM    Assessment  80 y o  female who presents to ED s/p facial trauma sustaining fracture of right orbital floor  On exam, patient presents with moderate right periorbital swelling and no signs of extra-occular muscle entrapment noted  CT maxillofacial shows mildly displaced right orbital floor fracture  Plan:  - No surgical intervention planned with OMFS  - HOB 30 degrees  - Sinus precautions: no nose blowing, no heavy lifting, avoid pressure to the area, head of bed elevated, decongestants as needed  - analgesia per primary team  - appreciate ophthalmology recommendations  - follow up at outpatient [facility] clinic in 1 week -- patient can call [phone] to schedule an appointment for follow-up after discharge      D/w OMFS attg on call Dr Salomón Willson consult to Oral and Maxillofacial Surgery     Performed by  Francisco Reis     Authorized by Tremaine Rodriguez MD               HPI: 80 y o  female presenting s/p facial trauma  Patient fell of her bed last night hitting on face on the floor  CT scan was taken in ER showing a mildly displaced right orbital floor fracture  Today, she complains of right periorbital pain and swelling   No reported LOC, n/v/f/c      PMH:   Past Medical History:   Diagnosis Date    Anxiety     Cognitive impairment     Dementia [facility] )     Depression     Hallucination     Hyperlipidemia     Hypertension     Memory loss     Psychiatric illness     Psychosis ([facility]     Schizoaffective disorder ([facility] )     Sleep difficulties         Allergies:   No Known Allergies    Meds:     Current Facility-Administered Medications:     acetaminophen (TYLENOL) tablet 975 mg, 975 mg, Oral, Q8H [facility], Tremaine Rodriguez MD    albuterol (PROVENTIL HFA,VENTOLIN HFA) inhaler 2 puff, 2 puff, Inhalation, Q4H PRN, Tremaine Rodriguez MD    diltiazem (CARDIZEM CD) 24 hr capsule 180 mg, 180 mg, Oral, Daily, Tremaine Rodriguez MD, 180 mg at 08/23/20 1146    docusate sodium (COLACE) capsule 100 mg, 100 mg, Oral, Q12H [address], Zhou Davis MD, 100 mg at 08/23/20 1146    DULoxetine (CYMBALTA) delayed release capsule 30 mg, 30 mg, Oral, HS, Zhou Davis MD    HYDROmorphone (DILAUDID) injection 0 2 mg, 0 2 mg, Intravenous, Q4H PRN, Zhou Davis MD    losartan (COZAAR) tablet 100 mg, 100 mg, Oral, Daily, Zhou Davis MD, 100 mg at 08/23/20 1146    melatonin tablet 3 mg, 3 mg, Oral, HS, Zhou Davis MD    metoprolol succinate (TOPROL-XL) 24 hr tablet 100 mg, 100 mg, Oral, Daily, Zhou Davis MD, 100 mg at 08/23/20 1147    OLANZapine (ZyPREXA) tablet 5 mg, 5 mg, Oral, HS, Zhou Davis MD    ondansetron [facility]) injection 4 mg, 4 mg, Intravenous, Q4H PRN, Zhou Davis MD    oxyCODONE (ROXICODONE) IR tablet 2 5 mg, 2 5 mg, Oral, Q4H PRN, Zhou Davis MD    oxyCODONE (ROXICODONE) IR tablet 5 mg, 5 mg, Oral, Q4H PRN, Zhou Davis MD    polyethylene glycol (MIRALAX) packet 17 g, 17 g, Oral, Daily PRN, Zhou Davis MD    senna-docusate sodium (SENOKOT S) 8 6-50 mg per tablet 1 tablet, 1 tablet, Oral, HS, Zhou Davis MD    PSH:   Past Surgical History:   Procedure Laterality Date    APPENDECTOMY      BLADDER SURGERY      CHOLECYSTECTOMY      TOTAL ABDOMINAL HYSTERECTOMY W/ BILATERAL SALPINGOOPHORECTOMY      TOTAL KNEE ARTHROPLASTY Left       Family History   Problem Relation Age of Onset    Heart disease Mother         Cardiac disorder    Parkinsonism Father     Heart disease Sister         Cardiac disorder    Hypertension Sister     Diabetes Child         Diabetes Mellitus    Lung disease Child         Respiratory Disorder    Diabetes Son         Diabetes Mellitus        Review of Systems   Constitutional: Negative for chills and fever  HENT: Positive for facial swelling  Negative for dental problem, trouble swallowing and voice change  Gastrointestinal: Negative for nausea and vomiting          Temp:  [97 8 °F (36 6 °C)-98 2 °F (36 8 °C)] 98 2 °F (36 8 °C)  HR:  [64-87] 74  Resp:  [16-22] 18  BP: (164-204)/(9-138) 204/95  SpO2:  [95 %-99 %] 97 %       Intake/Output Summary (Last 24 hours) at 8/23/2020 1227  Last data filed at 8/23/2020 0506  Gross per 24 hour   Intake 0 ml   Output 0 ml   Net 0 ml        Physical Exam:  Gen: AAOx3  NAD  CVS: RRR  Normal S1 S2  Resp: CTA B/L, unlabored on RA  Neuro: bilateral CN V2-V3 grossly intact  bilateral CN VII grossly intact  HEENT:   Head: moderate right facial swelling/ecchymosis   No bony step-off palpated  Mild tenderness to palpation  No  facial laceration/abrasion  Eye: EOM grossly intact bilaterally  PERRL  right subconjunctival hemorrhage  right periorbital ecchymosis/edema  Chemosis in R eye  No diplopia   Nose: no nasal dorsum deviation  No  septal hematoma  Intraoral: VIANNEY ~40mm  Dentition grossly intact (maxilalry CD, mandibular lower RPD)  Occlusion stable  No  segmental mobility  FOM soft, non-elevated, non-tender  Uvula midline  Lab Results: I have personally reviewed pertinent lab results  Imaging: I have personally reviewed pertinent films in PACS  CT maxillofacial shows mildly displaced right orbital floor fracture

## 2020-08-23 NOTE — INCIDENTAL FINDINGS
The following findings require follow up:  Radiographic finding   Finding: Incidental thyroid nodule(s) for which nonemergent thyroid ultrasound is recommended     Follow up required: thyroid ultrasound   Follow up should be done within 2 week(s)    Please notify the following clinician to assist with the follow up:   Dr Devang Doe PCP

## 2020-08-23 NOTE — ED NOTES
Report called to receiving facility, spoken to [facility], RN  08/23/20 1939

## 2020-08-23 NOTE — H&P
H&P Exam - [address] [place] [address] 80 y o  female MRN: [id number]  Unit/Bed#: ED 14 Encounter: [id number]    Assessment/Plan   Trauma Alert: Evaluation  Model of Arrival: Ambulance and Transfer [facility] [place] [facility]\""  Trauma Team: Attending Alida Negron and Residents Griselda Setter  Consultants: OMFS and Ophthalmology    Trauma Active Problems:   - R inferior orbital wall fracture    Trauma Plan:   - optho and OMFS consults  - NPO  - pain regimen    Chief Complaint: right eye pain    History of Present Illness   HPI:  Cyndi Funk is a 80 y o  female Faroese-speaking only with PMH of HTN and dementia and pafib not on any AC/AP medications who [facility] [address] for right inferior orbital wall fracture status post ground level fall  Patient reports she was sleeping and when she turned over she rolled out of bed and struck her face on the ground  No loss of consciousness  Complaining of only right orbital pain  No visual disturbance  No other complaints  Mechanism:Fall    Review of Systems   Constitutional: Negative for appetite change, chills, diaphoresis, fever and unexpected weight change  HENT: Negative for congestion and rhinorrhea  Eyes: Positive for pain and redness  Negative for photophobia and visual disturbance  Patient denies vision change   Respiratory: Negative for cough, chest tightness and shortness of breath  Cardiovascular: Negative for chest pain, palpitations and leg swelling  Gastrointestinal: Negative for abdominal distention, abdominal pain, blood in stool, constipation, diarrhea, nausea and vomiting  Genitourinary: Negative for dysuria and hematuria  Musculoskeletal: Negative for back pain, joint swelling, neck pain and neck stiffness  Skin: Negative for color change, pallor, rash and wound  Neurological: Negative for dizziness, syncope, weakness, light-headedness and headaches  Psychiatric/Behavioral: Negative for agitation     All other systems reviewed and are negative  12-point, complete review of systems was reviewed and negative except as stated above  Historical Information   History is unobtainable from the patient due to language barrier and dementia    Efforts to obtain history included the following sources: obtained from other records    Past Medical History:   Diagnosis Date    Anxiety     Cognitive impairment     Dementia ([facility] )     Depression     Hallucination     Hyperlipidemia     Hypertension     Memory loss     Psychiatric illness     [facility]     [facility]ffect[facility] disorder ([facility] )     Sleep difficulties      Past Surgical History:   Procedure Laterality Date    APPENDECTOMY      BLADDER SURGERY      CHOLECYSTECTOMY      TOTAL ABDOMINAL HYSTERECTOMY W/ BILATERAL SALPINGOOPHORECTOMY      TOTAL KNEE ARTHROPLASTY Left      Social History   Social History     Substance and Sexual Activity   Alcohol Use Never     Social History     Substance and Sexual Activity   Drug Use No     Social History     Tobacco Use   Smoking Status Never Smoker   Smokeless Tobacco Never Used   Tobacco Comment    Former smoker per [employer]     E-Cigarette/Vaping     E-Cigarette/Vaping Substances     Immunization History   Administered Date(s) Administered    INFLUENZA 02/16/2015, 11/13/2018    Influenza Quadrivalent, 6-35 Months IM 10/16/2014    Influenza, high dose seasonal 0 7 mL 11/13/2018, 10/01/2019    Pneumococcal Conjugate 13-Valent 02/04/2020    Pneumococcal Polysaccharide PPV23 07/11/2014    Tuberculin Skin Test-PPD Intradermal 10/23/2018     Last Tetanus: <5 years  Family History: Non-contributory  Unable to obtain/limited by none      Meds/Allergies   all current active meds have been reviewed, current meds:   Current Facility-Administered Medications   Medication Dose Route Frequency    acetaminophen (TYLENOL) tablet 975 mg  975 mg Oral Q8H [address]    albuterol (PROVENTIL HFA,VENTOLIN HFA) inhaler 2 puff  2 puff Inhalation Q4H PRN    diltiazem (CARDIZEM CD) 24 hr capsule 180 mg  180 mg Oral Daily    docusate sodium (COLACE) capsule 100 mg  100 mg Oral Q12H [address]    DULoxetine (CYMBALTA) delayed release capsule 30 mg  30 mg Oral HS    HYDROmorphone (DILAUDID) injection 0 2 mg  0 2 mg Intravenous Q4H PRN    losartan (COZAAR) tablet 100 mg  100 mg Oral Daily    melatonin tablet 3 mg  3 mg Oral HS    metoprolol succinate (TOPROL-XL) 24 hr tablet 100 mg  100 mg Oral Daily    OLANZapine (ZyPREXA) tablet 5 mg  5 mg Oral HS    ondansetron (ZOFRAN) injection 4 mg  4 mg Intravenous Q4H PRN    oxyCODONE (ROXICODONE) IR tablet 2 5 mg  2 5 mg Oral Q4H PRN    oxyCODONE (ROXICODONE) IR tablet 5 mg  5 mg Oral Q4H PRN    polyethylene glycol (MIRALAX) packet 17 g  17 g Oral Daily PRN    senna-docusate sodium (SENOKOT S) 8 6-50 mg per tablet 1 tablet  1 tablet Oral HS    and PTA meds:   Prior to Admission Medications   Prescriptions Last Dose Informant Patient Reported? Taking?    DULoxetine (CYMBALTA) 30 mg delayed release capsule   Yes No   Sig: Take 30 mg by mouth daily at bedtime   OLANZapine (ZyPREXA) 5 mg tablet   No No   Sig: Take 1 tablet (5 mg total) by mouth daily at bedtime   albuterol (2 5 mg/3 mL) 0 083 % nebulizer solution   No No   Sig: Take 1 vial (2 5 mg total) by nebulization every 4 (four) hours as needed for wheezing or shortness of breath   albuterol (PROVENTIL HFA,VENTOLIN HFA) 90 mcg/act inhaler   No No   Sig: Inhale 2 puffs every 4 (four) hours as needed for wheezing   diltiazem (CARDIZEM CD) 180 mg 24 hr capsule   No No   Sig: TAKE 1 CAPSULE (180 MG TOTAL) BY MOUTH DAILY   docusate sodium (COLACE) 100 mg capsule   No No   Sig: Take 1 capsule (100 mg total) by mouth every 12 (twelve) hours   losartan (COZAAR) 100 MG tablet   No No   Sig: TAKE 1 TABLET (100 MG TOTAL) BY MOUTH DAILY   melatonin 3 mg   Yes No   Sig: Take 3 mg by mouth daily at bedtime Take 2 tablets at bedtime   metoprolol succinate (TOPROL-XL) 100 mg 24 hr tablet No No   Sig: TAKE 1 TABLET (100 MG TOTAL) BY MOUTH DAILY   polyethylene glycol (MIRALAX) 17 g packet   No No   Sig: Take 17 g by mouth daily as needed (constipation first line)      Facility-Administered Medications: None       No Known Allergies      PHYSICAL EXAM    PE limited by: none    Objective   Vitals:   First set: Temperature: 98 2 °F (36 8 °C) (08/23/20 0450)  Pulse: 65 (08/23/20 0450)  Respirations: 16 (08/23/20 0450)  Blood Pressure: (!) 198/9 (08/23/20 0450)    Primary Survey:   (A) Airway: patent  (B) Breathing:  Bilateral breath sounds present  (C) Circulation: Pulses:   normal, pedal  2/4, radial  2/4 and femoral  2/4  (D) Disabliity:  GCS Total:  14, Eye Opening:   Spontaneous = 4, Motor Response: Obeys commands = 6 and Verbal Response:  Confused = 4  (E) Expose:  Completed    Secondary Survey: (Click on Physical Exam tab above)  Physical Exam  Vitals signs and nursing note reviewed  Constitutional:       Appearance: She is well-developed  She is not diaphoretic  HENT:      Head: Normocephalic  Comments: Right periorbital edema and ecchymosis with eye swollen shut  Able to manually retract eyelids     Nose: Nose normal    Eyes:      General:         Right eye: No discharge  Left eye: No discharge  Extraocular Movements: Extraocular movements intact  Pupils: Pupils are equal, round, and reactive to light  Comments: Extraocular motions intact  Pupils PERRLA  Eye pressures of right and left eye were 19 and 20 respectively  No hyphema on funduscopic exam    Neck:      Musculoskeletal: Normal range of motion and neck supple  Vascular: No JVD  Trachea: No tracheal deviation  Cardiovascular:      Rate and Rhythm: Normal rate and regular rhythm  Heart sounds: Normal heart sounds  No murmur  No friction rub  No gallop  Pulmonary:      Effort: Pulmonary effort is normal  No respiratory distress  Breath sounds: Normal breath sounds  No stridor   No wheezing or rales  Chest:      Chest wall: No tenderness  Abdominal:      General: Bowel sounds are normal  There is no distension  Palpations: Abdomen is soft  Tenderness: There is no abdominal tenderness  There is no guarding or rebound  Musculoskeletal: Normal range of motion  General: No tenderness or deformity  Lymphadenopathy:      Cervical: No cervical adenopathy  Skin:     General: Skin is warm and dry  Coloration: Skin is not pale  Findings: No erythema or rash  Neurological:      Mental Status: She is alert and oriented to person, place, and time  Cranial Nerves: No cranial nerve deficit  Sensory: No sensory deficit  Motor: No abnormal muscle tone  Coordination: Coordination normal    Psychiatric:         Behavior: Behavior normal          Thought Content:  Thought content normal          Invasive Devices     Peripheral Intravenous Line            Peripheral IV 08/23/20 Left Antecubital less than 1 day                Lab Results:   Results: I have personally reviewed pertinent reports   , BMP/CMP:   Lab Results   Component Value Date    SODIUM 139 08/23/2020    K 4 8 08/23/2020     08/23/2020    CO2 28 08/23/2020    BUN 32 (H) 08/23/2020    CREATININE 1 28 08/23/2020    CALCIUM 8 9 08/23/2020    AST 29 08/23/2020    ALT 21 08/23/2020    ALKPHOS 134 (H) 08/23/2020    EGFR 37 08/23/2020   , CBC:   Lab Results   Component Value Date    WBC 6 47 08/23/2020    HGB 12 9 08/23/2020    HCT 40 0 08/23/2020    MCV 88 08/23/2020     08/23/2020    MCH 28 4 08/23/2020    MCHC 32 3 08/23/2020    RDW 13 8 08/23/2020    MPV 10 0 08/23/2020    NRBC 0 08/23/2020   , Coagulation:   Lab Results   Component Value Date    INR 0 90 08/23/2020   , Lactate: No results found for: LACTATE, Amylase: No results found for: AMYLASE, Lipase: No results found for: LIPASE, AST:   Lab Results   Component Value Date    AST 29 08/23/2020   , ALT:   Lab Results   Component Value Date    ALT 21 08/23/2020   , Urinalysis: No results found for: Jeancarlos Mcleod, SPECGRAV, PHUR, LEUKOCYTESUR, NITRITE, PROTEINUA, GLUCOSEU, KETONESU, BILIRUBINUR, BLOODU, CK: No results found for: CKTOTAL, Troponin: No results found for: TROPONINI, EtOH: No results found for: ETOH, UDS: No components found for: RAPIDDRUGSCREEN, ABG: No results found for: PHART, PSL7GGJ, PO2ART, LAI4QDL, G5VSCQLT, BEART, SOURCE and ISTAT: No components found for: VBG  Imaging/EKG Studies: Results: I have personally reviewed pertinent reports  8/23 CTH: no acute process    8/23 CT c-spine:   No cervical spine fracture or traumatic malalignment  Incidental thyroid nodule(s) for which nonemergent thyroid ultrasound is recommended  8/23 CT facial bones:  Mildly displaced fracture of the right inferior orbital wall  Increased density within the right retrobulbar region    Recommend direct visualization    Other Studies: no new    Code Status: Level 1 - Full Code  Advance Directive and Living Will:      Power of :    POLST:

## 2020-08-23 NOTE — DISCHARGE SUMMARY
Discharge Summary - Tayler Ackerman 80 y o  female MRN: [id number]    Unit/Bed#: [facility] 803-01 Encounter: [id number]    Admission Date:   Admission Orders (From admission, onward)     Ordered        08/23/20 0507  Place in Observation  Once                     Admitting Diagnosis: Closed fracture of right orbital floor, initial encounter ([facility] [address] ) Mariajose Bruno  Unspecified multiple injuries, initial encounter [T07  XXXA]    HPI: Per Dr Glennette Felty on admission "Tayler Ackerman is a 80 y o  female [place]-speaking only with PMH of HTN and dementia and pafib not on any AC/AP medications who presents as a trauma transfer from [address] for right inferior orbital wall fracture status post ground level fall  Patient reports she was sleeping and when she turned over she rolled out of bed and struck her face on the ground  No loss of consciousness  Complaining of only right orbital pain  No visual disturbance  No other complaints "    Procedures Performed: No orders of the defined types were placed in this encounter  Summary of Hospital Course: Rowan Corado was evaluated by OMS this admission and was recommended non-operative management of her orbital fracture  She worked with PT and Ot therapy and appears to be at her baseline with no additional traumatic injury  She has no vision changes or eye pain  There was a concern for possible retrobulbar hematoma on CT scan and ophthalmology consult was requested however patient adamantly preferred to return home and follow-up as outpatient as she did not wish to remain in the hospital overnight  Her exam remains stable  Dr Tyrel Bermeo aware and agreeable to outpatient follow-up early this week  Our office will help her arrange outpatient follow-up tomorrow  Her son Ya Hansen was notified as well and agreeable to this plan  Rowan Corado has 24 hour caregiver support at her home whom also provide transportation  She will follow-up with optho and OMS as an outpatient as was recommended to follow-up with her PCP  Significant Findings, Care, Treatment and Services Provided: Xr Chest 1 View Portable    Result Date: 8/23/2020  Impression: No acute cardiopulmonary disease  Workstation performed: [id number]     Xr Hips Bilateral With Ap Pelvis 2 Vw    Result Date: 8/23/2020  Impression: No acute displaced fracture or dislocation Mild bilateral hip arthritis Workstation performed: [id number]     Ct Head Without Contrast    Result Date: 8/23/2020  Impression: No acute intracranial abnormality  Workstation performed: [id number]     Ct Facial Bones Without Contrast    Result Date: 8/23/2020  Impression: Mildly displaced fracture of the right inferior orbital wall  Increased density within the right retrobulbar region  Recommend direct visualization Workstation performed: [id number]     Ct Cervical Spine Without Contrast    Result Date: 8/23/2020  Impression: No cervical spine fracture or traumatic malalignment  Incidental thyroid nodule(s) for which nonemergent thyroid ultrasound is recommended    Workstation performed: [id number]       Complications: none    Discharge Diagnosis:   Patient Active Problem List   Diagnosis    Essential hypertension    Anxiety    Hyperlipidemia    Hyponatremia    Acute encephalopathy    Hearing problem of both ears    Fall    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[address] )    Cellulitis of extremity    Paroxysmal A-fib ([address]    Orthostatic hypertension    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[address] )    Ambulatory dysfunction    COVID-19 virus infection    Elevated troponin    Urinary incontinence    PAF (paroxysmal atrial fibrillation) ([address]    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[address] )    Major neurocognitive disorder ([address]    Moderate protein-calorie malnutrition ([address] )    Age-related osteoporosis without current pathological fracture    Closed fracture of right orbital floor ([address] )    Fall from ground level         Resolved Problems  Date Reviewed: 8/23/2020    None          Condition at Discharge: stable Discharge instructions/Information to patient and family:   See after visit summary for information provided to patient and family  Provisions for Follow-Up Care:  See after visit summary for information related to follow-up care and any pertinent home health orders  PCP: Lacho Diaz MD    Disposition: Home    Planned Readmission: No      Discharge Statement   I spent 16 minutes discharging the patient  This time was spent on the day of discharge  I had direct contact with the patient on the day of discharge  Additional documentation is required if more than 30 minutes were spent on discharge  Discharge Medications:  See after visit summary for reconciled discharge medications provided to patient and family

## 2020-08-25 ENCOUNTER — OFFICE VISIT (OUTPATIENT)
Dept: INTERNAL MEDICINE CLINIC | Facility: CLINIC | Age: 85
End: 2020-08-25
Payer: COMMERCIAL

## 2020-08-25 VITALS
WEIGHT: 117 LBS | HEIGHT: 58 IN | BODY MASS INDEX: 24.56 KG/M2 | HEART RATE: 70 BPM | TEMPERATURE: 97.4 F | RESPIRATION RATE: 14 BRPM | DIASTOLIC BLOOD PRESSURE: 64 MMHG | SYSTOLIC BLOOD PRESSURE: 110 MMHG

## 2020-08-25 DIAGNOSIS — S02.31XD CLOSED FRACTURE OF RIGHT ORBITAL FLOOR WITH ROUTINE HEALING, SUBSEQUENT ENCOUNTER: ICD-10-CM

## 2020-08-25 DIAGNOSIS — R26.2 AMBULATORY DYSFUNCTION: ICD-10-CM

## 2020-08-25 DIAGNOSIS — E78.2 MIXED HYPERLIPIDEMIA: ICD-10-CM

## 2020-08-25 DIAGNOSIS — I10 ESSENTIAL HYPERTENSION: Primary | ICD-10-CM
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
## 2020-08-25 DIAGNOSIS — I48.0 PAF (PAROXYSMAL ATRIAL FIBRILLATION) (HCC): ICD-10-CM

## 2020-08-25 DIAGNOSIS — F03.90 MAJOR NEUROCOGNITIVE DISORDER (HCC): Chronic | ICD-10-CM

## 2020-08-25 DIAGNOSIS — F20.3 UNDIFFERENTIATED SCHIZOPHRENIA (HCC): Chronic | ICD-10-CM

## 2020-08-25 PROCEDURE — 1111F DSCHRG MED/CURRENT MED MERGE: CPT | Performed by: INTERNAL MEDICINE

## 2020-08-25 PROCEDURE — 1036F TOBACCO NON-USER: CPT | Performed by: INTERNAL MEDICINE

## 2020-08-25 PROCEDURE — 3008F BODY MASS INDEX DOCD: CPT | Performed by: INTERNAL MEDICINE

## 2020-08-25 PROCEDURE — 4040F PNEUMOC VAC/ADMIN/RCVD: CPT | Performed by: INTERNAL MEDICINE

## 2020-08-25 PROCEDURE — 3078F DIAST BP <80 MM HG: CPT | Performed by: INTERNAL MEDICINE

## 2020-08-25 PROCEDURE — 99214 OFFICE O/P EST MOD 30 MIN: CPT | Performed by: INTERNAL MEDICINE

## 2020-08-25 PROCEDURE — 3074F SYST BP LT 130 MM HG: CPT | Performed by: INTERNAL MEDICINE

## 2020-08-25 PROCEDURE — 1160F RVW MEDS BY RX/DR IN RCRD: CPT | Performed by: INTERNAL MEDICINE

## 2020-08-25 RX ORDER — LORAZEPAM 0.5 MG/1
0.5 TABLET ORAL DAILY
COMMUNITY
End: 2021-07-26 | Stop reason: HOSPADM

## 2020-08-25 NOTE — PROGRESS NOTES
Assessment/Plan:  No change in meds will see her back in 4 weeks at risk for falling because of her schizophrenia and ambulatory dysfunction she ambulates with a walker and wheels    Patient came in for a follow-up in the hospital she had a closed fracture of the right orbital floor initial encounter  She was recommended to see an ophthalmologist she wanted to go home so the arrangements are will going to be made to be follow as an outpatient  Dr Hong Zelaya is aware of her orbital fracture and follow-up    Schizophrenia and psychosis she seems to be very stable here with her 8  Blood pressure is well control today  Denies any chest pain palpitation shortness of breath or headache    Paroxysmal atrial fibrillation she appears to be in sinus rhythm clinically  We order home care for physical therapy and occupational therapy she did fall at home I order a bed alarm and she needs to follow-up with ophthalmology concerning a orbital flexure we will see her back in 4 weeks    Apparently she fell at home she Rel out of bed to the floor  And she was taken to the hospital for evaluation  Reviewing the chart CT scan of the cervical spine showed no cervical spine fracture or traumatic malalignment  CT scan of the brain was unremarkable    Chest x-ray no active disease  X-ray of bilateral hips and pelvis no acute displaced fracture or dislocation she has mild bilateral arthritis of the hips  CT scan of the facial bones showed mild displaced fracture of the right inferior orbital wall increased density within the right retrobulbar region recommended direct visualization that is why she was sent to the ophthalmologist   No problem-specific Assessment & Plan notes found for this encounter         Diagnoses and all orders for this visit:    Essential hypertension    PAF (paroxysmal atrial fibrillation) (HCC)    Major neurocognitive disorder ([address] )    Closed fracture of right orbital floor with routine healing, subsequent encounter  -     Cancel: Ambulatory referral to Physical Therapy; Future  -     Cancel: Ambulatory referral to Occupational Therapy; Future  -     Bed Alarm and Pad  -     Ambulatory referral to Ophthalmology; Future  -     Ambulatory Referral to Home Health; Future    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[facility] [address] )    Mixed hyperlipidemia    Ambulatory dysfunction    Fall, initial encounter  -     Cancel: Ambulatory referral to Physical Therapy; Future  -     Cancel: Ambulatory referral to Occupational Therapy; Future  -     Bed Alarm and Pad  -     Ambulatory referral to Ophthalmology; Future  -     Ambulatory Referral to Home Health; Future    Other orders  -     LORazepam (ATIVAN) 0 5 mg tablet; Take by mouth daily          Subjective:      Patient ID: Ema Age is a 80 y o  female  Chief Complaint   Patient presents with    Follow-up     ER visit 8 23 20 for right inferior orbital wall fracture status post ground level fall           Current Outpatient Medications:     diltiazem (CARDIZEM CD) 180 mg 24 hr capsule, TAKE 1 CAPSULE (180 MG TOTAL) BY MOUTH DAILY, Disp: 30 capsule, Rfl: 0    DULoxetine (CYMBALTA) 30 mg delayed release capsule, Take 30 mg by mouth daily at bedtime, Disp: , Rfl:     LORazepam (ATIVAN) 0 5 mg tablet, Take by mouth daily, Disp: , Rfl:     metoprolol succinate (TOPROL-XL) 100 mg 24 hr tablet, TAKE 1 TABLET (100 MG TOTAL) BY MOUTH DAILY, Disp: 30 tablet, Rfl: 0    OLANZapine (ZyPREXA) 5 mg tablet, Take 1 tablet (5 mg total) by mouth daily at bedtime, Disp: 30 tablet, Rfl: 1    acetaminophen (TYLENOL) 325 mg tablet, Take 2 tablets (650 mg total) by mouth every 6 (six) hours as needed for mild pain, moderate pain or headaches (Patient not taking: Reported on 8/25/2020), Disp: 30 tablet, Rfl: 0    albuterol (2 5 mg/3 mL) 0 083 % nebulizer solution, Take 1 vial (2 5 mg total) by nebulization every 4 (four) hours as needed for wheezing or shortness of breath (Patient not taking: Reported on 8/25/2020), Disp: 1 vial, Rfl: 0    albuterol (PROVENTIL HFA,VENTOLIN HFA) 90 mcg/act inhaler, Inhale 2 puffs every 4 (four) hours as needed for wheezing (Patient not taking: Reported on 8/25/2020), Disp: 1 Inhaler, Rfl: 0    docusate sodium (COLACE) 100 mg capsule, Take 1 capsule (100 mg total) by mouth every 12 (twelve) hours (Patient not taking: Reported on 8/25/2020), Disp: 10 capsule, Rfl: 0    losartan (COZAAR) 100 MG tablet, TAKE 1 TABLET (100 MG TOTAL) BY MOUTH DAILY (Patient not taking: Reported on 8/25/2020), Disp: 90 tablet, Rfl: 0    melatonin 3 mg, Take 3 mg by mouth daily at bedtime Take 2 tablets at bedtime, Disp: , Rfl:     polyethylene glycol (MIRALAX) 17 g packet, Take 17 g by mouth daily as needed (constipation first line) (Patient not taking: Reported on 8/25/2020), Disp: 14 each, Rfl: 0    HPI    The following portions of the patient's history were reviewed and updated as appropriate: allergies, current medications, past family history, past medical history, past social history, past surgical history and problem list     Review of Systems   Constitutional: Negative  Negative for activity change, appetite change, fatigue, fever and unexpected weight change  HENT: Negative for congestion, ear pain, hearing loss, mouth sores, postnasal drip, rhinorrhea, sore throat, trouble swallowing and voice change  Eyes: Negative for pain, redness and visual disturbance  Respiratory: Negative for cough, chest tightness, shortness of breath and wheezing  Cardiovascular: Negative for chest pain, palpitations and leg swelling  Gastrointestinal: Negative for abdominal distention, abdominal pain, blood in stool, constipation, diarrhea and nausea  Endocrine: Negative for cold intolerance, heat intolerance, polydipsia, polyphagia and polyuria  Genitourinary: Negative for difficulty urinating, dysuria, flank pain, frequency, hematuria and urgency     Musculoskeletal: Negative for arthralgias, back pain, gait problem, joint swelling and myalgias  Skin: Negative for color change and pallor  Ecchymosis in the right orbital area and the right side of her face   Neurological: Negative for dizziness, tremors, seizures, syncope, weakness, numbness and headaches  Hematological: Negative for adenopathy  Does not bruise/bleed easily  Psychiatric/Behavioral: Negative  Negative for sleep disturbance  The patient is not nervous/anxious  Objective:    /64 (BP Location: Left arm, Patient Position: Sitting, Cuff Size: Standard)   Pulse 70   Temp (!) 97 4 °F (36 3 °C)   Resp 14   Ht 4' 10" (1 473 m)   Wt 53 1 kg (117 lb)   BMI 24 45 kg/m²      Physical Exam  Vitals signs and nursing note reviewed  Constitutional:       Appearance: She is well-developed  HENT:      Head: Normocephalic  Right Ear: External ear normal       Left Ear: External ear normal       Nose: Nose normal       Mouth/Throat:      Pharynx: No oropharyngeal exudate  Eyes:      Conjunctiva/sclera: Conjunctivae normal       Pupils: Pupils are equal, round, and reactive to light  Neck:      Musculoskeletal: Normal range of motion and neck supple  Thyroid: No thyromegaly  Cardiovascular:      Rate and Rhythm: Normal rate and regular rhythm  Heart sounds: Normal heart sounds  No murmur  No friction rub  No gallop  Pulmonary:      Effort: Pulmonary effort is normal  No respiratory distress  Breath sounds: Normal breath sounds  No wheezing or rales  Abdominal:      General: Bowel sounds are normal  There is no distension  Palpations: Abdomen is soft  There is no mass  Tenderness: There is no abdominal tenderness  There is no guarding or rebound  Musculoskeletal: Normal range of motion  Lymphadenopathy:      Cervical: No cervical adenopathy  Skin:     General: Skin is warm and dry  Findings: Bruising present        Comments: Ecchymosis in the right orbital area on the right side of her face  Neurological:      Mental Status: She is alert and oriented to person, place, and time     Psychiatric:         Behavior: Behavior normal          Judgment: Judgment normal

## 2020-08-25 NOTE — PATIENT INSTRUCTIONS
Follow-up with ophthalmology Dr Alison Villanueva    Will going to get occupational therapy and physical therapy the come to your house for home evaluation    No change in medication your blood pressure is control    Will see her back in 4 weeks

## 2020-09-03 DIAGNOSIS — I10 ESSENTIAL HYPERTENSION: ICD-10-CM

## 2020-09-03 RX ORDER — METOPROLOL SUCCINATE 100 MG/1
100 TABLET, EXTENDED RELEASE ORAL DAILY
Qty: 30 TABLET | Refills: 0 | Status: SHIPPED | OUTPATIENT
Start: 2020-09-03 | End: 2020-10-20 | Stop reason: SDUPTHER

## 2020-09-03 RX ORDER — DILTIAZEM HYDROCHLORIDE 180 MG/1
180 CAPSULE, COATED, EXTENDED RELEASE ORAL DAILY
Qty: 30 CAPSULE | Refills: 0 | Status: SHIPPED | OUTPATIENT
Start: 2020-09-03 | End: 2020-10-20 | Stop reason: SDUPTHER

## 2020-09-15 ENCOUNTER — APPOINTMENT (OUTPATIENT)
Dept: LAB | Facility: HOSPITAL | Age: 85
End: 2020-09-15
Attending: INTERNAL MEDICINE
Payer: COMMERCIAL

## 2020-09-15 DIAGNOSIS — U07.1 COVID-19 VIRUS INFECTION: ICD-10-CM

## 2020-09-15 DIAGNOSIS — F20.3 UNDIFFERENTIATED SCHIZOPHRENIA (HCC): Chronic | ICD-10-CM

## 2020-09-15 DIAGNOSIS — I48.0 PAF (PAROXYSMAL ATRIAL FIBRILLATION) (HCC): ICD-10-CM

## 2020-09-15 DIAGNOSIS — I10 ESSENTIAL HYPERTENSION: ICD-10-CM

## 2020-09-15 DIAGNOSIS — I10 ORTHOSTATIC HYPERTENSION: ICD-10-CM

## 2020-09-15 LAB
25(OH)D3 SERPL-MCNC: 34.8 NG/ML (ref 30–100)
ALBUMIN SERPL BCP-MCNC: 4.2 G/DL (ref 3.5–5)
ALP SERPL-CCNC: 115 U/L (ref 46–116)
ALT SERPL W P-5'-P-CCNC: 21 U/L (ref 12–78)
ANION GAP SERPL CALCULATED.3IONS-SCNC: 8 MMOL/L (ref 4–13)
AST SERPL W P-5'-P-CCNC: 24 U/L (ref 5–45)
BASOPHILS # BLD AUTO: 0.01 THOUSANDS/ΜL (ref 0–0.1)
BASOPHILS NFR BLD AUTO: 0 % (ref 0–1)
BILIRUB SERPL-MCNC: 0.61 MG/DL (ref 0.2–1)
BILIRUB UR QL STRIP: NEGATIVE
BUN SERPL-MCNC: 31 MG/DL (ref 5–25)
CALCIUM SERPL-MCNC: 9.4 MG/DL (ref 8.3–10.1)
CHLORIDE SERPL-SCNC: 99 MMOL/L (ref 100–108)
CLARITY UR: CLEAR
CO2 SERPL-SCNC: 28 MMOL/L (ref 21–32)
COLOR UR: NORMAL
CREAT SERPL-MCNC: 1.07 MG/DL (ref 0.6–1.3)
EOSINOPHIL # BLD AUTO: 0.15 THOUSAND/ΜL (ref 0–0.61)
EOSINOPHIL NFR BLD AUTO: 2 % (ref 0–6)
ERYTHROCYTE [DISTWIDTH] IN BLOOD BY AUTOMATED COUNT: 13.7 % (ref 11.6–15.1)
GFR SERPL CREATININE-BSD FRML MDRD: 46 ML/MIN/1.73SQ M
GLUCOSE P FAST SERPL-MCNC: 77 MG/DL (ref 65–99)
GLUCOSE UR STRIP-MCNC: NEGATIVE MG/DL
HCT VFR BLD AUTO: 43.9 % (ref 34.8–46.1)
HGB BLD-MCNC: 14 G/DL (ref 11.5–15.4)
HGB UR QL STRIP.AUTO: NEGATIVE
IMM GRANULOCYTES # BLD AUTO: 0.05 THOUSAND/UL (ref 0–0.2)
IMM GRANULOCYTES NFR BLD AUTO: 1 % (ref 0–2)
KETONES UR STRIP-MCNC: NEGATIVE MG/DL
LEUKOCYTE ESTERASE UR QL STRIP: NEGATIVE
LYMPHOCYTES # BLD AUTO: 1.48 THOUSANDS/ΜL (ref 0.6–4.47)
LYMPHOCYTES NFR BLD AUTO: 19 % (ref 14–44)
MAGNESIUM SERPL-MCNC: 2.4 MG/DL (ref 1.6–2.6)
MCH RBC QN AUTO: 28.8 PG (ref 26.8–34.3)
MCHC RBC AUTO-ENTMCNC: 31.9 G/DL (ref 31.4–37.4)
MCV RBC AUTO: 90 FL (ref 82–98)
MONOCYTES # BLD AUTO: 0.64 THOUSAND/ΜL (ref 0.17–1.22)
MONOCYTES NFR BLD AUTO: 8 % (ref 4–12)
NEUTROPHILS # BLD AUTO: 5.49 THOUSANDS/ΜL (ref 1.85–7.62)
NEUTS SEG NFR BLD AUTO: 70 % (ref 43–75)
NITRITE UR QL STRIP: NEGATIVE
NRBC BLD AUTO-RTO: 0 /100 WBCS
PH UR STRIP.AUTO: 6.5 [PH]
PLATELET # BLD AUTO: 297 THOUSANDS/UL (ref 149–390)
PMV BLD AUTO: 10 FL (ref 8.9–12.7)
POTASSIUM SERPL-SCNC: 4.4 MMOL/L (ref 3.5–5.3)
PROT SERPL-MCNC: 8.2 G/DL (ref 6.4–8.2)
PROT UR STRIP-MCNC: NEGATIVE MG/DL
RBC # BLD AUTO: 4.86 MILLION/UL (ref 3.81–5.12)
SODIUM SERPL-SCNC: 135 MMOL/L (ref 136–145)
SP GR UR STRIP.AUTO: 1.01 (ref 1–1.03)
TSH SERPL DL<=0.05 MIU/L-ACNC: 2.15 UIU/ML (ref 0.36–3.74)
UROBILINOGEN UR QL STRIP.AUTO: 0.2 E.U./DL
WBC # BLD AUTO: 7.82 THOUSAND/UL (ref 4.31–10.16)

## 2020-09-15 PROCEDURE — 81003 URINALYSIS AUTO W/O SCOPE: CPT | Performed by: INTERNAL MEDICINE

## 2020-09-15 PROCEDURE — 36415 COLL VENOUS BLD VENIPUNCTURE: CPT

## 2020-09-15 PROCEDURE — 84443 ASSAY THYROID STIM HORMONE: CPT

## 2020-09-15 PROCEDURE — 80053 COMPREHEN METABOLIC PANEL: CPT

## 2020-09-15 PROCEDURE — 83735 ASSAY OF MAGNESIUM: CPT

## 2020-09-15 PROCEDURE — 82306 VITAMIN D 25 HYDROXY: CPT

## 2020-09-15 PROCEDURE — 85025 COMPLETE CBC W/AUTO DIFF WBC: CPT

## 2020-09-28 ENCOUNTER — APPOINTMENT (EMERGENCY)
Dept: CT IMAGING | Facility: HOSPITAL | Age: 85
End: 2020-09-28
Payer: COMMERCIAL

## 2020-09-28 ENCOUNTER — HOSPITAL ENCOUNTER (EMERGENCY)
Facility: HOSPITAL | Age: 85
Discharge: HOME | End: 2020-09-28
Attending: EMERGENCY MEDICINE | Admitting: EMERGENCY MEDICINE
Payer: COMMERCIAL

## 2020-09-28 ENCOUNTER — APPOINTMENT (EMERGENCY)
Dept: RADIOLOGY | Facility: HOSPITAL | Age: 85
End: 2020-09-28
Payer: COMMERCIAL

## 2020-09-28 VITALS
OXYGEN SATURATION: 99 % | BODY MASS INDEX: 24.66 KG/M2 | TEMPERATURE: 98.3 F | WEIGHT: 118 LBS | DIASTOLIC BLOOD PRESSURE: 68 MMHG | HEART RATE: 59 BPM | RESPIRATION RATE: 16 BRPM | SYSTOLIC BLOOD PRESSURE: 133 MMHG
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
## 2020-09-28 DIAGNOSIS — S51.819A SKIN TEAR OF FOREARM WITHOUT COMPLICATION, INITIAL ENCOUNTER: ICD-10-CM

## 2020-09-28 PROCEDURE — 90471 IMMUNIZATION ADMIN: CPT

## 2020-09-28 PROCEDURE — 70450 CT HEAD/BRAIN W/O DYE: CPT

## 2020-09-28 PROCEDURE — 99284 EMERGENCY DEPT VISIT MOD MDM: CPT

## 2020-09-28 PROCEDURE — 90715 TDAP VACCINE 7 YRS/> IM: CPT | Performed by: PHYSICIAN ASSISTANT

## 2020-09-28 PROCEDURE — G1004 CDSM NDSC: HCPCS

## 2020-09-28 PROCEDURE — 99284 EMERGENCY DEPT VISIT MOD MDM: CPT | Performed by: PHYSICIAN ASSISTANT

## 2020-09-28 PROCEDURE — 73090 X-RAY EXAM OF FOREARM: CPT

## 2020-09-28 RX ADMIN — TETANUS TOXOID, REDUCED DIPHTHERIA TOXOID AND ACELLULAR PERTUSSIS VACCINE, ADSORBED 0.5 ML: 5; 2.5; 8; 8; 2.5 SUSPENSION INTRAMUSCULAR at 10:36

## 2020-10-07 ENCOUNTER — OFFICE VISIT (OUTPATIENT)
Dept: INTERNAL MEDICINE CLINIC | Facility: CLINIC | Age: 85
End: 2020-10-07
Payer: COMMERCIAL

## 2020-10-07 VITALS
WEIGHT: 121 LBS | TEMPERATURE: 98.5 F | RESPIRATION RATE: 13 BRPM | DIASTOLIC BLOOD PRESSURE: 70 MMHG | BODY MASS INDEX: 25.4 KG/M2 | HEART RATE: 70 BPM | HEIGHT: 58 IN | SYSTOLIC BLOOD PRESSURE: 144 MMHG

## 2020-10-07 DIAGNOSIS — E78.2 MIXED HYPERLIPIDEMIA: ICD-10-CM

## 2020-10-07 DIAGNOSIS — I48.0 PAF (PAROXYSMAL ATRIAL FIBRILLATION) (HCC): Primary | ICD-10-CM

## 2020-10-07 DIAGNOSIS — F23 BRIEF PSYCHOTIC DISORDER (HCC): ICD-10-CM

## 2020-10-07 DIAGNOSIS — U07.1 COVID-19 VIRUS INFECTION: ICD-10-CM

## 2020-10-07 DIAGNOSIS — M81.0 AGE-RELATED OSTEOPOROSIS WITHOUT CURRENT PATHOLOGICAL FRACTURE: ICD-10-CM

## 2020-10-07 PROCEDURE — 1160F RVW MEDS BY RX/DR IN RCRD: CPT | Performed by: INTERNAL MEDICINE

## 2020-10-07 PROCEDURE — 99214 OFFICE O/P EST MOD 30 MIN: CPT | Performed by: INTERNAL MEDICINE

## 2020-10-07 PROCEDURE — 1036F TOBACCO NON-USER: CPT | Performed by: INTERNAL MEDICINE

## 2020-10-20 DIAGNOSIS — I10 ESSENTIAL HYPERTENSION: ICD-10-CM

## 2020-10-20 RX ORDER — DILTIAZEM HYDROCHLORIDE 180 MG/1
180 CAPSULE, COATED, EXTENDED RELEASE ORAL DAILY
Qty: 30 CAPSULE | Refills: 1 | Status: SHIPPED | OUTPATIENT
Start: 2020-10-20 | End: 2020-12-14

## 2020-10-20 RX ORDER — METOPROLOL SUCCINATE 100 MG/1
100 TABLET, EXTENDED RELEASE ORAL DAILY
Qty: 30 TABLET | Refills: 1 | Status: SHIPPED | OUTPATIENT
Start: 2020-10-20 | End: 2020-12-14

## 2020-11-09 ENCOUNTER — LAB (OUTPATIENT)
Dept: LAB | Facility: HOSPITAL | Age: 85
End: 2020-11-09
Attending: INTERNAL MEDICINE
Payer: COMMERCIAL

## 2020-11-09 DIAGNOSIS — E78.2 MIXED HYPERLIPIDEMIA: ICD-10-CM

## 2020-11-09 DIAGNOSIS — I48.0 PAF (PAROXYSMAL ATRIAL FIBRILLATION) (HCC): ICD-10-CM

## 2020-11-09 DIAGNOSIS — F23 BRIEF PSYCHOTIC DISORDER (HCC): ICD-10-CM

## 2020-11-09 DIAGNOSIS — M81.0 AGE-RELATED OSTEOPOROSIS WITHOUT CURRENT PATHOLOGICAL FRACTURE: ICD-10-CM

## 2020-11-09 DIAGNOSIS — U07.1 COVID-19 VIRUS INFECTION: ICD-10-CM

## 2020-11-09 LAB
ALBUMIN SERPL BCP-MCNC: 3.6 G/DL (ref 3.5–5)
ALP SERPL-CCNC: 94 U/L (ref 46–116)
ALT SERPL W P-5'-P-CCNC: 20 U/L (ref 12–78)
ANION GAP SERPL CALCULATED.3IONS-SCNC: 7 MMOL/L (ref 4–13)
AST SERPL W P-5'-P-CCNC: 23 U/L (ref 5–45)
BASOPHILS # BLD AUTO: 0 THOUSANDS/ΜL (ref 0–0.1)
BASOPHILS NFR BLD AUTO: 0 % (ref 0–1)
BILIRUB SERPL-MCNC: 0.45 MG/DL (ref 0.2–1)
BUN SERPL-MCNC: 28 MG/DL (ref 5–25)
CALCIUM SERPL-MCNC: 9.1 MG/DL (ref 8.3–10.1)
CHLORIDE SERPL-SCNC: 104 MMOL/L (ref 100–108)
CO2 SERPL-SCNC: 28 MMOL/L (ref 21–32)
CREAT SERPL-MCNC: 1.18 MG/DL (ref 0.6–1.3)
EOSINOPHIL # BLD AUTO: 0.19 THOUSAND/ΜL (ref 0–0.61)
EOSINOPHIL NFR BLD AUTO: 3 % (ref 0–6)
ERYTHROCYTE [DISTWIDTH] IN BLOOD BY AUTOMATED COUNT: 13.1 % (ref 11.6–15.1)
GFR SERPL CREATININE-BSD FRML MDRD: 41 ML/MIN/1.73SQ M
GLUCOSE SERPL-MCNC: 179 MG/DL (ref 65–140)
HCT VFR BLD AUTO: 39.6 % (ref 34.8–46.1)
HGB BLD-MCNC: 12.4 G/DL (ref 11.5–15.4)
IMM GRANULOCYTES # BLD AUTO: 0.01 THOUSAND/UL (ref 0–0.2)
IMM GRANULOCYTES NFR BLD AUTO: 0 % (ref 0–2)
LYMPHOCYTES # BLD AUTO: 1.4 THOUSANDS/ΜL (ref 0.6–4.47)
LYMPHOCYTES NFR BLD AUTO: 25 % (ref 14–44)
MAGNESIUM SERPL-MCNC: 2.3 MG/DL (ref 1.6–2.6)
MCH RBC QN AUTO: 28.9 PG (ref 26.8–34.3)
MCHC RBC AUTO-ENTMCNC: 31.3 G/DL (ref 31.4–37.4)
MCV RBC AUTO: 92 FL (ref 82–98)
MONOCYTES # BLD AUTO: 0.44 THOUSAND/ΜL (ref 0.17–1.22)
MONOCYTES NFR BLD AUTO: 8 % (ref 4–12)
NEUTROPHILS # BLD AUTO: 3.68 THOUSANDS/ΜL (ref 1.85–7.62)
NEUTS SEG NFR BLD AUTO: 64 % (ref 43–75)
NRBC BLD AUTO-RTO: 0 /100 WBCS
PLATELET # BLD AUTO: 261 THOUSANDS/UL (ref 149–390)
PMV BLD AUTO: 10.6 FL (ref 8.9–12.7)
POTASSIUM SERPL-SCNC: 4 MMOL/L (ref 3.5–5.3)
PROT SERPL-MCNC: 6.7 G/DL (ref 6.4–8.2)
RBC # BLD AUTO: 4.29 MILLION/UL (ref 3.81–5.12)
SODIUM SERPL-SCNC: 139 MMOL/L (ref 136–145)
TSH SERPL DL<=0.05 MIU/L-ACNC: 1.29 UIU/ML (ref 0.36–3.74)
WBC # BLD AUTO: 5.72 THOUSAND/UL (ref 4.31–10.16)

## 2020-11-09 PROCEDURE — 80053 COMPREHEN METABOLIC PANEL: CPT

## 2020-11-09 PROCEDURE — 84443 ASSAY THYROID STIM HORMONE: CPT

## 2020-11-09 PROCEDURE — 36415 COLL VENOUS BLD VENIPUNCTURE: CPT

## 2020-11-09 PROCEDURE — 83735 ASSAY OF MAGNESIUM: CPT

## 2020-11-09 PROCEDURE — 85025 COMPLETE CBC W/AUTO DIFF WBC: CPT

## 2020-11-18 ENCOUNTER — OFFICE VISIT (OUTPATIENT)
Dept: INTERNAL MEDICINE CLINIC | Facility: CLINIC | Age: 85
End: 2020-11-18
Payer: COMMERCIAL

## 2020-11-18 VITALS
TEMPERATURE: 97.4 F | HEART RATE: 60 BPM | DIASTOLIC BLOOD PRESSURE: 64 MMHG | SYSTOLIC BLOOD PRESSURE: 116 MMHG | RESPIRATION RATE: 13 BRPM

## 2020-11-18 DIAGNOSIS — U07.1 COVID-19 VIRUS INFECTION: ICD-10-CM

## 2020-11-18 DIAGNOSIS — F03.90 MAJOR NEUROCOGNITIVE DISORDER (HCC): Chronic | ICD-10-CM

## 2020-11-18 DIAGNOSIS — Z23 FLU VACCINE NEED: ICD-10-CM

## 2020-11-18 DIAGNOSIS — M25.561 CHRONIC PAIN OF BOTH KNEES: ICD-10-CM

## 2020-11-18 DIAGNOSIS — E87.1 HYPONATREMIA: ICD-10-CM

## 2020-11-18 DIAGNOSIS — I48.0 PAROXYSMAL A-FIB (HCC): Primary | ICD-10-CM

## 2020-11-18 DIAGNOSIS — M25.562 CHRONIC PAIN OF BOTH KNEES: ICD-10-CM

## 2020-11-18 DIAGNOSIS — E78.2 MIXED HYPERLIPIDEMIA: ICD-10-CM

## 2020-11-18 DIAGNOSIS — F20.3 UNDIFFERENTIATED SCHIZOPHRENIA (HCC): Chronic | ICD-10-CM

## 2020-11-18 DIAGNOSIS — G89.29 CHRONIC PAIN OF BOTH KNEES: ICD-10-CM

## 2020-11-18 DIAGNOSIS — I10 ESSENTIAL HYPERTENSION: ICD-10-CM

## 2020-11-18 DIAGNOSIS — M81.0 AGE-RELATED OSTEOPOROSIS WITHOUT CURRENT PATHOLOGICAL FRACTURE: ICD-10-CM

## 2020-11-18 PROCEDURE — 90662 IIV NO PRSV INCREASED AG IM: CPT | Performed by: INTERNAL MEDICINE

## 2020-11-18 PROCEDURE — 1160F RVW MEDS BY RX/DR IN RCRD: CPT | Performed by: INTERNAL MEDICINE

## 2020-11-18 PROCEDURE — 1036F TOBACCO NON-USER: CPT | Performed by: INTERNAL MEDICINE

## 2020-11-18 PROCEDURE — G0008 ADMIN INFLUENZA VIRUS VAC: HCPCS | Performed by: INTERNAL MEDICINE

## 2020-11-18 PROCEDURE — 3725F SCREEN DEPRESSION PERFORMED: CPT | Performed by: INTERNAL MEDICINE

## 2020-11-18 PROCEDURE — 99214 OFFICE O/P EST MOD 30 MIN: CPT | Performed by: INTERNAL MEDICINE

## 2020-11-25 DIAGNOSIS — I10 ESSENTIAL HYPERTENSION: ICD-10-CM

## 2020-11-25 RX ORDER — LOSARTAN POTASSIUM 100 MG/1
TABLET ORAL
Qty: 90 TABLET | Refills: 1 | Status: SHIPPED | OUTPATIENT
Start: 2020-11-25 | End: 2021-05-17
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
## 2020-11-30 DIAGNOSIS — M17.0 OSTEOARTHRITIS OF BOTH KNEES, UNSPECIFIED OSTEOARTHRITIS TYPE: ICD-10-CM

## 2020-12-02 ENCOUNTER — EVALUATION (OUTPATIENT)
Dept: PHYSICAL THERAPY | Facility: CLINIC | Age: 85
End: 2020-12-02
Payer: COMMERCIAL

## 2020-12-02 DIAGNOSIS — M25.562 CHRONIC PAIN OF BOTH KNEES: ICD-10-CM

## 2020-12-02 DIAGNOSIS — G89.29 CHRONIC PAIN OF BOTH KNEES: ICD-10-CM

## 2020-12-02 DIAGNOSIS — M25.561 CHRONIC PAIN OF BOTH KNEES: ICD-10-CM

## 2020-12-02 DIAGNOSIS — I10 ESSENTIAL HYPERTENSION: ICD-10-CM

## 2020-12-02 DIAGNOSIS — M81.0 AGE-RELATED OSTEOPOROSIS WITHOUT CURRENT PATHOLOGICAL FRACTURE: ICD-10-CM

## 2020-12-02 PROCEDURE — 97161 PT EVAL LOW COMPLEX 20 MIN: CPT | Performed by: PHYSICAL THERAPIST

## 2020-12-14 DIAGNOSIS — I10 ESSENTIAL HYPERTENSION: ICD-10-CM

## 2020-12-14 RX ORDER — METOPROLOL SUCCINATE 100 MG/1
100 TABLET, EXTENDED RELEASE ORAL DAILY
Qty: 30 TABLET | Refills: 0 | Status: SHIPPED | OUTPATIENT
Start: 2020-12-14 | End: 2021-04-07

## 2020-12-14 RX ORDER — DILTIAZEM HYDROCHLORIDE 180 MG/1
180 CAPSULE, COATED, EXTENDED RELEASE ORAL DAILY
Qty: 30 CAPSULE | Refills: 0 | Status: SHIPPED | OUTPATIENT
Start: 2020-12-14 | End: 2021-01-20

## 2021-01-06 ENCOUNTER — OFFICE VISIT (OUTPATIENT)
Dept: INTERNAL MEDICINE CLINIC | Facility: CLINIC | Age: 86
End: 2021-01-06
Payer: COMMERCIAL

## 2021-01-06 VITALS
WEIGHT: 124.6 LBS | BODY MASS INDEX: 26.16 KG/M2 | HEIGHT: 58 IN | SYSTOLIC BLOOD PRESSURE: 110 MMHG | OXYGEN SATURATION: 97 % | HEART RATE: 52 BPM | DIASTOLIC BLOOD PRESSURE: 62 MMHG | RESPIRATION RATE: 16 BRPM | TEMPERATURE: 97.8 F

## 2021-01-06 DIAGNOSIS — E78.2 MIXED HYPERLIPIDEMIA: ICD-10-CM

## 2021-01-06 DIAGNOSIS — F03.90 MAJOR NEUROCOGNITIVE DISORDER (HCC): ICD-10-CM

## 2021-01-06 DIAGNOSIS — I10 ESSENTIAL HYPERTENSION: ICD-10-CM

## 2021-01-06 DIAGNOSIS — E44.0 MODERATE PROTEIN-CALORIE MALNUTRITION (HCC): ICD-10-CM

## 2021-01-06 DIAGNOSIS — I48.0 PAF (PAROXYSMAL ATRIAL FIBRILLATION) (HCC): Primary | ICD-10-CM

## 2021-01-06 DIAGNOSIS — F20.3 UNDIFFERENTIATED SCHIZOPHRENIA (HCC): ICD-10-CM

## 2021-01-06 PROCEDURE — 1160F RVW MEDS BY RX/DR IN RCRD: CPT | Performed by: INTERNAL MEDICINE

## 2021-01-06 PROCEDURE — 1036F TOBACCO NON-USER: CPT | Performed by: INTERNAL MEDICINE

## 2021-01-06 PROCEDURE — 99214 OFFICE O/P EST MOD 30 MIN: CPT | Performed by: INTERNAL MEDICINE

## 2021-01-06 RX ORDER — TRAZODONE HYDROCHLORIDE 50 MG/1
50 TABLET ORAL
COMMUNITY
End: 2021-03-10 | Stop reason: SDUPTHER

## 2021-01-06 NOTE — PROGRESS NOTES
BMI Counseling: Body mass index is 26 04 kg/m²  The BMI is above normal  Nutrition recommendations include decreasing portion sizes, encouraging healthy choices of fruits and vegetables, decreasing fast food intake, consuming healthier snacks, limiting drinks that contain sugar, moderation in carbohydrate intake, increasing intake of lean protein, reducing intake of saturated and trans fat and reducing intake of cholesterol  Exercise recommendations include exercising 3-5 times per week  No pharmacotherapy was ordered  Patient referred to PCP due to patient being overweight  Assessment/Plan:  No medication change will check lab work will see her back in 8 weeks    Problem 1  High blood pressure well controlled no chest palpitation shortness of breath she is mostly non communicative because of the hard of hearing  She is on the following medication  Losartan 100 mg  Metoprolol succinate 100 mg  Diltiazem extended release 180 mg    Problem 2  Schizophrenia and psychosis seems to be well her 8 says she has problems sleeping they need to talk to the psychiatrist concerning that she is taking Zyprexa 5 mg  Lorazepam 0 5 mg    Problem 3  Chronic pain osteoarthritis she is taking duloxetine 30 mg per day  Will check lab work before she comes back  No problem-specific Assessment & Plan notes found for this encounter  Diagnoses and all orders for this visit:    PAF (paroxysmal atrial fibrillation) ([facility] )    Essential hypertension    Major neurocognitive disorder ([facility] [place] [facility] )    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[facility] [place] [facility] )    Mixed hyperlipidemia          Subjective:      Patient ID: Arnulfo Jerome is a 80 y o  female  No chief complaint on file          Current Outpatient Medications:     acetaminophen (TYLENOL) 325 mg tablet, Take 2 tablets (650 mg total) by mouth every 6 (six) hours as needed for mild pain, moderate pain or headaches (Patient not taking: Reported on 8/25/2020), Disp: 30 tablet, Rfl: 0    albuterol (2 5 mg/3 mL) 0 083 % nebulizer solution, Take 1 vial (2 5 mg total) by nebulization every 4 (four) hours as needed for wheezing or shortness of breath (Patient not taking: Reported on 8/25/2020), Disp: 1 vial, Rfl: 0    albuterol (PROVENTIL HFA,VENTOLIN HFA) 90 mcg/act inhaler, Inhale 2 puffs every 4 (four) hours as needed for wheezing (Patient not taking: Reported on 8/25/2020), Disp: 1 Inhaler, Rfl: 0    diclofenac sodium (VOLTAREN) 1 %, Apply to the right knee and left knee 3 times a day, Disp: 240 g, Rfl: 3    diltiazem (CARDIZEM CD) 180 mg 24 hr capsule, TAKE 1 CAPSULE (180 MG TOTAL) BY MOUTH DAILY, Disp: 30 capsule, Rfl: 0    docusate sodium (COLACE) 100 mg capsule, Take 1 capsule (100 mg total) by mouth every 12 (twelve) hours (Patient not taking: Reported on 8/25/2020), Disp: 10 capsule, Rfl: 0    DULoxetine (CYMBALTA) 30 mg delayed release capsule, Take 30 mg by mouth daily at bedtime, Disp: , Rfl:     LORazepam (ATIVAN) 0 5 mg tablet, Take by mouth daily, Disp: , Rfl:     losartan (COZAAR) 100 MG tablet, TAKE ONE TABLET BY MOUTH (100 MG TOTAL) DAILY, Disp: 90 tablet, Rfl: 1    melatonin 3 mg, Take 3 mg by mouth daily at bedtime Take 2 tablets at bedtime, Disp: , Rfl:     metoprolol succinate (TOPROL-XL) 100 mg 24 hr tablet, TAKE 1 TABLET (100 MG TOTAL) BY MOUTH DAILY, Disp: 30 tablet, Rfl: 0    OLANZapine (ZyPREXA) 5 mg tablet, Take 1 tablet (5 mg total) by mouth daily at bedtime, Disp: 30 tablet, Rfl: 1    polyethylene glycol (MIRALAX) 17 g packet, Take 17 g by mouth daily as needed (constipation first line) (Patient not taking: Reported on 8/25/2020), Disp: 14 each, Rfl: 0    HPI    The following portions of the patient's history were reviewed and updated as appropriate: allergies, current medications, past family history, past medical history, past social history, past surgical history and problem list     Review of Systems   Constitutional: Negative    Negative for activity change, appetite change, fatigue, fever and unexpected weight change  HENT: Negative for congestion, ear pain, hearing loss, mouth sores, postnasal drip, rhinorrhea, sore throat, trouble swallowing and voice change  Eyes: Negative for pain, redness and visual disturbance  Respiratory: Negative for cough, chest tightness, shortness of breath and wheezing  Cardiovascular: Negative for chest pain, palpitations and leg swelling  Gastrointestinal: Negative for abdominal distention, abdominal pain, blood in stool, constipation, diarrhea and nausea  Endocrine: Negative for cold intolerance, heat intolerance, polydipsia, polyphagia and polyuria  Genitourinary: Negative for difficulty urinating, dysuria, flank pain, frequency, hematuria and urgency  Musculoskeletal: Negative for arthralgias, back pain, gait problem, joint swelling and myalgias  Skin: Negative for color change and pallor  Neurological: Negative for dizziness, tremors, seizures, syncope, weakness, numbness and headaches  Hematological: Negative for adenopathy  Does not bruise/bleed easily  Psychiatric/Behavioral: Positive for sleep disturbance  The patient is not nervous/anxious  Objective: There were no vitals taken for this visit  Physical Exam  Vitals signs and nursing note reviewed  Constitutional:       Appearance: She is well-developed  HENT:      Head: Normocephalic  Right Ear: External ear normal       Left Ear: External ear normal       Nose: Nose normal       Mouth/Throat:      Pharynx: No oropharyngeal exudate  Eyes:      Conjunctiva/sclera: Conjunctivae normal       Pupils: Pupils are equal, round, and reactive to light  Neck:      Musculoskeletal: Normal range of motion and neck supple  Thyroid: No thyromegaly  Cardiovascular:      Rate and Rhythm: Normal rate and regular rhythm  Heart sounds: Normal heart sounds  No murmur  No friction rub  No gallop         Comments: S1-S2 regular rhythm  Extremities no edema    Pulmonary:      Effort: Pulmonary effort is normal  No respiratory distress  Breath sounds: Normal breath sounds  No wheezing or rales  Comments: Lungs are clear no wheezing rales or rhonchi  Abdominal:      General: Bowel sounds are normal  There is no distension  Palpations: Abdomen is soft  There is no mass  Tenderness: There is no abdominal tenderness  There is no guarding or rebound  Musculoskeletal: Normal range of motion  Lymphadenopathy:      Cervical: No cervical adenopathy  Skin:     General: Skin is warm and dry  Neurological:      Mental Status: She is alert and oriented to person, place, and time  Psychiatric:         Behavior: Behavior normal          Judgment: Judgment normal       Comments: Not agitated looks well

## 2021-01-20 DIAGNOSIS — I10 ESSENTIAL HYPERTENSION: ICD-10-CM

## 2021-01-20 RX ORDER — DILTIAZEM HYDROCHLORIDE 180 MG/1
180 CAPSULE, COATED, EXTENDED RELEASE ORAL DAILY
Qty: 30 CAPSULE | Refills: 0 | Status: SHIPPED | OUTPATIENT
Start: 2021-01-20 | End: 2021-02-17

## 2021-02-17 DIAGNOSIS — I10 ESSENTIAL HYPERTENSION: ICD-10-CM

## 2021-02-17 RX ORDER — DILTIAZEM HYDROCHLORIDE 180 MG/1
180 CAPSULE, COATED, EXTENDED RELEASE ORAL DAILY
Qty: 30 CAPSULE | Refills: 0 | Status: SHIPPED | OUTPATIENT
Start: 2021-02-17 | End: 2021-03-17

## 2021-03-03 ENCOUNTER — LAB (OUTPATIENT)
Dept: LAB | Facility: HOSPITAL | Age: 86
End: 2021-03-03
Attending: INTERNAL MEDICINE
Payer: COMMERCIAL

## 2021-03-03 DIAGNOSIS — I10 ESSENTIAL HYPERTENSION: ICD-10-CM

## 2021-03-03 DIAGNOSIS — E78.2 MIXED HYPERLIPIDEMIA: ICD-10-CM

## 2021-03-03 DIAGNOSIS — F20.3 UNDIFFERENTIATED SCHIZOPHRENIA (HCC): Chronic | ICD-10-CM

## 2021-03-03 DIAGNOSIS — F03.90 MAJOR NEUROCOGNITIVE DISORDER (HCC): Chronic | ICD-10-CM

## 2021-03-03 DIAGNOSIS — I48.0 PAF (PAROXYSMAL ATRIAL FIBRILLATION) (HCC): ICD-10-CM

## 2021-03-03 LAB
ALBUMIN SERPL BCP-MCNC: 3.7 G/DL (ref 3.5–5)
ALP SERPL-CCNC: 100 U/L (ref 46–116)
ALT SERPL W P-5'-P-CCNC: 22 U/L (ref 12–78)
ANION GAP SERPL CALCULATED.3IONS-SCNC: 8 MMOL/L (ref 4–13)
AST SERPL W P-5'-P-CCNC: 21 U/L (ref 5–45)
BACTERIA UR QL AUTO: ABNORMAL /HPF
BASOPHILS # BLD AUTO: 0.01 THOUSANDS/ΜL (ref 0–0.1)
BASOPHILS NFR BLD AUTO: 0 % (ref 0–1)
BILIRUB SERPL-MCNC: 0.42 MG/DL (ref 0.2–1)
BILIRUB UR QL STRIP: NEGATIVE
BUN SERPL-MCNC: 32 MG/DL (ref 5–25)
CALCIUM SERPL-MCNC: 9 MG/DL (ref 8.3–10.1)
CHLORIDE SERPL-SCNC: 105 MMOL/L (ref 100–108)
CLARITY UR: ABNORMAL
CO2 SERPL-SCNC: 28 MMOL/L (ref 21–32)
COLOR UR: YELLOW
CREAT SERPL-MCNC: 1.26 MG/DL (ref 0.6–1.3)
EOSINOPHIL # BLD AUTO: 0.14 THOUSAND/ΜL (ref 0–0.61)
EOSINOPHIL NFR BLD AUTO: 3 % (ref 0–6)
ERYTHROCYTE [DISTWIDTH] IN BLOOD BY AUTOMATED COUNT: 13.2 % (ref 11.6–15.1)
GFR SERPL CREATININE-BSD FRML MDRD: 38 ML/MIN/1.73SQ M
GLUCOSE P FAST SERPL-MCNC: 190 MG/DL (ref 65–99)
GLUCOSE UR STRIP-MCNC: NEGATIVE MG/DL
HCT VFR BLD AUTO: 38.6 % (ref 34.8–46.1)
HGB BLD-MCNC: 12.5 G/DL (ref 11.5–15.4)
HGB UR QL STRIP.AUTO: NEGATIVE
IMM GRANULOCYTES # BLD AUTO: 0.02 THOUSAND/UL (ref 0–0.2)
IMM GRANULOCYTES NFR BLD AUTO: 0 % (ref 0–2)
KETONES UR STRIP-MCNC: NEGATIVE MG/DL
LEUKOCYTE ESTERASE UR QL STRIP: ABNORMAL
LYMPHOCYTES # BLD AUTO: 1.04 THOUSANDS/ΜL (ref 0.6–4.47)
LYMPHOCYTES NFR BLD AUTO: 19 % (ref 14–44)
MAGNESIUM SERPL-MCNC: 2.1 MG/DL (ref 1.6–2.6)
MCH RBC QN AUTO: 29.8 PG (ref 26.8–34.3)
MCHC RBC AUTO-ENTMCNC: 32.4 G/DL (ref 31.4–37.4)
MCV RBC AUTO: 92 FL (ref 82–98)
MONOCYTES # BLD AUTO: 0.43 THOUSAND/ΜL (ref 0.17–1.22)
MONOCYTES NFR BLD AUTO: 8 % (ref 4–12)
NEUTROPHILS # BLD AUTO: 3.97 THOUSANDS/ΜL (ref 1.85–7.62)
NEUTS SEG NFR BLD AUTO: 70 % (ref 43–75)
NITRITE UR QL STRIP: NEGATIVE
NON-SQ EPI CELLS URNS QL MICRO: ABNORMAL /HPF
NRBC BLD AUTO-RTO: 0 /100 WBCS
PH UR STRIP.AUTO: 5.5 [PH]
PLATELET # BLD AUTO: 239 THOUSANDS/UL (ref 149–390)
PMV BLD AUTO: 10.1 FL (ref 8.9–12.7)
POTASSIUM SERPL-SCNC: 4.2 MMOL/L (ref 3.5–5.3)
PROT SERPL-MCNC: 6.8 G/DL (ref 6.4–8.2)
PROT UR STRIP-MCNC: NEGATIVE MG/DL
RBC # BLD AUTO: 4.19 MILLION/UL (ref 3.81–5.12)
RBC #/AREA URNS AUTO: ABNORMAL /HPF
SODIUM SERPL-SCNC: 141 MMOL/L (ref 136–145)
SP GR UR STRIP.AUTO: 1.02 (ref 1–1.03)
TSH SERPL DL<=0.05 MIU/L-ACNC: 1.01 UIU/ML (ref 0.36–3.74)
UROBILINOGEN UR QL STRIP.AUTO: 0.2 E.U./DL
WBC # BLD AUTO: 5.61 THOUSAND/UL (ref 4.31–10.16)
WBC #/AREA URNS AUTO: ABNORMAL /HPF

## 2021-03-03 PROCEDURE — 83735 ASSAY OF MAGNESIUM: CPT

## 2021-03-03 PROCEDURE — 80053 COMPREHEN METABOLIC PANEL: CPT

## 2021-03-03 PROCEDURE — 84443 ASSAY THYROID STIM HORMONE: CPT

## 2021-03-03 PROCEDURE — 36415 COLL VENOUS BLD VENIPUNCTURE: CPT

## 2021-03-03 PROCEDURE — 81001 URINALYSIS AUTO W/SCOPE: CPT | Performed by: INTERNAL MEDICINE

## 2021-03-03 PROCEDURE — 85025 COMPLETE CBC W/AUTO DIFF WBC: CPT

## 2021-03-10 ENCOUNTER — OFFICE VISIT (OUTPATIENT)
Dept: INTERNAL MEDICINE CLINIC | Facility: CLINIC | Age: 86
End: 2021-03-10
Payer: COMMERCIAL

## 2021-03-10 VITALS
TEMPERATURE: 97.6 F | HEIGHT: 58 IN | BODY MASS INDEX: 25.82 KG/M2 | WEIGHT: 123 LBS | SYSTOLIC BLOOD PRESSURE: 140 MMHG | RESPIRATION RATE: 12 BRPM | DIASTOLIC BLOOD PRESSURE: 64 MMHG

## 2021-03-10 DIAGNOSIS — F41.9 ANXIETY: ICD-10-CM

## 2021-03-10 DIAGNOSIS — S02.31XK: ICD-10-CM

## 2021-03-10 DIAGNOSIS — I10 ESSENTIAL HYPERTENSION: ICD-10-CM

## 2021-03-10 DIAGNOSIS — E78.2 MIXED HYPERLIPIDEMIA: ICD-10-CM

## 2021-03-10 DIAGNOSIS — F20.3 UNDIFFERENTIATED SCHIZOPHRENIA (HCC): Chronic | ICD-10-CM

## 2021-03-10 DIAGNOSIS — U07.1 COVID-19 VIRUS INFECTION: ICD-10-CM

## 2021-03-10 DIAGNOSIS — H91.93 BILATERAL HEARING LOSS, UNSPECIFIED HEARING LOSS TYPE: ICD-10-CM

## 2021-03-10 DIAGNOSIS — M81.0 AGE-RELATED OSTEOPOROSIS WITHOUT CURRENT PATHOLOGICAL FRACTURE: ICD-10-CM

## 2021-03-10 DIAGNOSIS — R73.9 HYPERGLYCEMIA: ICD-10-CM

## 2021-03-10 DIAGNOSIS — I48.0 PAF (PAROXYSMAL ATRIAL FIBRILLATION) (HCC): Primary | ICD-10-CM

## 2021-03-10 LAB
SL AMB POCT GLUCOSE BLD: 113
SL AMB POCT HEMOGLOBIN AIC: 5.2 (ref ?–6.5)

## 2021-03-10 PROCEDURE — 83036 HEMOGLOBIN GLYCOSYLATED A1C: CPT | Performed by: INTERNAL MEDICINE

## 2021-03-10 PROCEDURE — 1036F TOBACCO NON-USER: CPT | Performed by: INTERNAL MEDICINE

## 2021-03-10 PROCEDURE — 1160F RVW MEDS BY RX/DR IN RCRD: CPT | Performed by: INTERNAL MEDICINE

## 2021-03-10 PROCEDURE — 99214 OFFICE O/P EST MOD 30 MIN: CPT | Performed by: INTERNAL MEDICINE

## 2021-03-10 PROCEDURE — 82948 REAGENT STRIP/BLOOD GLUCOSE: CPT | Performed by: INTERNAL MEDICINE

## 2021-03-10 RX ORDER — TRAZODONE HYDROCHLORIDE 50 MG/1
50 TABLET ORAL
Qty: 30 TABLET | Refills: 1 | Status: SHIPPED | OUTPATIENT
Start: 2021-03-10 | End: 2021-07-26 | Stop reason: HOSPADM

## 2021-03-10 NOTE — PROGRESS NOTES
Assessment/Plan:      Problem 1  Paroxysmal atrial fibrillation appears to be in sinus rhythm  She is taking the following for blood pressure control and rate control  Losartan 100 mg   Metoprolol succinate 100 mg   Diltiazem extended release 180 mg    Problem 2  Undifferentiated schizophrenia being followed by Psychiatry we order the trazodone to help her sleep and for anxiety    Problem 3  Reactive airway disease used to take inhalers not using the inhalers lungs are clear    Problem 4  Hyperglycemia doing the lab work will check an A1c and glucose make sure she is not diabetic  She has some pyuria in the urine but for what occurred understand she does not have any burning or pain when she urinates she has smell different  The urine which we do not treat for that  Does not have any fever does not look toxic  No problem-specific Assessment & Plan notes found for this encounter  Diagnoses and all orders for this visit:    PAF (paroxysmal atrial fibrillation) ([facility] [address] )    Essential hypertension    Age-related osteoporosis without current pathological fracture    Mixed hyperlipidemia    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[facility] )    COVID-19 virus infection    Anxiety          Subjective:      Patient ID: Cesar Sandhu is a 80 y o  female  No chief complaint on file          Current Outpatient Medications:     acetaminophen (TYLENOL) 325 mg tablet, Take 2 tablets (650 mg total) by mouth every 6 (six) hours as needed for mild pain, moderate pain or headaches (Patient not taking: Reported on 8/25/2020), Disp: 30 tablet, Rfl: 0    albuterol (2 5 mg/3 mL) 0 083 % nebulizer solution, Take 1 vial (2 5 mg total) by nebulization every 4 (four) hours as needed for wheezing or shortness of breath (Patient not taking: Reported on 8/25/2020), Disp: 1 vial, Rfl: 0    albuterol (PROVENTIL HFA,VENTOLIN HFA) 90 mcg/act inhaler, Inhale 2 puffs every 4 (four) hours as needed for wheezing (Patient not taking: Reported on 8/25/2020), Disp: 1 Inhaler, Rfl: 0    diclofenac sodium (VOLTAREN) 1 %, Apply to the right knee and left knee 3 times a day (Patient not taking: Reported on 1/6/2021), Disp: 240 g, Rfl: 3    diltiazem (CARDIZEM CD) 180 mg 24 hr capsule, TAKE 1 CAPSULE (180 MG TOTAL) BY MOUTH DAILY, Disp: 30 capsule, Rfl: 0    docusate sodium (COLACE) 100 mg capsule, Take 1 capsule (100 mg total) by mouth every 12 (twelve) hours (Patient not taking: Reported on 8/25/2020), Disp: 10 capsule, Rfl: 0    DULoxetine (CYMBALTA) 30 mg delayed release capsule, Take 30 mg by mouth daily at bedtime, Disp: , Rfl:     LORazepam (ATIVAN) 0 5 mg tablet, Take by mouth daily, Disp: , Rfl:     losartan (COZAAR) 100 MG tablet, TAKE ONE TABLET BY MOUTH (100 MG TOTAL) DAILY, Disp: 90 tablet, Rfl: 1    melatonin 3 mg, Take 3 mg by mouth daily at bedtime Take 2 tablets at bedtime, Disp: , Rfl:     metoprolol succinate (TOPROL-XL) 100 mg 24 hr tablet, TAKE 1 TABLET (100 MG TOTAL) BY MOUTH DAILY, Disp: 30 tablet, Rfl: 0    OLANZapine (ZyPREXA) 5 mg tablet, Take 1 tablet (5 mg total) by mouth daily at bedtime, Disp: 30 tablet, Rfl: 1    polyethylene glycol (MIRALAX) 17 g packet, Take 17 g by mouth daily as needed (constipation first line) (Patient not taking: Reported on 8/25/2020), Disp: 14 each, Rfl: 0    traZODone (DESYREL) 50 mg tablet, Take 50 mg by mouth daily at bedtime, Disp: , Rfl:     HPI    The following portions of the patient's history were reviewed and updated as appropriate: allergies, current medications, past family history, past medical history, past social history, past surgical history and problem list     Review of Systems   Constitutional: Negative  Negative for activity change, appetite change, fatigue, fever and unexpected weight change  HENT: Negative for congestion, ear pain, hearing loss, mouth sores, postnasal drip, rhinorrhea, sore throat, trouble swallowing and voice change      Eyes: Negative for pain, redness and visual disturbance  Respiratory: Negative for cough, chest tightness, shortness of breath and wheezing  Cardiovascular: Negative for chest pain, palpitations and leg swelling  Gastrointestinal: Negative for abdominal distention, abdominal pain, blood in stool, constipation, diarrhea and nausea  Endocrine: Negative for cold intolerance, heat intolerance, polydipsia, polyphagia and polyuria  Genitourinary: Negative for difficulty urinating, dysuria, flank pain, frequency, hematuria and urgency  Musculoskeletal: Negative for arthralgias, back pain, gait problem, joint swelling and myalgias  Skin: Negative for color change and pallor  Neurological: Negative for dizziness, tremors, seizures, syncope, weakness, numbness and headaches  Hematological: Negative for adenopathy  Does not bruise/bleed easily  Psychiatric/Behavioral: Negative  Negative for sleep disturbance  The patient is not nervous/anxious  Objective: There were no vitals taken for this visit  Physical Exam  Constitutional:       Appearance: She is well-developed  HENT:      Head: Normocephalic  Right Ear: External ear normal       Left Ear: External ear normal       Nose: Nose normal       Mouth/Throat:      Pharynx: No oropharyngeal exudate  Eyes:      Conjunctiva/sclera: Conjunctivae normal       Pupils: Pupils are equal, round, and reactive to light  Neck:      Musculoskeletal: Normal range of motion and neck supple  Thyroid: No thyromegaly  Cardiovascular:      Rate and Rhythm: Normal rate and regular rhythm  Heart sounds: Normal heart sounds  No murmur  No friction rub  No gallop  Comments: S1-S2 regular rhythm   Extremities no edema     Pulmonary:      Effort: Pulmonary effort is normal  No respiratory distress  Breath sounds: Normal breath sounds  No wheezing or rales        Comments: Lungs are clear no wheezing rales or rhonchi  Abdominal:      General: Bowel sounds are normal  There is no distension  Palpations: Abdomen is soft  There is no mass  Tenderness: There is no abdominal tenderness  There is no guarding or rebound  Comments: Abdomen soft nontender   Musculoskeletal: Normal range of motion  Lymphadenopathy:      Cervical: No cervical adenopathy  Skin:     General: Skin is warm and dry  Neurological:      Mental Status: She is alert and oriented to person, place, and time     Psychiatric:         Behavior: Behavior normal          Judgment: Judgment normal

## 2021-03-12 ENCOUNTER — IMMUNIZATIONS (OUTPATIENT)
Dept: FAMILY MEDICINE CLINIC | Facility: HOSPITAL | Age: 86
End: 2021-03-12

## 2021-03-12 DIAGNOSIS — Z23 ENCOUNTER FOR IMMUNIZATION: Primary | ICD-10-CM

## 2021-03-12 PROCEDURE — 0011A SARS-COV-2 / COVID-19 MRNA VACCINE (MODERNA) 100 MCG: CPT

## 2021-03-12 PROCEDURE — 91301 SARS-COV-2 / COVID-19 MRNA VACCINE (MODERNA) 100 MCG: CPT

## 2021-03-17 DIAGNOSIS — I10 ESSENTIAL HYPERTENSION: ICD-10-CM

## 2021-03-17 RX ORDER — DILTIAZEM HYDROCHLORIDE 180 MG/1
180 CAPSULE, COATED, EXTENDED RELEASE ORAL DAILY
Qty: 30 CAPSULE | Refills: 0 | Status: SHIPPED | OUTPATIENT
Start: 2021-03-17 | End: 2021-05-05

## 2021-03-25 NOTE — TELEPHONE ENCOUNTER
FYI:Patient son is calling to inform you patient is being taken to [facility] by ambulance  She is having trouble standing,dizzyness,nausea  Ms. Baez presents to clinic for follow-up.  We have been following her for ulcerative colitis.  She was last seen in a telehealth appointment in April 2020.  She has been managed on Apriso.  Patient states she is still doing well with that having a bowel movement 1-2 times a day.  She denies any rectal bleeding.  Denies any abdominal pain.  She denies any nausea.  She denies any reflux.  She is due for surveillance colonoscopy.  Colonoscopy in April 2019 was significant for tubular adenomas removed.  Her surveillance biopsies were negative for active colitis.  She denies any extraintestinal manifestations such as mouth ulcers or sores.  She has had some issues with change in her skin color around her lips.  She saw a dermatologist who gave her an ointment.  She would like a referral to another dermatologist.

## 2021-04-07 DIAGNOSIS — I10 ESSENTIAL HYPERTENSION: ICD-10-CM

## 2021-04-07 RX ORDER — METOPROLOL SUCCINATE 100 MG/1
100 TABLET, EXTENDED RELEASE ORAL DAILY
Qty: 30 TABLET | Refills: 0 | Status: SHIPPED | OUTPATIENT
Start: 2021-04-07 | End: 2021-05-17

## 2021-04-09 ENCOUNTER — IMMUNIZATIONS (OUTPATIENT)
Dept: FAMILY MEDICINE CLINIC | Facility: HOSPITAL | Age: 86
End: 2021-04-09

## 2021-04-09 DIAGNOSIS — Z23 ENCOUNTER FOR IMMUNIZATION: Primary | ICD-10-CM

## 2021-04-09 PROCEDURE — 0012A SARS-COV-2 / COVID-19 MRNA VACCINE (MODERNA) 100 MCG: CPT

## 2021-04-09 PROCEDURE — 91301 SARS-COV-2 / COVID-19 MRNA VACCINE (MODERNA) 100 MCG: CPT

## 2021-05-05 DIAGNOSIS — I10 ESSENTIAL HYPERTENSION: ICD-10-CM

## 2021-05-05 RX ORDER — DILTIAZEM HYDROCHLORIDE 180 MG/1
180 CAPSULE, COATED, EXTENDED RELEASE ORAL DAILY
Qty: 30 CAPSULE | Refills: 0 | Status: SHIPPED | OUTPATIENT
Start: 2021-05-05 | End: 2021-06-26

## 2021-05-17 DIAGNOSIS — I10 ESSENTIAL HYPERTENSION: ICD-10-CM

## 2021-05-17 RX ORDER — METOPROLOL SUCCINATE 100 MG/1
100 TABLET, EXTENDED RELEASE ORAL DAILY
Qty: 30 TABLET | Refills: 0 | Status: SHIPPED | OUTPATIENT
Start: 2021-05-17 | End: 2021-07-26 | Stop reason: HOSPADM

## 2021-05-17 RX ORDER — LOSARTAN POTASSIUM 100 MG/1
TABLET ORAL
Qty: 90 TABLET | Refills: 0 | Status: SHIPPED | OUTPATIENT
Start: 2021-05-17 | End: 2021-07-26 | Stop reason: HOSPADM

## 2021-05-21 ENCOUNTER — HOSPITAL ENCOUNTER (OUTPATIENT)
Dept: RADIOLOGY | Facility: HOSPITAL | Age: 86
Discharge: HOME | End: 2021-05-21
Attending: INTERNAL MEDICINE
Payer: COMMERCIAL

## 2021-05-21 ENCOUNTER — OFFICE VISIT (OUTPATIENT)
Dept: INTERNAL MEDICINE CLINIC | Facility: CLINIC | Age: 86
End: 2021-05-21
Payer: COMMERCIAL

## 2021-05-21 ENCOUNTER — TELEPHONE (OUTPATIENT)
Dept: INTERNAL MEDICINE CLINIC | Facility: CLINIC | Age: 86
End: 2021-05-21

## 2021-05-21 ENCOUNTER — APPOINTMENT (OUTPATIENT)
Dept: LAB | Facility: HOSPITAL | Age: 86
End: 2021-05-21
Attending: INTERNAL MEDICINE
Payer: COMMERCIAL

## 2021-05-21 VITALS
DIASTOLIC BLOOD PRESSURE: 62 MMHG | WEIGHT: 131 LBS | HEART RATE: 64 BPM | RESPIRATION RATE: 16 BRPM | BODY MASS INDEX: 27.5 KG/M2 | TEMPERATURE: 97.3 F | OXYGEN SATURATION: 98 % | SYSTOLIC BLOOD PRESSURE: 130 MMHG | HEIGHT: 58 IN

## 2021-05-21 DIAGNOSIS — M25.562 ACUTE PAIN OF BOTH KNEES: ICD-10-CM

## 2021-05-21 DIAGNOSIS — I48.0 PAF (PAROXYSMAL ATRIAL FIBRILLATION) (HCC): ICD-10-CM

## 2021-05-21 DIAGNOSIS — F20.5 RESIDUAL SCHIZOPHRENIA (HCC): Chronic | ICD-10-CM

## 2021-05-21 DIAGNOSIS — E53.8 B12 DEFICIENCY: ICD-10-CM

## 2021-05-21 DIAGNOSIS — R26.9 NEUROLOGIC GAIT DYSFUNCTION: ICD-10-CM

## 2021-05-21 DIAGNOSIS — M81.0 AGE-RELATED OSTEOPOROSIS WITHOUT CURRENT PATHOLOGICAL FRACTURE: ICD-10-CM

## 2021-05-21 DIAGNOSIS — I10 ORTHOSTATIC HYPERTENSION: ICD-10-CM

## 2021-05-21 DIAGNOSIS — M25.561 ACUTE PAIN OF BOTH KNEES: ICD-10-CM

## 2021-05-21 DIAGNOSIS — M17.0 PRIMARY OSTEOARTHRITIS OF BOTH KNEES: ICD-10-CM

## 2021-05-21 DIAGNOSIS — U07.1 COVID-19 VIRUS INFECTION: ICD-10-CM

## 2021-05-21 DIAGNOSIS — N30.00 ACUTE CYSTITIS WITHOUT HEMATURIA: Primary | ICD-10-CM

## 2021-05-21 DIAGNOSIS — E53.8 FOLATE DEFICIENCY: ICD-10-CM

## 2021-05-21 DIAGNOSIS — E78.2 MIXED HYPERLIPIDEMIA: ICD-10-CM

## 2021-05-21 DIAGNOSIS — S02.31XK: ICD-10-CM

## 2021-05-21 DIAGNOSIS — F20.5 RESIDUAL SCHIZOPHRENIA (HCC): ICD-10-CM

## 2021-05-21 DIAGNOSIS — I48.0 PAF (PAROXYSMAL ATRIAL FIBRILLATION) (HCC): Primary | ICD-10-CM

## 2021-05-21 DIAGNOSIS — I10 ESSENTIAL HYPERTENSION: ICD-10-CM

## 2021-05-21 LAB
25(OH)D3 SERPL-MCNC: 38 NG/ML (ref 30–100)
ALBUMIN SERPL BCP-MCNC: 4 G/DL (ref 3.5–5)
ALP SERPL-CCNC: 109 U/L (ref 46–116)
ALT SERPL W P-5'-P-CCNC: 25 U/L (ref 12–78)
ANION GAP SERPL CALCULATED.3IONS-SCNC: 7 MMOL/L (ref 4–13)
AST SERPL W P-5'-P-CCNC: 27 U/L (ref 5–45)
BACTERIA UR QL AUTO: ABNORMAL /HPF
BASOPHILS # BLD AUTO: 0.01 THOUSANDS/ΜL (ref 0–0.1)
BASOPHILS NFR BLD AUTO: 0 % (ref 0–1)
BILIRUB SERPL-MCNC: 0.39 MG/DL (ref 0.2–1)
BILIRUB UR QL STRIP: NEGATIVE
BUN SERPL-MCNC: 25 MG/DL (ref 5–25)
CALCIUM SERPL-MCNC: 9.2 MG/DL (ref 8.3–10.1)
CHLORIDE SERPL-SCNC: 103 MMOL/L (ref 100–108)
CLARITY UR: ABNORMAL
CO2 SERPL-SCNC: 30 MMOL/L (ref 21–32)
COLOR UR: ABNORMAL
CREAT SERPL-MCNC: 1.08 MG/DL (ref 0.6–1.3)
CRP SERPL QL: 9.6 MG/L
EOSINOPHIL # BLD AUTO: 0.09 THOUSAND/ΜL (ref 0–0.61)
EOSINOPHIL NFR BLD AUTO: 2 % (ref 0–6)
ERYTHROCYTE [DISTWIDTH] IN BLOOD BY AUTOMATED COUNT: 13 % (ref 11.6–15.1)
ERYTHROCYTE [SEDIMENTATION RATE] IN BLOOD: 2 MM/HOUR (ref 0–29)
FOLATE SERPL-MCNC: 16.8 NG/ML (ref 3.1–17.5)
GFR SERPL CREATININE-BSD FRML MDRD: 46 ML/MIN/1.73SQ M
GLUCOSE P FAST SERPL-MCNC: 120 MG/DL (ref 65–99)
GLUCOSE UR STRIP-MCNC: NEGATIVE MG/DL
HCT VFR BLD AUTO: 37.3 % (ref 34.8–46.1)
HGB BLD-MCNC: 12.1 G/DL (ref 11.5–15.4)
HGB UR QL STRIP.AUTO: NEGATIVE
IMM GRANULOCYTES # BLD AUTO: 0.01 THOUSAND/UL (ref 0–0.2)
IMM GRANULOCYTES NFR BLD AUTO: 0 % (ref 0–2)
KETONES UR STRIP-MCNC: NEGATIVE MG/DL
LEUKOCYTE ESTERASE UR QL STRIP: ABNORMAL
LYMPHOCYTES # BLD AUTO: 1.12 THOUSANDS/ΜL (ref 0.6–4.47)
LYMPHOCYTES NFR BLD AUTO: 20 % (ref 14–44)
MAGNESIUM SERPL-MCNC: 2.1 MG/DL (ref 1.6–2.6)
MCH RBC QN AUTO: 29.3 PG (ref 26.8–34.3)
MCHC RBC AUTO-ENTMCNC: 32.4 G/DL (ref 31.4–37.4)
MCV RBC AUTO: 90 FL (ref 82–98)
MONOCYTES # BLD AUTO: 0.59 THOUSAND/ΜL (ref 0.17–1.22)
MONOCYTES NFR BLD AUTO: 10 % (ref 4–12)
NEUTROPHILS # BLD AUTO: 3.88 THOUSANDS/ΜL (ref 1.85–7.62)
NEUTS SEG NFR BLD AUTO: 68 % (ref 43–75)
NITRITE UR QL STRIP: NEGATIVE
NON-SQ EPI CELLS URNS QL MICRO: ABNORMAL /HPF
NRBC BLD AUTO-RTO: 0 /100 WBCS
PH UR STRIP.AUTO: 6 [PH]
PLATELET # BLD AUTO: 211 THOUSANDS/UL (ref 149–390)
PMV BLD AUTO: 10.1 FL (ref 8.9–12.7)
POTASSIUM SERPL-SCNC: 4.4 MMOL/L (ref 3.5–5.3)
PROT SERPL-MCNC: 7.3 G/DL (ref 6.4–8.2)
PROT UR STRIP-MCNC: NEGATIVE MG/DL
RBC # BLD AUTO: 4.13 MILLION/UL (ref 3.81–5.12)
RBC #/AREA URNS AUTO: ABNORMAL /HPF
SODIUM SERPL-SCNC: 140 MMOL/L (ref 136–145)
SP GR UR STRIP.AUTO: 1.01 (ref 1–1.03)
TSH SERPL DL<=0.05 MIU/L-ACNC: 1.72 UIU/ML (ref 0.36–3.74)
UROBILINOGEN UR QL STRIP.AUTO: 0.2 E.U./DL
VIT B12 SERPL-MCNC: 455 PG/ML (ref 100–900)
WBC # BLD AUTO: 5.7 THOUSAND/UL (ref 4.31–10.16)
WBC #/AREA URNS AUTO: ABNORMAL /HPF

## 2021-05-21 PROCEDURE — 82746 ASSAY OF FOLIC ACID SERUM: CPT

## 2021-05-21 PROCEDURE — 83918 ORGANIC ACIDS TOTAL QUANT: CPT

## 2021-05-21 PROCEDURE — 36415 COLL VENOUS BLD VENIPUNCTURE: CPT

## 2021-05-21 PROCEDURE — 80053 COMPREHEN METABOLIC PANEL: CPT

## 2021-05-21 PROCEDURE — 82306 VITAMIN D 25 HYDROXY: CPT

## 2021-05-21 PROCEDURE — 85652 RBC SED RATE AUTOMATED: CPT

## 2021-05-21 PROCEDURE — 84443 ASSAY THYROID STIM HORMONE: CPT

## 2021-05-21 PROCEDURE — 85025 COMPLETE CBC W/AUTO DIFF WBC: CPT

## 2021-05-21 PROCEDURE — 81001 URINALYSIS AUTO W/SCOPE: CPT | Performed by: INTERNAL MEDICINE

## 2021-05-21 PROCEDURE — 86140 C-REACTIVE PROTEIN: CPT

## 2021-05-21 PROCEDURE — 99214 OFFICE O/P EST MOD 30 MIN: CPT | Performed by: INTERNAL MEDICINE

## 2021-05-21 PROCEDURE — 73564 X-RAY EXAM KNEE 4 OR MORE: CPT

## 2021-05-21 PROCEDURE — 83735 ASSAY OF MAGNESIUM: CPT

## 2021-05-21 PROCEDURE — 82607 VITAMIN B-12: CPT

## 2021-05-21 RX ORDER — SULFAMETHOXAZOLE AND TRIMETHOPRIM 800; 160 MG/1; MG/1
1 TABLET ORAL EVERY 12 HOURS SCHEDULED
Qty: 6 TABLET | Refills: 1 | Status: SHIPPED | OUTPATIENT
Start: 2021-05-21 | End: 2021-05-24

## 2021-05-21 NOTE — PATIENT INSTRUCTIONS
Voltaren ointment  apply 2 both knees 3 4 times a day    Tylenol 500 mg 3 times a day as needed for pain    Will get physical therapy    Will get orthopedic

## 2021-05-21 NOTE — TELEPHONE ENCOUNTER
----- Message from Nick Pat MD sent at 5/21/2021  3:46 PM EDT -----  Please call the patient regarding her abnormal result   Tx  Bactrim  Ds  1  Bid  3 days  1  Re fill

## 2021-05-21 NOTE — PROGRESS NOTES
Assessment/Plan:Voltaren ointment Tylenol orthopedic consultation physical therapy at home       problem 1  Osteoarthritis difficulty with gait because of pain she had a previous history of knee replacement on the left knee  Came in with her son for further evaluation she has also has history of schizophrenic almost not communicative  I examine him in the wheelchair today will going to do the following  Get some lab work  Voltaren to the knees 4 times a day  Tylenol 500 3 times a day   Orthopedic consultation for possibly injections     Physical therapy to be done at home with home care     Problem 2  Blood pressure is well control no chest palpitation shortness of breath she is on the following medications  Losartan 100 mg   Metoprolol succinate 100 mg   Diltiazem CD 1 80 mg     3  Schizophrenia being followed by Psychiatry on Zyprexa Cymbalta and lorazepam   Continue follow-up with psychiatry        No problem-specific Assessment & Plan notes found for this encounter  Diagnoses and all orders for this visit:    PAF (paroxysmal atrial fibrillation) ([facility] [address] )    Essential hypertension    Age-related osteoporosis without current pathological fracture    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HCC)    Orthostatic hypertension    Mixed hyperlipidemia    COVID-19 virus infection          Subjective:      Patient ID: Mary Kelley is a 80 y o  female  No chief complaint on file          Current Outpatient Medications:     acetaminophen (TYLENOL) 325 mg tablet, Take 2 tablets (650 mg total) by mouth every 6 (six) hours as needed for mild pain, moderate pain or headaches (Patient not taking: Reported on 8/25/2020), Disp: 30 tablet, Rfl: 0    albuterol (2 5 mg/3 mL) 0 083 % nebulizer solution, Take 1 vial (2 5 mg total) by nebulization every 4 (four) hours as needed for wheezing or shortness of breath (Patient not taking: Reported on 8/25/2020), Disp: 1 vial, Rfl: 0    albuterol (PROVENTIL HFA,VENTOLIN HFA) 90 mcg/act inhaler, Inhale 2 puffs every 4 (four) hours as needed for wheezing (Patient not taking: Reported on 8/25/2020), Disp: 1 Inhaler, Rfl: 0    diclofenac sodium (VOLTAREN) 1 %, Apply to the right knee and left knee 3 times a day (Patient not taking: Reported on 1/6/2021), Disp: 240 g, Rfl: 3    diltiazem (CARDIZEM CD) 180 mg 24 hr capsule, TAKE 1 CAPSULE (180 MG TOTAL) BY MOUTH DAILY, Disp: 30 capsule, Rfl: 0    docusate sodium (COLACE) 100 mg capsule, Take 1 capsule (100 mg total) by mouth every 12 (twelve) hours (Patient not taking: Reported on 8/25/2020), Disp: 10 capsule, Rfl: 0    DULoxetine (CYMBALTA) 30 mg delayed release capsule, Take 30 mg by mouth daily at bedtime, Disp: , Rfl:     LORazepam (ATIVAN) 0 5 mg tablet, Take by mouth daily, Disp: , Rfl:     losartan (COZAAR) 100 MG tablet, TAKE ONE TABLET BY MOUTH (100 MG TOTAL) DAILY, Disp: 90 tablet, Rfl: 0    melatonin 3 mg, Take 3 mg by mouth daily at bedtime Take 2 tablets at bedtime, Disp: , Rfl:     metoprolol succinate (TOPROL-XL) 100 mg 24 hr tablet, TAKE 1 TABLET (100 MG TOTAL) BY MOUTH DAILY, Disp: 30 tablet, Rfl: 0    OLANZapine (ZyPREXA) 5 mg tablet, Take 1 tablet (5 mg total) by mouth daily at bedtime, Disp: 30 tablet, Rfl: 1    polyethylene glycol (MIRALAX) 17 g packet, Take 17 g by mouth daily as needed (constipation first line) (Patient not taking: Reported on 8/25/2020), Disp: 14 each, Rfl: 0    traZODone (DESYREL) 50 mg tablet, Take 1 tablet (50 mg total) by mouth daily at bedtime, Disp: 30 tablet, Rfl: 1    HPI    The following portions of the patient's history were reviewed and updated as appropriate: allergies, current medications, past family history, past medical history, past social history, past surgical history and problem list     Review of Systems   Constitutional: Negative  Negative for activity change, appetite change, fatigue, fever and unexpected weight change     HENT: Negative for congestion, ear pain, hearing loss, mouth sores, postnasal drip, rhinorrhea, sore throat, trouble swallowing and voice change  Eyes: Negative for pain, redness and visual disturbance  Respiratory: Negative for cough, chest tightness, shortness of breath and wheezing  Cardiovascular: Negative for chest pain, palpitations and leg swelling  Gastrointestinal: Negative for abdominal distention, abdominal pain, blood in stool, constipation, diarrhea and nausea  Endocrine: Negative for cold intolerance, heat intolerance, polydipsia, polyphagia and polyuria  Genitourinary: Negative for difficulty urinating, dysuria, flank pain, frequency, hematuria and urgency  Musculoskeletal: Negative for arthralgias, back pain, gait problem, joint swelling and myalgias  Knee painDifficult to ambulate because of osteoarthritis and her psychiatric problem   Skin: Negative for color change and pallor  Neurological: Negative for dizziness, tremors, seizures, syncope, weakness, numbness and headaches  Hematological: Negative for adenopathy  Does not bruise/bleed easily  Psychiatric/Behavioral: Negative  Negative for sleep disturbance  The patient is not nervous/anxious  Objective: There were no vitals taken for this visit  Physical Exam  Vitals signs and nursing note reviewed  Constitutional:       Appearance: She is well-developed  HENT:      Head: Normocephalic  Right Ear: External ear normal       Left Ear: External ear normal       Nose: Nose normal       Mouth/Throat:      Pharynx: No oropharyngeal exudate  Eyes:      Conjunctiva/sclera: Conjunctivae normal       Pupils: Pupils are equal, round, and reactive to light  Neck:      Musculoskeletal: Normal range of motion and neck supple  Thyroid: No thyromegaly  Cardiovascular:      Rate and Rhythm: Normal rate and regular rhythm  Heart sounds: Normal heart sounds  No murmur  No friction rub  No gallop         Comments: S1-S2 regular rhythm   Extremities no edema  Pulmonary:      Effort: Pulmonary effort is normal  No respiratory distress  Breath sounds: Normal breath sounds  No wheezing or rales  Comments: Lungs are clear no wheezing rales or rhonchi  Abdominal:      General: Bowel sounds are normal  There is no distension  Palpations: Abdomen is soft  There is no mass  Tenderness: There is no abdominal tenderness  There is no guarding or rebound  Musculoskeletal: Normal range of motion  Lymphadenopathy:      Cervical: No cervical adenopathy  Skin:     General: Skin is warm and dry  Neurological:      Mental Status: She is alert and oriented to person, place, and time     Psychiatric:         Behavior: Behavior normal          Judgment: Judgment normal

## 2021-05-25 ENCOUNTER — DOCUMENTATION (OUTPATIENT)
Dept: SOCIAL WORK | Facility: HOSPITAL | Age: 86
End: 2021-05-25

## 2021-05-25 NOTE — PROGRESS NOTES
Admission Report at [facility]  [facility] has admitted your patient to [facility] service with the following disciplines:      PT  Response needed, please respond via tiger text or cell phone  Michelle Ronquillo [phone]  Primary focus of home health care OA B knees  Patient stated goals of care walk  Anticipated visit pattern and next visit date 2wk4 with visit later this wk  Significant clinical findings VS wfl  Son reports weight gain 10lb in past 6months and he reports MD aware  Mod nutritional risk with assist from caregivers  Significant R sarath vu  Request for additional disciplines na at this time  Request for medication clarification  Quita Prakash reports that pt is not taking the following, but they are  still on active med list from [facility] albuterol inhaler, albuterol nebulizer treatment, melatonin, colace  Please let me know that this is ok that these meds are no longer active and ok that she is not taking  Request for other order clarification na  Needs follow up physician appointments scheduled na  Potential barriers to goal achievement dementia [place] language age  Other pertinent information na  Thank you for allowing us to participate in the care of your patient        Mireille Lopez, PT

## 2021-06-01 ENCOUNTER — VBI (OUTPATIENT)
Dept: ADMINISTRATIVE | Facility: OTHER | Age: 86
End: 2021-06-01

## 2021-06-02 LAB
METHYLMALONATE SERPL-SCNC: 379 NMOL/L (ref 0–378)
SL AMB DISCLAIMER: ABNORMAL

## 2021-06-16 ENCOUNTER — OFFICE VISIT (OUTPATIENT)
Dept: INTERNAL MEDICINE CLINIC | Facility: CLINIC | Age: 86
End: 2021-06-16
Payer: COMMERCIAL

## 2021-06-16 VITALS
RESPIRATION RATE: 16 BRPM | TEMPERATURE: 97.1 F | WEIGHT: 130 LBS | DIASTOLIC BLOOD PRESSURE: 62 MMHG | HEART RATE: 69 BPM | OXYGEN SATURATION: 96 % | SYSTOLIC BLOOD PRESSURE: 112 MMHG | HEIGHT: 58 IN | BODY MASS INDEX: 27.29 KG/M2

## 2021-06-16 DIAGNOSIS — U07.1 COVID-19 VIRUS INFECTION: ICD-10-CM

## 2021-06-16 DIAGNOSIS — I48.0 PAF (PAROXYSMAL ATRIAL FIBRILLATION) (HCC): Primary | ICD-10-CM

## 2021-06-16 DIAGNOSIS — E53.8 B12 DEFICIENCY: ICD-10-CM

## 2021-06-16 DIAGNOSIS — I10 ORTHOSTATIC HYPERTENSION: ICD-10-CM

## 2021-06-16 DIAGNOSIS — F20.3 UNDIFFERENTIATED SCHIZOPHRENIA (HCC): Chronic | ICD-10-CM

## 2021-06-16 DIAGNOSIS — I10 ESSENTIAL HYPERTENSION: ICD-10-CM

## 2021-06-16 DIAGNOSIS — E87.1 HYPONATREMIA: ICD-10-CM

## 2021-06-16 DIAGNOSIS — E78.2 MIXED HYPERLIPIDEMIA: ICD-10-CM

## 2021-06-16 DIAGNOSIS — M81.0 AGE-RELATED OSTEOPOROSIS WITHOUT CURRENT PATHOLOGICAL FRACTURE: ICD-10-CM

## 2021-06-16 DIAGNOSIS — Z00.00 HEALTHCARE MAINTENANCE: ICD-10-CM

## 2021-06-16 DIAGNOSIS — R79.89 HIGH SERUM METHYLMALONIC ACID: ICD-10-CM

## 2021-06-16 PROCEDURE — 1170F FXNL STATUS ASSESSED: CPT | Performed by: INTERNAL MEDICINE

## 2021-06-16 PROCEDURE — 99214 OFFICE O/P EST MOD 30 MIN: CPT | Performed by: INTERNAL MEDICINE

## 2021-06-16 PROCEDURE — 3288F FALL RISK ASSESSMENT DOCD: CPT | Performed by: INTERNAL MEDICINE

## 2021-06-16 PROCEDURE — 96372 THER/PROPH/DIAG INJ SC/IM: CPT | Performed by: INTERNAL MEDICINE

## 2021-06-16 PROCEDURE — 1125F AMNT PAIN NOTED PAIN PRSNT: CPT | Performed by: INTERNAL MEDICINE

## 2021-06-16 PROCEDURE — 1160F RVW MEDS BY RX/DR IN RCRD: CPT | Performed by: INTERNAL MEDICINE

## 2021-06-16 PROCEDURE — 3725F SCREEN DEPRESSION PERFORMED: CPT | Performed by: INTERNAL MEDICINE

## 2021-06-16 PROCEDURE — 1036F TOBACCO NON-USER: CPT | Performed by: INTERNAL MEDICINE

## 2021-06-16 PROCEDURE — G0439 PPPS, SUBSEQ VISIT: HCPCS | Performed by: INTERNAL MEDICINE

## 2021-06-16 RX ORDER — CYANOCOBALAMIN 1000 UG/ML
1000 INJECTION INTRAMUSCULAR; SUBCUTANEOUS ONCE
Status: COMPLETED | OUTPATIENT
Start: 2021-06-16 | End: 2021-06-16

## 2021-06-16 RX ADMIN — CYANOCOBALAMIN 1000 MCG: 1000 INJECTION INTRAMUSCULAR; SUBCUTANEOUS at 10:52

## 2021-06-16 NOTE — PROGRESS NOTES
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)    Hyperlipidemia    Hyponatremia    Orthostatic hypertension    COVID-19 virus infection           Preventive health issues were discussed with patient, and age appropriate screening tests were ordered as noted in patient's After Visit Summary  Personalized health advice and appropriate referrals for health education or preventive services given if needed, as noted in patient's After Visit Summary       History of Present Illness:     Patient presents for Medicare Annual Wellness visit    Patient Care Team:  Berenice Ayers MD as PCP - General (Internal Medicine)  Vani Hager MD     Problem List:     Patient Active Problem List   Diagnosis    Essential hypertension    Anxiety    Hyperlipidemia    Hyponatremia    Acute encephalopathy    Hearing problem of both ears    Fall    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[address] )    Cellulitis of extremity    Paroxysmal A-fib ([address] )    Orthostatic hypertension    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[address] )    Ambulatory dysfunction    COVID-19 virus infection    Elevated troponin    Urinary incontinence    PAF (paroxysmal atrial fibrillation) ([address] )    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[address] )    Major neurocognitive disorder ([address] )    Moderate protein-calorie malnutrition ([address] )    Age-related osteoporosis without current pathological fracture    Closed fracture of right orbital floor ([address] )    Fall from ground level      Past Medical and Surgical History:     Past Medical History:   Diagnosis Date    Anxiety     Cognitive impairment     Dementia ([address] )     Depression     Hallucination     Hyperlipidemia     Hypertension     Memory loss  Psychiatric illness     Psychosis ([facility] )     Schizoaffective disorder ([facility] )     Sleep difficulties      Past Surgical History:   Procedure Laterality Date    APPENDECTOMY      BLADDER SURGERY      CHOLECYSTECTOMY      TOTAL ABDOMINAL HYSTERECTOMY W/ BILATERAL SALPINGOOPHORECTOMY      TOTAL KNEE ARTHROPLASTY Left       Family History:     Family History   Problem Relation Age of Onset    Heart disease Mother         Cardiac disorder    Parkinsonism Father     Heart disease Sister         Cardiac disorder    Hypertension Sister     Diabetes Child         Diabetes Mellitus    Lung disease Child         Respiratory Disorder    Diabetes Son         Diabetes Mellitus      Social History:     Social History     Socioeconomic History    Marital status:      Spouse name: None    Number of children: None    Years of education: None    Highest education level: None   Occupational History    None   Tobacco Use    Smoking status: Never Smoker    Smokeless tobacco: Never Used    Tobacco comment: Former smoker per [employer]   Vaping Use    Vaping Use: Never used   Substance and Sexual Activity    Alcohol use: Never    Drug use: No    Sexual activity: None   Other Topics Concern    None   Social History Narrative    Drinks coffee     Social Determinants of Health     Financial Resource Strain:     Difficulty of Paying Living Expenses:    Food Insecurity:     Worried About Running Out of Food in the Last Year:     [address] in the Last Year:    Transportation Needs:     Lack of Transportation (Medical):      Lack of Transportation (Non-Medical):    Physical Activity:     Days of Exercise per Week:     Minutes of Exercise per Session:    Stress:     Feeling of Stress :    Social Connections:     Frequency of Communication with Friends and Family:     Frequency of Social Gatherings with Friends and Family:     Attends [employer] Services:     Active Member of Clubs or Organizations:     Attends Club or Organization Meetings:     Marital Status:    Intimate Partner Violence:     Fear of Current or Ex-Partner:     Emotionally Abused:     Physically Abused:     Sexually Abused:       Medications and Allergies:     Current Outpatient Medications   Medication Sig Dispense Refill    diclofenac sodium (VOLTAREN) 1 % Apply to the right knee and left knee 3 times a day 240 g 3    diltiazem (CARDIZEM CD) 180 mg 24 hr capsule TAKE 1 CAPSULE (180 MG TOTAL) BY MOUTH DAILY 30 capsule 0    docusate sodium (COLACE) 100 mg capsule Take 1 capsule (100 mg total) by mouth every 12 (twelve) hours 10 capsule 0    DULoxetine (CYMBALTA) 30 mg delayed release capsule Take 30 mg by mouth daily at bedtime      LORazepam (ATIVAN) 0 5 mg tablet Take by mouth daily      losartan (COZAAR) 100 MG tablet TAKE ONE TABLET BY MOUTH (100 MG TOTAL) DAILY 90 tablet 0    melatonin 3 mg Take 3 mg by mouth daily at bedtime Take 2 tablets at bedtime      metoprolol succinate (TOPROL-XL) 100 mg 24 hr tablet TAKE 1 TABLET (100 MG TOTAL) BY MOUTH DAILY 30 tablet 0    OLANZapine (ZyPREXA) 5 mg tablet Take 1 tablet (5 mg total) by mouth daily at bedtime 30 tablet 1    polyethylene glycol (MIRALAX) 17 g packet Take 17 g by mouth daily as needed (constipation first line) 14 each 0    traZODone (DESYREL) 50 mg tablet Take 1 tablet (50 mg total) by mouth daily at bedtime 30 tablet 1    acetaminophen (TYLENOL) 325 mg tablet Take 2 tablets (650 mg total) by mouth every 6 (six) hours as needed for mild pain, moderate pain or headaches (Patient not taking: Reported on 5/21/2021) 30 tablet 0    albuterol (2 5 mg/3 mL) 0 083 % nebulizer solution Take 1 vial (2 5 mg total) by nebulization every 4 (four) hours as needed for wheezing or shortness of breath (Patient not taking: Reported on 5/21/2021) 1 vial 0    albuterol (PROVENTIL HFA,VENTOLIN HFA) 90 mcg/act inhaler Inhale 2 puffs every 4 (four) hours as needed for wheezing (Patient not taking: Reported on 5/21/2021) 1 Inhaler 0    Diclofenac Sodium (VOLTAREN) 1 %        No current facility-administered medications for this visit  No Known Allergies   Immunizations:     Immunization History   Administered Date(s) Administered    INFLUENZA 02/16/2015, 11/13/2018    Influenza Quadrivalent, 6-35 Months IM 10/16/2014    Influenza, high dose seasonal 0 7 mL 11/13/2018, 10/01/2019, 11/18/2020    Pneumococcal Conjugate 13-Valent 02/04/2020    Pneumococcal Polysaccharide PPV23 07/11/2014    SARS-CoV-2 / COVID-19 mRNA IM (Moderna) 03/12/2021, 04/09/2021    Tdap 09/28/2020    Tuberculin Skin Test-PPD Intradermal 10/23/2018      Health Maintenance:         Topic Date Due    DXA SCAN  02/24/2022     There are no preventive care reminders to display for this patient  Medicare Health Risk Assessment:     /62 (BP Location: Left arm, Patient Position: Sitting, Cuff Size: Standard)   Pulse 69   Temp (!) 97 1 °F (36 2 °C)   Resp 16   Ht 4' 10" (1 473 m)   Wt 59 kg (130 lb)   SpO2 96%   BMI 27 17 kg/m²      Yao Patel is here for her Subsequent Wellness visit  Health Risk Assessment:   Patient rates overall health as very good  Patient feels that their physical health rating is slightly better  Patient is very satisfied with their life  Eyesight was rated as same  Hearing was rated as slightly worse  Patient feels that their emotional and mental health rating is slightly worse  Patients states they are sometimes angry  Patient states they are always unusually tired/fatigued  Pain experienced in the last 7 days has been some  Patient's pain rating has been 7/10  Patient states that she has experienced no weight loss or gain in last 6 months  Continue Voltaren ointment for your pain in her joints    Depression Screening:   PHQ-2 Score: 2      Fall Risk Screening:    In the past year, patient has experienced: no history of falling in past year      Urinary Incontinence Screening:   Patient has not leaked urine accidently in the last six months  Home Safety:  Patient has trouble with stairs inside or outside of their home  Patient has working smoke alarms and has working carbon monoxide detector  Home safety hazards include: none  Nutrition:   Current diet is Regular and Limited junk food  Medications:   Patient is not currently taking any over-the-counter supplements  Patient is able to manage medications  Activities of Daily Living (ADLs)/Instrumental Activities of Daily Living (IADLs):   Walk and transfer into and out of bed and chair?: No  Dress and groom yourself?: No    Bathe or shower yourself?: No    Feed yourself? No  Do your laundry/housekeeping?: No  Manage your money, pay your bills and track your expenses?: No  Make your own meals?: No    Do your own shopping?: No    Previous Hospitalizations:   Any hospitalizations or ED visits within the last 12 months?: No      Advance Care Planning:   Living will: Yes    Durable POA for healthcare:  Yes    Advanced directive: Yes    End of Life Decisions reviewed with patient: Yes    Provider agrees with end of life decisions: Yes      PREVENTIVE SCREENINGS      Cardiovascular Screening:    General: Screening Not Indicated and History Lipid Disorder      Diabetes Screening:     General: Screening Current      Colorectal Cancer Screening:     General: Screening Not Indicated      Cervical Cancer Screening:    General: Screening Not Indicated      Osteoporosis Screening:    General: Screening Not Indicated and History Osteoporosis      Abdominal Aortic Aneurysm (AAA) Screening:        General: Screening Not Indicated      Lung Cancer Screening:     General: Screening Not Indicated      Hepatitis C Screening:    General: Risks and Benefits Discussed    Hep C Screening Accepted: Yes      Screening, Brief Intervention, and Referral to Treatment (SBIRT)    Screening  Typical number of drinks in a day: 0  Typical number of drinks in a week: 0  Interpretation: Low risk drinking behavior  Single Item Drug Screening:  How often have you used an illegal drug (including marijuana) or a prescription medication for non-medical reasons in the past year? never    Single Item Drug Screen Score: 0  Interpretation: Negative screen for possible drug use disorder    Other Counseling Topics:   Car/seat belt/driving safety, skin self-exam, sunscreen and regular weightbearing exercise and calcium and vitamin D intake         Titi Fofana MD

## 2021-06-16 NOTE — PROGRESS NOTES
Assessment/Plan:      1  Blood pressure very well control no chest palpitation shortness of breath she is on the following medications  Losartan 100 mg  Metoprolol succinate 100 mg  Diltiazem 180 mg    2  B12 deficiency just recently diagnosed with elevated methylmalonic acid level will start B12 replacement will give her a B12 injection today I will start her on B12 a 1000 units daily  3  Paroxysmal atrial fibrillation appears to be in sinus rhythm clinically her rhythm is regular her blood pressure is control she is on beta-blocker and calcium channel blockers  4  Psychosis and depression she seemed to be emotionally stable here with her ex caretaker  But does follow-up with psychiatry    Her liver function studies and renal function were within normal limits    Results for orders placed or performed in visit on 05/21/21   CBC and differential   Result Value Ref Range    WBC 5 70 4 31 - 10 16 Thousand/uL    RBC 4 13 3 81 - 5 12 Million/uL    Hemoglobin 12 1 11 5 - 15 4 g/dL    Hematocrit 37 3 34 8 - 46 1 %    MCV 90 82 - 98 fL    MCH 29 3 26 8 - 34 3 pg    MCHC 32 4 31 4 - 37 4 g/dL    RDW 13 0 11 6 - 15 1 %    MPV 10 1 8 9 - 12 7 fL    Platelets 583 113 - 185 Thousands/uL    nRBC 0 /100 WBCs    Neutrophils Relative 68 43 - 75 %    Immat GRANS % 0 0 - 2 %    Lymphocytes Relative 20 14 - 44 %    Monocytes Relative 10 4 - 12 %    Eosinophils Relative 2 0 - 6 %    Basophils Relative 0 0 - 1 %    Neutrophils Absolute 3 88 1 85 - 7 62 Thousands/µL    Immature Grans Absolute 0 01 0 00 - 0 20 Thousand/uL    Lymphocytes Absolute 1 12 0 60 - 4 47 Thousands/µL    Monocytes Absolute 0 59 0 17 - 1 22 Thousand/µL    Eosinophils Absolute 0 09 0 00 - 0 61 Thousand/µL    Basophils Absolute 0 01 0 00 - 0 10 Thousands/µL   Comprehensive metabolic panel   Result Value Ref Range    Sodium 140 136 - 145 mmol/L    Potassium 4 4 3 5 - 5 3 mmol/L    Chloride 103 100 - 108 mmol/L    CO2 30 21 - 32 mmol/L    ANION GAP 7 4 - 13 mmol/L BUN 25 5 - 25 mg/dL    Creatinine 1 08 0 60 - 1 30 mg/dL    Glucose, Fasting 120 (H) 65 - 99 mg/dL    Calcium 9 2 8 3 - 10 1 mg/dL    AST 27 5 - 45 U/L    ALT 25 12 - 78 U/L    Alkaline Phosphatase 109 46 - 116 U/L    Total Protein 7 3 6 4 - 8 2 g/dL    Albumin 4 0 3 5 - 5 0 g/dL    Total Bilirubin 0 39 0 20 - 1 00 mg/dL    eGFR 46 ml/min/1 73sq m   TSH, 3rd generation with Free T4 reflex   Result Value Ref Range    TSH 3RD GENERATON 1 724 0 358 - 3 740 uIU/mL   Vitamin D 25 hydroxy   Result Value Ref Range    Vit D, 25-Hydroxy 38 0 30 0 - 100 0 ng/mL   Magnesium   Result Value Ref Range    Magnesium 2 1 1 6 - 2 6 mg/dL   C-reactive protein   Result Value Ref Range    CRP 9 6 (H) <3 0 mg/L   Sedimentation rate, automated   Result Value Ref Range    Sed Rate 2 0 - 29 mm/hour   Vitamin B12   Result Value Ref Range    Vitamin B-12 455 100 - 900 pg/mL   Methylmalonic acid, serum   Result Value Ref Range    Methylmalonic Acid, S 379 (H) 0 - 378 nmol/L    Disclaimer: Comment    Folate   Result Value Ref Range    Folate 16 8 3 1 - 17 5 ng/mL          No problem-specific Assessment & Plan notes found for this encounter  Diagnoses and all orders for this visit:    PAF (paroxysmal atrial fibrillation) ([facility] )    Essential hypertension    Age-related osteoporosis without current pathological fracture    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HCC)    Orthostatic hypertension    Mixed hyperlipidemia    Hyponatremia    COVID-19 virus infection          Subjective:      Patient ID: Robert Conway is a 80 y o  female  No chief complaint on file          Current Outpatient Medications:     acetaminophen (TYLENOL) 325 mg tablet, Take 2 tablets (650 mg total) by mouth every 6 (six) hours as needed for mild pain, moderate pain or headaches (Patient not taking: Reported on 5/21/2021), Disp: 30 tablet, Rfl: 0    albuterol (2 5 mg/3 mL) 0 083 % nebulizer solution, Take 1 vial (2 5 mg total) by nebulization every 4 (four) hours as needed for wheezing or shortness of breath (Patient not taking: Reported on 5/21/2021), Disp: 1 vial, Rfl: 0    albuterol (PROVENTIL HFA,VENTOLIN HFA) 90 mcg/act inhaler, Inhale 2 puffs every 4 (four) hours as needed for wheezing (Patient not taking: Reported on 5/21/2021), Disp: 1 Inhaler, Rfl: 0    diclofenac sodium (VOLTAREN) 1 %, Apply to the right knee and left knee 3 times a day, Disp: 240 g, Rfl: 3    diltiazem (CARDIZEM CD) 180 mg 24 hr capsule, TAKE 1 CAPSULE (180 MG TOTAL) BY MOUTH DAILY, Disp: 30 capsule, Rfl: 0    docusate sodium (COLACE) 100 mg capsule, Take 1 capsule (100 mg total) by mouth every 12 (twelve) hours, Disp: 10 capsule, Rfl: 0    DULoxetine (CYMBALTA) 30 mg delayed release capsule, Take 30 mg by mouth daily at bedtime, Disp: , Rfl:     LORazepam (ATIVAN) 0 5 mg tablet, Take by mouth daily, Disp: , Rfl:     losartan (COZAAR) 100 MG tablet, TAKE ONE TABLET BY MOUTH (100 MG TOTAL) DAILY, Disp: 90 tablet, Rfl: 0    melatonin 3 mg, Take 3 mg by mouth daily at bedtime Take 2 tablets at bedtime, Disp: , Rfl:     metoprolol succinate (TOPROL-XL) 100 mg 24 hr tablet, TAKE 1 TABLET (100 MG TOTAL) BY MOUTH DAILY, Disp: 30 tablet, Rfl: 0    OLANZapine (ZyPREXA) 5 mg tablet, Take 1 tablet (5 mg total) by mouth daily at bedtime, Disp: 30 tablet, Rfl: 1    polyethylene glycol (MIRALAX) 17 g packet, Take 17 g by mouth daily as needed (constipation first line), Disp: 14 each, Rfl: 0    traZODone (DESYREL) 50 mg tablet, Take 1 tablet (50 mg total) by mouth daily at bedtime, Disp: 30 tablet, Rfl: 1    HPI    The following portions of the patient's history were reviewed and updated as appropriate: allergies, current medications, past family history, past medical history, past social history, past surgical history and problem list     Review of Systems   Constitutional: Negative  Negative for activity change, appetite change, fatigue, fever and unexpected weight change     HENT: Negative for congestion, ear pain, hearing loss, mouth sores, postnasal drip, rhinorrhea, sore throat, trouble swallowing and voice change  Eyes: Negative for pain, redness and visual disturbance  Respiratory: Negative for cough, chest tightness, shortness of breath and wheezing  Cardiovascular: Negative for chest pain, palpitations and leg swelling  Gastrointestinal: Negative for abdominal distention, abdominal pain, blood in stool, constipation, diarrhea and nausea  Endocrine: Negative for cold intolerance, heat intolerance, polydipsia, polyphagia and polyuria  Genitourinary: Negative for difficulty urinating, dysuria, flank pain, frequency, hematuria and urgency  Musculoskeletal: Negative for arthralgias, back pain, gait problem, joint swelling and myalgias  Skin: Negative for color change and pallor  Neurological: Negative for dizziness, tremors, seizures, syncope, weakness, numbness and headaches  Hematological: Negative for adenopathy  Does not bruise/bleed easily  Psychiatric/Behavioral: Negative  Negative for sleep disturbance  The patient is not nervous/anxious  Objective: There were no vitals taken for this visit  Physical Exam  Vitals and nursing note reviewed  Constitutional:       Appearance: She is well-developed  HENT:      Head: Normocephalic  Right Ear: External ear normal       Left Ear: External ear normal       Nose: Nose normal       Mouth/Throat:      Pharynx: No oropharyngeal exudate  Eyes:      Conjunctiva/sclera: Conjunctivae normal       Pupils: Pupils are equal, round, and reactive to light  Neck:      Thyroid: No thyromegaly  Cardiovascular:      Rate and Rhythm: Normal rate and regular rhythm  Heart sounds: Normal heart sounds  No murmur heard  No friction rub  No gallop  Comments: S1-S2 regular rhythm  Extremities no edema  Pulmonary:      Effort: Pulmonary effort is normal  No respiratory distress        Breath sounds: Normal breath sounds  No wheezing or rales  Comments: Lungs are clear no wheezing rales or rhonchi  Abdominal:      General: Bowel sounds are normal  There is no distension  Palpations: Abdomen is soft  There is no mass  Tenderness: There is no abdominal tenderness  There is no guarding or rebound  Musculoskeletal:         General: Normal range of motion  Cervical back: Normal range of motion and neck supple  Lymphadenopathy:      Cervical: No cervical adenopathy  Skin:     General: Skin is warm and dry  Neurological:      Mental Status: She is alert and oriented to person, place, and time     Psychiatric:         Behavior: Behavior normal          Judgment: Judgment normal

## 2021-06-16 NOTE — PATIENT INSTRUCTIONS
Medicare Preventive Visit Patient Instructions  Thank you for completing your Welcome to Medicare Visit or Medicare Annual Wellness Visit today  Your next wellness visit will be due in one year (6/17/2022)  The screening/preventive services that you may require over the next 5-10 years are detailed below  Some tests may not apply to you based off risk factors and/or age  Screening tests ordered at today's visit but not completed yet may show as past due  Also, please note that scanned in results may not display below  Preventive Screenings:  Service Recommendations Previous Testing/Comments   Colorectal Cancer Screening  * Colonoscopy    * Fecal Occult Blood Test (FOBT)/Fecal Immunochemical Test (FIT)  * Fecal DNA/Cologuard Test  * Flexible Sigmoidoscopy Age: 54-65 years old   Colonoscopy: every 10 years (may be performed more frequently if at higher risk)  OR  FOBT/FIT: every 1 year  OR  Cologuard: every 3 years  OR  Sigmoidoscopy: every 5 years  Screening may be recommended earlier than age 48 if at higher risk for colorectal cancer  Also, an individualized decision between you and your healthcare provider will decide whether screening between the ages of 74-80 would be appropriate  Colonoscopy: Not on file  FOBT/FIT: Not on file  Cologuard: Not on file  Sigmoidoscopy: Not on file          Breast Cancer Screening Age: 36 years old  Frequency: every 1-2 years  Not required if history of left and right mastectomy Mammogram: Not on file        Cervical Cancer Screening Between the ages of 21-29, pap smear recommended once every 3 years  Between the ages of 33-67, can perform pap smear with HPV co-testing every 5 years     Recommendations may differ for women with a history of total hysterectomy, cervical cancer, or abnormal pap smears in past  Pap Smear: Not on file        Hepatitis C Screening Once for adults born between [facility]  More frequently in patients at high risk for Hepatitis C Hep C Antibody: Not on file        Diabetes Screening 1-2 times per year if you're at risk for diabetes or have pre-diabetes Fasting glucose: 120 mg/dL   A1C: 5 2        Cholesterol Screening Once every 5 years if you don't have a lipid disorder  May order more often based on risk factors  Lipid panel: 02/12/2020          Other Preventive Screenings Covered by Medicare:  1  Abdominal Aortic Aneurysm (AAA) Screening: covered once if your at risk  You're considered to be at risk if you have a family history of AAA  2  Lung Cancer Screening: covers low dose CT scan once per year if you meet all of the following conditions: (1) Age 50-69; (2) No signs or symptoms of lung cancer; (3) Current smoker or have quit smoking within the last 15 years; (4) You have a tobacco smoking history of at least 30 pack years (packs per day multiplied by number of years you smoked); (5) You get a written order from a healthcare provider  3  Glaucoma Screening: covered annually if you're considered high risk: (1) You have diabetes OR (2) Family history of glaucoma OR (3)  aged 48 and older OR (3)  American aged 72 and older  3  Osteoporosis Screening: covered every 2 years if you meet one of the following conditions: (1) You're estrogen deficient and at risk for osteoporosis based off medical history and other findings; (2) Have a vertebral abnormality; (3) On glucocorticoid therapy for more than 3 months; (4) Have primary hyperparathyroidism; (5) On osteoporosis medications and need to assess response to drug therapy  · Last bone density test (DXA Scan): 02/24/2020   5  HIV Screening: covered annually if you're between the age of 15-65  Also covered annually if you are younger than 13 and older than 72 with risk factors for HIV infection  For pregnant patients, it is covered up to 3 times per pregnancy      Immunizations:  Immunization Recommendations   Influenza Vaccine Annual influenza vaccination during flu season is recommended for all persons aged >= 6 months who do not have contraindications   Pneumococcal Vaccine (Prevnar and Pneumovax)  * Prevnar = PCV13  * Pneumovax = PPSV23   Adults 25-60 years old: 1-3 doses may be recommended based on certain risk factors  Adults 72 years old: Prevnar (PCV13) vaccine recommended followed by Pneumovax (PPSV23) vaccine  If already received PPSV23 since turning 65, then PCV13 recommended at least one year after PPSV23 dose  Hepatitis B Vaccine 3 dose series if at intermediate or high risk (ex: diabetes, end stage renal disease, liver disease)   Tetanus (Td) Vaccine - COST NOT COVERED BY MEDICARE PART B Following completion of primary series, a booster dose should be given every 10 years to maintain immunity against tetanus  Td may also be given as tetanus wound prophylaxis  Tdap Vaccine - COST NOT COVERED BY MEDICARE PART B Recommended at least once for all adults  For pregnant patients, recommended with each pregnancy  Shingles Vaccine (Shingrix) - COST NOT COVERED BY MEDICARE PART B  2 shot series recommended in those aged 48 and above     Health Maintenance Due:      Topic Date Due    DXA SCAN  02/24/2022     Immunizations Due:  There are no preventive care reminders to display for this patient  Advance Directives   What are advance directives? Advance directives are legal documents that state your wishes and plans for medical care  These plans are made ahead of time in case you lose your ability to make decisions for yourself  Advance directives can apply to any medical decision, such as the treatments you want, and if you want to donate organs  What are the types of advance directives? There are many types of advance directives, and each state has rules about how to use them  You may choose a combination of any of the following:  · Living will: This is a written record of the treatment you want   You can also choose which treatments you do not want, which to limit, and which to stop at a certain time  This includes surgery, medicine, IV fluid, and tube feedings  · Durable power of  for healthcare [facility]): This is a written record that states who you want to make healthcare choices for you when you are unable to make them for yourself  This person, called a proxy, is usually a family member or a friend  You may choose more than 1 proxy  · Do not resuscitate (DNR) order:  A DNR order is used in case your heart stops beating or you stop breathing  It is a request not to have certain forms of treatment, such as CPR  A DNR order may be included in other types of advance directives  · Medical directive: This covers the care that you want if you are in a coma, near death, or unable to make decisions for yourself  You can list the treatments you want for each condition  Treatment may include pain medicine, surgery, blood transfusions, dialysis, IV or tube feedings, and a ventilator (breathing machine)  · Values history: This document has questions about your views, beliefs, and how you feel and think about life  This information can help others choose the care that you would choose  Why are advance directives important? An advance directive helps you control your care  Although spoken wishes may be used, it is better to have your wishes written down  Spoken wishes can be misunderstood, or not followed  Treatments may be given even if you do not want them  An advance directive may make it easier for your family to make difficult choices about your care  Urinary Incontinence   Urinary incontinence (UI)  is when you lose control of your bladder  UI develops because your bladder cannot store or empty urine properly  The 3 most common types of UI are stress incontinence, urge incontinence, or both  Medicines:   · May be given to help strengthen your bladder control  Report any side effects of medication to your healthcare provider    Do pelvic muscle exercises often:  Your pelvic muscles help you stop urinating  Squeeze these muscles tight for 5 seconds, then relax for 5 seconds  Gradually work up to squeezing for 10 seconds  Do 3 sets of 15 repetitions a day, or as directed  This will help strengthen your pelvic muscles and improve bladder control  Train your bladder:  Go to the bathroom at set times, such as every 2 hours, even if you do not feel the urge to go  You can also try to hold your urine when you feel the urge to go  For example, hold your urine for 5 minutes when you feel the urge to go  As that becomes easier, hold your urine for 10 minutes  Self-care:   · Keep a UI record  Write down how often you leak urine and how much you leak  Make a note of what you were doing when you leaked urine  · Drink liquids as directed  You may need to limit the amount of liquid you drink to help control your urine leakage  Do not drink any liquid right before you go to bed  Limit or do not have drinks that contain caffeine or alcohol  · Prevent constipation  Eat a variety of high-fiber foods  Good examples are high-fiber cereals, beans, vegetables, and whole-grain breads  Walking is the best way to trigger your intestines to have a bowel movement  · Exercise regularly and maintain a healthy weight  Weight loss and exercise will decrease pressure on your bladder and help you control your leakage  · Use a catheter as directed  to help empty your bladder  A catheter is a tiny, plastic tube that is put into your bladder to drain your urine  · Go to behavior therapy as directed  Behavior therapy may be used to help you learn to control your urge to urinate  Weight Management   Why it is important to manage your weight:  Being overweight increases your risk of health conditions such as heart disease, high blood pressure, type 2 diabetes, and certain types of cancer  It can also increase your risk for osteoarthritis, sleep apnea, and other respiratory problems   Aim for a slow, steady weight loss  Even a small amount of weight loss can lower your risk of health problems  How to lose weight safely:  A safe and healthy way to lose weight is to eat fewer calories and get regular exercise  You can lose up about 1 pound a week by decreasing the number of calories you eat by 500 calories each day  Healthy meal plan for weight management:  A healthy meal plan includes a variety of foods, contains fewer calories, and helps you stay healthy  A healthy meal plan includes the following:  · Eat whole-grain foods more often  A healthy meal plan should contain fiber  Fiber is the part of grains, fruits, and vegetables that is not broken down by your body  Whole-grain foods are healthy and provide extra fiber in your diet  Some examples of whole-grain foods are whole-wheat breads and pastas, oatmeal, brown rice, and bulgur  · Eat a variety of vegetables every day  Include dark, leafy greens such as spinach, kale, sara greens, and mustard greens  Eat yellow and orange vegetables such as carrots, sweet potatoes, and winter squash  · Eat a variety of fruits every day  Choose fresh or canned fruit (canned in its own juice or light syrup) instead of juice  Fruit juice has very little or no fiber  · Eat low-fat dairy foods  Drink fat-free (skim) milk or 1% milk  Eat fat-free yogurt and low-fat cottage cheese  Try low-fat cheeses such as mozzarella and other reduced-fat cheeses  · Choose meat and other protein foods that are low in fat  Choose beans or other legumes such as split peas or lentils  Choose fish, skinless poultry (chicken or turkey), or lean cuts of red meat (beef or pork)  Before you cook meat or poultry, cut off any visible fat  · Use less fat and oil  Try baking foods instead of frying them  Add less fat, such as margarine, sour cream, regular salad dressing and mayonnaise to foods  Eat fewer high-fat foods   Some examples of high-fat foods include french fries, doughnuts, ice cream, and cakes  · Eat fewer sweets  Limit foods and drinks that are high in sugar  This includes candy, cookies, regular soda, and sweetened drinks  Exercise:  Exercise at least 30 minutes per day on most days of the week  Some examples of exercise include walking, biking, dancing, and swimming  You can also fit in more physical activity by taking the stairs instead of the elevator or parking farther away from stores  Ask your healthcare provider about the best exercise plan for you  © Copyright [address] 2018 Information is for End User's use only and may not be sold, redistributed or otherwise used for commercial purposes   All illustrations and images included in CareNotes® are the copyrighted property of A D A M , Inc  or [facility] B12 replacement a 1000 units daily no other change in medications  Will give your B12 injection today Ambulatory

## 2021-06-22 ENCOUNTER — TELEPHONE (OUTPATIENT)
Dept: INTERNAL MEDICINE CLINIC | Facility: CLINIC | Age: 86
End: 2021-06-22

## 2021-06-22 NOTE — TELEPHONE ENCOUNTER
FYI:Milena from [facility] is calling to state Penny Andrea is being discharged today from Physical Therapy

## 2021-06-24 ENCOUNTER — OFFICE VISIT (OUTPATIENT)
Dept: AUDIOLOGY | Facility: CLINIC | Age: 86
End: 2021-06-24
Payer: COMMERCIAL

## 2021-06-24 ENCOUNTER — OFFICE VISIT (OUTPATIENT)
Dept: OTOLARYNGOLOGY | Facility: CLINIC | Age: 86
End: 2021-06-24
Payer: COMMERCIAL

## 2021-06-24 VITALS — RESPIRATION RATE: 15 BRPM | WEIGHT: 130 LBS | BODY MASS INDEX: 27.29 KG/M2 | HEIGHT: 58 IN | TEMPERATURE: 97.5 F

## 2021-06-24 DIAGNOSIS — H91.93 BILATERAL HEARING LOSS, UNSPECIFIED HEARING LOSS TYPE: Primary | ICD-10-CM

## 2021-06-24 DIAGNOSIS — H61.23 BILATERAL IMPACTED CERUMEN: ICD-10-CM

## 2021-06-24 DIAGNOSIS — H74.8X1 TYPE B TYMPANOGRAM, RIGHT: ICD-10-CM

## 2021-06-24 PROCEDURE — 1160F RVW MEDS BY RX/DR IN RCRD: CPT | Performed by: OTOLARYNGOLOGY

## 2021-06-24 PROCEDURE — 1036F TOBACCO NON-USER: CPT | Performed by: OTOLARYNGOLOGY

## 2021-06-24 PROCEDURE — 99203 OFFICE O/P NEW LOW 30 MIN: CPT | Performed by: OTOLARYNGOLOGY

## 2021-06-24 PROCEDURE — 92557 COMPREHENSIVE HEARING TEST: CPT | Performed by: AUDIOLOGIST

## 2021-06-24 PROCEDURE — 92567 TYMPANOMETRY: CPT | Performed by: AUDIOLOGIST

## 2021-06-24 PROCEDURE — 69210 REMOVE IMPACTED EAR WAX UNI: CPT | Performed by: OTOLARYNGOLOGY

## 2021-06-24 NOTE — PROGRESS NOTES
Anselmo Lau 80 y o  female MRN: [id number]  Unit/Bed#:  Encounter: [id number]            History of Present Illness     Reason for Visit[de-identified] Hearing loss  HPI: Asnelmo Lau is a 80y o  year old female who presents with caretaker for evaluation of hearing loss  Caretaker reports gradual hearing loss in both ears  Patient is largely non verbal       Review of Systems   Constitutional: Positive for fatigue  HENT: Positive for hearing loss  Eyes: Positive for visual disturbance  Respiratory: Negative  Cardiovascular: Negative  Gastrointestinal: Negative  Endocrine: Negative  Genitourinary: Negative  Musculoskeletal: Negative  Skin: Negative  Allergic/Immunologic: Negative  Neurological: Positive for dizziness, light-headedness and headaches  Hematological: Negative  Psychiatric/Behavioral: Negative        Revision of Systems:    Complete review done, only positive for the symptoms described in the H&P section above      Historical Information   Past Medical History:   Diagnosis Date    Anxiety     Cognitive impairment     D[facility] [address] [facility]     Depression     Hallucination     Hyperlipidemia     Hypertension     Memory loss     Psychiatric illness     Psychosis [facility] [address] [facility]     Schizoaffective disorder ([facility] [address] [facility]     Sleep difficulties      Past Surgical History:   Procedure Laterality Date    APPENDECTOMY      BLADDER SURGERY      CHOLECYSTECTOMY      TOTAL ABDOMINAL HYSTERECTOMY W/ BILATERAL SALPINGOOPHORECTOMY      TOTAL KNEE ARTHROPLASTY Left      Social History   Social History     Substance and Sexual Activity   Alcohol Use Never     Social History     Substance and Sexual Activity   Drug Use No     Social History     Tobacco Use   Smoking Status Never Smoker   Smokeless Tobacco Never Used   Tobacco Comment    Former smoker per [employer]     Family History:   Family History   Problem Relation Age of Onset    Heart disease Mother         Cardiac disorder    Parkinsonism Father     Heart disease Sister         Cardiac disorder    Hypertension Sister     Diabetes Child         Diabetes Mellitus    Lung disease Child         Respiratory Disorder    Diabetes Son         Diabetes Mellitus       Meds/Allergies   No current facility-administered medications for this visit  No Known Allergies    Objective       Physical Exam   Temperature 97 5 °F (36 4 °C), temperature source Temporal, resp  rate 15, height 4' 10" (1 473 m), weight 59 kg (130 lb)  Constitutional: Well-developed and well-nourished, no apparent distress, non-toxic appearance  Head: Normocephalic, atraumatic  No scars, masses or lesions  Face: Symmetric, no edema, no sinus tenderness  Eyes: Vision grossly intact, extra-ocular movement intact  Right Ear: External ear normal   Auditory canal w/ cerumen  Tympanic membrane well-appearing, without retraction or scarring  No fluid present  No post-auricular erythema or tenderness  Left Ear: External ear normal   Auditory canal w/ cerumen  Tympanic membrane well-appearing, without retraction or scarring  No fluid present  No post-auricular erythema or tenderness  Pulmonary/Chest: Normal effort and rate  No respiratory distress  Musculoskeletal: Normal range of motion  Neurological: Cranial nerves 2-12 intact  Skin: Skin is warm and dry  Psychiatric: Normal mood and affect  Procedure: Cerumen debridement bilateral    Indications: Cerumen impaction bilateral    Procedure in detail: After informed verbal consent was obtained the ear was visualized using microscopy  Using cerumen loop, suction, and alligator forceps the cerumen was debrided and the findings below were seen  The patient tolerated the procedure well  FINDINGS: Bilateral TM itnact and clear        Lab Results: CBC: No results found for: WBC, HGB, HCT, MCV, PLT, ADJUSTEDWBC, MCH, MCHC, RDW, MPV, NRBC, CMP: No results found for: NA, K, CL, CO2, ANIONGAP, BUN, CREATININE, GLUCOSE, CALCIUM, AST, ALT, ALKPHOS, PROT, BILITOT, EGFR  Imaging Studies: I have personally reviewed images on the PACS system and :   EKG, Pathology, and   Other Studies: I have personally reviewed pertinent reports and     Assessment:  Bilateral cerumen  Hearing loss bilateral    Plan:  Cerumen impaction:  We discussed the nature of cerumen impaction  We discussed appropriate treat with hydrogen peroxide 4-5 drops once per week  We discussed discontinuing the use of any Q-Tips or other objects into the ear  Audiogram today was unable to be completed  Patient could not understand instructions  She was however noted to have a right type B tymp, left type A  At this point I am unable to recommend hearing aids due to poor quality study  Recommend [facility] at [place] [id number]  Type B may simply be from thickened right TM  Difficult to determine if there is fluid without appropriate air/bone pure tones

## 2021-06-26 DIAGNOSIS — I10 ESSENTIAL HYPERTENSION: ICD-10-CM

## 2021-06-26 RX ORDER — DILTIAZEM HYDROCHLORIDE 180 MG/1
180 CAPSULE, COATED, EXTENDED RELEASE ORAL DAILY
Qty: 30 CAPSULE | Refills: 0 | Status: ON HOLD | OUTPATIENT
Start: 2021-06-26 | End: 2021-07-25

## 2021-07-02 ENCOUNTER — VBI (OUTPATIENT)
Dept: ADMINISTRATIVE | Facility: OTHER | Age: 86
End: 2021-07-02

## 2021-07-15 ENCOUNTER — ANESTHESIA (INPATIENT)
Dept: PERIOP | Facility: HOSPITAL | Age: 86
DRG: 480 | End: 2021-07-15
Payer: COMMERCIAL

## 2021-07-15 ENCOUNTER — APPOINTMENT (INPATIENT)
Dept: RADIOLOGY | Facility: HOSPITAL | Age: 86
DRG: 480 | End: 2021-07-15
Payer: COMMERCIAL

## 2021-07-15 ENCOUNTER — ANESTHESIA EVENT (INPATIENT)
Dept: PERIOP | Facility: HOSPITAL | Age: 86
DRG: 480 | End: 2021-07-15
Payer: COMMERCIAL

## 2021-07-15 ENCOUNTER — APPOINTMENT (EMERGENCY)
Dept: RADIOLOGY | Facility: HOSPITAL | Age: 86
DRG: 480 | End: 2021-07-15
Payer: COMMERCIAL

## 2021-07-15 ENCOUNTER — HOSPITAL ENCOUNTER (INPATIENT)
Facility: HOSPITAL | Age: 86
LOS: 11 days | Discharge: NURSING, REHAB OR LONG-TERM CARE | DRG: 480 | End: 2021-07-26
Attending: EMERGENCY MEDICINE | Admitting: INTERNAL MEDICINE
Payer: COMMERCIAL

## 2021-07-15 DIAGNOSIS — I10 HTN (HYPERTENSION): ICD-10-CM

## 2021-07-15 DIAGNOSIS — S72.001A CLOSED FRACTURE OF RIGHT HIP, INITIAL ENCOUNTER (HCC): ICD-10-CM

## 2021-07-15 DIAGNOSIS — I48.0 PAROXYSMAL A-FIB (HCC): ICD-10-CM

## 2021-07-15 DIAGNOSIS — I48.0 PAF (PAROXYSMAL ATRIAL FIBRILLATION) (HCC): ICD-10-CM

## 2021-07-15 DIAGNOSIS — F03.90 MAJOR NEUROCOGNITIVE DISORDER (HCC): ICD-10-CM

## 2021-07-15 DIAGNOSIS — F20.3 UNDIFFERENTIATED SCHIZOPHRENIA (HCC): ICD-10-CM

## 2021-07-15 DIAGNOSIS — S02.31XD CLOSED FRACTURE OF RIGHT ORBITAL FLOOR WITH ROUTINE HEALING, SUBSEQUENT ENCOUNTER: ICD-10-CM

## 2021-07-15 DIAGNOSIS — S72.001A CLOSED RIGHT HIP FRACTURE, INITIAL ENCOUNTER (HCC): Primary | ICD-10-CM

## 2021-07-15 DIAGNOSIS — F03.90 DEMENTIA (HCC): ICD-10-CM
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
## 2021-07-15 DIAGNOSIS — M25.561 RIGHT KNEE PAIN: ICD-10-CM

## 2021-07-15 DIAGNOSIS — N18.31 STAGE 3A CHRONIC KIDNEY DISEASE (HCC): ICD-10-CM

## 2021-07-15 LAB
ABO GROUP BLD: NORMAL
ANION GAP SERPL CALCULATED.3IONS-SCNC: 6 MMOL/L (ref 4–13)
APTT PPP: 28 SECONDS (ref 23–37)
ATRIAL RATE: 54 BPM
BASOPHILS # BLD AUTO: 0 THOUSANDS/ΜL (ref 0–0.1)
BASOPHILS NFR BLD AUTO: 0 % (ref 0–1)
BLD GP AB SCN SERPL QL: NEGATIVE
BUN SERPL-MCNC: 30 MG/DL (ref 5–25)
CALCIUM SERPL-MCNC: 8.9 MG/DL (ref 8.3–10.1)
CHLORIDE SERPL-SCNC: 106 MMOL/L (ref 100–108)
CO2 SERPL-SCNC: 29 MMOL/L (ref 21–32)
CREAT SERPL-MCNC: 1.12 MG/DL (ref 0.6–1.3)
EOSINOPHIL # BLD AUTO: 0.19 THOUSAND/ΜL (ref 0–0.61)
EOSINOPHIL NFR BLD AUTO: 3 % (ref 0–6)
ERYTHROCYTE [DISTWIDTH] IN BLOOD BY AUTOMATED COUNT: 13.1 % (ref 11.6–15.1)
GFR SERPL CREATININE-BSD FRML MDRD: 44 ML/MIN/1.73SQ M
GLUCOSE SERPL-MCNC: 126 MG/DL (ref 65–140)
HCT VFR BLD AUTO: 36.3 % (ref 34.8–46.1)
HGB BLD-MCNC: 12 G/DL (ref 11.5–15.4)
IMM GRANULOCYTES # BLD AUTO: 0.03 THOUSAND/UL (ref 0–0.2)
IMM GRANULOCYTES NFR BLD AUTO: 1 % (ref 0–2)
INR PPP: 0.96 (ref 0.84–1.19)
LYMPHOCYTES # BLD AUTO: 1.63 THOUSANDS/ΜL (ref 0.6–4.47)
LYMPHOCYTES NFR BLD AUTO: 28 % (ref 14–44)
MCH RBC QN AUTO: 29.4 PG (ref 26.8–34.3)
MCHC RBC AUTO-ENTMCNC: 33.1 G/DL (ref 31.4–37.4)
MCV RBC AUTO: 89 FL (ref 82–98)
MONOCYTES # BLD AUTO: 0.6 THOUSAND/ΜL (ref 0.17–1.22)
MONOCYTES NFR BLD AUTO: 10 % (ref 4–12)
NEUTROPHILS # BLD AUTO: 3.34 THOUSANDS/ΜL (ref 1.85–7.62)
NEUTS SEG NFR BLD AUTO: 58 % (ref 43–75)
NRBC BLD AUTO-RTO: 0 /100 WBCS
P AXIS: 118 DEGREES
PLATELET # BLD AUTO: 226 THOUSANDS/UL (ref 149–390)
PMV BLD AUTO: 11.6 FL (ref 8.9–12.7)
POTASSIUM SERPL-SCNC: 4.5 MMOL/L (ref 3.5–5.3)
PR INTERVAL: 180 MS
PROTHROMBIN TIME: 12.6 SECONDS (ref 11.6–14.5)
QRS AXIS: 74 DEGREES
QRSD INTERVAL: 88 MS
QT INTERVAL: 436 MS
QTC INTERVAL: 413 MS
RBC # BLD AUTO: 4.08 MILLION/UL (ref 3.81–5.12)
RH BLD: NEGATIVE
SODIUM SERPL-SCNC: 141 MMOL/L (ref 136–145)
SPECIMEN EXPIRATION DATE: NORMAL
T WAVE AXIS: 77 DEGREES
VENTRICULAR RATE: 54 BPM
WBC # BLD AUTO: 5.79 THOUSAND/UL (ref 4.31–10.16)

## 2021-07-15 PROCEDURE — 86901 BLOOD TYPING SEROLOGIC RH(D): CPT | Performed by: EMERGENCY MEDICINE

## 2021-07-15 PROCEDURE — 86923 COMPATIBILITY TEST ELECTRIC: CPT

## 2021-07-15 PROCEDURE — 73560 X-RAY EXAM OF KNEE 1 OR 2: CPT

## 2021-07-15 PROCEDURE — 85610 PROTHROMBIN TIME: CPT | Performed by: EMERGENCY MEDICINE

## 2021-07-15 PROCEDURE — 80048 BASIC METABOLIC PNL TOTAL CA: CPT | Performed by: EMERGENCY MEDICINE

## 2021-07-15 PROCEDURE — 93005 ELECTROCARDIOGRAM TRACING: CPT

## 2021-07-15 PROCEDURE — 85025 COMPLETE CBC W/AUTO DIFF WBC: CPT | Performed by: EMERGENCY MEDICINE

## 2021-07-15 PROCEDURE — C1769 GUIDE WIRE: HCPCS | Performed by: ORTHOPAEDIC SURGERY

## 2021-07-15 PROCEDURE — 73552 X-RAY EXAM OF FEMUR 2/>: CPT | Performed by: ORTHOPAEDIC SURGERY

## 2021-07-15 PROCEDURE — 93010 ELECTROCARDIOGRAM REPORT: CPT | Performed by: INTERNAL MEDICINE

## 2021-07-15 PROCEDURE — 99285 EMERGENCY DEPT VISIT HI MDM: CPT

## 2021-07-15 PROCEDURE — 99223 1ST HOSP IP/OBS HIGH 75: CPT | Performed by: INTERNAL MEDICINE

## 2021-07-15 PROCEDURE — 99285 EMERGENCY DEPT VISIT HI MDM: CPT | Performed by: EMERGENCY MEDICINE

## 2021-07-15 PROCEDURE — 86850 RBC ANTIBODY SCREEN: CPT | Performed by: EMERGENCY MEDICINE

## 2021-07-15 PROCEDURE — 86900 BLOOD TYPING SEROLOGIC ABO: CPT | Performed by: EMERGENCY MEDICINE

## 2021-07-15 PROCEDURE — 36415 COLL VENOUS BLD VENIPUNCTURE: CPT | Performed by: EMERGENCY MEDICINE

## 2021-07-15 PROCEDURE — 99223 1ST HOSP IP/OBS HIGH 75: CPT | Performed by: ORTHOPAEDIC SURGERY

## 2021-07-15 PROCEDURE — 73610 X-RAY EXAM OF ANKLE: CPT

## 2021-07-15 PROCEDURE — 96374 THER/PROPH/DIAG INJ IV PUSH: CPT

## 2021-07-15 PROCEDURE — 73502 X-RAY EXAM HIP UNI 2-3 VIEWS: CPT

## 2021-07-15 PROCEDURE — 73552 X-RAY EXAM OF FEMUR 2/>: CPT

## 2021-07-15 PROCEDURE — C1713 ANCHOR/SCREW BN/BN,TIS/BN: HCPCS | Performed by: ORTHOPAEDIC SURGERY

## 2021-07-15 PROCEDURE — 85730 THROMBOPLASTIN TIME PARTIAL: CPT | Performed by: EMERGENCY MEDICINE

## 2021-07-15 PROCEDURE — 27245 TREAT THIGH FRACTURE: CPT | Performed by: PHYSICIAN ASSISTANT

## 2021-07-15 PROCEDURE — 0QS606Z REPOSITION RIGHT UPPER FEMUR WITH INTRAMEDULLARY INTERNAL FIXATION DEVICE, OPEN APPROACH: ICD-10-PCS | Performed by: ORTHOPAEDIC SURGERY

## 2021-07-15 PROCEDURE — 27245 TREAT THIGH FRACTURE: CPT | Performed by: ORTHOPAEDIC SURGERY

## 2021-07-15 DEVICE — TFNA FENESTRATED SCREW 90MM - STERILE
Type: IMPLANTABLE DEVICE | Site: LEG | Status: FUNCTIONAL
Brand: TFN-ADVANCE

## 2021-07-15 DEVICE — 5.0MM TI LOCKING SCREW W/T25 STARDRIVE 30MM F/IM NAIL-STER: Type: IMPLANTABLE DEVICE | Site: LEG | Status: FUNCTIONAL

## 2021-07-15 DEVICE — 11MM/125 DEG TI CANN TFNA 340MM/RIGHT - STERILE
Type: IMPLANTABLE DEVICE | Site: LEG | Status: FUNCTIONAL
Brand: TFN-ADVANCE

## 2021-07-15 RX ORDER — LANOLIN ALCOHOL/MO/W.PET/CERES
3 CREAM (GRAM) TOPICAL
Status: DISCONTINUED | OUTPATIENT
Start: 2021-07-15 | End: 2021-07-26 | Stop reason: HOSPADM

## 2021-07-15 RX ORDER — CEFAZOLIN SODIUM 2 G/50ML
2000 SOLUTION INTRAVENOUS ONCE
Status: CANCELLED | OUTPATIENT
Start: 2021-07-15 | End: 2021-07-15

## 2021-07-15 RX ORDER — HYDROMORPHONE HCL/PF 1 MG/ML
0.2 SYRINGE (ML) INJECTION ONCE
Status: COMPLETED | OUTPATIENT
Start: 2021-07-15 | End: 2021-07-15

## 2021-07-15 RX ORDER — ONDANSETRON 2 MG/ML
INJECTION INTRAMUSCULAR; INTRAVENOUS AS NEEDED
Status: DISCONTINUED | OUTPATIENT
Start: 2021-07-15 | End: 2021-07-15

## 2021-07-15 RX ORDER — METOPROLOL SUCCINATE 50 MG/1
50 TABLET, EXTENDED RELEASE ORAL DAILY
Status: DISCONTINUED | OUTPATIENT
Start: 2021-07-16 | End: 2021-07-18

## 2021-07-15 RX ORDER — ALBUTEROL SULFATE 90 UG/1
2 AEROSOL, METERED RESPIRATORY (INHALATION) EVERY 4 HOURS PRN
Status: DISCONTINUED | OUTPATIENT
Start: 2021-07-15 | End: 2021-07-26 | Stop reason: HOSPADM

## 2021-07-15 RX ORDER — OXYCODONE HYDROCHLORIDE 5 MG/1
5 TABLET ORAL EVERY 4 HOURS PRN
Status: DISCONTINUED | OUTPATIENT
Start: 2021-07-15 | End: 2021-07-26 | Stop reason: HOSPADM

## 2021-07-15 RX ORDER — CEFAZOLIN SODIUM 2 G/50ML
2000 SOLUTION INTRAVENOUS EVERY 8 HOURS
Status: DISCONTINUED | OUTPATIENT
Start: 2021-07-15 | End: 2021-07-15

## 2021-07-15 RX ORDER — HYDROMORPHONE HCL IN WATER/PF 6 MG/30 ML
0.2 PATIENT CONTROLLED ANALGESIA SYRINGE INTRAVENOUS EVERY 4 HOURS PRN
Status: DISCONTINUED | OUTPATIENT
Start: 2021-07-15 | End: 2021-07-26 | Stop reason: HOSPADM

## 2021-07-15 RX ORDER — METOPROLOL SUCCINATE 50 MG/1
100 TABLET, EXTENDED RELEASE ORAL DAILY
Status: DISCONTINUED | OUTPATIENT
Start: 2021-07-15 | End: 2021-07-15

## 2021-07-15 RX ORDER — LORAZEPAM 0.5 MG/1
0.5 TABLET ORAL
Status: DISCONTINUED | OUTPATIENT
Start: 2021-07-15 | End: 2021-07-18

## 2021-07-15 RX ORDER — CEFAZOLIN SODIUM 1 G/50ML
SOLUTION INTRAVENOUS AS NEEDED
Status: DISCONTINUED | OUTPATIENT
Start: 2021-07-15 | End: 2021-07-15

## 2021-07-15 RX ORDER — LIDOCAINE HYDROCHLORIDE 20 MG/ML
INJECTION, SOLUTION EPIDURAL; INFILTRATION; INTRACAUDAL; PERINEURAL AS NEEDED
Status: DISCONTINUED | OUTPATIENT
Start: 2021-07-15 | End: 2021-07-15

## 2021-07-15 RX ORDER — DULOXETIN HYDROCHLORIDE 30 MG/1
30 CAPSULE, DELAYED RELEASE ORAL
Status: DISCONTINUED | OUTPATIENT
Start: 2021-07-15 | End: 2021-07-26 | Stop reason: HOSPADM

## 2021-07-15 RX ORDER — MAGNESIUM HYDROXIDE 1200 MG/15ML
LIQUID ORAL AS NEEDED
Status: DISCONTINUED | OUTPATIENT
Start: 2021-07-15 | End: 2021-07-15 | Stop reason: HOSPADM

## 2021-07-15 RX ORDER — POLYETHYLENE GLYCOL 3350 17 G/17G
17 POWDER, FOR SOLUTION ORAL DAILY PRN
Status: DISCONTINUED | OUTPATIENT
Start: 2021-07-15 | End: 2021-07-26 | Stop reason: HOSPADM

## 2021-07-15 RX ORDER — ACETAMINOPHEN 325 MG/1
975 TABLET ORAL EVERY 8 HOURS SCHEDULED
Status: DISCONTINUED | OUTPATIENT
Start: 2021-07-15 | End: 2021-07-17

## 2021-07-15 RX ORDER — CALCIUM CHLORIDE 100 MG/ML
INJECTION INTRAVENOUS; INTRAVENTRICULAR AS NEEDED
Status: DISCONTINUED | OUTPATIENT
Start: 2021-07-15 | End: 2021-07-15

## 2021-07-15 RX ORDER — CEFAZOLIN SODIUM 2 G/50ML
2000 SOLUTION INTRAVENOUS EVERY 8 HOURS
Status: COMPLETED | OUTPATIENT
Start: 2021-07-16 | End: 2021-07-16

## 2021-07-15 RX ORDER — ONDANSETRON 2 MG/ML
4 INJECTION INTRAMUSCULAR; INTRAVENOUS ONCE AS NEEDED
Status: DISCONTINUED | OUTPATIENT
Start: 2021-07-15 | End: 2021-07-15 | Stop reason: HOSPADM

## 2021-07-15 RX ORDER — FENTANYL CITRATE 50 UG/ML
INJECTION, SOLUTION INTRAMUSCULAR; INTRAVENOUS AS NEEDED
Status: DISCONTINUED | OUTPATIENT
Start: 2021-07-15 | End: 2021-07-15

## 2021-07-15 RX ORDER — CEFAZOLIN SODIUM 2 G/50ML
2000 SOLUTION INTRAVENOUS ONCE
Status: COMPLETED | OUTPATIENT
Start: 2021-07-15 | End: 2021-07-16

## 2021-07-15 RX ORDER — OXYCODONE HYDROCHLORIDE 5 MG/1
2.5 TABLET ORAL EVERY 4 HOURS PRN
Status: DISCONTINUED | OUTPATIENT
Start: 2021-07-15 | End: 2021-07-26 | Stop reason: HOSPADM

## 2021-07-15 RX ORDER — MAGNESIUM HYDROXIDE/ALUMINUM HYDROXICE/SIMETHICONE 120; 1200; 1200 MG/30ML; MG/30ML; MG/30ML
30 SUSPENSION ORAL EVERY 6 HOURS PRN
Status: DISCONTINUED | OUTPATIENT
Start: 2021-07-15 | End: 2021-07-26 | Stop reason: HOSPADM

## 2021-07-15 RX ORDER — ACETAMINOPHEN 325 MG/1
650 TABLET ORAL ONCE
Status: COMPLETED | OUTPATIENT
Start: 2021-07-15 | End: 2021-07-15

## 2021-07-15 RX ORDER — EPHEDRINE SULFATE 50 MG/ML
INJECTION INTRAVENOUS AS NEEDED
Status: DISCONTINUED | OUTPATIENT
Start: 2021-07-15 | End: 2021-07-15

## 2021-07-15 RX ORDER — BUPIVACAINE HYDROCHLORIDE 2.5 MG/ML
INJECTION, SOLUTION EPIDURAL; INFILTRATION; INTRACAUDAL AS NEEDED
Status: DISCONTINUED | OUTPATIENT
Start: 2021-07-15 | End: 2021-07-15 | Stop reason: HOSPADM

## 2021-07-15 RX ORDER — ONDANSETRON 2 MG/ML
4 INJECTION INTRAMUSCULAR; INTRAVENOUS EVERY 6 HOURS PRN
Status: DISCONTINUED | OUTPATIENT
Start: 2021-07-15 | End: 2021-07-26 | Stop reason: HOSPADM

## 2021-07-15 RX ORDER — DILTIAZEM HYDROCHLORIDE 120 MG/1
120 CAPSULE, COATED, EXTENDED RELEASE ORAL DAILY
Status: DISCONTINUED | OUTPATIENT
Start: 2021-07-16 | End: 2021-07-18

## 2021-07-15 RX ORDER — DOCUSATE SODIUM 100 MG/1
100 CAPSULE, LIQUID FILLED ORAL EVERY 12 HOURS SCHEDULED
Status: DISCONTINUED | OUTPATIENT
Start: 2021-07-15 | End: 2021-07-26 | Stop reason: HOSPADM

## 2021-07-15 RX ORDER — DILTIAZEM HYDROCHLORIDE 180 MG/1
180 CAPSULE, COATED, EXTENDED RELEASE ORAL DAILY
Status: DISCONTINUED | OUTPATIENT
Start: 2021-07-15 | End: 2021-07-15

## 2021-07-15 RX ORDER — OLANZAPINE 5 MG/1
5 TABLET ORAL
Status: DISCONTINUED | OUTPATIENT
Start: 2021-07-15 | End: 2021-07-18

## 2021-07-15 RX ORDER — PROPOFOL 10 MG/ML
INJECTION, EMULSION INTRAVENOUS AS NEEDED
Status: DISCONTINUED | OUTPATIENT
Start: 2021-07-15 | End: 2021-07-15

## 2021-07-15 RX ORDER — SODIUM CHLORIDE 9 MG/ML
INJECTION, SOLUTION INTRAVENOUS CONTINUOUS PRN
Status: DISCONTINUED | OUTPATIENT
Start: 2021-07-15 | End: 2021-07-15

## 2021-07-15 RX ORDER — SIMETHICONE 80 MG
80 TABLET,CHEWABLE ORAL 4 TIMES DAILY PRN
Status: DISCONTINUED | OUTPATIENT
Start: 2021-07-15 | End: 2021-07-26 | Stop reason: HOSPADM

## 2021-07-15 RX ORDER — FENTANYL CITRATE/PF 50 MCG/ML
25 SYRINGE (ML) INJECTION
Status: DISCONTINUED | OUTPATIENT
Start: 2021-07-15 | End: 2021-07-15 | Stop reason: HOSPADM

## 2021-07-15 RX ORDER — HEPARIN SODIUM 5000 [USP'U]/ML
5000 INJECTION, SOLUTION INTRAVENOUS; SUBCUTANEOUS EVERY 8 HOURS SCHEDULED
Status: DISCONTINUED | OUTPATIENT
Start: 2021-07-15 | End: 2021-07-16

## 2021-07-15 RX ORDER — ROCURONIUM BROMIDE 10 MG/ML
INJECTION, SOLUTION INTRAVENOUS AS NEEDED
Status: DISCONTINUED | OUTPATIENT
Start: 2021-07-15 | End: 2021-07-15

## 2021-07-15 RX ADMIN — CALCIUM CHLORIDE 0.5 G: 100 INJECTION INTRAVENOUS; INTRAVENTRICULAR at 20:33

## 2021-07-15 RX ADMIN — CYANOCOBALAMIN TAB 500 MCG 1000 MCG: 500 TAB at 08:00

## 2021-07-15 RX ADMIN — DILTIAZEM HYDROCHLORIDE 180 MG: 180 CAPSULE, COATED, EXTENDED RELEASE ORAL at 08:00

## 2021-07-15 RX ADMIN — ACETAMINOPHEN 975 MG: 325 TABLET, FILM COATED ORAL at 07:59

## 2021-07-15 RX ADMIN — DOCUSATE SODIUM 100 MG: 100 CAPSULE ORAL at 08:00

## 2021-07-15 RX ADMIN — ROCURONIUM BROMIDE 30 MG: 50 INJECTION, SOLUTION INTRAVENOUS at 19:13

## 2021-07-15 RX ADMIN — ACETAMINOPHEN 975 MG: 325 TABLET, FILM COATED ORAL at 14:46

## 2021-07-15 RX ADMIN — EPHEDRINE SULFATE 5 MG: 50 INJECTION, SOLUTION INTRAVENOUS at 20:29

## 2021-07-15 RX ADMIN — HEPARIN SODIUM 5000 UNITS: 5000 INJECTION INTRAVENOUS; SUBCUTANEOUS at 23:45

## 2021-07-15 RX ADMIN — HYDROMORPHONE HYDROCHLORIDE 0.2 MG: 1 INJECTION, SOLUTION INTRAMUSCULAR; INTRAVENOUS; SUBCUTANEOUS at 05:14

## 2021-07-15 RX ADMIN — SODIUM CHLORIDE: 0.9 INJECTION, SOLUTION INTRAVENOUS at 19:02

## 2021-07-15 RX ADMIN — CEFAZOLIN SODIUM 1000 MG: 1 SOLUTION INTRAVENOUS at 19:07

## 2021-07-15 RX ADMIN — CALCIUM CHLORIDE 0.5 G: 100 INJECTION INTRAVENOUS; INTRAVENTRICULAR at 20:26

## 2021-07-15 RX ADMIN — ONDANSETRON 4 MG: 2 INJECTION INTRAMUSCULAR; INTRAVENOUS at 23:22

## 2021-07-15 RX ADMIN — CEFAZOLIN SODIUM 1000 MG: 1 SOLUTION INTRAVENOUS at 19:02

## 2021-07-15 RX ADMIN — DULOXETINE HYDROCHLORIDE 30 MG: 30 CAPSULE, DELAYED RELEASE ORAL at 23:42

## 2021-07-15 RX ADMIN — EPHEDRINE SULFATE 5 MG: 50 INJECTION, SOLUTION INTRAVENOUS at 20:33

## 2021-07-15 RX ADMIN — LIDOCAINE HYDROCHLORIDE 40 MG: 20 INJECTION, SOLUTION EPIDURAL; INFILTRATION; INTRACAUDAL; PERINEURAL at 19:13

## 2021-07-15 RX ADMIN — OLANZAPINE 5 MG: 5 TABLET, FILM COATED ORAL at 23:41

## 2021-07-15 RX ADMIN — EPHEDRINE SULFATE 2.5 MG: 50 INJECTION, SOLUTION INTRAVENOUS at 20:26

## 2021-07-15 RX ADMIN — PROPOFOL 50 MG: 10 INJECTION, EMULSION INTRAVENOUS at 20:46

## 2021-07-15 RX ADMIN — FENTANYL CITRATE 50 MCG: 50 INJECTION INTRAMUSCULAR; INTRAVENOUS at 19:13

## 2021-07-15 RX ADMIN — CEFAZOLIN SODIUM 2000 MG: 2 SOLUTION INTRAVENOUS at 07:58

## 2021-07-15 RX ADMIN — ONDANSETRON 4 MG: 2 INJECTION INTRAMUSCULAR; INTRAVENOUS at 19:13

## 2021-07-15 RX ADMIN — ACETAMINOPHEN 650 MG: 325 TABLET, FILM COATED ORAL at 04:55

## 2021-07-15 RX ADMIN — METOPROLOL SUCCINATE 100 MG: 50 TABLET, EXTENDED RELEASE ORAL at 08:00

## 2021-07-15 RX ADMIN — PROPOFOL 100 MG: 10 INJECTION, EMULSION INTRAVENOUS at 19:13

## 2021-07-15 RX ADMIN — LORAZEPAM 0.5 MG: 0.5 TABLET ORAL at 23:43

## 2021-07-15 NOTE — PLAN OF CARE
Problem: MOBILITY - ADULT  Goal: Maintain or return to baseline ADL function  Description: INTERVENTIONS:  -  Assess patient's ability to carry out ADLs; assess patient's baseline for ADL function and identify physical deficits which impact ability to perform ADLs (bathing, care of mouth/teeth, toileting, grooming, dressing, etc )  - Assess/evaluate cause of self-care deficits   - Assess range of motion  - Assess patient's mobility; develop plan if impaired  - Assess patient's need for assistive devices and provide as appropriate  - Encourage maximum independence but intervene and supervise when necessary  - Involve family in performance of ADLs  - Assess for home care needs following discharge   - Consider OT consult to assist with ADL evaluation and planning for discharge  - Provide patient education as appropriate  7/15/2021 1821 by Rianna Mendiola  Outcome: Progressing  7/15/2021 1821 by Kalyn Setih  Outcome: Progressing  Goal: Maintains/Returns to pre admission functional level  Description: INTERVENTIONS:  - Perform BMAT or MOVE assessment daily    - Set and communicate daily mobility goal to care team and patient/family/caregiver     - Collaborate with rehabilitation services on mobility goals if consulted  - Out of bed for toileting  - Record patient progress and toleration of activity level   7/15/2021 1821 by Rianna Mendiola  Outcome: Progressing  7/15/2021 1821 by Rianna Mendiola  Outcome: Progressing     Problem: Prexisting or High Potential for Compromised Skin Integrity  Goal: Skin integrity is maintained or improved  Description: INTERVENTIONS:  - Identify patients at risk for skin breakdown  - Assess and monitor skin integrity  - Assess and monitor nutrition and hydration status  - Monitor labs   - Assess for incontinence   - Turn and reposition patient  - Assist with mobility/ambulation  - Relieve pressure over bony prominences  - Avoid friction and shearing  - Provide appropriate hygiene as needed including keeping skin clean and dry  - Evaluate need for skin moisturizer/barrier cream  - Collaborate with interdisciplinary team   - Patient/family teaching  - Consider wound care consult   7/15/2021 1821 by Venancio Holland  Outcome: Progressing  7/15/2021 1821 by Venancio Holland  Outcome: Progressing

## 2021-07-15 NOTE — ED PROVIDER NOTES
History  Chief Complaint   Patient presents with    Fall     pt arrived via ems from home  per ems, daughter said pt fell and rolled R ankle, swelling noted  hx dementia  HPI and ROS limited secondary to dementia  81 y/o female with hx of HTN, dementia, and schizophrenia presents to the ED via EMS for evaluation of right leg pain after falling tonight at home  Per home health aide, the patient has chronic difficulty sleeping and tonight was awake and asking to go to the bathroom; the health aide assisted the patient from bed (normally uses a walker or assistance to ambulate) and the patient tripped and fell, landing on her right side  After the fall she c/o right leg pain, specifically the right knee  No head strike or LOC with this fall  Prior to Admission Medications   Prescriptions Last Dose Informant Patient Reported? Taking?    DULoxetine (CYMBALTA) 30 mg delayed release capsule   Yes No   Sig: Take 30 mg by mouth daily at bedtime   Diclofenac Sodium (VOLTAREN) 1 %   Yes No   LORazepam (ATIVAN) 0 5 mg tablet   Yes No   Sig: Take by mouth daily   OLANZapine (ZyPREXA) 5 mg tablet   No No   Sig: Take 1 tablet (5 mg total) by mouth daily at bedtime   acetaminophen (TYLENOL) 325 mg tablet   No No   Sig: Take 2 tablets (650 mg total) by mouth every 6 (six) hours as needed for mild pain, moderate pain or headaches   Patient not taking: Reported on 5/21/2021   albuterol (2 5 mg/3 mL) 0 083 % nebulizer solution   No No   Sig: Take 1 vial (2 5 mg total) by nebulization every 4 (four) hours as needed for wheezing or shortness of breath   Patient not taking: Reported on 5/21/2021   albuterol (PROVENTIL HFA,VENTOLIN HFA) 90 mcg/act inhaler   No No   Sig: Inhale 2 puffs every 4 (four) hours as needed for wheezing   Patient not taking: Reported on 5/21/2021   cyanocobalamin (VITAMIN B-12) 1000 MCG tablet   No No   Sig: Take 1 tablet (1,000 mcg total) by mouth daily   diclofenac sodium (VOLTAREN) 1 %   No No Sig: Apply to the right knee and left knee 3 times a day   diltiazem (CARDIZEM CD) 180 mg 24 hr capsule   No No   Sig: TAKE 1 CAPSULE (180 MG TOTAL) BY MOUTH DAILY   docusate sodium (COLACE) 100 mg capsule   No No   Sig: Take 1 capsule (100 mg total) by mouth every 12 (twelve) hours   losartan (COZAAR) 100 MG tablet   No No   Sig: TAKE ONE TABLET BY MOUTH (100 MG TOTAL) DAILY   melatonin 3 mg   Yes No   Sig: Take 3 mg by mouth daily at bedtime Take 2 tablets at bedtime   metoprolol succinate (TOPROL-XL) 100 mg 24 hr tablet   No No   Sig: TAKE 1 TABLET (100 MG TOTAL) BY MOUTH DAILY   polyethylene glycol (MIRALAX) 17 g packet   No No   Sig: Take 17 g by mouth daily as needed (constipation first line)   traZODone (DESYREL) 50 mg tablet   No No   Sig: Take 1 tablet (50 mg total) by mouth daily at bedtime      Facility-Administered Medications: None       Past Medical History:   Diagnosis Date    Anxiety     Cognitive impairment     Dementia (HCC)     Depression     Hallucination     Hyperlipidemia     Hypertension     Memory loss     Psychiatric illness     [facility] [address] [facility]     [facility]     Sleep difficulties        Past Surgical History:   Procedure Laterality Date    APPENDECTOMY      BLADDER SURGERY      CHOLECYSTECTOMY      TOTAL ABDOMINAL HYSTERECTOMY W/ BILATERAL SALPINGOOPHORECTOMY      TOTAL KNEE ARTHROPLASTY Left        Family History   Problem Relation Age of Onset    Heart disease Mother         Cardiac disorder    Parkinsonism Father     Heart disease Sister         Cardiac disorder    Hypertension Sister     Diabetes Child         Diabetes Mellitus    Lung disease Child         Respiratory Disorder    Diabetes Son         Diabetes Mellitus     I have reviewed and agree with the history as documented      E-Cigarette/Vaping    E-Cigarette Use Never User      E-Cigarette/Vaping Substances    Nicotine No     THC No     CBD No     Flavoring No     Other No  Unknown No      Social History     Tobacco Use    Smoking status: Never Smoker    Smokeless tobacco: Never Used    Tobacco comment: Former smoker per [employer]   Vaping Use    Vaping Use: Never used   Substance Use Topics    Alcohol use: Never    Drug use: No        Review of Systems   Unable to perform ROS: Dementia       Physical Exam  ED Triage Vitals   Temperature Pulse Respirations Blood Pressure SpO2   07/15/21 0359 07/15/21 0359 07/15/21 0359 07/15/21 0359 07/15/21 0359   97 8 °F (36 6 °C) 60 18 (!) 183/70 98 %      Temp Source Heart Rate Source Patient Position - Orthostatic VS BP Location FiO2 (%)   07/15/21 0359 07/15/21 0359 07/15/21 0359 07/15/21 0359 --   Oral Monitor Lying Right arm       Pain Score       07/15/21 0455       Worst Possible Pain             Orthostatic Vital Signs  Vitals:    07/15/21 0359 07/15/21 0430 07/15/21 0524 07/15/21 0530   BP: (!) 183/70 (!) 173/90 162/72 153/68   Pulse: 60 (!) 54 (!) 51    Patient Position - Orthostatic VS: Lying Lying Lying Lying       Physical Exam  Vitals and nursing note reviewed  Constitutional:       Comments: Elderly female, awake but appears demented, lying in bed  Appears uncomfortable when right leg is moved but otherwise in no acute distress  HENT:      Head: Normocephalic and atraumatic  Right Ear: External ear normal       Left Ear: External ear normal       Nose: Nose normal  No congestion or rhinorrhea  Mouth/Throat:      Mouth: Mucous membranes are moist       Pharynx: Oropharynx is clear  No oropharyngeal exudate or posterior oropharyngeal erythema  Eyes:      Extraocular Movements: Extraocular movements intact  Conjunctiva/sclera: Conjunctivae normal       Pupils: Pupils are equal, round, and reactive to light  Cardiovascular:      Rate and Rhythm: Normal rate and regular rhythm  Pulses: Normal pulses  Heart sounds: Normal heart sounds        Comments: 2+ DP pulses bilaterally  Pulmonary: Effort: Pulmonary effort is normal  No respiratory distress  Breath sounds: Normal breath sounds  No wheezing or rales  Chest:      Chest wall: No tenderness  Abdominal:      General: Abdomen is flat  Bowel sounds are normal  There is no distension  Palpations: Abdomen is soft  Tenderness: There is no abdominal tenderness  There is no guarding  Comments: No abdominal bruising  Musculoskeletal:      Cervical back: Normal range of motion and neck supple  No tenderness  Comments: No grimacing or apparent tenderness of the bilateral hips on palpation/lateral compression  Tenderness over the right anterior knee on palpation  No ankle tenderness  No joint effusion or overlying skin changes  Compartments soft  No midline CTLS spine tenderness or step-offs  Skin:     General: Skin is warm and dry  Capillary Refill: Capillary refill takes less than 2 seconds  Findings: No bruising or erythema  Neurological:      Comments: Awake  Dementia, intermittently responds to questions/follows commands from caretaker but does not do so for me  Moving bilateral upper extremities spontaneously  Flexes the left leg in bed spontaneously  Wiggles toes of the right foot spontaneously but otherwise no other spontaneous movement of the right leg           ED Medications  Medications   acetaminophen (TYLENOL) tablet 650 mg (650 mg Oral Given 7/15/21 0455)   HYDROmorphone (DILAUDID) injection 0 2 mg (0 2 mg Intravenous Given 7/15/21 0514)       Diagnostic Studies  Results Reviewed     Procedure Component Value Units Date/Time    Basic metabolic panel [[id number]]  (Abnormal) Collected: 07/15/21 0509    Lab Status: Final result Specimen: Blood from Arm, Left Updated: 07/15/21 0530     Sodium 141 mmol/L      Potassium 4 5 mmol/L      Chloride 106 mmol/L      CO2 29 mmol/L      ANION GAP 6 mmol/L      BUN 30 mg/dL      Creatinine 1 12 mg/dL      Glucose 126 mg/dL      Calcium 8 9 mg/dL      eGFR 44 ml/min/1 73sq m     Narrative:      [employer] guidelines for Chronic Kidney Disease (CKD):     Stage 1 with normal or high GFR (GFR > 90 mL/min/1 73 square meters)    Stage 2 Mild CKD (GFR = 60-89 mL/min/1 73 square meters)    Stage 3A Moderate CKD (GFR = 45-59 mL/min/1 73 square meters)    Stage 3B Moderate CKD (GFR = 30-44 mL/min/1 73 square meters)    Stage 4 Severe CKD (GFR = 15-29 mL/min/1 73 square meters)    Stage 5 End Stage CKD (GFR <15 mL/min/1 73 square meters)  Note: GFR calculation is accurate only with a steady state creatinine    Protime-INR [[id number]]  (Normal) Collected: 07/15/21 0509    Lab Status: Final result Specimen: Blood from Arm, Left Updated: 07/15/21 0528     Protime 12 6 seconds      INR 0 96    APTT [[id number]]  (Normal) Collected: 07/15/21 0509    Lab Status: Final result Specimen: Blood from Arm, Left Updated: 07/15/21 0528     PTT 28 seconds     CBC and differential [[id number]] Collected: 07/15/21 0509    Lab Status: Final result Specimen: Blood from Arm, Left Updated: 07/15/21 0514     WBC 5 79 Thousand/uL      RBC 4 08 Million/uL      Hemoglobin 12 0 g/dL      Hematocrit 36 3 %      MCV 89 fL      MCH 29 4 pg      MCHC 33 1 g/dL      RDW 13 1 %      MPV 11 6 fL      Platelets 640 Thousands/uL      nRBC 0 /100 WBCs      Neutrophils Relative 58 %      Immat GRANS % 1 %      Lymphocytes Relative 28 %      Monocytes Relative 10 %      Eosinophils Relative 3 %      Basophils Relative 0 %      Neutrophils Absolute 3 34 Thousands/µL      Immature Grans Absolute 0 03 Thousand/uL      Lymphocytes Absolute 1 63 Thousands/µL      Monocytes Absolute 0 60 Thousand/µL      Eosinophils Absolute 0 19 Thousand/µL      Basophils Absolute 0 00 Thousands/µL                  XR hip/pelv 2-3 vws right   ED Interpretation by Nery Baird MD (07/15 3768)   Right hip fracture      XR ankle 3+ views RIGHT   ED Interpretation by Nery Baird MD (07/15 1824)   No acute fracture or dislocation      XR knee 1 or 2 vw right   ED Interpretation by Kaylee Garcia MD (07/15 0559)   Degenerative changes, no clear fracture or dislocation  Procedures  Procedures      ED Course  ED Course as of Jul 15 0601   Thu Jul 15, 2021   0559 Procedure Note: EKG  Date/Time: 07/15/21 5:59 AM   Interpreted by: Kaylee Garcia MD  Indications / Diagnosis: Hip fx, Pre-OP  ECG reviewed by me, the ED Physician: yes   The EKG demonstrates:  Rhythm: sinus bradycardia 54 bpm  Intervals: normal intervals  Axis: normal axis  QRS/Blocks: LVH with repolarization abnormality  ST Changes: Nonspecific ST-T wave changes  No STEMI  No significant change compared to EKG from 06/02/2020  SBIRT 20yo+      Most Recent Value   SBIRT (22 yo +)   In order to provide better care to our patients, we are screening all of our patients for alcohol and drug use  Would it be okay to ask you these screening questions? Unable to answer at this time Filed at: 07/15/2021 0404                MDM  Number of Diagnoses or Management Options  Closed right hip fracture, initial encounter ([facility] [address] )  [facility]  Fall from standing, initial encounter  HTN (hypertension)  Right knee pain  Diagnosis management comments: 81 y/o female with hx of HTN, dementia, and schizophrenia presents to the ED via EMS for evaluation of right leg pain after falling tonight at home  Per home health aide, the patient has chronic difficulty sleeping and tonight was awake and asking to go to the bathroom; the health aide assisted the patient from bed (normally uses a walker or assistance to ambulate) and the patient tripped and fell, landing on her right side  After the fall she c/o right leg pain, specifically the right knee  No head strike or LOC with this fall  HPI and ROS limited secondary to dementia  Afebrile, hypertensive but otherwise VSS   Heart RRR, lungs cta b/l, abdomen soft/non-tender/non-distended, no abdominal bruising  No grimacing or apparent tenderness of the bilateral hips on palpation/lateral compression  Tenderness over the right anterior knee on palpation  No ankle tenderness  No joint effusion or overlying skin changes  Compartments soft  No midline CTLS spine tenderness or step-offs  Awake  Dementia, intermittently responds to questions/follows commands from caretaker but does not do so for me  Moving bilateral upper extremities spontaneously  Flexes the left leg in bed spontaneously  Wiggles toes of the right foot spontaneously but otherwise no other spontaneous movement of the right leg  Obtained X-rays of the right hip, knee, and ankle  Found to have a right hip fracture  Pre-Op labs, IV, EKG, ordered/sent  I discussed with her son (decision maker) over the phone and updated him with the findings; he is flying to [facility] this morning and will be unavailable by phone during the 4 hour flight (8:30am - 12:30pm approximately)  Case discussed with ESTER Byers PA-C, who accepts the patient to the service of Gl[facility] Valade[facility]  Disposition  Final diagnoses:   Closed right hip fracture, initial encounter ([place] [address] )   Fall from standing, initial encounter   Right knee pain   Dementia ([place] [address] )   HTN (hypertension)     Time reflects when diagnosis was documented in both MDM as applicable and the Disposition within this note     Time User Action Codes Description Comment    7/15/2021  5:44 AM Pavithra Killer Add [S72 001A] Closed right hip fracture, initial encounter ([place] [address] )     7/15/2021  5:44 AM Pavithra Killer Add [P78  XXXA] Fall from standing, initial encounter     7/15/2021  5:44 AM Pavithra Killer Add [U83 569] Right knee pain     7/15/2021  5:44 AM Pavithra Killer Add [F03 90] Dementia ([place] [address] )     7/15/2021  5:44 AM Pavithra Killer Add [I10] HTN (hypertension)       ED Disposition     ED Disposition Condition Date/Time Comment    Admit Stable u Jul 15, 2021  5:44 AM Case was discussed with Kely Keith SANTOS, SLIM, and the patient's admission status was agreed to be Admission Status: inpatient status to the service of Dr Elsi Wall, [facility]  Follow-up Information    None         Patient's Medications   Discharge Prescriptions    No medications on file     No discharge procedures on file  PDMP Review     None           ED Provider  Attending physically available and evaluated Merrick Baron  ALFRED managed the patient along with the ED Attending      Electronically Signed by         Roseline Young MD  07/15/21 4336

## 2021-07-15 NOTE — ED ATTENDING ATTESTATION
7/15/2021  IYehuda DO, saw and evaluated the patient  I have discussed the patient with the resident/non-physician practitioner and agree with the resident's/non-physician practitioner's findings, Plan of Care, and MDM as documented in the resident's/non-physician practitioner's note, except where noted  All available labs and Radiology studies were reviewed  I was present for key portions of any procedure(s) performed by the resident/non-physician practitioner and I was immediately available to provide assistance  At this point I agree with the current assessment done in the Emergency Department  I have conducted an independent evaluation of this patient a history and physical is as follows:    79 yo F h/o neurocognitive disorder presenting for evaluation after a fall  Lives at home, brought in by EMS, accompanied by home health aide  Fall was witnessed by health aide  Pt was getting up to use bathroom with her walker and with assistance of health aide when she fell onto R leg  Did not strike head  No LOC  No AC  C/o pain to R leg, worse with movement  No other complaints  No recent illness      MDM: 79 yo F s/p mechanical fall- x-rays to assess for injuries    ED Course         Critical Care Time  Procedures

## 2021-07-15 NOTE — CONSULTS
Chief Complaint     Right hip pain      History of the Present Illness     Vicki Arnett is a 80 y o  female with history of dementia who presented to the ED early this morning with right leg pain after falling at home  History is unable to be obtained from the patient and no family members present  History obtained by previous notes  Patient reportedly normally uses a walker for ambulation  There are reports of patient describing pain in multiple areas of the right leg  No anticoagulation on record  Past Medical History:   Diagnosis Date    Anxiety     Cognitive impairment     Dementia ([facility] [address] )     Depression     Hallucination     Hyperlipidemia     Hypertension     Memory loss     Psychiatric illness     [facility]     [facility] disorder ([facility] )     Sleep difficulties        Past Surgical History:   Procedure Laterality Date    APPENDECTOMY      BLADDER SURGERY      CHOLECYSTECTOMY      TOTAL ABDOMINAL HYSTERECTOMY W/ BILATERAL SALPINGOOPHORECTOMY      TOTAL KNEE ARTHROPLASTY Left        No Known Allergies    No current facility-administered medications on file prior to encounter       Current Outpatient Medications on File Prior to Encounter   Medication Sig Dispense Refill    acetaminophen (TYLENOL) 325 mg tablet Take 2 tablets (650 mg total) by mouth every 6 (six) hours as needed for mild pain, moderate pain or headaches (Patient not taking: Reported on 5/21/2021) 30 tablet 0    albuterol (2 5 mg/3 mL) 0 083 % nebulizer solution Take 1 vial (2 5 mg total) by nebulization every 4 (four) hours as needed for wheezing or shortness of breath (Patient not taking: Reported on 5/21/2021) 1 vial 0    albuterol (PROVENTIL HFA,VENTOLIN HFA) 90 mcg/act inhaler Inhale 2 puffs every 4 (four) hours as needed for wheezing (Patient not taking: Reported on 5/21/2021) 1 Inhaler 0    cyanocobalamin (VITAMIN B-12) 1000 MCG tablet Take 1 tablet (1,000 mcg total) by mouth daily 30 tablet 2    diclofenac sodium (VOLTAREN) 1 % Apply to the right knee and left knee 3 times a day 240 g 3    Diclofenac Sodium (VOLTAREN) 1 %       diltiazem (CARDIZEM CD) 180 mg 24 hr capsule TAKE 1 CAPSULE (180 MG TOTAL) BY MOUTH DAILY 30 capsule 0    docusate sodium (COLACE) 100 mg capsule Take 1 capsule (100 mg total) by mouth every 12 (twelve) hours 10 capsule 0    DULoxetine (CYMBALTA) 30 mg delayed release capsule Take 30 mg by mouth daily at bedtime      LORazepam (ATIVAN) 0 5 mg tablet Take 0 5 mg by mouth daily       losartan (COZAAR) 100 MG tablet TAKE ONE TABLET BY MOUTH (100 MG TOTAL) DAILY 90 tablet 0    melatonin 3 mg Take 3 mg by mouth daily at bedtime Take 2 tablets at bedtime      metoprolol succinate (TOPROL-XL) 100 mg 24 hr tablet TAKE 1 TABLET (100 MG TOTAL) BY MOUTH DAILY 30 tablet 0    OLANZapine (ZyPREXA) 5 mg tablet Take 1 tablet (5 mg total) by mouth daily at bedtime 30 tablet 1    polyethylene glycol (MIRALAX) 17 g packet Take 17 g by mouth daily as needed (constipation first line) 14 each 0    traZODone (DESYREL) 50 mg tablet Take 1 tablet (50 mg total) by mouth daily at bedtime 30 tablet 1       Social History     Tobacco Use    Smoking status: Never Smoker    Smokeless tobacco: Never Used    Tobacco comment: Former smoker per [employer]   Vaping Use    Vaping Use: Never used   Substance Use Topics    Alcohol use: Never    Drug use: No       Family History   Problem Relation Age of Onset    Heart disease Mother         Cardiac disorder    Parkinsonism Father     Heart disease Sister         Cardiac disorder    Hypertension Sister     Diabetes Child         Diabetes Mellitus    Lung disease Child         Respiratory Disorder    Diabetes Son         Diabetes Mellitus       Review of Systems     As stated in the HPI  All other systems were reviewed and are negative        Physical Exam     /57   Pulse (!) 53   Temp (!) 96 °F (35 6 °C) (Temporal)   Resp 18   SpO2 96% GENERAL: This is a well-developed, well-nourished, age-appropriate patient in no acute distress  The patient tends to just look at me during the exam, she will occasionally states things such as "Ellender [place]" but does not answer questions or perform commands  Eyes: Anicteric sclerae  Extraocular movements appear intact  HENT: Nares are patent with no drainage  Lungs: There is equal chest rise on inspection  Breathing is non-labored with no audible wheezing  Cardiovascular: No cyanosis  No upper extremity lymphadema  Skin: Skin is warm to touch  No obvious skin lesions or rashes other than described below  Neurologic: No ataxia  Psychiatric: Mood and affect are appropriate  Right Lower Extremity:  Leg appears to be shortened compared to contralateral side  Skin intact  Edema to the right ankle  Patient does not appear to be in pain when knee, ankle or foot is palpated     Patient grimaces with log roll  Patient does not respond when asking about sensation  Patient does not follow commands when trying to test motor function  Toes are warm and well perfused      Data Review     Results Reviewed     Procedure Component Value Units Date/Time    Basic metabolic panel [[id number]]  (Abnormal) Collected: 07/15/21 0509    Lab Status: Final result Specimen: Blood from Arm, Left Updated: 07/15/21 0530     Sodium 141 mmol/L      Potassium 4 5 mmol/L      Chloride 106 mmol/L      CO2 29 mmol/L      ANION GAP 6 mmol/L      BUN 30 mg/dL      Creatinine 1 12 mg/dL      Glucose 126 mg/dL      Calcium 8 9 mg/dL      eGFR 44 ml/min/1 73sq m     Narrative:      [place] guidelines for Chronic Kidney Disease (CKD):     Stage 1 with normal or high GFR (GFR > 90 mL/min/1 73 square meters)    Stage 2 Mild CKD (GFR = 60-89 mL/min/1 73 square meters)    Stage 3A Moderate CKD (GFR = 45-59 mL/min/1 73 square meters)    Stage 3B Moderate CKD (GFR = 30-44 mL/min/1 73 square meters)    Stage 4 Severe CKD (GFR = 15-29 mL/min/1 73 square meters)    Stage 5 End Stage CKD (GFR <15 mL/min/1 73 square meters)  Note: GFR calculation is accurate only with a steady state creatinine    Protime-INR [[id number]]  (Normal) Collected: 07/15/21 0509    Lab Status: Final result Specimen: Blood from Arm, Left Updated: 07/15/21 0528     Protime 12 6 seconds      INR 0 96    APTT [[id number]]  (Normal) Collected: 07/15/21 0509    Lab Status: Final result Specimen: Blood from Arm, Left Updated: 07/15/21 0528     PTT 28 seconds     CBC and differential [[id number]] Collected: 07/15/21 0509    Lab Status: Final result Specimen: Blood from Arm, Left Updated: 07/15/21 0514     WBC 5 79 Thousand/uL      RBC 4 08 Million/uL      Hemoglobin 12 0 g/dL      Hematocrit 36 3 %      MCV 89 fL      MCH 29 4 pg      MCHC 33 1 g/dL      RDW 13 1 %      MPV 11 6 fL      Platelets 603 Thousands/uL      nRBC 0 /100 WBCs      Neutrophils Relative 58 %      Immat GRANS % 1 %      Lymphocytes Relative 28 %      Monocytes Relative 10 %      Eosinophils Relative 3 %      Basophils Relative 0 %      Neutrophils Absolute 3 34 Thousands/µL      Immature Grans Absolute 0 03 Thousand/uL      Lymphocytes Absolute 1 63 Thousands/µL      Monocytes Absolute 0 60 Thousand/µL      Eosinophils Absolute 0 19 Thousand/µL      Basophils Absolute 0 00 Thousands/µL              Imaging:  Xrays of the right ankle, knee, hip and pelvis were reviewed and show a displaced intertrochanteric femur fracture on the right side  Patient with significant degenerative changes about the knee  Patient's ankle sits in an inverted position, no obvious knee or ankle fractures appreciated  Assessment and Plan      81 yo female with right hip intertrochanteric fracture  Unable to obtain history from the patient so patient's family will have to be contacted  Patient also with dementia and is not able to provide consent, so will have to discuss this with family/POA as well   Plan would be surgical fixation of this, likely femoral IMN  Patient should remain NPO and NWB RLE  Will need medical clearance prior to surgery

## 2021-07-15 NOTE — ANESTHESIA PREPROCEDURE EVALUATION
Procedure:  INSERTION NAIL IM FEMUR ANTEGRADE (TROCHANTERIC) right (Right Leg Upper)    Relevant Problems   CARDIO   (+) Essential hypertension   (+) Hyperlipidemia   (+) PAF (paroxysmal atrial fibrillation) (HCC)   (+) Paroxysmal A-fib (HCC)      /RENAL   (+) Stage 3a chronic kidney disease (HCC)      NEURO/PSYCH   (+) Anxiety   (+) Dementia (HCC)   (+) 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HCC)   (+) 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HCC)      Other   (+) Moderate protein-calorie malnutrition (HCC)        Physical Exam    Airway    Mallampati score: III  TM Distance: <3 FB  Neck ROM: limited     Dental   upper dentures and lower dentures,     Cardiovascular  Rhythm: regular, Rate: normal, Cardiovascular exam normal    Pulmonary  Pulmonary exam normal Breath sounds clear to auscultation,     Other Findings        Anesthesia Plan  ASA Score- 3     Anesthesia Type- general with ASA Monitors  Additional Monitors:   Airway Plan: ETT  Plan Factors-    Chart reviewed  EKG reviewed  Imaging results reviewed  Existing labs reviewed  Patient summary reviewed  Induction- intravenous  Postoperative Plan- Plan for postoperative opioid use  Informed Consent- Anesthetic plan and risks discussed with patient and son

## 2021-07-15 NOTE — PHYSICAL THERAPY NOTE
Physical Therapy Cancellation Note    PT Orders received, EMR reviewed  Note pt with IT fx R hip and plan for OR per Ortho note  PT cancelled for today pending surgery  Will f/u to complete evals s/p surgery      Jessi Tilley Page, PT

## 2021-07-15 NOTE — H&P
[address] [facility] 11/20/1931, 80 y o  female MRN: [id number]  Unit/Bed#: E4 -01 Encounter: [id number]  Primary Care Provider: Leeanna Campbell MD   Date and time admitted to hospital: 7/15/2021  3:52 AM    * Closed right hip fracture [facility])  Assessment & Plan  · S/p fall  Preliminary XR shows right hip fracture; unable to obtain HPI due to dementia  As per ED resident, caregiver reported assisted fall  · NPO  · Pain control  · NWB RLE  · Start DVT prophylaxis postsurgery  Scheduled sq heparin to begin tonight 7/15 at 22:00  · PT OT consult  · May need post acute rehab, will consult Case Management for disposition  · Orthopedic surgery consult    Stage 3a chronic kidney disease [facility])  Assessment & Plan  Lab Results   Component Value Date    EGFR 44 07/15/2021    EGFR 46 05/21/2021    EGFR 38 03/03/2021    CREATININE 1 12 07/15/2021    CREATININE 1 08 05/21/2021    CREATININE 1 26 03/03/2021     · Creatinine at baseline  Avoid NSAIDs and nephrotoxins  · Outpatient follow-up with Nephrology    Bilateral hearing loss  Assessment & Plan  · Followed outpatient by ENT for bilateral hearing loss    Essential hypertension  Assessment & Plan  · On Toprol, diltiazem and losartan  · Hold losartan for anticipated surgery    Major neurocognitive disorder ([facility] [address] )  Assessment & Plan  · History of schizophrenia and dementia  · Has 24hr caregiver at home  · Supportive care    Paroxysmal A-fib (HCC)  Assessment & Plan  · PAF on rate control with diltiazem and Toprol    VTE Prophylaxis: Heparin  / sequential compression device   Code Status: DNR/I  POLST: POLST is not applicable to this patient  Discussion with family: son KEMAL Stoner at bedside    Anticipated Length of Stay:  Patient will be admitted on an Inpatient basis with an anticipated length of stay of  > 2 midnights     Justification for Hospital Stay: hip fx    Total Time for Visit, including Counseling / Coordination of Care: 60 minutes  Greater than 50% of this total time spent on direct patient counseling and coordination of care  Chief Complaint:   fall    History of Present Illness:    Gifty Teran is a 80 y o  female who presents s/p fall  H/o schizophrenia and dementia, unable to obtain HPI from patient, oriented to person only  As per son at bedside, patient was ambulating to bathroom with assistance from caregiver, caregiver reported patient tripped, assisted fall  Patient screamed of pain so 911 was called  Review of Systems:    Review of Systems   Unable to perform ROS: Dementia       Past Medical and Surgical History:     Past Medical History:   Diagnosis Date    Anxiety     Cognitive impairment     Dementia ([facility] [address] )     Depression     Hallucination     Hyperlipidemia     Hypertension     Memory loss     Psychiatric illness     Psychosis ([facility] [address] )     Schizoaffective disorder ([facility] [address] )     Sleep difficulties        Past Surgical History:   Procedure Laterality Date    APPENDECTOMY      BLADDER SURGERY      CHOLECYSTECTOMY      TOTAL ABDOMINAL HYSTERECTOMY W/ BILATERAL SALPINGOOPHORECTOMY      TOTAL KNEE ARTHROPLASTY Left        Meds/Allergies:    Prior to Admission medications    Medication Sig Start Date End Date Taking?  Authorizing Provider   acetaminophen (TYLENOL) 325 mg tablet Take 2 tablets (650 mg total) by mouth every 6 (six) hours as needed for mild pain, moderate pain or headaches  Patient not taking: Reported on 5/21/2021 8/23/20   BEV Bautista   albuterol (2 5 mg/3 mL) 0 083 % nebulizer solution Take 1 vial (2 5 mg total) by nebulization every 4 (four) hours as needed for wheezing or shortness of breath  Patient not taking: Reported on 5/21/2021 6/17/20   Calderon Murcia MD   albuterol (PROVENTIL HFA,VENTOLIN HFA) 90 mcg/act inhaler Inhale 2 puffs every 4 (four) hours as needed for wheezing  Patient not taking: Reported on 5/21/2021 6/17/20   Calderon Murcia MD   cyanocobalamin (VITAMIN B-12) 1000 MCG tablet Take 1 tablet (1,000 mcg total) by mouth daily 6/16/21 9/14/21  Enrique Turner MD   diclofenac sodium (VOLTAREN) 1 % Apply to the right knee and left knee 3 times a day 11/18/20   Enrique Turner MD   Diclofenac Sodium (VOLTAREN) 1 %  5/24/21   Historical Provider, MD   diltiazem (CARDIZEM CD) 180 mg 24 hr capsule TAKE 1 CAPSULE (180 MG TOTAL) BY MOUTH DAILY 6/26/21   Enrique Turner MD   docusate sodium (COLACE) 100 mg capsule Take 1 capsule (100 mg total) by mouth every 12 (twelve) hours 5/26/20   Nils Ho MD   DULoxetine (CYMBALTA) 30 mg delayed release capsule Take 30 mg by mouth daily at bedtime    Historical Provider, MD   LORazepam (ATIVAN) 0 5 mg tablet Take by mouth daily    Historical Provider, MD   losartan (COZAAR) 100 MG tablet TAKE ONE TABLET BY MOUTH (100 MG TOTAL) DAILY 5/17/21   Enrique Turner MD   melatonin 3 mg Take 3 mg by mouth daily at bedtime Take 2 tablets at bedtime    Historical Provider, MD   metoprolol succinate (TOPROL-XL) 100 mg 24 hr tablet TAKE 1 TABLET (100 MG TOTAL) BY MOUTH DAILY 5/17/21   Enrique Turner MD   OLANZapine (ZyPREXA) 5 mg tablet Take 1 tablet (5 mg total) by mouth daily at bedtime 5/26/20   Nils Ho MD   polyethylene glycol (MIRALAX) 17 g packet Take 17 g by mouth daily as needed (constipation first line) 6/17/20   Enrique Turner MD   traZODone (DESYREL) 50 mg tablet Take 1 tablet (50 mg total) by mouth daily at bedtime 3/10/21   Enrique Turner MD     I have reviewed home medications using allscripts  Allergies: No Known Allergies    Social History:     Marital Status:     Occupation: retired  Patient Pre-hospital Living Situation: resides alone, 24hr caregiver  Patient Pre-hospital Level of Mobility: walker  Patient Pre-hospital Diet Restrictions:   Substance Use History:   Social History     Substance and Sexual Activity   Alcohol Use Never     Social History     Tobacco Use   Smoking Status Never Smoker   Smokeless Tobacco Never Used   Tobacco Comment    Former smoker per Allscript     Social History     Substance and Sexual Activity   Drug Use No       Family History:    Family History   Problem Relation Age of Onset    Heart disease Mother         Cardiac disorder    Parkinsonism Father     Heart disease Sister         Cardiac disorder    Hypertension Sister     Diabetes Child         Diabetes Mellitus    Lung disease Child         Respiratory Disorder    Diabetes Son         Diabetes Mellitus       Physical Exam:     Vitals:   Blood Pressure: (!) 176/74 (07/15/21 1978)  Pulse: (!) 52 (07/15/21 0614)  Temperature: 97 6 °F (36 4 °C) (07/15/21 0614)  Temp Source: Tympanic (07/15/21 5367)  Respirations: 16 (07/15/21 0614)  SpO2: 94 % (07/15/21 6975)    Physical Exam  Constitutional:       General: She is not in acute distress  Appearance: Normal appearance  She is normal weight  She is not ill-appearing, toxic-appearing or diaphoretic  HENT:      Head: Normocephalic and atraumatic  Comments: Hard of hearing     Nose: No congestion or rhinorrhea  Mouth/Throat:      Mouth: Mucous membranes are dry  Eyes:      Conjunctiva/sclera: Conjunctivae normal    Cardiovascular:      Rate and Rhythm: Normal rate and regular rhythm  Pulmonary:      Effort: Pulmonary effort is normal       Breath sounds: Normal breath sounds  Abdominal:      General: Bowel sounds are normal       Palpations: Abdomen is soft  Musculoskeletal:         General: No swelling or deformity  Right lower leg: No edema  Left lower leg: No edema  Skin:     General: Skin is warm  Findings: No bruising  Neurological:      Mental Status: She is alert  She is disoriented  Psychiatric:      Comments: Oriented to person only; disoriented speech       Additional Data:     Lab Results: I have personally reviewed pertinent reports        Results from last 7 days   Lab Units 07/15/21  0509   WBC Thousand/uL 5 79   HEMOGLOBIN g/dL 12 0   HEMATOCRIT % 36 3 PLATELETS Thousands/uL 226   NEUTROS PCT % 58   LYMPHS PCT % 28   MONOS PCT % 10   EOS PCT % 3     Results from last 7 days   Lab Units 07/15/21  0509   SODIUM mmol/L 141   POTASSIUM mmol/L 4 5   CHLORIDE mmol/L 106   CO2 mmol/L 29   BUN mg/dL 30*   CREATININE mg/dL 1 12   ANION GAP mmol/L 6   CALCIUM mg/dL 8 9   GLUCOSE RANDOM mg/dL 126     Results from last 7 days   Lab Units 07/15/21  0509   INR  0 96                   Imaging: I have personally reviewed pertinent reports  XR hip/pelv 2-3 vws right   ED Interpretation by Philip Mares MD (07/15 3813)   Right hip fracture      XR ankle 3+ views RIGHT   ED Interpretation by Philip Mares MD (07/15 9459)   No acute fracture or dislocation      XR knee 1 or 2 vw right   ED Interpretation by Philip Mares MD (07/15 8542)   Degenerative changes, no clear fracture or dislocation  EKG, Pathology, and Other Studies Reviewed on Admission:   · EKG: xr ECHO LVEF 72%    Allscripts / Epic Records Reviewed: Yes     ** Please Note: This note has been constructed using a voice recognition system   **

## 2021-07-15 NOTE — OCCUPATIONAL THERAPY NOTE
Occupational Therapy         Patient Name: Jared Hermosillo  UEQKE'U Date: 7/15/2021      MD's orders received  Pt planned for OR 2* hip fx  Will continue when appropriate   Toby Navarro OT

## 2021-07-16 PROBLEM — D62 ABLA (ACUTE BLOOD LOSS ANEMIA): Status: ACTIVE | Noted: 2021-07-16

## 2021-07-16 LAB
ANION GAP SERPL CALCULATED.3IONS-SCNC: 14 MMOL/L (ref 4–13)
ANION GAP SERPL CALCULATED.3IONS-SCNC: 8 MMOL/L (ref 4–13)
BASOPHILS # BLD AUTO: 0.01 THOUSANDS/ΜL (ref 0–0.1)
BASOPHILS NFR BLD AUTO: 0 % (ref 0–1)
BUN SERPL-MCNC: 30 MG/DL (ref 5–25)
BUN SERPL-MCNC: 40 MG/DL (ref 5–25)
CALCIUM SERPL-MCNC: 8.4 MG/DL (ref 8.3–10.1)
CALCIUM SERPL-MCNC: 8.7 MG/DL (ref 8.3–10.1)
CHLORIDE SERPL-SCNC: 103 MMOL/L (ref 100–108)
CHLORIDE SERPL-SCNC: 108 MMOL/L (ref 100–108)
CO2 SERPL-SCNC: 23 MMOL/L (ref 21–32)
CO2 SERPL-SCNC: 23 MMOL/L (ref 21–32)
CREAT SERPL-MCNC: 1.23 MG/DL (ref 0.6–1.3)
CREAT SERPL-MCNC: 2.31 MG/DL (ref 0.6–1.3)
EOSINOPHIL # BLD AUTO: 0.01 THOUSAND/ΜL (ref 0–0.61)
EOSINOPHIL NFR BLD AUTO: 0 % (ref 0–6)
ERYTHROCYTE [DISTWIDTH] IN BLOOD BY AUTOMATED COUNT: 13.4 % (ref 11.6–15.1)
ERYTHROCYTE [DISTWIDTH] IN BLOOD BY AUTOMATED COUNT: 13.7 % (ref 11.6–15.1)
GFR SERPL CREATININE-BSD FRML MDRD: 18 ML/MIN/1.73SQ M
GFR SERPL CREATININE-BSD FRML MDRD: 39 ML/MIN/1.73SQ M
GLUCOSE SERPL-MCNC: 140 MG/DL (ref 65–140)
GLUCOSE SERPL-MCNC: 201 MG/DL (ref 65–140)
HCT VFR BLD AUTO: 26.5 % (ref 34.8–46.1)
HCT VFR BLD AUTO: 26.7 % (ref 34.8–46.1)
HGB BLD-MCNC: 8.6 G/DL (ref 11.5–15.4)
HGB BLD-MCNC: 8.7 G/DL (ref 11.5–15.4)
IMM GRANULOCYTES # BLD AUTO: 0.03 THOUSAND/UL (ref 0–0.2)
IMM GRANULOCYTES NFR BLD AUTO: 0 % (ref 0–2)
LYMPHOCYTES # BLD AUTO: 1.03 THOUSANDS/ΜL (ref 0.6–4.47)
LYMPHOCYTES NFR BLD AUTO: 15 % (ref 14–44)
MCH RBC QN AUTO: 29.6 PG (ref 26.8–34.3)
MCH RBC QN AUTO: 29.7 PG (ref 26.8–34.3)
MCHC RBC AUTO-ENTMCNC: 32.5 G/DL (ref 31.4–37.4)
MCHC RBC AUTO-ENTMCNC: 32.6 G/DL (ref 31.4–37.4)
MCV RBC AUTO: 91 FL (ref 82–98)
MCV RBC AUTO: 91 FL (ref 82–98)
MONOCYTES # BLD AUTO: 0.67 THOUSAND/ΜL (ref 0.17–1.22)
MONOCYTES NFR BLD AUTO: 10 % (ref 4–12)
NEUTROPHILS # BLD AUTO: 5.15 THOUSANDS/ΜL (ref 1.85–7.62)
NEUTS SEG NFR BLD AUTO: 75 % (ref 43–75)
NRBC BLD AUTO-RTO: 0 /100 WBCS
PLATELET # BLD AUTO: 168 THOUSANDS/UL (ref 149–390)
PLATELET # BLD AUTO: 177 THOUSANDS/UL (ref 149–390)
PMV BLD AUTO: 10.8 FL (ref 8.9–12.7)
PMV BLD AUTO: 10.8 FL (ref 8.9–12.7)
POTASSIUM SERPL-SCNC: 4.2 MMOL/L (ref 3.5–5.3)
POTASSIUM SERPL-SCNC: 4.9 MMOL/L (ref 3.5–5.3)
RBC # BLD AUTO: 2.91 MILLION/UL (ref 3.81–5.12)
RBC # BLD AUTO: 2.93 MILLION/UL (ref 3.81–5.12)
SODIUM SERPL-SCNC: 139 MMOL/L (ref 136–145)
SODIUM SERPL-SCNC: 140 MMOL/L (ref 136–145)
WBC # BLD AUTO: 6.9 THOUSAND/UL (ref 4.31–10.16)
WBC # BLD AUTO: 8.09 THOUSAND/UL (ref 4.31–10.16)

## 2021-07-16 PROCEDURE — 97167 OT EVAL HIGH COMPLEX 60 MIN: CPT

## 2021-07-16 PROCEDURE — 99232 SBSQ HOSP IP/OBS MODERATE 35: CPT | Performed by: PHYSICIAN ASSISTANT

## 2021-07-16 PROCEDURE — 80048 BASIC METABOLIC PNL TOTAL CA: CPT | Performed by: PHYSICIAN ASSISTANT

## 2021-07-16 PROCEDURE — 97163 PT EVAL HIGH COMPLEX 45 MIN: CPT

## 2021-07-16 PROCEDURE — 99024 POSTOP FOLLOW-UP VISIT: CPT | Performed by: ORTHOPAEDIC SURGERY

## 2021-07-16 PROCEDURE — 85027 COMPLETE CBC AUTOMATED: CPT | Performed by: PHYSICIAN ASSISTANT

## 2021-07-16 PROCEDURE — 85025 COMPLETE CBC W/AUTO DIFF WBC: CPT | Performed by: PHYSICIAN ASSISTANT

## 2021-07-16 RX ORDER — HEPARIN SODIUM 5000 [USP'U]/ML
5000 INJECTION, SOLUTION INTRAVENOUS; SUBCUTANEOUS EVERY 8 HOURS SCHEDULED
Status: COMPLETED | OUTPATIENT
Start: 2021-07-16 | End: 2021-07-16

## 2021-07-16 RX ADMIN — DULOXETINE HYDROCHLORIDE 30 MG: 30 CAPSULE, DELAYED RELEASE ORAL at 23:11

## 2021-07-16 RX ADMIN — OXYCODONE HYDROCHLORIDE 2.5 MG: 5 TABLET ORAL at 03:12

## 2021-07-16 RX ADMIN — ACETAMINOPHEN 975 MG: 325 TABLET, FILM COATED ORAL at 16:03

## 2021-07-16 RX ADMIN — ACETAMINOPHEN 975 MG: 325 TABLET, FILM COATED ORAL at 07:49

## 2021-07-16 RX ADMIN — OXYCODONE HYDROCHLORIDE 5 MG: 5 TABLET ORAL at 10:51

## 2021-07-16 RX ADMIN — HEPARIN SODIUM 5000 UNITS: 5000 INJECTION INTRAVENOUS; SUBCUTANEOUS at 07:48

## 2021-07-16 RX ADMIN — CYANOCOBALAMIN TAB 500 MCG 1000 MCG: 500 TAB at 09:37

## 2021-07-16 RX ADMIN — MELATONIN 3 MG: at 23:23

## 2021-07-16 RX ADMIN — SODIUM CHLORIDE 500 ML: 0.9 INJECTION, SOLUTION INTRAVENOUS at 21:01

## 2021-07-16 RX ADMIN — LORAZEPAM 0.5 MG: 0.5 TABLET ORAL at 23:14

## 2021-07-16 RX ADMIN — OLANZAPINE 5 MG: 5 TABLET, FILM COATED ORAL at 23:12

## 2021-07-16 RX ADMIN — ACETAMINOPHEN 975 MG: 325 TABLET, FILM COATED ORAL at 23:15

## 2021-07-16 RX ADMIN — DOCUSATE SODIUM 100 MG: 100 CAPSULE ORAL at 09:37

## 2021-07-16 RX ADMIN — CEFAZOLIN SODIUM 2000 MG: 2 SOLUTION INTRAVENOUS at 02:58

## 2021-07-16 RX ADMIN — HEPARIN SODIUM 5000 UNITS: 5000 INJECTION INTRAVENOUS; SUBCUTANEOUS at 16:03

## 2021-07-16 RX ADMIN — CEFAZOLIN SODIUM 2000 MG: 2 SOLUTION INTRAVENOUS at 11:05

## 2021-07-16 RX ADMIN — CEFAZOLIN SODIUM 2000 MG: 2 SOLUTION INTRAVENOUS at 19:20

## 2021-07-16 RX ADMIN — HEPARIN SODIUM 5000 UNITS: 5000 INJECTION INTRAVENOUS; SUBCUTANEOUS at 23:36

## 2021-07-16 NOTE — CASE MANAGEMENT
LOS: 1 GMLOS: 2 9 RISK OF READMISSION: 17 BUNDLE: NO    CM met with pt and son at bedside to discuss discharge needs  Pt lives in a one level apartment alone  She does have 24hr care givers  ADL's are completed with assistance; uses a RW for ambulation  PCP identified as Dr Marbin Norris  [facility] identified as [facility]  Pt has hx of SL-VNA; no hx of STR  Caregivers provide transportation  POA identified as pt's son, Ab AGUILERA([phone])  Pt had a recent psych stay on 4/18/2020  Pt goes to [facility]  If STR is needed after PT/OT eval pt would need to be optioned  CM will complete paperwork with son and physician  CM will advise once a determination has been made  CM reviewed d/c planning process including the following: identifying help at home, patient preference for d/c planning needs, Discharge Lounge, [facility] Meds to Bed program, availability of treatment team to discuss questions or concerns patient and/or family may have regarding understanding medications and recognizing signs and symptoms once discharged  CM also encouraged patient to follow up with all recommended appointments after discharge  Patient advised of importance for patient and family to participate in managing patients medical well being

## 2021-07-16 NOTE — PHYSICAL THERAPY NOTE
PHYSICAL THERAPY EVALUATION          Patient Name: Rom Fry  RFQCC'O Date: 7/16/2021   PT EVALUATION    80 y o     [id number]    Leg pain [M79 606]  HTN (hypertension) [I10]  Dementia ([place] [address] ) [F03 90]  Right knee pain [M25 561]  Closed right hip fracture, initial encounter ([place] [address] ) [S72 001A]    Past Medical History:   Diagnosis Date    Anxiety     Cognitive impairment     Dementia ([place] [address] )     Depression     Hallucination     Hyperlipidemia     Hypertension     Memory loss     Psychiatric illness     Psychosis ([place] [address] )     Schizoaffective disorder ([place] [address] )     Sleep difficulties      Past Surgical History:   Procedure Laterality Date    APPENDECTOMY      BLADDER SURGERY      CHOLECYSTECTOMY      NH OPEN RX FEMUR FX+INTRAMED BRYSON Right 7/15/2021    Procedure: INSERTION NAIL IM FEMUR ANTEGRADE (TROCHANTERIC) right;  Surgeon: Jil Daniels MD;  Location: [facility];  Service: Orthopedics    TOTAL ABDOMINAL HYSTERECTOMY W/ BILATERAL SALPINGOOPHORECTOMY      TOTAL KNEE ARTHROPLASTY Left         07/16/21 0859   PT Last Visit   PT Visit Date 07/16/21   Note Type   Note type Evaluation   Pain Assessment   Pain Assessment Tool FLACC   Pain Location/Orientation Orientation: Right;Location: Hip   Hospital Pain Intervention(s) Repositioned; Ambulation/increased activity; Rest   Pain Rating: FLACC (Rest) - Face 0   Pain Rating: FLACC (Rest) - Legs 0   Pain Rating: FLACC (Rest) - Activity 0   Pain Rating: FLACC (Rest) - Cry 0   Pain Rating: FLACC (Rest) - Consolability 0   Score: FLACC (Rest) 0   Pain Rating: FLACC (Activity) - Face 0   Pain Rating: FLACC (Activity) - Legs 1   Pain Rating: FLACC (Activity) - Activity 1   Pain Rating: FLACC (Activity) - Cry 1   Pain Rating: FLACC (Activity) - Consolability 1   Score: FLACC (Activity) 4   Home Living   Type of Home Apartment   Home Layout One level   Home Equipment Walker   Additional Comments obtained via chart review   Prior Function   Level of [place] Needs assistance with ADLs and functional mobility; Needs assistance with IADLs   Lives With Alone   Receives Help From Family;Personal care attendant   ADL Assistance Needs assistance   IADLs Needs assistance   Falls in the last 6 months 1 to 4  (at least one related to admission)   Comments obtained via chart review, pt has 24/7 caregivers and requires A for ADLs  use of RW for ambulation   Restrictions/Precautions   Weight Bearing Precautions Per Order Yes   RLE Weight Bearing Per Order WBAT   Other Precautions Cognitive; Chair Alarm; Bed Alarm; Fall Risk;Pain  (limited communication)   General   Additional Pertinent History pt admitted 7/15/21 for closed R hip fx, sp IM nailing  wbat per ortho  Tunisian speaking   Family/Caregiver Present No   Cognition   Overall Cognitive Status Impaired   Arousal/Participation Alert   Attention Difficulty attending to directions   Orientation Level Unable to assess  (responds to name via head movement)   Memory Unable to assess   Following Commands Follows one step commands inconsistently   Comments hx of dementia  primarily non verbal during session, but would occ speak short incomprehensible words   RLE Assessment   RLE Assessment X  (PROM difficult 2* pain)   LLE Assessment   LLE Assessment   (prom wnl, leg held in IR/add )   Bed Mobility   Rolling R 2  Maximal assistance   Additional items Assist x 2   Rolling L 2  Maximal assistance   Additional items Assist x 2   Supine to Sit 2  Maximal assistance   Additional items Assist x 2;HOB elevated; Increased time required;LE management; Other   Sit to Supine 2  Maximal assistance   Additional items Assist x 2; Increased time required;LE management; Other   Transfers   Sit to Stand 2  Maximal assistance   Additional items Assist x 2; Increased time required;Verbal cues; Other  (RW)   Stand to Sit 2  Maximal assistance   Additional items Assist x 2; Increased time required;Verbal cues; Other;Impulsive  (RW)   Balance   Dynamic Sitting Fair -   Static Standing Zero  (Ax2)   Endurance Deficit   Endurance Deficit Yes   Endurance Deficit Description BP EOB 78/54  assisted back to supine and /55   Activity Tolerance   Activity Tolerance Patient limited by pain;Treatment limited secondary to medical complications (Comment); Other (Comment)  (cognition, hypotension)   Medical Staff Made Aware Benita OT   Nurse Made Aware Brittany DEJESUS   Assessment   Prognosis Guarded   Problem List Decreased strength;Decreased range of motion;Decreased endurance; Impaired balance;Decreased mobility; Decreased cognition; Impaired judgement;Decreased safety awareness;Pain;Decreased skin integrity   Assessment Rin Gonzalez is a 80 y o  female admitted [facility] on 7/15/2021 for Closed right hip fracture ([facility] [address] )  Pt  has a past medical history of Anxiety, Cognitive impairment, Dementia ([facility] [address] ), Depression, Hallucination, Memory loss, Psychiatric illness, Psychosis ([facility] [address] ), Schizoaffective disorder ([facility] [address] )  PT was consulted and pt was seen on 7/16/2021 for mobility assessment and d/c planning  Pt presents wbat RLE per ortho, pain  Pt poor historian, obtained via chart review  At baseline has 24/7 caregivers and uses RW for ambulation Pt is currently functioning at a maximum assistance x2 level for bed mobility and transfers  Pt demonstrated impaired cognition impacting participation in therapy  In no obvious distress during EOB sitting and standing, however standing tolerance limited by LE weakness and poor UE support  Given hypotensive BP repositioned to supine for safety  Pt will benefit from continued skilled IP PT to address the above mentioned impairments  in order to maximize recovery and increase functional independence when completing mobility and ADLs  Currently PT recommendations for DME include quick move OOB when medically appropriate   At this time PT recommendations for d/c are STR given social barriers  Barriers to Discharge Decreased caregiver support   Barriers to Discharge Comments ?caregiver abilities at current LOF   Goals   Patient Goals non stated 2* cognition   STG Expiration Date 08/06/21   Short Term Goal #1 1)  Pt will perform bed mobility with mod Ax1 demonstrating appropriate technique 100% of the time in order to improve function and decrease caregiver burden  2)  Perform all transfers with mod Ax1 demonstrating safe and appropriate technique 100% of the time in order to improve ability to negotiate safely in home environment and decrease caregiver burden  3) PT for ambulation when appropriate  4)  Improve overall strength and balance 1/2 grade in order to optimize ability to perform functional tasks and reduce fall risk  5) Increase activity tolerance to 45 minutes in order to improve endurance to functional tasks  6)   PT for ongoing patient and family/caregiver education, DME needs and d/c planning in order to promote highest level of function in least restrictive environment  PT Treatment Day 0   Plan   Treatment/Interventions Functional transfer training;LE strengthening/ROM; Therapeutic exercise;Cognitive reorientation;Patient/family training;Equipment eval/education; Bed mobility;Gait training; Compensatory technique education;Continued evaluation;Spoke to nursing;OT   PT Frequency Other (Comment)  (3-5x)   Recommendation   PT Discharge Recommendation Post acute rehabilitation services   PT - OK to Discharge Yes   Additional Comments The patient's AM-PAC Basic Mobility Inpatient Short Form Low Function Raw Score 10 , Standardized Score is 14 65  A standardized score less 42 9 suggests the patient may benefit from discharge to post-acute rehab services  Please also refer to the recommendation of the Physical Therapist for safe discharge planning     AM-PAC Basic Mobility Inpatient   Turning in Bed Without Bedrails 1   Lying on Back to Sitting on Edge of Flat Bed 1   Moving Bed to Chair 1   Standing Up From Chair 1   Walk in Room 1   Climb 3-5 Stairs 1   Basic Mobility Inpatient Raw Score 6   Turning Head Towards Sound 3   Follow Simple Instructions 1   Low Function Basic Mobility Raw Score 10   Low Function Basic Mobility Standardized Score 14 65   History: co - morbidities, age, social background, fall risk, use of assistive device, assist for adl's, cognition, wbat rle  Exam: impairments in systems including musculoskeletal (PROM), neuromuscular (balance, transfers, motor function), joint integrity, cardiOpulmonary, cognition, am-pac  Clinical: unstable/unpredictable  Complexity:high      Alexander Payan, PT

## 2021-07-16 NOTE — PROGRESS NOTES
[address] [place] [address]  Progress Note - Marichuy Crockett 11/20/1931, 80 y o  female MRN: [id number]  Unit/Bed#: E2 -01 Encounter: [id number]  Primary Care Provider: Ana Clemente MD   Date and time admitted to hospital: 7/15/2021  3:52 AM    * Closed right hip fracture [facility])  Assessment & Plan  · S/p fall when ambulating to the bathroom with assistance from caregiver  Reportedly tripped and had an assisted fall  · XR shows right femoral neck fracture   · Patient seen in consultation with Orthopedics status post intramedullary nail fixation right intertrochanteric femur fracture on 07/15/2021 by Dr Yumiko Singletary   · DVT prophylaxis will switch to lovenox   · Pain control  · WBAT RLE   · PT OT consult  · Likely will need post acute rehab    ABLA (acute blood loss anemia)  Assessment & Plan  · Drop in hemoglobin from 12-8 7 overnight postoperatively  · No evidence of ongoing bleeding   · Monitor hemodynamics and H&H for need for transfusion     Lab Results   Component Value Date    HGB 8 7 (L) 07/16/2021    HGB 12 0 07/15/2021    HGB 12 1 05/21/2021    HGB 12 5 03/03/2021    HGB 12 4 11/09/2020         Stage 3a chronic kidney disease [facility])  Assessment & Plan  Lab Results   Component Value Date    EGFR 39 07/16/2021    EGFR 44 07/15/2021    EGFR 46 05/21/2021    CREATININE 1 23 07/16/2021    CREATININE 1 12 07/15/2021    CREATININE 1 08 05/21/2021     · Creatinine at baseline; Cr 1 0-1 2  · Avoid NSAIDs and nephrotoxins    · Outpatient follow-up with Nephrology     Major neurocognitive disorder [facility])  Assessment & Plan  · History of schizophrenia and dementia  · Has 24hr caregiver at home  · Supportive care  · PT/OT consult   · continue ativan, zyprexa, cymbalta   · PDMP reviewed   · Monitor for hospital delirium     Paroxysmal A-fib ([facility] [address] )  Assessment & Plan  · PAF on rate control with diltiazem and Toprol XL  · No longer on anticoagulation due to fall risk given dementia and schizophrenia       Bilateral hearing loss  Assessment & Plan  · Followed outpatient by ENT for bilateral hearing loss    Essential hypertension  Assessment & Plan  · On Toprol, diltiazem and losartan per home regimen   · Hold losartan - resume if blood pressure tolerates   · Currently running low normal       VTE Pharmacologic Prophylaxis:   Pharmacologic: Heparin  Mechanical VTE Prophylaxis in Place: Yes    Patient Centered Rounds: I have performed bedside rounds with nursing staff today  Discussions with Specialists or Other Care Team Provider: reviewed Dr Zoey Murcia notes     Education and Discussions with Family / Patient: patient at bedside with nursing staff, spoke with son Morena Brooks at the side of the bed and reviewd plan of care    Time Spent for Care: 20 minutes  More than 50% of total time spent on counseling and coordination of care as described above  Current Length of Stay: 1 day(s)    Current Patient Status: Inpatient   Certification Statement: The patient will continue to require additional inpatient hospital stay due to s/p right hip fx repair     Discharge Plan:  Pending PT/OT eval likely needs placement    Code Status: Level 3 - DNAR and DNI      Subjective:   Patient is resting in bed  Limited review of systems Given schizophrenia and dementia as well as language barrier  She is currently being fed breakfast by nursing staff  She smiles during the exam   She does not look any acute distress  Objective:     Vitals:   Temp (24hrs), Av 2 °F (36 2 °C), Min:96 1 °F (35 6 °C), Max:98 8 °F (37 1 °C)    Temp:  [96 1 °F (35 6 °C)-98 8 °F (37 1 °C)] 96 1 °F (35 6 °C)  HR:  [52-62] 60  Resp:  [16-20] 18  BP: (108-149)/(43-65) 111/55  SpO2:  [91 %-100 %] 95 %  There is no height or weight on file to calculate BMI  Input and Output Summary (last 24 hours):        Intake/Output Summary (Last 24 hours) at 2021 0857  Last data filed at 2021 0328  Gross per 24 hour   Intake 1590 ml   Output 538 ml   Net 1052 ml Physical Exam:     Physical Exam  Vitals and nursing note reviewed  HENT:      Head: Normocephalic  Eyes:      Conjunctiva/sclera: Conjunctivae normal    Cardiovascular:      Rate and Rhythm: Normal rate and regular rhythm  Pulmonary:      Effort: Pulmonary effort is normal  No respiratory distress  Breath sounds: Normal breath sounds  Comments: Decreased breath sounds throughout, no adventitious sounds present, on room air  Abdominal:      General: Bowel sounds are normal       Palpations: Abdomen is soft  Tenderness: There is no abdominal tenderness  Musculoskeletal:      Right lower leg: No edema  Left lower leg: No edema  Skin:     Comments: Dressing present over right hip   Neurological:      Mental Status: She is alert  Mental status is at baseline  Psychiatric:         Speech: She is noncommunicative  Behavior: Behavior is not agitated  Cognition and Memory: Cognition is impaired  Memory is impaired  Additional Data:     Labs:    Results from last 7 days   Lab Units 07/16/21  0710   WBC Thousand/uL 6 90   HEMOGLOBIN g/dL 8 7*   HEMATOCRIT % 26 7*   PLATELETS Thousands/uL 168   NEUTROS PCT % 75   LYMPHS PCT % 15   MONOS PCT % 10   EOS PCT % 0     Results from last 7 days   Lab Units 07/16/21  0538   SODIUM mmol/L 139   POTASSIUM mmol/L 4 9   CHLORIDE mmol/L 108   CO2 mmol/L 23   BUN mg/dL 30*   CREATININE mg/dL 1 23   ANION GAP mmol/L 8   CALCIUM mg/dL 8 7   GLUCOSE RANDOM mg/dL 140     Results from last 7 days   Lab Units 07/15/21  0509   INR  0 96                       * I Have Reviewed All Lab Data Listed Above  * Additional Pertinent Lab Tests Reviewed:  All Labs Within Last 24 Hours Reviewed    Imaging:    Imaging Reports Reviewed Today Include: reviewed  Imaging Personally Reviewed by Myself Includes:      Recent Cultures (last 7 days):           Last 24 Hours Medication List:   Current Facility-Administered Medications   Medication Dose Route Frequency Provider Last Rate    acetaminophen  975 mg Oral Q8H [address] Jeanette Rivero PA-C      albuterol  2 puff Inhalation Q4H PRN Maddy Jefferson PA-C      aluminum-magnesium hydroxide-simethicone  30 mL Oral Q6H PRN Maddy Jefferson PA-C      cefazolin  2,000 mg Intravenous Q8H Maddy Jefferson PA-C Stopped (07/16/21 0328)    cyanocobalamin  1,000 mcg Oral Daily Maddy Jefferson PA-C      diltiazem  120 mg Oral Daily Maddy Jefferson PA-C      docusate sodium  100 mg Oral Q12H [address] Maddy Jefferson PA-C      DULoxetine  30 mg Oral HS Maddy Jefferson PA-C      [START ON 7/17/2021] enoxaparin  30 mg Subcutaneous Q24H [address] Azalea Robertson PA-C      heparin (porcine)  5,000 Units Subcutaneous Q8H [address] Azalea Robertson PA-C      HYDROmorphone  0 2 mg Intravenous Q4H PRN Maddy Jefferson PA-C      LORazepam  0 5 mg Oral HS Maddy Jefferson PA-C      melatonin  3 mg Oral HS Maddy Jefferson PA-C      metoprolol succinate  50 mg Oral Daily Maddy Jefferson PA-C      OLANZapine  5 mg Oral HS Maddy Jefferson PA-C      ondansetron  4 mg Intravenous Q6H PRN Maddy Jefferson PA-C      oxyCODONE  2 5 mg Oral Q4H PRN Maddy Jefferson PA-C      oxyCODONE  5 mg Oral Q4H PRN Maddy Jefferson PA-C      polyethylene glycol  17 g Oral Daily PRN Maddy Jefferson PA-C      simethicone  80 mg Oral 4x Daily PRN Maddy Jefferson PA-C          Today, Patient Was Seen By: Magui Hernandez PA-C    ** Please Note: Dictation voice to text software may have been used in the creation of this document   **

## 2021-07-16 NOTE — OP NOTE
DATE OF SURGERY: 7/15/2021    SURGEON: Janet Sweeney MD    PREOPERATIVE DIAGNOSIS: right intertrochanteric femur fracture    POSTOPERATIVE DIAGNOSIS: same    PROCEDURE: Intramedullary nail fixation right intertrochanteric femur fracture    IMPLANTS:   Implant Name Type Inv  Item Serial No   Lot No  LRB No  Used Action   SCREW LCK 5 X 30MM T25 STRDRV RECES STRL - [id number]  SCREW LCK 5 X 30MM T25 STRDRV RECES STRL  Synthes [id number] Right 1 Implanted   NAIL IM 11MM 125 DEG TI HUNTER TFNA 340MM RT  STRL - [id number]  NAIL IM 11MM 125 DEG TI HUNTER TFNA 340MM RT  STRL  Synthes [id number] Right 1 Implanted   SCREW TFNA FENESTRATED 90MM  STRL - [id number]  SCREW TFNA FENESTRATED 90MM  STRL  Synthes [id number] Right 1 Implanted   katena synthes 2 5mm reaming channing with ball   [id number]  [id number] Right 1 Implanted        ASSISTANTS:      SANTOS Delarosa       ANESTHESIA: General    ESTIMATED BLOOD LOSS: 200 mL     INTRAVENOUS FLUIDS: Per anesthesia    URINE OUTPUT: No rooney    TOURNIQUET TIME: * No tourniquets in log *      COMPLICATIONS: None    ANTIBIOTICS: 2g Ancef    SPECIMENS: * No specimens in log *         INDICATIONS: Ying Singh is a 80y o  year old female who sustained the above conditions  The indications for operative intervention were a displaced intertrochanteric femur fracture and the need for early mobilization and mortality reduction  The alternatives to operative intervention included nonoperative management with bedrest and mobilization as pain tolerates  The risks of the operative procedure were discussed in detail with the patient and her son including but not limited to the risks of anesthesia, infection, injury to blood vessel/nerve, pain, malunion, nonunion, painful implants, stiffness, changes in sensation, and the need for repeat surgery  The patient understood these risks and alternatives and elected to proceed with surgery       DESCRIPTION OF PROCEDURE:   The patient was seen in the preoperative holding area and identification was performed as per the institution's protocol  The patient was then brought to the operating room, a briefing was held and prophylactic antibiotics were given  Anesthesia was induced  The patient was placed onto the [facility] table ensuring the post was stable and the feet were secure  The bump was placed under the ipsilateral susi sacrum and the ipsilateral arm was draped over the chest and well padded  X-rays with the C-arm were taken before prepping to ensure that appropriate images could be obtained and adjustments were made with the [place] table to affect as much reduction as possible  The site was pre-scrubbed with chlorhexidine and prepped with ChloraPrep and draped in the usual sterile fashion  Following a timeout procedure, an incision was made about 2 cm proximal to the greater trochanter the lateral hip  Dissection was carried down through skin and subcutaneous tissue to the iliotibial band  This was incised with a knife longitudinally  Blunt dissection was then used to access the tip of the greater trochanter  This was palpated and then a threaded guide pin was placed under fluoroscopy through the tip of the greater trochanter  It was noted to be in axis with the femoral shaft both on the AP and lateral x-ray views  An opening reamer was then used to access the femoral canal   A ball-tipped guidewire was then placed through the trochanter and into the femoral shaft  Biplanar fluoroscopy was utilized to ensure appropriate trajectory down the femoral shaft as well as ensuring that it was not placed anteriorly in the distal femur  Subsequent reaming commenced down the femoral shaft to ensure an opening at least 1 5 mm greater than the with of the selected nail  Estimated nail size was obtained from the length of the implanted guidewire using the measuring tool  The nail was then selected and implanted taking care to not displace the fracture  Fine adjustments were made to ensure the depth of the nail was appropriate to allow for a lag screw into the inferior aspect of the femoral neck  A triple sleeve was then introduced through a small stab incision through the skin subcutaneous tissue and iliotibial band distally  The trocar was brought to the lateral cortex and a threaded guide pin was then placed through this trocar into the femoral neck and head  AP and lateral fluoroscopy was used to ensure appropriate trajectory of the guidewire allowing for a small tip apex distance as well as keeping the nail inferior in the femoral neck  Estimated lag screw length was then obtained from the guide pin  The outer cortical reamer was then used and subsequently, the smaller reamer was then used into the femoral head  The selected screw was then placed  Set screw was then tightened down into the lag screw in static fashion  We then turned our attention distally and 1 lag screw was placed through the nail in static fashion through stab incisions distally using perfect [place] technique under fluoroscopy  Final fluoroscopic images were obtained of the entirety of the femur and we were satisfied with both the reduction of the fracture as well as the orientation of the implant  The wounds were copiously irrigated and closed in layers including an 0 Vicryl in the iliotibial band, 3-0 monocryl in the subcutaneous tissue, and 3-0 Monocryl in the subcuticular layer  Steri-Strips were applied, local analgesia was infused into the wounds, and a sterile dressings were applied  All sharps and sponge counts were correct and there were no complications  I attest that I, Beatrice Guzmán, was present and scrubbed for the entirety of the procedure  No qualified resident was available to assist with this case  and A physician assistant was required during the procedure for retraction, tissue handling, dissection and suturing  The assistant was critical for helping to hold reduction while the nail was placed  The patient was awoken from anesthesia in good condition and taken to the PACU  PLAN: The patient will be weight bearing as tolerated on the affected extremity  DVT prophylaxis can commence tonight  PT consult will be placed

## 2021-07-16 NOTE — UTILIZATION REVIEW
Inpatient Admission Authorization Request   NOTIFICATION OF INPATIENT ADMISSION/INPATIENT AUTHORIZATION REQUEST   SERVICING FACILITY:   [address] [place] [address], [facility], [address] [facility]  Tax ID: [id number]  NPI: [id number]  Place of Service: Inpatient [address] [facility]  [address]  Place of Service Code: 24     ATTENDING PROVIDER:  Attending Name and NPI#: Luz Marina Mesama [[id number]]  Address: [address] [place] [address], [facility], [facility] [address] [facility]  Phone: [phone]     UTILIZATION REVIEW CONTACT:  Dorita Valverde, Utilization   Network Utilization Review Department  Phone: [phone]  Fax: [phone]  Email: [email or web address]     PHYSICIAN ADVISORY SERVICES:  FOR CTNO-NV-BUNW REVIEW - MEDICAL NECESSITY DENIAL  Phone: [phone]  Fax: [phone]  Email: [email or web address]     TYPE OF REQUEST:  Inpatient Status     ADMISSION INFORMATION:  ADMISSION DATE/TIME: 7/15/21  5:45 AM  PATIENT DIAGNOSIS CODE/DESCRIPTION:  Leg pain [M79 606]  HTN (hypertension) [I10]  Dementia ([facility] [address] ) [F03 90]  Right knee pain [M25 561]  [facility] [S72 001A]  DISCHARGE DATE/TIME: No discharge date for patient encounter  DISCHARGE DISPOSITION (IF DISCHARGED): Home/Self Care     IMPORTANT INFORMATION:  Please contact the Antel[facility] Gutierres directly with any questions or concerns regarding this request  Department voicemails are confidential     Send requests for admission clinical reviews, concurrent reviews, approvals, and administrative denials due to lack of clinical to fax [phone]

## 2021-07-16 NOTE — ANESTHESIA POSTPROCEDURE EVALUATION
Post-Op Assessment Note    CV Status:  Stable    Pain management: adequate     Mental Status:  Awake, confused and alert   Hydration Status:  Euvolemic   PONV Controlled:  Controlled   Airway Patency:  Patent      Post Op Vitals Reviewed: Yes            No complications documented      BP      Temp      Pulse     Resp      SpO2

## 2021-07-16 NOTE — PROGRESS NOTES
Progress Note - Orthopedics   Jammie Ransom 80 y o  female MRN: [id number]  Unit/Bed#: E2 -01 Encounter: [id number]    Assessment:  Status post right hip intramedullary nail fixation    Plan:  Weight-bearing as tolerated  PT out of bed  IV fluids until taking good p o  Follow-up on CBC  Dispo planning  Dressing change day 2  DVT prophylaxis with Lovenox ideal    Subjective:  Patient demented not responding to questions though is awake at baseline    Vitals: Blood pressure 108/52, pulse 62, temperature (!) 96 6 °F (35 9 °C), temperature source Temporal, resp  rate 18, SpO2 99 %  ,There is no height or weight on file to calculate BMI  Intake/Output Summary (Last 24 hours) at 7/16/2021 9240  Last data filed at 7/16/2021 0328  Gross per 24 hour   Intake 1590 ml   Output 538 ml   Net 1052 ml       Invasive Devices     Peripheral Intravenous Line            Peripheral IV 07/15/21 Left Antecubital 1 day                Physical Exam:  Examination right lower extremity reveals dressings that are clean dry and intact  She is moving her foot grossly  Palpable pedal pulse    Some pain and grimace with logroll

## 2021-07-16 NOTE — DISCHARGE INSTRUCTIONS
Marce Cisse - Dr Wolf Chow (Orthopedic Surgery)    Follow-up Appointments   Please call to set up/confirm your first postoperative visit with Dr Agata Gil in 4 weeks from surgery    Dressing and Hazel a dressing on your surgical incision for a total of 4 weeks  The dressing should be made of gauze and Tegaderm  Ensure that the gauze is dry every day and change the dressing if you notice any staining on the gauze   After 2 weeks you may shower  Keep the wound covered with gauze and Tegaderm during showers   Apply an ice pack over your dressing for 20 minutes of every hour for the first 3 days when you are awake  This can help to reduce swelling and inflammation  Be sure the ice pack is waterproof so it does not leak on the dressing/splint  A simple ice pack can be made by adding ten cubes and a small amount of water in a small zip-lock bag  Seal this small bag tightly  Place this small bag in a larger zip lock bag  Apply to the area in pain  ACTIVITIES:   You may put full weight on your leg   Avoid keeping your leg down for extended periods of time   You may bend your hip, knee, and toes often to help keep blood circulating      POSTOPERATIVE CARE/CONCERNS:  [facility] You may experience some temporary numbness in your toes   You should have very little to no bleeding on your dressing   Notify the office (see contact info at bottom of page) for any of the following:  o Excessive pain not relieved by rest, elevation, and pain medications  o Feeling that the dressing is too tight in spite of adequately elevating  o Active bleeding through the dressing  o Drainage from the wound site or pin sites  o Foul odor from the dressing/wound  o Temperature greater that 101? F or chills  o Blue or excessively cold fingertips  o Numbness of the fingertips that does not improve in spite of adequately elevating     PAIN MEDICATION:   Pain is a normal part of the recovery after surgery   The pain medication provided to you will help to decrease the discomfort but will not completely eliminate the pain   A prescription for a narcotic pain medicine (oxycodone or hydrocodone) and anti-inflammatory (naproxen) were called in to your pharmacy  Please take the anti-inflammatory medication AND over-the-counter acetaminophen (Tylenol) regularly  The instructions will be listed on the bottles  The narcotic pain medicine should be used for pain that is not controlled by these other medications and only for the first few days after surgery  The narcotic is HIGHLY ADDICTIVE and has many side effects such as causing constipation, dizziness, confusion, decreased breathing and more  It is safe to take for a short period of time after surgery  It is almost never prescribed for longer than a few weeks and never after one month for elective surgeries   Do not take narcotic or anti-inflammatories on an empty stomach   It is illegal to drive while taking narcotic pain medication   Your pain should decrease over the first few days after surgery which will allow you to take less pain medicine, increase the time between doses of medication, or stop taking all pain medicine        OFFICE CONTACT NUMBERS   Please call [phone] with any questions about appointments or any medical concerns

## 2021-07-16 NOTE — OCCUPATIONAL THERAPY NOTE
Occupational Therapy Evaluation     Patient Name: Solo Ramirez  KWNZT'A Date: 7/16/2021  Problem List  Principal Problem:    Closed right hip fracture ([facility] [address] )  Active Problems:    Essential hypertension    Bilateral hearing loss    Paroxysmal A-fib (HCC)    Major neurocognitive disorder (HCC)    Stage 3a chronic kidney disease (HCC)    ABLA (acute blood loss anemia)    Past Medical History  Past Medical History:   Diagnosis Date    Anxiety     Cognitive impairment     [facility]     Depression     Hallucination     Hyperlipidemia     Hypertension     Memory loss     Psychiatric illness     Psychosis ([facility] )     Schizoaffective disorder ([facility] )     Sleep difficulties      Past Surgical History  Past Surgical History:   Procedure Laterality Date    APPENDECTOMY      BLADDER SURGERY      CHOLECYSTECTOMY      NH OPEN RX FEMUR FX+INTRAMED BRYSON Right 7/15/2021    Procedure: INSERTION NAIL IM FEMUR ANTEGRADE (TROCHANTERIC) right;  Surgeon: Alex Renner MD;  Location: [facility];  Service: Orthopedics    TOTAL ABDOMINAL HYSTERECTOMY W/ BILATERAL SALPINGOOPHORECTOMY      TOTAL KNEE ARTHROPLASTY Left              07/16/21 0858   OT Last Visit   OT Visit Date 07/16/21   Note Type   Note type Evaluation   Restrictions/Precautions   Weight Bearing Precautions Per Order Yes   RLE Weight Bearing Per Order WBAT   Other Precautions Chair Alarm; Bed Alarm;Cognitive; Fall Risk;Pain  (limited verbalizations)   Pain Assessment   Pain Assessment Tool FLACC   Pain Location/Orientation Orientation: Right;Location: Leg   Hospital Pain Intervention(s) Ambulation/increased activity;Repositioned; Emotional support   Pain Rating: FLACC (Rest) - Face 1   Pain Rating: FLACC (Rest) - Legs 0   Pain Rating: FLACC (Rest) - Activity 0   Pain Rating: FLACC (Rest) - Cry 0   Pain Rating: FLACC (Rest) - Consolability 1   Score: FLACC (Rest) 2   Pain Rating: FLACC (Activity) - Face 1   Pain Rating: FLACC (Activity) - Legs 0   Pain Rating: FLACC (Activity) - Activity 0   Pain Rating: FLACC (Activity) - Cry 1   Pain Rating: FLACC (Activity) - Consolability 1   Score: FLACC (Activity) 3   Home Living   Type of Home Apartment   Home Layout One level   Home Equipment Walker   Additional Comments pt poor historian w/ minimal verbalizations in Vietnamese and unable to provide home setup or prior level; in 2020 pt w/ was living w/ son and had 24/7 support/assist from son and caregivers; 24 hour caregivers   Prior Function   Level of [place] Needs assistance with ADLs and functional mobility; Needs assistance with IADLs   Lives With Son;Facility staff   Receives Help From Family;Personal care attendant   ADL Assistance Needs assistance   IADLs Needs assistance   Falls in the last 6 months 1 to 4  (atleast 1 leading to admission)   Vocational Retired   Comments pt poor historian unable to provide history; per chart pt has caregivers who assist her at home and was ambulating w/ them when she fell   Lifestyle   Autonomy pt w/ setup self-feeding and grooming, assist w/ ADLs, ambulates w/ RW or assistance in home   Reciprocal Relationships son and caregivers   Service to Others retired   Intrinsic Gratification unable to report   ADL   Where Assessed Edge of bed   Eating Assistance 4  Minimal Assistance   Grooming Assistance 3  Moderate Assistance   UB [address] 3  Moderate Assistance   LB [address] 2  Maximal [address] 3  Moderate Assistance    [address] 1  Total Assistance   LB Dressing Deficit Don/doff L sock; Don/doff R sock   Toileting Assistance  1  Total Assistance   Functional Assistance 2  Maximal Assistance   Bed Mobility   Rolling R 2  Maximal assistance   Additional items Assist x 2; Increased time required;Verbal cues; Bedrails;LE management   Rolling L 2  Maximal assistance   Additional items Assist x 2; Increased time required; Bedrails;LE management;Verbal cues   Supine to Sit 2  Maximal assistance   Additional items Assist x 2; Increased time required;Verbal cues;LE management; Bedrails;HOB elevated   Sit to Supine 2  Maximal assistance   Additional items Assist x 2; Increased time required;Verbal cues;LE management; Bedrails   Additional Comments pt w/ increased discomfort w/ rolling in bed; pt w/ BP: 78/54 and return to bed: 104/55   Transfers   Sit to Stand 2  Maximal assistance   Additional items Assist x 2; Increased time required;Verbal cues; Bedrails   Stand to Sit 2  Maximal assistance   Additional items Assist x 2; Increased time required;Verbal cues; Bedrails   Additional Comments cues for hand placement and positioning   Functional Mobility   Additional Comments pt unable to complete functional mobility w/ impaired cognition, recommend quick move next session   Balance   Static Sitting Fair   Dynamic Sitting Fair -   Static Standing Poor +   Dynamic Standing Poor   Activity Tolerance   Activity Tolerance Patient limited by fatigue;Patient limited by pain;Treatment limited secondary to medical complications (Comment)  (cognition)   Nurse Made Aware appropriate to see per [place] DEJESUS Assessment   RUE Assessment WFL  (3+/5, w/ hand flexion of 4th&5th digits at times)   LUE Assessment   LUE Assessment WFL  (grossly 3+/5)   Hand Function   Gross Motor Coordination Functional   Fine Motor Coordination Impaired   Sensation   Light Touch Not tested   Additional Comments unable to follow commands and minimal verbalizations difficult to assess   Proprioception   Proprioception No apparent deficits   Vision-Basic Assessment   Current Vision Wears glasses all the time   Vision - Complex Assessment   Ocular Range of Motion [facility]   Acuity Able to read clock/calendar on wall without difficulty   Cognition   Overall Cognitive Status Impaired   Arousal/Participation Cooperative;Persistent stimuli required;Poorly responsive   Attention Difficulty attending to directions   Orientation Level Oriented to person;Disoriented to place; Disoriented to time;Disoriented to situation  (smiles and looks when name called, unable to state name)   Memory Unable to assess  (Dementia at baseline and minimally verbal)   Following Commands Follows one step commands inconsistently   Comments pt w/ Dementia at baseline, pt w/ random minimal verbalizations in Nepalese and at times not responding to questions   Assessment   Limitation Decreased ADL status; Decreased Safe judgement during ADL;Decreased UE strength;Decreased endurance;Decreased cognition;Decreased high-level ADLs; Decreased self-care trans   Prognosis Guarded   Assessment Pt is a 80 y o  female seen for OT evaluation s/p admit to [facility] on 7/15/2021 w/ fall resulting in Closed right hip fracture (HCC) , s/p R hip intramedullary nail fixation, WBAT R LE  Comorbidities affecting pt's functional performance at time of assessment include: CKD III, b/l hearing loss, HTN, major neurocognitive disorder w/ schizophrenia, dementia, paroxysmal a-fib  Personal factors affecting pt at time of IE include:poor historian, provided random verbalizations in Nepalese at times  Prior to admission, pt was living in apt and per chart pt has 24/7 caregivers in apt and assist w/ ADLs, functional mobility w/ RW or assistance from caregiver, assist w/ IADLS, setup self-feeding and grooming  Upon evaluation: Pt requires MAX assist x2 rolling in bed, MAX assist x2 supine<>sit bed mobility w/ increased time to complete, MAX assist x2 sit<>stand w/ VCs for hand placement and positioning, total assist LB ADLs, mod-MAX assist UB ADLs, total assist toileting 2* the following deficits impacting occupational performance: decreased strength and endurance, impaired balance, impaired activity tolerance, fall risk, increased pain in R LE w/ Movement, impaired cognition, limited verbalizations, decreased ability to follow commands, hypotension (BP: 78/54 EOB and return supine: 104/55 and RN aware)   Recommend trial of quick move for OOB  Pt to benefit from continued skilled OT tx while in the hospital to address deficits as defined above and maximize level of functional independence w ADL's and functional mobility  Occupational Performance areas to address include: eating, grooming, bathing/shower, toilet hygiene, dressing, health maintenance, functional mobility and clothing management  From OT standpoint, recommendation at time of d/c would be short term rehab  The patient's raw score on the AM-PAC Daily Activity inpatient short form low function score is 14, standardized score is Low Function Daily Activity Standardized Score: 24 79  Patients with a standardized score less than 39 4 are likely to benefit from discharge to post-acute rehab services  Please refer to the recommendation of the Occupational Therapist for safe discharge planning  Goals   Patient Goals none expressed 2* impaired cognition    LTG Time Frame 10-14   Long Term Goal please see below goals   Plan   Treatment Interventions ADL retraining;Functional transfer training;UE strengthening/ROM; Endurance training;Cognitive reorientation;Patient/family training;Equipment evaluation/education; Compensatory technique education; Activityengagement; Energy conservation   Goal Expiration Date 07/30/21   OT Frequency 3-5x/wk   Recommendation   OT Discharge Recommendation Post acute rehabilitation services   OT - OK to Discharge Yes  (to rehab when medically stable)   AM-PAC Daily Activity Inpatient   Lower Body Dressing 1   Bathing 1   Toileting 1   Upper Body Dressing 2   Grooming 2   Eating 3   Daily Activity Raw Score 10   Turning Head Towards Sound 2   Follow Simple Instructions 2   Low Function Daily Activity Raw Score 14   Low Function Daily Activity Standardized Score 24 79   AM-PAC Applied Cognition Inpatient   Following a Speech/Presentation 1   Understanding Ordinary Conversation 2   Taking Medications 1   Remembering Where Things Are Placed or Put Away 1 Remembering List of 4-5 Errands 1   Taking Care of Complicated Tasks 1   Applied Cognition Raw Score 7   Applied Cognition Standardized Score 15 17   Modified Tom Green Scale   Modified Tom Green Scale 4     Occupational Therapy Goals to be met in 10-14 days:  1) Pt will improve activity tolerance to G for 30 min txment sessions to enhance ADLs  2) Pt will complete UB ADLs/self care w/ setup and mod assist LB ADLs  3) Pt will complete toileting w/ mod assistw/ G hygiene/thoroughness using DME PRN  4) Pt will improve functional transfers on/off all surfaces using DME PRN w/ G balance/safety including toileting w/ min assist  5) OTR to assess functional mobility as appropriate to establish goals  6) Pt will engage in ongoing cognitive assessment w/ G participation to A w/ safe d/c planning/recommendations  7) Pt will demonstrate G carryover of pt/caregiver education and training as appropriate w/ mod I  w/ G tolerance  8) Pt will engage in depression screen/leisure interest checklist w/ G participation to monitor s/s depression and ID 3 positive coping strategies to A w/ emotional regulation and management  9) Pt will demonstrate 100% carryover of E C  techniques w/ mod I t/o fx'l I/ADL/leisure tasks w/o cues s/p skilled education  10) Pt will demonstrate improved bed mobility to min assist to enhance ADLs  11) Pt will demonstrate improved standing tolerance to 2-3 minutes during functional tasks w/ Fair + dynamic standing balance to enhance ADL performance  12) Pt will demonstrate improved b/l UE strength by 1 MMT grade to enhance ADLS and functional transfers  13) Pt will be A&Ox 3 w/ utilization of environmental cues  And follow simple 1 step commands w/ min cues for 75% OT sessions    Documentation completed by: Malinda Andrade MS, OTR/L

## 2021-07-16 NOTE — PLAN OF CARE
Problem: PHYSICAL THERAPY ADULT  Goal: Performs mobility at highest level of function for planned discharge setting  See evaluation for individualized goals  Description: Treatment/Interventions: Functional transfer training, LE strengthening/ROM, Therapeutic exercise, Cognitive reorientation, Patient/family training, Equipment eval/education, Bed mobility, Gait training, Compensatory technique education, Continued evaluation, Spoke to nursing, OT          See flowsheet documentation for full assessment, interventions and recommendations  Note: Prognosis: Guarded  Problem List: Decreased strength, Decreased range of motion, Decreased endurance, Impaired balance, Decreased mobility, Decreased cognition, Impaired judgement, Decreased safety awareness, Pain, Decreased skin integrity  Assessment: Yeison Jensen is a 80 y o  female admitted to [facility] on 7/15/2021 for Closed right hip fracture ([facility] [address] )  Pt  has a past medical history of Anxiety, Cognitive impairment, Dementia ([facility] [address] ), Depression, Hallucination, Memory loss, Psychiatric illness, Psychosis ([facility] [address] ), [facility] ([facility] )  PT was consulted and pt was seen on 7/16/2021 for mobility assessment and d/c planning  Pt presents wbat RLE per ortho, pain  Pt poor historian, obtained via chart review  At baseline has 24/7 caregivers and uses RW for ambulation Pt is currently functioning at a maximum assistance x2 level for bed mobility and transfers  Pt demonstrated impaired cognition impacting participation in therapy  In no obvious distress during EOB sitting and standing, however standing tolerance limited by LE weakness and poor UE support  Given hypotensive BP repositioned to supine for safety  Pt will benefit from continued skilled IP PT to address the above mentioned impairments  in order to maximize recovery and increase functional independence when completing mobility and ADLs   Currently PT recommendations for DME include quick move OOB when medically appropriate  At this time PT recommendations for d/c are STR given social barriers  Barriers to Discharge: Decreased caregiver support  Barriers to Discharge Comments: ?caregiver abilities at current LOF     PT Discharge Recommendation: Post acute rehabilitation services     PT - OK to Discharge: Yes    See flowsheet documentation for full assessment

## 2021-07-16 NOTE — PLAN OF CARE
Problem: OCCUPATIONAL THERAPY ADULT  Goal: Performs self-care activities at highest level of function for planned discharge setting  See evaluation for individualized goals  Description: Treatment Interventions: ADL retraining, Functional transfer training, UE strengthening/ROM, Endurance training, Cognitive reorientation, Patient/family training, Equipment evaluation/education, Compensatory technique education, Activityengagement, Energy conservation          See flowsheet documentation for full assessment, interventions and recommendations  Note: Limitation: Decreased ADL status, Decreased Safe judgement during ADL, Decreased UE strength, Decreased endurance, Decreased cognition, Decreased high-level ADLs, Decreased self-care trans  Prognosis: Guarded  Assessment: Pt is a 80 y o  female seen for OT evaluation s/p admit to [facility] on 7/15/2021 w/ fall resulting in Closed right hip fracture (HCC) , s/p R hip intramedullary nail fixation, WBAT R LE  Comorbidities affecting pt's functional performance at time of assessment include: CKD III, b/l hearing loss, HTN, major neurocognitive disorder w/ schizophrenia, dementia, paroxysmal a-fib  Personal factors affecting pt at time of IE include:poor historian, provided random verbalizations in [place] at times  Prior to admission, pt was living in apt and per chart pt has 24/7 caregivers in apt and assist w/ ADLs, functional mobility w/ RW or assistance from caregiver, assist w/ IADLS, setup self-feeding and grooming   Upon evaluation: Pt requires MAX assist x2 rolling in bed, MAX assist x2 supine<>sit bed mobility w/ increased time to complete, MAX assist x2 sit<>stand w/ VCs for hand placement and positioning, total assist LB ADLs, mod-MAX assist UB ADLs, total assist toileting 2* the following deficits impacting occupational performance: decreased strength and endurance, impaired balance, impaired activity tolerance, fall risk, increased pain in R LE w/ Movement, impaired cognition, limited verbalizations, decreased ability to follow commands, hypotension (BP: 78/54 EOB and return supine: 104/55 and RN aware)  Recommend trial of quick move for OOB  Pt to benefit from continued skilled OT tx while in the hospital to address deficits as defined above and maximize level of functional independence w ADL's and functional mobility  Occupational Performance areas to address include: eating, grooming, bathing/shower, toilet hygiene, dressing, health maintenance, functional mobility and clothing management  From OT standpoint, recommendation at time of d/c would be short term rehab  The patient's raw score on the AM-PAC Daily Activity inpatient short form low function score is 14, standardized score is Low Function Daily Activity Standardized Score: 24 79  Patients with a standardized score less than 39 4 are likely to benefit from discharge to post-acute rehab services  Please refer to the recommendation of the Occupational Therapist for safe discharge planning       OT Discharge Recommendation: Post acute rehabilitation services  OT - OK to Discharge: Yes (to rehab when medically stable)

## 2021-07-16 NOTE — UTILIZATION REVIEW
Initial Clinical Review    Admission: Date/Time/Statement:   Admission Orders (From admission, onward)     Ordered        07/15/21 0545  Inpatient Admission  Once                   Orders Placed This Encounter   Procedures    Inpatient Admission     Standing Status:   Standing     Number of Occurrences:   1     Order Specific Question:   Level of Care     Answer:   Med Surg [16]     Order Specific Question:   Estimated length of stay     Answer:   More than 2 Midnights     Order Specific Question:   Certification     Answer:   I certify that inpatient services are medically necessary for this patient for a duration of greater than two midnights  See H&P and MD Progress Notes for additional information about the patient's course of treatment  ED Arrival Information     Expected Arrival Acuity    - 7/15/2021 03:52 Urgent         Means of arrival Escorted by Service Admission type    [facility] ([address]) Hospitalist Urgent         Arrival complaint    Leg pain        Chief Complaint   Patient presents with   [facility] Fall     pt arrived via ems from home  per ems, daughter said pt fell and rolled R ankle, swelling noted  hx dementia  Initial Presentation: 80  Y O female presents to ED  Via  EMS  From home  After a fall, patient screamed of pain  Patient  Was ambulating to bathroom with caregiver,  Tripped  PMH  Is  Schizophrenia, dementia, essential hypertension,  PAF,hearing loss  And  CKD  Stage  3   X ray shows  Right hip fracture  Admit  Ip with Closed right hip fracture and plan is  Pain control, NWB   RLE, ortho consult and likely  Surgical intervention  Ortho consult  Found with right hip intertrochanteric fracture  Need family consent  Due to dementia  Plan OR  DATE OF SURGERY: 7/15/2021  PROCEDURE: Intramedullary nail fixation right intertrochanteric femur fracture    Date:    7/16      Day 2:   POD  #  1   Continue post op care  Continue  PT/OT/WBAT  RLE  Monitor labs  Patient awake, doesn't  Respond to questions due to dementia  Continue  IVF until po intake adequate  Likely  Needs   Post acute rehab  Continue current treatment plan      ED Triage Vitals   Temperature Pulse Respirations Blood Pressure SpO2   07/15/21 0359 07/15/21 0359 07/15/21 0359 07/15/21 0359 07/15/21 0359   97 8 °F (36 6 °C) 60 18 (!) 183/70 98 %      Temp Source Heart Rate Source Patient Position - Orthostatic VS BP Location FiO2 (%)   07/15/21 0359 07/15/21 0359 07/15/21 0359 07/15/21 0359 --   Oral Monitor Lying Right arm       Pain Score       07/15/21 0455       Worst Possible Pain          Wt Readings from Last 1 Encounters:   06/24/21 59 kg (130 lb)     Additional Vital Signs:   /16/21 1100  --  --  --  118/60  --  95 %  --  --  --  None (Room air)  --  Lying   07/16/21 0901  --  --  --  104/55   --  --  --  --  --  --  --  --   BP: in bed lying with PT at 07/16/21 0901 07/16/21 0900  --  --  --  78/54Abnormal    --  --  --  --  --  --  --  --   BP: standing with PT at 07/16/21 0900   07/16/21 0721  96 1 °F (35 6 °C)Abnormal   60  18  111/55  80  95 %  28  --  2 L/min  Nasal cannula  --         07/15/21 2338  96 6 °F (35 9 °C)Abnormal   62  18  108/52  --  99 %  36  --  4 L/min  Nasal cannula  --  Lying   07/15/21 2305  96 7 °F (35 9 °C)Abnormal   58  18  108/52  --  100 %  36  --  4 L/min  Nasal cannula  --  Lying   07/15/21 2234  97 3 °F (36 3 °C)Abnormal   57  18  131/61  --  100 %  36  --  4 L/min  Nasal cannula  --  Lying   07/15/21 2208  --  58  18  144/54  --  --  --  --  --  --  --  --   07/15/21 2200  98 8 °F (37 1 °C)  54Abnormal   16  121/43Abnormal   --  93 %  --  4 L/min  --  Nasal cannula  --  --   07/15/21 2145  --  52Abnormal   20  149/49Abnormal   --  97 %  --  4 L/min  --  Nasal cannula  --  --   07/15/21 2130  --  56  18  135/65  --  95 %  --  5 L/min  --  Nasal cannula  --  --   07/15/21 2115  97 9 °F (36 6 °C)  59  18  136/43Abnormal   --  91 %  --  8 L/min  --  Simple mask WDL  --   07/15/21 0800  --  53Abnormal   --  147/57  --  --  --  --  --  --  --  --   07/15/21 0700  96 °F (35 6 °C)Abnormal   57  18  108/68  76  96 %  --  --  --  None (Room air)  --  Lying   07/15/21 0614  97 6 °F (36 4 °C)  52Abnormal   16  176/74Abnormal   106  94 %  --  --  --  None (Room air)  --  Lying   07/15/21 0530  --  --  --  153/68  98  --  --  --  --  --  --  Lying   07/15/21 0524  --  51Abnormal   16  162/72  --  96 %  --  --  --  None (Room air)  --  Lying   07/15/21 0430  --  54Abnormal   18  173/90Abnormal   125  97 %  --  --  --  None (Room air)  --  Lying   07/15/21 0359  97 8 °F (36 6 °C)  60  18  183/70Abnormal   --  98 %  --  --  --  None (Room air)  --           Pertinent Labs/Diagnostic Test Results:   X ray  R  Hip/pelvis    ( 7/15)     Impacted intertrochanteric right femoral neck fracture  X ray R  Ankle  ( 7/15)    Limited evaluation without evident acute osseous abnormality  X ray R   Knee ( 7/15)    No acute osseous abnormality   Evaluation for subtle fractures limited given extensive degenerative change  Degenerative changes as described     Results from last 7 days   Lab Units 07/16/21  0710 07/15/21  0509   WBC Thousand/uL 6 90 5 79   HEMOGLOBIN g/dL 8 7* 12 0   HEMATOCRIT % 26 7* 36 3   PLATELETS Thousands/uL 168 226   NEUTROS ABS Thousands/µL 5 15 3 34         Results from last 7 days   Lab Units 07/16/21  0538 07/15/21  0509   SODIUM mmol/L 139 141   POTASSIUM mmol/L 4 9 4 5   CHLORIDE mmol/L 108 106   CO2 mmol/L 23 29   ANION GAP mmol/L 8 6   BUN mg/dL 30* 30*   CREATININE mg/dL 1 23 1 12   EGFR ml/min/1 73sq m 39 44   CALCIUM mg/dL 8 7 8 9             Results from last 7 days   Lab Units 07/16/21  0538 07/15/21  0509   GLUCOSE RANDOM mg/dL 140 126           Results from last 7 days   Lab Units 07/15/21  0509   PROTIME seconds 12 6   INR  0 96   PTT seconds 28             ED Treatment:   Medication Administration from 07/15/2021 0352 to 07/15/2021 8159       Date/Time Order Dose Route Action Comments     07/15/2021 0455 acetaminophen (TYLENOL) tablet 650 mg 650 mg Oral Given      07/15/2021 0514 HYDROmorphone (DILAUDID) injection 0 2 mg 0 2 mg Intravenous Given         Present on Admission:   Closed right hip fracture (HCC)   Paroxysmal A-fib (HCC)   Essential hypertension   Major neurocognitive disorder (HCC)   Stage 3a chronic kidney disease (HCC)      Admitting Diagnosis: Leg pain [M79 606]  HTN (hypertension) [I10]  Dementia ([facility] ) [F03 90]  Right knee pain [M25 561]  Closed right hip fracture, initial encounter ([facility] ) [S72 001A]  Age/Sex: 80 y o  female  Admission Orders:  Scheduled Medications:  acetaminophen, 975 mg, Oral, Q8H [facility]  cefazolin, 2,000 mg, Intravenous, Q8H  cyanocobalamin, 1,000 mcg, Oral, Daily  diltiazem, 120 mg, Oral, Daily  docusate sodium, 100 mg, Oral, Q12H [facility]  DULoxetine, 30 mg, Oral, HS  [START ON 7/17/2021] enoxaparin, 30 mg, Subcutaneous, Q24H [facility]  heparin (porcine), 5,000 Units, Subcutaneous, Q8H [facility]NDSE[facility]  LORazepam, 0 5 mg, Oral, HS  melatonin, 3 mg, Oral, HS  metoprolol succinate, 50 mg, Oral, Daily  OLANZapine, 5 mg, Oral, HS      Continuous IV Infusions:     PRN Meds:  albuterol, 2 puff, Inhalation, Q4H PRN  aluminum-magnesium hydroxide-simethicone, 30 mL, Oral, Q6H PRN  HYDROmorphone, 0 2 mg, Intravenous, Q4H PRN  ondansetron, 4 mg, Intravenous, Q6H PRN  oxyCODONE, 2 5 mg, Oral, Q4H PRN  oxyCODONE, 5 mg, Oral, Q4H PRN  polyethylene glycol, 17 g, Oral, Daily PRN  simethicone, 80 mg, Oral, 4x Daily PRN        IP CONSULT TO ORTHOPEDIC SURGERY  IP CONSULT TO CASE MANAGEMENT    Network Utilization Review Department  ATTENTION: Please call with any questions or concerns to [phone] and carefully listen to the prompts so that you are directed to the right person   All voicemails are confidential   Zahra Dull all requests for admission clinical reviews, approved or denied determinations and any other requests to dedicated fax number below belonging to the [facility] where the patient is receiving treatment   List of dedicated fax numbers for the Facilities:  [address] [place] [address] DENIALS (Administrative/Medical Necessity) [phone]   [address] (Maternity/NICU/Pediatrics) [phone] [address] Yandel Mohan [phone] [place] [address] [address] [place] [address] [phone]

## 2021-07-17 PROBLEM — N39.0 UTI (URINARY TRACT INFECTION): Status: ACTIVE | Noted: 2021-07-17

## 2021-07-17 PROBLEM — R82.71 ASYMPTOMATIC BACTERIURIA: Status: ACTIVE | Noted: 2021-07-17

## 2021-07-17 LAB
ALBUMIN SERPL BCP-MCNC: 2.7 G/DL (ref 3.5–5)
ANION GAP SERPL CALCULATED.3IONS-SCNC: 12 MMOL/L (ref 4–13)
BACTERIA UR QL AUTO: ABNORMAL /HPF
BILIRUB UR QL STRIP: NEGATIVE
BUN SERPL-MCNC: 46 MG/DL (ref 5–25)
CALCIUM SERPL-MCNC: 8.3 MG/DL (ref 8.3–10.1)
CHLORIDE SERPL-SCNC: 105 MMOL/L (ref 100–108)
CLARITY UR: CLEAR
CO2 SERPL-SCNC: 22 MMOL/L (ref 21–32)
COLOR UR: YELLOW
CREAT SERPL-MCNC: 2.27 MG/DL (ref 0.6–1.3)
ERYTHROCYTE [DISTWIDTH] IN BLOOD BY AUTOMATED COUNT: 14.1 % (ref 11.6–15.1)
GFR SERPL CREATININE-BSD FRML MDRD: 19 ML/MIN/1.73SQ M
GLUCOSE SERPL-MCNC: 148 MG/DL (ref 65–140)
GLUCOSE UR STRIP-MCNC: NEGATIVE MG/DL
HCT VFR BLD AUTO: 27 % (ref 34.8–46.1)
HGB BLD-MCNC: 8.6 G/DL (ref 11.5–15.4)
HGB UR QL STRIP.AUTO: NEGATIVE
KETONES UR STRIP-MCNC: NEGATIVE MG/DL
LEUKOCYTE ESTERASE UR QL STRIP: NEGATIVE
MCH RBC QN AUTO: 30.1 PG (ref 26.8–34.3)
MCHC RBC AUTO-ENTMCNC: 31.9 G/DL (ref 31.4–37.4)
MCV RBC AUTO: 94 FL (ref 82–98)
NITRITE UR QL STRIP: POSITIVE
NON-SQ EPI CELLS URNS QL MICRO: ABNORMAL /HPF
PH UR STRIP.AUTO: 6 [PH]
PLATELET # BLD AUTO: 160 THOUSANDS/UL (ref 149–390)
PMV BLD AUTO: 10.9 FL (ref 8.9–12.7)
POTASSIUM SERPL-SCNC: 4 MMOL/L (ref 3.5–5.3)
PROT UR STRIP-MCNC: NEGATIVE MG/DL
RBC # BLD AUTO: 2.86 MILLION/UL (ref 3.81–5.12)
RBC #/AREA URNS AUTO: ABNORMAL /HPF
SODIUM SERPL-SCNC: 139 MMOL/L (ref 136–145)
SP GR UR STRIP.AUTO: 1.01 (ref 1–1.03)
UROBILINOGEN UR QL STRIP.AUTO: 0.2 E.U./DL
WBC # BLD AUTO: 8.28 THOUSAND/UL (ref 4.31–10.16)
WBC #/AREA URNS AUTO: ABNORMAL /HPF

## 2021-07-17 PROCEDURE — 85027 COMPLETE CBC AUTOMATED: CPT | Performed by: NURSE PRACTITIONER

## 2021-07-17 PROCEDURE — 82040 ASSAY OF SERUM ALBUMIN: CPT | Performed by: NURSE PRACTITIONER

## 2021-07-17 PROCEDURE — 99024 POSTOP FOLLOW-UP VISIT: CPT | Performed by: PHYSICIAN ASSISTANT

## 2021-07-17 PROCEDURE — 97530 THERAPEUTIC ACTIVITIES: CPT

## 2021-07-17 PROCEDURE — 97110 THERAPEUTIC EXERCISES: CPT

## 2021-07-17 PROCEDURE — 80048 BASIC METABOLIC PNL TOTAL CA: CPT | Performed by: NURSE PRACTITIONER

## 2021-07-17 PROCEDURE — 99223 1ST HOSP IP/OBS HIGH 75: CPT | Performed by: INTERNAL MEDICINE

## 2021-07-17 PROCEDURE — 99232 SBSQ HOSP IP/OBS MODERATE 35: CPT | Performed by: STUDENT IN AN ORGANIZED HEALTH CARE EDUCATION/TRAINING PROGRAM

## 2021-07-17 PROCEDURE — 81001 URINALYSIS AUTO W/SCOPE: CPT | Performed by: NURSE PRACTITIONER

## 2021-07-17 RX ORDER — ALBUMIN (HUMAN) 12.5 G/50ML
25 SOLUTION INTRAVENOUS ONCE
Status: COMPLETED | OUTPATIENT
Start: 2021-07-17 | End: 2021-07-17

## 2021-07-17 RX ORDER — SODIUM CHLORIDE, SODIUM LACTATE, POTASSIUM CHLORIDE, CALCIUM CHLORIDE 600; 310; 30; 20 MG/100ML; MG/100ML; MG/100ML; MG/100ML
125 INJECTION, SOLUTION INTRAVENOUS CONTINUOUS
Status: DISCONTINUED | OUTPATIENT
Start: 2021-07-17 | End: 2021-07-17

## 2021-07-17 RX ORDER — SODIUM CHLORIDE, SODIUM GLUCONATE, SODIUM ACETATE, POTASSIUM CHLORIDE, MAGNESIUM CHLORIDE, SODIUM PHOSPHATE, DIBASIC, AND POTASSIUM PHOSPHATE .53; .5; .37; .037; .03; .012; .00082 G/100ML; G/100ML; G/100ML; G/100ML; G/100ML; G/100ML; G/100ML
50 INJECTION, SOLUTION INTRAVENOUS CONTINUOUS
Status: DISCONTINUED | OUTPATIENT
Start: 2021-07-17 | End: 2021-07-19

## 2021-07-17 RX ORDER — ACETAMINOPHEN 160 MG/5ML
650 SUSPENSION, ORAL (FINAL DOSE FORM) ORAL 4 TIMES DAILY
Status: DISCONTINUED | OUTPATIENT
Start: 2021-07-17 | End: 2021-07-26 | Stop reason: HOSPADM

## 2021-07-17 RX ADMIN — ACETAMINOPHEN 650 MG: 650 SUSPENSION ORAL at 17:14

## 2021-07-17 RX ADMIN — LORAZEPAM 0.5 MG: 0.5 TABLET ORAL at 21:54

## 2021-07-17 RX ADMIN — SODIUM CHLORIDE, SODIUM LACTATE, POTASSIUM CHLORIDE, AND CALCIUM CHLORIDE 500 ML: .6; .31; .03; .02 INJECTION, SOLUTION INTRAVENOUS at 02:20

## 2021-07-17 RX ADMIN — SODIUM CHLORIDE, SODIUM LACTATE, POTASSIUM CHLORIDE, AND CALCIUM CHLORIDE 125 ML/HR: .6; .31; .03; .02 INJECTION, SOLUTION INTRAVENOUS at 12:24

## 2021-07-17 RX ADMIN — SODIUM CHLORIDE, SODIUM LACTATE, POTASSIUM CHLORIDE, AND CALCIUM CHLORIDE 125 ML/HR: .6; .31; .03; .02 INJECTION, SOLUTION INTRAVENOUS at 02:19

## 2021-07-17 RX ADMIN — ACETAMINOPHEN 649.6 MG: 650 SUSPENSION ORAL at 21:57

## 2021-07-17 RX ADMIN — OLANZAPINE 5 MG: 5 TABLET, FILM COATED ORAL at 21:54

## 2021-07-17 RX ADMIN — ENOXAPARIN SODIUM 30 MG: 30 INJECTION, SOLUTION INTRAVENOUS; SUBCUTANEOUS at 09:28

## 2021-07-17 RX ADMIN — ALBUMIN (HUMAN) 25 G: 0.25 INJECTION, SOLUTION INTRAVENOUS at 17:14

## 2021-07-17 RX ADMIN — SODIUM CHLORIDE, SODIUM GLUCONATE, SODIUM ACETATE, POTASSIUM CHLORIDE, MAGNESIUM CHLORIDE, SODIUM PHOSPHATE, DIBASIC, AND POTASSIUM PHOSPHATE 75 ML/HR: .53; .5; .37; .037; .03; .012; .00082 INJECTION, SOLUTION INTRAVENOUS at 15:25

## 2021-07-17 RX ADMIN — MELATONIN 3 MG: at 21:54

## 2021-07-17 RX ADMIN — ALBUMIN (HUMAN) 25 G: 0.25 INJECTION, SOLUTION INTRAVENOUS at 15:25

## 2021-07-17 NOTE — PHYSICAL THERAPY NOTE
Physical Therapy Progress Note     07/17/21 1145   PT Last Visit   PT Visit Date 07/17/21   Note Type   Note Type Treatment   Pain Assessment   Pain Assessment Tool FLACC   Pain Location/Orientation Orientation: Right;Location: Hip   Pain Rating: FLACC (Rest) - Face 0   Pain Rating: FLACC (Rest) - Legs 0   Pain Rating: FLACC (Rest) - Activity 0   Pain Rating: FLACC (Rest) - Cry 0   Pain Rating: FLACC (Rest) - Consolability 0   Score: FLACC (Rest) 0   Pain Rating: FLACC (Activity) - Face 1   Pain Rating: FLACC (Activity) - Legs 0   Pain Rating: FLACC (Activity) - Activity 0   Pain Rating: FLACC (Activity) - Cry 1   Pain Rating: FLACC (Activity) - Consolability 1   Score: FLACC (Activity) 3   Restrictions/Precautions   Weight Bearing Precautions Per Order Yes   RLE Weight Bearing Per Order WBAT   Other Precautions Cognitive; Chair Alarm; Bed Alarm; Fall Risk;Pain   General   Chart Reviewed Yes   Response to Previous Treatment Patient unable to report, no changes reported from family or staff   Family/Caregiver Present No   Subjective   Subjective None stated by pt  Bed Mobility   Rolling R 2  Maximal assistance   Additional items Assist x 2;HOB elevated; Bedrails;Leg ; Increased time required;Verbal cues;LE management   Rolling L 2  Maximal assistance   Additional items Assist x 2;HOB elevated; Bedrails; Increased time required;Leg ;LE management;Verbal cues   Endurance Deficit   Endurance Deficit Yes   Endurance Deficit Description medical/fatigue/pain   Activity Tolerance   Activity Tolerance Treatment limited secondary to medical complications (Comment); Patient limited by fatigue;Patient limited by pain   Nurse Made Aware Yes   Exercises   THR Supine;10 reps;PROM; Bilateral   Assessment   Prognosis Poor   Problem List Decreased strength;Decreased range of motion;Decreased endurance; Impaired balance;Decreased mobility; Decreased cognition; Impaired judgement;Decreased safety awareness;Decreased skin integrity;Orthopedic restrictions;Pain   Assessment Pt  supine in bed upon my arrival  Per RN, pt  with limited verbal communication this AM very lethargic  BP measured supine via dynamap at 74/48  [place] with manual BP supine at 90/50  RN made aware at end of treatment session  Performance of limited HEP supine in bed with constant cueing for participation  Performance of transfers requiring A of 2 for repositioning supine in bed  Pt  remained supine in bed at end of treatment session with HOB elevated above 30* and bed alarm active  PT will continue to recommend d/c to rehab when medically stable for continued improvement of noted impairments  Barriers to Discharge Decreased caregiver support; Inaccessible home environment   Barriers to Discharge Comments Level of support at home  Goals   Patient Goals None stated 2* to cognition   STG Expiration Date 08/06/21   PT Treatment Day 1   Plan   Treatment/Interventions Functional transfer training;LE strengthening/ROM; Therapeutic exercise; Endurance training;Bed mobility;Spoke to case management;Spoke to nursing   Progress Slow progress, medical status limitations   PT Frequency Other (Comment)  (3-5x/wk)   Recommendation   PT Discharge Recommendation Post acute rehabilitation services   PT - OK to Discharge Yes  (if d/c to rehab when medically stable  )   AM-PAC Basic Mobility Inpatient   Turning in Bed Without Bedrails 1   Lying on Back to Sitting on Edge of Flat Bed 1   Moving Bed to Chair 1   Standing Up From Chair 1   Walk in Room 1   Climb 3-5 Stairs 1   Basic Mobility Inpatient Raw Score 6   Turning Head Towards Sound 3   Follow Simple Instructions 1   Low Function Basic Mobility Raw Score 10   Low Function Basic Mobility Standardized Score 14 65     An AM-PAC Basic Mobility standardized score less than 42 9 suggests the patient may benefit from discharge to post-acute rehab services      Lani Davidson, PTA

## 2021-07-17 NOTE — PLAN OF CARE
Problem: PHYSICAL THERAPY ADULT  Goal: Performs mobility at highest level of function for planned discharge setting  See evaluation for individualized goals  Description:   Outcome: Not Progressing  Note: Prognosis: Poor  Problem List: Decreased strength, Decreased range of motion, Decreased endurance, Impaired balance, Decreased mobility, Decreased cognition, Impaired judgement, Decreased safety awareness, Decreased skin integrity, Orthopedic restrictions, Pain  Assessment: Pt  supine in bed upon my arrival  Per RN, pt  with limited verbal communication this AM very lethargic  BP measured supine via dynamap at 74/48  [place] with manual BP supine at 90/50  RN made aware at end of treatment session  Performance of limited HEP supine in bed with constant cueing for participation  Performance of transfers requiring A of 2 for repositioning supine in bed  Pt  remained supine in bed at end of treatment session with HOB elevated above 30* and bed alarm active  PT will continue to recommend d/c to rehab when medically stable for continued improvement of noted impairments  Barriers to Discharge: Decreased caregiver support, Inaccessible home environment  Barriers to Discharge Comments: Level of support at home  PT Discharge Recommendation: Post acute rehabilitation services     PT - OK to Discharge: Yes (if d/c to rehab when medically stable )    See flowsheet documentation for full assessment

## 2021-07-17 NOTE — PLAN OF CARE
Problem: MOBILITY - ADULT  Goal: Maintain or return to baseline ADL function  Description: INTERVENTIONS:  -  Assess patient's ability to carry out ADLs; assess patient's baseline for ADL function and identify physical deficits which impact ability to perform ADLs (bathing, care of mouth/teeth, toileting, grooming, dressing, etc )  - Assess/evaluate cause of self-care deficits   - Assess range of motion  - Assess patient's mobility; develop plan if impaired  - Assess patient's need for assistive devices and provide as appropriate  - Encourage maximum independence but intervene and supervise when necessary  - Involve family in performance of ADLs  - Assess for home care needs following discharge   - Consider OT consult to assist with ADL evaluation and planning for discharge  - Provide patient education as appropriate  Outcome: Progressing  Goal: Maintains/Returns to pre admission functional level  Description: INTERVENTIONS:  - Perform BMAT or MOVE assessment daily    - Set and communicate daily mobility goal to care team and patient/family/caregiver     - Collaborate with rehabilitation services on mobility goals if consulted  - Out of bed for toileting  - Record patient progress and toleration of activity level   Outcome: Progressing     Problem: Prexisting or High Potential for Compromised Skin Integrity  Goal: Skin integrity is maintained or improved  Description: INTERVENTIONS:  - Identify patients at risk for skin breakdown  - Assess and monitor skin integrity  - Assess and monitor nutrition and hydration status  - Monitor labs   - Assess for incontinence   - Turn and reposition patient  - Assist with mobility/ambulation  - Relieve pressure over bony prominences  - Avoid friction and shearing  - Provide appropriate hygiene as needed including keeping skin clean and dry  - Evaluate need for skin moisturizer/barrier cream  - Collaborate with interdisciplinary team   - Patient/family teaching  Outcome: Progressing     Problem: CARDIOVASCULAR - ADULT  Goal: Maintains optimal cardiac output and hemodynamic stability  Description: INTERVENTIONS:  - Monitor I/O, vital signs and rhythm  - Monitor for S/S and trends of decreased cardiac output  - Administer and titrate ordered vasoactive medications to optimize hemodynamic stability  - Assess quality of pulses, skin color and temperature  - Assess for signs of decreased coronary artery perfusion  - Instruct patient to report change in severity of symptoms  Outcome: Progressing     Problem: RESPIRATORY - ADULT  Goal: Achieves optimal ventilation and oxygenation  Description: INTERVENTIONS:  - Assess for changes in respiratory status  - Assess for changes in mentation and behavior  - Position to facilitate oxygenation and minimize respiratory effort  - Oxygen administered by appropriate delivery if ordered  - Encourage broncho-pulmonary hygiene including cough, deep breathe, Incentive Spirometry  - Assess the need for suctioning and aspirate as needed  - Assess for SOB or any respiratory difficulty  - Respiratory Therapy support as indicated  Outcome: Progressing     Problem: GENITOURINARY - ADULT  Goal: Urinary catheter remains patent  Description: INTERVENTIONS:  - Assess patency of urinary catheter  - Follow guidelines for intermittent irrigation of non-functioning urinary catheter  Outcome: Progressing     Problem: METABOLIC, FLUID AND ELECTROLYTES - ADULT  Goal: Electrolytes maintained within normal limits  Description: INTERVENTIONS:  - Monitor labs and assess patient for signs and symptoms of electrolyte imbalances  - Administer electrolyte replacement as ordered  - Monitor response to electrolyte replacements, including repeat lab results as appropriate  - Instruct patient on fluid and nutrition as appropriate  Outcome: Progressing     Problem: SKIN/TISSUE INTEGRITY - ADULT  Goal: Incision(s), wounds(s) or drain site(s) healing without S/S of infection  Description: INTERVENTIONS  - Assess and document dressing, incision, wound bed, drain sites and surrounding tissue  - Provide caregiver/family education  - Perform skin care/dressing changes as ordered  Outcome: Progressing     Problem: MUSCULOSKELETAL - ADULT  Goal: Maintain or return mobility to safest level of function  Description: INTERVENTIONS:  - Assess patient's ability to carry out ADLs; assess patient's baseline for ADL function and identify physical deficits which impact ability to perform ADLs (bathing, care of mouth/teeth, toileting, grooming, dressing, etc )  - Assess/evaluate cause of self-care deficits   - Assess range of motion  - Assess patient's mobility  - Assess patient's need for assistive devices and provide as appropriate  - Encourage maximum independence but intervene and supervise when necessary  - Involve family in performance of ADLs  - Assess for home care needs following discharge   - Consider OT consult to assist with ADL evaluation and planning for discharge  - Provide patient education as appropriate  Outcome: Progressing     Problem: PAIN - ADULT  Goal: Verbalizes/displays adequate comfort level or baseline comfort level  Description: Interventions:  - Encourage patient to monitor pain and request assistance  - Assess pain using appropriate pain scale  - Consider geriatric pain management order set   - Administer analgesics based on type and severity of pain and evaluate response  - Implement non-pharmacological measures as appropriate and evaluate response  - Consider cultural and social influences on pain and pain management  - Notify physician/advanced practitioner if interventions unsuccessful or patient reports new pain  Outcome: Progressing     Problem: SAFETY ADULT  Goal: Patient will remain free of falls  Description: INTERVENTIONS:  - Educate patient/family on patient safety including physical limitations  - Instruct patient to call for assistance with activity - Consult OT/PT to assist with strengthening/mobility   - Keep Call bell within reach  - Keep bed low and locked with side rails adjusted as appropriate  - Keep care items and personal belongings within reach  - Initiate and maintain comfort rounds  - Make Fall Risk Sign visible to staff  - Offer Toileting every 2 hours, in advance of need  - Initiate/Maintain bed/chair alarm  - Obtain necessary fall risk management equipment: bed alarm  - Apply yellow socks and bracelet for high fall risk patients  - Keep patient's room near nurses station  Outcome: Progressing     Problem: DISCHARGE PLANNING  Goal: Discharge to home or other facility with appropriate resources  Description: INTERVENTIONS:  - Identify barriers to discharge w/patient and caregiver  - Arrange for needed discharge resources and transportation as appropriate  - Identify discharge learning needs (meds, wound care, etc )  - Arrange for interpretive services (Uzbek) to assist at discharge as needed  - Refer to Case Management Department for coordinating discharge planning if the patient needs post-hospital services based on physician/advanced practitioner order or complex needs related to functional status, cognitive ability, or social support system  Outcome: Progressing     Problem: Knowledge Deficit  Goal: Patient/family/caregiver demonstrates understanding of disease process, treatment plan, medications, and discharge instructions  Description: Complete learning assessment and assess knowledge base    Interventions:  - Provide teaching at level of understanding  - Provide teaching via preferred learning methods  Outcome: Progressing     Problem: CONFUSION/THOUGHT DISTURBANCE  Goal: Thought disturbances (confusion, delirium, depression, dementia or psychosis) are managed to maintain or return to baseline mental status and functional level  Description: INTERVENTIONS:  - Assess for possible contributors to  thought disturbance, including but not limited to medications, infection, impaired vision or hearing, underlying metabolic abnormalities, dehydration, respiratory compromise,  psychiatric diagnoses and notify attending PHYSICAN/AP  - Monitor and intervene to maintain adequate nutrition, hydration, elimination, sleep and activity  - Decrease environmental stimuli, including noise as appropriate  - Patient is Pashto speaking only - communicate in Pashto  - Provide frequent contacts to provide refocusing, direction and reassurance as needed  Approach patient calmly with eye contact and at their level  - [place] high risk fall precautions, aspiration precautions and other safety measures, as indicated  - If delirium suspected, notify physician/AP of change in condition and request immediate in-person evaluation  - Pursue consults as appropriate including Geriatric (campus dependent), OT for cognitive evaluation/activity planning, psychiatric, pastoral care, etc   Outcome: Progressing     Problem: Nutrition/Hydration-ADULT  Goal: Nutrient/Hydration intake appropriate for improving, restoring or maintaining nutritional needs  Description: Monitor and assess patient's nutrition/hydration status for malnutrition  Collaborate with interdisciplinary team and initiate plan and interventions as ordered  Monitor patient's weight and dietary intake as ordered or per policy  Utilize nutrition screening tool and intervene as necessary  Determine patient's food preferences and provide high-protein, high-caloric foods as appropriate       INTERVENTIONS:  - Monitor oral intake, urinary output, labs, and treatment plans  - Assess nutrition and hydration status and recommend course of action  - Evaluate amount of meals eaten  - Total assist in feeding patient  - Allow adequate time for meals  - Recommend/ encourage appropriate diets, oral nutritional supplements, and vitamin/mineral supplements  - Assess need for intravenous fluids  - Provide specific nutrition/hydration education as appropriate  - Include patient/family/caregiver in decisions related to nutrition  Outcome: Progressing     Problem: Potential for Falls  Goal: Patient will remain free of falls  Description: INTERVENTIONS:  - Educate patient/family on patient safety including physical limitations  - Instruct patient to call for assistance with activity   - Consult OT/PT to assist with strengthening/mobility   - Keep Call bell within reach  - Keep bed low and locked with side rails adjusted as appropriate  - Keep care items and personal belongings within reach  - Initiate and maintain comfort rounds  - Make Fall Risk Sign visible to staff  - Offer Toileting every 2 hours, in advance of need  - Initiate/Maintain bed/chair alarm  - Obtain necessary fall risk management equipment: bed alarm  - Apply yellow socks and bracelet for high fall risk patients  - Keep patient's room near nurses station  Outcome: Progressing

## 2021-07-17 NOTE — CONSULTS
Consultation - Nephrology   Ying Singh 80 y o  female MRN: [id number]  Unit/Bed#: E2 -01 Encounter: [id number]    ASSESSMENT and PLAN:  1  Acute kidney injury on chronic kidney disease, stage III:  - etiology suspect multifactorial secondary to volume depletion with poor oral intake, hemodynamic perturbations with hypotension, acute anemia, plus or minus with progression ATN  - upon review medical records, baseline creatinine 1 1-1 2 mg/dL  - most recent creatinine 2 27 mg/dL today, peak creatinine thus far 2 31 mg/dL on 7/16/21    - currently on LR at 125 mL/hour, will discontinue and initiate isolyte for now  - continue to hold ARB  - UA, positive nitrite, innumerable bacteria  - imaging, may consider checking renal ultrasound if renal indices do not improve  - avoid NSAIDs, nephrotoxic agents, IV contrast   - adjust medications to appropriate GFR  - monitor volume status closely with strict intake/output, daily weight  2  Electrolytes, acid/base:  - electrolytes overall stable and acceptable  - low bicarbonate, most recent 22 with anion gap of 12   - will continue monitor with repeat lab studies  3  Hypertension:  - now with hypotension    - outpatient regimen includes: Losartan 100 mg daily, Toprol- mg daily  - currently on: Toprol XL 50 mg daily, continue to hold losartan  - consider midodrine however patient is not tolerating oral intake/medications, will provide albumin 25 g x 1 now  - optimize hemodynamics, avoid hypotension and fluctuations of blood pressure  4  Anemia:  - most recent hemoglobin 8 6 grams/deciliter, acute loss postoperatively  - goal hemoglobin greater than 8 grams/deciliter  - recommend PRBC transfusion for hemoglobin less than 7, per primary team   - check iron panel in a m     5  Closed right hip fracture:  - status post full when ambulating to bathroom    - status post intramedullary nail fixation right intratrochanteric femur fracture on 7/15/21   - further management and plan per Orthopedics  HISTORY OF PRESENT ILLNESS:  Requesting Physician: Sharon Pool MD  Reason for Consult:  Acute kidney injury    Reda [place] is a 80 y o  female with history of schizophrenia, dementia, hypertension, hyperlipidemia who was admitted to [facility] after presenting status post fall  At present, patient is status post long TFN for right hip fracture, postop day 2  Upon assessment and evaluation, patient resting comfortably in bed without acute distress  Given dementia, patient nonverbal however is awake with eyes opened  Unable to complete review of systems  A renal consultation is requested today for assistance in the management of acute kidney injury  PAST MEDICAL HISTORY:  Past Medical History:   Diagnosis Date    Anxiety     Cognitive impairment     Dementia [facility] [address] [facility]     Depression     Hallucination     Hyperlipidemia     Hypertension     Memory loss     Psychiatric illness     Psychosis [facility] [address] [facility]     Schizoaffective disorder ([facility] [address] [facility]     Sleep difficulties        PAST SURGICAL HISTORY:  Past Surgical History:   Procedure Laterality Date    APPENDECTOMY      BLADDER SURGERY      CHOLECYSTECTOMY      [facility] OPEN RX FEMUR FX+INTRAMED BRYSON Right 7/15/2021    Procedure: INSERTION NAIL IM FEMUR ANTEGRADE (TROCHANTERIC) right;  Surgeon:  Jaida Billy MD;  Location: [facility];  Service: Orthopedics    TOTAL ABDOMINAL HYSTERECTOMY W/ BILATERAL SALPINGOOPHORECTOMY      TOTAL KNEE ARTHROPLASTY Left        ALLERGIES:  No Known Allergies    SOCIAL HISTORY:  Social History     Substance and Sexual Activity   Alcohol Use Never     Social History     Substance and Sexual Activity   Drug Use No     Social History     Tobacco Use   Smoking Status Never Smoker   Smokeless Tobacco Never Used   Tobacco Comment    Former smoker per [employer]       FAMILY HISTORY:  Family History   Problem Relation Age of Onset    Heart disease Mother Cardiac disorder    Parkinsonism Father     Heart disease Sister         Cardiac disorder    Hypertension Sister     Diabetes Child         Diabetes Mellitus    Lung disease Child         Respiratory Disorder    Diabetes Son         Diabetes Mellitus       MEDICATIONS:    Current Facility-Administered Medications:     acetaminophen (TYLENOL) oral suspension 650 mg, 650 mg, Oral, 4x Daily, Real Calix PA-C    albuterol (PROVENTIL HFA,VENTOLIN HFA) inhaler 2 puff, 2 puff, Inhalation, Q4H PRN, Thresa Trinity, PA-C    aluminum-magnesium hydroxide-simethicone (MYLANTA) oral suspension 30 mL, 30 mL, Oral, Q6H PRN, Thresa Shock, SANTOS    cyanocobalamin (VITAMIN B-12) tablet 1,000 mcg, 1,000 mcg, Oral, Daily, Thresa Petersen, SANTOS, 1,000 mcg at 07/16/21 1151    diltiazem (CARDIZEM CD) 24 hr capsule 120 mg, 120 mg, Oral, Daily, Jeanette Callaway PA-C    docusate sodium (COLACE) capsule 100 mg, 100 mg, Oral, Q12H [address], Jeanette Callaway PA-C, 100 mg at 07/16/21 0139    DULoxetine (CYMBALTA) delayed release capsule 30 mg, 30 mg, Oral, HS, Jeanette Callaway PA-C, 30 mg at 07/16/21 2311    enoxaparin (LOVENOX) subcutaneous injection 30 mg, 30 mg, Subcutaneous, Q24H [address], Azalea Robertson PA-C, 30 mg at 07/17/21 0928    HYDROmorphone HCl (DILAUDID) injection 0 2 mg, 0 2 mg, Intravenous, Q4H PRN, Dev Petersen PA-C    lactated ringers infusion, 125 mL/hr, Intravenous, Continuous, Miguel Lack, CRNP, Last Rate: 125 mL/hr at 07/17/21 0420, 125 mL/hr at 07/17/21 0420    LORazepam (ATIVAN) tablet 0 5 mg, 0 5 mg, Oral, HS, Jeanette Callaway PA-C, 0 5 mg at 07/16/21 2314    melatonin tablet 3 mg, 3 mg, Oral, HS, Jeanette Callaway PA-C, 3 mg at 07/16/21 2323    metoprolol succinate (TOPROL-XL) 24 hr tablet 50 mg, 50 mg, Oral, Daily, Jeanette Callaway PA-C    OLANZapine (ZyPREXA) tablet 5 mg, 5 mg, Oral, HS, Jeanette Callaway PA-C, 5 mg at 07/16/21 2312    ondansetron (ZOFRAN) injection 4 mg, 4 mg, Intravenous, Q6H PRN, Ziyad Soda, PA-C, 4 mg at 07/15/21 2322    oxyCODONE (ROXICODONE) IR tablet 2 5 mg, 2 5 mg, Oral, Q4H PRN, Ziyad Soda, PA-C, 2 5 mg at 07/16/21 0732    oxyCODONE (ROXICODONE) IR tablet 5 mg, 5 mg, Oral, Q4H PRN, Ziyad Soda, PA-C, 5 mg at 07/16/21 1051    polyethylene glycol (MIRALAX) packet 17 g, 17 g, Oral, Daily PRN, Ziyad Soda, PA-C    simethicone (MYLICON) chewable tablet 80 mg, 80 mg, Oral, 4x Daily PRN, Ziyad Soda, PA-C    REVIEW OF SYSTEMS:  All the systems were reviewed and were negative except as documented on the HPI  PHYSICAL EXAM:  Current Weight: Weight - Scale: 59 7 kg (131 lb 9 8 oz)  First Weight: Weight - Scale: 60 kg (132 lb 4 4 oz)  Vitals:    07/17/21 0530 07/17/21 0538 07/17/21 0545 07/17/21 0700   BP: 97/52  (!) 94/46 (!) 91/43   BP Location: Right arm  Left arm Left arm   Pulse: 97  101 91   Resp:    16   Temp:    98 6 °F (37 °C)   TempSrc:    Temporal   SpO2: 97%   98%   Weight:  59 7 kg (131 lb 9 8 oz)         Intake/Output Summary (Last 24 hours) at 7/17/2021 1146  Last data filed at 7/17/2021 0601  Gross per 24 hour   Intake 1382 5 ml   Output 485 ml   Net 897 5 ml     General:  Not acute distress  Skin: no rash, warm, dry  Eyes: pale conjunctivae, anicteric sclerae  ENT: moist lips and dry mucous membranes  Neck: supple with trachea midline  Chest: clear breath sounds bilaterally  CVS: normal rate, regular rhythm  Abdomen: soft, nondistended  Extremities:  Trace bilateral lower extremity edema  Neuro: awake,       Invasive Devices:   Urethral Catheter Latex 16 Fr   (Active)   Amt returned on insertion(mL) 420 mL 07/17/21 0200   Site Assessment Clean;Skin intact 07/17/21 0200   Sanders Care Done 07/17/21 0848   Collection Container Standard drainage bag 07/17/21 0200   Securement Method Securing device (Describe) 07/17/21 0200   Output (mL) 15 mL 07/17/21 0601       Lab Results:   Results from last 7 days   Lab Units 07/17/21 0627 07/16/21 2046 07/16/21  0710 07/16/21  0538   WBC Thousand/uL 8 28 8 09 6 90  --    HEMOGLOBIN g/dL 8 6* 8 6* 8 7*  --    HEMATOCRIT % 27 0* 26 5* 26 7*  --    PLATELETS Thousands/uL 160 177 168  --    POTASSIUM mmol/L 4 0 4 2  --  4 9   CHLORIDE mmol/L 105 103  --  108   CO2 mmol/L 22 23  --  23   BUN mg/dL 46* 40*  --  30*   CREATININE mg/dL 2 27* 2 31*  --  1 23   CALCIUM mg/dL 8 3 8 4  --  8 7

## 2021-07-17 NOTE — PROGRESS NOTES
Progress Note - Orthopedics   Reda [place] 80 y o  female MRN: [id number]  Unit/Bed#: E2 -01 Encounter: [id number]    Assessment:  80 y o  female s/p long TFN for right hip fracture POD 2    Plan:  · Hgb stable at 8 6  patient has been hypotensive  Continue IV fluids until tolerating PO  Continue to monitor  · Dressing change completed at bedside  Incisions healing well without bleeding or drainage  · Pain control prn- switched to liquid tylenol  · PT/OT-WBAT right LE   · Lovenox/SCDs for DVT prophylaxis  Patient will require DVT prophylaxis 30 days postop  · DC planning- patient will likely require rehab at discharge  Subjective: Pt S&E  Resting comfortably  Patient does wake up to tactile stimulation but is not her conversation morning  Does grimace with pain with any movement of the right lower extremity  Per nursing, patient has required fluid bolus due to hypotension  Vitals: Blood pressure (!) 94/46, pulse 101, temperature (!) 97 4 °F (36 3 °C), temperature source Temporal, resp  rate 18, weight 59 7 kg (131 lb 9 8 oz), SpO2 97 %  ,Body mass index is 27 51 kg/m²        Intake/Output Summary (Last 24 hours) at 7/17/2021 0726  Last data filed at 7/17/2021 0601  Gross per 24 hour   Intake 1432 5 ml   Output 485 ml   Net 947 5 ml       Invasive Devices     Peripheral Intravenous Line            Peripheral IV 07/15/21 Left Antecubital 2 days    Peripheral IV 07/16/21 Right Antecubital <1 day          Drain            Urethral Catheter Latex 16 Fr  <1 day                Ortho Exam: rightLE:    Drsg:  C/D/I,   Unable to test sensation   Palpable pedal pulse  EHL/AT/GS intact, spontaneous movement noted    Lab, Imaging and other studies:   CBC:   Lab Results   Component Value Date    WBC 8 28 07/17/2021    HGB 8 6 (L) 07/17/2021    HCT 27 0 (L) 07/17/2021    MCV 94 07/17/2021     07/17/2021    MCH 30 1 07/17/2021    MCHC 31 9 07/17/2021    RDW 14 1 07/17/2021    MPV 10 9 07/17/2021 CMP:   Lab Results   Component Value Date    SODIUM 139 07/17/2021     07/17/2021    CO2 22 07/17/2021    BUN 46 (H) 07/17/2021    CREATININE 2 27 (H) 07/17/2021    CALCIUM 8 3 07/17/2021    EGFR 19 07/17/2021

## 2021-07-17 NOTE — PROGRESS NOTES
[address] [facility]  Progress Note - Beny Hogue 11/20/1931, 80 y o  female MRN: [id number]  Unit/Bed#: E2 -01 Encounter: [id number]  Primary Care Provider: Estephania Mckeon MD   Date and time admitted to hospital: 7/15/2021  3:52 AM    Asymptomatic bacteriuria  Assessment & Plan  UA shows bacteria positive nitrates with no WBCs  Low suspicions for urinary tract infection  Monitor off antibiotics    ABLA (acute blood loss anemia)  Assessment & Plan  · Drop in hemoglobin from 12 to 8 7 overnight postoperatively  · No evidence of ongoing bleeding   · Monitor hemodynamics and H&H for need for transfusion  · Check Iron panel, IV iron    Lab Results   Component Value Date    HGB 8 6 (L) 07/17/2021    HGB 8 6 (L) 07/16/2021    HGB 8 7 (L) 07/16/2021    HGB 12 0 07/15/2021    HGB 12 1 05/21/2021         Stage 3a chronic kidney disease [facility])  Assessment & Plan  Lab Results   Component Value Date    EGFR 19 07/17/2021    EGFR 18 07/16/2021    EGFR 39 07/16/2021    CREATININE 2 27 (H) 07/17/2021    CREATININE 2 31 (H) 07/16/2021    CREATININE 1 23 07/16/2021     · Creatinine at baseline; Cr 1 0-1 2  · Nephrology consulted  · Worsening creatinine, 2 3 likely prerenal vs ATN with hypotension and ABLA  · IVFs    Major neurocognitive disorder ([address] )  Assessment & Plan  · History of schizophrenia and dementia  · Has 24hr caregiver at home  · Supportive care  · PT/OT consult   · continue ativan, zyprexa, cymbalta   · PDMP reviewed   · Monitor for hospital delirium     Paroxysmal A-fib ([address] )  Assessment & Plan  · PAF on rate control with diltiazem and Toprol XL  · No longer on anticoagulation due to fall risk given dementia and schizophrenia       Bilateral hearing loss  Assessment & Plan  · Followed outpatient by ENT for bilateral hearing loss    Essential hypertension  Assessment & Plan  · On Toprol, diltiazem and losartan per home regimen   · Hold losartan - resume if blood pressure tolerates       * Closed right hip fracture [facility])  Assessment & Plan  · S/p fall when ambulating to the bathroom with assistance from caregiver  Reportedly tripped and had an assisted fall  · XR shows right femoral neck fracture   · Status post intramedullary nail fixation right intertrochanteric femur fracture on 07/15/2021  · DVT prophylaxis will switch to lovenox   · Pain control  · WBAT RLE   · PT OT consult, recommending rehab      VTE Pharmacologic Prophylaxis:   Pharmacologic: Enoxaparin (Lovenox)  Mechanical VTE Prophylaxis in Place: Yes    Patient Centered Rounds: I have performed bedside rounds with nursing staff today  Discussions with Specialists or Other Care Team Provider: Nephrology    Education and Discussions with Family / Patient: Patient, son    Time Spent for Care: 30 minutes  More than 50% of total time spent on counseling and coordination of care as described above  Current Length of Stay: 2 day(s)    Current Patient Status: Inpatient   Certification Statement: The patient will continue to require additional inpatient hospital stay due to Monitor BP, OMEGA and pending rehab    Discharge Plan: Pending    Code Status: Level 3 - DNAR and DNI      Subjective:   No events overnight  Patient blood pressure remains slightly on the lower side  Patient mainly nonverbal, blue phone was utilize but patient did not respond  Objective:     Vitals:   Temp (24hrs), Av 6 °F (36 4 °C), Min:97 2 °F (36 2 °C), Max:98 6 °F (37 °C)    Temp:  [97 2 °F (36 2 °C)-98 6 °F (37 °C)] 97 2 °F (36 2 °C)  HR:  [] 86  Resp:  [16-18] 16  BP: (84-97)/(43-55) 90/50  SpO2:  [91 %-99 %] 99 %  Body mass index is 27 51 kg/m²  Input and Output Summary (last 24 hours): Intake/Output Summary (Last 24 hours) at 2021 1301  Last data filed at 2021 0601  Gross per 24 hour   Intake 1382 5 ml   Output 485 ml   Net 897 5 ml       Physical Exam:     Physical Exam  Vitals and nursing note reviewed     HENT:      Head: Normocephalic  Eyes:      General: No scleral icterus  Cardiovascular:      Rate and Rhythm: Normal rate and regular rhythm  Pulmonary:      Effort: Pulmonary effort is normal  No respiratory distress  Comments: Decreased breath sounds throughout, no adventitious sounds present, on room air  Abdominal:      General: Bowel sounds are normal       Palpations: Abdomen is soft  Tenderness: There is no abdominal tenderness  Musculoskeletal:      Right lower leg: No edema  Left lower leg: No edema  Skin:     Comments: Dressing present over right hip   Neurological:      Mental Status: She is alert  Mental status is at baseline  Psychiatric:         Speech: She is noncommunicative  Cognition and Memory: Cognition is impaired  Additional Data:     Labs:    Results from last 7 days   Lab Units 07/17/21  0627 07/16/21  0710   WBC Thousand/uL 8 28 6 90   HEMOGLOBIN g/dL 8 6* 8 7*   HEMATOCRIT % 27 0* 26 7*   PLATELETS Thousands/uL 160 168   NEUTROS PCT %  --  75   LYMPHS PCT %  --  15   MONOS PCT %  --  10   EOS PCT %  --  0     Results from last 7 days   Lab Units 07/17/21  0627   SODIUM mmol/L 139   POTASSIUM mmol/L 4 0   CHLORIDE mmol/L 105   CO2 mmol/L 22   BUN mg/dL 46*   CREATININE mg/dL 2 27*   ANION GAP mmol/L 12   CALCIUM mg/dL 8 3   GLUCOSE RANDOM mg/dL 148*     Results from last 7 days   Lab Units 07/15/21  0509   INR  0 96                       * I Have Reviewed All Lab Data Listed Above  * Additional Pertinent Lab Tests Reviewed: All Labs For Current Hospital Admission Reviewed    Imaging:    XR hip/pelv 2-3 vws right    Result Date: 7/15/2021  Impression: Impacted intertrochanteric right femoral neck fracture  Workstation performed: [id number]     XR femur 2 vw left    Result Date: 7/17/2021  Impression: No acute osseous abnormality   Workstation performed: [id number]     XR femur 2 vw right    Result Date: 7/16/2021  Impression: Fluoroscopic guidance provided for procedure guidance  Please refer to the separate procedure notes for additional details  Workstation performed: [id number]     XR knee 1 or 2 vw left    Result Date: 7/17/2021  Impression: No acute findings  Unremarkable appearance of total knee arthroplasty  Workstation performed: [id number]     XR knee 1 or 2 vw right    Result Date: 7/15/2021  Impression: No acute osseous abnormality  Evaluation for subtle fractures limited given extensive degenerative change  Degenerative changes as described  Workstation performed: [id number]     XR ankle 3+ views RIGHT    Result Date: 7/15/2021  Impression: Limited evaluation without evident acute osseous abnormality   Workstation performed: [id number]     Recent Cultures (last 7 days):           Last 24 Hours Medication List:   Current Facility-Administered Medications   Medication Dose Route Frequency Provider Last Rate    acetaminophen  650 mg Oral 4x Daily Pearline Soulier, PA-C      albuterol  2 puff Inhalation Q4H PRN Dobarney DamicoSANTOS lafleur      aluminum-magnesium hydroxide-simethicone  30 mL Oral Q6H PRN Christian DamicoSANTOS lafleur      cyanocobalamin  1,000 mcg Oral Daily Dobarney BlanquitaSANTOS lafleur      diltiazem  120 mg Oral Daily Dobarney BlanquitaSANTOS lafleur      docusate sodium  100 mg Oral Q12H [address] Christian DamicoSANTOS lafleur      DULoxetine  30 mg Oral HS Dobarney Blanquita, SANTOS      enoxaparin  30 mg Subcutaneous Q24H [address] Azalea Robertson PA-C      HYDROmorphone  0 2 mg Intravenous Q4H PRN Christian DamicoSANTOS lafleur      lactated ringers  125 mL/hr Intravenous Continuous BEV Fonseca 125 mL/hr (07/17/21 1224)    LORazepam  0 5 mg Oral HS Dobarney Blanquita, SANTOS      melatonin  3 mg Oral HS Dobarney Blanquita, PA-VICTOR MANUEL      metoprolol succinate  50 mg Oral Daily Dobarney Blanquita, PA-VICTOR MANUEL      OLANZapine  5 mg Oral HS Dobarney Blanquita, PA-VICTOR MANUEL      ondansetron  4 mg Intravenous Q6H PRN Dobarney Blanquita, PA-VICTOR MANUEL      oxyCODONE  2 5 mg Oral Q4H PRN Dobarney Blanquita, PA-VICTOR MANUEL      oxyCODONE  5 mg Oral Q4H PRN Yanykenna Dorsey PA-C      polyethylene glycol  17 g Oral Daily PRN Yanykenna Dorsey PA-C      simethicone  80 mg Oral 4x Daily PRN Yany Dorsey PA-C          Today, Patient Was Seen By: Jennifer Downs MD    ** Please Note: Dictation voice to text software may have been used in the creation of this document   **

## 2021-07-18 PROBLEM — N30.00 ACUTE CYSTITIS WITHOUT HEMATURIA: Status: ACTIVE | Noted: 2021-07-17

## 2021-07-18 LAB
ABO GROUP BLD: NORMAL
ANION GAP SERPL CALCULATED.3IONS-SCNC: 8 MMOL/L (ref 4–13)
BASOPHILS # BLD AUTO: 0.01 THOUSANDS/ΜL (ref 0–0.1)
BASOPHILS NFR BLD AUTO: 0 % (ref 0–1)
BLD GP AB SCN SERPL QL: NEGATIVE
BUN SERPL-MCNC: 50 MG/DL (ref 5–25)
CALCIUM SERPL-MCNC: 7.9 MG/DL (ref 8.3–10.1)
CHLORIDE SERPL-SCNC: 107 MMOL/L (ref 100–108)
CK SERPL-CCNC: 87 U/L (ref 26–192)
CO2 SERPL-SCNC: 24 MMOL/L (ref 21–32)
CREAT SERPL-MCNC: 1.84 MG/DL (ref 0.6–1.3)
EOSINOPHIL # BLD AUTO: 0.05 THOUSAND/ΜL (ref 0–0.61)
EOSINOPHIL NFR BLD AUTO: 1 % (ref 0–6)
ERYTHROCYTE [DISTWIDTH] IN BLOOD BY AUTOMATED COUNT: 14 % (ref 11.6–15.1)
FERRITIN SERPL-MCNC: 136 NG/ML (ref 8–388)
GFR SERPL CREATININE-BSD FRML MDRD: 24 ML/MIN/1.73SQ M
GLUCOSE SERPL-MCNC: 115 MG/DL (ref 65–140)
HCT VFR BLD AUTO: 19.4 % (ref 34.8–46.1)
HCT VFR BLD AUTO: 30 % (ref 34.8–46.1)
HGB BLD-MCNC: 6.2 G/DL (ref 11.5–15.4)
HGB BLD-MCNC: 9.6 G/DL (ref 11.5–15.4)
IMM GRANULOCYTES # BLD AUTO: 0.02 THOUSAND/UL (ref 0–0.2)
IMM GRANULOCYTES NFR BLD AUTO: 0 % (ref 0–2)
IRON SATN MFR SERPL: 10 %
IRON SERPL-MCNC: 20 UG/DL (ref 50–170)
LYMPHOCYTES # BLD AUTO: 0.68 THOUSANDS/ΜL (ref 0.6–4.47)
LYMPHOCYTES NFR BLD AUTO: 14 % (ref 14–44)
MCH RBC QN AUTO: 29.7 PG (ref 26.8–34.3)
MCHC RBC AUTO-ENTMCNC: 32 G/DL (ref 31.4–37.4)
MCV RBC AUTO: 93 FL (ref 82–98)
MONOCYTES # BLD AUTO: 0.42 THOUSAND/ΜL (ref 0.17–1.22)
MONOCYTES NFR BLD AUTO: 9 % (ref 4–12)
NEUTROPHILS # BLD AUTO: 3.77 THOUSANDS/ΜL (ref 1.85–7.62)
NEUTS SEG NFR BLD AUTO: 76 % (ref 43–75)
NRBC BLD AUTO-RTO: 0 /100 WBCS
PLATELET # BLD AUTO: 148 THOUSANDS/UL (ref 149–390)
PMV BLD AUTO: 10.7 FL (ref 8.9–12.7)
POTASSIUM SERPL-SCNC: 4 MMOL/L (ref 3.5–5.3)
RBC # BLD AUTO: 2.09 MILLION/UL (ref 3.81–5.12)
RH BLD: NEGATIVE
SODIUM SERPL-SCNC: 139 MMOL/L (ref 136–145)
SPECIMEN EXPIRATION DATE: NORMAL
TIBC SERPL-MCNC: 194 UG/DL (ref 250–450)
WBC # BLD AUTO: 4.95 THOUSAND/UL (ref 4.31–10.16)

## 2021-07-18 PROCEDURE — 85018 HEMOGLOBIN: CPT | Performed by: STUDENT IN AN ORGANIZED HEALTH CARE EDUCATION/TRAINING PROGRAM

## 2021-07-18 PROCEDURE — 82728 ASSAY OF FERRITIN: CPT | Performed by: STUDENT IN AN ORGANIZED HEALTH CARE EDUCATION/TRAINING PROGRAM

## 2021-07-18 PROCEDURE — 99232 SBSQ HOSP IP/OBS MODERATE 35: CPT | Performed by: STUDENT IN AN ORGANIZED HEALTH CARE EDUCATION/TRAINING PROGRAM

## 2021-07-18 PROCEDURE — 99232 SBSQ HOSP IP/OBS MODERATE 35: CPT | Performed by: INTERNAL MEDICINE

## 2021-07-18 PROCEDURE — 85014 HEMATOCRIT: CPT | Performed by: STUDENT IN AN ORGANIZED HEALTH CARE EDUCATION/TRAINING PROGRAM

## 2021-07-18 PROCEDURE — 30233N1 TRANSFUSION OF NONAUTOLOGOUS RED BLOOD CELLS INTO PERIPHERAL VEIN, PERCUTANEOUS APPROACH: ICD-10-PCS | Performed by: INTERNAL MEDICINE

## 2021-07-18 PROCEDURE — 83550 IRON BINDING TEST: CPT | Performed by: STUDENT IN AN ORGANIZED HEALTH CARE EDUCATION/TRAINING PROGRAM

## 2021-07-18 PROCEDURE — 99024 POSTOP FOLLOW-UP VISIT: CPT | Performed by: PHYSICIAN ASSISTANT

## 2021-07-18 PROCEDURE — 86850 RBC ANTIBODY SCREEN: CPT | Performed by: NURSE PRACTITIONER

## 2021-07-18 PROCEDURE — 85025 COMPLETE CBC W/AUTO DIFF WBC: CPT | Performed by: STUDENT IN AN ORGANIZED HEALTH CARE EDUCATION/TRAINING PROGRAM

## 2021-07-18 PROCEDURE — P9016 RBC LEUKOCYTES REDUCED: HCPCS

## 2021-07-18 PROCEDURE — 80048 BASIC METABOLIC PNL TOTAL CA: CPT | Performed by: STUDENT IN AN ORGANIZED HEALTH CARE EDUCATION/TRAINING PROGRAM

## 2021-07-18 PROCEDURE — 86901 BLOOD TYPING SEROLOGIC RH(D): CPT | Performed by: NURSE PRACTITIONER

## 2021-07-18 PROCEDURE — 86900 BLOOD TYPING SEROLOGIC ABO: CPT | Performed by: NURSE PRACTITIONER

## 2021-07-18 PROCEDURE — 83540 ASSAY OF IRON: CPT | Performed by: STUDENT IN AN ORGANIZED HEALTH CARE EDUCATION/TRAINING PROGRAM

## 2021-07-18 PROCEDURE — 82550 ASSAY OF CK (CPK): CPT | Performed by: INTERNAL MEDICINE

## 2021-07-18 RX ORDER — METOPROLOL TARTRATE 5 MG/5ML
5 INJECTION INTRAVENOUS EVERY 6 HOURS PRN
Status: DISCONTINUED | OUTPATIENT
Start: 2021-07-18 | End: 2021-07-18

## 2021-07-18 RX ORDER — METOPROLOL SUCCINATE 50 MG/1
50 TABLET, EXTENDED RELEASE ORAL DAILY
Status: DISCONTINUED | OUTPATIENT
Start: 2021-07-19 | End: 2021-07-26 | Stop reason: HOSPADM

## 2021-07-18 RX ORDER — DILTIAZEM HYDROCHLORIDE 120 MG/1
120 CAPSULE, COATED, EXTENDED RELEASE ORAL DAILY
Status: DISCONTINUED | OUTPATIENT
Start: 2021-07-19 | End: 2021-07-23

## 2021-07-18 RX ORDER — OLANZAPINE 5 MG/1
2.5 TABLET ORAL
Status: DISCONTINUED | OUTPATIENT
Start: 2021-07-18 | End: 2021-07-26 | Stop reason: HOSPADM

## 2021-07-18 RX ORDER — METOPROLOL TARTRATE 5 MG/5ML
2.5 INJECTION INTRAVENOUS EVERY 6 HOURS PRN
Status: DISCONTINUED | OUTPATIENT
Start: 2021-07-18 | End: 2021-07-19

## 2021-07-18 RX ORDER — METOPROLOL TARTRATE 5 MG/5ML
2.5 INJECTION INTRAVENOUS ONCE
Status: COMPLETED | OUTPATIENT
Start: 2021-07-18 | End: 2021-07-18

## 2021-07-18 RX ADMIN — ACETAMINOPHEN 650 MG: 650 SUSPENSION ORAL at 18:19

## 2021-07-18 RX ADMIN — SODIUM CHLORIDE, SODIUM GLUCONATE, SODIUM ACETATE, POTASSIUM CHLORIDE, MAGNESIUM CHLORIDE, SODIUM PHOSPHATE, DIBASIC, AND POTASSIUM PHOSPHATE 75 ML/HR: .53; .5; .37; .037; .03; .012; .00082 INJECTION, SOLUTION INTRAVENOUS at 06:20

## 2021-07-18 RX ADMIN — CEFTRIAXONE SODIUM 1000 MG: 10 INJECTION, POWDER, FOR SOLUTION INTRAVENOUS at 12:40

## 2021-07-18 RX ADMIN — ENOXAPARIN SODIUM 30 MG: 30 INJECTION, SOLUTION INTRAVENOUS; SUBCUTANEOUS at 08:40

## 2021-07-18 RX ADMIN — MELATONIN 3 MG: at 22:34

## 2021-07-18 RX ADMIN — OLANZAPINE 2.5 MG: 5 TABLET, FILM COATED ORAL at 22:34

## 2021-07-18 RX ADMIN — DULOXETINE HYDROCHLORIDE 30 MG: 30 CAPSULE, DELAYED RELEASE ORAL at 22:34

## 2021-07-18 RX ADMIN — ACETAMINOPHEN 649.6 MG: 650 SUSPENSION ORAL at 22:34

## 2021-07-18 RX ADMIN — DOCUSATE SODIUM 100 MG: 100 CAPSULE ORAL at 22:34

## 2021-07-18 RX ADMIN — METOROPROLOL TARTRATE 2.5 MG: 5 INJECTION, SOLUTION INTRAVENOUS at 15:55

## 2021-07-18 RX ADMIN — IRON SUCROSE 200 MG: 20 INJECTION, SOLUTION INTRAVENOUS at 16:05

## 2021-07-18 NOTE — PLAN OF CARE
Problem: MOBILITY - ADULT  Goal: Maintain or return to baseline ADL function  Description: INTERVENTIONS:  -  Assess patient's ability to carry out ADLs; assess patient's baseline for ADL function and identify physical deficits which impact ability to perform ADLs (bathing, care of mouth/teeth, toileting, grooming, dressing, etc )  - Assess/evaluate cause of self-care deficits   - Assess range of motion  - Assess patient's mobility; develop plan if impaired  - Assess patient's need for assistive devices and provide as appropriate  - Encourage maximum independence but intervene and supervise when necessary  - Involve family in performance of ADLs  - Assess for home care needs following discharge   - Consider OT consult to assist with ADL evaluation and planning for discharge  - Provide patient education as appropriate  Outcome: Progressing  Goal: Maintains/Returns to pre admission functional level  Description: INTERVENTIONS:  - Perform BMAT or MOVE assessment daily    - Set and communicate daily mobility goal to care team and patient/family/caregiver     - Collaborate with rehabilitation services on mobility goals if consulted  - Out of bed for toileting  - Record patient progress and toleration of activity level   Outcome: Progressing     Problem: Prexisting or High Potential for Compromised Skin Integrity  Goal: Skin integrity is maintained or improved  Description: INTERVENTIONS:  - Identify patients at risk for skin breakdown  - Assess and monitor skin integrity  - Assess and monitor nutrition and hydration status  - Monitor labs   - Assess for incontinence   - Turn and reposition patient  - Assist with mobility/ambulation  - Relieve pressure over bony prominences  - Avoid friction and shearing  - Provide appropriate hygiene as needed including keeping skin clean and dry  - Evaluate need for skin moisturizer/barrier cream  - Collaborate with interdisciplinary team   - Patient/family teaching  Outcome: Progressing     Problem: CARDIOVASCULAR - ADULT  Goal: Maintains optimal cardiac output and hemodynamic stability  Description: INTERVENTIONS:  - Monitor I/O, vital signs and rhythm  - Monitor for S/S and trends of decreased cardiac output  - Administer and titrate ordered vasoactive medications to optimize hemodynamic stability  - Assess quality of pulses, skin color and temperature  - Assess for signs of decreased coronary artery perfusion  - Instruct patient to report change in severity of symptoms  Outcome: Progressing     Problem: GENITOURINARY - ADULT  Goal: Urinary catheter remains patent  Description: INTERVENTIONS:  - Assess patency of urinary catheter  - Follow guidelines for intermittent irrigation of non-functioning urinary catheter  Outcome: Progressing     Problem: METABOLIC, FLUID AND ELECTROLYTES - ADULT  Goal: Electrolytes maintained within normal limits  Description: INTERVENTIONS:  - Monitor labs and assess patient for signs and symptoms of electrolyte imbalances  - Administer electrolyte replacement as ordered  - Monitor response to electrolyte replacements, including repeat lab results as appropriate  - Instruct patient on fluid and nutrition as appropriate  Outcome: Progressing     Problem: SKIN/TISSUE INTEGRITY - ADULT  Goal: Incision(s), wounds(s) or drain site(s) healing without S/S of infection  Description: INTERVENTIONS  - Assess and document dressing, incision, wound bed, drain sites and surrounding tissue  - Provide caregiver/family education  - Perform skin care/dressing changes as ordered  Outcome: Progressing     Problem: MUSCULOSKELETAL - ADULT  Goal: Maintain or return mobility to safest level of function  Description: INTERVENTIONS:  - Assess patient's ability to carry out ADLs; assess patient's baseline for ADL function and identify physical deficits which impact ability to perform ADLs (bathing, care of mouth/teeth, toileting, grooming, dressing, etc )  - Assess/evaluate cause of self-care deficits   - Assess range of motion  - Assess patient's mobility  - Assess patient's need for assistive devices and provide as appropriate  - Encourage maximum independence but intervene and supervise when necessary  - Involve family in performance of ADLs  - Assess for home care needs following discharge   - Consider OT consult to assist with ADL evaluation and planning for discharge  - Provide patient education as appropriate  Outcome: Progressing     Problem: SAFETY ADULT  Goal: Patient will remain free of falls  Description: INTERVENTIONS:  - Educate patient/family on patient safety including physical limitations  - Instruct patient to call for assistance with activity   - Consult OT/PT to assist with strengthening/mobility   - Keep Call bell within reach  - Keep bed low and locked with side rails adjusted as appropriate  - Keep care items and personal belongings within reach  - Initiate and maintain comfort rounds  - Make Fall Risk Sign visible to staff  - Offer Toileting every 2 hours, in advance of need  - Initiate/Maintain bed/chair alarm  - Obtain necessary fall risk management equipment: bed alarm  - Apply yellow socks and bracelet for high fall risk patients  - Keep patient's room near nurses station  Outcome: Progressing     Problem: DISCHARGE PLANNING  Goal: Discharge to home or other facility with appropriate resources  Description: INTERVENTIONS:  - Identify barriers to discharge w/patient and caregiver  - Arrange for needed discharge resources and transportation as appropriate  - Identify discharge learning needs (meds, wound care, etc )  - Arrange for interpretive services ([place] Islander) to assist at discharge as needed  - Refer to Case Management Department for coordinating discharge planning if the patient needs post-hospital services based on physician/advanced practitioner order or complex needs related to functional status, cognitive ability, or social support system  Outcome: Progressing     Problem: Knowledge Deficit  Goal: Patient/family/caregiver demonstrates understanding of disease process, treatment plan, medications, and discharge instructions  Description: Complete learning assessment and assess knowledge base  Interventions:  - Provide teaching at level of understanding  - Provide teaching via preferred learning methods  Outcome: Progressing     Problem: CONFUSION/THOUGHT DISTURBANCE  Goal: Thought disturbances (confusion, delirium, depression, dementia or psychosis) are managed to maintain or return to baseline mental status and functional level  Description: INTERVENTIONS:  - Assess for possible contributors to  thought disturbance, including but not limited to medications, infection, impaired vision or hearing, underlying metabolic abnormalities, dehydration, respiratory compromise,  psychiatric diagnoses and notify attending PHYSICAN/AP  - Monitor and intervene to maintain adequate nutrition, hydration, elimination, sleep and activity  - Decrease environmental stimuli, including noise as appropriate  - Patient is Marshallese speaking only - communicate in Marshallese  - Provide frequent contacts to provide refocusing, direction and reassurance as needed  Approach patient calmly with eye contact and at their level  - [place] high risk fall precautions, aspiration precautions and other safety measures, as indicated  - If delirium suspected, notify physician/AP of change in condition and request immediate in-person evaluation  - Pursue consults as appropriate including Geriatric (campus dependent), OT for cognitive evaluation/activity planning, psychiatric, pastoral care, etc   Outcome: Progressing     Problem: Nutrition/Hydration-ADULT  Goal: Nutrient/Hydration intake appropriate for improving, restoring or maintaining nutritional needs  Description: Monitor and assess patient's nutrition/hydration status for malnutrition   Collaborate with interdisciplinary team and initiate plan and interventions as ordered  Monitor patient's weight and dietary intake as ordered or per policy  Utilize nutrition screening tool and intervene as necessary  Determine patient's food preferences and provide high-protein, high-caloric foods as appropriate       INTERVENTIONS:  - Monitor oral intake, urinary output, labs, and treatment plans  - Assess nutrition and hydration status and recommend course of action  - Evaluate amount of meals eaten  - Total assist in feeding patient  - Allow adequate time for meals  - Recommend/ encourage appropriate diets, oral nutritional supplements, and vitamin/mineral supplements  - Assess need for intravenous fluids  - Provide specific nutrition/hydration education as appropriate  - Include patient/family/caregiver in decisions related to nutrition  Outcome: Progressing     Problem: Potential for Falls  Goal: Patient will remain free of falls  Description: INTERVENTIONS:  - Educate patient/family on patient safety including physical limitations  - Instruct patient to call for assistance with activity   - Consult OT/PT to assist with strengthening/mobility   - Keep Call bell within reach  - Keep bed low and locked with side rails adjusted as appropriate  - Keep care items and personal belongings within reach  - Initiate and maintain comfort rounds  - Make Fall Risk Sign visible to staff  - Offer Toileting every 2 hours, in advance of need  - Initiate/Maintain bed/chair alarm  - Obtain necessary fall risk management equipment: bed alarm  - Apply yellow socks and bracelet for high fall risk patients  - Keep patient's room near nurses station  Outcome: Progressing

## 2021-07-18 NOTE — PROGRESS NOTES
[address] 48  Progress Note - Jared Hermosillo 11/20/1931, 80 y o  female MRN: [id number]  Unit/Bed#: E2 -01 Encounter: [id number]  Primary Care Provider: Marbin Norris MD   Date and time admitted to hospital: 7/15/2021  3:52 AM    Acute cystitis without hematuria  Assessment & Plan  UA shows bacteria positive nitrates with no WBCs  Low suspicions for urinary tract infection  Given poor mental status and recent fall, start IV Rocephin plan for 3 days    ABLA (acute blood loss anemia)  Assessment & Plan  · Drop in hemoglobin from 12 to 8 7 overnight postoperatively  · No evidence of ongoing bleeding   · Hemoglobin 6 2 today, transfuse 1 unit  · Moderate H&H    Lab Results   Component Value Date    HGB 6 2 (LL) 07/18/2021    HGB 8 6 (L) 07/17/2021    HGB 8 6 (L) 07/16/2021    HGB 8 7 (L) 07/16/2021    HGB 12 0 07/15/2021         Stage 3a chronic kidney disease [facility])  Assessment & Plan  Lab Results   Component Value Date    EGFR 24 07/18/2021    EGFR 19 07/17/2021    EGFR 18 07/16/2021    CREATININE 1 84 (H) 07/18/2021    CREATININE 2 27 (H) 07/17/2021    CREATININE 2 31 (H) 07/16/2021     · Creatinine at baseline; Cr 1 0-1 2  · Nephrology consulted  · Likely prerenal vs ATN with hypotension and ABLA  · IVFs per Nephrology  · Renal functions improving    Major neurocognitive disorder ([facility] [address] )  Assessment & Plan  · History of schizophrenia and dementia  · Has 24hr caregiver at home  · PT/OT consult   · Patient mental status decline further, poor appetite likely due to anesthesia and OMEGA versus UTI  · IV antibiotics  · Discontinue Ativan bedtime, decrease Zyprexa to 2 5 mg from 5 mg, continue Celexa  · Delirium precautions  · Mental status improving today, improved appetite    Paroxysmal A-fib (HCC)  Assessment & Plan  · PAF on rate control with diltiazem and Toprol XL  · No longer on anticoagulation due to fall risk given dementia and schizophrenia       Bilateral hearing loss  Assessment & Plan  · Followed outpatient by ENT for bilateral hearing loss    Essential hypertension  Assessment & Plan  · On Toprol, diltiazem and losartan per home regimen   · Multiple blood pressure medications held due to low blood pressure  · Hold losartan - resume if blood pressure tolerates       * Closed right hip fracture (HCC)  Assessment & Plan  · S/p fall when ambulating to the bathroom with assistance from caregiver  Reportedly tripped and had an assisted fall  · XR shows right femoral neck fracture   · Status post intramedullary nail fixation right intertrochanteric femur fracture on 07/15/2021  · DVT prophylaxis will switch to lovenox   · Pain control  · WBAT RLE   · PT OT consult, recommending rehab        VTE Pharmacologic Prophylaxis:   Pharmacologic: Enoxaparin (Lovenox)  Mechanical VTE Prophylaxis in Place: Yes    Patient Centered Rounds: I have performed bedside rounds with nursing staff today  Discussions with Specialists or Other Care Team Provider: None    Education and Discussions with Family / Patient: Patient, son on phone    Time Spent for Care: 30 minutes  More than 50% of total time spent on counseling and coordination of care as described above  Current Length of Stay: 3 day(s)    Current Patient Status: Inpatient   Certification Statement: The patient will continue to require additional inpatient hospital stay due to poor mental status, improve appetite and PT evaluation for rehab    Discharge Plan: 2 days, will need rehab    Code Status: Level 3 - DNAR and DNI      Subjective:   No events overnight  Continues to be lethargic in the morning, but improves throughout the day  Appetite improving with her granddaughter at bedside      Objective:     Vitals:   Temp (24hrs), Av 2 °F (36 8 °C), Min:97 5 °F (36 4 °C), Max:98 7 °F (37 1 °C)    Temp:  [97 5 °F (36 4 °C)-98 7 °F (37 1 °C)] 97 5 °F (36 4 °C)  HR:  [] 125  Resp:  [16-18] 18  BP: ()/(42-74) 93/65  SpO2:  [92 %-99 %] 93 %  Body mass index is 28 8 kg/m²  Input and Output Summary (last 24 hours): Intake/Output Summary (Last 24 hours) at 7/18/2021 1523  Last data filed at 7/18/2021 1412  Gross per 24 hour   Intake 350 ml   Output 825 ml   Net -475 ml       Physical Exam:     Physical Exam  Vitals and nursing note reviewed  HENT:      Head: Normocephalic  Eyes:      General: No scleral icterus  Cardiovascular:      Rate and Rhythm: Normal rate and regular rhythm  Pulmonary:      Effort: Pulmonary effort is normal  No respiratory distress  Comments: Decreased breath sounds  Abdominal:      General: Bowel sounds are normal       Palpations: Abdomen is soft  Tenderness: There is no abdominal tenderness  Musculoskeletal:      Right lower leg: No edema  Left lower leg: No edema  Skin:     Comments: Dressing present over right hip   Neurological:      Mental Status: She is alert  Mental status is at baseline  Psychiatric:         Speech: She is noncommunicative  Cognition and Memory: Cognition is impaired  Additional Data:     Labs:    Results from last 7 days   Lab Units 07/18/21  0439   WBC Thousand/uL 4 95   HEMOGLOBIN g/dL 6 2*   HEMATOCRIT % 19 4*   PLATELETS Thousands/uL 148*   NEUTROS PCT % 76*   LYMPHS PCT % 14   MONOS PCT % 9   EOS PCT % 1     Results from last 7 days   Lab Units 07/18/21  0439 07/17/21  0627   SODIUM mmol/L 139 139   POTASSIUM mmol/L 4 0 4 0   CHLORIDE mmol/L 107 105   CO2 mmol/L 24 22   BUN mg/dL 50* 46*   CREATININE mg/dL 1 84* 2 27*   ANION GAP mmol/L 8 12   CALCIUM mg/dL 7 9* 8 3   ALBUMIN g/dL  --  2 7*   GLUCOSE RANDOM mg/dL 115 148*     Results from last 7 days   Lab Units 07/15/21  0509   INR  0 96                       * I Have Reviewed All Lab Data Listed Above  * Additional Pertinent Lab Tests Reviewed:  All Labs For Current Hospital Admission Reviewed    Imaging:    XR hip/pelv 2-3 vws right    Result Date: 7/15/2021  Impression: Impacted intertrochanteric right femoral neck fracture  Workstation performed: [id number]     XR femur 2 vw left    Result Date: 7/17/2021  Impression: No acute osseous abnormality  Workstation performed: [id number]     XR femur 2 vw right    Result Date: 7/16/2021  Impression: Fluoroscopic guidance provided for procedure guidance  Please refer to the separate procedure notes for additional details  Workstation performed: [id number]     XR knee 1 or 2 vw left    Result Date: 7/17/2021  Impression: No acute findings  Unremarkable appearance of total knee arthroplasty  Workstation performed: [id number]     XR knee 1 or 2 vw right    Result Date: 7/15/2021  Impression: No acute osseous abnormality  Evaluation for subtle fractures limited given extensive degenerative change  Degenerative changes as described  Workstation performed: [id number]     XR ankle 3+ views RIGHT    Result Date: 7/15/2021  Impression: Limited evaluation without evident acute osseous abnormality   Workstation performed: [id number]       Recent Cultures (last 7 days):           Last 24 Hours Medication List:   Current Facility-Administered Medications   Medication Dose Route Frequency Provider Last Rate    acetaminophen  650 mg Oral 4x Daily Ella Arevalo PA-C      albuterol  2 puff Inhalation Q4H PRN Hadley Dajuan, SANTOS      aluminum-magnesium hydroxide-simethicone  30 mL Oral Q6H PRN Hadleyhal Graff PA-C      cefTRIAXone  1,000 mg Intravenous Q24H Doraine Necessary, MD Stopped (07/18/21 1315)    cyanocobalamin  1,000 mcg Oral Daily Hadley Graff PA-C      [START ON 7/19/2021] diltiazem  120 mg Oral Daily Doraine Necessary, MD      docusate sodium  100 mg Oral Q12H [address] Hadleyhal Graff PA-C      DULoxetine  30 mg Oral HS Hadleyhal Graff PA-C      enoxaparin  30 mg Subcutaneous Q24H [address] Azalea Robertson PA-C      HYDROmorphone  0 2 mg Intravenous Q4H PRN Hadleyhal Graff PA-C      iron sucrose  200 mg Intravenous Once Lucille Morales DO  melatonin  3 mg Oral HS Neno Doss PA-C      metoprolol  5 mg Intravenous Q6H PRN Lázaro Kamara MD      [START ON 7/19/2021] metoprolol succinate  50 mg Oral Daily Lázaro Kamara MD      multi-electrolyte  50 mL/hr Intravenous Continuous Krista Deeds, DO 75 mL/hr (07/18/21 0620)    OLANZapine  2 5 mg Oral HS Lázaro Kamara MD      ondansetron  4 mg Intravenous Q6H PRN Neno Doss PA-C      oxyCODONE  2 5 mg Oral Q4H PRN Neno Doss PA-C      oxyCODONE  5 mg Oral Q4H PRN Neno Doss PA-C      polyethylene glycol  17 g Oral Daily PRN Neno Doss PA-C      simethicone  80 mg Oral 4x Daily PRN Neno Doss PA-C          Today, Patient Was Seen By: Lázaro Kamara MD    ** Please Note: Dictation voice to text software may have been used in the creation of this document   **

## 2021-07-18 NOTE — PLAN OF CARE
Problem: MOBILITY - ADULT  Goal: Maintain or return to baseline ADL function  Description: INTERVENTIONS:  -  Assess patient's ability to carry out ADLs; assess patient's baseline for ADL function and identify physical deficits which impact ability to perform ADLs (bathing, care of mouth/teeth, toileting, grooming, dressing, etc )  - Assess/evaluate cause of self-care deficits   - Assess range of motion  - Assess patient's mobility; develop plan if impaired  - Assess patient's need for assistive devices and provide as appropriate  - Encourage maximum independence but intervene and supervise when necessary  - Involve family in performance of ADLs  - Assess for home care needs following discharge   - Consider OT consult to assist with ADL evaluation and planning for discharge  - Provide patient education as appropriate  Outcome: Progressing  Goal: Maintains/Returns to pre admission functional level  Description: INTERVENTIONS:  - Perform BMAT or MOVE assessment daily    - Set and communicate daily mobility goal to care team and patient/family/caregiver     - Collaborate with rehabilitation services on mobility goals if consulted  - Out of bed for toileting  - Record patient progress and toleration of activity level   Outcome: Progressing     Problem: Prexisting or High Potential for Compromised Skin Integrity  Goal: Skin integrity is maintained or improved  Description: INTERVENTIONS:  - Identify patients at risk for skin breakdown  - Assess and monitor skin integrity  - Assess and monitor nutrition and hydration status  - Monitor labs   - Assess for incontinence   - Turn and reposition patient  - Assist with mobility/ambulation  - Relieve pressure over bony prominences  - Avoid friction and shearing  - Provide appropriate hygiene as needed including keeping skin clean and dry  - Evaluate need for skin moisturizer/barrier cream  - Collaborate with interdisciplinary team   - Patient/family teaching  Outcome: Progressing     Problem: CARDIOVASCULAR - ADULT  Goal: Maintains optimal cardiac output and hemodynamic stability  Description: INTERVENTIONS:  - Monitor I/O, vital signs and rhythm  - Monitor for S/S and trends of decreased cardiac output  - Administer and titrate ordered vasoactive medications to optimize hemodynamic stability  - Assess quality of pulses, skin color and temperature  - Assess for signs of decreased coronary artery perfusion  - Instruct patient to report change in severity of symptoms  Outcome: Progressing     Problem: RESPIRATORY - ADULT  Goal: Achieves optimal ventilation and oxygenation  Description: INTERVENTIONS:  - Assess for changes in respiratory status  - Assess for changes in mentation and behavior  - Position to facilitate oxygenation and minimize respiratory effort  - Oxygen administered by appropriate delivery if ordered  - Encourage broncho-pulmonary hygiene including cough, deep breathe, Incentive Spirometry  - Assess the need for suctioning and aspirate as needed  - Assess for SOB or any respiratory difficulty  - Respiratory Therapy support as indicated  Outcome: Progressing     Problem: GENITOURINARY - ADULT  Goal: Urinary catheter remains patent  Description: INTERVENTIONS:  - Assess patency of urinary catheter  - Follow guidelines for intermittent irrigation of non-functioning urinary catheter  Outcome: Progressing     Problem: METABOLIC, FLUID AND ELECTROLYTES - ADULT  Goal: Electrolytes maintained within normal limits  Description: INTERVENTIONS:  - Monitor labs and assess patient for signs and symptoms of electrolyte imbalances  - Administer electrolyte replacement as ordered  - Monitor response to electrolyte replacements, including repeat lab results as appropriate  - Instruct patient on fluid and nutrition as appropriate  Outcome: Progressing     Problem: SKIN/TISSUE INTEGRITY - ADULT  Goal: Incision(s), wounds(s) or drain site(s) healing without S/S of infection  Description: INTERVENTIONS  - Assess and document dressing, incision, wound bed, drain sites and surrounding tissue  - Provide caregiver/family education  - Perform skin care/dressing changes as ordered  Outcome: Progressing     Problem: MUSCULOSKELETAL - ADULT  Goal: Maintain or return mobility to safest level of function  Description: INTERVENTIONS:  - Assess patient's ability to carry out ADLs; assess patient's baseline for ADL function and identify physical deficits which impact ability to perform ADLs (bathing, care of mouth/teeth, toileting, grooming, dressing, etc )  - Assess/evaluate cause of self-care deficits   - Assess range of motion  - Assess patient's mobility  - Assess patient's need for assistive devices and provide as appropriate  - Encourage maximum independence but intervene and supervise when necessary  - Involve family in performance of ADLs  - Assess for home care needs following discharge   - Consider OT consult to assist with ADL evaluation and planning for discharge  - Provide patient education as appropriate  Outcome: Progressing     Problem: PAIN - ADULT  Goal: Verbalizes/displays adequate comfort level or baseline comfort level  Description: Interventions:  - Encourage patient to monitor pain and request assistance  - Assess pain using appropriate pain scale  - Consider geriatric pain management order set   - Administer analgesics based on type and severity of pain and evaluate response  - Implement non-pharmacological measures as appropriate and evaluate response  - Consider cultural and social influences on pain and pain management  - Notify physician/advanced practitioner if interventions unsuccessful or patient reports new pain  Outcome: Progressing     Problem: SAFETY ADULT  Goal: Patient will remain free of falls  Description: INTERVENTIONS:  - Educate patient/family on patient safety including physical limitations  - Instruct patient to call for assistance with activity - Consult OT/PT to assist with strengthening/mobility   - Keep Call bell within reach  - Keep bed low and locked with side rails adjusted as appropriate  - Keep care items and personal belongings within reach  - Initiate and maintain comfort rounds  - Make Fall Risk Sign visible to staff  - Offer Toileting every 2 hours, in advance of need  - Initiate/Maintain bed/chair alarm  - Obtain necessary fall risk management equipment: bed alarm  - Apply yellow socks and bracelet for high fall risk patients  - Keep patient's room near nurses station  Outcome: Progressing     Problem: DISCHARGE PLANNING  Goal: Discharge to home or other facility with appropriate resources  Description: INTERVENTIONS:  - Identify barriers to discharge w/patient and caregiver  - Arrange for needed discharge resources and transportation as appropriate  - Identify discharge learning needs (meds, wound care, etc )  - Arrange for interpretive services (Yi) to assist at discharge as needed  - Refer to Case Management Department for coordinating discharge planning if the patient needs post-hospital services based on physician/advanced practitioner order or complex needs related to functional status, cognitive ability, or social support system  Outcome: Progressing     Problem: Knowledge Deficit  Goal: Patient/family/caregiver demonstrates understanding of disease process, treatment plan, medications, and discharge instructions  Description: Complete learning assessment and assess knowledge base    Interventions:  - Provide teaching at level of understanding  - Provide teaching via preferred learning methods  Outcome: Progressing     Problem: CONFUSION/THOUGHT DISTURBANCE  Goal: Thought disturbances (confusion, delirium, depression, dementia or psychosis) are managed to maintain or return to baseline mental status and functional level  Description: INTERVENTIONS:  - Assess for possible contributors to  thought disturbance, including but not limited to medications, infection, impaired vision or hearing, underlying metabolic abnormalities, dehydration, respiratory compromise,  psychiatric diagnoses and notify attending PHYSICAN/AP  - Monitor and intervene to maintain adequate nutrition, hydration, elimination, sleep and activity  - Decrease environmental stimuli, including noise as appropriate  - Patient is Pashto speaking only - communicate in Pashto  - Provide frequent contacts to provide refocusing, direction and reassurance as needed  Approach patient calmly with eye contact and at their level  - [place] high risk fall precautions, aspiration precautions and other safety measures, as indicated  - If delirium suspected, notify physician/AP of change in condition and request immediate in-person evaluation  - Pursue consults as appropriate including Geriatric (campus dependent), OT for cognitive evaluation/activity planning, psychiatric, pastoral care, etc   Outcome: Progressing     Problem: Nutrition/Hydration-ADULT  Goal: Nutrient/Hydration intake appropriate for improving, restoring or maintaining nutritional needs  Description: Monitor and assess patient's nutrition/hydration status for malnutrition  Collaborate with interdisciplinary team and initiate plan and interventions as ordered  Monitor patient's weight and dietary intake as ordered or per policy  Utilize nutrition screening tool and intervene as necessary  Determine patient's food preferences and provide high-protein, high-caloric foods as appropriate       INTERVENTIONS:  - Monitor oral intake, urinary output, labs, and treatment plans  - Assess nutrition and hydration status and recommend course of action  - Evaluate amount of meals eaten  - Total assist in feeding patient  - Allow adequate time for meals  - Recommend/ encourage appropriate diets, oral nutritional supplements, and vitamin/mineral supplements  - Assess need for intravenous fluids  - Provide specific nutrition/hydration education as appropriate  - Include patient/family/caregiver in decisions related to nutrition  Outcome: Progressing     Problem: Potential for Falls  Goal: Patient will remain free of falls  Description: INTERVENTIONS:  - Educate patient/family on patient safety including physical limitations  - Instruct patient to call for assistance with activity   - Consult OT/PT to assist with strengthening/mobility   - Keep Call bell within reach  - Keep bed low and locked with side rails adjusted as appropriate  - Keep care items and personal belongings within reach  - Initiate and maintain comfort rounds  - Make Fall Risk Sign visible to staff  - Offer Toileting every 2 hours, in advance of need  - Initiate/Maintain bed/chair alarm  - Obtain necessary fall risk management equipment: bed alarm  - Apply yellow socks and bracelet for high fall risk patients  - Keep patient's room near nurses station  Outcome: Progressing

## 2021-07-18 NOTE — PROGRESS NOTES
Progress Note - Orthopedics   Richelle Doctor 80 y o  female MRN: [id number]  Unit/Bed#: E2 -01 Encounter: [id number]    Assessment:  80 y o  female s/p long TFN for right hip fracture POD 3, ABLA    Plan:  · Hgb dropped to 6 2  Patient has been hypotensive  No sign of bleeding at incision sites  Patient to receive PRBC per primary team  Continue to monitor  · Pain control prn  · PT/OT-WBAT right LE   · Lovenox/SCDs for DVT prophylaxis  Patient will require DVT prophylaxis x 30 days postop  · DC planning- patient will likely require rehab at discharge  Subjective: Pt S&E  Resting comfortably  Patient does open her eyes to verbal and tactile stimuli but is not conversational  Does not follow commands to test sensation or motor  Per nursing patient was more awake earlier when getting cleaned up  She has been rather lethargic  Vitals: Blood pressure 140/64, pulse (!) 112, temperature 98 5 °F (36 9 °C), temperature source Temporal, resp  rate 16, weight 62 5 kg (137 lb 12 6 oz), SpO2 96 %  ,Body mass index is 28 8 kg/m²        Intake/Output Summary (Last 24 hours) at 7/18/2021 0810  Last data filed at 7/18/2021 0514  Gross per 24 hour   Intake --   Output 475 ml   Net -475 ml       Invasive Devices     Peripheral Intravenous Line            Peripheral IV 07/15/21 Left Antecubital 3 days    Peripheral IV 07/16/21 Right Antecubital 2 days          Drain            Urethral Catheter Latex 16 Fr  1 day                Ortho Exam: rightLE:    Drsg: gauze and tegaderm C/D/I   Thigh soft and compressible   Unable to test sensation or motor as patient not following commands  Palpable pedal pulse    Lab, Imaging and other studies:   CBC:   Lab Results   Component Value Date    WBC 4 95 07/18/2021    HGB 6 2 (LL) 07/18/2021    HCT 19 4 (L) 07/18/2021    MCV 93 07/18/2021     (L) 07/18/2021    MCH 29 7 07/18/2021    MCHC 32 0 07/18/2021    RDW 14 0 07/18/2021    MPV 10 7 07/18/2021    NRBC 0 07/18/2021     CMP: Lab Results   Component Value Date    SODIUM 139 07/18/2021     07/18/2021    CO2 24 07/18/2021    BUN 50 (H) 07/18/2021    CREATININE 1 84 (H) 07/18/2021    CALCIUM 7 9 (L) 07/18/2021    EGFR 24 07/18/2021

## 2021-07-18 NOTE — PROGRESS NOTES
NEPHROLOGY PROGRESS NOTE   Merrick Viverosman 80 y o  female MRN: [id number]  Unit/Bed#: E2 -01 Encounter: [id number]      ASSESSMENT/PLAN:  1  Acute kidney injury on chronic kidney disease, stage III:  - etiology suspect multifactorial secondary to volume depletion with poor oral intake, hemodynamic perturbations with hypotension, acute anemia, with progression ATN  To note, patient was ARB as an outpatient as well  - upon review medical records, baseline creatinine 1 1-1 2 mg/dL  - peak creatinine thus far 2 31 mg/dL on 7/16/21   - most recent creatinine 1 84 mg/dL today,               - currently on isolyte at 75 mL/hour for now  - continue to hold ARB  - UA, positive nitrite, innumerable bacteria  - imaging, may consider checking renal ultrasound if renal indices do not improve  - avoid NSAIDs, nephrotoxic agents, IV contrast   - adjust medications to appropriate GFR  - monitor volume status closely with strict intake/output, daily weight      2  Electrolytes, acid/base:  - electrolytes overall stable and acceptable  - will continue monitor with repeat lab studies      3  Hypertension:  - with lower blood pressures more recently, slightly improving post albumin   - outpatient regimen includes: Losartan 100 mg daily, Toprol- mg daily  - currently on: Toprol XL 50 mg daily, continue to hold losartan  - patient is not taking oral medications, consider IV Lopressor    - optimize hemodynamics, avoid hypotension and fluctuations of blood pressure      4  Anemia:  - most recent hemoglobin 6 2 grams/deciliter, plan for PRBC transfusion   - goal hemoglobin greater than 8 grams/deciliter  - recommend PRBC transfusion for hemoglobin less than 7, per primary team   - check iron panel in a m      5  Closed right hip fracture:  - status post full when ambulating to bathroom    - status post intramedullary nail fixation right intratrochanteric femur fracture on 7/15/21   - further management and plan per Orthopedics  SUBJECTIVE:  Patient seen and examined at the bedside  Patient is resting comfortably in bed  Patient does not open her eyes to verbal stimuli  OBJECTIVE:  Current Weight: Weight - Scale: 62 5 kg (137 lb 12 6 oz)  Vitals:    07/18/21 0915   BP: 96/65   Pulse: (!) 118   Resp: 18   Temp: 98 5 °F (36 9 °C)   SpO2:        Intake/Output Summary (Last 24 hours) at 7/18/2021 0946  Last data filed at 7/18/2021 0514  Gross per 24 hour   Intake --   Output 475 ml   Net -475 ml       General:  No acute distress, resting in bed  Skin:  Warm, no rash  Eyes:   Will not open eyes  ENT:  Dry lips, dry mucous membranes  Neck:  Supple trachea midline  Chest:  Clear breath sounds bilaterally   CVS:  Irregular rate, regular rhythm  Abdomen:  Soft, nontender, normoactive bowel sounds  Extremities:  Trace edema bilaterally   Neuro:  Nonverbal to stimuli, will not open eyes        Medications:  Scheduled Meds:  Current Facility-Administered Medications   Medication Dose Route Frequency Provider Last Rate    acetaminophen  650 mg Oral 4x Daily Siobhan Flores PA-C      albuterol  2 puff Inhalation Q4H PRN STEPHEN Benton-C      aluminum-magnesium hydroxide-simethicone  30 mL Oral Q6H PRN STEPHEN Benton-C      cyanocobalamin  1,000 mcg Oral Daily Jjene Belen, PA-C      diltiazem  120 mg Oral Daily Keiry Glover, PA-C      docusate sodium  100 mg Oral Q12H [address] Keiry Glover PA-C      DULoxetine  30 mg Oral HS STEPHEN Benton-VICTOR MANUEL      enoxaparin  30 mg Subcutaneous Q24H [address] Azalea Robertson PA-C      HYDROmorphone  0 2 mg Intravenous Q4H PRN Keiry Glover, SANTOS      LORazepam  0 5 mg Oral HS STEPHEN Benton-VICTOR MANUEL      melatonin  3 mg Oral HS Jjene Belen, PA-VICTOR MANUEL      metoprolol succinate  50 mg Oral Daily Keiry Glover, PA-VICTOR MANUEL      multi-electrolyte  75 mL/hr Intravenous Continuous SOUTH LoweNP 75 mL/hr (07/18/21 0620)    OLANZapine  5 mg Oral HS Jeanette Jazzy Martínez, PA-C      ondansetron  4 mg Intravenous Q6H PRN Dellis Holter, PA-C      oxyCODONE  2 5 mg Oral Q4H PRN Dellis Holter, PA-C      oxyCODONE  5 mg Oral Q4H PRN Dellis Holter, PA-C      polyethylene glycol  17 g Oral Daily PRN Dellis Holter, PA-C      simethicone  80 mg Oral 4x Daily PRN Dellis Holter, PA-C         PRN Meds: albuterol    aluminum-magnesium hydroxide-simethicone    HYDROmorphone    ondansetron    oxyCODONE    oxyCODONE    polyethylene glycol    simethicone    Continuous Infusions:multi-electrolyte, 75 mL/hr, Last Rate: 75 mL/hr (07/18/21 7909)        Laboratory Results:  Results from last 7 days   Lab Units 07/18/21  0439 07/17/21  0627 07/16/21  2043 07/16/21  0710 07/16/21  0538 07/15/21  0509   WBC Thousand/uL 4 95 8 28 8 09 6 90  --  5 79   HEMOGLOBIN g/dL 6 2* 8 6* 8 6* 8 7*  --  12 0   HEMATOCRIT % 19 4* 27 0* 26 5* 26 7*  --  36 3   PLATELETS Thousands/uL 148* 160 177 168  --  226   SODIUM mmol/L 139 139 140  --  139 141   POTASSIUM mmol/L 4 0 4 0 4 2  --  4 9 4 5   CHLORIDE mmol/L 107 105 103  --  108 106   CO2 mmol/L 24 22 23  --  23 29   BUN mg/dL 50* 46* 40*  --  30* 30*   CREATININE mg/dL 1 84* 2 27* 2 31*  --  1 23 1 12   CALCIUM mg/dL 7 9* 8 3 8 4  --  8 7 8 9

## 2021-07-19 LAB
ABO GROUP BLD BPU: NORMAL
ANION GAP SERPL CALCULATED.3IONS-SCNC: 10 MMOL/L (ref 4–13)
BASOPHILS # BLD AUTO: 0.01 THOUSANDS/ΜL (ref 0–0.1)
BASOPHILS NFR BLD AUTO: 0 % (ref 0–1)
BPU ID: NORMAL
BUN SERPL-MCNC: 38 MG/DL (ref 5–25)
CALCIUM SERPL-MCNC: 8.5 MG/DL (ref 8.3–10.1)
CHLORIDE SERPL-SCNC: 109 MMOL/L (ref 100–108)
CO2 SERPL-SCNC: 25 MMOL/L (ref 21–32)
CREAT SERPL-MCNC: 1.24 MG/DL (ref 0.6–1.3)
CROSSMATCH: NORMAL
EOSINOPHIL # BLD AUTO: 0.02 THOUSAND/ΜL (ref 0–0.61)
EOSINOPHIL NFR BLD AUTO: 0 % (ref 0–6)
ERYTHROCYTE [DISTWIDTH] IN BLOOD BY AUTOMATED COUNT: 14.2 % (ref 11.6–15.1)
GFR SERPL CREATININE-BSD FRML MDRD: 39 ML/MIN/1.73SQ M
GLUCOSE SERPL-MCNC: 143 MG/DL (ref 65–140)
HCT VFR BLD AUTO: 25.9 % (ref 34.8–46.1)
HGB BLD-MCNC: 8.4 G/DL (ref 11.5–15.4)
IMM GRANULOCYTES # BLD AUTO: 0.04 THOUSAND/UL (ref 0–0.2)
IMM GRANULOCYTES NFR BLD AUTO: 1 % (ref 0–2)
LYMPHOCYTES # BLD AUTO: 0.67 THOUSANDS/ΜL (ref 0.6–4.47)
LYMPHOCYTES NFR BLD AUTO: 9 % (ref 14–44)
MCH RBC QN AUTO: 29.8 PG (ref 26.8–34.3)
MCHC RBC AUTO-ENTMCNC: 32.4 G/DL (ref 31.4–37.4)
MCV RBC AUTO: 92 FL (ref 82–98)
MONOCYTES # BLD AUTO: 0.7 THOUSAND/ΜL (ref 0.17–1.22)
MONOCYTES NFR BLD AUTO: 9 % (ref 4–12)
NEUTROPHILS # BLD AUTO: 6.01 THOUSANDS/ΜL (ref 1.85–7.62)
NEUTS SEG NFR BLD AUTO: 81 % (ref 43–75)
NRBC BLD AUTO-RTO: 0 /100 WBCS
PLATELET # BLD AUTO: 196 THOUSANDS/UL (ref 149–390)
PMV BLD AUTO: 13.5 FL (ref 8.9–12.7)
POTASSIUM SERPL-SCNC: 3.5 MMOL/L (ref 3.5–5.3)
RBC # BLD AUTO: 2.82 MILLION/UL (ref 3.81–5.12)
SODIUM SERPL-SCNC: 144 MMOL/L (ref 136–145)
UNIT DISPENSE STATUS: NORMAL
UNIT PRODUCT CODE: NORMAL
UNIT RH: NORMAL
WBC # BLD AUTO: 7.45 THOUSAND/UL (ref 4.31–10.16)

## 2021-07-19 PROCEDURE — 97110 THERAPEUTIC EXERCISES: CPT

## 2021-07-19 PROCEDURE — 97530 THERAPEUTIC ACTIVITIES: CPT

## 2021-07-19 PROCEDURE — 92610 EVALUATE SWALLOWING FUNCTION: CPT

## 2021-07-19 PROCEDURE — 97535 SELF CARE MNGMENT TRAINING: CPT

## 2021-07-19 PROCEDURE — 99232 SBSQ HOSP IP/OBS MODERATE 35: CPT | Performed by: INTERNAL MEDICINE

## 2021-07-19 PROCEDURE — 80048 BASIC METABOLIC PNL TOTAL CA: CPT | Performed by: NURSE PRACTITIONER

## 2021-07-19 PROCEDURE — 85025 COMPLETE CBC W/AUTO DIFF WBC: CPT | Performed by: STUDENT IN AN ORGANIZED HEALTH CARE EDUCATION/TRAINING PROGRAM

## 2021-07-19 PROCEDURE — 99232 SBSQ HOSP IP/OBS MODERATE 35: CPT | Performed by: PHYSICIAN ASSISTANT

## 2021-07-19 RX ORDER — LIDOCAINE 50 MG/G
1 PATCH TOPICAL DAILY
Status: COMPLETED | OUTPATIENT
Start: 2021-07-19 | End: 2021-07-19

## 2021-07-19 RX ORDER — BISACODYL 10 MG
10 SUPPOSITORY, RECTAL RECTAL DAILY PRN
Status: DISCONTINUED | OUTPATIENT
Start: 2021-07-19 | End: 2021-07-26 | Stop reason: HOSPADM

## 2021-07-19 RX ADMIN — IRON SUCROSE 300 MG: 20 INJECTION, SOLUTION INTRAVENOUS at 15:09

## 2021-07-19 RX ADMIN — ACETAMINOPHEN 650 MG: 650 SUSPENSION ORAL at 14:03

## 2021-07-19 RX ADMIN — CYANOCOBALAMIN TAB 500 MCG 1000 MCG: 500 TAB at 08:07

## 2021-07-19 RX ADMIN — ACETAMINOPHEN 650 MG: 650 SUSPENSION ORAL at 19:15

## 2021-07-19 RX ADMIN — ACETAMINOPHEN 650 MG: 650 SUSPENSION ORAL at 08:07

## 2021-07-19 RX ADMIN — CEFTRIAXONE SODIUM 1000 MG: 10 INJECTION, POWDER, FOR SOLUTION INTRAVENOUS at 12:12

## 2021-07-19 RX ADMIN — DILTIAZEM HYDROCHLORIDE 120 MG: 120 CAPSULE, COATED, EXTENDED RELEASE ORAL at 08:14

## 2021-07-19 RX ADMIN — BISACODYL 10 MG: 10 SUPPOSITORY RECTAL at 16:34

## 2021-07-19 RX ADMIN — OXYCODONE HYDROCHLORIDE 5 MG: 5 TABLET ORAL at 15:39

## 2021-07-19 RX ADMIN — POLYETHYLENE GLYCOL 3350 17 G: 17 POWDER, FOR SOLUTION ORAL at 21:03

## 2021-07-19 RX ADMIN — LIDOCAINE 1 PATCH: 50 PATCH CUTANEOUS at 11:18

## 2021-07-19 RX ADMIN — ENOXAPARIN SODIUM 30 MG: 30 INJECTION, SOLUTION INTRAVENOUS; SUBCUTANEOUS at 08:08

## 2021-07-19 RX ADMIN — METOPROLOL SUCCINATE 50 MG: 50 TABLET, EXTENDED RELEASE ORAL at 08:14

## 2021-07-19 RX ADMIN — DOCUSATE SODIUM 100 MG: 100 CAPSULE ORAL at 08:08

## 2021-07-19 NOTE — QUICK NOTE
As patient's renal function is back to baseline, Nephrology will sign off  OMEGA likely prerenal as significant improvement on IV fluids  Please call or text with questions  Serum sodium uptrending, would consider DC IV fluids and encourage free water intake

## 2021-07-19 NOTE — OCCUPATIONAL THERAPY NOTE
Occupational Therapy Treatment Note:         07/19/21 1539   OT Last Visit   OT Visit Date 07/18/21   Note Type   Note Type Treatment   Restrictions/Precautions   Weight Bearing Precautions Per Order Yes   RLE Weight Bearing Per Order WBAT   Other Precautions Fall Risk;Pain; Chair Alarm;Cognitive; Bed Alarm   Pain Assessment   Pain Assessment Tool FLACC   Pain Score 7   Pain Location/Orientation Orientation: Right;Location: Hip   ADL   Where Assessed Supine, bed  (HOB elevated  )   Grooming Assistance 2  Maximal Assistance   Grooming Deficit Setup;Supervision/safety;Verbal cueing; Increased time to complete;Wash/dry hands; Wash/dry face;Brushing hair   Grooming Comments Pt without response following hand over hand initiation to tasks  Several attempts trailed  UB Bathing Assistance Unable to assess   LB Bathing Assistance Unable to assess   UB Dressing Assistance 2  Maximal Assistance   UB Dressing Deficit Setup;Verbal cueing;Supervision/safety; Increased time to complete; Thread RUE; Thread LUE   UB Dressing Comments increased A required due to lack of attention to tasks  LB Dressing Assistance 1  Total Assistance   LB Dressing Deficit Don/doff R sock; Don/doff L sock   LB Dressing Comments Pt with lmited attention to tasks  Functional Standing Tolerance   Time 30 secs   Activity static stand balance activities  Comments increased A required to achieve full stance  Bed Mobility   Rolling R 1  Dependent   Additional items Assist x 2;Bedrails; Increased time required;Verbal cues;LE management   Rolling L 1  Dependent   Additional items Assist x 2;Bedrails; Increased time required;Verbal cues;LE management   Supine to Sit 2  Maximal assistance   Additional items Assist x 2;Bedrails; Increased time required;Verbal cues;LE management   Sit to Supine 2  Maximal assistance   Additional items Assist x 2;Bedrails; Increased time required;Verbal cues;LE management   Additional Comments Pt able to tolerate EOB positioning with Min A to stabilize for safety  Increased fatigue and signs of pain noted    Transfers   Sit to Stand 2  Maximal assistance   Additional items Assist x 2;Armrests; Increased time required;Verbal cues   Stand to Sit 2  Maximal assistance   Additional items Assist x 2;Bedrails;Armrests; Increased time required;Verbal cues   Additional Comments both tactile and verbal cues required to maintain upright standing positioning  Cognition   Overall Cognitive Status Impaired   Arousal/Participation Responsive   Attention Difficulty attending to directions   Orientation Level Oriented to person;Disoriented to place; Disoriented to time;Disoriented to situation   Memory Unable to assess   Following Commands Follows one step commands with increased time or repetition   Comments Pt unable to follow simple commands  Hand over hand initiation required without carry over  Additional Activities   Additional Activities Other (Comment)  (reviewed basic orientation)   Additional Activities Comments Pt with limited following of simple conversation/commands  Activity Tolerance   Activity Tolerance Patient limited by fatigue;Patient limited by pain   Medical Staff Made Aware reported all findings to nursing staff  Assessment   Assessment Pt was seen for skilled OT with focus on completion of bed mobility, light grooming, sit to stand transfers and basic orientation  Pt with limited focus to tasks  Close, simple, loud 1 and 2 word communication provided without carry over in attempt to complete light grooming activity with both verbal and tactile cues  See above levels of A required for all functional tasks  Pt able to tolerate trial of sit to stand at [facility] and quick move device with Max A x2 required  Signs of increased pain noted with activities  Pt returned to supine positioning due to noted fatigue and pain levels  Pt is responsive to name only   Pt with tangential verbalization via translation without purpose or correct response  The patient's raw score on the AM-PAC Daily Activity inpatient short form is 10, standardized score is  , less than 39 4  Patients at this level are likely to benefit from discharge to post-acute rehabilitation services  Plan   Treatment Interventions ADL retraining;Functional transfer training;UE strengthening/ROM; Endurance training;Cognitive reorientation;Patient/family training   Goal Expiration Date 07/19/21   OT Treatment Day 1   OT Frequency 3-5x/wk   Recommendation   OT Discharge Recommendation Post acute rehabilitation services   OT - OK to Discharge Yes  (when medically cleared   )   AM-[facility] Daily Activity Inpatient   Lower Body Dressing 1   Bathing 1   Toileting 1   Upper Body Dressing 2   Grooming 2   Eating 3   Daily Activity Raw Score 10   Turning Head Towards Sound 2   Follow Simple Instructions 2   Low Function Daily Activity Raw Score 14   Low Function Daily Activity Standardized Score 24 79   AM-PAC Applied Cognition Inpatient   Following a Speech/Presentation 1   Understanding Ordinary Conversation 2   Taking Medications 1   Remembering Where Things Are Placed or Put Away 1   Remembering List of 4-5 Errands 1   Taking Care of Complicated Tasks 1   Applied Cognition Raw Score 7   Applied Cognition Standardized Score 15 17   Keiryneto Toussaint, [address]

## 2021-07-19 NOTE — PROGRESS NOTES
NEPHROLOGY PROGRESS NOTE   Reda [place] Juan 80 y o  female MRN: [id number]  Unit/Bed#: E2 -01 Encounter: [id number]  Reason for Consult: OMEGA    ASSESSMENT/PLAN:  1  Acute Kidney Injury- improving with most recent creatinine down to 1 3 from peak 2 3  - hold ARB  - on isolyte at 50ml/hr, can discontinue and encourage oral intake  2  Chronic Kidney Disease stage III- Baseline creatinine 1 1-1 2     3  Hypertension- BP acceptable  - holding ARB  4  Anemia, iron deficient- received 200mg IV venofer and will give another 300mg today for 500mg total dose this admission  5  Close right hip fracture- per orthopedics    Disposition: Nephrology will sign off  Please call with questions or concerns  Thank you  SUBJECTIVE:  Patient in no acute distress  Denies pain      OBJECTIVE:  Current Weight: Weight - Scale: 63 1 kg (139 lb 1 8 oz)  Vitals:    07/19/21 0535 07/19/21 0700 07/19/21 0737 07/19/21 1000   BP:  145/98     BP Location:  Right arm     Pulse:  (!) 140 (!) 128 88   Resp:  18     Temp:  (!) 97 1 °F (36 2 °C)     TempSrc:  Temporal     SpO2:  96% 95% 97%   Weight: 63 1 kg (139 lb 1 8 oz)          Intake/Output Summary (Last 24 hours) at 7/19/2021 1334  Last data filed at 7/19/2021 0754  Gross per 24 hour   Intake 220 ml   Output 1300 ml   Net -1080 ml     General: NAD  Skin: no rash  Eyes: anicteric  ENMT: mm moist  Neck: no masses  Respiratory: CTAB  Cardiac: RRR  Extremities: no edema  GI: soft nt nd  Neuro: alert awake  Psych: mood and affect appropriate    Medications:    Current Facility-Administered Medications:     acetaminophen (TYLENOL) oral suspension 650 mg, 650 mg, Oral, 4x Daily, Jaimie Grant PA-C, 650 mg at 07/19/21 0807    albuterol (PROVENTIL HFA,VENTOLIN HFA) inhaler 2 puff, 2 puff, Inhalation, Q4H PRN, Geeta Steele PA-C    aluminum-magnesium hydroxide-simethicone (MYLANTA) oral suspension 30 mL, 30 mL, Oral, Q6H PRN, Geeta Steele PA-C    cefTRIAXone (ROCEPHIN) 1,000 mg in dextrose 5 % 50 mL IVPB, 1,000 mg, Intravenous, Q24H, Chelsy Braun MD, Last Rate: 100 mL/hr at 07/19/21 1212, 1,000 mg at 07/19/21 1212    cyanocobalamin (VITAMIN B-12) tablet 1,000 mcg, 1,000 mcg, Oral, Daily, Abram Elias PA-C, 1,000 mcg at 07/19/21 0807    diltiazem (CARDIZEM CD) 24 hr capsule 120 mg, 120 mg, Oral, Daily, Chelsy Braun MD, 120 mg at 07/19/21 0814    docusate sodium (COLACE) capsule 100 mg, 100 mg, Oral, Q12H [facility], Abram Elias PA-C, 100 mg at 07/19/21 0808    DULoxetine (CYMBALTA) delayed release capsule 30 mg, 30 mg, Oral, HS, Jeanette Callaway PA-C, 30 mg at 07/18/21 2234    enoxaparin (LOVENOX) subcutaneous injection 30 mg, 30 mg, Subcutaneous, Q24H [facility], Azalea Robertson PA-C, 30 mg at 07/19/21 0808    HYDROmorphone HCl (DILAUDID) injection 0 2 mg, 0 2 mg, Intravenous, Q4H PRN, Abram Elias PA-C    lidocaine (LIDODERM) 5 % patch 1 patch, 1 patch, Topical, Daily, Tuesday M BEV Aburto, 1 patch at 07/19/21 1118    melatonin tablet 3 mg, 3 mg, Oral, HS, Jeanette Callaway PA-C, 3 mg at 07/18/21 2234    metoprolol (LOPRESSOR) injection 2 5 mg, 2 5 mg, Intravenous, Q6H PRN, Chelsy Braun MD    metoprolol succinate (TOPROL-XL) 24 hr tablet 50 mg, 50 mg, Oral, Daily, Chelsy Braun MD, 50 mg at 07/19/21 0814    multi-electrolyte (PLASMALYTE-A/ISOLYTE-S PH 7 4) IV solution, 50 mL/hr, Intravenous, Continuous, Stephie Moreno DO, Last Rate: 50 mL/hr at 07/19/21 0815, 50 mL/hr at 07/19/21 0815    OLANZapine (ZyPREXA) tablet 2 5 mg, 2 5 mg, Oral, HS, Chelsy Braun MD, 2 5 mg at 07/18/21 2234    ondansetron (ZOFRAN) injection 4 mg, 4 mg, Intravenous, Q6H PRN, Abram Elias PA-C, 4 mg at 07/15/21 2322    oxyCODONE (ROXICODONE) IR tablet 2 5 mg, 2 5 mg, Oral, Q4H PRN, Abram Elias PA-C, 2 5 mg at 07/16/21 0377    oxyCODONE (ROXICODONE) IR tablet 5 mg, 5 mg, Oral, Q4H PRN, Abram Elias PA-C, 5 mg at 07/16/21 1051    polyethylene glycol (MIRALAX) packet 17 g, 17 g, Oral, Daily PRN, Dellis Holter, PA-C    [facility]) chewable tablet 80 mg, 80 mg, Oral, 4x Daily PRN, Dellis Holter, PA-C    Laboratory Results:  Results from last 7 days   Lab Units 07/19/21  0427 07/18/21  1556 07/18/21  0439 07/17/21  0627 07/16/21  2043 07/16/21  0710 07/16/21  0538 07/15/21  0509   WBC Thousand/uL 7 45  --  4 95 8 28 8 09 6 90  --  5 79   HEMOGLOBIN g/dL 8 4* 9 6* 6 2* 8 6* 8 6* 8 7*  --  12 0   HEMATOCRIT % 25 9* 30 0* 19 4* 27 0* 26 5* 26 7*  --  36 3   PLATELETS Thousands/uL 196  --  148* 160 177 168  --  226   POTASSIUM mmol/L 3 5  --  4 0 4 0 4 2  --  4 9 4 5   CHLORIDE mmol/L 109*  --  107 105 103  --  108 106   CO2 mmol/L 25  --  24 22 23  --  23 29   BUN mg/dL 38*  --  50* 46* 40*  --  30* 30*   CREATININE mg/dL 1 24  --  1 84* 2 27* 2 31*  --  1 23 1 12   CALCIUM mg/dL 8 5  --  7 9* 8 3 8 4  --  8 7 8 9     I have personally reviewed the blood work as stated above and in my note  I have personally reviewed hospitalist and renal note

## 2021-07-19 NOTE — PLAN OF CARE
Problem: MOBILITY - ADULT  Goal: Maintain or return to baseline ADL function  Description: INTERVENTIONS:  -  Assess patient's ability to carry out ADLs; assess patient's baseline for ADL function and identify physical deficits which impact ability to perform ADLs (bathing, care of mouth/teeth, toileting, grooming, dressing, etc )  - Assess/evaluate cause of self-care deficits   - Assess range of motion  - Assess patient's mobility; develop plan if impaired  - Assess patient's need for assistive devices and provide as appropriate  - Encourage maximum independence but intervene and supervise when necessary  - Involve family in performance of ADLs  - Assess for home care needs following discharge   - Consider OT consult to assist with ADL evaluation and planning for discharge  - Provide patient education as appropriate  Outcome: Progressing  Goal: Maintains/Returns to pre admission functional level  Description: INTERVENTIONS:  - Perform BMAT or MOVE assessment daily    - Set and communicate daily mobility goal to care team and patient/family/caregiver     - Collaborate with rehabilitation services on mobility goals if consulted  - Out of bed for toileting  - Record patient progress and toleration of activity level   Outcome: Progressing     Problem: Prexisting or High Potential for Compromised Skin Integrity  Goal: Skin integrity is maintained or improved  Description: INTERVENTIONS:  - Identify patients at risk for skin breakdown  - Assess and monitor skin integrity  - Assess and monitor nutrition and hydration status  - Monitor labs   - Assess for incontinence   - Turn and reposition patient  - Assist with mobility/ambulation  - Relieve pressure over bony prominences  - Avoid friction and shearing  - Provide appropriate hygiene as needed including keeping skin clean and dry  - Evaluate need for skin moisturizer/barrier cream  - Collaborate with interdisciplinary team   - Patient/family teaching  Outcome: Progressing     Problem: CARDIOVASCULAR - ADULT  Goal: Maintains optimal cardiac output and hemodynamic stability  Description: INTERVENTIONS:  - Monitor I/O, vital signs and rhythm  - Monitor for S/S and trends of decreased cardiac output  - Administer and titrate ordered vasoactive medications to optimize hemodynamic stability  - Assess quality of pulses, skin color and temperature  - Assess for signs of decreased coronary artery perfusion  - Instruct patient to report change in severity of symptoms  Outcome: Progressing     Problem: RESPIRATORY - ADULT  Goal: Achieves optimal ventilation and oxygenation  Description: INTERVENTIONS:  - Assess for changes in respiratory status  - Assess for changes in mentation and behavior  - Position to facilitate oxygenation and minimize respiratory effort  - Oxygen administered by appropriate delivery if ordered  - Encourage broncho-pulmonary hygiene including cough, deep breathe, Incentive Spirometry  - Assess the need for suctioning and aspirate as needed  - Assess for SOB or any respiratory difficulty  - Respiratory Therapy support as indicated  Outcome: Progressing     Problem: GENITOURINARY - ADULT  Goal: Urinary catheter remains patent  Description: INTERVENTIONS:  - Assess patency of urinary catheter  - Follow guidelines for intermittent irrigation of non-functioning urinary catheter  Outcome: Progressing     Problem: METABOLIC, FLUID AND ELECTROLYTES - ADULT  Goal: Electrolytes maintained within normal limits  Description: INTERVENTIONS:  - Monitor labs and assess patient for signs and symptoms of electrolyte imbalances  - Administer electrolyte replacement as ordered  - Monitor response to electrolyte replacements, including repeat lab results as appropriate  - Instruct patient on fluid and nutrition as appropriate  Outcome: Progressing     Problem: SKIN/TISSUE INTEGRITY - ADULT  Goal: Incision(s), wounds(s) or drain site(s) healing without S/S of infection  Description: INTERVENTIONS  - Assess and document dressing, incision, wound bed, drain sites and surrounding tissue  - Provide caregiver/family education  - Perform skin care/dressing changes as ordered  Outcome: Progressing     Problem: MUSCULOSKELETAL - ADULT  Goal: Maintain or return mobility to safest level of function  Description: INTERVENTIONS:  - Assess patient's ability to carry out ADLs; assess patient's baseline for ADL function and identify physical deficits which impact ability to perform ADLs (bathing, care of mouth/teeth, toileting, grooming, dressing, etc )  - Assess/evaluate cause of self-care deficits   - Assess range of motion  - Assess patient's mobility  - Assess patient's need for assistive devices and provide as appropriate  - Encourage maximum independence but intervene and supervise when necessary  - Involve family in performance of ADLs  - Assess for home care needs following discharge   - Consider OT consult to assist with ADL evaluation and planning for discharge  - Provide patient education as appropriate  Outcome: Progressing     Problem: PAIN - ADULT  Goal: Verbalizes/displays adequate comfort level or baseline comfort level  Description: Interventions:  - Encourage patient to monitor pain and request assistance  - Assess pain using appropriate pain scale  - Consider geriatric pain management order set   - Administer analgesics based on type and severity of pain and evaluate response  - Implement non-pharmacological measures as appropriate and evaluate response  - Consider cultural and social influences on pain and pain management  - Notify physician/advanced practitioner if interventions unsuccessful or patient reports new pain  Outcome: Progressing     Problem: SAFETY ADULT  Goal: Patient will remain free of falls  Description: INTERVENTIONS:  - Educate patient/family on patient safety including physical limitations  - Instruct patient to call for assistance with activity - Consult OT/PT to assist with strengthening/mobility   - Keep Call bell within reach  - Keep bed low and locked with side rails adjusted as appropriate  - Keep care items and personal belongings within reach  - Initiate and maintain comfort rounds  - Make Fall Risk Sign visible to staff  - Offer Toileting every 2 hours, in advance of need  - Initiate/Maintain bed/chair alarm  - Obtain necessary fall risk management equipment: bed alarm  - Apply yellow socks and bracelet for high fall risk patients  - Keep patient's room near nurses station  Outcome: Progressing     Problem: DISCHARGE PLANNING  Goal: Discharge to home or other facility with appropriate resources  Description: INTERVENTIONS:  - Identify barriers to discharge w/patient and caregiver  - Arrange for needed discharge resources and transportation as appropriate  - Identify discharge learning needs (meds, wound care, etc )  - Arrange for interpretive services (Slov[place]) to assist at discharge as needed  - Refer to Case Management Department for coordinating discharge planning if the patient needs post-hospital services based on physician/advanced practitioner order or complex needs related to functional status, cognitive ability, or social support system  Outcome: Progressing     Problem: Knowledge Deficit  Goal: Patient/family/caregiver demonstrates understanding of disease process, treatment plan, medications, and discharge instructions  Description: Complete learning assessment and assess knowledge base    Interventions:  - Provide teaching at level of understanding  - Provide teaching via preferred learning methods  Outcome: Progressing     Problem: CONFUSION/THOUGHT DISTURBANCE  Goal: Thought disturbances (confusion, delirium, depression, dementia or psychosis) are managed to maintain or return to baseline mental status and functional level  Description: INTERVENTIONS:  - Assess for possible contributors to  thought disturbance, including but not limited to medications, infection, impaired vision or hearing, underlying metabolic abnormalities, dehydration, respiratory compromise,  psychiatric diagnoses and notify attending PHYSICAN/AP  - Monitor and intervene to maintain adequate nutrition, hydration, elimination, sleep and activity  - Decrease environmental stimuli, including noise as appropriate  - Patient is German speaking only - communicate in German  - Provide frequent contacts to provide refocusing, direction and reassurance as needed  Approach patient calmly with eye contact and at their level  - [place] high risk fall precautions, aspiration precautions and other safety measures, as indicated  - If delirium suspected, notify physician/AP of change in condition and request immediate in-person evaluation  - Pursue consults as appropriate including Geriatric (campus dependent), OT for cognitive evaluation/activity planning, psychiatric, pastoral care, etc   Outcome: Progressing     Problem: Nutrition/Hydration-ADULT  Goal: Nutrient/Hydration intake appropriate for improving, restoring or maintaining nutritional needs  Description: Monitor and assess patient's nutrition/hydration status for malnutrition  Collaborate with interdisciplinary team and initiate plan and interventions as ordered  Monitor patient's weight and dietary intake as ordered or per policy  Utilize nutrition screening tool and intervene as necessary  Determine patient's food preferences and provide high-protein, high-caloric foods as appropriate       INTERVENTIONS:  - Monitor oral intake, urinary output, labs, and treatment plans  - Assess nutrition and hydration status and recommend course of action  - Evaluate amount of meals eaten  - Total assist in feeding patient  - Allow adequate time for meals  - Recommend/ encourage appropriate diets, oral nutritional supplements, and vitamin/mineral supplements  - Assess need for intravenous fluids  - Provide specific nutrition/hydration education as appropriate  - Include patient/family/caregiver in decisions related to nutrition  Outcome: Progressing     Problem: Potential for Falls  Goal: Patient will remain free of falls  Description: INTERVENTIONS:  - Educate patient/family on patient safety including physical limitations  - Instruct patient to call for assistance with activity   - Consult OT/PT to assist with strengthening/mobility   - Keep Call bell within reach  - Keep bed low and locked with side rails adjusted as appropriate  - Keep care items and personal belongings within reach  - Initiate and maintain comfort rounds  - Make Fall Risk Sign visible to staff  - Offer Toileting every 2 hours, in advance of need  - Initiate/Maintain bed/chair alarm  - Obtain necessary fall risk management equipment: bed alarm  - Apply yellow socks and bracelet for high fall risk patients  - Keep patient's room near nurses station  Outcome: Progressing

## 2021-07-19 NOTE — PLAN OF CARE
Problem: OCCUPATIONAL THERAPY ADULT  Goal: Performs self-care activities at highest level of function for planned discharge setting  See evaluation for individualized goals  Description: Treatment Interventions: ADL retraining, Functional transfer training, UE strengthening/ROM, Endurance training, Cognitive reorientation, Patient/family training, Equipment evaluation/education, Compensatory technique education, Activityengagement, Energy conservation          See flowsheet documentation for full assessment, interventions and recommendations  Outcome: Progressing  Note: Limitation: Decreased ADL status, Decreased Safe judgement during ADL, Decreased UE strength, Decreased endurance, Decreased cognition, Decreased high-level ADLs, Decreased self-care trans  Prognosis: Guarded  Assessment: Pt was seen for skilled OT with focus on completion of bed mobility, light grooming, sit to stand transfers and basic orientation  Pt with limited focus to tasks  Close, simple, loud 1 and 2 word communication provided without carry over in attempt to complete light grooming activity with both verbal and tactile cues  See above levels of A required for all functional tasks  Pt able to tolerate trial of sit to stand at [facility] and quick move device with Max A x2 required  Signs of increased pain noted with activities  Pt returned to supine positioning due to noted fatigue and pain levels  Pt is responsive to name only  Pt with tangential verbalization via translation without purpose or correct response  The patient's raw score on the AM-PAC Daily Activity inpatient short form is 10, standardized score is  , less than 39 4  Patients at this level are likely to benefit from discharge to post-acute rehabilitation services        OT Discharge Recommendation: Post acute rehabilitation services  OT - OK to Discharge: Yes (when medically cleared  )

## 2021-07-19 NOTE — PHYSICAL THERAPY NOTE
PT Treatment Note     07/19/21 1451   Pain Assessment   Pain Assessment Tool FLACC   Pain Location/Orientation Orientation: Right;Location: Hip   Pain Rating: FLACC (Rest) - Face 0   Pain Rating: FLACC (Rest) - Legs 0   Pain Rating: FLACC (Rest) - Activity 0   Pain Rating: FLACC (Rest) - Cry 0   Pain Rating: FLACC (Rest) - Consolability 0   Score: FLACC (Rest) 0   Pain Rating: FLACC (Activity) - Face 2   Pain Rating: FLACC (Activity) - Legs 1   Pain Rating: FLACC (Activity) - Activity 1   Pain Rating: FLACC (Activity) - Cry 0   Pain Rating: FLACC (Activity) - Consolability 0   Score: FLACC (Activity) 4   Restrictions/Precautions   Weight Bearing Precautions Per Order Yes   RLE Weight Bearing Per Order WBAT   Other Precautions Cognitive; Chair Alarm; Bed Alarm; Fall Risk;Pain  (limited verbalization)   General   Chart Reviewed Yes   Additional Pertinent History pt admitted 7/15/21 for closed R hip fx, sp IM nailing  wbat per ortho  Armenian speaking  Pt  supine in bed, has not been OOB due to difficulty transfering and hypotension as of 7/15/21   Response to Previous Treatment Patient unable to report, no changes reported from family or staff   Family/Caregiver Present No   Cognition   Overall Cognitive Status Impaired   Arousal/Participation Cooperative;Persistent stimuli required;Poorly responsive   Attention Difficulty attending to directions   Comments hx dementia   Subjective   Subjective None stated   Bed Mobility   Rolling R 1  Dependent   Additional items Assist x 2;HOB elevated;LE management  (encouraged use of bedrails, unable to assist)   Rolling L 1  Dependent   Additional items Assist x 2; Increased time required; Bedrails;LE management   Supine to Sit 2  Maximal assistance   Additional items Assist x 2; Increased time required;Verbal cues;LE management; Bedrails;HOB elevated   Sit to Supine 2  Maximal assistance   Additional items Assist x 2; Increased time required;Verbal cues;LE management; Bedrails Additional Comments Sitting EOB pt  able to use R UE to assist with maintianing upright position, required min A to sit upright consistently  Transfers   Sit to Stand 2  Maximal assistance   Additional items Assist x 2; Increased time required;Verbal cues; Bedrails   Stand to Sit 2  Maximal assistance   Additional items Assist x 2; Increased time required;Verbal cues; Bedrails   Additional Comments cues for hand placement and positioning  Pt  able to achieve full stand with RW, Max A x2  Unable to advance LE for ambulation, 20 seconds standing tolerance  Pt  grimaced indicating pain sx   Attempted sit to stand lift, unable to achieve full stand or follow consistent directions, A x3 to complete transfer onto lift  Due to poor ability to follow directions, sit to stand (quick move) not appropriate at this time  Recommend Adithya lift OOB  Unable to weight shift  Pt  required skilled A of two for treatment to facilitate mobility for posture, positioning, and assessment of transfer methods/balance  Ambulation/Elevation   Gait pattern Not appropriate; Not tested   Balance   Static Sitting Fair   Dynamic Sitting Fair -   Static Standing Poor -   Endurance Deficit   Endurance Deficit Yes   Endurance Deficit Description pain  /58 supine hob elevated, sitting 137/67   Activity Tolerance   Activity Tolerance Patient limited by fatigue;Patient limited by pain;Treatment limited secondary to medical complications (Comment)   Medical Staff Made Aware OT Cornelius Mcpherson   Nurse Made Aware yes   Exercises   Hip Flexion Supine;Right;5 reps  (65-80 degrees to tolerance)   Knee AROM Short Arc Quad Sitting;AAROM;10 reps;Bilateral   Knee AROM Long Arc Quad Sitting;Right;10 reps;PROM  (occasional AAROM, mostly PROM)   Ankle Pumps Supine;10 reps;Bilateral;PROM   Assessment   Prognosis Poor   Problem List Decreased strength;Decreased range of motion;Decreased endurance;Decreased mobility; Decreased cognition; Impaired judgement;Decreased safety awareness;Pain;Decreased skin integrity   Barriers to Discharge Decreased caregiver support  (decline in functional mobility)   Barriers to Discharge Comments Pt  with PCA at home however dependent to Max A x2 currently   Goals   Patient Goals none stated 2* to impaired cognition   STG Expiration Date 08/06/21   Short Term Goal #1 1)  Pt will perform bed mobility with mod Ax1 demonstrating appropriate technique 100% of the time in order to improve function and decrease caregiver burden  2)  Perform all transfers with mod Ax1 demonstrating safe and appropriate technique 100% of the time in order to improve ability to negotiate safely in home environment and decrease caregiver burden  3) PT for ambulation when appropriate  4)  Improve overall strength and balance 1/2 grade in order to optimize ability to perform functional tasks and reduce fall risk  5) Increase activity tolerance to 45 minutes in order to improve endurance to functional tasks  6)   PT for ongoing patient and family/caregiver education, DME needs and d/c planning in order to promote highest level of function in least restrictive environment  PT Treatment Day 1   Plan   Treatment/Interventions Functional transfer training;LE strengthening/ROM; Therapeutic exercise;Cognitive reorientation;Patient/family training;Equipment eval/education; Bed mobility;Gait training; Compensatory technique education;Continued evaluation;Spoke to nursing;OT   Progress Slow progress, medical status limitations   PT Frequency   (4-5x/wk, per nursing com  w son-pt  ambulatory PTA w assist)   Recommendation   PT Discharge Recommendation Post acute rehabilitation services   PT - OK to Discharge Yes   Additional Comments The patient's AM-PAC Basic Mobility Inpatient Short Form Low Function Raw Score 10 , Standardized Score is 14 65  A standardized score less 42 9 suggests the patient may benefit from discharge to post-acute rehab services   Please also refer to the recommendation of the Physical Therapist for safe discharge planning     AM-PAC Basic Mobility Inpatient   Turning in Bed Without Bedrails 1   Lying on Back to Sitting on Edge of Flat Bed 1   Moving Bed to Chair 1   Standing Up From Chair 1   Walk in Room 1   Climb 3-5 Stairs 1   Basic Mobility Inpatient Raw Score 6   Turning Head Towards Sound 3   Follow Simple Instructions 1   Low Function Basic Mobility Raw Score 10   Low Function Basic Mobility Standardized Score 14 65      Michael Pereira, PT

## 2021-07-19 NOTE — PROGRESS NOTES
Progress Note - Salomón Parcel 80 y o  female MRN: [id number]    Unit/Bed#: E2 -01 Encounter: [id number]      Subjective: The patient feels reasonably well  She is not having much pain  She denies chest pain, shortness of breath, abdominal pain, nausea, or vomiting  Physical Exam:   Temp:  [96 8 °F (36 °C)-97 3 °F (36 3 °C)] 97 1 °F (36 2 °C)  HR:  [] 82  Resp:  [18] 18  BP: (120-145)/(57-98) 126/59    Gen:  Well-developed, well-nourished, in no distress  Neck:  Supple  No lymphadenopathy, goiter, or bruit  Heart:  Regular rhythm  I heard no murmur, gallop, or rub  Lungs:  Clear to auscultation and percussion  No wheezing, rales, or rhonchi    Abd:  Soft with active bowel sounds  No mass, tenderness, or organomegaly  Extremities:  No clubbing, cyanosis, or edema  No calf tenderness  Neuro:  Alert    No focal sign  Skin:  Warm and dry      LABS:   CBC:   Lab Results   Component Value Date    WBC 7 45 07/19/2021    HGB 8 4 (L) 07/19/2021    HCT 25 9 (L) 07/19/2021    MCV 92 07/19/2021     07/19/2021    MCH 29 8 07/19/2021    MCHC 32 4 07/19/2021    RDW 14 2 07/19/2021    MPV 13 5 (H) 07/19/2021    NRBC 0 07/19/2021   , CMP:   Lab Results   Component Value Date    SODIUM 144 07/19/2021    K 3 5 07/19/2021     (H) 07/19/2021    CO2 25 07/19/2021    BUN 38 (H) 07/19/2021    CREATININE 1 24 07/19/2021    CALCIUM 8 5 07/19/2021    EGFR 39 07/19/2021           Patient Active Problem List   Diagnosis    Essential hypertension    Anxiety    Hyperlipidemia    Hyponatremia    Acute encephalopathy    Bilateral hearing loss    Fall    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[facility] )    Cellulitis of extremity    Paroxysmal A-fib (HCC)    Orthostatic hypertension    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[facility] )    Ambulatory dysfunction    COVID-19 virus infection    Elevated troponin    Urinary incontinence    PAF (paroxysmal atrial fibrillation) ([facility] )    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[facility] )    Major neurocognitive disorder ([facility] )    Moderate protein-calorie malnutrition ([facility] )    Age-related osteoporosis without current pathological fracture    Closed fracture of right orbital floor ([facility] )    Fall from ground level    Closed right hip fracture (HCC)    Stage 3a chronic kidney disease (HCC)    Dementia (HCC)    ABLA (acute blood loss anemia)    Acute cystitis without hematuria       Assessment/Plan:  1  Right hip fracture status post intramedullary nail fixation  2  Acute blood loss anemia secondary to fracture and surgery  3  Dementia  4  History of schizophrenia  5  Paroxysmal atrial fibrillation  6  Acute kidney injury superimposed on chronic kidney disease stage 3, resolved  7  Possible urinary tract infection (doubt), DC ceftriaxone    The patient is reasonably stable at present  She will require inpatient rehab  Because of her psychiatric history, the option and process is required  Psychiatric evaluation will be needed to facilitate this        VTE Pharmacologic Prophylaxis: Enoxaparin (Lovenox)  VTE Mechanical Prophylaxis: sequential compression device

## 2021-07-19 NOTE — CONSULTS
Consultation - Anay Schulz 80 y o  female MRN: [id number]  Unit/Bed#: E2 -01 Encounter: [id number]      Assessment/Plan  1  Ambulatory dysfunction  At risk for falls secondary to hx of previous falls, age, gait instability, dementia, polypharmacy, visual impairment  PT/OT  Rehab post hospitalization    2  Acute pain due to fracture  Geriatric pain protocol  Scheduled acetaminophen  P r n  Oxycodone low-dose:  2 5 mg p o  q 4 hours p r n  Moderate pain/5 mg p o  Q 4 hours p r n  Severe pain  Monitor for constipation    3  Hearing loss  Hearing impairment strongly correlated with depression, cognitive impairment, delirium and falls in the older adult  Use hearing aids or sound amplifier  Speaking face to face  Use clear dictation, enunciation of words    4  Dementia  Continue vitamin B12 supplementation  Continue 24/7 care    5  Psychosis and depression  Follows with Psychiatry as outpatient  Patient on Cymbalta, trazodone, Zyprexa, Ativan  Continue 24/7 care    6  Insomnia  Patient on trazodone, Ativan and Zyprexa at bedtime as outpatient  zyprexa was reduced on 7/18, due to increased lethargy, trazodone and ativan not ordered on admission  Recommend order home trazadone QHS prn to help with sleep   Continue to monitor ativan, although benzodiazepines not recommended in the older adult, barb have withdrawal, increased agitation, confusion  First line is behavioral therapy  Avoid sedative hypnotics such as benzodiazepines and benadryl  Encourage staying awake during the day  Encourage daytime activity, morning exercise  Decrease or eliminate day time naps  Establish a night time routine    7   Delirium precautions  Alert and oriented x 1  Provide frequent redirection, reorientation, distraction techniques  Avoid deliriogenic medications such as tramadol, benzodiazepines, anticholinergics,  Benadryl  Treat pain, See geriatric pain protocol  Monitor for constipation and urinary retention  Encourage early and frequent moblization, OOB  Encourage Hydration/ Nutrition  Implement sleep hygiene, limit night time interuptions, group activities    8  Urinary retention  Sanders placed  Monitor for constipation, colace ordered, prn miralax  Recommend addition of scheduled senna    9  Right hip fracture  Ortho on consult  Status post long TFN right hip  PT/OT  Geriatric pain protocol    10  Home medication review  [facility]  Vitamin B12 1000 mcg p o  Daily  Duloxetine 30 mg p o  Daily  Ativan 0 5 mg p o  Daily  Losartan 100 mg p o  Daily  Metoprolol 100 mg p o  Daily  Olanzapine 5 mg p o  Daily  Diltiazem 180 mg daily  Trazodone 50 mg daily            History of Present Illness   Physician Requesting Consult: Colton Lal MD  Reason for Consult / Principal Problem: fall  Hx and PE limited by: NA  HPI: Jared Hermosillo is a 80y o  year old female who presents with fall  Patient was ambulating to the bathroom with assistance from caregiver  Patient tripped and fell  Patient was screaming in pain so 911 was called  She has dementia, schizophrenia, hypertension, CKD  Prior to arrival patient lives at home  She has 24 hour caregivers to assist with IADLs and ADLs  She wears glasses  She is hard of hearing  She is incontinent of urine  Upon exam pt is lying in bed  She is calm and cooperative  Rounded with nursing, pt tolerating po well, did not sleep well overnight, no BM  Attempted to call son to review HPI, no answer       Inpatient consult to Gerontology  Consult performed by: BEV Melendez  Consult ordered by: Mark Hogue MD          Review of Systems   Unable to perform ROS: Dementia       Historical Information   Past Medical History:   Diagnosis Date    Anxiety     Cognitive impairment     [facility]     Depression     Hallucination     Hyperlipidemia     Hypertension     Memory loss     Psychiatric illness     Psychosis [facility]     [facility]     Sleep difficulties      Past Surgical History:   Procedure Laterality Date    APPENDECTOMY      BLADDER SURGERY      CHOLECYSTECTOMY      [place] OPEN RX FEMUR FX+INTRAMED BRYSON Right 7/15/2021    Procedure: INSERTION NAIL IM FEMUR ANTEGRADE (TROCHANTERIC) right;  Surgeon:  Greg Gonzalez MD;  Location: [facility];  Service: Orthopedics    TOTAL ABDOMINAL HYSTERECTOMY W/ BILATERAL SALPINGOOPHORECTOMY      TOTAL KNEE ARTHROPLASTY Left      Social History   Social History     Substance and Sexual Activity   Alcohol Use Never     Social History     Substance and Sexual Activity   Drug Use No     Social History     Tobacco Use   Smoking Status Never Smoker   Smokeless Tobacco Never Used   Tobacco Comment    Former smoker per [employer]         Family History:   Family History   Problem Relation Age of Onset    Heart disease Mother         Cardiac disorder    Parkinsonism Father     Heart disease Sister         Cardiac disorder    Hypertension Sister     Diabetes Child         Diabetes Mellitus    Lung disease Child         Respiratory Disorder    Diabetes Son         Diabetes Mellitus       Meds/Allergies   Current meds:   Current Facility-Administered Medications   Medication Dose Route Frequency    acetaminophen (TYLENOL) oral suspension 650 mg  650 mg Oral 4x Daily    albuterol (PROVENTIL HFA,VENTOLIN HFA) inhaler 2 puff  2 puff Inhalation Q4H PRN    aluminum-magnesium hydroxide-simethicone (MYLANTA) oral suspension 30 mL  30 mL Oral Q6H PRN    cefTRIAXone (ROCEPHIN) 1,000 mg in dextrose 5 % 50 mL IVPB  1,000 mg Intravenous Q24H    cyanocobalamin (VITAMIN B-12) tablet 1,000 mcg  1,000 mcg Oral Daily    diltiazem (CARDIZEM CD) 24 hr capsule 120 mg  120 mg Oral Daily    docusate sodium (COLACE) capsule 100 mg  100 mg Oral Q12H [address]    DULoxetine (CYMBALTA) delayed release capsule 30 mg  30 mg Oral HS    enoxaparin (LOVENOX) subcutaneous injection 30 mg  30 mg Subcutaneous Q24H LYNDSEY    HYDROmorphone HCl (DILAUDID) injection 0 2 mg  0 2 mg Intravenous Q4H PRN    melatonin tablet 3 mg  3 mg Oral HS    metoprolol (LOPRESSOR) injection 2 5 mg  2 5 mg Intravenous Q6H PRN    metoprolol succinate (TOPROL-XL) 24 hr tablet 50 mg  50 mg Oral Daily    multi-electrolyte (PLASMALYTE-A/ISOLYTE-S PH 7 4) IV solution  50 mL/hr Intravenous Continuous    OLANZapine (ZyPREXA) tablet 2 5 mg  2 5 mg Oral HS    ondansetron (ZOFRAN) injection 4 mg  4 mg Intravenous Q6H PRN    oxyCODONE (ROXICODONE) IR tablet 2 5 mg  2 5 mg Oral Q4H PRN    oxyCODONE (ROXICODONE) IR tablet 5 mg  5 mg Oral Q4H PRN    polyethylene glycol (MIRALAX) packet 17 g  17 g Oral Daily PRN    simethicone (MYLICON) chewable tablet 80 mg  80 mg Oral 4x Daily PRN      Current PTA meds:  Medications Prior to Admission   Medication    acetaminophen (TYLENOL) 325 mg tablet    albuterol (2 5 mg/3 mL) 0 083 % nebulizer solution    albuterol (PROVENTIL HFA,VENTOLIN HFA) 90 mcg/act inhaler    cyanocobalamin (VITAMIN B-12) 1000 MCG tablet    diclofenac sodium (VOLTAREN) 1 %    Diclofenac Sodium (VOLTAREN) 1 %    diltiazem (CARDIZEM CD) 180 mg 24 hr capsule    docusate sodium (COLACE) 100 mg capsule    DULoxetine (CYMBALTA) 30 mg delayed release capsule    LORazepam (ATIVAN) 0 5 mg tablet    losartan (COZAAR) 100 MG tablet    melatonin 3 mg    metoprolol succinate (TOPROL-XL) 100 mg 24 hr tablet    OLANZapine (ZyPREXA) 5 mg tablet    polyethylene glycol (MIRALAX) 17 g packet    traZODone (DESYREL) 50 mg tablet        No Known Allergies    Objective   Vitals: Blood pressure 145/98, pulse 88, temperature (!) 97 1 °F (36 2 °C), temperature source Temporal, resp  rate 18, weight 63 1 kg (139 lb 1 8 oz), SpO2 97 %  ,Body mass index is 29 07 kg/m²  Physical Exam  Vitals and nursing note reviewed  HENT:      Head: Normocephalic        Right Ear: Tympanic membrane normal       Nose: Nose normal       Mouth/Throat:      Mouth: Mucous membranes are moist  Eyes:      General:         Right eye: No discharge  Left eye: No discharge  Cardiovascular:      Rate and Rhythm: Normal rate and regular rhythm  Pulses: Normal pulses  Pulmonary:      Effort: No respiratory distress  Breath sounds: Normal breath sounds  Abdominal:      General: Bowel sounds are normal       Palpations: Abdomen is soft  Musculoskeletal:      Cervical back: Normal range of motion  Comments: Right hip dressing CDI   Skin:     Comments: Ecchymosis to right hip   Neurological:      Mental Status: She is alert  Mental status is at baseline  Psychiatric:         Mood and Affect: Mood normal       Comments: Calm and cooperative         Lab Results:   Results from last 7 days   Lab Units 07/19/21  0427   WBC Thousand/uL 7 45   HEMOGLOBIN g/dL 8 4*   HEMATOCRIT % 25 9*   PLATELETS Thousands/uL 196        Results from last 7 days   Lab Units 07/19/21  0427   POTASSIUM mmol/L 3 5   CHLORIDE mmol/L 109*   CO2 mmol/L 25   BUN mg/dL 38*   CREATININE mg/dL 1 24   CALCIUM mg/dL 8 5       Imaging Studies: I have personally reviewed pertinent reports  EKG, Pathology, and Other Studies: I have personally reviewed pertinent reports      VTE Prophylaxis: Sequential compression device (Venodyne)     Code Status: Level 3 - DNAR and DNI

## 2021-07-19 NOTE — SPEECH THERAPY NOTE
Speech Language/Pathology  Speech/Language Pathology  Assessment    Patient Name: Rachel Gan  HQATN'O Date: 7/19/2021     Problem List  Principal Problem:    Closed right hip fracture ([facility] [address] )  Active Problems:    Essential hypertension    Bilateral hearing loss    Paroxysmal A-fib (HCC)    Major neurocognitive disorder (HCC)    Stage 3a chronic kidney disease ([facility] [address] )    ABLA (acute blood loss anemia)    Acute cystitis without hematuria    Past Medical History  Past Medical History:   Diagnosis Date    Anxiety     Cognitive impairment     [facility]     Depression     Hallucination     Hyperlipidemia     Hypertension     Memory loss     Psychiatric illness     [facility]     [facility]     Sleep difficulties      Past Surgical History  Past Surgical History:   Procedure Laterality Date    APPENDECTOMY      BLADDER SURGERY      CHOLECYSTECTOMY      [place] OPEN RX FEMUR FX+INTRAMED BRYSON Right 7/15/2021    Procedure: INSERTION NAIL IM FEMUR ANTEGRADE (TROCHANTERIC) right;  Surgeon: Zoey Dixon MD;  Location: [facility];  Service: Orthopedics    TOTAL ABDOMINAL HYSTERECTOMY W/ BILATERAL SALPINGOOPHORECTOMY      TOTAL KNEE ARTHROPLASTY Left           Bedside Swallow Evaluation:    Summary:  Upper dentures were placed  She refused her lower dentures  "only one"  Pt presents w/ refusals for po at lunch other than "agua"  Refused the penne, broccoli, chocolate pudding, and juice  I offered to get her something else  Stated she didn't want to eat now  Swallows were prompt w/ multiple consecutive sips of water  Good laryngeal rise  No cough or wet voice  Reportedly ate cream of wheat and a puree type item for breakfast      Recommendations:  Diet: softer foods for now  ? If appropriate for regular which she is currently on  Sign posted re softer foods  Liquid:thin  Meds: as tolerated  Has been taking wnl per nurse  Supervision: close  Encourage intake  Set up tray as needed  Positioning:Upright  Strategies: Pt to take PO/Meds only when fully alert and upright  Oral care  Aspiration precautions  Reflux precautions  Therapy Prognosis: ? fair  Prognosis considerations: unable to say for certain at this time due to refusal for lunch  Frequency:2-5x/wk    Goal(s):  Dysphagia LTG  -Patient will demonstrate safe and effective oral intake (without overt s/s significant oral/pharyngeal dysphagia including s/s penetration or aspiration) for the highest appropriate diet level  1 Pt will tolerate least restrictive diet w/out s/s aspiration or oral/pharyngeal difficulties  2 Pt will will effectively masticate and transfer solids w/out s/s dysphagia/aspiration  3 Pt will tolerate thin liquids w/out s/s aspiration  Patient's goal: none stated    Consider consult w/:  Rehab  Nutrition    Reason for consult:  R/o aspiration  Determine safest and least restrictive diet  H/o neurological disease  Current diet:  regular  Premorbid diet[de-identified]  ? Regular  Unknown  Previous VBS:  None known  O2 requirement:  none  Voice/Speech:  Min Chilean/english  Makes needs known  Social:  Home alone  Follows commands:  basic              Cognitive Status:  alert  Oral mech exam:  Dentition:upper pl;ate, bottom partial  Labial strength and ROM:wfl   Lingual strength and ROM:wfl  Mandibular strength and ROM: unable to assess  Secretion management:wnl:    Items administered: Only agreeable to water  No overt s/s aspiration    Esophageal stage:  No s/s reported    Aspiration precautions posted    Results d/w:  Pt, nursing    7/18  80 y o  female s/p long TFN for right hip fracture POD 3, ABLA    Chief Complaint:   fall  History of Present Illness:  Erasto Jones is a 80 y o  female who presents s/p fall  H/o schizophrenia and dementia, unable to obtain HPI from patient, oriented to person only   As per son at bedside, patient was ambulating to bathroom with assistance from caregiver, caregiver reported patient tripped, assisted fall  Patient screamed of pain so 911 was called

## 2021-07-20 PROCEDURE — 97535 SELF CARE MNGMENT TRAINING: CPT

## 2021-07-20 PROCEDURE — 97530 THERAPEUTIC ACTIVITIES: CPT

## 2021-07-20 PROCEDURE — 92526 ORAL FUNCTION THERAPY: CPT

## 2021-07-20 PROCEDURE — 99221 1ST HOSP IP/OBS SF/LOW 40: CPT | Performed by: PSYCHIATRY & NEUROLOGY

## 2021-07-20 PROCEDURE — 99232 SBSQ HOSP IP/OBS MODERATE 35: CPT | Performed by: INTERNAL MEDICINE

## 2021-07-20 PROCEDURE — 97116 GAIT TRAINING THERAPY: CPT

## 2021-07-20 PROCEDURE — 97110 THERAPEUTIC EXERCISES: CPT

## 2021-07-20 PROCEDURE — 99232 SBSQ HOSP IP/OBS MODERATE 35: CPT | Performed by: NURSE PRACTITIONER

## 2021-07-20 RX ORDER — LIDOCAINE 50 MG/G
1 PATCH TOPICAL DAILY
Status: DISCONTINUED | OUTPATIENT
Start: 2021-07-20 | End: 2021-07-26 | Stop reason: HOSPADM

## 2021-07-20 RX ORDER — BISACODYL 10 MG
10 SUPPOSITORY, RECTAL RECTAL ONCE
Status: DISCONTINUED | OUTPATIENT
Start: 2021-07-20 | End: 2021-07-26 | Stop reason: HOSPADM

## 2021-07-20 RX ADMIN — ENOXAPARIN SODIUM 30 MG: 30 INJECTION, SOLUTION INTRAVENOUS; SUBCUTANEOUS at 09:26

## 2021-07-20 RX ADMIN — LIDOCAINE 1 PATCH: 50 PATCH CUTANEOUS at 10:39

## 2021-07-20 RX ADMIN — DOCUSATE SODIUM 100 MG: 100 CAPSULE ORAL at 09:26

## 2021-07-20 RX ADMIN — DILTIAZEM HYDROCHLORIDE 120 MG: 120 CAPSULE, COATED, EXTENDED RELEASE ORAL at 09:26

## 2021-07-20 RX ADMIN — ACETAMINOPHEN 650 MG: 650 SUSPENSION ORAL at 21:18

## 2021-07-20 RX ADMIN — CYANOCOBALAMIN TAB 500 MCG 1000 MCG: 500 TAB at 09:26

## 2021-07-20 RX ADMIN — MELATONIN 3 MG: at 21:18

## 2021-07-20 RX ADMIN — OLANZAPINE 2.5 MG: 5 TABLET, FILM COATED ORAL at 21:18

## 2021-07-20 RX ADMIN — ACETAMINOPHEN 649.6 MG: 650 SUSPENSION ORAL at 13:19

## 2021-07-20 RX ADMIN — METOPROLOL SUCCINATE 50 MG: 50 TABLET, EXTENDED RELEASE ORAL at 09:26

## 2021-07-20 RX ADMIN — DULOXETINE HYDROCHLORIDE 30 MG: 30 CAPSULE, DELAYED RELEASE ORAL at 21:18

## 2021-07-20 RX ADMIN — ACETAMINOPHEN 649.6 MG: 650 SUSPENSION ORAL at 09:26

## 2021-07-20 RX ADMIN — ACETAMINOPHEN 650 MG: 650 SUSPENSION ORAL at 17:53

## 2021-07-20 RX ADMIN — DOCUSATE SODIUM 100 MG: 100 CAPSULE ORAL at 21:18

## 2021-07-20 NOTE — PHYSICAL THERAPY NOTE
07/20/21 1110   PT Last Visit   PT Visit Date 07/20/21   Note Type   Note Type Treatment   Pain Assessment   Pain Assessment Tool FLACC   Pain Location/Orientation Orientation: Right;Location: Hip   Effect of Pain on Daily Activities limits activity   Hospital Pain Intervention(s) Repositioned; Ambulation/increased activity; Rest   Pain Rating: FLACC (Rest) - Face 0   Pain Rating: FLACC (Rest) - Legs 0   Pain Rating: FLACC (Rest) - Activity 0   Pain Rating: FLACC (Rest) - Cry 0   Pain Rating: FLACC (Rest) - Consolability 0   Score: FLACC (Rest) 0   Pain Rating: FLACC (Activity) - Face 2   Pain Rating: FLACC (Activity) - Legs 1   Pain Rating: FLACC (Activity) - Activity 1   Pain Rating: FLACC (Activity) - Cry 0   Pain Rating: FLACC (Activity) - Consolability 0   Score: FLACC (Activity) 4   Restrictions/Precautions   Weight Bearing Precautions Per Order Yes   RLE Weight Bearing Per Order WBAT   Other Precautions Cognitive; Chair Alarm; Bed Alarm; Fall Risk;Pain   General   Chart Reviewed Yes   Family/Caregiver Present No   Cognition   Overall Cognitive Status Impaired   Arousal/Participation Responsive   Attention Attends with cues to redirect   Orientation Level Oriented to person   Memory Unable to assess   Following Commands Follows one step commands with increased time or repetition   Comments improved ability to participate in PT session/follow directions today vs yesterday   Bed Mobility   Supine to Sit 3  Moderate assistance   Additional items Assist x 2;HOB elevated; Bedrails; Increased time required;Verbal cues;LE management   Additional Comments pt sat at eob x 5 min with min assist for safety prior to transfer to standing  Sat OOB in recliner to end session, alarm & SCDs on, all needs in reach to end session   Transfers   Sit to Stand 2  Maximal assistance   Additional items Assist x 2;Bedrails;Armrests; Increased time required;Verbal cues   Stand to Sit 2  Maximal assistance   Additional items Assist x 2;Armrests; Increased time required;Verbal cues   Stand pivot 2  Maximal assistance   Additional items Assist x 2; Increased time required;Verbal cues   Toilet transfer 2  Maximal assistance   Additional items Assist x 2;Armrests; Increased time required;Verbal cues; Commode  (max x 1 for hygiene, mod x 1 for standing with RW 2 mins)   Additional Comments max cueing for all mobility, stood for 2 min with RW for hygiene post BSC use, stood 1 min with RW mod assist for standing tolerance   Ambulation/Elevation   Gait pattern Poor UE support; Improper Weight shift; Antalgic;Narrow ABBEY; Forward Flexion;Decreased foot clearance;Decreased R stance;Shuffling; Inconsistent dustin; Short stride; Step to;Excessively slow   Gait Assistance 2  Maximal assist   Additional items Assist x 2;Verbal cues; Tactile cues   Assistive Device Rolling walker   Distance 4 feet bed>BSC, 4 feet BSC>recliner   Stair Management Assistance Not tested   Balance   Static Sitting Fair   Dynamic Sitting Fair -   Static Standing Poor   Dynamic Standing Poor   Ambulatory Poor -   Endurance Deficit   Endurance Deficit Yes   Endurance Deficit Description pain  /68 supine, 161/104 having BM-told not to push so hard, 126/74 resting in chair post transfers   Activity Tolerance   Activity Tolerance Patient limited by fatigue;Patient limited by pain;Treatment limited secondary to medical complications (Comment)   Medical Staff Made Aware Benita OTR   Nurse Made Aware ANJELICA Erickson   Exercises   Hip Flexion Sitting;10 reps;PROM; Bilateral   Hip Abduction Sitting;10 reps;PROM; Bilateral   Knee AROM Long Arc Quad Sitting;10 reps;AROM; Left;AAROM;PROM; Right   Ankle Pumps Sitting;15 reps;PROM; Bilateral   Balance training  sitting EOB x 5 min, standing with RW 2 min & 1 min   Assessment   Prognosis Poor   Problem List Decreased strength;Decreased range of motion;Decreased endurance; Impaired balance;Decreased mobility; Decreased cognition; Impaired judgement;Decreased safety awareness;Decreased skin integrity;Orthopedic restrictions;Pain   Assessment Pt seen for PT treatment session this date with interventions consisting of gait training w/ emphasis on improving pt's ability to ambulate level surfaces x 4 feet x 2 with max A of 2 provided by therapist with RW, Therapeutic exercise consisting of: AROM, AAROM and PROM 10-15 reps B LE in sitting position and therapeutic activity consisting of training: supine<>sit transfers, sit<>stand transfers, static sitting tolerance at EOB for 5 minutes w/ B UE support, static standing tolerance for 1-2 x 2 minutes w/ B UE support and toilet transfer  Pt agreeable to PT treatment session upon arrival, pt found supine in bed w/ HOB elevated, in no apparent distress  In comparison to previous session, pt with improvements in ability to participate in therapy session & transfer OOB to chair, take some steps with RW & assistance  Post session: pt returned back to recliner, chair alarm engaged, all needs in reach and RN notified of session findings/recommendations  Continue to recommend post acute rehabilitation services at time of d/c in order to maximize pt's functional independence and safety w/ mobility  Pt continues to be functioning below baseline level, and remains limited 2* factors listed above and including decreased strength, endurance & safe functional mobility  PT will continue to see pt during current hospitalization in order to address the deficits listed above and provide interventions consistent w/ POC in effort to achieve STGs  Barriers to Discharge Decreased caregiver support  (decline in functional mobility)   Barriers to Discharge Comments pt max x 2 for mobility   Goals   Patient Goals none stated due to cognition   PT Treatment Day 2   Plan   Treatment/Interventions Functional transfer training;LE strengthening/ROM; Therapeutic exercise; Endurance training;Cognitive reorientation;Patient/family training;Equipment eval/education; Bed mobility;Gait training;Spoke to nursing;OT   Progress Slow progress, medical status limitations   PT Frequency   (4-5 x week)   Recommendation   PT Discharge Recommendation Post acute rehabilitation services   PT - OK to Discharge Yes  (when med cleared to STR)   AM-PAC Basic Mobility Inpatient   Turning in Bed Without Bedrails 1   Lying on Back to Sitting on Edge of Flat Bed 1   Moving Bed to Chair 1   Standing Up From Chair 1   Walk in Room 1   Climb 3-5 Stairs 1   Basic Mobility Inpatient Raw Score 6   The patient's AM-PAC Basic Mobility Inpatient Short Form Low Function Raw Score 6 , Standardized Score is 14 65  A standardized score less 42 9 suggests the patient may benefit from discharge to post-acute rehab services   Please also refer to the recommendation of the Physical Therapist for safe discharge planning as discussed with Physical Therapist   Levy Belcher PTA

## 2021-07-20 NOTE — PROGRESS NOTES
Progress Note - Alicia Urbina 80 y o  female MRN: [id number]    Unit/Bed#: E2 -01 Encounter: [id number]      Assessment/Plan:  1  Ambulatory dysfunction  At risk for falls secondary to hx of previous falls, age, gait instability, dementia, polypharmacy, visual impairment  PT/OT  Rehab post hospitalization     2  Acute pain due to fracture  Geriatric pain protocol  Scheduled acetaminophen  lidoderm patch ordered  P r n  Oxycodone low-dose:  2 5 mg p o  q 4 hours p r n  Moderate pain/5 mg p o  Q 4 hours p r n  Severe pain  Monitor for constipation, no BM since admission, ordered suppository x1 now, continue senna and prn miralax     3  Hearing loss  Hearing impairment strongly correlated with depression, cognitive impairment, delirium and falls in the older adult  Use hearing aids or sound amplifier  Speaking face to face  Use clear dictation, enunciation of words     4  Dementia  Continue vitamin B12 supplementation  Continue 24/7 care     5  Psychosis and depression  Follows with Psychiatry as outpatient  Patient on Cymbalta, trazodone, Zyprexa, Ativan  Continue 24/7 care     6  Insomnia  Patient on trazodone, Ativan and Zyprexa at bedtime as outpatient  zyprexa was reduced on 7/18, due to increased lethargy, trazodone and ativan not ordered on admission  Recommend order home trazadone QHS prn to help with sleep   Continue to monitor ativan, although benzodiazepines not recommended in the older adult, barb have withdrawal, increased agitation, confusion  First line is behavioral therapy  Avoid sedative hypnotics such as benzodiazepines and benadryl  Encourage staying awake during the day  Encourage daytime activity, morning exercise  Decrease or eliminate day time naps  Establish a night time routine     7   Delirium precautions  Alert and oriented x 1  Provide frequent redirection, reorientation, distraction techniques  Avoid deliriogenic medications such as tramadol, benzodiazepines, anticholinergics, Benadryl  Treat pain, See geriatric pain protocol  Monitor for constipation and urinary retention  Encourage early and frequent moblization, OOB  Encourage Hydration/ Nutrition  Implement sleep hygiene, limit night time interuptions, group activities     8  Urinary retention  Sanders placed  Monitor for constipation, colace ordered, prn miralax  Recommend addition of scheduled senna     9  Right hip fracture  Ortho on consult  Status post long TFN right hip  PT/OT  Geriatric pain protocol       Subjective:   Upon exam pt is lying in bed  She is alert, calm cooperative  Offered ensure, initially refuses, when asked "por favor" pt completed ensure  No recorded BM since admission  Objective:     Vitals: Blood pressure 116/51, pulse 91, temperature (!) 96 9 °F (36 1 °C), temperature source Temporal, resp  rate 16, weight 61 kg (134 lb 7 7 oz), SpO2 94 %  ,Body mass index is 28 11 kg/m²  Intake/Output Summary (Last 24 hours) at 7/20/2021 0919  Last data filed at 7/20/2021 0540  Gross per 24 hour   Intake 120 ml   Output 900 ml   Net -780 ml       Physical Exam:   General : NAD  HEENT : MMM   Heart : Normal rate, no murmur rub or gallop  Lungs : CTA no wheezes, rales or rhonchi  Abdomen : Soft, NT/ND, BS auscultated in all 4 quads  Ext :  no edema, left hip dressing CDI  Skin : Pink, warm, dry, age appropriate turgor and mobility  Neuro : Nonfocal  Psych : Alert       Invasive Devices     Peripheral Intravenous Line            Peripheral IV 07/19/21 Dorsal (posterior); Left Forearm <1 day    Peripheral IV 07/19/21 Right Antecubital <1 day          Drain            Urethral Catheter Latex 16 Fr  3 days                Lab, Imaging and other studies: I have personally reviewed pertinent reports      VTE Pharmacologic Prophylaxis: Sequential compression device (Venodyne)   VTE Mechanical Prophylaxis: sequential compression device

## 2021-07-20 NOTE — SPEECH THERAPY NOTE
Speech Language/Pathology    Speech/Language Pathology Progress Note    Patient Name: Jammie Price  [id number] Date: 7/20/2021     Problem List  Principal Problem:    Closed right hip fracture [facility])  Active Problems:    Essential hypertension    Bilateral hearing loss    Paroxysmal A-fib (HCC)    Major neurocognitive disorder (HCC)    Stage 3a chronic kidney disease ([facility] )    ABLA (acute blood loss anemia)    Acute cystitis without hematuria       Past Medical History  Past Medical History:   Diagnosis Date    Anxiety     Cognitive impairment     Dementia ([facility] )     Depression     Hallucination     Hyperlipidemia     Hypertension     Memory loss     Psychiatric illness     Psychosis ([facility] )     Schizoaffective disorder ([facility] )     Sleep difficulties         Past Surgical History  Past Surgical History:   Procedure Laterality Date    APPENDECTOMY      BLADDER SURGERY      CHOLECYSTECTOMY      HI OPEN RX FEMUR FX+INTRAMED BRYSON Right 7/15/2021    Procedure: INSERTION NAIL IM FEMUR ANTEGRADE (TROCHANTERIC) right;  Surgeon: Mulugeta Hess MD;  Location: [facility] OR;  Service: Orthopedics    TOTAL ABDOMINAL HYSTERECTOMY W/ BILATERAL SALPINGOOPHORECTOMY      TOTAL KNEE ARTHROPLASTY Left          Subjective:  Pt alert , seated in chair  Objective:  Pt seen for po tolerance and further recommendations  She was holding a  in her hand and had taken a bite  Her upper dentures were not in  Placed her upper dentures, as these fit adequately, though the lower partial does not  Very prolonged mastication, 5-6 minutes  I cut the bites much smaller  Prolonged mastication, Trialed one bite of the mac and cheese, at least mildly prolonged mastication  Gave drinks to help clear the oral cavity  Pt had thin water and thin apple juice w/ prompt swallow  No cough or wet voice  Noted pt had a muffin that I suspect family had brought  There was a small chunk missing but not eaten  It was in the pt's lap  Assessment:  Weak mastication  tolerated thin liquids  Reduced intake  Plan/Recommendations:  PLACE UPPER DENTURES FOR MEALS  Posted on sign and spoke w/ student nurse and nurse  Assist w/ tray set up and encourage intake  Downgrade to dysphagia 2 mechanical and thin  meds as tolerated  May need to break or crush /give in puree  Will f/u  Tiger text Dr Rupal Baird at 12:18 to downgrade diet

## 2021-07-20 NOTE — UTILIZATION REVIEW
Continued Stay Review    Date:   7/20/21                          Current Patient Class:    Inpatient  Current Level of Care:   Med surg    HPI:89 y o  female initially admitted on    7/15  with closed right hip fracture    7/18   Hemoglobin dropped to  6 2 and plan PRBC  Assessment/Plan:   POD   #  5      IMN   Right femur  7/20     Continue  PT/OT  Needs  Rehab on  D/c  Continue pain control if needed  Monitor labs and renal function  IVF  D/c  719  As  Sodium trending up  Renal function at baseline      Vital Signs:   96 9 °F (36 1 °C)Abnormal   91  16  116/51  --  94 %  --  --  None (Room air)  Lying   07/20/21 0533  97 6 °F (36 4 °C)  91  16  143/62  89  96 %  --  --  None (Room air)         Pertinent Labs/Diagnostic Results:       Results from last 7 days   Lab Units 07/19/21 0427 07/18/21  1556 07/18/21  0439 07/17/21  0627 07/16/21  2043 07/16/21  0710   WBC Thousand/uL 7 45  --  4 95 8 28 8 09 6 90   HEMOGLOBIN g/dL 8 4* 9 6* 6 2* 8 6* 8 6* 8 7*   HEMATOCRIT % 25 9* 30 0* 19 4* 27 0* 26 5* 26 7*   PLATELETS Thousands/uL 196  --  148* 160 177 168   NEUTROS ABS Thousands/µL 6 01  --  3 77  --   --  5 15         Results from last 7 days   Lab Units 07/19/21 0427 07/18/21  0439 07/17/21  0627 07/16/21  2043 07/16/21  0538   SODIUM mmol/L 144 139 139 140 139   POTASSIUM mmol/L 3 5 4 0 4 0 4 2 4 9   CHLORIDE mmol/L 109* 107 105 103 108   CO2 mmol/L 25 24 22 23 23   ANION GAP mmol/L 10 8 12 14* 8   BUN mg/dL 38* 50* 46* 40* 30*   CREATININE mg/dL 1 24 1 84* 2 27* 2 31* 1 23   EGFR ml/min/1 73sq m 39 24 19 18 39   CALCIUM mg/dL 8 5 7 9* 8 3 8 4 8 7     Results from last 7 days   Lab Units 07/17/21  0627   ALBUMIN g/dL 2 7*         Results from last 7 days   Lab Units 07/19/21 0427 07/18/21  0439 07/17/21  0627 07/16/21  2043 07/16/21  0538 07/15/21  0509   GLUCOSE RANDOM mg/dL 143* 115 148* 201* 140 126               Results from last 7 days   Lab Units 07/18/21  0439   CK TOTAL U/L 87 Results from last 7 days   Lab Units 07/15/21  0509   PROTIME seconds 12 6   INR  0 96   PTT seconds 28                             Results from last 7 days   Lab Units 07/18/21  0439   FERRITIN ng/mL 136                     Results from last 7 days   Lab Units 07/17/21  0224   CLARITY UA  Clear   COLOR UA  Yellow   SPEC GRAV UA  1 015   PH UA  6 0   GLUCOSE UA mg/dl Negative   KETONES UA mg/dl Negative   BLOOD UA  Negative   PROTEIN UA mg/dl Negative   NITRITE UA  Positive*   BILIRUBIN UA  Negative   UROBILINOGEN UA E U /dl 0 2   LEUKOCYTES UA  Negative   WBC UA /hpf None Seen   RBC UA /hpf None Seen   BACTERIA UA /hpf Innumerable*   EPITHELIAL CELLS WET PREP /hpf Occasional           Medications:   Scheduled Medications:  acetaminophen, 650 mg, Oral, 4x Daily  bisacodyl, 10 mg, Rectal, Once  cyanocobalamin, 1,000 mcg, Oral, Daily  diltiazem, 120 mg, Oral, Daily  docusate sodium, 100 mg, Oral, Q12H LYNDSEY  DULoxetine, 30 mg, Oral, HS  enoxaparin, 30 mg, Subcutaneous, Q24H LYNDSEY  lidocaine, 1 patch, Topical, Daily  melatonin, 3 mg, Oral, HS  metoprolol succinate, 50 mg, Oral, Daily  OLANZapine, 2 5 mg, Oral, HS      Continuous IV Infusions:     PRN Meds:  albuterol, 2 puff, Inhalation, Q4H PRN  aluminum-magnesium hydroxide-simethicone, 30 mL, Oral, Q6H PRN  bisacodyl, 10 mg, Rectal, Daily PRN  HYDROmorphone, 0 2 mg, Intravenous, Q4H PRN  ondansetron, 4 mg, Intravenous, Q6H PRN  oxyCODONE, 2 5 mg, Oral, Q4H PRN  oxyCODONE, 5 mg, Oral, Q4H PRN  polyethylene glycol, 17 g, Oral, Daily PRN  simethicone, 80 mg, Oral, 4x Daily PRN        Discharge Plan:   D    Network Utilization Review Department  ATTENTION: Please call with any questions or concerns to [phone] and carefully listen to the prompts so that you are directed to the right person   All voicemails are confidential   Sushma Fisher all requests for admission clinical reviews, approved or denied determinations and any other requests to dedicated fax number below belonging to the campus where the patient is receiving treatment   List of dedicated fax numbers for the Facilities:  [address] [place] [address] DENIALS (Administrative/Medical Necessity) [phone]   [address]  (Maternity/NICU/Pediatrics) [phone]   [address] Moh[address] [address] [place] [address] [address] [facility] [place], [place] [address] [phone]

## 2021-07-20 NOTE — PLAN OF CARE
Problem: MOBILITY - ADULT  Goal: Maintain or return to baseline ADL function  Description: INTERVENTIONS:  -  Assess patient's ability to carry out ADLs; assess patient's baseline for ADL function and identify physical deficits which impact ability to perform ADLs (bathing, care of mouth/teeth, toileting, grooming, dressing, etc )  - Assess/evaluate cause of self-care deficits   - Assess range of motion  - Assess patient's mobility; develop plan if impaired  - Assess patient's need for assistive devices and provide as appropriate  - Encourage maximum independence but intervene and supervise when necessary  - Involve family in performance of ADLs  - Assess for home care needs following discharge   - Consider OT consult to assist with ADL evaluation and planning for discharge  - Provide patient education as appropriate  Outcome: Progressing  Goal: Maintains/Returns to pre admission functional level  Description: INTERVENTIONS:  - Perform BMAT or MOVE assessment daily    - Set and communicate daily mobility goal to care team and patient/family/caregiver     - Collaborate with rehabilitation services on mobility goals if consulted  - Out of bed for toileting  - Record patient progress and toleration of activity level   Outcome: Progressing     Problem: Prexisting or High Potential for Compromised Skin Integrity  Goal: Skin integrity is maintained or improved  Description: INTERVENTIONS:  - Identify patients at risk for skin breakdown  - Assess and monitor skin integrity  - Assess and monitor nutrition and hydration status  - Monitor labs   - Assess for incontinence   - Turn and reposition patient  - Assist with mobility/ambulation  - Relieve pressure over bony prominences  - Avoid friction and shearing  - Provide appropriate hygiene as needed including keeping skin clean and dry  - Evaluate need for skin moisturizer/barrier cream  - Collaborate with interdisciplinary team   - Patient/family teaching  Outcome: Progressing     Problem: CARDIOVASCULAR - ADULT  Goal: Maintains optimal cardiac output and hemodynamic stability  Description: INTERVENTIONS:  - Monitor I/O, vital signs and rhythm  - Monitor for S/S and trends of decreased cardiac output  - Administer and titrate ordered vasoactive medications to optimize hemodynamic stability  - Assess quality of pulses, skin color and temperature  - Assess for signs of decreased coronary artery perfusion  - Instruct patient to report change in severity of symptoms  Outcome: Progressing     Problem: RESPIRATORY - ADULT  Goal: Achieves optimal ventilation and oxygenation  Description: INTERVENTIONS:  - Assess for changes in respiratory status  - Assess for changes in mentation and behavior  - Position to facilitate oxygenation and minimize respiratory effort  - Oxygen administered by appropriate delivery if ordered  - Encourage broncho-pulmonary hygiene including cough, deep breathe, Incentive Spirometry  - Assess the need for suctioning and aspirate as needed  - Assess for SOB or any respiratory difficulty  - Respiratory Therapy support as indicated  Outcome: Progressing     Problem: GENITOURINARY - ADULT  Goal: Urinary catheter remains patent  Description: INTERVENTIONS:  - Assess patency of urinary catheter  - Follow guidelines for intermittent irrigation of non-functioning urinary catheter  Outcome: Progressing     Problem: METABOLIC, FLUID AND ELECTROLYTES - ADULT  Goal: Electrolytes maintained within normal limits  Description: INTERVENTIONS:  - Monitor labs and assess patient for signs and symptoms of electrolyte imbalances  - Administer electrolyte replacement as ordered  - Monitor response to electrolyte replacements, including repeat lab results as appropriate  - Instruct patient on fluid and nutrition as appropriate  Outcome: Progressing     Problem: SKIN/TISSUE INTEGRITY - ADULT  Goal: Incision(s), wounds(s) or drain site(s) healing without S/S of infection  Description: INTERVENTIONS  - Assess and document dressing, incision, wound bed, drain sites and surrounding tissue  - Provide caregiver/family education  - Perform skin care/dressing changes as ordered  Outcome: Progressing     Problem: MUSCULOSKELETAL - ADULT  Goal: Maintain or return mobility to safest level of function  Description: INTERVENTIONS:  - Assess patient's ability to carry out ADLs; assess patient's baseline for ADL function and identify physical deficits which impact ability to perform ADLs (bathing, care of mouth/teeth, toileting, grooming, dressing, etc )  - Assess/evaluate cause of self-care deficits   - Assess range of motion  - Assess patient's mobility  - Assess patient's need for assistive devices and provide as appropriate  - Encourage maximum independence but intervene and supervise when necessary  - Involve family in performance of ADLs  - Assess for home care needs following discharge   - Consider OT consult to assist with ADL evaluation and planning for discharge  - Provide patient education as appropriate  Outcome: Progressing     Problem: PAIN - ADULT  Goal: Verbalizes/displays adequate comfort level or baseline comfort level  Description: Interventions:  - Encourage patient to monitor pain and request assistance  - Assess pain using appropriate pain scale  - Consider geriatric pain management order set   - Administer analgesics based on type and severity of pain and evaluate response  - Implement non-pharmacological measures as appropriate and evaluate response  - Consider cultural and social influences on pain and pain management  - Notify physician/advanced practitioner if interventions unsuccessful or patient reports new pain  Outcome: Progressing     Problem: SAFETY ADULT  Goal: Patient will remain free of falls  Description: INTERVENTIONS:  - Educate patient/family on patient safety including physical limitations  - Instruct patient to call for assistance with activity - Consult OT/PT to assist with strengthening/mobility   - Keep Call bell within reach  - Keep bed low and locked with side rails adjusted as appropriate  - Keep care items and personal belongings within reach  - Initiate and maintain comfort rounds  - Make Fall Risk Sign visible to staff  - Offer Toileting every 2 hours, in advance of need  - Initiate/Maintain bed/chair alarm  - Obtain necessary fall risk management equipment: bed alarm  - Apply yellow socks and bracelet for high fall risk patients  - Keep patient's room near nurses station  Outcome: Progressing     Problem: DISCHARGE PLANNING  Goal: Discharge to home or other facility with appropriate resources  Description: INTERVENTIONS:  - Identify barriers to discharge w/patient and caregiver  - Arrange for needed discharge resources and transportation as appropriate  - Identify discharge learning needs (meds, wound care, etc )  - Arrange for interpretive services (Yi) to assist at discharge as needed  - Refer to Case Management Department for coordinating discharge planning if the patient needs post-hospital services based on physician/advanced practitioner order or complex needs related to functional status, cognitive ability, or social support system  Outcome: Progressing     Problem: Knowledge Deficit  Goal: Patient/family/caregiver demonstrates understanding of disease process, treatment plan, medications, and discharge instructions  Description: Complete learning assessment and assess knowledge base    Interventions:  - Provide teaching at level of understanding  - Provide teaching via preferred learning methods  Outcome: Progressing     Problem: CONFUSION/THOUGHT DISTURBANCE  Goal: Thought disturbances (confusion, delirium, depression, dementia or psychosis) are managed to maintain or return to baseline mental status and functional level  Description: INTERVENTIONS:  - Assess for possible contributors to  thought disturbance, including but not limited to medications, infection, impaired vision or hearing, underlying metabolic abnormalities, dehydration, respiratory compromise,  psychiatric diagnoses and notify attending PHYSICAN/AP  - Monitor and intervene to maintain adequate nutrition, hydration, elimination, sleep and activity  - Decrease environmental stimuli, including noise as appropriate  - Patient is Serbian speaking only - communicate in Serbian  - Provide frequent contacts to provide refocusing, direction and reassurance as needed  Approach patient calmly with eye contact and at their level  - [place] high risk fall precautions, aspiration precautions and other safety measures, as indicated  - If delirium suspected, notify physician/AP of change in condition and request immediate in-person evaluation  - Pursue consults as appropriate including Geriatric (campus dependent), OT for cognitive evaluation/activity planning, psychiatric, pastoral care, etc   Outcome: Progressing     Problem: Nutrition/Hydration-ADULT  Goal: Nutrient/Hydration intake appropriate for improving, restoring or maintaining nutritional needs  Description: Monitor and assess patient's nutrition/hydration status for malnutrition  Collaborate with interdisciplinary team and initiate plan and interventions as ordered  Monitor patient's weight and dietary intake as ordered or per policy  Utilize nutrition screening tool and intervene as necessary  Determine patient's food preferences and provide high-protein, high-caloric foods as appropriate       INTERVENTIONS:  - Monitor oral intake, urinary output, labs, and treatment plans  - Assess nutrition and hydration status and recommend course of action  - Evaluate amount of meals eaten  - Total assist in feeding patient  - Allow adequate time for meals  - Recommend/ encourage appropriate diets, oral nutritional supplements, and vitamin/mineral supplements  - Assess need for intravenous fluids  - Provide specific nutrition/hydration education as appropriate  - Include patient/family/caregiver in decisions related to nutrition  Outcome: Progressing     Problem: Potential for Falls  Goal: Patient will remain free of falls  Description: INTERVENTIONS:  - Educate patient/family on patient safety including physical limitations  - Instruct patient to call for assistance with activity   - Consult OT/PT to assist with strengthening/mobility   - Keep Call bell within reach  - Keep bed low and locked with side rails adjusted as appropriate  - Keep care items and personal belongings within reach  - Initiate and maintain comfort rounds  - Make Fall Risk Sign visible to staff  - Offer Toileting every 2 hours, in advance of need  - Initiate/Maintain bed/chair alarm  - Obtain necessary fall risk management equipment: bed alarm  - Apply yellow socks and bracelet for high fall risk patients  - Keep patient's room near nurses station  Outcome: Progressing

## 2021-07-20 NOTE — PLAN OF CARE
Problem: PHYSICAL THERAPY ADULT  Goal: Performs mobility at highest level of function for planned discharge setting  See evaluation for individualized goals  Outcome: Progressing  Note: Prognosis: Poor  Problem List: Decreased strength, Decreased range of motion, Decreased endurance, Impaired balance, Decreased mobility, Decreased cognition, Impaired judgement, Decreased safety awareness, Decreased skin integrity, Orthopedic restrictions, Pain  Assessment: Pt seen for PT treatment session this date with interventions consisting of gait training w/ emphasis on improving pt's ability to ambulate level surfaces x 4 feet x 2 with max A of 2 provided by therapist with RW, Therapeutic exercise consisting of: AROM, AAROM and PROM 10-15 reps B LE in sitting position and therapeutic activity consisting of training: supine<>sit transfers, sit<>stand transfers, static sitting tolerance at EOB for 5 minutes w/ B UE support, static standing tolerance for 1-2 x 2 minutes w/ B UE support and toilet transfer  Pt agreeable to PT treatment session upon arrival, pt found supine in bed w/ HOB elevated, in no apparent distress  In comparison to previous session, pt with improvements in ability to participate in therapy session & transfer OOB to chair, take some steps with RW & assistance  Post session: pt returned back to recliner, chair alarm engaged, all needs in reach and RN notified of session findings/recommendations  Continue to recommend post acute rehabilitation services at time of d/c in order to maximize pt's functional independence and safety w/ mobility  Pt continues to be functioning below baseline level, and remains limited 2* factors listed above and including decreased strength, endurance & safe functional mobility  PT will continue to see pt during current hospitalization in order to address the deficits listed above and provide interventions consistent w/ POC in effort to achieve STGs    Barriers to Discharge: Decreased caregiver support (decline in functional mobility)  Barriers to Discharge Comments: pt max x 2 for mobility     PT Discharge Recommendation: Post acute rehabilitation services     PT - OK to Discharge: Yes (when med cleared to STR)    See flowsheet documentation for full assessment

## 2021-07-20 NOTE — PLAN OF CARE
Problem: OCCUPATIONAL THERAPY ADULT  Goal: Performs self-care activities at highest level of function for planned discharge setting  See evaluation for individualized goals  Description: Treatment Interventions: ADL retraining, Functional transfer training, UE strengthening/ROM, Endurance training, Cognitive reorientation, Patient/family training, Equipment evaluation/education, Compensatory technique education, Activityengagement, Energy conservation          See flowsheet documentation for full assessment, interventions and recommendations  Note: Limitation: Decreased ADL status, Decreased Safe judgement during ADL, Decreased UE strength, Decreased endurance, Decreased cognition, Decreased high-level ADLs, Decreased self-care trans  Prognosis: Guarded  Assessment: Pt seen for skilled OT session focused on ADLs, functional transfers and mobility, bed mobility  Pt w/ increased alertness and verbalizations in [address]  and [place]ian and following minimal simple commands during ADLs and functional transfers this session and expressing pain in R LE, RN provided pain medication prior to session  Pt w/ MOD assist x2 supine>Sit bed mobility w/ increased time to complete  Pt w/ MAX assist x2 sit>stand w/ VCs for hand placement and positioning  Pt w/ MAX assist x2 SPT to [facility] w/ RW w/ increased time to complete  Pt w/ MAX assist x2 sit>Stand from [facility] and MOD assist steadying support w/ RW and 2 minute standing tolerance w/ MAX assist toileting hygiene  Pt w/ MAX assist x2 functional mobility w/ RW w/ increased time to bedside recliner w/ increased time to complete  Pt MOD assist UB ADLs, pt w/ min assist to wash face  Pt w/ MAX assist LB Bathing w/ impaired functional reach  Pt w/ MAX assist x2 sit<>Stand w/ VCs for hand placement and positioning  Pt MAX assist to wash bottom w/ 1 min standing tolerance w/ RW  Pt repositioned upright in recliner w/ LEs elevated at end of session w/ all needs met and alarm intact   Pt continues to be limited due to decreased strength and endurance, impaired balance, impaired activity tolerance, fall risk, multiple lines,increased pain, impaired cognition, impaired functional reach all causing a decline in ADLs, functional transfers and mobility  Recommend STR when medically stable  Will continue to follow to address OT POC  The patient's raw score on the AM-PAC Daily Activity inpatient short form is 11, standardized score is 29 04, less than 39 4  Patients at this level are likely to benefit from discharge to post-acute rehabilitation services  Please refer to the recommendation of the Occupational Therapist for safe discharge planning       OT Discharge Recommendation: Post acute rehabilitation services  OT - OK to Discharge:  (to rehab when medically stable)

## 2021-07-20 NOTE — CONSULTS
TELEConsultation [address] 80 y o  female MRN: [id number]  Unit/Bed#: E2 -01 Encounter: [id number]    REQUIRED DOCUMENTATION:     1  This service was provided via Telemedicine  2  Provider located at [facility]  3  TeleMed provider: Nino Abbott MD  4  Identify all parties in room with patient during tele consult: Paolo Alba and Pennie Campos RN  5  After connecting through televideo, patient was identified by name and date of birth  Parent/patient was then informed that this was being conducted confidentially over secure lines  My office door was closed  The patient was notified the following individuals were present in the room: attending and resident  Patient acknowledged consent and understanding of privacy and security of the Telemedicine visit  I informed the patient that I have reviewed their record in Epic and presented the opportunity for them to ask any questions regarding the visit today  The patient agreed to participate  Chief Complaint:  Pt was unable to report a chief complaint, primary team reports that this consult is to psychiatrically clear pt for transfer to [facility]    History of Present Illness   Physician Requesting Consult: Westley Elliott MD  Reason for Consult / Principal Problem: Schizophrenia/ Psych consult is required for placement to SNF    Paolo Alba is a 80 y o  female with PMHx of CKDIII, dementia, AFib, hypertension, recent right hip fracture, and schizophrenia currently admitted to [facility] due to fall resulting in right hip fracture  Psychiatry consult is required by [facility] to clear pt for placement to a physical rehab  At baseline pt is only oriented to person, and does not have capacity to make medical decisions  Patient's appetite has fluctuated during this admission, at times she is able to eat all of her meals but recently has refused to eat and also at times has refused medications      During interview Valentino Cohn is able to provide brief periods of eye contact but does not respond to this note writer  This note writer attempted to speak with Valentino Cohn in [facility] [place] however she did not respond  She did continue to ask who was on the video call and it was repeated multiple times that she was on a virtual consult with the psychiatry team   Patient's nurse was in the room with her and states that this has been her baseline while in the hospital   Patient's nurse states that usually she is not engaged with others, except when family is visiting  According to patient's nurse Valentino Cohn has been sleeping, has had no behavioral issues, no aggression, and has been eating on and off  According to nursing staff pt usually will take her meds but at times refuses for unknown reasons  Collateral from pt's granddaughter Jazmin Adams):  Patient's granddaughter reports Valentino Cohn has had dementia for a couple years and sometimes will talk incoherently  She does still recognize her family and is able to express her needs to them  Patient's granddaughter does state that when Valentino Cohn is in a strange environment she becomes very confused and has difficulty engaging with others  When patient's granddaughter has visited Valentino Cohn in the hospital, Valentino Cohn eats and takes her pills  Patient's granddaughter at this time has no concerns for Valentino Cohn to get aggressive, no safety concerns, and has not seen pt responding to internal stimuli  Patient's granddaughter states that Valentino Cohn has not expressed depression or suicidal thoughts  Psychiatric Review Of Systems:  Pt was unable to participate in psychiatric ROS, but according to nursing staff in the room pt has been sleeping adequately, eating most times, and calm and cooperative with staff      Historical Information   Prior psychiatric diagnoses: per chart, Pt has had history of delusions and hallucinations, has been diagnosed with schizophrenia in the past  Inpatient hospitalizations: per chart, she has never been hospitalized  Suicide attempts: per chart, none  Self-harm behaviors: per chart, none  Violent behavior: per chart, none  Outpatient treatment: unknown  Psychiatric medication trial:  Cymbalta, Zyprexa, Ativan, trazodone    Substance Abuse History:  Social History     Tobacco Use    Smoking status: Never Smoker    Smokeless tobacco: Never Used    Tobacco comment: Former smoker per [employer]   Vaping Use    Vaping Use: Never used   Substance Use Topics    Alcohol use: Never    Drug use: No      I am unable to assess the patient for substance use within the past 12 months as they are unable or unwilling to answer    Family Psychiatric History:   Per chart review, no history    Social History  Education: Unknown  Marital history:   Children:  Has a supportive son  Living arrangement:  Per chart, pt lives at home with a caregiver  Occupational History: on permanent disability  Functioning Relationships:  Good family support    Other Pertinent History: None      Traumatic History:   Abuse: Per chart, none reported  Other Traumatic Events: Per chart, none reported    Past Medical History:   Diagnosis Date    Anxiety     Cognitive impairment     [facility]     Depression     Hallucination     Hyperlipidemia     Hypertension     Memory loss     Psychiatric illness     Psychosis [facility]     Schizoaffective disorder ([facility] )     Sleep difficulties        Medical Review Of Systems:  Review of Systems - Unable to gather review of symptoms from pt    Meds/Allergies   all current active meds have been reviewed  No Known Allergies    Objective   Vital signs in last 24 hours:  Temp:  [96 4 °F (35 8 °C)-97 6 °F (36 4 °C)] 96 9 °F (36 1 °C)  HR:  [80-91] 91  Resp:  [16-18] 16  BP: (101-143)/(44-62) 116/51    Mental Status Exam:  Appearance:  alert, appears stated age, casually dressed, appropriate grooming and hygiene, overweight and Pt had periods of eye contact, appeared confused at times   Behavior:  limited cooperativity, sitting comfortably and Aloof and confused   Motor: Did not visually see any tremors or abnormal movements through iPad   Speech:  scant and poverty of content   Mood:  Unable to obtain due to patient factors   Affect:  constricted   Thought Process:  unable to assess due to patient factors   Thought Content: unable to assess due to patient factors   Perceptual disturbances: does not appear to be responding to internal stimuli at this time and Cannot be assessed due to patient factors   Risk Potential: Low potential for aggression based on previous behavior, Unable to assess   Cognition: disoriented   Insight:  Poor   Judgment: Poor     Laboratory results:  I have personally reviewed all pertinent laboratory/tests results  Assessment/Plan   Varun Billings is a 80 y o  female with PMHx of CKDIII, dementia, AFib, hypertension, recent right hip fracture, and schizophrenia currently admitted to [facility] due to fall resulting in right hip fracture  Psychiatry consult is required so that pt can be placed in a STR  Based on chart review pt was diagnosed with schizophrenia and Jennifer and has had outpatient treatment, she has never been inpatient and according to family has never tried to hurt herself or end her life  Pt at baseline is oriented to herself and recognizes family members, however many times will speak incoherently  After speaking with patient's granddaughter there are no safety concerns from the family and pt is currently psychiatrically stable  Diagnosis:  Major neuro cognitive disorder, Schizophrenia by history    Recommended Treatment:    No indication for inpatient psychiatry at this time   Patient is stable for discharge pending medical clearance  Referrals will be made for outpatient psychiatric follow-up as needed     The patient does not meet criteria for inpatient psychiatric hospitalization and will be discharged home at their request   The patient has capacity to understand the risks and benefits of the different types of psychiatric treatment available (including inpatient, outpatient, and partial hospitalization) and has verbalized understanding of the same   Continue home medications   Discussed with primary team   Psychiatry will sign off  Please call or TigerText the on-call team with any questions or concerns          Dora Ivory,

## 2021-07-20 NOTE — PROGRESS NOTES
Progress Note - Jammie Valenzuela 80 y o  female MRN: [id number]    Unit/Bed#: E2 -01 Encounter: [id number]      Subjective: The patient appears comfortable  She denies pain or shortness of breath  She had a large bowel movement earlier today  Physical Exam:   Temp:  [96 4 °F (35 8 °C)-97 6 °F (36 4 °C)] 96 9 °F (36 1 °C)  HR:  [80-91] 91  Resp:  [16-18] 16  BP: (101-143)/(44-62) 116/51    Gen:  Well-developed, well-nourished, in no distress  Neck:  Supple  No lymphadenopathy, goiter, or bruit  Heart:  Regular rhythm  No murmur, gallop, or rub  Lungs:  Clear to auscultation and percussion  No wheezing, rales, or rhonchi    Abd:  Soft with active bowel sounds  No mass, tenderness, organomegaly  Extremities:  No clubbing, cyanosis, or edema  No calf tenderness  Neuro:  Alert and calm  No focal sign  Skin:  Warm and dry      LABS:  No new labs          Assessment/Plan:  1  Right hip fracture, status post intramedullary nail fixation  2  Acute blood loss anemia  3  Dementia  4  History of schizophrenia  5  Paroxysmal atrial fibrillation  6  Acute kidney injury superimposed on chronic kidney disease stage 3  7  Possible urinary tract infection, treated    The patient has remained stable  Psychiatric evaluation was completed earlier this afternoon  Geriatrics input is greatly appreciated  We will continue supportive postoperative care  Efforts for placement in a rehab facility are underway        VTE Pharmacologic Prophylaxis: Enoxaparin (Lovenox)  VTE Mechanical Prophylaxis: sequential compression device

## 2021-07-20 NOTE — OCCUPATIONAL THERAPY NOTE
[address]  Progress Note     Patient Name: Ying Singh  [id number] Date: 7/20/2021  Problem List  Principal Problem:    Closed right hip fracture [facility])  Active Problems:    Essential hypertension    Bilateral hearing loss    Paroxysmal A-fib (HCC)    Major neurocognitive disorder (HCC)    Stage 3a chronic kidney disease ([facility] )    ABLA (acute blood loss anemia)    Acute cystitis without hematuria            07/20/21 1111   OT Last Visit   OT Visit Date 07/20/21   Note Type   Note Type Treatment   Restrictions/Precautions   Weight Bearing Precautions Per Order Yes   RLE Weight Bearing Per Order WBAT   Other Precautions Cognitive; Chair Alarm; Bed Alarm;Pain; Fall Risk   Pain Assessment   Pain Assessment Tool FLACC   Pain Location/Orientation Orientation: Right;Location: Hip   Hospital Pain Intervention(s) Ambulation/increased activity;Repositioned; Emotional support   Pain Rating: FLACC (Rest) - Face 1   Pain Rating: FLACC (Rest) - Legs 0   Pain Rating: FLACC (Rest) - Activity 0   Pain Rating: FLACC (Rest) - Cry 1   Pain Rating: FLACC (Rest) - Consolability 1   Score: FLACC (Rest) 3   Pain Rating: FLACC (Activity) - Face 1   Pain Rating: FLACC (Activity) - Legs 1   Pain Rating: FLACC (Activity) - Activity 1   Pain Rating: FLACC (Activity) - Cry 1   Pain Rating: FLACC (Activity) - Consolability 1   Score: FLACC (Activity) 5   ADL   Where Assessed Supine, bed   Grooming Assistance 3  Moderate Assistance   Grooming Deficit Setup;Supervision/safety; Wash/dry hands; Wash/dry face   Grooming Comments increase time to complete to wash face, mod assist to comb hair   UB Bathing Assistance 3  Moderate Assistance   UB Bathing Deficit Setup;Verbal cueing; Increased time to complete;Supervision/safety   UB Bathing Comments increased encouragement and cues needed   LB Bathing Assistance 2  Maximal Assistance   LB Bathing Deficit Setup;Verbal cueing;Supervision/safety; Increased time to complete   LB Bathing Comments impaired functional reach and pain   UB Dressing Assistance 3  Moderate Assistance   UB Dressing Deficit Setup;Verbal cueing;Supervision/safety; Increased time to complete   LB Dressing Assistance 1  Total Assistance   LB Dressing Deficit Verbal cueing;Supervision/safety; Increased time to complete; Don/doff R sock; Don/doff L sock   LB Dressing Comments impaired functional reach   [address]  2  Maximal Assistance   Toileting Deficit Setup;Steadying;Verbal cueing;Supervison/safety; Increased time to complete;Clothing management up;Clothing management down;Perineal hygiene; Bedside commode   Toileting Comments 2min standing tolerance w/ mod assist support w/ max assist for toileting hygiene   Functional Standing Tolerance   Time 2minutes and 1 minutes   Activity during ADL tasks   Comments w/ RW support and mod assist   Bed Mobility   Supine to Sit 3  Moderate assistance   Additional items Assist x 2; Increased time required;Verbal cues;LE management; Bedrails;HOB elevated   Additional Comments increased time to complete; EOB x5 minutes w/ increased time to complete; pt w/ alarm intact end of session w/ all needs met   Transfers   Sit to Stand 2  Maximal assistance   Additional items Assist x 2; Increased time required;Verbal cues;Armrests   Stand to Sit 2  Maximal assistance   Additional items Assist x 2; Increased time required;Verbal cues;Armrests   Stand pivot 2  Maximal assistance   Additional items Assist x 2; Increased time required;Armrests; Verbal cues   Toilet transfer 2  Maximal assistance   Additional items Assist x 2; Increased time required;Verbal cues; Commode   Additional Comments MAX cues in english and Slovenian for sequencing; BP EOB: 149/68 and chair end of session: 124/74   Functional Mobility   Functional Mobility 2  Maximal assistance   Additional Comments assist x2 w/ increased time and max cues for sequencing, increased pain w/ mobility   Additional items Rolling walker   Cognition   Overall Cognitive Status Impaired   Arousal/Participation Responsive; Cooperative   Attention Difficulty attending to directions   Orientation Level Oriented to person;Disoriented to time;Disoriented to place; Disoriented to situation   Memory Unable to assess  (dEmentia at baseline)   Following Commands Follows one step commands with increased time or repetition   Comments pt w/ improvement in alertness this session, following simple commands w/ increased time; verbalizations in [address]  and [address]; able to say thank you to therapists and when therapists leaving "you both are leaving, so long", able to express pain in LEs and wanting water to drink; increased processing time and cues for sequencing during tasks   Additional Activities   Additional Activities   (safety)   Additional Activities Comments pt requires increased processing time   Activity Tolerance   Activity Tolerance Patient limited by fatigue;Patient limited by pain   Medical Staff Made Aware appropriate to see per ANJELICA [facility]   Assessment   Assessment Pt seen for skilled OT session focused on ADLs, functional transfers and mobility, bed mobility  Pt w/ increased alertness and verbalizations in [address]  and [place] and following minimal simple commands during ADLs and functional transfers this session and expressing pain in R LE, RN provided pain medication prior to session  Pt w/ MOD assist x2 supine>Sit bed mobility w/ increased time to complete  Pt w/ MAX assist x2 sit>stand w/ VCs for hand placement and positioning  Pt w/ MAX assist x2 SPT to [facility] w/ RW w/ increased time to complete  Pt w/ MAX assist x2 sit>Stand from [facility] and MOD assist steadying support w/ RW and 2 minute standing tolerance w/ MAX assist toileting hygiene  Pt w/ MAX assist x2 functional mobility w/ RW w/ increased time to bedside recliner w/ increased time to complete  Pt MOD assist UB ADLs, pt w/ min assist to wash face  Pt w/ MAX assist LB Bathing w/ impaired functional reach   Pt w/ MAX assist x2 sit<>Stand w/ VCs for hand placement and positioning  Pt MAX assist to wash bottom w/ 1 min standing tolerance w/ RW  Pt repositioned upright in recliner w/ LEs elevated at end of session w/ all needs met and alarm intact  Pt continues to be limited due to decreased strength and endurance, impaired balance, impaired activity tolerance, fall risk, multiple lines,increased pain, impaired cognition, impaired functional reach all causing a decline in ADLs, functional transfers and mobility  Recommend STR when medically stable  Will continue to follow to address OT POC  The patient's raw score on the AM-PAC Daily Activity inpatient short form is 11, standardized score is 29 04, less than 39 4  Patients at this level are likely to benefit from discharge to post-acute rehabilitation services  Please refer to the recommendation of the Occupational Therapist for safe discharge planning  Plan   Treatment Interventions ADL retraining;Functional transfer training;UE strengthening/ROM; Endurance training;Cognitive reorientation;Patient/family training;Equipment evaluation/education; Compensatory technique education; Activityengagement; Energy conservation   Goal Expiration Date 07/29/21   OT Treatment Day 2   OT Frequency 3-5x/wk   Recommendation   OT Discharge Recommendation Post acute rehabilitation services   OT - OK to Discharge   (to rehab when medically stable)   AM-PAC Daily Activity Inpatient   Lower Body Dressing 1   Bathing 2   Toileting 1   Upper Body Dressing 2   Grooming 2   Eating 3   Daily Activity Raw Score 11   Daily Activity Standardized Score (Calc for Raw Score >=11) 29 04   Turning Head Towards Sound 3   Follow Simple Instructions 2   Low Function Daily Activity Raw Score 16   Low Function Daily Activity Standardized Score 27 65   AM-PAC Applied Cognition Inpatient   Following a Speech/Presentation 1   Understanding Ordinary Conversation 2   Taking Medications 1   Remembering Where Things Are Placed or Put Away 1   Remembering List of 4-5 Errands 1   Taking Care of Complicated Tasks 1   Applied Cognition Raw Score 7   Applied Cognition Standardized Score 15 17   Modified Smith Scale   Modified Smith Scale 4     Additional OT goal: Pt will demonstrate improved functional mobility to min assist w/ good safety and AD in homelike environment     Documentation completed by: Magnolia Carroll MS, OTR/L

## 2021-07-21 PROCEDURE — 92526 ORAL FUNCTION THERAPY: CPT

## 2021-07-21 PROCEDURE — 99232 SBSQ HOSP IP/OBS MODERATE 35: CPT | Performed by: INTERNAL MEDICINE

## 2021-07-21 RX ADMIN — DULOXETINE HYDROCHLORIDE 30 MG: 30 CAPSULE, DELAYED RELEASE ORAL at 21:07

## 2021-07-21 RX ADMIN — MELATONIN 3 MG: at 21:07

## 2021-07-21 RX ADMIN — DILTIAZEM HYDROCHLORIDE 120 MG: 120 CAPSULE, COATED, EXTENDED RELEASE ORAL at 08:10

## 2021-07-21 RX ADMIN — DOCUSATE SODIUM 100 MG: 100 CAPSULE ORAL at 21:08

## 2021-07-21 RX ADMIN — METOPROLOL SUCCINATE 50 MG: 50 TABLET, EXTENDED RELEASE ORAL at 08:10

## 2021-07-21 RX ADMIN — ENOXAPARIN SODIUM 30 MG: 30 INJECTION, SOLUTION INTRAVENOUS; SUBCUTANEOUS at 08:11

## 2021-07-21 RX ADMIN — ACETAMINOPHEN 649.6 MG: 650 SUSPENSION ORAL at 08:09

## 2021-07-21 RX ADMIN — CYANOCOBALAMIN TAB 500 MCG 1000 MCG: 500 TAB at 08:10

## 2021-07-21 RX ADMIN — OLANZAPINE 2.5 MG: 5 TABLET, FILM COATED ORAL at 21:07

## 2021-07-21 RX ADMIN — DOCUSATE SODIUM 100 MG: 100 CAPSULE ORAL at 08:10

## 2021-07-21 RX ADMIN — ACETAMINOPHEN 649.6 MG: 650 SUSPENSION ORAL at 13:00

## 2021-07-21 RX ADMIN — ACETAMINOPHEN 650 MG: 650 SUSPENSION ORAL at 18:06

## 2021-07-21 RX ADMIN — LIDOCAINE 1 PATCH: 50 PATCH CUTANEOUS at 08:11

## 2021-07-21 NOTE — PROGRESS NOTES
Progress Note - Salomón Parcel 80 y o  female MRN: [id number]    Unit/Bed#: E2 -01 Encounter: [id number]      Subjective: The patient is pretty comfortable  She denies any pain at present  She has no abdominal pain, nausea, or vomiting  Physical Exam:   Temp:  [97 3 °F (36 3 °C)-97 9 °F (36 6 °C)] 97 9 °F (36 6 °C)  HR:  [86-93] 86  Resp:  [16-18] 18  BP: (126-174)/(64-72) 126/64    Gen:  Well-developed, well-nourished, in no distress  Neck:  Supple  No lymphadenopathy, goiter, or bruit  Heart:  Regular rhythm  No murmur, gallop, or rub  Lungs:  Clear to auscultation percussion  No wheezing, rales, or rhonchi    Abd:  Soft with active bowel sounds  No mass, tenderness, or organomegaly  Extremities:  No clubbing, cyanosis, or edema  No calf tenderness  Neuro:  Alert and interactive  No focal sign  Skin:  Warm and dry      LABS:  No new labs        Patient Active Problem List   Diagnosis    Essential hypertension    Anxiety    Hyperlipidemia    Hyponatremia    Acute encephalopathy    Bilateral hearing loss    Fall    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[address] )    Cellulitis of extremity    Paroxysmal A-fib (HCC)    Orthostatic hypertension    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[address]    Ambulatory dysfunction    COVID-19 virus infection    Elevated troponin    Urinary incontinence    PAF (paroxysmal atrial fibrillation) (HCC)    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HCC)    Major neurocognitive disorder (HCC)    Moderate protein-calorie malnutrition (HCC)    Age-related osteoporosis without current pathological fracture    Closed fracture of right orbital floor ([address]    Fall from ground level    Closed right hip fracture (HCC)    Stage 3a chronic kidney disease (HCC)    Dementia (HCC)    ABLA (acute blood loss anemia)    Acute cystitis without hematuria       Assessment/Plan:  1  Right hip fracture, status post intramedullary nail fixation  2  Acute blood loss anemia  3  Dementia  4   History of schizophrenia  5  Paroxysmal atrial fibrillation  6  Acute kidney injury superimposed on chronic kidney disease stage 3  7  Possible urinary tract infection, treated    The patient is doing pretty well  She is recovering from surgery uneventfully  She was evaluated by Psychiatry and was felt that she was suitable for inpatient rehab from their standpoint  The optioning process is underway  Geriatrics follow-up is much appreciated        VTE Pharmacologic Prophylaxis: Enoxaparin (Lovenox)  VTE Mechanical Prophylaxis: sequential compression device

## 2021-07-21 NOTE — SPEECH THERAPY NOTE
Speech Language/Pathology    Speech/Language Pathology Progress Note    Patient Name: Vicki ROMEROX'SORIN Date: 7/21/2021     Problem List  Principal Problem:    Closed right hip fracture [facility])  Active Problems:    Essential hypertension    Bilateral hearing loss    Paroxysmal A-fib (HCC)    Major neurocognitive disorder (HCC)    Stage 3a chronic kidney disease ([facility] )    ABLA (acute blood loss anemia)    Acute cystitis without hematuria       Past Medical History  Past Medical History:   Diagnosis Date    Anxiety     Cognitive impairment     Dementia ([facility]     Depression     Hallucination     Hyperlipidemia     Hypertension     Memory loss     Psychiatric illness     Psychosis ([facility]     Schizoaffective disorder ([facility]     Sleep difficulties         Past Surgical History  Past Surgical History:   Procedure Laterality Date    APPENDECTOMY      BLADDER SURGERY      CHOLECYSTECTOMY      [facility] OPEN RX FEMUR FX+INTRAMED BRYSON Right 7/15/2021    Procedure: INSERTION NAIL IM FEMUR ANTEGRADE (TROCHANTERIC) right;  Surgeon: Glenda Canavan, MD;  Location: [facility];  Service: Orthopedics    TOTAL ABDOMINAL HYSTERECTOMY W/ BILATERAL SALPINGOOPHORECTOMY      TOTAL KNEE ARTHROPLASTY Left          Subjective:  Pt alert "that's too much" re her food  Objective:  Top dentures were in today  Seen for po tolerance w/ downgraded level 2 dysphagia  Set up tray  Pt was able to feed self the ground beef, mashed potatoes and some of the chopped carrots  Mastication was much more functional w/ modified food texture  Still slightly prolonged  Took multiple sips of thin liquid w/ prompt swallow and no s/s aspiration  Overall ate <50%  Assessment:  Improved intake today w/ modified food texture but still somewhat reduced  Fed self after tray set up  No s/s aspiration  Plan/Recommendations:  Continue dysphagia 2 mech soft w/ thin liquids  ? Brief f/u

## 2021-07-21 NOTE — PROGRESS NOTES
Progress Note - Jose Li 80 y o  female MRN: [id number]    Unit/Bed#: E2 -01 Encounter: [id number]      Assessment/Plan:  1  Ambulatory dysfunction  At risk for falls secondary to hx of previous falls, age, gait instability, dementia, polypharmacy, visual impairment  PT/OT  Rehab post hospitalization     2  Acute pain due to fracture  Geriatric pain protocol  Scheduled acetaminophen  lidoderm patch ordered  P r n  Oxycodone low-dose:  2 5 mg p o  q 4 hours p r n  Moderate pain/5 mg p o  Q 4 hours p r n  Severe pain  Monitor for constipation,last BM 7/20/21     3  Hearing loss  Hearing impairment strongly correlated with depression, cognitive impairment, delirium and falls in the older adult  Use hearing aids or sound amplifier  Speaking face to face  Use clear dictation, enunciation of words     4  Dementia  Continue vitamin B12 supplementation  Continue 24/7 care     5  Psychosis and depression  Follows with Psychiatry as outpatient  Patient on Cymbalta, trazodone, Zyprexa, Ativan  Continue 24/7 care     6  Insomnia  Patient on trazodone, Ativan and Zyprexa at bedtime as outpatient  zyprexa was reduced on 7/18, due to increased lethargy, trazodone and ativan not ordered on admission  Recommend order home trazadone QHS prn to help with sleep   Continue to monitor ativan, although benzodiazepines not recommended in the older adult, barb have withdrawal, increased agitation, confusion  First line is behavioral therapy  Avoid sedative hypnotics such as benzodiazepines and benadryl  Encourage staying awake during the day  Encourage daytime activity, morning exercise  Decrease or eliminate day time naps  Establish a night time routine     7   Delirium precautions  Alert and oriented x 1  Provide frequent redirection, reorientation, distraction techniques  Avoid deliriogenic medications such as tramadol, benzodiazepines, anticholinergics,  Benadryl  Treat pain, See geriatric pain protocol  Monitor for constipation and urinary retention  Encourage early and frequent moblization, OOB  Encourage Hydration/ Nutrition  Implement sleep hygiene, limit night time interuptions, group activities     8  Urinary retention  Sanders placed  Monitor for constipation, last BM 7/20/21    9  Right hip fracture  Ortho on consult  Status post long TFN right hip  PT/OT  Geriatric pain protocol       Subjective:     Upon exam pt is lying in bed  Calm and cooperative  Last recorded BM 7/20/21      Objective:     Vitals: Blood pressure 156/72, pulse 93, temperature (!) 97 3 °F (36 3 °C), temperature source Temporal, resp  rate 16, weight 61 5 kg (135 lb 9 3 oz), SpO2 96 %  ,Body mass index is 28 34 kg/m²  Intake/Output Summary (Last 24 hours) at 7/21/2021 1044  Last data filed at 7/21/2021 0809  Gross per 24 hour   Intake --   Output 1200 ml   Net -1200 ml       Physical Exam:   General : NAD  HEENT : MMM   Heart : Normal rate, no murmur rub or gallop  Lungs : CTA no wheezes, rales or rhonchi  Abdomen : Soft, NT/ND, BS auscultated in all 4 quads  Ext :  no edema  Skin : Pink, warm, dry, age appropriate turgor and mobility  Neuro : Nonfocal  Psych : Alert       Invasive Devices     Peripheral Intravenous Line            Peripheral IV 07/19/21 Dorsal (posterior); Left Forearm 1 day    Peripheral IV 07/19/21 Right Antecubital 1 day          Drain            Urethral Catheter Latex 16 Fr  4 days                Lab, Imaging and other studies: I have personally reviewed pertinent reports      VTE Pharmacologic Prophylaxis: Sequential compression device (Venodyne)   VTE Mechanical Prophylaxis: sequential compression device

## 2021-07-22 LAB
ANION GAP SERPL CALCULATED.3IONS-SCNC: 7 MMOL/L (ref 4–13)
BASOPHILS # BLD AUTO: 0.01 THOUSANDS/ΜL (ref 0–0.1)
BASOPHILS NFR BLD AUTO: 0 % (ref 0–1)
BUN SERPL-MCNC: 20 MG/DL (ref 5–25)
CALCIUM SERPL-MCNC: 8.3 MG/DL (ref 8.3–10.1)
CHLORIDE SERPL-SCNC: 109 MMOL/L (ref 100–108)
CO2 SERPL-SCNC: 28 MMOL/L (ref 21–32)
CREAT SERPL-MCNC: 0.84 MG/DL (ref 0.6–1.3)
EOSINOPHIL # BLD AUTO: 0.2 THOUSAND/ΜL (ref 0–0.61)
EOSINOPHIL NFR BLD AUTO: 3 % (ref 0–6)
ERYTHROCYTE [DISTWIDTH] IN BLOOD BY AUTOMATED COUNT: 14.6 % (ref 11.6–15.1)
GFR SERPL CREATININE-BSD FRML MDRD: 62 ML/MIN/1.73SQ M
GLUCOSE SERPL-MCNC: 103 MG/DL (ref 65–140)
HCT VFR BLD AUTO: 28.4 % (ref 34.8–46.1)
HGB BLD-MCNC: 9.1 G/DL (ref 11.5–15.4)
IMM GRANULOCYTES # BLD AUTO: 0.12 THOUSAND/UL (ref 0–0.2)
IMM GRANULOCYTES NFR BLD AUTO: 2 % (ref 0–2)
LYMPHOCYTES # BLD AUTO: 1.24 THOUSANDS/ΜL (ref 0.6–4.47)
LYMPHOCYTES NFR BLD AUTO: 20 % (ref 14–44)
MCH RBC QN AUTO: 29.9 PG (ref 26.8–34.3)
MCHC RBC AUTO-ENTMCNC: 32 G/DL (ref 31.4–37.4)
MCV RBC AUTO: 93 FL (ref 82–98)
MONOCYTES # BLD AUTO: 0.69 THOUSAND/ΜL (ref 0.17–1.22)
MONOCYTES NFR BLD AUTO: 11 % (ref 4–12)
NEUTROPHILS # BLD AUTO: 4.07 THOUSANDS/ΜL (ref 1.85–7.62)
NEUTS SEG NFR BLD AUTO: 64 % (ref 43–75)
NRBC BLD AUTO-RTO: 0 /100 WBCS
PLATELET # BLD AUTO: 246 THOUSANDS/UL (ref 149–390)
PMV BLD AUTO: 9.9 FL (ref 8.9–12.7)
POTASSIUM SERPL-SCNC: 3.5 MMOL/L (ref 3.5–5.3)
RBC # BLD AUTO: 3.04 MILLION/UL (ref 3.81–5.12)
SODIUM SERPL-SCNC: 144 MMOL/L (ref 136–145)
WBC # BLD AUTO: 6.33 THOUSAND/UL (ref 4.31–10.16)

## 2021-07-22 PROCEDURE — 80048 BASIC METABOLIC PNL TOTAL CA: CPT | Performed by: INTERNAL MEDICINE

## 2021-07-22 PROCEDURE — 99232 SBSQ HOSP IP/OBS MODERATE 35: CPT | Performed by: INTERNAL MEDICINE

## 2021-07-22 PROCEDURE — 85025 COMPLETE CBC W/AUTO DIFF WBC: CPT | Performed by: INTERNAL MEDICINE

## 2021-07-22 RX ADMIN — METOPROLOL SUCCINATE 50 MG: 50 TABLET, EXTENDED RELEASE ORAL at 08:27

## 2021-07-22 RX ADMIN — MELATONIN 3 MG: at 21:04

## 2021-07-22 RX ADMIN — DOCUSATE SODIUM 100 MG: 100 CAPSULE ORAL at 08:27

## 2021-07-22 RX ADMIN — CYANOCOBALAMIN TAB 500 MCG 1000 MCG: 500 TAB at 08:27

## 2021-07-22 RX ADMIN — OLANZAPINE 2.5 MG: 5 TABLET, FILM COATED ORAL at 21:03

## 2021-07-22 RX ADMIN — ACETAMINOPHEN 650 MG: 650 SUSPENSION ORAL at 08:27

## 2021-07-22 RX ADMIN — LIDOCAINE 1 PATCH: 50 PATCH CUTANEOUS at 08:37

## 2021-07-22 RX ADMIN — DULOXETINE HYDROCHLORIDE 30 MG: 30 CAPSULE, DELAYED RELEASE ORAL at 21:04

## 2021-07-22 RX ADMIN — ENOXAPARIN SODIUM 30 MG: 30 INJECTION, SOLUTION INTRAVENOUS; SUBCUTANEOUS at 08:27

## 2021-07-22 RX ADMIN — ACETAMINOPHEN 650 MG: 650 SUSPENSION ORAL at 14:02

## 2021-07-22 RX ADMIN — DILTIAZEM HYDROCHLORIDE 120 MG: 120 CAPSULE, COATED, EXTENDED RELEASE ORAL at 08:27

## 2021-07-22 RX ADMIN — ACETAMINOPHEN 650 MG: 650 SUSPENSION ORAL at 17:55

## 2021-07-22 RX ADMIN — DOCUSATE SODIUM 100 MG: 100 CAPSULE ORAL at 21:04

## 2021-07-22 NOTE — PROGRESS NOTES
Progress Note - Beny Hogue 80 y o  female MRN: [id number]    Unit/Bed#: E2 -01 Encounter: [id number]      Assessment/Plan:  1  Ambulatory dysfunction  At risk for falls secondary to hx of previous falls, age, gait instability, dementia, polypharmacy, visual impairment  PT/OT  Rehab post hospitalization     2  Acute pain due to fracture  Geriatric pain protocol  Scheduled acetaminophen  lidoderm patch ordered  P r n  Oxycodone low-dose:  2 5 mg p o  q 4 hours p r n  Moderate pain/5 mg p o  Q 4 hours p r n  Severe pain  Monitor for constipation,last BM 7/20/21     3  Hearing loss  Hearing impairment strongly correlated with depression, cognitive impairment, delirium and falls in the older adult  Use hearing aids or sound amplifier  Speaking face to face  Use clear dictation, enunciation of words     4  Dementia  Continue vitamin B12 supplementation  Continue 24/7 care     5  Psychosis and depression  Follows with Psychiatry as outpatient  Patient on Cymbalta, trazodone, Zyprexa, Ativan  Continue 24/7 care     6  Insomnia  Patient on trazodone, Ativan and Zyprexa at bedtime as outpatient  zyprexa was reduced on 7/18, due to increased lethargy, trazodone and ativan not ordered on admission  Recommend order home trazadone QHS prn to help with sleep   Continue to monitor ativan, although benzodiazepines not recommended in the older adult, barb have withdrawal, increased agitation, confusion  First line is behavioral therapy  Avoid sedative hypnotics such as benzodiazepines and benadryl  Encourage staying awake during the day  Encourage daytime activity, morning exercise  Decrease or eliminate day time naps  Establish a night time routine     7   Delirium precautions  Alert and oriented x 1  Provide frequent redirection, reorientation, distraction techniques  Avoid deliriogenic medications such as tramadol, benzodiazepines, anticholinergics,  Benadryl  Treat pain, See geriatric pain protocol  Monitor for constipation and urinary retention  Encourage early and frequent moblization, OOB  Encourage Hydration/ Nutrition  Implement sleep hygiene, limit night time interuptions, group activities     8  Urinary retention  Sanders placed  Monitor for constipation, last BM 7/20/21     9  Right hip fracture  Ortho on consult  Status post long TFN right hip  PT/OT  Geriatric pain protocol    Subjective:   Upon exam pt is lying in bed  She is calm and cooperative  Last BM 7/20/21  Objective:     Vitals: Blood pressure 162/65, pulse 85, temperature (!) 97 4 °F (36 3 °C), temperature source Temporal, resp  rate 16, weight 62 5 kg (137 lb 12 6 oz), SpO2 96 %  ,Body mass index is 28 8 kg/m²  Intake/Output Summary (Last 24 hours) at 7/22/2021 1035  Last data filed at 7/22/2021 0400  Gross per 24 hour   Intake --   Output 650 ml   Net -650 ml       Physical Exam:   General : NAD  HEENT : MMM no erythema or exudates  EOMI, sclera anicteric  Heart : Normal rate, no murmur rub or gallop  Lungs : CTA no wheezes, rales or rhonchi  Abdomen : Soft, NT/ND, BS auscultated in all 4 quads  Ext :  no edema  Skin : Pink, warm, dry, age appropriate turgor and mobility  Neuro : Nonfocal  Psych : Alert       Invasive Devices     Peripheral Intravenous Line            Peripheral IV 07/19/21 Dorsal (posterior); Left Forearm 2 days    Peripheral IV 07/19/21 Right Antecubital 2 days          Drain            Urethral Catheter Latex 16 Fr  5 days                Lab, Imaging and other studies: I have personally reviewed pertinent reports      VTE Pharmacologic Prophylaxis: Sequential compression device (Venodyne)   VTE Mechanical Prophylaxis: sequential compression device

## 2021-07-22 NOTE — PROGRESS NOTES
Progress Note - Merrick  80 y o  female MRN: [id number]    Unit/Bed#: E2 -01 Encounter: [id number]      Subjective: The patient has remained stable overnight  She denies chest pain, shortness of breath, nausea, or vomiting  Physical Exam:   Temp:  [97 4 °F (36 3 °C)-97 8 °F (36 6 °C)] 97 4 °F (36 3 °C)  HR:  [80-85] 85  Resp:  [16-18] 16  BP: (130-162)/(65) 162/65    Gen:  Well-developed, well-nourished, in no distress  Neck:  Supple  No lymphadenopathy, goiter, or bruit  Heart:  Regular rhythm  I heard no murmur, gallop, or rub  Lungs:  Clear to auscultation and percussion  No wheezing, rales, or rhonchi    Abd:  Soft with active bowel sounds  No mass, tenderness, organomegaly  Extremities:  No clubbing, cyanosis, or edema  No calf tenderness  Neuro:  Alert and interactive  No focal sign  Skin:  Warm and dry      LABS:   CBC:   Lab Results   Component Value Date    WBC 6 33 07/22/2021    HGB 9 1 (L) 07/22/2021    HCT 28 4 (L) 07/22/2021    MCV 93 07/22/2021     07/22/2021    MCH 29 9 07/22/2021    MCHC 32 0 07/22/2021    RDW 14 6 07/22/2021    MPV 9 9 07/22/2021    NRBC 0 07/22/2021   , CMP:   Lab Results   Component Value Date    SODIUM 144 07/22/2021    K 3 5 07/22/2021     (H) 07/22/2021    CO2 28 07/22/2021    BUN 20 07/22/2021    CREATININE 0 84 07/22/2021    CALCIUM 8 3 07/22/2021    EGFR 62 07/22/2021             Assessment/Plan:  1  Right hip fracture, status post intramedullary nail fixation  2  Acute blood loss anemia secondary to fracture and surgery  3  Dementia  4  History of schizophrenia  5  Paroxysmal atrial fibrillation  6  Acute kidney injury superimposed on chronic kidney disease stage 3, resolved  7  Pyuria, treated for urinary tract infection    The patient is doing pretty well postoperatively  [facility] has approved her for transfer to a skilled nursing facility for rehabilitation  Case Management is trying to finalize the arrangements      VTE Pharmacologic Prophylaxis: Enoxaparin (Lovenox)  VTE Mechanical Prophylaxis: sequential compression device

## 2021-07-22 NOTE — CASE MANAGEMENT
Patient is medically clear to discharge  [facility] optioning paperwork received and patient is nursing facility eligible  Placed referral in [facility] to first choice, [facility]  They are reviewing bed availability and will reach out tomorrow with decision  CM Department will continue to follow

## 2021-07-23 LAB
ATRIAL RATE: 81 BPM
P AXIS: 49 DEGREES
PR INTERVAL: 156 MS
QRS AXIS: 51 DEGREES
QRSD INTERVAL: 84 MS
QT INTERVAL: 388 MS
QTC INTERVAL: 450 MS
T WAVE AXIS: 49 DEGREES
VENTRICULAR RATE: 81 BPM

## 2021-07-23 PROCEDURE — 97530 THERAPEUTIC ACTIVITIES: CPT

## 2021-07-23 PROCEDURE — 93005 ELECTROCARDIOGRAM TRACING: CPT

## 2021-07-23 PROCEDURE — 99232 SBSQ HOSP IP/OBS MODERATE 35: CPT | Performed by: INTERNAL MEDICINE

## 2021-07-23 PROCEDURE — 92526 ORAL FUNCTION THERAPY: CPT

## 2021-07-23 PROCEDURE — 97110 THERAPEUTIC EXERCISES: CPT

## 2021-07-23 PROCEDURE — 97535 SELF CARE MNGMENT TRAINING: CPT

## 2021-07-23 PROCEDURE — 93010 ELECTROCARDIOGRAM REPORT: CPT | Performed by: INTERNAL MEDICINE

## 2021-07-23 RX ORDER — DILTIAZEM HYDROCHLORIDE 180 MG/1
180 CAPSULE, COATED, EXTENDED RELEASE ORAL DAILY
Status: DISCONTINUED | OUTPATIENT
Start: 2021-07-24 | End: 2021-07-26 | Stop reason: HOSPADM

## 2021-07-23 RX ADMIN — CYANOCOBALAMIN TAB 500 MCG 1000 MCG: 500 TAB at 08:05

## 2021-07-23 RX ADMIN — DOCUSATE SODIUM 100 MG: 100 CAPSULE ORAL at 08:05

## 2021-07-23 RX ADMIN — LIDOCAINE 1 PATCH: 50 PATCH CUTANEOUS at 08:10

## 2021-07-23 RX ADMIN — METOPROLOL SUCCINATE 50 MG: 50 TABLET, EXTENDED RELEASE ORAL at 08:05

## 2021-07-23 RX ADMIN — MELATONIN 3 MG: at 21:29

## 2021-07-23 RX ADMIN — ENOXAPARIN SODIUM 30 MG: 30 INJECTION, SOLUTION INTRAVENOUS; SUBCUTANEOUS at 08:05

## 2021-07-23 RX ADMIN — ACETAMINOPHEN 650 MG: 650 SUSPENSION ORAL at 04:54

## 2021-07-23 RX ADMIN — OXYCODONE HYDROCHLORIDE 5 MG: 5 TABLET ORAL at 08:21

## 2021-07-23 RX ADMIN — OLANZAPINE 2.5 MG: 5 TABLET, FILM COATED ORAL at 21:28

## 2021-07-23 RX ADMIN — DILTIAZEM HYDROCHLORIDE 120 MG: 120 CAPSULE, COATED, EXTENDED RELEASE ORAL at 08:05

## 2021-07-23 RX ADMIN — ACETAMINOPHEN 650 MG: 650 SUSPENSION ORAL at 18:28

## 2021-07-23 RX ADMIN — DOCUSATE SODIUM 100 MG: 100 CAPSULE ORAL at 21:28

## 2021-07-23 RX ADMIN — DULOXETINE HYDROCHLORIDE 30 MG: 30 CAPSULE, DELAYED RELEASE ORAL at 21:29

## 2021-07-23 RX ADMIN — ACETAMINOPHEN 650 MG: 650 SUSPENSION ORAL at 13:48

## 2021-07-23 NOTE — PLAN OF CARE
Problem: PHYSICAL THERAPY ADULT  Goal: Performs mobility at highest level of function for planned discharge setting  See evaluation for individualized goals  Description:   Outcome: Progressing  Note: Prognosis: Fair  Problem List: Decreased strength, Decreased range of motion, Decreased endurance, Impaired balance, Decreased mobility, Decreased coordination, Decreased cognition, Impaired judgement, Decreased safety awareness, Decreased skin integrity, Pain, Orthopedic restrictions  Assessment: Pt  supine in bed upon my arrival  Discussed with RN, pt  with increased lethargic state this PM  Performance of HEP supine in bed with cues provided for proper completion  Pt  resistive to certain ROM, however when asked where pain was pt  without response  Progressed with transfers requiring A of 2 with cues for hand placement/technique  Attempted SPT with RW, however pt  with impulsive behavior with attempt to sit without support  Requiring A of therapist for return to EOB  Pt  repositioned supine in bed with alarm active and ice applied at end of treatment session  PT will continue to recommend d/c to rehab when medically stable for continued improvement of noted impairments  RN made aware and present in pt's room at end of treatment session due to noted medical status  Barriers to Discharge: Inaccessible home environment, Decreased caregiver support  Barriers to Discharge Comments: Level of support at home  PT Discharge Recommendation: Post acute rehabilitation services     PT - OK to Discharge: Yes (if d/c to rehab when medically stable )    See flowsheet documentation for full assessment

## 2021-07-23 NOTE — SPEECH THERAPY NOTE
Speech Language/Pathology    Speech/Language Pathology Progress Note    Patient Name: Kingsley Arnold  MJERN'B Date: 7/23/2021     Problem List  Principal Problem:    Closed right hip fracture [facility])  Active Problems:    Essential hypertension    Bilateral hearing loss    Paroxysmal A-fib (HCC)    Major neurocognitive disorder (HCC)    Stage 3a chronic kidney disease ([facility] [address] )    ABLA (acute blood loss anemia)    Acute cystitis without hematuria       Past Medical History  Past Medical History:   Diagnosis Date    Anxiety     Cognitive impairment     Dementia ([facility] [address] )     Depression     Hallucination     Hyperlipidemia     Hypertension     Memory loss     Psychiatric illness     Psychosis ([facility] [address] )     [facility]     Sleep difficulties         Past Surgical History  Past Surgical History:   Procedure Laterality Date    APPENDECTOMY      BLADDER SURGERY      CHOLECYSTECTOMY      [place] OPEN RX FEMUR FX+INTRAMED BRYSON Right 7/15/2021    Procedure: INSERTION NAIL IM FEMUR ANTEGRADE (TROCHANTERIC) right;  Surgeon: Finn Barrera MD;  Location: [facility];  Service: Orthopedics    TOTAL ABDOMINAL HYSTERECTOMY W/ BILATERAL SALPINGOOPHORECTOMY      TOTAL KNEE ARTHROPLASTY Left          Subjective:  Pt smiled at me but otherwise not herself  Objective: Attempted to see ta lunchtime for tolerance  Nurse checking vital due to lethargy  Reported she ate breakfast  Stated pt was also given oxy this am, which she has not had prior  Pt was nonverbal  Made eye contact x 1  Would not let me put her dentures in  Assessment:  Too lethargic for po at this time  Change in status  ? Due to oxy vs other  Nurse reported an episode of a fib this am also  Plan/Recommendations:  NPO for now  Hopefully ok to resume level 2 mechanical and thin as she becomes more alert  Supervise at meal when alert

## 2021-07-23 NOTE — NURSING NOTE
Patients HR in the 120's-140's, irregular HR, blood pressure 128/73, not symptomatic  Tiger text sent to Dr Joyce Euceda regarding elevated HR, no response  Dr Joyce Euceda on floor and advised of patients status face to face  He stated "I am not ready to discuss this right now " Yelled at RN that this is not urgent and he will get to it  Patient given her AM meds which included cardizem and metoprolol  Dr Joyce Euceda then came in to see the patient, ordered EKG and was put on telemetry  EKG normal, patient now in sinus rhythm  Also advised Dr Joyce Euceda that patients blood pressure can be elevated at times and there is not anything PRN ordered  He stated he does not want to order anything PRN at this time and to continue to monitor patient

## 2021-07-23 NOTE — PLAN OF CARE
Problem: MOBILITY - ADULT  Goal: Maintain or return to baseline ADL function  Description: INTERVENTIONS:  -  Assess patient's ability to carry out ADLs; assess patient's baseline for ADL function and identify physical deficits which impact ability to perform ADLs (bathing, care of mouth/teeth, toileting, grooming, dressing, etc )  - Assess/evaluate cause of self-care deficits   - Assess range of motion  - Assess patient's mobility; develop plan if impaired  - Assess patient's need for assistive devices and provide as appropriate  - Encourage maximum independence but intervene and supervise when necessary  - Involve family in performance of ADLs  - Assess for home care needs following discharge   - Consider OT consult to assist with ADL evaluation and planning for discharge  - Provide patient education as appropriate  Outcome: Progressing  Goal: Maintains/Returns to pre admission functional level  Description: INTERVENTIONS:  - Perform BMAT or MOVE assessment daily    - Set and communicate daily mobility goal to care team and patient/family/caregiver     - Collaborate with rehabilitation services on mobility goals if consulted  - Out of bed for toileting  - Record patient progress and toleration of activity level   Outcome: Progressing     Problem: Prexisting or High Potential for Compromised Skin Integrity  Goal: Skin integrity is maintained or improved  Description: INTERVENTIONS:  - Identify patients at risk for skin breakdown  - Assess and monitor skin integrity  - Assess and monitor nutrition and hydration status  - Monitor labs   - Assess for incontinence   - Turn and reposition patient  - Assist with mobility/ambulation  - Relieve pressure over bony prominences  - Avoid friction and shearing  - Provide appropriate hygiene as needed including keeping skin clean and dry  - Evaluate need for skin moisturizer/barrier cream  - Collaborate with interdisciplinary team   - Patient/family teaching  Outcome: Progressing     Problem: CARDIOVASCULAR - ADULT  Goal: Maintains optimal cardiac output and hemodynamic stability  Description: INTERVENTIONS:  - Monitor I/O, vital signs and rhythm  - Monitor for S/S and trends of decreased cardiac output  - Assess quality of pulses, skin color and temperature  - Assess for signs of decreased coronary artery perfusion  - Instruct patient to report change in severity of symptoms  Outcome: Progressing     Problem: RESPIRATORY - ADULT  Goal: Achieves optimal ventilation and oxygenation  Description: INTERVENTIONS:  - Assess for changes in respiratory status  - Assess for changes in mentation and behavior  - Position to facilitate oxygenation and minimize respiratory effort  - Oxygen administered by appropriate delivery if ordered  - Encourage broncho-pulmonary hygiene including cough, deep breathe, Incentive Spirometry  - Assess the need for suctioning and aspirate as needed  - Assess for SOB or any respiratory difficulty  - Respiratory Therapy support as indicated  Outcome: Progressing        Problem: METABOLIC, FLUID AND ELECTROLYTES - ADULT  Goal: Electrolytes maintained within normal limits  Description: INTERVENTIONS:  - Monitor labs and assess patient for signs and symptoms of electrolyte imbalances  - Administer electrolyte replacement as ordered  - Monitor response to electrolyte replacements, including repeat lab results as appropriate  - Instruct patient on fluid and nutrition as appropriate  Outcome: Progressing     Problem: SKIN/TISSUE INTEGRITY - ADULT  Goal: Incision(s), wounds(s) or drain site(s) healing without S/S of infection  Description: INTERVENTIONS  - Assess and document dressing, incision and surrounding tissue  - Provide caregiver/family education  - Perform skin care/dressing changes as ordered  Outcome: Progressing     Problem: MUSCULOSKELETAL - ADULT  Goal: Maintain or return mobility to safest level of function  Description: INTERVENTIONS:  - Assess patient's ability to carry out ADLs; assess patient's baseline for ADL function and identify physical deficits which impact ability to perform ADLs (bathing, care of mouth/teeth, toileting, grooming, dressing, etc )  - Assess/evaluate cause of self-care deficits   - Assess range of motion  - Assess patient's mobility  - Assess patient's need for assistive devices and provide as appropriate  - Encourage maximum independence but intervene and supervise when necessary  - Involve family in performance of ADLs  - Assess for home care needs following discharge   - Consider OT consult to assist with ADL evaluation and planning for discharge  - Provide patient education as appropriate  Outcome: Progressing     Problem: SAFETY ADULT  Goal: Patient will remain free of falls  Description: INTERVENTIONS:  - Educate patient/family on patient safety including physical limitations  - Instruct patient to call for assistance with activity   - Consult OT/PT to assist with strengthening/mobility   - Keep Call bell within reach  - Keep bed low and locked with side rails adjusted as appropriate  - Keep care items and personal belongings within reach  - Initiate and maintain comfort rounds  - Make Fall Risk Sign visible to staff  - Offer Toileting every 2 hours, in advance of need  - Initiate/Maintain bed/chair alarm  - Obtain necessary fall risk management equipment: bed alarm  - Apply yellow socks and bracelet for high fall risk patients  - Keep patient's room near nurses station  Outcome: Progressing     Problem: CONFUSION/THOUGHT DISTURBANCE  Goal: Thought disturbances (confusion, delirium, depression, dementia or psychosis) are managed to maintain or return to baseline mental status and functional level  Description: INTERVENTIONS:  - Assess for possible contributors to  thought disturbance, including but not limited to medications, infection, impaired vision or hearing, underlying metabolic abnormalities, dehydration, respiratory compromise,  psychiatric diagnoses and notify attending PHYSICAN/AP  - Monitor and intervene to maintain adequate nutrition, hydration, elimination, sleep and activity  - Decrease environmental stimuli, including noise as appropriate  - Patient is Hebrew speaking only - communicate in Hebrew  - Provide frequent contacts to provide refocusing, direction and reassurance as needed  Approach patient calmly with eye contact and at their level    - Coin high risk fall precautions, aspiration precautions and other safety measures, as indicated  - If delirium suspected, notify physician/AP of change in condition and request immediate in-person evaluation  - Pursue consults as appropriate including Geriatric (campus dependent), OT for cognitive evaluation/activity planning, psychiatric, pastoral care, etc   Outcome: Progressing     Problem: Potential for Falls  Goal: Patient will remain free of falls  Description: INTERVENTIONS:  - Educate patient/family on patient safety including physical limitations  - Instruct patient to call for assistance with activity   - Consult OT/PT to assist with strengthening/mobility   - Keep Call bell within reach  - Keep bed low and locked with side rails adjusted as appropriate  - Keep care items and personal belongings within reach  - Initiate and maintain comfort rounds  - Make Fall Risk Sign visible to staff  - Offer Toileting every 2 hours, in advance of need  - Initiate/Maintain bed/chair alarm  - Obtain necessary fall risk management equipment: bed alarm  - Apply yellow socks and bracelet for high fall risk patients  - Keep patient's room near nurses station  Outcome: Progressing

## 2021-07-23 NOTE — CASE MANAGEMENT
[facility], [place]   Psychiatric Progress Note      Impression:   This is a 15 year old female admitted for SI and running away and possible CD.  We are adjusting medications to target mood and anxiety.  We are also working with the patient on therapeutic skill building.         Diagnoses and Plan:     Principal Diagnosis: MDD, recurrent, moderate; EMILIANO  Unit: 7ITC  Attending: Roxana   Medications: risks/benefits discussed with patient and mother  - continue PTA Prozac 40mg daily mood/anxiety.   - continue PTA gabapentin 400mg BID anxiety. Consider further titration in frequency and dose.  - continue PTA trazodone 100-150mg po qhs prn insomnia  Laboratory/Imaging:  - UTOX-neg , HCG- neg  - CMP, CBC, TSH, Lipid, Vit D 31     Consults:  - CD assessment 9/6; report pending  Patient will be treated in therapeutic milieu with appropriate individual and group therapies as described.  Family Assessment reviewed     Secondary psychiatric diagnoses of concern this admission:  R/O Cannabis Usage D/O  R/O Alcohol Usage D/O  - CD assessment. Will transfer to  today  Cluster B Traits  -Firm limits and expectations.     Medical diagnoses to be addressed this admission:   none     Relevant psychosocial stressors: family dynamics     Legal Status: Voluntary     Safety Assessment:   Checks: Status 15  Precautions: Suicide  Elopement  Pt has not required locked seclusion or restraints in the past 24 hours to maintain safety, please refer to RN documentation for further details.    The risks, benefits, alternatives and side effects have been discussed and are understood by the patient and other caregivers.   Anticipated Disposition/Discharge Date:   Will transfer to [address] [place] [address] as pt does not meet criteria for ITC  Target symptoms to stabilize: SI and poor frustration tolerance  Target disposition: likely home with outpatient f/u , will discuss with mom today on plan to dc to dad    Patient has been seen  Pt has been accepted at [facility] for STR  Expecting pt to be medically stable by tomorrow   will start insurance authorization  Will need updated PT/OT notes; CM did contact therapy who will see her this afternoon  CM will advise once a determination has been made  "and evaluated by me,  Mariaelena Randolph MD with the attending, Dr Schmidt.    Physician Attestation           Interim History:   The patient's care was discussed with the treatment team and chart notes were reviewed.    Side effects to medication: denies  Sleep: slept through the night  Intake: eating/drinking without difficulty  Groups: attending groups  Peer interactions: easily agitated by peers. Received ibuprofen at 0906 and Trazodone at 2106. Per chart, pt was frustrated after talking with mom on the phone and was seen hitting the wall.    Pt seen in group and interviewed in hallway. States she is doing well, no safety or other concerns. Would like to know when she transfers to 6A for programming and is cooperative with plan to discuss with mom and get consent.     Team discussed with mom on the phone and reiterated that Lovely does not meet criteria for ITC currently and we would need her consent to either transfer to 6A for programming or discharge home. Mom does not feel that she can keep Lovely safe at home (also living with dad is not an option) and after further discussion with team on potential benefits of 6A to address substance use, teach coping skills and institute family therapy, she was agreeable to this plan and gave consent. No other questions.    The 10 point Review of Systems is negative other than noted in the HPI         Medications:       FLUoxetine  40 mg Oral Daily     gabapentin  400 mg Oral BID             Allergies:   No Known Allergies         Psychiatric Examination:   /77  Pulse 87  Temp 97.4  F (36.3  C) (Oral)  Resp 12  Ht 1.575 m (5' 2.01\")  Wt 54 kg (119 lb 2 oz)  LMP 08/28/2017  SpO2 97%  BMI 21.78 kg/m2  Weight is 119 lbs 2 oz  Body mass index is 21.78 kg/(m^2).    Appearance:  awake, alert and adequately groomed with pink Polish  Attitude:  cooperative  Eye Contact:  fair  Mood:  \"ok\"  Affect:  mood congruent and intensity is blunted  Speech:  clear, " coherent and decreased prosody  Psychomotor Behavior:  intact station, gait and muscle tone, mildly slowed  Thought Process:  goal oriented  Associations:  no loose associations  Thought Content:  no evidence of suicidal ideation or homicidal ideation and no evidence of psychotic thought  Insight:  limited  Judgment:  limited  Oriented to:  time, person, and place  Attention Span and Concentration:  intact  Recent and Remote Memory:  intact  Language: Able to name objects  Fund of Knowledge: appropriate  Muscle Strength and Tone: normal  Gait and Station: Normal         Labs:   No results found for this or any previous visit (from the past 24 hour(s)).

## 2021-07-23 NOTE — PLAN OF CARE
Problem: OCCUPATIONAL THERAPY ADULT  Goal: Performs self-care activities at highest level of function for planned discharge setting  See evaluation for individualized goals  Description: Treatment Interventions: ADL retraining, Functional transfer training, UE strengthening/ROM, Endurance training, Cognitive reorientation, Patient/family training, Equipment evaluation/education, Compensatory technique education, Activityengagement, Energy conservation          See flowsheet documentation for full assessment, interventions and recommendations  Note: Limitation: Decreased ADL status, Decreased Safe judgement during ADL, Decreased UE strength, Decreased endurance, Decreased cognition, Decreased high-level ADLs, Decreased self-care trans  Prognosis: Guarded  Assessment: Pt seen for skilled OT session focused on ADLs, functional transfers, bed mobility  Pt w/ increased lethargy and decreased safety, ability to follow commands this session  Pt w/ MIN assist x2 supine>sit bed mobility w/ increased time to complete  Pt w/ MIN assist x1 w/ SBA of 2nd sit>stand from bed and pt attempting to perform functional transfer to [facility], however pt unable to advance LEs  Pt impulsively returning to seated position EOB  Pt w/ MAX assist x2 sit>supine bed mobility w/ increased time to complete  Pt w/ MAX assist x2 rolling in bed to reposition the pad w/ increased time  Pt w/ MOD assist to wash face w/ increased time to complete and cues  Pt repositioned upright in bed at end of session w/ all needs met  Pt continues to be limited due to increased pain, impaired balance, impulsive, decreased insight and safety awareness, impaired cognition, fall risk all causing a decline in ADLs, functional transfers and mobility  Recommend sTR when medically stable  Will continue to follow to address OT POC  The patient's raw score on the AM-PAC Daily Activity inpatient short form is 11, standardized score is 29 04, less than 39 4   Patients at this level are likely to benefit from discharge to post-acute rehabilitation services  Please refer to the recommendation of the Occupational Therapist for safe discharge planning       OT Discharge Recommendation: Post acute rehabilitation services  OT - OK to Discharge:  (to rehab when medically stable)

## 2021-07-23 NOTE — OCCUPATIONAL THERAPY NOTE
[address] Progress Note     Patient Name: Jared PRIESTY Date: 7/23/2021  Problem List  Principal Problem:    Closed right hip fracture [facility])  Active Problems:    Essential hypertension    Bilateral hearing loss    Paroxysmal A-fib (HCC)    Major neurocognitive disorder (HCC)    Stage 3a chronic kidney disease ([facility] [address] )    ABLA (acute blood loss anemia)    Acute cystitis without hematuria            07/23/21 1357   OT Last Visit   OT Visit Date 07/23/21   Note Type   Note Type Treatment   Restrictions/Precautions   Weight Bearing Precautions Per Order Yes   RLE Weight Bearing Per Order WBAT   Other Precautions Chair Alarm; Bed Alarm;Cognitive; Fall Risk;Pain   Pain Assessment   Pain Assessment Tool FLACC   Pain Rating: FLACC (Rest) - Face 0   Pain Rating: FLACC (Rest) - Legs 0   Pain Rating: FLACC (Rest) - Activity 0   Pain Rating: FLACC (Rest) - Cry 1   Pain Rating: FLACC (Rest) - Consolability 1   Score: FLACC (Rest) 2   Pain Rating: FLACC (Activity) - Face 0   Pain Rating: FLACC (Activity) - Legs 0   Pain Rating: FLACC (Activity) - Activity 1   Pain Rating: FLACC (Activity) - Cry 1   Pain Rating: FLACC (Activity) - Consolability 1   Score: FLACC (Activity) 3   ADL   Where Assessed   (upright in bed)   Grooming Assistance 3  Moderate Assistance   Grooming Deficit Setup;Supervision/safety; Increased time to complete;Wash/dry hands; Wash/dry face   Grooming Comments increased time to wash face   LB Dressing Assistance 2  Maximal Assistance   LB Dressing Deficit Setup;Verbal cueing;Supervision/safety; Increased time to complete; Don/doff R sock; Don/doff L sock   Functional Standing Tolerance   Time 2 minutes    Activity w/ MIN assist steadying support w/ RW   Bed Mobility   Rolling R 2  Maximal assistance   Additional items Assist x 2; Increased time required;Verbal cues;LE management; Bedrails   Rolling L 2  Maximal assistance   Additional items Assist x 2; Increased time required;LE management;Verbal cues;Bedrails   Supine to Sit 4  Minimal assistance   Additional items Assist x 2; Increased time required;LE management;Verbal cues; Bedrails;HOB elevated   Sit to Supine 2  Maximal assistance   Additional items Assist x 2; Increased time required;LE management;Verbal cues; Bedrails;HOB elevated   Additional Comments pt impulsively sitting down from stance w/ cues for safety   Transfers   Sit to Stand 4  Minimal assistance   Additional items Assist x 1; Increased time required;Verbal cues; Bedrails  (SBA of 2nd for safety)   Stand to Sit 4  Minimal assistance   Additional items Assist x 2; Increased time required;Verbal cues;Armrests  (cues for hand placement)   Additional Comments cues for safety and positioning   Therapeutic Exercise - ROM   UE-ROM Yes   ROM- Right Upper Extremities   R Elbow Elbow flexion;Elbow extension;AAROM   R Weight/Reps/Sets 2 sets x 10reps   ROM - Left Upper Extremities    L Elbow Elbow extension;Elbow flexion;AAROM   L Weight/Reps/Sets 2 sets [id number]   Cognition   Overall Cognitive Status Impaired   Arousal/Participation Responsive; Cooperative   Attention Difficulty attending to directions   Orientation Level Oriented to person;Disoriented to time;Disoriented to place; Disoriented to situation   Memory Decreased short term memory;Decreased recall of recent events;Decreased recall of precautions; Unable to assess   Following Commands Follows one step commands inconsistently   Comments pt w/ Dementia at baseline, unable to follow simple commands this session, impulsive attempting to sit up in bed   Additional Activities   Additional Activities   (education on repositioning)   Additional Activities Comments pt requires continued education   Activity Tolerance   Activity Tolerance Patient limited by pain; Patient limited by fatigue   Medical Staff Made Aware appropriate to see per Russell DEJESUS and aware of pt feeling hot and PCA taking patient temperature   Assessment   Assessment Pt seen for skilled OT session focused on ADLs, functional transfers, bed mobility  Pt w/ increased lethargy and decreased safety, ability to follow commands this session  Pt w/ MIN assist x2 supine>sit bed mobility w/ increased time to complete  Pt w/ MIN assist x1 w/ SBA of 2nd sit>stand from bed and pt attempting to perform functional transfer to [facility], however pt unable to advance LEs  Pt impulsively returning to seated position EOB  Pt w/ MAX assist x2 sit>supine bed mobility w/ increased time to complete  Pt w/ MAX assist x2 rolling in bed to reposition the pad w/ increased time  Pt w/ MOD assist to wash face w/ increased time to complete and cues  Pt repositioned upright in bed at end of session w/ all needs met  Pt continues to be limited due to increased pain, impaired balance, impulsive, decreased insight and safety awareness, impaired cognition, fall risk all causing a decline in ADLs, functional transfers and mobility  Recommend sTR when medically stable  Will continue to follow to address OT POC  The patient's raw score on the AM-PAC Daily Activity inpatient short form is 11, standardized score is 29 04, less than 39 4  Patients at this level are likely to benefit from discharge to post-acute rehabilitation services  Please refer to the recommendation of the Occupational Therapist for safe discharge planning  Plan   Treatment Interventions ADL retraining;UE strengthening/ROM; Functional transfer training; Endurance training;Cognitive reorientation;Patient/family training;Equipment evaluation/education; Compensatory technique education; Energy conservation; Activityengagement   Goal Expiration Date 07/29/21   OT Treatment Day 3   OT Frequency 3-5x/wk   Recommendation   OT Discharge Recommendation Post acute rehabilitation services   OT - OK to Discharge   (to rehab when medically stable)   AM-PAC Daily Activity Inpatient   Lower Body Dressing 1   Bathing 2   Toileting 1   Upper Body Dressing 2   Grooming 2   Eating 3   Daily Activity Raw Score 11   Daily Activity Standardized Score (Calc for Raw Score >=11) 29 04   Turning Head Towards Sound 3   Follow Simple Instructions 2   Low Function Daily Activity Raw Score 16   Low Function Daily Activity Standardized Score 27 65   AM-PAC Applied Cognition Inpatient   Following a Speech/Presentation 1   Understanding Ordinary Conversation 2   Taking Medications 1   Remembering Where Things Are Placed or Put Away 1   Remembering List of 4-5 Errands 1   Taking Care of Complicated Tasks 1   Applied Cognition Raw Score 7   Applied Cognition Standardized Score 15 17   Modified [place] Scale   Modified [place] Scale 4     Documentation completed by: Marianne Carter MS, OTR/L

## 2021-07-23 NOTE — PHYSICAL THERAPY NOTE
Physical Therapy Progress Note     07/23/21 1345   PT Last Visit   PT Visit Date 07/23/21   Note Type   Note Type Treatment   Pain Assessment   Pain Assessment Tool FLACC   Pain Location/Orientation Location: Generalized   Hospital Pain Intervention(s) Ambulation/increased activity;Repositioned;Cold applied   Pain Rating: FLACC (Rest) - Face 0   Pain Rating: FLACC (Rest) - Legs 0   Pain Rating: FLACC (Rest) - Activity 0   Pain Rating: FLACC (Rest) - Cry 0   Pain Rating: FLACC (Rest) - Consolability 0   Score: FLACC (Rest) 0   Pain Rating: FLACC (Activity) - Face 1   Pain Rating: FLACC (Activity) - Legs 1   Pain Rating: FLACC (Activity) - Activity 1   Pain Rating: FLACC (Activity) - Cry 1   Pain Rating: FLACC (Activity) - Consolability 2   Score: FLACC (Activity) 6   Restrictions/Precautions   Weight Bearing Precautions Per Order Yes   RLE Weight Bearing Per Order WBAT   Other Precautions Cognitive; Chair Alarm; Bed Alarm; Fall Risk;Pain;Multiple lines   General   Chart Reviewed Yes   Response to Previous Treatment Patient unable to report, no changes reported from family or staff   Family/Caregiver Present No   Subjective   Subjective None stated  Bed Mobility   Rolling R 2  Maximal assistance   Additional items Assist x 2;Bedrails;Leg ; Increased time required;Verbal cues;LE management   Supine to Sit 3  Moderate assistance   Additional items Assist x 2;HOB elevated; Bedrails;Leg ; Increased time required;Verbal cues;LE management   Sit to Supine 2  Maximal assistance   Additional items Assist x 2;Bedrails;Leg ; Increased time required;Verbal cues;LE management   Transfers   Sit to Stand 4  Minimal assistance   Additional items Assist x 1;Bedrails; Increased time required;Verbal cues; Other  (second present for safety)   Stand to Sit 2  Maximal assistance   Additional items Assist x 2;Bedrails; Increased time required;Verbal cues   Stand pivot 2  Maximal assistance   Additional items Assist x 2;Bedrails;Armrests; Increased time required;Verbal cues  (Pt  attempted to sit without proper positioning)   Balance   Static Sitting Fair   Dynamic Sitting Fair -   Static Standing Poor   Dynamic Standing Poor   Endurance Deficit   Endurance Deficit Yes   Endurance Deficit Description fatigue/pain   Activity Tolerance   Activity Tolerance Patient limited by fatigue;Patient limited by pain   Medical Staff JORGE LUIS Alejandro   Nurse Made Aware yes   Exercises   THR Supine;10 reps;AAROM; Bilateral   Assessment   Prognosis Fair   Problem List Decreased strength;Decreased range of motion;Decreased endurance; Impaired balance;Decreased mobility; Decreased coordination;Decreased cognition; Impaired judgement;Decreased safety awareness;Decreased skin integrity;Pain;Orthopedic restrictions   Assessment Pt  supine in bed upon my arrival  Discussed with RN, pt  with increased lethargic state this PM  Performance of HEP supine in bed with cues provided for proper completion  Pt  resistive to certain ROM, however when asked where pain was pt  without response  Progressed with transfers requiring A of 2 with cues for hand placement/technique  Attempted SPT with RW, however pt  with impulsive behavior with attempt to sit without support  Requiring A of therapist for return to EOB  Pt  repositioned supine in bed with alarm active and ice applied at end of treatment session  PT will continue to recommend d/c to rehab when medically stable for continued improvement of noted impairments  RN made aware and present in pt's room at end of treatment session due to noted medical status  Barriers to Discharge Inaccessible home environment;Decreased caregiver support   Barriers to Discharge Comments Level of support at home  Goals   Patient Goals None stated 2* to impaired cognition   STG Expiration Date 08/06/21   PT Treatment Day 3   Plan   Treatment/Interventions Functional transfer training;LE strengthening/ROM; Therapeutic exercise; Endurance training;Bed mobility;Spoke to case management;Spoke to nursing;OT   Progress Slow progress, decreased activity tolerance   PT Frequency Other (Comment)  (3-5x/wk)   Recommendation   PT Discharge Recommendation Post acute rehabilitation services   Equipment Recommended [facility] Recommended Wheeled walker   PT - OK to Discharge Yes  (if d/c to rehab when medically stable  )   AM-PAC Basic Mobility Inpatient   Turning in Bed Without Bedrails 2   Lying on Back to Sitting on Edge of Flat Bed 3   Moving Bed to Chair 2   Standing Up From Chair 3   Walk in Room 2   Climb 3-5 Stairs 1   Basic Mobility Inpatient Raw Score 13   Basic Mobility Standardized Score 33 99     An AM-PAC Basic Mobility standardized score less than 42 9 suggests the patient may benefit from discharge to post-acute rehab services      Negar Deleon, PTA

## 2021-07-23 NOTE — PROGRESS NOTES
Progress Note - Kingsley Arnold 80 y o  female MRN: [id number]    Unit/Bed#: E2 -01 Encounter: [id number]      Assessment/Plan:  1  Ambulatory dysfunction  At risk for falls secondary to hx of previous falls, age, gait instability, dementia, polypharmacy, visual impairment  PT/OT  Rehab post hospitalization for optimization     2  Acute pain due to fracture  Geriatric pain protocol  Scheduled acetaminophen  lidoderm patch ordered  P r n  Oxycodone low-dose:  2 5 mg p o  q 4 hours p r n  Moderate pain/5 mg p o  Q 4 hours p r n  Severe pain  Monitor for constipation ,last BM 7/23/21     3  Hearing loss  Hearing impairment strongly correlated with depression, cognitive impairment, delirium and falls in the older adult  Use hearing aids or sound amplifier  Speaking face to face  Use clear dictation, enunciation of words     4  Dementia  Continue vitamin B12 supplementation  Continue 24/7 care     5  Psychosis and depression  Follows with Psychiatry as outpatient  Patient on Cymbalta, trazodone, Zyprexa, Ativan as outpatient   Current on zyprexa 2 5 mg po QHS, cymbalta QHS  Continue 24/7 care     6  Insomnia  Patient on trazodone, Ativan and Zyprexa at bedtime as outpatient  zyprexa was reduced on 7/18, due to increased lethargy, trazodone and ativan not ordered on admission  Continue melatonin  Continue to monitor ativan, although benzodiazepines not recommended in the older adult, barb have withdrawal, increased agitation, confusion  First line is behavioral therapy  Avoid sedative hypnotics such as benzodiazepines and benadryl  Encourage staying awake during the day  Encourage daytime activity, morning exercise  Decrease or eliminate day time naps  Establish a night time routine     7   Delirium precautions  Alert and oriented x 1  Provide frequent redirection, reorientation, distraction techniques  Avoid deliriogenic medications such as tramadol, benzodiazepines, anticholinergics,  Benadryl  Treat pain, See geriatric pain protocol  Monitor for constipation and urinary retention  Encourage early and frequent moblization, OOB  Encourage Hydration/ Nutrition  Implement sleep hygiene, limit night time interuptions, group activities     8  Urinary retention  Sanders placed  Monitor for constipation, last BM 7/20/21     9  Right hip fracture  Ortho on consult  Status post long TFN right hip  PT/OT  Geriatric pain protocol    Subjective:   Upon exam pt is lying in bed  She is alert, calm and cooperative  Doing well with current medication regimen  Last recorded BM 7/23/21  Objective:     Vitals: Blood pressure 128/73, pulse (!) 130, temperature (!) 97 °F (36 1 °C), temperature source Temporal, resp  rate 18, weight 62 5 kg (137 lb 12 6 oz), SpO2 96 %  ,Body mass index is 28 8 kg/m²  Intake/Output Summary (Last 24 hours) at 7/23/2021 0920  Last data filed at 7/23/2021 0401  Gross per 24 hour   Intake --   Output 1713 ml   Net -1713 ml       Physical Exam:   General : NAD  HEENT : MMM   Heart : Normal rate, no murmur rub or gallop  Lungs : CTA no wheezes, rales or rhonchi  Abdomen : Soft, NT/ND, BS auscultated in all 4 quads  Ext :  no edema  Skin : Pink, warm, dry, age appropriate turgor and mobility  Neuro : Nonfocal  Psych : Alert      Invasive Devices     Peripheral Intravenous Line            Peripheral IV 07/19/21 Dorsal (posterior); Left Forearm 3 days    Peripheral IV 07/19/21 Right Antecubital 3 days                Lab, Imaging and other studies: I have personally reviewed pertinent reports      VTE Pharmacologic Prophylaxis: Sequential compression device (Venodyne)   VTE Mechanical Prophylaxis: sequential compression device

## 2021-07-24 DIAGNOSIS — I10 ESSENTIAL HYPERTENSION: ICD-10-CM

## 2021-07-24 PROCEDURE — 99232 SBSQ HOSP IP/OBS MODERATE 35: CPT | Performed by: INTERNAL MEDICINE

## 2021-07-24 PROCEDURE — 99222 1ST HOSP IP/OBS MODERATE 55: CPT | Performed by: INTERNAL MEDICINE

## 2021-07-24 PROCEDURE — 99024 POSTOP FOLLOW-UP VISIT: CPT | Performed by: PHYSICIAN ASSISTANT

## 2021-07-24 RX ORDER — DILTIAZEM HYDROCHLORIDE 5 MG/ML
15 INJECTION INTRAVENOUS ONCE
Status: DISCONTINUED | OUTPATIENT
Start: 2021-07-24 | End: 2021-07-26 | Stop reason: HOSPADM

## 2021-07-24 RX ADMIN — OLANZAPINE 2.5 MG: 5 TABLET, FILM COATED ORAL at 20:43

## 2021-07-24 RX ADMIN — OXYCODONE HYDROCHLORIDE 5 MG: 5 TABLET ORAL at 17:42

## 2021-07-24 RX ADMIN — MELATONIN 3 MG: at 20:43

## 2021-07-24 RX ADMIN — DULOXETINE HYDROCHLORIDE 30 MG: 30 CAPSULE, DELAYED RELEASE ORAL at 20:41

## 2021-07-24 RX ADMIN — ENOXAPARIN SODIUM 30 MG: 30 INJECTION, SOLUTION INTRAVENOUS; SUBCUTANEOUS at 09:56

## 2021-07-24 RX ADMIN — DOCUSATE SODIUM 100 MG: 100 CAPSULE ORAL at 09:56

## 2021-07-24 RX ADMIN — METOPROLOL SUCCINATE 50 MG: 50 TABLET, EXTENDED RELEASE ORAL at 09:56

## 2021-07-24 RX ADMIN — ACETAMINOPHEN 650 MG: 650 SUSPENSION ORAL at 12:56

## 2021-07-24 RX ADMIN — ACETAMINOPHEN 650 MG: 650 SUSPENSION ORAL at 17:42

## 2021-07-24 RX ADMIN — ACETAMINOPHEN 650 MG: 650 SUSPENSION ORAL at 05:39

## 2021-07-24 RX ADMIN — DOCUSATE SODIUM 100 MG: 100 CAPSULE ORAL at 20:44

## 2021-07-24 RX ADMIN — LIDOCAINE 1 PATCH: 50 PATCH CUTANEOUS at 09:56

## 2021-07-24 RX ADMIN — DILTIAZEM HYDROCHLORIDE 180 MG: 180 CAPSULE, COATED, EXTENDED RELEASE ORAL at 09:56

## 2021-07-24 RX ADMIN — CYANOCOBALAMIN TAB 500 MCG 1000 MCG: 500 TAB at 09:56

## 2021-07-24 NOTE — PLAN OF CARE
Problem: MOBILITY - ADULT  Goal: Maintain or return to baseline ADL function  Description: INTERVENTIONS:  -  Assess patient's ability to carry out ADLs; assess patient's baseline for ADL function and identify physical deficits which impact ability to perform ADLs (bathing, care of mouth/teeth, toileting, grooming, dressing, etc )  - Assess/evaluate cause of self-care deficits   - Assess range of motion  - Assess patient's mobility; develop plan if impaired  - Assess patient's need for assistive devices and provide as appropriate  - Encourage maximum independence but intervene and supervise when necessary  - Involve family in performance of ADLs  - Assess for home care needs following discharge   - Consider OT consult to assist with ADL evaluation and planning for discharge  - Provide patient education as appropriate  Outcome: Progressing  Goal: Maintains/Returns to pre admission functional level  Description: INTERVENTIONS:  - Perform BMAT or MOVE assessment daily    - Set and communicate daily mobility goal to care team and patient/family/caregiver     - Collaborate with rehabilitation services on mobility goals if consulted  - Out of bed for toileting  - Record patient progress and toleration of activity level   Outcome: Progressing     Problem: Prexisting or High Potential for Compromised Skin Integrity  Goal: Skin integrity is maintained or improved  Description: INTERVENTIONS:  - Identify patients at risk for skin breakdown  - Assess and monitor skin integrity  - Assess and monitor nutrition and hydration status  - Monitor labs   - Assess for incontinence   - Turn and reposition patient  - Assist with mobility/ambulation  - Relieve pressure over bony prominences  - Avoid friction and shearing  - Provide appropriate hygiene as needed including keeping skin clean and dry  - Evaluate need for skin moisturizer/barrier cream  - Collaborate with interdisciplinary team   - Patient/family teaching  Outcome: Progressing     Problem: CARDIOVASCULAR - ADULT  Goal: Maintains optimal cardiac output and hemodynamic stability  Description: INTERVENTIONS:  - Monitor I/O, vital signs and rhythm  - Monitor for S/S and trends of decreased cardiac output  - Administer and titrate ordered vasoactive medications to optimize hemodynamic stability  - Assess quality of pulses, skin color and temperature  - Assess for signs of decreased coronary artery perfusion  - Instruct patient to report change in severity of symptoms  Outcome: Progressing     Problem: RESPIRATORY - ADULT  Goal: Achieves optimal ventilation and oxygenation  Description: INTERVENTIONS:  - Assess for changes in respiratory status  - Assess for changes in mentation and behavior  - Position to facilitate oxygenation and minimize respiratory effort  - Oxygen administered by appropriate delivery if ordered  - Encourage broncho-pulmonary hygiene including cough, deep breathe, Incentive Spirometry  - Assess the need for suctioning and aspirate as needed  - Assess for SOB or any respiratory difficulty  - Respiratory Therapy support as indicated  Outcome: Progressing     Problem: METABOLIC, FLUID AND ELECTROLYTES - ADULT  Goal: Electrolytes maintained within normal limits  Description: INTERVENTIONS:  - Monitor labs and assess patient for signs and symptoms of electrolyte imbalances  - Administer electrolyte replacement as ordered  - Monitor response to electrolyte replacements, including repeat lab results as appropriate  - Instruct patient on fluid and nutrition as appropriate  Outcome: Progressing     Problem: SKIN/TISSUE INTEGRITY - ADULT  Goal: Incision(s), wounds(s) or drain site(s) healing without S/S of infection  Description: INTERVENTIONS  - Assess and document dressing, incision, wound bed, drain sites and surrounding tissue  - Provide caregiver/family education  - Perform skin care/dressing changes as ordered  Outcome: Progressing     Problem: MUSCULOSKELETAL - ADULT  Goal: Maintain or return mobility to safest level of function  Description: INTERVENTIONS:  - Assess patient's ability to carry out ADLs; assess patient's baseline for ADL function and identify physical deficits which impact ability to perform ADLs (bathing, care of mouth/teeth, toileting, grooming, dressing, etc )  - Assess/evaluate cause of self-care deficits   - Assess range of motion  - Assess patient's mobility  - Assess patient's need for assistive devices and provide as appropriate  - Encourage maximum independence but intervene and supervise when necessary  - Involve family in performance of ADLs  - Assess for home care needs following discharge   - Consider OT consult to assist with ADL evaluation and planning for discharge  - Provide patient education as appropriate  Outcome: Progressing     Problem: PAIN - ADULT  Goal: Verbalizes/displays adequate comfort level or baseline comfort level  Description: Interventions:  - Encourage patient to monitor pain and request assistance  - Assess pain using appropriate pain scale  - Consider geriatric pain management order set   - Administer analgesics based on type and severity of pain and evaluate response  - Implement non-pharmacological measures as appropriate and evaluate response  - Consider cultural and social influences on pain and pain management  - Notify physician/advanced practitioner if interventions unsuccessful or patient reports new pain  Outcome: Progressing     Problem: SAFETY ADULT  Goal: Patient will remain free of falls  Description: INTERVENTIONS:  - Educate patient/family on patient safety including physical limitations  - Instruct patient to call for assistance with activity   - Consult OT/PT to assist with strengthening/mobility   - Keep Call bell within reach  - Keep bed low and locked with side rails adjusted as appropriate  - Keep care items and personal belongings within reach  - Initiate and maintain comfort rounds  - Make Fall Risk Sign visible to staff  - Offer Toileting every 2 hours, in advance of need  - Initiate/Maintain bed/chair alarm  - Obtain necessary fall risk management equipment: bed alarm  - Apply yellow socks and bracelet for high fall risk patients  - Keep patient's room near nurses station  Outcome: Progressing     Problem: DISCHARGE PLANNING  Goal: Discharge to home or other facility with appropriate resources  Description: INTERVENTIONS:  - Identify barriers to discharge w/patient and caregiver  - Arrange for needed discharge resources and transportation as appropriate  - Identify discharge learning needs (meds, wound care, etc )  - Arrange for interpretive services (Turkish) to assist at discharge as needed  - Refer to Case Management Department for coordinating discharge planning if the patient needs post-hospital services based on physician/advanced practitioner order or complex needs related to functional status, cognitive ability, or social support system  Outcome: Progressing     Problem: Knowledge Deficit  Goal: Patient/family/caregiver demonstrates understanding of disease process, treatment plan, medications, and discharge instructions  Description: Complete learning assessment and assess knowledge base    Interventions:  - Provide teaching at level of understanding  - Provide teaching via preferred learning methods  Outcome: Progressing     Problem: CONFUSION/THOUGHT DISTURBANCE  Goal: Thought disturbances (confusion, delirium, depression, dementia or psychosis) are managed to maintain or return to baseline mental status and functional level  Description: INTERVENTIONS:  - Assess for possible contributors to  thought disturbance, including but not limited to medications, infection, impaired vision or hearing, underlying metabolic abnormalities, dehydration, respiratory compromise,  psychiatric diagnoses and notify attending PHYSICAN/AP  - Monitor and intervene to maintain adequate nutrition, hydration, elimination, sleep and activity  - Decrease environmental stimuli, including noise as appropriate  - Patient is Surinamese speaking only - communicate in Surinamese  - Provide frequent contacts to provide refocusing, direction and reassurance as needed  Approach patient calmly with eye contact and at their level  - [place] high risk fall precautions, aspiration precautions and other safety measures, as indicated  - If delirium suspected, notify physician/AP of change in condition and request immediate in-person evaluation  - Pursue consults as appropriate including Geriatric (campus dependent), OT for cognitive evaluation/activity planning, psychiatric, pastoral care, etc   Outcome: Progressing     Problem: Nutrition/Hydration-ADULT  Goal: Nutrient/Hydration intake appropriate for improving, restoring or maintaining nutritional needs  Description: Monitor and assess patient's nutrition/hydration status for malnutrition  Collaborate with interdisciplinary team and initiate plan and interventions as ordered  Monitor patient's weight and dietary intake as ordered or per policy  Utilize nutrition screening tool and intervene as necessary  Determine patient's food preferences and provide high-protein, high-caloric foods as appropriate       INTERVENTIONS:  - Monitor oral intake, urinary output, labs, and treatment plans  - Assess nutrition and hydration status and recommend course of action  - Evaluate amount of meals eaten  - Total assist in feeding patient  - Allow adequate time for meals  - Recommend/ encourage appropriate diets, oral nutritional supplements, and vitamin/mineral supplements  - Assess need for intravenous fluids  - Provide specific nutrition/hydration education as appropriate  - Include patient/family/caregiver in decisions related to nutrition  Outcome: Progressing

## 2021-07-24 NOTE — PROGRESS NOTES
Progress Note - Carissa Hurst 80 y o  female MRN: [id number]    Unit/Bed#: E2 -01 Encounter: [id number]      Subjective: The patient appears comfortable  She denies any chest pain or shortness of breath  Physical Exam:   Temp:  [97 °F (36 1 °C)-99 °F (37 2 °C)] 97 9 °F (36 6 °C)  HR:  [] 110  Resp:  [16-18] 16  BP: (146-172)/(61-77) 146/64    Gen:  Well-developed, well-nourished, in no distress  Neck:  Supple  No lymphadenopathy, goiter, bruit  Heart:  Irregular rhythm  Tachycardic at times  Lungs:  Clear to auscultation percussion  No wheezing, rales, rhonchi  Abd:  Soft with active bowel sounds  No mass, tenderness, or organomegaly  Extremities:  No clubbing, cyanosis, or edema  No calf tenderness  Neuro:  Alert but somewhat withdrawn today  Able to move all limbs  Skin:  Warm and dry      LABS:  No new labs          Assessment/Plan:  1  Right hip fracture, status post intramedullary nail fixation  2  Acute blood loss anemia secondary to fracture plus surgery  3  Atrial fibrillation  4  Dementia  5  History of schizophrenia  6  Acute kidney injury superimposed on chronic kidney disease stage 3, resolved  7  Pyuria, status post treatment for urinary tract infection    The patient has a history of paroxysmal atrial fibrillation  She developed atrial fibrillation briefly yesterday but now has reverted to atrial fib overnight  Her heart rate was elevated earlier but spontaneously improved  She is on diltiazem and metoprolol chronically  Diltiazem was increased yesterday  I will ask Cardiology to evaluate the situation  The patient has not been anticoagulated  I presume this is related to her mental status and risk of falling      VTE Pharmacologic Prophylaxis: Enoxaparin (Lovenox)  VTE Mechanical Prophylaxis: sequential compression device

## 2021-07-24 NOTE — CONSULTS
Cardiology Consult  07/24/21     Referring Physian: MD ESTER Ibarra     Chief Complain/Reason for Referal:      IMPRESSION/RECOMMENDATIONS/DISCUSSION:  1  Paroxysmal atrial fibrillation, rate controlled at this time  2  Acute blood loss anemia secondary to hip fracture and surgery  3  Dementia  4  History of schizophrenia  5  Acute kidney injury on chronic kidney disease  6  Hypertension        · Heart rate better controlled today after receiving 180 mg, higher dose of diltiazem  Continue the same along with metoprolol  Will pursue rate control strategy at this time  If she goes back into sinus rhythm, may consider amiodarone with close monitoring of QTC interval  · Remain off anticoagulation in light of high fall risk, including recent fall with subsequent hip fracture  · Inpatient cardiac testing not recommended at this time  · Continue telemetry monitoring           ======================================================     HPI:  I am seeing this patient in cardiology consultation for:  Paroxysmal atrial fibrillation     Erasto Jones is a 80 y o  female paroxysmal atrial fibrillation, who has been on metoprolol and diltiazem, without anticoagulation secondary to high fall risk  She actually presented to the hospital with fall and subsequent hip fracture  She underwent orthopedic surgery, and has been doing well  On exam and telemetry has been noted to have atrial fibrillation intermittently  Diltiazem dosing was increased yesterday, receiving 180 mg for the 1st time today, and 120 mg yesterday  She maintains on metoprolol 50 mg daily  From a symptomatic standpoint she feels well, denying any chest pain, shortness of breath  She is a limited historian    She has been dealing with some postoperative anemia and acute blood loss            Medical History        Past Medical History:   Diagnosis Date    Anxiety      Cognitive impairment      Dementia (HCC)      Depression      Hallucination   ICD transmission showed 1 VT episode on 11/19/2020 @ 2:54pm, episode was treated successfully with ATP x 1.     Hyperlipidemia      Hypertension      Memory loss      Psychiatric illness      Psychosis ([facility] [address] )      [facility]oaffective disorder [facility]      Sleep difficulties                 Scheduled Meds:           Current Facility-Administered Medications   Medication Dose Route Frequency Provider Last Rate    acetaminophen  650 mg Oral 4x Daily Nasrin Jenkins PA-C      albuterol  2 puff Inhalation Q4H PRN Cj Solorio PA-C      aluminum-magnesium hydroxide-simethicone  30 mL Oral Q6H PRN Cj Solorio PA-C      bisacodyl  10 mg Rectal Daily PRN Kd Joyce MD      bisacodyl  10 mg Rectal Once Tuesday M Trudekenna, CRNP      cyanocobalamin  1,000 mcg Oral Daily Cj Solorio PA-C      diltiazem  180 mg Oral Daily Kd Joyce MD      diltiazem  15 mg Intravenous Once Kd Joyce MD      docusate sodium  100 mg Oral Q12H [address] Cj Solorio PA-C      DULoxetine  30 mg Oral HS Cj Solorio PA-C      enoxaparin  30 mg Subcutaneous Q24H [address] Azalea Ibarra PA-C      HYDROmorphone  0 2 mg Intravenous Q4H PRN Cj Solorio PA-C      lidocaine  1 patch Topical Daily Tuesday M BEV Aburto      melatonin  3 mg Oral HS Cj Solorio PA-C      metoprolol succinate  50 mg Oral Daily James Patten MD      OLANZapine  2 5 mg Oral HS James Patten MD      ondansetron  4 mg Intravenous Q6H PRN Cj Solorio PA-C      oxyCODONE  2 5 mg Oral Q4H PRN Cj Solorio PA-C      oxyCODONE  5 mg Oral Q4H PRN Cj Solorio PA-C      polyethylene glycol  17 g Oral Daily PRN Cj Solorio PA-C      simethicone  80 mg Oral 4x Daily PRN Cj Solorio PA-C        Continuous Infusions:   PRN Meds: albuterol    aluminum-magnesium hydroxide-simethicone    bisacodyl    HYDROmorphone    ondansetron    oxyCODONE    oxyCODONE    polyethylene glycol    simethicone  No Known Allergies  I reviewed the Home Medication list in the chart             Family History   Problem Relation Age of Onset    Heart disease Mother           Cardiac disorder    Parkinsonism Father      Heart disease Sister           Cardiac disorder    Hypertension Sister      Diabetes Child           Diabetes Mellitus    Lung disease Child           Respiratory Disorder    Diabetes Son           Diabetes Mellitus         Social History               Socioeconomic History    Marital status:        Spouse name: Not on file    Number of children: Not on file    Years of education: Not on file    Highest education level: Not on file   Occupational History    Not on file   Tobacco Use    Smoking status: Never Smoker    Smokeless tobacco: Never Used    Tobacco comment: Former smoker per [employer]   Vaping Use    Vaping Use: Never used   Substance and Sexual Activity    Alcohol use: Never    Drug use: No    Sexual activity: Not on file   Other Topics Concern    Not on file   Social History Narrative     Drinks coffee      Social Determinants of Health          Financial Resource Strain:     Difficulty of Paying Living Expenses:    Food Insecurity:     Worried About [address] in the Last Year:     [address] in the Last Year:    Transportation Needs:     Lack of Transportation (Medical):      Lack of Transportation (Non-Medical):    Physical Activity:     Days of Exercise per Week:     Minutes of Exercise per Session:    Stress:     Feeling of Stress :    Social Connections:     Frequency of Communication with Friends and Family:     Frequency of Social Gatherings with Friends and Family:     Attends Hindu Services:     Active Member of Clubs or Organizations:     Attends Club or Organization Meetings:     Marital Status:    Intimate Partner Violence:     Fear of Current or Ex-Partner:     Emotionally Abused:     Physically Abused:     Sexually Abused:             Review of Systems - as per HPI, all others reviewed and negative      Vitals:     07/24/21 0700   BP: 146/64   Pulse: (!) 110   Resp: 16   Temp: 97 9 °F (36 6 °C)   SpO2: 98%             I/O        07/22 0701 - 07/23 0700 07/23 0701 - 07/24 0700 07/24 0701 - 07/25 0700     Urine (mL/kg/hr) 1713 (1 1) 900 (0 6) 825 (3 2)     Total Output 1713 900 825     Net -1713 -900 -825                 Unmeasured Urine Occurrence   1 x 1 x                 Weight (last 2 days)      Date/Time   Weight     07/24/21 0600    62 1 (136 91)     07/23/21 0558    62 5 (137 79)     07/22/21 0552    62 5 (137 79)                   GEN: NAD, Alert  HEENT: Mucus membranes moist, pink conjunctivae  EYES: Pupils equal, sclera anicteric  NECK: No JVD/HJR, no carotid bruit  CARDIOVASCULAR:  Irregularly irregular rhythm, normal rate, No murmur, rub, gallops S1,S2, no parasternal heave/thrill  LUNGS: Clear To auscultation bilaterally  ABDOMEN: Soft, nondistended, no hepatic systolic pulsation  EXTREMITIES/VASCULAR: No edema    PSYCH: Normal Affect by limited examination  NEURO: Grossly intact by limited examination    HEME: No significant bleeding, bruising, petechia by limited examination  SKIN: Thin skin, No significant rashes by limited examination           EKG:  Sinus rhythm  TELE:  Atrial fibrillation     ECHO:  Prior echocardiogram 04/2018 reviewed, ejection fraction 72%, mild-to-moderate aortic regurgitation              Results from last 7 days   Lab Units 07/22/21  0436 07/19/21  0427 07/18/21  1556 07/18/21  0439   WBC Thousand/uL 6 33 7 45  --  4 95   HEMOGLOBIN g/dL 9 1* 8 4* 9 6* 6 2*   HEMATOCRIT % 28 4* 25 9* 30 0* 19 4*   PLATELETS Thousands/uL 246 196  --  148*   NEUTROS PCT % 64 81*  --  76*   MONOS PCT % 11 9  --  9             Results from last 7 days   Lab Units 07/22/21  0436 07/19/21  0427 07/18/21  0439   POTASSIUM mmol/L 3 5 3 5 4 0   CHLORIDE mmol/L 109* 109* 107   CO2 mmol/L 28 25 24   BUN mg/dL 20 38* 50*   CREATININE mg/dL 0 84 1 24 1 84*   CALCIUM mg/dL 8 3 8 5 7 9*             Results from last 7 days   Lab Units 07/22/21  0434 07/19/21  0427 07/18/21  0439   POTASSIUM mmol/L 3 5 3 5 4 0   CHLORIDE mmol/L 109* 109* 107   CO2 mmol/L 28 25 24   BUN mg/dL 20 38* 50*   CREATININE mg/dL 0 84 1 24 1 84*   CALCIUM mg/dL 8 3 8 5 7 9*      No results found for: TROPONINT           Results from last 7 days   Lab Units 07/18/21  0439   CK TOTAL U/L 87                           I have personally reviewed the EKG, CXR and Telemetry images directly              Patient Active Problem List     Diagnosis Date Noted    Acute cystitis without hematuria 07/17/2021    ABLA (acute blood loss anemia) 07/16/2021    Closed right hip fracture ([facility] [address] ) 07/15/2021    Stage 3a chronic kidney disease ([facility] [address] ) 07/15/2021    Dementia ([facility] [address] )      Closed fracture of right orbital floor ([facility] ) 08/23/2020    Fall from ground level 08/23/2020    Age-related osteoporosis without current pathological fracture 06/26/2020    Moderate protein-calorie malnutrition ([facility] [facility] ) 04/28/2020    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    Major neurocognitive disorder ([facility] ) 04/19/2020    PAF (paroxysmal atrial fibrillation) (HCC)      Elevated troponin 04/16/2020    Urinary incontinence 04/16/2020    COVID-19 virus infection 04/13/2020    Ambulatory dysfunction 04/10/2020    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    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    Paroxysmal A-fib ([facility] [address] ) 03/18/2019    Cellulitis of extremity 12/10/2018    Bilateral hearing loss 10/11/2018    Fall 10/11/2018    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[facility] [address] ) 10/11/2018    Essential hypertension 07/24/2018    Anxiety 07/24/2018    Hyperlipidemia 07/24/2018    Hyponatremia 07/24/2018    Acute encephalopathy 07/24/2018         Portions of the record may have been created with voice recognition software  Occasional wrong word or "sound a like" substitutions may have occurred due to the inherent limitations of voice recognition software   Read the chart carefully and recognize, using context, where substitutions have occurred

## 2021-07-24 NOTE — PROGRESS NOTES
Cardiology Consult  07/24/21    Referring Physian: MD ESTER Gómez    Chief Complain/Reason for Referal:     IMPRESSION/RECOMMENDATIONS/DISCUSSION:  1  Paroxysmal atrial fibrillation, rate controlled at this time  2  Acute blood loss anemia secondary to hip fracture and surgery  3  Dementia  4  History of schizophrenia  5  Acute kidney injury on chronic kidney disease  6  Hypertension      · Heart rate better controlled today after receiving 180 mg, higher dose of diltiazem  Continue the same along with metoprolol  Will pursue rate control strategy at this time  If she goes back into sinus rhythm, may consider amiodarone with close monitoring of QTC interval  · Remain off anticoagulation in light of high fall risk, including recent fall with subsequent hip fracture  · Inpatient cardiac testing not recommended at this time  · Continue telemetry monitoring        ======================================================    HPI:  I am seeing this patient in cardiology consultation for:  Paroxysmal atrial fibrillation    Guerrero Morrison is a 80 y o  female paroxysmal atrial fibrillation, who has been on metoprolol and diltiazem, without anticoagulation secondary to high fall risk  She actually presented to the hospital with fall and subsequent hip fracture  She underwent orthopedic surgery, and has been doing well  On exam and telemetry has been noted to have atrial fibrillation intermittently  Diltiazem dosing was increased yesterday, receiving 180 mg for the 1st time today, and 120 mg yesterday  She maintains on metoprolol 50 mg daily  From a symptomatic standpoint she feels well, denying any chest pain, shortness of breath  She is a limited historian  She has been dealing with some postoperative anemia and acute blood loss          Past Medical History:   Diagnosis Date    Anxiety     Cognitive impairment     D[facility]     Depression     Hallucination     Hyperlipidemia     Hypertension     Memory loss     Psychiatric illness     Psychosis [facility]     Schizoaffective disorder ([facility] )     Sleep difficulties          Scheduled Meds:  Current Facility-Administered Medications   Medication Dose Route Frequency Provider Last Rate    acetaminophen  650 mg Oral 4x Daily Siobhan Flores PA-C      albuterol  2 puff Inhalation Q4H PRN Jjene SANTOS Glover      aluminum-magnesium hydroxide-simethicone  30 mL Oral Q6H PRN Keiry Glover PA-C      bisacodyl  10 mg Rectal Daily PRN Kirill Wright MD      bisacodyl  10 mg Rectal Once Tuesday M Trudel, CRNP      cyanocobalamin  1,000 mcg Oral Daily Keiry Glover PA-C      diltiazem  180 mg Oral Daily Kirill Wright MD      diltiazem  15 mg Intravenous Once Kirill Wright MD      docusate sodium  100 mg Oral Q12H [address] Keiry Glover PA-C      DULoxetine  30 mg Oral HS Keiry Glover PA-C      enoxaparin  30 mg Subcutaneous Q24H [address] Fannie Bardales PA-C      HYDROmorphone  0 2 mg Intravenous Q4H PRN Keiry Glover PA-C      lidocaine  1 patch Topical Daily Tuesday M Criselda, BEV      melatonin  3 mg Oral HS Keiry Glover PA-C      metoprolol succinate  50 mg Oral Daily Александр Ramirez MD      OLANZapine  2 5 mg Oral HS Александр Ramirez MD      ondansetron  4 mg Intravenous Q6H PRN Keiry Glover PA-C      oxyCODONE  2 5 mg Oral Q4H PRN Keiry Glover PA-C      oxyCODONE  5 mg Oral Q4H PRN Keiry Glover PA-C      polyethylene glycol  17 g Oral Daily PRN Keiry Glover PA-C      simethicone  80 mg Oral 4x Daily PRN Keiry Glover PA-C       Continuous Infusions:   PRN Meds: albuterol    aluminum-magnesium hydroxide-simethicone    bisacodyl    HYDROmorphone    ondansetron    oxyCODONE    oxyCODONE    polyethylene glycol    simethicone  No Known Allergies  I reviewed the Home Medication list in the chart       Family History   Problem Relation Age of Onset    Heart disease Mother         Cardiac disorder    Parkinsonism Father  Heart disease Sister         Cardiac disorder    Hypertension Sister     Diabetes Child         Diabetes Mellitus    Lung disease Child         Respiratory Disorder    Diabetes Son         Diabetes Mellitus       Social History     Socioeconomic History    Marital status:      Spouse name: Not on file    Number of children: Not on file    Years of education: Not on file    Highest education level: Not on file   Occupational History    Not on file   Tobacco Use    Smoking status: Never Smoker    Smokeless tobacco: Never Used    Tobacco comment: Former smoker per [employer]   Vaping Use    Vaping Use: Never used   Substance and Sexual Activity    Alcohol use: Never    Drug use: No    Sexual activity: Not on file   Other Topics Concern    Not on file   Social History Narrative    Drinks coffee     Social Determinants of Health     Financial Resource Strain:     Difficulty of Paying Living Expenses:    Food Insecurity:     Worried About Running Out of Food in the Last Year:     [address] in the Last Year:    Transportation Needs:     Lack of Transportation (Medical):      Lack of Transportation (Non-Medical):    Physical Activity:     Days of Exercise per Week:     Minutes of Exercise per Session:    Stress:     Feeling of Stress :    Social Connections:     Frequency of Communication with Friends and Family:     Frequency of Social Gatherings with Friends and Family:     Attends [employer] Services:     Active Member of Clubs or Organizations:     Attends Club or Organization Meetings:     Marital Status:    Intimate Partner Violence:     Fear of Current or Ex-Partner:     Emotionally Abused:     Physically Abused:     Sexually Abused:        Review of Systems - as per HPI, all others reviewed and negative  Vitals:    07/24/21 0700   BP: 146/64   Pulse: (!) 110   Resp: 16   Temp: 97 9 °F (36 6 °C)   SpO2: 98%     I/O       07/22 0701 - 07/23 0700 07/23 0701 - 07/24 0700 07/24 0701 - 07/25 0700    Urine (mL/kg/hr) 1713 (1 1) 900 (0 6) 825 (3 2)    Total Output 1713 900 825    Net -1713 -900 -825           Unmeasured Urine Occurrence  1 x 1 x        Weight (last 2 days)     Date/Time   Weight    07/24/21 0600   62 1 (136 91)    07/23/21 0558   62 5 (137 79)    07/22/21 0552   62 5 (137 79)              GEN: NAD, Alert  HEENT: Mucus membranes moist, pink conjunctivae  EYES: Pupils equal, sclera anicteric  NECK: No JVD/HJR, no carotid bruit  CARDIOVASCULAR:  Irregularly irregular rhythm, normal rate, No murmur, rub, gallops S1,S2, no parasternal heave/thrill  LUNGS: Clear To auscultation bilaterally  ABDOMEN: Soft, nondistended, no hepatic systolic pulsation  EXTREMITIES/VASCULAR: No edema    PSYCH: Normal Affect by limited examination  NEURO: Grossly intact by limited examination    HEME: No significant bleeding, bruising, petechia by limited examination  SKIN: Thin skin, No significant rashes by limited examination        EKG:  Sinus rhythm  TELE:  Atrial fibrillation    ECHO:  Prior echocardiogram 04/2018 reviewed, ejection fraction 72%, mild-to-moderate aortic regurgitation     Results from last 7 days   Lab Units 07/22/21  0436 07/19/21  0427 07/18/21  1556 07/18/21  0439   WBC Thousand/uL 6 33 7 45  --  4 95   HEMOGLOBIN g/dL 9 1* 8 4* 9 6* 6 2*   HEMATOCRIT % 28 4* 25 9* 30 0* 19 4*   PLATELETS Thousands/uL 246 196  --  148*   NEUTROS PCT % 64 81*  --  76*   MONOS PCT % 11 9  --  9     Results from last 7 days   Lab Units 07/22/21  0436 07/19/21  0427 07/18/21  0439   POTASSIUM mmol/L 3 5 3 5 4 0   CHLORIDE mmol/L 109* 109* 107   CO2 mmol/L 28 25 24   BUN mg/dL 20 38* 50*   CREATININE mg/dL 0 84 1 24 1 84*   CALCIUM mg/dL 8 3 8 5 7 9*     Results from last 7 days   Lab Units 07/22/21  0436 07/19/21  0427 07/18/21  0439   POTASSIUM mmol/L 3 5 3 5 4 0   CHLORIDE mmol/L 109* 109* 107   CO2 mmol/L 28 25 24   BUN mg/dL 20 38* 50*   CREATININE mg/dL 0 84 1 24 1 84*   CALCIUM mg/dL 8 3 8 5 7 9*     No results found for: Val Kussmaul      Results from last 7 days   Lab Units 07/18/21  0439   CK TOTAL U/L 87                       I have personally reviewed the EKG, CXR and Telemetry images directly  Patient Active Problem List    Diagnosis Date Noted    Acute cystitis without hematuria 07/17/2021    ABLA (acute blood loss anemia) 07/16/2021    Closed right [facility] fracture ([facility] ) 07/15/2021    Stage 3a chronic kidney disease ([facility] ) 07/15/2021    Dementia ([facility] )     Closed fracture of right orbital floor ([facility] ) 08/23/2020    Fall from ground level 08/23/2020    Age-related osteoporosis without current pathological fracture 06/26/2020    Moderate protein-calorie malnutrition ([facility] ) 04/28/2020    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    Major neurocognitive disorder ([facility] ) 04/19/2020    [facility]ox[facility]     Elevated troponin 04/16/2020    Urinary incontinence 04/16/2020    COVID-19 virus infection 04/13/2020    Ambulatory dysfunction 04/10/2020    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    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    Paroxysmal A-fib ([facility] ) 03/18/2019    Cellulitis of extremity 12/10/2018    Bilateral hearing loss 10/11/2018    Fall 10/11/2018    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[facility] ) 10/11/2018    Essential hypertension 07/24/2018    Anxiety 07/24/2018    Hyperlipidemia 07/24/2018    Hyponatremia 07/24/2018    Acute encephalopathy 07/24/2018       Portions of the record may have been created with voice recognition software  Occasional wrong word or "sound a like" substitutions may have occurred due to the inherent limitations of voice recognition software  Read the chart carefully and recognize, using context, where substitutions have occurred

## 2021-07-24 NOTE — PROGRESS NOTES
Estella Wright [age] y o  female MRN: [id number]  Unit/Bed#: E2 -01      Subjective:  80 y  o female seen and evaluated at bedside this morning  Patient denies any pain currently  She is resting comfortably in no acute distress      Labs:  0   Lab Value Date/Time    HCT 28 4 (L) 07/22/2021 0436    HCT 25 9 (L) 07/19/2021 0427    HCT 30 0 (L) 07/18/2021 1556    HCT 31 6 (L) 06/23/2014 0504    HCT 36 1 06/22/2014 1652    HCT 39 5 05/27/2014 0932    HGB 9 1 (L) 07/22/2021 0436    HGB 8 4 (L) 07/19/2021 0427    HGB 9 6 (L) 07/18/2021 1556    HGB 11 0 (L) 06/23/2014 0504    HGB 12 7 06/22/2014 1652    HGB 13 1 05/27/2014 0932    INR 0 96 07/15/2021 0509    WBC 6 33 07/22/2021 0436    WBC 7 45 07/19/2021 0427    WBC 4 95 07/18/2021 0439    WBC 6 32 06/23/2014 0504    WBC 7 68 06/22/2014 1652    WBC 5 73 05/27/2014 0932    ESR 2 05/21/2021 1118    CRP 9 6 (H) 05/21/2021 1118       Meds:    Current Facility-Administered Medications:     acetaminophen (TYLENOL) oral suspension 650 mg, 650 mg, Oral, 4x Daily, Jaimie Grant PA-C, 650 mg at 07/24/21 0539    albuterol (PROVENTIL HFA,VENTOLIN HFA) inhaler 2 puff, 2 puff, Inhalation, Q4H PRN, Maddy Jefferson PA-C    aluminum-magnesium hydroxide-simethicone (MYLANTA) oral suspension 30 mL, 30 mL, Oral, Q6H PRN, Maddy Jefferson PA-C    bisacodyl (DULCOLAX) rectal suppository 10 mg, 10 mg, Rectal, Daily PRN, Rubi Howard MD, 10 mg at 07/19/21 1634    bisacodyl (DULCOLAX) rectal suppository 10 mg, 10 mg, Rectal, Once, Tuesday M BEV Aburto    cyanocobalamin (VITAMIN B-12) tablet 1,000 mcg, 1,000 mcg, Oral, Daily, Maddy Jefferson PA-C, 1,000 mcg at 07/23/21 0805    diltiazem (CARDIZEM CD) 24 hr capsule 180 mg, 180 mg, Oral, Daily, Rubi Howadr MD    diltiazem (CARDIZEM) injection 15 mg, 15 mg, Intravenous, Once, Rubi Howard MD    docusate sodium (COLACE) capsule 100 mg, 100 mg, Oral, Q12H [address], Maddy Jefferson PA-C, 100 mg at 07/23/21 2128   DULoxetine (CYMBALTA) delayed release capsule 30 mg, 30 mg, Oral, HS, Jeanette Callaway PA-C, 30 mg at 07/23/21 2129    enoxaparin (LOVENOX) subcutaneous injection 30 mg, 30 mg, Subcutaneous, Q24H [facility], Azalea Nellywell, PA-C, 30 mg at 07/23/21 0805    HYDROmorphone HCl (DILAUDID) injection 0 2 mg, 0 2 mg, Intravenous, Q4H PRN, Thresa Shock, PA-C    lidocaine (LIDODERM) 5 % patch 1 patch, 1 patch, Topical, Daily, Tuesday M BEV Aburto, 1 patch at 07/23/21 0810    melatonin tablet 3 mg, 3 mg, Oral, HS, Jeanette Callaway PA-C, 3 mg at 07/23/21 2129    metoprolol succinate (TOPROL-XL) 24 hr tablet 50 mg, 50 mg, Oral, Daily, Suly Palma MD, 50 mg at 07/23/21 0805    OLANZapine (ZyPREXA) tablet 2 5 mg, 2 5 mg, Oral, HS, Suly Palma MD, 2 5 mg at 07/23/21 2128    ondansetron (ZOFRAN) injection 4 mg, 4 mg, Intravenous, Q6H PRN, Thresa Shock, PA-C, 4 mg at 07/15/21 2322    oxyCODONE (ROXICODONE) IR tablet 2 5 mg, 2 5 mg, Oral, Q4H PRN, Thresa Shock, PA-C, 2 5 mg at 07/16/21 7204    oxyCODONE (ROXICODONE) IR tablet 5 mg, 5 mg, Oral, Q4H PRN, Thresa Shock, PA-C, 5 mg at 07/23/21 5837    polyethylene glycol (MIRALAX) packet 17 g, 17 g, Oral, Daily PRN, Thresa Shock, PA-C, 17 g at 07/19/21 2103    simethicone (MYLICON) chewable tablet 80 mg, 80 mg, Oral, 4x Daily PRN, Thresa Shock, PA-C    Blood Culture:   Lab Results   Component Value Date    BLOODCX No Growth After 5 Days  04/16/2020       Wound Culture:   No results found for: WOUNDCULT    Ins and Outs:  I/O last 24 hours:   In: -   Out: 900 [Urine:900]          Physical Exam:  Vitals:    07/24/21 0300   BP: 152/61   Pulse: 80   Resp: 16   Temp: (!) 97 °F (36 1 °C)   SpO2: 93%     right Lower Extremity extremity:  · Dressings C/D/I of the right hip  · No surrounding erythema, ecchymosis  · No palpable subcutaneous fluid collection in the lateral thigh  · Sensation motor grossly intact throughout right lower extremity  · 2+ DP pulse    Assessment: 80 y o female status post right femur short IM nail by Dr Zoey Murcia on 7/15/21    Plan:  · Weight-bearing as tolerated  · DVT prophylaxis - Lovenox for 30 days postop, SCDs  · Analgesics per primary team  · PT/OT  · Dispo: [id number] [address]ia Law for discharge from ortho perspective   · Follow-up with Dr Zoey Murcia 4 weeks from date of surgery  · Patient noted to have acute blood loss anemia due to a drop in Hbg of > 2 0g from preop levels, will monitor vital signs and resuscitate with IV fluids as needed    Jake Jane PA-C

## 2021-07-25 PROCEDURE — 99232 SBSQ HOSP IP/OBS MODERATE 35: CPT | Performed by: INTERNAL MEDICINE

## 2021-07-25 RX ORDER — DILTIAZEM HYDROCHLORIDE 180 MG/1
180 CAPSULE, COATED, EXTENDED RELEASE ORAL DAILY
Qty: 30 CAPSULE | Refills: 0 | Status: SHIPPED | OUTPATIENT
Start: 2021-07-25 | End: 2021-07-26 | Stop reason: HOSPADM

## 2021-07-25 RX ORDER — AMIODARONE HYDROCHLORIDE 200 MG/1
200 TABLET ORAL
Status: DISCONTINUED | OUTPATIENT
Start: 2021-07-25 | End: 2021-07-26 | Stop reason: HOSPADM

## 2021-07-25 RX ADMIN — LIDOCAINE 1 PATCH: 50 PATCH CUTANEOUS at 08:43

## 2021-07-25 RX ADMIN — MELATONIN 3 MG: at 22:07

## 2021-07-25 RX ADMIN — ACETAMINOPHEN 649.6 MG: 650 SUSPENSION ORAL at 01:15

## 2021-07-25 RX ADMIN — METOPROLOL SUCCINATE 50 MG: 50 TABLET, EXTENDED RELEASE ORAL at 08:43

## 2021-07-25 RX ADMIN — OLANZAPINE 2.5 MG: 5 TABLET, FILM COATED ORAL at 22:07

## 2021-07-25 RX ADMIN — AMIODARONE HYDROCHLORIDE 200 MG: 200 TABLET ORAL at 13:58

## 2021-07-25 RX ADMIN — ENOXAPARIN SODIUM 30 MG: 30 INJECTION, SOLUTION INTRAVENOUS; SUBCUTANEOUS at 08:43

## 2021-07-25 RX ADMIN — DULOXETINE HYDROCHLORIDE 30 MG: 30 CAPSULE, DELAYED RELEASE ORAL at 22:07

## 2021-07-25 RX ADMIN — OXYCODONE HYDROCHLORIDE 5 MG: 5 TABLET ORAL at 15:31

## 2021-07-25 RX ADMIN — AMIODARONE HYDROCHLORIDE 200 MG: 200 TABLET ORAL at 18:10

## 2021-07-25 RX ADMIN — ACETAMINOPHEN 650 MG: 650 SUSPENSION ORAL at 23:24

## 2021-07-25 RX ADMIN — ACETAMINOPHEN 650 MG: 650 SUSPENSION ORAL at 18:09

## 2021-07-25 RX ADMIN — DOCUSATE SODIUM 100 MG: 100 CAPSULE ORAL at 08:43

## 2021-07-25 RX ADMIN — DOCUSATE SODIUM 100 MG: 100 CAPSULE ORAL at 22:07

## 2021-07-25 RX ADMIN — CYANOCOBALAMIN TAB 500 MCG 1000 MCG: 500 TAB at 08:43

## 2021-07-25 RX ADMIN — DILTIAZEM HYDROCHLORIDE 180 MG: 180 CAPSULE, COATED, EXTENDED RELEASE ORAL at 08:43

## 2021-07-25 NOTE — PROGRESS NOTES
Cardiology   MD Hardeep Mendoza MD Ather Mansoor, MD Eligah Baars, DO, Ben Palacio DO, Governor DO Thuan, [facility] [place]  -------------------------------------------------------------------  [facility]  [address] [place] [id number]  [phone]  [phone]        Progress Note - Cardiology   Binta Castaneda 80 y o  female MRN: [id number]  Unit/Bed#: E2 -01 Encounter: [id number]        Assessment/Recommendations/Discussion:   1  Paroxysmal atrial fibrillation, rate controlled at this time  2  Acute blood loss anemia secondary to hip fracture and surgery  3  Dementia  4  History of schizophrenia  5  Acute kidney injury on chronic kidney disease  6  Hypertension    · Fortunately back in sinus rhythm today  Will start amiodarone 200 mg p o  T i d  x 2 weeks, then 100 mg once daily to maintain sinus rhythm  Continue metoprolol and diltiazem  Baseline QTC interval 450 milliseconds    ECG tomorrow morning to check QTC, since on Zyprexa  · Remain off anticoagulation in light of high fall risk including recent fall and subsequent hip fracture  · Okay to start discharge plan from cardiac standpoint        Subjective:  Patient seen and examined, no complaints          Physical Exam:  GEN:  NAD  HEENT:  MMM, NCAT, pink conjunctiva, EOMI, nonicteric sclera  CV:  NO JVD/HJR, RR, NO M/R/G, +S1/S2, NO PARASTERNAL HEAVE/THRILL, NO LE EDEMA, NO HEPATIC SYSTOLIC PULSATION, WARM EXTREMITIES  RESP:  CTAB/L  ABD:  SOFT, NT, NO GROSS ORGANOMEGALY        Vitals:   /90 (BP Location: Left arm)   Pulse 95   Temp 97 5 °F (36 4 °C) (Temporal)   Resp 20   Wt 59 5 kg (131 lb 2 8 oz)   SpO2 98%   BMI 27 42 kg/m²   Vitals:    07/24/21 0600 07/25/21 0600   Weight: 62 1 kg (136 lb 14 5 oz) 59 5 kg (131 lb 2 8 oz)       Intake/Output Summary (Last 24 hours) at 7/25/2021 1340  Last data filed at 7/25/2021 1100  Gross per 24 hour   Intake --   Output 1400 ml Net -1400 ml       TELEMETRY:  Sinus rhythm  Lab Results:  Results from last 7 days   Lab Units 07/22/21  0436   WBC Thousand/uL 6 33   HEMOGLOBIN g/dL 9 1*   HEMATOCRIT % 28 4*   PLATELETS Thousands/uL 246     Results from last 7 days   Lab Units 07/22/21  0436   POTASSIUM mmol/L 3 5   CHLORIDE mmol/L 109*   CO2 mmol/L 28   BUN mg/dL 20   CREATININE mg/dL 0 84   CALCIUM mg/dL 8 3     Results from last 7 days   Lab Units 07/22/21  0436   POTASSIUM mmol/L 3 5   CHLORIDE mmol/L 109*   CO2 mmol/L 28   BUN mg/dL 20   CREATININE mg/dL 0 84   CALCIUM mg/dL 8 3           Medications:    Current Facility-Administered Medications:     acetaminophen (TYLENOL) oral suspension 650 mg, 650 mg, Oral, 4x Daily, Sandi Santos PA-C, 649 6 mg at 07/25/21 0115    albuterol (PROVENTIL HFA,VENTOLIN HFA) inhaler 2 puff, 2 puff, Inhalation, Q4H PRN, Neno Doss PA-C    aluminum-magnesium hydroxide-simethicone (MYLANTA) oral suspension 30 mL, 30 mL, Oral, Q6H PRN, Neno Doss PA-C    bisacodyl (DULCOLAX) rectal suppository 10 mg, 10 mg, Rectal, Daily PRN, Elmo Marr MD, 10 mg at 07/19/21 1634    bisacodyl (DULCOLAX) rectal suppository 10 mg, 10 mg, Rectal, Once, Tuesday M BEV Aburto    cyanocobalamin (VITAMIN B-12) tablet 1,000 mcg, 1,000 mcg, Oral, Daily, Jeanette Callaway PA-C, 1,000 mcg at 07/25/21 0843    diltiazem (CARDIZEM CD) 24 hr capsule 180 mg, 180 mg, Oral, Daily, Elmo Marr MD, 180 mg at 07/25/21 0843    diltiazem (CARDIZEM) injection 15 mg, 15 mg, Intravenous, Once, Elmo Marr MD    docusate sodium (COLACE) capsule 100 mg, 100 mg, Oral, Q12H [address], Jeanette Callaway PA-C, 100 mg at 07/25/21 0843    DULoxetine (CYMBALTA) delayed release capsule 30 mg, 30 mg, Oral, HS, Jeanette J SANTOS Callaway, 30 mg at 07/24/21 2041    enoxaparin (LOVENOX) subcutaneous injection 30 mg, 30 mg, Subcutaneous, Q24H [address], Azalea Robertson PA-C, 30 mg at 07/25/21 0843    HYDROmorphone HCl (DILAUDID) injection 0 2 mg, 0 2 mg, Intravenous, Q4H PRN, Neno Doss PA-C    lidocaine (LIDODERM) 5 % patch 1 patch, 1 patch, Topical, Daily, Tuesday M BEV Aburto, 1 patch at 07/25/21 0843    melatonin tablet 3 mg, 3 mg, Oral, HS, Jeanette Callaway PA-C, 3 mg at 07/24/21 2043    metoprolol succinate (TOPROL-XL) 24 hr tablet 50 mg, 50 mg, Oral, Daily, Lázaro Kamara MD, 50 mg at 07/25/21 0843    OLANZapine (ZyPREXA) tablet 2 5 mg, 2 5 mg, Oral, HS, Lázaro Kamara MD, 2 5 mg at 07/24/21 2043    ondansetron (ZOFRAN) injection 4 mg, 4 mg, Intravenous, Q6H PRN, Neno Doss PA-C, 4 mg at 07/15/21 2322    oxyCODONE (ROXICODONE) IR tablet 2 5 mg, 2 5 mg, Oral, Q4H PRN, Neno Doss PA-C, 2 5 mg at 07/16/21 7977    oxyCODONE (ROXICODONE) IR tablet 5 mg, 5 mg, Oral, Q4H PRN, Neno Doss PA-C, 5 mg at 07/24/21 1742    polyethylene glycol (MIRALAX) packet 17 g, 17 g, Oral, Daily PRN, Neno Doss PA-C, 17 g at 07/19/21 2103    simethicone (MYLICON) chewable tablet 80 mg, 80 mg, Oral, 4x Daily PRN, Neno Doss PA-C    This note was completed in part utilizing [employer] Software  Grammatical errors, random word insertions, spelling mistakes, and incomplete sentences may be an occasional consequence of this system secondary to software limitations, ambient noise, and hardware issues  If you have any questions or concerns about the content, text, or information contained within the body of this dictation, please contact the provider for clarification

## 2021-07-25 NOTE — PLAN OF CARE
Problem: MOBILITY - ADULT  Goal: Maintain or return to baseline ADL function  Description: INTERVENTIONS:  -  Assess patient's ability to carry out ADLs; assess patient's baseline for ADL function and identify physical deficits which impact ability to perform ADLs (bathing, care of mouth/teeth, toileting, grooming, dressing, etc )  - Assess/evaluate cause of self-care deficits   - Assess range of motion  - Assess patient's mobility; develop plan if impaired  - Assess patient's need for assistive devices and provide as appropriate  - Encourage maximum independence but intervene and supervise when necessary  - Involve family in performance of ADLs  - Assess for home care needs following discharge   - Consider OT consult to assist with ADL evaluation and planning for discharge  - Provide patient education as appropriate  Outcome: Progressing  Goal: Maintains/Returns to pre admission functional level  Description: INTERVENTIONS:  - Perform BMAT or MOVE assessment daily    - Set and communicate daily mobility goal to care team and patient/family/caregiver     - Collaborate with rehabilitation services on mobility goals if consulted  - Out of bed for toileting  - Record patient progress and toleration of activity level   Outcome: Progressing     Problem: Prexisting or High Potential for Compromised Skin Integrity  Goal: Skin integrity is maintained or improved  Description: INTERVENTIONS:  - Identify patients at risk for skin breakdown  - Assess and monitor skin integrity  - Assess and monitor nutrition and hydration status  - Monitor labs   - Assess for incontinence   - Turn and reposition patient  - Assist with mobility/ambulation  - Relieve pressure over bony prominences  - Avoid friction and shearing  - Provide appropriate hygiene as needed including keeping skin clean and dry  - Evaluate need for skin moisturizer/barrier cream  - Collaborate with interdisciplinary team   - Patient/family teaching  Outcome: Progressing     Problem: CARDIOVASCULAR - ADULT  Goal: Maintains optimal cardiac output and hemodynamic stability  Description: INTERVENTIONS:  - Monitor I/O, vital signs and rhythm  - Monitor for S/S and trends of decreased cardiac output  - Administer and titrate ordered vasoactive medications to optimize hemodynamic stability  - Assess quality of pulses, skin color and temperature  - Assess for signs of decreased coronary artery perfusion  - Instruct patient to report change in severity of symptoms  Outcome: Progressing     Problem: RESPIRATORY - ADULT  Goal: Achieves optimal ventilation and oxygenation  Description: INTERVENTIONS:  - Assess for changes in respiratory status  - Assess for changes in mentation and behavior  - Position to facilitate oxygenation and minimize respiratory effort  - Oxygen administered by appropriate delivery if ordered  - Encourage broncho-pulmonary hygiene including cough, deep breathe, Incentive Spirometry  - Assess the need for suctioning and aspirate as needed  - Assess for SOB or any respiratory difficulty  - Respiratory Therapy support as indicated  Outcome: Progressing     Problem: METABOLIC, FLUID AND ELECTROLYTES - ADULT  Goal: Electrolytes maintained within normal limits  Description: INTERVENTIONS:  - Monitor labs and assess patient for signs and symptoms of electrolyte imbalances  - Administer electrolyte replacement as ordered  - Monitor response to electrolyte replacements, including repeat lab results as appropriate  - Instruct patient on fluid and nutrition as appropriate  Outcome: Progressing     Problem: SKIN/TISSUE INTEGRITY - ADULT  Goal: Incision(s), wounds(s) or drain site(s) healing without S/S of infection  Description: INTERVENTIONS  - Assess and document dressing, incision, wound bed, drain sites and surrounding tissue  - Provide caregiver/family education  - Perform skin care/dressing changes as ordered  Outcome: Progressing     Problem: MUSCULOSKELETAL - ADULT  Goal: Maintain or return mobility to safest level of function  Description: INTERVENTIONS:  - Assess patient's ability to carry out ADLs; assess patient's baseline for ADL function and identify physical deficits which impact ability to perform ADLs (bathing, care of mouth/teeth, toileting, grooming, dressing, etc )  - Assess/evaluate cause of self-care deficits   - Assess range of motion  - Assess patient's mobility  - Assess patient's need for assistive devices and provide as appropriate  - Encourage maximum independence but intervene and supervise when necessary  - Involve family in performance of ADLs  - Assess for home care needs following discharge   - Consider OT consult to assist with ADL evaluation and planning for discharge  - Provide patient education as appropriate  Outcome: Progressing     Problem: PAIN - ADULT  Goal: Verbalizes/displays adequate comfort level or baseline comfort level  Description: Interventions:  - Encourage patient to monitor pain and request assistance  - Assess pain using appropriate pain scale  - Consider geriatric pain management order set   - Administer analgesics based on type and severity of pain and evaluate response  - Implement non-pharmacological measures as appropriate and evaluate response  - Consider cultural and social influences on pain and pain management  - Notify physician/advanced practitioner if interventions unsuccessful or patient reports new pain  Outcome: Progressing     Problem: DISCHARGE PLANNING  Goal: Discharge to home or other facility with appropriate resources  Description: INTERVENTIONS:  - Identify barriers to discharge w/patient and caregiver  - Arrange for needed discharge resources and transportation as appropriate  - Identify discharge learning needs (meds, wound care, etc )  - Arrange for interpretive services (Slovenian) to assist at discharge as needed  - Refer to Case Management Department for coordinating discharge planning if the patient needs post-hospital services based on physician/advanced practitioner order or complex needs related to functional status, cognitive ability, or social support system  Outcome: Progressing     Problem: Knowledge Deficit  Goal: Patient/family/caregiver demonstrates understanding of disease process, treatment plan, medications, and discharge instructions  Description: Complete learning assessment and assess knowledge base  Interventions:  - Provide teaching at level of understanding  - Provide teaching via preferred learning methods  Outcome: Progressing     Problem: CONFUSION/THOUGHT DISTURBANCE  Goal: Thought disturbances (confusion, delirium, depression, dementia or psychosis) are managed to maintain or return to baseline mental status and functional level  Description: INTERVENTIONS:  - Assess for possible contributors to  thought disturbance, including but not limited to medications, infection, impaired vision or hearing, underlying metabolic abnormalities, dehydration, respiratory compromise,  psychiatric diagnoses and notify attending PHYSICAN/AP  - Monitor and intervene to maintain adequate nutrition, hydration, elimination, sleep and activity  - Decrease environmental stimuli, including noise as appropriate  - Patient is Hungarian speaking only - communicate in Hungarian  - Provide frequent contacts to provide refocusing, direction and reassurance as needed  Approach patient calmly with eye contact and at their level    - [place] high risk fall precautions, aspiration precautions and other safety measures, as indicated  - If delirium suspected, notify physician/AP of change in condition and request immediate in-person evaluation  - Pursue consults as appropriate including Geriatric (campus dependent), OT for cognitive evaluation/activity planning, psychiatric, pastoral care, etc   Outcome: Progressing     Problem: Nutrition/Hydration-ADULT  Goal: Nutrient/Hydration intake appropriate for improving, restoring or maintaining nutritional needs  Description: Monitor and assess patient's nutrition/hydration status for malnutrition  Collaborate with interdisciplinary team and initiate plan and interventions as ordered  Monitor patient's weight and dietary intake as ordered or per policy  Utilize nutrition screening tool and intervene as necessary  Determine patient's food preferences and provide high-protein, high-caloric foods as appropriate       INTERVENTIONS:  - Monitor oral intake, urinary output, labs, and treatment plans  - Assess nutrition and hydration status and recommend course of action  - Evaluate amount of meals eaten  - Total assist in feeding patient  - Allow adequate time for meals  - Recommend/ encourage appropriate diets, oral nutritional supplements, and vitamin/mineral supplements  - Assess need for intravenous fluids  - Provide specific nutrition/hydration education as appropriate  - Include patient/family/caregiver in decisions related to nutrition  Outcome: Progressing     Problem: Potential for Falls  Goal: Patient will remain free of falls  Description: INTERVENTIONS:  - Educate patient/family on patient safety including physical limitations  - Instruct patient to call for assistance with activity   - Consult OT/PT to assist with strengthening/mobility   - Keep Call bell within reach  - Keep bed low and locked with side rails adjusted as appropriate  - Keep care items and personal belongings within reach  - Initiate and maintain comfort rounds  - Make Fall Risk Sign visible to staff  - Offer Toileting every 2 hours, in advance of need  - Initiate/Maintain bed/chair alarm  - Obtain necessary fall risk management equipment: bed alarm  - Apply yellow socks and bracelet for high fall risk patients  - Keep patient's room near nurses station  Outcome: Progressing

## 2021-07-25 NOTE — PROGRESS NOTES
Progress Note - Guerrero Morrison 80 y o  female MRN: [id number]    Unit/Bed#: E2 -01 Encounter: [id number]      Subjective: The patient appears comfortable  She denies any chest pain or shortness of breath  She has been sleeping okay  Physical Exam:   Temp:  [96 6 °F (35 9 °C)-97 5 °F (36 4 °C)] 97 5 °F (36 4 °C)  HR:  [71-95] 95  Resp:  [16-20] 20  BP: (116-161)/(49-90) 116/90    Gen:  Well-developed, well-nourished, in no distress  Neck:  Supple  No lymphadenopathy, goiter, or bruit  Heart:  Regular rhythm  I heard no murmur, gallop, or rub  Lungs:  Clear to auscultation and percussion  No wheezing, rales, or rhonchi    Abd:  Soft with active bowel sounds  No mass, tenderness, or organomegaly  Extremities:  No clubbing, cyanosis, or edema  No calf tenderness  Neuro:  Alert and interactive  No focal sign  Skin:  Warm and dry      LABS:  No new labs          Assessment/Plan:  1  Right hip fracture, status post intramedullary nail fixation  2  Occluded blood-loss anemia secondary to fracture plus surgery  3  Paroxysmal atrial fibrillation, currently in sinus rhythm  4  Dementia  5  History of schizophrenia  6  Acute kidney injury superimposed on chronic kidney disease stage 3, resolved  7  Pyuria, treated for urinary tract infection    The patient has reverted to sinus rhythm  She is currently on metoprolol and diltiazem  I will discuss the situation with Cardiology  The possibility of amiodarone therapy was raised yesterday  The patient appears to be a poor candidate for anticoagulation  Arrangements are in progress for short-term rehab        VTE Pharmacologic Prophylaxis: Enoxaparin (Lovenox)  VTE Mechanical Prophylaxis: sequential compression device

## 2021-07-26 VITALS
OXYGEN SATURATION: 96 % | BODY MASS INDEX: 28.11 KG/M2 | WEIGHT: 134.48 LBS | DIASTOLIC BLOOD PRESSURE: 74 MMHG | HEART RATE: 85 BPM | RESPIRATION RATE: 18 BRPM | SYSTOLIC BLOOD PRESSURE: 135 MMHG | TEMPERATURE: 97.4 F

## 2021-07-26 PROBLEM — N18.31 STAGE 3A CHRONIC KIDNEY DISEASE (HCC): Status: ACTIVE | Noted: 2021-07-15

## 2021-07-26 PROBLEM — N30.00 ACUTE CYSTITIS WITHOUT HEMATURIA: Status: RESOLVED | Noted: 2021-07-17 | Resolved: 2021-07-26

## 2021-07-26 PROBLEM — F03.90 MAJOR NEUROCOGNITIVE DISORDER (HCC): Status: ACTIVE | Noted: 2020-04-19

## 2021-07-26 PROCEDURE — 99232 SBSQ HOSP IP/OBS MODERATE 35: CPT | Performed by: INTERNAL MEDICINE

## 2021-07-26 PROCEDURE — 97530 THERAPEUTIC ACTIVITIES: CPT

## 2021-07-26 PROCEDURE — 97116 GAIT TRAINING THERAPY: CPT

## 2021-07-26 PROCEDURE — 99239 HOSP IP/OBS DSCHRG MGMT >30: CPT | Performed by: INTERNAL MEDICINE

## 2021-07-26 PROCEDURE — 97535 SELF CARE MNGMENT TRAINING: CPT

## 2021-07-26 RX ORDER — AMIODARONE HYDROCHLORIDE 200 MG/1
200 TABLET ORAL
Refills: 0
Start: 2021-07-26 | End: 2021-08-27

## 2021-07-26 RX ORDER — SIMETHICONE 80 MG
80 TABLET,CHEWABLE ORAL 4 TIMES DAILY PRN
Qty: 30 TABLET | Refills: 0
Start: 2021-07-26 | End: 2022-04-05

## 2021-07-26 RX ORDER — DILTIAZEM HYDROCHLORIDE 120 MG/1
120 CAPSULE, COATED, EXTENDED RELEASE ORAL DAILY
Start: 2021-07-27 | End: 2021-08-27

## 2021-07-26 RX ORDER — LIDOCAINE 50 MG/G
1 PATCH TOPICAL DAILY
Refills: 0
Start: 2021-07-27 | End: 2021-09-29

## 2021-07-26 RX ORDER — BISACODYL 10 MG
10 SUPPOSITORY, RECTAL RECTAL DAILY PRN
Qty: 12 SUPPOSITORY | Refills: 0
Start: 2021-07-26

## 2021-07-26 RX ORDER — METOPROLOL SUCCINATE 50 MG/1
50 TABLET, EXTENDED RELEASE ORAL DAILY
Refills: 0
Start: 2021-07-27 | End: 2021-08-04 | Stop reason: SDUPTHER

## 2021-07-26 RX ADMIN — ACETAMINOPHEN 650 MG: 650 SUSPENSION ORAL at 12:18

## 2021-07-26 RX ADMIN — AMIODARONE HYDROCHLORIDE 200 MG: 200 TABLET ORAL at 12:18

## 2021-07-26 RX ADMIN — ACETAMINOPHEN 650 MG: 650 SUSPENSION ORAL at 05:56

## 2021-07-26 RX ADMIN — ENOXAPARIN SODIUM 30 MG: 30 INJECTION, SOLUTION INTRAVENOUS; SUBCUTANEOUS at 08:08

## 2021-07-26 RX ADMIN — DOCUSATE SODIUM 100 MG: 100 CAPSULE ORAL at 08:07

## 2021-07-26 RX ADMIN — METOPROLOL SUCCINATE 50 MG: 50 TABLET, EXTENDED RELEASE ORAL at 08:07

## 2021-07-26 RX ADMIN — CYANOCOBALAMIN TAB 500 MCG 1000 MCG: 500 TAB at 08:07

## 2021-07-26 RX ADMIN — LIDOCAINE 1 PATCH: 50 PATCH CUTANEOUS at 08:08

## 2021-07-26 RX ADMIN — DILTIAZEM HYDROCHLORIDE 180 MG: 180 CAPSULE, COATED, EXTENDED RELEASE ORAL at 08:07

## 2021-07-26 RX ADMIN — AMIODARONE HYDROCHLORIDE 200 MG: 200 TABLET ORAL at 08:08

## 2021-07-26 NOTE — PLAN OF CARE
Problem: OCCUPATIONAL THERAPY ADULT  Goal: Performs self-care activities at highest level of function for planned discharge setting  See evaluation for individualized goals  Description: Treatment Interventions: ADL retraining, Functional transfer training, UE strengthening/ROM, Endurance training, Cognitive reorientation, Patient/family training, Equipment evaluation/education, Compensatory technique education, Activityengagement, Energy conservation          See flowsheet documentation for full assessment, interventions and recommendations  Outcome: Progressing  Note: Limitation: Decreased ADL status, Decreased Safe judgement during ADL, Decreased UE strength, Decreased endurance, Decreased cognition, Decreased high-level ADLs, Decreased self-care trans  Prognosis: Guarded  Assessment: Pt was seen for skilled OT with focus on completion of self care routine, bed mobility, functional transfers, review of RW safety, basic orientation and review of current plan of care  Pt with positive results following greeting with hugging gesture and translated communication into R ear  Pt with flat affect with need for close, step by step communication  See above levels of A required for all functional tasks  Pt agreeable for completion of ADL routine  ADL routine as follows  Feeding: Min A with cues and close presentation of one item at a time  Pt enjoys water  Grooming: Min A to comb hair and wash face this tx session with initiation to tasks required  UB Bathing: Min A with cues to sequence activity and to encourage good quality  UB Dressing: S LB Bathing: Max A LB Dressing: Max A Toileting: Max A Bed Mobility: Min A x1 Transfers: Mod A x2 with early release of RW noted  Poor safety awareness  Hand over hand A with step by step verbal cues will be required to encourage carry over of appropriate techs   The patient's raw score on the AM-PAC Daily Activity inpatient short form is 13, standardized score is 32 03, less than 39 4  Patients at this level are likely to benefit from discharge to post-acute rehabilitation services        OT Discharge Recommendation: Post acute rehabilitation services  OT - OK to Discharge: Yes (to rehab when medically cleared  )

## 2021-07-26 NOTE — PLAN OF CARE
Problem: MOBILITY - ADULT  Goal: Maintain or return to baseline ADL function  Description: INTERVENTIONS:  -  Assess patient's ability to carry out ADLs; assess patient's baseline for ADL function and identify physical deficits which impact ability to perform ADLs (bathing, care of mouth/teeth, toileting, grooming, dressing, etc )  - Assess/evaluate cause of self-care deficits   - Assess range of motion  - Assess patient's mobility; develop plan if impaired  - Assess patient's need for assistive devices and provide as appropriate  - Encourage maximum independence but intervene and supervise when necessary  - Involve family in performance of ADLs  - Assess for home care needs following discharge   - Consider OT consult to assist with ADL evaluation and planning for discharge  - Provide patient education as appropriate  Outcome: Progressing  Goal: Maintains/Returns to pre admission functional level  Description: INTERVENTIONS:  - Perform BMAT or MOVE assessment daily    - Set and communicate daily mobility goal to care team and patient/family/caregiver     - Collaborate with rehabilitation services on mobility goals if consulted  - Out of bed for toileting  - Record patient progress and toleration of activity level   Outcome: Progressing     Problem: Prexisting or High Potential for Compromised Skin Integrity  Goal: Skin integrity is maintained or improved  Description: INTERVENTIONS:  - Identify patients at risk for skin breakdown  - Assess and monitor skin integrity  - Assess and monitor nutrition and hydration status  - Monitor labs   - Assess for incontinence   - Turn and reposition patient  - Assist with mobility/ambulation  - Relieve pressure over bony prominences  - Avoid friction and shearing  - Provide appropriate hygiene as needed including keeping skin clean and dry  - Evaluate need for skin moisturizer/barrier cream  - Collaborate with interdisciplinary team   - Patient/family teaching  Outcome: Progressing     Problem: CARDIOVASCULAR - ADULT  Goal: Maintains optimal cardiac output and hemodynamic stability  Description: INTERVENTIONS:  - Monitor I/O, vital signs and rhythm  - Monitor for S/S and trends of decreased cardiac output  - Administer and titrate ordered vasoactive medications to optimize hemodynamic stability  - Assess quality of pulses, skin color and temperature  - Assess for signs of decreased coronary artery perfusion  - Instruct patient to report change in severity of symptoms  Outcome: Progressing     Problem: RESPIRATORY - ADULT  Goal: Achieves optimal ventilation and oxygenation  Description: INTERVENTIONS:  - Assess for changes in respiratory status  - Assess for changes in mentation and behavior  - Position to facilitate oxygenation and minimize respiratory effort  - Oxygen administered by appropriate delivery if ordered  - Encourage broncho-pulmonary hygiene including cough, deep breathe, Incentive Spirometry  - Assess the need for suctioning and aspirate as needed  - Assess for SOB or any respiratory difficulty  - Respiratory Therapy support as indicated  Outcome: Progressing     Problem: METABOLIC, FLUID AND ELECTROLYTES - ADULT  Goal: Electrolytes maintained within normal limits  Description: INTERVENTIONS:  - Monitor labs and assess patient for signs and symptoms of electrolyte imbalances  - Administer electrolyte replacement as ordered  - Monitor response to electrolyte replacements, including repeat lab results as appropriate  - Instruct patient on fluid and nutrition as appropriate  Outcome: Progressing     Problem: SKIN/TISSUE INTEGRITY - ADULT  Goal: Incision(s), wounds(s) or drain site(s) healing without S/S of infection  Description: INTERVENTIONS  - Assess and document dressing, incision, wound bed, drain sites and surrounding tissue  - Provide caregiver/family education  - Perform skin care/dressing changes as ordered  Outcome: Progressing     Problem: MUSCULOSKELETAL - ADULT  Goal: Maintain or return mobility to safest level of function  Description: INTERVENTIONS:  - Assess patient's ability to carry out ADLs; assess patient's baseline for ADL function and identify physical deficits which impact ability to perform ADLs (bathing, care of mouth/teeth, toileting, grooming, dressing, etc )  - Assess/evaluate cause of self-care deficits   - Assess range of motion  - Assess patient's mobility  - Assess patient's need for assistive devices and provide as appropriate  - Encourage maximum independence but intervene and supervise when necessary  - Involve family in performance of ADLs  - Assess for home care needs following discharge   - Consider OT consult to assist with ADL evaluation and planning for discharge  - Provide patient education as appropriate  Outcome: Progressing     Problem: PAIN - ADULT  Goal: Verbalizes/displays adequate comfort level or baseline comfort level  Description: Interventions:  - Encourage patient to monitor pain and request assistance  - Assess pain using appropriate pain scale  - Consider geriatric pain management order set   - Administer analgesics based on type and severity of pain and evaluate response  - Implement non-pharmacological measures as appropriate and evaluate response  - Consider cultural and social influences on pain and pain management  - Notify physician/advanced practitioner if interventions unsuccessful or patient reports new pain  Outcome: Progressing     Problem: SAFETY ADULT  Goal: Patient will remain free of falls  Description: INTERVENTIONS:  - Educate patient/family on patient safety including physical limitations  - Instruct patient to call for assistance with activity   - Consult OT/PT to assist with strengthening/mobility   - Keep Call bell within reach  - Keep bed low and locked with side rails adjusted as appropriate  - Keep care items and personal belongings within reach  - Initiate and maintain comfort rounds  - Make Fall Risk Sign visible to staff  - Offer Toileting every 2 hours, in advance of need  - Initiate/Maintain bed/chair alarm  - Obtain necessary fall risk management equipment: bed alarm  - Apply yellow socks and bracelet for high fall risk patients  - Keep patient's room near nurses station  Outcome: Progressing     Problem: DISCHARGE PLANNING  Goal: Discharge to home or other facility with appropriate resources  Description: INTERVENTIONS:  - Identify barriers to discharge w/patient and caregiver  - Arrange for needed discharge resources and transportation as appropriate  - Identify discharge learning needs (meds, wound care, etc )  - Arrange for interpretive services (Polish) to assist at discharge as needed  - Refer to Case Management Department for coordinating discharge planning if the patient needs post-hospital services based on physician/advanced practitioner order or complex needs related to functional status, cognitive ability, or social support system  Outcome: Progressing     Problem: Knowledge Deficit  Goal: Patient/family/caregiver demonstrates understanding of disease process, treatment plan, medications, and discharge instructions  Description: Complete learning assessment and assess knowledge base    Interventions:  - Provide teaching at level of understanding  - Provide teaching via preferred learning methods  Outcome: Progressing     Problem: CONFUSION/THOUGHT DISTURBANCE  Goal: Thought disturbances (confusion, delirium, depression, dementia or psychosis) are managed to maintain or return to baseline mental status and functional level  Description: INTERVENTIONS:  - Assess for possible contributors to  thought disturbance, including but not limited to medications, infection, impaired vision or hearing, underlying metabolic abnormalities, dehydration, respiratory compromise,  psychiatric diagnoses and notify attending PHYSICAN/AP  - Monitor and intervene to maintain adequate nutrition, hydration, elimination, sleep and activity  - Decrease environmental stimuli, including noise as appropriate  - Patient is Dutch speaking only - communicate in Dutch  - Provide frequent contacts to provide refocusing, direction and reassurance as needed  Approach patient calmly with eye contact and at their level  - [place] high risk fall precautions, aspiration precautions and other safety measures, as indicated  - If delirium suspected, notify physician/AP of change in condition and request immediate in-person evaluation  - Pursue consults as appropriate including Geriatric (campus dependent), OT for cognitive evaluation/activity planning, psychiatric, pastoral care, etc   Outcome: Progressing     Problem: Nutrition/Hydration-ADULT  Goal: Nutrient/Hydration intake appropriate for improving, restoring or maintaining nutritional needs  Description: Monitor and assess patient's nutrition/hydration status for malnutrition  Collaborate with interdisciplinary team and initiate plan and interventions as ordered  Monitor patient's weight and dietary intake as ordered or per policy  Utilize nutrition screening tool and intervene as necessary  Determine patient's food preferences and provide high-protein, high-caloric foods as appropriate       INTERVENTIONS:  - Monitor oral intake, urinary output, labs, and treatment plans  - Assess nutrition and hydration status and recommend course of action  - Evaluate amount of meals eaten  - Total assist in feeding patient  - Allow adequate time for meals  - Recommend/ encourage appropriate diets, oral nutritional supplements, and vitamin/mineral supplements  - Assess need for intravenous fluids  - Provide specific nutrition/hydration education as appropriate  - Include patient/family/caregiver in decisions related to nutrition  Outcome: Progressing     Problem: Potential for Falls  Goal: Patient will remain free of falls  Description: INTERVENTIONS:  - Educate patient/family on patient safety including physical limitations  - Instruct patient to call for assistance with activity   - Consult OT/PT to assist with strengthening/mobility   - Keep Call bell within reach  - Keep bed low and locked with side rails adjusted as appropriate  - Keep care items and personal belongings within reach  - Initiate and maintain comfort rounds  - Make Fall Risk Sign visible to staff  - Offer Toileting every 2 hours, in advance of need  - Initiate/Maintain bed/chair alarm  - Obtain necessary fall risk management equipment: bed alarm  - Apply yellow socks and bracelet for high fall risk patients  - Keep patient's room near nurses station  Outcome: Progressing

## 2021-07-26 NOTE — PROGRESS NOTES
Cardiology   MD Essence Kirk MD Ather Mansoor, MD Flavia Colt DO, Jonathan Estrada DO, Jenny Amado DO, [facility] [place]  -------------------------------------------------------------------  [facility] ORTHOPEDIC [facility]  [address] [place] [id number] [phone]  [phone]        Progress Note - Cardiology   Solo Ramirez 80 y o  female MRN: [id number]  Unit/Bed#: E2 -01 Encounter: [id number]        Assessment/Recommendations/Discussion:     1  Paroxysmal atrial fibrillation, rate controlled at this time  2  Acute blood loss anemia secondary to hip fracture and surgery  3  Dementia  4  History of schizophrenia  5  Acute kidney injury on chronic kidney disease  6  Hypertension    · Sinus rhythm on amiodarone  Continue the same TID x 2 weeks then 100 mg daily, QTc ok  · No AC due to high fall risk  · Cardiology will sign off        Subjective: Pt seen/examined    Offers no complaints          Physical Exam:  GEN:  NAD  HEENT:  MMM, NCAT, pink conjunctiva, EOMI, nonicteric sclera  CV:  NO JVD/HJR, RR, NO M/R/G, +S1/S2, NO PARASTERNAL HEAVE/THRILL, NO LE EDEMA, NO HEPATIC SYSTOLIC PULSATION, WARM EXTREMITIES  RESP:  CTAB/L  ABD:  SOFT, NT, NO GROSS ORGANOMEGALY        Vitals:   /74 (BP Location: Left arm)   Pulse 85   Temp (!) 97 4 °F (36 3 °C) (Temporal)   Resp 18   Wt 61 kg (134 lb 7 7 oz)   SpO2 96%   BMI 28 11 kg/m²   Vitals:    07/25/21 0600 07/26/21 0539   Weight: 59 5 kg (131 lb 2 8 oz) 61 kg (134 lb 7 7 oz)       Intake/Output Summary (Last 24 hours) at 7/26/2021 1110  Last data filed at 7/25/2021 1532  Gross per 24 hour   Intake --   Output 600 ml   Net -600 ml       TELEMETRY: off  Lab Results:  Results from last 7 days   Lab Units 07/22/21  0436   WBC Thousand/uL 6 33   HEMOGLOBIN g/dL 9 1*   HEMATOCRIT % 28 4*   PLATELETS Thousands/uL 246     Results from last 7 days   Lab Units 07/22/21  0436   POTASSIUM mmol/L 3 5   CHLORIDE mmol/L 109*   CO2 mmol/L 28   BUN mg/dL 20   CREATININE mg/dL 0 84   CALCIUM mg/dL 8 3     Results from last 7 days   Lab Units 07/22/21  0436   POTASSIUM mmol/L 3 5   CHLORIDE mmol/L 109*   CO2 mmol/L 28   BUN mg/dL 20   CREATININE mg/dL 0 84   CALCIUM mg/dL 8 3           Medications:    Current Facility-Administered Medications:     acetaminophen (TYLENOL) oral suspension 650 mg, 650 mg, Oral, 4x Daily, Jaimie Grant PA-C, 650 mg at 07/26/21 0556    albuterol (PROVENTIL HFA,VENTOLIN HFA) inhaler 2 puff, 2 puff, Inhalation, Q4H PRN, Karina Mark PA-C    aluminum-magnesium hydroxide-simethicone (MYLANTA) oral suspension 30 mL, 30 mL, Oral, Q6H PRN, Karina Mark PA-C    amiodarone tablet 200 mg, 200 mg, Oral, TID With Meals, Troy Sanchez DO, 200 mg at 07/26/21 0808    bisacodyl (DULCOLAX) rectal suppository 10 mg, 10 mg, Rectal, Daily PRN, Nicolás Cerda MD, 10 mg at 07/19/21 1634    bisacodyl (DULCOLAX) rectal suppository 10 mg, 10 mg, Rectal, Once, Tuesday M BEV Aburto    cyanocobalamin (VITAMIN B-12) tablet 1,000 mcg, 1,000 mcg, Oral, Daily, Karina Mark PA-C, 1,000 mcg at 07/26/21 0807    diltiazem (CARDIZEM CD) 24 hr capsule 180 mg, 180 mg, Oral, Daily, Nicolás Cerda MD, 180 mg at 07/26/21 0807    diltiazem (CARDIZEM) injection 15 mg, 15 mg, Intravenous, Once, Nicolás Cerda MD    docusate sodium (COLACE) capsule 100 mg, 100 mg, Oral, Q12H [facility], Jeanette Quiroga PA-C, 100 mg at 07/26/21 0807    DULoxetine (CYMBALTA) delayed release capsule 30 mg, 30 mg, Oral, HS, Jeanette Callaway PA-C, 30 mg at 07/25/21 2207    enoxaparin (LOVENOX) subcutaneous injection 30 mg, 30 mg, Subcutaneous, Q24H [facility], [facility] SANTOS Robertson, 30 mg at 07/26/21 0808    HYDROmorphone HCl (DILAUDID) injection 0 2 mg, 0 2 mg, Intravenous, Q4H PRN, Karina Mark PA-C    lidocaine (LIDODERM) 5 % patch 1 patch, 1 patch, Topical, Daily, Tuesday M BEV Aburto, 1 patch at 07/26/21 0808    melatonin tablet 3 mg, 3 mg, Oral, HS, Jeanetteannie Mar PA-C, 3 mg at 07/25/21 2207    metoprolol succinate (TOPROL-XL) 24 hr tablet 50 mg, 50 mg, Oral, Daily, García Gar MD, 50 mg at 07/26/21 0807    OLANZapine (ZyPREXA) tablet 2 5 mg, 2 5 mg, Oral, HS, García Gar MD, 2 5 mg at 07/25/21 2207    ondansetron (ZOFRAN) injection 4 mg, 4 mg, Intravenous, Q6H PRN, Adeel Villavicencio PA-C, 4 mg at 07/15/21 2322    oxyCODONE (ROXICODONE) IR tablet 2 5 mg, 2 5 mg, Oral, Q4H PRN, Adeel Villavicencio PA-C, 2 5 mg at 07/16/21 3295    oxyCODONE (ROXICODONE) IR tablet 5 mg, 5 mg, Oral, Q4H PRN, Adeel Villavicencio PA-C, 5 mg at 07/25/21 1531    polyethylene glycol (MIRALAX) packet 17 g, 17 g, Oral, Daily PRN, Adeel Villavicencio PA-C, 17 g at 07/19/21 2103    simethicone (MYLICON) chewable tablet 80 mg, 80 mg, Oral, 4x Daily PRN, Adeel Villavicencio PA-C    This note was completed in part utilizing [employer] Direct Software  Grammatical errors, random word insertions, spelling mistakes, and incomplete sentences may be an occasional consequence of this system secondary to software limitations, ambient noise, and hardware issues  If you have any questions or concerns about the content, text, or information contained within the body of this dictation, please contact the provider for clarification

## 2021-07-26 NOTE — UTILIZATION REVIEW
Continued Stay Review    Date: 7/25/2021                       Current Patient Class: Inpatient  Current Level of Care: Med/Surg  HPI:89 y o  female initially admitted on 7/15 with R hip fracture s/p fall  OR IM nail fixation to right intertrochanteric femur fracture 7/15  Assessment/Plan:  POD #10 -- cardiology was consulted 7/24 for A-fib  Pt back in sinus rhythm today  Cardiology plans to start amiodarone today  Continue metoprolol and diltiazem  Baseline QTC interval 450 milliseconds  ECG tomorrow morning to check QTC, since on Zyprexa  Remain off anticoagulation d/t high fall risk  PT/OT recommendation for IP rehab on d/c  Care management working on finding accepting facility  Continue dysphagia diet with Ensure BID  SCD's   OOB with assist    Vital Signs:   Date/Time  Temp  Pulse  Resp  BP  MAP (mmHg)  SpO2  O2 Device  Patient Position - Orthostatic VS   07/25/21 2324  97 8 °F (36 6 °C)  77  20  149/67  --  93 %  --  Lying   07/25/21 1545  97 °F (36 1 °C)Abnormal   77  20  188/93Abnormal   --  98 %  None (Room air)  Lying   07/25/21 1100  97 5 °F (36 4 °C)  95  20  116/90  --  98 %  None (Room air)  Lying   07/25/21 0711  97 3 °F (36 3 °C)Abnormal   75  16  161/80  --  98 %  None (Room air)  Lying   07/24/21 2308  96 6 °F (35 9 °C)Abnormal   71  18  124/49Abnormal   --  96 %  None (Room air)  Lying   07/24/21 2041  --  --  --  --  --  --  None (Room air)  --   07/24/21 2011  96 8 °F (36 °C)Abnormal   79  18  161/87  --  96 %  None (Room air)  Lying   07/24/21 1558  97 4 °F (36 3 °C)Abnormal   85  18  150/64  --  99 %  None (Room air)  Lying   07/24/21 1100  96 7 °F (35 9 °C)Abnormal   90  18  161/75  --  97 %  None (Room air)  Lying   07/24/21 0700  97 9 °F (36 6 °C)  110Abnormal   16  146/64  --  98 %  None (Room air)  Lying   07/24/21 0300  97 °F (36 1 °C)Abnormal   80  16  152/61  --  93 %  --  Lying         Pertinent Labs/Diagnostic Results:       Results from last 7 days   Lab Units 07/22/21  4133 WBC Thousand/uL 6 33   HEMOGLOBIN g/dL 9 1*   HEMATOCRIT % 28 4*   PLATELETS Thousands/uL 246   NEUTROS ABS Thousands/µL 4 07     Results from last 7 days   Lab Units 07/22/21  0436   SODIUM mmol/L 144   POTASSIUM mmol/L 3 5   CHLORIDE mmol/L 109*   CO2 mmol/L 28   ANION GAP mmol/L 7   BUN mg/dL 20   CREATININE mg/dL 0 84   EGFR ml/min/1 73sq m 62   CALCIUM mg/dL 8 3     Results from last 7 days   Lab Units 07/22/21  0436   GLUCOSE RANDOM mg/dL 103       Medications:   Scheduled Medications:  acetaminophen, 650 mg, Oral, 4x Daily  amiodarone, 200 mg, Oral, TID With Meals  bisacodyl, 10 mg, Rectal, Once  cyanocobalamin, 1,000 mcg, Oral, Daily  diltiazem, 180 mg, Oral, Daily  diltiazem, 15 mg, Intravenous, Once  docusate sodium, 100 mg, Oral, Q12H LYNDSEY  DULoxetine, 30 mg, Oral, HS  enoxaparin, 30 mg, Subcutaneous, Q24H LYNDSEY  lidocaine, 1 patch, Topical, Daily  melatonin, 3 mg, Oral, HS  metoprolol succinate, 50 mg, Oral, Daily  OLANZapine, 2 5 mg, Oral, HS    PRN Meds:  albuterol, 2 puff, Inhalation, Q4H PRN  aluminum-magnesium hydroxide-simethicone, 30 mL, Oral, Q6H PRN  bisacodyl, 10 mg, Rectal, Daily PRN  HYDROmorphone, 0 2 mg, Intravenous, Q4H PRN  ondansetron, 4 mg, Intravenous, Q6H PRN  oxyCODONE, 2 5 mg, Oral, Q4H PRN  oxyCODONE, 5 mg, Oral, Q4H PRN 7/25 x1  polyethylene glycol, 17 g, Oral, Daily PRN  simethicone, 80 mg, Oral, 4x Daily PRN        Discharge Plan: TBD    Network Utilization Review Department  ATTENTION: Please call with any questions or concerns to [phone] and carefully listen to the prompts so that you are directed to the right person  All voicemails are confidential   Christophe Ding all requests for admission clinical reviews, approved or denied determinations and any other requests to dedicated fax number below belonging to the campus where the patient is receiving treatment   List of dedicated fax numbers for the Facilities:  FACILITY NAME UR FAX NUMBER   ADMISSION DENIALS (Administrative/Medical [facility]) [phone]   [address] (Maternity/NICU/Pediatrics) [address] [facility],[address] [facility] [address] [address]  [phone]   Abby Mohan [address] [address] [place] [address] [address]ar [address] [phone] 46[address] [facility] [phone]

## 2021-07-26 NOTE — PLAN OF CARE
Problem: PHYSICAL THERAPY ADULT  Goal: Performs mobility at highest level of function for planned discharge setting  See evaluation for individualized goals  Description:   Outcome: Progressing  Note: Prognosis: Fair  Problem List: Decreased strength, Decreased range of motion, Decreased mobility, Impaired balance, Decreased endurance, Decreased skin integrity, Pain, Impaired judgement, Decreased safety awareness, Decreased cognition, Impaired hearing  Assessment: Pt  supine in bed upon my arrival  Pt  reporting fatigue/pain, however agreeable to therapeutic intervention  Performance of HEP with cues provided for proper completion  Progressed with transfers requiring A of therapist with cues provided for proper completion/technique  Pt  progressed with a SPT with use of RW and cues provided for LE sequencing/technique  Pt  remained seated in bedside chair with alarm active at end of treatment session  PT will continue to recommend d/c to rehab when medically stable for continued improvement of noted impairments  Barriers to Discharge: Decreased caregiver support, Inaccessible home environment  Barriers to Discharge Comments: Level of support at home  PT Discharge Recommendation: Post acute rehabilitation services     PT - OK to Discharge: Yes (if d/c to rehab when medically stable )    See flowsheet documentation for full assessment

## 2021-07-26 NOTE — PLAN OF CARE
Problem: MOBILITY - ADULT  Goal: Maintain or return to baseline ADL function  Description: INTERVENTIONS:  -  Assess patient's ability to carry out ADLs; assess patient's baseline for ADL function and identify physical deficits which impact ability to perform ADLs (bathing, care of mouth/teeth, toileting, grooming, dressing, etc )  - Assess/evaluate cause of self-care deficits   - Assess range of motion  - Assess patient's mobility; develop plan if impaired  - Assess patient's need for assistive devices and provide as appropriate  - Encourage maximum independence but intervene and supervise when necessary  - Involve family in performance of ADLs  - Assess for home care needs following discharge   - Consider OT consult to assist with ADL evaluation and planning for discharge  - Provide patient education as appropriate  Outcome: Progressing  Goal: Maintains/Returns to pre admission functional level  Description: INTERVENTIONS:  - Perform BMAT or MOVE assessment daily    - Set and communicate daily mobility goal to care team and patient/family/caregiver     - Collaborate with rehabilitation services on mobility goals if consulted  - Out of bed for toileting  - Record patient progress and toleration of activity level   Outcome: Progressing     Problem: Prexisting or High Potential for Compromised Skin Integrity  Goal: Skin integrity is maintained or improved  Description: INTERVENTIONS:  - Identify patients at risk for skin breakdown  - Assess and monitor skin integrity  - Assess and monitor nutrition and hydration status  - Monitor labs   - Assess for incontinence   - Turn and reposition patient  - Assist with mobility/ambulation  - Relieve pressure over bony prominences  - Avoid friction and shearing  - Provide appropriate hygiene as needed including keeping skin clean and dry  - Evaluate need for skin moisturizer/barrier cream  - Collaborate with interdisciplinary team   - Patient/family teaching  Outcome: Progressing     Problem: CARDIOVASCULAR - ADULT  Goal: Maintains optimal cardiac output and hemodynamic stability  Description: INTERVENTIONS:  - Monitor I/O, vital signs and rhythm  - Monitor for S/S and trends of decreased cardiac output  - Administer and titrate ordered vasoactive medications to optimize hemodynamic stability  - Assess quality of pulses, skin color and temperature  - Assess for signs of decreased coronary artery perfusion  - Instruct patient to report change in severity of symptoms  Outcome: Progressing     Problem: RESPIRATORY - ADULT  Goal: Achieves optimal ventilation and oxygenation  Description: INTERVENTIONS:  - Assess for changes in respiratory status  - Assess for changes in mentation and behavior  - Position to facilitate oxygenation and minimize respiratory effort  - Oxygen administered by appropriate delivery if ordered  - Encourage broncho-pulmonary hygiene including cough, deep breathe, Incentive Spirometry  - Assess the need for suctioning and aspirate as needed  - Assess for SOB or any respiratory difficulty  - Respiratory Therapy support as indicated  Outcome: Progressing     Problem: METABOLIC, FLUID AND ELECTROLYTES - ADULT  Goal: Electrolytes maintained within normal limits  Description: INTERVENTIONS:  - Monitor labs and assess patient for signs and symptoms of electrolyte imbalances  - Administer electrolyte replacement as ordered  - Monitor response to electrolyte replacements, including repeat lab results as appropriate  - Instruct patient on fluid and nutrition as appropriate  Outcome: Progressing     Problem: SKIN/TISSUE INTEGRITY - ADULT  Goal: Incision(s), wounds(s) or drain site(s) healing without S/S of infection  Description: INTERVENTIONS  - Assess and document dressing, incision, wound bed, drain sites and surrounding tissue  - Provide caregiver/family education  - Perform skin care/dressing changes as ordered  Outcome: Progressing     Problem: MUSCULOSKELETAL - ADULT  Goal: Maintain or return mobility to safest level of function  Description: INTERVENTIONS:  - Assess patient's ability to carry out ADLs; assess patient's baseline for ADL function and identify physical deficits which impact ability to perform ADLs (bathing, care of mouth/teeth, toileting, grooming, dressing, etc )  - Assess/evaluate cause of self-care deficits   - Assess range of motion  - Assess patient's mobility  - Assess patient's need for assistive devices and provide as appropriate  - Encourage maximum independence but intervene and supervise when necessary  - Involve family in performance of ADLs  - Assess for home care needs following discharge   - Consider OT consult to assist with ADL evaluation and planning for discharge  - Provide patient education as appropriate  Outcome: Progressing     Problem: PAIN - ADULT  Goal: Verbalizes/displays adequate comfort level or baseline comfort level  Description: Interventions:  - Encourage patient to monitor pain and request assistance  - Assess pain using appropriate pain scale  - Consider geriatric pain management order set   - Administer analgesics based on type and severity of pain and evaluate response  - Implement non-pharmacological measures as appropriate and evaluate response  - Consider cultural and social influences on pain and pain management  - Notify physician/advanced practitioner if interventions unsuccessful or patient reports new pain  Outcome: Progressing     Problem: SAFETY ADULT  Goal: Patient will remain free of falls  Description: INTERVENTIONS:  - Educate patient/family on patient safety including physical limitations  - Instruct patient to call for assistance with activity   - Consult OT/PT to assist with strengthening/mobility   - Keep Call bell within reach  - Keep bed low and locked with side rails adjusted as appropriate  - Keep care items and personal belongings within reach  - Initiate and maintain comfort rounds  - Make Fall Risk Sign visible to staff  - Offer Toileting every 2 hours, in advance of need  - Initiate/Maintain bed/chair alarm  - Obtain necessary fall risk management equipment: bed alarm  - Apply yellow socks and bracelet for high fall risk patients  - Keep patient's room near nurses station  Outcome: Progressing     Problem: DISCHARGE PLANNING  Goal: Discharge to home or other facility with appropriate resources  Description: INTERVENTIONS:  - Identify barriers to discharge w/patient and caregiver  - Arrange for needed discharge resources and transportation as appropriate  - Identify discharge learning needs (meds, wound care, etc )  - Arrange for interpretive services (Polish) to assist at discharge as needed  - Refer to Case Management Department for coordinating discharge planning if the patient needs post-hospital services based on physician/advanced practitioner order or complex needs related to functional status, cognitive ability, or social support system  Outcome: Progressing     Problem: Knowledge Deficit  Goal: Patient/family/caregiver demonstrates understanding of disease process, treatment plan, medications, and discharge instructions  Description: Complete learning assessment and assess knowledge base    Interventions:  - Provide teaching at level of understanding  - Provide teaching via preferred learning methods  Outcome: Progressing     Problem: CONFUSION/THOUGHT DISTURBANCE  Goal: Thought disturbances (confusion, delirium, depression, dementia or psychosis) are managed to maintain or return to baseline mental status and functional level  Description: INTERVENTIONS:  - Assess for possible contributors to  thought disturbance, including but not limited to medications, infection, impaired vision or hearing, underlying metabolic abnormalities, dehydration, respiratory compromise,  psychiatric diagnoses and notify attending PHYSICAN/AP  - Monitor and intervene to maintain adequate nutrition, hydration, elimination, sleep and activity  - Decrease environmental stimuli, including noise as appropriate  - Patient is Vietnamese speaking only - communicate in Vietnamese  - Provide frequent contacts to provide refocusing, direction and reassurance as needed  Approach patient calmly with eye contact and at their level  - [place] high risk fall precautions, aspiration precautions and other safety measures, as indicated  - If delirium suspected, notify physician/AP of change in condition and request immediate in-person evaluation  - Pursue consults as appropriate including Geriatric (campus dependent), OT for cognitive evaluation/activity planning, psychiatric, pastoral care, etc   Outcome: Progressing     Problem: Nutrition/Hydration-ADULT  Goal: Nutrient/Hydration intake appropriate for improving, restoring or maintaining nutritional needs  Description: Monitor and assess patient's nutrition/hydration status for malnutrition  Collaborate with interdisciplinary team and initiate plan and interventions as ordered  Monitor patient's weight and dietary intake as ordered or per policy  Utilize nutrition screening tool and intervene as necessary  Determine patient's food preferences and provide high-protein, high-caloric foods as appropriate       INTERVENTIONS:  - Monitor oral intake, urinary output, labs, and treatment plans  - Assess nutrition and hydration status and recommend course of action  - Evaluate amount of meals eaten  - Total assist in feeding patient  - Allow adequate time for meals  - Recommend/ encourage appropriate diets, oral nutritional supplements, and vitamin/mineral supplements  - Assess need for intravenous fluids  - Provide specific nutrition/hydration education as appropriate  - Include patient/family/caregiver in decisions related to nutrition  Outcome: Progressing     Problem: Potential for Falls  Goal: Patient will remain free of falls  Description: INTERVENTIONS:  - Educate patient/family on patient safety including physical limitations  - Instruct patient to call for assistance with activity   - Consult OT/PT to assist with strengthening/mobility   - Keep Call bell within reach  - Keep bed low and locked with side rails adjusted as appropriate  - Keep care items and personal belongings within reach  - Initiate and maintain comfort rounds  - Make Fall Risk Sign visible to staff  - Offer Toileting every 2 hours, in advance of need  - Initiate/Maintain bed/chair alarm  - Obtain necessary fall risk management equipment: bed alarm  - Apply yellow socks and bracelet for high fall risk patients  - Keep patient's room near nurses station  Outcome: Progressing

## 2021-07-26 NOTE — PHYSICAL THERAPY NOTE
Physical Therapy Progress Note     07/26/21 1105   PT Last Visit   PT Visit Date 07/26/21   Note Type   Note Type Treatment   Pain Assessment   Pain Assessment Tool FLACC   Pain Location/Orientation Orientation: Right;Location: Leg   Hospital Pain Intervention(s) Ambulation/increased activity;Repositioned   Pain Rating: FLACC (Rest) - Face 0   Pain Rating: FLACC (Rest) - Legs 0   Pain Rating: FLACC (Rest) - Activity 0   Pain Rating: FLACC (Rest) - Cry 0   Pain Rating: FLACC (Rest) - Consolability 0   Score: FLACC (Rest) 0   Pain Rating: FLACC (Activity) - Face 1   Pain Rating: FLACC (Activity) - Legs 1   Pain Rating: FLACC (Activity) - Activity 1   Pain Rating: FLACC (Activity) - Cry 1   Pain Rating: FLACC (Activity) - Consolability 0   Score: FLACC (Activity) 4   Restrictions/Precautions   Weight Bearing Precautions Per Order Yes   RLE Weight Bearing Per Order WBAT   Other Precautions Cognitive; Chair Alarm; Bed Alarm; Fall Risk;Pain;Multiple lines   General   Chart Reviewed Yes   Response to Previous Treatment Patient unable to report, no changes reported from family or staff   Family/Caregiver Present No   Subjective   Subjective Willing to participate in therapy this AM    Bed Mobility   Supine to Sit 4  Minimal assistance   Additional items Assist x 1;HOB elevated; Bedrails;Leg ; Increased time required;Verbal cues;LE management   Additional Comments Pt  remained seated in bedside chair at end of treatment session  Transfers   Sit to Stand 3  Moderate assistance   Additional items Assist x 2;Bedrails; Increased time required;Verbal cues;Armrests   Stand to Sit 3  Moderate assistance   Additional items Assist x 2;Armrests; Increased time required;Verbal cues   Stand pivot 3  Moderate assistance   Additional items Assist x 2;Bedrails;Armrests; Increased time required;Verbal cues   Balance   Static Sitting Fair   Dynamic Sitting Fair -   Static Standing Poor   Dynamic Standing Poor   Endurance Deficit Endurance Deficit Yes   Endurance Deficit Description fatigue/weakness/pain   Activity Tolerance   Activity Tolerance Patient limited by fatigue;Patient limited by pain   Medical Staff Made Aware Akosua Harada, [address]   Nurse Made Aware Yes   Exercises   THR Supine;Sitting;10 reps;AAROM; Bilateral   Assessment   Prognosis Fair   Problem List Decreased strength;Decreased range of motion;Decreased mobility; Impaired balance;Decreased endurance;Decreased skin integrity;Pain; Impaired judgement;Decreased safety awareness;Decreased cognition; Impaired hearing   Assessment Pt  supine in bed upon my arrival  Pt  reporting fatigue/pain, however agreeable to therapeutic intervention  Performance of HEP with cues provided for proper completion  Progressed with transfers requiring A of therapist with cues provided for proper completion/technique  Pt  progressed with a SPT with use of RW and cues provided for LE sequencing/technique  Pt  remained seated in bedside chair with alarm active at end of treatment session  PT will continue to recommend d/c to rehab when medically stable for continued improvement of noted impairments  Barriers to Discharge Decreased caregiver support; Inaccessible home environment   Barriers to Discharge Comments Level of support at home  Goals   Patient Goals None stated 2* to cognition   STG Expiration Date 08/06/21   PT Treatment Day 4   Plan   Treatment/Interventions LE strengthening/ROM; Functional transfer training; Therapeutic exercise; Endurance training;Bed mobility;Spoke to nursing;Spoke to case management;OT   Progress Slow progress, decreased activity tolerance   PT Frequency Other (Comment)  (3-5x/wk)   Recommendation   PT Discharge Recommendation Post acute rehabilitation services   Equipment Recommended [facility] Recommended Wheeled walker   Change/add to [employer]? No   PT - OK to Discharge Yes  (if d/c to rehab when medically stable  )   Oz Ricci Turning in Bed Without Bedrails 2   Lying on Back to Sitting on Edge of Flat Bed 3   Moving Bed to Chair 2   Standing Up From Chair 3   Walk in Room 2   Climb 3-5 Stairs 1   Basic Mobility Inpatient Raw Score 13   Basic Mobility Standardized Score 33 99     An AM-PAC Basic Mobility standardized score less than 42 9 suggests the patient may benefit from discharge to post-acute rehab services      Shaun Conway, PTA

## 2021-07-26 NOTE — DISCHARGE INSTR - AVS FIRST PAGE
Continue Lovenox for DVT prophylaxis for 19 more days (30 days postprocedure)  Continue amiodarone 200 mg 3 times a day for 2 weeks, then 200 mg once daily starting August 9, 2021  Follow-up with orthopedic  Follow-up with PCP

## 2021-07-26 NOTE — DISCHARGE SUMMARY
[facility]  Discharge- David Carnes 11/20/1931, 80 y o  female MRN: [id number]  Unit/Bed#: E2 -01 Encounter: [id number]  Primary Care Provider: Liliana Fernandez MD   Date and time admitted to hospital: 7/15/2021  3:52 AM    * Closed right hip fracture [facility])  Assessment & Plan  · S/p fall when ambulating to the bathroom with assistance from caregiver  · XR showed right femoral neck fracture   · Status post intramedullary nail fixation right intertrochanteric femur fracture on 07/15/2021  · Continue Lovenox for 19 more days to complete 30 days postprocedure  · Pain control adequate  · Continue Lidoderm patch  · WBAT RLE   · Stable for rehab transfer today    Paroxysmal A-fib [facility])  Assessment & Plan  · Currently rate controlled and in sinus  · Discussed with cardiology Dr Berta Velasquez  · She will be discharged on amiodarone 200 mg t i d  For 2 weeks then 200 mg once daily starting August 9, 2021   Instruction placed on her AVS/med rec      ABLA (acute blood loss anemia)  Assessment & Plan  Status post 1 unit PRBC on 07/18/2021  Stable with no further need for blood transfusion    Lab Results   Component Value Date    HGB 9 1 (L) 07/22/2021    HGB 8 4 (L) 07/19/2021    HGB 9 6 (L) 07/18/2021    HGB 6 2 (LL) 07/18/2021    HGB 8 6 (L) 07/17/2021         Stage 3a chronic kidney disease [facility])  Assessment & Plan  Lab Results   Component Value Date    EGFR 62 07/22/2021    EGFR 39 07/19/2021    EGFR 24 07/18/2021    CREATININE 0 84 07/22/2021    CREATININE 1 24 07/19/2021    CREATININE 1 84 (H) 07/18/2021     · Stable and at baseline    Major neurocognitive disorder (HCC)  Assessment & Plan  · History of schizophrenia and dementia  · Has 24hr caregiver at home  · PT/OT consult   · Stable on Cymbalta 30 mg at bedtime, Zyprexa 2 5 mg at bedtime    Bilateral hearing loss  Assessment & Plan  · Followed outpatient by ENT for bilateral hearing loss    Essential hypertension  Assessment & Plan  Discussed discharge medications with Cardiology Dr Shana Currie  Will continue Toprol XL 50 mg daily  Decreased Cardizem from 180 mg to 120 mg daily  Avoid hypotension    Acute cystitis without hematuria-resolved as of 7/26/2021  Assessment & Plan  UA shows bacteria positive nitrates with no WBCs  Low suspicions for urinary tract infection  Given poor mental status and recent fall, start IV Rocephin plan for 3 days      Discharging Physician / Practitioner: Shalini Burkett MD  PCP: Estephania Mkceon MD  Admission Date:   Admission Orders (From admission, onward)     Ordered        07/15/21 0545  Inpatient Admission  Once                   Discharge Date: 07/26/21    Medical Problems     Resolved Problems  Date Reviewed: 7/26/2021        Resolved    Acute cystitis without hematuria 7/26/2021     Resolved by  Shalini Burkett MD                Consultations During Hospital Stay:  · Orthopedic  · Nephrology  · Geriatric Medicine  · Cardiology    Procedures Performed:   · 07/15/2021- intramedullary nail fixation right intertrochanteric femur fracture  · 07/18/2021-PRBC transfusion    Significant Findings / Test Results:   · Right femoral neck fracture    Incidental Findings:   · None     Test Results Pending at Discharge (will require follow up): · None     Outpatient Tests Requested:  · None    Complications:  None    Reason for Admission:  [facility] Course:     Beny Hogue is a 80 y o  female patient who originally presented to the hospital on 7/15/2021 due to fall at home  She has known history of dementia and schizophrenia  She was found to have an impacted right femoral neck fracture  She was seen by Orthopedic and this was repaired on 07/15/2021  Her postoperative course was highlighted by blood loss anemia with the hemoglobin of 6 2  This was treated by packed red blood cell transfusion on 07/18/2021  After that she has not needed transfusion since  She also developed rapid AFib on 07/24/2021    She was seen by Cardiology and she was initiated on amiodarone 200 mg t i d  Plan is to continue this for 2 weeks and then decrease dose to 200 mg once daily after  She was not placed on anticoagulation due to her dementia and high risk for falls  With the addition of amiodarone, her metoprolol was reduced to 50 mg and her Cardizem was reduced to 120 mg  [place] [facility] and she will be transferred there for rehabilitation  Please see above list of diagnoses and related plan for additional information  Condition at Discharge: good     Discharge Day Visit / Exam:     Subjective:  Patient reports feeling "regular  "She denied pain or discomfort from the hip  She denied chest pain or shortness of breath  Vitals: Blood Pressure: 135/74 (07/26/21 0700)  Pulse: 85 (07/26/21 0700)  Temperature: (!) 97 4 °F (36 3 °C) (07/26/21 0700)  Temp Source: Temporal (07/26/21 0700)  Respirations: 18 (07/26/21 0700)  Weight - Scale: 61 kg (134 lb 7 7 oz) (07/26/21 0539)  SpO2: 96 % (07/26/21 0700)  Exam:   Physical Exam  Constitutional:       Appearance: She is not ill-appearing or diaphoretic  HENT:      Head: Normocephalic and atraumatic  Eyes:      General: No scleral icterus  Cardiovascular:      Rate and Rhythm: Regular rhythm  Heart sounds: No murmur heard  No gallop  Pulmonary:      Effort: Pulmonary effort is normal  No respiratory distress  Breath sounds: No wheezing or rales  Abdominal:      General: Abdomen is flat  Palpations: Abdomen is soft  Musculoskeletal:      Cervical back: Neck supple  Comments: Mild edema on the right thigh   Skin:     Comments: Clean dry intact dressings  No bleeding  No hematoma   Neurological:      Mental Status: She is alert  Comments: Bilateral poor hearing   Psychiatric:         Behavior: Behavior normal        Discussion with Family:  Left voicemail for son and when earlier today    Spoke with grandson Chas Sams and informed him of her discharge today    Discharge instructions/Information to patient and family:   See after visit summary for information provided to patient and family  Provisions for Follow-Up Care:  See after visit summary for information related to follow-up care and any pertinent home health orders  Disposition:     Other: Rehab      Planned Readmission: no     Discharge Statement:  I spent >30 minutes discharging the patient  This time was spent on the day of discharge  I had direct contact with the patient on the day of discharge  Greater than 50% of the total time was spent examining patient, answering all patient questions, arranging and discussing plan of care with patient as well as directly providing post-discharge instructions  Additional time then spent on discharge activities  Discharge Medications:  See after visit summary for reconciled discharge medications provided to patient and family        ** Please Note: This note has been constructed using a voice recognition system **

## 2021-07-26 NOTE — CASE MANAGEMENT
Pt is medically cleared for discharge  Authorization received from [employer]  Pt requiring BLS transport  Called [facility]  BLS transport arranged with Belen Gallegos for 3:30PM   MD, RN, pt and pt's son notified  Medical Necessity completed

## 2021-07-26 NOTE — OCCUPATIONAL THERAPY NOTE
Occupational Therapy Treatment Note:         07/26/21 1110   OT Last Visit   OT Visit Date 07/26/21   Note Type   Note Type Treatment   Restrictions/Precautions   Weight Bearing Precautions Per Order Yes   RLE Weight Bearing Per Order WBAT   Other Precautions Cognitive; Chair Alarm; Bed Alarm;Pain; Fall Risk   Pain Assessment   Pain Assessment Tool FLACC   Pain Score 4   Pain Location/Orientation Orientation: Right;Location: Hip;Location: Leg   Pain Rating: FLACC (Rest) - Face 0   Pain Rating: FLACC (Rest) - Legs 0   Pain Rating: FLACC (Rest) - Activity 0   Pain Rating: FLACC (Rest) - Cry 0   Pain Rating: FLACC (Rest) - Consolability 0   Score: FLACC (Rest) 0   Pain Rating: FLACC (Activity) - Face 1   Pain Rating: FLACC (Activity) - Legs 1   Pain Rating: FLACC (Activity) - Activity 1   Pain Rating: FLACC (Activity) - Cry 1   Pain Rating: FLACC (Activity) - Consolability 0   Score: FLACC (Activity) 4   ADL   Where Assessed Supine, bed  (with bed positioned as chair  )   Grooming Assistance 4  Minimal Assistance   Grooming Deficit Setup;Supervision/safety;Verbal cueing; Increased time to complete;Wash/dry hands; Wash/dry face;Brushing hair   UB Bathing Assistance 4  Minimal Assistance   UB Bathing Deficit Setup;Verbal cueing;Supervision/safety; Increased time to complete; Chest;Right arm;Left arm; Abdomen   UB Bathing Comments step by step verbal cues required to advance steps and for thoroughness  LB Bathing Assistance 2  Maximal Assistance   LB Bathing Deficit Setup;Steadying;Verbal cueing;Supervision/safety; Increased time to complete;Perineal area; Buttocks;Right lower leg including foot; Left lower leg including foot   LB Bathing Comments increased A required for sanaz care due to limited functional balance/reach  UB Dressing Assistance 4  Minimal Assistance   UB Dressing Deficit Setup;Verbal cueing;Supervision/safety; Increased time to complete; Thread RUE; Thread LUE   UB Dressing Comments hand over hand A required  LB Dressing Assistance 2  Maximal Assistance   LB Dressing Deficit Steadying;Setup; Requires assistive device for steadying;Verbal cueing;Supervision/safety; Increased time to complete; Don/doff R sock; Don/doff L sock   LB Dressing Comments increased A due to limited functional reach  Toileting Assistance  2  Maximal Assistance   Toileting Deficit Setup;Verbal cueing;Supervison/safety; Increased time to complete;Use of bedpan/urinal setup;Perineal hygiene   Toileting Comments increased A for sanaz hygiene and simulated clothing management  Functional Standing Tolerance   Time 1-2 mins   Activity SPT, self care routine  Comments cues for RW safety required  Bed Mobility   Supine to Sit 4  Minimal assistance   Additional items Assist x 1;Bedrails; Increased time required;Verbal cues;LE management   Additional Comments Pt OOB in bedside chair upon termination of tx session  Transfers   Sit to Stand 3  Moderate assistance   Additional items Assist x 2;Bedrails;Armrests; Increased time required;Verbal cues   Stand to Sit 3  Moderate assistance   Additional items Assist x 2;Armrests; Bedrails; Increased time required;Verbal cues   Stand pivot 3  Moderate assistance   Additional items Assist x 2;Armrests; Increased time required;Verbal cues   Additional Comments both tactile and verbal cues required with activities      Functional Mobility   Functional Mobility 3  Moderate assistance   Additional Comments x2   Additional items Rolling walker   Cognition   Overall Cognitive Status Impaired   Arousal/Participation Responsive   Attention Difficulty attending to directions   Orientation Level Oriented to person;Disoriented to situation;Disoriented to time;Disoriented to place   Memory Decreased short term memory;Decreased recall of recent events;Decreased recall of precautions   Following Commands Follows one step commands without difficulty   Comments Pt looked outside without response when questioned, "what month is it"     Additional Activities   Additional Activities Other (Comment)  (basic orientation  )   Additional Activities Comments Pt oriented to name and, "hospital"   Activity Tolerance   Activity Tolerance Patient limited by fatigue;Patient limited by pain   Medical Staff Made Aware Reported all findings to nursing staff  Pt OOB in bedside chair with chair alarm activated upon termination of tx session  Assessment   Assessment Pt was seen for skilled OT with focus on completion of self care routine, bed mobility, functional transfers, review of RW safety, basic orientation and review of current plan of care  Pt with positive results following greeting with hugging gesture and translated communication into R ear  Pt with flat affect with need for close, step by step communication  See above levels of A required for all functional tasks  Pt agreeable for completion of ADL routine  ADL routine as follows  Feeding: Min A with cues and close presentation of one item at a time  Pt enjoys water  Grooming: Min A to comb hair and wash face this tx session with initiation to tasks required  UB Bathing: Min A with cues to sequence activity and to encourage good quality  UB Dressing: S LB Bathing: Max A LB Dressing: Max A Toileting: Max A Bed Mobility: Min A x1 Transfers: Mod A x2 with early release of RW noted  Poor safety awareness  Hand over hand A with step by step verbal cues will be required to encourage carry over of appropriate techs  The patient's raw score on the AM-PAC Daily Activity inpatient short form is 13, standardized score is 32 03, less than 39 4  Patients at this level are likely to benefit from discharge to post-acute rehabilitation services  Plan   Treatment Interventions ADL retraining;Functional transfer training;UE strengthening/ROM; Endurance training;Cognitive reorientation;Patient/family training   Goal Expiration Date 07/29/21   OT Treatment Day 4   OT Frequency 3-5x/wk   Recommendation   OT Discharge Recommendation Post acute rehabilitation services   OT - OK to Discharge Yes  (to rehab when medically cleared   )   AM-PAC Daily Activity Inpatient   Lower Body Dressing 1   Bathing 2   Toileting 1   Upper Body Dressing 3   Grooming 3   Eating 3   Daily Activity Raw Score 13   Daily Activity Standardized Score (Calc for Raw Score >=11) 32 03   AM-PAC Applied Cognition Inpatient   Following a Speech/Presentation 1   Understanding Ordinary Conversation 2   Taking Medications 1   Remembering Where Things Are Placed or Put Away 1   Remembering List of 4-5 Errands 1   Taking Care of Complicated Tasks 1   Applied Cognition Raw Score 7   Applied Cognition Standardized Score 15 17   Liv Casas, [address]

## 2021-07-27 ENCOUNTER — NURSING HOME VISIT (OUTPATIENT)
Dept: GERIATRICS | Facility: OTHER | Age: 86
End: 2021-07-27
Payer: COMMERCIAL

## 2021-07-27 ENCOUNTER — TRANSITIONAL CARE MANAGEMENT (OUTPATIENT)
Dept: INTERNAL MEDICINE CLINIC | Facility: CLINIC | Age: 86
End: 2021-07-27

## 2021-07-27 DIAGNOSIS — D62 ABLA (ACUTE BLOOD LOSS ANEMIA): ICD-10-CM

## 2021-07-27 DIAGNOSIS — S72.001A CLOSED FRACTURE OF RIGHT HIP, INITIAL ENCOUNTER (HCC): Primary | ICD-10-CM

## 2021-07-27 DIAGNOSIS — N18.31 STAGE 3A CHRONIC KIDNEY DISEASE (HCC): ICD-10-CM

## 2021-07-27 DIAGNOSIS — F20.3 UNDIFFERENTIATED SCHIZOPHRENIA (HCC): Chronic | ICD-10-CM

## 2021-07-27 DIAGNOSIS — R13.12 OROPHARYNGEAL DYSPHAGIA: ICD-10-CM

## 2021-07-27 DIAGNOSIS — I48.0 PAROXYSMAL A-FIB (HCC): ICD-10-CM

## 2021-07-27 DIAGNOSIS — R26.2 AMBULATORY DYSFUNCTION: ICD-10-CM

## 2021-07-27 PROBLEM — U07.1 COVID-19 VIRUS INFECTION: Status: RESOLVED | Noted: 2020-04-13 | Resolved: 2021-07-27

## 2021-07-27 PROCEDURE — 99306 1ST NF CARE HIGH MDM 50: CPT | Performed by: FAMILY MEDICINE

## 2021-07-27 NOTE — ASSESSMENT & PLAN NOTE
S/P fixation/surgery  WBAT on RLE  Pain well controlled  Lovenox for DVT prophylaxis  Will need NH care  Ordered PT/OT to improve gait, transfer, endurance, ADLs

## 2021-07-27 NOTE — PROGRESS NOTES
Steve 11  [address]  Facility: [facility] [address] [place] [address]    NAME: Silvana Comment  AGE: 80 y o  SEX: female    DATE OF ENCOUNTER: 7/27/2021    Code status:  No CPR    Assessment and Plan     Closed right hip fracture (HCC)  S/P fixation/surgery  WBAT on RLE  Pain well controlled  Lovenox for DVT prophylaxis  Will need NH care  Ordered PT/OT to improve gait, transfer, endurance, ADLs  Ambulatory dysfunction  2nd to #1, needs NH care, and therapy  Ordered PT/OT  Oropharyngeal dysphagia  On mechanical diet  Ordered ST to improve swallowing  Paroxysmal A-fib (HCC)  On Amiodarone, Metoprolol, and Diltiazem  Ordered labs  No AC per cardio due to falls and dementia  ABLA (acute blood loss anemia)  S/P PRBC transfusion, ordered labs, close monitoring  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 on 7/15 after a fall at home which ended on r femoral neck fracture, she had repaired surgery on the same day by ortho  She had blood loss anemia and received PRBC on 7/18  On 7/24 she had afib with [facility] cardiology started her on Amiodarone which is currently TID and it will be decreased daily on 8/9  Pt was not put on [facility] due ot her dementia and high risk of falls  Her metoprolol, and Cardizem were reduced in dose  Pt is now at [facility] since yesterday for STR  Pt has no specific complaint  Pt is WBAT on RLE       HISTORY:  Past Medical History:   Diagnosis Date    Anxiety     Cognitive impairment     Dementia ([facility] )     Depression     Hallucination     Hyperlipidemia     Hypertension     Memory loss     Psychiatric illness     Psychosis ([facility]     Schizoaffective disorder ([facility] )     Sleep difficulties      Family History   Problem Relation Age of Onset    Heart disease Mother         Cardiac disorder    Parkinsonism Father     Heart disease Sister         Cardiac disorder    Hypertension Sister     Diabetes Child         Diabetes Mellitus    Lung disease Child         Respiratory Disorder    Diabetes Son         Diabetes Mellitus     Social History     Socioeconomic History    Marital status:      Spouse name: None    Number of children: None    Years of education: None    Highest education level: None   Occupational History    None   Tobacco Use    Smoking status: Never Smoker    Smokeless tobacco: Never Used    Tobacco comment: Former smoker per [employer]   Vaping Use    Vaping Use: Never used   Substance and Sexual Activity    Alcohol use: Never    Drug use: No    Sexual activity: None   Other Topics Concern    None   Social History Narrative    Drinks coffee     Social Determinants of Health     Financial Resource Strain:     Difficulty of Paying Living Expenses:    Food Insecurity:     Worried About Running Out of Food in the Last Year:     [address] in the Last Year:    Transportation Needs:     Lack of Transportation (Medical):  Lack of Transportation (Non-Medical):    Physical Activity:     Days of Exercise per Week:     Minutes of Exercise per Session:    Stress:     Feeling of Stress :    Social Connections:     Frequency of Communication with Friends and Family:     Frequency of Social Gatherings with Friends and Family:     Attends [employer] Services:     Active Member of Clubs or Organizations:     Attends Club or Organization Meetings:     Marital Status:    Intimate Partner Violence:     Fear of Current or Ex-Partner:     Emotionally Abused:     Physically Abused:     Sexually Abused:         Allergies:  No Known Allergies    Review of Systems Review of Systems   Unable to perform ROS: Dementia   Musculoskeletal:        Denies having any pain        Medications and orders     All medications reviewed and updated in Nursing Home EMR  Objective     Vitals:wt:138 4Ibs         BP:159/64        Afebrile    Physical Exam  Vitals and nursing note reviewed  Constitutional:       General: She is not in acute distress  Appearance: Normal appearance  She is well-developed  She is not ill-appearing, toxic-appearing or diaphoretic  HENT:      Head: Normocephalic and atraumatic  Right Ear: External ear normal       Left Ear: External ear normal       Ears:      Comments: [place]     Nose: Nose normal  No congestion or rhinorrhea  Mouth/Throat:      Comments: Missing teeth   Eyes:      General: No scleral icterus  Right eye: No discharge  Left eye: No discharge  Conjunctiva/sclera: Conjunctivae normal       Pupils: Pupils are equal, round, and reactive to light  Cardiovascular:      Rate and Rhythm: Normal rate and regular rhythm  Heart sounds: Normal heart sounds  No murmur heard  No friction rub  No gallop  Pulmonary:      Effort: Pulmonary effort is normal  No respiratory distress  Breath sounds: Normal breath sounds  No stridor  No wheezing, rhonchi or rales  Chest:      Chest wall: No tenderness  Abdominal:      General: Abdomen is flat  Bowel sounds are normal  There is no distension  Palpations: Abdomen is soft  There is no mass  Tenderness: There is no abdominal tenderness  There is no guarding or rebound  Hernia: No hernia is present  Genitourinary:     Comments: Deferred  Musculoskeletal:         General: No swelling, tenderness, deformity or signs of injury  Cervical back: Normal range of motion and neck supple  No rigidity or tenderness  Right lower leg: Edema present  Left lower leg: Edema present  Comments: In bed, limited ROM of UEs and LEs  Lymphadenopathy:      Cervical: No cervical adenopathy  Skin:     General: Skin is warm and dry  Coloration: Skin is not jaundiced or pale  Findings: No bruising, erythema, lesion or rash  Comments: Didn't examine sacral area  R lateral thigh has 3 separates incision lines, stri-strips on, I/D/C  Old incision on R knee and mid abdomen  Neurological:      Mental Status: She is alert  Cranial Nerves: Cranial nerve deficit ([place]) present  Comments: Forgetful  Follows some of the simple commands  Psychiatric:         Behavior: Behavior normal          Pertinent Laboratory/Diagnostic Studies: The following labs/studies were reviewed please see chart or hospital paperwork for details  Ref Range & Units 7/22/21 0436    Sodium 136 - 145 mmol/L 144    Potassium 3 5 - 5 3 mmol/L 3 5    Chloride 100 - 108 mmol/L 109High     CO2 21 - 32 mmol/L 28    ANION GAP 4 - 13 mmol/L 7    BUN 5 - 25 mg/dL 20    Creatinine 0 60 - 1 30 mg/dL 0 84    Comment: Standardized to IDMS reference method   Glucose 65 - 140 mg/dL 103    Comment: If the patient is fasting, the ADA then defines impaired fasting glucose as > 100 mg/dL and diabetes as > or equal to 123 mg/dL  Specimen collection should occur prior to Sulfasalazine administration due to the potential for falsely depressed results  Specimen collection should occur prior to Sulfapyridine administration due to the potential for falsely elevated results     Calcium 8 3 - 10 1 mg/dL 8 3    eGFR ml/min/1 73sq m 62      Ref Range & Units 7/22/21 0436    WBC 4 31 - 10 16 Thousand/uL 6 33    RBC 3 81 - 5 12 Million/uL 3 04Low     Hemoglobin 11 5 - 15 4 g/dL 9 1Low     Hematocrit 34 8 - 46 1 % 28 4Low     MCV 82 - 98 fL 93    MCH 26 8 - 34 3 pg 29 9    MCHC 31 4 - 37 4 g/dL 32 0    RDW 11 6 - 15 1 % 14 6    MPV 8 9 - 12 7 fL 9 9    Platelets 359 - 375 Thousands/uL 246    nRBC /100 WBCs 0    Neutrophils Relative 43 - 75 % 64    Immat GRANS % 0 - 2 % 2 Lymphocytes Relative 14 - 44 % 20    Monocytes Relative 4 - 12 % 11    Eosinophils Relative 0 - 6 % 3    Basophils Relative 0 - 1 % 0    Neutrophils Absolute 1 85 - 7 62 Thousands/µL 4 07    Immature Grans Absolute 0 00 - 0 20 Thousand/uL 0 12    Lymphocytes Absolute 0 60 - 4 47 Thousands/µL 1 24    Monocytes Absolute 0 17 - 1 22 Thousand/µL 0 69    Eosinophils Absolute 0 00 - 0 61 Thousand/µL 0 20    Basophils Absolute 0 00 - 0 10 Thousands/µL 0 01          Specimen Collected: 07/22/21 04:36   Last Resulted: 07/22/21 04:51           Ref Range & Units 7/18/21 0439    Total CK 26 - 192 U/L 87          Specimen Collected: 07/18/21 04:39   Last Resulted: 07/18/21 05:26           Ref Range & Units 7/18/21 0439    Iron Saturation % 10    TIBC 250 - 450 ug/dL 194Low     Iron 50 - 170 ug/dL 20Low     Comment: Patients treated with metal-binding drugs (ie  Deferoxamine) may have depressed iron values  Specimen Collected: 07/18/21 04:39   Last Resulted: 07/18/21 11:56      C-ARM - right femur      INDICATION: Closed fracture of right hip, initial encounter   Procedure guidance      COMPARISON:  None     TECHNIQUE:     FLUOROSCOPY TIME:   2 min 28 sec     5 FLUOROSCOPIC IMAGES     FINDINGS:     Fluoroscopic guidance provided for procedure guidance      Osseous and soft tissue detail limited by technique      IMPRESSION:     Fluoroscopic guidance provided for procedure guidance  Please refer to the separate procedure notes for additional details  LEFT KNEE     INDICATION:  Fall      COMPARISON:  5/21/2021     VIEWS:  XR KNEE 1 OR 2 VW LEFT   Images: 1 (lateral view)     FINDINGS:     There is no acute fracture or dislocation      There is no joint effusion      Unremarkable appearance of total knee arthroplasty   No evidence of hardware complication      No lytic or blastic osseous lesion      Soft tissues are unremarkable      IMPRESSION:     No acute findings      Unremarkable appearance of total knee arthroplasty       LEFT FEMUR     INDICATION:  Fall      COMPARISON:  Left knee 5/21/2021     VIEWS:  XR FEMUR 2 VW LEFT   Images: 3     FINDINGS: Bony demineralization      There is no acute fracture or dislocation      Stable appearance of left total knee arthroplasty      No lytic or blastic osseous lesion      Soft tissues are unremarkable      IMPRESSION:     No acute osseous abnormality       RIGHT KNEE     INDICATION:   Fall, right knee pain      COMPARISON:  Right knee radiograph 5/21/2021     VIEWS:  XR KNEE 1 OR 2 VW RIGHT         FINDINGS:     There is no acute fracture or dislocation      There is no joint effusion      There are advanced tricompartmental degenerative changes similar to previous exam      No lytic or blastic osseous lesion      Soft tissues are unremarkable      IMPRESSION:     No acute osseous abnormality  Evaluation for subtle fractures limited given extensive degenerative change      Degenerative changes as described       RIGHT ANKLE     INDICATION:   Fall, right leg pain      COMPARISON:  None     VIEWS:  XR ANKLE 3+ VW RIGHT         FINDINGS:     Evaluation is limited by positioning on the lateral and frontal view      There is no acute fracture or dislocation      No significant degenerative changes      No lytic or blastic osseous lesion      Soft tissues are unremarkable      IMPRESSION:     Limited evaluation without evident acute osseous abnormality      RIGHT HIP     INDICATION:   Fall, right leg pain      COMPARISON:  Bilateral hips 8/23/2020     VIEWS:  XR HIP/PELV 2-3 VWS RIGHT W PELVIS IF PERFORMED         FINDINGS:     There is an impacted right intertrochanteric femoral neck fracture  No additional fracture or dislocation    Evaluation of the pubic ramus and pelvis is limited by overlying stool and bowel gas      Mild degenerative changes of the bilateral hips      No lytic or blastic osseous lesion      Soft tissues are unremarkable      Degenerative changes visualized lower lumbar spine      IMPRESSION:     Impacted intertrochanteric right femoral neck fracture                            Armando Hays MD  7/27/2021 1:56 PM

## 2021-08-01 ENCOUNTER — TELEPHONE (OUTPATIENT)
Dept: OTHER | Facility: OTHER | Age: 86
End: 2021-08-01

## 2021-08-02 ENCOUNTER — NURSING HOME VISIT (OUTPATIENT)
Dept: GERIATRICS | Facility: OTHER | Age: 86
End: 2021-08-02
Payer: COMMERCIAL

## 2021-08-02 DIAGNOSIS — R26.2 AMBULATORY DYSFUNCTION: Primary | ICD-10-CM

## 2021-08-02 DIAGNOSIS — N18.31 STAGE 3A CHRONIC KIDNEY DISEASE (HCC): ICD-10-CM

## 2021-08-02 DIAGNOSIS — I48.0 PAROXYSMAL A-FIB (HCC): ICD-10-CM

## 2021-08-02 DIAGNOSIS — I10 ESSENTIAL HYPERTENSION: ICD-10-CM

## 2021-08-02 DIAGNOSIS — R13.12 OROPHARYNGEAL DYSPHAGIA: ICD-10-CM

## 2021-08-02 DIAGNOSIS — F03.90 MAJOR NEUROCOGNITIVE DISORDER (HCC): ICD-10-CM

## 2021-08-02 PROCEDURE — 99316 NF DSCHRG MGMT 30 MIN+: CPT | Performed by: FAMILY MEDICINE

## 2021-08-03 ENCOUNTER — TRANSITIONAL CARE MANAGEMENT (OUTPATIENT)
Dept: INTERNAL MEDICINE CLINIC | Facility: CLINIC | Age: 86
End: 2021-08-03

## 2021-08-03 ENCOUNTER — TELEPHONE (OUTPATIENT)
Dept: OBGYN CLINIC | Facility: HOSPITAL | Age: 86
End: 2021-08-03

## 2021-08-03 ENCOUNTER — TELEPHONE (OUTPATIENT)
Dept: OBGYN CLINIC | Facility: MEDICAL CENTER | Age: 86
End: 2021-08-03

## 2021-08-03 NOTE — TELEPHONE ENCOUNTER
Dr Aguilar,       Patient's son called in requesting a call back  Patient had sx 7/15 R femur  He advised patient was at a rehab for a week when she was discharged from the hospital nd he did not like the way his mother was being treated  She suffers from dementia and he feels like she wasn't getting the proper care  Patient had fallen  He stated they took an xray and She is fine but he would like to know if  would like to follow up with her? Her son is now discharging her from the rehab and taking her home where she has 24 hour supervision         #: [phone]

## 2021-08-03 NOTE — PROGRESS NOTES
[facility]  [address]  [phone]  Facility: [place] [address]  Discharge note          NAME: Mini Grey  AGE: 80 y o  SEX: female    DATE OF ENCOUNTER: 8/2/2021    Chief Complaint   Pt is not providing any info     History of Present Illness     HPI    The following portions of the patient's history were reviewed and updated as appropriate (from facility chart and hospital records): allergies, current medications, past family history, past medical history, past social history, past surgical history and problem list   Pt was seen and examined for f/u on ambulatory dysfunction, Dysphagia, ABLA, schizophrenia, R hip fracture, CKD  Pt had a fall two days ago with no obvious injury, Xray of right hip showed intact IM , hard ware  Pt is confused and impulsive and per staff pt constantly wants to stand up  I was informed today that pt's son wants to take her home ASAP saying that pt would get better care at home! 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[address]    Ambulatory dysfunction    Elevated troponin    Urinary incontinence    PAF (paroxysmal atrial fibrillation) ([address]    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[address]    Major neurocognitive disorder ([address]    Moderate protein-calorie malnutrition ([address]    Age-related osteoporosis without current pathological fracture    Closed fracture of right orbital floor ([address]    Fall from ground level    Closed right [facility] fracture ([facility]    Stage 3a chronic kidney disease ([facility] )    D[facility]    ABLA (acute blood loss anemia)    Oropharyngeal dysphagia       Objective     Vitals: wt:137 4Ibs BP, HR log reviewed  Physical Exam  Vitals and nursing note reviewed  Constitutional:       General: She is not in acute distress  Appearance: Normal appearance  She is well-developed  She is not ill-appearing, toxic-appearing or diaphoretic  HENT:      Head: Normocephalic and atraumatic  Right Ear: External ear normal       Left Ear: External ear normal       Ears:      Comments: [place]     Nose: Nose normal  No congestion or rhinorrhea  Mouth/Throat:      Comments: Missing teeth   Eyes:      General: No scleral icterus  Right eye: No discharge  Left eye: No discharge  Extraocular Movements: Extraocular movements intact  Conjunctiva/sclera: Conjunctivae normal       Pupils: Pupils are equal, round, and reactive to light  Cardiovascular:      Rate and Rhythm: Normal rate  Rhythm irregular  Heart sounds: Normal heart sounds  No murmur heard  No friction rub  No gallop  Pulmonary:      Effort: Pulmonary effort is normal  No respiratory distress  Breath sounds: Normal breath sounds  No stridor  No wheezing, rhonchi or rales  Chest:      Chest wall: No tenderness  Abdominal:      General: Abdomen is flat  Bowel sounds are normal  There is no distension  Palpations: Abdomen is soft  There is no mass  Tenderness: There is no abdominal tenderness  There is no guarding or rebound  Hernia: No hernia is present  Genitourinary:     Comments: Deferred  Musculoskeletal:         General: Swelling (of B/L feet ) present  No tenderness, deformity or signs of injury  Cervical back: Normal range of motion and neck supple  No rigidity or tenderness  Right lower leg: Edema (R> L edema ) present  Left lower leg: Edema present     Lymphadenopathy:      Cervical: No cervical adenopathy  Skin:     General: Skin is warm and dry  Coloration: Skin is not jaundiced or pale  Findings: Erythema present  No bruising, lesion or rash  Comments: Didn't examine sacral area  Neurological:      Cranial Nerves: Cranial nerve deficit present  Comments: Confused, not following any commands  Psychiatric:         Behavior: Behavior normal          Pertinent Laboratory/Diagnostic Studies:    7/29:CBC, CMP      HIP UNI W OR W/O PELVIS 2-3 V, RIGHT  See Note  FINDINGS: There is intact-appearing femoral IM channing and interlocking screw fixation hardware traversing a non-united femoral neck fracture  Hardware appears intact without evidence of loosening or failure  There is no obvious additional acute fracture or joint dislocation  Bony mineralization is moderately decreased  Soft tissues are radiographically unremarkable  There is moderate knee and milder right hip arthrosis manifested by joint space narrowing, degenerative spurring, and subchondral sclerosis  There is a suprapatellar effusion  CONCLUSION: Intact-appearing surgical fixation hardware traverses a non-united right hip fracture  There is a non-fixated lesser trochanter avulsion  No obvious additional acute bony abnormality  ELECTRONICALLY SIGNED BY JULI Carter  7/31/2021 11:47:02 PM EDT  FEMUR MIN 2 VIEWS, RIGHT  See Note  FINDINGS: There is intact-appearing femoral IM channing and interlocking screw fixation hardware traversing a non-united femoral neck fracture  Hardware appears intact without evidence of loosening or failure  There is no obvious additional acute fracture or joint dislocation  Bony mineralization is moderately decreased  Soft tissues are radiographically unremarkable  There is moderate knee and milder right hip arthrosis manifested by joint space narrowing, degenerative spurring, and subchondral sclerosis  There is a suprapatellar effusion    CONCLUSION: Intact-appearing surgical fixation hardware traverses a non-united right hip fracture  There is a non-fixated lesser trochanter avulsion  No obvious additional acute bony abnormality  ELECTRONICALLY SIGNED BY JULI Denis  7/31/2021 11:47:02 PM EDT  Current Medications   Medication list in facility chart was reviewed and necessary changes made  Assessment and Plan   Ambulatory dysfunction  S/p fall 2 days ago, hardware intact on xray  Pt continues with WBAT  Will be DC home tomorrow per her son;s request  Will continue with therapy and care at home  Per  pt has walker and WC at home  On Lovenox till 8/15, staff to teach family how to do the injection  Referred to [facility] with PT/OT  F/u with ortho  Stage 3a chronic kidney disease ([facility] )  Labs on 7/29 stable will need f/u with her PCP  Major neurocognitive disorder [facility])  Needs 24/7 care, confuses with risk of falls  Paroxysmal A-fib (HCC)  HR is stable  On Diltiazem and Amiodarone  No AC per cardio with fall risk, age and dementia  Essential hypertension  On Toprol XL and Cardizem  Will need close monitoring  Oropharyngeal dysphagia  On mechanical soft diet  Total time spent was 38mis with more than 50% of time spent on discharge and discharge planning and counseling and coordinating care  Script for all meds written, face to face for [facility] and referral to [facility] done     Jacinto Jackson MD  4/9/543833:81 PM

## 2021-08-03 NOTE — TELEPHONE ENCOUNTER
Pt does have regularly scheduled follow up on 8/17  As long as the xrays look good this will be OK  I did call [facility] to see what we could do to get these images  She is going to try to take pictures of them and email them to us  As long as the xrays show no injury, patient can keep her routine follow up  I did try to call the number listed to let son know but no answer  Left VM for him to call back

## 2021-08-03 NOTE — TELEPHONE ENCOUNTER
Patient's son is calling back to speak with someone in reference to the patient   Myrna Mccain was transferred to the triage nurse at [id number]

## 2021-08-03 NOTE — TELEPHONE ENCOUNTER
I spoke to Son and PA's msg was relayed  He verbalized understanding  He is bringing her to appt on 8/17  Address was provided  Let him know if an earlier appt is needed

## 2021-08-03 NOTE — ASSESSMENT & PLAN NOTE
S/p fall 2 days ago, hardware intact on xray  Pt continues with WBAT  Will be DC home tomorrow per her son;s request  Will continue with therapy and care at home  Per  pt has walker and WC at home  On Lovenox till 8/15, staff to teach family how to do the injection  Referred to [facility] with PT/OT  F/u with ortho

## 2021-08-03 NOTE — UTILIZATION REVIEW
Continued Stay Review    Date: 7/25/2021                       Current Patient Class: Inpatient  Current Level of Care: Med/Surg  HPI:89 y o  female initially admitted on 7/15 with R hip fracture s/p fall  OR IM nail fixation to right intertrochanteric femur fracture 7/15  Assessment/Plan:  POD #10 -- cardiology was consulted 7/24 for A-fib  Pt back in sinus rhythm today  Cardiology plans to start amiodarone today  Continue metoprolol and diltiazem  Baseline QTC interval 450 milliseconds  ECG tomorrow morning to check QTC, since on Zyprexa  Remain off anticoagulation d/t high fall risk  PT/OT recommendation for IP rehab on d/c  Care management working on finding accepting facility  Continue dysphagia diet with Ensure BID  SCD's   OOB with assist    Vital Signs:   Date/Time  Temp  Pulse  Resp  BP  MAP (mmHg)  SpO2  O2 Device  Patient Position - Orthostatic VS   07/25/21 2324  97 8 °F (36 6 °C)  77  20  149/67  --  93 %  --  Lying   07/25/21 1545  97 °F (36 1 °C)Abnormal   77  20  188/93Abnormal   --  98 %  None (Room air)  Lying   07/25/21 1100  97 5 °F (36 4 °C)  95  20  116/90  --  98 %  None (Room air)  Lying   07/25/21 0711  97 3 °F (36 3 °C)Abnormal   75  16  161/80  --  98 %  None (Room air)  Lying   07/24/21 2308  96 6 °F (35 9 °C)Abnormal   71  18  124/49Abnormal   --  96 %  None (Room air)  Lying   07/24/21 2041  --  --  --  --  --  --  None (Room air)  --   07/24/21 2011  96 8 °F (36 °C)Abnormal   79  18  161/87  --  96 %  None (Room air)  Lying   07/24/21 1558  97 4 °F (36 3 °C)Abnormal   85  18  150/64  --  99 %  None (Room air)  Lying   07/24/21 1100  96 7 °F (35 9 °C)Abnormal   90  18  161/75  --  97 %  None (Room air)  Lying   07/24/21 0700  97 9 °F (36 6 °C)  110Abnormal   16  146/64  --  98 %  None (Room air)  Lying   07/24/21 0300  97 °F (36 1 °C)Abnormal   80  16  152/61  --  93 %  --  Lying         Pertinent Labs/Diagnostic Results:                     Medications:   Scheduled Medications:  No current facility-administered medications for this encounter  PRN Meds:  albuterol, 2 puff, Inhalation, Q4H PRN  aluminum-magnesium hydroxide-simethicone, 30 mL, Oral, Q6H PRN  bisacodyl, 10 mg, Rectal, Daily PRN  HYDROmorphone, 0 2 mg, Intravenous, Q4H PRN  ondansetron, 4 mg, Intravenous, Q6H PRN  oxyCODONE, 2 5 mg, Oral, Q4H PRN  oxyCODONE, 5 mg, Oral, Q4H PRN 7/25 x1  polyethylene glycol, 17 g, Oral, Daily PRN  simethicone, 80 mg, Oral, 4x Daily PRN        Discharge Plan: D    Network Utilization Review Department  ATTENTION: Please call with any questions or concerns to [phone] and carefully listen to the prompts so that you are directed to the right person  All voicemails are confidential   Oksana Horne all requests for admission clinical reviews, approved or denied determinations and any other requests to dedicated fax number below belonging to the campus where the patient is receiving treatment   List of dedicated fax numbers for the Facilities:  [address] DENIALS (Administrative/Medical Necessity) [phone]   [address] (Maternity/NICU/Pediatrics) [phone] [address] Yandel Mohan [address] [place] [address] [address] [facility] [place] [address] [phone]

## 2021-08-04 ENCOUNTER — HOSPITAL ENCOUNTER (EMERGENCY)
Facility: HOSPITAL | Age: 86
Discharge: HOME | End: 2021-08-04
Attending: EMERGENCY MEDICINE | Admitting: EMERGENCY MEDICINE
Payer: COMMERCIAL

## 2021-08-04 ENCOUNTER — TELEPHONE (OUTPATIENT)
Dept: INTERNAL MEDICINE CLINIC | Facility: CLINIC | Age: 86
End: 2021-08-04

## 2021-08-04 ENCOUNTER — TELEPHONE (OUTPATIENT)
Dept: OBGYN CLINIC | Facility: HOSPITAL | Age: 86
End: 2021-08-04

## 2021-08-04 ENCOUNTER — PATIENT OUTREACH (OUTPATIENT)
Dept: INTERNAL MEDICINE CLINIC | Facility: CLINIC | Age: 86
End: 2021-08-04

## 2021-08-04 VITALS
SYSTOLIC BLOOD PRESSURE: 129 MMHG | DIASTOLIC BLOOD PRESSURE: 94 MMHG | OXYGEN SATURATION: 96 % | RESPIRATION RATE: 16 BRPM | HEART RATE: 101 BPM | TEMPERATURE: 98.4 F

## 2021-08-04 DIAGNOSIS — Z71.1 PHYSICALLY WELL BUT WORRIED: Primary | ICD-10-CM

## 2021-08-04 DIAGNOSIS — N18.31 STAGE 3A CHRONIC KIDNEY DISEASE (HCC): ICD-10-CM

## 2021-08-04 DIAGNOSIS — Z71.89 COMPLEX CARE COORDINATION: Primary | ICD-10-CM

## 2021-08-04 PROCEDURE — 99282 EMERGENCY DEPT VISIT SF MDM: CPT | Performed by: INTERNAL MEDICINE

## 2021-08-04 PROCEDURE — 99283 EMERGENCY DEPT VISIT LOW MDM: CPT

## 2021-08-04 RX ORDER — OLANZAPINE 5 MG/1
5 TABLET ORAL ONCE
Status: COMPLETED | OUTPATIENT
Start: 2021-08-04 | End: 2021-08-04

## 2021-08-04 RX ADMIN — OLANZAPINE 5 MG: 5 TABLET, FILM COATED ORAL at 19:35

## 2021-08-04 NOTE — TELEPHONE ENCOUNTER
Benoit Found called looking for information about a fall Reynaldo Linda had while she was in rehab  He said she fell around 7 31 21 and hurt her ankle  According to Benoit Found, the rehab only did an x-ray of her hip and not the right ankle  He took her home because he was not happy with the care she was getting at \[facility]\""  I looked at the discharge papers from [facility] and did not see any documentation of a fall while Reynaldo Linda was there  PT is at [facility] now and does not want to start PT in case there is a break or fracture on the right ankle  I asked Benoit Found to take Reynaldo Linda to the ER for evaluation  He said he would call the squad to transport her there, he would not be able to do it himself

## 2021-08-04 NOTE — ED PROVIDER NOTES
History  Chief Complaint   Patient presents with    Wound Check     patient d/c from [facility] yesterday  nurses came and check on patient today and noticed wound on R heel that would like to be checked  hx of dementia      HPI  40-year-old female presents to the ED with her son for evaluation of right heel dressing  Patient has a history of dementia and is unable to provide history  Her son states that she recently had hip fracture and after it was fixed by Orthopedics, she was at [facility]  She was discharged from there yesterday and has had 24/7 nursing care  Today, when physical therapy went to work with patient, they stated they could not because she had a dressing over right heel  The son called the patient's PCP about it and the PCP encouraged him to go to the ER for evaluation  The son expresses concern because there is no documentation of right heel wound and he does not know why the dressing was there  He has not noticed wound himself  He has noticed that she has increased pain right heel  No other complaints or concerns at this time  Prior to Admission Medications   Prescriptions Last Dose Informant Patient Reported? Taking?    DULoxetine (CYMBALTA) 30 mg delayed release capsule   Yes Yes   Sig: Take 30 mg by mouth daily at bedtime   Metoprolol Succinate (KAPSPARGO SPRINKLE PO)   Yes Yes   Sig: Take 50 mg by mouth daily   OLANZapine (ZyPREXA) 5 mg tablet   No Yes   Sig: Take 1 tablet (5 mg total) by mouth daily at bedtime   acetaminophen (TYLENOL) 325 mg tablet   No Yes   Sig: Take 2 tablets (650 mg total) by mouth every 6 (six) hours as needed for mild pain, moderate pain or headaches   albuterol (2 5 mg/3 mL) 0 083 % nebulizer solution   No No   Sig: Take 1 vial (2 5 mg total) by nebulization every 4 (four) hours as needed for wheezing or shortness of breath   Patient not taking: Reported on 5/21/2021   albuterol (PROVENTIL HFA,VENTOLIN HFA) 90 mcg/act inhaler   No No   Sig: Inhale 2 puffs every 4 (four) hours as needed for wheezing   Patient not taking: Reported on 5/21/2021   amiodarone 200 mg tablet   No Yes   Sig: Take 1 tablet (200 mg total) by mouth 3 (three) times a day with meals for 14 days 200 mg TID x 2 weeks then once daily starting august 9, 2021   Patient taking differently: Take 200 mg by mouth daily 200 mg TID x 2 weeks then once daily starting august 9, 2021   bisacodyl (DULCOLAX) 10 mg suppository   No Yes   Sig: Insert 1 suppository (10 mg total) into the rectum daily as needed for constipation (2nd line)   cyanocobalamin (VITAMIN B-12) 1000 MCG tablet   No Yes   Sig: Take 1 tablet (1,000 mcg total) by mouth daily   diltiazem (CARDIZEM CD) 120 mg 24 hr capsule   No Yes   Sig: Take 1 capsule (120 mg total) by mouth daily   docusate sodium (COLACE) 100 mg capsule   No Yes   Sig: Take 1 capsule (100 mg total) by mouth every 12 (twelve) hours   enoxaparin (LOVENOX) 30 mg/0 3 mL   No Yes   Sig: Inject 0 3 mL (30 mg total) under the skin every 24 hours for 19 days   lidocaine (LIDODERM) 5 %   No Yes   Sig: Apply 1 patch topically daily Remove & Discard patch within 12 hours or as directed by MD   melatonin 3 mg   Yes Yes   Sig: Take 3 mg by mouth daily at bedtime Take 2 tablets at bedtime   polyethylene glycol (MIRALAX) 17 g packet   No Yes   Sig: Take 17 g by mouth daily as needed (constipation first line)   simethicone (MYLICON) 80 mg chewable tablet   No Yes   Sig: Chew 1 tablet (80 mg total) 4 (four) times a day as needed for flatulence      Facility-Administered Medications: None       Past Medical History:   Diagnosis Date    Anxiety     Cognitive impairment     COVID-19 virus infection 4/13/2020    Dementia ([facility] [address] [facility]     Depression     Hallucination     Hyperlipidemia     Hypertension     Memory loss     Psychiatric illness     Psychosis ([facility] [address] [facility]     Schizoaffective disorder ([facility] [address] [facility]     Sleep difficulties        Past Surgical History:   Procedure Laterality Date    APPENDECTOMY      BLADDER SURGERY      CHOLECYSTECTOMY      IL OPEN RX FEMUR FX+INTRAMED BRYSON Right 7/15/2021    Procedure: INSERTION NAIL IM FEMUR ANTEGRADE (TROCHANTERIC) right;  Surgeon: Alberto Ernst MD;  Location: [facility];  Service: Orthopedics    TOTAL ABDOMINAL HYSTERECTOMY W/ BILATERAL SALPINGOOPHORECTOMY      TOTAL KNEE ARTHROPLASTY Left        Family History   Problem Relation Age of Onset    Heart disease Mother         Cardiac disorder    Parkinsonism Father     Heart disease Sister         Cardiac disorder    Hypertension Sister     Diabetes Child         Diabetes Mellitus    Lung disease Child         Respiratory Disorder    Diabetes Son         Diabetes Mellitus     I have reviewed and agree with the history as documented      E-Cigarette/Vaping    E-Cigarette Use Never User      E-Cigarette/Vaping Substances    Nicotine No     THC No     CBD No     Flavoring No     Other No     Unknown No      Social History     Tobacco Use    Smoking status: Never Smoker    Smokeless tobacco: Never Used    Tobacco comment: Former smoker per [employer]   Vaping Use    Vaping Use: Never used   Substance Use Topics    Alcohol use: Never    Drug use: No        Review of Systems   Unable to perform ROS: Dementia       Physical Exam  ED Triage Vitals   Temp Pulse Respirations Blood Pressure SpO2   -- 08/04/21 1653 08/04/21 1653 08/04/21 1655 08/04/21 1653    102 17 136/82 99 %      Temp src Heart Rate Source Patient Position - Orthostatic VS BP Location FiO2 (%)   -- 08/04/21 1653 08/04/21 1655 08/04/21 1655 --    Monitor Sitting Right arm       Pain Score       --                    Orthostatic Vital Signs  Vitals:    08/04/21 1653 08/04/21 1655   BP:  136/82   Pulse: 102    Patient Position - Orthostatic VS:  Sitting       Physical Exam   PHYSICAL EXAM    Constitutional:  Well developed, well nourished, no acute distress, non-toxic appearance    HEENT:  Conjunctiva normal  Oropharynx moist  Respiratory:  No respiratory distress, normal breath sounds  Cardiovascular:  Normal rate, normal rhythm, no murmurs  GI:  Soft, nondistended, normal bowel sounds, nontender  :  No costovertebral angle tenderness   Musculoskeletal:  No edema, no tenderness, no deformities  Bilateral heels without any wounds or deformities  Bilateral feet with no ulcers, wounds bleeding, drainage or cellulitis  Integument:  Well hydrated, no rash   Lymphatic:  No lymphadenopathy noted   Neurologic:  Alert, not oriented to place or time, normal motor function, normal sensory function, no focal deficits noted   Psychiatric:  Calm      ED Medications  Medications - No data to display    Diagnostic Studies  Results Reviewed     None                 No orders to display         Procedures  Procedures      ED Course                             SBIRT 20yo+      Most Recent Value   SBIRT (22 yo +)   In order to provide better care to our patients, we are screening all of our patients for alcohol and drug use  Would it be okay to ask you these screening questions? No Filed at: 08/04/2021 1715                MDM  Number of Diagnoses or Management Options  Physically well but worried  Diagnosis management comments: 80-year-old female presents to the ED with her son for evaluation of right heel dressing  Today, when physical therapy went to work with patient, they stated they could not because she had a dressing over right heel  The son called the patient's PCP about it and the PCP encouraged him to go to the ER for evaluation  The son expresses concern because there is no documentation of right heel wound and he does not know why the dressing was there  He has not noticed wound himself  He has noticed that she has increased pain right heel  There are no wounds, ulcers, drainage, bleeding or cellulitis from bilateral heels    The son showed me a picture of the dressing his mother had, it appears to be a dressing that prevents pressure ulcers  Since her no wounds on her heels she is otherwise recovering well at home, would not recommend any further treatment this time  The son reported understanding they are discharged home  Amount and/or Complexity of Data Reviewed  Obtain history from someone other than the patient: yes  Review and summarize past medical records: yes  Discuss the patient with other providers: yes    Risk of Complications, Morbidity, and/or Mortality  Presenting problems: low  Diagnostic procedures: low  Management options: low    Patient Progress  Patient progress: improved      Disposition  Final diagnoses:   Physically well but worried     Time reflects when diagnosis was documented in both MDM as applicable and the Disposition within this note     Time User Action Codes Description Comment    8/4/2021  6:10 PM [place] Laurence Add [Z71 1] Physically well but worried       ED Disposition     ED Disposition Condition Date/Time Comment    Discharge Stable Wed Aug 4, 2021  6:10 PM Vicki Arnett discharge to home/self care  Follow-up Information     Follow up With Specialties Details Why Chadd Whatley MD Internal Medicine Call  As needed [address] [facility]  [phone]            Patient's Medications   Discharge Prescriptions    No medications on file     No discharge procedures on file  PDMP Review       Value Time User    PDMP Reviewed  Yes 7/16/2021  8:50 AM Lorena Rodriguez PA-C           ED Provider  Attending physically available and evaluated Vicki Arnett  I managed the patient along with the ED Attending      Electronically Signed by         Stalin Pritchard MD  08/04/21 4276

## 2021-08-04 NOTE — ED NOTES
Patient son left to go home  Patient placed on a bed alarm at this time and started on q15 min checks  Will continue to monitor patient        Rosa Sampson RN  08/04/21 0405

## 2021-08-04 NOTE — DISCHARGE INSTRUCTIONS
No wounds over either legs or heels  Patient make work with physical therapy  Patient develops any fevers, vomiting, diarrhea or any other physical complaints, please return to the ER evaluation

## 2021-08-04 NOTE — TELEPHONE ENCOUNTER
Dr Fatimah Frey  from [facility] is doing an eval for PT  She is asking if the patient has any weight bearing restrictions?      # [phone]

## 2021-08-04 NOTE — PROGRESS NOTES
Son Pallavi Null returned my call  Harish Bassett is home from rehab post R hip fx  and has 24/7 caregivers  She will be receiving home care services of SN and PT, initial visit today  He was not happy with her care at rehab so her brought her home  Harish Bassett has dementia  She is weak and non ambulatory, requires assistance to transfer out of bed to chair  Taking tylenol for hip discomfort, reinforced to avoid NSAIDS  Caregiver reported Harish Bassett is not eating or drinking today  Pallavi Null will be visiting mother this afternoon during VNA nurse visit to address eating issues and to review medications  He will be administering daily lovenox injections  Patient does have LE edema present but has not increased  She is not being weighed  Vitals will be checked by nurse at visit today  F/u with PCP scheduled for 8/12  Pallaviricardo Null does not feel he needs any additional assistance at this time  Encouraged him to call with any questions or concerns

## 2021-08-04 NOTE — ED NOTES
P/u at 2030 with [facility]  Son called  No answer  Message left  Will call back again        Marely Alvarado RN  08/04/21 [id number]  Edgar Ruffin RN  08/04/21 9423

## 2021-08-04 NOTE — ED NOTES
RN called [facility] for BLS ride home for patient  [facility] will call back with time        Nura Petty RN  08/04/21 4508

## 2021-08-04 NOTE — ED NOTES
RN spoke with Son  Will be there when ambulance arrives at home        Isidro Osorio RN  08/04/21 9727

## 2021-08-12 ENCOUNTER — HOSPITAL ENCOUNTER (OUTPATIENT)
Dept: RADIOLOGY | Facility: HOSPITAL | Age: 86
Discharge: HOME | End: 2021-08-12
Attending: INTERNAL MEDICINE
Payer: COMMERCIAL

## 2021-08-12 ENCOUNTER — APPOINTMENT (OUTPATIENT)
Dept: LAB | Facility: HOSPITAL | Age: 86
End: 2021-08-12
Attending: INTERNAL MEDICINE
Payer: COMMERCIAL

## 2021-08-12 ENCOUNTER — OFFICE VISIT (OUTPATIENT)
Dept: INTERNAL MEDICINE CLINIC | Facility: CLINIC | Age: 86
End: 2021-08-12
Payer: COMMERCIAL

## 2021-08-12 VITALS
SYSTOLIC BLOOD PRESSURE: 120 MMHG | BODY MASS INDEX: 28.11 KG/M2 | DIASTOLIC BLOOD PRESSURE: 70 MMHG | RESPIRATION RATE: 15 BRPM | HEART RATE: 60 BPM | TEMPERATURE: 97.2 F | HEIGHT: 58 IN

## 2021-08-12 DIAGNOSIS — E78.2 MIXED HYPERLIPIDEMIA: ICD-10-CM

## 2021-08-12 DIAGNOSIS — G89.29 CHRONIC PAIN OF RIGHT KNEE: ICD-10-CM

## 2021-08-12 DIAGNOSIS — F03.91 DEMENTIA WITH BEHAVIORAL DISTURBANCE, UNSPECIFIED DEMENTIA TYPE (HCC): Chronic | ICD-10-CM

## 2021-08-12 DIAGNOSIS — N18.31 STAGE 3A CHRONIC KIDNEY DISEASE (HCC): ICD-10-CM

## 2021-08-12 DIAGNOSIS — R26.2 AMBULATORY DYSFUNCTION: ICD-10-CM

## 2021-08-12 DIAGNOSIS — D50.9 IRON DEFICIENCY ANEMIA, UNSPECIFIED IRON DEFICIENCY ANEMIA TYPE: ICD-10-CM

## 2021-08-12 DIAGNOSIS — I48.0 PAF (PAROXYSMAL ATRIAL FIBRILLATION) (HCC): ICD-10-CM

## 2021-08-12 DIAGNOSIS — E87.1 HYPONATREMIA: ICD-10-CM

## 2021-08-12 DIAGNOSIS — I48.0 PAF (PAROXYSMAL ATRIAL FIBRILLATION) (HCC): Primary | ICD-10-CM

## 2021-08-12 DIAGNOSIS — I10 ESSENTIAL HYPERTENSION: ICD-10-CM

## 2021-08-12 DIAGNOSIS — I10 ORTHOSTATIC HYPERTENSION: ICD-10-CM

## 2021-08-12 DIAGNOSIS — S02.31XA CLOSED FRACTURE OF RIGHT ORBITAL FLOOR, INITIAL ENCOUNTER (HCC): ICD-10-CM

## 2021-08-12 DIAGNOSIS — S72.001A CLOSED FRACTURE OF RIGHT HIP, INITIAL ENCOUNTER (HCC): ICD-10-CM

## 2021-08-12 DIAGNOSIS — F20.5 RESIDUAL SCHIZOPHRENIA (HCC): Chronic | ICD-10-CM

## 2021-08-12 DIAGNOSIS — M25.561 CHRONIC PAIN OF RIGHT KNEE: ICD-10-CM

## 2021-08-12 LAB
ALBUMIN SERPL BCP-MCNC: 4 G/DL (ref 3.5–5)
ALP SERPL-CCNC: 161 U/L (ref 46–116)
ALT SERPL W P-5'-P-CCNC: 15 U/L (ref 12–78)
ANION GAP SERPL CALCULATED.3IONS-SCNC: 10 MMOL/L (ref 4–13)
AST SERPL W P-5'-P-CCNC: 26 U/L (ref 5–45)
BASOPHILS # BLD AUTO: 0.01 THOUSANDS/ΜL (ref 0–0.1)
BASOPHILS NFR BLD AUTO: 0 % (ref 0–1)
BILIRUB SERPL-MCNC: 0.53 MG/DL (ref 0.2–1)
BUN SERPL-MCNC: 28 MG/DL (ref 5–25)
CALCIUM SERPL-MCNC: 9 MG/DL (ref 8.3–10.1)
CHLORIDE SERPL-SCNC: 99 MMOL/L (ref 100–108)
CO2 SERPL-SCNC: 25 MMOL/L (ref 21–32)
CREAT SERPL-MCNC: 1.21 MG/DL (ref 0.6–1.3)
EOSINOPHIL # BLD AUTO: 0.08 THOUSAND/ΜL (ref 0–0.61)
EOSINOPHIL NFR BLD AUTO: 1 % (ref 0–6)
ERYTHROCYTE [DISTWIDTH] IN BLOOD BY AUTOMATED COUNT: 14.6 % (ref 11.6–15.1)
FERRITIN SERPL-MCNC: 516 NG/ML (ref 8–388)
GFR SERPL CREATININE-BSD FRML MDRD: 40 ML/MIN/1.73SQ M
GLUCOSE SERPL-MCNC: 110 MG/DL (ref 65–140)
HCT VFR BLD AUTO: 37.8 % (ref 34.8–46.1)
HGB BLD-MCNC: 12.3 G/DL (ref 11.5–15.4)
IMM GRANULOCYTES # BLD AUTO: 0.03 THOUSAND/UL (ref 0–0.2)
IMM GRANULOCYTES NFR BLD AUTO: 0 % (ref 0–2)
IRON SATN MFR SERPL: 25 %
IRON SERPL-MCNC: 62 UG/DL (ref 50–170)
LYMPHOCYTES # BLD AUTO: 0.72 THOUSANDS/ΜL (ref 0.6–4.47)
LYMPHOCYTES NFR BLD AUTO: 10 % (ref 14–44)
MAGNESIUM SERPL-MCNC: 2.2 MG/DL (ref 1.6–2.6)
MCH RBC QN AUTO: 30.7 PG (ref 26.8–34.3)
MCHC RBC AUTO-ENTMCNC: 32.5 G/DL (ref 31.4–37.4)
MCV RBC AUTO: 94 FL (ref 82–98)
MONOCYTES # BLD AUTO: 0.57 THOUSAND/ΜL (ref 0.17–1.22)
MONOCYTES NFR BLD AUTO: 8 % (ref 4–12)
NEUTROPHILS # BLD AUTO: 6.1 THOUSANDS/ΜL (ref 1.85–7.62)
NEUTS SEG NFR BLD AUTO: 81 % (ref 43–75)
NRBC BLD AUTO-RTO: 0 /100 WBCS
PLATELET # BLD AUTO: 376 THOUSANDS/UL (ref 149–390)
PMV BLD AUTO: 10.2 FL (ref 8.9–12.7)
POTASSIUM SERPL-SCNC: 5.3 MMOL/L (ref 3.5–5.3)
PROT SERPL-MCNC: 7.6 G/DL (ref 6.4–8.2)
RBC # BLD AUTO: 4.01 MILLION/UL (ref 3.81–5.12)
SODIUM SERPL-SCNC: 134 MMOL/L (ref 136–145)
TIBC SERPL-MCNC: 250 UG/DL (ref 250–450)
VIT B12 SERPL-MCNC: 1548 PG/ML (ref 100–900)
WBC # BLD AUTO: 7.51 THOUSAND/UL (ref 4.31–10.16)

## 2021-08-12 PROCEDURE — 36415 COLL VENOUS BLD VENIPUNCTURE: CPT

## 2021-08-12 PROCEDURE — 80053 COMPREHEN METABOLIC PANEL: CPT

## 2021-08-12 PROCEDURE — 85025 COMPLETE CBC W/AUTO DIFF WBC: CPT

## 2021-08-12 PROCEDURE — 73564 X-RAY EXAM KNEE 4 OR MORE: CPT

## 2021-08-12 PROCEDURE — 82728 ASSAY OF FERRITIN: CPT

## 2021-08-12 PROCEDURE — 83918 ORGANIC ACIDS TOTAL QUANT: CPT

## 2021-08-12 PROCEDURE — 83550 IRON BINDING TEST: CPT

## 2021-08-12 PROCEDURE — 82607 VITAMIN B-12: CPT

## 2021-08-12 PROCEDURE — 83735 ASSAY OF MAGNESIUM: CPT

## 2021-08-12 PROCEDURE — 83540 ASSAY OF IRON: CPT

## 2021-08-12 PROCEDURE — 99495 TRANSJ CARE MGMT MOD F2F 14D: CPT | Performed by: INTERNAL MEDICINE

## 2021-08-12 NOTE — PROGRESS NOTES
Assessment/Plan:  Paroxysmal atrial fib in the hospital   On amiodarone now metoprolol was decreased in dose to 50 mg daily on the cardia side was decreased to 120 mg daily    He is hemodynamically stable here going to see orthopedics next week with knee pain I order an x-ray of the right knee may be an injection with Orthopedics to evaluate will see her back in 6-7 weeks for influenza vaccine of tardive I will follow-up she is still on Lovenox 40 mg subQ daily she will need anticoagulation for her paroxysmal atrial fib she also now on amiodarone        Admitted to [facility] July 15 discharge in July 26  Closed right hip fracture  Status post intramedullary nail fixation of the right intertrochanteric femur fracture on July 15 2021    Paroxysmal atrial fib  Discharge from amiodarone 200 mg daily starting on August 9    Acute blood-loss item 1 unit of packed red blood cells last hemoglobin was 9 1 on July 22nd will ask the son to take her for the labs today    Chronic renal failure stage 3 BUN 62 creatinine 0 8 on July 22nd much improved    Psychosis card with the decline schizophrenia dementia on Cymbalta 30 mg at bedtime Zyprexa 2 5 of the time    Bilateral hearing loss she is to follow-up with ENT    Blood pressure well controlled on the following medications  Toprol XL 50 mg  Cardio send CD decreased to 120 mg daily to avoid hypotension    Maya Guerra in July 15 at home my femoral neck fracture was seen by Orthopedic blood loss hemoglobin was 6 23 with a unit of packed red blood cells the level of rapid atrial fibrillation in July 24 with initiate amiodarone metoprolol was reduced to 50 mg and her Cardizem was reduced to 120 mg    She went to nursing home under care    She is here with her son and her a examined in the wheelchair was provided a hospital bed for her at home to use the transfers and movement in the bed she denies any complain too has schizophrenia and cognitive decline for review of system is not reliable but appears in no distress  T has been some knee pain will get an x-ray of the knee and will ask Orthopedic to evaluate when he see her next week  TCM Call (since 7/12/2021)     Date and time call was made  8/3/2021 11:16 AM    Hospital care reviewed  Records reviewed    Patient was hospitialized at  Other (comment)        Comment  [place] [facility] [place]hab    Date of Admission  07/26/21    Date of discharge  08/03/21    Diagnosis  right hip fracture     Disposition  Home    Were the patients medications reviewed and updated  No    Current Symptoms  None      TCM Call (since 7/12/2021)     Post hospital issues  None    Should patient be enrolled in anticoag monitoring? No    Scheduled for follow up? Yes    Not clinically warranted  D/C to [facility]    Did you obtain your prescribed medications  No    Do you need help managing your prescriptions or medications  No    Is transportation to your appointment needed  No    I have advised the patient to call PCP with any new or worsening symptoms  vickie urbano cma     Living Arrangements  Alone    Are you recieving any outpatient services  No    Are you recieving home care services  No    Have you fallen in the last 12 months  Yes    Interperter language line needed  Yes    Counseling  Caregiver          No problem-specific Assessment & Plan notes found for this encounter  Diagnoses and all orders for this visit:    PAF (paroxysmal atrial fibrillation) ([facility] )    Essential hypertension    Dementia with behavioral disturbance, unspecified dementia type ([facility] )    Stage 3a chronic kidney disease ([facility] )    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[facility] )    Ambulatory dysfunction    Mixed hyperlipidemia    Hyponatremia    Orthostatic hypertension          Subjective:      Patient ID: Carissa Hurst is a 80 y o  female  No chief complaint on file          Current Outpatient Medications:     acetaminophen (TYLENOL) 325 mg tablet, Take 2 tablets (650 mg total) by mouth every 6 (six) hours as needed for mild pain, moderate pain or headaches, Disp: 30 tablet, Rfl: 0    albuterol (2 5 mg/3 mL) 0 083 % nebulizer solution, Take 1 vial (2 5 mg total) by nebulization every 4 (four) hours as needed for wheezing or shortness of breath (Patient not taking: Reported on 5/21/2021), Disp: 1 vial, Rfl: 0    albuterol (PROVENTIL HFA,VENTOLIN HFA) 90 mcg/act inhaler, Inhale 2 puffs every 4 (four) hours as needed for wheezing (Patient not taking: Reported on 5/21/2021), Disp: 1 Inhaler, Rfl: 0    amiodarone 200 mg tablet, Take 1 tablet (200 mg total) by mouth 3 (three) times a day with meals for 14 days 200 mg TID x 2 weeks then once daily starting august 9, 2021 (Patient taking differently: Take 200 mg by mouth daily 200 mg TID x 2 weeks then once daily starting august 9, 2021), Disp: , Rfl: 0    bisacodyl (DULCOLAX) 10 mg suppository, Insert 1 suppository (10 mg total) into the rectum daily as needed for constipation (2nd line), Disp: 12 suppository, Rfl: 0    cyanocobalamin (VITAMIN B-12) 1000 MCG tablet, Take 1 tablet (1,000 mcg total) by mouth daily, Disp: 30 tablet, Rfl: 2    diltiazem (CARDIZEM CD) 120 mg 24 hr capsule, Take 1 capsule (120 mg total) by mouth daily, Disp: , Rfl:     docusate sodium (COLACE) 100 mg capsule, Take 1 capsule (100 mg total) by mouth every 12 (twelve) hours, Disp: 10 capsule, Rfl: 0    DULoxetine (CYMBALTA) 30 mg delayed release capsule, Take 30 mg by mouth daily at bedtime, Disp: , Rfl:     enoxaparin (LOVENOX) 30 mg/0 3 mL, Inject 0 3 mL (30 mg total) under the skin every 24 hours for 19 days, Disp: , Rfl: 0    lidocaine (LIDODERM) 5 %, Apply 1 patch topically daily Remove & Discard patch within 12 hours or as directed by MD, Disp: , Rfl: 0    melatonin 3 mg, Take 3 mg by mouth daily at bedtime Take 2 tablets at bedtime, Disp: , Rfl:     Metoprolol Succinate (KAPSPARGO SPRINKLE PO), Take 50 mg by mouth daily, Disp: , Rfl:    OLANZapine (ZyPREXA) 5 mg tablet, Take 1 tablet (5 mg total) by mouth daily at bedtime, Disp: 30 tablet, Rfl: 1    polyethylene glycol (MIRALAX) 17 g packet, Take 17 g by mouth daily as needed (constipation first line), Disp: 14 each, Rfl: 0    simethicone (MYLICON) 80 mg chewable tablet, Chew 1 tablet (80 mg total) 4 (four) times a day as needed for flatulence, Disp: 30 tablet, Rfl: 0    HPI    The following portions of the patient's history were reviewed and updated as appropriate: allergies, current medications, past family history, past medical history, past social history, past surgical history and problem list     Review of Systems   Constitutional: Negative  Negative for activity change, appetite change, fatigue, fever and unexpected weight change  HENT: Negative for congestion, ear pain, hearing loss, mouth sores, postnasal drip, rhinorrhea, sore throat, trouble swallowing and voice change  Eyes: Negative for pain, redness and visual disturbance  Respiratory: Negative for cough, chest tightness, shortness of breath and wheezing  Cardiovascular: Negative for chest pain, palpitations and leg swelling  Gastrointestinal: Negative for abdominal distention, abdominal pain, blood in stool, constipation, diarrhea and nausea  Endocrine: Negative for cold intolerance, heat intolerance, polydipsia, polyphagia and polyuria  Genitourinary: Negative for difficulty urinating, dysuria, flank pain, frequency, hematuria and urgency  Musculoskeletal: Negative for arthralgias, back pain, gait problem, joint swelling and myalgias  Skin: Negative for color change and pallor  Neurological: Negative for dizziness, tremors, seizures, syncope, weakness, numbness and headaches  Hematological: Negative for adenopathy  Does not bruise/bleed easily  Psychiatric/Behavioral: Negative  Negative for sleep disturbance  The patient is not nervous/anxious  Objective:     There were no vitals taken for this visit      Physical Exam  Constitutional:       Appearance: She is well-developed  HENT:      Head: Normocephalic  Right Ear: External ear normal       Left Ear: External ear normal       Nose: Nose normal       Mouth/Throat:      Pharynx: No oropharyngeal exudate  Eyes:      Conjunctiva/sclera: Conjunctivae normal       Pupils: Pupils are equal, round, and reactive to light  Neck:      Thyroid: No thyromegaly  Cardiovascular:      Rate and Rhythm: Normal rate and regular rhythm  Heart sounds: Normal heart sounds  No murmur heard  No friction rub  No gallop  Comments: S1-S2 regular rhythm  Extremities no edema  Pulmonary:      Effort: Pulmonary effort is normal  No respiratory distress  Breath sounds: Normal breath sounds  No wheezing or rales  Comments: Lungs are clear no wheezing rales or rhonchi  Abdominal:      General: Bowel sounds are normal  There is no distension  Palpations: Abdomen is soft  There is no mass  Tenderness: There is no abdominal tenderness  There is no guarding or rebound  Comments: Abdomen soft nontender   Musculoskeletal:         General: Normal range of motion  Cervical back: Normal range of motion and neck supple  Lymphadenopathy:      Cervical: No cervical adenopathy  Skin:     General: Skin is warm and dry  Neurological:      Mental Status: She is alert and oriented to person, place, and time     Psychiatric:         Behavior: Behavior normal          Judgment: Judgment normal

## 2021-08-13 ENCOUNTER — TELEPHONE (OUTPATIENT)
Dept: INTERNAL MEDICINE CLINIC | Facility: CLINIC | Age: 86
End: 2021-08-13

## 2021-08-13 NOTE — TELEPHONE ENCOUNTER
Attending Attestation (For Attendings USE Only)... I spoke with pts son and he is aware of her results  I also received a phone call from [id number] ([facility])  She called to stated that pt had not been sleeping and is very confused and restless  I asked Tabitha Bajwa if he was aware of this matter and he stated that he thinks its due to the changes in aide that come out and visit in the home  He will call back in about a week if the problem persists to address

## 2021-08-13 NOTE — TELEPHONE ENCOUNTER
----- Message from Ana Clemente MD sent at 8/12/2021  1:58 PM EDT -----  Normal results  hemoglobin normalize good news hemoglobin is now 12 3 it was 9 1 no change in medication or plan

## 2021-08-13 NOTE — TELEPHONE ENCOUNTER
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

## 2021-08-16 LAB
METHYLMALONATE SERPL-SCNC: 159 NMOL/L (ref 0–378)
SL AMB DISCLAIMER: NORMAL

## 2021-08-17 ENCOUNTER — OFFICE VISIT (OUTPATIENT)
Dept: OBGYN CLINIC | Facility: MEDICAL CENTER | Age: 86
End: 2021-08-17

## 2021-08-17 ENCOUNTER — APPOINTMENT (OUTPATIENT)
Dept: RADIOLOGY | Facility: MEDICAL CENTER | Age: 86
End: 2021-08-17
Payer: COMMERCIAL

## 2021-08-17 VITALS
RESPIRATION RATE: 16 BRPM | WEIGHT: 134 LBS | SYSTOLIC BLOOD PRESSURE: 111 MMHG | HEART RATE: 58 BPM | DIASTOLIC BLOOD PRESSURE: 68 MMHG | BODY MASS INDEX: 28.01 KG/M2

## 2021-08-17 DIAGNOSIS — S72.001D CLOSED FRACTURE OF RIGHT HIP WITH ROUTINE HEALING, SUBSEQUENT ENCOUNTER: ICD-10-CM

## 2021-08-17 DIAGNOSIS — S02.31XD CLOSED FRACTURE OF RIGHT ORBITAL FLOOR WITH ROUTINE HEALING, SUBSEQUENT ENCOUNTER: ICD-10-CM

## 2021-08-17 DIAGNOSIS — S72.001D CLOSED FRACTURE OF RIGHT HIP WITH ROUTINE HEALING, SUBSEQUENT ENCOUNTER: Primary | ICD-10-CM

## 2021-08-17 PROCEDURE — 73552 X-RAY EXAM OF FEMUR 2/>: CPT

## 2021-08-17 PROCEDURE — 99024 POSTOP FOLLOW-UP VISIT: CPT | Performed by: ORTHOPAEDIC SURGERY

## 2021-08-17 NOTE — PROGRESS NOTES
History of the Present Illness     Christiana Russell is a 80 y o  female following up postoperatively status post IM nail right femur on 07/15/2021  Patient is not communicative but her son presents with her today  Notes that she has been having some knee pain  Has been ambulating with a cane and assistance  Therapy coming to her house        Physical Exam     /68   Pulse 58   Resp 16   Wt 60 8 kg (134 lb)   BMI 28 01 kg/m²     Examination right lower extremity reveals incisions that are clean dry and intact and healing well  She is not compliant with motor sensory exam   No distal edema to the leg  Warm well-perfused foot  No pain with logroll of the hip    Data Review       Imaging:  Multiple view imaging of the right femur taken in the office and personally reviewed by me today shows evidence of stable reduction and implant with significant tricompartmental osteoarthritis of the right knee    Assessment and Plan       80year-old female healing well status post intramedullary nail fixation of her right reverse obliquity intertrochanteric femur fracture  Patient should continue to work with therapy at home  2 times a week in house therapy should be sufficient at this point and I have advocated for her home care workers to continue to work with her to help her mobilize  Can transition outpatient when meeting all criteria to do so  Recommended vitamin-D supplementation    Son has asked for a hospital bed for her care which I have written a prescription for      Follow Up:  2 months with 1 of my partners    To Do Next Visit:     PROCEDURES PERFORMED:    No Procedures performed today

## 2021-08-19 ENCOUNTER — TELEPHONE (OUTPATIENT)
Dept: OBGYN CLINIC | Facility: HOSPITAL | Age: 86
End: 2021-08-19

## 2021-08-19 NOTE — TELEPHONE ENCOUNTER
Patient's son is calling for an updated order for home care    [employer] will approve through November, but they need a new order to go to [facility]

## 2021-08-19 NOTE — TELEPHONE ENCOUNTER
Patient sees Dr Sung Hernandez    Patient's son called requesting the DME order for a bed be sent to [facility]

## 2021-08-20 DIAGNOSIS — S72.001D CLOSED FRACTURE OF RIGHT HIP WITH ROUTINE HEALING, SUBSEQUENT ENCOUNTER: Primary | ICD-10-CM

## 2021-08-23 NOTE — TELEPHONE ENCOUNTER
[address] [address] staff - Can you fax the [address] order to [address] fax #[phone]    Thanks

## 2021-08-24 ENCOUNTER — HOSPITAL ENCOUNTER (EMERGENCY)
Facility: HOSPITAL | Age: 86
Discharge: HOME | End: 2021-08-25
Attending: EMERGENCY MEDICINE | Admitting: EMERGENCY MEDICINE
Payer: COMMERCIAL

## 2021-08-24 DIAGNOSIS — F02.81 LATE ONSET ALZHEIMER'S DEMENTIA WITH BEHAVIORAL DISTURBANCE (HCC): Primary | ICD-10-CM

## 2021-08-24 DIAGNOSIS — G30.1 LATE ONSET ALZHEIMER'S DEMENTIA WITH BEHAVIORAL DISTURBANCE (HCC): Primary | ICD-10-CM

## 2021-08-24 PROCEDURE — 99285 EMERGENCY DEPT VISIT HI MDM: CPT

## 2021-08-24 PROCEDURE — 99284 EMERGENCY DEPT VISIT MOD MDM: CPT | Performed by: EMERGENCY MEDICINE

## 2021-08-24 NOTE — TELEPHONE ENCOUNTER
Patient is calling because Emilia soto is telling him that we are not sending the fax correctly  Please call [address] to find out what system they use for faxing  Please advise      Please call Yaya Calloway [phone] as son as this has been transmitted

## 2021-08-25 VITALS
OXYGEN SATURATION: 98 % | RESPIRATION RATE: 18 BRPM | TEMPERATURE: 97.6 F | SYSTOLIC BLOOD PRESSURE: 154 MMHG | WEIGHT: 134 LBS | DIASTOLIC BLOOD PRESSURE: 71 MMHG | BODY MASS INDEX: 28.01 KG/M2 | HEART RATE: 74 BPM

## 2021-08-25 NOTE — TELEPHONE ENCOUNTER
Brett Mendosa is calling asking that we fax the script for the hospital bed directly to him so he can get it to [facility]  I let him know that we could but he will call back with his fax number

## 2021-08-25 NOTE — ED NOTES
Son called made aware pt is leaving with transport at this time       Sabi Delong, RN  08/25/21 0174
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
## 2021-08-25 NOTE — ED NOTES
Pt sat up in bed and held onto bed rail to sit up  bed alarm began to sound  RN went in and asked pt nicely to lay back  Pt let go of bed rail and bumped L arm on rail  Small skin tear noted  Bleeding controlled and ED provider notified  RN cleaned out skin tear with wound care solution and placed a bandage  Pt denies pain        Liu Davidson RN  08/24/21 2227

## 2021-08-25 NOTE — ED NOTES
RN in with pt next door when RN walked out of pt room ED tech notified RN that pt found sitting next to bed  Negative injuries noted and ED provider notified  RN assessed pt and pt denies pain and injuries and was able to stand up and sit back in bed  Pt placed on bed alarm and bed in trendelenburg position  All other fall risk precautions still in place from arrival (fall risk bracelet, yellow socks, fall risk sign, bed in lowest position, call bell in reach, and both side rails up   Pt still remains near nurses station     Fernando Riojas, [facility]  08/24/21 ANJELICA Calderon  08/24/21 2055

## 2021-08-26 DIAGNOSIS — K59.00 CONSTIPATION: ICD-10-CM

## 2021-08-26 DIAGNOSIS — I48.0 PAROXYSMAL A-FIB (HCC): ICD-10-CM

## 2021-08-27 RX ORDER — METOPROLOL SUCCINATE 50 MG/1
TABLET, EXTENDED RELEASE ORAL
Qty: 90 TABLET | Refills: 0 | Status: SHIPPED | OUTPATIENT
Start: 2021-08-27 | End: 2022-01-19

## 2021-08-27 RX ORDER — DILTIAZEM HYDROCHLORIDE 120 MG/1
CAPSULE, EXTENDED RELEASE ORAL
Qty: 90 CAPSULE | Refills: 0 | Status: SHIPPED | OUTPATIENT
Start: 2021-08-27 | End: 2021-11-22

## 2021-08-27 RX ORDER — AMIODARONE HYDROCHLORIDE 200 MG/1
200 TABLET ORAL DAILY
Qty: 90 TABLET | Refills: 0 | Status: SHIPPED | OUTPATIENT
Start: 2021-08-27 | End: 2021-11-22

## 2021-08-27 RX ORDER — DOCUSATE SODIUM 100 MG
CAPSULE ORAL
Qty: 90 CAPSULE | Refills: 0 | Status: SHIPPED | OUTPATIENT
Start: 2021-08-27

## 2021-08-27 NOTE — TELEPHONE ENCOUNTER
Fax failed to send with [phone]  Will try again, but will most likely result in failed fax again  We cannot send a fax that will not go through

## 2021-09-02 NOTE — TELEPHONE ENCOUNTER
Patient's son is calling in requesting script to be faxed over again  He states he is not received it yet         Please advise,     Fax#: [phone]

## 2021-09-03 NOTE — TELEPHONE ENCOUNTER
Done  I will be unavailable for 2 weeks   Please any further messages about this to [facility] Clinical bin

## 2021-09-03 NOTE — TELEPHONE ENCOUNTER
Patient son called with a new fax # for [facility] regarding the hospital bed       Fax # [phone]

## 2021-09-04 ENCOUNTER — HOSPITAL ENCOUNTER (EMERGENCY)
Facility: HOSPITAL | Age: 86
Discharge: HOME | End: 2021-09-05
Attending: EMERGENCY MEDICINE
Payer: COMMERCIAL

## 2021-09-04 DIAGNOSIS — N39.0 UTI (URINARY TRACT INFECTION): Primary | ICD-10-CM

## 2021-09-04 DIAGNOSIS — R33.8 ACUTE URINARY RETENTION: ICD-10-CM

## 2021-09-04 LAB
BASOPHILS # BLD AUTO: 0.01 THOUSANDS/ΜL (ref 0–0.1)
BASOPHILS NFR BLD AUTO: 0 % (ref 0–1)
EOSINOPHIL # BLD AUTO: 0.08 THOUSAND/ΜL (ref 0–0.61)
EOSINOPHIL NFR BLD AUTO: 1 % (ref 0–6)
ERYTHROCYTE [DISTWIDTH] IN BLOOD BY AUTOMATED COUNT: 14 % (ref 11.6–15.1)
HCT VFR BLD AUTO: 38.3 % (ref 34.8–46.1)
HGB BLD-MCNC: 12.5 G/DL (ref 11.5–15.4)
IMM GRANULOCYTES # BLD AUTO: 0.06 THOUSAND/UL (ref 0–0.2)
IMM GRANULOCYTES NFR BLD AUTO: 1 % (ref 0–2)
LYMPHOCYTES # BLD AUTO: 0.93 THOUSANDS/ΜL (ref 0.6–4.47)
LYMPHOCYTES NFR BLD AUTO: 10 % (ref 14–44)
MCH RBC QN AUTO: 30.6 PG (ref 26.8–34.3)
MCHC RBC AUTO-ENTMCNC: 32.6 G/DL (ref 31.4–37.4)
MCV RBC AUTO: 94 FL (ref 82–98)
MONOCYTES # BLD AUTO: 0.65 THOUSAND/ΜL (ref 0.17–1.22)
MONOCYTES NFR BLD AUTO: 7 % (ref 4–12)
NEUTROPHILS # BLD AUTO: 7.6 THOUSANDS/ΜL (ref 1.85–7.62)
NEUTS SEG NFR BLD AUTO: 81 % (ref 43–75)
NRBC BLD AUTO-RTO: 0 /100 WBCS
PLATELET # BLD AUTO: 247 THOUSANDS/UL (ref 149–390)
PMV BLD AUTO: 9.6 FL (ref 8.9–12.7)
RBC # BLD AUTO: 4.08 MILLION/UL (ref 3.81–5.12)
WBC # BLD AUTO: 9.33 THOUSAND/UL (ref 4.31–10.16)

## 2021-09-04 PROCEDURE — 85025 COMPLETE CBC W/AUTO DIFF WBC: CPT | Performed by: NURSE PRACTITIONER

## 2021-09-04 PROCEDURE — 99284 EMERGENCY DEPT VISIT MOD MDM: CPT | Performed by: NURSE PRACTITIONER

## 2021-09-04 PROCEDURE — 36415 COLL VENOUS BLD VENIPUNCTURE: CPT | Performed by: NURSE PRACTITIONER

## 2021-09-04 PROCEDURE — 99283 EMERGENCY DEPT VISIT LOW MDM: CPT

## 2021-09-05 ENCOUNTER — TELEPHONE (OUTPATIENT)
Dept: OTHER | Facility: OTHER | Age: 86
End: 2021-09-05

## 2021-09-05 VITALS
TEMPERATURE: 99.3 F | OXYGEN SATURATION: 99 % | SYSTOLIC BLOOD PRESSURE: 181 MMHG | RESPIRATION RATE: 16 BRPM | HEART RATE: 62 BPM | DIASTOLIC BLOOD PRESSURE: 69 MMHG

## 2021-09-05 LAB
ALBUMIN SERPL BCP-MCNC: 4 G/DL (ref 3.5–5)
ALP SERPL-CCNC: 155 U/L (ref 46–116)
ALT SERPL W P-5'-P-CCNC: 18 U/L (ref 12–78)
ANION GAP SERPL CALCULATED.3IONS-SCNC: 8 MMOL/L (ref 4–13)
AST SERPL W P-5'-P-CCNC: 23 U/L (ref 5–45)
BACTERIA UR QL AUTO: ABNORMAL /HPF
BILIRUB SERPL-MCNC: 0.47 MG/DL (ref 0.2–1)
BILIRUB UR QL STRIP: NEGATIVE
BUN SERPL-MCNC: 26 MG/DL (ref 5–25)
CALCIUM SERPL-MCNC: 9.3 MG/DL (ref 8.3–10.1)
CHLORIDE SERPL-SCNC: 96 MMOL/L (ref 100–108)
CLARITY UR: ABNORMAL
CO2 SERPL-SCNC: 26 MMOL/L (ref 21–32)
COLOR UR: YELLOW
CREAT SERPL-MCNC: 1.13 MG/DL (ref 0.6–1.3)
GFR SERPL CREATININE-BSD FRML MDRD: 43 ML/MIN/1.73SQ M
GLUCOSE SERPL-MCNC: 128 MG/DL (ref 65–140)
GLUCOSE UR STRIP-MCNC: NEGATIVE MG/DL
HGB UR QL STRIP.AUTO: NEGATIVE
KETONES UR STRIP-MCNC: NEGATIVE MG/DL
LEUKOCYTE ESTERASE UR QL STRIP: ABNORMAL
NITRITE UR QL STRIP: NEGATIVE
NON-SQ EPI CELLS URNS QL MICRO: ABNORMAL /HPF
PH UR STRIP.AUTO: 7 [PH] (ref 4.5–8)
POTASSIUM SERPL-SCNC: 5.1 MMOL/L (ref 3.5–5.3)
PROT SERPL-MCNC: 7.5 G/DL (ref 6.4–8.2)
PROT UR STRIP-MCNC: NEGATIVE MG/DL
RBC #/AREA URNS AUTO: ABNORMAL /HPF
SODIUM SERPL-SCNC: 130 MMOL/L (ref 136–145)
SP GR UR STRIP.AUTO: 1.02 (ref 1–1.03)
UROBILINOGEN UR QL STRIP.AUTO: 1 E.U./DL
WBC #/AREA URNS AUTO: ABNORMAL /HPF

## 2021-09-05 PROCEDURE — 81001 URINALYSIS AUTO W/SCOPE: CPT

## 2021-09-05 PROCEDURE — 80053 COMPREHEN METABOLIC PANEL: CPT | Performed by: NURSE PRACTITIONER

## 2021-09-05 PROCEDURE — 87077 CULTURE AEROBIC IDENTIFY: CPT

## 2021-09-05 PROCEDURE — 87186 SC STD MICRODIL/AGAR DIL: CPT

## 2021-09-05 PROCEDURE — 87086 URINE CULTURE/COLONY COUNT: CPT

## 2021-09-05 PROCEDURE — 96365 THER/PROPH/DIAG IV INF INIT: CPT

## 2021-09-05 PROCEDURE — 36415 COLL VENOUS BLD VENIPUNCTURE: CPT | Performed by: NURSE PRACTITIONER

## 2021-09-05 RX ORDER — CEPHALEXIN 500 MG/1
500 CAPSULE ORAL EVERY 12 HOURS SCHEDULED
Qty: 14 CAPSULE | Refills: 0 | Status: SHIPPED | OUTPATIENT
Start: 2021-09-05 | End: 2021-09-12

## 2021-09-05 RX ORDER — CEPHALEXIN 500 MG/1
500 CAPSULE ORAL EVERY 12 HOURS SCHEDULED
Qty: 14 CAPSULE | Refills: 0 | Status: SHIPPED | OUTPATIENT
Start: 2021-09-05 | End: 2021-09-05 | Stop reason: SDUPTHER

## 2021-09-05 RX ADMIN — SODIUM CHLORIDE 500 ML: 0.9 INJECTION, SOLUTION INTRAVENOUS at 01:25

## 2021-09-05 RX ADMIN — CEFTRIAXONE SODIUM 1000 MG: 10 INJECTION, POWDER, FOR SOLUTION INTRAVENOUS at 01:26

## 2021-09-05 NOTE — TELEPHONE ENCOUNTER
The patient was seen in the ER on 9/4/2021 for a UTI and she had a rooney catheter inserted  They told her to call to schedule an appointment to have the rooney catheter removed within the next 2 days, which would be Tuesday 9/7/2021

## 2021-09-05 NOTE — ED PROVIDER NOTES
History  Chief Complaint   Patient presents with    Urinary Retention     family reports pt has not voided since this morning  states this happened once before when she had an infection  also reports left flank pain  This is an 80year old female who has dementia and does very little ambulation who comes to the ED with c/o urinary retention  Son states that the care taker arrived at 7am this morning and left at 630 tonight and states that pt has not urinated in almost 12 hours  He states that pt wears a diaper and it is unknown if it has been changed  Son states pt has had urinary retention before but denies unknown why and no urologist   Care taker at bedside as well and denies fever, n/v/d  Son states she has been eating and drinking  History provided by:  Medical records and patient  History limited by:  Dementia and age   used: No        Prior to Admission Medications   Prescriptions Last Dose Informant Patient Reported? Taking?    Cartia  MG 24 hr capsule   No No   Sig: TAKE 1 CAPSULE BY MOUTH DAILY   DULoxetine (CYMBALTA) 30 mg delayed release capsule   Yes No   Sig: Take 30 mg by mouth daily at bedtime   Metoprolol Succinate (KAPSPARGO SPRINKLE PO)   Yes No   Sig: Take 50 mg by mouth daily   OLANZapine (ZyPREXA) 5 mg tablet   No No   Sig: Take 1 tablet (5 mg total) by mouth daily at bedtime   Stool Softener 100 MG capsule   No No   Sig: TAKE 1 CAPSULE BY MOUTH EVERY 12 HOURS FOR CONSTIPATION   acetaminophen (TYLENOL) 325 mg tablet   No No   Sig: Take 2 tablets (650 mg total) by mouth every 6 (six) hours as needed for mild pain, moderate pain or headaches   albuterol (2 5 mg/3 mL) 0 083 % nebulizer solution   No No   Sig: Take 1 vial (2 5 mg total) by nebulization every 4 (four) hours as needed for wheezing or shortness of breath   Patient not taking: Reported on 5/21/2021   albuterol (PROVENTIL HFA,VENTOLIN HFA) 90 mcg/act inhaler   No No   Sig: Inhale 2 puffs every 4 (four) hours as needed for wheezing   Patient not taking: Reported on 5/21/2021   amiodarone 200 mg tablet   No No   Sig: Take 1 tablet (200 mg total) by mouth daily   bisacodyl (DULCOLAX) 10 mg suppository   No No   Sig: Insert 1 suppository (10 mg total) into the rectum daily as needed for constipation (2nd line)   cyanocobalamin (VITAMIN B-12) 1000 MCG tablet   No No   Sig: Take 1 tablet (1,000 mcg total) by mouth daily   enoxaparin (LOVENOX) 30 mg/0 3 mL   No No   Sig: Inject 0 3 mL (30 mg total) under the skin every 24 hours for 19 days   lidocaine (LIDODERM) 5 %   No No   Sig: Apply 1 patch topically daily Remove & Discard patch within 12 hours or as directed by MD   melatonin 3 mg   Yes No   Sig: Take 3 mg by mouth daily at bedtime Take 2 tablets at bedtime   metoprolol succinate (TOPROL-XL) 50 mg 24 hr tablet   No No   Sig: TAKE 1 TABLET BY MOUTH DAILY   polyethylene glycol (MIRALAX) 17 g packet   No No   Sig: Take 17 g by mouth daily as needed (constipation first line)   simethicone (MYLICON) 80 mg chewable tablet   No No   Sig: Chew 1 tablet (80 mg total) 4 (four) times a day as needed for flatulence      Facility-Administered Medications: None       Past Medical History:   Diagnosis Date    Anxiety     Cognitive impairment     COVID-19 virus infection 4/13/2020    [facility]     Depression     Hallucination     Hyperlipidemia     Hypertension     Memory loss     Psychiatric illness     Psychosis [facility]     Schizoaffective disorder ([facility]     Sleep difficulties        Past Surgical History:   Procedure Laterality Date    APPENDECTOMY      BLADDER SURGERY      CHOLECYSTECTOMY      VT OPEN RX FEMUR FX+INTRAMED BRYSON Right 7/15/2021    Procedure: INSERTION NAIL IM FEMUR ANTEGRADE (TROCHANTERIC) right;  Surgeon:  Jamarcus Patricia MD;  Location: [facility];  Service: Orthopedics    TOTAL ABDOMINAL HYSTERECTOMY W/ BILATERAL SALPINGOOPHORECTOMY      TOTAL KNEE ARTHROPLASTY Left        Family History   Problem Relation Age of Onset    Heart disease Mother         Cardiac disorder    Parkinsonism Father     Heart disease Sister         Cardiac disorder    Hypertension Sister     Diabetes Child         Diabetes Mellitus    Lung disease Child         Respiratory Disorder    Diabetes Son         Diabetes Mellitus     I have reviewed and agree with the history as documented  E-Cigarette/Vaping    E-Cigarette Use Never User      E-Cigarette/Vaping Substances    Nicotine No     THC No     CBD No     Flavoring No     Other No     Unknown No      Social History     Tobacco Use    Smoking status: Never Smoker    Smokeless tobacco: Never Used    Tobacco comment: Former smoker per [employer]   Vaping Use    Vaping Use: Never used   Substance Use Topics    Alcohol use: Never    Drug use: No       Review of Systems   Constitutional: Negative  HENT: Negative  Eyes: Negative  Respiratory: Negative  Cardiovascular: Negative  Gastrointestinal: Negative  Endocrine: Negative  Genitourinary:        Urinary retention    Musculoskeletal: Negative  Skin: Negative  Allergic/Immunologic: Negative  Neurological: Negative  Hematological: Negative  Psychiatric/Behavioral: Negative  Physical Exam  Physical Exam  Vitals and nursing note reviewed  Constitutional:       General: She is not in acute distress  Appearance: Normal appearance  She is normal weight  She is not ill-appearing, toxic-appearing or diaphoretic  HENT:      Head: Normocephalic and atraumatic  Eyes:      Extraocular Movements: Extraocular movements intact  Cardiovascular:      Rate and Rhythm: Normal rate and regular rhythm  Pulses: Normal pulses  Heart sounds: Normal heart sounds  Pulmonary:      Effort: Pulmonary effort is normal       Breath sounds: Normal breath sounds  Abdominal:      General: There is no distension  Palpations: Abdomen is soft  Tenderness:  There is no abdominal tenderness  Musculoskeletal:         General: Normal range of motion  Cervical back: Normal range of motion  Skin:     General: Skin is warm and dry  Capillary Refill: Capillary refill takes less than 2 seconds  Neurological:      General: No focal deficit present  Mental Status: She is alert  Psychiatric:         Mood and Affect: Mood normal          Behavior: Behavior normal          Thought Content: Thought content normal          Judgment: Judgment normal          Vital Signs  ED Triage Vitals   Temperature Pulse Respirations Blood Pressure SpO2   09/04/21 1940 09/04/21 1939 09/04/21 1939 09/04/21 1939 09/04/21 1939   98 4 °F (36 9 °C) 67 16 (!) 190/108 94 %      Temp Source Heart Rate Source Patient Position - Orthostatic VS BP Location FiO2 (%)   09/04/21 1940 09/04/21 1939 09/04/21 1939 09/04/21 1939 --   Oral Monitor Sitting Left arm       Pain Score       --                  Vitals:    09/04/21 1939 09/04/21 2331 09/05/21 0045   BP: (!) 190/108 (!) 183/87 (!) 181/69   Pulse: 67 64 62   Patient Position - Orthostatic VS: Sitting Lying Lying         Visual Acuity      ED Medications  Medications   sodium chloride 0 9 % bolus 500 mL (0 mL Intravenous Stopped 9/5/21 0213)   ceftriaxone (ROCEPHIN) 1 g/50 mL in dextrose IVPB (0 mg Intravenous Stopped 9/5/21 0156)       Diagnostic Studies  Results Reviewed     Procedure Component Value Units Date/Time    Urine Microscopic [[id number]]  (Abnormal) Collected: 09/05/21 0043    Lab Status: Final result Specimen: Urine, Indwelling Sanders Catheter Updated: 09/05/21 0110     RBC, UA None Seen /hpf      WBC, UA 20-30 /hpf      Epithelial Cells Occasional /hpf      Bacteria, UA Moderate /hpf     Urine culture [[id number]] Collected: 09/05/21 0043    Lab Status:  In process Specimen: Urine, Indwelling Sanders Catheter Updated: 09/05/21 0110    Comprehensive metabolic panel [[id number]]  (Abnormal) Collected: 09/05/21 0025    Lab Status: Final result Specimen: Blood from Arm, Left Updated: 09/05/21 0052     Sodium 130 mmol/L      Potassium 5 1 mmol/L      Chloride 96 mmol/L      CO2 26 mmol/L      ANION GAP 8 mmol/L      BUN 26 mg/dL      Creatinine 1 13 mg/dL      Glucose 128 mg/dL      Calcium 9 3 mg/dL      AST 23 U/L      ALT 18 U/L      Alkaline Phosphatase 155 U/L      Total Protein 7 5 g/dL      Albumin 4 0 g/dL      Total Bilirubin 0 47 mg/dL      eGFR 43 ml/min/1 73sq m     Narrative:      [place] guidelines for Chronic Kidney Disease (CKD):     Stage 1 with normal or high GFR (GFR > 90 mL/min/1 73 square meters)    Stage 2 Mild CKD (GFR = 60-89 mL/min/1 73 square meters)    Stage 3A Moderate CKD (GFR = 45-59 mL/min/1 73 square meters)    Stage 3B Moderate CKD (GFR = 30-44 mL/min/1 73 square meters)    Stage 4 Severe CKD (GFR = 15-29 mL/min/1 73 square meters)    Stage 5 End Stage CKD (GFR <15 mL/min/1 73 square meters)  Note: GFR calculation is accurate only with a steady state creatinine    Urine Macroscopic, POC [[id number]]  (Abnormal) Collected: 09/05/21 0043    Lab Status: Final result Specimen: Urine Updated: 09/05/21 0044     Color, UA Yellow     Clarity, UA Cloudy     pH, UA 7 0     Leukocytes, UA Large     Nitrite, UA Negative     Protein, UA Negative mg/dl      Glucose, UA Negative mg/dl      Ketones, UA Negative mg/dl      Urobilinogen, UA 1 0 E U /dl      Bilirubin, UA Negative     Blood, UA Negative     Specific Gravity, UA 1 020    Narrative:      CLINITEK RESULT    CBC and differential [[id number]]  (Abnormal) Collected: 09/04/21 2353    Lab Status: Final result Specimen: Blood from Arm, Right Updated: 09/04/21 2359     WBC 9 33 Thousand/uL      RBC 4 08 Million/uL      Hemoglobin 12 5 g/dL      Hematocrit 38 3 %      MCV 94 fL      MCH 30 6 pg      MCHC 32 6 g/dL      RDW 14 0 %      MPV 9 6 fL      Platelets 607 Thousands/uL      nRBC 0 /100 WBCs      Neutrophils Relative 81 %      Immat GRANS % 1 %      Lymphocytes Relative 10 %      Monocytes Relative 7 %      Eosinophils Relative 1 %      Basophils Relative 0 %      Neutrophils Absolute 7 60 Thousands/µL      Immature Grans Absolute 0 06 Thousand/uL      Lymphocytes Absolute 0 93 Thousands/µL      Monocytes Absolute 0 65 Thousand/µL      Eosinophils Absolute 0 08 Thousand/µL      Basophils Absolute 0 01 Thousands/µL                  No orders to display              Procedures  Procedures         ED Course  ED Course as of Sep 05 0305   Sun Sep 05, 2021   0044 Leukocytes, UA(!): Large   0057 Labs unremarkable  Na 130 will give IVF bolus and for large leuks in urine 1 gm rocephin IV  Cr 1 13 and GFR 43 - baseline       0117 WBC, UA(!): 20-30   0117 Will discharge on keflex bacteria moderate and 20-30 WBC    Bacteria, UA(!): Moderate   0143 Call placed to karri Brantley Jozef - to  patient           discharge instructions discussed with caretaker at bedside  She states she verbalizes understanding of instructions and follow up                                          MDM  Number of Diagnoses or Management Options  Diagnosis management comments: Urinary retention  Bladder scan 493     DDX: UTI  Urinary retention due to obstructive pathology    Plan  Labs  IV  Sanders cath  Bladder scan  Urine  Monitor reassess          Amount and/or Complexity of Data Reviewed  Clinical lab tests: ordered and reviewed  Review and summarize past medical records: yes        Disposition  Final diagnoses:   Acute urinary retention   UTI (urinary tract infection)     Time reflects when diagnosis was documented in both MDM as applicable and the Disposition within this note     Time User Action Codes Description Comment    9/5/2021 12:58 AM Varun Gonzalez [R33 9] Urinary retention     9/5/2021 12:58 AM Dylan Torres Remove [R33 9] Urinary retention     9/5/2021 12:58 AM Dylan Torres Add [R33 8] Acute urinary retention     9/5/2021 12:59 AM Varun Gonzalez [N39 0] UTI (urinary tract infection)     9/5/2021  1:18 AM Dianna Cousins Modify [R33 8] Acute urinary retention     9/5/2021  1:18 AM Dianna Cousins Modify [N39 0] UTI (urinary tract infection)       ED Disposition     ED Disposition Condition Date/Time Comment    Discharge Stable Sun Sep 5, 2021  1:41 AM Christiana Russell discharge to home/self care  Follow-up Information     Follow up With Specialties Details Why Contact Info Additional [address] [facility] [place] [facility] Schedule an appointment as soon as possible for a visit in 2 days you musts follow up next week [facility] [address]  [address] [facility] [id number]  [address]  [facility] [place], [address], [place], [address] [facility], [phone] [address] [facility], MD Internal Medicine Schedule an appointment as soon as possible for a visit in 2 days  [address]   [facility] [address] [place], [place] [address]y [address] Emergency Department Emergency Medicine  If symptoms worsen [facility] [id number]  [address] [facility] Emergency Department, [address] [facility] [address]  , [place], [address] [facility], [address]          Discharge Medication List as of 9/5/2021  1:41 AM      START taking these medications    Details   cephalexin (KEFLEX) 500 mg capsule Take 1 capsule (500 mg total) by mouth every 12 (twelve) hours for 7 days, Starting Sun 9/5/2021, Until Sun 9/12/2021, Normal         CONTINUE these medications which have NOT CHANGED    Details   acetaminophen (TYLENOL) 325 mg tablet Take 2 tablets (650 mg total) by mouth every 6 (six) hours as needed for mild pain, moderate pain or headaches, Starting Sun 8/23/2020, No Print      albuterol (2 5 mg/3 mL) 0 083 % nebulizer solution Take 1 vial (2 5 mg total) by nebulization every 4 (four) hours as needed for wheezing or shortness of breath, Starting Wed 6/17/2020, Normal      albuterol (PROVENTIL HFA,VENTOLIN HFA) 90 mcg/act inhaler Inhale 2 puffs every 4 (four) hours as needed for wheezing, Starting Wed 6/17/2020, Normal      amiodarone 200 mg tablet Take 1 tablet (200 mg total) by mouth daily, Starting Fri 8/27/2021, Normal      bisacodyl (DULCOLAX) 10 mg suppository Insert 1 suppository (10 mg total) into the rectum daily as needed for constipation (2nd line), Starting Mon 7/26/2021, No Print      Cartia  MG 24 hr capsule TAKE 1 CAPSULE BY MOUTH DAILY, Normal      cyanocobalamin (VITAMIN B-12) 1000 MCG tablet Take 1 tablet (1,000 mcg total) by mouth daily, Starting Wed 6/16/2021, Until Tue 9/14/2021, Normal      DULoxetine (CYMBALTA) 30 mg delayed release capsule Take 30 mg by mouth daily at bedtime, Historical Med      enoxaparin (LOVENOX) 30 mg/0 3 mL Inject 0 3 mL (30 mg total) under the skin every 24 hours for 19 days, Starting Tue 7/27/2021, Until Tue 8/24/2021, No Print      lidocaine (LIDODERM) 5 % Apply 1 patch topically daily Remove & Discard patch within 12 hours or as directed by MD, Starting Tue 7/27/2021, No Print      melatonin 3 mg Take 3 mg by mouth daily at bedtime Take 2 tablets at bedtime, Historical Med      Metoprolol Succinate (KAPSPARGO SPRINKLE PO) Take 50 mg by mouth daily, Historical Med      metoprolol succinate (TOPROL-XL) 50 mg 24 hr tablet TAKE 1 TABLET BY MOUTH DAILY, Normal      OLANZapine (ZyPREXA) 5 mg tablet Take 1 tablet (5 mg total) by mouth daily at bedtime, Starting Tue 5/26/2020, Normal      polyethylene glycol (MIRALAX) 17 g packet Take 17 g by mouth daily as needed (constipation first line), Starting Wed 6/17/2020, Normal      simethicone (MYLICON) 80 mg chewable tablet Chew 1 tablet (80 mg total) 4 (four) times a day as needed for flatulence, Starting Mon 7/26/2021, No Print      Stool Softener 100 MG capsule TAKE 1 CAPSULE BY MOUTH EVERY 12 HOURS FOR CONSTIPATION, Normal           No discharge procedures on file     PDMP Review       Value Time User    PDMP Reviewed  Yes 7/16/2021  8:50 AM Reinier Samson PA-C          ED Provider  Electronically Signed by           Vickie Sr  09/05/21 5728

## 2021-09-05 NOTE — DISCHARGE INSTRUCTIONS
You are being prescribed keflex for a urinary tract infection  Take as prescribed  You are to make an appointment with urology to have rooney removed  You are to follow up with your PCP  Return to ED if symptoms worsen  Drink water

## 2021-09-06 ENCOUNTER — HOSPITAL ENCOUNTER (EMERGENCY)
Facility: HOSPITAL | Age: 86
Discharge: HOME | End: 2021-09-06
Attending: EMERGENCY MEDICINE | Admitting: EMERGENCY MEDICINE
Payer: COMMERCIAL

## 2021-09-06 VITALS
HEART RATE: 69 BPM | TEMPERATURE: 99 F | OXYGEN SATURATION: 100 % | RESPIRATION RATE: 16 BRPM | DIASTOLIC BLOOD PRESSURE: 86 MMHG | SYSTOLIC BLOOD PRESSURE: 196 MMHG

## 2021-09-06 DIAGNOSIS — T83.9XXA PROBLEM WITH FOLEY CATHETER, INITIAL ENCOUNTER (HCC): Primary | ICD-10-CM

## 2021-09-06 PROCEDURE — 99284 EMERGENCY DEPT VISIT MOD MDM: CPT | Performed by: EMERGENCY MEDICINE

## 2021-09-06 PROCEDURE — 99283 EMERGENCY DEPT VISIT LOW MDM: CPT

## 2021-09-06 NOTE — ED NOTES
Patient given ice water per request  Care giver to return with wheelchair in order to take patient home  Dr Jaime Corona, ANJELICA  09/06/21 3471

## 2021-09-06 NOTE — ED NOTES
New drainage bag connected to indwelling rooney at this time  Clear yellow urine noted to be draining at this time  Patient offers no complaints       Charmaine Sebastian RN  09/06/21 0876

## 2021-09-06 NOTE — ED PROVIDER NOTES
History  Chief Complaint   Patient presents with    Urinary Catheter Problem     Patient from home came in by EMS for complaint of rooney displacement  Upon arrival to ED patient noted to have rooney intact however drainage bag not connected  History provided by:  EMS personnel and caregiver  History limited by:  Dementia   used: No    Urinary Catheter Problem  Quality:  Rooney catheter not connected to bag  Severity:  Mild  Onset quality:  Unable to specify  Timing:  Sporadic  Progression:  Unchanged  Chronicity:  New  Context:  EMS were called as pt's Rooney for disconnected from bag, on arrival connected to bag and urine noted to be draining  Relieved by:  Connecting the Rooney to bag  Worsened by:  Nothing  Ineffective treatments:  None  Associated symptoms: no chest pain, no cough, no diarrhea, no fever, no headaches, no nausea, no rash, no shortness of breath, no sore throat and no vomiting    Risk factors:  Dementia      Prior to Admission Medications   Prescriptions Last Dose Informant Patient Reported? Taking?    Cartia  MG 24 hr capsule   No No   Sig: TAKE 1 CAPSULE BY MOUTH DAILY   DULoxetine (CYMBALTA) 30 mg delayed release capsule   Yes No   Sig: Take 30 mg by mouth daily at bedtime   Metoprolol Succinate (KAPSPARGO SPRINKLE PO)   Yes No   Sig: Take 50 mg by mouth daily   OLANZapine (ZyPREXA) 5 mg tablet   No No   Sig: Take 1 tablet (5 mg total) by mouth daily at bedtime   Stool Softener 100 MG capsule   No No   Sig: TAKE 1 CAPSULE BY MOUTH EVERY 12 HOURS FOR CONSTIPATION   acetaminophen (TYLENOL) 325 mg tablet   No No   Sig: Take 2 tablets (650 mg total) by mouth every 6 (six) hours as needed for mild pain, moderate pain or headaches   albuterol (2 5 mg/3 mL) 0 083 % nebulizer solution   No No   Sig: Take 1 vial (2 5 mg total) by nebulization every 4 (four) hours as needed for wheezing or shortness of breath   Patient not taking: Reported on 5/21/2021   albuterol (PROVENTIL HFA,VENTOLIN HFA) 90 mcg/act inhaler   No No   Sig: Inhale 2 puffs every 4 (four) hours as needed for wheezing   Patient not taking: Reported on 5/21/2021   amiodarone 200 mg tablet   No No   Sig: Take 1 tablet (200 mg total) by mouth daily   bisacodyl (DULCOLAX) 10 mg suppository   No No   Sig: Insert 1 suppository (10 mg total) into the rectum daily as needed for constipation (2nd line)   cephalexin (KEFLEX) 500 mg capsule   No No   Sig: Take 1 capsule (500 mg total) by mouth every 12 (twelve) hours for 7 days   cyanocobalamin (VITAMIN B-12) 1000 MCG tablet   No No   Sig: Take 1 tablet (1,000 mcg total) by mouth daily   enoxaparin (LOVENOX) 30 mg/0 3 mL   No No   Sig: Inject 0 3 mL (30 mg total) under the skin every 24 hours for 19 days   lidocaine (LIDODERM) 5 %   No No   Sig: Apply 1 patch topically daily Remove & Discard patch within 12 hours or as directed by MD   melatonin 3 mg   Yes No   Sig: Take 3 mg by mouth daily at bedtime Take 2 tablets at bedtime   metoprolol succinate (TOPROL-XL) 50 mg 24 hr tablet   No No   Sig: TAKE 1 TABLET BY MOUTH DAILY   polyethylene glycol (MIRALAX) 17 g packet   No No   Sig: Take 17 g by mouth daily as needed (constipation first line)   simethicone (MYLICON) 80 mg chewable tablet   No No   Sig: Chew 1 tablet (80 mg total) 4 (four) times a day as needed for flatulence      Facility-Administered Medications: None       Past Medical History:   Diagnosis Date    Anxiety     Cognitive impairment     COVID-19 virus infection 4/13/2020    [facility]     Depression     Hallucination     Hyperlipidemia     Hypertension     Memory loss     Psychiatric illness     Psychosis [facility]     Schizoaffective disorder [facility]     Sleep difficulties        Past Surgical History:   Procedure Laterality Date    APPENDECTOMY      BLADDER SURGERY      CHOLECYSTECTOMY      FL OPEN RX FEMUR FX+INTRAMED BRYSON Right 7/15/2021    Procedure: INSERTION NAIL IM FEMUR ANTEGRADE (TROCHANTERIC) right;  Surgeon: Sebastian Bruno MD;  Location: [facility] OR;  Service: Orthopedics    TOTAL ABDOMINAL HYSTERECTOMY W/ BILATERAL SALPINGOOPHORECTOMY      TOTAL KNEE ARTHROPLASTY Left        Family History   Problem Relation Age of Onset    Heart disease Mother         Cardiac disorder    Parkinsonism Father     Heart disease Sister         Cardiac disorder    Hypertension Sister     Diabetes Child         Diabetes Mellitus    Lung disease Child         Respiratory Disorder    Diabetes Son         Diabetes Mellitus     I have reviewed and agree with the history as documented  E-Cigarette/Vaping    E-Cigarette Use Never User      E-Cigarette/Vaping Substances    Nicotine No     THC No     CBD No     Flavoring No     Other No     Unknown No      Social History     Tobacco Use    Smoking status: Never Smoker    Smokeless tobacco: Never Used    Tobacco comment: Former smoker per [employer]   Vaping Use    Vaping Use: Never used   Substance Use Topics    Alcohol use: Never    Drug use: No       Review of Systems   Constitutional: Negative for chills and fever  HENT: Negative for facial swelling, sore throat and trouble swallowing  Eyes: Negative for pain and visual disturbance  Respiratory: Negative for cough and shortness of breath  Cardiovascular: Negative for chest pain and leg swelling  Gastrointestinal: Negative for diarrhea, nausea and vomiting  Genitourinary: Negative for dysuria and flank pain  Sanders catheter got dosconected   Musculoskeletal: Negative for back pain, neck pain and neck stiffness  Skin: Negative for pallor and rash  Allergic/Immunologic: Negative for environmental allergies and immunocompromised state  Neurological: Negative for dizziness and headaches  Hematological: Negative for adenopathy  Does not bruise/bleed easily  Psychiatric/Behavioral: Negative for agitation and behavioral problems     All other systems reviewed and are negative  Physical Exam  Physical Exam  Vitals and nursing note reviewed  Constitutional:       General: She is not in acute distress  Appearance: She is well-developed  HENT:      Head: Normocephalic and atraumatic  Eyes:      Extraocular Movements: Extraocular movements intact  Cardiovascular:      Rate and Rhythm: Normal rate and regular rhythm  Pulmonary:      Effort: Pulmonary effort is normal    Abdominal:      Palpations: Abdomen is soft  Tenderness: There is no abdominal tenderness  There is no guarding or rebound  Genitourinary:     Comments: Sanders catheter connected to bag, urine seen flowing in tubing  Musculoskeletal:         General: Normal range of motion  Cervical back: Normal range of motion and neck supple  Skin:     General: Skin is warm and dry  Neurological:      General: No focal deficit present  Mental Status: She is alert  Cranial Nerves: No cranial nerve deficit           Vital Signs  ED Triage Vitals   Temperature Pulse Respirations Blood Pressure SpO2   09/06/21 1705 09/06/21 1705 09/06/21 1705 09/06/21 1705 09/06/21 1705   99 °F (37 2 °C) 69 16 (!) 198/86 100 %      Temp Source Heart Rate Source Patient Position - Orthostatic VS BP Location FiO2 (%)   09/06/21 1705 -- 09/06/21 1816 09/06/21 1816 --   Oral  Lying Left arm       Pain Score       09/06/21 1705       No Pain           Vitals:    09/06/21 1705 09/06/21 1816   BP: (!) 198/86 (!) 196/86   Pulse: 69    Patient Position - Orthostatic VS:  Lying         Visual Acuity      ED Medications  Medications - No data to display    Diagnostic Studies  Results Reviewed     None                 No orders to display              Procedures  Procedures         ED Course  ED Course as of Sep 06 2126   Mon Sep 06, 2021   1719 Spoke with pt's Son on Ulyss[employer] phone, patient removed the bag tubing at home which fell to floor, Caregiver did not re-connect due to concern about introducing infection, called EMS and patient was brought to ER  As patient is stable, afebrile, clear yellow urine draining in bag, we will discharge back home; Son said that she has Urology jesus tomorrow  MDM  Number of Diagnoses or Management Options  Problem with Sanders catheter, initial encounter [facility])  Diagnosis management comments: 80-year-old female, brought in by EMS after her Sanders got disconnected from the bag, the tubing connecting to the bag fell on the floor, caregiver present on the scene got concerned as she did want to reconnect for risk of infection; patient is asymptomatic  Sanders was in place, connected to a new bag, noted to be draining clear urine  Discussed with son on the phone and caregiver in ER, stable for discharge, has Urology appointment tomorrow  Disposition  Final diagnoses:   Problem with Sanders catheter, initial encounter [facility])     Time reflects when diagnosis was documented in both MDM as applicable and the Disposition within this note     Time User Action Codes Description Comment    9/6/2021  5:11 PM Oz Schaeffer 3  9XXA] Problem with Sanders catheter, initial encounter [facility])       ED Disposition     ED Disposition Condition Date/Time Comment    Discharge Stable Mon Sep 6, 2021  5:22 PM Jared Hermosillo discharge to home/self care              Follow-up Information     Follow up With Specialties Details Why Contact Info Additional [address] For Urology [facility] End Urology Call   [address] [facility]  [address] [facility] [address] [phone] [facility] [address] [place], [place], [id number] [phone]          Discharge Medication List as of 9/6/2021  5:23 PM      CONTINUE these medications which have NOT CHANGED    Details   acetaminophen (TYLENOL) 325 mg tablet Take 2 tablets (650 mg total) by mouth every 6 (six) hours as needed for mild pain, moderate pain or headaches, Starting Sun 8/23/2020, No Print      albuterol (2 5 mg/3 mL) 0 083 % nebulizer solution Take 1 vial (2 5 mg total) by nebulization every 4 (four) hours as needed for wheezing or shortness of breath, Starting Wed 6/17/2020, Normal      albuterol (PROVENTIL HFA,VENTOLIN HFA) 90 mcg/act inhaler Inhale 2 puffs every 4 (four) hours as needed for wheezing, Starting Wed 6/17/2020, Normal      amiodarone 200 mg tablet Take 1 tablet (200 mg total) by mouth daily, Starting Fri 8/27/2021, Normal      bisacodyl (DULCOLAX) 10 mg suppository Insert 1 suppository (10 mg total) into the rectum daily as needed for constipation (2nd line), Starting Mon 7/26/2021, No Print      Cartia  MG 24 hr capsule TAKE 1 CAPSULE BY MOUTH DAILY, Normal      cephalexin (KEFLEX) 500 mg capsule Take 1 capsule (500 mg total) by mouth every 12 (twelve) hours for 7 days, Starting Sun 9/5/2021, Until Sun 9/12/2021, Normal      cyanocobalamin (VITAMIN B-12) 1000 MCG tablet Take 1 tablet (1,000 mcg total) by mouth daily, Starting Wed 6/16/2021, Until Tue 9/14/2021, Normal      DULoxetine (CYMBALTA) 30 mg delayed release capsule Take 30 mg by mouth daily at bedtime, Historical Med      enoxaparin (LOVENOX) 30 mg/0 3 mL Inject 0 3 mL (30 mg total) under the skin every 24 hours for 19 days, Starting Tue 7/27/2021, Until Tue 8/24/2021, No Print      lidocaine (LIDODERM) 5 % Apply 1 patch topically daily Remove & Discard patch within 12 hours or as directed by MD, Starting Tue 7/27/2021, No Print      melatonin 3 mg Take 3 mg by mouth daily at bedtime Take 2 tablets at bedtime, Historical Med      Metoprolol Succinate (KAPSPARGO SPRINKLE PO) Take 50 mg by mouth daily, Historical Med      metoprolol succinate (TOPROL-XL) 50 mg 24 hr tablet TAKE 1 TABLET BY MOUTH DAILY, Normal      OLANZapine (ZyPREXA) 5 mg tablet Take 1 tablet (5 mg total) by mouth daily at bedtime, Starting Tue 5/26/2020, Normal      polyethylene glycol (MIRALAX) 17 g packet Take 17 g by mouth daily as needed (constipation first line), Starting Wed 6/17/2020, Normal      simethicone (MYLICON) 80 mg chewable tablet Chew 1 tablet (80 mg total) 4 (four) times a day as needed for flatulence, Starting Mon 7/26/2021, No Print      Stool Softener 100 MG capsule TAKE 1 CAPSULE BY MOUTH EVERY 12 HOURS FOR CONSTIPATION, Normal           No discharge procedures on file      PDMP Review       Value Time User    PDMP Reviewed  Yes 7/16/2021  8:50 AM Farideh Mckeon PA-C          ED Provider  Electronically Signed by           Preeti Medrano MD  09/06/21 9801

## 2021-09-07 ENCOUNTER — TELEPHONE (OUTPATIENT)
Dept: UROLOGY | Facility: MEDICAL CENTER | Age: 86
End: 2021-09-07

## 2021-09-07 LAB
BACTERIA UR CULT: ABNORMAL
BACTERIA UR CULT: ABNORMAL

## 2021-09-07 NOTE — TELEPHONE ENCOUNTER
Please Triage - Daron  New Patient-     What is the reason for the patients appointment? Patient's son called stating patient was in ER on 09/04 patient has a rooney catheter which he removed yesterday and had to get it reinserted  He would like to set up appointment for rooney removal         Imaging/Lab Results:in epic       Do we accept the patient's insurance or is the patient Self-Pay? Provider & Plan: [employer]   Member ID#:       Has the patient had any previous urologist(s)?no        Have patient records been requested?in epic        Has the patient had any outside testing done?in [facility]       Does the patient have a personal history of cancer?no       Patient can be reached at :[phone]

## 2021-09-07 NOTE — TELEPHONE ENCOUNTER
Vanesa Cid 2 days ago   RH     The patient was seen in the ER on 9/4/2021 for a UTI and she had a rooney catheter inserted  They told her to call to schedule an appointment to have the rooney catheter removed within the next 2 days, which would be Tuesday 9/7/2021

## 2021-09-08 ENCOUNTER — HOSPITAL ENCOUNTER (EMERGENCY)
Facility: HOSPITAL | Age: 86
Discharge: HOME | End: 2021-09-09
Attending: EMERGENCY MEDICINE | Admitting: EMERGENCY MEDICINE
Payer: COMMERCIAL

## 2021-09-08 DIAGNOSIS — T83.9XXA FOLEY CATHETER PROBLEM (HCC): Primary | ICD-10-CM

## 2021-09-08 PROCEDURE — 99284 EMERGENCY DEPT VISIT MOD MDM: CPT | Performed by: EMERGENCY MEDICINE

## 2021-09-08 PROCEDURE — 99283 EMERGENCY DEPT VISIT LOW MDM: CPT

## 2021-09-09 ENCOUNTER — OFFICE VISIT (OUTPATIENT)
Dept: UROLOGY | Facility: CLINIC | Age: 86
End: 2021-09-09
Payer: COMMERCIAL

## 2021-09-09 VITALS
TEMPERATURE: 97.7 F | OXYGEN SATURATION: 99 % | SYSTOLIC BLOOD PRESSURE: 133 MMHG | HEART RATE: 59 BPM | RESPIRATION RATE: 16 BRPM | DIASTOLIC BLOOD PRESSURE: 51 MMHG

## 2021-09-09 VITALS — HEART RATE: 60 BPM | DIASTOLIC BLOOD PRESSURE: 60 MMHG | SYSTOLIC BLOOD PRESSURE: 130 MMHG

## 2021-09-09 DIAGNOSIS — R33.8 ACUTE URINARY RETENTION: Primary | ICD-10-CM

## 2021-09-09 LAB
BACTERIA UR QL AUTO: NORMAL /HPF
BILIRUB UR QL STRIP: NEGATIVE
CLARITY UR: CLEAR
COLOR UR: YELLOW
GLUCOSE UR STRIP-MCNC: NEGATIVE MG/DL
HGB UR QL STRIP.AUTO: NEGATIVE
KETONES UR STRIP-MCNC: NEGATIVE MG/DL
LEUKOCYTE ESTERASE UR QL STRIP: ABNORMAL
NITRITE UR QL STRIP: NEGATIVE
NON-SQ EPI CELLS URNS QL MICRO: NORMAL /HPF
PH UR STRIP.AUTO: 6 [PH] (ref 4.5–8)
PROT UR STRIP-MCNC: NEGATIVE MG/DL
RBC #/AREA URNS AUTO: NORMAL /HPF
SP GR UR STRIP.AUTO: 1.01 (ref 1–1.03)
UROBILINOGEN UR QL STRIP.AUTO: 0.2 E.U./DL
WBC #/AREA URNS AUTO: NORMAL /HPF

## 2021-09-09 PROCEDURE — 81001 URINALYSIS AUTO W/SCOPE: CPT

## 2021-09-09 PROCEDURE — 87086 URINE CULTURE/COLONY COUNT: CPT | Performed by: EMERGENCY MEDICINE

## 2021-09-09 PROCEDURE — 99204 OFFICE O/P NEW MOD 45 MIN: CPT | Performed by: PHYSICIAN ASSISTANT

## 2021-09-09 NOTE — DISCHARGE INSTRUCTIONS
Return for any trouble breathing, persistent vomiting, fever more than 101, worsening symptoms or or any concerns

## 2021-09-10 LAB — BACTERIA UR CULT: NORMAL

## 2021-09-10 NOTE — PROGRESS NOTES
9/9/2021      Chief Complaint   Patient presents with    New Patient Visit         Assessment and Plan    80 y o  female   New patient    1  Urinary retention     I recommend she complete her current course of Keflex, and return Monday for void trial   This will be one week of bladder rest and UTI treatment  Threshold for catheter replacement 300ml or no void in 6-8 hours  History of Present Illness  Paolo Alba is a 80 y o  female here for evaluation of  New patient urinary retention, urinary tract infection  Was seen emergency department on 09/04/2021 with a dry brief and no void in approximately 8 hours  Sanders catheter was placed for around 450 mL  Urinalysis showed leukocytes and blood  She was treated with Keflex and completed the antibiotics  She had some issues with catheter tubing and to repeat this is the emergency department since, she has been having regular bowel movements, the urine is clear yellow, and follow-up urinalysis demonstrates improvements in the quantity of leukocyte and blood  She otherwise feels well  She has been eating and drinking and having regular bowel movements  She presents today with a caregiver and her son  Patient has dementia and is minimally verbal   Her son also provides Hungarian translation  This is her first episode of urinary retention, she has had one prior urinary tract infection managed with oral antibiotics it never had a catheter before  Review of Systems   Constitutional: Negative for activity change, appetite change, chills, fever and unexpected weight change  HENT: Negative  Respiratory: Negative  Negative for shortness of breath  Cardiovascular: Negative  Negative for chest pain  Gastrointestinal: Negative for abdominal pain, diarrhea, nausea and vomiting  Endocrine: Negative  Genitourinary: Positive for difficulty urinating   Negative for decreased urine volume, dysuria, flank pain, frequency, hematuria, pelvic pain, urgency and vaginal pain  Musculoskeletal: Negative for back pain and gait problem  Skin: Negative  Allergic/Immunologic: Negative  Neurological: Negative  Hematological: Negative for adenopathy  Does not bruise/bleed easily  Vitals  Vitals:    09/09/21 1445   BP: 130/60   BP Location: Left arm   Patient Position: Sitting   Cuff Size: Adult   Pulse: 60       Physical Exam  Vitals and nursing note reviewed  Constitutional:       General: She is not in acute distress  Appearance: Normal appearance  She is well-developed  She is not diaphoretic  HENT:      Head: Normocephalic and atraumatic  Cardiovascular:      Rate and Rhythm: Normal rate and regular rhythm  Pulmonary:      Effort: Pulmonary effort is normal       Breath sounds: Normal breath sounds  Abdominal:      General: Bowel sounds are normal       Palpations: Abdomen is soft  There is no mass  Tenderness: There is no abdominal tenderness  There is no right CVA tenderness or left CVA tenderness  Hernia: No hernia is present  Genitourinary:     Comments: Sanders catheter with clear yellow urine  Musculoskeletal:      Right lower leg: No edema  Left lower leg: No edema  Skin:     General: Skin is warm and dry  Capillary Refill: Capillary refill takes less than 2 seconds  Neurological:      General: No focal deficit present  Mental Status: She is alert and oriented to person, place, and time        Gait: Gait normal    Psychiatric:         Mood and Affect: Mood normal          Speech: Speech normal          Behavior: Behavior normal            Past History  Past Medical History:   Diagnosis Date    Anxiety     Cognitive impairment     COVID-19 virus infection 4/13/2020    [facility] [address]     Depression     Hallucination     Hyperlipidemia     Hypertension     Memory loss     Psychiatric illness     Psychosis [facility]     [facility]     Sleep difficulties      Social History     Socioeconomic History    Marital status:      Spouse name: None    Number of children: None    Years of education: None    Highest education level: None   Occupational History    None   Tobacco Use    Smoking status: Never Smoker    Smokeless tobacco: Never Used    Tobacco comment: Former smoker per [employer]   Vaping Use    Vaping Use: Never used   Substance and Sexual Activity    Alcohol use: Never    Drug use: No    Sexual activity: None   Other Topics Concern    None   Social History Narrative    Drinks coffee     Social Determinants of Health     Financial Resource Strain:     Difficulty of Paying Living Expenses:    Food Insecurity:     Worried About Running Out of Food in the Last Year:     [address] in the Last Year:    Transportation Needs:     Lack of Transportation (Medical):      Lack of Transportation (Non-Medical):    Physical Activity:     Days of Exercise per Week:     Minutes of Exercise per Session:    Stress:     Feeling of Stress :    Social Connections:     Frequency of Communication with Friends and Family:     Frequency of Social Gatherings with Friends and Family:     Attends [employer] Services:     Active Member of Clubs or Organizations:     Attends Club or Organization Meetings:     Marital Status:    Intimate Partner Violence:     Fear of Current or Ex-Partner:     Emotionally Abused:     Physically Abused:     Sexually Abused:      Social History     Tobacco Use   Smoking Status Never Smoker   Smokeless Tobacco Never Used   Tobacco Comment    Former smoker per [employer]     Family History   Problem Relation Age of Onset    Heart disease Mother         Cardiac disorder    Parkinsonism Father     Heart disease Sister         Cardiac disorder    Hypertension Sister     Diabetes Child         Diabetes Mellitus    Lung disease Child         Respiratory Disorder    Diabetes Son         Diabetes Mellitus       The following portions of the patient's history were reviewed and updated as appropriate: allergies, current medications, past medical history, past social history, past surgical history and problem list     Results  No results found for this or any previous visit (from the past 1 hour(s))  ]  No results found for: PSA  Lab Results   Component Value Date    GLUCOSE 114 07/29/2014    CALCIUM 9 3 09/05/2021     07/29/2014    K 5 1 09/05/2021    CO2 26 09/05/2021    CL 96 (L) 09/05/2021    BUN 26 (H) 09/05/2021    CREATININE 1 13 09/05/2021     Lab Results   Component Value Date    WBC 9 33 09/04/2021    HGB 12 5 09/04/2021    HCT 38 3 09/04/2021    MCV 94 09/04/2021     09/04/2021

## 2021-09-13 ENCOUNTER — PROCEDURE VISIT (OUTPATIENT)
Dept: UROLOGY | Facility: CLINIC | Age: 86
End: 2021-09-13
Payer: COMMERCIAL

## 2021-09-13 VITALS
DIASTOLIC BLOOD PRESSURE: 80 MMHG | HEIGHT: 58 IN | SYSTOLIC BLOOD PRESSURE: 140 MMHG | BODY MASS INDEX: 28.13 KG/M2 | HEART RATE: 64 BPM | WEIGHT: 134 LBS | RESPIRATION RATE: 20 BRPM

## 2021-09-13 DIAGNOSIS — R33.8 ACUTE URINARY RETENTION: Primary | ICD-10-CM

## 2021-09-13 LAB — POST-VOID RESIDUAL VOLUME, ML POC: 146 ML

## 2021-09-13 PROCEDURE — 51798 US URINE CAPACITY MEASURE: CPT | Performed by: UROLOGY

## 2021-09-13 NOTE — PROGRESS NOTES
9/13/2021    Thamas Stamp  11/19/1931  [id number]    Diagnosis  Chief Complaint     Urinary Retention; Void Trial          Patient presents for void trial managed by Dr Aminata Nava  Patient's son does not want to schedule follow up appointment  Son will contact office if patient does not void  Procedure Sanders removal/voiding trial    Sanders catheter removed after deflation of an intact balloon  Patient tolerated well  Encouraged patient's cargiver to increase water intake and return this afternoon for post void residual   Patient's caregiver and son know they may return early if patient uncomfortable and unable to urinate  Caregiver and son agree to this plan  Patient returned this afternoon  Patient arrives this afternoon with caregiver who does not speak [place]  Attempted to contact [address] [place] [address]  using I pad but unsuccessful after 10 minutes of waiting  Caregiver call patient's son  Son unaware if patient voided or not  Bladder ultrasound performed and PVR measured 146 ml    Recent Results (from the past 1 hour(s))   POCT Measure PVR    Collection Time: 09/13/21  3:20 PM   Result Value Ref Range    POST-VOID RESIDUAL VOLUME, ML  mL             Vitals:    09/13/21 1017   BP: 140/80   Pulse: 64   Resp: 20   Weight: 60 8 kg (134 lb)   Height: 4' 10" (1 473 m)           Christiano Vieira RN

## 2021-09-22 NOTE — TELEPHONE ENCOUNTER
Per patients son, Teo martin does not have all of the information needed for hospital bed  She is in desperate need of it  Please contact them directly to see what is missing  He states he was told they called office several times   Thanks Also please return son's call ph [phone] Sruthi Mary

## 2021-09-22 NOTE — TELEPHONE ENCOUNTER
Thanks Jayashree Snowball  Please let us know what they need from us after you speak with them   Thanks

## 2021-09-22 NOTE — TELEPHONE ENCOUNTER
Called & spoke to Mr Stoner, patient's adult son  He tells me patient has dementia & the reasoning behind requesting the medical bed was to assist the caretakers that would come to patient's apartment help with transitional positions and other physical tasks  He gave me Greta Blancar phone # from the caretaker company [phone] to further inquire on what the criteria to achieve medical necessity  I will also get in in touch with Teo

## 2021-09-22 NOTE — TELEPHONE ENCOUNTER
Just an FYI    Later this afternoon, i'd like to call patient's son, or better, the facility and inquire the medical reasoning behind the need for bed that Dr Letty Mckinley had ordered but per patient's son, did not receive  This is a Dr Letty Mckinley patient originally for Insertion Nail Trochanteric SX on 07/15/2021 last seen on 07/15/2021 with plan to F/U with Dr Raul Torrez on 10/15/2021  Because this patient has not yet seen Dr Raul Torrez, it is difficult to establish medical necessity to warrant bed

## 2021-09-28 NOTE — TELEPHONE ENCOUNTER
Jeff P/O Nail [facility] follwing with Dr Mckenna Judd 10/15/2021    Son's request for medical bed for patient Hx dementia  After speaking to Jose Maria, who asked that I contact Fina from [address] [place] [address] for further assistance   Fina confirmed [facility] does supply beds  However, medical necessity criteria in the provider's office visit note must meet the following criteria:    Provider's note must mention need for bed for use for either head elevation/positioning due to pain, or mention for use of frequent/immediate repositioning  Patient's appt is not until 10/15/2021  I can call patient's son & let him know that you could better determine & evaluate when coming in for that appt to justify need for bed at that time?

## 2021-09-28 NOTE — TELEPHONE ENCOUNTER
Sounds good Phoebe Vincentyd  Please let the son know that we can either provide a note when she comes into the office or they could also try contacting the patient's PCP who may have seen her more recently and could provide the note of medical necessity  Let me know if they need anything else  Thanks!

## 2021-09-29 ENCOUNTER — OFFICE VISIT (OUTPATIENT)
Dept: INTERNAL MEDICINE CLINIC | Facility: CLINIC | Age: 86
End: 2021-09-29
Payer: COMMERCIAL

## 2021-09-29 VITALS
DIASTOLIC BLOOD PRESSURE: 70 MMHG | RESPIRATION RATE: 12 BRPM | SYSTOLIC BLOOD PRESSURE: 170 MMHG | HEIGHT: 58 IN | WEIGHT: 125 LBS | HEART RATE: 84 BPM | TEMPERATURE: 97.6 F | BODY MASS INDEX: 26.24 KG/M2

## 2021-09-29 DIAGNOSIS — E78.2 MIXED HYPERLIPIDEMIA: ICD-10-CM

## 2021-09-29 DIAGNOSIS — E87.1 HYPONATREMIA: ICD-10-CM

## 2021-09-29 DIAGNOSIS — F03.91 DEMENTIA WITH BEHAVIORAL DISTURBANCE, UNSPECIFIED DEMENTIA TYPE (HCC): Chronic | ICD-10-CM

## 2021-09-29 DIAGNOSIS — I10 ORTHOSTATIC HYPERTENSION: ICD-10-CM

## 2021-09-29 DIAGNOSIS — I10 ESSENTIAL HYPERTENSION: Primary | ICD-10-CM

## 2021-09-29 DIAGNOSIS — N18.31 STAGE 3A CHRONIC KIDNEY DISEASE (HCC): ICD-10-CM

## 2021-09-29 DIAGNOSIS — F20.3 UNDIFFERENTIATED SCHIZOPHRENIA (HCC): Chronic | ICD-10-CM

## 2021-09-29 DIAGNOSIS — I48.0 PAF (PAROXYSMAL ATRIAL FIBRILLATION) (HCC): ICD-10-CM

## 2021-09-29 PROCEDURE — 1036F TOBACCO NON-USER: CPT | Performed by: INTERNAL MEDICINE

## 2021-09-29 PROCEDURE — 99214 OFFICE O/P EST MOD 30 MIN: CPT | Performed by: INTERNAL MEDICINE

## 2021-09-29 RX ORDER — LORAZEPAM 0.5 MG/1
0.5 TABLET ORAL
COMMUNITY

## 2021-09-29 RX ORDER — TRAZODONE HYDROCHLORIDE 50 MG/1
50 TABLET ORAL
COMMUNITY

## 2021-09-29 RX ORDER — ADHESIVE BANDAGE 3/4"
BANDAGE TOPICAL DAILY
Qty: 1 EACH | Refills: 0 | Status: SHIPPED | OUTPATIENT
Start: 2021-09-29

## 2021-09-29 NOTE — TELEPHONE ENCOUNTER
Patient's son, John Paul Oropeza is full  I will attempt to call him tomorrow morning to relay message above regarding bed

## 2021-09-29 NOTE — PROGRESS NOTES
ASSESSMENT/PLAN:    Case and plan discussed with Dr Shine Maguire    Diagnoses and all orders for this visit:    Essential hypertension  Elevated today  - advised HHA to moitor her BP at home and will f/u in 7 weeks  - BP cuff ordered  - will hold off in increasing meds due to h/o orthostatic hypotension    PAF (paroxysmal atrial fibrillation) (HCC)  Continue AMiodorone 200mg HS, Cartia Xt 120, Toprol-XL 50mg  Not on anticoagulation  Will refer to cardiology for f/u      Dementia with behavioral disturbance, unspecified dementia type ([facility] [address] )  Stable   followed by behavioral health  olanzapine 5mg HS and lorazepam HS, trazodone 50mg  Stage 3a chronic kidney disease (HCC)  Stable       Hyponatremia  Likely due to low volume  Advised to maintained good hydration  Will recheck on BMP         Other orders  -     LORazepam (ATIVAN) 0 5 mg tablet  -     cyanocobalamin (VITAMIN B-12) 100 MCG tablet; Take 100 mcg by mouth daily  -     traZODone (DESYREL) 50 mg tablet; Take 50 mg by mouth daily at bedtime  -     Ambulatory referral to Cardiology; Future  -     CBC and Platelet; Future        Immunization History   Administered Date(s) Administered    INFLUENZA 02/16/2015, 11/13/2018    Influenza Quadrivalent, 6-35 Months IM 10/16/2014    Influenza, high dose seasonal 0 7 mL 11/13/2018, 10/01/2019, 11/18/2020    Pneumococcal Conjugate 13-Valent 02/04/2020    Pneumococcal Polysaccharide PPV23 07/11/2014    SARS-CoV-2 / COVID-19 mRNA IM (Moderna) 03/12/2021, 04/09/2021    Tdap 09/28/2020    Tuberculin Skin Test-PPD Intradermal 10/23/2018         HISTORY OF PRESENT ILLNESS:    Presents for  F/u accompanied by her home health aid  Last visit on 8/12  She has been to the ED [facility] times since then for UTI and urinary retention with rooney placement  Rooney removed on 9/13 by urology  Good UOP reported by her HHA  She has been eating and sleeping well  She intermittently shows sundowning   She responds well to walking and reorientation  She has been taking olanzapine 5mg HS and lorazepam HS, trazodone 50mg  She has not seen cardiology since her hospitalization in July  Lovenox injections have been stopped 30 days after her hip fracture  BP today noted to be elevated 170/70 initally and 150/60 on manual repeat  Review of Systems  Limited by patients mentation  HHA reports no complaints  OBJECTIVE:  Vitals:    09/29/21 1030   BP: 170/70   BP Location: Left arm   Patient Position: Sitting   Cuff Size: Standard   Pulse: 84   Resp: 12   Temp: 97 6 °F (36 4 °C)   Weight: 56 7 kg (125 lb)   Height: 4' 10" (1 473 m)       Physical Exam:   GENERAL: NAD, Normal appearance  Non diaphoretic, non-toxic, not ill-appearing, well-developed, well-nourished  Wheel chair dependent  Hard of hearing  NEUROLOGIC:  Alert/oriented x1  LE weakness  HEENT:  NC/AT, PERRL, EOMI, MMM, no scleral icterus  CARDIAC:  RRR, +S1/S2, no S3/S4 heard, no m/g/r  PULMONARY:  non-labored breathing, CTA B/L, no wheezing/rales/rhonci appreciated at time of encounter  ABDOMEN:  Soft, NT/ND, +BS, no rebound/guarding/rigidity  Extremities:  2+ Pulses in DP/PT   No edema, cyanosis, or clubbing  SKIN:  No rashes or erythema        Current Outpatient Medications:     amiodarone 200 mg tablet, Take 1 tablet (200 mg total) by mouth daily, Disp: 90 tablet, Rfl: 0    Cartia  MG 24 hr capsule, TAKE 1 CAPSULE BY MOUTH DAILY, Disp: 90 capsule, Rfl: 0    cyanocobalamin (VITAMIN B-12) 100 MCG tablet, Take 100 mcg by mouth daily, Disp: , Rfl:     DULoxetine (CYMBALTA) 30 mg delayed release capsule, Take 30 mg by mouth daily at bedtime, Disp: , Rfl:     LORazepam (ATIVAN) 0 5 mg tablet, , Disp: , Rfl:     Metoprolol Succinate (KAPSPARGO SPRINKLE PO), Take 50 mg by mouth daily, Disp: , Rfl:     OLANZapine (ZyPREXA) 5 mg tablet, Take 1 tablet (5 mg total) by mouth daily at bedtime, Disp: 30 tablet, Rfl: 1    acetaminophen (TYLENOL) 325 mg tablet, Take 2 tablets (650 mg total) by mouth every 6 (six) hours as needed for mild pain, moderate pain or headaches (Patient not taking: Reported on 9/29/2021), Disp: 30 tablet, Rfl: 0    albuterol (2 5 mg/3 mL) 0 083 % nebulizer solution, Take 1 vial (2 5 mg total) by nebulization every 4 (four) hours as needed for wheezing or shortness of breath (Patient not taking: Reported on 5/21/2021), Disp: 1 vial, Rfl: 0    albuterol (PROVENTIL HFA,VENTOLIN HFA) 90 mcg/act inhaler, Inhale 2 puffs every 4 (four) hours as needed for wheezing (Patient not taking: Reported on 5/21/2021), Disp: 1 Inhaler, Rfl: 0    bisacodyl (DULCOLAX) 10 mg suppository, Insert 1 suppository (10 mg total) into the rectum daily as needed for constipation (2nd line) (Patient not taking: Reported on 9/29/2021), Disp: 12 suppository, Rfl: 0    cyanocobalamin (VITAMIN B-12) 1000 MCG tablet, Take 1 tablet (1,000 mcg total) by mouth daily, Disp: 30 tablet, Rfl: 2    enoxaparin (LOVENOX) 30 mg/0 3 mL, Inject 0 3 mL (30 mg total) under the skin every 24 hours for 19 days, Disp: , Rfl: 0    lidocaine (LIDODERM) 5 %, Apply 1 patch topically daily Remove & Discard patch within 12 hours or as directed by MD (Patient not taking: Reported on 9/29/2021), Disp: , Rfl: 0    melatonin 3 mg, Take 3 mg by mouth daily at bedtime Take 2 tablets at bedtime (Patient not taking: Reported on 9/29/2021), Disp: , Rfl:     metoprolol succinate (TOPROL-XL) 50 mg 24 hr tablet, TAKE 1 TABLET BY MOUTH DAILY, Disp: 90 tablet, Rfl: 0    polyethylene glycol (MIRALAX) 17 g packet, Take 17 g by mouth daily as needed (constipation first line) (Patient not taking: Reported on 9/29/2021), Disp: 14 each, Rfl: 0    simethicone (MYLICON) 80 mg chewable tablet, Chew 1 tablet (80 mg total) 4 (four) times a day as needed for flatulence (Patient not taking: Reported on 9/29/2021), Disp: 30 tablet, Rfl: 0    Stool Softener 100 MG capsule, TAKE 1 CAPSULE BY MOUTH EVERY 12 HOURS FOR CONSTIPATION (Patient not taking: Reported on 9/29/2021), Disp: 90 capsule, Rfl: 0    Past Medical History:   Diagnosis Date    Anxiety     Cognitive impairment     COVID-19 virus infection 4/13/2020    Dementia ([facility] )     Depression     Hallucination     Hyperlipidemia     Hypertension     Memory loss     Psychiatric illness     Psychosis ([facility] )     Retention of urine     Schizoaffective disorder ([facility] )     Sleep difficulties      Past Surgical History:   Procedure Laterality Date    APPENDECTOMY      BLADDER SURGERY      CHOLECYSTECTOMY      MD OPEN RX FEMUR FX+INTRAMED BRYSON Right 7/15/2021    Procedure: INSERTION NAIL IM FEMUR ANTEGRADE (TROCHANTERIC) right;  Surgeon: Zoey Dixon MD;  Location: [facility];  Service: Orthopedics    TOTAL ABDOMINAL HYSTERECTOMY W/ BILATERAL SALPINGOOPHORECTOMY      TOTAL KNEE ARTHROPLASTY Left      Family History   Problem Relation Age of Onset    Heart disease Mother         Cardiac disorder    Parkinsonism Father     Heart disease Sister         Cardiac disorder    Hypertension Sister     Diabetes Child         Diabetes Mellitus    Lung disease Child         Respiratory Disorder    Diabetes Son         Diabetes Mellitus     Social History     Socioeconomic History    Marital status:       Spouse name: Not on file    Number of children: Not on file    Years of education: Not on file    Highest education level: Not on file   Occupational History    Not on file   Tobacco Use    Smoking status: Never Smoker    Smokeless tobacco: Never Used    Tobacco comment: Former smoker per [employer]   Vaping Use    Vaping Use: Never used   Substance and Sexual Activity    Alcohol use: Never    Drug use: No    Sexual activity: Not on file   Other Topics Concern    Not on file   Social History Narrative    Drinks coffee     Social Determinants of Health     Financial Resource Strain:     Difficulty of Paying Living Expenses:    Food Insecurity:     Worried About Running Out of Food in the Last Year:    [address] in the Last Year:    Transportation Needs:     Lack of Transportation (Medical):      Lack of Transportation (Non-Medical):    Physical Activity:     Days of Exercise per Week:     Minutes of Exercise per Session:    Stress:     Feeling of Stress :    Social Connections:     Frequency of Communication with Friends and Family:     Frequency of Social Gatherings with Friends and Family:     Attends [employer] Services:     Active Member of Clubs or Organizations:     Attends Club or Organization Meetings:     Marital Status:    Intimate Partner Violence:     Fear of Current or Ex-Partner:     Emotionally Abused:     Physically Abused:     Sexually Abused:      Social History     Tobacco Use   Smoking Status Never Smoker   Smokeless Tobacco Never Used   Tobacco Comment    Former smoker per [employer]     Social History     Substance and Sexual Activity   Alcohol Use Never     Social History     Substance and Sexual Activity   Drug Use No         José Miguel Lares MD  Internal Medicine Residency, PGY-3  [facility] [address]

## 2021-09-29 NOTE — PROGRESS NOTES
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[address] )    Mixed hyperlipidemia    Hyponatremia    Orthostatic hypertension          Subjective:      Patient ID: Janice Michaud is a 80 y o  female  No chief complaint on file          Current Outpatient Medications:     acetaminophen (TYLENOL) 325 mg tablet, Take 2 tablets (650 mg total) by mouth every 6 (six) hours as needed for mild pain, moderate pain or headaches, Disp: 30 tablet, Rfl: 0    albuterol (2 5 mg/3 mL) 0 083 % nebulizer solution, Take 1 vial (2 5 mg total) by nebulization every 4 (four) hours as needed for wheezing or shortness of breath (Patient not taking: Reported on 5/21/2021), Disp: 1 vial, Rfl: 0    albuterol (PROVENTIL HFA,VENTOLIN HFA) 90 mcg/act inhaler, Inhale 2 puffs every 4 (four) hours as needed for wheezing (Patient not taking: Reported on 5/21/2021), Disp: 1 Inhaler, Rfl: 0    amiodarone 200 mg tablet, Take 1 tablet (200 mg total) by mouth daily, Disp: 90 tablet, Rfl: 0    bisacodyl (DULCOLAX) 10 mg suppository, Insert 1 suppository (10 mg total) into the rectum daily as needed for constipation (2nd line), Disp: 12 suppository, Rfl: 0    Cartia  MG 24 hr capsule, TAKE 1 CAPSULE BY MOUTH DAILY, Disp: 90 capsule, Rfl: 0    cyanocobalamin (VITAMIN B-12) 1000 MCG tablet, Take 1 tablet (1,000 mcg total) by mouth daily, Disp: 30 tablet, Rfl: 2    DULoxetine (CYMBALTA) 30 mg delayed release capsule, Take 30 mg by mouth daily at bedtime, Disp: , Rfl:     enoxaparin (LOVENOX) 30 mg/0 3 mL, Inject 0 3 mL (30 mg total) under the skin every 24 hours for 19 days, Disp: , Rfl: 0    lidocaine (LIDODERM) 5 %, Apply 1 patch topically daily Remove & Discard patch within 12 hours or as directed by MD, Disp: , Rfl: 0    melatonin 3 mg, Take 3 mg by mouth daily at bedtime Take 2 tablets at bedtime, Disp: , Rfl:     Metoprolol Succinate (KAPSPARGO SPRINKLE PO), Take 50 mg by mouth daily, Disp: , Rfl:     metoprolol succinate (TOPROL-XL) 50 mg 24 hr tablet, TAKE 1 TABLET BY MOUTH DAILY, Disp: 90 tablet, Rfl: 0    OLANZapine (ZyPREXA) 5 mg tablet, Take 1 tablet (5 mg total) by mouth daily at bedtime, Disp: 30 tablet, Rfl: 1    polyethylene glycol (MIRALAX) 17 g packet, Take 17 g by mouth daily as needed (constipation first line), Disp: 14 each, Rfl: 0    simethicone (MYLICON) 80 mg chewable tablet, Chew 1 tablet (80 mg total) 4 (four) times a day as needed for flatulence, Disp: 30 tablet, Rfl: 0    Stool Softener 100 MG capsule, TAKE 1 CAPSULE BY MOUTH EVERY 12 HOURS FOR CONSTIPATION, Disp: 90 capsule, Rfl: 0    HPI    The following portions of the patient's history were reviewed and updated as appropriate: allergies, current medications, past family history, past medical history, past social history, past surgical history and problem list     Review of Systems      Objective: There were no vitals taken for this visit       Physical Exam

## 2021-10-06 ENCOUNTER — TELEPHONE (OUTPATIENT)
Dept: LAB | Facility: HOSPITAL | Age: 86
End: 2021-10-06

## 2021-10-13 ENCOUNTER — APPOINTMENT (OUTPATIENT)
Dept: LAB | Facility: HOSPITAL | Age: 86
End: 2021-10-13
Attending: INTERNAL MEDICINE
Payer: COMMERCIAL

## 2021-10-13 DIAGNOSIS — E87.1 HYPONATREMIA: ICD-10-CM

## 2021-10-13 DIAGNOSIS — E53.8 B12 DEFICIENCY: ICD-10-CM

## 2021-10-13 DIAGNOSIS — R79.89 HIGH SERUM METHYLMALONIC ACID: ICD-10-CM

## 2021-10-13 DIAGNOSIS — I10 ESSENTIAL HYPERTENSION: ICD-10-CM

## 2021-10-13 DIAGNOSIS — Z00.00 HEALTHCARE MAINTENANCE: ICD-10-CM

## 2021-10-13 DIAGNOSIS — I10 ORTHOSTATIC HYPERTENSION: ICD-10-CM

## 2021-10-13 LAB
ALBUMIN SERPL BCP-MCNC: 3.7 G/DL (ref 3.5–5)
ALP SERPL-CCNC: 120 U/L (ref 46–116)
ALT SERPL W P-5'-P-CCNC: 20 U/L (ref 12–78)
ANION GAP SERPL CALCULATED.3IONS-SCNC: 9 MMOL/L (ref 4–13)
AST SERPL W P-5'-P-CCNC: 21 U/L (ref 5–45)
BASOPHILS # BLD AUTO: 0.01 THOUSANDS/ΜL (ref 0–0.1)
BASOPHILS NFR BLD AUTO: 0 % (ref 0–1)
BILIRUB SERPL-MCNC: 0.52 MG/DL (ref 0.2–1)
BUN SERPL-MCNC: 18 MG/DL (ref 5–25)
CALCIUM SERPL-MCNC: 8.9 MG/DL (ref 8.3–10.1)
CHLORIDE SERPL-SCNC: 104 MMOL/L (ref 100–108)
CO2 SERPL-SCNC: 26 MMOL/L (ref 21–32)
CREAT SERPL-MCNC: 1.11 MG/DL (ref 0.6–1.3)
EOSINOPHIL # BLD AUTO: 0.1 THOUSAND/ΜL (ref 0–0.61)
EOSINOPHIL NFR BLD AUTO: 2 % (ref 0–6)
ERYTHROCYTE [DISTWIDTH] IN BLOOD BY AUTOMATED COUNT: 13.4 % (ref 11.6–15.1)
GFR SERPL CREATININE-BSD FRML MDRD: 44 ML/MIN/1.73SQ M
GLUCOSE SERPL-MCNC: 102 MG/DL (ref 65–140)
HCT VFR BLD AUTO: 39.9 % (ref 34.8–46.1)
HCV AB SER QL: NORMAL
HGB BLD-MCNC: 12.9 G/DL (ref 11.5–15.4)
IMM GRANULOCYTES # BLD AUTO: 0.03 THOUSAND/UL (ref 0–0.2)
IMM GRANULOCYTES NFR BLD AUTO: 1 % (ref 0–2)
LYMPHOCYTES # BLD AUTO: 1.05 THOUSANDS/ΜL (ref 0.6–4.47)
LYMPHOCYTES NFR BLD AUTO: 18 % (ref 14–44)
MCH RBC QN AUTO: 30.5 PG (ref 26.8–34.3)
MCHC RBC AUTO-ENTMCNC: 32.3 G/DL (ref 31.4–37.4)
MCV RBC AUTO: 94 FL (ref 82–98)
MONOCYTES # BLD AUTO: 0.52 THOUSAND/ΜL (ref 0.17–1.22)
MONOCYTES NFR BLD AUTO: 9 % (ref 4–12)
NEUTROPHILS # BLD AUTO: 4.3 THOUSANDS/ΜL (ref 1.85–7.62)
NEUTS SEG NFR BLD AUTO: 70 % (ref 43–75)
NRBC BLD AUTO-RTO: 0 /100 WBCS
PLATELET # BLD AUTO: 254 THOUSANDS/UL (ref 149–390)
PMV BLD AUTO: 11.8 FL (ref 8.9–12.7)
POTASSIUM SERPL-SCNC: 4 MMOL/L (ref 3.5–5.3)
PROT SERPL-MCNC: 7.2 G/DL (ref 6.4–8.2)
RBC # BLD AUTO: 4.23 MILLION/UL (ref 3.81–5.12)
SODIUM SERPL-SCNC: 139 MMOL/L (ref 136–145)
TSH SERPL DL<=0.05 MIU/L-ACNC: 2.03 UIU/ML (ref 0.36–3.74)
VIT B12 SERPL-MCNC: 1547 PG/ML (ref 100–900)
WBC # BLD AUTO: 6.01 THOUSAND/UL (ref 4.31–10.16)

## 2021-10-13 PROCEDURE — 83918 ORGANIC ACIDS TOTAL QUANT: CPT

## 2021-10-13 PROCEDURE — 82607 VITAMIN B-12: CPT

## 2021-10-13 PROCEDURE — 84443 ASSAY THYROID STIM HORMONE: CPT

## 2021-10-13 PROCEDURE — 80053 COMPREHEN METABOLIC PANEL: CPT

## 2021-10-13 PROCEDURE — 36415 COLL VENOUS BLD VENIPUNCTURE: CPT

## 2021-10-13 PROCEDURE — 86803 HEPATITIS C AB TEST: CPT

## 2021-10-13 PROCEDURE — 85025 COMPLETE CBC W/AUTO DIFF WBC: CPT

## 2021-10-15 ENCOUNTER — APPOINTMENT (OUTPATIENT)
Dept: RADIOLOGY | Facility: MEDICAL CENTER | Age: 86
DRG: 392 | End: 2021-10-15
Payer: COMMERCIAL

## 2021-10-15 ENCOUNTER — OFFICE VISIT (OUTPATIENT)
Dept: OBGYN CLINIC | Facility: MEDICAL CENTER | Age: 86
End: 2021-10-15
Payer: COMMERCIAL

## 2021-10-15 VITALS — DIASTOLIC BLOOD PRESSURE: 72 MMHG | HEART RATE: 63 BPM | SYSTOLIC BLOOD PRESSURE: 178 MMHG

## 2021-10-15 DIAGNOSIS — S72.001D CLOSED FRACTURE OF RIGHT HIP WITH ROUTINE HEALING, SUBSEQUENT ENCOUNTER: ICD-10-CM

## 2021-10-15 DIAGNOSIS — S72.001D CLOSED FRACTURE OF RIGHT HIP WITH ROUTINE HEALING, SUBSEQUENT ENCOUNTER: Primary | ICD-10-CM

## 2021-10-15 LAB
METHYLMALONATE SERPL-SCNC: 131 NMOL/L (ref 0–378)
SL AMB DISCLAIMER: NORMAL

## 2021-10-15 PROCEDURE — 73552 X-RAY EXAM OF FEMUR 2/>: CPT

## 2021-10-15 PROCEDURE — 1036F TOBACCO NON-USER: CPT | Performed by: ORTHOPAEDIC SURGERY

## 2021-10-15 PROCEDURE — 1160F RVW MEDS BY RX/DR IN RCRD: CPT | Performed by: ORTHOPAEDIC SURGERY

## 2021-10-15 PROCEDURE — 99214 OFFICE O/P EST MOD 30 MIN: CPT | Performed by: ORTHOPAEDIC SURGERY

## 2021-10-18 ENCOUNTER — HOSPITAL ENCOUNTER (INPATIENT)
Facility: HOSPITAL | Age: 86
LOS: 2 days | Discharge: HOME | DRG: 392 | End: 2021-10-20
Attending: EMERGENCY MEDICINE | Admitting: INTERNAL MEDICINE
Payer: COMMERCIAL

## 2021-10-18 ENCOUNTER — APPOINTMENT (EMERGENCY)
Dept: CT IMAGING | Facility: HOSPITAL | Age: 86
DRG: 392 | End: 2021-10-18
Payer: COMMERCIAL

## 2021-10-18 ENCOUNTER — APPOINTMENT (EMERGENCY)
Dept: RADIOLOGY | Facility: HOSPITAL | Age: 86
DRG: 392 | End: 2021-10-18
Payer: COMMERCIAL

## 2021-10-18 DIAGNOSIS — K59.00 CONSTIPATION: ICD-10-CM

## 2021-10-18 DIAGNOSIS — N17.9 AKI (ACUTE KIDNEY INJURY) (HCC): ICD-10-CM

## 2021-10-18 DIAGNOSIS — R39.89 SUSPECTED UTI: ICD-10-CM

## 2021-10-18 DIAGNOSIS — R10.9 LEFT FLANK PAIN: Primary | ICD-10-CM

## 2021-10-18 LAB
ALBUMIN SERPL BCP-MCNC: 4.1 G/DL (ref 3.5–5)
ALP SERPL-CCNC: 132 U/L (ref 46–116)
ALT SERPL W P-5'-P-CCNC: 17 U/L (ref 12–78)
AMORPH PHOS CRY URNS QL MICRO: ABNORMAL /HPF
ANION GAP SERPL CALCULATED.3IONS-SCNC: 9 MMOL/L (ref 4–13)
AST SERPL W P-5'-P-CCNC: 31 U/L (ref 5–45)
BACTERIA UR QL AUTO: ABNORMAL /HPF
BASOPHILS # BLD AUTO: 0.01 THOUSANDS/ΜL (ref 0–0.1)
BASOPHILS NFR BLD AUTO: 0 % (ref 0–1)
BILIRUB SERPL-MCNC: 0.7 MG/DL (ref 0.2–1)
BILIRUB UR QL STRIP: NEGATIVE
BUN SERPL-MCNC: 23 MG/DL (ref 5–25)
CALCIUM SERPL-MCNC: 9.2 MG/DL (ref 8.3–10.1)
CHLORIDE SERPL-SCNC: 99 MMOL/L (ref 100–108)
CLARITY UR: CLEAR
CO2 SERPL-SCNC: 26 MMOL/L (ref 21–32)
COLOR UR: YELLOW
CREAT SERPL-MCNC: 1.59 MG/DL (ref 0.6–1.3)
EOSINOPHIL # BLD AUTO: 0.09 THOUSAND/ΜL (ref 0–0.61)
EOSINOPHIL NFR BLD AUTO: 1 % (ref 0–6)
ERYTHROCYTE [DISTWIDTH] IN BLOOD BY AUTOMATED COUNT: 13.6 % (ref 11.6–15.1)
GFR SERPL CREATININE-BSD FRML MDRD: 29 ML/MIN/1.73SQ M
GLUCOSE SERPL-MCNC: 98 MG/DL (ref 65–140)
GLUCOSE UR STRIP-MCNC: NEGATIVE MG/DL
HCT VFR BLD AUTO: 43 % (ref 34.8–46.1)
HGB BLD-MCNC: 14.3 G/DL (ref 11.5–15.4)
HGB UR QL STRIP.AUTO: ABNORMAL
IMM GRANULOCYTES # BLD AUTO: 0.03 THOUSAND/UL (ref 0–0.2)
IMM GRANULOCYTES NFR BLD AUTO: 0 % (ref 0–2)
KETONES UR STRIP-MCNC: NEGATIVE MG/DL
LEUKOCYTE ESTERASE UR QL STRIP: NEGATIVE
LIPASE SERPL-CCNC: 66 U/L (ref 73–393)
LYMPHOCYTES # BLD AUTO: 0.85 THOUSANDS/ΜL (ref 0.6–4.47)
LYMPHOCYTES NFR BLD AUTO: 9 % (ref 14–44)
MCH RBC QN AUTO: 30.7 PG (ref 26.8–34.3)
MCHC RBC AUTO-ENTMCNC: 33.3 G/DL (ref 31.4–37.4)
MCV RBC AUTO: 92 FL (ref 82–98)
MONOCYTES # BLD AUTO: 0.6 THOUSAND/ΜL (ref 0.17–1.22)
MONOCYTES NFR BLD AUTO: 6 % (ref 4–12)
NEUTROPHILS # BLD AUTO: 7.89 THOUSANDS/ΜL (ref 1.85–7.62)
NEUTS SEG NFR BLD AUTO: 84 % (ref 43–75)
NITRITE UR QL STRIP: NEGATIVE
NON-SQ EPI CELLS URNS QL MICRO: ABNORMAL /HPF
NRBC BLD AUTO-RTO: 0 /100 WBCS
PH UR STRIP.AUTO: 8.5 [PH] (ref 4.5–8)
PLATELET # BLD AUTO: 274 THOUSANDS/UL (ref 149–390)
PMV BLD AUTO: 9.9 FL (ref 8.9–12.7)
POTASSIUM SERPL-SCNC: 5.1 MMOL/L (ref 3.5–5.3)
PROT SERPL-MCNC: 7.9 G/DL (ref 6.4–8.2)
PROT UR STRIP-MCNC: ABNORMAL MG/DL
RBC # BLD AUTO: 4.66 MILLION/UL (ref 3.81–5.12)
RBC #/AREA URNS AUTO: ABNORMAL /HPF
SODIUM SERPL-SCNC: 134 MMOL/L (ref 136–145)
SP GR UR STRIP.AUTO: 1.02 (ref 1–1.03)
TROPONIN I SERPL-MCNC: 0.02 NG/ML
UROBILINOGEN UR QL STRIP.AUTO: 1 E.U./DL
WBC # BLD AUTO: 9.47 THOUSAND/UL (ref 4.31–10.16)
WBC #/AREA URNS AUTO: ABNORMAL /HPF

## 2021-10-18 PROCEDURE — 80053 COMPREHEN METABOLIC PANEL: CPT

## 2021-10-18 PROCEDURE — 71045 X-RAY EXAM CHEST 1 VIEW: CPT

## 2021-10-18 PROCEDURE — 74176 CT ABD & PELVIS W/O CONTRAST: CPT

## 2021-10-18 PROCEDURE — 99284 EMERGENCY DEPT VISIT MOD MDM: CPT | Performed by: EMERGENCY MEDICINE

## 2021-10-18 PROCEDURE — 96372 THER/PROPH/DIAG INJ SC/IM: CPT

## 2021-10-18 PROCEDURE — 83690 ASSAY OF LIPASE: CPT

## 2021-10-18 PROCEDURE — 85025 COMPLETE CBC W/AUTO DIFF WBC: CPT

## 2021-10-18 PROCEDURE — 93005 ELECTROCARDIOGRAM TRACING: CPT

## 2021-10-18 PROCEDURE — 96361 HYDRATE IV INFUSION ADD-ON: CPT

## 2021-10-18 PROCEDURE — 99222 1ST HOSP IP/OBS MODERATE 55: CPT | Performed by: PHYSICIAN ASSISTANT

## 2021-10-18 PROCEDURE — 36415 COLL VENOUS BLD VENIPUNCTURE: CPT

## 2021-10-18 PROCEDURE — 99285 EMERGENCY DEPT VISIT HI MDM: CPT

## 2021-10-18 PROCEDURE — 84484 ASSAY OF TROPONIN QUANT: CPT

## 2021-10-18 PROCEDURE — 81001 URINALYSIS AUTO W/SCOPE: CPT

## 2021-10-18 PROCEDURE — 96360 HYDRATION IV INFUSION INIT: CPT

## 2021-10-18 RX ORDER — HALOPERIDOL 5 MG/ML
5 INJECTION INTRAMUSCULAR ONCE
Status: DISCONTINUED | OUTPATIENT
Start: 2021-10-18 | End: 2021-10-18

## 2021-10-18 RX ORDER — HALOPERIDOL 5 MG/ML
5 INJECTION INTRAMUSCULAR ONCE
Status: COMPLETED | OUTPATIENT
Start: 2021-10-18 | End: 2021-10-18

## 2021-10-18 RX ADMIN — SODIUM CHLORIDE 1000 ML: 0.9 INJECTION, SOLUTION INTRAVENOUS at 19:30

## 2021-10-18 RX ADMIN — HALOPERIDOL LACTATE 5 MG: 5 INJECTION, SOLUTION INTRAMUSCULAR at 19:17

## 2021-10-18 NOTE — ED PROVIDER NOTES
History  Chief Complaint   Patient presents with    Flank Pain     pt reports L sided flank pain starting last week  denies urinary symptoms     Patient is an 44-year-old female with past medical history of dementia, AFib, present to the ED with caretaker at bedside for evaluation of left flank/left back area pain  Patient is a poor historian at bedside secondary to dementia  Caretaker at bedside states that the patient is complaining of some intermittent left-sided flank pain for the past week  It has not resolved, so patient was taken to the ED  Caretaker states the patient has a prior history of urinary tract infections, last in September 2021, treated with antibiotics  Caretaker does not believe the patient has any problems with her urinary habits over the past week or so to coincide with the onset of pain  Caretaker notes no fevers at home, no recent cough congestion, no anterior abdominal pain apparently, no chest pain, no shortness of breath at baseline  Prior to Admission Medications   Prescriptions Last Dose Informant Patient Reported? Taking?    Blood Pressure Monitoring (Blood Pressure Cuff) MISC   No No   Sig: Use daily   Cartia  MG 24 hr capsule   No Yes   Sig: TAKE 1 CAPSULE BY MOUTH DAILY   DULoxetine (CYMBALTA) 30 mg delayed release capsule   Yes Yes   Sig: Take 30 mg by mouth daily at bedtime   LORazepam (ATIVAN) 0 5 mg tablet   Yes Yes   Sig: Take 0 5 mg by mouth    OLANZapine (ZyPREXA) 5 mg tablet   No Yes   Sig: Take 1 tablet (5 mg total) by mouth daily at bedtime   Stool Softener 100 MG capsule Not Taking at Unknown time  No No   Sig: TAKE 1 CAPSULE BY MOUTH EVERY 12 HOURS FOR CONSTIPATION   Patient not taking: Reported on 9/29/2021   acetaminophen (TYLENOL) 325 mg tablet Not Taking at Unknown time  No No   Sig: Take 2 tablets (650 mg total) by mouth every 6 (six) hours as needed for mild pain, moderate pain or headaches   Patient not taking: Reported on 9/29/2021 albuterol (2 5 mg/3 mL) 0 083 % nebulizer solution Not Taking at Unknown time  No No   Sig: Take 1 vial (2 5 mg total) by nebulization every 4 (four) hours as needed for wheezing or shortness of breath   Patient not taking: Reported on 5/21/2021   albuterol (PROVENTIL HFA,VENTOLIN HFA) 90 mcg/act inhaler Not Taking at Unknown time  No No   Sig: Inhale 2 puffs every 4 (four) hours as needed for wheezing   Patient not taking: Reported on 5/21/2021   amiodarone 200 mg tablet   No Yes   Sig: Take 1 tablet (200 mg total) by mouth daily   bisacodyl (DULCOLAX) 10 mg suppository Not Taking at Unknown time  No No   Sig: Insert 1 suppository (10 mg total) into the rectum daily as needed for constipation (2nd line)   Patient not taking: Reported on 9/29/2021   cyanocobalamin (VITAMIN B-12) 100 MCG tablet   Yes Yes   Sig: Take 100 mcg by mouth daily   melatonin 3 mg Not Taking at Unknown time  Yes No   Sig: Take 3 mg by mouth daily at bedtime Take 2 tablets at bedtime   Patient not taking: Reported on 10/19/2021   metoprolol succinate (TOPROL-XL) 50 mg 24 hr tablet   No Yes   Sig: TAKE 1 TABLET BY MOUTH DAILY   polyethylene glycol (MIRALAX) 17 g packet Not Taking at Unknown time  No No   Sig: Take 17 g by mouth daily as needed (constipation first line)   Patient not taking: Reported on 10/19/2021   simethicone (MYLICON) 80 mg chewable tablet Not Taking at Unknown time  No No   Sig: Chew 1 tablet (80 mg total) 4 (four) times a day as needed for flatulence   Patient not taking: Reported on 10/19/2021   traZODone (DESYREL) 50 mg tablet   Yes No   Sig: Take 50 mg by mouth daily at bedtime      Facility-Administered Medications: None       Past Medical History:   Diagnosis Date    Anxiety     Cognitive impairment     COVID-19 virus infection 4/13/2020    Dementia [facility] [address] [facility]     Depression     Hallucination     Hyperlipidemia     Hypertension     Memory loss     Psychiatric illness     Psychosis ([facility] [address] [facility]     Retention of urine  [facility]oaffective disorder [facility] [address] [facility]     Sleep difficulties        Past Surgical History:   Procedure Laterality Date    APPENDECTOMY      BLADDER SURGERY      CHOLECYSTECTOMY      OK OPEN RX FEMUR FX+INTRAMED BRYSON Right 7/15/2021    Procedure: INSERTION NAIL IM FEMUR ANTEGRADE (TROCHANTERIC) right;  Surgeon: Rai Thomas MD;  Location: [facility];  Service: Orthopedics    TOTAL ABDOMINAL HYSTERECTOMY W/ BILATERAL SALPINGOOPHORECTOMY      TOTAL KNEE ARTHROPLASTY Left        Family History   Problem Relation Age of Onset    Heart disease Mother         Cardiac disorder    Parkinsonism Father     Heart disease Sister         Cardiac disorder    Hypertension Sister     Diabetes Child         Diabetes Mellitus    Lung disease Child         Respiratory Disorder    Diabetes Son         Diabetes Mellitus     I have reviewed and agree with the history as documented      E-Cigarette/Vaping    E-Cigarette Use Never User      E-Cigarette/Vaping Substances    Nicotine No     THC No     CBD No     Flavoring No     Other No     Unknown No      Social History     Tobacco Use    Smoking status: Never Smoker    Smokeless tobacco: Never Used    Tobacco comment: Former smoker per [employer]   Vaping Use    Vaping Use: Never used   Substance Use Topics    Alcohol use: Never    Drug use: No        Review of Systems   Unable to perform ROS: Dementia       Physical Exam  ED Triage Vitals   Temperature Pulse Respirations Blood Pressure SpO2   10/18/21 1552 10/18/21 1554 10/18/21 1554 10/18/21 1554 10/18/21 1554   98 4 °F (36 9 °C) 70 16 133/63 99 %      Temp Source Heart Rate Source Patient Position - Orthostatic VS BP Location FiO2 (%)   10/18/21 1552 10/18/21 1554 10/18/21 1554 10/18/21 1554 --   Oral Monitor Sitting Left arm       Pain Score       10/19/21 0757       No Pain             Orthostatic Vital Signs  Vitals:    10/19/21 0530 10/19/21 0630 10/19/21 1015 10/19/21 1123   BP: (!) 184/75 139/61  (!) 178/86 Pulse: (!) 48 (!) 46 58 (!) 48   Patient Position - Orthostatic VS: Lying Lying  Lying       Physical Exam  Vitals and nursing note reviewed  Constitutional:       General: She is not in acute distress  Appearance: Normal appearance  She is well-developed  She is not ill-appearing or toxic-appearing  Comments: No signs of external trauma or bruising; patient resting comfortably in bed  HENT:      Head: Normocephalic and atraumatic  Right Ear: External ear normal       Left Ear: External ear normal       Nose: Nose normal       Mouth/Throat:      Mouth: Mucous membranes are moist       Pharynx: Oropharynx is clear  Eyes:      Extraocular Movements: Extraocular movements intact  Conjunctiva/sclera: Conjunctivae normal       Pupils: Pupils are equal, round, and reactive to light  Cardiovascular:      Rate and Rhythm: Normal rate  Rhythm irregular  Pulses: Normal pulses  Heart sounds: Normal heart sounds  Pulmonary:      Effort: Pulmonary effort is normal  No respiratory distress  Breath sounds: No wheezing, rhonchi or rales  Comments: Diminished breath sounds bilaterally  Chest:      Chest wall: No tenderness  Abdominal:      General: Bowel sounds are normal       Palpations: Abdomen is soft  Tenderness: There is no abdominal tenderness  Comments: Slight pain on palpation L flank area  No rashes or bruising over area of pain  Musculoskeletal:         General: No swelling or tenderness  Cervical back: Neck supple  No tenderness  Right lower leg: No edema  Left lower leg: No edema  Comments: +scoliosis; able to move arms and legs without pain  Skin:     General: Skin is warm and dry  Capillary Refill: Capillary refill takes less than 2 seconds  Coloration: Skin is not pale  Findings: No bruising or erythema  Neurological:      Mental Status: She is alert  Mental status is at baseline        Comments: Obeys simple commands; strength 4/5 bilaterally upper and lower extremities   Psychiatric:         Mood and Affect: Mood normal          ED Medications  Medications   diltiazem (CARDIZEM CD) 24 hr capsule 120 mg (120 mg Oral Not Given 10/19/21 0828)   cyanocobalamin (VITAMIN B-12) tablet 100 mcg (has no administration in time range)   DULoxetine (CYMBALTA) delayed release capsule 30 mg (30 mg Oral Not Given 10/19/21 0118)   melatonin tablet 3 mg (3 mg Oral Given 10/19/21 0059)   metoprolol succinate (TOPROL-XL) 24 hr tablet 50 mg (50 mg Oral Not Given 10/19/21 0828)   OLANZapine (ZyPREXA) tablet 5 mg (5 mg Oral Given 10/19/21 0056)   simethicone (MYLICON) chewable tablet 80 mg (has no administration in time range)   traZODone (DESYREL) tablet 50 mg (50 mg Oral Given 10/19/21 0057)   bisacodyl (DULCOLAX) EC tablet 5 mg (has no administration in time range)   sodium chloride 0 9 % infusion (100 mL/hr Intravenous New Bag 10/19/21 0212)   acetaminophen (TYLENOL) tablet 650 mg (has no administration in time range)   ondansetron (ZOFRAN) injection 4 mg (has no administration in time range)   aluminum-magnesium hydroxide-simethicone (MYLANTA) oral suspension 30 mL (has no administration in time range)   heparin (porcine) subcutaneous injection 5,000 Units (5,000 Units Subcutaneous Given 10/19/21 0529)   LORazepam (ATIVAN) injection 0 5 mg (0 5 mg Intravenous Given 10/19/21 0520)   haloperidol lactate (HALDOL) injection 5 mg (5 mg Intramuscular Given 10/18/21 1917)   sodium chloride 0 9 % bolus 1,000 mL (1,000 mL Intravenous New Bag 10/18/21 1930)   polyethylene glycol (MIRALAX) packet 17 g (17 g Oral Given 10/19/21 0101)       Diagnostic Studies  Results Reviewed     Procedure Component Value Units Date/Time    CBC and differential [[id number]] Collected: 10/19/21 0528    Lab Status: Final result Specimen: Blood from Arm, Left Updated: 10/19/21 0832     WBC 5 54 Thousand/uL      RBC 4 30 Million/uL      Hemoglobin 13 2 g/dL Hematocrit 39 9 %      MCV 93 fL      MCH 30 7 pg      MCHC 33 1 g/dL      RDW 13 5 %      MPV 10 5 fL      Platelets --     nRBC 0 /100 WBCs      Neutrophils Relative 68 %      Immat GRANS % 0 %      Lymphocytes Relative 20 %      Monocytes Relative 9 %      Eosinophils Relative 3 %      Basophils Relative 0 %      Neutrophils Absolute 3 73 Thousands/µL      Immature Grans Absolute 0 02 Thousand/uL      Lymphocytes Absolute 1 09 Thousands/µL      Monocytes Absolute 0 52 Thousand/µL      Eosinophils Absolute 0 17 Thousand/µL      Basophils Absolute 0 01 Thousands/µL     Osmolality-"If this is regarding a toxic alcohol, STOP  Test is not routinely indicated   Please consult medical  on call for further guidance " [[id number]]  (Normal) Collected: 10/19/21 0026    Lab Status: Final result Specimen: Blood from Arm, Left Updated: 10/19/21 0751     Osmolality Serum 290 mmol/KG     Basic metabolic panel [[id number]] Collected: 10/19/21 0528    Lab Status: Final result Specimen: Blood from Arm, Left Updated: 10/19/21 0549     Sodium 138 mmol/L      Potassium 3 7 mmol/L      Chloride 103 mmol/L      CO2 28 mmol/L      ANION GAP 7 mmol/L      BUN 22 mg/dL      Creatinine 1 26 mg/dL      Glucose 99 mg/dL      Calcium 8 8 mg/dL      eGFR 38 ml/min/1 73sq m     Narrative:      [place] guidelines for Chronic Kidney Disease (CKD):     Stage 1 with normal or high GFR (GFR > 90 mL/min/1 73 square meters)    Stage 2 Mild CKD (GFR = 60-89 mL/min/1 73 square meters)    Stage 3A Moderate CKD (GFR = 45-59 mL/min/1 73 square meters)    Stage 3B Moderate CKD (GFR = 30-44 mL/min/1 73 square meters)    Stage 4 Severe CKD (GFR = 15-29 mL/min/1 73 square meters)    Stage 5 End Stage CKD (GFR <15 mL/min/1 73 square meters)  Note: GFR calculation is accurate only with a steady state creatinine    TSH, 3rd generation [[id number]]  (Normal) Collected: 10/19/21 0026    Lab Status: Final result Specimen: Blood from Arm, Left Updated: 10/19/21 0104     TSH 3RD GENERATON 3 205 uIU/mL     Narrative:      Patients undergoing fluorescein dye angiography may retain small amounts of fluorescein in the body for 48-72 hours post procedure  Samples containing fluorescein can produce falsely depressed TSH values  If the patient had this procedure,a specimen should be resubmitted post fluorescein clearance        Platelet count [[id number]]  (Normal) Collected: 10/19/21 0026    Lab Status: Final result Specimen: Blood from Arm, Left Updated: 10/19/21 0055     Platelets 573 Thousands/uL      MPV 10 0 fL     Osmolality, urine [[id number]]     Lab Status: No result Specimen: Urine     Urine Microscopic [[id number]]  (Abnormal) Collected: 10/18/21 2123    Lab Status: Final result Specimen: Urine, Indwelling Sanders Catheter Updated: 10/18/21 2243     RBC, UA 1-2 /hpf      WBC, UA None Seen /hpf      Epithelial Cells Occasional /hpf      Bacteria, UA Occasional /hpf      AMORPH PHOSPATES Occasional /hpf     Urine Macroscopic, POC [[id number]]  (Abnormal) Collected: 10/18/21 2123    Lab Status: Final result Specimen: Urine Updated: 10/18/21 2124     Color, UA Yellow     Clarity, UA Clear     pH, UA 8 5     Leukocytes, UA Negative     Nitrite, UA Negative     Protein, UA 30 (1+) mg/dl      Glucose, UA Negative mg/dl      Ketones, UA Negative mg/dl      Urobilinogen, UA 1 0 E U /dl      Bilirubin, UA Negative     Blood, UA Trace     Specific [place], UA 1 020    Narrative:      CLINITEK RESULT    Troponin I [[id number]]  (Normal) Collected: 10/18/21 1839    Lab Status: Final result Specimen: Blood from Arm, Right Updated: 10/18/21 1908     Troponin I 0 02 ng/mL     Comprehensive metabolic panel [[id number]]  (Abnormal) Collected: 10/18/21 1836    Lab Status: Final result Specimen: Blood from Arm, Right Updated: 10/18/21 1907     Sodium 134 mmol/L      Potassium 5 1 mmol/L      Chloride 99 mmol/L      CO2 26 mmol/L      ANION GAP 9 mmol/L BUN 23 mg/dL      Creatinine 1 59 mg/dL      Glucose 98 mg/dL      Calcium 9 2 mg/dL      AST 31 U/L      ALT 17 U/L      Alkaline Phosphatase 132 U/L      Total Protein 7 9 g/dL      Albumin 4 1 g/dL      Total Bilirubin 0 70 mg/dL      eGFR 29 ml/min/1 73sq m     Narrative:      National Kid[employer] Foundation guidelines for Chronic Kidney Disease (CKD):     Stage 1 with normal or high GFR (GFR > 90 mL/min/1 73 square meters)    Stage 2 Mild CKD (GFR = 60-89 mL/min/1 73 square meters)    Stage 3A Moderate CKD (GFR = 45-59 mL/min/1 73 square meters)    Stage 3B Moderate CKD (GFR = 30-44 mL/min/1 73 square meters)    Stage 4 Severe CKD (GFR = 15-29 mL/min/1 73 square meters)    Stage 5 End Stage CKD (GFR <15 mL/min/1 73 square meters)  Note: GFR calculation is accurate only with a steady state creatinine    Lipase [[id number]]  (Abnormal) Collected: 10/18/21 1836    Lab Status: Final result Specimen: Blood from Arm, Right Updated: 10/18/21 1907     Lipase 66 u/L     CBC and differential [[id number]]  (Abnormal) Collected: 10/18/21 1836    Lab Status: Final result Specimen: Blood from Arm, Right Updated: 10/18/21 1845     WBC 9 47 Thousand/uL      RBC 4 66 Million/uL      Hemoglobin 14 3 g/dL      Hematocrit 43 0 %      MCV 92 fL      MCH 30 7 pg      MCHC 33 3 g/dL      RDW 13 6 %      MPV 9 9 fL      Platelets 242 Thousands/uL      nRBC 0 /100 WBCs      Neutrophils Relative 84 %      Immat GRANS % 0 %      Lymphocytes Relative 9 %      Monocytes Relative 6 %      Eosinophils Relative 1 %      Basophils Relative 0 %      Neutrophils Absolute 7 89 Thousands/µL      Immature Grans Absolute 0 03 Thousand/uL      Lymphocytes Absolute 0 85 Thousands/µL      Monocytes Absolute 0 60 Thousand/µL      Eosinophils Absolute 0 09 Thousand/µL      Basophils Absolute 0 01 Thousands/µL                  CT abdomen pelvis wo contrast   Final Result by Enid Cardoso MD (10/18 2050)   Moderate fecal stasis    Diverticular disease without diverticulitis  No acute findings  Workstation performed: [id number]         XR chest 1 view portable   ED Interpretation by Coni Villatoro DO (10/18 1948)   Rotated film, +scoliosis, no acute cardiopulmonary disease      Final Result by Vinh Corrales MD (10/19 5808)      No acute cardiopulmonary disease  Workstation performed: [id number]               Procedures  Procedures      ED Course  ED Course as of Oct 19 1202   Mon Oct 18, 2021   1412 Due to patient's dementia, will order broad lab workup, including CT abdomen/pelvis to look for any acute intraabdominal pathology and/or kidney stone  1849 Per chart review via epic, patient has been seen multiple times in the ER previously for urinary catheter problems  Patient does not have a catheter in place now, it was removed by Urology on 09/13/2021  Prior to that patient was admitted to the hospital for treatment of a UTI on 09/04/2021 1929 Patient has elevated creatinine compared to 5 days ago  Given NS 1 L IV  Noncontrast CT ordered due to GFR  Patient is agitated with nursing staff, attempting to get out of bed  Given Haldol 5 mg IM       2028 On re-evaluation, patient is resting comfortably  She is able to sit up and lay down in bed without significant or apparent discomfort  CT abdomen pending  IVF continued  2131 Case discussed with Dr Flora Eaton, hospitalist; arrangements made for admission  SBIRT 20yo+      Most Recent Value   SBIRT (24 yo +)   In order to provide better care to our patients, we are screening all of our patients for alcohol and drug use  Would it be okay to ask you these screening questions?   Unable to answer at this time Filed at: 10/18/2021 1826                MDM    Disposition  Final diagnoses:   Left flank pain   OMEGA (acute kidney injury) ([facility] [address] )   Constipation   Suspected UTI     Time reflects when diagnosis was documented in both MDM as applicable and the Disposition within this note     Time User Action Codes Description Comment    10/18/2021  9:22 PM Melody Hernandez Add [R10 9] Left flank pain     10/18/2021  9:22 PM Melody Hernandez Add [N17 9] OMEGA (acute kidney injury) ([facility] [address] )     10/18/2021  9:22 PM Melody Hernandez Add [K59 00] Constipation     10/18/2021  9:22 PM Melody Hernandez Add [R39 89] Suspected UTI       ED Disposition     ED Disposition Condition Date/Time Comment    Admit Stable Mon Oct 18, 2021  9:22 PM Case was discussed with Dr Chelle Bah and the patient's admission status was agreed to be Admission Status: inpatient status to the service of Dr Chelle Bah   Follow-up Information    None         Patient's Medications   Discharge Prescriptions    No medications on file     No discharge procedures on file  PDMP Review       Value Time User    PDMP Reviewed  Yes 7/16/2021  8:50 AM Jeremiah León PA-C           ED Provider  Attending physically available and evaluated Manuela Crawford I managed the patient along with the ED Attending      Electronically Signed by         Betsy Pleitez DO  10/19/21 0564

## 2021-10-19 LAB
ANION GAP SERPL CALCULATED.3IONS-SCNC: 7 MMOL/L (ref 4–13)
ATRIAL RATE: 64 BPM
BASOPHILS # BLD AUTO: 0.01 THOUSANDS/ΜL (ref 0–0.1)
BASOPHILS NFR BLD AUTO: 0 % (ref 0–1)
BUN SERPL-MCNC: 22 MG/DL (ref 5–25)
CALCIUM SERPL-MCNC: 8.8 MG/DL (ref 8.3–10.1)
CHLORIDE SERPL-SCNC: 103 MMOL/L (ref 100–108)
CO2 SERPL-SCNC: 28 MMOL/L (ref 21–32)
CREAT SERPL-MCNC: 1.26 MG/DL (ref 0.6–1.3)
EOSINOPHIL # BLD AUTO: 0.17 THOUSAND/ΜL (ref 0–0.61)
EOSINOPHIL NFR BLD AUTO: 3 % (ref 0–6)
ERYTHROCYTE [DISTWIDTH] IN BLOOD BY AUTOMATED COUNT: 13.5 % (ref 11.6–15.1)
GFR SERPL CREATININE-BSD FRML MDRD: 38 ML/MIN/1.73SQ M
GLUCOSE SERPL-MCNC: 99 MG/DL (ref 65–140)
HCT VFR BLD AUTO: 39.9 % (ref 34.8–46.1)
HGB BLD-MCNC: 13.2 G/DL (ref 11.5–15.4)
IMM GRANULOCYTES # BLD AUTO: 0.02 THOUSAND/UL (ref 0–0.2)
IMM GRANULOCYTES NFR BLD AUTO: 0 % (ref 0–2)
LYMPHOCYTES # BLD AUTO: 1.09 THOUSANDS/ΜL (ref 0.6–4.47)
LYMPHOCYTES NFR BLD AUTO: 20 % (ref 14–44)
MCH RBC QN AUTO: 30.7 PG (ref 26.8–34.3)
MCHC RBC AUTO-ENTMCNC: 33.1 G/DL (ref 31.4–37.4)
MCV RBC AUTO: 93 FL (ref 82–98)
MONOCYTES # BLD AUTO: 0.52 THOUSAND/ΜL (ref 0.17–1.22)
MONOCYTES NFR BLD AUTO: 9 % (ref 4–12)
NEUTROPHILS # BLD AUTO: 3.73 THOUSANDS/ΜL (ref 1.85–7.62)
NEUTS SEG NFR BLD AUTO: 68 % (ref 43–75)
NRBC BLD AUTO-RTO: 0 /100 WBCS
OSMOLALITY UR/SERPL-RTO: 290 MMOL/KG (ref 282–298)
P AXIS: 42 DEGREES
PLATELET # BLD AUTO: 224 THOUSANDS/UL (ref 149–390)
PMV BLD AUTO: 10 FL (ref 8.9–12.7)
PMV BLD AUTO: 10.5 FL (ref 8.9–12.7)
POTASSIUM SERPL-SCNC: 3.7 MMOL/L (ref 3.5–5.3)
PR INTERVAL: 172 MS
QRS AXIS: 67 DEGREES
QRSD INTERVAL: 88 MS
QT INTERVAL: 456 MS
QTC INTERVAL: 470 MS
RBC # BLD AUTO: 4.3 MILLION/UL (ref 3.81–5.12)
SODIUM SERPL-SCNC: 138 MMOL/L (ref 136–145)
T WAVE AXIS: 78 DEGREES
TSH SERPL DL<=0.05 MIU/L-ACNC: 3.21 UIU/ML (ref 0.36–3.74)
VENTRICULAR RATE: 64 BPM
WBC # BLD AUTO: 5.54 THOUSAND/UL (ref 4.31–10.16)

## 2021-10-19 PROCEDURE — 83930 ASSAY OF BLOOD OSMOLALITY: CPT | Performed by: PHYSICIAN ASSISTANT

## 2021-10-19 PROCEDURE — 85025 COMPLETE CBC W/AUTO DIFF WBC: CPT | Performed by: PHYSICIAN ASSISTANT

## 2021-10-19 PROCEDURE — 80048 BASIC METABOLIC PNL TOTAL CA: CPT | Performed by: PHYSICIAN ASSISTANT

## 2021-10-19 PROCEDURE — 99232 SBSQ HOSP IP/OBS MODERATE 35: CPT | Performed by: STUDENT IN AN ORGANIZED HEALTH CARE EDUCATION/TRAINING PROGRAM

## 2021-10-19 PROCEDURE — 93010 ELECTROCARDIOGRAM REPORT: CPT | Performed by: INTERNAL MEDICINE

## 2021-10-19 PROCEDURE — 85049 AUTOMATED PLATELET COUNT: CPT | Performed by: PHYSICIAN ASSISTANT

## 2021-10-19 PROCEDURE — 36415 COLL VENOUS BLD VENIPUNCTURE: CPT | Performed by: PHYSICIAN ASSISTANT

## 2021-10-19 PROCEDURE — 84443 ASSAY THYROID STIM HORMONE: CPT | Performed by: PHYSICIAN ASSISTANT

## 2021-10-19 RX ORDER — TRAZODONE HYDROCHLORIDE 50 MG/1
50 TABLET ORAL
Status: DISCONTINUED | OUTPATIENT
Start: 2021-10-19 | End: 2021-10-20 | Stop reason: HOSPADM

## 2021-10-19 RX ORDER — POLYETHYLENE GLYCOL 3350 17 G/17G
17 POWDER, FOR SOLUTION ORAL DAILY
Status: DISCONTINUED | OUTPATIENT
Start: 2021-10-19 | End: 2021-10-20 | Stop reason: HOSPADM

## 2021-10-19 RX ORDER — DILTIAZEM HYDROCHLORIDE 120 MG/1
120 CAPSULE, COATED, EXTENDED RELEASE ORAL DAILY
Status: DISCONTINUED | OUTPATIENT
Start: 2021-10-19 | End: 2021-10-20 | Stop reason: HOSPADM

## 2021-10-19 RX ORDER — METOPROLOL SUCCINATE 50 MG/1
50 TABLET, EXTENDED RELEASE ORAL DAILY
Status: DISCONTINUED | OUTPATIENT
Start: 2021-10-19 | End: 2021-10-20 | Stop reason: HOSPADM

## 2021-10-19 RX ORDER — DULOXETIN HYDROCHLORIDE 30 MG/1
30 CAPSULE, DELAYED RELEASE ORAL
Status: DISCONTINUED | OUTPATIENT
Start: 2021-10-19 | End: 2021-10-20 | Stop reason: HOSPADM

## 2021-10-19 RX ORDER — SIMETHICONE 80 MG
80 TABLET,CHEWABLE ORAL 4 TIMES DAILY PRN
Status: DISCONTINUED | OUTPATIENT
Start: 2021-10-19 | End: 2021-10-20 | Stop reason: HOSPADM

## 2021-10-19 RX ORDER — POLYETHYLENE GLYCOL 3350 17 G/17G
17 POWDER, FOR SOLUTION ORAL ONCE
Status: COMPLETED | OUTPATIENT
Start: 2021-10-19 | End: 2021-10-19

## 2021-10-19 RX ORDER — LORAZEPAM 2 MG/ML
INJECTION INTRAMUSCULAR
Status: COMPLETED
Start: 2021-10-19 | End: 2021-10-19

## 2021-10-19 RX ORDER — LANOLIN ALCOHOL/MO/W.PET/CERES
3 CREAM (GRAM) TOPICAL
Status: DISCONTINUED | OUTPATIENT
Start: 2021-10-19 | End: 2021-10-20 | Stop reason: HOSPADM

## 2021-10-19 RX ORDER — MAGNESIUM HYDROXIDE/ALUMINUM HYDROXICE/SIMETHICONE 120; 1200; 1200 MG/30ML; MG/30ML; MG/30ML
30 SUSPENSION ORAL EVERY 6 HOURS PRN
Status: DISCONTINUED | OUTPATIENT
Start: 2021-10-19 | End: 2021-10-20 | Stop reason: HOSPADM

## 2021-10-19 RX ORDER — OLANZAPINE 5 MG/1
5 TABLET ORAL
Status: DISCONTINUED | OUTPATIENT
Start: 2021-10-19 | End: 2021-10-20 | Stop reason: HOSPADM

## 2021-10-19 RX ORDER — ONDANSETRON 2 MG/ML
4 INJECTION INTRAMUSCULAR; INTRAVENOUS EVERY 6 HOURS PRN
Status: DISCONTINUED | OUTPATIENT
Start: 2021-10-19 | End: 2021-10-20 | Stop reason: HOSPADM

## 2021-10-19 RX ORDER — SODIUM CHLORIDE 9 MG/ML
100 INJECTION, SOLUTION INTRAVENOUS CONTINUOUS
Status: DISPENSED | OUTPATIENT
Start: 2021-10-19 | End: 2021-10-19

## 2021-10-19 RX ORDER — HEPARIN SODIUM 5000 [USP'U]/ML
5000 INJECTION, SOLUTION INTRAVENOUS; SUBCUTANEOUS EVERY 8 HOURS SCHEDULED
Status: DISCONTINUED | OUTPATIENT
Start: 2021-10-19 | End: 2021-10-20 | Stop reason: HOSPADM

## 2021-10-19 RX ORDER — SENNOSIDES 8.6 MG
1 TABLET ORAL
Status: DISCONTINUED | OUTPATIENT
Start: 2021-10-19 | End: 2021-10-20 | Stop reason: HOSPADM

## 2021-10-19 RX ORDER — LORAZEPAM 2 MG/ML
0.5 INJECTION INTRAMUSCULAR EVERY 6 HOURS PRN
Status: DISCONTINUED | OUTPATIENT
Start: 2021-10-19 | End: 2021-10-20 | Stop reason: HOSPADM

## 2021-10-19 RX ORDER — ACETAMINOPHEN 325 MG/1
650 TABLET ORAL EVERY 6 HOURS PRN
Status: DISCONTINUED | OUTPATIENT
Start: 2021-10-19 | End: 2021-10-20 | Stop reason: HOSPADM

## 2021-10-19 RX ADMIN — LORAZEPAM 0.5 MG: 2 INJECTION INTRAMUSCULAR at 05:20

## 2021-10-19 RX ADMIN — POLYETHYLENE GLYCOL 3350 17 G: 17 POWDER, FOR SOLUTION ORAL at 16:25

## 2021-10-19 RX ADMIN — LORAZEPAM 0.5 MG: 2 INJECTION INTRAMUSCULAR; INTRAVENOUS at 05:20

## 2021-10-19 RX ADMIN — HEPARIN SODIUM 5000 UNITS: 5000 INJECTION INTRAVENOUS; SUBCUTANEOUS at 21:12

## 2021-10-19 RX ADMIN — OLANZAPINE 5 MG: 5 TABLET, FILM COATED ORAL at 00:56

## 2021-10-19 RX ADMIN — MELATONIN 3 MG: at 00:59

## 2021-10-19 RX ADMIN — OLANZAPINE 5 MG: 5 TABLET, FILM COATED ORAL at 21:12

## 2021-10-19 RX ADMIN — SODIUM CHLORIDE 100 ML/HR: 0.9 INJECTION, SOLUTION INTRAVENOUS at 02:12

## 2021-10-19 RX ADMIN — TRAZODONE HYDROCHLORIDE 50 MG: 50 TABLET ORAL at 00:57

## 2021-10-19 RX ADMIN — STANDARDIZED SENNA CONCENTRATE 8.6 MG: 8.6 TABLET ORAL at 21:12

## 2021-10-19 RX ADMIN — MELATONIN 3 MG: at 21:12

## 2021-10-19 RX ADMIN — HEPARIN SODIUM 5000 UNITS: 5000 INJECTION INTRAVENOUS; SUBCUTANEOUS at 13:49

## 2021-10-19 RX ADMIN — HEPARIN SODIUM 5000 UNITS: 5000 INJECTION INTRAVENOUS; SUBCUTANEOUS at 05:29

## 2021-10-19 RX ADMIN — TRAZODONE HYDROCHLORIDE 50 MG: 50 TABLET ORAL at 21:12

## 2021-10-19 RX ADMIN — DULOXETINE HYDROCHLORIDE 30 MG: 30 CAPSULE, DELAYED RELEASE ORAL at 21:12

## 2021-10-19 RX ADMIN — POLYETHYLENE GLYCOL 3350 17 G: 17 POWDER, FOR SOLUTION ORAL at 01:01

## 2021-10-19 NOTE — ED NOTES
Pt's blood pressure is trending up - pt was unable to have bp meds due to heart rate being below parameters - provider has been tigerconnected at this time       Marshall Zambrano, [address] [facility]  10/19/21 122

## 2021-10-19 NOTE — ASSESSMENT & PLAN NOTE
Presents with left flank pain x1 week  History provided by caretaker, patient unable to give history despite use of translation services due to dementia/schizophrenia/language barrier  · No tenderness on palpation  · CT abdomen/pelvis - Moderate fecal stasis  Diverticular disease without diverticulitis  No acute findings      Likely due to constipation, possibly secondary to dehydration  Will give miralax x 1, start dulcolax daily  IVF hydration no

## 2021-10-19 NOTE — PHYSICAL THERAPY NOTE
Physical Therapy Cancellation Note           10/19/21 1120   PT Last Visit   PT Visit Date 10/19/21   Note Type   Note type Evaluation   Cancel Reasons Other  (Per nursing, pt is too lethargic/sedated to participate at this time )     Will f/u as able to complete eval when appropriate      Jaida Bowling Page, PT

## 2021-10-19 NOTE — ED NOTES
Pt laying on stretcher at this time  Fall bracelet and yellow socks on  Bed alarm on  Room near nurses station  Bed in lowest and locked position with 2/2 side rails up  Fall risk sign on door  Frequent comfort rounds implemented  Will continue to monitor        Mendy Ortiz RN  10/19/21 9589

## 2021-10-19 NOTE — H&P
[address]  11/19/1931, 80 y o  female MRN: [id number]  Unit/Bed#: ED 04 Encounter: [id number]  Primary Care Provider: Toney Bernal MD   Date and time admitted to hospital: 10/18/2021  6:00 PM    * OMEGA (acute kidney injury) [facility])  Assessment & Plan  Lab Results   Component Value Date    CREATININE 1 59 (H) 10/18/2021    CREATININE 1 11 10/13/2021    CREATININE 1 13 09/05/2021     Baseline creatinine around 1 1, currently 1 59  No hydronephrosis, kidney stone on CT imaging  UA without infection  Likely pre renal given dementia/schizophrenia, although son notes that she drinks adequately (around 5 12oz water bottles/day)  Received 1L in ED, will give maintenance IVF overnight  No diabetes/diuretics that would explain dehydration with adequater water intake  Possibly idiopathic diabetes insipidus? Serum and urine osm pending  Follow morning BMP  Will also check serum    Flank pain  Assessment & Plan  Presents with left flank pain x1 week  History provided by caretaker, patient unable to give history despite use of translation services due to dementia/schizophrenia/language barrier  · No tenderness on palpation  · CT abdomen/pelvis - Moderate fecal stasis  Diverticular disease without diverticulitis  No acute findings  Likely due to constipation, possibly secondary to dehydration  Will give miralax x 1, start dulcolax daily  IVF hydration        Dementia ([facility] [address] )  Assessment & Plan  Advanced    Poor historian, unable to provide any history    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HCC)  Assessment & Plan  Continue Zyprexa, trazodone, Cymbalta    PAF (paroxysmal atrial fibrillation) ([facility])  Assessment & Plan  Continue Cardizem and metoprolol    Essential hypertension  Assessment & Plan  Continue diltiazem and metoprolol      VTE Prophylaxis: Heparin  / sequential compression device   Code Status:  Level 3 DNR DNI  POLST: There is no POLST form on file for this patient (pre-hospital)  Discussion with family:  Son    Anticipated Length of Stay:  Patient will be admitted on an Inpatient basis with an anticipated length of stay of  greater 2 midnights  Justification for Hospital Stay:  OMEGA    Total Time for Visit, including Counseling / Coordination of Care: 30 minutes  Greater than 50% of this total time spent on direct patient counseling and coordination of care  Chief Complaint:   Left flank pain    History of Present Illness:    Vandana Hernandez is a 80 y o  female with PMH dementia, schizophrenia, AFib, HTN who presents with left flank pain x1 week  History was provided ED notes who received report from caretaker as patient is unable to provide history herself due to schizophrenia/dementia/the language barrier despite use of translating services  Vital signs are stable, she is mildly hyponatremic and mildly hypochloremic  Her creatinine is elevated at 1 59, baseline around 1 1  CT abdomen/pelvis shows moderate fecal stasis, no hydronephrosis, no hydroureter, no nephrolithiasis  Will be admitted for IVF and monitoring of kidney function  Review of Systems:    Review of Systems   Unable to perform ROS: Dementia       Past Medical and Surgical History:     Past Medical History:   Diagnosis Date    Anxiety     Cognitive impairment     COVID-19 virus infection 4/13/2020    Dementia ([facility] [address] )     Depression     Hallucination     Hyperlipidemia     Hypertension     Memory loss     Psychiatric illness     Psychosis ([facility] [address] )     Retention of urine     Schizoaffective disorder ([facility] [address] )     Sleep difficulties        Past Surgical History:   Procedure Laterality Date    APPENDECTOMY      BLADDER SURGERY      CHOLECYSTECTOMY      [place] OPEN RX FEMUR FX+INTRAMED BRYSON Right 7/15/2021    Procedure: INSERTION NAIL IM FEMUR ANTEGRADE (TROCHANTERIC) right;  Surgeon:  Salome Hicks MD;  Location: [facility];  Service: Orthopedics    TOTAL ABDOMINAL HYSTERECTOMY W/ BILATERAL SALPINGOOPHORECTOMY      TOTAL KNEE ARTHROPLASTY Left        Meds/Allergies:    Prior to Admission medications    Medication Sig Start Date End Date Taking?  Authorizing Provider   acetaminophen (TYLENOL) 325 mg tablet Take 2 tablets (650 mg total) by mouth every 6 (six) hours as needed for mild pain, moderate pain or headaches  Patient not taking: Reported on 9/29/2021 8/23/20   BEV Grijalva   albuterol (2 5 mg/3 mL) 0 083 % nebulizer solution Take 1 vial (2 5 mg total) by nebulization every 4 (four) hours as needed for wheezing or shortness of breath  Patient not taking: Reported on 5/21/2021 6/17/20   Deb Salcedo MD   albuterol (PROVENTIL HFA,VENTOLIN HFA) 90 mcg/act inhaler Inhale 2 puffs every 4 (four) hours as needed for wheezing  Patient not taking: Reported on 5/21/2021 6/17/20   Deb Salcedo MD   amiodarone 200 mg tablet Take 1 tablet (200 mg total) by mouth daily 8/27/21   Deb Salcedo MD   bisacodyl (DULCOLAX) 10 mg suppository Insert 1 suppository (10 mg total) into the rectum daily as needed for constipation (2nd line)  Patient not taking: Reported on 9/29/2021 7/26/21   Elle Henson MD   Blood Pressure Monitoring (Blood Pressure Cuff) MISC Use daily 9/29/21   Angie Bojorquez MD   Cartia  MG 24 hr capsule TAKE 1 CAPSULE BY MOUTH DAILY 8/27/21   Deb Salcedo MD   cyanocobalamin (VITAMIN B-12) 100 MCG tablet Take 100 mcg by mouth daily    Historical Provider, MD   DULoxetine (CYMBALTA) 30 mg delayed release capsule Take 30 mg by mouth daily at bedtime    Historical Provider, MD   LORazepam (ATIVAN) 0 5 mg tablet     Historical Provider, MD   melatonin 3 mg Take 3 mg by mouth daily at bedtime Take 2 tablets at bedtime  Patient not taking: Reported on 9/29/2021    Historical Provider, MD   metoprolol succinate (TOPROL-XL) 50 mg 24 hr tablet TAKE 1 TABLET BY MOUTH DAILY 8/27/21   Deb Salcedo MD   OLANZapine (ZyPREXA) 5 mg tablet Take 1 tablet (5 mg total) by mouth daily at bedtime 5/26/20 Diego Monreal MD   polyethylene glycol (MIRALAX) 17 g packet Take 17 g by mouth daily as needed (constipation first line)  Patient not taking: Reported on 9/29/2021 6/17/20   Sherman Betts MD   simethicone (MYLICON) 80 mg chewable tablet Chew 1 tablet (80 mg total) 4 (four) times a day as needed for flatulence  Patient not taking: Reported on 9/29/2021 7/26/21   Jacey Fontenot MD   Stool Softener 100 MG capsule TAKE 1 CAPSULE BY MOUTH EVERY 12 HOURS FOR CONSTIPATION  Patient not taking: Reported on 9/29/2021 8/27/21   Sherman Betts MD   traZODone (DESYREL) 50 mg tablet Take 50 mg by mouth daily at bedtime    Historical Provider, MD     I have reviewed home medications with patient family member  Allergies: No Known Allergies    Social History:     Marital Status:    Occupation:  Noncontributory  Patient Pre-hospital Living Situation:  Home with around the clock caretakers  Patient Pre-hospital Level of Mobility:  Unclear  Patient Pre-hospital Diet Restrictions:  None  Substance Use History:   Social History     Substance and Sexual Activity   Alcohol Use Never     Social History     Tobacco Use   Smoking Status Never Smoker   Smokeless Tobacco Never Used   Tobacco Comment    Former smoker per [employer]     Social History     Substance and Sexual Activity   Drug Use No       Family History:    non-contributory    Physical Exam:     Vitals:   Blood Pressure: (!) 171/70 (10/18/21 2124)  Pulse: 55 (10/18/21 2124)  Temperature: 98 4 °F (36 9 °C) (10/18/21 1552)  Temp Source: Oral (10/18/21 1552)  Respirations: 17 (10/18/21 2124)  SpO2: 96 % (10/18/21 2124)    Physical Exam  Vitals and nursing note reviewed  Constitutional:       Appearance: Normal appearance  HENT:      Head: Normocephalic and atraumatic  Mouth/Throat:      Mouth: Mucous membranes are dry  Pharynx: Oropharynx is clear  No oropharyngeal exudate  Eyes:      Extraocular Movements: Extraocular movements intact     Cardiovascular: Rate and Rhythm: Normal rate and regular rhythm  Pulses: Normal pulses  Heart sounds: Normal heart sounds  No murmur heard  No friction rub  No gallop  Pulmonary:      Effort: Pulmonary effort is normal  No respiratory distress  Breath sounds: Normal breath sounds  No stridor  No wheezing or rales  Abdominal:      General: Abdomen is flat  Bowel sounds are normal  There is no distension  Palpations: Abdomen is soft  Tenderness: There is no abdominal tenderness  Musculoskeletal:      Right lower leg: No edema  Left lower leg: No edema  Skin:     General: Skin is warm and dry  Neurological:      General: No focal deficit present  Mental Status: She is alert and oriented to person, place, and time  Additional Data:     Lab Results: I have personally reviewed pertinent reports  Results from last 7 days   Lab Units 10/18/21  1836   WBC Thousand/uL 9 47   HEMOGLOBIN g/dL 14 3   HEMATOCRIT % 43 0   PLATELETS Thousands/uL 274   NEUTROS PCT % 84*   LYMPHS PCT % 9*   MONOS PCT % 6   EOS PCT % 1     Results from last 7 days   Lab Units 10/18/21  1836   SODIUM mmol/L 134*   POTASSIUM mmol/L 5 1   CHLORIDE mmol/L 99*   CO2 mmol/L 26   BUN mg/dL 23   CREATININE mg/dL 1 59*   ANION GAP mmol/L 9   CALCIUM mg/dL 9 2   ALBUMIN g/dL 4 1   TOTAL BILIRUBIN mg/dL 0 70   ALK PHOS U/L 132*   ALT U/L 17   AST U/L 31   GLUCOSE RANDOM mg/dL 98                       Imaging: I have personally reviewed pertinent reports  CT abdomen pelvis wo contrast   Final Result by Viky Coon MD (10/18 2050)   Moderate fecal stasis  Diverticular disease without diverticulitis  No acute findings                 Workstation performed: [id number]         XR chest 1 view portable   ED Interpretation by Cecile Gan DO (10/18 1948)   Rotated film, +scoliosis, no acute cardiopulmonary disease          EKG, Pathology, and Other Studies Reviewed on Admission:     [facility]     ** Please Note: This note has been constructed using a voice recognition system   **

## 2021-10-19 NOTE — ED ATTENDING ATTESTATION
10/18/2021  IMilad MD, saw and evaluated the patient  I have discussed the patient with the resident/non-physician practitioner and agree with the resident's/non-physician practitioner's findings, Plan of Care, and MDM as documented in the resident's/non-physician practitioner's note, except where noted  All available labs and Radiology studies were reviewed  I was present for key portions of any procedure(s) performed by the resident/non-physician practitioner and I was immediately available to provide assistance  At this point I agree with the current assessment done in the Emergency Department  I have conducted an independent evaluation of this patient a history and physical is as follows:  27-year-old demented female presents for evaluation of right flank pain  She is unable to provide more history other than this  Unable to perform review of systems secondary to dementia  On exam no distress lungs normal cardiac normal abdomen normal, no CVA tenderness  Medical decision-will check labs, urine, CT and pelvis    ED Course  ED Course as of Oct 18 2023   Mon Oct 18, 2021   1914 Creatinine(!): 1 59         Critical Care Time  Procedures

## 2021-10-19 NOTE — UTILIZATION REVIEW
Inpatient Admission Authorization Request   NOTIFICATION OF INPATIENT ADMISSION/INPATIENT AUTHORIZATION REQUEST   SERVICING FACILITY:   [address] [place] [address], [facility], [facility] [address] [facility]  Tax ID: [id number]  NPI: [id number]  Place of Service: Inpatient [address] [facility]  [address]  Place of Service Code: 24     ATTENDING PROVIDER:  Attending Name and NPI#: Emilia Halsted [[id number]]  Address: [address] [place] [address], [place] [address] [address] [facility]  Phone: [phone]     UTILIZATION REVIEW CONTACT:  [facility] Utilization   Network Utilization Review Department  Phone: [phone]  Fax: [phone]  Email: [email or web address]     PHYSICIAN ADVISORY SERVICES:  FOR MIAM-AA-CYGY REVIEW - MEDICAL NECESSITY DENIAL  Phone: [phone]  Fax: [phone]  Email: [email or web address]     TYPE OF REQUEST:  Inpatient Status     ADMISSION INFORMATION:  ADMISSION DATE/TIME: 10/18/21  9:23 PM  PATIENT DIAGNOSIS CODE/DESCRIPTION:  Constipation [K59 00]  Flank pain [R10 9]  Left flank pain [R10 9]  OMEGA (acute kidney injury) ([facility] [address] ) [N17 9]  Suspected UTI [R39 89]  DISCHARGE DATE/TIME: No discharge date for patient encounter  DISCHARGE DISPOSITION (IF DISCHARGED): Home/Self Care     IMPORTANT INFORMATION:  Please contact the [facility] directly with any questions or concerns regarding this request  Department voicemails are confidential     Send requests for admission clinical reviews, concurrent reviews, approvals, and administrative denials due to lack of clinical to fax [phone]

## 2021-10-19 NOTE — PLAN OF CARE
Problem: MOBILITY - ADULT  Goal: Maintain or return to baseline ADL function  Description: INTERVENTIONS:  -  Assess patient's ability to carry out ADLs; assess patient's baseline for ADL function and identify physical deficits which impact ability to perform ADLs (bathing, care of mouth/teeth, toileting, grooming, dressing, etc )  - Assess/evaluate cause of self-care deficits   - Assess range of motion  - Assess patient's mobility; develop plan if impaired  - Assess patient's need for assistive devices and provide as appropriate  - Encourage maximum independence but intervene and supervise when necessary  - Involve family in performance of ADLs  - Assess for home care needs following discharge   - Consider OT consult to assist with ADL evaluation and planning for discharge  - Provide patient education as appropriate  Outcome: Progressing  Goal: Maintains/Returns to pre admission functional level  Description: INTERVENTIONS:  - Perform BMAT or MOVE assessment daily    - Set and communicate daily mobility goal to care team and patient/family/caregiver     - Collaborate with rehabilitation services on mobility goals if consulted      - Out of bed for toileting  - Record patient progress and toleration of activity level   Outcome: Progressing     Problem: Prexisting or High Potential for Compromised Skin Integrity  Goal: Skin integrity is maintained or improved  Description: INTERVENTIONS:  - Identify patients at risk for skin breakdown  - Assess and monitor skin integrity  - Assess and monitor nutrition and hydration status  - Monitor labs   - Assess for incontinence   - Turn and reposition patient  - Assist with mobility/ambulation  - Relieve pressure over bony prominences  - Avoid friction and shearing  - Provide appropriate hygiene as needed including keeping skin clean and dry  - Evaluate need for skin moisturizer/barrier cream  - Collaborate with interdisciplinary team   - Patient/family teaching  - Consider wound care consult   Outcome: Progressing     Problem: Potential for Falls  Goal: Patient will remain free of falls  Description: INTERVENTIONS:  - Educate patient/family on patient safety including physical limitations  - Instruct patient to call for assistance with activity   - Consult OT/PT to assist with strengthening/mobility   - Keep Call bell within reach  - Keep bed low and locked with side rails adjusted as appropriate  - Keep care items and personal belongings within reach  - Initiate and maintain comfort rounds  - Make Fall Risk Sign visible to staff    - Apply yellow socks and bracelet for high fall risk patients  - Consider moving patient to room near nurses station  Outcome: Progressing

## 2021-10-19 NOTE — OCCUPATIONAL THERAPY NOTE
Occupational Therapy Cancellation Note        Patient Name: Satish Tejada  UIFBW'W Date: 10/19/2021      OT consult received   Spoke to RN and pt w/ increased lethargy and sedated to participate in session, will continue to follow to address OT brandyn Comer MS, OTR/L

## 2021-10-19 NOTE — ASSESSMENT & PLAN NOTE
Presents with left flank pain x1 week  History provided by caretaker, patient unable to give history despite use of translation services due to dementia/schizophrenia/language barrier  · No tenderness on palpation  · CT abdomen/pelvis - Moderate fecal stasis  Diverticular disease without diverticulitis  No acute findings  Possibly due to dehydration  No TTP nor CVA tenderness    - bowel regimen

## 2021-10-19 NOTE — ASSESSMENT & PLAN NOTE
Lab Results   Component Value Date    CREATININE 1 59 (H) 10/18/2021    CREATININE 1 11 10/13/2021    CREATININE 1 13 09/05/2021     Baseline creatinine around 1 1, currently 1 59  No hydronephrosis, kidney stone on CT imaging  UA without infection  Likely pre renal given dementia/schizophrenia, although son notes that she drinks adequately (around 5 12oz water bottles/day)  Received 1L in ED, will give maintenance IVF overnight  No diabetes/diuretics that would explain dehydration with adequater water intake  Possibly idiopathic diabetes insipidus?   Serum and urine osm pending  Follow morning BMP  Will also check serum

## 2021-10-19 NOTE — UTILIZATION REVIEW
Initial Clinical Review    Admission: Date/Time/Statement:   Admission Orders (From admission, onward)     Ordered        10/18/21 2123  Inpatient Admission  Once                   Orders Placed This Encounter   Procedures    Inpatient Admission     Standing Status:   Standing     Number of Occurrences:   1     Order Specific Question:   Level of Care     Answer:   Med Surg [16]     Order Specific Question:   Estimated length of stay     Answer:   More than 2 Midnights     Order Specific Question:   Certification     Answer:   I certify that inpatient services are medically necessary for this patient for a duration of greater than two midnights  See H&P and MD Progress Notes for additional information about the patient's course of treatment  ED Arrival Information     Expected Arrival Acuity    - 10/18/2021 15:35 Urgent         Means of arrival Escorted by Service Admission type    Wheelchair Family Member Hospitalist Urgent         Arrival complaint    rib pain        Chief Complaint   Patient presents with    Flank Pain     pt reports L sided flank pain starting last week  denies urinary symptoms       Initial Presentation: 80  Y O female presents to ED from home  With left flank pain for 1 week  History obtained from caregiver  PMH  Is  Dementia, schizophrenia, Afib and  HTN  Labs reveal creatinine  1 59, baseline  1 1  U/A  Shows  No infection  CT scan   Shows  Fecal stasis, no hydronephrosis  Admit  Ip with  OMEGA, flank pain  And plan is  IVF, monitor labs,  PT/OT,  Continue home meds  Date:  10/19       Day 2:   Continue IVF for OMEGA  Creatinine Improved to  1 26  Monitor labs  Continue  PT/OT  Continue current  Meds  No new events      ED Triage Vitals   Temperature Pulse Respirations Blood Pressure SpO2   10/18/21 1552 10/18/21 1554 10/18/21 1554 10/18/21 1554 10/18/21 1554   98 4 °F (36 9 °C) 70 16 133/63 99 %      Temp Source Heart Rate Source Patient Position - Orthostatic VS BP Location FiO2 (%)   10/18/21 1552 10/18/21 1554 10/18/21 1554 10/18/21 1554 --   Oral Monitor Sitting Left arm       Pain Score       10/19/21 0757       No Pain          Wt Readings from Last 1 Encounters:   09/29/21 56 7 kg (125 lb)     Additional Vital Signs:   --  48Abnormal   17  178/86Abnormal   --  96 %  None (Room air)  Lying     10/19/21 1015  98 1 °F (36 7 °C)  58  16  --  --  98 %  None (Room air)  --   10/19/21 0630  --  46Abnormal   16  139/61  88  94 %  None (Room air)  Lying   10/19/21 0530  --  48Abnormal   16  184/75Abnormal   108  94 %  None (Room air)  Lying   10/19/21 0430  --  50Abnormal   22  187/81Abnormal   116  96 %  None (Room air)  Lying   10/18/21 2124  --  55  17  171/70Abnormal   --  96 %  None (Room air)  --   10/18/21 1813  --  59  16  165/73  --  97 %  None (Room air)  --   10/18/21 1554  --  70  16  133/63  --  99 %  None (Room air)  Sitting   10/18/21 1552  98 4 °F (36 9 °C)  --  --  --  --  --  --         Pertinent Labs/Diagnostic Test Results:   Ct  Abd/pelvis  (  10/18)    Moderate fecal stasis   Diverticular disease without diverticulitis   No acute findings  CXR  ( 10/19)   NAD  X ray  R  Femur  ( 10/19)     Healing intertrochanteric fracture in alignment          Results from last 7 days   Lab Units 10/19/21  0528 10/19/21  0026 10/18/21  1836 10/13/21  1102   WBC Thousand/uL 5 54  --  9 47 6 01   HEMOGLOBIN g/dL 13 2  --  14 3 12 9   HEMATOCRIT % 39 9  --  43 0 39 9   PLATELETS Thousands/uL  --  224 274 254   NEUTROS ABS Thousands/µL 3 73  --  7 89* 4 30         Results from last 7 days   Lab Units 10/19/21  0528 10/18/21  1836 10/13/21  1102   SODIUM mmol/L 138 134* 139   POTASSIUM mmol/L 3 7 5 1 4 0   CHLORIDE mmol/L 103 99* 104   CO2 mmol/L 28 26 26   ANION GAP mmol/L 7 9 9   BUN mg/dL 22 23 18   CREATININE mg/dL 1 26 1 59* 1 11   EGFR ml/min/1 73sq m 38 29 44   CALCIUM mg/dL 8 8 9 2 8 9     Results from last 7 days   Lab Units 10/18/21  1836 10/13/21  1102   AST U/L 31 21   ALT U/L 17 20   ALK PHOS U/L 132* 120*   TOTAL PROTEIN g/dL 7 9 7 2   ALBUMIN g/dL 4 1 3 7   TOTAL BILIRUBIN mg/dL 0 70 0 52         Results from last 7 days   Lab Units 10/19/21  0528 10/18/21  1836 10/13/21  1102   GLUCOSE RANDOM mg/dL 99 98 102     Results from last 7 days   Lab Units 10/19/21  0026   OSMOLALITY, SERUM mmol/               Results from last 7 days   Lab Units 10/18/21  1839   TROPONIN I ng/mL 0 02             Results from last 7 days   Lab Units 10/19/21  0026 10/13/21  1102   TSH 3RD GENERATON uIU/mL 3 205 2 033             Results from last 7 days   Lab Units 10/18/21  1836   LIPASE u/L 66*             Results from last 7 days   Lab Units 10/18/21  2123   CLARITY UA  Clear   COLOR UA  Yellow   SPEC GRAV UA  1 020   PH UA  8 5*   GLUCOSE UA mg/dl Negative   KETONES UA mg/dl Negative   BLOOD UA  Trace*   PROTEIN UA mg/dl 30 (1+)*   NITRITE UA  Negative   BILIRUBIN UA  Negative   UROBILINOGEN UA E U /dl 1 0   LEUKOCYTES UA  Negative   WBC UA /hpf None Seen   RBC UA /hpf 1-2*   BACTERIA UA /hpf Occasional   EPITHELIAL CELLS WET PREP /hpf Occasional             ED Treatment:   Medication Administration from 10/18/2021 1535 to 10/19/2021 1413       Date/Time Order Dose Route Action Comments     10/18/2021 1917 haloperidol lactate (HALDOL) injection 5 mg 5 mg Intramuscular Given      10/19/2021 1346 sodium chloride 0 9 % bolus 1,000 mL 0 mL Intravenous Stopped      10/18/2021 1930 sodium chloride 0 9 % bolus 1,000 mL 1,000 mL Intravenous New Bag      10/19/2021 0828 diltiazem (CARDIZEM CD) 24 hr capsule 120 mg 120 mg Oral Not Given hr 47     10/19/2021 1345 cyanocobalamin (VITAMIN B-12) tablet 100 mcg 100 mcg Oral Not Given PT SEDATED AT THIS TIME     10/19/2021 0118 DULoxetine (CYMBALTA) delayed release capsule 30 mg 30 mg Oral Not Given      10/19/2021 0059 melatonin tablet 3 mg 3 mg Oral Given      10/19/2021 0828 metoprolol succinate (TOPROL-XL) 24 hr tablet 50 mg 50 mg Oral Not Given 10/19/2021 0056 OLANZapine (ZyPREXA) tablet 5 mg 5 mg Oral Given      10/19/2021 0101 polyethylene glycol (MIRALAX) packet 17 g 17 g Oral Given      10/19/2021 0057 traZODone (DESYREL) tablet 50 mg 50 mg Oral Given      10/19/2021 0212 sodium chloride 0 9 % infusion 100 mL/hr Intravenous New Bag      10/19/2021 1349 heparin (porcine) subcutaneous injection 5,000 Units 5,000 Units Subcutaneous Given      10/19/2021 0529 heparin (porcine) subcutaneous injection 5,000 Units 5,000 Units Subcutaneous Given      10/19/2021 0118 heparin (porcine) subcutaneous injection 5,000 Units 5,000 Units Subcutaneous Not Given      10/19/2021 0520 LORazepam (ATIVAN) injection 0 5 mg 0 5 mg Intravenous Given           Present on Admission:   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[address] )   Essential hypertension   PAF (paroxysmal atrial fibrillation) ([facility])      Admitting Diagnosis: Flank pain [R10 9]  Age/Sex: 80 y o  female  Admission Orders:  Scheduled Medications:  cyanocobalamin, 100 mcg, Oral, Daily  diltiazem, 120 mg, Oral, Daily  DULoxetine, 30 mg, Oral, HS  heparin (porcine), 5,000 Units, Subcutaneous, Q8H [address]  melatonin, 3 mg, Oral, HS  metoprolol succinate, 50 mg, Oral, Daily  OLANZapine, 5 mg, Oral, HS  traZODone, 50 mg, Oral, HS      Continuous IV Infusions:  sodium chloride, 100 mL/hr, Intravenous, Continuous      PRN Meds:  acetaminophen, 650 mg, Oral, Q6H PRN  aluminum-magnesium hydroxide-simethicone, 30 mL, Oral, Q6H PRN  bisacodyl, 5 mg, Oral, Daily PRN  LORazepam, 0 5 mg, Intravenous, Q6H PRN  ondansetron, 4 mg, Intravenous, Q6H PRN  simethicone, 80 mg, Oral, 4x Daily PRN        Network Utilization Review Department  ATTENTION: Please call with any questions or concerns to [phone] and carefully listen to the prompts so that you are directed to the right person   All voicemails are confidential   Matthew Mendez all requests for admission clinical reviews, approved or denied determinations and any other requests to dedicated fax number below belonging to the campus where the patient is receiving treatment   List of dedicated fax numbers for the Facilities:  [address] [place] [address] DENIALS (Administrative/Medical Necessity) [phone]   [address]  (Maternity/NICU/Pediatrics) [phone]   [address]  [address] [place] [id number] [address] [place], [place] [address] [phone]

## 2021-10-19 NOTE — PROGRESS NOTES
[address]  Progress Note - Manolo Wray 11/19/1931, 80 y o  female MRN: [id number]  Unit/Bed#: E5 -01 Encounter: [id number]  Primary Care Provider: Yolanda Corrales MD   Date and time admitted to hospital: 10/18/2021  6:00 PM    * OMEGA (acute kidney injury) [facility])  Assessment & Plan  Lab Results   Component Value Date    CREATININE 1 26 10/19/2021    CREATININE 1 59 (H) 10/18/2021    CREATININE 1 11 10/13/2021     80year old female presenting with acute kidney injury  Likely pre-renal in etiology  - Improving with IV hydration  Complete IV hydration this afternoon    - Monitor BMP  - Ancticipate discharge in 24 hours    Essential hypertension  Assessment & Plan  Continue diltiazem and metoprolol    Flank pain  Assessment & Plan  Presents with left flank pain x1 week  History provided by caretaker, patient unable to give history despite use of translation services due to dementia/schizophrenia/language barrier  · No tenderness on palpation  · CT abdomen/pelvis - Moderate fecal stasis  Diverticular disease without diverticulitis  No acute findings  Possibly due to dehydration  No TTP nor CVA tenderness  - bowel regimen    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HCC)  Assessment & Plan  Continue Zyprexa, trazodone, Cymbalta    PAF (paroxysmal atrial fibrillation) (HCC)  Assessment & Plan  Continue Cardizem and metoprolol      VTE Pharmacologic Prophylaxis:   Pharmacologic: Heparin  Mechanical VTE Prophylaxis in Place: Yes    Patient Centered Rounds: I have performed bedside rounds with nursing staff today  Discussions with Specialists or Other Care Team Provider: nursing    Education and Discussions with Family / Patient: patient    Time Spent for Care: 30 minutes  More than 50% of total time spent on counseling and coordination of care as described above      Current Length of Stay: 1 day(s)    Current Patient Status: Inpatient   Certification Statement: The patient will continue to require additional inpatient hospital stay [facility], iv hydration, constipation    Discharge Plan: active    Code Status: Level 3 - DNAR and DNI      Subjective:   Patient seen and examined at bedside  No acute issues overnight  Objective:     Vitals:   Temp (24hrs), Av 3 °F (36 8 °C), Min:98 1 °F (36 7 °C), Max:98 4 °F (36 9 °C)    Temp:  [98 1 °F (36 7 °C)-98 4 °F (36 9 °C)] 98 1 °F (36 7 °C)  HR:  [46-70] 58  Resp:  [14-22] 14  BP: (133-187)/(61-87) 184/87  SpO2:  [94 %-99 %] 95 %  There is no height or weight on file to calculate BMI  Input and Output Summary (last 24 hours): Intake/Output Summary (Last 24 hours) at 10/19/2021 1545  Last data filed at 10/19/2021 1346  Gross per 24 hour   Intake 1000 ml   Output --   Net 1000 ml       Physical Exam:     Physical Exam  Vitals reviewed  Constitutional:       General: She is not in acute distress  HENT:      Head: Normocephalic  Nose: Nose normal       Mouth/Throat:      Mouth: Mucous membranes are moist    Eyes:      General: No scleral icterus  Cardiovascular:      Rate and Rhythm: Normal rate  Pulmonary:      Effort: Pulmonary effort is normal  No respiratory distress  Abdominal:      Palpations: Abdomen is soft  Tenderness: There is no abdominal tenderness  Skin:     General: Skin is warm  Neurological:      Mental Status: She is alert  Mental status is at baseline         Additional Data:     Labs:    Results from last 7 days   Lab Units 10/19/21  0528 10/19/21  0026   WBC Thousand/uL 5 54  --    HEMOGLOBIN g/dL 13 2  --    HEMATOCRIT % 39 9  --    PLATELETS Thousands/uL  --  224   NEUTROS PCT % 68  --    LYMPHS PCT % 20  --    MONOS PCT % 9  --    EOS PCT % 3  --      Results from last 7 days   Lab Units 10/19/21  0528 10/18/21  1836   SODIUM mmol/L 138 134*   POTASSIUM mmol/L 3 7 5 1   CHLORIDE mmol/L 103 99*   CO2 mmol/L 28 26   BUN mg/dL 22 23   CREATININE mg/dL 1 26 1 59*   ANION GAP mmol/L 7 9   CALCIUM mg/dL 8 8 9 2   ALBUMIN g/dL  --  4 1   TOTAL BILIRUBIN mg/dL  --  0 70   ALK PHOS U/L  --  132*   ALT U/L  --  17   AST U/L  --  31   GLUCOSE RANDOM mg/dL 99 98                           * I Have Reviewed All Lab Data Listed Above  * Additional Pertinent Lab Tests Reviewed: [address] Admission Reviewed    Imaging:    Imaging Reports Reviewed Today Include: xr chest, ct a/p    Recent Cultures (last 7 days):           Last 24 Hours Medication List:   Current Facility-Administered Medications   Medication Dose Route Frequency Provider Last Rate    acetaminophen  650 mg Oral Q6H PRN Reather Rinks, PA-C      aluminum-magnesium hydroxide-simethicone  30 mL Oral Q6H PRN Reather Rinks, PA-C      bisacodyl  5 mg Oral Daily PRN Reather Rinks, PA-C      cyanocobalamin  100 mcg Oral Daily Reather Rinks, PA-C      diltiazem  120 mg Oral Daily [facility], [place]      DULoxetine  30 mg Oral HS Reather Rinks, PA-C      heparin (porcine)  5,000 Units Subcutaneous [facility] [address], [place]      LORazepam  0 5 mg Intravenous Q6H PRN Reather Rinks, PA-C      melatonin  3 mg Oral HS [place]      metoprolol succinate  50 mg Oral Daily Reather [place], [place]      OLANZapine  5 mg Oral HS Reather Rinks, PA-C      ondansetron  4 mg Intravenous Q6H PRN Reather Rinks, PA-C      polyethylene glycol  17 g Oral Daily Blake Orr MD      senna  1 tablet Oral HS Blake Orr MD      simethicone  80 mg Oral 4x Daily PRN Reather Rinks, PA-C      sodium chloride  100 mL/hr Intravenous Continuous Reather Rinks, PA-C 100 mL/hr (10/19/21 9236)    traZODone  50 mg Oral HS Reather Rinks, PA-C          Today, Patient Was Seen By: Bassem Sanchez MD    ** Please Note: Dictation voice to text software may have been used in the creation of this document   **

## 2021-10-19 NOTE — PLAN OF CARE
Problem: MOBILITY - ADULT  Goal: Maintain or return to baseline ADL function  Description: INTERVENTIONS:  -  Assess patient's ability to carry out ADLs; assess patient's baseline for ADL function and identify physical deficits which impact ability to perform ADLs (bathing, care of mouth/teeth, toileting, grooming, dressing, etc )  - Assess/evaluate cause of self-care deficits   - Assess range of motion  - Assess patient's mobility; develop plan if impaired  - Assess patient's need for assistive devices and provide as appropriate  - Encourage maximum independence but intervene and supervise when necessary  - Involve family in performance of ADLs  - Assess for home care needs following discharge   - Consider OT consult to assist with ADL evaluation and planning for discharge  - Provide patient education as appropriate  Outcome: Progressing  Goal: Maintains/Returns to pre admission functional level  Description: INTERVENTIONS:  - Perform BMAT or MOVE assessment daily    - Set and communicate daily mobility goal to care team and patient/family/caregiver     - Collaborate with rehabilitation services on mobility goals if consulted  - Out of bed for toileting  - Record patient progress and toleration of activity level   Outcome: Progressing     Problem: Prexisting or High Potential for Compromised Skin Integrity  Goal: Skin integrity is maintained or improved  Description: INTERVENTIONS:  - Identify patients at risk for skin breakdown  - Assess and monitor skin integrity  - Assess and monitor nutrition and hydration status  - Monitor labs   - Assess for incontinence   - Turn and reposition patient  - Assist with mobility/ambulation  - Relieve pressure over bony prominences  - Avoid friction and shearing  - Provide appropriate hygiene as needed including keeping skin clean and dry  - Evaluate need for skin moisturizer/barrier cream  - Collaborate with interdisciplinary team   - Patient/family teaching  - Consider wound care consult   Outcome: Progressing     Problem: Potential for Falls  Goal: Patient will remain free of falls  Description: INTERVENTIONS:  - Educate patient/family on patient safety including physical limitations  - Instruct patient to call for assistance with activity   - Consult OT/PT to assist with strengthening/mobility   - Keep Call bell within reach  - Keep bed low and locked with side rails adjusted as appropriate  - Keep care items and personal belongings within reach  - Initiate and maintain comfort rounds  - Make Fall Risk Sign visible to staff  - Initiate/Maintain bedalarm    - Apply yellow socks and bracelet for high fall risk patients  - Consider moving patient to room near nurses station  Outcome: Progressing

## 2021-10-19 NOTE — ASSESSMENT & PLAN NOTE
Lab Results   Component Value Date    CREATININE 1 26 10/19/2021    CREATININE 1 59 (H) 10/18/2021    CREATININE 1 11 10/13/2021     80year old female presenting with acute kidney injury  Likely pre-renal in etiology  - Improving with IV hydration   Complete IV hydration this afternoon    - Monitor BMP  - Ancticipate discharge in 24 hours

## 2021-10-20 VITALS
OXYGEN SATURATION: 95 % | SYSTOLIC BLOOD PRESSURE: 164 MMHG | TEMPERATURE: 98.4 F | RESPIRATION RATE: 18 BRPM | WEIGHT: 119.93 LBS | HEART RATE: 85 BPM | HEIGHT: 58 IN | DIASTOLIC BLOOD PRESSURE: 76 MMHG | BODY MASS INDEX: 25.17 KG/M2

## 2021-10-20 LAB
ANION GAP SERPL CALCULATED.3IONS-SCNC: 9 MMOL/L (ref 4–13)
BUN SERPL-MCNC: 23 MG/DL (ref 5–25)
CALCIUM SERPL-MCNC: 9.3 MG/DL (ref 8.3–10.1)
CHLORIDE SERPL-SCNC: 106 MMOL/L (ref 100–108)
CO2 SERPL-SCNC: 25 MMOL/L (ref 21–32)
CREAT SERPL-MCNC: 1.24 MG/DL (ref 0.6–1.3)
GFR SERPL CREATININE-BSD FRML MDRD: 39 ML/MIN/1.73SQ M
GLUCOSE SERPL-MCNC: 92 MG/DL (ref 65–140)
MAGNESIUM SERPL-MCNC: 2.4 MG/DL (ref 1.6–2.6)
POTASSIUM SERPL-SCNC: 4.8 MMOL/L (ref 3.5–5.3)
SODIUM SERPL-SCNC: 140 MMOL/L (ref 136–145)

## 2021-10-20 PROCEDURE — 97163 PT EVAL HIGH COMPLEX 45 MIN: CPT

## 2021-10-20 PROCEDURE — 83735 ASSAY OF MAGNESIUM: CPT | Performed by: STUDENT IN AN ORGANIZED HEALTH CARE EDUCATION/TRAINING PROGRAM

## 2021-10-20 PROCEDURE — 99239 HOSP IP/OBS DSCHRG MGMT >30: CPT | Performed by: PHYSICIAN ASSISTANT

## 2021-10-20 PROCEDURE — 80048 BASIC METABOLIC PNL TOTAL CA: CPT | Performed by: STUDENT IN AN ORGANIZED HEALTH CARE EDUCATION/TRAINING PROGRAM

## 2021-10-20 PROCEDURE — 97166 OT EVAL MOD COMPLEX 45 MIN: CPT

## 2021-10-20 RX ADMIN — DILTIAZEM HYDROCHLORIDE 120 MG: 120 CAPSULE, COATED, EXTENDED RELEASE ORAL at 09:22

## 2021-10-20 RX ADMIN — HEPARIN SODIUM 5000 UNITS: 5000 INJECTION INTRAVENOUS; SUBCUTANEOUS at 05:12

## 2021-10-20 RX ADMIN — VITAM B12 100 MCG: 100 TAB at 09:22

## 2021-10-20 RX ADMIN — METOPROLOL SUCCINATE 50 MG: 50 TABLET, EXTENDED RELEASE ORAL at 09:22

## 2021-10-20 RX ADMIN — POLYETHYLENE GLYCOL 3350 17 G: 17 POWDER, FOR SOLUTION ORAL at 09:22

## 2021-10-20 NOTE — PLAN OF CARE
Problem: OCCUPATIONAL THERAPY ADULT  Goal: Performs self-care activities at highest level of function for planned discharge setting  See evaluation for individualized goals  Description: Treatment Interventions: ADL retraining, Endurance training, Functional transfer training, UE strengthening/ROM, Cognitive reorientation, Patient/family training, Equipment evaluation/education, Compensatory technique education, Activityengagement, Energy conservation          See flowsheet documentation for full assessment, interventions and recommendations  Note: Limitation: Decreased ADL status, Decreased Safe judgement during ADL, Decreased UE strength, Decreased cognition, Decreased endurance, Decreased self-care trans, Decreased high-level ADLs, Decreased sensation  Prognosis: Fair  Assessment: Pt is a 80 y o  female seen for OT evaluation s/p admit to [facility] on 10/18/2021 w/ OMEGA (acute kidney injury) ([facility] [address] ), left flank pain  Comorbidities affecting pt's functional performance at time of assessment include: dementia, schizophrenia, a-fib, HTN, R femur IM nail 7/15/2021  Personal factors affecting pt at time of IE include:difficulty performing ADLS, limited insight into deficits and decreased initiation and engagement , language barrier, poor historian and information taken from previous charts  Prior to admission, pt was at home w/ 24/7 support/assist caregivers and reports was setup grooming, assist UB/LB ADLs, assist x1 functional transfers and mobility w/ IADLs   Upon evaluation: Pt requires MAX assist x2 supine>sit bed mobility, MOD assist x1 sit>supine bed mobility, MOD assist x2 sit<>stand w/ VCs, MOD assist x2 SPT to BSC, MAX assist toileting, MOD assist UB ADLS, MOD assist grooming 2* the following deficits impacting occupational performance: decreased strength and endurance, impaired balance, impaired balance, impaired activity tolerance, fall risk, impaired cognition (insight, safety awareness) lethargy, R sided facial droop (RN made aware)  Pt to benefit from continued skilled OT tx while in the hospital to address deficits as defined above and maximize level of functional independence w ADL's and functional mobility  Occupational Performance areas to address include: grooming, bathing/shower, toilet hygiene, dressing, health maintenance, functional mobility and clothing management, formal cognitive assessment, safety education  From OT standpoint, recommendation at time of d/c would be home w/ continued 24/7 support/assist and HOME OT vs  STR  The patient's raw score on the -PAC Daily Activity inpatient short form is 13, standardized score is 32 03, less than 39 4  Patients at this level are likely to benefit from discharge to post-acute rehabilitation services  Please refer to the recommendation of the Occupational Therapist for safe discharge planning       OT Discharge Recommendation: Post acute rehabilitation services (24/7 care/support vs  STR)  OT - OK to Discharge:  (when medically stable)

## 2021-10-20 NOTE — TELEPHONE ENCOUNTER
Call placed to patient  She did not answer  Left a message in detail in regards to appointment scheduled for tomorrow  Informed patient that tentative appointment is scheduled with SANTOS at our [address] [address] location to establish care  Office number was provided for the patient or son to contact the office to confirm this appointment  Statement Selected

## 2021-10-20 NOTE — OCCUPATIONAL THERAPY NOTE
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[facility] )    Flank pain    Past Medical History  Past Medical History:   Diagnosis Date    Anxiety     Cognitive impairment     COVID-19 virus infection 4/13/2020    Dementia ([place] [address] )     Depression     Hallucination     Hyperlipidemia     Hypertension     Memory loss     Psychiatric illness     Psychosis ([place] [address] )     Retention of urine     Schizoaffective disorder ([place] [address] )     Sleep difficulties      Past Surgical History  Past Surgical History:   Procedure Laterality Date    APPENDECTOMY      BLADDER SURGERY      CHOLECYSTECTOMY      LA OPEN RX FEMUR FX+INTRAMED BRYSON Right 7/15/2021    Procedure: INSERTION NAIL IM FEMUR ANTEGRADE (TROCHANTERIC) right;  Surgeon: Lizzie Sexton MD;  [facility]: [facility];  Service: Orthopedics    TOTAL ABDOMINAL HYSTERECTOMY W/ BILATERAL SALPINGOOPHORECTOMY      TOTAL KNEE ARTHROPLASTY Left              10/20/21 0944   OT Last Visit   OT Visit Date 10/20/21   Note Type   Note type Evaluation   Restrictions/Precautions   Weight Bearing Precautions Per Order No   Other Precautions Cognitive; Chair Alarm; Bed Alarm; Fall Risk  (MAsimo)   Pain Assessment   Pain Assessment Tool FLACC   Pain Rating: FLACC (Rest) - Face 0   Pain Rating: FLACC (Rest) - Legs 0   Pain Rating: FLACC (Rest) - Activity 0   Pain Rating: FLACC (Rest) - Cry 0   Pain Rating: FLACC (Rest) - Consolability 0   Score: FLACC (Rest) 0   Pain Rating: FLACC (Activity) - Face 0   Pain Rating: FLACC (Activity) - Legs 0   Pain Rating: FLACC (Activity) - Activity 0   Pain Rating: FLACC (Activity) - Cry 0   Pain Rating: FLACC (Activity) - Consolability 0   Score: FLACC (Activity) 0   Home Living   Type of Home Apartment   Home Layout One level;Performs ADLs on one level; Able to live on main level with bedroom/bathroom   [address]; Wheelchair-manual  (transport chair)   Additional Comments pt poor historian w/ history of dementia and unable to obtain information and taken from previous charts   Prior Function   Level of [place] Needs assistance with IADLs; Needs assistance with ADLs and functional mobility   Lives With Other (Comment)  (caregiver 24/7)   Receives Help From Family;Personal care attendant  (son and 24/7 caregivers)   ADL Assistance Needs assistance   IADLs Needs assistance   Falls in the last 6 months 1 to 4   Vocational Retired   Comments pt poor historian; per chart pt has 24/7 caregivers and assist w/ ADLs, assist w/ functional transfers and mobility w/ Rw and w/c   Lifestyle   Autonomy per pt setup self-feeding and grooming, min assist UB ADLs, assist LB ADLs, assist toileting, assist x1 functional transfers and mobility w/ RW or w/c   Reciprocal Relationships son and home caregivers   Service to Others retired   Intrinsic Gratification watching tv   [address] "[place], [place]"   ADL   Where [phone]  Minimal Assistance   Grooming Assistance 4  [address] [facility] [facility] 3  Moderate Assistance   LB [address] 2  Maximal [address] 3  Moderate Assistance    [address] 2  Maximal [address]  2  Maximal [address] 2  Maximal Assistance   Bed Mobility   Supine to Sit 2  Maximal assistance   Additional items Assist x 2; Increased time required;LE management;Verbal cues; Bedrails;HOB elevated   Sit to Supine 3  Moderate assistance   Additional items Assist x 1; Increased time required;Verbal cues;LE management   Additional Comments increased time to complete, to return to bed only LEs assist   Transfers   Sit to Stand 3  Moderate assistance   Additional items Assist x 2; Increased time required;Verbal cues;Armrests   Stand to Sit 3  Moderate assistance   Additional items Assist x 2; Increased time required;Verbal cues;Armrests   Stand pivot 3  Moderate assistance   Additional items Assist x 2; Increased time required;Verbal cues   Toilet transfer 3  Moderate assistance   Additional items Assist x 2;Verbal cues; Increased time required;Commode   Additional Comments cues for safety and techniques   Balance   Static Sitting Fair +   Dynamic Sitting Fair   Static Standing Fair -   Dynamic Standing Poor +   Ambulatory Poor +   Activity Tolerance   Activity Tolerance Patient limited by fatigue;Treatment limited secondary to medical complications (Comment)  (cognition)   Nurse Made Aware appropriate to see per Shara DEJESUS   RUE Assessment   RUE Assessment WFL  (grossly 3+/5)   LUE Assessment   LUE Assessment WFL  (grossly 3+/5)   Hand Function   Gross Motor Coordination Functional   Proprioception   Proprioception No apparent deficits   Vision-Basic Assessment   Current Vision Wears glasses only for reading   Vision - Complex Assessment   Ocular Range of Motion [facility]   Acuity Able to read clock/calendar on wall without difficulty   Perception   Inattention/Neglect Appears intact   Cognition   Overall Cognitive Status Impaired   Arousal/Participation Responsive;Lethargic   Attention Attends with cues to redirect   Orientation Level Oriented to person;Disoriented to place; Disoriented to time;Disoriented to situation   Memory Decreased recall of precautions;Decreased recall of recent events;Decreased short term memory   Following Commands Follows one step commands with increased time or repetition   Comments pt primarily Brazilian speaking, following some simple commands in Brazilian; impaired insight and safety awareness, dementia at baseline   Assessment   Limitation Decreased ADL status; Decreased Safe judgement during ADL;Decreased UE strength;Decreased cognition;Decreased endurance;Decreased self-care trans;Decreased high-level ADLs; Decreased sensation   Prognosis Fair   Assessment Pt is a 80 y o  female seen for OT evaluation s/p admit to [facility] on 10/18/2021 w/ OMEGA (acute kidney injury) ([facility] ), left flank pain  Comorbidities affecting pt's functional performance at time of assessment include: dementia, schizophrenia, a-fib, HTN, R femur IM nail 7/15/2021  Personal factors affecting pt at time of IE include:difficulty performing ADLS, limited insight into deficits and decreased initiation and engagement , language barrier, poor historian and information taken from previous charts  Prior to admission, pt was at home w/ 24/7 support/assist caregivers and reports was setup grooming, assist UB/LB ADLs, assist x1 functional transfers and mobility w/ IADLs  Upon evaluation: Pt requires MAX assist x2 supine>sit bed mobility, MOD assist x1 sit>supine bed mobility, MOD assist x2 sit<>stand w/ VCs, MOD assist x2 SPT to BSC, MAX assist toileting, MOD assist UB ADLS, MOD assist grooming 2* the following deficits impacting occupational performance: decreased strength and endurance, impaired balance, impaired balance, impaired activity tolerance, fall risk, impaired cognition (insight, safety awareness) lethargy, R sided facial droop (RN made aware)  Pt to benefit from continued skilled OT tx while in the hospital to address deficits as defined above and maximize level of functional independence w ADL's and functional mobility  Occupational Performance areas to address include: grooming, bathing/shower, toilet hygiene, dressing, health maintenance, functional mobility and clothing management, formal cognitive assessment, safety education  From OT standpoint, recommendation at time of d/c would be home w/ continued 24/7 support/assist and HOME OT vs  STR  The patient's raw score on the AM-PAC Daily Activity inpatient short form is 13, standardized score is 32 03, less than 39 4   Patients at this level are likely to benefit from discharge to post-acute rehabilitation services  Please refer to the recommendation of the Occupational Therapist for safe discharge planning  Goals   Patient Goals none expressed 2* Dementia   LTG Time Frame 10-14   Long Term Goal please see below goals   Plan   Treatment Interventions ADL retraining; Endurance training;Functional transfer training;UE strengthening/ROM; Cognitive reorientation;Patient/family training;Equipment evaluation/education; Compensatory technique education; Activityengagement; Energy conservation   Goal Expiration Date 11/03/21   OT Frequency 2-3x/wk   Recommendation   OT Discharge Recommendation Post acute rehabilitation services  (24/7 care/support vs  STR)   OT - OK to Discharge   (when medically stable)   Additional Comments  HOME OT w/ continued 24/7 support/assist and if unable to assist STR   AM-PAC Daily Activity Inpatient   Lower Body Dressing 2   Bathing 2   Toileting 1   Upper Body Dressing 2   Grooming 3   Eating 3   Daily Activity Raw Score 13   Daily Activity Standardized Score (Calc for Raw Score >=11) 32 03   AM-PAC Applied Cognition Inpatient   Following a Speech/Presentation 2   Understanding Ordinary Conversation 2   Taking Medications 1   Remembering Where Things Are Placed or Put Away 2   Remembering List of 4-5 Errands 1   Taking Care of Complicated Tasks 1   Applied Cognition Raw Score 9   Applied Cognition Standardized Score 22 48     Occupational Therapy Goals to be met in 10-14 days:  1) Pt will improve activity tolerance to G for 30 min txment sessions to enhance ADLs  2) Pt will complete UB ADLs/self care w/ supervision and min assist LB ADLs  3) Pt will complete toileting w/ min assist w/ G hygiene/thoroughness using DME PRN  4) Pt will improve functional transfers on/off all surfaces using DME PRN w/ G balance/safety including toileting w/ min assist  5) Pt will improve fx'l mobility during I/ADl/leisure tasks using DME PRN w/ g balance/safety w/ min assist  6) Pt will engage in ongoing cognitive assessment w/ G participation to A w/ safe d/c planning/recommendations  7) Pt will demonstrate G carryover of pt/caregiver education and training as appropriate w/ mod I  w/ G tolerance  8) Pt will engage in depression screen/leisure interest checklist w/ G participation to monitor s/s depression and ID 3 positive coping strategies to A w/ emotional regulation and management  9) Pt will demonstrate 100% carryover of E C  techniques w/ mod I t/o fx'l I/ADL/leisure tasks w/o cues s/p skilled education  10) Pt will demonstrate improved bed mobility to MOD I to enhance ADLs  11) Pt will demonstrate improved standing tolerance to 3-5 minutes during functional tasks w/ Fair + dynamic standing balance to enhance ADL performance  12) Pt will demonstrate improved b/l UE strength by 1 MMT grade to enhance ADLS and functional transfers       Documentation completed by: Kassy Wray MS, OTR/L

## 2021-10-20 NOTE — PHYSICAL THERAPY NOTE
PT EVALUATION    80 y o     [id number]    Constipation [K59 00]  Flank pain [R10 9]  Left flank pain [R10 9]  OMEGA (acute kidney injury) ([facility] ) [N17 9]  Suspected UTI [R39 89]    Past Medical History:   Diagnosis Date    Anxiety     Cognitive impairment     COVID-19 virus infection 4/13/2020    Dementia ([facility] )     Depression     Hallucination     Hyperlipidemia     Hypertension     Memory loss     Psychiatric illness     Psychosis ([place] [address] )     Retention of urine     Schizoaffective disorder ([place] [address] )     Sleep difficulties          Past Surgical History:   Procedure Laterality Date    APPENDECTOMY      BLADDER SURGERY      CHOLECYSTECTOMY      [place] OPEN RX FEMUR FX+INTRAMED BRYSON Right 7/15/2021    Procedure: INSERTION NAIL IM FEMUR ANTEGRADE (TROCHANTERIC) right;  Surgeon: Naomie Benavides MD;  [facility];  Service: Orthopedics    TOTAL ABDOMINAL HYSTERECTOMY W/ BILATERAL SALPINGOOPHORECTOMY      TOTAL KNEE ARTHROPLASTY Left         10/20/21 0943   PT Last Visit   PT Visit Date 10/20/21   Note Type   Note type Evaluation   Pain Assessment   Pain Assessment Tool FLACC   Pain Rating: FLACC (Rest) - Face 0   Pain Rating: FLACC (Rest) - Legs 0   Pain Rating: FLACC (Rest) - Activity 0   Pain Rating: FLACC (Rest) - Cry 0   Pain Rating: FLACC (Rest) - Consolability 0   Score: FLACC (Rest) 0   Pain Rating: FLACC (Activity) - Face 0   Pain Rating: FLACC (Activity) - Legs 0   Pain Rating: FLACC (Activity) - Activity 0   Pain Rating: FLACC (Activity) - Cry 0   Pain Rating: FLACC (Activity) - Consolability 0   Score: FLACC (Activity) 0   Home Living   Home Equipment Walker; Wheelchair-manual   Additional Comments Unable to obtain Social history due to dementia/ language barrier  Prior Function   Level of [place] Needs assistance with IADLs; Needs assistance with ADLs and functional mobility   Receives Help From Personal care attendant  (24/7 caregivers per last admission)   ADL Assistance Needs assistance IADLs Needs assistance   Falls in the last 6 months 1 to 4   Comments Per last admission 7/2021, pt lived at home with 24/7 caregivers  Pt amb with RW and needed assist with all ADLs  Restrictions/Precautions   Weight Bearing Precautions Per Order No   Other Precautions Cognitive; Chair Alarm; Bed Alarm; Fall Risk   General   Family/Caregiver Present No   Cognition   Overall Cognitive Status Impaired   Arousal/Participation Arousable   Orientation Level Unable to assess   Memory Unable to assess   Following Commands Follows one step commands with increased time or repetition   RLE Assessment   RLE Assessment   (Unable to fully assess due to dementia/ language barrier,)   LLE Assessment   LLE Assessment   (Unable to fully assess due to dementia/ language barrier,)   Bed Mobility   Rolling R 2  Maximal assistance   Additional items Assist x 1; Increased time required;Verbal cues   Rolling L 2  Maximal assistance   Additional items Assist x 1; Increased time required;Verbal cues   Supine to Sit 2  Maximal assistance   Additional items Assist x 1;Assist x 2; Increased time required;Verbal cues;LE management   Sit to Supine 2  Maximal assistance   Additional items Assist x 1;Assist x 2; Increased time required;Verbal cues;LE management   Transfers   Sit to Stand 3  Moderate assistance   Additional items Assist x 2;Verbal cues; Increased time required   Stand to Sit 3  Moderate assistance   Additional items Assist x 2; Increased time required;Verbal cues   Stand pivot 3  Moderate assistance   Additional items Assist x 2; Increased time required;Verbal cues   Toilet transfer 3  Moderate assistance   Additional items Assist x 2; Increased time required;Verbal cues; Commode   Ambulation/Elevation   Gait pattern R Knee Laeny; Improper Weight shift; Wide ABBEY; Decreased foot clearance;Shuffling; Short stride; Excessively slow   Gait Assistance 3  Moderate assist   Additional items Assist x 2   Assistive Device None  (Hand-held assistance) Distance 3' x 2   Balance   Static Sitting Fair +   Dynamic Sitting Fair   Static Standing Fair -   Dynamic Standing Poor +   Ambulatory Poor +   Endurance Deficit   Endurance Deficit Yes   Endurance Deficit Description fatigue, lethargy   Activity Tolerance   Activity Tolerance Patient limited by fatigue   Medical Staff Made Aware KIESHA Joy   Nurse Made Aware yes, ANJELICA Crook   Assessment   Prognosis Fair   Problem List Decreased strength;Decreased endurance; Impaired balance;Decreased mobility; Impaired judgement;Decreased safety awareness   Assessment Pt is an 80 y o  female admitted to [place] [address] on 10/18/21 with c/o L flank pain x 1 wk  PT consulted with eval and treat and activity as tolerated orders  At baseline, pt needs assist with ADLs and ambulates with RW per previous admission 7/2021  Upon evaluation, pt required mod A x 2 for mobility  Pt presents with weakness (R knee buckling noted), impaired balance, impaired endurance (fatigue with transfer/ asking to return to bed), gait dysfunction and decreased safety awareness (frequent cues)  The patient's AM-PAC Basic Mobility Inpatient Short Form Raw Score is 11, Standardized Score is 30 25  A standardized score less than 42 9 suggests the patient may benefit from discharge to post-acute rehabilitation services  Please also refer to the recommendation of the Physical Therapist for safe discharge planning  PT to follow 3-5x/wk during acute stay to address deficits  Recommend STR v  Return home with home PT and continued 24 hour assist to maximize safe mobility and function  Barriers to Discharge None   Goals   Patient Goals none stated   [facility] Expiration Date 11/03/21   Short Term Goal #1 1)  Pt will perform bed mobility with min A demonstrating appropriate technique 100% of the time in order to improve function  2)  Perform all transfers with min A demonstrating safe and appropriate technique 100% of the time in order to improve ability to negotiate safely in home environment  3) Amb with least restrictive AD > 30'x1 with min A in order to demonstrate ability to negotiate in home environment  4)  Improve overall balance 1/2 grade in order to optimize ability to perform functional tasks and reduce fall risk  5) Increase activity tolerance to 45 minutes in order to improve endurance to functional tasks  6) PT for ongoing patient and family/caregiver education, DME needs and d/c planning in order to promote highest level of function in least restrictive environment  PT Treatment Day 0   Plan   Treatment/Interventions Functional transfer training;LE strengthening/ROM; Therapeutic exercise;Cognitive reorientation; Endurance training;Patient/family training;Equipment eval/education; Bed mobility;Gait training;OT;Spoke to nursing   PT Frequency Other (Comment)  (3-5x/wk)   Recommendation   PT Discharge Recommendation Post acute rehabilitation services  (STR v  return home with continued 24/7 assist)   PT - OK to Discharge Yes   [address]   Turning in Bed Without Bedrails 2   Lying on Back to Sitting on Edge of Flat Bed 2   Moving Bed to Chair 2   Standing Up From Chair 2   Walk in Room 2   Climb 3-5 Stairs 1   Basic Mobility Inpatient Raw Score 11   Basic Mobility Standardized Score 30 25     Complexity: High  History: cognition (dementia), advanced age, assistive device, assist with ADLs  Examination: impairments in musculoskeletal system, endurance, balance, locomotion  Presentation: unpredictable (ongoing medical management, fall risk)    Domenic Goldman, PT

## 2021-10-20 NOTE — NURSING NOTE
Discharge instructions reviewed with Pt's son and caregiver  Reiterated bowel medication regimen and explained nonpharmacologic interventions for bowel health  Family and caregiver have no questions or concerns  Pt transported home by son in pt's wheelchair

## 2021-10-20 NOTE — ASSESSMENT & PLAN NOTE
Presents with left flank pain x1 week  History provided by caretaker, patient unable to give history despite use of translation services due to dementia/schizophrenia/language barrier  · No tenderness on palpation  · CT abdomen/pelvis - Moderate fecal stasis  Diverticular disease without diverticulitis  No acute findings    · Started on bowel regimen   · Tolerating diet on day of discharge

## 2021-10-20 NOTE — PLAN OF CARE
Problem: MOBILITY - ADULT  Goal: Maintain or return to baseline ADL function  Description: INTERVENTIONS:  -  Assess patient's ability to carry out ADLs; assess patient's baseline for ADL function and identify physical deficits which impact ability to perform ADLs (bathing, care of mouth/teeth, toileting, grooming, dressing, etc )  - Assess/evaluate cause of self-care deficits   - Assess range of motion  - Assess patient's mobility; develop plan if impaired  - Assess patient's need for assistive devices and provide as appropriate  - Encourage maximum independence but intervene and supervise when necessary  - Involve family in performance of ADLs  - Assess for home care needs following discharge   - Consider OT consult to assist with ADL evaluation and planning for discharge  - Provide patient education as appropriate  Outcome: Progressing  Goal: Maintains/Returns to pre admission functional level  Description: INTERVENTIONS:  - Perform BMAT or MOVE assessment daily    - Set and communicate daily mobility goal to care team and patient/family/caregiver     - Collaborate with rehabilitation services on mobility goals if consulted  - Out of bed for toileting  - Record patient progress and toleration of activity level   Outcome: Progressing     Problem: Prexisting or High Potential for Compromised Skin Integrity  Goal: Skin integrity is maintained or improved  Description: INTERVENTIONS:  - Identify patients at risk for skin breakdown  - Assess and monitor skin integrity  - Assess and monitor nutrition and hydration status  - Monitor labs   - Assess for incontinence   - Turn and reposition patient  - Assist with mobility/ambulation  - Relieve pressure over bony prominences  - Avoid friction and shearing  - Provide appropriate hygiene as needed including keeping skin clean and dry  - Evaluate need for skin moisturizer/barrier cream  - Collaborate with interdisciplinary team   - Patient/family teaching  - Consider wound care consult   Outcome: Progressing     Problem: Potential for Falls  Goal: Patient will remain free of falls  Description: INTERVENTIONS:  - Educate patient/family on patient safety including physical limitations  - Instruct patient to call for assistance with activity   - Consult OT/PT to assist with strengthening/mobility   - Keep Call bell within reach  - Keep bed low and locked with side rails adjusted as appropriate  - Keep care items and personal belongings within reach  - Initiate and maintain comfort rounds  - Make Fall Risk Sign visible to staff  - Offer Toileting  in advance of need  - Initiate/Maintain bed alarm  - Obtain necessary fall risk management equipment  - Apply yellow socks and bracelet for high fall risk patients  - Consider moving patient to room near nurses station  Outcome: Progressing

## 2021-10-20 NOTE — ASSESSMENT & PLAN NOTE
Lab Results   Component Value Date    CREATININE 1 24 10/20/2021    CREATININE 1 26 10/19/2021    CREATININE 1 59 (H) 10/18/2021     80year old female presenting with acute kidney injury  Likely pre-renal in etiology    Creatinine back to baseline with IV fluids   Tolerating normal diet   Plan for discharge today on bowel regimen with outpatient follow up

## 2021-10-20 NOTE — DISCHARGE SUMMARY
Sharonda 48  Discharge- Kd Mao 11/19/1931, 80 y o  female MRN: [id number]  Unit/Bed#: E5 -01 Encounter: [id number]  Primary Care Provider: Tato Godinez MD   Date and time admitted to hospital: 10/18/2021  6:00 PM    * OMEGA (acute kidney injury) [facility])  Assessment & Plan  Lab Results   Component Value Date    CREATININE 1 24 10/20/2021    CREATININE 1 26 10/19/2021    CREATININE 1 59 (H) 10/18/2021     80year old female presenting with acute kidney injury  Likely pre-renal in etiology  Creatinine back to baseline with IV fluids   Tolerating normal diet   Plan for discharge today on bowel regimen with outpatient follow up     Flank pain  Assessment & Plan  Presents with left flank pain x1 week  History provided by caretaker, patient unable to give history despite use of translation services due to dementia/schizophrenia/language barrier  · No tenderness on palpation  · CT abdomen/pelvis - Moderate fecal stasis  Diverticular disease without diverticulitis  No acute findings  · Started on bowel regimen   · Tolerating diet on day of discharge     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[address] )  Assessment & Plan  Continue Zyprexa, trazodone, Cymbalta    PAF (paroxysmal atrial fibrillation) ([facility])  Assessment & Plan  Continue Cardizem and metoprolol    Essential hypertension  Assessment & Plan  Continue diltiazem and metoprolol      Medical Problems     Resolved Problems  Date Reviewed: 10/20/2021    None              Discharging Physician / Practitioner: Michaell Fothergill, PA-C  PCP: Tato Godinez MD  Admission Date:   Admission Orders (From admission, onward)     Ordered        10/18/21 2123  Inpatient Admission  Once                   Discharge Date: 10/20/21    Consultations During Hospital Stay:  · none    Procedures Performed:   CT abdomen pelvis wo contrast    Result Date: 10/18/2021  Impression: Moderate fecal stasis  Diverticular disease without diverticulitis    No acute findings  Workstation performed: [id number]     XR chest 1 view portable    Result Date: 10/19/2021  Impression: No acute cardiopulmonary disease  Workstation performed: [id number]       Significant Findings / Test Results:   · See above    Incidental Findings:   · See above     Test Results Pending at Discharge (will require follow up):   · none     Outpatient Tests Requested:  · Outpatient PCP follow up     Complications:  none    Reason for Admission: OMEGA     Hospital Course:   Herbie Veloz is a 80 y o  female patient who originally presented to the hospital on 10/18/2021 due to flank pain  Patient initially presented the hospital with complaint of 1 week of left flank pain  Was found to have significant constipation and was started on bowel regimen  She also was found to have OMEGA which resolved with IV fluid hydration  On day of discharge, patient was tolerating oral diet without difficulty  Please see above list of diagnoses and related plan for additional information  Condition at Discharge: stable    Discharge Day Visit / Exam:   Subjective:  Patient seen examined at bedside  Denies any complaints  Notes she is enjoying her breakfast   Vitals: Blood Pressure: 164/76 (10/20/21 0820)  Pulse: 85 (10/20/21 0820)  Temperature: 98 4 °F (36 9 °C) (10/20/21 0820)  Temp Source: Oral (10/20/21 0820)  Respirations: 18 (10/20/21 0820)  Height: 4' 10" (147 3 cm) (10/19/21 1544)  Weight - Scale: 54 4 kg (119 lb 14 9 oz) (10/19/21 1544)  SpO2: 95 % (10/20/21 0820)  Exam:   Physical Exam  Vitals reviewed  Constitutional:       General: She is not in acute distress  HENT:      Head: Normocephalic and atraumatic  Eyes:      General: No scleral icterus  Conjunctiva/sclera: Conjunctivae normal    Cardiovascular:      Rate and Rhythm: Normal rate and regular rhythm  Heart sounds: No murmur heard  Pulmonary:      Effort: Pulmonary effort is normal  No respiratory distress        Breath sounds: Normal breath sounds  Abdominal:      General: Bowel sounds are normal  There is no distension  Palpations: Abdomen is soft  Tenderness: There is no abdominal tenderness  Musculoskeletal:      Cervical back: Neck supple  Right lower leg: No edema  Left lower leg: No edema  Skin:     General: Skin is warm and dry  Neurological:      Mental Status: She is alert  She is disoriented  Psychiatric:         Mood and Affect: Mood normal          Behavior: Behavior normal           Discussion with Family: Attempted to update  (son) via phone  Unable to contact  Discharge instructions/Information to patient and family:   See after visit summary for information provided to patient and family  Provisions for Follow-Up Care:  See after visit summary for information related to follow-up care and any pertinent home health orders  Disposition:   Home    Planned Readmission:  none     Discharge Statement:  I spent 35 minutes discharging the patient  This time was spent on the day of discharge  I had direct contact with the patient on the day of discharge  Greater than 50% of the total time was spent examining patient, answering all patient questions, arranging and discussing plan of care with patient as well as directly providing post-discharge instructions  Additional time then spent on discharge activities  Discharge Medications:  See after visit summary for reconciled discharge medications provided to patient and/or family        **Please Note: This note may have been constructed using a voice recognition system**

## 2021-10-20 NOTE — PLAN OF CARE
Problem: PHYSICAL THERAPY ADULT  Goal: Performs mobility at highest level of function for planned discharge setting  See evaluation for individualized goals  Description: Treatment/Interventions: Functional transfer training, LE strengthening/ROM, Therapeutic exercise, Cognitive reorientation, Endurance training, Patient/family training, Equipment eval/education, Bed mobility, Gait training, OT, Spoke to nursing          See flowsheet documentation for full assessment, interventions and recommendations  Note: Prognosis: Fair  Problem List: Decreased strength, Decreased endurance, Impaired balance, Decreased mobility, Impaired judgement, Decreased safety awareness  Assessment: Pt is an 80 y o  female admitted to [place] [address] on 10/18/21 with c/o L flank pain x 1 wk  PT consulted with eval and treat and activity as tolerated orders  At baseline, pt needs assist with ADLs and ambulates with RW per previous admission 7/2021  Upon evaluation, pt required mod A x 2 for mobility  Pt presents with weakness (R knee buckling noted), impaired balance, impaired endurance (fatigue with transfer/ asking to return to bed), gait dysfunction and decreased safety awareness (frequent cues)  The patient's AM-PAC Basic Mobility Inpatient Short Form Raw Score is 11, Standardized Score is 30 25  A standardized score less than 42 9 suggests the patient may benefit from discharge to post-acute rehabilitation services  Please also refer to the recommendation of the Physical Therapist for safe discharge planning  PT to follow 3-5x/wk during acute stay to address deficits  Recommend STR v  Return home with home PT and continued 24 hour assist to maximize safe mobility and function  Barriers to Discharge: None        PT Discharge Recommendation: Post acute rehabilitation services (STR v  return home with continued 24/7 assist)     PT - OK to Discharge: Yes    See flowsheet documentation for full assessment

## 2021-10-21 ENCOUNTER — TRANSITIONAL CARE MANAGEMENT (OUTPATIENT)
Dept: INTERNAL MEDICINE CLINIC | Facility: CLINIC | Age: 86
End: 2021-10-21

## 2021-10-27 ENCOUNTER — OFFICE VISIT (OUTPATIENT)
Dept: INTERNAL MEDICINE CLINIC | Facility: CLINIC | Age: 86
End: 2021-10-27
Payer: COMMERCIAL

## 2021-10-27 VITALS
HEIGHT: 58 IN | TEMPERATURE: 97.4 F | WEIGHT: 119 LBS | DIASTOLIC BLOOD PRESSURE: 80 MMHG | RESPIRATION RATE: 14 BRPM | SYSTOLIC BLOOD PRESSURE: 190 MMHG | BODY MASS INDEX: 24.98 KG/M2

## 2021-10-27 DIAGNOSIS — M81.0 AGE-RELATED OSTEOPOROSIS WITHOUT CURRENT PATHOLOGICAL FRACTURE: ICD-10-CM

## 2021-10-27 DIAGNOSIS — I10 ORTHOSTATIC HYPERTENSION: ICD-10-CM

## 2021-10-27 DIAGNOSIS — I10 ESSENTIAL HYPERTENSION: Primary | ICD-10-CM

## 2021-10-27 DIAGNOSIS — F03.91 DEMENTIA WITH BEHAVIORAL DISTURBANCE, UNSPECIFIED DEMENTIA TYPE (HCC): Chronic | ICD-10-CM

## 2021-10-27 DIAGNOSIS — I48.0 PAROXYSMAL A-FIB (HCC): ICD-10-CM

## 2021-10-27 DIAGNOSIS — N18.31 STAGE 3A CHRONIC KIDNEY DISEASE (HCC): ICD-10-CM

## 2021-10-27 DIAGNOSIS — F20.3 UNDIFFERENTIATED SCHIZOPHRENIA (HCC): Chronic | ICD-10-CM

## 2021-10-27 DIAGNOSIS — E78.2 MIXED HYPERLIPIDEMIA: ICD-10-CM

## 2021-10-27 PROCEDURE — 99495 TRANSJ CARE MGMT MOD F2F 14D: CPT | Performed by: INTERNAL MEDICINE

## 2021-10-27 PROCEDURE — 1111F DSCHRG MED/CURRENT MED MERGE: CPT | Performed by: INTERNAL MEDICINE

## 2021-10-27 RX ORDER — AMLODIPINE BESYLATE 2.5 MG/1
2.5 TABLET ORAL DAILY
Qty: 30 TABLET | Refills: 5 | Status: SHIPPED | OUTPATIENT
Start: 2021-10-27 | End: 2022-05-25

## 2021-10-28 ENCOUNTER — HOSPITAL ENCOUNTER (EMERGENCY)
Facility: HOSPITAL | Age: 86
Discharge: HOME | End: 2021-10-28
Attending: EMERGENCY MEDICINE | Admitting: EMERGENCY MEDICINE
Payer: COMMERCIAL

## 2021-10-28 ENCOUNTER — APPOINTMENT (EMERGENCY)
Dept: CT IMAGING | Facility: HOSPITAL | Age: 86
End: 2021-10-28
Payer: COMMERCIAL

## 2021-10-28 VITALS
TEMPERATURE: 98.3 F | RESPIRATION RATE: 18 BRPM | DIASTOLIC BLOOD PRESSURE: 86 MMHG | HEART RATE: 56 BPM | SYSTOLIC BLOOD PRESSURE: 198 MMHG | OXYGEN SATURATION: 97 %

## 2021-10-28 DIAGNOSIS — F03.91 AGITATION DUE TO DEMENTIA (HCC): Primary | ICD-10-CM

## 2021-10-28 LAB
AMORPH URATE CRY URNS QL MICRO: ABNORMAL /HPF
ANION GAP SERPL CALCULATED.3IONS-SCNC: 6 MMOL/L (ref 4–13)
ATRIAL RATE: 55 BPM
BACTERIA UR QL AUTO: ABNORMAL /HPF
BASOPHILS # BLD AUTO: 0.02 THOUSANDS/ΜL (ref 0–0.1)
BASOPHILS NFR BLD AUTO: 0 % (ref 0–1)
BILIRUB UR QL STRIP: NEGATIVE
BUN SERPL-MCNC: 15 MG/DL (ref 5–25)
CALCIUM SERPL-MCNC: 9 MG/DL (ref 8.3–10.1)
CHLORIDE SERPL-SCNC: 99 MMOL/L (ref 100–108)
CLARITY UR: ABNORMAL
CO2 SERPL-SCNC: 31 MMOL/L (ref 21–32)
COLOR UR: YELLOW
CREAT SERPL-MCNC: 1.08 MG/DL (ref 0.6–1.3)
EOSINOPHIL # BLD AUTO: 0.1 THOUSAND/ΜL (ref 0–0.61)
EOSINOPHIL NFR BLD AUTO: 1 % (ref 0–6)
ERYTHROCYTE [DISTWIDTH] IN BLOOD BY AUTOMATED COUNT: 13.3 % (ref 11.6–15.1)
GFR SERPL CREATININE-BSD FRML MDRD: 46 ML/MIN/1.73SQ M
GLUCOSE SERPL-MCNC: 112 MG/DL (ref 65–140)
GLUCOSE SERPL-MCNC: 87 MG/DL (ref 65–140)
GLUCOSE UR STRIP-MCNC: NEGATIVE MG/DL
HCT VFR BLD AUTO: 38.5 % (ref 34.8–46.1)
HGB BLD-MCNC: 13 G/DL (ref 11.5–15.4)
HGB UR QL STRIP.AUTO: NEGATIVE
IMM GRANULOCYTES # BLD AUTO: 0.03 THOUSAND/UL (ref 0–0.2)
IMM GRANULOCYTES NFR BLD AUTO: 0 % (ref 0–2)
KETONES UR STRIP-MCNC: NEGATIVE MG/DL
LEUKOCYTE ESTERASE UR QL STRIP: ABNORMAL
LYMPHOCYTES # BLD AUTO: 1.21 THOUSANDS/ΜL (ref 0.6–4.47)
LYMPHOCYTES NFR BLD AUTO: 16 % (ref 14–44)
MCH RBC QN AUTO: 30.6 PG (ref 26.8–34.3)
MCHC RBC AUTO-ENTMCNC: 33.8 G/DL (ref 31.4–37.4)
MCV RBC AUTO: 91 FL (ref 82–98)
MONOCYTES # BLD AUTO: 0.64 THOUSAND/ΜL (ref 0.17–1.22)
MONOCYTES NFR BLD AUTO: 9 % (ref 4–12)
NEUTROPHILS # BLD AUTO: 5.41 THOUSANDS/ΜL (ref 1.85–7.62)
NEUTS SEG NFR BLD AUTO: 74 % (ref 43–75)
NITRITE UR QL STRIP: NEGATIVE
NON-SQ EPI CELLS URNS QL MICRO: ABNORMAL /HPF
NRBC BLD AUTO-RTO: 0 /100 WBCS
P AXIS: 38 DEGREES
PH UR STRIP.AUTO: 6 [PH]
PLATELET # BLD AUTO: 239 THOUSANDS/UL (ref 149–390)
PMV BLD AUTO: 9.8 FL (ref 8.9–12.7)
POTASSIUM SERPL-SCNC: 4.2 MMOL/L (ref 3.5–5.3)
PR INTERVAL: 198 MS
PROT UR STRIP-MCNC: NEGATIVE MG/DL
QRS AXIS: 62 DEGREES
QRSD INTERVAL: 88 MS
QT INTERVAL: 434 MS
QTC INTERVAL: 415 MS
RBC # BLD AUTO: 4.25 MILLION/UL (ref 3.81–5.12)
RBC #/AREA URNS AUTO: ABNORMAL /HPF
SODIUM SERPL-SCNC: 136 MMOL/L (ref 136–145)
SP GR UR STRIP.AUTO: 1.01 (ref 1–1.03)
T WAVE AXIS: 81 DEGREES
TROPONIN I SERPL-MCNC: <0.02 NG/ML
UROBILINOGEN UR QL STRIP.AUTO: 1 E.U./DL
VENTRICULAR RATE: 55 BPM
WBC # BLD AUTO: 7.41 THOUSAND/UL (ref 4.31–10.16)
WBC #/AREA URNS AUTO: ABNORMAL /HPF

## 2021-10-28 PROCEDURE — 93010 ELECTROCARDIOGRAM REPORT: CPT | Performed by: INTERNAL MEDICINE

## 2021-10-28 PROCEDURE — 99285 EMERGENCY DEPT VISIT HI MDM: CPT | Performed by: EMERGENCY MEDICINE

## 2021-10-28 PROCEDURE — 99284 EMERGENCY DEPT VISIT MOD MDM: CPT

## 2021-10-28 PROCEDURE — 70450 CT HEAD/BRAIN W/O DYE: CPT

## 2021-10-28 PROCEDURE — 81001 URINALYSIS AUTO W/SCOPE: CPT | Performed by: EMERGENCY MEDICINE

## 2021-10-28 PROCEDURE — 93005 ELECTROCARDIOGRAM TRACING: CPT

## 2021-10-28 PROCEDURE — 82948 REAGENT STRIP/BLOOD GLUCOSE: CPT

## 2021-10-28 PROCEDURE — 36415 COLL VENOUS BLD VENIPUNCTURE: CPT | Performed by: EMERGENCY MEDICINE

## 2021-10-28 PROCEDURE — 80048 BASIC METABOLIC PNL TOTAL CA: CPT | Performed by: EMERGENCY MEDICINE

## 2021-10-28 PROCEDURE — 84484 ASSAY OF TROPONIN QUANT: CPT | Performed by: EMERGENCY MEDICINE

## 2021-10-28 PROCEDURE — 85025 COMPLETE CBC W/AUTO DIFF WBC: CPT | Performed by: EMERGENCY MEDICINE

## 2021-11-11 NOTE — TELEPHONE ENCOUNTER
Patient's son Malachi Bermudez stopped in office and would like update on bed  Advised of note above  Patients son will speak with patient's PCP as well but would like a call back from Teto Samson  Please call son at [phone]  Thank you!

## 2021-11-17 ENCOUNTER — OFFICE VISIT (OUTPATIENT)
Dept: INTERNAL MEDICINE CLINIC | Facility: CLINIC | Age: 86
End: 2021-11-17
Payer: COMMERCIAL

## 2021-11-17 VITALS
DIASTOLIC BLOOD PRESSURE: 64 MMHG | BODY MASS INDEX: 25.4 KG/M2 | WEIGHT: 121 LBS | HEART RATE: 74 BPM | SYSTOLIC BLOOD PRESSURE: 136 MMHG | RESPIRATION RATE: 13 BRPM | HEIGHT: 58 IN | TEMPERATURE: 97.8 F

## 2021-11-17 DIAGNOSIS — R26.9 NEUROLOGIC GAIT DYSFUNCTION: ICD-10-CM

## 2021-11-17 DIAGNOSIS — F20.3 UNDIFFERENTIATED SCHIZOPHRENIA (HCC): Chronic | ICD-10-CM

## 2021-11-17 DIAGNOSIS — I48.0 PAF (PAROXYSMAL ATRIAL FIBRILLATION) (HCC): ICD-10-CM

## 2021-11-17 DIAGNOSIS — N18.31 STAGE 3A CHRONIC KIDNEY DISEASE (HCC): ICD-10-CM

## 2021-11-17 DIAGNOSIS — Z23 ENCOUNTER FOR IMMUNIZATION: ICD-10-CM

## 2021-11-17 DIAGNOSIS — E78.2 MIXED HYPERLIPIDEMIA: ICD-10-CM

## 2021-11-17 DIAGNOSIS — F03.91 DEMENTIA WITH BEHAVIORAL DISTURBANCE, UNSPECIFIED DEMENTIA TYPE (HCC): Chronic | ICD-10-CM

## 2021-11-17 DIAGNOSIS — M81.0 AGE-RELATED OSTEOPOROSIS WITHOUT CURRENT PATHOLOGICAL FRACTURE: ICD-10-CM

## 2021-11-17 DIAGNOSIS — I10 ESSENTIAL HYPERTENSION: Primary | ICD-10-CM

## 2021-11-17 PROCEDURE — 90662 IIV NO PRSV INCREASED AG IM: CPT | Performed by: INTERNAL MEDICINE

## 2021-11-17 PROCEDURE — 99214 OFFICE O/P EST MOD 30 MIN: CPT | Performed by: INTERNAL MEDICINE

## 2021-11-17 PROCEDURE — 1111F DSCHRG MED/CURRENT MED MERGE: CPT | Performed by: INTERNAL MEDICINE

## 2021-11-17 PROCEDURE — 1036F TOBACCO NON-USER: CPT | Performed by: INTERNAL MEDICINE

## 2021-11-17 PROCEDURE — 1160F RVW MEDS BY RX/DR IN RCRD: CPT | Performed by: INTERNAL MEDICINE

## 2021-11-17 PROCEDURE — G0008 ADMIN INFLUENZA VIRUS VAC: HCPCS | Performed by: INTERNAL MEDICINE

## 2021-11-22 ENCOUNTER — TELEPHONE (OUTPATIENT)
Dept: OBGYN CLINIC | Facility: HOSPITAL | Age: 86
End: 2021-11-22

## 2021-11-22 DIAGNOSIS — I48.0 PAROXYSMAL A-FIB (HCC): ICD-10-CM

## 2021-11-22 DIAGNOSIS — I10 ESSENTIAL HYPERTENSION: ICD-10-CM

## 2021-11-22 RX ORDER — AMIODARONE HYDROCHLORIDE 200 MG/1
200 TABLET ORAL DAILY
Qty: 90 TABLET | Refills: 0 | Status: SHIPPED | OUTPATIENT
Start: 2021-11-22 | End: 2022-05-11

## 2021-11-22 RX ORDER — DILTIAZEM HYDROCHLORIDE 120 MG/1
CAPSULE, EXTENDED RELEASE ORAL
Qty: 90 CAPSULE | Refills: 0 | Status: SHIPPED | OUTPATIENT
Start: 2021-11-22 | End: 2022-03-15

## 2021-11-22 RX ORDER — DILTIAZEM HYDROCHLORIDE 180 MG/1
180 CAPSULE, COATED, EXTENDED RELEASE ORAL DAILY
Qty: 30 CAPSULE | Refills: 0 | Status: SHIPPED | OUTPATIENT
Start: 2021-11-22 | End: 2022-04-05 | Stop reason: ALTCHOICE

## 2021-12-02 ENCOUNTER — OFFICE VISIT (OUTPATIENT)
Dept: CARDIOLOGY CLINIC | Facility: CLINIC | Age: 86
End: 2021-12-02
Payer: COMMERCIAL

## 2021-12-02 VITALS
WEIGHT: 124 LBS | DIASTOLIC BLOOD PRESSURE: 60 MMHG | HEIGHT: 58 IN | OXYGEN SATURATION: 95 % | BODY MASS INDEX: 26.03 KG/M2 | HEART RATE: 62 BPM | SYSTOLIC BLOOD PRESSURE: 130 MMHG

## 2021-12-02 DIAGNOSIS — I48.0 PAROXYSMAL A-FIB (HCC): Primary | ICD-10-CM

## 2021-12-02 DIAGNOSIS — I48.0 PAF (PAROXYSMAL ATRIAL FIBRILLATION) (HCC): ICD-10-CM

## 2021-12-02 DIAGNOSIS — I10 ESSENTIAL HYPERTENSION: ICD-10-CM

## 2021-12-02 PROCEDURE — 99213 OFFICE O/P EST LOW 20 MIN: CPT | Performed by: INTERNAL MEDICINE

## 2021-12-02 PROCEDURE — 1160F RVW MEDS BY RX/DR IN RCRD: CPT | Performed by: INTERNAL MEDICINE

## 2021-12-02 PROCEDURE — 1036F TOBACCO NON-USER: CPT | Performed by: INTERNAL MEDICINE

## 2021-12-08 ENCOUNTER — HOSPITAL ENCOUNTER (OUTPATIENT)
Dept: NON INVASIVE DIAGNOSTICS | Facility: HOSPITAL | Age: 86
Discharge: HOME | End: 2021-12-08
Attending: INTERNAL MEDICINE
Payer: COMMERCIAL

## 2021-12-08 DIAGNOSIS — I48.0 PAROXYSMAL A-FIB (HCC): ICD-10-CM

## 2021-12-08 PROCEDURE — 93225 XTRNL ECG REC<48 HRS REC: CPT

## 2021-12-08 PROCEDURE — 93226 XTRNL ECG REC<48 HR SCAN A/R: CPT

## 2021-12-15 PROCEDURE — 93227 XTRNL ECG REC<48 HR R&I: CPT

## 2022-01-05 ENCOUNTER — TELEPHONE (OUTPATIENT)
Dept: LAB | Facility: HOSPITAL | Age: 87
End: 2022-01-05

## 2022-01-05 ENCOUNTER — OFFICE VISIT (OUTPATIENT)
Dept: INTERNAL MEDICINE CLINIC | Facility: CLINIC | Age: 87
End: 2022-01-05
Payer: MEDICARE

## 2022-01-05 VITALS
RESPIRATION RATE: 12 BRPM | HEIGHT: 58 IN | SYSTOLIC BLOOD PRESSURE: 120 MMHG | DIASTOLIC BLOOD PRESSURE: 64 MMHG | TEMPERATURE: 97.9 F | WEIGHT: 123 LBS | BODY MASS INDEX: 25.82 KG/M2 | HEART RATE: 80 BPM

## 2022-01-05 DIAGNOSIS — M81.0 AGE-RELATED OSTEOPOROSIS WITHOUT CURRENT PATHOLOGICAL FRACTURE: ICD-10-CM

## 2022-01-05 DIAGNOSIS — E78.2 MIXED HYPERLIPIDEMIA: ICD-10-CM

## 2022-01-05 DIAGNOSIS — S02.31XA CLOSED FRACTURE OF RIGHT ORBITAL FLOOR, INITIAL ENCOUNTER (HCC): ICD-10-CM

## 2022-01-05 DIAGNOSIS — I10 ESSENTIAL HYPERTENSION: Primary | ICD-10-CM

## 2022-01-05 DIAGNOSIS — F20.3 UNDIFFERENTIATED SCHIZOPHRENIA (HCC): ICD-10-CM

## 2022-01-05 DIAGNOSIS — F03.91 DEMENTIA WITH BEHAVIORAL DISTURBANCE, UNSPECIFIED DEMENTIA TYPE (HCC): Chronic | ICD-10-CM

## 2022-01-05 DIAGNOSIS — E44.0 MODERATE PROTEIN-CALORIE MALNUTRITION (HCC): ICD-10-CM

## 2022-01-05 DIAGNOSIS — I48.0 PAF (PAROXYSMAL ATRIAL FIBRILLATION) (HCC): ICD-10-CM

## 2022-01-05 DIAGNOSIS — F23 BRIEF PSYCHOTIC DISORDER (HCC): ICD-10-CM

## 2022-01-05 DIAGNOSIS — N18.31 STAGE 3A CHRONIC KIDNEY DISEASE (HCC): ICD-10-CM

## 2022-01-05 PROCEDURE — 99214 OFFICE O/P EST MOD 30 MIN: CPT | Performed by: INTERNAL MEDICINE

## 2022-01-05 NOTE — PROGRESS NOTES
BMI Counseling: Body mass index is 25 71 kg/m²  The BMI is above normal  Nutrition recommendations include reducing portion sizes, decreasing overall calorie intake, 3-5 servings of fruits/vegetables daily, reducing fast food intake and consuming healthier snacks  Exercise recommendations include exercising 3-5 times per week

## 2022-01-05 NOTE — PROGRESS NOTES
Assessment/Plan:      1  Blood pressure well controlled no chest palpitation shortness of breath    2  Paroxysmal atrial fibrillation she is in sinus rhythm clinically she has seen the cardiologist because of the risk of falling she is not on anticoagulation  She is on amiodarone 1200 mg per day he has adjusted the medications    3  Schizophrenia psychosis she is very come here with the 8 today she is not communicative  But she appears in no distress  I did not make any medication changes  She is due for lab work which we will encouraged her to go to the lab today and get a done a block away from the hospital         No problem-specific Assessment & Plan notes found for this encounter  Diagnoses and all orders for this visit:    Essential hypertension    PAF (paroxysmal atrial fibrillation) ([facility] )    Dementia with behavioral disturbance, unspecified dementia type ([facility] )    Age-related osteoporosis without current pathological fracture    Stage 3a chronic kidney disease ([facility] )    Mixed hyperlipidemia    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[place] [id number] )          Subjective:      Patient ID: Antonio Number is a 80 y o  female  No chief complaint on file          Current Outpatient Medications:     acetaminophen (TYLENOL) 325 mg tablet, Take 2 tablets (650 mg total) by mouth every 6 (six) hours as needed for mild pain, moderate pain or headaches, Disp: 30 tablet, Rfl: 0    albuterol (2 5 mg/3 mL) 0 083 % nebulizer solution, Take 1 vial (2 5 mg total) by nebulization every 4 (four) hours as needed for wheezing or shortness of breath, Disp: 1 vial, Rfl: 0    albuterol (PROVENTIL HFA,VENTOLIN HFA) 90 mcg/act inhaler, Inhale 2 puffs every 4 (four) hours as needed for wheezing, Disp: 1 Inhaler, Rfl: 0    amiodarone 200 mg tablet, TAKE 1 TABLET (200 MG TOTAL) BY MOUTH DAILY, Disp: 90 tablet, Rfl: 0    amLODIPine (NORVASC) 2 5 mg tablet, Take 1 tablet (2 5 mg total) by mouth daily, Disp: 30 tablet, Rfl: 5    bisacodyl (DULCOLAX) 10 mg suppository, Insert 1 suppository (10 mg total) into the rectum daily as needed for constipation (2nd line) (Patient not taking: Reported on 9/29/2021), Disp: 12 suppository, Rfl: 0    Blood Pressure Monitoring (Blood Pressure Cuff) MISC, Use daily, Disp: 1 each, Rfl: 0    Cartia  MG 24 hr capsule, TAKE 1 CAPSULE BY MOUTH DAILY, Disp: 90 capsule, Rfl: 0    cyanocobalamin (VITAMIN B-12) 100 MCG tablet, Take 100 mcg by mouth daily, Disp: , Rfl:     diltiazem (CARDIZEM CD) 180 mg 24 hr capsule, TAKE 1 CAPSULE (180 MG TOTAL) BY MOUTH DAILY, Disp: 30 capsule, Rfl: 0    DULoxetine (CYMBALTA) 30 mg delayed release capsule, Take 30 mg by mouth daily at bedtime, Disp: , Rfl:     LORazepam (ATIVAN) 0 5 mg tablet, Take 0 5 mg by mouth , Disp: , Rfl:     melatonin 3 mg, Take 3 mg by mouth daily at bedtime Take 2 tablets at bedtime  , Disp: , Rfl:     metoprolol succinate (TOPROL-XL) 50 mg 24 hr tablet, TAKE 1 TABLET BY MOUTH DAILY, Disp: 90 tablet, Rfl: 0    OLANZapine (ZyPREXA) 5 mg tablet, Take 1 tablet (5 mg total) by mouth daily at bedtime, Disp: 30 tablet, Rfl: 1    polyethylene glycol (MIRALAX) 17 g packet, Take 17 g by mouth daily as needed (constipation first line) (Patient not taking: Reported on 12/2/2021 ), Disp: 14 each, Rfl: 0    simethicone (MYLICON) 80 mg chewable tablet, Chew 1 tablet (80 mg total) 4 (four) times a day as needed for flatulence, Disp: 30 tablet, Rfl: 0    Stool Softener 100 MG capsule, TAKE 1 CAPSULE BY MOUTH EVERY 12 HOURS FOR CONSTIPATION, Disp: 90 capsule, Rfl: 0    traZODone (DESYREL) 50 mg tablet, Take 50 mg by mouth daily at bedtime, Disp: , Rfl:     HPI    The following portions of the patient's history were reviewed and updated as appropriate: allergies, current medications, past family history, past medical history, past social history, past surgical history and problem list     Review of Systems   Constitutional: Negative    Negative for activity change, appetite change, fatigue, fever and unexpected weight change  HENT: Negative for congestion, ear pain, hearing loss, mouth sores, postnasal drip, rhinorrhea, sore throat, trouble swallowing and voice change  Eyes: Negative for pain, redness and visual disturbance  Respiratory: Negative for cough, chest tightness, shortness of breath and wheezing  Cardiovascular: Negative for chest pain, palpitations and leg swelling  Gastrointestinal: Negative for abdominal distention, abdominal pain, blood in stool, constipation, diarrhea and nausea  Endocrine: Negative for cold intolerance, heat intolerance, polydipsia, polyphagia and polyuria  Genitourinary: Negative for difficulty urinating, dysuria, flank pain, frequency, hematuria and urgency  Musculoskeletal: Negative for arthralgias, back pain, gait problem, joint swelling and myalgias  Skin: Negative for color change and pallor  Neurological: Negative for dizziness, tremors, seizures, syncope, weakness, numbness and headaches  Hematological: Negative for adenopathy  Does not bruise/bleed easily  Psychiatric/Behavioral: Negative  Negative for sleep disturbance  The patient is not nervous/anxious  Objective: There were no vitals taken for this visit  Physical Exam  Vitals and nursing note reviewed  Constitutional:       Appearance: She is well-developed  HENT:      Head: Normocephalic  Right Ear: External ear normal       Left Ear: External ear normal       Nose: Nose normal       Mouth/Throat:      Pharynx: No oropharyngeal exudate  Eyes:      Conjunctiva/sclera: Conjunctivae normal       Pupils: Pupils are equal, round, and reactive to light  Neck:      Thyroid: No thyromegaly  Cardiovascular:      Rate and Rhythm: Normal rate and regular rhythm  Heart sounds: Normal heart sounds  No murmur heard  No friction rub  No gallop         Comments: S1-S2 regular rhythm  Extremities no edema  Pulmonary:      Effort: Pulmonary effort is normal  No respiratory distress  Breath sounds: Normal breath sounds  No wheezing or rales  Comments: Lungs are clear no wheezing rales or rhonchi  Abdominal:      General: Bowel sounds are normal  There is no distension  Palpations: Abdomen is soft  There is no mass  Tenderness: There is no abdominal tenderness  There is no guarding or rebound  Comments: Soft nontender   Musculoskeletal:         General: Normal range of motion  Cervical back: Normal range of motion and neck supple  Lymphadenopathy:      Cervical: No cervical adenopathy  Skin:     General: Skin is warm and dry  Neurological:      Mental Status: She is alert and oriented to person, place, and time     Psychiatric:         Behavior: Behavior normal          Judgment: Judgment normal

## 2022-01-11 ENCOUNTER — TELEPHONE (OUTPATIENT)
Dept: LAB | Facility: HOSPITAL | Age: 87
End: 2022-01-11

## 2022-01-15 ENCOUNTER — APPOINTMENT (OUTPATIENT)
Dept: LAB | Facility: HOSPITAL | Age: 87
End: 2022-01-15
Attending: INTERNAL MEDICINE
Payer: MEDICARE

## 2022-01-15 DIAGNOSIS — F23 BRIEF PSYCHOTIC DISORDER (HCC): ICD-10-CM

## 2022-01-15 DIAGNOSIS — E78.2 MIXED HYPERLIPIDEMIA: ICD-10-CM

## 2022-01-15 DIAGNOSIS — F03.91 DEMENTIA WITH BEHAVIORAL DISTURBANCE, UNSPECIFIED DEMENTIA TYPE (HCC): Chronic | ICD-10-CM

## 2022-01-15 DIAGNOSIS — M81.0 AGE-RELATED OSTEOPOROSIS WITHOUT CURRENT PATHOLOGICAL FRACTURE: ICD-10-CM

## 2022-01-15 DIAGNOSIS — N18.31 STAGE 3A CHRONIC KIDNEY DISEASE (HCC): ICD-10-CM

## 2022-01-15 DIAGNOSIS — I10 ESSENTIAL HYPERTENSION: ICD-10-CM

## 2022-01-15 DIAGNOSIS — I48.0 PAF (PAROXYSMAL ATRIAL FIBRILLATION) (HCC): ICD-10-CM

## 2022-01-15 LAB
25(OH)D3 SERPL-MCNC: 34.1 NG/ML (ref 30–100)
ALBUMIN SERPL BCP-MCNC: 3.9 G/DL (ref 3.5–5)
ALP SERPL-CCNC: 117 U/L (ref 46–116)
ALT SERPL W P-5'-P-CCNC: 25 U/L (ref 12–78)
ANION GAP SERPL CALCULATED.3IONS-SCNC: 3 MMOL/L (ref 4–13)
AST SERPL W P-5'-P-CCNC: 26 U/L (ref 5–45)
BASOPHILS # BLD AUTO: 0.01 THOUSANDS/ΜL (ref 0–0.1)
BASOPHILS NFR BLD AUTO: 0 % (ref 0–1)
BILIRUB SERPL-MCNC: 0.54 MG/DL (ref 0.2–1)
BUN SERPL-MCNC: 24 MG/DL (ref 5–25)
CALCIUM SERPL-MCNC: 9 MG/DL (ref 8.3–10.1)
CHLORIDE SERPL-SCNC: 104 MMOL/L (ref 100–108)
CHOLEST SERPL-MCNC: 189 MG/DL
CO2 SERPL-SCNC: 29 MMOL/L (ref 21–32)
CREAT SERPL-MCNC: 1.1 MG/DL (ref 0.6–1.3)
EOSINOPHIL # BLD AUTO: 0.12 THOUSAND/ΜL (ref 0–0.61)
EOSINOPHIL NFR BLD AUTO: 3 % (ref 0–6)
ERYTHROCYTE [DISTWIDTH] IN BLOOD BY AUTOMATED COUNT: 13.3 % (ref 11.6–15.1)
EST. AVERAGE GLUCOSE BLD GHB EST-MCNC: 88 MG/DL
GFR SERPL CREATININE-BSD FRML MDRD: 44 ML/MIN/1.73SQ M
GLUCOSE P FAST SERPL-MCNC: 91 MG/DL (ref 65–99)
HBA1C MFR BLD: 4.7 %
HCT VFR BLD AUTO: 37.7 % (ref 34.8–46.1)
HDLC SERPL-MCNC: 67 MG/DL
HGB BLD-MCNC: 12 G/DL (ref 11.5–15.4)
IMM GRANULOCYTES # BLD AUTO: 0.02 THOUSAND/UL (ref 0–0.2)
IMM GRANULOCYTES NFR BLD AUTO: 1 % (ref 0–2)
LDLC SERPL CALC-MCNC: 108 MG/DL (ref 0–100)
LYMPHOCYTES # BLD AUTO: 0.64 THOUSANDS/ΜL (ref 0.6–4.47)
LYMPHOCYTES NFR BLD AUTO: 15 % (ref 14–44)
MAGNESIUM SERPL-MCNC: 2.3 MG/DL (ref 1.6–2.6)
MCH RBC QN AUTO: 30 PG (ref 26.8–34.3)
MCHC RBC AUTO-ENTMCNC: 31.8 G/DL (ref 31.4–37.4)
MCV RBC AUTO: 94 FL (ref 82–98)
MONOCYTES # BLD AUTO: 0.44 THOUSAND/ΜL (ref 0.17–1.22)
MONOCYTES NFR BLD AUTO: 10 % (ref 4–12)
NEUTROPHILS # BLD AUTO: 3.03 THOUSANDS/ΜL (ref 1.85–7.62)
NEUTS SEG NFR BLD AUTO: 71 % (ref 43–75)
NRBC BLD AUTO-RTO: 0 /100 WBCS
PLATELET # BLD AUTO: 152 THOUSANDS/UL (ref 149–390)
PMV BLD AUTO: 10.9 FL (ref 8.9–12.7)
POTASSIUM SERPL-SCNC: 4.4 MMOL/L (ref 3.5–5.3)
PROT SERPL-MCNC: 7.6 G/DL (ref 6.4–8.2)
RBC # BLD AUTO: 4 MILLION/UL (ref 3.81–5.12)
SODIUM SERPL-SCNC: 136 MMOL/L (ref 136–145)
TRIGL SERPL-MCNC: 70 MG/DL
TSH SERPL DL<=0.05 MIU/L-ACNC: 1.85 UIU/ML (ref 0.36–3.74)
WBC # BLD AUTO: 4.26 THOUSAND/UL (ref 4.31–10.16)

## 2022-01-15 PROCEDURE — 85025 COMPLETE CBC W/AUTO DIFF WBC: CPT

## 2022-01-15 PROCEDURE — 80061 LIPID PANEL: CPT

## 2022-01-15 PROCEDURE — 83036 HEMOGLOBIN GLYCOSYLATED A1C: CPT

## 2022-01-15 PROCEDURE — 83735 ASSAY OF MAGNESIUM: CPT

## 2022-01-15 PROCEDURE — 84443 ASSAY THYROID STIM HORMONE: CPT

## 2022-01-15 PROCEDURE — 36415 COLL VENOUS BLD VENIPUNCTURE: CPT

## 2022-01-15 PROCEDURE — 82306 VITAMIN D 25 HYDROXY: CPT

## 2022-01-15 PROCEDURE — 80053 COMPREHEN METABOLIC PANEL: CPT

## 2022-01-18 ENCOUNTER — TELEPHONE (OUTPATIENT)
Dept: INTERNAL MEDICINE CLINIC | Facility: CLINIC | Age: 87
End: 2022-01-18

## 2022-01-18 ENCOUNTER — NURSE TRIAGE (OUTPATIENT)
Dept: OTHER | Facility: OTHER | Age: 87
End: 2022-01-18

## 2022-01-18 DIAGNOSIS — N39.0 ACUTE UTI: Primary | ICD-10-CM

## 2022-01-18 NOTE — TELEPHONE ENCOUNTER
Regarding: URINARY RETENTION SINCE Liv Bloom   ----- Message from Rayshawn Saenz sent at 1/18/2022  3:15 PM EST -----  Patient's son called that caregiver advised him that she is having issue urinating today but it had began on Friday  He wants to know what needs to be done      Son is listed on the communication consent

## 2022-01-18 NOTE — TELEPHONE ENCOUNTER
Son is not sure exact amount care givers are saying voiding very little  Reason for Disposition   [1] Unable to urinate (or only a few drops) > 4 hours AND [2] bladder feels very full (e g , palpable bladder or strong urge to urinate)    Answer Assessment - Initial Assessment Questions  1  SYMPTOM: "What's the main symptom you're concerned about?" (e g , frequency, incontinence)      Urination retention  2  ONSET: "When did the  Friday  start?"      Today is worse she has only voided a small amount  3  PAIN: "Is there any pain?" If Yes, ask: "How bad is it?" (Scale: 1-10; mild, moderate, severe)      None reported  4  CAUSE: "What do you think is causing the symptoms?"      unsure  5  OTHER SYMPTOMS: "Do you have any other symptoms?" (e g , fever, flank pain, blood in urine, pain with urination)      No fever, no blood   She is eating and drinking well      Protocols used: [facility]

## 2022-01-18 NOTE — TELEPHONE ENCOUNTER
Phone call from Becca Partida:  He says Uriah Mckeon is having trouble urinating  He was referred to [address] [place] [address] Urology, Uriah Mckeon was seen for this on 9 9 2021

## 2022-01-18 NOTE — TELEPHONE ENCOUNTER
Please call  Son Becca Partida tomorrow regarding his mothers urine retention  He plans to visit her today and has instructions to proceed to the Ed if having discomfort , fever  as per on call Blue

## 2022-01-19 DIAGNOSIS — I48.0 PAROXYSMAL A-FIB (HCC): ICD-10-CM

## 2022-01-19 DIAGNOSIS — K59.00 CONSTIPATION: ICD-10-CM

## 2022-01-19 RX ORDER — METOPROLOL SUCCINATE 50 MG/1
TABLET, EXTENDED RELEASE ORAL
Qty: 90 TABLET | Refills: 0 | Status: SHIPPED | OUTPATIENT
Start: 2022-01-19 | End: 2022-05-11

## 2022-01-19 NOTE — TELEPHONE ENCOUNTER
Called and spoke with patients son [facility] caregivers reported patient was able to void a little bit better yesterday evening, but he is still concerned with retention  States he would be agreeable to patient coming in to the [facility] today for a nurse visit  Reached out to [facility] RN who is agreeable to PVR visit today  Logan to contact patients caregiver to determine a good time for patient to come in, then call office back

## 2022-01-19 NOTE — TELEPHONE ENCOUNTER
Spoke with son Eliza Forbes  States patient unable to come into the office today, but would prefer to come in for a PVR in the [facility] tomorrow at 0900  States patient is voiding okay at this time (still in small amounts, however has no complaints or issues at this time)  Patient scheduled for nurse visit PVR tomorrow morning  Office location provided

## 2022-01-20 ENCOUNTER — PROCEDURE VISIT (OUTPATIENT)
Dept: UROLOGY | Facility: CLINIC | Age: 87
End: 2022-01-20
Payer: MEDICARE

## 2022-01-20 VITALS — RESPIRATION RATE: 20 BRPM | HEART RATE: 73 BPM | SYSTOLIC BLOOD PRESSURE: 130 MMHG | DIASTOLIC BLOOD PRESSURE: 70 MMHG

## 2022-01-20 DIAGNOSIS — R33.8 ACUTE URINARY RETENTION: Primary | ICD-10-CM

## 2022-01-20 LAB
AMORPH URATE CRY URNS QL MICRO: ABNORMAL /HPF
BACTERIA UR QL AUTO: ABNORMAL /HPF
BILIRUB UR QL STRIP: NEGATIVE
CLARITY UR: ABNORMAL
COLOR UR: YELLOW
GLUCOSE UR STRIP-MCNC: NEGATIVE MG/DL
HGB UR QL STRIP.AUTO: NEGATIVE
KETONES UR STRIP-MCNC: NEGATIVE MG/DL
LEUKOCYTE ESTERASE UR QL STRIP: ABNORMAL
NITRITE UR QL STRIP: NEGATIVE
NON-SQ EPI CELLS URNS QL MICRO: ABNORMAL /HPF
PH UR STRIP.AUTO: 6.5 [PH]
POST-VOID RESIDUAL VOLUME, ML POC: 355 ML
PROT UR STRIP-MCNC: NEGATIVE MG/DL
RBC #/AREA URNS AUTO: ABNORMAL /HPF
SP GR UR STRIP.AUTO: 1.02 (ref 1–1.03)
UROBILINOGEN UR QL STRIP.AUTO: 0.2 E.U./DL
WBC #/AREA URNS AUTO: ABNORMAL /HPF

## 2022-01-20 PROCEDURE — 87186 SC STD MICRODIL/AGAR DIL: CPT | Performed by: PHYSICIAN ASSISTANT

## 2022-01-20 PROCEDURE — 87086 URINE CULTURE/COLONY COUNT: CPT | Performed by: PHYSICIAN ASSISTANT

## 2022-01-20 PROCEDURE — 87077 CULTURE AEROBIC IDENTIFY: CPT | Performed by: PHYSICIAN ASSISTANT

## 2022-01-20 PROCEDURE — 51798 US URINE CAPACITY MEASURE: CPT

## 2022-01-20 PROCEDURE — 51702 INSERT TEMP BLADDER CATH: CPT

## 2022-01-20 PROCEDURE — 81001 URINALYSIS AUTO W/SCOPE: CPT | Performed by: PHYSICIAN ASSISTANT

## 2022-01-20 NOTE — PROGRESS NOTES
1/20/2022  Nicole Leone is a 80 y o  female  [id number]    Diagnosis:  Chief Complaint     Urinary Retention          Patient presents for follow up post void residual  managed by our office  Patient and patient's caregiver speak Malawian so Verner Hands MA assisted with translation  Per caregiver for the past week patient experiencing foul smelling urine , voiding infrequently and small amounts on the toilet  Patient   Wears depends for incontinence  Plan:  Plan another void trial in 2 weeks  Urine testing obtained  Assessment:  Discussed with China Barriga PA-C  patient's PVR results  It was decided to place rooney and obtain urine testing  Patient draped and prepped with betadine, 16fr rooney catheter   Inserted without issue and attached to nighttime bag  Bladder drained for 350 ml foul smelling cloudy yellow urine  Urine testing obtained  Vitals:    01/20/22 1009   BP: 130/70   Pulse: 73   Resp: 20           Patient voided 0 mL in the office    Post void residual measured via bladder scan to be 355 mL    Recent Results (from the past 1 hour(s))   POCT Measure PVR    Collection Time: 01/20/22 10:12 AM   Result Value Ref Range    POST-VOID RESIDUAL VOLUME, ML  mL       Brenda Khan RN

## 2022-01-22 LAB
BACTERIA UR CULT: ABNORMAL
BACTERIA UR CULT: ABNORMAL

## 2022-01-24 ENCOUNTER — TELEPHONE (OUTPATIENT)
Dept: OTHER | Facility: OTHER | Age: 87
End: 2022-01-24

## 2022-01-24 DIAGNOSIS — N39.0 ACUTE UTI: ICD-10-CM

## 2022-01-24 RX ORDER — NITROFURANTOIN 25; 75 MG/1; MG/1
100 CAPSULE ORAL 2 TIMES DAILY
Qty: 14 CAPSULE | Refills: 0 | Status: SHIPPED | OUTPATIENT
Start: 2022-01-24 | End: 2022-01-24 | Stop reason: SDUPTHER

## 2022-01-24 RX ORDER — NITROFURANTOIN 25; 75 MG/1; MG/1
100 CAPSULE ORAL 2 TIMES DAILY
Qty: 14 CAPSULE | Refills: 0 | Status: SHIPPED | OUTPATIENT
Start: 2022-01-24 | End: 2022-01-31

## 2022-01-24 NOTE — TELEPHONE ENCOUNTER
Called and spoke with son Bladimir Snyder  He was made aware of positive urine culture and that Macrobid was ordered for patient  States he will make sure patient begins taking antibiotic today  Confirmed void trial appointment for next week

## 2022-01-24 NOTE — TELEPHONE ENCOUNTER
Seen last week for nurse visit  pvr around 350  Sanders was placed  Samples collected  Please let patient/caregiver know culture is positive for bacteria/uti  I sent macrobid to pharmacy to begin today   Void trial next week at end of abx

## 2022-01-24 NOTE — TELEPHONE ENCOUNTER
Pharmacy is requesting the script be resent over since they did not get an electronic order for nitrofurantoin (MACROBID) 100 mg capsule

## 2022-02-03 ENCOUNTER — PROCEDURE VISIT (OUTPATIENT)
Dept: UROLOGY | Facility: CLINIC | Age: 87
End: 2022-02-03
Payer: MEDICARE

## 2022-02-03 VITALS
HEART RATE: 56 BPM | DIASTOLIC BLOOD PRESSURE: 70 MMHG | WEIGHT: 123 LBS | SYSTOLIC BLOOD PRESSURE: 100 MMHG | RESPIRATION RATE: 20 BRPM | BODY MASS INDEX: 25.82 KG/M2 | HEIGHT: 58 IN

## 2022-02-03 DIAGNOSIS — R33.8 ACUTE URINARY RETENTION: Primary | ICD-10-CM

## 2022-02-03 LAB — POST-VOID RESIDUAL VOLUME, ML POC: 32 ML

## 2022-02-03 PROCEDURE — 51798 US URINE CAPACITY MEASURE: CPT

## 2022-02-03 NOTE — PROGRESS NOTES
2/3/2022    Alexis Mcdonough  11/20/1931  [id number]    Diagnosis  Chief Complaint     Urinary Retention; Void Trial          Patient presents for void trial managed by our office  Greenlandic interpretor [id number] used for today's visit  Plan  Son wishes to follow up prn  Procedure Sanders removal/voiding trial    Sanders catheter removed after deflation of an intact balloon  Patient tolerated well  Encouraged patient to hydrate well and return this afternoon for post void residual   she knows she may return early if uncomfortable and unable to urinate  Patient agrees to this plan  Patient with dementia and incontinent of urine  Patient returned this afternoon  Patient states able to void  Patient voided 0 ml while in office  Bladder ultrasound performed and PVR measured 32 ml      Recent Results (from the past 1 hour(s))   POCT Measure PVR    Collection Time: 02/03/22  3:26 PM   Result Value Ref Range    POST-VOID RESIDUAL VOLUME, ML POC 32 mL         Vitals:    02/03/22 1008   BP: 100/70   Pulse: 56   Resp: 20   Weight: 55 8 kg (123 lb)   Height: 4' 10" (1 473 m)           Yoly Bartlett RN

## 2022-03-03 ENCOUNTER — TELEPHONE (OUTPATIENT)
Dept: INTERNAL MEDICINE CLINIC | Facility: CLINIC | Age: 87
End: 2022-03-03

## 2022-03-03 DIAGNOSIS — R82.90 ABNORMAL URINE ODOR: Primary | ICD-10-CM

## 2022-03-03 NOTE — TELEPHONE ENCOUNTER
Merline Knight returned call and stated that the patient has a terrible odor to her urine  Since she has dementia, it is hard to find out if she has any other symptom    He does know that she does not have a fever    Order place for UA and Urine Culture

## 2022-03-03 NOTE — TELEPHONE ENCOUNTER
Wilmer Cooks called the office on 3/2 at 3:54pm regarding that his mother has a terrible smell to her urine    LM on VM today for son to call back so that we can get a little more information on her symptoms

## 2022-03-04 ENCOUNTER — APPOINTMENT (OUTPATIENT)
Dept: LAB | Facility: HOSPITAL | Age: 87
End: 2022-03-04
Attending: INTERNAL MEDICINE
Payer: MEDICARE

## 2022-03-04 DIAGNOSIS — R82.90 ABNORMAL URINE ODOR: ICD-10-CM

## 2022-03-04 LAB
BACTERIA UR QL AUTO: ABNORMAL /HPF
BILIRUB UR QL STRIP: NEGATIVE
CLARITY UR: ABNORMAL
COLOR UR: YELLOW
GLUCOSE UR STRIP-MCNC: NEGATIVE MG/DL
HGB UR QL STRIP.AUTO: ABNORMAL
KETONES UR STRIP-MCNC: NEGATIVE MG/DL
LEUKOCYTE ESTERASE UR QL STRIP: ABNORMAL
NITRITE UR QL STRIP: NEGATIVE
NON-SQ EPI CELLS URNS QL MICRO: ABNORMAL /HPF
PH UR STRIP.AUTO: 8.5 [PH]
PROT UR STRIP-MCNC: ABNORMAL MG/DL
RBC #/AREA URNS AUTO: ABNORMAL /HPF
SP GR UR STRIP.AUTO: 1.01 (ref 1–1.03)
TRI-PHOS CRY URNS QL MICRO: ABNORMAL /HPF
UROBILINOGEN UR QL STRIP.AUTO: 0.2 E.U./DL
WBC #/AREA URNS AUTO: ABNORMAL /HPF

## 2022-03-04 PROCEDURE — 87086 URINE CULTURE/COLONY COUNT: CPT

## 2022-03-04 PROCEDURE — 81001 URINALYSIS AUTO W/SCOPE: CPT

## 2022-03-05 LAB — BACTERIA UR CULT: NORMAL

## 2022-03-07 ENCOUNTER — TELEPHONE (OUTPATIENT)
Dept: INTERNAL MEDICINE CLINIC | Facility: CLINIC | Age: 87
End: 2022-03-07

## 2022-03-07 DIAGNOSIS — N30.00 ACUTE CYSTITIS WITHOUT HEMATURIA: Primary | ICD-10-CM

## 2022-03-07 RX ORDER — SULFAMETHOXAZOLE AND TRIMETHOPRIM 800; 160 MG/1; MG/1
1 TABLET ORAL EVERY 12 HOURS SCHEDULED
Qty: 6 TABLET | Refills: 1 | Status: SHIPPED | OUTPATIENT
Start: 2022-03-07 | End: 2022-03-10

## 2022-03-07 NOTE — TELEPHONE ENCOUNTER
Spoke to Lea, states her urine still has a fowl smell  Would like antibiotic to be sent to pharmacy    Script will be sent for the Bactrim as per Dr Erika Ruano,      ----- Message from Roman Chatterjee MD sent at 3/4/2022  4:25 PM EST -----  Please call the patient regarding her abnormal result  pyuria  Sx  +  uti  Tx  bactrim ds  1  bid  3  days

## 2022-03-26 ENCOUNTER — HOSPITAL ENCOUNTER (EMERGENCY)
Facility: HOSPITAL | Age: 87
Discharge: HOME | End: 2022-03-26
Attending: EMERGENCY MEDICINE
Payer: MEDICARE

## 2022-03-26 VITALS
SYSTOLIC BLOOD PRESSURE: 137 MMHG | TEMPERATURE: 98.3 F | HEART RATE: 61 BPM | DIASTOLIC BLOOD PRESSURE: 74 MMHG | RESPIRATION RATE: 16 BRPM | OXYGEN SATURATION: 97 %

## 2022-03-26 DIAGNOSIS — N39.0 UTI (URINARY TRACT INFECTION): Primary | ICD-10-CM

## 2022-03-26 LAB
BACTERIA UR QL AUTO: ABNORMAL /HPF
BILIRUB UR QL STRIP: NEGATIVE
CLARITY UR: ABNORMAL
COLOR UR: YELLOW
GLUCOSE UR STRIP-MCNC: NEGATIVE MG/DL
HGB UR QL STRIP.AUTO: ABNORMAL
KETONES UR STRIP-MCNC: NEGATIVE MG/DL
LEUKOCYTE ESTERASE UR QL STRIP: ABNORMAL
NITRITE UR QL STRIP: NEGATIVE
NON-SQ EPI CELLS URNS QL MICRO: ABNORMAL /HPF
PH UR STRIP.AUTO: 8.5 [PH] (ref 4.5–8)
PROT UR STRIP-MCNC: ABNORMAL MG/DL
RBC #/AREA URNS AUTO: ABNORMAL /HPF
SP GR UR STRIP.AUTO: 1.02 (ref 1–1.03)
TRI-PHOS CRY URNS QL MICRO: ABNORMAL /HPF
UROBILINOGEN UR QL STRIP.AUTO: 1 E.U./DL
WBC #/AREA URNS AUTO: ABNORMAL /HPF

## 2022-03-26 PROCEDURE — 87086 URINE CULTURE/COLONY COUNT: CPT

## 2022-03-26 PROCEDURE — 99283 EMERGENCY DEPT VISIT LOW MDM: CPT

## 2022-03-26 PROCEDURE — 87077 CULTURE AEROBIC IDENTIFY: CPT

## 2022-03-26 PROCEDURE — 87186 SC STD MICRODIL/AGAR DIL: CPT

## 2022-03-26 PROCEDURE — 99284 EMERGENCY DEPT VISIT MOD MDM: CPT | Performed by: EMERGENCY MEDICINE

## 2022-03-26 PROCEDURE — 81001 URINALYSIS AUTO W/SCOPE: CPT

## 2022-03-26 RX ORDER — AMOXICILLIN 500 MG/1
500 CAPSULE ORAL EVERY 12 HOURS SCHEDULED
Qty: 13 CAPSULE | Refills: 0 | Status: SHIPPED | OUTPATIENT
Start: 2022-03-26 | End: 2022-04-02

## 2022-03-26 RX ORDER — AMOXICILLIN 250 MG/1
500 CAPSULE ORAL ONCE
Status: COMPLETED | OUTPATIENT
Start: 2022-03-26 | End: 2022-03-26

## 2022-03-26 RX ADMIN — AMOXICILLIN 500 MG: 250 CAPSULE ORAL at 19:45

## 2022-03-26 NOTE — ED PROVIDER NOTES
History  Chief Complaint   Patient presents with    Possible UTI     Per family patient urine has a strong odor  Pt  is not urinating as normal      80-year-old woman with relevant PMH recurrent UTI presents with foul smelling urine  Her home caretaker noticed her urine smelled last Saturday and as the week progressed it has become more foul smelling  She has had this multiple times in the past  Her last culture grew mixed contaminants  The culture before grew pan sensitive Aerococcus and Enterococcus  No fevers, vomiting, abdominal pain, dysuria, hematuria, or diarrhea  Patient does defecate on the toilet but has caretakers wipe her   Faheem Campos [id number] used  Prior to Admission Medications   Prescriptions Last Dose Informant Patient Reported? Taking?    Blood Pressure Monitoring (Blood Pressure Cuff) MISC  Care Giver No No   Sig: Use daily   Cartia  MG 24 hr capsule   No No   Sig: TAKE 1 CAPSULE BY MOUTH DAILY   DULoxetine (CYMBALTA) 30 mg delayed release capsule  Care Giver Yes No   Sig: Take 30 mg by mouth daily at bedtime   LORazepam (ATIVAN) 0 5 mg tablet  Care Giver Yes No   Sig: Take 0 5 mg by mouth    OLANZapine (ZyPREXA) 5 mg tablet  Care Giver No No   Sig: Take 1 tablet (5 mg total) by mouth daily at bedtime   Stool Softener 100 MG capsule  Care Giver No No   Sig: TAKE 1 CAPSULE BY MOUTH EVERY 12 HOURS FOR CONSTIPATION   acetaminophen (TYLENOL) 325 mg tablet Not Taking at Unknown time Care Giver No No   Sig: Take 2 tablets (650 mg total) by mouth every 6 (six) hours as needed for mild pain, moderate pain or headaches   Patient not taking: Reported on 3/26/2022    albuterol (2 5 mg/3 mL) 0 083 % nebulizer solution  Care Giver No No   Sig: Take 1 vial (2 5 mg total) by nebulization every 4 (four) hours as needed for wheezing or shortness of breath   Patient not taking: Reported on 1/20/2022    albuterol (PROVENTIL HFA,VENTOLIN HFA) 90 mcg/act inhaler  Care Giver No No   Sig: Inhale 2 puffs every 4 (four) hours as needed for wheezing   Patient not taking: Reported on 1/20/2022    amLODIPine (NORVASC) 2 5 mg tablet  Care Giver No No   Sig: Take 1 tablet (2 5 mg total) by mouth daily   amiodarone 200 mg tablet Not Taking at Unknown time Care Giver No No   Sig: TAKE 1 TABLET (200 MG TOTAL) BY MOUTH DAILY   Patient not taking: Reported on 3/26/2022    bisacodyl (DULCOLAX) 10 mg suppository  Care Giver No No   Sig: Insert 1 suppository (10 mg total) into the rectum daily as needed for constipation (2nd line)   Patient not taking: Reported on 9/29/2021   cyanocobalamin (VITAMIN B-12) 100 MCG tablet  Care Giver Yes No   Sig: Take 100 mcg by mouth daily   Patient not taking: Reported on 1/20/2022    diltiazem (CARDIZEM CD) 180 mg 24 hr capsule  Care Giver No No   Sig: TAKE 1 CAPSULE (180 MG TOTAL) BY MOUTH DAILY   Patient not taking: Reported on 1/20/2022    melatonin 3 mg  Care Giver Yes No   Sig: Take 3 mg by mouth daily at bedtime Take 2 tablets at bedtime     Patient not taking: Reported on 1/20/2022    metoprolol succinate (TOPROL-XL) 50 mg 24 hr tablet  Care Giver No No   Sig: TAKE 1 TABLET BY MOUTH DAILY   polyethylene glycol (MIRALAX) 17 g packet  Care Giver No No   Sig: Take 17 g by mouth daily as needed (constipation first line)   Patient not taking: Reported on 12/2/2021    simethicone (MYLICON) 80 mg chewable tablet  Care Giver No No   Sig: Chew 1 tablet (80 mg total) 4 (four) times a day as needed for flatulence   Patient not taking: Reported on 1/20/2022    traZODone (DESYREL) 50 mg tablet  Care Giver Yes No   Sig: Take 50 mg by mouth daily at bedtime      Facility-Administered Medications: None       Past Medical History:   Diagnosis Date    Anxiety     Cognitive impairment     COVID-19 virus infection 4/13/2020    [facility]     Depression     Hallucination     Hyperlipidemia     Hypertension     Memory loss     Psychiatric illness     Psychosis [facility] )     Retention of urine     [facility]oaffect[facility] disorder [facility]     Sleep difficulties        Past Surgical History:   Procedure Laterality Date    APPENDECTOMY      BLADDER SURGERY      CHOLECYSTECTOMY      NC OPEN RX FEMUR FX+INTRAMED BRYSON Right 7/15/2021    Procedure: INSERTION NAIL IM FEMUR ANTEGRADE (TROCHANTERIC) right;  Surgeon: Kevyn Coffman MD;  Location: [facility];  Service: Orthopedics    TOTAL ABDOMINAL HYSTERECTOMY W/ BILATERAL SALPINGOOPHORECTOMY      TOTAL KNEE ARTHROPLASTY Left        Family History   Problem Relation Age of Onset    Heart disease Mother         Cardiac disorder    Parkinsonism Father     Heart disease Sister         Cardiac disorder    Hypertension Sister     Diabetes Child         Diabetes Mellitus    Lung disease Child         Respiratory Disorder    Diabetes Son         Diabetes Mellitus     I have reviewed and agree with the history as documented  E-Cigarette/Vaping    E-Cigarette Use Never User      E-Cigarette/Vaping Substances    Nicotine No     THC No     CBD No     Flavoring No     Other No     Unknown No      Social History     Tobacco Use    Smoking status: Never Smoker    Smokeless tobacco: Never Used    Tobacco comment: Former smoker per [employer]   Vaping Use    Vaping Use: Never used   Substance Use Topics    Alcohol use: Never    Drug use: No        Review of Systems   Constitutional: Negative for chills and fever  HENT: Negative for ear pain and sore throat  Eyes: Negative for pain and visual disturbance  Respiratory: Negative for cough, shortness of breath and wheezing  Cardiovascular: Negative for chest pain and palpitations  Gastrointestinal: Negative for abdominal pain, constipation, diarrhea and vomiting  Genitourinary: Negative for dysuria, frequency, hematuria and vaginal bleeding  Musculoskeletal: Negative for arthralgias and back pain  Skin: Negative for color change and rash     Neurological: Negative for seizures, syncope and headaches  Psychiatric/Behavioral: Negative for agitation and confusion  Physical Exam  ED Triage Vitals [03/26/22 1801]   Temperature Pulse Respirations Blood Pressure SpO2   98 3 °F (36 8 °C) 60 18 135/82 98 %      Temp Source Heart Rate Source Patient Position - Orthostatic VS BP Location FiO2 (%)   Oral Monitor Sitting Right arm --      Pain Score       --             Orthostatic Vital Signs  Vitals:    03/26/22 1801   BP: 135/82   Pulse: 60   Patient Position - Orthostatic VS: Sitting       Physical Exam  Vitals and nursing note reviewed  Constitutional:       General: She is not in acute distress  Appearance: Normal appearance  She is well-developed  HENT:      Head: Normocephalic and atraumatic  Right Ear: External ear normal       Left Ear: External ear normal       Nose: Nose normal  No congestion  Cardiovascular:      Rate and Rhythm: Normal rate and regular rhythm  Pulmonary:      Effort: Pulmonary effort is normal  No respiratory distress  Breath sounds: Normal breath sounds  Abdominal:      Palpations: Abdomen is soft  Tenderness: There is no abdominal tenderness  There is no guarding or rebound  Musculoskeletal:         General: Normal range of motion  Cervical back: Normal range of motion and neck supple  Right lower leg: No edema  Left lower leg: No edema  Skin:     General: Skin is warm and dry  Neurological:      General: No focal deficit present  Mental Status: She is alert and oriented to person, place, and time  Sensory: No sensory deficit  Motor: No weakness     Psychiatric:         Mood and Affect: Mood normal          Behavior: Behavior normal          ED Medications  Medications   amoxicillin (AMOXIL) capsule 500 mg (has no administration in time range)       Diagnostic Studies  Results Reviewed     Procedure Component Value Units Date/Time    Urine Microscopic [[id number]]  (Abnormal) Collected: 03/26/22 4333 Lab Status: Final result Specimen: Urine, Clean Catch Updated: 03/26/22 1854     RBC, UA       Field obscured, unable to enumerate     /hpf     WBC, UA Innumerable /hpf      Epithelial Cells Innumerable /hpf      Bacteria, UA Innumerable /hpf      Triplep Phos Evelyn, UA Occasional /hpf     Urine culture [[id number]] Collected: 03/26/22 1831    Lab Status: In process Specimen: Urine, Clean Catch Updated: 03/26/22 1854    Urine Macroscopic, POC [[id number]]  (Abnormal) Collected: 03/26/22 1831    Lab Status: Final result Specimen: Urine Updated: 03/26/22 1832     Color, UA Yellow     Clarity, UA Cloudy     pH, UA 8 5     Leukocytes, UA Large     Nitrite, UA Negative     Protein,  (2+) mg/dl      Glucose, UA Negative mg/dl      Ketones, UA Negative mg/dl      Urobilinogen, UA 1 0 E U /dl      Bilirubin, UA Negative     Blood, UA Small     Specific [place], UA 1 020    Narrative:      CLINITEK RESULT                 No orders to display         Procedures  Procedures      ED Course                             SBIRT 22yo+      Most Recent Value   SBIRT (24 yo +)    In order to provide better care to our patients, we are screening all of our patients for alcohol and drug use  Would it be okay to ask you these screening questions? Yes Filed at: 03/26/2022 4556   Initial Alcohol Screen: US AUDIT-C     1  How often do you have a drink containing alcohol? 0 Filed at: 03/26/2022 1834   2  How many drinks containing alcohol do you have on a typical day you are drinking? 0 Filed at: 03/26/2022 1834   3b  FEMALE Any Age, or MALE 65+: How often do you have 4 or more drinks on one occassion? 0 Filed at: 03/26/2022 1834   Audit-C Score 0 Filed at: 03/26/2022 0457   ROBERT: How many times in the past year have you    Used an illegal drug or used a prescription medication for non-medical reasons?  Never Filed at: 03/26/2022 1834                MDM  Number of Diagnoses or Management Options  UTI (urinary tract infection)  Diagnosis management comments: Presenting with foul smelling urine, similar to prior UTIs  Last urine culture grew mixed contaminants but the second to last grew aerococcus and enterococcus that were pan sensitive  Current specimen appears contaminated but does show signs of infection  Will prescribe amoxicillin and recommend urology follow up  No evidence of pyelo and my exam was nonfocal  Vital signs were normal  Caretaker in agreement with plan and questions were answered  Verbalized understanding of return precautions  Portions or all of this note were generated using voice recognition software  Occasional wrong word or "sound a like" substitutions may have occurred due to the inherent limitations of voice recognition software  Please interpret any errors within the intended context of the whole sentence or idea  Disposition  Final diagnoses:   UTI (urinary tract infection)     Time reflects when diagnosis was documented in both MDM as applicable and the Disposition within this note     Time User Action Codes Description Comment    3/26/2022  7:25 PM Lexx Potter Add [N39 0] UTI (urinary tract infection)       ED Disposition     ED Disposition Condition Date/Time Comment    Discharge Stable Sat Mar 26, 2022  7:25 PM Miky Olmedo discharge to home/self care              Follow-up Information     Follow up With Specialties Details Why Contact Info Additional [address] [facility] Go in 3 days  [facility] 386 [phone]  [phone]  [facility], [address] [place] [address], [id number] [phone]          Patient's Medications   Discharge Prescriptions    AMOXICILLIN (AMOXIL) 500 MG CAPSULE    Take 1 capsule (500 mg total) by mouth every 12 (twelve) hours for 7 days       Start Date: 3/26/2022 End Date: 4/2/2022       Order Dose: 500 mg Quantity: 13 capsule    Refills: 0     No discharge procedures on file  PDMP Review       Value Time User    PDMP Reviewed  Yes 7/16/2021  8:50 AM Angella Sears PA-C           ED Provider  Attending physically available and evaluated Yamilet Queen I managed the patient along with the ED Attending      Electronically Signed by         Rachna Corona MD  03/26/22 2002

## 2022-03-28 LAB
BACTERIA UR CULT: ABNORMAL
BACTERIA UR CULT: ABNORMAL

## 2022-03-31 ENCOUNTER — OFFICE VISIT (OUTPATIENT)
Dept: UROLOGY | Facility: CLINIC | Age: 87
End: 2022-03-31
Payer: COMMERCIAL

## 2022-03-31 VITALS
BODY MASS INDEX: 25.71 KG/M2 | RESPIRATION RATE: 20 BRPM | WEIGHT: 123 LBS | SYSTOLIC BLOOD PRESSURE: 110 MMHG | HEART RATE: 60 BPM | DIASTOLIC BLOOD PRESSURE: 60 MMHG

## 2022-03-31 DIAGNOSIS — R33.8 ACUTE URINARY RETENTION: Primary | ICD-10-CM

## 2022-03-31 PROCEDURE — 99214 OFFICE O/P EST MOD 30 MIN: CPT | Performed by: PHYSICIAN ASSISTANT

## 2022-03-31 RX ORDER — TERAZOSIN 1 MG/1
1 CAPSULE ORAL
Qty: 90 CAPSULE | Refills: 1 | Status: SHIPPED | OUTPATIENT
Start: 2022-03-31 | End: 2022-07-06

## 2022-03-31 NOTE — PROGRESS NOTES
3/31/2022      Chief Complaint   Patient presents with    Urinary Tract Infection    Urinary Retention         Assessment and Plan    80 y o  female managed by AP team    1  Incomplete bladder emptying  2  Bacteriuria    [employer]  services were used for the duration of today's visit  Her PVR measures 249 mL  Her last void was estimated to be 1-1 5 hours ago  I have offered trial of Hytrin 1 mg taken at bedtime over the next three months in addition to regular toileting, bowel regimen to avoid constipation to aid in improved bladder emptying at her age  Return at that time for reassessment with PVR at visit  History of Present Illness  Jamie Hernandez is a 80 y o  female here for evaluation of urinary retention follow-up  Patient seen by me in September for episode of urinary retention has successful void trial   She was doing well up until about six weeks ago she was diagnosed with urinary tract infection  She took a course of antibiotics she had a catheter placed briefly for around 250 mL with another successful void trialed a week later  She has been doing fine since that time though return to the emergency room last week with a strong odor to her urine noted by her caregivers  She was prescribed amoxicillin for possible UTI  Culture grew Enterococcus faecalis and aerococcus urinae  Again she is feeling well  She is unable to communicate any complaints to her caregivers if she is aphasic  There is also a language barrier, her caregiver with her today also does not speak [place]  Patient has not had any fevers or complain of any abdominal pain  Her urine has been yellow      Review of Systems   Constitutional: Negative for activity change, appetite change, chills, fever and unexpected weight change  HENT: Negative  Respiratory: Negative  Negative for shortness of breath  Cardiovascular: Negative  Negative for chest pain     Gastrointestinal: Negative for abdominal pain, diarrhea, nausea and vomiting  Endocrine: Negative  Genitourinary: Negative for decreased urine volume, difficulty urinating, dysuria, flank pain, frequency, hematuria, pelvic pain and urgency  Musculoskeletal: Negative for back pain and gait problem  Skin: Negative  Allergic/Immunologic: Negative  Neurological: Negative  Hematological: Negative for adenopathy  Does not bruise/bleed easily  Vitals  Vitals:    03/31/22 1005   BP: 110/60   Pulse: 60   Resp: 20   Weight: 55 8 kg (123 lb)       Physical Exam  Vitals and nursing note reviewed  Constitutional:       General: She is not in acute distress  Appearance: She is well-developed  She is not diaphoretic  HENT:      Head: Normocephalic and atraumatic  Pulmonary:      Effort: Pulmonary effort is normal    Skin:     General: Skin is warm and dry  Neurological:      Mental Status: She is alert and oriented to person, place, and time  Gait: Gait normal    Psychiatric:         Speech: Speech normal          Behavior: Behavior normal            Past History  Past Medical History:   Diagnosis Date    Anxiety     Cognitive impairment     COVID-19 virus infection 4/13/2020    [facility]     Depression     Hallucination     Hyperlipidemia     Hypertension     Memory loss     Psychiatric illness     [facility]osis [facility]     Retention of urine     [facility]     Sleep difficulties      Social History     Socioeconomic History    Marital status:       Spouse name: None    Number of children: None    Years of education: None    Highest education level: None   Occupational History    None   Tobacco Use    Smoking status: Never Smoker    Smokeless tobacco: Never Used    Tobacco comment: Former smoker per [employer]   Vaping Use    Vaping Use: Never used   Substance and Sexual Activity    Alcohol use: Never    Drug use: No    Sexual activity: None   Other Topics Concern    None Social History Narrative    Drinks coffee     Social Determinants of Health     Financial Resource Strain: Not on file   Food Insecurity: Not on file   Transportation Needs: Not on file   Physical Activity: Not on file   Stress: Not on file   Social Connections: Not on file   Intimate Partner Violence: Not on file   Housing Stability: Not on file     Social History     Tobacco Use   Smoking Status Never Smoker   Smokeless Tobacco Never Used   Tobacco Comment    Former smoker per [employer]     Family History   Problem Relation Age of Onset    Heart disease Mother         Cardiac disorder    Parkinsonism Father     Heart disease Sister         Cardiac disorder    Hypertension Sister     Diabetes Child         Diabetes Mellitus    Lung disease Child         Respiratory Disorder    Diabetes Son         Diabetes Mellitus       The following portions of the patient's history were reviewed and updated as appropriate: allergies, current medications, past medical history, past social history, past surgical history and problem list     Results  No results found for this or any previous visit (from the past 1 hour(s))  ]  No results found for: PSA  Lab Results   Component Value Date    GLUCOSE 114 07/29/2014    CALCIUM 9 0 01/15/2022     07/29/2014    K 4 4 01/15/2022    CO2 29 01/15/2022     01/15/2022    BUN 24 01/15/2022    CREATININE 1 10 01/15/2022     Lab Results   Component Value Date    WBC 4 26 (L) 01/15/2022    HGB 12 0 01/15/2022    HCT 37 7 01/15/2022    MCV 94 01/15/2022     01/15/2022

## 2022-04-05 ENCOUNTER — OFFICE VISIT (OUTPATIENT)
Dept: INTERNAL MEDICINE CLINIC | Facility: CLINIC | Age: 87
End: 2022-04-05
Payer: MEDICARE

## 2022-04-05 VITALS
TEMPERATURE: 97.6 F | HEART RATE: 70 BPM | SYSTOLIC BLOOD PRESSURE: 116 MMHG | RESPIRATION RATE: 12 BRPM | DIASTOLIC BLOOD PRESSURE: 56 MMHG

## 2022-04-05 DIAGNOSIS — F03.90 MAJOR NEUROCOGNITIVE DISORDER (HCC): ICD-10-CM

## 2022-04-05 DIAGNOSIS — I10 ESSENTIAL HYPERTENSION: Primary | ICD-10-CM

## 2022-04-05 DIAGNOSIS — E78.2 MIXED HYPERLIPIDEMIA: ICD-10-CM

## 2022-04-05 DIAGNOSIS — F03.91 DEMENTIA WITH BEHAVIORAL DISTURBANCE, UNSPECIFIED DEMENTIA TYPE (HCC): ICD-10-CM

## 2022-04-05 DIAGNOSIS — R26.2 AMBULATORY DYSFUNCTION: ICD-10-CM

## 2022-04-05 DIAGNOSIS — M81.0 AGE-RELATED OSTEOPOROSIS WITHOUT CURRENT PATHOLOGICAL FRACTURE: ICD-10-CM

## 2022-04-05 DIAGNOSIS — E53.8 B12 DEFICIENCY: ICD-10-CM

## 2022-04-05 DIAGNOSIS — S72.001A CLOSED FRACTURE OF RIGHT HIP, INITIAL ENCOUNTER (HCC): ICD-10-CM

## 2022-04-05 DIAGNOSIS — N18.31 STAGE 3A CHRONIC KIDNEY DISEASE (HCC): ICD-10-CM

## 2022-04-05 DIAGNOSIS — F20.3 UNDIFFERENTIATED SCHIZOPHRENIA (HCC): Chronic | ICD-10-CM

## 2022-04-05 DIAGNOSIS — I48.0 PAF (PAROXYSMAL ATRIAL FIBRILLATION) (HCC): ICD-10-CM

## 2022-04-05 PROCEDURE — 99214 OFFICE O/P EST MOD 30 MIN: CPT | Performed by: INTERNAL MEDICINE

## 2022-04-05 NOTE — PATIENT INSTRUCTIONS
No change in medications  Follow-up with Urology  Will see her back in 3 months    Recommend cardiology follow-up your on amiodarone 200 mg

## 2022-04-05 NOTE — PROGRESS NOTES
Assessment/Plan:      1  Schizophrenia she is very counter and smiling but not communicating very much not answering questions  She looks comfortable she is here with her aide that gives her her medications    2  Frequent urinary tract infection has been follow with urologist checking cultures  She denies any urinary symptoms now recently was treated with urinary tract infection    3  Blood pressure well control she is taking the following medications  Amlodipine 2 5 mg   Cardizem 120 mg  Metoprolol succinate 50 mg    Problems voiding with some residual urine at times urologist at put her on Hytrin 1 mg at bedtime which also can control her blood pressure  History of paroxysmal atrial fibrillation she is on amiodarone 200 mg per day continue on the same medication will check liver function studies she should follow-up with Cardiology check     A risk of falling she will benefit from a shower chair        Labs review with the aid    Results for orders placed or performed during the hospital encounter of 03/26/22   Urine culture    Specimen: Urine, Clean Catch   Result Value Ref Range    Urine Culture >100,000 cfu/ml Enterococcus faecalis (A)     Urine Culture >100,000 cfu/ml Aerococcus urinae (A)        Susceptibility    Aerococcus urinae - JESUS     ZID Performed Yes      Enterococcus faecalis - JESUS     ZID Performed Yes       Ampicillin ($$) <=2 00 Susceptible ug/ml     Levofloxacin ($) 2 00 Susceptible ug/ml     Nitrofurantoin <=32 Susceptible ug/ml     Tetracycline <=2 Susceptible ug/ml     Vancomycin ($) 1 00 Susceptible ug/ml   Urine Microscopic   Result Value Ref Range    RBC, UA Field obscured, unable to enumerate (A) None Seen, 2-4 /hpf    WBC, UA Innumerable (A) None Seen, 2-4, 5-60 /hpf    Epithelial Cells Innumerable (A) None Seen, Occasional /hpf    Bacteria, UA Innumerable (A) None Seen, Occasional /hpf    Triplep Phos Evelyn, UA Occasional /hpf   Urine Macroscopic, POC   Result Value Ref Range Color, UA Yellow     Clarity, UA Cloudy     pH, UA 8 5 (H) 4 5 - 8 0    Leukocytes, UA Large (A) Negative    Nitrite, UA Negative Negative    Protein,  (2+) (A) Negative mg/dl    Glucose, UA Negative Negative mg/dl    Ketones, UA Negative Negative mg/dl    Urobilinogen, UA 1 0 0 2, 1 0 E U /dl E U /dl    Bilirubin, UA Negative Negative    Blood, UA Small (A) Negative    Specific Gravity, UA 1 020 1 003 - 1 030           No problem-specific Assessment & Plan notes found for this encounter  Diagnoses and all orders for this visit:    Essential hypertension    PAF (paroxysmal atrial fibrillation) ([facility])    Major neurocognitive disorder ([facility] )    Stage 3a chronic kidney disease ([facility] )    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[facility] )    Mixed hyperlipidemia          Subjective:      Patient ID: Jigna Vázquez is a 80 y o  female  No chief complaint on file          Current Outpatient Medications:     acetaminophen (TYLENOL) 325 mg tablet, Take 2 tablets (650 mg total) by mouth every 6 (six) hours as needed for mild pain, moderate pain or headaches (Patient not taking: Reported on 3/26/2022 ), Disp: 30 tablet, Rfl: 0    albuterol (2 5 mg/3 mL) 0 083 % nebulizer solution, Take 1 vial (2 5 mg total) by nebulization every 4 (four) hours as needed for wheezing or shortness of breath (Patient not taking: Reported on 1/20/2022 ), Disp: 1 vial, Rfl: 0    albuterol (PROVENTIL HFA,VENTOLIN HFA) 90 mcg/act inhaler, Inhale 2 puffs every 4 (four) hours as needed for wheezing (Patient not taking: Reported on 1/20/2022 ), Disp: 1 Inhaler, Rfl: 0    amiodarone 200 mg tablet, TAKE 1 TABLET (200 MG TOTAL) BY MOUTH DAILY (Patient not taking: Reported on 3/26/2022 ), Disp: 90 tablet, Rfl: 0    amLODIPine (NORVASC) 2 5 mg tablet, Take 1 tablet (2 5 mg total) by mouth daily, Disp: 30 tablet, Rfl: 5    bisacodyl (DULCOLAX) 10 mg suppository, Insert 1 suppository (10 mg total) into the rectum daily as needed for constipation (2nd line) (Patient not taking: Reported on 9/29/2021), Disp: 12 suppository, Rfl: 0    Blood Pressure Monitoring (Blood Pressure Cuff) MISC, Use daily, Disp: 1 each, Rfl: 0    Cartia  MG 24 hr capsule, TAKE 1 CAPSULE BY MOUTH DAILY, Disp: 30 capsule, Rfl: 0    cyanocobalamin (VITAMIN B-12) 100 MCG tablet, Take 100 mcg by mouth daily (Patient not taking: Reported on 1/20/2022 ), Disp: , Rfl:     diltiazem (CARDIZEM CD) 180 mg 24 hr capsule, TAKE 1 CAPSULE (180 MG TOTAL) BY MOUTH DAILY (Patient not taking: Reported on 1/20/2022 ), Disp: 30 capsule, Rfl: 0    DULoxetine (CYMBALTA) 30 mg delayed release capsule, Take 30 mg by mouth daily at bedtime, Disp: , Rfl:     LORazepam (ATIVAN) 0 5 mg tablet, Take 0 5 mg by mouth , Disp: , Rfl:     melatonin 3 mg, Take 3 mg by mouth daily at bedtime Take 2 tablets at bedtime   (Patient not taking: Reported on 1/20/2022 ), Disp: , Rfl:     metoprolol succinate (TOPROL-XL) 50 mg 24 hr tablet, TAKE 1 TABLET BY MOUTH DAILY, Disp: 90 tablet, Rfl: 0    OLANZapine (ZyPREXA) 5 mg tablet, Take 1 tablet (5 mg total) by mouth daily at bedtime, Disp: 30 tablet, Rfl: 1    polyethylene glycol (MIRALAX) 17 g packet, Take 17 g by mouth daily as needed (constipation first line) (Patient not taking: Reported on 12/2/2021 ), Disp: 14 each, Rfl: 0    simethicone (MYLICON) 80 mg chewable tablet, Chew 1 tablet (80 mg total) 4 (four) times a day as needed for flatulence (Patient not taking: Reported on 1/20/2022 ), Disp: 30 tablet, Rfl: 0    Stool Softener 100 MG capsule, TAKE 1 CAPSULE BY MOUTH EVERY 12 HOURS FOR CONSTIPATION, Disp: 90 capsule, Rfl: 0    terazosin (HYTRIN) 1 mg capsule, Take 1 capsule (1 mg total) by mouth daily at bedtime, Disp: 90 capsule, Rfl: 1    traZODone (DESYREL) 50 mg tablet, Take 50 mg by mouth daily at bedtime, Disp: , Rfl:     HPI    The following portions of the patient's history were reviewed and updated as appropriate: allergies, current medications, past family history, past medical history, past social history, past surgical history and problem list     Review of Systems   Constitutional: Negative  Negative for activity change, appetite change, fatigue, fever and unexpected weight change  HENT: Negative for congestion, ear pain, hearing loss, mouth sores, postnasal drip, rhinorrhea, sore throat, trouble swallowing and voice change  Eyes: Negative for pain, redness and visual disturbance  Respiratory: Negative for cough, chest tightness, shortness of breath and wheezing  Cardiovascular: Negative for chest pain, palpitations and leg swelling  Gastrointestinal: Negative for abdominal distention, abdominal pain, blood in stool, constipation, diarrhea and nausea  Endocrine: Negative for cold intolerance, heat intolerance, polydipsia, polyphagia and polyuria  Genitourinary: Negative for difficulty urinating, dysuria, flank pain, frequency, hematuria and urgency  Musculoskeletal: Negative for arthralgias, back pain, gait problem, joint swelling and myalgias  Skin: Negative for color change and pallor  Neurological: Negative for dizziness, tremors, seizures, syncope, weakness, numbness and headaches  Hematological: Negative for adenopathy  Does not bruise/bleed easily  Psychiatric/Behavioral: Negative  Negative for sleep disturbance  The patient is not nervous/anxious  Objective: There were no vitals taken for this visit  Physical Exam  Vitals and nursing note reviewed  Constitutional:       Appearance: She is well-developed  HENT:      Head: Normocephalic  Right Ear: External ear normal       Left Ear: External ear normal       Nose: Nose normal       Mouth/Throat:      Pharynx: No oropharyngeal exudate  Eyes:      Conjunctiva/sclera: Conjunctivae normal       Pupils: Pupils are equal, round, and reactive to light  Neck:      Thyroid: No thyromegaly     Cardiovascular:      Rate and Rhythm: Normal rate and regular rhythm  Heart sounds: Normal heart sounds  No murmur heard  No friction rub  No gallop  Comments: S1-S2 regular rhythm  Extremities no edema  Pulmonary:      Effort: Pulmonary effort is normal  No respiratory distress  Breath sounds: Normal breath sounds  No wheezing or rales  Comments: Lungs are clear no wheezing rales or rhonchi  Abdominal:      General: Bowel sounds are normal  There is no distension  Palpations: Abdomen is soft  There is no mass  Tenderness: There is no abdominal tenderness  There is no guarding or rebound  Comments: Abdomen soft nontender   Musculoskeletal:         General: Normal range of motion  Cervical back: Normal range of motion and neck supple  Lymphadenopathy:      Cervical: No cervical adenopathy  Skin:     General: Skin is warm and dry  Neurological:      Mental Status: She is alert and oriented to person, place, and time  Psychiatric:         Behavior: Behavior is slowed and withdrawn  Thought Content: Thought content is delusional          Cognition and Memory: Cognition is impaired  Memory is impaired  Judgment: Judgment normal       Comments: S smiling does not communicate very well

## 2022-04-17 ENCOUNTER — APPOINTMENT (EMERGENCY)
Dept: RADIOLOGY | Facility: HOSPITAL | Age: 87
End: 2022-04-17
Payer: MEDICARE

## 2022-04-17 ENCOUNTER — HOSPITAL ENCOUNTER (EMERGENCY)
Facility: HOSPITAL | Age: 87
Discharge: HOME | End: 2022-04-18
Attending: EMERGENCY MEDICINE | Admitting: EMERGENCY MEDICINE
Payer: MEDICARE

## 2022-04-17 VITALS
TEMPERATURE: 98.1 F | OXYGEN SATURATION: 98 % | WEIGHT: 128.31 LBS | BODY MASS INDEX: 26.82 KG/M2 | SYSTOLIC BLOOD PRESSURE: 185 MMHG | DIASTOLIC BLOOD PRESSURE: 95 MMHG | HEART RATE: 55 BPM | RESPIRATION RATE: 16 BRPM

## 2022-04-17 DIAGNOSIS — F03.91 DEMENTIA WITH BEHAVIORAL PROBLEM (HCC): Primary | ICD-10-CM

## 2022-04-17 DIAGNOSIS — R07.89 ATYPICAL CHEST PAIN: ICD-10-CM

## 2022-04-17 LAB
2HR DELTA HS TROPONIN: 0 NG/L
ALBUMIN SERPL BCP-MCNC: 3.7 G/DL (ref 3.5–5)
ALP SERPL-CCNC: 107 U/L (ref 46–116)
ALT SERPL W P-5'-P-CCNC: 31 U/L (ref 12–78)
ANION GAP SERPL CALCULATED.3IONS-SCNC: 6 MMOL/L (ref 4–13)
AST SERPL W P-5'-P-CCNC: 30 U/L (ref 5–45)
ATRIAL RATE: 52 BPM
ATRIAL RATE: 53 BPM
ATRIAL RATE: 56 BPM
BASOPHILS # BLD AUTO: 0.01 THOUSANDS/ΜL (ref 0–0.1)
BASOPHILS NFR BLD AUTO: 0 % (ref 0–1)
BILIRUB SERPL-MCNC: 0.47 MG/DL (ref 0.2–1)
BUN SERPL-MCNC: 25 MG/DL (ref 5–25)
CALCIUM SERPL-MCNC: 8.6 MG/DL (ref 8.3–10.1)
CARDIAC TROPONIN I PNL SERPL HS: 4 NG/L
CARDIAC TROPONIN I PNL SERPL HS: 4 NG/L
CHLORIDE SERPL-SCNC: 100 MMOL/L (ref 100–108)
CO2 SERPL-SCNC: 28 MMOL/L (ref 21–32)
CREAT SERPL-MCNC: 1.17 MG/DL (ref 0.6–1.3)
EOSINOPHIL # BLD AUTO: 0.13 THOUSAND/ΜL (ref 0–0.61)
EOSINOPHIL NFR BLD AUTO: 2 % (ref 0–6)
ERYTHROCYTE [DISTWIDTH] IN BLOOD BY AUTOMATED COUNT: 13.2 % (ref 11.6–15.1)
GFR SERPL CREATININE-BSD FRML MDRD: 41 ML/MIN/1.73SQ M
GLUCOSE SERPL-MCNC: 103 MG/DL (ref 65–140)
HCT VFR BLD AUTO: 36.4 % (ref 34.8–46.1)
HGB BLD-MCNC: 12.2 G/DL (ref 11.5–15.4)
IMM GRANULOCYTES # BLD AUTO: 0.02 THOUSAND/UL (ref 0–0.2)
IMM GRANULOCYTES NFR BLD AUTO: 0 % (ref 0–2)
LIPASE SERPL-CCNC: 52 U/L (ref 73–393)
LYMPHOCYTES # BLD AUTO: 0.81 THOUSANDS/ΜL (ref 0.6–4.47)
LYMPHOCYTES NFR BLD AUTO: 13 % (ref 14–44)
MCH RBC QN AUTO: 30.9 PG (ref 26.8–34.3)
MCHC RBC AUTO-ENTMCNC: 33.5 G/DL (ref 31.4–37.4)
MCV RBC AUTO: 92 FL (ref 82–98)
MONOCYTES # BLD AUTO: 0.6 THOUSAND/ΜL (ref 0.17–1.22)
MONOCYTES NFR BLD AUTO: 9 % (ref 4–12)
NEUTROPHILS # BLD AUTO: 4.81 THOUSANDS/ΜL (ref 1.85–7.62)
NEUTS SEG NFR BLD AUTO: 76 % (ref 43–75)
NRBC BLD AUTO-RTO: 0 /100 WBCS
NT-PROBNP SERPL-MCNC: 535 PG/ML
P AXIS: 40 DEGREES
P AXIS: 64 DEGREES
P AXIS: 69 DEGREES
PLATELET # BLD AUTO: 208 THOUSANDS/UL (ref 149–390)
PMV BLD AUTO: 9.5 FL (ref 8.9–12.7)
POTASSIUM SERPL-SCNC: 4.3 MMOL/L (ref 3.5–5.3)
PR INTERVAL: 194 MS
PR INTERVAL: 206 MS
PROT SERPL-MCNC: 6.9 G/DL (ref 6.4–8.2)
QRS AXIS: 67 DEGREES
QRS AXIS: 70 DEGREES
QRS AXIS: 71 DEGREES
QRSD INTERVAL: 84 MS
QRSD INTERVAL: 84 MS
QRSD INTERVAL: 86 MS
QT INTERVAL: 430 MS
QT INTERVAL: 450 MS
QT INTERVAL: 456 MS
QTC INTERVAL: 414 MS
QTC INTERVAL: 418 MS
QTC INTERVAL: 427 MS
RBC # BLD AUTO: 3.95 MILLION/UL (ref 3.81–5.12)
SODIUM SERPL-SCNC: 134 MMOL/L (ref 136–145)
T WAVE AXIS: 71 DEGREES
T WAVE AXIS: 75 DEGREES
T WAVE AXIS: 76 DEGREES
VENTRICULAR RATE: 52 BPM
VENTRICULAR RATE: 53 BPM
VENTRICULAR RATE: 56 BPM
WBC # BLD AUTO: 6.38 THOUSAND/UL (ref 4.31–10.16)

## 2022-04-17 PROCEDURE — 80053 COMPREHEN METABOLIC PANEL: CPT | Performed by: PHYSICIAN ASSISTANT

## 2022-04-17 PROCEDURE — 93005 ELECTROCARDIOGRAM TRACING: CPT

## 2022-04-17 PROCEDURE — 99285 EMERGENCY DEPT VISIT HI MDM: CPT | Performed by: PHYSICIAN ASSISTANT

## 2022-04-17 PROCEDURE — 71045 X-RAY EXAM CHEST 1 VIEW: CPT

## 2022-04-17 PROCEDURE — 83880 ASSAY OF NATRIURETIC PEPTIDE: CPT | Performed by: PHYSICIAN ASSISTANT

## 2022-04-17 PROCEDURE — 96361 HYDRATE IV INFUSION ADD-ON: CPT

## 2022-04-17 PROCEDURE — 85025 COMPLETE CBC W/AUTO DIFF WBC: CPT | Performed by: PHYSICIAN ASSISTANT

## 2022-04-17 PROCEDURE — 99285 EMERGENCY DEPT VISIT HI MDM: CPT

## 2022-04-17 PROCEDURE — 84484 ASSAY OF TROPONIN QUANT: CPT | Performed by: PHYSICIAN ASSISTANT

## 2022-04-17 PROCEDURE — 96374 THER/PROPH/DIAG INJ IV PUSH: CPT

## 2022-04-17 PROCEDURE — 36415 COLL VENOUS BLD VENIPUNCTURE: CPT | Performed by: PHYSICIAN ASSISTANT

## 2022-04-17 PROCEDURE — 83690 ASSAY OF LIPASE: CPT | Performed by: PHYSICIAN ASSISTANT

## 2022-04-17 PROCEDURE — 93010 ELECTROCARDIOGRAM REPORT: CPT | Performed by: INTERNAL MEDICINE

## 2022-04-17 RX ORDER — HYDRALAZINE HYDROCHLORIDE 20 MG/ML
5 INJECTION INTRAMUSCULAR; INTRAVENOUS ONCE
Status: COMPLETED | OUTPATIENT
Start: 2022-04-17 | End: 2022-04-17

## 2022-04-17 RX ADMIN — HYDRALAZINE HYDROCHLORIDE 5 MG: 20 INJECTION, SOLUTION INTRAMUSCULAR; INTRAVENOUS at 20:42

## 2022-04-17 RX ADMIN — SODIUM CHLORIDE 500 ML: 0.9 INJECTION, SOLUTION INTRAVENOUS at 20:42

## 2022-04-17 NOTE — ED PROVIDER NOTES
History  Chief Complaint   Patient presents with    Chest Pain     patient arrives via ems coming from home  patient family reports chest pain and sob, per ems patient denied any complaints  patient Citizen of [place] speaking  patient dementia at baseline  Patient is a 26-year-old female with advanced dementia, schizophrenia, hypertension, hyperlipidemia, CKD, paroxysmal AFib who presents via EMS from home with unclear story  According to EMS, initial report was for chest pain and shortness of breath, however upon EMS arrival to the scene, patient denied any complaints  Patient continues to offered no complaints  Attempted to speak with patient in [facility] [place] and [place], utilizing  services  Patient repeatedly stating she wishes to go home, she is nice  When asked directly if she has any pain patient did say no once  Attempted to reach family to ascertain more information  No answer  Based on chart review, patient with minimal communicative skills at baseline  Patient is in no acute distress, resting comfortably with no tachypnea, respiratory distress or airway compromise noted  No focal deficits noted and patient able to move all extremities  Prior to Admission Medications   Prescriptions Last Dose Informant Patient Reported? Taking?    Blood Pressure Monitoring (Blood Pressure Cuff) MISC  Care Giver No No   Sig: Use daily   Cartia  MG 24 hr capsule   No No   Sig: TAKE 1 CAPSULE BY MOUTH DAILY   DULoxetine (CYMBALTA) 30 mg delayed release capsule  Care Giver Yes No   Sig: Take 30 mg by mouth daily at bedtime   LORazepam (ATIVAN) 0 5 mg tablet  Care Giver Yes No   Sig: Take 0 5 mg by mouth    OLANZapine (ZyPREXA) 5 mg tablet  Care Giver No No   Sig: Take 1 tablet (5 mg total) by mouth daily at bedtime   Patient not taking: Reported on 4/5/2022    Stool Softener 100 MG capsule  Care Giver No No   Sig: TAKE 1 CAPSULE BY MOUTH EVERY 12 HOURS FOR CONSTIPATION   acetaminophen (TYLENOL) 325 mg tablet  Care Giver No No   Sig: Take 2 tablets (650 mg total) by mouth every 6 (six) hours as needed for mild pain, moderate pain or headaches   Patient not taking: Reported on 3/26/2022    albuterol (2 5 mg/3 mL) 0 083 % nebulizer solution  Care Giver No No   Sig: Take 1 vial (2 5 mg total) by nebulization every 4 (four) hours as needed for wheezing or shortness of breath   Patient not taking: Reported on 1/20/2022    amLODIPine (NORVASC) 2 5 mg tablet  Care Giver No No   Sig: Take 1 tablet (2 5 mg total) by mouth daily   amiodarone 200 mg tablet  Care Giver No No   Sig: TAKE 1 TABLET (200 MG TOTAL) BY MOUTH DAILY   bisacodyl (DULCOLAX) 10 mg suppository  Care Giver No No   Sig: Insert 1 suppository (10 mg total) into the rectum daily as needed for constipation (2nd line)   Patient not taking: Reported on 9/29/2021   cyanocobalamin (VITAMIN B-12) 100 MCG tablet  Care Giver Yes No   Sig: Take 100 mcg by mouth daily   Patient not taking: Reported on 1/20/2022    metoprolol succinate (TOPROL-XL) 50 mg 24 hr tablet  Care Giver No No   Sig: TAKE 1 TABLET BY MOUTH DAILY   polyethylene glycol (MIRALAX) 17 g packet  Care Giver No No   Sig: Take 17 g by mouth daily as needed (constipation first line)   Patient not taking: Reported on 12/2/2021    terazosin (HYTRIN) 1 mg capsule   No No   Sig: Take 1 capsule (1 mg total) by mouth daily at bedtime   traZODone (DESYREL) 50 mg tablet  Care Giver Yes No   Sig: Take 50 mg by mouth daily at bedtime      Facility-Administered Medications: None       Past Medical History:   Diagnosis Date    Anxiety     Cognitive impairment     COVID-19 virus infection 4/13/2020    D[facility] [facility]     Depression     Hallucination     Hyperlipidemia     Hypertension     Memory loss     Psychiatric illness     Psychosis [facility] [facility]     Retention of urine     Schizoaffective disorder ([facility] [facility]     Sleep difficulties        Past Surgical History:   Procedure Laterality Date    APPENDECTOMY      BLADDER SURGERY      CHOLECYSTECTOMY      DE OPEN RX FEMUR FX+INTRAMED BRYSON Right 7/15/2021    Procedure: INSERTION NAIL IM FEMUR ANTEGRADE (TROCHANTERIC) right;  Surgeon: Ba Hernandez MD;  Location: [facility];  Service: Orthopedics    TOTAL ABDOMINAL HYSTERECTOMY W/ BILATERAL SALPINGOOPHORECTOMY      TOTAL KNEE ARTHROPLASTY Left        Family History   Problem Relation Age of Onset    Heart disease Mother         Cardiac disorder    Parkinsonism Father     Heart disease Sister         Cardiac disorder    Hypertension Sister     Diabetes Child         Diabetes Mellitus    Lung disease Child         Respiratory Disorder    Diabetes Son         Diabetes Mellitus     I have reviewed and agree with the history as documented  E-Cigarette/Vaping    E-Cigarette Use Never User      E-Cigarette/Vaping Substances    Nicotine No     THC No     CBD No     Flavoring No     Other No     Unknown No      Social History     Tobacco Use    Smoking status: Never Smoker    Smokeless tobacco: Never Used    Tobacco comment: Former smoker per [employer]   Vaping Use    Vaping Use: Never used   Substance Use Topics    Alcohol use: Never    Drug use: No       Review of Systems   Unable to perform ROS: Dementia   Cardiovascular: Negative for chest pain  Gastrointestinal: Negative for abdominal pain  Neurological: Negative for headaches  Physical Exam  Physical Exam  Vitals and nursing note reviewed  Constitutional:       General: She is awake  She is not in acute distress  Appearance: She is well-developed  She is not ill-appearing, toxic-appearing or diaphoretic  HENT:      Head: Normocephalic and atraumatic  Right Ear: External ear normal       Left Ear: External ear normal       Nose: Nose normal       Mouth/Throat:      Mouth: Mucous membranes are moist    Eyes:      Conjunctiva/sclera: Conjunctivae normal       Pupils: Pupils are equal, round, and reactive to light     Cardiovascular: Rate and Rhythm: Normal rate and regular rhythm  Pulses: Normal pulses  Heart sounds: Normal heart sounds  Pulmonary:      Effort: Pulmonary effort is normal  No respiratory distress  Breath sounds: Normal breath sounds  No stridor  No decreased breath sounds or wheezing  Abdominal:      General: Bowel sounds are normal  There is no distension  Palpations: Abdomen is soft  Tenderness: There is no abdominal tenderness  Musculoskeletal:         General: Normal range of motion  Cervical back: Normal range of motion and neck supple  Right lower leg: No edema  Left lower leg: No edema  Comments: Patient moving all extremities freely   Skin:     General: Skin is warm and dry  Capillary Refill: Capillary refill takes less than 2 seconds  Neurological:      Mental Status: She is alert  GCS: GCS eye subscore is 4  GCS verbal subscore is 4  GCS motor subscore is 5  Psychiatric:         Behavior: Behavior is cooperative           Vital Signs  ED Triage Vitals [04/17/22 1832]   Temperature Pulse Respirations Blood Pressure SpO2   98 1 °F (36 7 °C) (!) 53 16 (!) 179/76 99 %      Temp Source Heart Rate Source Patient Position - Orthostatic VS BP Location FiO2 (%)   Oral Monitor Lying Left arm --      Pain Score       --           Vitals:    04/17/22 2050 04/17/22 2131 04/17/22 2145 04/17/22 2356   BP:  (!) 195/81 (!) 192/81 (!) 185/95   Pulse: 57   55   Patient Position - Orthostatic VS:  Lying Lying Lying         Visual Acuity      ED Medications  Medications   hydrALAZINE (APRESOLINE) injection 5 mg (5 mg Intravenous Given 4/17/22 2042)   sodium chloride 0 9 % bolus 500 mL (0 mL Intravenous Stopped 4/17/22 2142)       Diagnostic Studies  Results Reviewed     Procedure Component Value Units Date/Time    HS Troponin I 2hr [[id number]]  (Normal) Collected: 04/17/22 2041    Lab Status: Final result Specimen: Blood from Arm, Left Updated: 04/17/22 2111     hs TnI 2hr 4 ng/L      Delta 2hr hsTnI 0 ng/L     HS Troponin 0hr (reflex protocol) [[id number]]  (Normal) Collected: 04/17/22 1855    Lab Status: Final result Specimen: Blood from Arm, Left Updated: 04/17/22 1927     hs TnI 0hr 4 ng/L     NT-BNP PRO [[id number]]  (Abnormal) Collected: 04/17/22 1855    Lab Status: Final result Specimen: Blood from Arm, Left Updated: 04/17/22 1924     NT-proBNP 535 pg/mL     Lipase [[id number]]  (Abnormal) Collected: 04/17/22 1855    Lab Status: Final result Specimen: Blood from Arm, Left Updated: 04/17/22 1924     Lipase 52 u/L     Comprehensive metabolic panel [[id number]]  (Abnormal) Collected: 04/17/22 1855    Lab Status: Final result Specimen: Blood from Arm, Left Updated: 04/17/22 1918     Sodium 134 mmol/L      Potassium 4 3 mmol/L      Chloride 100 mmol/L      CO2 28 mmol/L      ANION GAP 6 mmol/L      BUN 25 mg/dL      Creatinine 1 17 mg/dL      Glucose 103 mg/dL      Calcium 8 6 mg/dL      AST 30 U/L      ALT 31 U/L      Alkaline Phosphatase 107 U/L      Total Protein 6 9 g/dL      Albumin 3 7 g/dL      Total Bilirubin 0 47 mg/dL      eGFR 41 ml/min/1 73sq m     Narrative:      [facility] guidelines for Chronic Kidney Disease (CKD):     Stage 1 with normal or high GFR (GFR > 90 mL/min/1 73 square meters)    Stage 2 Mild CKD (GFR = 60-89 mL/min/1 73 square meters)    Stage 3A Moderate CKD (GFR = 45-59 mL/min/1 73 square meters)    Stage 3B Moderate CKD (GFR = 30-44 mL/min/1 73 square meters)    Stage 4 Severe CKD (GFR = 15-29 mL/min/1 73 square meters)    Stage 5 End Stage CKD (GFR <15 mL/min/1 73 square meters)  Note: GFR calculation is accurate only with a steady state creatinine    CBC and differential [[id number]]  (Abnormal) Collected: 04/17/22 1855    Lab Status: Final result Specimen: Blood from Arm, Left Updated: 04/17/22 1901     WBC 6 38 Thousand/uL      RBC 3 95 Million/uL      Hemoglobin 12 2 g/dL      Hematocrit 36 4 %      MCV 92 fL      MCH 30 9 pg      MCHC 33 5 g/dL      RDW 13 2 %      MPV 9 5 fL      Platelets 698 Thousands/uL      nRBC 0 /100 WBCs      Neutrophils Relative 76 %      Immat GRANS % 0 %      Lymphocytes Relative 13 %      Monocytes Relative 9 %      Eosinophils Relative 2 %      Basophils Relative 0 %      Neutrophils Absolute 4 81 Thousands/µL      Immature Grans Absolute 0 02 Thousand/uL      Lymphocytes Absolute 0 81 Thousands/µL      Monocytes Absolute 0 60 Thousand/µL      Eosinophils Absolute 0 13 Thousand/µL      Basophils Absolute 0 01 Thousands/µL                  XR chest 1 view portable   ED Interpretation by Bang Romero PA-C (04/17 1932)   No acute pathology noted                 Procedures  ECG 12 Lead Documentation Only    Date/Time: 4/17/2022 6:35 PM  Performed by: Bang Romero PA-C  Authorized by: Bang Romero PA-C     Indications / Diagnosis:  Chest pain  ECG reviewed by me, the ED Provider: yes    Patient location:  ED  Previous ECG:     Previous ECG:  Compared to current    Comparison ECG info:  [id number]    Similarity:  No change  Rate:     ECG rate:  56    ECG rate assessment: bradycardic    Rhythm:     Rhythm: sinus rhythm    Ectopy:     Ectopy: none    QRS:     QRS axis:  Normal  Conduction:     Conduction: normal    ST segments:     ST segments:  Normal  T waves:     T waves: normal      ECG 12 Lead Documentation Only    Date/Time: 4/17/2022 8:48 PM  Performed by: Bang Romero PA-C  Authorized by: Bang Romero PA-C     Indications / Diagnosis:  Chest pain  ECG reviewed by me, the ED Provider: yes    Patient location:  ED  Previous ECG:     Previous ECG:  Compared to current    Comparison ECG info:  Earlier today    Similarity:  No change  Rate:     ECG rate:  53    ECG rate assessment: bradycardic    Rhythm:     Rhythm: sinus rhythm    Ectopy:     Ectopy: none    QRS:     QRS axis:  Normal  Conduction:     Conduction: normal    ST segments:     ST segments:  Normal  T waves:     T waves: normal               ED Course  ED Course as of 04/18/22 0026   Pamelia Mail Apr 17, 2022   1851 Attempted to call child listed as emergency contact, phone went straight to voicemail  Left message  Will proceed with cardiac workup based on report  1902 WBC: 6 38   1902 Hemoglobin: 12 2   1929 NT-proBNP(!): 535  Improved from prior   1933 No answer again with attempt to call family   2008 Spoke with patient's son, Morro Wagoner  He states that patient typically has these reactions when there is a change in caregiver, which occurred earlier today  If patient believes the caregiver is a man, either based on general body form or hair covering, she will lash out, make complaints and yell at the caregiver  Son states that this is what occurred earlier today and he said usually when she is removed from this, she calms down, which is consistent with patient's unagitated demeanor on arrival   Awaiting delta troponin, but anticipate patient would be stable for discharge  Son is agreeable and will contact care services to ensure there will be a person at the house to care for patient  2031 Son agreeable to plan for delta trop and likely plan for d/c  Son is unsure if patient received daily meds  Patient continues to deny any complaints   2116 hs TnI 2hr: 4   2116 Delta 2hr hsTnI: 0   2118 Reviewed all results with son  He confirms there will be someone at the house when patient returns  Patient no longer uses walker, primarily wheelchair or bed-bound   Patient stable for discharge   Mon Apr 18, 2022   0025 Patient signed out to [facility], [facility] ER pending transport now planned for 0230             HEART Risk Score      Most Recent Value   Heart Score Risk Calculator    History 0 Filed at: 04/17/2022 2053   ECG 0 Filed at: 04/17/2022 2053   Age 2 Filed at: 04/17/2022 2053   Risk Factors 2 Filed at: 04/17/2022 2053   Troponin 0 Filed at: 04/17/2022 2053   HEART Score 4 Filed at: 04/17/2022 2053 MDM    Disposition  Final diagnoses:   Dementia with behavioral problem ([facility] [address] )   Atypical chest pain     Time reflects when diagnosis was documented in both MDM as applicable and the Disposition within this note     Time User Action Codes Description Comment    4/17/2022  8:57 PM Reginia Bathe Add [F43 29] Adjustment reaction with antisocial behavior     4/17/2022  8:57 PM Costlow, Adron Blow Remove [F43 29] Adjustment reaction with antisocial behavior     4/17/2022  8:58 PM Reginia Bathe Add [F03 90] Dementia ([facility] [address] )     4/17/2022  8:59 PM Reginia Bathe Add [F03 91] Dementia with behavioral problem ([facility] [address] )     4/17/2022  8:59 PM Reginia Bathe Modify [F03 91] Dementia with behavioral problem ([facility] [address] )     4/17/2022  8:59 PM Geofm [place] [F03 90] Dementia ([facility] [address] )     4/17/2022  8:59 PM Reginia Bathe Add [R07 89] Atypical chest pain       ED Disposition     ED Disposition Condition Date/Time Comment    Discharge Stable Sun Apr 17, 2022  9:19 PM Maxx Vicente discharge to home/self care  Follow-up Information     Follow up With Specialties Details Why Contact Info Additional Information    [address] [facility] Emergency Medicine  If symptoms worsen [facility] [address]  [address] [place] [address] Emergency Department, [address] [place], [place] [address]rilee Halsted, [address] Cam Awad MD Internal Medicine In 3 days  [address] [place] [address]  [phone]             Patient's Medications   Discharge Prescriptions    No medications on file       No discharge procedures on file      PDMP Review       Value Time User    PDMP Reviewed  Yes 7/16/2021  8:50 AM Mick Fregoso PA-C          ED Provider  Electronically Signed by           Saintclair Hal, PA-C  04/18/22 3240

## 2022-04-18 NOTE — ED NOTES
Caren contacted at this time for a BLS transport to Pt's residence  Awaiting a callback at this time  SANTOS Ibarra did verify with Pt's family that Pt's round-the-clock care will be at the residence when Pt arrives        Hillary Petty  54/03/24 2122

## 2022-04-18 NOTE — ED NOTES
HOB raised at this time and Pt provided with water per request  OK per provider       Micky Wright  39/16/32 2007

## 2022-04-18 NOTE — DISCHARGE INSTRUCTIONS
Continue all previously prescribed medications  Follow-up with PCP as needed  Return to ED if symptoms worsen

## 2022-04-21 ENCOUNTER — TELEPHONE (OUTPATIENT)
Dept: LAB | Facility: HOSPITAL | Age: 87
End: 2022-04-21

## 2022-04-25 ENCOUNTER — TELEPHONE (OUTPATIENT)
Dept: LAB | Facility: HOSPITAL | Age: 87
End: 2022-04-25

## 2022-04-28 DIAGNOSIS — I48.0 PAROXYSMAL A-FIB (HCC): ICD-10-CM

## 2022-04-29 ENCOUNTER — APPOINTMENT (OUTPATIENT)
Dept: LAB | Facility: HOSPITAL | Age: 87
End: 2022-04-29
Attending: INTERNAL MEDICINE
Payer: MEDICARE

## 2022-04-29 DIAGNOSIS — N18.31 STAGE 3A CHRONIC KIDNEY DISEASE (HCC): ICD-10-CM

## 2022-04-29 DIAGNOSIS — I10 ESSENTIAL HYPERTENSION: ICD-10-CM

## 2022-04-29 DIAGNOSIS — E78.2 MIXED HYPERLIPIDEMIA: ICD-10-CM

## 2022-04-29 DIAGNOSIS — F03.90 MAJOR NEUROCOGNITIVE DISORDER (HCC): ICD-10-CM

## 2022-04-29 DIAGNOSIS — E53.8 B12 DEFICIENCY: ICD-10-CM

## 2022-04-29 DIAGNOSIS — F20.3 UNDIFFERENTIATED SCHIZOPHRENIA (HCC): Chronic | ICD-10-CM

## 2022-04-29 DIAGNOSIS — I48.0 PAF (PAROXYSMAL ATRIAL FIBRILLATION) (HCC): ICD-10-CM

## 2022-04-29 LAB
25(OH)D3 SERPL-MCNC: 39.1 NG/ML (ref 30–100)
ALBUMIN SERPL BCP-MCNC: 3.8 G/DL (ref 3.5–5)
ALP SERPL-CCNC: 112 U/L (ref 46–116)
ALT SERPL W P-5'-P-CCNC: 28 U/L (ref 12–78)
ANION GAP SERPL CALCULATED.3IONS-SCNC: 4 MMOL/L (ref 4–13)
AST SERPL W P-5'-P-CCNC: 26 U/L (ref 5–45)
BASOPHILS # BLD AUTO: 0.01 THOUSANDS/ΜL (ref 0–0.1)
BASOPHILS NFR BLD AUTO: 0 % (ref 0–1)
BILIRUB SERPL-MCNC: 0.49 MG/DL (ref 0.2–1)
BUN SERPL-MCNC: 31 MG/DL (ref 5–25)
CALCIUM SERPL-MCNC: 9.1 MG/DL (ref 8.3–10.1)
CHLORIDE SERPL-SCNC: 108 MMOL/L (ref 100–108)
CO2 SERPL-SCNC: 28 MMOL/L (ref 21–32)
CREAT SERPL-MCNC: 1.24 MG/DL (ref 0.6–1.3)
EOSINOPHIL # BLD AUTO: 0.17 THOUSAND/ΜL (ref 0–0.61)
EOSINOPHIL NFR BLD AUTO: 3 % (ref 0–6)
ERYTHROCYTE [DISTWIDTH] IN BLOOD BY AUTOMATED COUNT: 13.1 % (ref 11.6–15.1)
GFR SERPL CREATININE-BSD FRML MDRD: 38 ML/MIN/1.73SQ M
GLUCOSE P FAST SERPL-MCNC: 87 MG/DL (ref 65–99)
HCT VFR BLD AUTO: 41.9 % (ref 34.8–46.1)
HGB BLD-MCNC: 13.7 G/DL (ref 11.5–15.4)
IMM GRANULOCYTES # BLD AUTO: 0.01 THOUSAND/UL (ref 0–0.2)
IMM GRANULOCYTES NFR BLD AUTO: 0 % (ref 0–2)
LYMPHOCYTES # BLD AUTO: 0.76 THOUSANDS/ΜL (ref 0.6–4.47)
LYMPHOCYTES NFR BLD AUTO: 13 % (ref 14–44)
MAGNESIUM SERPL-MCNC: 2.5 MG/DL (ref 1.6–2.6)
MCH RBC QN AUTO: 30.8 PG (ref 26.8–34.3)
MCHC RBC AUTO-ENTMCNC: 32.7 G/DL (ref 31.4–37.4)
MCV RBC AUTO: 94 FL (ref 82–98)
MONOCYTES # BLD AUTO: 0.45 THOUSAND/ΜL (ref 0.17–1.22)
MONOCYTES NFR BLD AUTO: 8 % (ref 4–12)
NEUTROPHILS # BLD AUTO: 4.51 THOUSANDS/ΜL (ref 1.85–7.62)
NEUTS SEG NFR BLD AUTO: 76 % (ref 43–75)
NRBC BLD AUTO-RTO: 0 /100 WBCS
PLATELET # BLD AUTO: 201 THOUSANDS/UL (ref 149–390)
PMV BLD AUTO: 12.4 FL (ref 8.9–12.7)
POTASSIUM SERPL-SCNC: 4.5 MMOL/L (ref 3.5–5.3)
PROT SERPL-MCNC: 7.1 G/DL (ref 6.4–8.2)
RBC # BLD AUTO: 4.45 MILLION/UL (ref 3.81–5.12)
SODIUM SERPL-SCNC: 140 MMOL/L (ref 136–145)
TSH SERPL DL<=0.05 MIU/L-ACNC: 1.13 UIU/ML (ref 0.45–4.5)
VIT B12 SERPL-MCNC: 571 PG/ML (ref 100–900)
WBC # BLD AUTO: 5.91 THOUSAND/UL (ref 4.31–10.16)

## 2022-04-29 PROCEDURE — 36415 COLL VENOUS BLD VENIPUNCTURE: CPT

## 2022-04-29 PROCEDURE — 83918 ORGANIC ACIDS TOTAL QUANT: CPT

## 2022-04-29 PROCEDURE — 80053 COMPREHEN METABOLIC PANEL: CPT

## 2022-04-29 PROCEDURE — 85025 COMPLETE CBC W/AUTO DIFF WBC: CPT

## 2022-04-29 PROCEDURE — 82607 VITAMIN B-12: CPT

## 2022-04-29 PROCEDURE — 83735 ASSAY OF MAGNESIUM: CPT

## 2022-04-29 PROCEDURE — 84443 ASSAY THYROID STIM HORMONE: CPT

## 2022-04-29 PROCEDURE — 82306 VITAMIN D 25 HYDROXY: CPT

## 2022-04-29 RX ORDER — DILTIAZEM HYDROCHLORIDE 120 MG/1
CAPSULE, COATED, EXTENDED RELEASE ORAL
Qty: 90 CAPSULE | Refills: 1 | Status: SHIPPED | OUTPATIENT
Start: 2022-04-29

## 2022-05-06 LAB — METHYLMALONATE SERPL-SCNC: 228 NMOL/L (ref 0–378)

## 2022-06-13 ENCOUNTER — TELEPHONE (OUTPATIENT)
Dept: OTHER | Facility: OTHER | Age: 87
End: 2022-06-13

## 2022-06-13 ENCOUNTER — NURSE TRIAGE (OUTPATIENT)
Dept: OTHER | Facility: OTHER | Age: 87
End: 2022-06-13

## 2022-06-13 DIAGNOSIS — N39.0 ACUTE UTI: Primary | ICD-10-CM

## 2022-06-13 NOTE — TELEPHONE ENCOUNTER
Can someone call to set up an appt pt cannot void on demand    Reason for Disposition   Bad or foul-smelling urine    Answer Assessment - Initial Assessment Questions  1  SYMPTOM: "What's the main symptom you're concerned about?" (e g , frequency, incontinence)      frequency  2  ONSET: "When did the  frequency  start?"      A few days ago  3  PAIN: "Is there any pain?" If Yes, ask: "How bad is it?" (Scale: 1-10; mild, moderate, severe)      no  4  CAUSE: "What do you think is causing the symptoms?"      uti  5  OTHER SYMPTOMS: "Do you have any other symptoms?" (e g , fever, flank pain, blood in urine, pain with urination)      no  6   PREGNANCY: "Is there any chance you are pregnant?" "When was your last menstrual period?"      no    Protocols used: [facility]

## 2022-06-14 ENCOUNTER — PROCEDURE VISIT (OUTPATIENT)
Dept: UROLOGY | Facility: CLINIC | Age: 87
End: 2022-06-14
Payer: MEDICARE

## 2022-06-14 VITALS — HEART RATE: 52 BPM | DIASTOLIC BLOOD PRESSURE: 70 MMHG | SYSTOLIC BLOOD PRESSURE: 120 MMHG

## 2022-06-14 DIAGNOSIS — N30.00 ACUTE CYSTITIS WITHOUT HEMATURIA: Primary | ICD-10-CM

## 2022-06-14 LAB
AMORPH URATE CRY URNS QL MICRO: ABNORMAL
BACTERIA UR QL AUTO: ABNORMAL /HPF
BILIRUB UR QL STRIP: NEGATIVE
CLARITY UR: ABNORMAL
COLOR UR: YELLOW
GLUCOSE UR STRIP-MCNC: NEGATIVE MG/DL
GRAN CASTS #/AREA URNS LPF: ABNORMAL /[LPF]
HGB UR QL STRIP.AUTO: NEGATIVE
KETONES UR STRIP-MCNC: NEGATIVE MG/DL
LEUKOCYTE ESTERASE UR QL STRIP: ABNORMAL
MUCOUS THREADS UR QL AUTO: ABNORMAL
NITRITE UR QL STRIP: NEGATIVE
NON-SQ EPI CELLS URNS QL MICRO: ABNORMAL /HPF
PH UR STRIP.AUTO: 7.5 [PH]
POST-VOID RESIDUAL VOLUME, ML POC: 11 ML
PROT UR STRIP-MCNC: ABNORMAL MG/DL
RBC #/AREA URNS AUTO: ABNORMAL /HPF
SL AMB  POCT GLUCOSE, UA: NORMAL
SL AMB LEUKOCYTE ESTERASE,UA: NORMAL
SL AMB POCT BILIRUBIN,UA: NORMAL
SL AMB POCT BLOOD,UA: NORMAL
SL AMB POCT CLARITY,UA: NORMAL
SL AMB POCT COLOR,UA: YELLOW
SL AMB POCT KETONES,UA: NORMAL
SL AMB POCT NITRITE,UA: NORMAL
SL AMB POCT PH,UA: 6
SL AMB POCT SPECIFIC GRAVITY,UA: 1.02
SL AMB POCT URINE PROTEIN: 30
SL AMB POCT UROBILINOGEN: NORMAL
SP GR UR STRIP.AUTO: 1.01 (ref 1–1.03)
UROBILINOGEN UR STRIP-ACNC: <2 MG/DL
WBC #/AREA URNS AUTO: ABNORMAL /HPF
WBC CLUMPS # UR AUTO: PRESENT /UL

## 2022-06-14 PROCEDURE — 87077 CULTURE AEROBIC IDENTIFY: CPT | Performed by: PHYSICIAN ASSISTANT

## 2022-06-14 PROCEDURE — 51798 US URINE CAPACITY MEASURE: CPT

## 2022-06-14 PROCEDURE — 87086 URINE CULTURE/COLONY COUNT: CPT | Performed by: PHYSICIAN ASSISTANT

## 2022-06-14 PROCEDURE — 81002 URINALYSIS NONAUTO W/O SCOPE: CPT

## 2022-06-14 PROCEDURE — 81001 URINALYSIS AUTO W/SCOPE: CPT | Performed by: PHYSICIAN ASSISTANT

## 2022-06-14 NOTE — PROGRESS NOTES
6/14/2022  Vandana Hernandez is a 80 y o  female  [id number]    Diagnosis:    Chief Complaint     Urinary Tract Infection          Patient presents for urgent visit for inability to urinate, history of incomplete bladder emptying managed by our office  Ester Garrison co worker used for translation  Plan:  Follow up on  September 30 2022 with [facility]  Await urine culture results  History:  Patient's caregiver states patient has been voiding but her urine is foul smelling beginning today  No fever/chills, hematuria or flank pain per caregiver  Patient unable to communicate any complaints, she is aphasic  Assessment:  Patient voided in the office to obtain urine testing  Patient's PVR 11 ml  Urinalysis and culture sent    Vitals:    06/14/22 1355   BP: 120/70   Pulse: (!) 52           Recent Results (from the past 1 hour(s))   POCT Measure PVR    Collection Time: 06/14/22  2:19 PM   Result Value Ref Range    POST-VOID RESIDUAL VOLUME, ML POC 11 mL   POCT urine dip    Collection Time: 06/14/22  2:19 PM   Result Value Ref Range    LEUKOCYTE ESTERASE,UA moderate     NITRITE,UA neg     SL AMB POCT UROBILINOGEN neg     POCT URINE PROTEIN 30      PH,UA 6     BLOOD,UA neg     SPECIFIC GRAVITY,UA 1 020     KETONES,UA neg     BILIRUBIN,UA neg     GLUCOSE, UA neg      COLOR,UA yellow     CLARITY,UA cloudy        Procedure:  PVR measured via bladder scan 11 ml        Hernán Davison RN

## 2022-06-17 LAB
BACTERIA UR CULT: ABNORMAL
BACTERIA UR CULT: ABNORMAL

## 2022-06-20 ENCOUNTER — TELEPHONE (OUTPATIENT)
Dept: OTHER | Facility: OTHER | Age: 87
End: 2022-06-20

## 2022-06-20 RX ORDER — CEPHALEXIN 500 MG/1
500 CAPSULE ORAL EVERY 6 HOURS SCHEDULED
Qty: 28 CAPSULE | Refills: 0 | Status: SHIPPED | OUTPATIENT
Start: 2022-06-20 | End: 2022-06-27

## 2022-06-20 NOTE — TELEPHONE ENCOUNTER
Pt's son Elizabeth Shields called and would like the results from the patient test  He has been waiting and wants to know what the next step is       Elizabeth Shields can be reached at [phone]

## 2022-06-20 NOTE — TELEPHONE ENCOUNTER
Pt s son has been calling for his mom's lab results  He's very upset that no one has called him in days to give him these results  What if my mom needs treatment? Please return the call today

## 2022-06-20 NOTE — TELEPHONE ENCOUNTER
Contacted patients son Rosa Kisha and made him aware of patients urine culture results and that Keflex was sent to her pharmacy to take 4 x;s/day for 7 days  Son verbalized understanding

## 2022-07-06 ENCOUNTER — OFFICE VISIT (OUTPATIENT)
Dept: INTERNAL MEDICINE CLINIC | Facility: CLINIC | Age: 87
End: 2022-07-06
Payer: MEDICARE

## 2022-07-06 VITALS
RESPIRATION RATE: 13 BRPM | HEIGHT: 58 IN | DIASTOLIC BLOOD PRESSURE: 60 MMHG | WEIGHT: 120 LBS | TEMPERATURE: 97.6 F | BODY MASS INDEX: 25.19 KG/M2 | SYSTOLIC BLOOD PRESSURE: 148 MMHG | HEART RATE: 80 BPM

## 2022-07-06 DIAGNOSIS — M81.0 AGE-RELATED OSTEOPOROSIS WITHOUT CURRENT PATHOLOGICAL FRACTURE: ICD-10-CM

## 2022-07-06 DIAGNOSIS — F20.3 UNDIFFERENTIATED SCHIZOPHRENIA (HCC): Chronic | ICD-10-CM

## 2022-07-06 DIAGNOSIS — N18.31 STAGE 3A CHRONIC KIDNEY DISEASE (HCC): ICD-10-CM

## 2022-07-06 DIAGNOSIS — E53.8 B12 DEFICIENCY: ICD-10-CM

## 2022-07-06 DIAGNOSIS — I10 ESSENTIAL HYPERTENSION: Primary | ICD-10-CM

## 2022-07-06 DIAGNOSIS — I48.0 PAF (PAROXYSMAL ATRIAL FIBRILLATION) (HCC): ICD-10-CM

## 2022-07-06 DIAGNOSIS — E78.2 MIXED HYPERLIPIDEMIA: ICD-10-CM

## 2022-07-06 PROCEDURE — 99214 OFFICE O/P EST MOD 30 MIN: CPT | Performed by: INTERNAL MEDICINE

## 2022-07-06 NOTE — PROGRESS NOTES
Assessment/Plan:      1  Schizophrenia she is awake today verbalizing a little bit  She did fall yesterday is is some ecchymosis in the left facial area her pupils are equally reactive extra motor call are intact no vital signs on the both ears tympanic membranes clear she never loss consciousness the aide did not take it to the ER and she denies any headache there were no change in her  baseline mental status she is also here with her granddaughter that was very helpful getting the medication she is taking from the pharmacy    She is usually nonverbal usually sleepy she is awake today       Blood pressure is well controlled no chest palpitation shortness of breath    Paroxysmal atrial fibrillation she is in sinus rhythm taking amiodarone 100 mg per day no change in medication or plan will check a thyroid function    Chronic renal insufficiency stage III A I told the granddaughter did take her for lab work  She is overdue    Did not make any medication changes    She has a history of B12 deficiency she is not taking B12 will check the B12 methylmalonic acid    No problem-specific Assessment & Plan notes found for this encounter  Diagnoses and all orders for this visit:    Essential hypertension    PAF (paroxysmal atrial fibrillation) ([facility])    Age-related osteoporosis without current pathological fracture    Stage 3a chronic kidney disease ([facility] [facility] )    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[facility] )    Mixed hyperlipidemia          Subjective:      Patient ID: Narendra Whitlock is a 80 y o  female  No chief complaint on file          Current Outpatient Medications:     acetaminophen (TYLENOL) 325 mg tablet, Take 2 tablets (650 mg total) by mouth every 6 (six) hours as needed for mild pain, moderate pain or headaches (Patient not taking: No sig reported), Disp: 30 tablet, Rfl: 0    albuterol (2 5 mg/3 mL) 0 083 % nebulizer solution, Take 1 vial (2 5 mg total) by nebulization every 4 (four) hours as needed for wheezing or shortness of breath (Patient not taking: No sig reported), Disp: 1 vial, Rfl: 0    amiodarone 200 mg tablet, TAKE 1 TABLET (200 MG TOTAL) BY MOUTH DAILY, Disp: 90 tablet, Rfl: 0    amLODIPine (NORVASC) 2 5 mg tablet, TAKE 1 TABLET (2 5 MG TOTAL) BY MOUTH DAILY, Disp: 90 tablet, Rfl: 0    bisacodyl (DULCOLAX) 10 mg suppository, Insert 1 suppository (10 mg total) into the rectum daily as needed for constipation (2nd line) (Patient not taking: No sig reported), Disp: 12 suppository, Rfl: 0    Blood Pressure Monitoring (Blood Pressure Cuff) MISC, Use daily, Disp: 1 each, Rfl: 0    cyanocobalamin (VITAMIN B-12) 100 MCG tablet, Take 100 mcg by mouth daily (Patient not taking: No sig reported), Disp: , Rfl:     diltiazem (CARDIZEM CD) 120 mg 24 hr capsule, TAKE 1 CAPSULE BY MOUTH DAILY, Disp: 90 capsule, Rfl: 1    DULoxetine (CYMBALTA) 30 mg delayed release capsule, Take 30 mg by mouth daily at bedtime, Disp: , Rfl:     LORazepam (ATIVAN) 0 5 mg tablet, Take 0 5 mg by mouth , Disp: , Rfl:     metoprolol succinate (TOPROL-XL) 50 mg 24 hr tablet, TAKE 1 TABLET BY MOUTH DAILY, Disp: 90 tablet, Rfl: 0    OLANZapine (ZyPREXA) 5 mg tablet, Take 1 tablet (5 mg total) by mouth daily at bedtime (Patient not taking: No sig reported), Disp: 30 tablet, Rfl: 1    polyethylene glycol (MIRALAX) 17 g packet, Take 17 g by mouth daily as needed (constipation first line) (Patient not taking: No sig reported), Disp: 14 each, Rfl: 0    Stool Softener 100 MG capsule, TAKE 1 CAPSULE BY MOUTH EVERY 12 HOURS FOR CONSTIPATION, Disp: 90 capsule, Rfl: 0    terazosin (HYTRIN) 1 mg capsule, Take 1 capsule (1 mg total) by mouth daily at bedtime, Disp: 90 capsule, Rfl: 1    traZODone (DESYREL) 50 mg tablet, Take 50 mg by mouth daily at bedtime, Disp: , Rfl:     HPI    The following portions of the patient's history were reviewed and updated as appropriate: allergies, current medications, past family history, past medical history, past social history, past surgical history and problem list     Review of Systems   Constitutional: Negative  Negative for activity change, appetite change, fatigue, fever and unexpected weight change  HENT: Negative for congestion, ear pain, hearing loss, mouth sores, postnasal drip, rhinorrhea, sore throat, trouble swallowing and voice change  Eyes: Negative for pain, redness and visual disturbance  Respiratory: Negative for cough, chest tightness, shortness of breath and wheezing  Cardiovascular: Negative for chest pain, palpitations and leg swelling  Gastrointestinal: Negative for abdominal distention, abdominal pain, blood in stool, constipation, diarrhea and nausea  Endocrine: Negative for cold intolerance, heat intolerance, polydipsia, polyphagia and polyuria  Genitourinary: Negative for difficulty urinating, dysuria, flank pain, frequency, hematuria and urgency  Musculoskeletal: Negative for arthralgias, back pain, gait problem, joint swelling and myalgias  Skin: Negative for color change and pallor  Neurological: Negative for dizziness, tremors, seizures, syncope, weakness, numbness and headaches  Hematological: Negative for adenopathy  Does not bruise/bleed easily  Psychiatric/Behavioral: Negative  Negative for sleep disturbance  The patient is not nervous/anxious  Objective: There were no vitals taken for this visit  Physical Exam  Vitals and nursing note reviewed  Constitutional:       Appearance: She is well-developed  HENT:      Head: Normocephalic  Right Ear: External ear normal       Left Ear: External ear normal       Nose: Nose normal       Mouth/Throat:      Pharynx: No oropharyngeal exudate  Eyes:      Extraocular Movements: Extraocular movements intact  Conjunctiva/sclera: Conjunctivae normal       Pupils: Pupils are equal, round, and reactive to light  Neck:      Thyroid: No thyromegaly     Cardiovascular:      Rate and Rhythm: Normal rate and regular rhythm  Heart sounds: Normal heart sounds  No murmur heard  No friction rub  No gallop  Comments: S1-S2 regular rhythm  Extremities no edema  Pulmonary:      Effort: Pulmonary effort is normal  No respiratory distress  Breath sounds: Normal breath sounds  No wheezing or rales  Comments: Lungs are clear no wheezing rales or rhonchi  Abdominal:      General: Bowel sounds are normal  There is no distension  Palpations: Abdomen is soft  There is no mass  Tenderness: There is no abdominal tenderness  There is no guarding or rebound  Comments: Abdomen soft nontender   Musculoskeletal:         General: Normal range of motion  Cervical back: Normal range of motion and neck supple  Lymphadenopathy:      Cervical: No cervical adenopathy  Skin:     General: Skin is warm and dry  Neurological:      Mental Status: She is alert  Cranial Nerves: No cranial nerve deficit  Motor: No weakness        Comments: Examined in the wheelchair   Psychiatric:         Behavior: Behavior normal          Judgment: Judgment normal

## 2022-07-06 NOTE — PATIENT INSTRUCTIONS
Will continue the same medications  Get her lab work done  Will see her back in the fall  I will give you the influenza vaccine    Give her the 2nd booster Gemini Paget whenever you can this month

## 2022-07-28 ENCOUNTER — HOSPITAL ENCOUNTER (EMERGENCY)
Facility: HOSPITAL | Age: 87
Discharge: HOME | End: 2022-07-29
Attending: EMERGENCY MEDICINE
Payer: MEDICARE

## 2022-07-28 ENCOUNTER — APPOINTMENT (EMERGENCY)
Dept: RADIOLOGY | Facility: HOSPITAL | Age: 87
End: 2022-07-28
Payer: MEDICARE

## 2022-07-28 DIAGNOSIS — F03.90 DEMENTIA (HCC): Primary | ICD-10-CM
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
## 2022-07-28 PROCEDURE — 73630 X-RAY EXAM OF FOOT: CPT

## 2022-07-28 PROCEDURE — 99284 EMERGENCY DEPT VISIT MOD MDM: CPT

## 2022-07-28 RX ORDER — LORAZEPAM 0.5 MG/1
0.5 TABLET ORAL ONCE
Status: COMPLETED | OUTPATIENT
Start: 2022-07-28 | End: 2022-07-29

## 2022-07-29 VITALS
SYSTOLIC BLOOD PRESSURE: 159 MMHG | DIASTOLIC BLOOD PRESSURE: 74 MMHG | RESPIRATION RATE: 18 BRPM | OXYGEN SATURATION: 98 % | HEART RATE: 75 BPM | TEMPERATURE: 98.3 F

## 2022-07-29 RX ADMIN — LORAZEPAM 0.5 MG: 0.5 TABLET ORAL at 00:00

## 2022-07-29 NOTE — ED NOTES
Phone call made to Ximena West to make her aware pt was on her way home  States she is at home and able to let her in        Thierry Castellon RN  07/29/22 2202

## 2022-07-29 NOTE — ED PROVIDER NOTES
History  Chief Complaint   Patient presents with    Fall     Pt fell today c/o right ankle pain and swelling     80 YOF with PMH schizophrenia, dementia, afib, chronic renal insufficiency stage 3 presents today via EMS after an apparent fall at home and was having right foot pain and swelling  Pt has dementia and does not answer questions  Can only state that she has pain in her right foot  Reviewed her chart  Pt's baseline seems to be nonverbal and will only answer questions occasionally  According to last ED visit- pt is mostly bed bound or wheelchair bound  Prior to Admission Medications   Prescriptions Last Dose Informant Patient Reported? Taking?    Blood Pressure Monitoring (Blood Pressure Cuff) MISC  Care Giver No No   Sig: Use daily   DULoxetine (CYMBALTA) 30 mg delayed release capsule  Care Giver Yes No   Sig: Take 30 mg by mouth daily at bedtime   LORazepam (ATIVAN) 0 5 mg tablet  Care Giver Yes No   Sig: Take 0 5 mg by mouth    OLANZapine (ZyPREXA) 5 mg tablet   No No   Sig: Take 1 tablet (5 mg total) by mouth daily at bedtime   Stool Softener 100 MG capsule  Care Giver No No   Sig: TAKE 1 CAPSULE BY MOUTH EVERY 12 HOURS FOR CONSTIPATION   Patient not taking: Reported on 7/6/2022   acetaminophen (TYLENOL) 325 mg tablet   No No   Sig: Take 2 tablets (650 mg total) by mouth every 6 (six) hours as needed for mild pain, moderate pain or headaches   Patient not taking: No sig reported   albuterol (2 5 mg/3 mL) 0 083 % nebulizer solution   No No   Sig: Take 1 vial (2 5 mg total) by nebulization every 4 (four) hours as needed for wheezing or shortness of breath   Patient not taking: No sig reported   amLODIPine (NORVASC) 2 5 mg tablet   No No   Sig: TAKE 1 TABLET (2 5 MG TOTAL) BY MOUTH DAILY   amiodarone 200 mg tablet   No No   Sig: TAKE 1 TABLET (200 MG TOTAL) BY MOUTH DAILY   bisacodyl (DULCOLAX) 10 mg suppository   No No   Sig: Insert 1 suppository (10 mg total) into the rectum daily as needed for constipation (2nd line)   Patient not taking: Reported on 7/6/2022   diltiazem (CARDIZEM CD) 120 mg 24 hr capsule   No No   Sig: TAKE 1 CAPSULE BY MOUTH DAILY   metoprolol succinate (TOPROL-XL) 50 mg 24 hr tablet   No No   Sig: TAKE 1 TABLET BY MOUTH DAILY   polyethylene glycol (MIRALAX) 17 g packet   No No   Sig: Take 17 g by mouth daily as needed (constipation first line)   Patient not taking: No sig reported   terazosin (HYTRIN) 1 mg capsule   No No   Sig: Take 1 capsule (1 mg total) by mouth daily at bedtime   traZODone (DESYREL) 50 mg tablet  Care Giver Yes No   Sig: Take 50 mg by mouth daily at bedtime      Facility-Administered Medications: None       Past Medical History:   Diagnosis Date    Anxiety     Cognitive impairment     COVID-19 virus infection 04/13/2020    Dementia [facility] [address] [facility]     Depression     Hallucination     Hyperlipidemia     Hypertension     Memory loss     Psychiatric illness     Psychosis ([facility] [address] )     Retention of urine     Schizoaffective disorder ([facility] [address] )     Sleep difficulties     Urinary tract infection        Past Surgical History:   Procedure Laterality Date    APPENDECTOMY      BLADDER SURGERY      CHOLECYSTECTOMY      CO OPEN RX FEMUR FX+INTRAMED BRYSON Right 7/15/2021    Procedure: INSERTION NAIL IM FEMUR ANTEGRADE (TROCHANTERIC) right;  Surgeon: Miguel York MD;  Location: [facility];  Service: Orthopedics    TOTAL ABDOMINAL HYSTERECTOMY W/ BILATERAL SALPINGOOPHORECTOMY      TOTAL KNEE ARTHROPLASTY Left        Family History   Problem Relation Age of Onset    Heart disease Mother         Cardiac disorder    Parkinsonism Father     Heart disease Sister         Cardiac disorder    Hypertension Sister     Diabetes Child         Diabetes Mellitus    Lung disease Child         Respiratory Disorder    Diabetes Son         Diabetes Mellitus     I have reviewed and agree with the history as documented      E-Cigarette/Vaping    E-Cigarette Use Never User E-Cigarette/Vaping Substances    Nicotine No     THC No     CBD No     Flavoring No     Other No     Unknown No      Social History     Tobacco Use    Smoking status: Never Smoker    Smokeless tobacco: Never Used    Tobacco comment: Former smoker per [employer]   Vaping Use    Vaping Use: Never used   Substance Use Topics    Alcohol use: Never    Drug use: No       Review of Systems   Unable to perform ROS: Dementia   Musculoskeletal: Positive for arthralgias (right foot)  Physical Exam  Physical Exam  Vitals and nursing note reviewed  Constitutional:       General: She is not in acute distress  Appearance: Normal appearance  She is well-developed  She is not ill-appearing  Comments: Resting comfortably  HENT:      Head: Normocephalic and atraumatic  Eyes:      Conjunctiva/sclera: Conjunctivae normal    Cardiovascular:      Rate and Rhythm: Normal rate and regular rhythm  Heart sounds: No murmur heard  Pulmonary:      Effort: Pulmonary effort is normal  No respiratory distress  Breath sounds: Normal breath sounds  Abdominal:      Palpations: Abdomen is soft  Tenderness: There is no abdominal tenderness  Musculoskeletal:         General: Tenderness present  Cervical back: Normal range of motion and neck supple  Legs:    Skin:     General: Skin is warm and dry  Capillary Refill: Capillary refill takes less than 2 seconds  Neurological:      General: No focal deficit present  Mental Status: She is alert and oriented to person, place, and time     Psychiatric:         Mood and Affect: Mood normal          Behavior: Behavior normal          Vital Signs  ED Triage Vitals [07/28/22 2237]   Temperature Pulse Respirations Blood Pressure SpO2   98 3 °F (36 8 °C) 55 16 160/69 97 %      Temp Source Heart Rate Source Patient Position - Orthostatic VS BP Location FiO2 (%)   Oral Monitor Lying Left arm --      Pain Score       --           Vitals: 07/28/22 2237   BP: 160/69   Pulse: 55   Patient Position - Orthostatic VS: Lying         Visual Acuity  Visual Acuity    Flowsheet Row Most Recent Value   L Pupil Size (mm) 2   R Pupil Size (mm) 2          ED Medications  Medications   LORazepam (ATIVAN) tablet 0 5 mg (0 5 mg Oral Given 7/29/22 0000)       Diagnostic Studies  Results Reviewed     None                 XR foot 3+ views RIGHT    (Results Pending)              Procedures  Procedures         ED Course  ED Course as of 07/29/22 0104   Thu Jul 28, 2022   2255 Attempted to call family- no answer   2335 At this point, cannot send pt home without speaking with family member  It seems pt has an aide at home- unsure if they will be there if we would discharge her     2359 Call this number when pt is ready to go home: [phone]   Fri Jul 29, 2022   0004 Spoke with son, Lolis Wallace: he reports aide was helping pt to the tub for a bath and pt had a mechanical fall  Did not hit her head or lose consciousness  Pt was then complaining of pain in the right foot  Son is okay with pt to be discharged home  She is mostly bed bound or in a wheelchair    4797 Sign out to CM: waiting for final read on Xray  Pt is mostly in bed or wheelchair- would be okay to send home with an acute fracture  Would not recommend splinting due to increased risk for DVT  Aide should be called when pt is discharged so she can unlock the door at the pt's house  MDM  Number of Diagnoses or Management Options  Dementia ([facility] [address] ): new and does not require workup  Fall, initial encounter: new and requires workup  Diagnosis management comments: 80 YOF presents after fall at home  Pt complaining of right foot pain  Spoke with son who states that pt was ambulating to the bath with her aide and she fell and complained of right foot pain  Pt has a home health aide 24/7  Son is okay with pt being discharged home ultimately   If xray were to show fracture, would not splint due to increased risk for DVT and pt would not be putting much pressure on it anyway  Sign out to KENNA AKHTAR [facility]: pending final read of XR  Amount and/or Complexity of Data Reviewed  Tests in the radiology section of CPT®: ordered and reviewed  Obtain history from someone other than the patient: yes (Son)  Independent visualization of images, tracings, or specimens: yes    Patient Progress  Patient progress: stable      Disposition  Final diagnoses:   Dementia ([facility] [address] )   Fall, initial encounter     Time reflects when diagnosis was documented in both MDM as applicable and the Disposition within this note     Time User Action Codes Description Comment    7/28/2022 11:58 PM Jamie Corona Add [F03 90] Dementia ([facility] [address] )     7/28/2022 11:58 PM Jamie Corona Add [L01  HTZQ] Fall, initial encounter       ED Disposition     None      Follow-up Information     Follow up With Specialties Details Why Contact Info Additional Information    [address]  Emergency Department Emergency Medicine  If symptoms worsen [facility] [id number]  [address] [place] [address] Emergency Department, [address] [place] [address] Obdulio Sahni MD Internal Medicine Schedule an appointment as soon as possible for a visit   [address] [place] [address]  [phone]             Patient's Medications   Discharge Prescriptions    No medications on file       No discharge procedures on file      PDMP Review       Value Time User    PDMP Reviewed  Yes 7/16/2021  8:50 AM Damon Schilder, PA-C          ED Provider  Electronically Signed by           Desiree Felty, PA-C  07/29/22 0104

## 2022-07-29 NOTE — ED NOTES
[facility] called for BLS transport back home, will call back with transport time       Lolly March RN  07/29/22 1936

## 2022-08-08 ENCOUNTER — TELEPHONE (OUTPATIENT)
Dept: LAB | Facility: HOSPITAL | Age: 87
End: 2022-08-08

## 2022-08-09 ENCOUNTER — TELEPHONE (OUTPATIENT)
Dept: LAB | Facility: HOSPITAL | Age: 87
End: 2022-08-09

## 2022-08-12 ENCOUNTER — APPOINTMENT (OUTPATIENT)
Dept: LAB | Facility: HOSPITAL | Age: 87
End: 2022-08-12
Attending: INTERNAL MEDICINE
Payer: MEDICARE

## 2022-08-12 DIAGNOSIS — N18.31 STAGE 3A CHRONIC KIDNEY DISEASE (HCC): ICD-10-CM

## 2022-08-12 DIAGNOSIS — E53.8 B12 DEFICIENCY: ICD-10-CM

## 2022-08-12 DIAGNOSIS — I10 ESSENTIAL HYPERTENSION: ICD-10-CM

## 2022-08-12 LAB
ALBUMIN SERPL BCP-MCNC: 3.2 G/DL (ref 3.5–5)
ALP SERPL-CCNC: 113 U/L (ref 46–116)
ALT SERPL W P-5'-P-CCNC: 17 U/L (ref 12–78)
ANION GAP SERPL CALCULATED.3IONS-SCNC: 3 MMOL/L (ref 4–13)
AST SERPL W P-5'-P-CCNC: 21 U/L (ref 5–45)
BASOPHILS # BLD AUTO: 0.02 THOUSANDS/ΜL (ref 0–0.1)
BASOPHILS NFR BLD AUTO: 0 % (ref 0–1)
BILIRUB SERPL-MCNC: 0.4 MG/DL (ref 0.2–1)
BUN SERPL-MCNC: 32 MG/DL (ref 5–25)
CALCIUM ALBUM COR SERPL-MCNC: 9.4 MG/DL (ref 8.3–10.1)
CALCIUM SERPL-MCNC: 8.8 MG/DL (ref 8.3–10.1)
CHLORIDE SERPL-SCNC: 106 MMOL/L (ref 96–108)
CO2 SERPL-SCNC: 28 MMOL/L (ref 21–32)
CREAT SERPL-MCNC: 1.18 MG/DL (ref 0.6–1.3)
EOSINOPHIL # BLD AUTO: 0.17 THOUSAND/ΜL (ref 0–0.61)
EOSINOPHIL NFR BLD AUTO: 3 % (ref 0–6)
ERYTHROCYTE [DISTWIDTH] IN BLOOD BY AUTOMATED COUNT: 13.4 % (ref 11.6–15.1)
GFR SERPL CREATININE-BSD FRML MDRD: 40 ML/MIN/1.73SQ M
GLUCOSE SERPL-MCNC: 86 MG/DL (ref 65–140)
HCT VFR BLD AUTO: 35 % (ref 34.8–46.1)
HGB BLD-MCNC: 11.4 G/DL (ref 11.5–15.4)
IMM GRANULOCYTES # BLD AUTO: 0.02 THOUSAND/UL (ref 0–0.2)
IMM GRANULOCYTES NFR BLD AUTO: 0 % (ref 0–2)
LYMPHOCYTES # BLD AUTO: 0.99 THOUSANDS/ΜL (ref 0.6–4.47)
LYMPHOCYTES NFR BLD AUTO: 18 % (ref 14–44)
MAGNESIUM SERPL-MCNC: 2.4 MG/DL (ref 1.6–2.6)
MCH RBC QN AUTO: 30.2 PG (ref 26.8–34.3)
MCHC RBC AUTO-ENTMCNC: 32.6 G/DL (ref 31.4–37.4)
MCV RBC AUTO: 93 FL (ref 82–98)
MONOCYTES # BLD AUTO: 0.58 THOUSAND/ΜL (ref 0.17–1.22)
MONOCYTES NFR BLD AUTO: 11 % (ref 4–12)
NEUTROPHILS # BLD AUTO: 3.62 THOUSANDS/ΜL (ref 1.85–7.62)
NEUTS SEG NFR BLD AUTO: 68 % (ref 43–75)
NRBC BLD AUTO-RTO: 0 /100 WBCS
PLATELET # BLD AUTO: 245 THOUSANDS/UL (ref 149–390)
PMV BLD AUTO: 10.7 FL (ref 8.9–12.7)
POTASSIUM SERPL-SCNC: 4.3 MMOL/L (ref 3.5–5.3)
PROT SERPL-MCNC: 6.2 G/DL (ref 6.4–8.4)
RBC # BLD AUTO: 3.78 MILLION/UL (ref 3.81–5.12)
SODIUM SERPL-SCNC: 137 MMOL/L (ref 135–147)
TSH SERPL DL<=0.05 MIU/L-ACNC: 1.52 UIU/ML (ref 0.45–4.5)
VIT B12 SERPL-MCNC: 415 PG/ML (ref 100–900)
WBC # BLD AUTO: 5.4 THOUSAND/UL (ref 4.31–10.16)

## 2022-08-12 PROCEDURE — 83918 ORGANIC ACIDS TOTAL QUANT: CPT

## 2022-08-12 PROCEDURE — 85025 COMPLETE CBC W/AUTO DIFF WBC: CPT

## 2022-08-12 PROCEDURE — 83735 ASSAY OF MAGNESIUM: CPT

## 2022-08-12 PROCEDURE — 82607 VITAMIN B-12: CPT

## 2022-08-12 PROCEDURE — 80053 COMPREHEN METABOLIC PANEL: CPT

## 2022-08-12 PROCEDURE — 36415 COLL VENOUS BLD VENIPUNCTURE: CPT

## 2022-08-12 PROCEDURE — 84443 ASSAY THYROID STIM HORMONE: CPT

## 2022-08-16 LAB — METHYLMALONATE SERPL-SCNC: 251 NMOL/L (ref 0–378)

## 2022-08-17 DIAGNOSIS — I48.0 PAROXYSMAL A-FIB (HCC): ICD-10-CM

## 2022-08-17 DIAGNOSIS — K59.00 CONSTIPATION: ICD-10-CM

## 2022-08-17 RX ORDER — METOPROLOL SUCCINATE 50 MG/1
TABLET, EXTENDED RELEASE ORAL
Qty: 90 TABLET | Refills: 0 | Status: SHIPPED | OUTPATIENT
Start: 2022-08-17

## 2022-08-17 RX ORDER — AMIODARONE HYDROCHLORIDE 200 MG/1
200 TABLET ORAL DAILY
Qty: 90 TABLET | Refills: 0 | Status: SHIPPED | OUTPATIENT
Start: 2022-08-17

## 2022-08-18 ENCOUNTER — TELEPHONE (OUTPATIENT)
Dept: INTERNAL MEDICINE CLINIC | Facility: CLINIC | Age: 87
End: 2022-08-18

## 2022-08-18 DIAGNOSIS — M79.671 RIGHT FOOT PAIN: Primary | ICD-10-CM

## 2022-08-18 NOTE — TELEPHONE ENCOUNTER
Patient's son called to inform patient has right foot swelling since fall on 7/28, went to  ER, no fracture  Dr Fausto Salmon will refer patient to Podiatry  I called Logan back, he verbalized understanding to call and schedule appointment, phone # provided to Dr Isabella Mckeon office

## 2022-08-29 NOTE — ASSESSMENT & PLAN NOTE
On Amiodarone, Metoprolol, and Diltiazem  Ordered labs  No AC per cardio due to falls and dementia  Patient is here alone today.    Patient is requesting a work excuse.    If any information or results need to be relayed from today's visit, the best way to contact the patient is via [phone] (mobile) - Patient gives verbal permission to leave a detailed voicemail at the number provided.

## 2022-09-14 DIAGNOSIS — R33.8 ACUTE URINARY RETENTION: ICD-10-CM

## 2022-09-14 RX ORDER — TERAZOSIN 1 MG/1
1 CAPSULE ORAL
Qty: 90 CAPSULE | Refills: 0 | Status: SHIPPED | OUTPATIENT
Start: 2022-09-14 | End: 2022-12-13

## 2022-09-29 ENCOUNTER — OFFICE VISIT (OUTPATIENT)
Dept: PODIATRY | Facility: CLINIC | Age: 87
End: 2022-09-29
Payer: MEDICARE

## 2022-09-29 VITALS — BODY MASS INDEX: 25.08 KG/M2 | HEIGHT: 58 IN

## 2022-09-29 DIAGNOSIS — M25.471 EDEMA OF RIGHT ANKLE: Primary | ICD-10-CM

## 2022-09-29 DIAGNOSIS — M79.671 RIGHT FOOT PAIN: ICD-10-CM

## 2022-09-29 PROCEDURE — 99203 OFFICE O/P NEW LOW 30 MIN: CPT | Performed by: PODIATRIST

## 2022-10-04 PROBLEM — M25.471 EDEMA OF RIGHT ANKLE: Status: ACTIVE | Noted: 2022-10-04

## 2022-10-04 NOTE — PROGRESS NOTES
This patient was seen on 9/29/22  My role is Foot , Ankle, and Wound Specialist    SUBJECTIVE    Chief Complaint:  Edema of ankle     Patient ID: Tayler Ackerman is a 80 y o  female  Rowan Corado is here with some caregivers at the instruction of her son (present on phone)  The reason he wanted her to be seen is due to ankle swelling Right  Rowan Corado has dementia  Her son relates no injury but he's concerned about the edema  He states she never complains of pain, doesn't limp  The following portions of the patient's history were reviewed and updated as appropriate: allergies, current medications, past family history, past medical history, past social history, past surgical history and problem list     Review of Systems   Constitutional: Negative  Cardiovascular: Positive for leg swelling  Musculoskeletal: Positive for joint swelling  Negative for arthralgias and gait problem  OBJECTIVE      Ht 4' 10" (1 473 m)   BMI 25 08 kg/m²     Foot/Ankle Musculoskeletal Exam    General        General additional comments: I note mild-to-moderate edema Right greater than Left ankle  It's pitting edema  No bruising, erythema  She doesn't demonstrate any pain on ROM nor palpation  Physical Exam  Constitutional:       General: She is not in acute distress  Appearance: Normal appearance  She is normal weight  She is not ill-appearing, toxic-appearing or diaphoretic  HENT:      Head: Normocephalic and atraumatic  Pulmonary:      Effort: Pulmonary effort is normal       Breath sounds: Normal breath sounds  Musculoskeletal:      Right lower leg: Edema present  Left lower leg: Edema present  Comments: I note mild-to-moderate edema Right greater than Left ankle  It's pitting edema  No bruising, erythema  She doesn't demonstrate any pain on ROM nor palpation               ASSESSMENT     Diagnoses and all orders for this visit:    Edema of right ankle    Right foot pain  -     Ambulatory Referral to Podiatry         Problem List Items Addressed This Visit        Other    Edema of right ankle - Primary      Other Visit Diagnoses     Right foot pain                  PLAN    At this point, I feel her edema is relatively normal given her age and dependent nature of the lower extremities much of the day  No prior injury, bruising, pain noted; so I'm not going to recommend any further interventions, imaging

## 2022-10-11 ENCOUNTER — OFFICE VISIT (OUTPATIENT)
Dept: INTERNAL MEDICINE CLINIC | Facility: CLINIC | Age: 87
End: 2022-10-11
Payer: MEDICARE

## 2022-10-11 VITALS
HEART RATE: 80 BPM | BODY MASS INDEX: 25.61 KG/M2 | WEIGHT: 122 LBS | DIASTOLIC BLOOD PRESSURE: 60 MMHG | TEMPERATURE: 97.9 F | RESPIRATION RATE: 13 BRPM | HEIGHT: 58 IN | SYSTOLIC BLOOD PRESSURE: 100 MMHG

## 2022-10-11 DIAGNOSIS — I10 ESSENTIAL HYPERTENSION: Primary | ICD-10-CM

## 2022-10-11 DIAGNOSIS — F03.C11 SEVERE DEMENTIA WITH AGITATION, UNSPECIFIED DEMENTIA TYPE: Chronic | ICD-10-CM

## 2022-10-11 DIAGNOSIS — M81.0 AGE-RELATED OSTEOPOROSIS WITHOUT CURRENT PATHOLOGICAL FRACTURE: ICD-10-CM

## 2022-10-11 DIAGNOSIS — F20.5 RESIDUAL SCHIZOPHRENIA (HCC): Chronic | ICD-10-CM

## 2022-10-11 DIAGNOSIS — Z23 ENCOUNTER FOR IMMUNIZATION: ICD-10-CM

## 2022-10-11 DIAGNOSIS — I48.0 PAF (PAROXYSMAL ATRIAL FIBRILLATION) (HCC): ICD-10-CM
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
## 2022-10-11 DIAGNOSIS — I10 ORTHOSTATIC HYPERTENSION: ICD-10-CM

## 2022-10-11 DIAGNOSIS — E78.2 MIXED HYPERLIPIDEMIA: ICD-10-CM

## 2022-10-11 DIAGNOSIS — N18.31 STAGE 3A CHRONIC KIDNEY DISEASE (HCC): ICD-10-CM

## 2022-10-11 PROCEDURE — 99214 OFFICE O/P EST MOD 30 MIN: CPT | Performed by: INTERNAL MEDICINE

## 2022-10-11 PROCEDURE — 90662 IIV NO PRSV INCREASED AG IM: CPT | Performed by: INTERNAL MEDICINE

## 2022-10-11 PROCEDURE — G0008 ADMIN INFLUENZA VIRUS VAC: HCPCS | Performed by: INTERNAL MEDICINE

## 2022-10-11 RX ORDER — BLOOD PRESSURE TEST KIT
KIT MISCELLANEOUS 2 TIMES WEEKLY
Qty: 1 KIT | Refills: 0 | Status: SHIPPED | OUTPATIENT
Start: 2022-10-13

## 2022-10-11 NOTE — PROGRESS NOTES
Assessment/Plan:      The end of the visit that check the blood pressure was 100/60 right arm sitting standing 70/50 will stop the amlodipine which will avoid swelling of the legs and avoid hypotension  In view of her history of falling    She is here for evaluation of a shower chair she does need that she does have gait dysfunction she has a history of psychosis and she has a history of multiple falls 1 of them which resulted in a fracture hip years ago so she will benefit from a shower chair    Blood pressure control I order a blood pressure monitor so she can check her blood pressure at home recorded I told that to the 8 she is on diltiazem extended release 120 and a little bit on amlodipine 2 5 mg daily    Schizophrenia above follow-up with psychiatry is some on multiple psych medication will continue with that  Paroxysmal atrial fibrillation rhythm appear regular during auscultation she is on metoprolol succinate 50 mg daily        No problem-specific Assessment & Plan notes found for this encounter  Diagnoses and all orders for this visit:    Essential hypertension    PAF (paroxysmal atrial fibrillation) ([facility])    Severe dementia with agitation, unspecified dementia type    Age-related osteoporosis without current pathological fracture  -     DXA bone density spine hip and pelvis; Future    Stage 3a chronic kidney disease ([facility] [address] )    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[facility] [address] )    Mixed hyperlipidemia    Orthostatic hypertension    Fall, initial encounter    Encounter for immunization  -     FLUZONE HIGH-DOSE: influenza vaccine, high-dose, preservative-free 0 7 mL          Subjective:      Patient ID: Lito Lau is a 80 y o  female  Chief Complaint   Patient presents with   • Follow-up     NEEDS FACE TO [id number] [facility]  Meds not verified             Current Outpatient Medications:   •  acetaminophen (TYLENOL) 325 mg tablet, Take 2 tablets (650 mg total) by mouth every 6 (six) hours as needed for mild pain, moderate pain or headaches (Patient not taking: No sig reported), Disp: 30 tablet, Rfl: 0  •  albuterol (2 5 mg/3 mL) 0 083 % nebulizer solution, Take 1 vial (2 5 mg total) by nebulization every 4 (four) hours as needed for wheezing or shortness of breath (Patient not taking: No sig reported), Disp: 1 vial, Rfl: 0  •  amiodarone 200 mg tablet, TAKE 1 TABLET (200 MG TOTAL) BY MOUTH DAILY, Disp: 90 tablet, Rfl: 0  •  amLODIPine (NORVASC) 2 5 mg tablet, TAKE 1 TABLET (2 5 MG TOTAL) BY MOUTH DAILY, Disp: 90 tablet, Rfl: 0  •  bisacodyl (DULCOLAX) 10 mg suppository, Insert 1 suppository (10 mg total) into the rectum daily as needed for constipation (2nd line) (Patient not taking: Reported on 7/6/2022), Disp: 12 suppository, Rfl: 0  •  Blood Pressure Monitoring (Blood Pressure Cuff) MISC, Use daily, Disp: 1 each, Rfl: 0  •  diltiazem (CARDIZEM CD) 120 mg 24 hr capsule, TAKE 1 CAPSULE BY MOUTH DAILY, Disp: 90 capsule, Rfl: 1  •  DULoxetine (CYMBALTA) 30 mg delayed release capsule, Take 30 mg by mouth daily at bedtime, Disp: , Rfl:   •  LORazepam (ATIVAN) 0 5 mg tablet, Take 0 5 mg by mouth , Disp: , Rfl:   •  metoprolol succinate (TOPROL-XL) 50 mg 24 hr tablet, TAKE 1 TABLET BY MOUTH DAILY, Disp: 90 tablet, Rfl: 0  •  OLANZapine (ZyPREXA) 5 mg tablet, Take 1 tablet (5 mg total) by mouth daily at bedtime, Disp: 30 tablet, Rfl: 1  •  polyethylene glycol (MIRALAX) 17 g packet, Take 17 g by mouth daily as needed (constipation first line) (Patient not taking: No sig reported), Disp: 14 each, Rfl: 0  •  Stool Softener 100 MG capsule, TAKE 1 CAPSULE BY MOUTH EVERY 12 HOURS FOR CONSTIPATION (Patient not taking: No sig reported), Disp: 90 capsule, Rfl: 0  •  terazosin (HYTRIN) 1 mg capsule, TAKE 1 CAPSULE (1 MG TOTAL) BY MOUTH DAILY AT BEDTIME, Disp: 90 capsule, Rfl: 0  •  traZODone (DESYREL) 50 mg tablet, Take 50 mg by mouth daily at bedtime, Disp: , Rfl:     HPI    The following portions of the patient's history were reviewed and updated as appropriate: allergies, current medications, past family history, past medical history, past social history, past surgical history and problem list     Review of Systems   Constitutional: Negative  Negative for activity change, appetite change, fatigue, fever and unexpected weight change  HENT: Negative for congestion, ear pain, hearing loss, mouth sores, postnasal drip, rhinorrhea, sore throat, trouble swallowing and voice change  Eyes: Negative for pain, redness and visual disturbance  Respiratory: Negative for cough, chest tightness, shortness of breath and wheezing  Cardiovascular: Negative for chest pain, palpitations and leg swelling  Gastrointestinal: Negative for abdominal distention, abdominal pain, blood in stool, constipation, diarrhea and nausea  Endocrine: Negative for cold intolerance, heat intolerance, polydipsia, polyphagia and polyuria  Genitourinary: Negative for difficulty urinating, dysuria, flank pain, frequency, hematuria and urgency  Musculoskeletal: Negative for arthralgias, back pain, gait problem, joint swelling and myalgias  Skin: Negative for color change and pallor  Neurological: Negative for dizziness, tremors, seizures, syncope, weakness, numbness and headaches  Hematological: Negative for adenopathy  Does not bruise/bleed easily  Psychiatric/Behavioral: Negative  Negative for sleep disturbance  The patient is not nervous/anxious  Objective:    /60 (BP Location: Left arm, Patient Position: Sitting, Cuff Size: Standard)   Pulse 80   Temp 97 9 °F (36 6 °C)   Resp 13   Ht 4' 10" (1 473 m)   Wt 55 3 kg (122 lb)   BMI 25 50 kg/m²      Physical Exam  Vitals and nursing note reviewed  Constitutional:       Appearance: She is well-developed  HENT:      Head: Normocephalic  Right Ear: External ear normal       Left Ear: External ear normal       Nose: Nose normal       Mouth/Throat:      Pharynx: No oropharyngeal exudate  Eyes:      Conjunctiva/sclera: Conjunctivae normal       Pupils: Pupils are equal, round, and reactive to light  Neck:      Thyroid: No thyromegaly  Cardiovascular:      Rate and Rhythm: Normal rate and regular rhythm  Heart sounds: Normal heart sounds  No murmur heard  No friction rub  No gallop  Comments: S1-S2 regular rhythm  Extremities no edema  Pulmonary:      Effort: Pulmonary effort is normal  No respiratory distress  Breath sounds: Normal breath sounds  No wheezing or rales  Comments: Lungs are clear no wheezing rales or rhonchi  Abdominal:      General: Bowel sounds are normal  There is no distension  Palpations: Abdomen is soft  There is no mass  Tenderness: There is no abdominal tenderness  There is no guarding or rebound  Musculoskeletal:         General: Normal range of motion  Cervical back: Normal range of motion and neck supple  Lymphadenopathy:      Cervical: No cervical adenopathy  Skin:     General: Skin is warm and dry  Neurological:      Mental Status: She is alert and oriented to person, place, and time     Psychiatric:         Behavior: Behavior normal          Judgment: Judgment normal

## 2022-10-11 NOTE — PATIENT INSTRUCTIONS
We order the shower share  Blood pressure is control    The blood pressure is low when she stands up so will stop the blood pressure pill amlodipine 2 5 mg  Monitor her blood pressure at home at least 2 or 3 times a week sitting and standing and call the office with the results weekly    Will see her back in 3 months

## 2022-10-25 ENCOUNTER — TELEPHONE (OUTPATIENT)
Dept: UROLOGY | Facility: MEDICAL CENTER | Age: 87
End: 2022-10-25

## 2022-10-25 DIAGNOSIS — N39.0 ACUTE UTI: ICD-10-CM

## 2022-10-25 DIAGNOSIS — R39.89 SUSPECTED UTI: Primary | ICD-10-CM

## 2022-10-25 NOTE — TELEPHONE ENCOUNTER
Returned call to patients son  He reports fowl smelling urine for at least a week  Due to her dementia they are not able to ask her about other symptoms  Requested family to get urine testing  Patient son is not sure they would be able to she has care takers and is not able to follow instructions with dementia   Appt given in [facility] 10/26/22 with site RN for strait cath for urine testing  Advised to hydrate, monitor fever, chills, pain     Will send to provider for additional recommendations

## 2022-10-25 NOTE — TELEPHONE ENCOUNTER
Patient managed by Kannan Forbes     Last Seen on 3/31/22    Patient son called due to the patient having a strong odor in her urine  Stated that the odor started about a week ago  They do have a hard time to know if she has other symptoms due to her having dementia  They do feel like they are having to change her depends more often  She is mostly incontinent  They she has caregivers 24/7  They believe she might have an infection   Denies fever chills in pt  Please call son Renée Krupa back at [phone]    SOn requested an appointment  scheudled with Sushma Hernandez on 11/23 at [facility] end

## 2022-10-25 NOTE — TELEPHONE ENCOUNTER
That sounds good   Curious what her cath residual is too, history of retention see how much she is holding on straight cath tomorrow and for specimen thanks so much

## 2022-10-26 ENCOUNTER — APPOINTMENT (OUTPATIENT)
Dept: LAB | Facility: MEDICAL CENTER | Age: 87
End: 2022-10-26

## 2022-10-26 DIAGNOSIS — R39.89 SUSPECTED UTI: ICD-10-CM

## 2022-10-26 LAB
BACTERIA UR QL AUTO: ABNORMAL /HPF
BILIRUB UR QL STRIP: NEGATIVE
CLARITY UR: ABNORMAL
COLOR UR: YELLOW
GLUCOSE UR STRIP-MCNC: ABNORMAL MG/DL
HGB UR QL STRIP.AUTO: ABNORMAL
KETONES UR STRIP-MCNC: ABNORMAL MG/DL
LEUKOCYTE ESTERASE UR QL STRIP: ABNORMAL
NITRITE UR QL STRIP: POSITIVE
NON-SQ EPI CELLS URNS QL MICRO: ABNORMAL /HPF
PH UR STRIP.AUTO: 6.5 [PH]
PROT UR STRIP-MCNC: ABNORMAL MG/DL
RBC #/AREA URNS AUTO: ABNORMAL /HPF
SP GR UR STRIP.AUTO: 1.02 (ref 1–1.03)
UROBILINOGEN UR STRIP-ACNC: <2 MG/DL
WBC #/AREA URNS AUTO: ABNORMAL /HPF
WBC CLUMPS # UR AUTO: PRESENT /UL

## 2022-10-26 PROCEDURE — 81001 URINALYSIS AUTO W/SCOPE: CPT

## 2022-10-26 NOTE — TELEPHONE ENCOUNTER
Patients son called returning a call from  Someone from this morning but I did not see any notes/encounters   I called [facility] and apparently patient did not show up to her appointment, someone from the office will call the son back    His name is Kenia Luo and his cell is [phone]

## 2022-10-26 NOTE — TELEPHONE ENCOUNTER
Called and spoke with the son Cleveland Wilson  Explained to Cleveland Wilson his mother was scheduled for an appointment today for cath specimen and PVR check  Cleveland Wilson states he did not know anything about the office appointment and took his mother to the lab for the urine testing  Await results

## 2022-10-29 LAB
BACTERIA UR CULT: ABNORMAL

## 2022-10-31 RX ORDER — NITROFURANTOIN 25; 75 MG/1; MG/1
100 CAPSULE ORAL 2 TIMES DAILY
Qty: 14 CAPSULE | Refills: 0 | Status: SHIPPED | OUTPATIENT
Start: 2022-10-31 | End: 2022-11-07

## 2022-10-31 NOTE — TELEPHONE ENCOUNTER
Called and spoke with patients son Humberto Sanchez  He was made aware of patients urine culture results and that Cheko Chavez was sent to her pharmacy to take twice a day for 7 days  Son verbalized understanding

## 2022-11-08 DIAGNOSIS — I48.0 PAROXYSMAL A-FIB (HCC): ICD-10-CM

## 2022-11-08 RX ORDER — AMIODARONE HYDROCHLORIDE 200 MG/1
200 TABLET ORAL DAILY
Qty: 90 TABLET | Refills: 0 | Status: SHIPPED | OUTPATIENT
Start: 2022-11-08

## 2022-11-17 ENCOUNTER — HOSPITAL ENCOUNTER (EMERGENCY)
Facility: HOSPITAL | Age: 87
Discharge: HOME | End: 2022-11-17
Attending: EMERGENCY MEDICINE

## 2022-11-17 VITALS
HEART RATE: 60 BPM | RESPIRATION RATE: 15 BRPM | SYSTOLIC BLOOD PRESSURE: 147 MMHG | OXYGEN SATURATION: 98 % | DIASTOLIC BLOOD PRESSURE: 48 MMHG | TEMPERATURE: 98 F

## 2022-11-17 DIAGNOSIS — R07.9 CHEST PAIN: Primary | ICD-10-CM

## 2022-11-17 DIAGNOSIS — Z86.59 HISTORY OF ANXIETY: ICD-10-CM

## 2022-11-17 LAB
2HR DELTA HS TROPONIN: 1 NG/L
ANION GAP SERPL CALCULATED.3IONS-SCNC: 6 MMOL/L (ref 4–13)
ATRIAL RATE: 51 BPM
ATRIAL RATE: 59 BPM
BASOPHILS # BLD AUTO: 0.01 THOUSANDS/ÂΜL (ref 0–0.1)
BASOPHILS NFR BLD AUTO: 0 % (ref 0–1)
BUN SERPL-MCNC: 27 MG/DL (ref 5–25)
CALCIUM SERPL-MCNC: 8.8 MG/DL (ref 8.3–10.1)
CARDIAC TROPONIN I PNL SERPL HS: 6 NG/L
CARDIAC TROPONIN I PNL SERPL HS: 7 NG/L
CHLORIDE SERPL-SCNC: 101 MMOL/L (ref 96–108)
CO2 SERPL-SCNC: 30 MMOL/L (ref 21–32)
CREAT SERPL-MCNC: 1.37 MG/DL (ref 0.6–1.3)
EOSINOPHIL # BLD AUTO: 0.12 THOUSAND/ÂΜL (ref 0–0.61)
EOSINOPHIL NFR BLD AUTO: 3 % (ref 0–6)
ERYTHROCYTE [DISTWIDTH] IN BLOOD BY AUTOMATED COUNT: 13.9 % (ref 11.6–15.1)
GFR SERPL CREATININE-BSD FRML MDRD: 33 ML/MIN/1.73SQ M
GLUCOSE SERPL-MCNC: 106 MG/DL (ref 65–140)
HCT VFR BLD AUTO: 36.8 % (ref 34.8–46.1)
HGB BLD-MCNC: 12.2 G/DL (ref 11.5–15.4)
IMM GRANULOCYTES # BLD AUTO: 0.02 THOUSAND/UL (ref 0–0.2)
IMM GRANULOCYTES NFR BLD AUTO: 0 % (ref 0–2)
LYMPHOCYTES # BLD AUTO: 0.8 THOUSANDS/ÂΜL (ref 0.6–4.47)
LYMPHOCYTES NFR BLD AUTO: 17 % (ref 14–44)
MCH RBC QN AUTO: 30.1 PG (ref 26.8–34.3)
MCHC RBC AUTO-ENTMCNC: 33.2 G/DL (ref 31.4–37.4)
MCV RBC AUTO: 91 FL (ref 82–98)
MONOCYTES # BLD AUTO: 0.44 THOUSAND/ÂΜL (ref 0.17–1.22)
MONOCYTES NFR BLD AUTO: 10 % (ref 4–12)
NEUTROPHILS # BLD AUTO: 3.24 THOUSANDS/ÂΜL (ref 1.85–7.62)
NEUTS SEG NFR BLD AUTO: 70 % (ref 43–75)
NRBC BLD AUTO-RTO: 0 /100 WBCS
P AXIS: -88 DEGREES
PLATELET # BLD AUTO: 232 THOUSANDS/UL (ref 149–390)
PMV BLD AUTO: 9.5 FL (ref 8.9–12.7)
POTASSIUM SERPL-SCNC: 4.1 MMOL/L (ref 3.5–5.3)
PR INTERVAL: 192 MS
QRS AXIS: 51 DEGREES
QRS AXIS: 58 DEGREES
QRSD INTERVAL: 92 MS
QRSD INTERVAL: 92 MS
QT INTERVAL: 446 MS
QT INTERVAL: 476 MS
QTC INTERVAL: 438 MS
QTC INTERVAL: 441 MS
RBC # BLD AUTO: 4.05 MILLION/UL (ref 3.81–5.12)
SODIUM SERPL-SCNC: 137 MMOL/L (ref 135–147)
T WAVE AXIS: -15 DEGREES
T WAVE AXIS: 21 DEGREES
VENTRICULAR RATE: 51 BPM
VENTRICULAR RATE: 59 BPM
WBC # BLD AUTO: 4.63 THOUSAND/UL (ref 4.31–10.16)

## 2022-11-17 NOTE — ED PROVIDER NOTES
History  Chief Complaint   Patient presents with   • Anxiety     Pt arrived via ems-per caregiver patient has been more easily startled today  Hx of dementia  Pt is poor historian  80 y o  F w/h/o schizophrenia, dementia, HTN, HLD p/w "easy to startle "  Pt unable to give history  Per EMS, caregiver reports pt seemed more easily startled today  There is no care giver present at bedside  History provided by:  EMS personnel  History limited by:  Dementia  Anxiety  Patient accompanied by: EMS  Timing:  Unable to specify  Progression:  Unable to specify  Chronicity:  New  Risk factors: hx of mental illness        Prior to Admission Medications   Prescriptions Last Dose Informant Patient Reported? Taking?    Blood Pressure KIT   No No   Sig: Use 2 (two) times a week   DULoxetine (CYMBALTA) 30 mg delayed release capsule   Yes No   Sig: Take 30 mg by mouth daily at bedtime   LORazepam (ATIVAN) 0 5 mg tablet   Yes No   Sig: Take 0 5 mg by mouth    OLANZapine (ZyPREXA) 5 mg tablet   No No   Sig: Take 1 tablet (5 mg total) by mouth daily at bedtime   Stool Softener 100 MG capsule   No No   Sig: TAKE 1 CAPSULE BY MOUTH EVERY 12 HOURS FOR CONSTIPATION   Patient not taking: No sig reported   acetaminophen (TYLENOL) 325 mg tablet   No No   Sig: Take 2 tablets (650 mg total) by mouth every 6 (six) hours as needed for mild pain, moderate pain or headaches   Patient not taking: No sig reported   albuterol (2 5 mg/3 mL) 0 083 % nebulizer solution   No No   Sig: Take 1 vial (2 5 mg total) by nebulization every 4 (four) hours as needed for wheezing or shortness of breath   Patient not taking: No sig reported   amiodarone 200 mg tablet   No No   Sig: TAKE 1 TABLET (200 MG TOTAL) BY MOUTH DAILY   bisacodyl (DULCOLAX) 10 mg suppository   No No   Sig: Insert 1 suppository (10 mg total) into the rectum daily as needed for constipation (2nd line)   Patient not taking: Reported on 7/6/2022   diltiazem (CARDIZEM CD) 120 mg 24 hr capsule   No No   Sig: TAKE 1 CAPSULE BY MOUTH DAILY   metoprolol succinate (TOPROL-XL) 50 mg 24 hr tablet   No No   Sig: TAKE 1 TABLET BY MOUTH DAILY   polyethylene glycol (MIRALAX) 17 g packet   No No   Sig: Take 17 g by mouth daily as needed (constipation first line)   Patient not taking: No sig reported   terazosin (HYTRIN) 1 mg capsule   No No   Sig: TAKE 1 CAPSULE (1 MG TOTAL) BY MOUTH DAILY AT BEDTIME   traZODone (DESYREL) 50 mg tablet   Yes No   Sig: Take 50 mg by mouth daily at bedtime      Facility-Administered Medications: None       Past Medical History:   Diagnosis Date   • Anxiety    • Cognitive impairment    • COVID-19 virus infection 04/13/2020   • Dementia (HCC)    • Depression    • Hallucination    • Hyperlipidemia    • Hypertension    • Memory loss    • Psychiatric illness    • Psychosis ([facility] [address] )    • Retention of urine    • Schizoaffective disorder ([facility] [address] )    • Sleep difficulties    • Urinary tract infection        Past Surgical History:   Procedure Laterality Date   • APPENDECTOMY     • BLADDER SURGERY     • CHOLECYSTECTOMY     • TN OPEN RX FEMUR FX+INTRAMED BRYSON Right 7/15/2021    Procedure: INSERTION NAIL IM FEMUR ANTEGRADE (TROCHANTERIC) right;  Surgeon: Kalyani Aj MD;  Location: [facility];  Service: Orthopedics   • TOTAL ABDOMINAL HYSTERECTOMY W/ BILATERAL SALPINGOOPHORECTOMY     • TOTAL KNEE ARTHROPLASTY Left        Family History   Problem Relation Age of Onset   • Heart disease Mother         Cardiac disorder   • Parkinsonism Father    • Heart disease Sister         Cardiac disorder   • Hypertension Sister    • Diabetes Child         Diabetes Mellitus   • Lung disease Child         Respiratory Disorder   • Diabetes Son         Diabetes Mellitus     I have reviewed and agree with the history as documented      E-Cigarette/Vaping   • E-Cigarette Use Never User      E-Cigarette/Vaping Substances   • Nicotine No    • THC No    • CBD No    • Flavoring No    • Other No    • Unknown No Social History     Tobacco Use   • Smoking status: Never   • Smokeless tobacco: Never   • Tobacco comments:     Former smoker per [employer]   Vaping Use   • Vaping Use: Never used   Substance Use Topics   • Alcohol use: Never   • Drug use: No       Review of Systems   Unable to perform ROS: Dementia       Physical Exam  Physical Exam  Vitals and nursing note reviewed  Constitutional:       General: She is not in acute distress  Appearance: She is well-developed  She is not ill-appearing, toxic-appearing or diaphoretic  Comments: Sleeping upon my arrival to room   HENT:      Head: Normocephalic and atraumatic  Eyes:      General: No scleral icterus  Conjunctiva/sclera:      Right eye: Right conjunctiva is not injected  Left eye: Left conjunctiva is not injected  Neck:      Vascular: No JVD  Trachea: Trachea normal    Cardiovascular:      Rate and Rhythm: Normal rate and regular rhythm  Pulses: Normal pulses  Heart sounds: Normal heart sounds  No murmur heard  No friction rub  Pulmonary:      Effort: Pulmonary effort is normal  No accessory muscle usage or respiratory distress  Breath sounds: Normal breath sounds  No stridor  No wheezing, rhonchi or rales  Chest:      Chest wall: No tenderness  Abdominal:      General: There is no distension  Palpations: Abdomen is soft  Tenderness: There is no abdominal tenderness  There is no guarding or rebound  Musculoskeletal:      Cervical back: Normal range of motion  Skin:     General: Skin is warm and dry  Coloration: Skin is not pale  Findings: No rash  Neurological:      Mental Status: She is alert     Psychiatric:         Behavior: Behavior normal          Vital Signs  ED Triage Vitals [11/17/22 0903]   Temperature Pulse Respirations Blood Pressure SpO2   98 °F (36 7 °C) (!) 52 16 162/70 98 %      Temp Source Heart Rate Source Patient Position - Orthostatic VS BP Location FiO2 (%)   Oral Monitor -- -- --      Pain Score       --           Vitals:    11/17/22 0903 11/17/22 1100 11/17/22 1215   BP: 162/70 (!) 147/48    Pulse: (!) 52 (!) 54 60         Visual Acuity      ED Medications  Medications - No data to display    Diagnostic Studies  Results Reviewed     Procedure Component Value Units Date/Time    HS Troponin I 2hr [[id number]]  (Normal) Collected: 11/17/22 1218    Lab Status: Final result Specimen: Blood from Arm, Right Updated: 11/17/22 1249     hs TnI 2hr 7 ng/L      Delta 2hr hsTnI 1 ng/L     CBC and differential [[id number]] Collected: 11/17/22 0955    Lab Status: Final result Specimen: Blood from Arm, Right Updated: 11/17/22 1028     WBC 4 63 Thousand/uL      RBC 4 05 Million/uL      Hemoglobin 12 2 g/dL      Hematocrit 36 8 %      MCV 91 fL      MCH 30 1 pg      MCHC 33 2 g/dL      RDW 13 9 %      MPV 9 5 fL      Platelets 296 Thousands/uL      nRBC 0 /100 WBCs      Neutrophils Relative 70 %      Immat GRANS % 0 %      Lymphocytes Relative 17 %      Monocytes Relative 10 %      Eosinophils Relative 3 %      Basophils Relative 0 %      Neutrophils Absolute 3 24 Thousands/µL      Immature Grans Absolute 0 02 Thousand/uL      Lymphocytes Absolute 0 80 Thousands/µL      Monocytes Absolute 0 44 Thousand/µL      Eosinophils Absolute 0 12 Thousand/µL      Basophils Absolute 0 01 Thousands/µL     HS Troponin 0hr (reflex protocol) [[id number]]  (Normal) Collected: 11/17/22 0955    Lab Status: Final result Specimen: Blood from Arm, Right Updated: 11/17/22 1027     hs TnI 0hr 6 ng/L     Basic metabolic panel [[id number]]  (Abnormal) Collected: 11/17/22 0955    Lab Status: Final result Specimen: Blood from Arm, Right Updated: 11/17/22 1015     Sodium 137 mmol/L      Potassium 4 1 mmol/L      Chloride 101 mmol/L      CO2 30 mmol/L      ANION GAP 6 mmol/L      BUN 27 mg/dL      Creatinine 1 37 mg/dL      Glucose 106 mg/dL      Calcium 8 8 mg/dL      eGFR 33 ml/min/1 73sq m     Narrative:      Consolidated [employer] guidelines for Chronic Kidney Disease (CKD):   •  Stage 1 with normal or high GFR (GFR > 90 mL/min/1 73 square meters)  •  Stage 2 Mild CKD (GFR = 60-89 mL/min/1 73 square meters)  •  Stage 3A Moderate CKD (GFR = 45-59 mL/min/1 73 square meters)  •  Stage 3B Moderate CKD (GFR = 30-44 mL/min/1 73 square meters)  •  Stage 4 Severe CKD (GFR = 15-29 mL/min/1 73 square meters)  •  Stage 5 End Stage CKD (GFR <15 mL/min/1 73 square meters)  Note: GFR calculation is accurate only with a steady state creatinine                 No orders to display              Procedures  ECG 12 Lead Documentation Only    Date/Time: 11/17/2022 9:33 AM  Performed by: DO Tacho  Authorized by: DO Tacho     Indications / Diagnosis: Anxiety  ECG reviewed by me, the ED Provider: yes    Patient location:  Bedside  Previous ECG:     Previous ECG:  Compared to current    Comparison ECG info:  4/17/22  Rate:     ECG rate:  51    ECG rate assessment: bradycardic    Ectopy:     Ectopy: none    QRS:     QRS axis:  Normal    QRS intervals:  Normal  ST segments:     ST segments:  Normal    ECG 12 Lead Documentation Only    Date/Time: 11/17/2022 12:29 PM  Performed by: DO Tacho  Authorized by: DO Tacho     Indications / Diagnosis: Anxiety  Previous ECG:     Previous ECG:  Compared to current    Comparison ECG info:  11/17/22    Similarity:  No change  Rate:     ECG rate:  59    ECG rate assessment: bradycardic    Ectopy:     Ectopy: none    QRS:     QRS axis:  Normal    QRS intervals:  Normal  ST segments:     ST segments:  Normal             ED Course  ED Course as of 11/17/22 1506   Thu Nov 17, 2022   0911 Called family on pt's demographics  No answer  Left message to call me back  [address] [id number], spoke with caregiver who is now at bedside  States pt is fine now  Yesterday, had a "nervous attack" and states she has these daily    Pt complained of pain on left side of chest   Lasted an hour  Seemed SOB  But states this happens daily, but hasn't followed up with PCP for it  Cardiac work up ordered  Instructed caregiver to have pt f/u with PCP if work up here is negative  1041 hs TnI 0hr: 6   1251 Delta 2hr hsTnI: 1             HEART Risk Score    Flowsheet Row Most Recent Value   Heart Score Risk Calculator    History 0 Filed at: 11/17/2022 1252   ECG 1 Filed at: 11/17/2022 1252   Age 2 Filed at: 11/17/2022 1252   Risk Factors 1 Filed at: 11/17/2022 1252   Troponin 0 Filed at: 11/17/2022 1252   HEART Score 4 Filed at: 11/17/2022 1252                                      MDM    Disposition  Final diagnoses:   Chest pain   History of anxiety     Time reflects when diagnosis was documented in both MDM as applicable and the Disposition within this note     Time User Action Codes Description Comment    11/17/2022 12:51 PM Jaison Reynoso 48 [R07 9] Chest pain     11/17/2022 12:51 PM Jaison Reynoso 48 [Z86 59] History of anxiety       ED Disposition     ED Disposition   Discharge    Condition   Stable    Date/Time   Thu Nov 17, 2022 12:52 PM    [address] discharge to home/self care  Follow-up Information     Follow up With Specialties Details Why [address] Governor Nghia MD Internal Medicine Schedule an appointment as soon as possible for a visit  For follow up [address]  [address] [place] [address]            Discharge Medication List as of 11/17/2022 12:52 PM      CONTINUE these medications which have NOT CHANGED    Details   amiodarone 200 mg tablet TAKE 1 TABLET (200 MG TOTAL) BY MOUTH DAILY, Starting Tue 11/8/2022, Normal      acetaminophen (TYLENOL) 325 mg tablet Take 2 tablets (650 mg total) by mouth every 6 (six) hours as needed for mild pain, moderate pain or headaches, Starting Sun 8/23/2020, No Print      albuterol (2 5 mg/3 mL) 0 083 % nebulizer solution Take 1 vial (2 5 mg total) by nebulization every 4 (four) hours as needed for wheezing or shortness of breath, Starting Wed 6/17/2020, Normal      bisacodyl (DULCOLAX) 10 mg suppository Insert 1 suppository (10 mg total) into the rectum daily as needed for constipation (2nd line), Starting Mon 7/26/2021, No Print      Blood Pressure KIT Use 2 (two) times a week, Starting Thu 10/13/2022, Normal      diltiazem (CARDIZEM CD) 120 mg 24 hr capsule TAKE 1 CAPSULE BY MOUTH DAILY, Normal      DULoxetine (CYMBALTA) 30 mg delayed release capsule Take 30 mg by mouth daily at bedtime, Historical Med      LORazepam (ATIVAN) 0 5 mg tablet Take 0 5 mg by mouth , Historical Med      metoprolol succinate (TOPROL-XL) 50 mg 24 hr tablet TAKE 1 TABLET BY MOUTH DAILY, Normal      OLANZapine (ZyPREXA) 5 mg tablet Take 1 tablet (5 mg total) by mouth daily at bedtime, Starting Tue 5/26/2020, Normal      polyethylene glycol (MIRALAX) 17 g packet Take 17 g by mouth daily as needed (constipation first line), Starting Wed 6/17/2020, Normal      Stool Softener 100 MG capsule TAKE 1 CAPSULE BY MOUTH EVERY 12 HOURS FOR CONSTIPATION, Normal      terazosin (HYTRIN) 1 mg capsule TAKE 1 CAPSULE (1 MG TOTAL) BY MOUTH DAILY AT BEDTIME, Starting Wed 9/14/2022, Until Tue 12/13/2022, Normal      traZODone (DESYREL) 50 mg tablet Take 50 mg by mouth daily at bedtime, Historical Med             No discharge procedures on file      PDMP Review       Value Time User    PDMP Reviewed  Yes 7/16/2021  8:50 AM Pranav Miguel PA-C          ED Provider  Electronically Signed by           [address], DO  11/17/22 8139

## 2022-11-23 ENCOUNTER — OFFICE VISIT (OUTPATIENT)
Dept: UROLOGY | Facility: CLINIC | Age: 87
End: 2022-11-23

## 2022-11-23 ENCOUNTER — TELEPHONE (OUTPATIENT)
Dept: UROLOGY | Facility: CLINIC | Age: 87
End: 2022-11-23

## 2022-11-23 VITALS
HEIGHT: 58 IN | BODY MASS INDEX: 25.5 KG/M2 | DIASTOLIC BLOOD PRESSURE: 68 MMHG | SYSTOLIC BLOOD PRESSURE: 122 MMHG | HEART RATE: 55 BPM | OXYGEN SATURATION: 99 %

## 2022-11-23 DIAGNOSIS — N39.0 URINARY TRACT INFECTION WITHOUT HEMATURIA, SITE UNSPECIFIED: Primary | ICD-10-CM

## 2022-11-23 DIAGNOSIS — N39.498 OTHER URINARY INCONTINENCE: ICD-10-CM

## 2022-11-23 DIAGNOSIS — R33.8 ACUTE URINARY RETENTION: ICD-10-CM

## 2022-11-23 LAB
POST-VOID RESIDUAL VOLUME, ML POC: 292 ML
SL AMB  POCT GLUCOSE, UA: NORMAL
SL AMB LEUKOCYTE ESTERASE,UA: NORMAL
SL AMB POCT BILIRUBIN,UA: NORMAL
SL AMB POCT BLOOD,UA: NORMAL
SL AMB POCT CLARITY,UA: NORMAL
SL AMB POCT COLOR,UA: YELLOW
SL AMB POCT KETONES,UA: NORMAL
SL AMB POCT NITRITE,UA: NORMAL
SL AMB POCT PH,UA: 5
SL AMB POCT SPECIFIC GRAVITY,UA: 1.01
SL AMB POCT URINE PROTEIN: NORMAL
SL AMB POCT UROBILINOGEN: 0.2

## 2022-11-23 RX ORDER — TERAZOSIN 1 MG/1
1 CAPSULE ORAL
Qty: 90 CAPSULE | Refills: 3 | Status: SHIPPED | OUTPATIENT
Start: 2022-11-23 | End: 2023-02-21

## 2022-11-23 NOTE — ASSESSMENT & PLAN NOTE
· , declined catheter  · Return Monday for nurse visit and PVR  · History of CVA  · Continue Hytrin 1 mg q h s    · Follow-up 6 months for recheck

## 2022-11-23 NOTE — TELEPHONE ENCOUNTER
Called LV with patient's son letting him know that his mother can wait until Wednesday for her PVR     We have 3 spots available 11:30,1:00,1:30

## 2022-11-23 NOTE — ASSESSMENT & PLAN NOTE
· Begin daily probiotic  · Begin cranberry supplement 500 mg daily  · Begin vitamin-C 1000 mg daily  · Begin D mannose 2000 mg daily  · Follow-up 6 months for recheck

## 2022-11-23 NOTE — PROGRESS NOTES
Assessment and plan:     Urinary incontinence  · Continue daily bowel regimen to prevent constipation  · Avoid bladder irritation  · Increased water intake  · Follow-up 6 months    Acute urinary retention  · , declined catheter  · Return Monday for nurse visit and PVR  · History of CVA  · Continue Hytrin 1 mg q h s  · Follow-up 6 months for recheck    Recurrent UTI  · Begin daily probiotic  · Begin cranberry supplement 500 mg daily  · Begin vitamin-C 1000 mg daily  · Begin D mannose 2000 mg daily  · Follow-up 6 months for recheck      BEV Ferrell    History of Present Illness     Lisa Pastrana is a 80 y o  female here for evaluation of urinary retention follow-up  Patient seen by Afua Willoughby in September for episode of urinary retention has successful void trial   She was doing well up until just prior to her last visit March 2022 at which time she was diagnosed with urinary tract infection  She took a course of antibiotics she had a catheter placed briefly for around 250 mL with another successful void trialed a week later  She has been doing fine since that time though return to the emergency room last week with a strong odor to her urine noted by her caregivers  She was prescribed amoxicillin for possible UTI  Culture grew Enterococcus faecalis and aerococcus urinae  She is unable to communicate any complaints to her caregivers if she is aphasic  There is also a language barrier  She was started on Hytrin q h s  With additional recommendations for timed voiding and a daily bowel regimen  She presented for follow-up June 2022 for PVR at which time her PVR was 11 mL  Has constipation, normally has bowel movement twice a week on miralax and prunes  Incontinent and uses about 5 depends a day   She can communicate that she needs to have a bowel movement, but not urination    Urine culture:   10/26/2022 >336672 enterococcus faecalis, >450370 aerococcus urinae   06/14/2022 aerococcus urinae   03/26/2022 >022206 enterococcus faecalis, >709225 aerococcus urinae   03/04/2022 >149195 mixed contaminants   01/20/2022 >429528 enterococcus faecalis, >667113 aerococcus urinae  They notice a change in her urine color and odor  The is nonverbal    Her son is translating for her today  Care giver is her with them today  Bladder surgery is listed in her hx  Pt is unable to tell us and her son does not know  Possible bladder sling, she had a hysterectomy previously  Laboratory     Lab Results   Component Value Date    BUN 27 (H) 11/17/2022    CREATININE 1 37 (H) 11/17/2022       No components found for: GFR    Lab Results   Component Value Date    GLUCOSE 114 07/29/2014    CALCIUM 8 8 11/17/2022     07/29/2014    K 4 1 11/17/2022    CO2 30 11/17/2022     11/17/2022       Lab Results   Component Value Date    WBC 4 63 11/17/2022    HGB 12 2 11/17/2022    HCT 36 8 11/17/2022    MCV 91 11/17/2022     11/17/2022       No results found for: PSA    Recent Results (from the past 1 hour(s))   POCT Measure PVR    Collection Time: 11/23/22 10:03 AM   Result Value Ref Range    POST-VOID RESIDUAL VOLUME, ML  mL       @RESULT(URINEMICROSCOPIC)@    @RESULT(URINECULTURE)@    Radiology     CT ABDOMEN AND PELVIS WITHOUT IV CONTRAST     INDICATION:   flank pain, back pain      COMPARISON:  None      TECHNIQUE:  CT examination of the abdomen and pelvis was performed without intravenous contrast   Axial, sagittal, and coronal 2D reformatted images were created from the source data and submitted for interpretation       Radiation dose length product (DLP) for this visit:  397 mGy-cm     This examination, like all CT scans performed in the [facility], was performed utilizing techniques to minimize radiation dose exposure, including the use of iterative   reconstruction and automated exposure control       Enteric contrast was administered       FINDINGS:     ABDOMEN     LOWER CHEST:  No clinically significant abnormality identified in the visualized lower chest      LIVER/BILIARY TREE:  Unremarkable      GALLBLADDER:  No calcified gallstones  No pericholecystic inflammatory change      SPLEEN:  Calcified granulomas      PANCREAS:  Unremarkable      ADRENAL GLANDS:  Unremarkable      KIDNEYS/URETERS:  Unremarkable  No hydronephrosis      STOMACH AND BOWEL:  Moderate fecal stasis  Diverticular disease without diverticulitis      APPENDIX:  No findings to suggest appendicitis      ABDOMINOPELVIC CAVITY:  No ascites  No pneumoperitoneum  No lymphadenopathy      VESSELS:  Unremarkable for patient's age      PELVIS     REPRODUCTIVE ORGANS:  Unremarkable for patient's age      URINARY BLADDER:  Unremarkable      ABDOMINAL WALL/INGUINAL REGIONS:  Left posterior lateral wall hernia containing descending colon and fat without acute findings  No obstruction      OSSEOUS STRUCTURES:  Status post ORIF for a right intertrochanteric hip fracture with near-anatomic alignment  Severe scoliosis      IMPRESSION:  Moderate fecal stasis  Diverticular disease without diverticulitis  No acute findings         Review of Systems     Review of Systems   Constitutional: Negative for activity change, appetite change, chills, fatigue, fever and unexpected weight change  HENT: Negative for facial swelling  Eyes: Negative for discharge  Respiratory: Negative  Negative for cough and shortness of breath  Cardiovascular: Negative for chest pain and leg swelling  Gastrointestinal: Positive for constipation  Negative for abdominal distention, abdominal pain, diarrhea, nausea and vomiting  Endocrine: Negative  Genitourinary: Negative  Negative for decreased urine volume, difficulty urinating, dysuria, enuresis, flank pain, frequency, genital sores, hematuria and urgency  Urinary incontinence   Musculoskeletal: Negative for back pain and myalgias  Skin: Negative for pallor and rash  Allergic/Immunologic: Negative  Negative for immunocompromised state  Neurological: Negative for facial asymmetry and speech difficulty  Psychiatric/Behavioral: Negative for agitation and confusion  Allergies     No Known Allergies    Physical Exam     Physical Exam  Vitals reviewed  Constitutional:       General: She is not in acute distress  Appearance: Normal appearance  She is normal weight  She is not ill-appearing, toxic-appearing or diaphoretic  HENT:      Head: Normocephalic and atraumatic  Eyes:      General: No scleral icterus  Cardiovascular:      Rate and Rhythm: Normal rate  Pulmonary:      Effort: Pulmonary effort is normal  No respiratory distress  Abdominal:      General: Abdomen is flat  There is no distension  Palpations: Abdomen is soft  Tenderness: There is no abdominal tenderness  There is no guarding or rebound  Musculoskeletal:         General: No swelling  Cervical back: Normal range of motion  Skin:     General: Skin is warm and dry  Coloration: Skin is not jaundiced or pale  Findings: No rash  Neurological:      General: No focal deficit present  Mental Status: She is alert and oriented to person, place, and time        Gait: Gait abnormal    Psychiatric:         Mood and Affect: Mood normal          Behavior: Behavior normal          Vital Signs     Vitals:    11/23/22 0920   BP: 122/68   BP Location: Left arm   Patient Position: Sitting   Cuff Size: Adult   Pulse: 55   SpO2: 99%   Height: 4' 10" (1 473 m)       Current Medications       Current Outpatient Medications:   •  acetaminophen (TYLENOL) 325 mg tablet, Take 2 tablets (650 mg total) by mouth every 6 (six) hours as needed for mild pain, moderate pain or headaches, Disp: 30 tablet, Rfl: 0  •  amiodarone 200 mg tablet, TAKE 1 TABLET (200 MG TOTAL) BY MOUTH DAILY, Disp: 90 tablet, Rfl: 0  •  Blood Pressure KIT, Use 2 (two) times a week, Disp: 1 kit, Rfl: 0  •  diltiazem (CARDIZEM CD) 120 mg 24 hr capsule, TAKE 1 CAPSULE BY MOUTH DAILY, Disp: 90 capsule, Rfl: 1  •  DULoxetine (CYMBALTA) 30 mg delayed release capsule, Take 30 mg by mouth daily at bedtime, Disp: , Rfl:   •  LORazepam (ATIVAN) 0 5 mg tablet, Take 0 5 mg by mouth , Disp: , Rfl:   •  metoprolol succinate (TOPROL-XL) 50 mg 24 hr tablet, TAKE 1 TABLET BY MOUTH DAILY, Disp: 90 tablet, Rfl: 0  •  OLANZapine (ZyPREXA) 5 mg tablet, Take 1 tablet (5 mg total) by mouth daily at bedtime, Disp: 30 tablet, Rfl: 1  •  polyethylene glycol (MIRALAX) 17 g packet, Take 17 g by mouth daily as needed (constipation first line), Disp: 14 each, Rfl: 0  •  terazosin (HYTRIN) 1 mg capsule, Take 1 capsule (1 mg total) by mouth daily at bedtime, Disp: 90 capsule, Rfl: 3  •  traZODone (DESYREL) 50 mg tablet, Take 50 mg by mouth daily at bedtime, Disp: , Rfl:   •  albuterol (2 5 mg/3 mL) 0 083 % nebulizer solution, Take 1 vial (2 5 mg total) by nebulization every 4 (four) hours as needed for wheezing or shortness of breath (Patient not taking: Reported on 1/20/2022), Disp: 1 vial, Rfl: 0  •  bisacodyl (DULCOLAX) 10 mg suppository, Insert 1 suppository (10 mg total) into the rectum daily as needed for constipation (2nd line) (Patient not taking: Reported on 7/6/2022), Disp: 12 suppository, Rfl: 0  •  Stool Softener 100 MG capsule, TAKE 1 CAPSULE BY MOUTH EVERY 12 HOURS FOR CONSTIPATION (Patient not taking: Reported on 7/6/2022), Disp: 90 capsule, Rfl: 0    Active Problems     Patient Active Problem List   Diagnosis   • Essential hypertension   • Anxiety   • Hyperlipidemia   • Hyponatremia   • Acute encephalopathy   • Bilateral hearing loss   • Fall   • 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HCC)   • Cellulitis of extremity   • Paroxysmal A-fib ([facility])   • Orthostatic hypertension   • 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[facility])   • Ambulatory dysfunction   • OMEGA (acute kidney injury) ([facility])   • Elevated troponin   • Urinary incontinence   • PAF (paroxysmal atrial fibrillation) [facility])   • 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[facility] )   • Major neurocognitive disorder ([place] [address] )   • Moderate protein-calorie malnutrition ([place] [address] )   • Age-related osteoporosis without current pathological fracture   • Closed fracture of right orbital floor [facility])   • Fall from ground level   • Closed right hip fracture (HCC)   • Stage 3a chronic kidney disease (HCC)   • Dementia (HCC)   • ABLA (acute blood loss anemia)   • Oropharyngeal dysphagia   • Flank pain   • Edema of right ankle   • Acute urinary retention   • Recurrent UTI       Past Medical History     Past Medical History:   Diagnosis Date   • Anxiety    • Cognitive impairment    • COVID-19 virus infection 04/13/2020   • Dementia ([place] [address] )    • Depression    • Hallucination    • Hyperlipidemia    • Hypertension    • Memory loss    • Psychiatric illness    • Psychosis ([place] [address] )    • Retention of urine    • Schizoaffective disorder ([place] [address] )    • Sleep difficulties    • Urinary tract infection        Surgical History     Past Surgical History:   Procedure Laterality Date   • APPENDECTOMY     • BLADDER SURGERY     • CHOLECYSTECTOMY     • [place] OPEN RX FEMUR FX+INTRAMED BRYSON Right 7/15/2021    Procedure: INSERTION NAIL IM FEMUR ANTEGRADE (TROCHANTERIC) right;  Surgeon:  Karyn Mcneal MD;  Location: [facility];  Service: Orthopedics   • TOTAL ABDOMINAL HYSTERECTOMY W/ BILATERAL SALPINGOOPHORECTOMY     • TOTAL KNEE ARTHROPLASTY Left        Family History     Family History   Problem Relation Age of Onset   • Heart disease Mother         Cardiac disorder   • Parkinsonism Father    • Heart disease Sister         Cardiac disorder   • Hypertension Sister    • Diabetes Child         Diabetes Mellitus   • Lung disease Child         Respiratory Disorder   • Diabetes Son         Diabetes Mellitus       Social History     Social History     Social History     Tobacco Use   Smoking Status Never   Smokeless Tobacco Never   Tobacco Comments    Former smoker per [employer]       Past Surgical History: Procedure Laterality Date   • APPENDECTOMY     • BLADDER SURGERY     • CHOLECYSTECTOMY     • [place] OPEN RX FEMUR FX+INTRAMED BRYSON Right 7/15/2021    Procedure: INSERTION NAIL IM FEMUR ANTEGRADE (TROCHANTERIC) right;  Surgeon: Collette Day, MD;  Location: [facility];  Service: Orthopedics   • TOTAL ABDOMINAL HYSTERECTOMY W/ BILATERAL SALPINGOOPHORECTOMY     • TOTAL KNEE ARTHROPLASTY Left          The following portions of the patient's history were reviewed and updated as appropriate: allergies, current medications, past family history, past medical history, past social history, past surgical history and problem list    Please note :  Voice dictation software has been used to create this document  There may be inadvertent transcription errors      [id number] [place] [id number] Samreen Leblanc

## 2022-11-23 NOTE — ASSESSMENT & PLAN NOTE
· Continue daily bowel regimen to prevent constipation  · Avoid bladder irritation  · Increased water intake  · Follow-up 6 months

## 2022-11-30 ENCOUNTER — CLINICAL SUPPORT (OUTPATIENT)
Dept: UROLOGY | Facility: CLINIC | Age: 87
End: 2022-11-30

## 2022-11-30 ENCOUNTER — TELEPHONE (OUTPATIENT)
Dept: OTHER | Facility: OTHER | Age: 87
End: 2022-11-30

## 2022-11-30 VITALS
WEIGHT: 122 LBS | HEIGHT: 58 IN | HEART RATE: 64 BPM | RESPIRATION RATE: 20 BRPM | BODY MASS INDEX: 25.61 KG/M2 | DIASTOLIC BLOOD PRESSURE: 60 MMHG | SYSTOLIC BLOOD PRESSURE: 130 MMHG

## 2022-11-30 DIAGNOSIS — R33.8 ACUTE URINARY RETENTION: Primary | ICD-10-CM

## 2022-11-30 LAB — POST-VOID RESIDUAL VOLUME, ML POC: 372 ML

## 2022-11-30 NOTE — TELEPHONE ENCOUNTER
Son stated he received a call to move his mother's appointment to a different location  Son Elizabeth Shields prefers [facility] location  In [facility]  Please call son back to confirm

## 2022-11-30 NOTE — RESULT ENCOUNTER NOTE
son is concerned about Sanders catheter placement  He is also not inclined for CIC  The currently only have caretakers and no nurses coming into the house  The caretakers are unable to perform CIC  He would like to avoid Sanders catheter placement due to the likelihood of her pulling it out and causing trauma  We discussed the possibility of renal failure related to ongoing urinary retention  He voiced understanding of same  He would like to recheck her BMP and have an ultrasound kidney and bladder in 4 weeks  If stable can continue to monitor closely

## 2022-11-30 NOTE — PROGRESS NOTES
11/30/2022  Chava Calderon is a 80 y o  female  [id number]    Diagnosis:  Chief Complaint    Incomplete bladder emptying         Patient presents for follow up post void residual  managed by our office    Plan:  Plan USK and BMP in 4 weeks  Will call with results  Assessment:      Vitals:    11/30/22 1357   BP: 130/60   Pulse: 64   Resp: 20   Weight: 55 3 kg (122 lb)   Height: 4' 10" (1 473 m)           Patient voided 0 mL in the office  Post void residual measured via bladder scan to be 372 mL    Recent Results (from the past 1 hour(s))   POCT Measure PVR    Collection Time: 11/30/22  2:19 PM   Result Value Ref Range    POST-VOID RESIDUAL VOLUME, ML  mL   Claven Pool CRNP spoke with patient's son in regards to patient's post void residual   Shan Levi expressed concern if patient does not empty her bladder well, it could lead to kidney failure  Family does not want indwelling rooney and does not have staff to perform catheterization several times a day  It was decided to obtain USK with BMP in 4 weeks  Will call son with results      Michoacano Burks RN

## 2022-11-30 NOTE — ASSESSMENT & PLAN NOTE
· Son would like to avoid Sanders catheter due to her confusion-he is aware of possibility of renal failure due to urinary retention  · Unsure if caregivers are able to straight cath patient  ·   · Continue Hytrin 1 mg daily  · Continue daily bowel regimen  · Continue timed voiding  · Schedule ultrasound kidney and bladder with PVR in 4 weeks  · Check BMP in 4 weeks

## 2022-12-07 DIAGNOSIS — I48.0 PAROXYSMAL A-FIB (HCC): ICD-10-CM

## 2022-12-07 RX ORDER — DILTIAZEM HYDROCHLORIDE 120 MG/1
CAPSULE, COATED, EXTENDED RELEASE ORAL
Qty: 90 CAPSULE | Refills: 0 | Status: SHIPPED | OUTPATIENT
Start: 2022-12-07

## 2022-12-19 ENCOUNTER — OFFICE VISIT (OUTPATIENT)
Dept: CARDIOLOGY CLINIC | Facility: CLINIC | Age: 87
End: 2022-12-19

## 2022-12-19 VITALS
SYSTOLIC BLOOD PRESSURE: 120 MMHG | BODY MASS INDEX: 26.15 KG/M2 | DIASTOLIC BLOOD PRESSURE: 62 MMHG | WEIGHT: 125.1 LBS | HEART RATE: 56 BPM

## 2022-12-19 DIAGNOSIS — I10 ESSENTIAL HYPERTENSION: ICD-10-CM

## 2022-12-19 DIAGNOSIS — I48.0 PAF (PAROXYSMAL ATRIAL FIBRILLATION) (HCC): Primary | ICD-10-CM

## 2022-12-19 NOTE — PROGRESS NOTES
Cardiology Follow Up    Manjeet Riddle  11/20/1931  [id number]  [address]  [address]  [phone]    1  PAF (paroxysmal atrial fibrillation) [facility])  Ambulatory Referral to Cardiology      2  Essential hypertension            Interval History: Cardiology follow-up  Patient was recently seen in the emergency room with vague discomfort, questionable chest discomfort, the patient has advanced dementia and is difficult to characterize her symptoms  No syncope or presyncope  She has sustained no falls    Not ambulatory    Patient Active Problem List   Diagnosis   • Essential hypertension   • Anxiety   • Hyperlipidemia   • Hyponatremia   • Acute encephalopathy   • Bilateral hearing loss   • Fall   • 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[place] [address] )   • Cellulitis of extremity   • Paroxysmal A-fib ([facility])   • Orthostatic hypertension   • 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[address] )   • Ambulatory dysfunction   • OMEGA (acute kidney injury) ([facility])   • Elevated troponin   • Urinary incontinence   • PAF (paroxysmal atrial fibrillation) ([facility])   • 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[facility])   • Major neurocognitive disorder ([facility])   • Moderate protein-calorie malnutrition ([facility])   • Age-related osteoporosis without current pathological fracture   • Closed fracture of right orbital floor [facility])   • Fall from ground level   • Closed right hip fracture ([facility])   • Stage 3a chronic kidney disease ([facility])   • Dementia ([facility])   • ABLA (acute blood loss anemia)   • Oropharyngeal dysphagia   • Flank pain   • Edema of right ankle   • Acute urinary retention   • Recurrent UTI     Past Medical History:   Diagnosis Date   • Anxiety    • Cognitive impairment    • COVID-19 virus infection 04/13/2020   • Dementia ([place] [address] )    • Depression    • Hallucination    • Hyperlipidemia    • Hypertension    • Memory loss    • Psychiatric illness    • Psychosis ([place] [address] )    • Retention of urine    • Schizoaffective disorder ([facility] [address] )    • Sleep difficulties    • Urinary tract infection      Social History     Socioeconomic History   • Marital status:      Spouse name: Not on file   • Number of children: Not on file   • Years of education: Not on file   • Highest education level: Not on file   Occupational History   • Not on file   Tobacco Use   • Smoking status: Never   • Smokeless tobacco: Never   • Tobacco comments:     Former smoker per [employer]   Vaping Use   • Vaping Use: Never used   Substance and Sexual Activity   • Alcohol use: Never   • Drug use: No   • Sexual activity: Not on file   Other Topics Concern   • Not on file   Social History Narrative    Drinks coffee     Social Determinants of Health     Financial Resource Strain: Not on file   Food Insecurity: Not on file   Transportation Needs: Not on file   Physical Activity: Not on file   Stress: Not on file   Social Connections: Not on file   Intimate Partner Violence: Not on file   Housing Stability: Not on file      Family History   Problem Relation Age of Onset   • Heart disease Mother         Cardiac disorder   • Parkinsonism Father    • Heart disease Sister         Cardiac disorder   • Hypertension Sister    • Diabetes Child         Diabetes Mellitus   • Lung disease Child         Respiratory Disorder   • Diabetes Son         Diabetes Mellitus     Past Surgical History:   Procedure Laterality Date   • APPENDECTOMY     • BLADDER SURGERY     • CHOLECYSTECTOMY     • AK OPEN RX FEMUR FX+INTRAMED BRYSON Right 7/15/2021    Procedure: INSERTION NAIL IM FEMUR ANTEGRADE (TROCHANTERIC) right;  Surgeon:  Deonna Jefferson MD;  Location: [facility] OR;  Service: Orthopedics   • TOTAL ABDOMINAL HYSTERECTOMY W/ BILATERAL SALPINGOOPHORECTOMY     • TOTAL KNEE ARTHROPLASTY Left        Current Outpatient Medications:   •  acetaminophen (TYLENOL) 325 mg tablet, Take 2 tablets (650 mg total) by mouth every 6 (six) hours as needed for mild pain, moderate pain or headaches, Disp: 30 tablet, Rfl: 0  •  albuterol (2 5 mg/3 mL) 0 083 % nebulizer solution, Take 1 vial (2 5 mg total) by nebulization every 4 (four) hours as needed for wheezing or shortness of breath (Patient not taking: Reported on 1/20/2022), Disp: 1 vial, Rfl: 0  •  amiodarone 200 mg tablet, TAKE 1 TABLET (200 MG TOTAL) BY MOUTH DAILY, Disp: 90 tablet, Rfl: 0  •  bisacodyl (DULCOLAX) 10 mg suppository, Insert 1 suppository (10 mg total) into the rectum daily as needed for constipation (2nd line) (Patient not taking: Reported on 7/6/2022), Disp: 12 suppository, Rfl: 0  •  Blood Pressure KIT, Use 2 (two) times a week, Disp: 1 kit, Rfl: 0  •  diltiazem (CARDIZEM CD) 120 mg 24 hr capsule, TAKE 1 CAPSULE BY MOUTH DAILY, Disp: 90 capsule, Rfl: 0  •  DULoxetine (CYMBALTA) 30 mg delayed release capsule, Take 30 mg by mouth daily at bedtime, Disp: , Rfl:   •  LORazepam (ATIVAN) 0 5 mg tablet, Take 0 5 mg by mouth , Disp: , Rfl:   •  metoprolol succinate (TOPROL-XL) 50 mg 24 hr tablet, TAKE 1 TABLET BY MOUTH DAILY, Disp: 90 tablet, Rfl: 0  •  OLANZapine (ZyPREXA) 5 mg tablet, Take 1 tablet (5 mg total) by mouth daily at bedtime, Disp: 30 tablet, Rfl: 1  •  polyethylene glycol (MIRALAX) 17 g packet, Take 17 g by mouth daily as needed (constipation first line), Disp: 14 each, Rfl: 0  •  Stool Softener 100 MG capsule, TAKE 1 CAPSULE BY MOUTH EVERY 12 HOURS FOR CONSTIPATION (Patient not taking: Reported on 7/6/2022), Disp: 90 capsule, Rfl: 0  •  terazosin (HYTRIN) 1 mg capsule, Take 1 capsule (1 mg total) by mouth daily at bedtime, Disp: 90 capsule, Rfl: 3  •  traZODone (DESYREL) 50 mg tablet, Take 50 mg by mouth daily at bedtime, Disp: , Rfl:   No Known Allergies    Labs:  Clinical Support on 11/30/2022   Component Date Value   • POST-VOID RESIDUAL VOLUM* 11/30/2022 372    Office Visit on 11/23/2022   Component Date Value   • LEUKOCYTE ESTERASE,UA 11/23/2022 large    • NITRITE,UA 11/23/2022 neg    • SL AMB POCT UROBILINOGEN 11/23/2022 0 2    • POCT URINE PROTEIN 11/23/2022 neg    •  PH,UA 11/23/2022 5 0    • BLOOD,UA 11/23/2022 moderate    • SPECIFIC GRAVITY,UA 11/23/2022 1 015    • KETONES,UA 11/23/2022 neg    • BILIRUBIN,UA 11/23/2022 neg    • GLUCOSE, UA 11/23/2022 neg    •  COLOR,UA 11/23/2022 yellow    • CLARITY,UA 11/23/2022 cloudy    • POST-VOID RESIDUAL VOLUM* 11/23/2022 292    Admission on 11/17/2022, Discharged on 11/17/2022   Component Date Value   • Ventricular Rate 11/17/2022 51    • Atrial Rate 11/17/2022 51    • QRSD Interval 11/17/2022 92    • QT Interval 11/17/2022 476    • QTC Interval 11/17/2022 438    • QRS Axis 11/17/2022 51    • T Wave Axis 11/17/2022 21    • WBC 11/17/2022 4 63    • RBC 11/17/2022 4 05    • Hemoglobin 11/17/2022 12 2    • Hematocrit 11/17/2022 36 8    • MCV 11/17/2022 91    • MCH 11/17/2022 30 1    • MCHC 11/17/2022 33 2    • RDW 11/17/2022 13 9    • MPV 11/17/2022 9 5    • Platelets 26/85/8483 232    • nRBC 11/17/2022 0    • Neutrophils Relative 11/17/2022 70    • Immat GRANS % 11/17/2022 0    • Lymphocytes Relative 11/17/2022 17    • Monocytes Relative 11/17/2022 10    • Eosinophils Relative 11/17/2022 3    • Basophils Relative 11/17/2022 0    • Neutrophils Absolute 11/17/2022 3 24    • Immature Grans Absolute 11/17/2022 0 02    • Lymphocytes Absolute 11/17/2022 0 80    • Monocytes Absolute 11/17/2022 0 44    • Eosinophils Absolute 11/17/2022 0 12    • Basophils Absolute 11/17/2022 0 01    • Sodium 11/17/2022 137    • Potassium 11/17/2022 4 1    • Chloride 11/17/2022 101    • CO2 11/17/2022 30    • ANION GAP 11/17/2022 6    • BUN 11/17/2022 27 (H)    • Creatinine 11/17/2022 1 37 (H)    • Glucose 11/17/2022 106    • Calcium 11/17/2022 8 8    • eGFR 11/17/2022 33    • hs TnI 0hr 11/17/2022 6    • hs TnI 2hr 11/17/2022 7    • Delta 2hr hsTnI 11/17/2022 1    • Ventricular Rate 11/17/2022 59    • Atrial Rate 11/17/2022 59    • PA Interval 11/17/2022 192    • QRSD Interval 11/17/2022 92    • QT Interval 11/17/2022 446    • QTC Interval 11/17/2022 441    • P Axis 11/17/2022 -88    • QRS Axis 11/17/2022 58    • T Wave Axis 11/17/2022 -15    Appointment on 10/26/2022   Component Date Value   • Urine Culture 10/26/2022 >100,000 cfu/ml Enterococcus faecalis (A)    • Urine Culture 10/26/2022 >100,000 cfu/ml Aerococcus urinae (A)    • Urine Culture 10/26/2022 <10,000 cfu/ml Staphylococcus species (A)    Telephone on 10/25/2022   Component Date Value   • Color, UA 10/26/2022 Yellow    • Clarity, UA 10/26/2022 Turbid    • Specific Gravity, UA 10/26/2022 1 020    • pH, UA 10/26/2022 6 5    • Leukocytes, UA 10/26/2022 Large (A)    • Nitrite, UA 10/26/2022 Positive (A)    • Protein, UA 10/26/2022 300 (3+) (A)    • Glucose, UA 10/26/2022 30 (3/100%) (A)    • Ketones, UA 10/26/2022 Trace (A)    • Urobilinogen, UA 10/26/2022 <2 0    • Bilirubin, UA 10/26/2022 Negative    • Occult Blood, UA 10/26/2022 Large (A)    • RBC, UA 10/26/2022 Field obscured, unable to enumerate (A)    • WBC, UA 10/26/2022 Innumerable (A)    • Epithelial Cells 10/26/2022 None Seen    • Bacteria, UA 10/26/2022 Innumerable (A)    • WBC Clumps 10/26/2022 present    Appointment on 08/12/2022   Component Date Value   • WBC 08/12/2022 5 40    • RBC 08/12/2022 3 78 (L)    • Hemoglobin 08/12/2022 11 4 (L)    • Hematocrit 08/12/2022 35 0    • MCV 08/12/2022 93    • MCH 08/12/2022 30 2    • MCHC 08/12/2022 32 6    • RDW 08/12/2022 13 4    • MPV 08/12/2022 10 7    • Platelets 18/01/8879 245    • nRBC 08/12/2022 0    • Neutrophils Relative 08/12/2022 68    • Immat GRANS % 08/12/2022 0    • Lymphocytes Relative 08/12/2022 18    • Monocytes Relative 08/12/2022 11    • Eosinophils Relative 08/12/2022 3    • Basophils Relative 08/12/2022 0    • Neutrophils Absolute 08/12/2022 3 62    • Immature Grans Absolute 08/12/2022 0 02    • Lymphocytes Absolute 08/12/2022 0 99    • Monocytes Absolute 08/12/2022 0 58    • Eosinophils Absolute 08/12/2022 0 17    • Basophils Absolute 08/12/2022 0 02    • Sodium 08/12/2022 137    • Potassium 08/12/2022 4 3    • Chloride 08/12/2022 106    • CO2 08/12/2022 28    • ANION GAP 08/12/2022 3 (L)    • BUN 08/12/2022 32 (H)    • Creatinine 08/12/2022 1 18    • Glucose 08/12/2022 86    • Calcium 08/12/2022 8 8    • Corrected Calcium 08/12/2022 9 4    • AST 08/12/2022 21    • ALT 08/12/2022 17    • Alkaline Phosphatase 08/12/2022 113    • Total Protein 08/12/2022 6 2 (L)    • Albumin 08/12/2022 3 2 (L)    • Total Bilirubin 08/12/2022 0 40    • eGFR 08/12/2022 40    • Magnesium 08/12/2022 2 4    • Methylmalonic Acid, S 08/12/2022 251    • Vitamin B-12 08/12/2022 415    • TSH 3RD GENERATON 08/12/2022 1 520      Imaging: No results found  Review of Systems:  Review of Systems   Respiratory: Negative for shortness of breath  Cardiovascular: Negative for chest pain  Neurological: Positive for syncope  Physical Exam:  Physical Exam  Constitutional:       General: She is not in acute distress  Appearance: She is not ill-appearing  Neck:      Vascular: No carotid bruit  Cardiovascular:      Rate and Rhythm: Regular rhythm  Bradycardia present  Heart sounds: No murmur heard  No friction rub  No gallop  Pulmonary:      Effort: Pulmonary effort is normal  No respiratory distress  Breath sounds: Normal breath sounds  No stridor  No wheezing, rhonchi or rales  Discussion/Summary: Paroxysmal atrial fibrillation  Remains clinically and electrocardiographically in sinus rhythm/sinus bradycardia, Holter monitor last year revealed resting sinus bradycardia with mild chronotropic incompetence but no high-grade block and no atrial fibrillation  She is on low-dose amiodarone we will decrease the dose to 100 mg daily, discontinue calcium channel blocker  Continue beta-blocker not a candidate for anticoagulation due to fall risk  Not a candidate for any aggressive interventions    Echocardiogram 3 years ago revealed healed normal left-ventricular Systolic function left atrial enlargement and mild to moderate aortic insufficiency  This note was completed in part utilizing m[employer]form[employer] direct voice recognition software  Grammatical errors, random word insertion, spelling mistakes, and incomplete sentences may be an occasional consequence of the system secondary to software limitations, ambient noise and hardware issues  At the time of dictation, efforts were made to edit, clarify and /or correct errors  Please read the chart carefully and recognize, using context, where substitutions have occurred  If you have any questions or concerns about the context, text or information contained within the body of this dictation, please contact myself, the provider, for further clarification

## 2022-12-30 ENCOUNTER — HOSPITAL ENCOUNTER (OUTPATIENT)
Dept: ULTRASOUND IMAGING | Facility: MEDICAL CENTER | Age: 87
Discharge: HOME | End: 2022-12-30

## 2022-12-30 DIAGNOSIS — R33.8 ACUTE URINARY RETENTION: ICD-10-CM

## 2023-01-06 DIAGNOSIS — R33.8 ACUTE URINARY RETENTION: Primary | ICD-10-CM

## 2023-01-06 RX ORDER — TERAZOSIN 2 MG/1
2 CAPSULE ORAL
Qty: 90 CAPSULE | Refills: 3 | Status: SHIPPED | OUTPATIENT
Start: 2023-01-06

## 2023-01-06 NOTE — RESULT ENCOUNTER NOTE
Please let patient's family know that her ultrasound did not reveal any swelling of her kidneys however she is not emptying her bladder and has a PVR of 568 mL  This is very concerning and can eventually cause kidney failure as well as recurrent urinary tract infections  Continue to recommend a Sanders catheter or intermittent catheterization or a suprapubic tube  I also increased her Hytrin to 2 mg  If she becomes dizzy or lightheaded from this they should notify us and be monitoring her blood pressure at home    Please make sure she is scheduled for a follow-up in 3 months

## 2023-01-11 ENCOUNTER — OFFICE VISIT (OUTPATIENT)
Dept: INTERNAL MEDICINE CLINIC | Facility: CLINIC | Age: 88
End: 2023-01-11

## 2023-01-11 VITALS
RESPIRATION RATE: 13 BRPM | HEART RATE: 80 BPM | DIASTOLIC BLOOD PRESSURE: 54 MMHG | SYSTOLIC BLOOD PRESSURE: 108 MMHG | TEMPERATURE: 97.6 F

## 2023-01-11 DIAGNOSIS — F03.90 MAJOR NEUROCOGNITIVE DISORDER (HCC): ICD-10-CM

## 2023-01-11 DIAGNOSIS — S02.31XA CLOSED FRACTURE OF RIGHT ORBITAL FLOOR, INITIAL ENCOUNTER (HCC): ICD-10-CM

## 2023-01-11 DIAGNOSIS — N18.31 STAGE 3A CHRONIC KIDNEY DISEASE (HCC): ICD-10-CM

## 2023-01-11 DIAGNOSIS — F41.9 ANXIETY: ICD-10-CM

## 2023-01-11 DIAGNOSIS — F20.5 RESIDUAL SCHIZOPHRENIA (HCC): Chronic | ICD-10-CM

## 2023-01-11 DIAGNOSIS — I48.0 PAF (PAROXYSMAL ATRIAL FIBRILLATION) (HCC): ICD-10-CM

## 2023-01-11 DIAGNOSIS — E78.2 MIXED HYPERLIPIDEMIA: ICD-10-CM

## 2023-01-11 DIAGNOSIS — E44.0 MODERATE PROTEIN-CALORIE MALNUTRITION (HCC): ICD-10-CM

## 2023-01-11 DIAGNOSIS — I10 ESSENTIAL HYPERTENSION: Primary | ICD-10-CM

## 2023-01-11 RX ORDER — AMIODARONE HYDROCHLORIDE 200 MG/1
200 TABLET ORAL DAILY
COMMUNITY

## 2023-01-11 NOTE — PATIENT INSTRUCTIONS
Audiologist change in medications  He stopped the amlodipine 2 5 mg you need to call the pharmacy and let them know that  The amiodarone 200 mg she is going to take a half a tablet  He started her on Cardizem  mg which she is taking    She has a tendency to have bladder retention with urine 500 cc which is half a liter the best thing to do is to have the caretaker remind her to empty her bladder every couple hours at least for the first week if that is working then you can increase the time to every 3-4 hours    Sure she does not get constipated that she has laxatives she has bowel movements at least every day or every other day    Continue follow-up with cardiology    Needs to stop amlodipine 2 5 mg

## 2023-01-11 NOTE — PROGRESS NOTES
Assessment/Plan:      #1 schizophrenia doing quite well on the medication she is here with her son she smiles does not communicate very much she appears in no distress and has no complaints    2  Urology note and the ultrasound of the kidney and bladder shows that she has a high residual like 500 I told the son to have the caretaker make her go to the bathroom and empty her bladder at least every couple hours for the next week Sosol and then decrease it by every 3 to 4/h while awake to see if that helps otherwise is going to end up with a Sanders catheter    3  Paroxysmal atrial fibrillation she is in sinus rhythm she saw the cardiologist he recommended to cut down the amiodarone from 200 to 100 mg/day which we did today she needs to take half a tablet she needs to inform the pharmacy also he stopped the amlodipine 2 5 mg started on Cardizem  mg her blood pressure is stable    4  Chronic renal failure we will check lab work        No problem-specific Assessment & Plan notes found for this encounter  Diagnoses and all orders for this visit:    Essential hypertension    PAF (paroxysmal atrial fibrillation) (HCC)    Major neurocognitive disorder (HCC)    Stage 3a chronic kidney disease ([facility] [address] )    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    Anxiety    Mixed hyperlipidemia          Subjective:      Patient ID: Brian Rosas is a 80 y o  female  No chief complaint on file          Current Outpatient Medications:   •  acetaminophen (TYLENOL) 325 mg tablet, Take 2 tablets (650 mg total) by mouth every 6 (six) hours as needed for mild pain, moderate pain or headaches, Disp: 30 tablet, Rfl: 0  •  albuterol (2 5 mg/3 mL) 0 083 % nebulizer solution, Take 1 vial (2 5 mg total) by nebulization every 4 (four) hours as needed for wheezing or shortness of breath (Patient not taking: Reported on 1/20/2022), Disp: 1 vial, Rfl: 0  •  amiodarone 200 mg tablet, TAKE 1 TABLET (200 MG TOTAL) BY MOUTH DAILY, Disp: 90 tablet, Rfl: 0  • bisacodyl (DULCOLAX) 10 mg suppository, Insert 1 suppository (10 mg total) into the rectum daily as needed for constipation (2nd line) (Patient not taking: Reported on 7/6/2022), Disp: 12 suppository, Rfl: 0  •  Blood Pressure KIT, Use 2 (two) times a week, Disp: 1 kit, Rfl: 0  •  diltiazem (CARDIZEM CD) 120 mg 24 hr capsule, TAKE 1 CAPSULE BY MOUTH DAILY, Disp: 90 capsule, Rfl: 0  •  DULoxetine (CYMBALTA) 30 mg delayed release capsule, Take 30 mg by mouth daily at bedtime, Disp: , Rfl:   •  LORazepam (ATIVAN) 0 5 mg tablet, Take 0 5 mg by mouth , Disp: , Rfl:   •  metoprolol succinate (TOPROL-XL) 50 mg 24 hr tablet, TAKE 1 TABLET BY MOUTH DAILY, Disp: 90 tablet, Rfl: 0  •  OLANZapine (ZyPREXA) 5 mg tablet, Take 1 tablet (5 mg total) by mouth daily at bedtime, Disp: 30 tablet, Rfl: 1  •  polyethylene glycol (MIRALAX) 17 g packet, Take 17 g by mouth daily as needed (constipation first line), Disp: 14 each, Rfl: 0  •  Stool Softener 100 MG capsule, TAKE 1 CAPSULE BY MOUTH EVERY 12 HOURS FOR CONSTIPATION (Patient not taking: Reported on 12/19/2022), Disp: 90 capsule, Rfl: 0  •  terazosin (HYTRIN) 2 mg capsule, Take 1 capsule (2 mg total) by mouth daily at bedtime, Disp: 90 capsule, Rfl: 3  •  traZODone (DESYREL) 50 mg tablet, Take 50 mg by mouth daily at bedtime, Disp: , Rfl:     HPI    The following portions of the patient's history were reviewed and updated as appropriate: allergies, current medications, past family history, past medical history, past social history, past surgical history and problem list     Review of Systems   Constitutional: Negative  Negative for activity change, appetite change, fatigue, fever and unexpected weight change  HENT: Negative for congestion, ear pain, hearing loss, mouth sores, postnasal drip, rhinorrhea, sore throat, trouble swallowing and voice change  Eyes: Negative for pain, redness and visual disturbance     Respiratory: Negative for cough, chest tightness, shortness of breath and wheezing  Cardiovascular: Negative for chest pain, palpitations and leg swelling  Gastrointestinal: Negative for abdominal distention, abdominal pain, blood in stool, constipation, diarrhea and nausea  Endocrine: Negative for cold intolerance, heat intolerance, polydipsia, polyphagia and polyuria  Genitourinary: Negative for difficulty urinating, dysuria, flank pain, frequency, hematuria and urgency  Musculoskeletal: Negative for arthralgias, back pain, gait problem, joint swelling and myalgias  Skin: Negative for color change and pallor  Neurological: Negative for dizziness, tremors, seizures, syncope, weakness, numbness and headaches  Hematological: Negative for adenopathy  Does not bruise/bleed easily  Psychiatric/Behavioral: Negative  Negative for sleep disturbance  The patient is not nervous/anxious  Objective: There were no vitals taken for this visit  Physical Exam  Vitals and nursing note reviewed  Constitutional:       Appearance: She is well-developed  HENT:      Head: Normocephalic  Right Ear: External ear normal       Left Ear: External ear normal       Nose: Nose normal       Mouth/Throat:      Pharynx: No oropharyngeal exudate  Eyes:      Conjunctiva/sclera: Conjunctivae normal       Pupils: Pupils are equal, round, and reactive to light  Neck:      Thyroid: No thyromegaly  Cardiovascular:      Rate and Rhythm: Normal rate and regular rhythm  Heart sounds: Normal heart sounds  No murmur heard  No friction rub  No gallop  Comments: S1-S2 regular rhythm  Extremities no edema  Pulmonary:      Effort: Pulmonary effort is normal  No respiratory distress  Breath sounds: Normal breath sounds  No wheezing or rales  Comments: Lungs are clear no wheezing rales or rhonchi  Abdominal:      General: Bowel sounds are normal  There is no distension  Palpations: Abdomen is soft  There is no mass  Tenderness:  There is no abdominal tenderness  There is no guarding or rebound  Comments: Abdomen soft nontender   Musculoskeletal:         General: Normal range of motion  Cervical back: Normal range of motion and neck supple  Lymphadenopathy:      Cervical: No cervical adenopathy  Skin:     General: Skin is warm and dry  Neurological:      Mental Status: She is alert and oriented to person, place, and time     Psychiatric:         Behavior: Behavior normal          Judgment: Judgment normal

## 2023-01-12 ENCOUNTER — TELEPHONE (OUTPATIENT)
Dept: CARDIOLOGY CLINIC | Facility: CLINIC | Age: 88
End: 2023-01-12

## 2023-01-12 NOTE — TELEPHONE ENCOUNTER
Pt son called was asking about when amlodipine was stopped  Advise let me look into this and will call back with information  Called and left a message to call office    PCP d/c amlodipine

## 2023-01-12 NOTE — TELEPHONE ENCOUNTER
Clemente Ferrari [phone] please call me back   Thank you    Tried to call pt and left a message to call office

## 2023-01-13 ENCOUNTER — TELEPHONE (OUTPATIENT)
Dept: CARDIOLOGY CLINIC | Facility: CLINIC | Age: 88
End: 2023-01-13

## 2023-01-13 NOTE — TELEPHONE ENCOUNTER
Son Kenyatta Catalan calling with questions  Pt saw PCP on Wednesday who advised patient Dr Ginette Murillo stopped amlodipine 2 5 mg and replaced with Cardizem 120 mg at last visit, but he did not recall Dr Ginette Murillo mentioning this change  Amiodarone was the only med adjusted and that was reduced from 200 to 100 mg during the visit w/ Dr Ginette Murillo 12/19/22  After reviewing chart PCP discontinued Amlodipine 10/11/22 due to BP 70/50 and LE edema       Left detailed message for Temple Bodily explaining his mother is not to be taking Amlodipine 2 5 mg

## 2023-01-19 NOTE — PROGRESS NOTES
Progress Note - Kingsley Arnold 80 y o  female MRN: [id number]    Unit/Bed#: E2 -01 Encounter: [id number]      Subjective:   the patient appears comfortable  She denies chest pain or shortness of breath  She has no abdominal pain  She was noted to have an irregular tachycardia today  Physical Exam:   Temp:  [96 5 °F (35 8 °C)-99 °F (37 2 °C)] 97 1 °F (36 2 °C)  HR:  [] 77  Resp:  [18] 18  BP: (128-182)/(73-80) 157/77    Gen:   Well-developed, well-nourished, in no distress  Neck: supple  No lymphadenopathy, goiter, or bruit  Heart:  Irregular rhythm  I heard no murmur, gallop, or rub  Lungs: clear to auscultation and percussion  No wheezing, rales, or rhonchi   Abd:  Soft with active bowel sounds  No mass, tenderness, or organomegaly  Extremities:  No clubbing, cyanosis, or edema  No calf tenderness  Neuro:  Awake and interactive  No focal sign  Skin: warm and dry      LABS:  EKG shows sinus rhythm without acute changes  Patient Active Problem List   Diagnosis    Essential hypertension    Anxiety    Hyperlipidemia    Hyponatremia    Acute encephalopathy    Bilateral hearing loss    Fall    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[address] )    Cellulitis of extremity    Paroxysmal A-fib (HCC)    Orthostatic hypertension    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[address] )    Ambulatory dysfunction    COVID-19 virus infection    Elevated troponin    Urinary incontinence    PAF (paroxysmal atrial fibrillation) ([address]    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[address] )    Major neurocognitive disorder ([address]    Moderate protein-calorie malnutrition ([address]    Age-related osteoporosis without current pathological fracture    Closed fracture of right orbital floor ([address] )    Fall from ground level    Closed right hip fracture (HCC)    Stage 3a chronic kidney disease (HCC)    Dementia (HCC)    ABLA (acute blood loss anemia)    Acute cystitis without hematuria       Assessment/Plan:    1   Right hip fracture, status post intramedullary nail fixation   2  Acute blood loss anemia secondary to fracture and surgery  3  Dementia   4  History of schizophrenia   5  Acute kidney injury superimposed on chronic kidney disease stage 3, resolved  6  Pyuria, status post treatment for urinary tract infection  7  Paroxysmal atrial fibrillation,    The patient was clinically and atrial fibrillation when she was examined  By the time or EKG was done she had reverted to sinus rhythm  Her blood pressure is on the high side at times  In light of this, I will increase her diltiazem dose  She seems to be a poor candidate for anticoagulation because of her dementia and fell history  We will monitor her on telemetry overnight  If he remains stable, transfer to rehab will be appropriate      VTE Pharmacologic Prophylaxis: Enoxaparin (Lovenox)  VTE Mechanical Prophylaxis: sequential compression device multiple medical complaints

## 2023-01-20 ENCOUNTER — APPOINTMENT (OUTPATIENT)
Dept: LAB | Facility: HOSPITAL | Age: 88
End: 2023-01-20

## 2023-01-20 DIAGNOSIS — E78.2 MIXED HYPERLIPIDEMIA: ICD-10-CM

## 2023-01-20 DIAGNOSIS — F20.5 RESIDUAL SCHIZOPHRENIA (HCC): Chronic | ICD-10-CM

## 2023-01-20 DIAGNOSIS — I10 ESSENTIAL HYPERTENSION: ICD-10-CM

## 2023-01-20 DIAGNOSIS — N18.31 STAGE 3A CHRONIC KIDNEY DISEASE (HCC): ICD-10-CM

## 2023-01-20 DIAGNOSIS — I48.0 PAF (PAROXYSMAL ATRIAL FIBRILLATION) (HCC): ICD-10-CM

## 2023-01-20 DIAGNOSIS — F41.9 ANXIETY: ICD-10-CM

## 2023-01-20 DIAGNOSIS — F03.90 MAJOR NEUROCOGNITIVE DISORDER (HCC): ICD-10-CM

## 2023-01-20 LAB
25(OH)D3 SERPL-MCNC: 31.7 NG/ML (ref 30–100)
ALBUMIN SERPL BCP-MCNC: 4.4 G/DL (ref 3.5–5)
ALP SERPL-CCNC: 105 U/L (ref 34–104)
ALT SERPL W P-5'-P-CCNC: 12 U/L (ref 7–52)
ANION GAP SERPL CALCULATED.3IONS-SCNC: 8 MMOL/L (ref 4–13)
AST SERPL W P-5'-P-CCNC: 18 U/L (ref 13–39)
BASOPHILS # BLD AUTO: 0.02 THOUSANDS/ÂΜL (ref 0–0.1)
BASOPHILS NFR BLD AUTO: 0 % (ref 0–1)
BILIRUB SERPL-MCNC: 0.54 MG/DL (ref 0.2–1)
BUN SERPL-MCNC: 27 MG/DL (ref 5–25)
CALCIUM SERPL-MCNC: 9.1 MG/DL (ref 8.4–10.2)
CHLORIDE SERPL-SCNC: 101 MMOL/L (ref 96–108)
CHOLEST SERPL-MCNC: 158 MG/DL
CO2 SERPL-SCNC: 27 MMOL/L (ref 21–32)
CREAT SERPL-MCNC: 1.37 MG/DL (ref 0.6–1.3)
EOSINOPHIL # BLD AUTO: 0.18 THOUSAND/ÂΜL (ref 0–0.61)
EOSINOPHIL NFR BLD AUTO: 2 % (ref 0–6)
ERYTHROCYTE [DISTWIDTH] IN BLOOD BY AUTOMATED COUNT: 13.4 % (ref 11.6–15.1)
GFR SERPL CREATININE-BSD FRML MDRD: 33 ML/MIN/1.73SQ M
GLUCOSE P FAST SERPL-MCNC: 105 MG/DL (ref 65–99)
HCT VFR BLD AUTO: 38.1 % (ref 34.8–46.1)
HDLC SERPL-MCNC: 55 MG/DL
HGB BLD-MCNC: 12.6 G/DL (ref 11.5–15.4)
IMM GRANULOCYTES # BLD AUTO: 0.03 THOUSAND/UL (ref 0–0.2)
IMM GRANULOCYTES NFR BLD AUTO: 0 % (ref 0–2)
LDLC SERPL CALC-MCNC: 82 MG/DL (ref 0–100)
LYMPHOCYTES # BLD AUTO: 1.26 THOUSANDS/ÂΜL (ref 0.6–4.47)
LYMPHOCYTES NFR BLD AUTO: 17 % (ref 14–44)
MAGNESIUM SERPL-MCNC: 2.2 MG/DL (ref 1.9–2.7)
MCH RBC QN AUTO: 30.7 PG (ref 26.8–34.3)
MCHC RBC AUTO-ENTMCNC: 33.1 G/DL (ref 31.4–37.4)
MCV RBC AUTO: 93 FL (ref 82–98)
MONOCYTES # BLD AUTO: 0.69 THOUSAND/ÂΜL (ref 0.17–1.22)
MONOCYTES NFR BLD AUTO: 9 % (ref 4–12)
NEUTROPHILS # BLD AUTO: 5.37 THOUSANDS/ÂΜL (ref 1.85–7.62)
NEUTS SEG NFR BLD AUTO: 72 % (ref 43–75)
NRBC BLD AUTO-RTO: 0 /100 WBCS
PLATELET # BLD AUTO: 262 THOUSANDS/UL (ref 149–390)
PMV BLD AUTO: 9.2 FL (ref 8.9–12.7)
POTASSIUM SERPL-SCNC: 4.5 MMOL/L (ref 3.5–5.3)
PROT SERPL-MCNC: 7.1 G/DL (ref 6.4–8.4)
RBC # BLD AUTO: 4.11 MILLION/UL (ref 3.81–5.12)
SODIUM SERPL-SCNC: 136 MMOL/L (ref 135–147)
TRIGL SERPL-MCNC: 103 MG/DL
TSH SERPL DL<=0.05 MIU/L-ACNC: 2.37 UIU/ML (ref 0.45–4.5)
WBC # BLD AUTO: 7.55 THOUSAND/UL (ref 4.31–10.16)

## 2023-02-15 ENCOUNTER — OFFICE VISIT (OUTPATIENT)
Dept: UROLOGY | Facility: CLINIC | Age: 88
End: 2023-02-15

## 2023-02-15 VITALS
WEIGHT: 126 LBS | SYSTOLIC BLOOD PRESSURE: 108 MMHG | HEART RATE: 57 BPM | OXYGEN SATURATION: 96 % | BODY MASS INDEX: 26.45 KG/M2 | DIASTOLIC BLOOD PRESSURE: 58 MMHG | HEIGHT: 58 IN

## 2023-02-15 DIAGNOSIS — N39.45 CONTINUOUS LEAKAGE OF URINE: ICD-10-CM

## 2023-02-15 DIAGNOSIS — N39.0 RECURRENT UTI: ICD-10-CM

## 2023-02-15 DIAGNOSIS — R33.8 ACUTE URINARY RETENTION: Primary | ICD-10-CM

## 2023-02-15 DIAGNOSIS — N39.0 URINARY TRACT INFECTION WITHOUT HEMATURIA, SITE UNSPECIFIED: ICD-10-CM

## 2023-02-15 LAB
POST-VOID RESIDUAL VOLUME, ML POC: 456 ML
SL AMB  POCT GLUCOSE, UA: ABNORMAL
SL AMB LEUKOCYTE ESTERASE,UA: ABNORMAL
SL AMB POCT BILIRUBIN,UA: ABNORMAL
SL AMB POCT BLOOD,UA: ABNORMAL
SL AMB POCT CLARITY,UA: ABNORMAL
SL AMB POCT COLOR,UA: YELLOW
SL AMB POCT KETONES,UA: ABNORMAL
SL AMB POCT NITRITE,UA: ABNORMAL
SL AMB POCT PH,UA: 5
SL AMB POCT SPECIFIC GRAVITY,UA: 1.01
SL AMB POCT URINE PROTEIN: ABNORMAL
SL AMB POCT UROBILINOGEN: 0.2

## 2023-02-15 NOTE — ASSESSMENT & PLAN NOTE
• Continue daily probiotic  • Continue cranberry supplement 500 mg daily  • Continue vitamin-C 1000 mg daily  • Begin/continue D mannose 2000 mg daily  • Send urine culture  • Follow-up 6 months for recheck

## 2023-02-15 NOTE — ASSESSMENT & PLAN NOTE
• PVR 456ml, declined catheter  • History of CVA  • Continue Hytrin 2 mg q h s    • Ultrasound kidney and bladder 12/30/2022:  mL, no hydronephrosis  • Follow-up 6 months for recheck

## 2023-02-15 NOTE — ASSESSMENT & PLAN NOTE
· Likely overflow incontinence  · Recommend daily bowel regimen to prevent constipation  · Continue Hytrin 2 mg daily  · Declined catheter by son  · caretakers are not allowed to perform CIC so they are not amenable to this  · Follow-up 6 months with ultrasound PVR prior

## 2023-02-15 NOTE — PATIENT INSTRUCTIONS
continue daily probiotic  continue cranberry supplement 500 mg daily  continue vitamin-C 1000 mg daily  continue D mannose 2000 mg daily  These are to help prevent infections  Timed voiding every 2 hours  Daily bowel regimen to prevent constipation    Retención urinaria aguda en mujeres   LO QUE NECESITA SABER:   ¿Qué es la retención urinaria aguda (SHELDON)? La retención urinaria aguda significa que mitchell vejiga está llena, suze no puede orinar  Esta condición sucede repentinamente, empeora con rapidez y dura poco tiempo  ¿Qué causa la retención urinaria aguda? Músculos de la vejiga débiles    Obstrucciones, griselda rj eliecer o un tumor    Daño en los nervios a causa de la diabetes, derrame cerebral o lesiones a la médula dougherty    Inflamación o infección, incluyendo rj infección debido al [place] pélvica o del tracto urinario    Ciertos medicamentos, griselda narcóticos, anestesia y antidepresivos    ¿Cuáles son los signos y síntomas de la retención urinaria aguda? Molestias o dolor en la parte inferior del estómago    Inflamación en la parte inferior del abdomen    Urgencia para orinar darcy después de hacerlo    Flujo de orina que se detiene y comienza por sí solo    ¿Cómo se diagnostica la retención urinaria aguda? Mitchell médico le preguntará acerca de mitchell historial de lacey y de los medicamentos que usted ronald  Presionará o palpará en la parte baja de mitchell abdomen  Es posible que usted necesite hacerse alguno de los siguientes estudios:  Un examen pélvico se realizará para comprobar si hay obstrucciones  Un examen pélvico también mostrará si mitchell vejiga, útero u otros órganos se unger salido de mitchell lugar  Un examen de residuo postmiccional mostrará la cantidad de orina que [place] en mitchell vejiga después de orinar  A usted le pedirán que orine  Los médicos usarán rj máquina de ultrasonido pequeña para comprobar la cantidad de [facility] en la vejiga  Análisis de [facility] para comprobar si hay mariluz o rj infección      Los análisis de mariluz para comprobar si hay rj infección y revisar la función del Algernon Merles  ¿Cómo se trata la retención urinaria aguda? Rj sonda de [place] es un tubo que se coloca en crowell vejiga para drenar la orina dentro de [place]  Mantenga la bolsa debajo del nivel de crowell cintura  [place] va a prevenir que la orina fluya de regreso a crowell vejiga y cause rj infección u otros problemas  También mantenga el tubo sin torceduras para que la orina drene apropiadamente  No jale del catéter  [place] puede provocarle dolor y sangrado y podría provocar que el catéter se salga  Los antibióticos ayudan a tratar o prevenir infecciones bacterianas  Llame al Cristobal Greenanna de emergencias local (911 en los [place]) en cualquiera de los siguientes casos:  Usted está respirando más rápido que lo normal     [place] ritmo cardíaco es más rápido de lo normal     ¿Cuándo estrellita buscar atención inmediata? Usted tiene dolor abdominal intenso  [place] jose, yves, pies o tobillos están inflamados  ¿Cuándo estrellita llamar a mi médico?  Tiene fiebre  Siente dolor al Ke[place]  Usted orina con mariluz  Usted tiene problemas con el catéter  Usted tiene preguntas o inquietudes acerca de crowell condición o cuidado  ACUERDOS SOBRE CROWELL CUIDADO:   Usted tiene el derecho de ayudar a planear crowell cuidado  Aprenda todo lo que pueda sobre crowell condición y griselda darle tratamiento  Discuta mirlande opciones de tratamiento con mirlande médicos para decidir el cuidado que usted desea recibir  Usted siempre tiene el derecho de rechazar el tratamiento  Esta información es sólo para uso en educación  [place] intención no es darle un consejo médico sobre enfermedades o tratamientos  Colsulte con crowell Juan F Larve farmacéutico antes de seguir cualquier régimen médico para saber si es seguro y efectivo para usted  © [facility] "[employer] [facility] 2022 Information is for End User's use only and may not be sold, redistributed or otherwise used for commercial purposes   All illustrations and images included in CareNotes® are the copyrighted property of A D A M , Inc  or Layla Delvalle

## 2023-02-15 NOTE — PROGRESS NOTES
Assessment and plan:     Acute urinary retention  • PVR 456ml, declined catheter  • History of CVA  • Continue Hytrin 2 mg q h s  • Ultrasound kidney and bladder 12/30/2022:  mL, no hydronephrosis  • Follow-up 6 months for recheck    Recurrent UTI  • Continue daily probiotic  • Continue cranberry supplement 500 mg daily  • Continue vitamin-C 1000 mg daily  • Begin/continue D mannose 2000 mg daily  • Send urine culture  • Follow-up 6 months for recheck    Urinary incontinence  · Likely overflow incontinence  · Recommend daily bowel regimen to prevent constipation  · Continue Hytrin 2 mg daily  · Declined catheter by son  · caretakers are not allowed to perform CIC so they are not amenable to this  · Follow-up 6 months with ultrasound PVR prior          BEV Hunter    History of Present Illness     Manjeet Riddle is a 80 y o  female here for evaluation of urinary retention follow-up   Patient seen by Salud Burtr in September for episode of urinary retention has successful void trial  Joaquina Block was doing well up until just prior to her last visit March 2022 at which time she was diagnosed with urinary tract infection   She took a course of antibiotics she had a catheter placed briefly for around 250 mL with another successful void trialed a week later  Joaquina Block has been doing fine since that time though return to the emergency room last week with a strong odor to her urine noted by her caregivers  Joaquina Block was prescribed amoxicillin for possible UTI   Culture grew Enterococcus faecalis and aerococcus urinae   She is unable to communicate any complaints to her caregivers if she is aphasic  Bekayl Bebetoard is also a language barrier  She was started on Hytrin q h s  With additional recommendations for timed voiding and a daily bowel regimen  Has constipation, normally has bowel movement twice a week on miralax and prunes  Incontinent and uses about 5 depends a day   She can communicate that she needs to have a bowel movement, but not urination  We again reviewed how unmanaged urinary retention can lead to recurrent UTI and kidney failure  They voiced understanding  Unfortunately her caregivers are not able to perform CIC and there is no additional family that can come and perform CIC for them  He is not interested in having a Sanders catheter placed in her      Urine culture:              10/26/2022 >074993 enterococcus faecalis, >076401 aerococcus urinae              06/14/2022 aerococcus urinae              03/26/2022 >968544 enterococcus faecalis, >881650 aerococcus urinae              03/04/2022 >338325 mixed contaminants              01/20/2022 >000096 enterococcus faecalis, >569847 aerococcus urinae  Her son is translating for her today  Care giver is her with them today  Bladder surgery is listed in her hx  Pt is unable to tell us and her son does not know  Possible bladder sling, she had a hysterectomy previously  Laboratory     Lab Results   Component Value Date    BUN 27 (H) 01/20/2023    CREATININE 1 37 (H) 01/20/2023       No components found for: GFR    Lab Results   Component Value Date    GLUCOSE 114 07/29/2014    CALCIUM 9 1 01/20/2023     07/29/2014    K 4 5 01/20/2023    CO2 27 01/20/2023     01/20/2023       Lab Results   Component Value Date    WBC 7 55 01/20/2023    HGB 12 6 01/20/2023    HCT 38 1 01/20/2023    MCV 93 01/20/2023     01/20/2023       No results found for: PSA    No results found for this or any previous visit (from the past 1 hour(s))  @RESULT(URINEMICROSCOPIC)@    @RESULT(URINECULTURE)@    Radiology     RENAL ULTRASOUND WITH PVR 12/30/2022     INDICATION:   R33 8: Other retention of urine      COMPARISON: None     TECHNIQUE:   Ultrasound of the retroperitoneum was performed with a curvilinear transducer utilizing volumetric sweeps and still imaging techniques       FINDINGS:     KIDNEYS:  Symmetric and normal size  Right kidney:  7 0 x 3 9 x 3 7 cm   Volume 53 7 mL  Left kidney: 7 0 x 3 7 x 4 0 cm  Volume 54 8 mL     Right kidney  Normal echogenicity and contour  No mass is identified  No hydronephrosis  No shadowing calculi  No perinephric fluid collections      Left kidney  Normal echogenicity and contour  No mass is identified  No hydronephrosis  No shadowing calculi  No perinephric fluid collections      URETERS:  Nonvisualized      BLADDER:   Normally distended  Layering debris identified  No focal thickening or mass lesions  Bilateral ureteral jets detected  Prevoid: 786  ml  Measured post void volume in mL: 568 ml        IMPRESSION:     Layering debris within the bladder with post void residual of 568 mL         Review of Systems     Review of Systems   Constitutional: Negative for activity change, appetite change, chills, fatigue, fever and unexpected weight change  HENT: Negative for facial swelling  Eyes: Negative for discharge  Respiratory: Negative  Negative for cough and shortness of breath  Cardiovascular: Negative for chest pain and leg swelling  Gastrointestinal: Positive for constipation  Negative for abdominal distention, abdominal pain, diarrhea, nausea and vomiting  Endocrine: Negative  Genitourinary: Negative  Negative for decreased urine volume, difficulty urinating, dysuria, enuresis, flank pain, frequency, genital sores, hematuria and urgency  Musculoskeletal: Negative for back pain and myalgias  Skin: Negative for pallor and rash  Allergic/Immunologic: Negative  Negative for immunocompromised state  Neurological: Negative for facial asymmetry and speech difficulty  Psychiatric/Behavioral: Negative for agitation and confusion  Allergies     No Known Allergies    Physical Exam     Physical Exam  Vitals reviewed  Constitutional:       General: She is not in acute distress  Appearance: Normal appearance  She is normal weight  She is not ill-appearing, toxic-appearing or diaphoretic     HENT:      Head: Normocephalic and atraumatic  Eyes:      General: No scleral icterus  Cardiovascular:      Rate and Rhythm: Normal rate  Pulmonary:      Effort: Pulmonary effort is normal  No respiratory distress  Abdominal:      General: Abdomen is flat  There is no distension  Palpations: Abdomen is soft  Tenderness: There is no abdominal tenderness  There is no guarding or rebound  Musculoskeletal:         General: No swelling  Cervical back: Normal range of motion  Skin:     General: Skin is warm and dry  Coloration: Skin is not jaundiced or pale  Findings: No rash  Neurological:      General: No focal deficit present  Mental Status: She is alert and oriented to person, place, and time        Gait: Gait normal    Psychiatric:         Mood and Affect: Mood normal          Behavior: Behavior normal          Vital Signs     Vitals:    02/15/23 1030   BP: 108/58   BP Location: Right arm   Patient Position: Sitting   Cuff Size: Adult   Pulse: 57   SpO2: 96%   Weight: 57 2 kg (126 lb)   Height: 4' 10" (1 473 m)       Current Medications       Current Outpatient Medications:   •  amiodarone 200 mg tablet, Take 200 mg by mouth daily Take 1/2 tablet daily, Disp: , Rfl:   •  bisacodyl (DULCOLAX) 10 mg suppository, Insert 1 suppository (10 mg total) into the rectum daily as needed for constipation (2nd line), Disp: 12 suppository, Rfl: 0  •  diltiazem (CARDIZEM CD) 120 mg 24 hr capsule, TAKE 1 CAPSULE BY MOUTH DAILY, Disp: 90 capsule, Rfl: 0  •  DULoxetine (CYMBALTA) 30 mg delayed release capsule, Take 30 mg by mouth daily at bedtime, Disp: , Rfl:   •  LORazepam (ATIVAN) 0 5 mg tablet, Take 0 5 mg by mouth , Disp: , Rfl:   •  metoprolol succinate (TOPROL-XL) 50 mg 24 hr tablet, TAKE 1 TABLET BY MOUTH DAILY, Disp: 90 tablet, Rfl: 0  •  OLANZapine (ZyPREXA) 5 mg tablet, Take 1 tablet (5 mg total) by mouth daily at bedtime, Disp: 30 tablet, Rfl: 1  •  terazosin (HYTRIN) 2 mg capsule, Take 1 capsule (2 mg total) by mouth daily at bedtime, Disp: 90 capsule, Rfl: 3  •  traZODone (DESYREL) 50 mg tablet, Take 50 mg by mouth daily at bedtime, Disp: , Rfl:   •  acetaminophen (TYLENOL) 325 mg tablet, Take 2 tablets (650 mg total) by mouth every 6 (six) hours as needed for mild pain, moderate pain or headaches (Patient not taking: Reported on 1/11/2023), Disp: 30 tablet, Rfl: 0  •  albuterol (2 5 mg/3 mL) 0 083 % nebulizer solution, Take 1 vial (2 5 mg total) by nebulization every 4 (four) hours as needed for wheezing or shortness of breath (Patient not taking: Reported on 1/11/2023), Disp: 1 vial, Rfl: 0  •  amiodarone 200 mg tablet, TAKE 1 TABLET (200 MG TOTAL) BY MOUTH DAILY (Patient not taking: Reported on 1/11/2023), Disp: 90 tablet, Rfl: 0  •  Blood Pressure KIT, Use 2 (two) times a week (Patient not taking: Reported on 1/11/2023), Disp: 1 kit, Rfl: 0  •  polyethylene glycol (MIRALAX) 17 g packet, Take 17 g by mouth daily as needed (constipation first line) (Patient not taking: Reported on 1/11/2023), Disp: 14 each, Rfl: 0  •  Stool Softener 100 MG capsule, TAKE 1 CAPSULE BY MOUTH EVERY 12 HOURS FOR CONSTIPATION (Patient not taking: Reported on 1/11/2023), Disp: 90 capsule, Rfl: 0    Active Problems     Patient Active Problem List   Diagnosis   • Essential hypertension   • Anxiety   • Hyperlipidemia   • Hyponatremia   • Acute encephalopathy   • Bilateral hearing loss   • Fall   • 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[address] )   • Cellulitis of extremity   • Paroxysmal A-fib ([address] )   • Orthostatic hypertension   • 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[address] )   • Ambulatory dysfunction   • OMEGA (acute kidney injury) ([address] )   • Elevated troponin   • Urinary incontinence   • PAF (paroxysmal atrial fibrillation) ([facility])   • 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[address] )   • Major neurocognitive disorder (HCC)   • Moderate protein-calorie malnutrition ([address] )   • Age-related osteoporosis without current pathological fracture   • Closed fracture of right orbital floor [facility])   • Fall from ground level   • Closed right hip fracture ([facility] )   • Stage 3a chronic kidney disease ([place] [address] )   • Dementia (HCC)   • ABLA (acute blood loss anemia)   • Oropharyngeal dysphagia   • Flank pain   • Edema of right ankle   • Acute urinary retention   • Recurrent UTI       Past Medical History     Past Medical History:   Diagnosis Date   • Anxiety    • Cognitive impairment    • COVID-19 virus infection 04/13/2020   • Dementia ([place] [address] )    • Depression    • Hallucination    • Hyperlipidemia    • Hypertension    • Memory loss    • Psychiatric illness    • Psychosis ([place] [address] )    • Retention of urine    • Schizoaffective disorder ([place] [address] )    • Sleep difficulties    • Urinary tract infection        Surgical History     Past Surgical History:   Procedure Laterality Date   • APPENDECTOMY     • BLADDER SURGERY     • CHOLECYSTECTOMY     • HI OPTX FEM SHFT FX W/INSJ IMED IMPLT W/WO SCREW Right 7/15/2021    Procedure: INSERTION NAIL IM FEMUR ANTEGRADE (TROCHANTERIC) right;  Surgeon:  Micah Hickey MD;  Location: [facility];  Service: Orthopedics   • TOTAL ABDOMINAL HYSTERECTOMY W/ BILATERAL SALPINGOOPHORECTOMY     • TOTAL KNEE ARTHROPLASTY Left        Family History     Family History   Problem Relation Age of Onset   • Heart disease Mother         Cardiac disorder   • Parkinsonism Father    • Heart disease Sister         Cardiac disorder   • Hypertension Sister    • Diabetes Child         Diabetes Mellitus   • Lung disease Child         Respiratory Disorder   • Diabetes Son         Diabetes Mellitus       Social History     Social History     Social History     Tobacco Use   Smoking Status Never   Smokeless Tobacco Never   Tobacco Comments    Former smoker per [employer]       Past Surgical History:   Procedure Laterality Date   • APPENDECTOMY     • BLADDER SURGERY     • CHOLECYSTECTOMY     • HI OPTX FEM SHFT FX W/INSJ IMED IMPLT W/WO SCREW Right 7/15/2021    Procedure: INSERTION NAIL IM FEMUR ANTEGRADE (TROCHANTERIC) right; Surgeon: Dayana Shelton MD;  Location: [facility];  Service: Orthopedics   • TOTAL ABDOMINAL HYSTERECTOMY W/ BILATERAL SALPINGOOPHORECTOMY     • TOTAL KNEE ARTHROPLASTY Left          The following portions of the patient's history were reviewed and updated as appropriate: allergies, current medications, past family history, past medical history, past social history, past surgical history and problem list    Please note :  Voice dictation software has been used to create this document  There may be inadvertent transcription errors      [id number] [address]

## 2023-02-17 LAB — BACTERIA UR CULT: NORMAL

## 2023-02-20 ENCOUNTER — TELEPHONE (OUTPATIENT)
Dept: UROLOGY | Facility: CLINIC | Age: 88
End: 2023-02-20

## 2023-02-20 NOTE — TELEPHONE ENCOUNTER
Patient's son answered the phone and verified with him that her urine testing showed no signs of infection

## 2023-02-20 NOTE — TELEPHONE ENCOUNTER
----- Message from [address] [place] [address] sent at 2/20/2023  1:05 PM EST -----  Please let son know that patient's urine testing did not show any signs of infection    She has mixed contaminants which is generally skin and vaginal alejo

## 2023-02-20 NOTE — RESULT ENCOUNTER NOTE
Please let son know that patient's urine testing did not show any signs of infection    She has mixed contaminants which is generally skin and vaginal alejo

## 2023-02-24 ENCOUNTER — HOSPITAL ENCOUNTER (OUTPATIENT)
Dept: BONE DENSITY | Facility: MEDICAL CENTER | Age: 88
Discharge: HOME | End: 2023-02-24

## 2023-02-24 DIAGNOSIS — M81.0 AGE-RELATED OSTEOPOROSIS WITHOUT CURRENT PATHOLOGICAL FRACTURE: ICD-10-CM

## 2023-02-28 ENCOUNTER — TELEPHONE (OUTPATIENT)
Dept: INTERNAL MEDICINE CLINIC | Facility: CLINIC | Age: 88
End: 2023-02-28

## 2023-02-28 NOTE — TELEPHONE ENCOUNTER
I spoke with Sandra Johnson and gave result  He will come to the 4 27 23 OV to discuss the result further

## 2023-02-28 NOTE — TELEPHONE ENCOUNTER
----- Message from Yancy Jain MD sent at 2/27/2023  4:40 PM EST -----  Please call the patient regarding her abnormal result  dexa  osteoporosis  rheumatology eval  ?  Tx options save ov

## 2023-03-28 DIAGNOSIS — K59.00 CONSTIPATION: ICD-10-CM

## 2023-03-28 DIAGNOSIS — I48.0 PAROXYSMAL A-FIB (HCC): ICD-10-CM

## 2023-03-28 RX ORDER — AMIODARONE HYDROCHLORIDE 200 MG/1
200 TABLET ORAL DAILY
Qty: 90 TABLET | Refills: 0 | Status: SHIPPED | OUTPATIENT
Start: 2023-03-28

## 2023-03-28 RX ORDER — METOPROLOL SUCCINATE 50 MG/1
TABLET, EXTENDED RELEASE ORAL
Qty: 90 TABLET | Refills: 0 | Status: SHIPPED | OUTPATIENT
Start: 2023-03-28

## 2023-03-28 RX ORDER — DILTIAZEM HYDROCHLORIDE 120 MG/1
CAPSULE, COATED, EXTENDED RELEASE ORAL
Qty: 90 CAPSULE | Refills: 0 | Status: SHIPPED | OUTPATIENT
Start: 2023-03-28

## 2023-04-27 ENCOUNTER — OFFICE VISIT (OUTPATIENT)
Dept: INTERNAL MEDICINE CLINIC | Facility: CLINIC | Age: 88
End: 2023-04-27

## 2023-04-27 VITALS
HEART RATE: 80 BPM | BODY MASS INDEX: 25.82 KG/M2 | SYSTOLIC BLOOD PRESSURE: 124 MMHG | HEIGHT: 58 IN | DIASTOLIC BLOOD PRESSURE: 62 MMHG | WEIGHT: 123 LBS | RESPIRATION RATE: 13 BRPM | TEMPERATURE: 97.7 F

## 2023-04-27 DIAGNOSIS — F20.3 UNDIFFERENTIATED SCHIZOPHRENIA (HCC): Chronic | ICD-10-CM

## 2023-04-27 DIAGNOSIS — M81.0 OSTEOPOROSIS, UNSPECIFIED OSTEOPOROSIS TYPE, UNSPECIFIED PATHOLOGICAL FRACTURE PRESENCE: ICD-10-CM

## 2023-04-27 DIAGNOSIS — I10 ESSENTIAL HYPERTENSION: Primary | ICD-10-CM

## 2023-04-27 DIAGNOSIS — I48.0 PAF (PAROXYSMAL ATRIAL FIBRILLATION) (HCC): ICD-10-CM

## 2023-04-27 DIAGNOSIS — N18.31 STAGE 3A CHRONIC KIDNEY DISEASE (HCC): ICD-10-CM

## 2023-04-27 DIAGNOSIS — M81.0 AGE-RELATED OSTEOPOROSIS WITHOUT CURRENT PATHOLOGICAL FRACTURE: ICD-10-CM

## 2023-04-27 DIAGNOSIS — E78.2 MIXED HYPERLIPIDEMIA: ICD-10-CM

## 2023-04-27 DIAGNOSIS — F41.9 ANXIETY: ICD-10-CM

## 2023-04-27 DIAGNOSIS — F03.C11 SEVERE DEMENTIA WITH AGITATION, UNSPECIFIED DEMENTIA TYPE (HCC): Chronic | ICD-10-CM

## 2023-04-27 DIAGNOSIS — E63.9 NUTRITION DISORDER: ICD-10-CM

## 2023-04-27 NOTE — PROGRESS NOTES
Assessment/Plan:    Patient has osteoporosis because of her age and comorbidity I will have a rheumatology evaluate that  To see what therapy is appropriate vitamin D level is normal at 32 rheumatology consult      #1 paroxysmal atrial fibrillation appears to be in sinus rhythm clinically continue on amiodarone and follow-up with cardiology  2   Schizophrenia not communicating today does response to some voice by opening the eyes she is on Zyprexa 5 mg she is on trazodone 50 mg    3  Chronic pain most likely from osteoarthritis she takes duloxetine 30 mg at bedtime    Takes lorazepam from the psychiatrist as needed 0 5 for anxiety    Takes Hytrin 2 mg at bedtime which helps with the bladder retention and also her blood pressure which is controlled  She lost about 3 pounds we will give her nutritional supplements    No other change in medications Labs were reviewed from previously    Weight is stable no need for nutritional supplements  Results for orders placed or performed in visit on 02/15/23   Urine culture    Specimen: Urine   Result Value Ref Range    Urine Culture >100,000 cfu/ml    POCT Measure PVR   Result Value Ref Range    POST-VOID RESIDUAL VOLUME, ML  mL   POCT urine dip   Result Value Ref Range    LEUKOCYTE ESTERASE,UA +++     NITRITE,UA -     SL AMB POCT UROBILINOGEN 0 2     POCT URINE PROTEIN +++      PH,UA 5 0     BLOOD,UA ++     SPECIFIC GRAVITY,UA 1 010     KETONES,UA -     BILIRUBIN,UA -     GLUCOSE, UA -      COLOR,UA yellow     CLARITY,UA cloudy      No problem-specific Assessment & Plan notes found for this encounter         Diagnoses and all orders for this visit:    Essential hypertension    PAF (paroxysmal atrial fibrillation) (HCC)    Severe dementia with agitation, unspecified dementia type ([facility] )    Stage 3a chronic kidney disease ([facility] )    Anxiety    Mixed hyperlipidemia    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[facility] )          Subjective:      Patient ID: Keyana Flores is a 80 y o  female  No chief complaint on file          Current Outpatient Medications:   •  acetaminophen (TYLENOL) 325 mg tablet, Take 2 tablets (650 mg total) by mouth every 6 (six) hours as needed for mild pain, moderate pain or headaches (Patient not taking: Reported on 1/11/2023), Disp: 30 tablet, Rfl: 0  •  albuterol (2 5 mg/3 mL) 0 083 % nebulizer solution, Take 1 vial (2 5 mg total) by nebulization every 4 (four) hours as needed for wheezing or shortness of breath (Patient not taking: Reported on 1/11/2023), Disp: 1 vial, Rfl: 0  •  amiodarone 200 mg tablet, Take 200 mg by mouth daily Take 1/2 tablet daily, Disp: , Rfl:   •  amiodarone 200 mg tablet, TAKE 1 TABLET (200 MG TOTAL) BY MOUTH DAILY, Disp: 90 tablet, Rfl: 0  •  bisacodyl (DULCOLAX) 10 mg suppository, Insert 1 suppository (10 mg total) into the rectum daily as needed for constipation (2nd line), Disp: 12 suppository, Rfl: 0  •  Blood Pressure KIT, Use 2 (two) times a week (Patient not taking: Reported on 1/11/2023), Disp: 1 kit, Rfl: 0  •  diltiazem (CARDIZEM CD) 120 mg 24 hr capsule, TAKE 1 CAPSULE BY MOUTH DAILY, Disp: 90 capsule, Rfl: 0  •  DULoxetine (CYMBALTA) 30 mg delayed release capsule, Take 30 mg by mouth daily at bedtime, Disp: , Rfl:   •  LORazepam (ATIVAN) 0 5 mg tablet, Take 0 5 mg by mouth , Disp: , Rfl:   •  metoprolol succinate (TOPROL-XL) 50 mg 24 hr tablet, TAKE 1 TABLET BY MOUTH DAILY, Disp: 90 tablet, Rfl: 0  •  OLANZapine (ZyPREXA) 5 mg tablet, Take 1 tablet (5 mg total) by mouth daily at bedtime, Disp: 30 tablet, Rfl: 1  •  polyethylene glycol (MIRALAX) 17 g packet, Take 17 g by mouth daily as needed (constipation first line) (Patient not taking: Reported on 1/11/2023), Disp: 14 each, Rfl: 0  •  Stool Softener 100 MG capsule, TAKE 1 CAPSULE BY MOUTH EVERY 12 HOURS FOR CONSTIPATION (Patient not taking: Reported on 1/11/2023), Disp: 90 capsule, Rfl: 0  •  terazosin (HYTRIN) 2 mg capsule, Take 1 capsule (2 mg total) by mouth daily at bedtime, Disp: 90 capsule, Rfl: 3  •  traZODone (DESYREL) 50 mg tablet, Take 50 mg by mouth daily at bedtime, Disp: , Rfl:     HPI    The following portions of the patient's history were reviewed and updated as appropriate: allergies, current medications, past family history, past medical history, past social history, past surgical history and problem list     Review of Systems   Constitutional: Negative  Negative for activity change, appetite change, fatigue, fever and unexpected weight change  HENT: Negative for congestion, ear pain, hearing loss, mouth sores, postnasal drip, rhinorrhea, sore throat, trouble swallowing and voice change  Eyes: Negative for pain, redness and visual disturbance  Respiratory: Negative for cough, chest tightness, shortness of breath and wheezing  Cardiovascular: Negative for chest pain, palpitations and leg swelling  Gastrointestinal: Negative for abdominal distention, abdominal pain, blood in stool, constipation, diarrhea and nausea  Endocrine: Negative for cold intolerance, heat intolerance, polydipsia, polyphagia and polyuria  Genitourinary: Negative for difficulty urinating, dysuria, flank pain, frequency, hematuria and urgency  Musculoskeletal: Negative for arthralgias, back pain, gait problem, joint swelling and myalgias  Skin: Negative for color change and pallor  Neurological: Negative for dizziness, tremors, seizures, syncope, weakness, numbness and headaches  Hematological: Negative for adenopathy  Does not bruise/bleed easily  Psychiatric/Behavioral: Negative  Negative for sleep disturbance  The patient is not nervous/anxious  Objective: There were no vitals taken for this visit  Physical Exam  Vitals and nursing note reviewed  Constitutional:       Appearance: She is well-developed  HENT:      Head: Normocephalic        Right Ear: External ear normal       Left Ear: External ear normal       Nose: Nose normal  Mouth/Throat:      Pharynx: No oropharyngeal exudate  Eyes:      Conjunctiva/sclera: Conjunctivae normal       Pupils: Pupils are equal, round, and reactive to light  Neck:      Thyroid: No thyromegaly  Cardiovascular:      Rate and Rhythm: Normal rate and regular rhythm  Heart sounds: Normal heart sounds  No murmur heard  No friction rub  No gallop  Comments: S1-S2 regular rhythm  Extremities no edema or calf tenderness  Pulmonary:      Effort: Pulmonary effort is normal  No respiratory distress  Breath sounds: Normal breath sounds  No wheezing or rales  Comments: 's are clear no wheezing rales or rhonchi  Abdominal:      General: Bowel sounds are normal  There is no distension  Palpations: Abdomen is soft  There is no mass  Tenderness: There is no abdominal tenderness  There is no guarding or rebound  Comments: Abdomen soft nontender   Musculoskeletal:         General: Normal range of motion  Cervical back: Normal range of motion and neck supple  Lymphadenopathy:      Cervical: No cervical adenopathy  Skin:     General: Skin is warm and dry  Neurological:      Mental Status: She is alert and oriented to person, place, and time  Psychiatric:         Attention and Perception: She is inattentive  Judgment: Judgment normal       Comments: Noncommunicative    Opens eyes appears in no distress she is here with her therapist

## 2023-04-27 NOTE — PROGRESS NOTES
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[facility] ) (Chronic)    Anxiety    Hyperlipidemia        Preventive health issues were discussed with patient, and age appropriate screening tests were ordered as noted in patient's After Visit Summary  Personalized health advice and appropriate referrals for health education or preventive services given if needed, as noted in patient's After Visit Summary       History of Present Illness:     Patient presents for a Medicare Wellness Visit    HPI   Patient Care Team:  Emely Mora MD as PCP - General (Internal Medicine)  Lisa Sanchez MD     Review of Systems:     Review of Systems     Problem List:     Patient Active Problem List   Diagnosis   • Essential hypertension   • Anxiety   • Hyperlipidemia   • Hyponatremia   • Acute encephalopathy   • Bilateral hearing loss   • Fall   • 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[facility])   • Cellulitis of extremity   • Paroxysmal A-fib (HCC)   • Orthostatic hypertension   • 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[facility] )   • Ambulatory dysfunction   • OMEGA (acute kidney injury) ([facility] )   • Elevated troponin   • Urinary incontinence   • PAF (paroxysmal atrial fibrillation) ([facility] )   • 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[facility] )   • Major neurocognitive disorder ([facility] )   • Moderate protein-calorie malnutrition ([facility] )   • Age-related osteoporosis without current pathological fracture   • Closed fracture of right orbital floor [facility])   • Fall from ground level   • Closed right hip fracture ([facility] )   • Stage 3a chronic kidney disease ([facility] )   • Dementia ([facility] )   • ABLA (acute blood loss anemia)   • Oropharyngeal dysphagia   • Flank pain   • Edema of right ankle   • Acute urinary retention   • Recurrent UTI      Past Medical and Surgical History:     Past Medical History:   Diagnosis Date   • Anxiety    • Cognitive impairment    • COVID-19 virus infection 04/13/2020   • Dementia ([facility] )    • Depression    • Hallucination    • Hyperlipidemia    • Hypertension    • Memory loss    • Psychiatric illness    • Psychosis ([facility] )    • Retention of urine    • Schizoaffective disorder ([facility] )    • Sleep difficulties    • Urinary tract infection      Past Surgical History:   Procedure Laterality Date   • APPENDECTOMY     • BLADDER SURGERY     • CHOLECYSTECTOMY     • KS OPTX FEM SHFT FX W/INSJ IMED IMPLT W/WO SCREW Right 7/15/2021    Procedure: INSERTION NAIL IM FEMUR ANTEGRADE (TROCHANTERIC) right;  Surgeon: Michael Brock MD;  Location: [facility];  Service: Orthopedics   • TOTAL ABDOMINAL HYSTERECTOMY W/ BILATERAL SALPINGOOPHORECTOMY     • TOTAL KNEE ARTHROPLASTY Left       Family History:     Family History   Problem Relation Age of Onset   • Heart disease Mother         Cardiac disorder   • Parkinsonism Father    • Heart disease Sister         Cardiac disorder   • Hypertension Sister    • Diabetes Child         Diabetes Mellitus   • Lung disease Child         Respiratory Disorder   • Diabetes Son         Diabetes Mellitus      Social History:     Social History     Socioeconomic History   • Marital status:       Spouse name: Not on file   • Number of children: Not on file   • Years of education: Not on file   • Highest education level: Not on file   Occupational History   • Not on file   Tobacco Use   • Smoking status: Never   • Smokeless tobacco: Never   • Tobacco comments:     Former smoker per [employer]   Vaping Use   • Vaping Use: Never used   Substance and Sexual Activity   • Alcohol use: Never   • Drug use: No   • Sexual activity: Not on file   Other Topics Concern   • Not on file   Social History Narrative    Drinks coffee     Social Determinants of Health     Financial Resource Strain: Not on file   Food Insecurity: Not on file   Transportation Needs: Not on file   Physical Activity: Not on file   Stress: Not on file   Social Connections: Not on file   Intimate Partner Violence: Not on file   Housing Stability: Not on file      Medications and Allergies:     Current Outpatient Medications   Medication Sig Dispense Refill   • acetaminophen (TYLENOL) 325 mg tablet Take 2 tablets (650 mg total) by mouth every 6 (six) hours as needed for mild pain, moderate pain or headaches (Patient not taking: Reported on 1/11/2023) 30 tablet 0   • albuterol (2 5 mg/3 mL) 0 083 % nebulizer solution Take 1 vial (2 5 mg total) by nebulization every 4 (four) hours as needed for wheezing or shortness of breath (Patient not taking: Reported on 1/11/2023) 1 vial 0   • amiodarone 200 mg tablet Take 200 mg by mouth daily Take 1/2 tablet daily     • amiodarone 200 mg tablet TAKE 1 TABLET (200 MG TOTAL) BY MOUTH DAILY 90 tablet 0   • bisacodyl (DULCOLAX) 10 mg suppository Insert 1 suppository (10 mg total) into the rectum daily as needed for constipation (2nd line) 12 suppository 0   • Blood Pressure KIT Use 2 (two) times a week (Patient not taking: Reported on 1/11/2023) 1 kit 0   • diltiazem (CARDIZEM CD) 120 mg 24 hr capsule TAKE 1 CAPSULE BY MOUTH DAILY 90 capsule 0   • DULoxetine (CYMBALTA) 30 mg delayed release capsule Take 30 mg by mouth daily at bedtime     • LORazepam (ATIVAN) 0 5 mg tablet Take 0 5 mg by mouth      • metoprolol succinate (TOPROL-XL) 50 mg 24 hr tablet TAKE 1 TABLET BY MOUTH DAILY 90 tablet 0   • OLANZapine (ZyPREXA) 5 mg tablet Take 1 tablet (5 mg total) by mouth daily at bedtime 30 tablet 1   • polyethylene glycol (MIRALAX) 17 g packet Take 17 g by mouth daily as needed (constipation first line) (Patient not taking: Reported on 1/11/2023) 14 each 0   • Stool Softener 100 MG capsule TAKE 1 CAPSULE BY MOUTH EVERY 12 HOURS FOR CONSTIPATION (Patient not taking: Reported on 1/11/2023) 90 capsule 0   • terazosin (HYTRIN) 2 mg capsule Take 1 capsule (2 mg total) by mouth daily at bedtime 90 capsule 3   • traZODone (DESYREL) 50 mg tablet Take 50 mg by mouth daily at bedtime       No current facility-administered medications for this visit  No Known Allergies   Immunizations:     Immunization History   Administered Date(s) Administered   • COVID-19 MODERNA VACC 0 25 ML IM BOOSTER 12/07/2021   • COVID-19 MODERNA VACC 0 5 ML IM 03/12/2021, 04/09/2021   • INFLUENZA 02/16/2015, 11/13/2018, 10/11/2022   • Influenza Quadrivalent, 6-35 Months IM 10/16/2014   • Influenza, high dose seasonal 0 7 mL 11/13/2018, 10/01/2019, 11/18/2020, 11/17/2021, 10/11/2022   • Pneumococcal Conjugate 13-Valent 02/04/2020   • Pneumococcal Polysaccharide PPV23 07/11/2014   • Tdap 09/28/2020   • Tuberculin Skin Test-PPD Intradermal 10/23/2018      Health Maintenance:         Topic Date Due   • DXA SCAN  02/24/2025   • Hepatitis C Screening  Completed         Topic Date Due   • COVID-19 Vaccine (4 - Booster for [place] series) 02/01/2022      Medicare Screening Tests and Risk Assessments:         Historian  Patient cannot answer questions due to cognitive impairment, intelluctual disability, or expressive limitations  Information provided by: other (DEFERRED AS PER DR Vikram Estrada)  PREVENTIVE SCREENINGS      Cardiovascular Screening:    General: Screening Not Indicated and History Lipid Disorder      Diabetes Screening:     General: Screening Current      Colorectal Cancer Screening:     General: Screening Not Indicated      Cervical Cancer Screening:    General: Screening Not Indicated      Osteoporosis Screening:    General: Screening Not Indicated and History Osteoporosis      Lung Cancer Screening:     General: Screening Not Indicated      Hepatitis C Screening:    General: Screening Current    No results found  Physical Exam:     There were no vitals taken for this visit      Physical Exam     Jase Apple MD

## 2023-04-27 NOTE — PATIENT INSTRUCTIONS
Continue same medications  Get your lab work before you come back  We will see you back in 4 to 6 months

## 2023-05-05 ENCOUNTER — APPOINTMENT (EMERGENCY)
Dept: CT IMAGING | Facility: HOSPITAL | Age: 88
End: 2023-05-05

## 2023-05-05 ENCOUNTER — HOSPITAL ENCOUNTER (EMERGENCY)
Facility: HOSPITAL | Age: 88
Discharge: HOME | End: 2023-05-05
Attending: EMERGENCY MEDICINE

## 2023-05-05 VITALS
DIASTOLIC BLOOD PRESSURE: 88 MMHG | OXYGEN SATURATION: 96 % | TEMPERATURE: 98.3 F | RESPIRATION RATE: 19 BRPM | SYSTOLIC BLOOD PRESSURE: 195 MMHG | HEART RATE: 55 BPM

## 2023-05-05 DIAGNOSIS — S00.83XA TRAUMATIC HEMATOMA OF FOREHEAD, INITIAL ENCOUNTER: ICD-10-CM
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
## 2023-05-05 DIAGNOSIS — N39.0 UTI (URINARY TRACT INFECTION): ICD-10-CM

## 2023-05-05 LAB
2HR DELTA HS TROPONIN: -3 NG/L
ANION GAP SERPL CALCULATED.3IONS-SCNC: 8 MMOL/L (ref 4–13)
APTT PPP: 27 SECONDS (ref 23–37)
ATRIAL RATE: 46 BPM
ATRIAL RATE: 51 BPM
BACTERIA UR QL AUTO: ABNORMAL /HPF
BASOPHILS # BLD AUTO: 0.01 THOUSANDS/ΜL (ref 0–0.1)
BASOPHILS NFR BLD AUTO: 0 % (ref 0–1)
BILIRUB UR QL STRIP: NEGATIVE
BUN SERPL-MCNC: 30 MG/DL (ref 5–25)
CALCIUM SERPL-MCNC: 8.8 MG/DL (ref 8.4–10.2)
CARDIAC TROPONIN I PNL SERPL HS: 12 NG/L
CARDIAC TROPONIN I PNL SERPL HS: 9 NG/L
CHLORIDE SERPL-SCNC: 105 MMOL/L (ref 96–108)
CLARITY UR: CLEAR
CO2 SERPL-SCNC: 25 MMOL/L (ref 21–32)
COLOR UR: YELLOW
CREAT SERPL-MCNC: 1.3 MG/DL (ref 0.6–1.3)
EOSINOPHIL # BLD AUTO: 0.18 THOUSAND/ΜL (ref 0–0.61)
EOSINOPHIL NFR BLD AUTO: 3 % (ref 0–6)
ERYTHROCYTE [DISTWIDTH] IN BLOOD BY AUTOMATED COUNT: 13.2 % (ref 11.6–15.1)
GFR SERPL CREATININE-BSD FRML MDRD: 35 ML/MIN/1.73SQ M
GLUCOSE SERPL-MCNC: 93 MG/DL (ref 65–140)
GLUCOSE UR STRIP-MCNC: NEGATIVE MG/DL
HCT VFR BLD AUTO: 36.7 % (ref 34.8–46.1)
HGB BLD-MCNC: 12.1 G/DL (ref 11.5–15.4)
HGB UR QL STRIP.AUTO: ABNORMAL
IMM GRANULOCYTES # BLD AUTO: 0.02 THOUSAND/UL (ref 0–0.2)
IMM GRANULOCYTES NFR BLD AUTO: 0 % (ref 0–2)
INR PPP: 1.03 (ref 0.84–1.19)
KETONES UR STRIP-MCNC: NEGATIVE MG/DL
LEUKOCYTE ESTERASE UR QL STRIP: ABNORMAL
LYMPHOCYTES # BLD AUTO: 1.4 THOUSANDS/ΜL (ref 0.6–4.47)
LYMPHOCYTES NFR BLD AUTO: 25 % (ref 14–44)
MCH RBC QN AUTO: 30.1 PG (ref 26.8–34.3)
MCHC RBC AUTO-ENTMCNC: 33 G/DL (ref 31.4–37.4)
MCV RBC AUTO: 91 FL (ref 82–98)
MONOCYTES # BLD AUTO: 0.51 THOUSAND/ΜL (ref 0.17–1.22)
MONOCYTES NFR BLD AUTO: 9 % (ref 4–12)
NEUTROPHILS # BLD AUTO: 3.41 THOUSANDS/ΜL (ref 1.85–7.62)
NEUTS SEG NFR BLD AUTO: 63 % (ref 43–75)
NITRITE UR QL STRIP: NEGATIVE
NON-SQ EPI CELLS URNS QL MICRO: ABNORMAL /HPF
NRBC BLD AUTO-RTO: 0 /100 WBCS
P AXIS: 38 DEGREES
P AXIS: 51 DEGREES
PH UR STRIP.AUTO: 6.5 [PH] (ref 4.5–8)
PLATELET # BLD AUTO: 226 THOUSANDS/UL (ref 149–390)
PMV BLD AUTO: 10.3 FL (ref 8.9–12.7)
POTASSIUM SERPL-SCNC: 4.2 MMOL/L (ref 3.5–5.3)
PR INTERVAL: 198 MS
PR INTERVAL: 206 MS
PROT UR STRIP-MCNC: ABNORMAL MG/DL
PROTHROMBIN TIME: 13.5 SECONDS (ref 11.6–14.5)
QRS AXIS: 54 DEGREES
QRS AXIS: 62 DEGREES
QRSD INTERVAL: 104 MS
QRSD INTERVAL: 84 MS
QT INTERVAL: 458 MS
QT INTERVAL: 496 MS
QTC INTERVAL: 422 MS
QTC INTERVAL: 434 MS
RBC # BLD AUTO: 4.02 MILLION/UL (ref 3.81–5.12)
RBC #/AREA URNS AUTO: ABNORMAL /HPF
SODIUM SERPL-SCNC: 138 MMOL/L (ref 135–147)
SP GR UR STRIP.AUTO: 1.01 (ref 1–1.03)
T WAVE AXIS: 0 DEGREES
T WAVE AXIS: 34 DEGREES
UROBILINOGEN UR QL STRIP.AUTO: 0.2 E.U./DL
VENTRICULAR RATE: 46 BPM
VENTRICULAR RATE: 51 BPM
WBC # BLD AUTO: 5.53 THOUSAND/UL (ref 4.31–10.16)
WBC #/AREA URNS AUTO: ABNORMAL /HPF

## 2023-05-05 RX ORDER — CEPHALEXIN 250 MG/5ML
500 POWDER, FOR SUSPENSION ORAL EVERY 12 HOURS SCHEDULED
Qty: 140 ML | Refills: 0 | Status: SHIPPED | OUTPATIENT
Start: 2023-05-05 | End: 2023-05-12

## 2023-05-05 RX ORDER — CEPHALEXIN 500 MG/1
500 CAPSULE ORAL EVERY 12 HOURS SCHEDULED
Qty: 14 CAPSULE | Refills: 0 | Status: SHIPPED | OUTPATIENT
Start: 2023-05-05 | End: 2023-05-05 | Stop reason: SDUPTHER

## 2023-05-05 RX ORDER — CEPHALEXIN 250 MG/5ML
500 POWDER, FOR SUSPENSION ORAL ONCE
Status: COMPLETED | OUTPATIENT
Start: 2023-05-05 | End: 2023-05-05

## 2023-05-05 RX ORDER — CEPHALEXIN 250 MG/1
500 CAPSULE ORAL ONCE
Status: DISCONTINUED | OUTPATIENT
Start: 2023-05-05 | End: 2023-05-05

## 2023-05-05 RX ADMIN — CEPHALEXIN 500 MG: 250 POWDER, FOR SUSPENSION ORAL at 06:44

## 2023-05-05 RX ADMIN — IOHEXOL 100 ML: 350 INJECTION, SOLUTION INTRAVENOUS at 02:09

## 2023-05-05 NOTE — ED PROVIDER NOTES
History  Chief Complaint   Patient presents with    Fall     Pt was found in front of her couch, fall was unwitnessed  Apparent head strike, - thinners, -LOC  Hx of dementia  This is a 70-year-old female presenting for evaluation of fall  According to EMS and caregiver patient had an unwitnessed fall  Patient was on the couch when the caregiver went to grab her a drink  Caregiver heard a loud bang and returned to the patient on the floor on a carpeted surface  Patient has obvious hematoma to the left side of the forehead  Caregiver is unsure if patient is on blood thinners  No history provided by patient as she is noncommunicative and has dementia  History provided by:  EMS personnel and caregiver      Prior to Admission Medications   Prescriptions Last Dose Informant Patient Reported? Taking?    Blood Pressure KIT   No No   Sig: Use 2 (two) times a week   Patient not taking: Reported on 1/11/2023   DULoxetine (CYMBALTA) 30 mg delayed release capsule   Yes No   Sig: Take 30 mg by mouth daily at bedtime   LORazepam (ATIVAN) 0 5 mg tablet   Yes No   Sig: Take 0 5 mg by mouth    OLANZapine (ZyPREXA) 5 mg tablet   No No   Sig: Take 1 tablet (5 mg total) by mouth daily at bedtime   Stool Softener 100 MG capsule   No No   Sig: TAKE 1 CAPSULE BY MOUTH EVERY 12 HOURS FOR CONSTIPATION   Patient not taking: Reported on 1/11/2023   acetaminophen (TYLENOL) 325 mg tablet   No No   Sig: Take 2 tablets (650 mg total) by mouth every 6 (six) hours as needed for mild pain, moderate pain or headaches   Patient not taking: Reported on 1/11/2023   albuterol (2 5 mg/3 mL) 0 083 % nebulizer solution   No No   Sig: Take 1 vial (2 5 mg total) by nebulization every 4 (four) hours as needed for wheezing or shortness of breath   Patient not taking: Reported on 1/11/2023   amiodarone 200 mg tablet   Yes No   Sig: Take 200 mg by mouth daily Take 1/2 tablet daily   amiodarone 200 mg tablet   No No   Sig: TAKE 1 TABLET (200 MG TOTAL) BY MOUTH DAILY   bisacodyl (DULCOLAX) 10 mg suppository   No No   Sig: Insert 1 suppository (10 mg total) into the rectum daily as needed for constipation (2nd line)   diltiazem (CARDIZEM CD) 120 mg 24 hr capsule   No No   Sig: TAKE 1 CAPSULE BY MOUTH DAILY   metoprolol succinate (TOPROL-XL) 50 mg 24 hr tablet   No No   Sig: TAKE 1 TABLET BY MOUTH DAILY   polyethylene glycol (MIRALAX) 17 g packet   No No   Sig: Take 17 g by mouth daily as needed (constipation first line)   Patient not taking: Reported on 1/11/2023   terazosin (HYTRIN) 2 mg capsule   No No   Sig: Take 1 capsule (2 mg total) by mouth daily at bedtime   traZODone (DESYREL) 50 mg tablet   Yes No   Sig: Take 50 mg by mouth daily at bedtime      Facility-Administered Medications: None       Past Medical History:   Diagnosis Date    Anxiety     Cognitive impairment     COVID-19 virus infection 04/13/2020    Dementia [facility] [address] [facility]     Depression     Hallucination     Hyperlipidemia     Hypertension     Memory loss     Psychiatric illness     Psychosis [facility] [address] [facility]     Retention of urine     Schizoaffective disorder ([facility] [address] [facility]     Sleep difficulties     Urinary tract infection        Past Surgical History:   Procedure Laterality Date    APPENDECTOMY      BLADDER SURGERY      CHOLECYSTECTOMY      WA OPTX FEM SHFT FX W/INSJ IMED IMPLT W/WO SCREW Right 7/15/2021    Procedure: INSERTION NAIL IM FEMUR ANTEGRADE (TROCHANTERIC) right;  Surgeon:  Stephon Plunkett MD;  Location: [facility];  Service: Orthopedics    TOTAL ABDOMINAL HYSTERECTOMY W/ BILATERAL SALPINGOOPHORECTOMY      TOTAL KNEE ARTHROPLASTY Left        Family History   Problem Relation Age of Onset    Heart disease Mother         Cardiac disorder    Parkinsonism Father     Heart disease Sister         Cardiac disorder    Hypertension Sister     Diabetes Child         Diabetes Mellitus    Lung disease Child         Respiratory Disorder    Diabetes Son         Diabetes Mellitus     I have reviewed and agree with the history as documented  E-Cigarette/Vaping    E-Cigarette Use Never User      E-Cigarette/Vaping Substances    Nicotine No     THC No     CBD No     Flavoring No     Other No     Unknown No      Social History     Tobacco Use    Smoking status: Never    Smokeless tobacco: Never    Tobacco comments:     Former smoker per [employer]   Vaping Use    Vaping Use: Never used   Substance Use Topics    Alcohol use: Never    Drug use: No       Review of Systems   Unable to perform ROS: Dementia       Physical Exam  Physical Exam  Vitals reviewed  Constitutional:       General: She is not in acute distress  Appearance: Normal appearance  She is well-developed and well-groomed  She is not ill-appearing, toxic-appearing or diaphoretic  HENT:      Head: Normocephalic  Contusion present  No raccoon eyes or Real's sign  Comments: Large hematoma noted to the left anterior forehead, no bony deformity noted  Right Ear: Tympanic membrane, ear canal and external ear normal  No hemotympanum  Left Ear: Tympanic membrane, ear canal and external ear normal  No hemotympanum  Ears:      Comments: No postauricular ecchymosis  Nose: No nasal tenderness, congestion or rhinorrhea  Comments: Ecchymosis to the nasal bridge  No obvious deformity  Mouth/Throat:      Lips: Pink  Mouth: Mucous membranes are moist       Pharynx: Oropharynx is clear  Uvula midline  No pharyngeal swelling, oropharyngeal exudate, posterior oropharyngeal erythema or uvula swelling  Tonsils: No tonsillar exudate or tonsillar abscesses  Eyes:      General: No scleral icterus  Right eye: No discharge  Left eye: No discharge  Conjunctiva/sclera: Conjunctivae normal       Right eye: Right conjunctiva is not injected  Left eye: Left conjunctiva is not injected        Comments: Ecchymosis noted below the left eye along the maxillary sinus   Cardiovascular: Rate and Rhythm: Normal rate and regular rhythm  Pulses: Normal pulses  Heart sounds: No murmur heard  No friction rub  No gallop  Pulmonary:      Effort: Pulmonary effort is normal  No respiratory distress  Breath sounds: Normal breath sounds  No wheezing, rhonchi or rales  Chest:      Chest wall: No deformity or tenderness  Abdominal:      General: Abdomen is flat  There is no distension  Palpations: Abdomen is soft  Tenderness: There is no abdominal tenderness  There is no right CVA tenderness, left CVA tenderness, guarding or rebound  Musculoskeletal:         General: No deformity  Normal range of motion  Cervical back: Normal range of motion and neck supple  No deformity  Thoracic back: No deformity  Lumbar back: No deformity  Right hip: No tenderness  Left hip: No tenderness  Skin:     General: Skin is warm and dry  Coloration: Skin is not jaundiced or pale  Findings: No rash  Neurological:      General: No focal deficit present  Mental Status: She is alert  GCS: GCS eye subscore is 4  GCS motor subscore is 6  Comments: Dementia, not following commands  Psychiatric:         Behavior: Behavior is uncooperative        Comments: Noncommunicative         Vital Signs  ED Triage Vitals [05/05/23 0129]   Temperature Pulse Respirations Blood Pressure SpO2   98 3 °F (36 8 °C) (!) 48 18 (!) 198/84 96 %      Temp Source Heart Rate Source Patient Position - Orthostatic VS BP Location FiO2 (%)   Oral Monitor Lying Right arm --      Pain Score       --           Vitals:    05/05/23 0129 05/05/23 0230 05/05/23 0400 05/05/23 0600   BP: (!) 198/84 (!) 190/85 (!) 194/82 (!) 190/79   Pulse: (!) 48 (!) 47 (!) 46 (!) 46   Patient Position - Orthostatic VS: Lying Lying Lying Lying         Visual Acuity      ED Medications  Medications   cephalexin (KEFLEX) oral suspension 500 mg (has no administration in time range)   iohexol (OMNIPAQUE) 350 MG/ML injection (SINGLE-DOSE) 100 mL (100 mL Intravenous Given 5/5/23 0209)       Diagnostic Studies  Results Reviewed     Procedure Component Value Units Date/Time    HS Troponin I 2hr [[id number]]  (Normal) Collected: 05/05/23 0451    Lab Status: Final result Specimen: Blood from Hand, Left Updated: 05/05/23 0518     hs TnI 2hr 9 ng/L      Delta 2hr hsTnI -3 ng/L     Urine Microscopic [[id number]]  (Abnormal) Collected: 05/05/23 0444    Lab Status: Final result Specimen: Urine, Clean Catch Updated: 05/05/23 0509     RBC, UA 1-2 /hpf      WBC, UA 30-50 /hpf      Epithelial Cells Moderate /hpf      Bacteria, UA Innumerable /hpf     Urine culture [[id number]] Collected: 05/05/23 0444    Lab Status:  In process Specimen: Urine, Clean Catch Updated: 05/05/23 0509    Urine Macroscopic, POC [[id number]]  (Abnormal) Collected: 05/05/23 0444    Lab Status: Final result Specimen: Urine Updated: 05/05/23 0445     Color, UA Yellow     Clarity, UA Clear     pH, UA 6 5     Leukocytes, UA Moderate     Nitrite, UA Negative     Protein,  (2+) mg/dl      Glucose, UA Negative mg/dl      Ketones, UA Negative mg/dl      Urobilinogen, UA 0 2 E U /dl      Bilirubin, UA Negative     Occult Blood, UA Small     Specific [place], UA 1 015    Narrative:      CLINITEK RESULT    HS Troponin 0hr (reflex protocol) [[id number]]  (Normal) Collected: 05/05/23 0220    Lab Status: Final result Specimen: Blood from Hand, Left Updated: 05/05/23 0249     hs TnI 0hr 12 ng/L     Basic metabolic panel [[id number]]  (Abnormal) Collected: 05/05/23 0220    Lab Status: Final result Specimen: Blood from Hand, Left Updated: 05/05/23 0241     Sodium 138 mmol/L      Potassium 4 2 mmol/L      Chloride 105 mmol/L      CO2 25 mmol/L      ANION GAP 8 mmol/L      BUN 30 mg/dL      Creatinine 1 30 mg/dL      Glucose 93 mg/dL      Calcium 8 8 mg/dL      eGFR 35 ml/min/1 73sq m     Narrative:      [place] guidelines for Chronic Kidney Disease (CKD):   Stage 1 with normal or high GFR (GFR > 90 mL/min/1 73 square meters)    Stage 2 Mild CKD (GFR = 60-89 mL/min/1 73 square meters)    Stage 3A Moderate CKD (GFR = 45-59 mL/min/1 73 square meters)    Stage 3B Moderate CKD (GFR = 30-44 mL/min/1 73 square meters)    Stage 4 Severe CKD (GFR = 15-29 mL/min/1 73 square meters)    Stage 5 End Stage CKD (GFR <15 mL/min/1 73 square meters)  Note: GFR calculation is accurate only with a steady state creatinine    Protime-INR [[id number]]  (Normal) Collected: 05/05/23 0220    Lab Status: Final result Specimen: Blood from Arm, Left Updated: 05/05/23 0238     Protime 13 5 seconds      INR 1 03    APTT [[id number]]  (Normal) Collected: 05/05/23 0220    Lab Status: Final result Specimen: Blood from Arm, Left Updated: 05/05/23 0238     PTT 27 seconds     CBC and differential [[id number]] Collected: 05/05/23 0220    Lab Status: Final result Specimen: Blood from Hand, Left Updated: 05/05/23 0227     WBC 5 53 Thousand/uL      RBC 4 02 Million/uL      Hemoglobin 12 1 g/dL      Hematocrit 36 7 %      MCV 91 fL      MCH 30 1 pg      MCHC 33 0 g/dL      RDW 13 2 %      MPV 10 3 fL      Platelets 731 Thousands/uL      nRBC 0 /100 WBCs      Neutrophils Relative 63 %      Immat GRANS % 0 %      Lymphocytes Relative 25 %      Monocytes Relative 9 %      Eosinophils Relative 3 %      Basophils Relative 0 %      Neutrophils Absolute 3 41 Thousands/µL      Immature Grans Absolute 0 02 Thousand/uL      Lymphocytes Absolute 1 40 Thousands/µL      Monocytes Absolute 0 51 Thousand/µL      Eosinophils Absolute 0 18 Thousand/µL      Basophils Absolute 0 01 Thousands/µL                  CT facial bones without contrast   Final Result by eGremias Garces MD (05/05 3153)      No evidence of acute traumatic injury to the facial bones                 Workstation performed: [id number]         CT head without contrast   Final Result by Geremias Garces MD (05/05 8477)      No acute intracranial abnormality  Workstation performed: [id number]         CT cervical spine without contrast   Final Result by Alana Araya MD (05/05 7011)      No cervical spine fracture or traumatic malalignment  Workstation performed: [id number]         CT chest abdomen pelvis w contrast   Final Result by Alana Araya MD (05/05 8910)      1  No traumatic abnormality   2    Moderate to severe cystitis            Workstation performed: [id number]                    Procedures  ECG 12 Lead Documentation Only    Date/Time: 5/5/2023 2:03 AM  Performed by: Titi Pagan PA-C  Authorized by: Titi Pagan PA-C     Indications / Diagnosis:  Fall  ECG reviewed by me, the ED Provider: yes (Also reviewed by Dr Fiona Singh)    Patient location:  ED  Previous ECG:     Previous ECG:  Compared to current    Similarity:  Changes noted  Interpretation:     Interpretation: abnormal    Rate:     ECG rate:  51    ECG rate assessment: bradycardic    Rhythm:     Rhythm: sinus rhythm    QRS:     QRS axis:  Normal    QRS intervals:  Normal  Conduction:     Conduction: normal    ST segments:     ST segments:  Non-specific  T waves:     T waves: normal    Other findings:     Other findings: LVH      ECG 12 Lead Documentation Only    Date/Time: 5/5/2023 4:57 AM  Performed by: Titi Pagan PA-C  Authorized by: Titi Pagan PA-C     Indications / Diagnosis:  Delta  ECG reviewed by me, the ED Provider: yes (Also reviewed by Dr Fiona Singh)    Patient location:  ED  Previous ECG:     Previous ECG:  Compared to current    Similarity:  No change  Interpretation:     Interpretation: abnormal    Rate:     ECG rate:  46    ECG rate assessment: bradycardic    Rhythm:     Rhythm: sinus rhythm    Ectopy:     Ectopy: none    QRS:     QRS axis:  Normal    QRS intervals:  Normal  Conduction:     Conduction: normal    ST segments:     ST segments:  Normal  T waves:     T waves: normal    Other findings:     Other findings: LVH ED Course  ED Course as of 05/05/23 0642   Fri May 05, 2023   3009 CT facial bones without contrast  No evidence of acute traumatic injury to the facial bones  Left frontal scalp hematoma   0307 hs TnI 0hr: 12  Will need delta   0307 CT head without contrast  No acute intracranial abnormality  9268 CT cervical spine without contrast  No cervical spine fracture or traumatic malalignment  0308 CT chest abdomen pelvis w contrast  IMPRESSION:     1  No traumatic abnormality  2  Moderate to severe cystitis   0455 Leukocytes, UA(!): Moderate   0543 WBC, UA(!): 30-50   0543 Bacteria, UA(!): Innumerable   0544 ECG 12 lead  Sinus bradycardia  Voltage criteria for left ventricular hypertrophy  Nonspecific ST abnormality  Abnormal ECG  When compared with ECG of 17-NOV-2022 12:11,  Nonspecific T wave abnormality no longer evident in Anterolateral leads  Confirmed by Carlos Cortes ([id number]) on 5/5/2023 5:22:00 AM   0544 ECG 12 lead  Sinus bradycardia  Voltage criteria for left ventricular hypertrophy  Abnormal ECG  When compared with ECG of 05-MAY-2023 01:28, (unconfirmed)  No significant change was found  Confirmed by Carlos Cortes ([id number]) on 5/5/2023 5:22:31 AM   0550 Delta 2hr hsTnI: -3   0621 Patient fighting to take Keflex, will order solution               SBIRT 20yo+    Flowsheet Row Most Recent Value   Initial Alcohol Screen: US AUDIT-C     1  How often do you have a drink containing alcohol? 0 Filed at: 05/05/2023 0134   2  How many drinks containing alcohol do you have on a typical day you are drinking? 0 Filed at: 05/05/2023 0134   3a  Male UNDER 65: How often do you have five or more drinks on one occasion? 0 Filed at: 05/05/2023 0134   3b  FEMALE Any Age, or MALE 65+: How often do you have 4 or more drinks on one occassion? 0 Filed at: 05/05/2023 0134   Audit-C Score 0 Filed at: 05/05/2023 0134   ROBERT: How many times in the past year have you        Used an illegal drug or used a prescription medication for non-medical reasons? Never Filed at: 05/05/2023 0134                    Medical Decision Making      DDx including but not limited to: intracranial injury, concussion, cervical injury, intrathoracic injury, intraabdominal injury, extremity injury--fracture, dislocation, strain, sprain, contusion  Patient presenting for evaluation status post fall  Patient has obvious hematoma to the left anterior forehead  History provided by caregiver although limited as caregiver states this is her first night with the patient  Unaware if patient is on any blood thinners  Patient is not communicative and difficult to cooperate on exam   No history provided by patient  Patient has some ecchymosis to the nasal bridge as well as over the left maxillary sinus  She is visually tracking  She is moving all 4 extremities  Will order CT head for evaluation of intracranial injury, CT facial bones for evaluation of any facial bone fractures, CT C-spine as unable to clear by [facility] as patient noncommunicative  CT chest abdomen pelvis for evaluation of any traumatic injury is unsure mechanism of fall specifically  EKG for evaluation of any arrhythmia or cardiac abnormalities as cause of fall  Troponin for evaluation of heart strain  CBC for evaluation of anemia, BMP for evaluation of kidney function and electrolyte abnormalities  aPTT, PT/INR for evaluation of coags in the event of bleed  Labs are unremarkable  Troponin of 12, patient will need delta  CT head, neck and facial bones without any acute traumatic injury  Incidentally noted on CT abdomen pelvis, patient has cystitis  Will order UA and straight cath for urine  UA with moderate leuks, micro with 30-50 WBCs and bacteria innumerable, will treat with Keflex for urinary tract infection  Reviewed information with patient caregiver  Prior to discharge, discharge instructions were discussed with patient at bedside   Patient was provided both verbal and written instructions  Patient is understanding of the discharge instructions and is agreeable to plan of care  Return precautions were discussed with patient bedside, patient verbalized understanding of signs and symptoms that would necessitate return to the ED  All questions were answered  Patient was comfortable with the plan of care and discharged to home  Dispo: discharge home with follow up to PCP  Patient stable, in no acute distress and non-toxic at discharge  Fall, initial encounter: acute illness or injury  Traumatic hematoma of forehead, initial encounter: acute illness or injury  UTI (urinary tract infection): acute illness or injury  Amount and/or Complexity of Data Reviewed  Independent Historian: caregiver  Labs: ordered  Decision-making details documented in ED Course  Radiology: ordered  Decision-making details documented in ED Course  ECG/medicine tests:  Decision-making details documented in ED Course  Risk  Prescription drug management  Disposition  Final diagnoses:   Fall, initial encounter   Traumatic hematoma of forehead, initial encounter   UTI (urinary tract infection)     Time reflects when diagnosis was documented in both MDM as applicable and the Disposition within this note     Time User Action Codes Description Comment    5/5/2023  5:49 AM Nellene Delfina Add [I51  WEOC] Fall, initial encounter     5/5/2023  5:49 AM Nellene Delfina Add [S00 83XA] Traumatic hematoma of forehead, initial encounter     5/5/2023  5:49 AM Nellene Delfina Add [N39 0] UTI (urinary tract infection)       ED Disposition     ED Disposition   Discharge    Condition   Stable    Date/Time   Fri May 5, 2023  5:49 AM    Comment   Yolis Markham discharge to home/self care  Follow-up Information     Follow up With Specialties Details Why [address] Jeronimo Cooper MD Internal Medicine Schedule an appointment as soon as possible for a visit   [address]   [place] [address]            Patient's Medications   Discharge Prescriptions    CEPHALEXIN (KEFLEX) 250 MG/5 ML SUSPENSION    Take 10 mL (500 mg total) by mouth every 12 (twelve) hours for 7 days       Start Date: 5/5/2023  End Date: 5/12/2023       Order Dose: 500 mg       Quantity: 140 mL    Refills: 0       No discharge procedures on file      PDMP Review       Value Time User    PDMP Reviewed  Yes 7/16/2021  8:50 AM Oliver Rogers PA-C          ED Provider  [facility]  05/05/23 8554

## 2023-05-06 LAB — BACTERIA UR CULT: NORMAL

## 2023-05-07 ENCOUNTER — HOSPITAL ENCOUNTER (EMERGENCY)
Facility: HOSPITAL | Age: 88
Discharge: HOME | End: 2023-05-07
Attending: EMERGENCY MEDICINE | Admitting: EMERGENCY MEDICINE

## 2023-05-07 ENCOUNTER — APPOINTMENT (EMERGENCY)
Dept: CT IMAGING | Facility: HOSPITAL | Age: 88
End: 2023-05-07

## 2023-05-07 VITALS
TEMPERATURE: 98.1 F | DIASTOLIC BLOOD PRESSURE: 82 MMHG | HEART RATE: 56 BPM | OXYGEN SATURATION: 96 % | RESPIRATION RATE: 16 BRPM | SYSTOLIC BLOOD PRESSURE: 166 MMHG
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
## 2023-05-07 RX ORDER — ACETAMINOPHEN 325 MG/1
650 TABLET ORAL ONCE
Status: COMPLETED | OUTPATIENT
Start: 2023-05-07 | End: 2023-05-07

## 2023-05-07 RX ADMIN — ACETAMINOPHEN 325MG 650 MG: 325 TABLET ORAL at 10:58

## 2023-05-07 NOTE — ED PROVIDER NOTES
History  Chief Complaint   Patient presents with   • Bleeding/Bruising     Pt seen here for fall recently  Son states at the time of the last fall aids were sleeping and son would like to verify bruising on pt's face is from last fall and not new  Pt confused; not answering questions in triage  80 YOF presents for re-eval of a fall  Pt is baseline non verbal and demented  Spoke with son who wanted pt to be re-evaluated due to new bruising that is occurring on the left  Would like to make sure that pt has not fallen again  Prior to Admission Medications   Prescriptions Last Dose Informant Patient Reported? Taking?    Blood Pressure KIT   No No   Sig: Use 2 (two) times a week   Patient not taking: Reported on 1/11/2023   DULoxetine (CYMBALTA) 30 mg delayed release capsule   Yes Yes   Sig: Take 30 mg by mouth daily at bedtime   LORazepam (ATIVAN) 0 5 mg tablet   Yes Yes   Sig: Take 0 5 mg by mouth    OLANZapine (ZyPREXA) 5 mg tablet   No Yes   Sig: Take 1 tablet (5 mg total) by mouth daily at bedtime   Stool Softener 100 MG capsule   No No   Sig: TAKE 1 CAPSULE BY MOUTH EVERY 12 HOURS FOR CONSTIPATION   Patient not taking: Reported on 1/11/2023   acetaminophen (TYLENOL) 325 mg tablet Not Taking  No No   Sig: Take 2 tablets (650 mg total) by mouth every 6 (six) hours as needed for mild pain, moderate pain or headaches   Patient not taking: Reported on 1/11/2023   albuterol (2 5 mg/3 mL) 0 083 % nebulizer solution   No Yes   Sig: Take 1 vial (2 5 mg total) by nebulization every 4 (four) hours as needed for wheezing or shortness of breath   amiodarone 200 mg tablet   Yes Yes   Sig: Take 200 mg by mouth daily Take 1/2 tablet daily   amiodarone 200 mg tablet   No Yes   Sig: TAKE 1 TABLET (200 MG TOTAL) BY MOUTH DAILY   bisacodyl (DULCOLAX) 10 mg suppository   No Yes   Sig: Insert 1 suppository (10 mg total) into the rectum daily as needed for constipation (2nd line)   cephalexin (KEFLEX) 250 mg/5 mL suspension No Yes   Sig: Take 10 mL (500 mg total) by mouth every 12 (twelve) hours for 7 days   diltiazem (CARDIZEM CD) 120 mg 24 hr capsule   No Yes   Sig: TAKE 1 CAPSULE BY MOUTH DAILY   metoprolol succinate (TOPROL-XL) 50 mg 24 hr tablet   No Yes   Sig: TAKE 1 TABLET BY MOUTH DAILY   polyethylene glycol (MIRALAX) 17 g packet   No No   Sig: Take 17 g by mouth daily as needed (constipation first line)   Patient not taking: Reported on 1/11/2023   terazosin (HYTRIN) 2 mg capsule   No No   Sig: Take 1 capsule (2 mg total) by mouth daily at bedtime   traZODone (DESYREL) 50 mg tablet   Yes No   Sig: Take 50 mg by mouth daily at bedtime      Facility-Administered Medications: None       Past Medical History:   Diagnosis Date   • Anxiety    • Cognitive impairment    • COVID-19 virus infection 04/13/2020   • Dementia ([facility] [address] )    • Depression    • Hallucination    • Hyperlipidemia    • Hypertension    • Memory loss    • Psychiatric illness    • Psychosis ([facility] [address] )    • Retention of urine    • Schizoaffective disorder ([facility] [address] )    • Sleep difficulties    • Urinary tract infection        Past Surgical History:   Procedure Laterality Date   • APPENDECTOMY     • BLADDER SURGERY     • CHOLECYSTECTOMY     • MI OPTX FEM SHFT FX W/INSJ IMED IMPLT W/WO SCREW Right 7/15/2021    Procedure: INSERTION NAIL IM FEMUR ANTEGRADE (TROCHANTERIC) right;  Surgeon: Sheryle Milroy, MD;  Location: [facility];  Service: Orthopedics   • TOTAL ABDOMINAL HYSTERECTOMY W/ BILATERAL SALPINGOOPHORECTOMY     • TOTAL KNEE ARTHROPLASTY Left        Family History   Problem Relation Age of Onset   • Heart disease Mother         Cardiac disorder   • Parkinsonism Father    • Heart disease Sister         Cardiac disorder   • Hypertension Sister    • Diabetes Child         Diabetes Mellitus   • Lung disease Child         Respiratory Disorder   • Diabetes Son         Diabetes Mellitus     I have reviewed and agree with the history as documented      E-Cigarette/Vaping   • E-Cigarette Use Never User      E-Cigarette/Vaping Substances   • Nicotine No    • THC No    • CBD No    • Flavoring No    • Other No    • Unknown No      Social History     Tobacco Use   • Smoking status: Never   • Smokeless tobacco: Never   • Tobacco comments:     Former smoker per [employer]   Vaping Use   • Vaping Use: Never used   Substance Use Topics   • Alcohol use: Never   • Drug use: No       Review of Systems   Unable to perform ROS: Dementia       Physical Exam  Physical Exam  Vitals and nursing note reviewed  Constitutional:       General: She is not in acute distress  Appearance: Normal appearance  She is well-developed  She is not ill-appearing (chronically ill appearing)  HENT:      Head: Normocephalic  No right periorbital erythema or left periorbital erythema  Comments: ecchymosis present around both eyes  Small hematoma on left side of forehead  Eyes:      Conjunctiva/sclera: Conjunctivae normal       Pupils: Pupils are equal, round, and reactive to light  Comments: Pt is visually tracking   Cardiovascular:      Rate and Rhythm: Regular rhythm  Bradycardia present  Heart sounds: No murmur heard  Pulmonary:      Effort: Pulmonary effort is normal  No respiratory distress  Breath sounds: Normal breath sounds  Abdominal:      Palpations: Abdomen is soft  Musculoskeletal:         General: Normal range of motion  Cervical back: Neck supple  Comments: Actively moving all 4 extremities   Skin:     General: Skin is warm and dry  Capillary Refill: Capillary refill takes less than 2 seconds  Neurological:      Mental Status: She is alert     Psychiatric:         Mood and Affect: Mood normal          Vital Signs  ED Triage Vitals [05/07/23 0935]   Temp Pulse Respirations Blood Pressure SpO2   -- (!) 50 17 (!) 192/84 97 %      Temp src Heart Rate Source Patient Position - Orthostatic VS BP Location FiO2 (%)   -- Monitor Lying Left arm --      Pain Score       -- Vitals:    05/07/23 0935   BP: (!) 192/84   Pulse: (!) 50   Patient Position - Orthostatic VS: Lying         Visual Acuity      ED Medications  Medications - No data to display    Diagnostic Studies  Results Reviewed     None                 No orders to display              Procedures  Procedures         ED Course  ED Course as of 05/07/23 1100   Sun May 07, 2023   1045 CT facial bones without contrast  New right facial and periorbital soft tissue swelling with suggestion of a chronic right orbital floor fracture deformity      No acute maxillofacial fracture      Improving left frontal scalp hematoma  1045 CT head without contrast  No acute intracranial abnormality  Generalized atrophy and chronic microangiopathic changes      Left frontal scalp hematoma; slightly improved  No underlying fracture      New right facial and periorbital soft tissue swelling  Please see the separate CT facial bone study report  1059 Attempted to call son with updated CT scan results however he did not answer and unable to leave message as VM is full                               SBIRT 20yo+    Flowsheet Row Most Recent Value   Initial Alcohol Screen: US AUDIT-C     1  How often do you have a drink containing alcohol? 0 Filed at: 05/07/2023 0936   2  How many drinks containing alcohol do you have on a typical day you are drinking? 0 Filed at: 05/07/2023 0936   3b  FEMALE Any Age, or MALE 65+: How often do you have 4 or more drinks on one occassion? 0 Filed at: 05/07/2023 0936   Audit-C Score 0 Filed at: 05/07/2023 0970   ROBERT: How many times in the past year have you    Used an illegal drug or used a prescription medication for non-medical reasons? Never Filed at: 05/07/2023 4110                    Medical Decision Making  80 YOF presents for re-eval of a fall  Pt is baseline non verbal and demented  Son is concerned due to developing right sided facial bruising   On physical exam pt does have large area of ecchymoses around both eyes however appear to be in the healing stage  No new signs of trauma on my exam  However given concern of repeat fall Ct head and facial bones obtained  CT head normal  CT facial bones noted right sided facial and periorbital swelling with chronic right orbital floor fracture deformity  Discussed results with the son that there is no concern for any new trauma  Okay for discharge  I have discussed the plan to discharge pt from ED  The patient was discharged in stable condition   Patient ambulated off the department   Extensive return to emergency department precautions were discussed   Follow up with appropriate providers including primary care physician was discussed   Patient and/or their  primary decision maker expressed understanding  Mike Saul remained stable during entire emergency department stay  Fall, subsequent encounter: acute illness or injury  Amount and/or Complexity of Data Reviewed  Independent Historian:      Details: Son provides all the history   Radiology: ordered  Decision-making details documented in ED Course  Risk  OTC drugs  Disposition  Final diagnoses:   None     ED Disposition     None      Follow-up Information    None         Patient's Medications   Discharge Prescriptions    No medications on file       No discharge procedures on file      PDMP Review       Value Time User    PDMP Reviewed  Yes 7/16/2021  8:50 AM Blade Fine PA-C          ED Provider  Electronically Signed by           Mehul Pate PA-C  05/07/23 1898

## 2023-05-07 NOTE — DISCHARGE INSTRUCTIONS
CT scan showed no acute intracranial abnormality  Left frontal scalp hematoma is improving, no underlying fracture  Right facial swelling and periorbital swelling  There is a right orbital floor fracture that is unchanged from previous CT scan

## 2023-05-07 NOTE — ED ATTENDING ATTESTATION
5/7/2023  IMagdalena MD, saw and evaluated the patient  I have discussed the patient with the resident/non-physician practitioner and agree with the resident's/non-physician practitioner's findings, Plan of Care, and MDM as documented in the resident's/non-physician practitioner's note, except where noted  All available labs and Radiology studies were reviewed  I was present for key portions of any procedure(s) performed by the resident/non-physician practitioner and I was immediately available to provide assistance  At this point I agree with the current assessment done in the Emergency Department  I have conducted an independent evaluation of this patient a history and physical is as follows:    Patient sent in by son  Patient had recent fall with contusion to the forehead  Today with ecchymosis and swelling to the B/L eyes  Son is concerned that patient had another fall  While bruising does appear old will re-image to determine if there is new injury since last visit  This is because patient is not able to give history      ED Course         Critical Care Time  Procedures

## 2023-05-10 ENCOUNTER — OFFICE VISIT (OUTPATIENT)
Dept: INTERNAL MEDICINE CLINIC | Facility: CLINIC | Age: 88
End: 2023-05-10

## 2023-05-10 VITALS
TEMPERATURE: 97.8 F | DIASTOLIC BLOOD PRESSURE: 78 MMHG | HEIGHT: 58 IN | BODY MASS INDEX: 24.77 KG/M2 | RESPIRATION RATE: 13 BRPM | HEART RATE: 80 BPM | SYSTOLIC BLOOD PRESSURE: 130 MMHG | WEIGHT: 118 LBS

## 2023-05-10 DIAGNOSIS — F20.5 RESIDUAL SCHIZOPHRENIA (HCC): Chronic | ICD-10-CM

## 2023-05-10 DIAGNOSIS — I48.0 PAROXYSMAL A-FIB (HCC): ICD-10-CM

## 2023-05-10 DIAGNOSIS — I10 ESSENTIAL HYPERTENSION: Primary | ICD-10-CM

## 2023-05-10 DIAGNOSIS — F23 BRIEF PSYCHOTIC DISORDER (HCC): ICD-10-CM

## 2023-05-10 DIAGNOSIS — R26.2 AMBULATORY DYSFUNCTION: ICD-10-CM

## 2023-05-10 DIAGNOSIS — M81.0 AGE-RELATED OSTEOPOROSIS WITHOUT CURRENT PATHOLOGICAL FRACTURE: ICD-10-CM

## 2023-05-10 DIAGNOSIS — F03.C11 SEVERE DEMENTIA WITH AGITATION, UNSPECIFIED DEMENTIA TYPE (HCC): Chronic | ICD-10-CM

## 2023-05-10 DIAGNOSIS — E78.2 MIXED HYPERLIPIDEMIA: ICD-10-CM

## 2023-05-10 DIAGNOSIS — I10 ORTHOSTATIC HYPERTENSION: ICD-10-CM
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
## 2023-05-10 DIAGNOSIS — N18.31 STAGE 3A CHRONIC KIDNEY DISEASE (HCC): ICD-10-CM

## 2023-05-10 DIAGNOSIS — S02.85XA CLOSED FRACTURE OF ORBIT, INITIAL ENCOUNTER (HCC): ICD-10-CM

## 2023-05-10 NOTE — PROGRESS NOTES
Assessment/Plan:      Follow-up of the ER visit that a CAT scan of the sinuses showed an orbital fracture on the right side after a fall she denies any headache she has bruising throughout her face her pupils react equally we will refer her to ophthalmology and ENT for further follow-up of the orbital fracture      She has cystitis when she went to the ER to give her Keflex 500 twice a day she is tolerating the medication    Blood pressure is well controlled denies any chest pain palpitation shortness of breath on diltiazem 120 mg metoprolol succinate 50 mg        Paroxysmal atrial fibrillation does follow-up with cardiology appears to be in sinus rhythm    Schizophrenia and psychosis seems to be stable on medications she does have psychiatric follow-up    X-rays from the ER review CAT scan of the facial bones due right facial periorbital soft tissue swelling suggestive of a chronic right orbital floor fracture deformity    No acute maxillofacial fracture    Improve left frontal scalp hematoma      CAT scan of the brain no acute intracranial abnormalities generalized atrophy and chronic microangiopathic changes left frontal scalp hematoma slightly improved    Results for orders placed or performed during the hospital encounter of 05/05/23   Urine culture    Specimen: Urine, Clean Catch   Result Value Ref Range    Urine Culture No Growth <1000 cfu/mL    CBC and differential   Result Value Ref Range    WBC 5 53 4 31 - 10 16 Thousand/uL    RBC 4 02 3 81 - 5 12 Million/uL    Hemoglobin 12 1 11 5 - 15 4 g/dL    Hematocrit 36 7 34 8 - 46 1 %    MCV 91 82 - 98 fL    MCH 30 1 26 8 - 34 3 pg    MCHC 33 0 31 4 - 37 4 g/dL    RDW 13 2 11 6 - 15 1 %    MPV 10 3 8 9 - 12 7 fL    Platelets 828 704 - 275 Thousands/uL    nRBC 0 /100 WBCs    Neutrophils Relative 63 43 - 75 %    Immat GRANS % 0 0 - 2 %    Lymphocytes Relative 25 14 - 44 %    Monocytes Relative 9 4 - 12 %    Eosinophils Relative 3 0 - 6 %    Basophils Relative 0 0 - 1 "%    Neutrophils Absolute 3 41 1 85 - 7 62 Thousands/µL    Immature Grans Absolute 0 02 0 00 - 0 20 Thousand/uL    Lymphocytes Absolute 1 40 0 60 - 4 47 Thousands/µL    Monocytes Absolute 0 51 0 17 - 1 22 Thousand/µL    Eosinophils Absolute 0 18 0 00 - 0 61 Thousand/µL    Basophils Absolute 0 01 0 00 - 0 10 Thousands/µL   Basic metabolic panel   Result Value Ref Range    Sodium 138 135 - 147 mmol/L    Potassium 4 2 3 5 - 5 3 mmol/L    Chloride 105 96 - 108 mmol/L    CO2 25 21 - 32 mmol/L    ANION GAP 8 4 - 13 mmol/L    BUN 30 (H) 5 - 25 mg/dL    Creatinine 1 30 0 60 - 1 30 mg/dL    Glucose 93 65 - 140 mg/dL    Calcium 8 8 8 4 - 10 2 mg/dL    eGFR 35 ml/min/1 73sq m   Protime-INR   Result Value Ref Range    Protime 13 5 11 6 - 14 5 seconds    INR 1 03 0 84 - 1 19   APTT   Result Value Ref Range    PTT 27 23 - 37 seconds   HS Troponin 0hr (reflex protocol)   Result Value Ref Range    hs TnI 0hr 12 \"Refer to ACS Flowchart\"- see link ng/L   HS Troponin I 2hr   Result Value Ref Range    hs TnI 2hr 9 \"Refer to ACS Flowchart\"- see link ng/L    Delta 2hr hsTnI -3 <20 ng/L   Urine Microscopic   Result Value Ref Range    RBC, UA 1-2 (A) None Seen, 2-4 /hpf    WBC, UA 30-50 (A) None Seen, 2-4, 5-60 /hpf    Epithelial Cells Moderate (A) None Seen, Occasional /hpf    Bacteria, UA Innumerable (A) None Seen, Occasional /hpf   ECG 12 lead   Result Value Ref Range    Ventricular Rate 51 BPM    Atrial Rate 51 BPM    NH Interval 198 ms    QRSD Interval 84 ms    QT Interval 458 ms    QTC Interval 422 ms    P Axis 38 degrees    QRS Axis 54 degrees    T Wave Axis 0 degrees   ECG 12 lead   Result Value Ref Range    Ventricular Rate 46 BPM    Atrial Rate 46 BPM    NH Interval 206 ms    QRSD Interval 104 ms    QT Interval 496 ms    QTC Interval 434 ms    P [place] 51 degrees    QRS Axis 62 degrees    T Wave Axis 34 degrees   Urine Macroscopic, POC   Result Value Ref Range    Color, UA Yellow     Clarity, UA Clear     pH, UA 6 5 4 5 - 8 0    " Leukocytes, UA Moderate (A) Negative    Nitrite, UA Negative Negative    Protein,  (2+) (A) Negative mg/dl    Glucose, UA Negative Negative mg/dl    Ketones, UA Negative Negative mg/dl    Urobilinogen, UA 0 2 0 2, 1 0 E U /dl E U /dl    Bilirubin, UA Negative Negative    Occult Blood, UA Small (A) Negative    Specific Gravity, UA 1 015 1 003 - 1 030     No problem-specific Assessment & Plan notes found for this encounter  Diagnoses and all orders for this visit:    Essential hypertension    Paroxysmal A-fib (HCC)    Severe dementia with agitation, unspecified dementia type (HCC)    Age-related osteoporosis without current pathological fracture    Stage 3a chronic kidney disease ([facility] [address] )    Ambulatory dysfunction    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[facility] )    Mixed hyperlipidemia    Orthostatic hypertension    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[facility] )          Subjective:      Patient ID: Brando Kathleen is a 80 y o  female  No chief complaint on file          Current Outpatient Medications:   •  acetaminophen (TYLENOL) 325 mg tablet, Take 2 tablets (650 mg total) by mouth every 6 (six) hours as needed for mild pain, moderate pain or headaches (Patient not taking: Reported on 1/11/2023), Disp: 30 tablet, Rfl: 0  •  albuterol (2 5 mg/3 mL) 0 083 % nebulizer solution, Take 1 vial (2 5 mg total) by nebulization every 4 (four) hours as needed for wheezing or shortness of breath, Disp: 1 vial, Rfl: 0  •  amiodarone 200 mg tablet, Take 200 mg by mouth daily Take 1/2 tablet daily, Disp: , Rfl:   •  amiodarone 200 mg tablet, TAKE 1 TABLET (200 MG TOTAL) BY MOUTH DAILY, Disp: 90 tablet, Rfl: 0  •  bisacodyl (DULCOLAX) 10 mg suppository, Insert 1 suppository (10 mg total) into the rectum daily as needed for constipation (2nd line), Disp: 12 suppository, Rfl: 0  •  Blood Pressure KIT, Use 2 (two) times a week (Patient not taking: Reported on 1/11/2023), Disp: 1 kit, Rfl: 0  •  cephalexin (KEFLEX) 250 mg/5 mL suspension, Take 10 mL (500 mg total) by mouth every 12 (twelve) hours for 7 days, Disp: 140 mL, Rfl: 0  •  diltiazem (CARDIZEM CD) 120 mg 24 hr capsule, TAKE 1 CAPSULE BY MOUTH DAILY, Disp: 90 capsule, Rfl: 0  •  DULoxetine (CYMBALTA) 30 mg delayed release capsule, Take 30 mg by mouth daily at bedtime, Disp: , Rfl:   •  LORazepam (ATIVAN) 0 5 mg tablet, Take 0 5 mg by mouth , Disp: , Rfl:   •  metoprolol succinate (TOPROL-XL) 50 mg 24 hr tablet, TAKE 1 TABLET BY MOUTH DAILY, Disp: 90 tablet, Rfl: 0  •  OLANZapine (ZyPREXA) 5 mg tablet, Take 1 tablet (5 mg total) by mouth daily at bedtime, Disp: 30 tablet, Rfl: 1  •  polyethylene glycol (MIRALAX) 17 g packet, Take 17 g by mouth daily as needed (constipation first line) (Patient not taking: Reported on 1/11/2023), Disp: 14 each, Rfl: 0  •  Stool Softener 100 MG capsule, TAKE 1 CAPSULE BY MOUTH EVERY 12 HOURS FOR CONSTIPATION (Patient not taking: Reported on 1/11/2023), Disp: 90 capsule, Rfl: 0  •  terazosin (HYTRIN) 2 mg capsule, Take 1 capsule (2 mg total) by mouth daily at bedtime, Disp: 90 capsule, Rfl: 3  •  traZODone (DESYREL) 50 mg tablet, Take 50 mg by mouth daily at bedtime, Disp: , Rfl:     HPI    The following portions of the patient's history were reviewed and updated as appropriate: allergies, current medications, past family history, past medical history, past social history, past surgical history and problem list     Review of Systems   Constitutional: Negative  Negative for activity change, appetite change, fatigue, fever and unexpected weight change  HENT: Negative for congestion, ear pain, hearing loss, mouth sores, postnasal drip, rhinorrhea, sore throat, trouble swallowing and voice change  Eyes: Negative for pain, redness and visual disturbance  Respiratory: Negative for cough, chest tightness, shortness of breath and wheezing  Cardiovascular: Negative for chest pain, palpitations and leg swelling     Gastrointestinal: Negative for abdominal distention, abdominal pain, blood in stool, constipation, diarrhea and nausea  Endocrine: Negative for cold intolerance, heat intolerance, polydipsia, polyphagia and polyuria  Genitourinary: Negative for difficulty urinating, dysuria, flank pain, frequency, hematuria and urgency  Musculoskeletal: Negative for arthralgias, back pain, gait problem, joint swelling and myalgias  Skin: Negative for color change and pallor  Neurological: Negative for dizziness, tremors, seizures, syncope, weakness, numbness and headaches  Hematological: Negative for adenopathy  Does not bruise/bleed easily  Psychiatric/Behavioral: Negative  Negative for sleep disturbance  The patient is not nervous/anxious  Objective: There were no vitals taken for this visit  Physical Exam  Vitals and nursing note reviewed  Constitutional:       Appearance: She is well-developed  HENT:      Head: Normocephalic  Right Ear: External ear normal       Left Ear: External ear normal       Nose: Nose normal       Mouth/Throat:      Mouth: Mucous membranes are moist       Pharynx: Oropharynx is clear  No oropharyngeal exudate  Eyes:      Conjunctiva/sclera: Conjunctivae normal       Pupils: Pupils are equal, round, and reactive to light  Neck:      Thyroid: No thyromegaly  Cardiovascular:      Rate and Rhythm: Normal rate and regular rhythm  Heart sounds: Normal heart sounds  No murmur heard  No friction rub  No gallop  Comments: S1-S2 regular rhythm  Extremities no edema  Pulmonary:      Effort: Pulmonary effort is normal  No respiratory distress  Breath sounds: Normal breath sounds  No wheezing or rales  Comments: 's are clear no wheezing rales or rhonchi  Abdominal:      General: Bowel sounds are normal  There is no distension  Palpations: Abdomen is soft  There is no mass  Tenderness: There is no abdominal tenderness  There is no guarding or rebound     Musculoskeletal:         General: Normal range of motion  Cervical back: Normal range of motion and neck supple  Lymphadenopathy:      Cervical: No cervical adenopathy  Skin:     General: Skin is warm and dry  Findings: Bruising present  Comments: Oozing throughout the forehead axillary area of the face more on the right than the left   Neurological:      Mental Status: She is alert and oriented to person, place, and time     Psychiatric:         Behavior: Behavior normal          Judgment: Judgment normal

## 2023-05-12 ENCOUNTER — APPOINTMENT (EMERGENCY)
Dept: CT IMAGING | Facility: HOSPITAL | Age: 88
End: 2023-05-12

## 2023-05-12 ENCOUNTER — HOSPITAL ENCOUNTER (INPATIENT)
Facility: HOSPITAL | Age: 88
LOS: 6 days | Discharge: HOME | End: 2023-05-19
Attending: EMERGENCY MEDICINE | Admitting: INTERNAL MEDICINE

## 2023-05-12 DIAGNOSIS — M54.9 BACK PAIN: Primary | ICD-10-CM

## 2023-05-12 DIAGNOSIS — Z91.89 AT RISK FOR ELDER ABUSE: ICD-10-CM

## 2023-05-12 DIAGNOSIS — F03.C11 SEVERE DEMENTIA WITH AGITATION, UNSPECIFIED DEMENTIA TYPE (HCC): ICD-10-CM

## 2023-05-12 DIAGNOSIS — I48.0 PAROXYSMAL A-FIB (HCC): ICD-10-CM

## 2023-05-12 LAB
2HR DELTA HS TROPONIN: 1 NG/L
ALBUMIN SERPL BCP-MCNC: 4.2 G/DL (ref 3.5–5)
ALP SERPL-CCNC: 89 U/L (ref 34–104)
ALT SERPL W P-5'-P-CCNC: 9 U/L (ref 7–52)
ANION GAP SERPL CALCULATED.3IONS-SCNC: 6 MMOL/L (ref 4–13)
AST SERPL W P-5'-P-CCNC: 21 U/L (ref 13–39)
ATRIAL RATE: 51 BPM
ATRIAL RATE: 54 BPM
BACTERIA UR QL AUTO: ABNORMAL /HPF
BASOPHILS # BLD AUTO: 0.01 THOUSANDS/ÂΜL (ref 0–0.1)
BASOPHILS NFR BLD AUTO: 0 % (ref 0–1)
BILIRUB SERPL-MCNC: 0.66 MG/DL (ref 0.2–1)
BILIRUB UR QL STRIP: NEGATIVE
BUN SERPL-MCNC: 26 MG/DL (ref 5–25)
CALCIUM SERPL-MCNC: 8.8 MG/DL (ref 8.4–10.2)
CARDIAC TROPONIN I PNL SERPL HS: 10 NG/L
CARDIAC TROPONIN I PNL SERPL HS: 11 NG/L
CHLORIDE SERPL-SCNC: 103 MMOL/L (ref 96–108)
CLARITY UR: ABNORMAL
CO2 SERPL-SCNC: 25 MMOL/L (ref 21–32)
COLOR UR: YELLOW
CREAT SERPL-MCNC: 1.32 MG/DL (ref 0.6–1.3)
EOSINOPHIL # BLD AUTO: 0.07 THOUSAND/ÂΜL (ref 0–0.61)
EOSINOPHIL NFR BLD AUTO: 1 % (ref 0–6)
ERYTHROCYTE [DISTWIDTH] IN BLOOD BY AUTOMATED COUNT: 13.4 % (ref 11.6–15.1)
GFR SERPL CREATININE-BSD FRML MDRD: 35 ML/MIN/1.73SQ M
GLUCOSE SERPL-MCNC: 96 MG/DL (ref 65–140)
GLUCOSE UR STRIP-MCNC: NEGATIVE MG/DL
HCT VFR BLD AUTO: 35 % (ref 34.8–46.1)
HGB BLD-MCNC: 11.4 G/DL (ref 11.5–15.4)
HGB UR QL STRIP.AUTO: ABNORMAL
IMM GRANULOCYTES # BLD AUTO: 0.02 THOUSAND/UL (ref 0–0.2)
IMM GRANULOCYTES NFR BLD AUTO: 0 % (ref 0–2)
KETONES UR STRIP-MCNC: NEGATIVE MG/DL
LEUKOCYTE ESTERASE UR QL STRIP: ABNORMAL
LIPASE SERPL-CCNC: 11 U/L (ref 11–82)
LYMPHOCYTES # BLD AUTO: 1.21 THOUSANDS/ÂΜL (ref 0.6–4.47)
LYMPHOCYTES NFR BLD AUTO: 19 % (ref 14–44)
MCH RBC QN AUTO: 29.5 PG (ref 26.8–34.3)
MCHC RBC AUTO-ENTMCNC: 32.6 G/DL (ref 31.4–37.4)
MCV RBC AUTO: 90 FL (ref 82–98)
MONOCYTES # BLD AUTO: 0.52 THOUSAND/ÂΜL (ref 0.17–1.22)
MONOCYTES NFR BLD AUTO: 8 % (ref 4–12)
MUCOUS THREADS UR QL AUTO: ABNORMAL
NEUTROPHILS # BLD AUTO: 4.61 THOUSANDS/ÂΜL (ref 1.85–7.62)
NEUTS SEG NFR BLD AUTO: 72 % (ref 43–75)
NITRITE UR QL STRIP: NEGATIVE
NON-SQ EPI CELLS URNS QL MICRO: ABNORMAL /HPF
NRBC BLD AUTO-RTO: 0 /100 WBCS
P AXIS: 61 DEGREES
P AXIS: 68 DEGREES
PH UR STRIP.AUTO: 7.5 [PH]
PLATELET # BLD AUTO: 228 THOUSANDS/UL (ref 149–390)
PMV BLD AUTO: 10.1 FL (ref 8.9–12.7)
POTASSIUM SERPL-SCNC: 4.3 MMOL/L (ref 3.5–5.3)
PR INTERVAL: 182 MS
PR INTERVAL: 216 MS
PROT SERPL-MCNC: 6.5 G/DL (ref 6.4–8.4)
PROT UR STRIP-MCNC: ABNORMAL MG/DL
QRS AXIS: 47 DEGREES
QRS AXIS: 59 DEGREES
QRSD INTERVAL: 92 MS
QRSD INTERVAL: 96 MS
QT INTERVAL: 440 MS
QT INTERVAL: 462 MS
QTC INTERVAL: 417 MS
QTC INTERVAL: 425 MS
RBC # BLD AUTO: 3.87 MILLION/UL (ref 3.81–5.12)
RBC #/AREA URNS AUTO: ABNORMAL /HPF
SODIUM SERPL-SCNC: 134 MMOL/L (ref 135–147)
SP GR UR STRIP.AUTO: 1.02 (ref 1–1.03)
T WAVE AXIS: 66 DEGREES
T WAVE AXIS: 67 DEGREES
UROBILINOGEN UR STRIP-ACNC: 2 MG/DL
VENTRICULAR RATE: 51 BPM
VENTRICULAR RATE: 54 BPM
WBC # BLD AUTO: 6.44 THOUSAND/UL (ref 4.31–10.16)
WBC #/AREA URNS AUTO: ABNORMAL /HPF

## 2023-05-12 RX ORDER — HYDRALAZINE HYDROCHLORIDE 20 MG/ML
10 INJECTION INTRAMUSCULAR; INTRAVENOUS ONCE
Status: CANCELLED | OUTPATIENT
Start: 2023-05-12

## 2023-05-12 RX ORDER — MAGNESIUM HYDROXIDE/ALUMINUM HYDROXICE/SIMETHICONE 120; 1200; 1200 MG/30ML; MG/30ML; MG/30ML
30 SUSPENSION ORAL EVERY 6 HOURS PRN
Status: DISCONTINUED | OUTPATIENT
Start: 2023-05-12 | End: 2023-05-19 | Stop reason: HOSPADM

## 2023-05-12 RX ORDER — TERAZOSIN 1 MG/1
2 CAPSULE ORAL
Status: DISCONTINUED | OUTPATIENT
Start: 2023-05-12 | End: 2023-05-19 | Stop reason: HOSPADM

## 2023-05-12 RX ORDER — WATER 1000 ML/1000ML
INJECTION, SOLUTION INTRAVENOUS
Status: COMPLETED
Start: 2023-05-12 | End: 2023-05-12

## 2023-05-12 RX ORDER — IODIXANOL 320 MG/ML
100 INJECTION, SOLUTION INTRAVASCULAR
Status: COMPLETED | OUTPATIENT
Start: 2023-05-12 | End: 2023-05-12

## 2023-05-12 RX ORDER — LORAZEPAM 0.5 MG/1
0.5 TABLET ORAL EVERY 8 HOURS PRN
Status: DISCONTINUED | OUTPATIENT
Start: 2023-05-12 | End: 2023-05-13

## 2023-05-12 RX ORDER — METOPROLOL SUCCINATE 50 MG/1
50 TABLET, EXTENDED RELEASE ORAL DAILY
Status: DISCONTINUED | OUTPATIENT
Start: 2023-05-13 | End: 2023-05-19 | Stop reason: HOSPADM

## 2023-05-12 RX ORDER — TRAZODONE HYDROCHLORIDE 50 MG/1
50 TABLET ORAL
Status: DISCONTINUED | OUTPATIENT
Start: 2023-05-12 | End: 2023-05-17

## 2023-05-12 RX ORDER — AMIODARONE HYDROCHLORIDE 100 MG/1
100 TABLET ORAL ONCE
Status: DISCONTINUED | OUTPATIENT
Start: 2023-05-12 | End: 2023-05-12

## 2023-05-12 RX ORDER — AMIODARONE HYDROCHLORIDE 100 MG/1
100 TABLET ORAL
Status: DISCONTINUED | OUTPATIENT
Start: 2023-05-13 | End: 2023-05-19 | Stop reason: HOSPADM

## 2023-05-12 RX ORDER — ACETAMINOPHEN 325 MG/1
650 TABLET ORAL EVERY 6 HOURS PRN
Status: DISCONTINUED | OUTPATIENT
Start: 2023-05-12 | End: 2023-05-19 | Stop reason: HOSPADM

## 2023-05-12 RX ORDER — OLANZAPINE 10 MG/1
2.5 INJECTION, POWDER, LYOPHILIZED, FOR SOLUTION INTRAMUSCULAR EVERY 8 HOURS PRN
Status: DISCONTINUED | OUTPATIENT
Start: 2023-05-12 | End: 2023-05-15

## 2023-05-12 RX ORDER — SODIUM CHLORIDE, SODIUM GLUCONATE, SODIUM ACETATE, POTASSIUM CHLORIDE, MAGNESIUM CHLORIDE, SODIUM PHOSPHATE, DIBASIC, AND POTASSIUM PHOSPHATE .53; .5; .37; .037; .03; .012; .00082 G/100ML; G/100ML; G/100ML; G/100ML; G/100ML; G/100ML; G/100ML
75 INJECTION, SOLUTION INTRAVENOUS CONTINUOUS
Status: DISCONTINUED | OUTPATIENT
Start: 2023-05-12 | End: 2023-05-14

## 2023-05-12 RX ORDER — ACETAMINOPHEN 325 MG/1
650 TABLET ORAL EVERY 8 HOURS PRN
Status: DISCONTINUED | OUTPATIENT
Start: 2023-05-12 | End: 2023-05-12

## 2023-05-12 RX ORDER — OLANZAPINE 5 MG/1
5 TABLET ORAL
Status: DISCONTINUED | OUTPATIENT
Start: 2023-05-12 | End: 2023-05-17

## 2023-05-12 RX ORDER — CEPHALEXIN 250 MG/5ML
500 POWDER, FOR SUSPENSION ORAL EVERY 12 HOURS SCHEDULED
Status: DISCONTINUED | OUTPATIENT
Start: 2023-05-12 | End: 2023-05-12

## 2023-05-12 RX ORDER — HEPARIN SODIUM 5000 [USP'U]/ML
5000 INJECTION, SOLUTION INTRAVENOUS; SUBCUTANEOUS EVERY 8 HOURS SCHEDULED
Status: DISCONTINUED | OUTPATIENT
Start: 2023-05-12 | End: 2023-05-19 | Stop reason: HOSPADM

## 2023-05-12 RX ORDER — ONDANSETRON 2 MG/ML
4 INJECTION INTRAMUSCULAR; INTRAVENOUS EVERY 6 HOURS PRN
Status: DISCONTINUED | OUTPATIENT
Start: 2023-05-12 | End: 2023-05-19 | Stop reason: HOSPADM

## 2023-05-12 RX ORDER — LIDOCAINE 50 MG/G
1 PATCH TOPICAL DAILY
Status: DISCONTINUED | OUTPATIENT
Start: 2023-05-13 | End: 2023-05-19 | Stop reason: HOSPADM

## 2023-05-12 RX ORDER — DILTIAZEM HYDROCHLORIDE 120 MG/1
120 CAPSULE, COATED, EXTENDED RELEASE ORAL DAILY
Status: DISCONTINUED | OUTPATIENT
Start: 2023-05-12 | End: 2023-05-14

## 2023-05-12 RX ORDER — MORPHINE SULFATE 4 MG/ML
4 INJECTION, SOLUTION INTRAMUSCULAR; INTRAVENOUS ONCE
Status: COMPLETED | OUTPATIENT
Start: 2023-05-12 | End: 2023-05-12

## 2023-05-12 RX ORDER — LABETALOL HYDROCHLORIDE 5 MG/ML
10 INJECTION, SOLUTION INTRAVENOUS EVERY 4 HOURS PRN
Status: DISCONTINUED | OUTPATIENT
Start: 2023-05-12 | End: 2023-05-13

## 2023-05-12 RX ADMIN — LABETALOL HYDROCHLORIDE 10 MG: 5 INJECTION, SOLUTION INTRAVENOUS at 18:53

## 2023-05-12 RX ADMIN — OLANZAPINE 2.5 MG: 10 INJECTION, POWDER, LYOPHILIZED, FOR SOLUTION INTRAMUSCULAR at 19:13

## 2023-05-12 RX ADMIN — WATER: 1 INJECTION INTRAMUSCULAR; INTRAVENOUS; SUBCUTANEOUS at 20:33

## 2023-05-12 RX ADMIN — IODIXANOL 100 ML: 320 INJECTION, SOLUTION INTRAVASCULAR at 13:31

## 2023-05-12 RX ADMIN — SODIUM CHLORIDE, SODIUM GLUCONATE, SODIUM ACETATE, POTASSIUM CHLORIDE, MAGNESIUM CHLORIDE, SODIUM PHOSPHATE, DIBASIC, AND POTASSIUM PHOSPHATE 75 ML/HR: .53; .5; .37; .037; .03; .012; .00082 INJECTION, SOLUTION INTRAVENOUS at 20:00

## 2023-05-12 RX ADMIN — HEPARIN SODIUM 5000 UNITS: 5000 INJECTION INTRAVENOUS; SUBCUTANEOUS at 21:15

## 2023-05-12 RX ADMIN — MORPHINE SULFATE 4 MG: 4 INJECTION INTRAVENOUS at 12:51

## 2023-05-12 RX ADMIN — CEFTRIAXONE SODIUM 1000 MG: 10 INJECTION, POWDER, FOR SOLUTION INTRAVENOUS at 20:00
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
## 2023-05-12 NOTE — ED PROVIDER NOTES
History  Chief Complaint   Patient presents with   • Back Pain     Per EMS, they were called for back pain but report pt has dementia and is more altered  Pt at baseline per Sharlene Minor PA-C who had pt week prior for fall  Pt is reporting left sided flank pain along with upper abdominal pain  Pt does arrive with bilateral face ecchymosis and edema from prior fall a week ago  HPI  Noam Dye is a 80 y o  female with PMH dementia who presents to the emergency department with back pain  Extremely limited ability to provide history, patient is only able to point at her left lower back and abdomen and say that she has pain  Patient was evaluated multiple times in the ER last week after falls, per staff that saw her at the prior visit she is at her baseline mental status and has the same facial ecchymosis as prior  Prior to Admission Medications   Prescriptions Last Dose Informant Patient Reported? Taking?    Blood Pressure KIT   No No   Sig: Use 2 (two) times a week   Patient not taking: Reported on 1/11/2023   DULoxetine (CYMBALTA) 30 mg delayed release capsule   Yes No   Sig: Take 30 mg by mouth daily at bedtime   LORazepam (ATIVAN) 0 5 mg tablet   Yes No   Sig: Take 0 5 mg by mouth    OLANZapine (ZyPREXA) 5 mg tablet   No No   Sig: Take 1 tablet (5 mg total) by mouth daily at bedtime   Stool Softener 100 MG capsule   No No   Sig: TAKE 1 CAPSULE BY MOUTH EVERY 12 HOURS FOR CONSTIPATION   acetaminophen (TYLENOL) 325 mg tablet   No No   Sig: Take 2 tablets (650 mg total) by mouth every 6 (six) hours as needed for mild pain, moderate pain or headaches   albuterol (2 5 mg/3 mL) 0 083 % nebulizer solution   No No   Sig: Take 1 vial (2 5 mg total) by nebulization every 4 (four) hours as needed for wheezing or shortness of breath   Patient not taking: Reported on 5/10/2023   amiodarone 200 mg tablet   Yes No   Sig: Take 200 mg by mouth daily Take 1/2 tablet daily   Patient not taking: Reported on 5/10/2023 amiodarone 200 mg tablet   No No   Sig: TAKE 1 TABLET (200 MG TOTAL) BY MOUTH DAILY   bisacodyl (DULCOLAX) 10 mg suppository   No No   Sig: Insert 1 suppository (10 mg total) into the rectum daily as needed for constipation (2nd line)   Patient not taking: Reported on 5/10/2023   cephalexin (KEFLEX) 250 mg/5 mL suspension   No No   Sig: Take 10 mL (500 mg total) by mouth every 12 (twelve) hours for 7 days   diltiazem (CARDIZEM CD) 120 mg 24 hr capsule   No No   Sig: TAKE 1 CAPSULE BY MOUTH DAILY   metoprolol succinate (TOPROL-XL) 50 mg 24 hr tablet   No No   Sig: TAKE 1 TABLET BY MOUTH DAILY   polyethylene glycol (MIRALAX) 17 g packet   No No   Sig: Take 17 g by mouth daily as needed (constipation first line)   Patient not taking: Reported on 5/10/2023   terazosin (HYTRIN) 2 mg capsule   No No   Sig: Take 1 capsule (2 mg total) by mouth daily at bedtime   traZODone (DESYREL) 50 mg tablet   Yes No   Sig: Take 50 mg by mouth daily at bedtime      Facility-Administered Medications: None       Past Medical History:   Diagnosis Date   • Anxiety    • Cognitive impairment    • COVID-19 virus infection 04/13/2020   • Dementia ([facility] )    • Depression    • Hallucination    • Hyperlipidemia    • Hypertension    • Memory loss    • Psychiatric illness    • Psychosis ([facility] )    • Retention of urine    • Schizoaffective disorder ([facility] )    • Sleep difficulties    • Urinary tract infection        Past Surgical History:   Procedure Laterality Date   • APPENDECTOMY     • BLADDER SURGERY     • CHOLECYSTECTOMY     • OR OPTX FEM SHFT FX W/INSJ IMED IMPLT W/WO SCREW Right 7/15/2021    Procedure: INSERTION NAIL IM FEMUR ANTEGRADE (TROCHANTERIC) right;  Surgeon:  Lary Finch MD;  Location: [facility];  Service: Orthopedics   • TOTAL ABDOMINAL HYSTERECTOMY W/ BILATERAL SALPINGOOPHORECTOMY     • TOTAL KNEE ARTHROPLASTY Left        Family History   Problem Relation Age of Onset   • Heart disease Mother         Cardiac disorder   • Parkinsonism Father    • Heart disease Sister         Cardiac disorder   • Hypertension Sister    • Diabetes Child         Diabetes Mellitus   • Lung disease Child         Respiratory Disorder   • Diabetes Son         Diabetes Mellitus     I have reviewed and agree with the history as documented  E-Cigarette/Vaping   • E-Cigarette Use Never User      E-Cigarette/Vaping Substances   • Nicotine No    • THC No    • CBD No    • Flavoring No    • Other No    • Unknown No      Social History     Tobacco Use   • Smoking status: Never   • Smokeless tobacco: Never   • Tobacco comments:     Former smoker per [employer]   Vaping Use   • Vaping Use: Never used   Substance Use Topics   • Alcohol use: Never   • Drug use: No       Home medications:  Prior to Admission Medications   Prescriptions Last Dose Informant Patient Reported? Taking?    Blood Pressure KIT   No No   Sig: Use 2 (two) times a week   Patient not taking: Reported on 1/11/2023   DULoxetine (CYMBALTA) 30 mg delayed release capsule   Yes No   Sig: Take 30 mg by mouth daily at bedtime   LORazepam (ATIVAN) 0 5 mg tablet   Yes No   Sig: Take 0 5 mg by mouth    OLANZapine (ZyPREXA) 5 mg tablet   No No   Sig: Take 1 tablet (5 mg total) by mouth daily at bedtime   Stool Softener 100 MG capsule   No No   Sig: TAKE 1 CAPSULE BY MOUTH EVERY 12 HOURS FOR CONSTIPATION   acetaminophen (TYLENOL) 325 mg tablet   No No   Sig: Take 2 tablets (650 mg total) by mouth every 6 (six) hours as needed for mild pain, moderate pain or headaches   albuterol (2 5 mg/3 mL) 0 083 % nebulizer solution   No No   Sig: Take 1 vial (2 5 mg total) by nebulization every 4 (four) hours as needed for wheezing or shortness of breath   Patient not taking: Reported on 5/10/2023   amiodarone 200 mg tablet   Yes No   Sig: Take 200 mg by mouth daily Take 1/2 tablet daily   Patient not taking: Reported on 5/10/2023   amiodarone 200 mg tablet   No No   Sig: TAKE 1 TABLET (200 MG TOTAL) BY MOUTH DAILY   bisacodyl (DULCOLAX) 10 mg suppository   No No   Sig: Insert 1 suppository (10 mg total) into the rectum daily as needed for constipation (2nd line)   Patient not taking: Reported on 5/10/2023   cephalexin (KEFLEX) 250 mg/5 mL suspension   No No   Sig: Take 10 mL (500 mg total) by mouth every 12 (twelve) hours for 7 days   diltiazem (CARDIZEM CD) 120 mg 24 hr capsule   No No   Sig: TAKE 1 CAPSULE BY MOUTH DAILY   metoprolol succinate (TOPROL-XL) 50 mg 24 hr tablet   No No   Sig: TAKE 1 TABLET BY MOUTH DAILY   polyethylene glycol (MIRALAX) 17 g packet   No No   Sig: Take 17 g by mouth daily as needed (constipation first line)   Patient not taking: Reported on 5/10/2023   terazosin (HYTRIN) 2 mg capsule   No No   Sig: Take 1 capsule (2 mg total) by mouth daily at bedtime   traZODone (DESYREL) 50 mg tablet   Yes No   Sig: Take 50 mg by mouth daily at bedtime      Facility-Administered Medications: None     Allergies:  No Known Allergies     Review of Systems   Unable to perform ROS: Dementia       Physical Exam  ED Triage Vitals   Temperature Pulse Respirations Blood Pressure SpO2   05/12/23 1213 05/12/23 1213 05/12/23 1213 05/12/23 1213 05/12/23 1213   98 1 °F (36 7 °C) (!) 53 20 (!) 233/94 98 %      Temp Source Heart Rate Source Patient Position - Orthostatic VS BP Location FiO2 (%)   05/12/23 1213 05/12/23 1213 05/12/23 1213 05/12/23 1213 --   Oral Monitor Lying Right arm       Pain Score       05/12/23 1251       10 - Worst Possible Pain             Orthostatic Vital Signs  Vitals:    05/12/23 1312 05/12/23 1345 05/12/23 1515 05/12/23 1545   BP: 170/72 (!) 188/77 141/60 (!) 216/86   Pulse: (!) 47 (!) 50 56 56   Patient Position - Orthostatic VS: Lying Lying Lying Lying       Physical Exam  Vitals and nursing note reviewed  Constitutional:       General: She is not in acute distress  Appearance: She is not toxic-appearing  HENT:      Head: Normocephalic        Comments: Diffuse old facial ecchymosis     Mouth/Throat: Mouth: Mucous membranes are moist    Eyes:      Pupils: Pupils are equal, round, and reactive to light  Cardiovascular:      Rate and Rhythm: Normal rate and regular rhythm  Heart sounds: No murmur heard  Pulmonary:      Effort: Pulmonary effort is normal  No respiratory distress  Breath sounds: Normal breath sounds  No wheezing or rales  Abdominal:      General: Abdomen is flat  There is no distension  Palpations: Abdomen is soft  Tenderness: There is abdominal tenderness (Mild)  There is no right CVA tenderness, left CVA tenderness, guarding or rebound  Musculoskeletal:      Comments: No midline spinal tenderness  Mild tenderness over left lumbar paraspinal muscles  Skin:     General: Skin is warm and dry  Neurological:      Mental Status: She is alert  Comments: Moving all extremities symmetrically         ED Medications  Medications   morphine injection 4 mg (4 mg Intravenous Given 5/12/23 1251)   iodixanol (VISIPAQUE) 320 MG/ML injection 100 mL (100 mL Intravenous Given 5/12/23 1331)       Diagnostic Studies  Results Reviewed     Procedure Component Value Units Date/Time    HS Troponin I 2hr [[id number]]  (Normal) Collected: 05/12/23 1447    Lab Status: Final result Specimen: Blood from Arm, Left Updated: 05/12/23 1529     hs TnI 2hr 11 ng/L      Delta 2hr hsTnI 1 ng/L     HS Troponin I 4hr [[id number]]     Lab Status: No result Specimen: Blood     Urine Microscopic [[id number]]  (Abnormal) Collected: 05/12/23 1303    Lab Status: Final result Specimen: Urine, Straight Cath Updated: 05/12/23 1337     RBC, UA 4-10 /hpf      WBC, UA Innumerable /hpf      Epithelial Cells Occasional /hpf      Bacteria, UA None Seen /hpf      MUCUS THREADS Occasional    Urine culture [[id number]] Collected: 05/12/23 1303    Lab Status:  In process Specimen: Urine, Straight Cath Updated: 05/12/23 1337    UA w Reflex to Microscopic w Reflex to Culture [[id number]]  (Abnormal) Collected: 05/12/23 1303    Lab Status: Final result Specimen: Urine, Straight Cath Updated: 05/12/23 1326     Color, UA Yellow     Clarity, UA Turbid     Specific [place], UA 1 019     pH, UA 7 5     Leukocytes, UA Large     Nitrite, UA Negative     Protein, UA 50 (1+) mg/dl      Glucose, UA Negative mg/dl      Ketones, UA Negative mg/dl      Urobilinogen, UA 2 0 mg/dl      Bilirubin, UA Negative     Occult Blood, UA Trace    HS Troponin 0hr (reflex protocol) [[id number]]  (Normal) Collected: 05/12/23 1239    Lab Status: Final result Specimen: Blood from Arm, Left Updated: 05/12/23 1318     hs TnI 0hr 10 ng/L     Comprehensive metabolic panel [[id number]]  (Abnormal) Collected: 05/12/23 1239    Lab Status: Final result Specimen: Blood from Arm, Left Updated: 05/12/23 1311     Sodium 134 mmol/L      Potassium 4 3 mmol/L      Chloride 103 mmol/L      CO2 25 mmol/L      ANION GAP 6 mmol/L      BUN 26 mg/dL      Creatinine 1 32 mg/dL      Glucose 96 mg/dL      Calcium 8 8 mg/dL      AST 21 U/L      ALT 9 U/L      Alkaline Phosphatase 89 U/L      Total Protein 6 5 g/dL      Albumin 4 2 g/dL      Total Bilirubin 0 66 mg/dL      eGFR 35 ml/min/1 73sq m     Narrative:      [place] guidelines for Chronic Kidney Disease (CKD):   •  Stage 1 with normal or high GFR (GFR > 90 mL/min/1 73 square meters)  •  Stage 2 Mild CKD (GFR = 60-89 mL/min/1 73 square meters)  •  Stage 3A Moderate CKD (GFR = 45-59 mL/min/1 73 square meters)  •  Stage 3B Moderate CKD (GFR = 30-44 mL/min/1 73 square meters)  •  Stage 4 Severe CKD (GFR = 15-29 mL/min/1 73 square meters)  •  Stage 5 End Stage CKD (GFR <15 mL/min/1 73 square meters)  Note: GFR calculation is accurate only with a steady state creatinine    Lipase [[id number]]  (Normal) Collected: 05/12/23 1239    Lab Status: Final result Specimen: Blood from Arm, Left Updated: 05/12/23 1311     Lipase 11 u/L     CBC and differential [[id number]]  (Abnormal) Collected: 05/12/23 1239    Lab Status: Final result Specimen: Blood from Arm, Left Updated: 05/12/23 1249     WBC 6 44 Thousand/uL      RBC 3 87 Million/uL      Hemoglobin 11 4 g/dL      Hematocrit 35 0 %      MCV 90 fL      MCH 29 5 pg      MCHC 32 6 g/dL      RDW 13 4 %      MPV 10 1 fL      Platelets 601 Thousands/uL      nRBC 0 /100 WBCs      Neutrophils Relative 72 %      Immat GRANS % 0 %      Lymphocytes Relative 19 %      Monocytes Relative 8 %      Eosinophils Relative 1 %      Basophils Relative 0 %      Neutrophils Absolute 4 61 Thousands/µL      Immature Grans Absolute 0 02 Thousand/uL      Lymphocytes Absolute 1 21 Thousands/µL      Monocytes Absolute 0 52 Thousand/µL      Eosinophils Absolute 0 07 Thousand/µL      Basophils Absolute 0 01 Thousands/µL                  CTA dissection protocol chest abdomen pelvis w wo contrast   Final Result by Evin De Leon MD (05/12 3666)      No new aortic findings  No acute findings in the chest       Bladder findings in keeping with persistent cystitis improved from 5/5/2023  Workstation performed: [id number]         CT recon only lumbar spine   Final Result by Evin De Leon MD (05/12 5406)      No fracture or traumatic subluxation  Workstation performed: [id number]               Procedures  Procedures      ED Course                             SBIRT 22yo+    Flowsheet Row Most Recent Value   Initial Alcohol Screen: US AUDIT-C     1  How often do you have a drink containing alcohol? 0 Filed at: 05/12/2023 1218   2  How many drinks containing alcohol do you have on a typical day you are drinking? 0 Filed at: 05/12/2023 1218   3b  FEMALE Any Age, or MALE 65+: How often do you have 4 or more drinks on one occassion? 0 Filed at: 05/12/2023 1218   Audit-C Score 0 Filed at: 05/12/2023 1218   ROBERT: How many times in the past year have you    Used an illegal drug or used a prescription medication for non-medical reasons?  Never Filed at: 05/12/2023 1218                [facility]  Medical Decision Making  Amount and/or Complexity of Data Reviewed  Labs: ordered  Radiology: ordered  Risk  Prescription drug management  Enrique Magana is a 80 y o  female with PMH dementia who presents to the emergency department with back pain and abdominal pain  Workup including vital signs, physical exam, EKG, labs, urinalysis and CT  Patient's mental status and facial ecchymosis similar to prior visit per staff that had seen her at prior visit  EKG without acute ischemic changes and labs largely unremarkable  CTA chest abdomen pelvis and lumbar recon done without acute abnormalities  Work-up largely unremarkable  UA with WBC but no bacteria, currently on Keflex for UTI although her urine culture did not grow organisms  Plan to complete Keflex course as prescribed, with primary care follow-up of repeat urine culture from today  Stable for discharge home with primary care follow up, discharge instructions and return precautions given  Disposition  Final diagnoses:   Back pain     Time reflects when diagnosis was documented in both MDM as applicable and the Disposition within this note     Time User Action Codes Description Comment    5/12/2023  2:45 PM Eboni Payton Add [M54 9] Back pain       ED Disposition     ED Disposition   Discharge    Condition   Stable    Date/Time   Fri May 12, 2023  2:45 PM    [address] discharge to home/self care  Follow-up Information    None         Patient's Medications   Discharge Prescriptions    No medications on file       No discharge procedures on file  PDMP Review       Value Time User    PDMP Reviewed  Yes 7/16/2021  8:50 AM Efraín Owens PA-C           ED Provider  Attending physically available and evaluated Enrique Magana  I managed the patient along with the ED Attending      Electronically Signed by    Portions of the record may have been created with voice recognition "software  Occasional wrong word or \"sound a like\" substitutions may have occurred due to the inherent limitations of voice recognition software    Read the chart carefully and recognize, using context, where substitutions have occurred     Gilbert Huynh MD  05/12/23 0494    "

## 2023-05-12 NOTE — DISCHARGE INSTRUCTIONS
Follow-up with the primary care doctor for results of the urine culture  You can take Tylenol as needed for back pain  Return to the emergency department if symptoms worsen or if you have any other concerns

## 2023-05-12 NOTE — ASSESSMENT & PLAN NOTE
Son reports that she is she has been to the hospital she has not slept and she has been more agitated screaming out noting pain in her back and belly  This is likely hospital induced delirium due to change in environment with underlying dementia  Supportive care, delirium precautions  Virtual one-to-one  Zyprexa as needed  Geriatrics consultation consultation

## 2023-05-12 NOTE — ASSESSMENT & PLAN NOTE
Lab Results   Component Value Date    EGFR 35 05/12/2023    EGFR 35 05/05/2023    EGFR 33 01/20/2023    CREATININE 1 32 (H) 05/12/2023    CREATININE 1 30 05/05/2023    CREATININE 1 37 (H) 01/20/2023   Renal function at baseline

## 2023-05-12 NOTE — ED NOTES
Pts bed alarm sounded, RN responded immediately to find pt at end of bed  Amaury belt requested at this time due to pts hx of dementia and falls        Audelia Duverney, ANJELICA  05/12/23 2832

## 2023-05-12 NOTE — ED NOTES
Home care agency, [phone] [facility], [facility], contacted to give update regarding transport status  Placed on hold for prolonged period of time  Will attempt to contact again        Chen Whitlock RN  05/12/23 1954

## 2023-05-12 NOTE — ASSESSMENT & PLAN NOTE
Son reports that patient tolerates regular diet normally  Monitor; aspiration precautions  Awaiting speech eval

## 2023-05-12 NOTE — ASSESSMENT & PLAN NOTE
Area of aging received a complaint against home caregivers  Case management to follow-up for safe discharge planning

## 2023-05-12 NOTE — ED NOTES
Floor made aware of pt's status, virtual 1:1 to be set up on floor        Faustino Eugene RN  05/12/23 2047

## 2023-05-12 NOTE — ED NOTES
Kevin Holland from department of aging at bedside at this time        Jasmyne Clemente RN  05/12/23 3592

## 2023-05-12 NOTE — ED PROVIDER NOTES
Signed out to me by Dr Mardy Romberg  Patient 70-year-old female with a history of dementia with back pain  It was a signed out that patient was discharged  However department of aging was in room spoke with resident stating that there is concern that patient's home health aides are not taking care of her and patient is not safe to go home       Marylen Layman,   05/12/23 1939

## 2023-05-12 NOTE — ASSESSMENT & PLAN NOTE
Elevated blood pressure on admission, patient not tolerating oral meds at this time due to acute encephalopathy  IV labetalol as needed SBP greater than 170

## 2023-05-12 NOTE — ED ATTENDING ATTESTATION
5/12/2023  I, Albertine Boeck, MD, saw and evaluated the patient  I have discussed the patient with the resident/non-physician practitioner and agree with the resident's/non-physician practitioner's findings, Plan of Care, and MDM as documented in the resident's/non-physician practitioner's note, except where noted  All available labs and Radiology studies were reviewed  I was present for key portions of any procedure(s) performed by the resident/non-physician practitioner and I was immediately available to provide assistance  At this point I agree with the current assessment done in the Emergency Department  I have conducted an independent evaluation of this patient a history and physical is as follows:  Patient is a 58-year-old female, history of dementia, comes in with complaints of back pain, described in left flank, patient has had recent falls, multiple imaging tests done recently, has pre-existing bruising over face, according to report, patient is acting at baseline, does complain of left flank pain, limited history due to dementia  On exam, patient is conscious, alert, vital signs noted for hypertension, no acute distress, lungs clear to auscultation, heart sounds regular, abdomen is soft, no anterior tenderness, left CVA tenderness is noted, no midline lumbar spine deformity or tenderness  Differential diagnosis: Back pain, left flank pain, renal colic, pyelonephritis, back injury from recent fall, will check labs, get CTA dissection study with lumbar recon, pain meds  ED Course     ER work-up results reviewed, no significant acute abnormality  Urine was noted for leukocytes, recent culture was negative, cystitis improved on CT  We will send fresh urine culture, we will defer treatment pending culture results as patient has no infectious/sepsis signs or symptoms      Critical Care Time  Procedures

## 2023-05-12 NOTE — ASSESSMENT & PLAN NOTE
Elevated blood pressure on admission, patient not tolerating oral meds at this time due to acute encephalopathy  IV hydralazine as needed SBP greater than 170

## 2023-05-12 NOTE — ED NOTES
Attempted to contact Logan Stoner, son, to inform him status of  time  No answer, unable to leave message          Qi Hale, RN  05/12/23 0555 no

## 2023-05-12 NOTE — PLAN OF CARE
Problem: Potential for Falls  Goal: Patient will remain free of falls  Description: INTERVENTIONS:  - Educate patient/family on patient safety including physical limitations  - Instruct patient to call for assistance with activity   - Consult OT/PT to assist with strengthening/mobility   - Keep Call bell within reach  - Keep bed low and locked with side rails adjusted as appropriate  - Keep care items and personal belongings within reach  - Initiate and maintain comfort rounds  - Make Fall Risk Sign visible to staff  - Offer Toileting every 2 Hours, in advance of need  - Initiate/Maintain bed alarm  - Obtain necessary fall risk management equipment: yellow bracelet  - Apply yellow socks and bracelet for high fall risk patients  - Consider moving patient to room near nurses station  Outcome: Progressing     Problem: SAFETY,RESTRAINT: NV/NON-SELF DESTRUCTIVE BEHAVIOR  Goal: Remains free of harm/injury (restraint for non violent/non self-detsructive behavior)  Description: INTERVENTIONS:  - Instruct patient/family regarding restraint use   - Assess and monitor physiologic and psychological status   - Provide interventions and comfort measures to meet assessed patient needs   - Identify and implement measures to help patient regain control  - Assess readiness for release of restraint   Outcome: Progressing  Goal: Returns to optimal restraint-free functioning  Description: INTERVENTIONS:  - Assess the patient's behavior and symptoms that indicate continued need for restraint  - Identify and implement measures to help patient regain control  - Assess readiness for release of restraint   Outcome: Progressing     Problem: MOBILITY - ADULT  Goal: Maintain or return to baseline ADL function  Description: INTERVENTIONS:  -  Assess patient's ability to carry out ADLs; assess patient's baseline for ADL function and identify physical deficits which impact ability to perform ADLs (bathing, care of mouth/teeth, toileting, grooming, dressing, etc )  - Assess/evaluate cause of self-care deficits   - Assess range of motion  - Assess patient's mobility; develop plan if impaired  - Assess patient's need for assistive devices and provide as appropriate  - Encourage maximum independence but intervene and supervise when necessary  - Involve family in performance of ADLs  - Assess for home care needs following discharge   - Consider OT consult to assist with ADL evaluation and planning for discharge  - Provide patient education as appropriate  Outcome: Progressing  Goal: Maintains/Returns to pre admission functional level  Description: INTERVENTIONS:  - Perform BMAT or MOVE assessment daily    - Set and communicate daily mobility goal to care team and patient/family/caregiver  - Collaborate with rehabilitation services on mobility goals if consulted  - Perform Range of Motion 4 times a day  - Reposition patient every 2 hours    - Dangle patient 3 times a day  - Stand patient 3 times a day  - Ambulate patient 3 times a day  - Out of bed to chair 3 times a day   - Out of bed for meals 3 times a day  - Out of bed for toileting  - Record patient progress and toleration of activity level   Outcome: Progressing

## 2023-05-12 NOTE — ASSESSMENT & PLAN NOTE
Unclear cause  CT lumbar spine, abdomen pelvis unremarkable for acute pathology  Reportedly has chronic pain  May be related to spasm  May be behavioral/functional  Supportive care, conservative measures Tylenol, medication, aqua K

## 2023-05-12 NOTE — ASSESSMENT & PLAN NOTE
Son reports that she is she has been to the hospital she has not slept and she has been more agitated screaming out noting pain in her back and belly  This is likely hospital induced delirium due to change in environment with underlying dementia  Supportive care, delirium precautions, urinary retention protocol  Virtual one-to-one  Zyprexa as needed  Geriatrics consultation  5/13-in discussions with case management, reported by APS that she cannot return to her normal environment as case has been opened against one of her caregivers  However, unknown which caregiver and APS not available on the weekends  Monitor and likely can be discharged Monday once caregiver situation addressed

## 2023-05-13 LAB
ANION GAP SERPL CALCULATED.3IONS-SCNC: 9 MMOL/L (ref 4–13)
BACTERIA UR CULT: NORMAL
BUN SERPL-MCNC: 17 MG/DL (ref 5–25)
CALCIUM SERPL-MCNC: 8.4 MG/DL (ref 8.4–10.2)
CHLORIDE SERPL-SCNC: 105 MMOL/L (ref 96–108)
CO2 SERPL-SCNC: 24 MMOL/L (ref 21–32)
CREAT SERPL-MCNC: 1.13 MG/DL (ref 0.6–1.3)
ERYTHROCYTE [DISTWIDTH] IN BLOOD BY AUTOMATED COUNT: 13.5 % (ref 11.6–15.1)
GFR SERPL CREATININE-BSD FRML MDRD: 42 ML/MIN/1.73SQ M
GLUCOSE SERPL-MCNC: 74 MG/DL (ref 65–140)
HCT VFR BLD AUTO: 37.1 % (ref 34.8–46.1)
HGB BLD-MCNC: 11.9 G/DL (ref 11.5–15.4)
MCH RBC QN AUTO: 29.5 PG (ref 26.8–34.3)
MCHC RBC AUTO-ENTMCNC: 32.1 G/DL (ref 31.4–37.4)
MCV RBC AUTO: 92 FL (ref 82–98)
PLATELET # BLD AUTO: 190 THOUSANDS/UL (ref 149–390)
PMV BLD AUTO: 9.1 FL (ref 8.9–12.7)
POTASSIUM SERPL-SCNC: 4 MMOL/L (ref 3.5–5.3)
RBC # BLD AUTO: 4.04 MILLION/UL (ref 3.81–5.12)
SODIUM SERPL-SCNC: 138 MMOL/L (ref 135–147)
WBC # BLD AUTO: 5.24 THOUSAND/UL (ref 4.31–10.16)

## 2023-05-13 RX ORDER — HYDRALAZINE HYDROCHLORIDE 20 MG/ML
10 INJECTION INTRAMUSCULAR; INTRAVENOUS EVERY 6 HOURS PRN
Status: DISCONTINUED | OUTPATIENT
Start: 2023-05-13 | End: 2023-05-19 | Stop reason: HOSPADM

## 2023-05-13 RX ORDER — AMLODIPINE BESYLATE 5 MG/1
5 TABLET ORAL DAILY
Status: DISCONTINUED | OUTPATIENT
Start: 2023-05-13 | End: 2023-05-13

## 2023-05-13 RX ORDER — WATER 1000 ML/1000ML
INJECTION, SOLUTION INTRAVENOUS
Status: COMPLETED
Start: 2023-05-13 | End: 2023-05-13

## 2023-05-13 RX ORDER — LORAZEPAM 2 MG/ML
0.5 INJECTION INTRAMUSCULAR EVERY 6 HOURS PRN
Status: DISCONTINUED | OUTPATIENT
Start: 2023-05-13 | End: 2023-05-17

## 2023-05-13 RX ADMIN — HEPARIN SODIUM 5000 UNITS: 5000 INJECTION INTRAVENOUS; SUBCUTANEOUS at 15:10

## 2023-05-13 RX ADMIN — LORAZEPAM 0.5 MG: 2 INJECTION INTRAMUSCULAR; INTRAVENOUS at 11:10

## 2023-05-13 RX ADMIN — HEPARIN SODIUM 5000 UNITS: 5000 INJECTION INTRAVENOUS; SUBCUTANEOUS at 22:06

## 2023-05-13 RX ADMIN — HEPARIN SODIUM 5000 UNITS: 5000 INJECTION INTRAVENOUS; SUBCUTANEOUS at 05:02

## 2023-05-13 RX ADMIN — OLANZAPINE 2.5 MG: 10 INJECTION, POWDER, LYOPHILIZED, FOR SOLUTION INTRAMUSCULAR at 15:49

## 2023-05-13 RX ADMIN — WATER: 1 INJECTION INTRAMUSCULAR; INTRAVENOUS; SUBCUTANEOUS at 15:53

## 2023-05-13 RX ADMIN — OLANZAPINE 2.5 MG: 10 INJECTION, POWDER, LYOPHILIZED, FOR SOLUTION INTRAMUSCULAR at 06:08

## 2023-05-13 RX ADMIN — LORAZEPAM 0.5 MG: 2 INJECTION INTRAMUSCULAR; INTRAVENOUS at 20:53

## 2023-05-13 RX ADMIN — SODIUM CHLORIDE, SODIUM GLUCONATE, SODIUM ACETATE, POTASSIUM CHLORIDE, MAGNESIUM CHLORIDE, SODIUM PHOSPHATE, DIBASIC, AND POTASSIUM PHOSPHATE 75 ML/HR: .53; .5; .37; .037; .03; .012; .00082 INJECTION, SOLUTION INTRAVENOUS at 11:12

## 2023-05-13 NOTE — ED CARE HANDOFF
Emergency Department Sign Out Note        Sign out and transfer of care from [facility] Separate Emergency Department note  The patient, Job Choudhury, was evaluated by the previous provider for back pain  ED Course / Workup Pending (followup):    Upon signout, patient was all set to go home  However, while patient was in the process of preparing to go home,  department of aging agent showed up, with a complaint against the home caregivers  Caregivers at the complaint was against were currently on shift, and as such we can't send her home  Son is unable or unwilling to come in and take care of her  Given the lack of safe discharge options, patient will be admitted                                   Procedures  MDM        Disposition  Final diagnoses:   Back pain   At risk for elder abuse     Time reflects when diagnosis was documented in both MDM as applicable and the Disposition within this note     Time User Action Codes Description Comment    5/12/2023  2:45 PM Dormitra Fontaine Add [M54 9] Back pain     5/12/2023  5:28 PM Lisadarnell Brenner Add [Z91 89] At risk for elder abuse       ED Disposition     ED Disposition   Admit    Condition   Stable    Date/Time   Fri May 12, 2023  5:28 PM    Comment   Case was discussed with Dr Sammi Yanez and the patient's admission status was agreed to be Admission Status: observation status to the service of Dr Sammi Yanez             Follow-up Information    None       Current Discharge Medication List      CONTINUE these medications which have NOT CHANGED    Details   diltiazem (CARDIZEM CD) 120 mg 24 hr capsule TAKE 1 CAPSULE BY MOUTH DAILY  Qty: 90 capsule, Refills: 0    Associated Diagnoses: Paroxysmal A-fib (HCC)      DULoxetine (CYMBALTA) 30 mg delayed release capsule Take 30 mg by mouth daily at bedtime      OLANZapine (ZyPREXA) 5 mg tablet Take 1 tablet (5 mg total) by mouth daily at bedtime  Qty: 30 tablet, Refills: 1    Associated Diagnoses: 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HCC)      terazosin (HYTRIN) 2 mg capsule Take 1 capsule (2 mg total) by mouth daily at bedtime  Qty: 90 capsule, Refills: 3    Associated Diagnoses: Acute urinary retention      traZODone (DESYREL) 50 mg tablet Take 50 mg by mouth daily at bedtime      acetaminophen (TYLENOL) 325 mg tablet Take 2 tablets (650 mg total) by mouth every 6 (six) hours as needed for mild pain, moderate pain or headaches  Qty: 30 tablet, Refills: 0    Associated Diagnoses: Closed fracture of right orbital floor with routine healing, subsequent encounter      amiodarone 200 mg tablet TAKE 1 TABLET (200 MG TOTAL) BY MOUTH DAILY  Qty: 90 tablet, Refills: 0    Associated Diagnoses: Paroxysmal A-fib (HCC)      cephalexin (KEFLEX) 250 mg/5 mL suspension Take 10 mL (500 mg total) by mouth every 12 (twelve) hours for 7 days  Qty: 140 mL, Refills: 0    Associated Diagnoses: UTI (urinary tract infection)      LORazepam (ATIVAN) 0 5 mg tablet Take 0 5 mg by mouth       metoprolol succinate (TOPROL-XL) 50 mg 24 hr tablet TAKE 1 TABLET BY MOUTH DAILY  Qty: 90 tablet, Refills: 0    Associated Diagnoses: Paroxysmal A-fib ([address] ); Constipation      Stool Softener 100 MG capsule TAKE 1 CAPSULE BY MOUTH EVERY 12 HOURS FOR CONSTIPATION  Qty: 90 capsule, Refills: 0    Associated Diagnoses: Constipation           No discharge procedures on file         ED Provider  Electronically Signed by     Maria G Garland MD  05/12/23 2039

## 2023-05-13 NOTE — UTILIZATION REVIEW
Initial Clinical Review    Admission: Date/Time/Statement:      OBSERVATION 5/12 1729 CHANGED TO INPATIENT 5/13 1104 DUE TO ACUTE ENCEPHALOPATHY  SAFE DISCHARGE PLANNING        Admission Orders (From admission, onward)     Ordered        05/13/23 1104  Inpatient Admission  Once            05/12/23 1729  Place in Observation  Once                            ED Arrival Information     Expected   -    Arrival   5/12/2023 12:05    Acuity   Urgent            Means of arrival   Ambulance    Escorted by   [facility] ([address])    Service   Hospitalist    Admission type   Emergency            Arrival complaint   Altered Mental status           Chief Complaint   Patient presents with   • Back Pain     Per EMS, they were called for back pain but report pt has dementia and is more altered  Pt at baseline per Zackary England PA-C who had pt week prior for fall  Pt is reporting left sided flank pain along with upper abdominal pain  Pt does arrive with bilateral face ecchymosis and edema from prior fall a week ago  Initial Presentation: 80 y o  female presents to ED from home via EMS due to severe back and abdominal pain  PMH of dementia Evaluated multiple times in ED last week after falls  Facial ecchymosis noted, same as previous ED visits  BP elevated 202/68 , chronically ill appearing, disoriented  agitated, restless, impulsive  Concern   that patient's home health aides are not taking care of her and patient is not safe to go home  Admitted as Observation to med surg  Due to back pain, acute encephalopathy, HTN,  at risk for elder abuse, UTI, Stage  3 CKD, PAF, Plan supportive care and conservative measures for back pain Tylenol, aqua K, One time dose Rocephin, Case management consult for safe DC planning  Delirium precautions, virtual 1:1 monitoring, Zyprexa prn, Consult geriatric medicine, Assess for urinary retention,m Continue home meds, add IV labetalol for SBP > 170          Date: 5/13 CHANGED TO INPATIENT Patient combative  disoriented to person, place and time, Nursing unable to collect am bloodwork  Not tolerating oral meds Wrist , ankle, posey belt restraints required for safety  Exam notes extensive ecchymotic lesions on face, upper extremities  Pt is unable to return to her normal environment due to a case has been opened against one of her caregivers by APS     IV Lorazepam added 5/13 @ 1102 and patient received  0 5 mg IV 5/13 x 2     5/14 DAY 2 INPATIENT   IV Lorazepam 0 5 mg given x 1 for agitation   Disoriented x 4, Virtual 1:1 changed to Continual 1:1 observation for safety   Labs and vitals stable; have increased diltiazem for better blood pressure control  Review of previous imaging negative for acute intracranial pathology (5/7/2023)  No focal neurological deficit noted and patient is very active while in restraints  Low suspicion for current cranial acute pathology; likely all secondary to known dementia  Case management working with patient's son to ensure safe discharge; medically stable for discharge; will need follow-up with APS tomorrow      ED Triage Vitals   Temperature Pulse Respirations Blood Pressure SpO2   05/12/23 1213 05/12/23 1213 05/12/23 1213 05/12/23 1213 05/12/23 1213   98 1 °F (36 7 °C) (!) 53 20 (!) 233/94 98 %      Temp Source Heart Rate Source Patient Position - Orthostatic VS BP Location FiO2 (%)   05/12/23 1213 05/12/23 1213 05/12/23 1213 05/12/23 1213 --   Oral Monitor Lying Right arm       Pain Score       05/12/23 1251       10 - Worst Possible Pain          Wt Readings from Last 1 Encounters:   05/12/23 57 1 kg (125 lb 14 1 oz)     Additional Vital Signs:        Date/Time Temp Pulse Resp BP MAP (mmHg) SpO2 O2 Device Patient Position - Orthostatic VS   05/14/23 0900 -- -- -- -- -- -- None (Room air) --   05/14/23 08:19:29 97 3 °F (36 3 °C) Abnormal  68 19 165/66 99 94 % -- --   05/13/23 22:02:24 -- 61 -- 165/64 98 96 % -- --   05/13/23 20:36:33 98 6 °F (37 °C) 80 20 173/85 Abnormal   114 97 % None (Room air) Lying   BP: patient yelling, combative at 05/13/23 2036   05/13/23 17:20:16 -- 56 -- 161/60 94 95 % -- --   05/13/23 14:40:14 97 7 °F (36 5 °C) 60 20 172/56 Abnormal  95 94 % -- --   05/13/23 07:25:24 97 3 °F (36 3 °C) Abnormal  55 -- 165/55 92 97 % -- --   05/12/23 18:43:43 97 6 °F (36 4 °C) 60 -- 202/68 Abnormal  113 98 % -- --   05/12/23 1745 -- 50 Abnormal  16 194/79 Abnormal  113 99 % None (Room air) Lying   05/12/23 1715 -- 56 16 213/86 Abnormal  124 98 % None (Room air) Lying   05/12/23 1645 -- 56 16 201/82 Abnormal  118 96 % None (Room air) Lying   05/12/23 1545 -- 56 14 216/86 Abnormal  -- -- -- Lying       Pertinent Labs/Diagnostic Test Results:     5/12 EKG    Sinus bradycardia  Voltage criteria for left ventricular hypertrophy  Abnormal ECG    5/12 EKG #2   Sinus bradycardia First degree AV block Sinus arrhythmia  Otherwise normal ECG        CTA dissection protocol chest abdomen pelvis w wo contrast   Final Result by Uri Jack MD (05/12 0461)      No new aortic findings  No acute findings in the chest       Bladder findings in keeping with persistent cystitis improved from 5/5/2023  Workstation performed: [id number]         CT recon only lumbar spine   Final Result by Uri Jack MD (05/12 1477)      No fracture or traumatic subluxation              Workstation performed: [id number]               Results from last 7 days   Lab Units 05/12/23  1239   WBC Thousand/uL 6 44   HEMOGLOBIN g/dL 11 4*   HEMATOCRIT % 35 0   PLATELETS Thousands/uL 228   NEUTROS ABS Thousands/µL 4 61         Results from last 7 days   Lab Units 05/12/23  1239   SODIUM mmol/L 134*   POTASSIUM mmol/L 4 3   CHLORIDE mmol/L 103   CO2 mmol/L 25   ANION GAP mmol/L 6   BUN mg/dL 26*   CREATININE mg/dL 1 32*   EGFR ml/min/1 73sq m 35   CALCIUM mg/dL 8 8     Results from last 7 days   Lab Units 05/12/23  1239   AST U/L 21   ALT U/L 9   ALK PHOS U/L 89   TOTAL PROTEIN g/dL 6 5   ALBUMIN g/dL 4 2   TOTAL BILIRUBIN mg/dL 0 66         Results from last 7 days   Lab Units 05/12/23  1239   GLUCOSE RANDOM mg/dL 96           Results from last 7 days   Lab Units 05/12/23  1447 05/12/23  1239   HS TNI 0HR ng/L  --  10   HS TNI 2HR ng/L 11  --    HSTNI D2 ng/L 1  --            Results from last 7 days   Lab Units 05/12/23  1239   LIPASE u/L 11                 Results from last 7 days   Lab Units 05/12/23  1303   CLARITY UA  Turbid   COLOR UA  Yellow   SPEC GRAV UA  1 019   PH UA  7 5   GLUCOSE UA mg/dl Negative   KETONES UA mg/dl Negative   BLOOD UA  Trace*   PROTEIN UA mg/dl 50 (1+)*   NITRITE UA  Negative   BILIRUBIN UA  Negative   UROBILINOGEN UA (BE) mg/dl 2 0*   LEUKOCYTES UA  Large*   WBC UA /hpf Innumerable*   RBC UA /hpf 4-10*   BACTERIA UA /hpf None Seen   EPITHELIAL CELLS WET PREP /hpf Occasional   MUCUS THREADS  Occasional*       ED Treatment:   Medication Administration from 05/12/2023 1205 to 05/12/2023 1816       Date/Time Order Dose Route Action Action by Comments     05/12/2023 1251 EDT morphine injection 4 mg 4 mg Intravenous Given Wendy Hannah RN --     05/12/2023 1327 EDT iohexol (OMNIPAQUE) 350 MG/ML injection (SINGLE-DOSE) 100 mL 100 mL Intravenous Not Given Samule Rena --     05/12/2023 1331 EDT iodixanol (VISIPAQUE) 320 MG/ML injection 100 mL 100 mL Intravenous Given Samule Rena --     05/12/2023 1754 EDT amiodarone tablet 100 mg 100 mg Oral Not Given Balbina Oliver, RN RN attempted to give pill per caregiver instrcutions  Pt hitting nurse and pinch RN  Unable to give pt oral medications at this time       05/12/2023 1753 EDT diltiazem (CARDIZEM CD) 24 hr capsule 120 mg 120 mg Oral Not Given Balbina Oliver, RN HR 50        Past Medical History:   Diagnosis Date   • Anxiety    • Cognitive impairment    • COVID-19 virus infection 04/13/2020   • Dementia ([facility] [address] )    • Depression    • Hallucination    • Hyperlipidemia    • Hypertension    • Memory loss    • Psychiatric illness    • Psychosis ([facility] )    • Retention of urine    • Schizoaffective disorder ([facility] )    • Sleep difficulties    • Urinary tract infection      Present on Admission:  • Essential hypertension  • Anxiety  • Hyperlipidemia  • Acute encephalopathy  • Paroxysmal A-fib (HCC)  • Stage 3a chronic kidney disease (HCC)  • Oropharyngeal dysphagia      Admitting Diagnosis: Back pain [M54 9]  Altered mental state [R41 82]  At risk for elder abuse [Z91 89]  Age/Sex: 80 y o  female  Admission Orders:  Scheduled Medications:  amiodarone, 100 mg, Oral, Daily With Breakfast  diltiazem, 120 mg, Oral, Daily  heparin (porcine), 5,000 Units, Subcutaneous, Q8H [facility]  lidocaine, 1 patch, Topical, Daily  metoprolol succinate, 50 mg, Oral, Daily  OLANZapine, 5 mg, Oral, HS  terazosin, 2 mg, Oral, HS  traZODone, 50 mg, Oral, HS      Continuous IV Infusions:  multi-electrolyte, 75 mL/hr, Intravenous, Continuous      PRN Meds:  acetaminophen, 650 mg, Oral, Q6H PRN  aluminum-magnesium hydroxide-simethicone, 30 mL, Oral, Q6H PRN  labetalol, 10 mg, Intravenous, Q4H PRN  LORazepam, 0 5 mg, Oral, Q8H PRN  OLANZapine, 2 5 mg, Intramuscular, Q8H PRN   X 1 5/12, x 2 5/13, x 1 5/14   ondansetron, 4 mg, Intravenous, Q6H PRN      Virtual observation for safety     CBC, CMP, MG, Phos    IP CONSULT TO CASE MANAGEMENT    Network Utilization Review Department  ATTENTION: Please call with any questions or concerns to [phone] and carefully listen to the prompts so that you are directed to the right person  All voicemails are confidential   Evette Ogden all requests for admission clinical reviews, approved or denied determinations and any other requests to dedicated fax number below belonging to the campus where the patient is receiving treatment   List of dedicated fax numbers for the Facilities:  FACILITY NAME CLEMENTE Begum 25 DENIALS (Administrative/Medical Necessity) [phone] [address] (Maternity/NICU/Pediatrics) [address] [facility] [address] [place] [address] [phone]   [address] [place] [address] [facility] 310 [facility] [address] [phone]

## 2023-05-13 NOTE — QUICK NOTE
Nursing reported patient requiring bilateral ankle and wrist restraints as well as belt restraint  Posey bed ordered

## 2023-05-13 NOTE — CASE MANAGEMENT
Case Management Progress Note    Patient name Madai Wilkerson  Location [facility] 2 /[facility] 2 Bella Good* MRN [id number]  : 1931 Date 2023       LOS (days): 0  Geometric Mean LOS (GMLOS) (days):   Days to [facility]:        OBJECTIVE:        Current admission status: Inpatient  Preferred Pharmacy:   [address]  [address] [facility] [address]  Phone: [phone] Fax: [address] [place], [place] - [address] ,  [address] ,  [address] [facility]  Phone: [phone] Fax: [phone]    [address] [facility], [address] [place] [address]  Phone: [phone] Fax: [phone]    Primary Care Provider: Inder Daniel MD    Primary Insurance: Golda Punt MEDICARE [facility] REP  Secondary Insurance: [address] [place] [address] NOTE:      CM contacted [facility] [employer] to discuss potentially changing pt's overnight caregiver  Pt has three different caregivers that provide care throughout the week  Pt's caregivers are: Shen Milian, and [facility] [employer] would need to know which caregiver would need to be removed but pt unable to provide name  CM will have to [facility] with [facility] [place] [address] on Monday

## 2023-05-13 NOTE — PROGRESS NOTES
[facility]  Progress Note  Name: Valentine Eduardo  MRN: [id number]  Unit/Bed#: [facility] 68 2 -01 I Date of Admission: 5/12/2023   Date of Service: 5/13/2023 I Hospital Day: 0    Assessment/Plan   At risk for elder abuse  Assessment & Plan  Area of aging received a complaint against home caregivers  Case management to follow-up for safe discharge planning    UTI (urinary tract infection)  Assessment & Plan  Recent UTI plan to complete Keflex course  One-time dose of Rocephin to complete course    Oropharyngeal dysphagia  Assessment & Plan  Son reports that patient tolerates regular diet normally  Monitor; aspiration precautions  Awaiting speech eval    Stage 3a chronic kidney disease ([facility] [address] )  Assessment & Plan  Lab Results   Component Value Date    EGFR 35 05/12/2023    EGFR 35 05/05/2023    EGFR 33 01/20/2023    CREATININE 1 32 (H) 05/12/2023    CREATININE 1 30 05/05/2023    CREATININE 1 37 (H) 01/20/2023   Renal function at baseline    Paroxysmal A-fib (HCC)  Assessment & Plan  Continue amiodarone 100 mg daily, metoprolol 50 mg daily, Cardizem 120 mg daily    Acute encephalopathy  Assessment & Plan  Son reports that she is she has been to the hospital she has not slept and she has been more agitated screaming out noting pain in her back and belly  This is likely hospital induced delirium due to change in environment with underlying dementia  Supportive care, delirium precautions, urinary retention protocol  Virtual one-to-one  Zyprexa as needed  Geriatrics consultation  5/13-in discussions with case management, reported by APS that she cannot return to her normal environment as case has been opened against one of her caregivers  However, unknown which caregiver and APS not available on the weekends  Monitor and likely can be discharged Monday once caregiver situation addressed      Essential hypertension  Assessment & Plan  Elevated blood pressure on admission, patient not tolerating oral meds at this time due to acute encephalopathy  IV hydralazine as needed SBP greater than 170    * Back pain  Assessment & Plan  Unclear cause  CT lumbar spine, abdomen pelvis unremarkable for acute pathology  Reportedly has chronic pain  May be related to spasm  May be behavioral/functional  Supportive care, conservative measures Tylenol, medication, aqua K           VTE Pharmacologic Prophylaxis:   Pharmacologic: Heparin  Mechanical VTE Prophylaxis in Place: Yes    Patient Centered Rounds: I have performed bedside rounds with nursing staff today  Discussions with Specialists or Other Care Team Provider:     Education and Discussions with Family / Patient: Discussed treatment plan with family and patient who agree with current plan; encouraged to ask questions and participate  Time Spent for Care: 30 minutes  More than 50% of total time spent on counseling and coordination of care as described above  Current Length of Stay: 0 day(s)    Current Patient Status: Inpatient   Certification Statement: The patient will continue to require additional inpatient hospital stay due to Awaiting placement    Discharge Plan: To be determined    Code Status: Level 3 - DNAR and DNI      Subjective:   Patient seen and examined bedside, found resting comfortably and offers no complaints at this time  Objective:     Vitals:   Temp (24hrs), Av 5 °F (36 4 °C), Min:97 3 °F (36 3 °C), Max:97 7 °F (36 5 °C)    Temp:  [97 3 °F (36 3 °C)-97 7 °F (36 5 °C)] 97 7 °F (36 5 °C)  HR:  [50-60] 60  Resp:  [16-20] 20  BP: (165-213)/(55-86) 172/56  SpO2:  [94 %-99 %] 94 %  Body mass index is 26 31 kg/m²  Input and Output Summary (last 24 hours):     No intake or output data in the 24 hours ending 23 1703    Physical Exam:     Physical Exam  Vitals and nursing note reviewed  Constitutional:       General: She is not in acute distress  Appearance: She is well-developed  HENT:      Head: Normocephalic and atraumatic  Eyes:      Conjunctiva/sclera: Conjunctivae normal    Cardiovascular:      Rate and Rhythm: Normal rate  Heart sounds: No murmur heard  Pulmonary:      Effort: Pulmonary effort is normal  No respiratory distress  Abdominal:      Palpations: Abdomen is soft  Tenderness: There is no abdominal tenderness  Musculoskeletal:         General: No swelling  Cervical back: Neck supple  Skin:     General: Skin is warm and dry  Comments: Extensive ecchymotic lesions on forehead and face; also on upper extremities   Neurological:      Mental Status: She is alert  Comments: Alert and oriented x 0   Psychiatric:         Mood and Affect: Mood normal            Additional Data:     Labs:    Results from last 7 days   Lab Units 05/13/23  1150 05/12/23  1239   WBC Thousand/uL 5 24 6 44   HEMOGLOBIN g/dL 11 9 11 4*   HEMATOCRIT % 37 1 35 0   PLATELETS Thousands/uL 190 228   NEUTROS PCT %  --  72   LYMPHS PCT %  --  19   MONOS PCT %  --  8   EOS PCT %  --  1     Results from last 7 days   Lab Units 05/13/23  1150 05/12/23  1239   SODIUM mmol/L 138 134*   POTASSIUM mmol/L 4 0 4 3   CHLORIDE mmol/L 105 103   CO2 mmol/L 24 25   BUN mg/dL 17 26*   CREATININE mg/dL 1 13 1 32*   ANION GAP mmol/L 9 6   CALCIUM mg/dL 8 4 8 8   ALBUMIN g/dL  --  4 2   TOTAL BILIRUBIN mg/dL  --  0 66   ALK PHOS U/L  --  89   ALT U/L  --  9   AST U/L  --  21   GLUCOSE RANDOM mg/dL 74 96                           * I Have Reviewed All Lab Data Listed Above  * Additional Pertinent Lab Tests Reviewed:  All Labs Within Last 24 Hours Reviewed    Imaging:    Imaging Reports Reviewed Today Include: CT head, CT facial bones, CT chest abdomen pelvis  Imaging Personally Reviewed by Myself Includes:      Recent Cultures (last 7 days):     Results from last 7 days   Lab Units 05/12/23  1303   URINE CULTURE  No Growth <1000 cfu/mL       Last 24 Hours Medication List:   Current Facility-Administered Medications   Medication Dose Route Frequency Provider Last Rate   • acetaminophen  650 mg Oral Q6H PRN Perkins Billow, DO     • aluminum-magnesium hydroxide-simethicone  30 mL Oral Q6H PRN Perkins Billow, DO     • amiodarone  100 mg Oral Daily With Breakfast Perkins Billow, DO     • diltiazem  120 mg Oral Daily Perkins Billow, DO     • heparin (porcine)  5,000 Units Subcutaneous [facility] Perkins Billow, DO     • hydrALAZINE  10 mg Intravenous Q6H PRN Jerman Guevara MD     • lidocaine  1 patch Topical Daily Perkins Billow, DO     • LORazepam  0 5 mg Intravenous Q6H PRN Jerman Guevara MD     • metoprolol succinate  50 mg Oral Daily Perkins Billow, DO     • multi-electrolyte  75 mL/hr Intravenous Continuous Perkins Billow, DO 75 mL/hr (05/13/23 1112)   • OLANZapine  2 5 mg Intramuscular Q8H PRN Perkins Billow, DO     • OLANZapine  5 mg Oral HS Perkins Billow, DO     • ondansetron  4 mg Intravenous Q6H PRN Perkins Billow, DO     • terazosin  2 mg Oral HS Perkins Billow, DO     • traZODone  50 mg Oral HS Perkins Billow, DO          Today, Patient Was Seen By: Salud Damico MD    ** Please Note: Dictation voice to text software may have been used in the creation of this document   **

## 2023-05-13 NOTE — PLAN OF CARE
Problem: Potential for Falls  Goal: Patient will remain free of falls  Description: INTERVENTIONS:  - Educate patient/family on patient safety including physical limitations  - Instruct patient to call for assistance with activity   - Consult OT/PT to assist with strengthening/mobility   - Keep Call bell within reach  - Keep bed low and locked with side rails adjusted as appropriate  - Keep care items and personal belongings within reach  - Initiate and maintain comfort rounds  - Make Fall Risk Sign visible to staff  - Offer Toileting every 2 Hours, in advance of need  - Initiate/Maintain bed alarm  - Obtain necessary fall risk management equipment: yellow bracelet  - Apply yellow socks and bracelet for high fall risk patients  - Consider moving patient to room near nurses station  5/13/2023 0917 by Vivienne Trevino RN  Outcome: Progressing  5/12/2023 1928 by Vivienne Trevino RN  Outcome: Progressing     Problem: SAFETY,RESTRAINT: NV/NON-SELF DESTRUCTIVE BEHAVIOR  Goal: Remains free of harm/injury (restraint for non violent/non self-detsructive behavior)  Description: INTERVENTIONS:  - Instruct patient/family regarding restraint use   - Assess and monitor physiologic and psychological status   - Provide interventions and comfort measures to meet assessed patient needs   - Identify and implement measures to help patient regain control  - Assess readiness for release of restraint   5/13/2023 0917 by Vivienne Trevino RN  Outcome: Progressing  5/12/2023 1928 by Vivienne Trevino RN  Outcome: Progressing  Goal: Returns to optimal restraint-free functioning  Description: INTERVENTIONS:  - Assess the patient's behavior and symptoms that indicate continued need for restraint  - Identify and implement measures to help patient regain control  - Assess readiness for release of restraint   5/13/2023 0917 by Vivienne Trevino RN  Outcome: Progressing  5/12/2023 1928 by Vivienne Trevino RN  Outcome: Progressing     Problem: MOBILITY - ADULT  Goal: Maintain or return to baseline ADL function  Description: INTERVENTIONS:  -  Assess patient's ability to carry out ADLs; assess patient's baseline for ADL function and identify physical deficits which impact ability to perform ADLs (bathing, care of mouth/teeth, toileting, grooming, dressing, etc )  - Assess/evaluate cause of self-care deficits   - Assess range of motion  - Assess patient's mobility; develop plan if impaired  - Assess patient's need for assistive devices and provide as appropriate  - Encourage maximum independence but intervene and supervise when necessary  - Involve family in performance of ADLs  - Assess for home care needs following discharge   - Consider OT consult to assist with ADL evaluation and planning for discharge  - Provide patient education as appropriate  5/13/2023 0917 by Donte Sr RN  Outcome: Progressing  5/12/2023 1928 by Donte Sr RN  Outcome: Progressing  Goal: Maintains/Returns to pre admission functional level  Description: INTERVENTIONS:  - Perform BMAT or MOVE assessment daily    - Set and communicate daily mobility goal to care team and patient/family/caregiver  - Collaborate with rehabilitation services on mobility goals if consulted  - Perform Range of Motion 4 times a day  - Reposition patient every 2 hours    - Dangle patient 3 times a day  - Stand patient 3 times a day  - Ambulate patient 3 times a day  - Out of bed to chair 3 times a day   - Out of bed for meals 3 times a day  - Out of bed for toileting  - Record patient progress and toleration of activity level   5/13/2023 0917 by Donte Sr RN  Outcome: Progressing  5/12/2023 1928 by Donte Sr RN  Outcome: Progressing

## 2023-05-14 LAB
ALBUMIN SERPL BCP-MCNC: 3.8 G/DL (ref 3.5–5)
ALP SERPL-CCNC: 128 U/L (ref 34–104)
ALT SERPL W P-5'-P-CCNC: 16 U/L (ref 7–52)
ANION GAP SERPL CALCULATED.3IONS-SCNC: 9 MMOL/L (ref 4–13)
AST SERPL W P-5'-P-CCNC: 25 U/L (ref 13–39)
BASOPHILS # BLD AUTO: 0.01 THOUSANDS/ÂΜL (ref 0–0.1)
BASOPHILS NFR BLD AUTO: 0 % (ref 0–1)
BILIRUB SERPL-MCNC: 0.57 MG/DL (ref 0.2–1)
BUN SERPL-MCNC: 19 MG/DL (ref 5–25)
CALCIUM SERPL-MCNC: 8.6 MG/DL (ref 8.4–10.2)
CHLORIDE SERPL-SCNC: 104 MMOL/L (ref 96–108)
CO2 SERPL-SCNC: 27 MMOL/L (ref 21–32)
CREAT SERPL-MCNC: 1.3 MG/DL (ref 0.6–1.3)
EOSINOPHIL # BLD AUTO: 0.16 THOUSAND/ÂΜL (ref 0–0.61)
EOSINOPHIL NFR BLD AUTO: 3 % (ref 0–6)
ERYTHROCYTE [DISTWIDTH] IN BLOOD BY AUTOMATED COUNT: 13.6 % (ref 11.6–15.1)
GFR SERPL CREATININE-BSD FRML MDRD: 35 ML/MIN/1.73SQ M
GLUCOSE SERPL-MCNC: 81 MG/DL (ref 65–140)
HCT VFR BLD AUTO: 36.5 % (ref 34.8–46.1)
HGB BLD-MCNC: 12 G/DL (ref 11.5–15.4)
IMM GRANULOCYTES # BLD AUTO: 0.02 THOUSAND/UL (ref 0–0.2)
IMM GRANULOCYTES NFR BLD AUTO: 0 % (ref 0–2)
LYMPHOCYTES # BLD AUTO: 1.39 THOUSANDS/ÂΜL (ref 0.6–4.47)
LYMPHOCYTES NFR BLD AUTO: 27 % (ref 14–44)
MAGNESIUM SERPL-MCNC: 2.7 MG/DL (ref 1.9–2.7)
MCH RBC QN AUTO: 29.6 PG (ref 26.8–34.3)
MCHC RBC AUTO-ENTMCNC: 32.9 G/DL (ref 31.4–37.4)
MCV RBC AUTO: 90 FL (ref 82–98)
MONOCYTES # BLD AUTO: 0.57 THOUSAND/ÂΜL (ref 0.17–1.22)
MONOCYTES NFR BLD AUTO: 11 % (ref 4–12)
NEUTROPHILS # BLD AUTO: 2.92 THOUSANDS/ÂΜL (ref 1.85–7.62)
NEUTS SEG NFR BLD AUTO: 59 % (ref 43–75)
NRBC BLD AUTO-RTO: 0 /100 WBCS
PHOSPHATE SERPL-MCNC: 2.9 MG/DL (ref 2.3–4.1)
PLATELET # BLD AUTO: 212 THOUSANDS/UL (ref 149–390)
PMV BLD AUTO: 11.6 FL (ref 8.9–12.7)
POTASSIUM SERPL-SCNC: 3.9 MMOL/L (ref 3.5–5.3)
PROT SERPL-MCNC: 6.2 G/DL (ref 6.4–8.4)
RBC # BLD AUTO: 4.05 MILLION/UL (ref 3.81–5.12)
SODIUM SERPL-SCNC: 140 MMOL/L (ref 135–147)
WBC # BLD AUTO: 5.07 THOUSAND/UL (ref 4.31–10.16)

## 2023-05-14 RX ORDER — DILTIAZEM HYDROCHLORIDE 180 MG/1
180 CAPSULE, COATED, EXTENDED RELEASE ORAL DAILY
Status: DISCONTINUED | OUTPATIENT
Start: 2023-05-15 | End: 2023-05-19 | Stop reason: HOSPADM

## 2023-05-14 RX ADMIN — TERAZOSIN HYDROCHLORIDE 2 MG: 1 CAPSULE ORAL at 21:48

## 2023-05-14 RX ADMIN — LORAZEPAM 0.5 MG: 2 INJECTION INTRAMUSCULAR; INTRAVENOUS at 21:56

## 2023-05-14 RX ADMIN — OLANZAPINE 2.5 MG: 10 INJECTION, POWDER, LYOPHILIZED, FOR SOLUTION INTRAMUSCULAR at 15:14

## 2023-05-14 RX ADMIN — HEPARIN SODIUM 5000 UNITS: 5000 INJECTION INTRAVENOUS; SUBCUTANEOUS at 06:16

## 2023-05-14 RX ADMIN — LIDOCAINE 1 PATCH: 700 PATCH TOPICAL at 08:31

## 2023-05-14 RX ADMIN — DILTIAZEM HYDROCHLORIDE 120 MG: 120 CAPSULE, COATED, EXTENDED RELEASE ORAL at 08:37

## 2023-05-14 RX ADMIN — OLANZAPINE 2.5 MG: 10 INJECTION, POWDER, LYOPHILIZED, FOR SOLUTION INTRAMUSCULAR at 00:50

## 2023-05-14 RX ADMIN — HEPARIN SODIUM 5000 UNITS: 5000 INJECTION INTRAVENOUS; SUBCUTANEOUS at 13:36

## 2023-05-14 RX ADMIN — METOPROLOL SUCCINATE 50 MG: 50 TABLET, EXTENDED RELEASE ORAL at 08:37

## 2023-05-14 RX ADMIN — TRAZODONE HYDROCHLORIDE 50 MG: 50 TABLET ORAL at 21:48

## 2023-05-14 RX ADMIN — HEPARIN SODIUM 5000 UNITS: 5000 INJECTION INTRAVENOUS; SUBCUTANEOUS at 21:48

## 2023-05-14 RX ADMIN — SODIUM CHLORIDE, SODIUM GLUCONATE, SODIUM ACETATE, POTASSIUM CHLORIDE, MAGNESIUM CHLORIDE, SODIUM PHOSPHATE, DIBASIC, AND POTASSIUM PHOSPHATE 75 ML/HR: .53; .5; .37; .037; .03; .012; .00082 INJECTION, SOLUTION INTRAVENOUS at 00:50

## 2023-05-14 RX ADMIN — LORAZEPAM 0.5 MG: 2 INJECTION INTRAMUSCULAR; INTRAVENOUS at 14:12

## 2023-05-14 RX ADMIN — OLANZAPINE 5 MG: 5 TABLET, FILM COATED ORAL at 21:48

## 2023-05-14 RX ADMIN — AMIODARONE HYDROCHLORIDE 100 MG: 100 TABLET ORAL at 08:37

## 2023-05-14 RX ADMIN — LORAZEPAM 0.5 MG: 2 INJECTION INTRAMUSCULAR; INTRAVENOUS at 03:42

## 2023-05-14 NOTE — ASSESSMENT & PLAN NOTE
Elevated blood pressure on admission, patient not tolerating oral meds at this time due to acute encephalopathy  IV hydralazine as needed SBP greater than 170  5/14-overall, p o  tolerance has improved; I have increased diltiazem to 180 mg daily for better blood pressure control
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
## 2023-05-14 NOTE — NURSING NOTE
Visited by son at 0025 D[address]   Very confused  Seen by SPEECH today  Better oral intake  IV fluid discontinued by SLIM  Assisted oral feeds  Very low safety awareness    Disoriented x 4

## 2023-05-14 NOTE — ASSESSMENT & PLAN NOTE
Son reports that patient tolerates regular diet normally  Monitor; aspiration precautions  Appreciate speech eval-level 3 with thins
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
## 2023-05-14 NOTE — PLAN OF CARE
Problem: SAFETY,RESTRAINT: NV/NON-SELF DESTRUCTIVE BEHAVIOR  Goal: Remains free of harm/injury (restraint for non violent/non self-detsructive behavior)  Description: INTERVENTIONS:  - Instruct patient/family regarding restraint use   - Assess and monitor physiologic and psychological status   - Provide interventions and comfort measures to meet assessed patient needs   - Identify and implement measures to help patient regain control  - Assess readiness for release of restraint   Outcome: Progressing  Goal: Returns to optimal restraint-free functioning  Description: INTERVENTIONS:  - Assess the patient's behavior and symptoms that indicate continued need for restraint  - Identify and implement measures to help patient regain control  - Assess readiness for release of restraint   Outcome: Progressing     Problem: Nutrition/Hydration-ADULT  Goal: Nutrient/Hydration intake appropriate for improving, restoring or maintaining nutritional needs  Description: Monitor and assess patient's nutrition/hydration status for malnutrition  Collaborate with interdisciplinary team and initiate plan and interventions as ordered  Monitor patient's weight and dietary intake as ordered or per policy  Utilize nutrition screening tool and intervene as necessary  Determine patient's food preferences and provide high-protein, high-caloric foods as appropriate       INTERVENTIONS:  - Monitor oral intake, urinary output, labs, and treatment plans  - Assess nutrition and hydration status and recommend course of action  - Evaluate amount of meals eaten  - Assist patient with eating if necessary   - Allow adequate time for meals  - Recommend/ encourage appropriate diets, oral nutritional supplements, and vitamin/mineral supplements  - Order, calculate, and assess calorie counts as needed  - Recommend, monitor, and adjust tube feedings and TPN/PPN based on assessed needs  - Assess need for intravenous fluids  - Provide specific nutrition/hydration education as appropriate  - Include patient/family/caregiver in decisions related to nutrition  Outcome: Progressing     Problem: METABOLIC, FLUID AND ELECTROLYTES - ADULT  Goal: Electrolytes maintained within normal limits  Description: INTERVENTIONS:  - Monitor labs and assess patient for signs and symptoms of electrolyte imbalances  - Administer electrolyte replacement as ordered  - Monitor response to electrolyte replacements, including repeat lab results as appropriate  - Instruct patient on fluid and nutrition as appropriate  Outcome: Progressing     Problem: SKIN/TISSUE INTEGRITY - ADULT  Goal: Pressure injury heals and does not worsen  Description: Interventions:  - Implement low air loss mattress or specialty surface (Criteria met)  - Apply silicone foam dressing  - Instruct/assist with weight shifting every 120 minutes when in chair   - Limit chair time to 1 hour intervals  - Use special pressure reducing interventions such as waffle cushion when in chair   - Apply fecal or urinary incontinence containment device   - Perform passive or active ROM every 2 hours  - Turn and reposition patient & offload bony prominences every 2 hours   - Utilize friction reducing device or surface for transfers   - Consider consults to  interdisciplinary teams such as PT  - Use incontinent care products after each incontinent episode such as inco pad  - Consider nutrition services referral as needed  Outcome: Progressing     Problem: DISCHARGE PLANNING  Goal: Discharge to home or other facility with appropriate resources  Description: INTERVENTIONS:  - Identify barriers to discharge w/patient and caregiver  - Arrange for needed discharge resources and transportation as appropriate  - Identify discharge learning needs (meds, wound care, etc )  - Arrange for interpretive services to assist at discharge as needed  - Refer to Case Management Department for coordinating discharge planning if the patient needs post-hospital services based on physician/advanced practitioner order or complex needs related to functional status, cognitive ability, or social support system  Outcome: Progressing     Problem: METABOLIC, FLUID AND ELECTROLYTES - ADULT  Goal: Fluid balance maintained  Description: INTERVENTIONS:  - Monitor labs   - Monitor I/O and WT  - Instruct patient on fluid and nutrition as appropriate  - Assess for signs & symptoms of volume excess or deficit  Outcome: Progressing

## 2023-05-14 NOTE — ASSESSMENT & PLAN NOTE
Recent UTI plan to complete Keflex course  One-time dose of Rocephin to complete course on admission  Monitor off antibiotics at this time

## 2023-05-14 NOTE — ASSESSMENT & PLAN NOTE
Lab Results   Component Value Date    EGFR 35 05/14/2023    EGFR 42 05/13/2023    EGFR 35 05/12/2023    CREATININE 1 30 05/14/2023    CREATININE 1 13 05/13/2023    CREATININE 1 32 (H) 05/12/2023   Renal function at baseline

## 2023-05-15 RX ADMIN — AMIODARONE HYDROCHLORIDE 100 MG: 100 TABLET ORAL at 09:15

## 2023-05-15 RX ADMIN — HEPARIN SODIUM 5000 UNITS: 5000 INJECTION INTRAVENOUS; SUBCUTANEOUS at 22:52

## 2023-05-15 RX ADMIN — TRAZODONE HYDROCHLORIDE 50 MG: 50 TABLET ORAL at 22:52

## 2023-05-15 RX ADMIN — HEPARIN SODIUM 5000 UNITS: 5000 INJECTION INTRAVENOUS; SUBCUTANEOUS at 05:21

## 2023-05-15 RX ADMIN — LORAZEPAM 0.5 MG: 2 INJECTION INTRAMUSCULAR; INTRAVENOUS at 22:52

## 2023-05-15 RX ADMIN — HEPARIN SODIUM 5000 UNITS: 5000 INJECTION INTRAVENOUS; SUBCUTANEOUS at 14:41

## 2023-05-15 RX ADMIN — TERAZOSIN HYDROCHLORIDE 2 MG: 1 CAPSULE ORAL at 22:53

## 2023-05-15 RX ADMIN — OLANZAPINE 5 MG: 5 TABLET, FILM COATED ORAL at 22:52

## 2023-05-15 RX ADMIN — DILTIAZEM HYDROCHLORIDE 180 MG: 180 CAPSULE, COATED, EXTENDED RELEASE ORAL at 09:15

## 2023-05-15 RX ADMIN — LIDOCAINE 1 PATCH: 700 PATCH TOPICAL at 09:15

## 2023-05-15 NOTE — SPEECH THERAPY NOTE
Speech Language/Pathology  Attempted to see pt at lunch  Lethargic  Unableto alert for PO  Has 1:1 and is in wrist restraints  Room was dark  Turned the lights on and recommended to keep them on during the day  Suspect pt may be up during the night  Will f/u as able

## 2023-05-15 NOTE — CASE MANAGEMENT
Case Management Discharge Planning Note    Patient name Mark Gordon  Location [place] [address] 2 /[address] 2 Thalia Prime* MRN [id number]  : 1931 Date 5/15/2023       Current Admission Date: 2023  Current Admission Diagnosis:Back pain   Patient Active Problem List    Diagnosis Date Noted   • Back pain 2023   • At risk for elder abuse 2023   • Acute urinary retention 2022   • UTI (urinary tract infection) 2022   • Edema of right ankle 10/04/2022   • Flank pain 10/18/2021   • Oropharyngeal dysphagia 2021   • ABLA (acute blood loss anemia) 2021   • Closed right hip fracture ([facility] ) 07/15/2021   • Stage 3a chronic kidney disease ([facility] ) 07/15/2021   • Dementia ([facility] )    • Closed fracture of right orbital floor ([facility] ) 2020   • Fall from ground level 2020   • Age-related osteoporosis without current pathological fracture 2020   • Moderate protein-calorie malnutrition ([facility] ) 2020   • 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   • Major neurocognitive disorder ([facility] ) 2020   • PAF (paroxysmal atrial fibrillation) ([facility])    • Elevated troponin 2020   • Urinary incontinence 2020   • Ambulatory dysfunction 04/10/2020   • OMEGA (acute kidney injury) ([facility] ) 04/10/2020   • 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   • 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   • Paroxysmal A-fib ([address] ) 2019   • Cellulitis of extremity 12/10/2018   • Bilateral hearing loss 10/11/2018   • Fall 10/11/2018   • 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[address] ) 10/11/2018   • Essential hypertension 2018   • Anxiety 2018   • Hyperlipidemia 2018   • Hyponatremia 2018   • Acute encephalopathy 2018      LOS (days): 2  Geometric Mean LOS (GMLOS) (days):   Days to GMLOS:     OBJECTIVE:  Risk of Unplanned Readmission Score: 19 81         Current admission status: Inpatient   Preferred Pharmacy:   [address] [place] [address]  [address] [address]  Phone: [phone] Fax: [address] [place], [place] - [address] ,  [address] ,  [address] [facility] [address]  Phone: [phone] Fax: [phone] - [place], [place] - [address] [facility] [address] [place] [address] Mobile  [address] [address]  Phone: [phone] Fax: [phone]    Primary Care Provider: Denzel Fletcher MD    Primary Insurance: [employer] MEDICARE [facility] REP  Secondary Insurance: [address] [place] [address] DETAILS:                           Contacts  Patient Contacts: Amaryllis Fret - son  Relationship to Patient[de-identified] Family  Contact Method: Phone  Phone Number: [phone] (Voicemail left for return call so to complete assessment as well as discuss APS situation with HHA's- await call back)                                                                     Additional Comments: CM Call to [address] with VM left for [facility] not available this day)- informed of pt being medically cleared and need to obtain alternate HHA through [facility] [phone] however need to know as to which current HHA is being investigated- awaiting call back

## 2023-05-15 NOTE — ASSESSMENT & PLAN NOTE
Son reports that she is she has been to the hospital she has not slept and she has been more agitated screaming out noting pain in her back and belly  This is likely hospital induced delirium due to change in environment with underlying dementia  Supportive care, delirium precautions, urinary retention protocol  Virtual one-to-one  Zyprexa as needed  Geriatrics consultation  5/13-in discussions with case management, reported by APS that she cannot return to her normal environment as case has been opened against one of her caregivers  However, unknown which caregiver and APS not available on the weekends  Monitor and likely can be discharged Monday once caregiver situation addressed      Discussed with case management-trying to arrange home health care with another agency or she may need nursing home placement-Case management is working on discharge planning

## 2023-05-15 NOTE — PLAN OF CARE
Problem: Potential for Falls  Goal: Patient will remain free of falls  Description: INTERVENTIONS:  - Educate patient/family on patient safety including physical limitations  - Instruct patient to call for assistance with activity   - Consult OT/PT to assist with strengthening/mobility   - Keep Call bell within reach  - Keep bed low and locked with side rails adjusted as appropriate  - Keep care items and personal belongings within reach  - Initiate and maintain comfort rounds  - Make Fall Risk Sign visible to staff  - Offer Toileting every 2 Hours, in advance of need  - Initiate/Maintain bed alarm  - Apply yellow socks and bracelet for high fall risk patients  - Consider moving patient to room near nurses station  Outcome: Progressing     Problem: SAFETY,RESTRAINT: NV/NON-SELF DESTRUCTIVE BEHAVIOR  Goal: Remains free of harm/injury (restraint for non violent/non self-detsructive behavior)  Description: INTERVENTIONS:  - Instruct patient/family regarding restraint use   - Assess and monitor physiologic and psychological status   - Provide interventions and comfort measures to meet assessed patient needs   - Identify and implement measures to help patient regain control  - Assess readiness for release of restraint   Outcome: Progressing  Goal: Returns to optimal restraint-free functioning  Description: INTERVENTIONS:  - Assess the patient's behavior and symptoms that indicate continued need for restraint  - Identify and implement measures to help patient regain control  - Assess readiness for release of restraint   Outcome: Progressing     Problem: MOBILITY - ADULT  Goal: Maintain or return to baseline ADL function  Description: INTERVENTIONS:  -  Assess patient's ability to carry out ADLs; assess patient's baseline for ADL function and identify physical deficits which impact ability to perform ADLs (bathing, care of mouth/teeth, toileting, grooming, dressing, etc )  - Assess/evaluate cause of self-care deficits - Assess range of motion  - Assess patient's mobility; develop plan if impaired  - Assess patient's need for assistive devices and provide as appropriate  - Encourage maximum independence but intervene and supervise when necessary  - Involve family in performance of ADLs  - Assess for home care needs following discharge   - Consider OT consult to assist with ADL evaluation and planning for discharge  - Provide patient education as appropriate  Outcome: Progressing  Goal: Maintains/Returns to pre admission functional level  Description: INTERVENTIONS:  - Perform BMAT or MOVE assessment daily    - Set and communicate daily mobility goal to care team and patient/family/caregiver  - Collaborate with rehabilitation services on mobility goals if consulted  - Perform Range of Motion 3 times a day  - Reposition patient every 2 hours    - Dangle patient 3 times a day  - Out of bed for toileting  - Record patient progress and toleration of activity level   Outcome: Progressing     Problem: PAIN - ADULT  Goal: Verbalizes/displays adequate comfort level or baseline comfort level  Description: Interventions:  - Encourage patient to monitor pain and request assistance  - Assess pain using appropriate pain scale  - Administer analgesics based on type and severity of pain and evaluate response  - Implement non-pharmacological measures as appropriate and evaluate response  - Consider cultural and social influences on pain and pain management  - Notify physician/advanced practitioner if interventions unsuccessful or patient reports new pain  Outcome: Progressing     Problem: DISCHARGE PLANNING  Goal: Discharge to home or other facility with appropriate resources  Description: INTERVENTIONS:  - Identify barriers to discharge w/patient and caregiver  - Arrange for needed discharge resources and transportation as appropriate  - Identify discharge learning needs (meds, wound care, etc )  - Arrange for interpretive services to assist at discharge as needed  - Refer to Case Management Department for coordinating discharge planning if the patient needs post-hospital services based on physician/advanced practitioner order or complex needs related to functional status, cognitive ability, or social support system  Outcome: Progressing     Problem: GENITOURINARY - ADULT  Goal: Absence of urinary retention  Description: INTERVENTIONS:  - Assess patient's ability to void and empty bladder  - Monitor I/O  - Bladder scan as needed  - Discuss with physician/AP medications to alleviate retention as needed  - Discuss catheterization for long term situations as appropriate  Outcome: Progressing     Problem: METABOLIC, FLUID AND ELECTROLYTES - ADULT  Goal: Electrolytes maintained within normal limits  Description: INTERVENTIONS:  - Monitor labs and assess patient for signs and symptoms of electrolyte imbalances  - Administer electrolyte replacement as ordered  - Monitor response to electrolyte replacements, including repeat lab results as appropriate  - Instruct patient on fluid and nutrition as appropriate  Outcome: Progressing  Goal: Fluid balance maintained  Description: INTERVENTIONS:  - Monitor labs   - Monitor I/O and WT  - Instruct patient on fluid and nutrition as appropriate  - Assess for signs & symptoms of volume excess or deficit  Outcome: Progressing  Goal: Glucose maintained within target range  Description: INTERVENTIONS:  - Monitor Blood Glucose as ordered  - Assess for signs and symptoms of hyperglycemia and hypoglycemia  - Administer ordered medications to maintain glucose within target range  - Assess nutritional intake and initiate nutrition service referral as needed  Outcome: Progressing     Problem: SKIN/TISSUE INTEGRITY - ADULT  Goal: Pressure injury heals and does not worsen  Description: Interventions:  - Implement low air loss mattress or specialty surface (Criteria met)  - Apply silicone foam dressing  - Instruct/assist with weight shifting every 30 minutes when in chair   - Limit chair time to 3 hour intervals  - Use special pressure reducing interventions such as cushion when in chair   - Apply fecal or urinary incontinence containment device   - Perform passive or active ROM every shift  - Turn and reposition patient & offload bony prominences every 2 hours   - Utilize friction reducing device or surface for transfers   - Consider nutrition services referral as needed  Outcome: Progressing     Problem: Nutrition/Hydration-ADULT  Goal: Nutrient/Hydration intake appropriate for improving, restoring or maintaining nutritional needs  Description: Monitor and assess patient's nutrition/hydration status for malnutrition  Collaborate with interdisciplinary team and initiate plan and interventions as ordered  Monitor patient's weight and dietary intake as ordered or per policy  Utilize nutrition screening tool and intervene as necessary  Determine patient's food preferences and provide high-protein, high-caloric foods as appropriate       INTERVENTIONS:  - Monitor oral intake, urinary output, labs, and treatment plans  - Assess nutrition and hydration status and recommend course of action  - Evaluate amount of meals eaten  - Assist patient with eating if necessary   - Allow adequate time for meals  - Recommend/ encourage appropriate diets, oral nutritional supplements, and vitamin/mineral supplements  - Order, calculate, and assess calorie counts as needed  - Recommend, monitor, and adjust tube feedings and TPN/PPN based on assessed needs  - Assess need for intravenous fluids  - Provide specific nutrition/hydration education as appropriate  - Include patient/family/caregiver in decisions related to nutrition  Outcome: Progressing

## 2023-05-15 NOTE — PROGRESS NOTES
[facility]  Progress Note  Name: Tracy Francis  MRN: [id number]  Unit/Bed#: [address] I Date of Admission: 5/12/2023   Date of Service: 5/15/2023 I Hospital Day: 2    Assessment/Plan   * Back pain  Assessment & Plan  Unclear cause  CT lumbar spine, abdomen pelvis unremarkable for acute pathology  Reportedly has chronic pain  May be related to spasm  May be behavioral/functional  Supportive care, conservative measures Tylenol, medication, aqua K    At risk for elder abuse  Assessment & Plan  Area of aging received a complaint against home caregivers  Case management to follow-up for safe discharge planning-discussed with case management        UTI (urinary tract infection)  Assessment & Plan  Recent UTI plan to complete Keflex course  One-time dose of Rocephin to complete course on admission  Monitor off antibiotics at this time    Oropharyngeal dysphagia  Assessment & Plan  Son reports that patient tolerates regular diet normally  Monitor; aspiration precautions  Appreciate speech eval-level 3 with thins    Stage 3a chronic kidney disease [facility])  Assessment & Plan  Lab Results   Component Value Date    EGFR 35 05/14/2023    EGFR 42 05/13/2023    EGFR 35 05/12/2023    CREATININE 1 30 05/14/2023    CREATININE 1 13 05/13/2023    CREATININE 1 32 (H) 05/12/2023   Renal function at baseline    Paroxysmal A-fib [facility])  Assessment & Plan  Continue amiodarone 100 mg daily, metoprolol 50 mg daily, Cardizem 180 mg daily    Acute encephalopathy  Assessment & Plan  Son reports that she is she has been to the hospital she has not slept and she has been more agitated screaming out noting pain in her back and belly  This is likely hospital induced delirium due to change in environment with underlying dementia  Supportive care, delirium precautions, urinary retention protocol  Virtual one-to-one  Zyprexa as needed  Geriatrics consultation  5/13-in discussions with case management, reported by APS that she cannot return to her normal environment as case has been opened against one of her caregivers  However, unknown which caregiver and APS not available on the weekends  Monitor and likely can be discharged Monday once caregiver situation addressed  Discussed with case management-trying to arrange home health care with another agency or she may need nursing home placement-Case management is working on discharge planning    Essential hypertension  Assessment & Plan  Elevated blood pressure on admission, patient not tolerating oral meds at this time due to acute encephalopathy  IV hydralazine as needed SBP greater than 170  5/14-overall, p o  tolerance has improved; I have increased diltiazem to 180 mg daily for better blood pressure control           VTE Pharmacologic Prophylaxis: VTE Score: 4 Moderate Risk (Score 3-4) - Pharmacological DVT Prophylaxis Ordered: heparin  Patient Centered Rounds: I performed bedside rounds with nursing staff today  Discussions with Specialists or Other Care Team Provider:     Education and Discussions with Family / Patient: Updated  (son) via phone  Total Time Spent on Date of Encounter in care of patient: 35 minutes This time was spent on one or more of the following: performing physical exam; counseling and coordination of care; obtaining or reviewing history; documenting in the medical record; reviewing/ordering tests, medications or procedures; communicating with other healthcare professionals and discussing with patient's family/caregivers  Current Length of Stay: 2 day(s)  Current Patient Status: Inpatient   Certification Statement: The patient will continue to require additional inpatient hospital stay due to Safe discharge plan  Discharge Plan: Anticipate discharge in 24-48 hrs to home with home services  Code Status: Level 3 - DNAR and DNI    Subjective:   Patient seen and examined  Patient was sleeping most of the day    Was able to arouse her  Received Zyprexa overnight  Discussed with case management  Working on setting up outpatient home health care agency-    Objective:     Vitals:   Temp (24hrs), Av 3 °F (36 3 °C), Min:97 3 °F (36 3 °C), Max:97 3 °F (36 3 °C)    Temp:  [97 3 °F (36 3 °C)] 97 3 °F (36 3 °C)  HR:  [57-78] 58  Resp:  [16-20] 16  BP: (108-164)/(47-94) 117/59  SpO2:  [93 %-98 %] 97 %  Body mass index is 26 31 kg/m²  Input and Output Summary (last 24 hours): Intake/Output Summary (Last 24 hours) at 5/15/2023 1656  Last data filed at 5/15/2023 0900  Gross per 24 hour   Intake 60 ml   Output --   Net 60 ml       Physical Exam:   Physical Exam  Constitutional:       General: She is not in acute distress  Appearance: She is not ill-appearing  Comments: Sleeping but arousable   HENT:      Head: Normocephalic  Comments: Ecchymosis of the face     Nose: Nose normal    Eyes:      General: No scleral icterus  Cardiovascular:      Rate and Rhythm: Normal rate and regular rhythm  Abdominal:      General: Abdomen is flat  Skin:     General: Skin is warm  Neurological:      Cranial Nerves: No cranial nerve deficit  Additional Data:     Labs:  Results from last 7 days   Lab Units 23  0602   WBC Thousand/uL 5 07   HEMOGLOBIN g/dL 12 0   HEMATOCRIT % 36 5   PLATELETS Thousands/uL 212   NEUTROS PCT % 59   LYMPHS PCT % 27   MONOS PCT % 11   EOS PCT % 3     Results from last 7 days   Lab Units 23  0602   SODIUM mmol/L 140   POTASSIUM mmol/L 3 9   CHLORIDE mmol/L 104   CO2 mmol/L 27   BUN mg/dL 19   CREATININE mg/dL 1 30   ANION GAP mmol/L 9   CALCIUM mg/dL 8 6   ALBUMIN g/dL 3 8   TOTAL BILIRUBIN mg/dL 0 57   ALK PHOS U/L 128*   ALT U/L 16   AST U/L 25   GLUCOSE RANDOM mg/dL 81                       Lines/Drains:  Invasive Devices     Peripheral Intravenous Line  Duration           Peripheral IV 23 Dorsal (posterior); Left Forearm <1 day                      Imaging: Reviewed radiology reports from this admission including: CT spine    Recent Cultures (last 7 days):   Results from last 7 days   Lab Units 05/12/23  1303   URINE CULTURE  No Growth <1000 cfu/mL       Last 24 Hours Medication List:   Current Facility-Administered Medications   Medication Dose Route Frequency Provider Last Rate   • acetaminophen  650 mg Oral Q6H PRN Gael Pablo, DO     • aluminum-magnesium hydroxide-simethicone  30 mL Oral Q6H PRN Gael Pablo, DO     • amiodarone  100 mg Oral Daily With Breakfast Gael Pablo, DO     • diltiazem  180 mg Oral Daily Malinda Morelos MD     • heparin (porcine)  5,000 Units Subcutaneous [facility] Gael Pablo, DO     • hydrALAZINE  10 mg Intravenous Q6H PRN Malinda Morelos MD     • lidocaine  1 patch Topical Daily Gael Pablo DO     • LORazepam  0 5 mg Intravenous Q6H PRN Malinda Morelos MD     • metoprolol succinate  50 mg Oral Daily Gael Pablo, DO     • OLANZapine  5 mg Oral HS Gael Pablo, DO     • ondansetron  4 mg Intravenous Q6H PRN Gael Pablo DO     • terazosin  2 mg Oral HS Gael Pablo, DO     • traZODone  50 mg Oral HS Gael Pablo, DO          Today, Patient Was Seen By: Beverly Joiner MD    **Please Note: This note may have been constructed using a voice recognition system  **

## 2023-05-15 NOTE — ASSESSMENT & PLAN NOTE
Area of aging received a complaint against home caregivers  Case management to follow-up for safe discharge planning-discussed with case management

## 2023-05-15 NOTE — PLAN OF CARE
Problem: Potential for Falls  Goal: Patient will remain free of falls  Description: INTERVENTIONS:  - Educate patient/family on patient safety including physical limitations  - Instruct patient to call for assistance with activity   - Consult OT/PT to assist with strengthening/mobility   - Keep Call bell within reach  - Keep bed low and locked with side rails adjusted as appropriate  - Keep care items and personal belongings within reach  - Initiate and maintain comfort rounds  - Make Fall Risk Sign visible to staff  - Offer Toileting every 2 Hours, in advance of need  - Initiate/Maintain bed alarm  - Obtain necessary fall risk management equipment:   - Apply yellow socks and bracelet for high fall risk patients  - Consider moving patient to room near nurses station  Outcome: Progressing     Problem: SAFETY,RESTRAINT: NV/NON-SELF DESTRUCTIVE BEHAVIOR  Goal: Remains free of harm/injury (restraint for non violent/non self-detsructive behavior)  Description: INTERVENTIONS:  - Instruct patient/family regarding restraint use   - Assess and monitor physiologic and psychological status   - Provide interventions and comfort measures to meet assessed patient needs   - Identify and implement measures to help patient regain control  - Assess readiness for release of restraint   Outcome: Progressing     Problem: SKIN/TISSUE INTEGRITY - ADULT  Goal: Pressure injury heals and does not worsen  Description: Interventions:  - Implement low air loss mattress or specialty surface (Criteria met)  - Apply silicone foam dressing  - Perform passive or active ROM every shift  - Turn and reposition patient & offload bony prominences every 2 hours   - Utilize friction reducing device or surface for transfers   - Consider nutrition services referral as needed  Outcome: Progressing

## 2023-05-16 RX ADMIN — HEPARIN SODIUM 5000 UNITS: 5000 INJECTION INTRAVENOUS; SUBCUTANEOUS at 22:15

## 2023-05-16 RX ADMIN — TRAZODONE HYDROCHLORIDE 50 MG: 50 TABLET ORAL at 23:35

## 2023-05-16 RX ADMIN — LORAZEPAM 0.5 MG: 2 INJECTION INTRAMUSCULAR; INTRAVENOUS at 06:30

## 2023-05-16 RX ADMIN — HEPARIN SODIUM 5000 UNITS: 5000 INJECTION INTRAVENOUS; SUBCUTANEOUS at 06:30

## 2023-05-16 RX ADMIN — LORAZEPAM 0.5 MG: 2 INJECTION INTRAMUSCULAR; INTRAVENOUS at 20:05

## 2023-05-16 RX ADMIN — OLANZAPINE 5 MG: 5 TABLET, FILM COATED ORAL at 22:11

## 2023-05-16 RX ADMIN — TERAZOSIN HYDROCHLORIDE 2 MG: 1 CAPSULE ORAL at 23:35

## 2023-05-16 RX ADMIN — HEPARIN SODIUM 5000 UNITS: 5000 INJECTION INTRAVENOUS; SUBCUTANEOUS at 14:33

## 2023-05-16 NOTE — ASSESSMENT & PLAN NOTE
· Recent UTI plan to complete Keflex course  · One-time dose of Rocephin to complete course on admission  · Monitor off antibiotics at this time

## 2023-05-16 NOTE — PLAN OF CARE
Problem: Potential for Falls  Goal: Patient will remain free of falls  Description: INTERVENTIONS:  - Educate patient/family on patient safety including physical limitations  - Instruct patient to call for assistance with activity   - Consult OT/PT to assist with strengthening/mobility   - Keep Call bell within reach  - Keep bed low and locked with side rails adjusted as appropriate  - Keep care items and personal belongings within reach  - Initiate and maintain comfort rounds  - Make Fall Risk Sign visible to staff  - Offer Toileting every 2 Hours, in advance of need  - Initiate/Maintain bed alarm  - Apply yellow socks and bracelet for high fall risk patients  - Consider moving patient to room near nurses station  Outcome: Progressing     Problem: SAFETY,RESTRAINT: NV/NON-SELF DESTRUCTIVE BEHAVIOR  Goal: Remains free of harm/injury (restraint for non violent/non self-detsructive behavior)  Description: INTERVENTIONS:  - Instruct patient/family regarding restraint use   - Assess and monitor physiologic and psychological status   - Provide interventions and comfort measures to meet assessed patient needs   - Identify and implement measures to help patient regain control  - Assess readiness for release of restraint   Outcome: Progressing  Goal: Returns to optimal restraint-free functioning  Description: INTERVENTIONS:  - Assess the patient's behavior and symptoms that indicate continued need for restraint  - Identify and implement measures to help patient regain control  - Assess readiness for release of restraint   Outcome: Progressing     Problem: MOBILITY - ADULT  Goal: Maintain or return to baseline ADL function  Description: INTERVENTIONS:  -  Assess patient's ability to carry out ADLs; assess patient's baseline for ADL function and identify physical deficits which impact ability to perform ADLs (bathing, care of mouth/teeth, toileting, grooming, dressing, etc )  - Assess/evaluate cause of self-care deficits - Assess range of motion  - Assess patient's mobility; develop plan if impaired  - Assess patient's need for assistive devices and provide as appropriate  - Encourage maximum independence but intervene and supervise when necessary  - Involve family in performance of ADLs  - Assess for home care needs following discharge   - Consider OT consult to assist with ADL evaluation and planning for discharge  - Provide patient education as appropriate  Outcome: Progressing  Goal: Maintains/Returns to pre admission functional level  Description: INTERVENTIONS:  - Perform BMAT or MOVE assessment daily    - Set and communicate daily mobility goal to care team and patient/family/caregiver  - Collaborate with rehabilitation services on mobility goals if consulted  - Perform Range of Motion 3 times a day  - Reposition patient every 2 hours    - Dangle patient 3 times a day  - Out of bed for toileting  - Record patient progress and toleration of activity level   Outcome: Progressing     Problem: PAIN - ADULT  Goal: Verbalizes/displays adequate comfort level or baseline comfort level  Description: Interventions:  - Encourage patient to monitor pain and request assistance  - Assess pain using appropriate pain scale  - Administer analgesics based on type and severity of pain and evaluate response  - Implement non-pharmacological measures as appropriate and evaluate response  - Consider cultural and social influences on pain and pain management  - Notify physician/advanced practitioner if interventions unsuccessful or patient reports new pain  Outcome: Progressing     Problem: DISCHARGE PLANNING  Goal: Discharge to home or other facility with appropriate resources  Description: INTERVENTIONS:  - Identify barriers to discharge w/patient and caregiver  - Arrange for needed discharge resources and transportation as appropriate  - Identify discharge learning needs (meds, wound care, etc )  - Arrange for interpretive services to assist at discharge as needed  - Refer to Case Management Department for coordinating discharge planning if the patient needs post-hospital services based on physician/advanced practitioner order or complex needs related to functional status, cognitive ability, or social support system  Outcome: Progressing     Problem: GENITOURINARY - ADULT  Goal: Absence of urinary retention  Description: INTERVENTIONS:  - Assess patient's ability to void and empty bladder  - Monitor I/O  - Bladder scan as needed  - Discuss with physician/AP medications to alleviate retention as needed  - Discuss catheterization for long term situations as appropriate  Outcome: Progressing     Problem: METABOLIC, FLUID AND ELECTROLYTES - ADULT  Goal: Electrolytes maintained within normal limits  Description: INTERVENTIONS:  - Monitor labs and assess patient for signs and symptoms of electrolyte imbalances  - Administer electrolyte replacement as ordered  - Monitor response to electrolyte replacements, including repeat lab results as appropriate  - Instruct patient on fluid and nutrition as appropriate  Outcome: Progressing  Goal: Fluid balance maintained  Description: INTERVENTIONS:  - Monitor labs   - Monitor I/O and WT  - Instruct patient on fluid and nutrition as appropriate  - Assess for signs & symptoms of volume excess or deficit  Outcome: Progressing  Goal: Glucose maintained within target range  Description: INTERVENTIONS:  - Monitor Blood Glucose as ordered  - Assess for signs and symptoms of hyperglycemia and hypoglycemia  - Administer ordered medications to maintain glucose within target range  - Assess nutritional intake and initiate nutrition service referral as needed  Outcome: Progressing     Problem: SKIN/TISSUE INTEGRITY - ADULT  Goal: Pressure injury heals and does not worsen  Description: Interventions:  - Implement low air loss mattress or specialty surface (Criteria met)  - Apply silicone foam dressing  - Instruct/assist with weight shifting every 30 minutes when in chair   - Limit chair time to 3 hour intervals  - Use special pressure reducing interventions such as cushion when in chair   - Apply fecal or urinary incontinence containment device   - Perform passive or active ROM every shift  - Turn and reposition patient & offload bony prominences every 2 hours   - Utilize friction reducing device or surface for transfers   - Consider nutrition services referral as needed  Outcome: Progressing     Problem: Nutrition/Hydration-ADULT  Goal: Nutrient/Hydration intake appropriate for improving, restoring or maintaining nutritional needs  Description: Monitor and assess patient's nutrition/hydration status for malnutrition  Collaborate with interdisciplinary team and initiate plan and interventions as ordered  Monitor patient's weight and dietary intake as ordered or per policy  Utilize nutrition screening tool and intervene as necessary  Determine patient's food preferences and provide high-protein, high-caloric foods as appropriate       INTERVENTIONS:  - Monitor oral intake, urinary output, labs, and treatment plans  - Assess nutrition and hydration status and recommend course of action  - Evaluate amount of meals eaten  - Assist patient with eating if necessary   - Allow adequate time for meals  - Recommend/ encourage appropriate diets, oral nutritional supplements, and vitamin/mineral supplements  - Order, calculate, and assess calorie counts as needed  - Recommend, monitor, and adjust tube feedings and TPN/PPN based on assessed needs  - Assess need for intravenous fluids  - Provide specific nutrition/hydration education as appropriate  - Include patient/family/caregiver in decisions related to nutrition  Outcome: Progressing     Problem: Prexisting or High Potential for Compromised Skin Integrity  Goal: Skin integrity is maintained or improved  Description: INTERVENTIONS:  - Identify patients at risk for skin breakdown  - Assess and monitor skin integrity  - Assess and monitor nutrition and hydration status  - Monitor labs   - Assess for incontinence   - Turn and reposition patient  - Assist with mobility/ambulation  - Relieve pressure over bony prominences  - Avoid friction and shearing  - Provide appropriate hygiene as needed including keeping skin clean and dry  - Evaluate need for skin moisturizer/barrier cream  - Collaborate with interdisciplinary team   - Patient/family teaching  - Consider wound care consult   Outcome: Progressing

## 2023-05-16 NOTE — CASE MANAGEMENT
Case Management Discharge Planning Note    Patient name Birch Harbor Block  Location [address]/[facility] 2 Coral Moder* MRN [id number]  : 1931 Date 2023       Current Admission Date: 2023  Current Admission Diagnosis:Back pain   Patient Active Problem List    Diagnosis Date Noted   • Back pain 2023   • At risk for elder abuse 2023   • Acute urinary retention 2022   • UTI (urinary tract infection) 2022   • Edema of right ankle 10/04/2022   • Flank pain 10/18/2021   • Oropharyngeal dysphagia 2021   • ABLA (acute blood loss anemia) 2021   • Closed right hip fracture ([facility] [address] [facility] ) 07/15/2021   • Stage 3a chronic kidney disease ([facility] ) 07/15/2021   • Dementia ([facility] [facility] )    • Closed fracture of right orbital floor ([facility] ) 2020   • Fall from ground level 2020   • Age-related osteoporosis without current pathological fracture 2020   • Moderate protein-calorie malnutrition ([facility] [facility] ) 2020   • 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   • Major neurocognitive disorder ([facility] [address] [facility] ) 2020   • PAF (paroxysmal atrial fibrillation) ([facility])    • Elevated troponin 2020   • Urinary incontinence 2020   • Ambulatory dysfunction 04/10/2020   • OMEGA (acute kidney injury) ([facility] [address] [facility] ) 04/10/2020   • 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   • 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   • Paroxysmal A-fib ([address] ) 2019   • Cellulitis of extremity 12/10/2018   • Bilateral hearing loss 10/11/2018   • Fall 10/11/2018   • 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[address] ) 10/11/2018   • Essential hypertension 2018   • Anxiety 2018   • Hyperlipidemia 2018   • Hyponatremia 2018   • Acute encephalopathy 2018      LOS (days): 3  Geometric Mean LOS (GMLOS) (days): 2 80  Days to GMLOS:-0 4     OBJECTIVE:  Risk of Unplanned Readmission Score: 19 99         Current admission status: Inpatient   Preferred Pharmacy:   [address] [place] [address]  [address] [facility] [address]  Phone: [phone] Fax: 267 [place], [place] - [address] ,  [address] ,  [address] [address]  Phone: [phone] Fax: [phone] - [place], [place] - [address] [address]  Phone: [phone] Fax: [phone]    Primary Care Provider: Gay Haas MD    Primary Insurance: Masood Schwarz MEDICARE [facility] REP  Secondary Insurance: [address] DETAILS:                                                                                                 Additional Comments: Call from [facility] at [facility] who states no 3rd shift HHA has been found by [facility], [facility] on Call to date/time  Will follow up with [facility] in a m- email sent to [facility]  to ask if a 2nd [address] agency able to be involved with providing care- await return response to same

## 2023-05-16 NOTE — PROGRESS NOTES
[address]  Progress Note  Name: Edil Lopez  MRN: [id number]  Unit/Bed#: [place] [address] 223-01 I Date of Admission: 5/12/2023   Date of Service: 5/16/2023 I Hospital Day: 3    Assessment/Plan   * Acute encephalopathy  Assessment & Plan  · Son reports that she is she has been to the hospital she has not slept and she has been more agitated screaming out noting pain in her back and belly  · This is likely hospital induced delirium due to change in environment with underlying dementia  · Supportive care, delirium precautions, urinary retention protocol  · Continue one-to-one  · Zyprexa as needed  · Geriatrics consultation    Back pain  Assessment & Plan  · Unclear cause  · CT lumbar spine, abdomen pelvis unremarkable for acute pathology  · Reportedly has chronic pain  · May be related to spasm  · May be behavioral/functional  · Supportive care, conservative measures Tylenol, medication, aqua K    At risk for elder abuse  Assessment & Plan  Area of aging received a complaint against home caregivers  Case management to follow-up for safe discharge planning-discussed with case management        UTI (urinary tract infection)  Assessment & Plan  · Recent UTI plan to complete Keflex course  · One-time dose of Rocephin to complete course on admission  · Monitor off antibiotics at this time    Oropharyngeal dysphagia  Assessment & Plan  · Son reports that patient tolerates regular diet normally  · Monitor; aspiration precautions  · Appreciate speech eval-level 3 with thins    Stage 3a chronic kidney disease [facility])  Assessment & Plan  Lab Results   Component Value Date    EGFR 35 05/14/2023    EGFR 42 05/13/2023    EGFR 35 05/12/2023    CREATININE 1 30 05/14/2023    CREATININE 1 13 05/13/2023    CREATININE 1 32 (H) 05/12/2023   Renal function at baseline    Paroxysmal A-fib [facility])  Assessment & Plan  Continue amiodarone 100 mg daily, metoprolol 50 mg daily, Cardizem 180 mg daily    Essential hypertension  Assessment & Plan  · Continue diltiazem 180 mg daily, metoprolol 50 mg daily                 VTE Pharmacologic Prophylaxis:   Pharmacologic: heparin    Patient Centered Rounds: I have performed bedside rounds with nursing staff today  Education and Discussions with Family / Patient: Left message for sonAnita    Time Spent for Care: More than 50% of total time spent on counseling and coordination of care as described above  Current Length of Stay: 3 day(s)    Current Patient Status: Inpatient   Certification Statement: The patient will continue to require additional inpatient hospital stay due to awaiting safe discharge    Discharge Plan / Estimated Discharge Date: TBD    Code Status: Level 3 - DNAR and DNI      Subjective:   Patient seen and examined at bedside, resting comfortably, confused    Objective:     Vitals:   Temp (24hrs), Av 4 °F (36 3 °C), Min:97 1 °F (36 2 °C), Max:97 9 °F (36 6 °C)    Temp:  [97 1 °F (36 2 °C)-97 9 °F (36 6 °C)] 97 9 °F (36 6 °C)  HR:  [55-76] 74  Resp:  [18] 18  BP: (119-158)/(65-98) 143/65  SpO2:  [96 %-100 %] 98 %  Body mass index is 26 31 kg/m²  Input and Output Summary (last 24 hours): Intake/Output Summary (Last 24 hours) at 2023 1732  Last data filed at 2023 1358  Gross per 24 hour   Intake 240 ml   Output --   Net 240 ml       Physical Exam:    Constitutional: Patient is confused, no acute distress  HEENT:  Normocephalic, atraumatic  Cardiovascular: Normal S1S2, RRR, No murmurs/rubs/gallops appreciated  Pulmonary:  Bilateral air entry, No rhonchi/rales/wheezing appreciated  Abdominal: Soft, Bowel sounds present, Non-tender, Non-distended  Extremities:  No cyanosis, clubbing or edema     Neurological: Awake, alert  Skin:  Warm, dry    Additional Data:     Labs:    Results from last 7 days   Lab Units 23  0602   WBC Thousand/uL 5 07   HEMOGLOBIN g/dL 12 0   HEMATOCRIT % 36 5   PLATELETS Thousands/uL 212   NEUTROS PCT % 59 LYMPHS PCT % 27   MONOS PCT % 11   EOS PCT % 3     Results from last 7 days   Lab Units 05/14/23  0602   POTASSIUM mmol/L 3 9   CHLORIDE mmol/L 104   CO2 mmol/L 27   BUN mg/dL 19   CREATININE mg/dL 1 30   CALCIUM mg/dL 8 6   ALK PHOS U/L 128*   ALT U/L 16   AST U/L 25            I Have Reviewed All Lab Data Listed Above  Invasive Devices     Peripheral Intravenous Line  Duration           Peripheral IV 05/14/23 Dorsal (posterior); Left Forearm 2 days                   Recent Cultures (last 7 days):     Results from last 7 days   Lab Units 05/12/23  1303   URINE CULTURE  No Growth <1000 cfu/mL       Last 24 Hours Medication List:   Current Facility-Administered Medications   Medication Dose Route Frequency Provider Last Rate   • acetaminophen  650 mg Oral Q6H PRN Eryn Colvin DO     • aluminum-magnesium hydroxide-simethicone  30 mL Oral Q6H PRN Eryn Colvin DO     • amiodarone  100 mg Oral Daily With Breakfast Eryn Colvin DO     • diltiazem  180 mg Oral Daily Lexii Jackson MD     • heparin (porcine)  5,000 Units Subcutaneous [facility] Eryn Colvin DO     • hydrALAZINE  10 mg Intravenous Q6H PRN Lexii Jackson MD     • lidocaine  1 patch Topical Daily Eryn Colvin DO     • LORazepam  0 5 mg Intravenous Q6H PRN Lexii Jackson MD     • metoprolol succinate  50 mg Oral Daily Eryn Colvin DO     • OLANZapine  5 mg Oral HS Eryn Colvin DO     • ondansetron  4 mg Intravenous Q6H PRN Eryn Colvin DO     • terazosin  2 mg Oral HS Eryn Colvin DO     • traZODone  50 mg Oral HS Eryn Colvin DO          Today, Patient Was Seen By: Woody Warner MD

## 2023-05-16 NOTE — SPEECH THERAPY NOTE
Speech Language/Pathology  Pt in and out of alertness w/ behavioral issues as well  Consider geriatrics consult  Will f/u as able

## 2023-05-16 NOTE — ASSESSMENT & PLAN NOTE
· Son reports that she is she has been to the hospital she has not slept and she has been more agitated screaming out noting pain in her back and belly  · This is likely hospital induced delirium due to change in environment with underlying dementia  · Supportive care, delirium precautions, urinary retention protocol  · Continue one-to-one  · Zyprexa as needed  · Geriatrics consultation

## 2023-05-16 NOTE — ASSESSMENT & PLAN NOTE
· Son reports that patient tolerates regular diet normally  · Monitor; aspiration precautions  · Appreciate speech eval-level 3 with thins

## 2023-05-16 NOTE — ASSESSMENT & PLAN NOTE
· Unclear cause  · CT lumbar spine, abdomen pelvis unremarkable for acute pathology  · Reportedly has chronic pain  · May be related to spasm  · May be behavioral/functional  · Supportive care, conservative measures Tylenol, medication, aqua K

## 2023-05-17 RX ORDER — TRAZODONE HYDROCHLORIDE 50 MG/1
50 TABLET ORAL
Status: DISCONTINUED | OUTPATIENT
Start: 2023-05-17 | End: 2023-05-19 | Stop reason: HOSPADM

## 2023-05-17 RX ORDER — LORAZEPAM 0.5 MG/1
0.5 TABLET ORAL DAILY
Status: DISCONTINUED | OUTPATIENT
Start: 2023-05-18 | End: 2023-05-19 | Stop reason: HOSPADM

## 2023-05-17 RX ORDER — OLANZAPINE 10 MG/1
2.5 INJECTION, POWDER, LYOPHILIZED, FOR SOLUTION INTRAMUSCULAR EVERY 6 HOURS PRN
Status: DISCONTINUED | OUTPATIENT
Start: 2023-05-17 | End: 2023-05-19 | Stop reason: HOSPADM

## 2023-05-17 RX ORDER — LORAZEPAM 0.5 MG/1
0.5 TABLET ORAL
Status: DISCONTINUED | OUTPATIENT
Start: 2023-05-17 | End: 2023-05-17

## 2023-05-17 RX ORDER — OLANZAPINE 5 MG/1
5 TABLET ORAL
Status: DISCONTINUED | OUTPATIENT
Start: 2023-05-17 | End: 2023-05-19 | Stop reason: HOSPADM

## 2023-05-17 RX ORDER — LANOLIN ALCOHOL/MO/W.PET/CERES
3 CREAM (GRAM) TOPICAL
Status: DISCONTINUED | OUTPATIENT
Start: 2023-05-17 | End: 2023-05-19 | Stop reason: HOSPADM

## 2023-05-17 RX ORDER — ACETAMINOPHEN 160 MG/5ML
650 SUSPENSION ORAL EVERY 8 HOURS
Status: DISCONTINUED | OUTPATIENT
Start: 2023-05-17 | End: 2023-05-19 | Stop reason: HOSPADM

## 2023-05-17 RX ORDER — OLANZAPINE 5 MG/1
2.5 TABLET, ORALLY DISINTEGRATING ORAL EVERY 6 HOURS PRN
Status: DISCONTINUED | OUTPATIENT
Start: 2023-05-17 | End: 2023-05-19 | Stop reason: HOSPADM

## 2023-05-17 RX ADMIN — HEPARIN SODIUM 5000 UNITS: 5000 INJECTION INTRAVENOUS; SUBCUTANEOUS at 06:50

## 2023-05-17 RX ADMIN — AMIODARONE HYDROCHLORIDE 100 MG: 100 TABLET ORAL at 08:33

## 2023-05-17 RX ADMIN — TERAZOSIN HYDROCHLORIDE 2 MG: 1 CAPSULE ORAL at 21:41

## 2023-05-17 RX ADMIN — HEPARIN SODIUM 5000 UNITS: 5000 INJECTION INTRAVENOUS; SUBCUTANEOUS at 21:42

## 2023-05-17 RX ADMIN — ACETAMINOPHEN 650 MG: 650 SUSPENSION ORAL at 19:14

## 2023-05-17 RX ADMIN — Medication 3 MG: at 21:41

## 2023-05-17 RX ADMIN — LORAZEPAM 0.5 MG: 2 INJECTION INTRAMUSCULAR; INTRAVENOUS at 02:58

## 2023-05-17 RX ADMIN — TRAZODONE HYDROCHLORIDE 50 MG: 50 TABLET ORAL at 21:42

## 2023-05-17 RX ADMIN — LIDOCAINE 1 PATCH: 700 PATCH TOPICAL at 08:38

## 2023-05-17 RX ADMIN — OLANZAPINE 5 MG: 5 TABLET, FILM COATED ORAL at 21:42

## 2023-05-17 RX ADMIN — HEPARIN SODIUM 5000 UNITS: 5000 INJECTION INTRAVENOUS; SUBCUTANEOUS at 16:45

## 2023-05-17 RX ADMIN — METOPROLOL SUCCINATE 50 MG: 50 TABLET, EXTENDED RELEASE ORAL at 08:33

## 2023-05-17 RX ADMIN — DILTIAZEM HYDROCHLORIDE 180 MG: 180 CAPSULE, COATED, EXTENDED RELEASE ORAL at 08:33

## 2023-05-17 NOTE — CASE MANAGEMENT
Case Management Discharge Planning Note    Patient name Arvie Bernheim  Location [facility] 2 /[facility] 2 Brooke Seay* MRN [id number]  : 1931 Date 2023       Current Admission Date: 2023  Current Admission Diagnosis:Acute encephalopathy   Patient Active Problem List    Diagnosis Date Noted   • Back pain 2023   • At risk for elder abuse 2023   • Acute urinary retention 2022   • UTI (urinary tract infection) 2022   • Edema of right ankle 10/04/2022   • Flank pain 10/18/2021   • Oropharyngeal dysphagia 2021   • ABLA (acute blood loss anemia) 2021   • Closed right hip fracture ([facility] [address] ) 07/15/2021   • Stage 3a chronic kidney disease ([facility] [address] ) 07/15/2021   • Dementia ([facility] [address] )    • Closed fracture of right orbital floor ([facility] [address] ) 2020   • Fall from ground level 2020   • Age-related osteoporosis without current pathological fracture 2020   • Moderate protein-calorie malnutrition ([facility] [address] ) 2020   • 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[facility] [address] ) 2020   • Major neurocognitive disorder ([facility] [address] ) 2020   • PAF (paroxysmal atrial fibrillation) ([facility])    • Elevated troponin 2020   • Urinary incontinence 2020   • Ambulatory dysfunction 04/10/2020   • OMEGA (acute kidney injury) ([facility] [address] ) 04/10/2020   • 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[facility] [address] ) 2020   • 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   • Paroxysmal A-fib ([facility] [address] ) 2019   • Cellulitis of extremity 12/10/2018   • Bilateral hearing loss 10/11/2018   • Fall 10/11/2018   • 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[facility] [address] ) 10/11/2018   • Essential hypertension 2018   • Anxiety 2018   • Hyperlipidemia 2018   • Hyponatremia 2018   • Acute encephalopathy 2018      LOS (days): 4  Geometric Mean LOS (GMLOS) (days): 2 80  Days to GMLOS:-1 2     OBJECTIVE:  Risk of Unplanned Readmission Score: 18 49         Current admission status: Inpatient   Preferred Pharmacy:   [address] [place] [address], [address] [facility] [address]  Phone: [phone] Fax: [phone] [facility], [address] - [address] ,  [address] ,  [facility] [address]  Phone: [phone] Fax: [phone] - [place], [address] - [address] [facility] [address] [address]  Phone: [phone] Fax: [phone]    Primary Care Provider: Eunice Engle MD    Primary Insurance: [id number] [facility] [facility] REP  Secondary Insurance: [address] [facility] [address]    DISCHARGE DETAILS:                                                    Treatment Team Recommendation: Home  Discharge Destination Plan[de-identified] Home (with [address] 24/7 HHA via waiver)  Transport at Discharge : [facility]  Dispatcher Contacted: Yes  Number/Name of Dispatcher: Velma Diego by [facility] and Unit #): SLETS  ETA of Transport (Date): 05/19/23 (requested 10am )  ETA of Transport (Time): 1000                       Additional Comments: Confirmed with pt's son Ruma Liu care with [address] can start any time Friday- need to know  time to be sure HHA is at the house upon pt arrival-     ADDENDUM: 1245 Confirmation of  time with Ruma Liu made aware of same- to contact [address] [place] [address] agency to inform of easton Preciado from [facility] made aware as is [facility] coordinator with [employer]
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
## 2023-05-17 NOTE — PROGRESS NOTES
[address] [place] [address]  Progress Note  Name: Twan Santos  MRN: [id number]  Unit/Bed#: [facility] 223-01 I Date of Admission: 5/12/2023   Date of Service: 5/17/2023 I Hospital Day: 4    Assessment/Plan   * Acute encephalopathy  Assessment & Plan  · Son reports that she is she has been to the hospital she has not slept and she has been more agitated screaming out noting pain in her back and belly  · This is likely hospital induced delirium due to change in environment with underlying dementia  · Supportive care, delirium precautions, urinary retention protocol  · Continue one-to-one  · Zyprexa as needed  · Geriatrics consultation    Back pain  Assessment & Plan  · Unclear cause  · CT lumbar spine, abdomen pelvis unremarkable for acute pathology  · Reportedly has chronic pain  · May be related to spasm  · May be behavioral/functional  · Supportive care, conservative measures Tylenol, medication, aqua K    At risk for elder abuse  Assessment & Plan  Area of aging received a complaint against home caregivers  Case management to follow-up for safe discharge planning-discussed with case management        UTI (urinary tract infection)  Assessment & Plan  · Recent UTI plan to complete Keflex course  · One-time dose of Rocephin to complete course on admission  · Monitor off antibiotics at this time    Oropharyngeal dysphagia  Assessment & Plan  · Son reports that patient tolerates regular diet normally  · Monitor; aspiration precautions  · Appreciate speech eval-level 3 with thins    Stage 3a chronic kidney disease [facility])  Assessment & Plan  Lab Results   Component Value Date    EGFR 35 05/14/2023    EGFR 42 05/13/2023    EGFR 35 05/12/2023    CREATININE 1 30 05/14/2023    CREATININE 1 13 05/13/2023    CREATININE 1 32 (H) 05/12/2023   Renal function at baseline    Paroxysmal A-fib [facility])  Assessment & Plan  Continue amiodarone 100 mg daily, metoprolol 50 mg daily, Cardizem 180 mg daily    Essential hypertension  Assessment & Plan  · Continue diltiazem 180 mg daily, metoprolol 50 mg daily             VTE Pharmacologic Prophylaxis:   Pharmacologic: heparin    Patient Centered Rounds: I have performed bedside rounds with nursing staff today  Education and Discussions with Family / Patient: LM for sonJonathon    Time Spent for Care: More than 50% of total time spent on counseling and coordination of care as described above  Current Length of Stay: 4 day(s)    Current Patient Status: Inpatient   Certification Statement: The patient will continue to require additional inpatient hospital stay due to Pending arrangement for home health aide    Discharge Plan / Estimated Discharge Date: Friday    Code Status: Level 3 - DNAR and DNI      Subjective:   Patient seen and examined at bedside, in bed on restraints, confused    Objective:     Vitals:   Temp (24hrs), Av 5 °F (36 4 °C), Min:97 °F (36 1 °C), Max:97 9 °F (36 6 °C)    Temp:  [97 °F (36 1 °C)-97 9 °F (36 6 °C)] 97 °F (36 1 °C)  HR:  [74-89] 80  Resp:  [14-18] 14  BP: (143-194)/(65-93) 156/67  SpO2:  [93 %-98 %] 96 %  Body mass index is 26 31 kg/m²  Input and Output Summary (last 24 hours): Intake/Output Summary (Last 24 hours) at 2023 7671  Last data filed at 2023 1358  Gross per 24 hour   Intake 240 ml   Output --   Net 240 ml       Physical Exam:    Constitutional: Patient is confused  HEENT:  Normocephalic, atraumatic  Cardiovascular: Normal S1S2, RRR, No murmurs/rubs/gallops appreciated  Pulmonary:  Bilateral air entry, No rhonchi/rales/wheezing appreciated  Abdominal: Soft, Bowel sounds present, Non-tender, Non-distended  Extremities:  No cyanosis, clubbing or edema     Neurological: Awake, alert  Skin:  Warm, dry    Additional Data:     Labs:    Results from last 7 days   Lab Units 23  0602   WBC Thousand/uL 5 07   HEMOGLOBIN g/dL 12 0   HEMATOCRIT % 36 5   PLATELETS Thousands/uL 212   NEUTROS PCT % 59   LYMPHS PCT % 27 MONOS PCT % 11   EOS PCT % 3     Results from last 7 days   Lab Units 05/14/23  0602   POTASSIUM mmol/L 3 9   CHLORIDE mmol/L 104   CO2 mmol/L 27   BUN mg/dL 19   CREATININE mg/dL 1 30   CALCIUM mg/dL 8 6   ALK PHOS U/L 128*   ALT U/L 16   AST U/L 25            I Have Reviewed All Lab Data Listed Above  Invasive Devices     Peripheral Intravenous Line  Duration           Peripheral IV 05/14/23 Dorsal (posterior); Left Forearm 2 days                     Recent Cultures (last 7 days):     Results from last 7 days   Lab Units 05/12/23  1303   URINE CULTURE  No Growth <1000 cfu/mL       Last 24 Hours Medication List:   Current Facility-Administered Medications   Medication Dose Route Frequency Provider Last Rate   • acetaminophen  650 mg Oral Q6H PRN Cleola Samy, DO     • aluminum-magnesium hydroxide-simethicone  30 mL Oral Q6H PRN Cleola Samy, DO     • amiodarone  100 mg Oral Daily With Breakfast Cleola Samy, DO     • diltiazem  180 mg Oral Daily Cinthia Red MD     • heparin (porcine)  5,000 Units Subcutaneous [facility] Cleola Samy, DO     • hydrALAZINE  10 mg Intravenous Q6H PRN Cinthia Red MD     • lidocaine  1 patch Topical Daily Cleola Samy, DO     • LORazepam  0 5 mg Intravenous Q6H PRN Cinthia Red MD     • metoprolol succinate  50 mg Oral Daily Cleola Samy, DO     • OLANZapine  5 mg Oral HS Cleola Samy, DO     • ondansetron  4 mg Intravenous Q6H PRN Cleola Samy, DO     • terazosin  2 mg Oral HS Cleola Samy, DO     • traZODone  50 mg Oral HS Cleola Samy, DO          Today, Patient Was Seen By: Rahel Ochoa MD

## 2023-05-17 NOTE — CASE MANAGEMENT
Case Management Discharge Planning Note    Patient name Juan Cazares  Location [place] 2 /[facility] 2 Ada Velazquez* MRN [id number]  : 1931 Date 2023       Current Admission Date: 2023  Current Admission Diagnosis:Acute encephalopathy   Patient Active Problem List    Diagnosis Date Noted   • Back pain 2023   • At risk for elder abuse 2023   • Acute urinary retention 2022   • UTI (urinary tract infection) 2022   • Edema of right ankle 10/04/2022   • Flank pain 10/18/2021   • Oropharyngeal dysphagia 2021   • ABLA (acute blood loss anemia) 2021   • Closed right hip fracture ([facility] ) 07/15/2021   • Stage 3a chronic kidney disease ([facility] ) 07/15/2021   • Dementia ([facility] )    • Closed fracture of right orbital floor ([facility] ) 2020   • Fall from ground level 2020   • Age-related osteoporosis without current pathological fracture 2020   • Moderate protein-calorie malnutrition ([facility] ) 2020   • 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   • Major neurocognitive disorder ([facility] ) 2020   • PAF (paroxysmal atrial fibrillation) ([facility])    • Elevated troponin 2020   • Urinary incontinence 2020   • Ambulatory dysfunction 04/10/2020   • OMEGA (acute kidney injury) ([facility] ) 04/10/2020   • 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   • 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   • Paroxysmal A-fib ([facility] ) 2019   • Cellulitis of extremity 12/10/2018   • Bilateral hearing loss 10/11/2018   • Fall 10/11/2018   • 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[address] ) 10/11/2018   • Essential hypertension 2018   • Anxiety 2018   • Hyperlipidemia 2018   • Hyponatremia 2018   • Acute encephalopathy 2018      LOS (days): 4  Geometric Mean LOS (GMLOS) (days): 2 80  Days to GMLOS:-1 1     OBJECTIVE:  Risk of Unplanned Readmission Score: 18 49         Current admission status: Inpatient   Preferred Pharmacy:   [address] [place] [address], [address] [facility] [address]  Phone: [phone] Fax: [address] [place], [place] - [address] ,  [address] ,  [facility] [address]  Phone: [phone] Fax: [phone] [place], [address] [place] [address] [address] [address]  Phone: [phone] Fax: [phone]    Primary Care Provider: Delmar Lewis MD    Primary Insurance: Maida Sprung MEDICARE [facility]  Secondary Insurance: Sumeet Castaneda    DISCHARGE DETAILS:                           Contacts  Patient Contacts: Alia Palafox - son  Relationship to Patient[de-identified] Family  Contact Method: Phone  Phone Number: [phone]  Reason/Outcome: Discharge Planning                                                                     Additional Comments: Notified of new [address] to start Friday- awaiting call back from Alia Palafox in re: to transportation arrangements; [facility] , made aware of this information and pt would be cleared for d/c home with services

## 2023-05-17 NOTE — ASSESSMENT & PLAN NOTE
· Son reports that she is she has been to the hospital she has not slept and she has been more agitated screaming out noting pain in her back and belly  · This is likely hospital induced delirium due to change in environment with underlying dementia  · Supportive care, delirium precautions, urinary retention protocol  · Continue one-to-one  · Zyprexa as needed  · Geriatrics consultation will be appreciated

## 2023-05-18 VITALS
HEART RATE: 70 BPM | BODY MASS INDEX: 26.31 KG/M2 | WEIGHT: 125.88 LBS | DIASTOLIC BLOOD PRESSURE: 53 MMHG | SYSTOLIC BLOOD PRESSURE: 138 MMHG | TEMPERATURE: 98.5 F | OXYGEN SATURATION: 94 % | RESPIRATION RATE: 18 BRPM

## 2023-05-18 RX ADMIN — OLANZAPINE 5 MG: 5 TABLET, FILM COATED ORAL at 21:12

## 2023-05-18 RX ADMIN — TRAZODONE HYDROCHLORIDE 50 MG: 50 TABLET ORAL at 21:12

## 2023-05-18 RX ADMIN — LORAZEPAM 0.5 MG: 0.5 TABLET ORAL at 13:09

## 2023-05-18 RX ADMIN — DILTIAZEM HYDROCHLORIDE 180 MG: 180 CAPSULE, COATED, EXTENDED RELEASE ORAL at 13:10

## 2023-05-18 RX ADMIN — LIDOCAINE 1 PATCH: 700 PATCH TOPICAL at 13:10

## 2023-05-18 RX ADMIN — HEPARIN SODIUM 5000 UNITS: 5000 INJECTION INTRAVENOUS; SUBCUTANEOUS at 21:21

## 2023-05-18 RX ADMIN — Medication 3 MG: at 21:12

## 2023-05-18 RX ADMIN — TERAZOSIN HYDROCHLORIDE 2 MG: 1 CAPSULE ORAL at 21:11

## 2023-05-18 RX ADMIN — HEPARIN SODIUM 5000 UNITS: 5000 INJECTION INTRAVENOUS; SUBCUTANEOUS at 14:58

## 2023-05-18 RX ADMIN — ACETAMINOPHEN 650 MG: 650 SUSPENSION ORAL at 13:09

## 2023-05-18 RX ADMIN — HEPARIN SODIUM 5000 UNITS: 5000 INJECTION INTRAVENOUS; SUBCUTANEOUS at 06:36

## 2023-05-18 RX ADMIN — AMIODARONE HYDROCHLORIDE 100 MG: 100 TABLET ORAL at 13:11

## 2023-05-18 RX ADMIN — METOPROLOL SUCCINATE 50 MG: 50 TABLET, EXTENDED RELEASE ORAL at 13:08

## 2023-05-18 NOTE — PROGRESS NOTES
[address] [facility]  Progress Note  Name: Valentine Eduardo  MRN: [id number]  Unit/Bed#: [address] 223-01 I Date of Admission: 5/12/2023   Date of Service: 5/18/2023 I Hospital Day: 5    Assessment/Plan   * Acute encephalopathy  Assessment & Plan  · Son reports that she is she has been to the hospital she has not slept and she has been more agitated screaming out noting pain in her back and belly  · This is likely hospital induced delirium due to change in environment with underlying dementia  · Supportive care, delirium precautions, urinary retention protocol  · Continue one-to-one  · Zyprexa as needed  · Geriatrics input appreciated  · Encourage sleep-wake cycle  · Continue trazodone at bedtime, Zyprexa at bedtime, melatonin    Back pain  Assessment & Plan  · Unclear cause  · CT lumbar spine, abdomen pelvis unremarkable for acute pathology  · Reportedly has chronic pain  · May be related to spasm  · May be behavioral/functional  · Supportive care, conservative measures Tylenol, medication, aqua K    At risk for elder abuse  Assessment & Plan  Area of aging received a complaint against home caregivers  Case management to follow-up for safe discharge planning-discussed with case management        UTI (urinary tract infection)  Assessment & Plan  · Recent UTI plan to complete Keflex course  · One-time dose of Rocephin to complete course on admission  · Monitor off antibiotics at this time    Oropharyngeal dysphagia  Assessment & Plan  · Son reports that patient tolerates regular diet normally  · Monitor; aspiration precautions  · Appreciate speech eval-level 3 with thins    Stage 3a chronic kidney disease [facility])  Assessment & Plan  Lab Results   Component Value Date    EGFR 35 05/14/2023    EGFR 42 05/13/2023    EGFR 35 05/12/2023    CREATININE 1 30 05/14/2023    CREATININE 1 13 05/13/2023    CREATININE 1 32 (H) 05/12/2023   Renal function at baseline    Paroxysmal A-fib [facility])  Assessment & Plan  Continue amiodarone 100 mg daily, metoprolol 50 mg daily, Cardizem 180 mg daily    Essential hypertension  Assessment & Plan  · Continue diltiazem 180 mg daily, metoprolol 50 mg daily                 VTE Pharmacologic Prophylaxis:   Pharmacologic: Heparin    Patient Centered Rounds: I have performed bedside rounds with nursing staff today  Education and Discussions with Family / Patient: Updated son Angeline Ozuna    Time Spent for Care: More than 50% of total time spent on counseling and coordination of care as described above  Current Length of Stay: 5 day(s)    Current Patient Status: Inpatient   Certification Statement: The patient will continue to require additional inpatient hospital stay due to Arrangement for home health aide    Discharge Plan / Estimated Discharge Date: 24h    Code Status: Level 3 - DNAR and DNI      Subjective:   Patient seen and examined at bedside, comfortable, did sleep better overnight, still confused in restraints    Objective:     Vitals:   Temp (24hrs), Av 8 °F (37 1 °C), Min:98 °F (36 7 °C), Max:99 6 °F (37 6 °C)    Temp:  [98 °F (36 7 °C)-99 6 °F (37 6 °C)] 98 °F (36 7 °C)  HR:  [68-79] 68  Resp:  [16-18] 16  BP: (123-135)/(44-76) 135/44  SpO2:  [93 %-96 %] 93 %  Body mass index is 26 31 kg/m²  Input and Output Summary (last 24 hours):     No intake or output data in the 24 hours ending 23 0855    Physical Exam:    Constitutional: Patient is in no acute distress  HEENT:  Normocephalic, atraumatic  Cardiovascular: Normal S1S2, RRR, No murmurs/rubs/gallops appreciated  Pulmonary:  Bilateral air entry, No rhonchi/rales/wheezing appreciated  Abdominal: Soft, Bowel sounds present, Non-tender, Non-distended  Extremities:  No cyanosis, clubbing or edema     Neurological: Awake, alert, confused  Skin:  Warm, dry    Additional Data:     Labs:    Results from last 7 days   Lab Units 23  0602   WBC Thousand/uL 5 07   HEMOGLOBIN g/dL 12 0   HEMATOCRIT % 36 5 PLATELETS Thousands/uL 212   NEUTROS PCT % 59   LYMPHS PCT % 27   MONOS PCT % 11   EOS PCT % 3     Results from last 7 days   Lab Units 05/14/23  0602   POTASSIUM mmol/L 3 9   CHLORIDE mmol/L 104   CO2 mmol/L 27   BUN mg/dL 19   CREATININE mg/dL 1 30   CALCIUM mg/dL 8 6   ALK PHOS U/L 128*   ALT U/L 16   AST U/L 25            I Have Reviewed All Lab Data Listed Above  Invasive Devices     Peripheral Intravenous Line  Duration           Peripheral IV 05/14/23 Dorsal (posterior); Left Forearm 3 days                     Recent Cultures (last 7 days):     Results from last 7 days   Lab Units 05/12/23  1303   URINE CULTURE  No Growth <1000 cfu/mL       Last 24 Hours Medication List:   Current Facility-Administered Medications   Medication Dose Route Frequency Provider Last Rate   • acetaminophen  650 mg Oral Q8H BEV John     • acetaminophen  650 mg Oral Q6H PRN Carol Pablo, DO     • aluminum-magnesium hydroxide-simethicone  30 mL Oral Q6H PRN Carol Pablo, DO     • amiodarone  100 mg Oral Daily With Breakfast Carol Pablo, DO     • diltiazem  180 mg Oral Daily Cinthia Red MD     • heparin (porcine)  5,000 Units Subcutaneous [facility] Carol Pablo, DO     • hydrALAZINE  10 mg Intravenous Q6H PRN Cinthia Red MD     • lidocaine  1 patch Topical Daily Carol Pablo, DO     • LORazepam  0 5 mg Oral Daily BEV John     • melatonin  3 mg Oral HS BEV John     • metoprolol succinate  50 mg Oral Daily Carol Pablo, DO     • OLANZapine  2 5 mg Intramuscular Q6H PRN BEV John      Or   • OLANZapine  2 5 mg Oral Q6H PRN BEV John     • OLANZapine  5 mg Oral HS BEV John     • ondansetron  4 mg Intravenous Q6H PRN Carol Pablo, DO     • terazosin  2 mg Oral HS Carol Pablo, DO     • traZODone  50 mg Oral HS BEV John          Today, Patient Was Seen By: Rahel Ochoa MD

## 2023-05-18 NOTE — ASSESSMENT & PLAN NOTE
Continue amiodarone 100 mg daily, metoprolol 50 mg daily, Cardizem 180 mg daily Follow up for surgery.    Prednisolone Acetate 1% four times a day right side 2 weeks.

## 2023-05-18 NOTE — PROGRESS NOTES
Progress Note - Geriatric Medicine   Darwin Reyes 80 y o  female MRN: [id number]  Unit/Bed#: [facility] 2 [address] 87 [id number] Encounter: [id number]      Assessment/Plan:    Dementia  • Patient with known documented history   • Stenosis at baseline the patient is alert and oriented to person and sometimes place  · He notes that she is able to recognize family  · He notes that she does become physically behavioral at times when she wants to do things that family will not let her do  · She does see psych in the outpatient setting for history of schizophrenia and they are managing her behavioral medication  · Patient's son notes that she was just seen by psych approximately 1 5 weeks ago  · Patient has been agitated at times while inpatient requiring as needed medication and restraints  · She had been ordered Ativan as needed for agitation  · This was discontinued yesterday as this medication can increase behaviors and worsen delirium  · As needed medication adjusted to Zyprexa last night (see full recommendations below)  · Patient remains on 1:1 supervision today  · She remains in bilateral wrist restraints  · She is noted to be sleeping on exam today and appears comfortable  · Nursing notes that the patient did sleep for approximately 5 hours last night  · She did not require any as needed Zyprexa last night  · Most recent TSH on 1/20/2023 noted to be 2 374  · Most recent vitamin B12 level on 8/12/2022 noted to be 415  · Would recommend checking vitamin B12 level on routine labs  · CT of the head on 5/7/2023 revealed moderate microangiopathic changes  · No MoCA noted in epic  · Maintain delirium precautions as discussed below  · Redirect and reorient as needed  · Keep physically, mentally, and socially active    Delirium   • Current mentation: patient sleeping on exam today  • Patient is at high risk secondary to age, dementia, acute pain, hearing impairment, sleep disturbance, and hospitalization  • Maintain delirium precautions · Provide redirection, reorientation, and distraction techniques  · Maintain fall and safety precautions   · Assist with ADLs/IADLs  · Avoid deliriogenic medications such as tramadol, benzodiazepines, anticholinergics, benadryl  · Treat pain using geriatric pain protocol   · Encourage oral hydration and nutrition   · Monitor for constipation and urinary retention   · Implement sleep hygiene and limit night time interuptions   · Maintain sleep-wake cycle   · Encourage early and frequent mobilization   · Encourage participation in group activities  • Most recent EKG on 5/12/2023 revealed a QTc interval of 417  · If all other interventions are unsuccessful for acute agitation and behaviors, can consider Zyprexa 2 5 mg prn (ODT ordered if patient is taking oral medication and IM ordered if patient is too agitated to take medication)  • Would avoid benzodiazepines such as Ativan as these can worsen delirium     Deconditioning   • Patient is at increased risk for deconditioning secondary to dementia, frequent falls, weakness, gait dysfunction, and hospitalization  • Continue to optimize diet, hydration, and mobility for healing   · GFR on 5/14/2023 noted to be 35  · Avoid nephrotoxic medication and renal dose medication  · Keep hydrated  • Monitor for signs and symptoms of infection, dehydration, DVT, and skin breakdown    Frailty   Clinical Frail Scale: 6- Moderately Frail  · Need help with all outside activities  · Need help with stairs and bathing  · May need assistance with dressing  • Most recent albumin on 5/14/2023 noted to be 3 8  • Consider nutrition consult  • Encourage protein supplementation     Ambulatory Dysfunction/Falls  • Patient has had several recent falls at home  · Patient's son notes much of this is due to negligence of 24-hour caregivers  · She had not been ambulating with any assistive devices in the home but family or caregivers would assist her when ambulating  • Patients son is interested in obtaining a gait belt for home   • PT/OT consulted to assist with strengthening/mobility and assist with discharge planning to appropriate level of care  • Assess patient frequently for physical needs, encourage use of assistant devices as needed and directed by PT/OT  • Identify cognitive and physical deficits and behaviors that affect risk of falls  • Consider moving patient closer to nursing station to monitor more closely for impulsive behavior which may increase risk of falls  • [place] fall precautions   • Educate patient/family on patient safety including physical limitations and importance of using call bell for assistance   • Modify environment to reduce risk of injury including disconnecting from pole when not in use, ensuring adequate lighting in room and restroom, ensuring that path to restroom is clear and free of trip hazards  • Out of bed as tolerated     Impaired Hearing   • Patient does have hearing impairment  · Patient's son notes that she used to have hearing aids but she had been pulling them out of her ear  · Hearing impairment strongly correlated with depression, cognitive impairment, delirium and falls in the older adult  · Use sound amplifier as needed  · Speak face to face  · Use clear dictation and enunciation of words    Dentition/Appetite   • Patient does have dentures   • Son notes that she has a very good appetite at baseline  • Encourage use of dentures at all appropriate times  • Ensure meal consistency is appropriate for all abilities   • Consider nutrition consult   • Continue aspiration precautions     Elimination   • Patient's son notes that the patient has been continent of bowel and bladder at baseline  · He notes that the patient does have a history of urinary retention  · She follows up with urology in the outpatient setting and a Sanders catheter had been discussed in the past  · It was recommended by urology that she be toileted every 2 hours which she notes the caregivers had not been doing at home  · Recommend frequent toileting while inpatient  · Monitor for urinary retention and check PVRs  · Last documented bowel movement was this morning  · Patient is not currently on a bowel regimen  • Monitor for constipation and urinary retention     Insomnia   • Patient's son notes that she does have some trouble sleeping at baseline  • He notes that she sometimes sleeps during the day and sometimes she sleeps at night  • He notes that he recently spoke to the psychiatrist who told him to try melatonin at bedtime if she is having trouble sleeping  • Patient does take Zyprexa, trazodone, and duloxetine at bedtime at baseline  • Per nursing, patient has not been sleeping for several days  • She is noted to be sleeping on my exam today  • Nursing notes that she slept for approximately 5 hours last night and has been sleeping for a good part of the morning  • Melatonin 3 mg daily at bedtime ordered last night  • Recommend continuing current medication regimen  • First line is behavioral therapy   • Avoid sedative hypnotics including benzodiazepines and benadryl  • Encourage staying awake during the day   • Encourage daytime activities and morning exercise   • Decrease or eliminate daytime naps   • Avoid caffeine especially during late afternoon and evening hours  • Establish a nighttime routine  • Implement sleep hygiene and limit nighttime interruptions    Schizophrenia  • Patient with a documented history of schizophrenia  • She follows up with psych in the outpatient setting  • Psych has been managing her behavioral medications  • Current medication in the outpatient setting includes  · Zyprexa 5 mg daily at bedtime  · Ativan 0 5 mg daily with an additional dose of 0 5 mg daily as needed (PDMP reviewed yesterday)  · Trazodone 50 mg daily at bedtime  · Duloxetine 30 mg daily at bedtime  · Ativan scheduled yesterday but patient did not receive a m  dose yet today as she has been sleeping  · Please see recommendations above for acute agitation behaviors  · Recommend follow-up with psych on discharge    Back Pain  · Patient presented with back pain on admission  · CT of the spine was unremarkable for any acute traumatic injuries  · Patient does have a history of chronic pain  · She had been on a lidocaine patch daily for pain  · Tylenol scheduled around-the-clock yesterday  · Staff notes the patient has not been complaining of pain  · We will continue the scheduled Tylenol and lidocaine patch  · Continue nonpharmacological methods of pain management    At Risk for Elder Abuse  · Area of aging has received a complaining of the patient's home caregivers  · Son has been working on finding a new caregiver agency which she has found  · Patient is scheduled to be discharged tomorrow with new agency in place  · Patient's son expressed some concern about additional caregivers coming into the home some prior experiences  · Will provide patient's son with information on caregiver support group in addition to other caregiver resources when he comes in today  · Will also reach out to the outpatient office to help facilitate caregiver resources and support group information on discharge      Subjective: The patient is being seen and evaluated today at the bedside for geriatric follow-up  She is noted to be lying in bed comfortably in no acute distress  She remains in bilateral wrist restraints  She is sleeping on my exam today  Care was coordinated with patients nurse SB  She notes that the patient slept for approximately 5 hours last night  She notes that this morning she has been sleeping and she has not taken her a m  meds  Instructed SB to let her sleep for a little bit as she has not been sleeping for several days  Also informed SB to contact me when patient's son arrives so I can provide him with caregiver resources  She notes no acute issues or events overnight      Review of Systems "  Unable to perform ROS: Dementia (patient sleeping on exam today)   Constitutional: Positive for activity change and fatigue  HENT: Positive for hearing loss  Genitourinary:        Patient with history of urinary retention   Musculoskeletal: Positive for gait problem  Neurological: Positive for weakness  Psychiatric/Behavioral: Positive for confusion and sleep disturbance  Negative for behavioral problems  Objective:     Vitals: Blood pressure (!) 135/44, pulse 68, temperature 98 °F (36 7 °C), resp  rate 16, weight 57 1 kg (125 lb 14 1 oz), SpO2 93 %  ,Body mass index is 26 31 kg/m²  No intake or output data in the 24 hours ending 05/18/23 1232    Current Medications: Reviewed    Physical Exam:   Physical Exam  Vitals and nursing note reviewed  Constitutional:       General: She is sleeping  She is not in acute distress  Appearance: She is not ill-appearing  HENT:      Head: Normocephalic  Cardiovascular:      Rate and Rhythm: Normal rate and regular rhythm  Pulmonary:      Effort: Pulmonary effort is normal  No respiratory distress  Abdominal:      General: Bowel sounds are normal  There is no distension  Palpations: Abdomen is soft  Musculoskeletal:         General: No swelling  Skin:     General: Skin is warm and dry  Findings: Bruising (facial) present  Comments: Bilateral wrist restraints   Neurological:      Motor: Weakness present  Gait: Gait abnormal           Invasive Devices     Peripheral Intravenous Line  Duration           Peripheral IV 05/14/23 Dorsal (posterior); Left Forearm 3 days                Lab, Imaging and other studies: I have personally reviewed pertinent reports  Please note:  Voice-recognition software may have been used in the preparation of this document  Occasional wrong word or \"sound-alike\" substitutions may have occurred due to the inherent limitations of voice recognition software    Interpretation should be guided by " context

## 2023-05-19 ENCOUNTER — TRANSITIONAL CARE MANAGEMENT (OUTPATIENT)
Dept: INTERNAL MEDICINE CLINIC | Facility: CLINIC | Age: 88
End: 2023-05-19

## 2023-05-19 RX ORDER — AMIODARONE HYDROCHLORIDE 100 MG/1
100 TABLET ORAL DAILY
Qty: 30 TABLET | Refills: 0 | Status: SHIPPED | OUTPATIENT
Start: 2023-05-19 | End: 2023-05-19 | Stop reason: SDUPTHER

## 2023-05-19 RX ORDER — DILTIAZEM HYDROCHLORIDE 180 MG/1
180 CAPSULE, COATED, EXTENDED RELEASE ORAL DAILY
Qty: 30 CAPSULE | Refills: 0 | Status: SHIPPED | OUTPATIENT
Start: 2023-05-19

## 2023-05-19 RX ORDER — AMIODARONE HYDROCHLORIDE 200 MG/1
200 TABLET ORAL DAILY
Start: 2023-05-19

## 2023-05-19 RX ADMIN — AMIODARONE HYDROCHLORIDE 100 MG: 100 TABLET ORAL at 08:02

## 2023-05-19 RX ADMIN — LORAZEPAM 0.5 MG: 0.5 TABLET ORAL at 08:02

## 2023-05-19 RX ADMIN — LIDOCAINE 1 PATCH: 700 PATCH TOPICAL at 08:03

## 2023-05-19 RX ADMIN — HEPARIN SODIUM 5000 UNITS: 5000 INJECTION INTRAVENOUS; SUBCUTANEOUS at 06:27

## 2023-05-19 RX ADMIN — METOPROLOL SUCCINATE 50 MG: 50 TABLET, EXTENDED RELEASE ORAL at 08:02

## 2023-05-19 RX ADMIN — DILTIAZEM HYDROCHLORIDE 180 MG: 180 CAPSULE, COATED, EXTENDED RELEASE ORAL at 08:02

## 2023-05-19 NOTE — PROGRESS NOTES
Progress Note - Geriatric Medicine   Pk Parker 80 y o  female MRN: [id number]  Unit/Bed#: [facility] 2 -01 Encounter: [id number]      Assessment/Plan:    Dementia  • Patient with known documented history   • Son notes that at baseline the patient is alert and oriented to person and sometimes place  · He notes that she is able to recognize family  · He notes that she does become physically behavioral at times when she wants to do things that family will not let her do  · She does see psych in the outpatient setting for history of schizophrenia and they are managing her behavioral medication  · Patient's son notes that she was just seen by psych approximately 1 5 weeks ago  · Patient has been agitated at times while inpatient requiring as needed medication and restraints  · She had been ordered Ativan as needed for agitation  · This was discontinued yesterday as this medication can increase behaviors and worsen delirium  · As needed medication adjusted to Zyprexa(see full recommendations below)  · Patient has not required any prn Zyprexa since it was ordered on 5/17  · Patient remains on 1:1 supervision today  · She remains in bilateral wrist restraints  · She is alert and awake on exam today  · She appears comfortable  · Plan is for discharge home this morning   · Most recent TSH on 1/20/2023 noted to be 2 374  · Most recent vitamin B12 level on 8/12/2022 noted to be 415  · Would recommend checking vitamin B12 level on routine labs  · CT of the head on 5/7/2023 revealed moderate microangiopathic changes  · No MoCA noted in epic  · Maintain delirium precautions as discussed below  · Redirect and reorient as needed  · Keep physically, mentally, and socially active    Delirium   • Current mentation: oriented to person   • Patient is at high risk secondary to age, dementia, acute pain, hearing impairment, sleep disturbance, and hospitalization  • Maintain delirium precautions   · Provide redirection, reorientation, and distraction techniques  · Maintain fall and safety precautions   · Assist with ADLs/IADLs  · Avoid deliriogenic medications such as tramadol, benzodiazepines, anticholinergics, benadryl  · Treat pain using geriatric pain protocol   · Encourage oral hydration and nutrition   · Monitor for constipation and urinary retention   · Implement sleep hygiene and limit night time interuptions   · Maintain sleep-wake cycle   · Encourage early and frequent mobilization   · Encourage participation in group activities  • Most recent EKG on 5/12/2023 revealed a QTc interval of 417  · If all other interventions are unsuccessful for acute agitation and behaviors, can consider Zyprexa 2 5 mg prn (ODT ordered if patient is taking oral medication and IM ordered if patient is too agitated to take medication)  • Would avoid benzodiazepines such as Ativan as these can worsen delirium     Deconditioning   • Patient is at increased risk for deconditioning secondary to dementia, frequent falls, weakness, gait dysfunction, and hospitalization  • Continue to optimize diet, hydration, and mobility for healing   · GFR on 5/14/2023 noted to be 35  · Avoid nephrotoxic medication and renal dose medication  · Keep hydrated  • Monitor for signs and symptoms of infection, dehydration, DVT, and skin breakdown    Frailty   Clinical Frail Scale: 6- Moderately Frail  · Need help with all outside activities  · Need help with stairs and bathing  · May need assistance with dressing  • Most recent albumin on 5/14/2023 noted to be 3 8  • Consider nutrition consult  • Encourage protein supplementation     Ambulatory Dysfunction/Falls  • Patient has had several recent falls at home  · Patient's son notes much of this is due to negligence of 24-hour caregivers  · She had not been ambulating with any assistive devices in the home but family or caregivers would assist her when ambulating  • Patients son is interested in obtaining a gait belt for home   • PT/OT consulted to assist with strengthening/mobility and assist with discharge planning to appropriate level of care  • Assess patient frequently for physical needs, encourage use of assistant devices as needed and directed by PT/OT  • Identify cognitive and physical deficits and behaviors that affect risk of falls  • Consider moving patient closer to nursing station to monitor more closely for impulsive behavior which may increase risk of falls  • [place] fall precautions   • Educate patient/family on patient safety including physical limitations and importance of using call bell for assistance   • Modify environment to reduce risk of injury including disconnecting from pole when not in use, ensuring adequate lighting in room and restroom, ensuring that path to restroom is clear and free of trip hazards  • Out of bed as tolerated     Impaired Hearing   • Patient does have hearing impairment  · Patient's son notes that she used to have hearing aids but she had been pulling them out of her ear  · Hearing impairment strongly correlated with depression, cognitive impairment, delirium and falls in the older adult  · Use sound amplifier as needed  · Speak face to face  · Use clear dictation and enunciation of words    Dentition/Appetite   • Patient does have dentures   • Son notes that she has a very good appetite at baseline  • Encourage use of dentures at all appropriate times  • Ensure meal consistency is appropriate for all abilities   • Consider nutrition consult   • Continue aspiration precautions     Elimination   • Patient's son notes that the patient has been continent of bowel and bladder at baseline  · He notes that the patient does have a history of urinary retention  · She follows up with urology in the outpatient setting and a Sanders catheter had been discussed in the past  · It was recommended by urology that she be toileted every 2 hours which she notes the caregivers had not been doing at home  · Recommend frequent toileting while inpatient  · Monitor for urinary retention and check PVRs  · Last documented bowel movement was yesterday  · Patient is not currently on a bowel regimen  • Monitor for constipation and urinary retention     Insomnia   • Patient's son notes that she does have some trouble sleeping at baseline  • He notes that she sometimes sleeps during the day and sometimes she sleeps at night  • He notes that he recently spoke to the psychiatrist who told him to try melatonin at bedtime if she is having trouble sleeping  • Patient does take Zyprexa, trazodone, and duloxetine at bedtime at baseline  • Per nursing, patient has not been sleeping for several days  • She is noted to be sleeping on my exam today  • Nursing notes that she slept for approximately 5 hours last night and has been sleeping for a good part of the morning  • Melatonin 3 mg daily at bedtime ordered last night  • Recommend continuing current medication regimen  • First line is behavioral therapy   • Avoid sedative hypnotics including benzodiazepines and benadryl  • Encourage staying awake during the day   • Encourage daytime activities and morning exercise   • Decrease or eliminate daytime naps   • Avoid caffeine especially during late afternoon and evening hours  • Establish a nighttime routine  • Implement sleep hygiene and limit nighttime interruptions    Schizophrenia  • Patient with a documented history of schizophrenia  • She follows up with psych in the outpatient setting  • Psych has been managing her behavioral medications  • Current medication in the outpatient setting includes  · Zyprexa 5 mg daily at bedtime  · Ativan 0 5 mg daily with an additional dose of 0 5 mg daily as needed (PDMP reviewed yesterday)  · Trazodone 50 mg daily at bedtime  · Duloxetine 30 mg daily at bedtime  · Ativan scheduled on 5/17 as she takes this daily for several years at home  · Please see recommendations above for acute agitation behaviors  · Recommend follow-up with psych on discharge    Back Pain  · Patient presented with back pain on admission  · CT of the spine was unremarkable for any acute traumatic injuries  · Patient does have a history of chronic pain  · She had been on a lidocaine patch daily for pain  · Tylenol scheduled around-the-clock yesterday  · Staff notes the patient has not been complaining of pain  · We will continue the scheduled Tylenol and lidocaine patch  · Continue nonpharmacological methods of pain management    At Risk for Elder Abuse  · Area of aging has received a complaining of the patient's home caregivers  · Son has been working on finding a new caregiver agency which she has found  · Patient is scheduled to be discharged tomorrow with new agency in place  · Patient's son expressed some concern about additional caregivers coming into the home some prior experiences  · Caregiver support group information and resources provided to patients son this morning   · I am also reaching out to the outpatient office to help facilitate caregiver resources and support group information       Subjective: The patient is being seen and evaluated today at the bedside for geriatric follow-up  She is noted to be lying in bed comfortably in no acute distress  She remains in bilateral wrist restraints  She is alert and awake on exam today  The PCA notes she has not had any complaints this morning  He notes that she did have eggs for breakfast      Care was coordinated with patients nurse Heidi Alegria  Review of Systems   Unable to perform ROS: Dementia   Constitutional: Positive for activity change and fatigue  HENT: Positive for hearing loss  Genitourinary:        Patient with history of urinary retention   Musculoskeletal: Positive for gait problem  Neurological: Positive for weakness  Psychiatric/Behavioral: Positive for confusion and sleep disturbance  Negative for behavioral problems         Objective: "    Vitals: Blood pressure 138/53, pulse 70, temperature 98 5 °F (36 9 °C), temperature source Oral, resp  rate 18, weight 57 1 kg (125 lb 14 1 oz), SpO2 94 %  ,Body mass index is 26 31 kg/m²  No intake or output data in the 24 hours ending 05/19/23 0924    Current Medications: Reviewed    Physical Exam:   Physical Exam  Vitals and nursing note reviewed  Constitutional:       General: She is sleeping  She is not in acute distress  Appearance: She is not ill-appearing  HENT:      Head: Normocephalic  Cardiovascular:      Rate and Rhythm: Normal rate and regular rhythm  Pulmonary:      Effort: Pulmonary effort is normal  No respiratory distress  Abdominal:      General: Bowel sounds are normal  There is no distension  Palpations: Abdomen is soft  Musculoskeletal:         General: No swelling  Skin:     General: Skin is warm and dry  Findings: Bruising (facial) present  Comments: Bilateral wrist restraints   Neurological:      Motor: Weakness present  Gait: Gait abnormal           Invasive Devices     Peripheral Intravenous Line  Duration           Peripheral IV 05/14/23 Dorsal (posterior); Left Forearm 4 days                Lab, Imaging and other studies: I have personally reviewed pertinent reports  Please note:  Voice-recognition software may have been used in the preparation of this document  Occasional wrong word or \"sound-alike\" substitutions may have occurred due to the inherent limitations of voice recognition software  Interpretation should be guided by context      "
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
## 2023-05-22 DIAGNOSIS — Z71.89 COMPLEX CARE COORDINATION: Primary | ICD-10-CM

## 2023-05-22 NOTE — UTILIZATION REVIEW
NOTIFICATION OF ADMISSION DISCHARGE   This is a Notification of Discharge from [address]  Please be advised that this patient has been discharge from our facility  Below you will find the admission and discharge date and time including the patient’s disposition  UTILIZATION REVIEW CONTACT:  Flores Molina MA  Utilization   Network Utilization Review Department  Phone: [phone] x carefully listen to the prompts  All voicemails are confidential   Email: [email or web address]     ADMISSION INFORMATION  PRESENTATION DATE: 5/12/2023 12:05 PM  OBERVATION ADMISSION DATE:   INPATIENT ADMISSION DATE: 5/13/23 11:04 AM   DISCHARGE DATE: 5/19/2023 11:08 AM   DISPOSITION:Home/Self Care    IMPORTANT INFORMATION:  Send all requests for admission clinical reviews, approved or denied determinations and any other requests to dedicated fax number below belonging to the campus where the patient is receiving treatment   List of dedicated fax numbers:  [address] DENIALS (Administrative/Medical Necessity) [phone]   [address] (Maternity/NICU/Pediatrics) [phone]   [facility] [phone]   [place] [address] [phone]   [facility] [place]  [phone]   [address] [facility] [phone] [facility] [phone]   [address] [place], [place] [address] [address] [phone]   [facility]  [phone]   [facility] [phone]   [address] [facility] [address] [facility] [phone]

## 2023-05-24 ENCOUNTER — PATIENT OUTREACH (OUTPATIENT)
Dept: INTERNAL MEDICINE CLINIC | Facility: CLINIC | Age: 88
End: 2023-05-24

## 2023-05-24 ENCOUNTER — OFFICE VISIT (OUTPATIENT)
Dept: INTERNAL MEDICINE CLINIC | Facility: CLINIC | Age: 88
End: 2023-05-24

## 2023-05-24 VITALS
HEART RATE: 70 BPM | TEMPERATURE: 97.8 F | SYSTOLIC BLOOD PRESSURE: 136 MMHG | DIASTOLIC BLOOD PRESSURE: 56 MMHG | RESPIRATION RATE: 13 BRPM

## 2023-05-24 DIAGNOSIS — F20.5 RESIDUAL SCHIZOPHRENIA (HCC): Chronic | ICD-10-CM

## 2023-05-24 DIAGNOSIS — I10 ESSENTIAL HYPERTENSION: Primary | ICD-10-CM

## 2023-05-24 DIAGNOSIS — E78.2 MIXED HYPERLIPIDEMIA: ICD-10-CM

## 2023-05-24 DIAGNOSIS — F03.C11 SEVERE DEMENTIA WITH AGITATION, UNSPECIFIED DEMENTIA TYPE (HCC): Chronic | ICD-10-CM

## 2023-05-24 DIAGNOSIS — M81.0 AGE-RELATED OSTEOPOROSIS WITHOUT CURRENT PATHOLOGICAL FRACTURE: ICD-10-CM

## 2023-05-24 DIAGNOSIS — R26.2 AMBULATORY DYSFUNCTION: ICD-10-CM

## 2023-05-24 DIAGNOSIS — G93.40 ACUTE ENCEPHALOPATHY: ICD-10-CM

## 2023-05-24 DIAGNOSIS — N18.31 STAGE 3A CHRONIC KIDNEY DISEASE (HCC): ICD-10-CM

## 2023-05-24 NOTE — PROGRESS NOTES
Assessment/Plan:      Patient was admitted to [facility] May 12 discharged on May 19  Acute encephalopathy agitated most likely triggered by UTI  Continuing one-on-one in the hospital    Back pain she had a work-up CAT scan of the lumbar spine abdomen pelvis was unremarkable no evidence of fractures    Urinary tract infection does have history of urinary retention  And incontinence was treated with Rocephin she is asymptomatic now    Chronic renal insufficiency creatinine 1 3 to in May 12 stable    Paroxysmal atrial fibrillation she is in sinus rhythm clinically here she takes amiodarone 200 mg move metoprolol succinate 50 mg daily and Cardizem  maintaining her blood pressure and her regular pulse  She is doing well examine her in the wheelchair with her son and her new caregiver his son is very happy with the care at home    She denied any complaining new including back pain her weight is stable she is eating without any problems and taking her medications    Results for orders placed or performed during the hospital encounter of 05/12/23   Urine culture    Specimen: Urine, Straight Cath   Result Value Ref Range    Urine Culture No Growth <1000 cfu/mL    CBC and differential   Result Value Ref Range    WBC 6 44 4 31 - 10 16 Thousand/uL    RBC 3 87 3 81 - 5 12 Million/uL    Hemoglobin 11 4 (L) 11 5 - 15 4 g/dL    Hematocrit 35 0 34 8 - 46 1 %    MCV 90 82 - 98 fL    MCH 29 5 26 8 - 34 3 pg    MCHC 32 6 31 4 - 37 4 g/dL    RDW 13 4 11 6 - 15 1 %    MPV 10 1 8 9 - 12 7 fL    Platelets 311 451 - 546 Thousands/uL    nRBC 0 /100 WBCs    Neutrophils Relative 72 43 - 75 %    Immat GRANS % 0 0 - 2 %    Lymphocytes Relative 19 14 - 44 %    Monocytes Relative 8 4 - 12 %    Eosinophils Relative 1 0 - 6 %    Basophils Relative 0 0 - 1 %    Neutrophils Absolute 4 61 1 85 - 7 62 Thousands/µL    Immature Grans Absolute 0 02 0 00 - 0 20 Thousand/uL    Lymphocytes Absolute 1 21 0 60 - 4 47 Thousands/µL    Monocytes "Absolute 0 52 0 17 - 1 22 Thousand/µL    Eosinophils Absolute 0 07 0 00 - 0 61 Thousand/µL    Basophils Absolute 0 01 0 00 - 0 10 Thousands/µL   Comprehensive metabolic panel   Result Value Ref Range    Sodium 134 (L) 135 - 147 mmol/L    Potassium 4 3 3 5 - 5 3 mmol/L    Chloride 103 96 - 108 mmol/L    CO2 25 21 - 32 mmol/L    ANION GAP 6 4 - 13 mmol/L    BUN 26 (H) 5 - 25 mg/dL    Creatinine 1 32 (H) 0 60 - 1 30 mg/dL    Glucose 96 65 - 140 mg/dL    Calcium 8 8 8 4 - 10 2 mg/dL    AST 21 13 - 39 U/L    ALT 9 7 - 52 U/L    Alkaline Phosphatase 89 34 - 104 U/L    Total Protein 6 5 6 4 - 8 4 g/dL    Albumin 4 2 3 5 - 5 0 g/dL    Total Bilirubin 0 66 0 20 - 1 00 mg/dL    eGFR 35 ml/min/1 73sq m   Lipase   Result Value Ref Range    Lipase 11 11 - 82 u/L   HS Troponin 0hr (reflex protocol)   Result Value Ref Range    hs TnI 0hr 10 \"Refer to ACS Flowchart\"- see link ng/L   UA w Reflex to Microscopic w Reflex to Culture    Specimen: Urine, Straight Cath   Result Value Ref Range    Color, UA Yellow     Clarity, UA Turbid     Specific [place], UA 1 019 1 003 - 1 030    pH, UA 7 5 4 5, 5 0, 5 5, 6 0, 6 5, 7 0, 7 5, 8 0    Leukocytes, UA Large (A) Negative    Nitrite, UA Negative Negative    Protein, UA 50 (1+) (A) Negative mg/dl    Glucose, UA Negative Negative mg/dl    Ketones, UA Negative Negative mg/dl    Urobilinogen, UA 2 0 (A) <2 0 mg/dl mg/dl    Bilirubin, UA Negative Negative    Occult Blood, UA Trace (A) Negative   HS Troponin I 2hr   Result Value Ref Range    hs TnI 2hr 11 \"Refer to ACS Flowchart\"- see link ng/L    Delta 2hr hsTnI 1 <20 ng/L   Urine Microscopic   Result Value Ref Range    RBC, UA 4-10 (A) None Seen, 1-2 /hpf    WBC, UA Innumerable (A) None Seen, 1-2 /hpf    Epithelial Cells Occasional None Seen, Occasional /hpf    Bacteria, UA None Seen None Seen, Occasional /hpf    MUCUS THREADS Occasional (A) None Seen   Basic metabolic panel   Result Value Ref Range    Sodium 138 135 - 147 mmol/L    Potassium 4 0 " 3 5 - 5 3 mmol/L    Chloride 105 96 - 108 mmol/L    CO2 24 21 - 32 mmol/L    ANION GAP 9 4 - 13 mmol/L    BUN 17 5 - 25 mg/dL    Creatinine 1 13 0 60 - 1 30 mg/dL    Glucose 74 65 - 140 mg/dL    Calcium 8 4 8 4 - 10 2 mg/dL    eGFR 42 ml/min/1 73sq m   CBC (With Platelets)   Result Value Ref Range    WBC 5 24 4 31 - 10 16 Thousand/uL    RBC 4 04 3 81 - 5 12 Million/uL    Hemoglobin 11 9 11 5 - 15 4 g/dL    Hematocrit 37 1 34 8 - 46 1 %    MCV 92 82 - 98 fL    MCH 29 5 26 8 - 34 3 pg    MCHC 32 1 31 4 - 37 4 g/dL    RDW 13 5 11 6 - 15 1 %    Platelets 327 231 - 658 Thousands/uL    MPV 9 1 8 9 - 12 7 fL   CBC and differential   Result Value Ref Range    WBC 5 07 4 31 - 10 16 Thousand/uL    RBC 4 05 3 81 - 5 12 Million/uL    Hemoglobin 12 0 11 5 - 15 4 g/dL    Hematocrit 36 5 34 8 - 46 1 %    MCV 90 82 - 98 fL    MCH 29 6 26 8 - 34 3 pg    MCHC 32 9 31 4 - 37 4 g/dL    RDW 13 6 11 6 - 15 1 %    MPV 11 6 8 9 - 12 7 fL    Platelets 655 665 - 607 Thousands/uL    nRBC 0 /100 WBCs    Neutrophils Relative 59 43 - 75 %    Immat GRANS % 0 0 - 2 %    Lymphocytes Relative 27 14 - 44 %    Monocytes Relative 11 4 - 12 %    Eosinophils Relative 3 0 - 6 %    Basophils Relative 0 0 - 1 %    Neutrophils Absolute 2 92 1 85 - 7 62 Thousands/µL    Immature Grans Absolute 0 02 0 00 - 0 20 Thousand/uL    Lymphocytes Absolute 1 39 0 60 - 4 47 Thousands/µL    Monocytes Absolute 0 57 0 17 - 1 22 Thousand/µL    Eosinophils Absolute 0 16 0 00 - 0 61 Thousand/µL    Basophils Absolute 0 01 0 00 - 0 10 Thousands/µL   Comprehensive metabolic panel   Result Value Ref Range    Sodium 140 135 - 147 mmol/L    Potassium 3 9 3 5 - 5 3 mmol/L    Chloride 104 96 - 108 mmol/L    CO2 27 21 - 32 mmol/L    ANION GAP 9 4 - 13 mmol/L    BUN 19 5 - 25 mg/dL    Creatinine 1 30 0 60 - 1 30 mg/dL    Glucose 81 65 - 140 mg/dL    Calcium 8 6 8 4 - 10 2 mg/dL    AST 25 13 - 39 U/L    ALT 16 7 - 52 U/L    Alkaline Phosphatase 128 (H) 34 - 104 U/L    Total Protein 6 2 (L) 6 4 - 8 4 g/dL    Albumin 3 8 3 5 - 5 0 g/dL    Total Bilirubin 0 57 0 20 - 1 00 mg/dL    eGFR 35 ml/min/1 73sq m   Magnesium   Result Value Ref Range    Magnesium 2 7 1 9 - 2 7 mg/dL   Phosphorus   Result Value Ref Range    Phosphorus 2 9 2 3 - 4 1 mg/dL   ECG 12 lead   Result Value Ref Range    Ventricular Rate 54 BPM    Atrial Rate 54 BPM    MO Interval 182 ms    QRSD Interval 96 ms    QT Interval 440 ms    QTC Interval 417 ms    P Axis 61 degrees    QRS Axis 47 degrees    T Wave Axis 66 degrees   ECG 12 lead   Result Value Ref Range    Ventricular Rate 51 BPM    Atrial Rate 51 BPM    MO Interval 216 ms    QRSD Interval 92 ms    QT Interval 462 ms    QTC Interval 425 ms    P [place] 68 degrees    QRS Axis 59 degrees    T Wave Axis 67 degrees       No problem-specific Assessment & Plan notes found for this encounter  Diagnoses and all orders for this visit:    Essential hypertension    Acute encephalopathy    Severe dementia with agitation, unspecified dementia type ([facility] )    Stage 3a chronic kidney disease ([place] [address] )    Ambulatory dysfunction    Mixed hyperlipidemia    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[place] [address] )    Age-related osteoporosis without current pathological fracture          Subjective:      Patient ID: Lexa Russell is a 80 y o  female  No chief complaint on file          Current Outpatient Medications:   •  acetaminophen (TYLENOL) 325 mg tablet, Take 2 tablets (650 mg total) by mouth every 6 (six) hours as needed for mild pain, moderate pain or headaches, Disp: 30 tablet, Rfl: 0  •  amiodarone 200 mg tablet, Take 1 tablet (200 mg total) by mouth daily, Disp: , Rfl:   •  diltiazem (CARDIZEM CD) 180 mg 24 hr capsule, Take 1 capsule (180 mg total) by mouth daily, Disp: 30 capsule, Rfl: 0  •  DULoxetine (CYMBALTA) 30 mg delayed release capsule, Take 30 mg by mouth daily at bedtime, Disp: , Rfl:   •  LORazepam (ATIVAN) 0 5 mg tablet, Take 0 5 mg by mouth , Disp: , Rfl:   •  metoprolol succinate (TOPROL-XL) 50 mg 24 hr tablet, TAKE 1 TABLET BY MOUTH DAILY, Disp: 90 tablet, Rfl: 0  •  OLANZapine (ZyPREXA) 5 mg tablet, Take 1 tablet (5 mg total) by mouth daily at bedtime, Disp: 30 tablet, Rfl: 1  •  Stool Softener 100 MG capsule, TAKE 1 CAPSULE BY MOUTH EVERY 12 HOURS FOR CONSTIPATION, Disp: 90 capsule, Rfl: 0  •  terazosin (HYTRIN) 2 mg capsule, Take 1 capsule (2 mg total) by mouth daily at bedtime, Disp: 90 capsule, Rfl: 3  •  traZODone (DESYREL) 50 mg tablet, Take 50 mg by mouth daily at bedtime, Disp: , Rfl:     HPI    The following portions of the patient's history were reviewed and updated as appropriate: allergies, current medications, past family history, past medical history, past social history, past surgical history and problem list     Review of Systems   Constitutional: Negative  Negative for activity change, appetite change, fatigue, fever and unexpected weight change  HENT: Negative for congestion, ear pain, hearing loss, mouth sores, postnasal drip, rhinorrhea, sore throat, trouble swallowing and voice change  Eyes: Negative for pain, redness and visual disturbance  Respiratory: Negative for cough, chest tightness, shortness of breath and wheezing  Cardiovascular: Negative for chest pain, palpitations and leg swelling  Gastrointestinal: Negative for abdominal distention, abdominal pain, blood in stool, constipation, diarrhea and nausea  Endocrine: Negative for cold intolerance, heat intolerance, polydipsia, polyphagia and polyuria  Genitourinary: Negative for difficulty urinating, dysuria, flank pain, frequency, hematuria and urgency  Musculoskeletal: Negative for arthralgias, back pain, gait problem, joint swelling and myalgias  Skin: Negative for color change and pallor  Neurological: Negative for dizziness, tremors, seizures, syncope, weakness, numbness and headaches  Hematological: Negative for adenopathy  Does not bruise/bleed easily  Psychiatric/Behavioral: Negative    Negative for sleep disturbance  The patient is not nervous/anxious  Objective: There were no vitals taken for this visit  Physical Exam  Vitals and nursing note reviewed  Constitutional:       Appearance: She is well-developed  HENT:      Head: Normocephalic  Right Ear: External ear normal       Left Ear: External ear normal       Nose: Nose normal       Mouth/Throat:      Pharynx: No oropharyngeal exudate  Eyes:      Conjunctiva/sclera: Conjunctivae normal       Pupils: Pupils are equal, round, and reactive to light  Neck:      Thyroid: No thyromegaly  Cardiovascular:      Rate and Rhythm: Normal rate and regular rhythm  Heart sounds: Normal heart sounds  No murmur heard  No friction rub  No gallop  Comments: S1-S2 regular rhythm  Extremities no edema  Pulmonary:      Effort: Pulmonary effort is normal  No respiratory distress  Breath sounds: Normal breath sounds  No wheezing or rales  Comments: 's are clear no wheezing rales or rhonchi  Abdominal:      General: Bowel sounds are normal  There is no distension  Palpations: Abdomen is soft  There is no mass  Tenderness: There is no abdominal tenderness  There is no guarding or rebound  Comments: Abdomen soft nontender   Musculoskeletal:         General: Normal range of motion  Cervical back: Normal range of motion and neck supple  Lymphadenopathy:      Cervical: No cervical adenopathy  Skin:     General: Skin is warm and dry  Neurological:      Mental Status: She is alert and oriented to person, place, and time     Psychiatric:         Behavior: Behavior normal          Judgment: Judgment normal

## 2023-05-31 ENCOUNTER — PATIENT OUTREACH (OUTPATIENT)
Dept: INTERNAL MEDICINE CLINIC | Facility: CLINIC | Age: 88
End: 2023-05-31

## 2023-05-31 NOTE — PROGRESS NOTES
Contacted patient's son for f/u post hospitalization for acute encephalopathy  He states he was presently on another call and will call  back

## 2023-06-07 ENCOUNTER — PATIENT OUTREACH (OUTPATIENT)
Dept: INTERNAL MEDICINE CLINIC | Facility: CLINIC | Age: 88
End: 2023-06-07

## 2023-06-07 NOTE — LETTER
Fecha: 06/07/23    Estimado/a Nils Felton:      Mi nombre es Aurelia Brown un enfermero registrado administrador de atención de   [facility] [place]   No pude comunicarme con usted y me gustaría programar un horario para que hablemos por teléfono o personalmente  Me dedico a ayudar a los pacientes que tienen afecciones médicas complejas a obtener la atención que necesitan  [place] comprende a pacientes que pueden estar en el hospital o en rj david de emergencias  Adjunto información para usted  Si tiene preguntas, no dude en llamarme  Espero crowell llamado  Atentamente    Tyrel Charmaine DEJESUS  [phone]  Jae Suarez ambulatoria

## 2023-06-14 ENCOUNTER — HOSPITAL ENCOUNTER (OUTPATIENT)
Dept: RADIOLOGY | Facility: HOSPITAL | Age: 88
Discharge: HOME | End: 2023-06-14
Payer: MEDICARE

## 2023-06-14 ENCOUNTER — OFFICE VISIT (OUTPATIENT)
Dept: INTERNAL MEDICINE CLINIC | Facility: CLINIC | Age: 88
End: 2023-06-14
Payer: MEDICARE

## 2023-06-14 VITALS
DIASTOLIC BLOOD PRESSURE: 70 MMHG | BODY MASS INDEX: 23.3 KG/M2 | HEART RATE: 58 BPM | RESPIRATION RATE: 18 BRPM | HEIGHT: 58 IN | SYSTOLIC BLOOD PRESSURE: 120 MMHG | OXYGEN SATURATION: 97 % | WEIGHT: 111 LBS | TEMPERATURE: 97.6 F

## 2023-06-14 DIAGNOSIS — R10.9 FLANK PAIN: Primary | ICD-10-CM

## 2023-06-14 DIAGNOSIS — E78.2 MIXED HYPERLIPIDEMIA: ICD-10-CM

## 2023-06-14 DIAGNOSIS — F03.C11 SEVERE DEMENTIA WITH AGITATION, UNSPECIFIED DEMENTIA TYPE (HCC): Chronic | ICD-10-CM

## 2023-06-14 DIAGNOSIS — I48.0 PAROXYSMAL A-FIB (HCC): ICD-10-CM

## 2023-06-14 DIAGNOSIS — I10 ORTHOSTATIC HYPERTENSION: ICD-10-CM

## 2023-06-14 DIAGNOSIS — I10 ESSENTIAL HYPERTENSION: Primary | ICD-10-CM

## 2023-06-14 DIAGNOSIS — R10.9 FLANK PAIN: ICD-10-CM

## 2023-06-14 DIAGNOSIS — N18.31 STAGE 3A CHRONIC KIDNEY DISEASE (HCC): ICD-10-CM

## 2023-06-14 DIAGNOSIS — R07.89 STERNUM PAIN: ICD-10-CM

## 2023-06-14 DIAGNOSIS — I48.0 PAF (PAROXYSMAL ATRIAL FIBRILLATION) (HCC): ICD-10-CM

## 2023-06-14 DIAGNOSIS — F20.3 UNDIFFERENTIATED SCHIZOPHRENIA (HCC): Chronic | ICD-10-CM

## 2023-06-14 PROCEDURE — 71120 X-RAY EXAM BREASTBONE 2/>VWS: CPT

## 2023-06-14 PROCEDURE — 99213 OFFICE O/P EST LOW 20 MIN: CPT | Performed by: INTERNAL MEDICINE

## 2023-06-14 RX ORDER — DILTIAZEM HYDROCHLORIDE 180 MG/1
180 CAPSULE, COATED, EXTENDED RELEASE ORAL DAILY
Qty: 30 CAPSULE | Refills: 0 | Status: SHIPPED | OUTPATIENT
Start: 2023-06-14

## 2023-06-14 RX ORDER — MIRTAZAPINE 7.5 MG/1
TABLET, FILM COATED ORAL
COMMUNITY
Start: 2023-05-31

## 2023-06-14 NOTE — PROGRESS NOTES
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 she did lose some weight but will get sternal x-ray  She had a CAT scan of the chest and abdomen and pelvis when she went to the ER in [place] which was unremarkable  She denies any abdominal pain no nausea or vomiting she is smiling during the visit today  No problem-specific Assessment & Plan notes found for this encounter  Diagnoses and all orders for this visit:    Essential hypertension    PAF (paroxysmal atrial fibrillation) ([facility])    Severe dementia with agitation, unspecified dementia type ([place] [address] )    Stage 3a chronic kidney disease ([place] [address] )    Mixed hyperlipidemia    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[place] [address] )          Subjective:      Patient ID: Keila Guy is a 80 y o  female  No chief complaint on file          Current Outpatient Medications:   •  acetaminophen (TYLENOL) 325 mg tablet, Take 2 tablets (650 mg total) by mouth every 6 (six) hours as needed for mild pain, moderate pain or headaches, Disp: 30 tablet, Rfl: 0  •  amiodarone 200 mg tablet, Take 1 tablet (200 mg total) by mouth daily, Disp: , Rfl:   •  diltiazem (CARDIZEM CD) 180 mg 24 hr capsule, Take 1 capsule (180 mg total) by mouth daily, Disp: 30 capsule, Rfl: 0  •  DULoxetine (CYMBALTA) 30 mg delayed release capsule, Take 30 mg by mouth daily at bedtime, Disp: , Rfl:   •  LORazepam (ATIVAN) 0 5 mg tablet, Take 0 5 mg by mouth , Disp: , Rfl:   •  metoprolol succinate (TOPROL-XL) 50 mg 24 hr tablet, TAKE 1 TABLET BY MOUTH DAILY, Disp: 90 tablet, Rfl: 0  •  OLANZapine (ZyPREXA) 5 mg tablet, Take 1 tablet (5 mg total) by mouth daily at bedtime, Disp: 30 tablet, Rfl: 1  •  Stool Softener 100 MG capsule, TAKE 1 CAPSULE BY MOUTH EVERY 12 HOURS FOR CONSTIPATION, Disp: 90 capsule, Rfl: 0  •  terazosin (HYTRIN) 2 mg capsule, Take 1 capsule (2 mg total) by mouth daily at bedtime, Disp: 90 capsule, Rfl: 3  •  traZODone (DESYREL) 50 mg tablet, Take 50 mg by mouth daily at bedtime, Disp: , Rfl:     HPI    The following portions of the patient's history were reviewed and updated as appropriate: allergies, current medications, past family history, past medical history, past social history, past surgical history and problem list     Review of Systems   Constitutional: Negative  Negative for activity change, appetite change, fatigue, fever and unexpected weight change  HENT: Negative for congestion, ear pain, hearing loss, mouth sores, postnasal drip, rhinorrhea, sore throat, trouble swallowing and voice change  Eyes: Negative for pain, redness and visual disturbance  Respiratory: Negative for cough, chest tightness, shortness of breath and wheezing  Cardiovascular: Negative for chest pain, palpitations and leg swelling  Gastrointestinal: Negative for abdominal distention, abdominal pain, blood in stool, constipation, diarrhea and nausea  Abdominal discomfort due to the sternum deformity and the xiphoid   Endocrine: Negative for cold intolerance, heat intolerance, polydipsia, polyphagia and polyuria  Genitourinary: Negative for difficulty urinating, dysuria, flank pain, frequency, hematuria and urgency  Musculoskeletal: Negative for arthralgias, back pain, gait problem, joint swelling and myalgias  Skin: Negative for color change and pallor  Neurological: Negative for dizziness, tremors, seizures, syncope, weakness, numbness and headaches  Hematological: Negative for adenopathy  Does not bruise/bleed easily  Psychiatric/Behavioral: Negative  Negative for sleep disturbance  The patient is not nervous/anxious  Objective: There were no vitals taken for this visit  Physical Exam  Constitutional:       Appearance: She is well-developed  HENT:      Head: Normocephalic        Right Ear: External ear normal       Left Ear: External ear normal       Nose: Nose normal       Mouth/Throat: Pharynx: No oropharyngeal exudate  Eyes:      Conjunctiva/sclera: Conjunctivae normal       Pupils: Pupils are equal, round, and reactive to light  Neck:      Thyroid: No thyromegaly  Cardiovascular:      Rate and Rhythm: Normal rate and regular rhythm  Heart sounds: Normal heart sounds  No murmur heard  No friction rub  No gallop  Comments: S1-S2 regular rhythm  Extremities no edema  Pulmonary:      Effort: Pulmonary effort is normal  No respiratory distress  Breath sounds: Normal breath sounds  No wheezing or rales  Comments: Lungs are clear no wheezing rales or rhonchi    Xiphoid deformity lump palpable denies any pain during palpation  Abdominal:      General: Bowel sounds are normal  There is no distension  Palpations: Abdomen is soft  There is no mass  Tenderness: There is no abdominal tenderness  There is no guarding or rebound  Comments: Abdomen soft nontender   Musculoskeletal:         General: Normal range of motion  Cervical back: Normal range of motion and neck supple  Lymphadenopathy:      Cervical: No cervical adenopathy  Skin:     General: Skin is warm and dry  Neurological:      Mental Status: She is alert and oriented to person, place, and time     Psychiatric:         Behavior: Behavior normal          Judgment: Judgment normal

## 2023-06-14 NOTE — PATIENT INSTRUCTIONS
Pelvis sternal deformity you had a CAT scan when he went to the ER which was unremarkable we will go to do an x-ray you can take Tylenol for pain

## 2023-06-21 ENCOUNTER — TELEPHONE (OUTPATIENT)
Dept: INTERNAL MEDICINE CLINIC | Facility: CLINIC | Age: 88
End: 2023-06-21

## 2023-06-21 NOTE — TELEPHONE ENCOUNTER
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

## 2023-06-22 ENCOUNTER — PATIENT OUTREACH (OUTPATIENT)
Dept: INTERNAL MEDICINE CLINIC | Facility: CLINIC | Age: 88
End: 2023-06-22

## 2023-06-30 ENCOUNTER — OFFICE VISIT (OUTPATIENT)
Dept: RHEUMATOLOGY | Facility: CLINIC | Age: 88
End: 2023-06-30

## 2023-06-30 ENCOUNTER — TELEPHONE (OUTPATIENT)
Dept: RHEUMATOLOGY | Facility: CLINIC | Age: 88
End: 2023-06-30

## 2023-06-30 VITALS — DIASTOLIC BLOOD PRESSURE: 70 MMHG | BODY MASS INDEX: 23.2 KG/M2 | SYSTOLIC BLOOD PRESSURE: 110 MMHG | HEIGHT: 58 IN

## 2023-06-30 DIAGNOSIS — M81.0 OSTEOPOROSIS, UNSPECIFIED OSTEOPOROSIS TYPE, UNSPECIFIED PATHOLOGICAL FRACTURE PRESENCE: ICD-10-CM

## 2023-06-30 DIAGNOSIS — N18.32 STAGE 3B CHRONIC KIDNEY DISEASE (HCC): ICD-10-CM

## 2023-06-30 DIAGNOSIS — S72.001S CLOSED FRACTURE OF RIGHT HIP, SEQUELA: ICD-10-CM

## 2023-06-30 DIAGNOSIS — S02.31XS: ICD-10-CM

## 2023-06-30 DIAGNOSIS — M80.00XA AGE-RELATED OSTEOPOROSIS WITH CURRENT PATHOLOGICAL FRACTURE, UNSPECIFIED SITE, INITIAL ENCOUNTER FOR FRACTURE: Primary | ICD-10-CM

## 2023-06-30 RX ORDER — LANOLIN ALCOHOL/MO/W.PET/CERES
CREAM (GRAM) TOPICAL
COMMUNITY
Start: 2023-06-26

## 2023-06-30 NOTE — PROGRESS NOTES
Assessment and Plan:  Ms Peri Flores is a 69-year-old female with past medical history significant for osteoporosis, history of closed right hip fracture 2021, history of closed fracture of right orbital floor, oropharyngeal dysphagia, CKD stage IIIb, paroxysmal atrial fibrillation, dementia, and schizophrenia who presents for rheumatology evaluation of osteoporosis  The patient does have history of multiple fractures in the past as mentioned above and this bone density scan was obtained on 2/24/2023  This revealed osteoporosis of the lumbar spine, left total hip, and left femoral neck  She has osteopenia of the right forearm  Due to history of dysphagia and CKD stage IIIb (eGFR 35), I would recommend the avoidance of bisphosphonate medications  Prolia would be appropriate in this patient to prevent further fractures  Additionally, I recommend calcium 600 mg twice daily and vitamin D 2000 units daily  Most recent vitamin D 25-hydroxy was greater than 30  Reviewed with patient that Prolia (denosumab) is a monoclonal antibody biologic medication that can rapidly utilize the body's calcium and make one susceptible to hypocalcemia; the medication also has a risk of osteonecrosis of the jaw in patients with exposed jaw bone  Other potential side effects include: hypersensitivity, musculoskeletal pain, dermatitis, atypical femur fractures  They are advised to report new or unusual thigh, hip, or groin pain so that incomplete femur fracture can be ruled out  The patient does not have any planned invasive dental procedures  We will obtain prior authorization with the patient's insurance company and once approved, she will return for first injection visit and then 6 months later, for injection visit + provider visit  We will plan to repeat bone density scan in 2 years      Plan:  Diagnoses and all orders for this visit:    Age-related osteoporosis with current pathological fracture, unspecified site, initial encounter for fracture    Osteoporosis, unspecified osteoporosis type, unspecified pathological fracture presence  -     Ambulatory referral to Rheumatology    Stage 3b chronic kidney disease ([facility] [address] )    Closed fracture of right hip, sequela    Closed fracture of right orbital floor, sequela ([facility] [address] )    Other orders  -     melatonin 3 mg        I have personally reviewed prior notes, recent laboratory results, and pertinent films in PACS  Activities as tolerated  Exercise: try to maintain a low impact exercise regimen as much as possible  Walk for 30 minutes a day for at least 3 days a week  Continue other medications as prescribed by PCP and other specialists  RTC once Prolia approved  Follow-up plan: once Prolia approved  Chief Complaint  No chief complaint on file  (Patient with dementia)  Rheumatic Disease Summary:  1  Age-related osteoporosis  -DEXA 2/24/2023: LS T -3 3, left total hip T -2 7, left FN T -2 6, right forearm T -2 3   -Initial visit 6/30/2023: Osteoporosis of multiple sites noted in the setting of multiple fractures  CMP, vitamin D 25-hydroxy, phos within normal limits  Bisphosphonates contraindicated due to oropharyngeal dysphagia and CKD stage IIIb  Obtain prior auth for Prolia  Recommend calcium 600 mg twice daily and vitamin D 2000 units daily  2   Other comorbidities: history of closed right hip fracture 2021, history of closed fracture of right orbital floor, oropharyngeal dysphagia, CKD stage IIIb,    HPI  Juana Mims is a 80 y o   female who presents as a Rheumatology consult referred by Devika Altamirano MD for evaluation of osteoporosis  This patient is a Egyptian-speaking 80-year-old with dementia, therefore she is unable to provide history  She presents with her son (who provides the history) and her caregiver today  Apparently, the patient has been falling frequently  She does have a history of multiple fractures    She is not taking calcium or vitamin D that they are aware of  They are aware that she does have osteoporosis based on the most recent bone density scan  Review of Systems  Review of Systems  Constitutional: Negative for weight change, fevers, chills, night sweats, fatigue  ENT/Mouth: Negative for hearing changes, ear pain, nasal congestion, sinus pain, hoarseness, sore throat, rhinorrhea, swallowing difficulty  Eyes: Negative for pain, redness, discharge, vision changes  Cardiovascular: Negative for chest pain, SOB, palpitations  Respiratory: Negative for cough, sputum, wheezing, dyspnea  Gastrointestinal: Negative for nausea, vomiting, diarrhea, constipation, pain, heartburn  Genitourinary: Negative for dysuria, urinary frequency, hematuria  Musculoskeletal: As per HPI  Skin: Negative for skin rash, color changes  Neuro: +weakness, balance issues   Psych: +dementia, schizophrenia  Heme/Lymph: Negative for easy bruising, bleeding, lymphadenopathy        Allergies  No Known Allergies    Home Medications    Current Outpatient Medications:   •  acetaminophen (TYLENOL) 325 mg tablet, Take 2 tablets (650 mg total) by mouth every 6 (six) hours as needed for mild pain, moderate pain or headaches, Disp: 30 tablet, Rfl: 0  •  amiodarone 200 mg tablet, Take 1 tablet (200 mg total) by mouth daily, Disp: , Rfl:   •  diltiazem (CARDIZEM CD) 180 mg 24 hr capsule, Take 1 capsule (180 mg total) by mouth daily, Disp: 30 capsule, Rfl: 0  •  DULoxetine (CYMBALTA) 30 mg delayed release capsule, Take 30 mg by mouth daily at bedtime, Disp: , Rfl:   •  LORazepam (ATIVAN) 0 5 mg tablet, Take 0 5 mg by mouth , Disp: , Rfl:   •  melatonin 3 mg, , Disp: , Rfl:   •  metoprolol succinate (TOPROL-XL) 50 mg 24 hr tablet, TAKE 1 TABLET BY MOUTH DAILY, Disp: 90 tablet, Rfl: 0  •  mirtazapine (REMERON) 7 5 MG tablet, , Disp: , Rfl:   •  OLANZapine (ZyPREXA) 5 mg tablet, Take 1 tablet (5 mg total) by mouth daily at bedtime, Disp: 30 tablet, Rfl: 1  •  Stool Softener 100 MG capsule, TAKE 1 CAPSULE BY MOUTH EVERY 12 HOURS FOR CONSTIPATION, Disp: 90 capsule, Rfl: 0  •  terazosin (HYTRIN) 2 mg capsule, Take 1 capsule (2 mg total) by mouth daily at bedtime, Disp: 90 capsule, Rfl: 3  •  traZODone (DESYREL) 50 mg tablet, Take 50 mg by mouth daily at bedtime, Disp: , Rfl:     Past Medical History  Past Medical History:   Diagnosis Date   • Anxiety    • Cognitive impairment    • COVID-19 virus infection 04/13/2020   • Dementia ([facility] [address] )    • Depression    • Hallucination    • Hyperlipidemia    • Hypertension    • Memory loss    • Psychiatric illness    • Psychosis ([facility] [address] )    • Retention of urine    • Schizoaffective disorder ([facility] [address] )    • Sleep difficulties    • Urinary tract infection        Past Surgical History   Past Surgical History:   Procedure Laterality Date   • APPENDECTOMY     • BLADDER SURGERY     • CHOLECYSTECTOMY     • AL OPTX FEM SHFT FX W/INSJ IMED IMPLT W/WO SCREW Right 7/15/2021    Procedure: INSERTION NAIL IM FEMUR ANTEGRADE (TROCHANTERIC) right;  Surgeon: Abby Farah MD;  Location: [facility];  Service: Orthopedics   • TOTAL ABDOMINAL HYSTERECTOMY W/ BILATERAL SALPINGOOPHORECTOMY     • TOTAL KNEE ARTHROPLASTY Left        Family History    Family History   Problem Relation Age of Onset   • Heart disease Mother         Cardiac disorder   • Parkinsonism Father    • Heart disease Sister         Cardiac disorder   • Hypertension Sister    • Diabetes Child         Diabetes Mellitus   • Lung disease Child         Respiratory Disorder   • Diabetes Son         Diabetes Mellitus     No known family history of autoimmune or inflammatory diseases  Social History  Occupation: retired    Social History     Substance and Sexual Activity   Alcohol Use Never     Social History     Substance and Sexual Activity   Drug Use No     Social History     Tobacco Use   Smoking Status Never   Smokeless Tobacco Never   Tobacco Comments    Former smoker per [employer] "      Objective:  Vitals:    06/30/23 0951   BP: 110/70   Height: 4' 10\" (1 473 m)       Physical Exam  General: Well appearing, well nourished, in no acute distress  Disoriented  Presents in a wheelchair  Skin: + Bruising on left side of face and cheek area  Hair: Normal texture and distribution  Nails: Normal color, no deformities  HEENT:  Head: Normocephalic, atraumatic  Eyes: Conjunctiva clear, sclera non-icteric, EOM intact  Nose: No external lesions, mucosa non-inflamed  Mouth: Mucous membranes moist, no mucosal lesions  Neck: Supple  Extremities: No amputations or deformities, cyanosis, edema  Musculoskeletal:   No asymmetry or deformities noted of bilateral upper and lower extremities  No swelling of joints bilaterally to include: shoulder, elbow, wrist, MCP I-V, PIP I-V, knee  Neurologic: Demented  Psychiatric: Normal mood  Reviewed labs and imaging  Imaging:   XR sternum minimum 2 views    Result Date: 6/18/2023  Narrative: STERNUM INDICATION:   Protrusion near xiphoid  COMPARISON: CTs from 10/28/2021 and 5/12/2023 VIEWS:  XR STERNUM MINIMUM 2 VIEWS FINDINGS: Evaluation is limited by severe scoliosis and extensive costal cartilage calcification as well as limited  While the xiphoid is not well visualized radiographically, anterior angulation is seen on the prior CTs likely correlating with the palpable abnormality  This is congenital or developmental  There is no acute fracture or pathologic bone lesion  The retrosternal clear space is maintained  Soft tissues are unremarkable  Impression: Limited study as above  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, better visualized on CT   Workstation performed: [id number]        Labs:   Admission on 05/12/2023, Discharged on 05/19/2023   Component Date Value Ref Range Status   • Ventricular Rate 05/12/2023 54  BPM Final   • Atrial Rate 05/12/2023 54  BPM Final   • SC Interval 05/12/2023 182  ms Final   • QRSD Interval " 05/12/2023 96  ms Final   • QT Interval 05/12/2023 440  ms Final   • QTC Interval 05/12/2023 417  ms Final   • P Axis 05/12/2023 61  degrees Final   • QRS Axis 05/12/2023 47  degrees Final   • T Wave Axis 05/12/2023 66  degrees Final   • WBC 05/12/2023 6 44  4 31 - 10 16 Thousand/uL Final   • RBC 05/12/2023 3 87  3 81 - 5 12 Million/uL Final   • Hemoglobin 05/12/2023 11 4 (L)  11 5 - 15 4 g/dL Final   • Hematocrit 05/12/2023 35 0  34 8 - 46 1 % Final   • MCV 05/12/2023 90  82 - 98 fL Final   • MCH 05/12/2023 29 5  26 8 - 34 3 pg Final   • MCHC 05/12/2023 32 6  31 4 - 37 4 g/dL Final   • RDW 05/12/2023 13 4  11 6 - 15 1 % Final   • MPV 05/12/2023 10 1  8 9 - 12 7 fL Final   • Platelets 88/03/9459 228  149 - 390 Thousands/uL Final   • nRBC 05/12/2023 0  /100 WBCs Final   • Neutrophils Relative 05/12/2023 72  43 - 75 % Final   • Immat GRANS % 05/12/2023 0  0 - 2 % Final   • Lymphocytes Relative 05/12/2023 19  14 - 44 % Final   • Monocytes Relative 05/12/2023 8  4 - 12 % Final   • Eosinophils Relative 05/12/2023 1  0 - 6 % Final   • Basophils Relative 05/12/2023 0  0 - 1 % Final   • Neutrophils Absolute 05/12/2023 4 61  1 85 - 7 62 Thousands/µL Final   • Immature Grans Absolute 05/12/2023 0 02  0 00 - 0 20 Thousand/uL Final   • Lymphocytes Absolute 05/12/2023 1 21  0 60 - 4 47 Thousands/µL Final   • Monocytes Absolute 05/12/2023 0 52  0 17 - 1 22 Thousand/µL Final   • Eosinophils Absolute 05/12/2023 0 07  0 00 - 0 61 Thousand/µL Final   • Basophils Absolute 05/12/2023 0 01  0 00 - 0 10 Thousands/µL Final   • Sodium 05/12/2023 134 (L)  135 - 147 mmol/L Final   • Potassium 05/12/2023 4 3  3 5 - 5 3 mmol/L Final   • Chloride 05/12/2023 103  96 - 108 mmol/L Final   • CO2 05/12/2023 25  21 - 32 mmol/L Final   • ANION GAP 05/12/2023 6  4 - 13 mmol/L Final   • BUN 05/12/2023 26 (H)  5 - 25 mg/dL Final   • Creatinine 05/12/2023 1 32 (H)  0 60 - 1 30 mg/dL Final    Standardized to IDMS reference method   • Glucose 05/12/2023 96 "65 - 140 mg/dL Final    If the patient is fasting, the ADA then defines impaired fasting glucose as > 100 mg/dL and diabetes as > or equal to 123 mg/dL  • Calcium 05/12/2023 8 8  8 4 - 10 2 mg/dL Final   • AST 05/12/2023 21  13 - 39 U/L Final   • ALT 05/12/2023 9  7 - 52 U/L Final    Specimen collection should occur prior to Sulfasalazine administration due to the potential for falsely depressed results  • Alkaline Phosphatase 05/12/2023 89  34 - 104 U/L Final   • Total Protein 05/12/2023 6 5  6 4 - 8 4 g/dL Final   • Albumin 05/12/2023 4 2  3 5 - 5 0 g/dL Final   • Total Bilirubin 05/12/2023 0 66  0 20 - 1 00 mg/dL Final    Use of this assay is not recommended for patients undergoing treatment with eltrombopag due to the potential for falsely elevated results  N-acetyl-p-benzoquinone imine (metabolite of Acetaminophen) will generate erroneously low results in samples for patients that have taken an overdose of Acetaminophen  • eGFR 05/12/2023 35  ml/min/1 73sq m Final   • Lipase 05/12/2023 11  11 - 82 u/L Final   • hs TnI 0hr 05/12/2023 10  \"Refer to ACS Flowchart\"- see link ng/L Final    Comment:                                              Initial (time 0) result  If >=50 ng/L, Myocardial injury suggested ;  Type of myocardial injury and treatment strategy  to be determined  If 5-49 ng/L, a delta result at 2 hours and or 4 hours will be needed to further evaluate  If <4 ng/L, and chest pain has been >3 hours since onset, patient may qualify for discharge based on the HEART score in the ED  If <5 ng/L and <3hours since onset of chest pain, a delta result at 2 hours will be needed to further evaluate  HS Troponin 99th Percentile URL of a Health Population=12 ng/L with a 95% Confidence Interval of 8-18 ng/L  Second Troponin (time 2 hours)  If calculated delta >= 20 ng/L,  Myocardial injury suggested ; Type of myocardial injury and treatment strategy to be determined    If 5-49 ng/L and the calculated " "delta is 5-19 ng/L, consult medical service for evaluation  Continue evaluation for ischemia on ecg and other possible etiology and repeat hs troponin at 4 hours  If delta                            is <5 ng/L at 2 hours, consider discharge based on risk stratification via the HEART score (if in ED), or CAROLINE risk score in IP/Observation  HS Troponin 99th Percentile URL of a Health Population=12 ng/L with a 95% Confidence Interval of 8-18 ng/L  • Color, UA 05/12/2023 Yellow   Final   • Clarity, UA 05/12/2023 Turbid   Final   • Specific Gravity, UA 05/12/2023 1 019  1 003 - 1 030 Final   • pH, UA 05/12/2023 7 5  4 5, 5 0, 5 5, 6 0, 6 5, 7 0, 7 5, 8 0 Final   • Leukocytes, UA 05/12/2023 Large (A)  Negative Final   • Nitrite, UA 05/12/2023 Negative  Negative Final   • Protein, UA 05/12/2023 50 (1+) (A)  Negative mg/dl Final   • Glucose, UA 05/12/2023 Negative  Negative mg/dl Final   • Ketones, UA 05/12/2023 Negative  Negative mg/dl Final   • Urobilinogen, UA 05/12/2023 2 0 (A)  <2 0 mg/dl mg/dl Final   • Bilirubin, UA 05/12/2023 Negative  Negative Final   • Occult Blood, UA 05/12/2023 Trace (A)  Negative Final   • hs TnI 2hr 05/12/2023 11  \"Refer to ACS Flowchart\"- see link ng/L Final    Comment:                                              Initial (time 0) result  If >=50 ng/L, Myocardial injury suggested ;  Type of myocardial injury and treatment strategy  to be determined  If 5-49 ng/L, a delta result at 2 hours and or 4 hours will be needed to further evaluate  If <4 ng/L, and chest pain has been >3 hours since onset, patient may qualify for discharge based on the HEART score in the ED  If <5 ng/L and <3hours since onset of chest pain, a delta result at 2 hours will be needed to further evaluate  HS Troponin 99th Percentile URL of a Health Population=12 ng/L with a 95% Confidence Interval of 8-18 ng/L      Second Troponin (time 2 hours)  If calculated delta >= 20 ng/L,  Myocardial injury suggested ; Type " of myocardial injury and treatment strategy to be determined  If 5-49 ng/L and the calculated delta is 5-19 ng/L, consult medical service for evaluation  Continue evaluation for ischemia on ecg and other possible etiology and repeat hs troponin at 4 hours  If delta                            is <5 ng/L at 2 hours, consider discharge based on risk stratification via the HEART score (if in ED), or CAROLINE risk score in IP/Observation  HS Troponin 99th Percentile URL of a Health Population=12 ng/L with a 95% Confidence Interval of 8-18 ng/L  • Delta 2hr hsTnI 05/12/2023 1  <20 ng/L Final   • RBC, UA 05/12/2023 4-10 (A)  None Seen, 1-2 /hpf Final   • WBC, UA 05/12/2023 Innumerable (A)  None Seen, 1-2 /hpf Final   • Epithelial Cells 05/12/2023 Occasional  None Seen, Occasional /hpf Final   • Bacteria, UA 05/12/2023 None Seen  None Seen, Occasional /hpf Final   • MUCUS THREADS 05/12/2023 Occasional (A)  None Seen Final   • Urine Culture 05/12/2023 No Growth <1000 cfu/mL   Final   • Ventricular Rate 05/12/2023 51  BPM Final   • Atrial Rate 05/12/2023 51  BPM Final   • FL Interval 05/12/2023 216  ms Final   • QRSD Interval 05/12/2023 92  ms Final   • QT Interval 05/12/2023 462  ms Final   • QTC Interval 05/12/2023 425  ms Final   • P Axis 05/12/2023 68  degrees Final   • QRS Axis 05/12/2023 59  degrees Final   • T Wave Axis 05/12/2023 67  degrees Final   • Sodium 05/13/2023 138  135 - 147 mmol/L Final   • Potassium 05/13/2023 4 0  3 5 - 5 3 mmol/L Final   • Chloride 05/13/2023 105  96 - 108 mmol/L Final   • CO2 05/13/2023 24  21 - 32 mmol/L Final   • ANION GAP 05/13/2023 9  4 - 13 mmol/L Final   • BUN 05/13/2023 17  5 - 25 mg/dL Final   • Creatinine 05/13/2023 1 13  0 60 - 1 30 mg/dL Final    Standardized to IDMS reference method   • Glucose 05/13/2023 74  65 - 140 mg/dL Final    If the patient is fasting, the ADA then defines impaired fasting glucose as > 100 mg/dL and diabetes as > or equal to 123 mg/dL     • Calcium 05/13/2023 8 4  8 4 - 10 2 mg/dL Final   • eGFR 05/13/2023 42  ml/min/1 73sq m Final   • WBC 05/13/2023 5 24  4 31 - 10 16 Thousand/uL Final   • RBC 05/13/2023 4 04  3 81 - 5 12 Million/uL Final   • Hemoglobin 05/13/2023 11 9  11 5 - 15 4 g/dL Final   • Hematocrit 05/13/2023 37 1  34 8 - 46 1 % Final   • MCV 05/13/2023 92  82 - 98 fL Final   • MCH 05/13/2023 29 5  26 8 - 34 3 pg Final   • MCHC 05/13/2023 32 1  31 4 - 37 4 g/dL Final   • RDW 05/13/2023 13 5  11 6 - 15 1 % Final   • Platelets 61/68/5978 190  149 - 390 Thousands/uL Final   • MPV 05/13/2023 9 1  8 9 - 12 7 fL Final   • WBC 05/14/2023 5 07  4 31 - 10 16 Thousand/uL Final   • RBC 05/14/2023 4 05  3 81 - 5 12 Million/uL Final   • Hemoglobin 05/14/2023 12 0  11 5 - 15 4 g/dL Final   • Hematocrit 05/14/2023 36 5  34 8 - 46 1 % Final   • MCV 05/14/2023 90  82 - 98 fL Final   • MCH 05/14/2023 29 6  26 8 - 34 3 pg Final   • MCHC 05/14/2023 32 9  31 4 - 37 4 g/dL Final   • RDW 05/14/2023 13 6  11 6 - 15 1 % Final   • MPV 05/14/2023 11 6  8 9 - 12 7 fL Final   • Platelets 56/83/2853 212  149 - 390 Thousands/uL Final   • nRBC 05/14/2023 0  /100 WBCs Final   • Neutrophils Relative 05/14/2023 59  43 - 75 % Final   • Immat GRANS % 05/14/2023 0  0 - 2 % Final   • Lymphocytes Relative 05/14/2023 27  14 - 44 % Final   • Monocytes Relative 05/14/2023 11  4 - 12 % Final   • Eosinophils Relative 05/14/2023 3  0 - 6 % Final   • Basophils Relative 05/14/2023 0  0 - 1 % Final   • Neutrophils Absolute 05/14/2023 2 92  1 85 - 7 62 Thousands/µL Final   • Immature Grans Absolute 05/14/2023 0 02  0 00 - 0 20 Thousand/uL Final   • Lymphocytes Absolute 05/14/2023 1 39  0 60 - 4 47 Thousands/µL Final   • Monocytes Absolute 05/14/2023 0 57  0 17 - 1 22 Thousand/µL Final   • Eosinophils Absolute 05/14/2023 0 16  0 00 - 0 61 Thousand/µL Final   • Basophils Absolute 05/14/2023 0 01  0 00 - 0 10 Thousands/µL Final   • Sodium 05/14/2023 140  135 - 147 mmol/L Final   • Potassium 05/14/2023 3 9  3 5 - 5 3 mmol/L Final   • Chloride 05/14/2023 104  96 - 108 mmol/L Final   • CO2 05/14/2023 27  21 - 32 mmol/L Final   • ANION GAP 05/14/2023 9  4 - 13 mmol/L Final   • BUN 05/14/2023 19  5 - 25 mg/dL Final   • Creatinine 05/14/2023 1 30  0 60 - 1 30 mg/dL Final    Standardized to IDMS reference method   • Glucose 05/14/2023 81  65 - 140 mg/dL Final    If the patient is fasting, the ADA then defines impaired fasting glucose as > 100 mg/dL and diabetes as > or equal to 123 mg/dL  • Calcium 05/14/2023 8 6  8 4 - 10 2 mg/dL Final   • AST 05/14/2023 25  13 - 39 U/L Final   • ALT 05/14/2023 16  7 - 52 U/L Final    Specimen collection should occur prior to Sulfasalazine administration due to the potential for falsely depressed results  • Alkaline Phosphatase 05/14/2023 128 (H)  34 - 104 U/L Final   • Total Protein 05/14/2023 6 2 (L)  6 4 - 8 4 g/dL Final   • Albumin 05/14/2023 3 8  3 5 - 5 0 g/dL Final   • Total Bilirubin 05/14/2023 0 57  0 20 - 1 00 mg/dL Final    Use of this assay is not recommended for patients undergoing treatment with eltrombopag due to the potential for falsely elevated results  N-acetyl-p-benzoquinone imine (metabolite of Acetaminophen) will generate erroneously low results in samples for patients that have taken an overdose of Acetaminophen     • eGFR 05/14/2023 35  ml/min/1 73sq m Final   • Magnesium 05/14/2023 2 7  1 9 - 2 7 mg/dL Final   • Phosphorus 05/14/2023 2 9  2 3 - 4 1 mg/dL Final   Admission on 05/05/2023, Discharged on 05/05/2023   Component Date Value Ref Range Status   • Ventricular Rate 05/05/2023 51  BPM Final   • Atrial Rate 05/05/2023 51  BPM Final   • ID Interval 05/05/2023 198  ms Final   • QRSD Interval 05/05/2023 84  ms Final   • QT Interval 05/05/2023 458  ms Final   • QTC Interval 05/05/2023 422  ms Final   • P Axis 05/05/2023 38  degrees Final   • QRS Axis 05/05/2023 54  degrees Final   • T Wave [place] 05/05/2023 0  degrees Final   • WBC 05/05/2023 5 53  4 31 - 10 16 Thousand/uL Final   • RBC 05/05/2023 4 02  3 81 - 5 12 Million/uL Final   • Hemoglobin 05/05/2023 12 1  11 5 - 15 4 g/dL Final   • Hematocrit 05/05/2023 36 7  34 8 - 46 1 % Final   • MCV 05/05/2023 91  82 - 98 fL Final   • MCH 05/05/2023 30 1  26 8 - 34 3 pg Final   • MCHC 05/05/2023 33 0  31 4 - 37 4 g/dL Final   • RDW 05/05/2023 13 2  11 6 - 15 1 % Final   • MPV 05/05/2023 10 3  8 9 - 12 7 fL Final   • Platelets 36/44/8144 226  149 - 390 Thousands/uL Final   • nRBC 05/05/2023 0  /100 WBCs Final   • Neutrophils Relative 05/05/2023 63  43 - 75 % Final   • Immat GRANS % 05/05/2023 0  0 - 2 % Final   • Lymphocytes Relative 05/05/2023 25  14 - 44 % Final   • Monocytes Relative 05/05/2023 9  4 - 12 % Final   • Eosinophils Relative 05/05/2023 3  0 - 6 % Final   • Basophils Relative 05/05/2023 0  0 - 1 % Final   • Neutrophils Absolute 05/05/2023 3 41  1 85 - 7 62 Thousands/µL Final   • Immature Grans Absolute 05/05/2023 0 02  0 00 - 0 20 Thousand/uL Final   • Lymphocytes Absolute 05/05/2023 1 40  0 60 - 4 47 Thousands/µL Final   • Monocytes Absolute 05/05/2023 0 51  0 17 - 1 22 Thousand/µL Final   • Eosinophils Absolute 05/05/2023 0 18  0 00 - 0 61 Thousand/µL Final   • Basophils Absolute 05/05/2023 0 01  0 00 - 0 10 Thousands/µL Final   • Sodium 05/05/2023 138  135 - 147 mmol/L Final   • Potassium 05/05/2023 4 2  3 5 - 5 3 mmol/L Final   • Chloride 05/05/2023 105  96 - 108 mmol/L Final   • CO2 05/05/2023 25  21 - 32 mmol/L Final   • ANION GAP 05/05/2023 8  4 - 13 mmol/L Final   • BUN 05/05/2023 30 (H)  5 - 25 mg/dL Final   • Creatinine 05/05/2023 1 30  0 60 - 1 30 mg/dL Final    Standardized to IDMS reference method   • Glucose 05/05/2023 93  65 - 140 mg/dL Final    If the patient is fasting, the ADA then defines impaired fasting glucose as > 100 mg/dL and diabetes as > or equal to 123 mg/dL     • Calcium 05/05/2023 8 8  8 4 - 10 2 mg/dL Final   • eGFR 05/05/2023 35  ml/min/1 73sq m Final   • Protime 05/05/2023 13 5 " 11 6 - 14 5 seconds Final   • INR 05/05/2023 1 03  0 84 - 1 19 Final   • PTT 05/05/2023 27  23 - 37 seconds Final    Therapeutic Heparin Range =  60-90 seconds   • hs TnI 0hr 05/05/2023 12  \"Refer to ACS Flowchart\"- see link ng/L Final    Comment:                                              Initial (time 0) result  If >=50 ng/L, Myocardial injury suggested ;  Type of myocardial injury and treatment strategy  to be determined  If 5-49 ng/L, a delta result at 2 hours and or 4 hours will be needed to further evaluate  If <4 ng/L, and chest pain has been >3 hours since onset, patient may qualify for discharge based on the HEART score in the ED  If <5 ng/L and <3hours since onset of chest pain, a delta result at 2 hours will be needed to further evaluate  HS Troponin 99th Percentile URL of a Health Population=12 ng/L with a 95% Confidence Interval of 8-18 ng/L  Second Troponin (time 2 hours)  If calculated delta >= 20 ng/L,  Myocardial injury suggested ; Type of myocardial injury and treatment strategy to be determined  If 5-49 ng/L and the calculated delta is 5-19 ng/L, consult medical service for evaluation  Continue evaluation for ischemia on ecg and other possible etiology and repeat hs troponin at 4 hours  If delta                            is <5 ng/L at 2 hours, consider discharge based on risk stratification via the HEART score (if in ED), or CAROLINE risk score in IP/Observation  HS Troponin 99th Percentile URL of a Health Population=12 ng/L with a 95% Confidence Interval of 8-18 ng/L  • hs TnI 2hr 05/05/2023 9  \"Refer to ACS Flowchart\"- see link ng/L Final    Comment:                                              Initial (time 0) result  If >=50 ng/L, Myocardial injury suggested ;  Type of myocardial injury and treatment strategy  to be determined  If 5-49 ng/L, a delta result at 2 hours and or 4 hours will be needed to further evaluate    If <4 ng/L, and chest pain has been >3 hours since onset, " patient may qualify for discharge based on the HEART score in the ED  If <5 ng/L and <3hours since onset of chest pain, a delta result at 2 hours will be needed to further evaluate  HS Troponin 99th Percentile URL of a Health Population=12 ng/L with a 95% Confidence Interval of 8-18 ng/L  Second Troponin (time 2 hours)  If calculated delta >= 20 ng/L,  Myocardial injury suggested ; Type of myocardial injury and treatment strategy to be determined  If 5-49 ng/L and the calculated delta is 5-19 ng/L, consult medical service for evaluation  Continue evaluation for ischemia on ecg and other possible etiology and repeat hs troponin at 4 hours  If delta                            is <5 ng/L at 2 hours, consider discharge based on risk stratification via the HEART score (if in ED), or CAROLINE risk score in IP/Observation  HS Troponin 99th Percentile URL of a Health Population=12 ng/L with a 95% Confidence Interval of 8-18 ng/L     • Delta 2hr hsTnI 05/05/2023 -3  <20 ng/L Final   • Color, UA 05/05/2023 Yellow   Final   • Clarity, UA 05/05/2023 Clear   Final   • pH, UA 05/05/2023 6 5  4 5 - 8 0 Final   • Leukocytes, UA 05/05/2023 Moderate (A)  Negative Final   • Nitrite, UA 05/05/2023 Negative  Negative Final   • Protein, UA 05/05/2023 100 (2+) (A)  Negative mg/dl Final   • Glucose, UA 05/05/2023 Negative  Negative mg/dl Final   • Ketones, UA 05/05/2023 Negative  Negative mg/dl Final   • Urobilinogen, UA 05/05/2023 0 2  0 2, 1 0 E U /dl E U /dl Final   • Bilirubin, UA 05/05/2023 Negative  Negative Final   • Occult Blood, UA 05/05/2023 Small (A)  Negative Final   • Specific Gravity, UA 05/05/2023 1 015  1 003 - 1 030 Final   • RBC, UA 05/05/2023 1-2 (A)  None Seen, 2-4 /hpf Final   • WBC, UA 05/05/2023 30-50 (A)  None Seen, 2-4, 5-60 /hpf Final   • Epithelial Cells 05/05/2023 Moderate (A)  None Seen, Occasional /hpf Final   • Bacteria, UA 05/05/2023 Innumerable (A)  None Seen, Occasional /hpf Final   • Ventricular Rate 05/05/2023 46  BPM Final   • Atrial Rate 05/05/2023 46  BPM Final   • MI Interval 05/05/2023 206  ms Final   • QRSD Interval 05/05/2023 104  ms Final   • QT Interval 05/05/2023 496  ms Final   • QTC Interval 05/05/2023 434  ms Final   • P Axis 05/05/2023 51  degrees Final   • QRS Axis 05/05/2023 62  degrees Final   • T Wave Axis 05/05/2023 34  degrees Final   • Urine Culture 05/05/2023 No Growth <1000 cfu/mL   Final   Office Visit on 02/15/2023   Component Date Value Ref Range Status   • POST-VOID RESIDUAL VOLUME, ML POC 02/15/2023 456  mL Final   • LEUKOCYTE ESTERASE,UA 02/15/2023 +++   Final   • NITRITE,UA 02/15/2023 -   Final   • SL AMB POCT UROBILINOGEN 02/15/2023 0 2   Final   • POCT URINE PROTEIN 02/15/2023 +++   Final   •  PH,UA 02/15/2023 5 0   Final   • BLOOD,UA 02/15/2023 ++   Final   • SPECIFIC GRAVITY,UA 02/15/2023 1 010   Final   • KETONES,UA 02/15/2023 -   Final   • BILIRUBIN,UA 02/15/2023 -   Final   • GLUCOSE, UA 02/15/2023 -   Final   •  COLOR,UA 02/15/2023 yellow   Final   • CLARITY,UA 02/15/2023 cloudy   Final   • Urine Culture 02/15/2023 >100,000 cfu/ml   Final    Mixed Contaminants X4   Appointment on 01/20/2023   Component Date Value Ref Range Status   • WBC 01/20/2023 7 55  4 31 - 10 16 Thousand/uL Final   • RBC 01/20/2023 4 11  3 81 - 5 12 Million/uL Final   • Hemoglobin 01/20/2023 12 6  11 5 - 15 4 g/dL Final   • Hematocrit 01/20/2023 38 1  34 8 - 46 1 % Final   • MCV 01/20/2023 93  82 - 98 fL Final   • MCH 01/20/2023 30 7  26 8 - 34 3 pg Final   • MCHC 01/20/2023 33 1  31 4 - 37 4 g/dL Final   • RDW 01/20/2023 13 4  11 6 - 15 1 % Final   • MPV 01/20/2023 9 2  8 9 - 12 7 fL Final   • Platelets 09/48/5644 262  149 - 390 Thousands/uL Final   • nRBC 01/20/2023 0  /100 WBCs Final   • Neutrophils Relative 01/20/2023 72  43 - 75 % Final   • Immat GRANS % 01/20/2023 0  0 - 2 % Final   • Lymphocytes Relative 01/20/2023 17  14 - 44 % Final   • Monocytes Relative 01/20/2023 9  4 - 12 % Final   • Eosinophils Relative 01/20/2023 2  0 - 6 % Final   • Basophils Relative 01/20/2023 0  0 - 1 % Final   • Neutrophils Absolute 01/20/2023 5 37  1 85 - 7 62 Thousands/µL Final   • Immature Grans Absolute 01/20/2023 0 03  0 00 - 0 20 Thousand/uL Final   • Lymphocytes Absolute 01/20/2023 1 26  0 60 - 4 47 Thousands/µL Final   • Monocytes Absolute 01/20/2023 0 69  0 17 - 1 22 Thousand/µL Final   • Eosinophils Absolute 01/20/2023 0 18  0 00 - 0 61 Thousand/µL Final   • Basophils Absolute 01/20/2023 0 02  0 00 - 0 10 Thousands/µL Final   • Sodium 01/20/2023 136  135 - 147 mmol/L Final   • Potassium 01/20/2023 4 5  3 5 - 5 3 mmol/L Final   • Chloride 01/20/2023 101  96 - 108 mmol/L Final   • CO2 01/20/2023 27  21 - 32 mmol/L Final   • ANION GAP 01/20/2023 8  4 - 13 mmol/L Final   • BUN 01/20/2023 27 (H)  5 - 25 mg/dL Final   • Creatinine 01/20/2023 1 37 (H)  0 60 - 1 30 mg/dL Final    Standardized to IDMS reference method   • Glucose, Fasting 01/20/2023 105 (H)  65 - 99 mg/dL Final    Specimen collection should occur prior to Sulfasalazine administration due to the potential for falsely depressed results  Specimen collection should occur prior to Sulfapyridine administration due to the potential for falsely elevated results  • Calcium 01/20/2023 9 1  8 4 - 10 2 mg/dL Final   • AST 01/20/2023 18  13 - 39 U/L Final    Specimen collection should occur prior to Sulfasalazine administration due to the potential for falsely depressed results  • ALT 01/20/2023 12  7 - 52 U/L Final    Specimen collection should occur prior to Sulfasalazine administration due to the potential for falsely depressed results      • Alkaline Phosphatase 01/20/2023 105 (H)  34 - 104 U/L Final   • Total Protein 01/20/2023 7 1  6 4 - 8 4 g/dL Final   • Albumin 01/20/2023 4 4  3 5 - 5 0 g/dL Final   • Total Bilirubin 01/20/2023 0 54  0 20 - 1 00 mg/dL Final   • eGFR 01/20/2023 33  ml/min/1 73sq m Final   • Cholesterol 01/20/2023 158  See Comment mg/dL Final    Cholesterol:         Pediatric <18 Years        Desirable          <170 mg/dL      Borderline High    170-199 mg/dL      High               >=200 mg/dL        Adult >=18 Years            Desirable         <200 mg/dL      Borderline High   200-239 mg/dL      High              >239 mg/dL     • Triglycerides 01/20/2023 103  See Comment mg/dL Final    Triglyceride:     0-9Y            <75mg/dL     10Y-17Y         <90 mg/dL       >=18Y     Normal          <150 mg/dL     Borderline High 150-199 mg/dL     High            200-499 mg/dL        Very High       >499 mg/dL    Specimen collection should occur prior to N-Acetylcysteine or Metamizole administration due to the potential for falsely depressed results  • HDL, Direct 01/20/2023 55  >=50 mg/dL Final   • LDL Calculated 01/20/2023 82  0 - 100 mg/dL Final    LDL Cholesterol:     Optimal           <100 mg/dl     Near Optimal      100-129 mg/dl     Above Optimal       Borderline High 130-159 mg/dl       High            160-189 mg/dl       Very High       >189 mg/dl         This screening LDL is a calculated result  It does not have the accuracy of the Direct Measured LDL in the monitoring of patients with hyperlipidemia and/or statin therapy  Direct Measure LDL (UID786) must be ordered separately in these patients  • TSH 3RD GENERATON 01/20/2023 2 374  0 450 - 4 500 uIU/mL Final    The recommended reference ranges for TSH during pregnancy are as follows:   First trimester 0 1 to 2 5 uIU/mL   Second trimester  0 2 to 3 0 uIU/mL   Third trimester 0 3 to 3 0 uIU/m    Note: Normal ranges may not apply to patients who are transgender, non-binary, or whose legal sex, sex at birth, and gender identity differ  Adult TSH (3rd generation) reference range follows the recommended guidelines of the American Thyroid Association, January, 2020     • Magnesium 01/20/2023 2 2  1 9 - 2 7 mg/dL Final   • Vit D, 25-Hydroxy 01/20/2023 31 7  30 0 - 100 0 ng/mL Final Rajiv Lopez PA-C  Rheumatology

## 2023-06-30 NOTE — TELEPHONE ENCOUNTER
Please initiate prior authorization for Prolia 60 mg every 6 month injections  Diagnosis age-related osteoporosis of multiple sites (left femoral neck, left total hip, lumbar spine) with history of multiple fractures  Cannot utilize oral or IV bisphosphonates due to oropharyngeal dysphagia and CKD stage IIIb  Recent calcium and vitamin D levels acceptable

## 2023-07-03 NOTE — TELEPHONE ENCOUNTER
Could you please call and schedule patient for just a Prolia shot on morgans schedule, no roberto.  Thank you

## 2023-07-05 ENCOUNTER — TELEPHONE (OUTPATIENT)
Dept: OBGYN CLINIC | Facility: CLINIC | Age: 88
End: 2023-07-05

## 2023-07-05 NOTE — TELEPHONE ENCOUNTER
Caller: Patients son    Doctor: Casey Favorite    Reason for call: Wondering if pt can be put on tomorrows schedule to receive her Prolia injection with the MA. Please call and let them know if this is a possibility.     Call back#: [phone]

## 2023-07-11 PROBLEM — N39.0 UTI (URINARY TRACT INFECTION): Status: RESOLVED | Noted: 2022-11-23 | Resolved: 2023-07-11

## 2023-08-10 ENCOUNTER — CLINICAL SUPPORT (OUTPATIENT)
Dept: RHEUMATOLOGY | Facility: CLINIC | Age: 88
End: 2023-08-10

## 2023-08-10 DIAGNOSIS — M80.00XA AGE-RELATED OSTEOPOROSIS WITH CURRENT PATHOLOGICAL FRACTURE, UNSPECIFIED SITE, INITIAL ENCOUNTER FOR FRACTURE: Primary | ICD-10-CM

## 2023-08-10 NOTE — PROGRESS NOTES
Patient received her Prolia shot in her left thigh. No redness bleeding or swelling at injection site. Patient will schedule a follow up appointment and next Prolia injection for 6 months.

## 2023-08-24 ENCOUNTER — HOSPITAL ENCOUNTER (OUTPATIENT)
Dept: ULTRASOUND IMAGING | Facility: MEDICAL CENTER | Age: 88
Discharge: HOME | End: 2023-08-24
Payer: MEDICARE

## 2023-08-24 DIAGNOSIS — R33.8 ACUTE URINARY RETENTION: ICD-10-CM

## 2023-08-24 PROCEDURE — 76770 US EXAM ABDO BACK WALL COMP: CPT

## 2023-09-02 DIAGNOSIS — I48.0 PAROXYSMAL A-FIB (HCC): ICD-10-CM

## 2023-09-05 RX ORDER — DILTIAZEM HYDROCHLORIDE 180 MG/1
180 CAPSULE, COATED, EXTENDED RELEASE ORAL DAILY
Qty: 30 CAPSULE | Refills: 0 | Status: SHIPPED | OUTPATIENT
Start: 2023-09-05

## 2023-09-11 ENCOUNTER — TELEPHONE (OUTPATIENT)
Age: 88
End: 2023-09-11

## 2023-09-11 NOTE — TELEPHONE ENCOUNTER
Returned call to patients son Lima Ann. Reviewed renal US results and BEV Zambrano's note. Advised US will be reviewed in more detail at upcoming appointment and patient will have PVR as well.

## 2023-09-11 NOTE — TELEPHONE ENCOUNTER
Patients son calling in for results of US done on 8/24/23. Please review and call patients son with results.          CB: [phone]

## 2023-09-15 ENCOUNTER — OFFICE VISIT (OUTPATIENT)
Dept: UROLOGY | Facility: CLINIC | Age: 88
End: 2023-09-15
Payer: MEDICARE

## 2023-09-15 VITALS
HEART RATE: 62 BPM | SYSTOLIC BLOOD PRESSURE: 118 MMHG | HEIGHT: 58 IN | BODY MASS INDEX: 23.2 KG/M2 | DIASTOLIC BLOOD PRESSURE: 64 MMHG

## 2023-09-15 DIAGNOSIS — N39.0 URINARY TRACT INFECTION WITHOUT HEMATURIA, SITE UNSPECIFIED: Primary | ICD-10-CM

## 2023-09-15 DIAGNOSIS — R33.8 ACUTE URINARY RETENTION: ICD-10-CM

## 2023-09-15 LAB — POST-VOID RESIDUAL VOLUME, ML POC: 31 ML

## 2023-09-15 PROCEDURE — 51798 US URINE CAPACITY MEASURE: CPT

## 2023-09-15 PROCEDURE — 99213 OFFICE O/P EST LOW 20 MIN: CPT

## 2023-09-15 RX ORDER — MIRTAZAPINE 15 MG/1
TABLET, FILM COATED ORAL
COMMUNITY
Start: 2023-09-11

## 2023-09-15 RX ORDER — TERAZOSIN 2 MG/1
2 CAPSULE ORAL
Qty: 90 CAPSULE | Refills: 3 | Status: SHIPPED | OUTPATIENT
Start: 2023-09-15

## 2023-09-15 NOTE — PROGRESS NOTES
Office Visit- Urology  Bradly Hernandez 11/20/1931 MRN: [id number]      Assessment/Discussion/Plan    80 y.o. female managed by     1. Urinary retention  - mL an ultrasound of the kidneys and bladder in August 2023 with no visualization of hydronephrosis  -PVR 31 mL today  -Plan for repeat ultrasound of the kidneys and bladder with PVR in 1 year with follow-up at that time    2. Recurrent UTI  -Continue with current regimen daily probiotic, cranberry, vitamin C, d-mannose  - obtain urine testing  if change in baseline urinary symptoms versus development of gross hematuria versus change in cognitive baseline    3. Urinary incontinence  -Conservative measures  -Previously declined catheterization      Subjective    -80-year-old female  -History of elevated PVR, recurrent urinary tract infection, urinary incontinence  -Patient has dementia at baseline  -Patient was started on Hytrin at nighttime  -Previously had used 5 depends a day but is now down to 1 dependent day indicating that patient's incontinence may have been overflow incontinence as PVRs have now normalized below 300 mL  -Family is previously denied CIC or indwelling catheterization  -Son and caregivers in the office visit today  -No blood in the urine. Patient unable to communicate any discomfort due to her dementia      ROS:   Review of Systems   Constitutional: Negative. Negative for chills, fatigue and fever. HENT: Negative. Respiratory: Negative for shortness of breath. Cardiovascular: Negative for chest pain. Gastrointestinal: Negative. Negative for abdominal pain. Endocrine: Negative. Musculoskeletal: Negative. Skin: Negative. Neurological: Negative. Negative for dizziness and light-headedness. Hematological: Negative. Psychiatric/Behavioral: Negative.           Past Medical History  Past Medical History:   Diagnosis Date   • Anxiety    • Cognitive impairment    • COVID-19 virus infection 04/13/2020   • Dementia [facility])    • Depression    • Hallucination    • Hyperlipidemia    • Hypertension    • Memory loss    • Psychiatric illness    • Psychosis ([address])    • Retention of urine    • Schizoaffective disorder ([address])    • Sleep difficulties    • Urinary tract infection        Past Surgical History  Past Surgical History:   Procedure Laterality Date   • APPENDECTOMY     • BLADDER SURGERY     • CHOLECYSTECTOMY     • W[place] OPTX FEM SHFT FX W/INSJ IMED IMPLT W/WO SCREW Right 7/15/2021    Procedure: INSERTION NAIL IM FEMUR ANTEGRADE (TROCHANTERIC) right;  Surgeon: Araceli Mayo MD;  Location: [facility];  Service: Orthopedics   • TOTAL ABDOMINAL HYSTERECTOMY W/ BILATERAL SALPINGOOPHORECTOMY     • TOTAL KNEE ARTHROPLASTY Left        Past Family History  Family History   Problem Relation Age of Onset   • Heart disease Mother         Cardiac disorder   • Parkinsonism Father    • Heart disease Sister         Cardiac disorder   • Hypertension Sister    • Diabetes Child         Diabetes Mellitus   • Lung disease Child         Respiratory Disorder   • Diabetes Son         Diabetes Mellitus       Past Social history  Social History     Socioeconomic History   • Marital status:       Spouse name: Not on file   • Number of children: Not on file   • Years of education: Not on file   • Highest education level: Not on file   Occupational History   • Not on file   Tobacco Use   • Smoking status: Never   • Smokeless tobacco: Never   • Tobacco comments:     Former smoker per [employer]   Vaping Use   • Vaping Use: Never used   Substance and Sexual Activity   • Alcohol use: Never   • Drug use: No   • Sexual activity: Not on file   Other Topics Concern   • Not on file   Social History Narrative    Drinks coffee     Social Determinants of Health     Financial Resource Strain: Not on file   Food Insecurity: No Food Insecurity (5/15/2023)    Hunger Vital Sign    • Worried About Running Out of Food in the Last Year: Never true    • Ran Out of Food in the Last Year: Never true   Transportation Needs: No Transportation Needs (5/15/2023)    PRAPARE - Transportation    • Lack of Transportation (Medical): No    • Lack of Transportation (Non-Medical): No   Physical Activity: Not on file   Stress: Not on file   Social Connections: Not on file   Intimate Partner Violence: Not on file   Housing Stability: Low Risk  (5/15/2023)    Housing Stability Vital Sign    • Unable to Pay for Housing in the Last Year: No    • Number of Places Lived in the Last Year: 1    • Unstable Housing in the Last Year: No       Current Medications  Current Outpatient Medications   Medication Sig Dispense Refill   • acetaminophen (TYLENOL) 325 mg tablet Take 2 tablets (650 mg total) by mouth every 6 (six) hours as needed for mild pain, moderate pain or headaches 30 tablet 0   • amiodarone 200 mg tablet Take 1 tablet (200 mg total) by mouth daily     • diltiazem (CARDIZEM CD) 180 mg 24 hr capsule TAKE 1 CAPSULE (180 MG TOTAL) BY MOUTH DAILY 30 capsule 0   • DULoxetine (CYMBALTA) 30 mg delayed release capsule Take 30 mg by mouth daily at bedtime     • LORazepam (ATIVAN) 0.5 mg tablet Take 0.5 mg by mouth      • melatonin 3 mg      • metoprolol succinate (TOPROL-XL) 50 mg 24 hr tablet TAKE 1 TABLET BY MOUTH DAILY 90 tablet 0   • mirtazapine (REMERON) 15 mg tablet      • mirtazapine (REMERON) 7.5 MG tablet      • OLANZapine (ZyPREXA) 5 mg tablet Take 1 tablet (5 mg total) by mouth daily at bedtime 30 tablet 1   • Stool Softener 100 MG capsule TAKE 1 CAPSULE BY MOUTH EVERY 12 HOURS FOR CONSTIPATION 90 capsule 0   • terazosin (HYTRIN) 2 mg capsule Take 1 capsule (2 mg total) by mouth daily at bedtime 90 capsule 3   • traZODone (DESYREL) 50 mg tablet Take 50 mg by mouth daily at bedtime       No current facility-administered medications for this visit.        Allergies  No Known Allergies    OBJECTIVE    Vitals   Vitals:    09/15/23 1140   BP: 118/64   BP Location: Left arm   Patient Position: Sitting Cuff Size: Adult   Pulse: 62   Height: 4' 10" (1.473 m)       PVR:    Physical Exam  Constitutional:       General: She is not in acute distress. Appearance: Normal appearance. She is normal weight. She is not ill-appearing or toxic-appearing. HENT:      Head: Normocephalic and atraumatic. Eyes:      Conjunctiva/sclera: Conjunctivae normal.   Cardiovascular:      Rate and Rhythm: Normal rate. Pulmonary:      Effort: Pulmonary effort is normal. No respiratory distress. Skin:     General: Skin is warm and dry. Neurological:      General: No focal deficit present. Mental Status: She is alert. Mental status is at baseline. Cranial Nerves: No cranial nerve deficit. Psychiatric:         Mood and Affect: Mood normal.         Behavior: Behavior normal.         Thought Content: Thought content normal.          Labs:     No results found for: "PSA"  Lab Results   Component Value Date    CREATININE 1.30 05/14/2023      Lab Results   Component Value Date    HGBA1C 4.7 01/15/2022     Lab Results   Component Value Date    GLUCOSE 114 07/29/2014    CALCIUM 8.6 05/14/2023     07/29/2014    K 3.9 05/14/2023    CO2 27 05/14/2023     05/14/2023    BUN 19 05/14/2023    CREATININE 1.30 05/14/2023       I have personally reviewed all pertinent lab results and reviewed with patient    Imaging   RENAL ULTRASOUND WITH PVR     INDICATION:   R33.8: Other retention of urine.     COMPARISON: December 30, 2022     TECHNIQUE:   Ultrasound of the retroperitoneum was performed with a curvilinear transducer utilizing volumetric sweeps and still imaging techniques.     FINDINGS:     KIDNEYS:  Symmetric and normal size. Right kidney:  6.8 x 3.4 x 4.1 cm. Volume 50.0 mL  Left kidney:  6.4 x 3.9 x 4.4 cm. Volume 57.3 mL     Right kidney  Normal echogenicity and contour. No mass is identified. No hydronephrosis. No shadowing calculi.   No perinephric fluid collections.     Left kidney  Normal echogenicity and contour. No mass is identified. No hydronephrosis. No shadowing calculi. No perinephric fluid collections.     URETERS:  Nonvisualized.     BLADDER:  Normally distended. No focal thickening or mass lesions. Bilateral ureteral jets detected. Prevoid: 199.1  Moderate post void residual volume. Measured post void volume in mL: 199.1ml        IMPRESSION:     Moderate post void residual urine volume. Patient was unable to void.       Chang Keane PA-C  Date: 9/15/2023 Time: 11:53 AM  [id number] [facility]    This note was written using fluency dictation software. Please excuse any resulting minor grammatical errors.

## 2023-09-27 ENCOUNTER — OFFICE VISIT (OUTPATIENT)
Dept: INTERNAL MEDICINE CLINIC | Facility: CLINIC | Age: 88
End: 2023-09-27
Payer: MEDICARE

## 2023-09-27 VITALS — DIASTOLIC BLOOD PRESSURE: 50 MMHG | HEART RATE: 50 BPM | SYSTOLIC BLOOD PRESSURE: 110 MMHG | TEMPERATURE: 97.6 F

## 2023-09-27 DIAGNOSIS — I48.0 PAF (PAROXYSMAL ATRIAL FIBRILLATION) (HCC): ICD-10-CM

## 2023-09-27 DIAGNOSIS — N18.31 STAGE 3A CHRONIC KIDNEY DISEASE (HCC): ICD-10-CM

## 2023-09-27 DIAGNOSIS — E87.1 HYPONATREMIA: ICD-10-CM

## 2023-09-27 DIAGNOSIS — Z23 ENCOUNTER FOR IMMUNIZATION: ICD-10-CM

## 2023-09-27 DIAGNOSIS — I10 ESSENTIAL HYPERTENSION: Primary | ICD-10-CM

## 2023-09-27 DIAGNOSIS — F03.90 MAJOR NEUROCOGNITIVE DISORDER (HCC): ICD-10-CM

## 2023-09-27 DIAGNOSIS — M81.0 AGE-RELATED OSTEOPOROSIS WITHOUT CURRENT PATHOLOGICAL FRACTURE: ICD-10-CM

## 2023-09-27 DIAGNOSIS — F20.5 RESIDUAL SCHIZOPHRENIA (HCC): Chronic | ICD-10-CM

## 2023-09-27 PROCEDURE — 99214 OFFICE O/P EST MOD 30 MIN: CPT | Performed by: INTERNAL MEDICINE

## 2023-09-27 NOTE — PROGRESS NOTES
Assessment/Plan: Cardiology to review the amiodarone dose that she has been taking for quite some time she has not had any episodes of atrial fibrillation rapid tachycardia      #1 schizophrenia does follow-up with psychiatrist she is a little lethargic today. She did not get a good night sleep and got up early. So we forego giving her the influenza vaccine and the son is going to take her to the local pharmacy    2. Paroxysmal atrial fibrillation she is in sinus rhythm clinically she has been on amiodarone 200 mg daily for quite some time I want the cardiologist to review the case to see if we need to lowered the dose. We will check lab work including liver function studies and thyroid function for us to review    3. Blood pressure is well controlled and she is on beta-blockers low-dose    4. Problems voiding and also controlling the blood pressure she is on Hytrin 2 mg at bedtime    She is here with her son and her caretaker today I did examine her in the wheelchair    No problem-specific Assessment & Plan notes found for this encounter. Diagnoses and all orders for this visit:    Essential hypertension    PAF (paroxysmal atrial fibrillation) (HCC)    Major neurocognitive disorder (HCC)    Age-related osteoporosis without current pathological fracture    Stage 3a chronic kidney disease ([address])    Hyponatremia    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HCC)          Subjective:      Patient ID: Lisa Gomes is a 80 y.o. female. No chief complaint on file.         Current Outpatient Medications:   •  acetaminophen (TYLENOL) 325 mg tablet, Take 2 tablets (650 mg total) by mouth every 6 (six) hours as needed for mild pain, moderate pain or headaches, Disp: 30 tablet, Rfl: 0  •  amiodarone 200 mg tablet, Take 1 tablet (200 mg total) by mouth daily, Disp: , Rfl:   •  diltiazem (CARDIZEM CD) 180 mg 24 hr capsule, TAKE 1 CAPSULE (180 MG TOTAL) BY MOUTH DAILY, Disp: 30 capsule, Rfl: 0  •  DULoxetine (CYMBALTA) 30 mg delayed release capsule, Take 30 mg by mouth daily at bedtime, Disp: , Rfl:   •  LORazepam (ATIVAN) 0.5 mg tablet, Take 0.5 mg by mouth , Disp: , Rfl:   •  melatonin 3 mg, , Disp: , Rfl:   •  metoprolol succinate (TOPROL-XL) 50 mg 24 hr tablet, TAKE 1 TABLET BY MOUTH DAILY, Disp: 90 tablet, Rfl: 0  •  mirtazapine (REMERON) 15 mg tablet, , Disp: , Rfl:   •  mirtazapine (REMERON) 7.5 MG tablet, , Disp: , Rfl:   •  OLANZapine (ZyPREXA) 5 mg tablet, Take 1 tablet (5 mg total) by mouth daily at bedtime, Disp: 30 tablet, Rfl: 1  •  Stool Softener 100 MG capsule, TAKE 1 CAPSULE BY MOUTH EVERY 12 HOURS FOR CONSTIPATION, Disp: 90 capsule, Rfl: 0  •  terazosin (HYTRIN) 2 mg capsule, Take 1 capsule (2 mg total) by mouth daily at bedtime, Disp: 90 capsule, Rfl: 3  •  traZODone (DESYREL) 50 mg tablet, Take 50 mg by mouth daily at bedtime, Disp: , Rfl:     HPI    The following portions of the patient's history were reviewed and updated as appropriate: allergies, current medications, past family history, past medical history, past social history, past surgical history and problem list.    Review of Systems   Constitutional: Negative. Negative for activity change, appetite change, fatigue, fever and unexpected weight change. HENT: Negative for congestion, ear pain, hearing loss, mouth sores, postnasal drip, rhinorrhea, sore throat, trouble swallowing and voice change. Eyes: Negative for pain, redness and visual disturbance. Respiratory: Negative for cough, chest tightness, shortness of breath and wheezing. Cardiovascular: Negative for chest pain, palpitations and leg swelling. Gastrointestinal: Negative for abdominal distention, abdominal pain, blood in stool, constipation, diarrhea and nausea. Endocrine: Negative for cold intolerance, heat intolerance, polydipsia, polyphagia and polyuria. Genitourinary: Negative for difficulty urinating, dysuria, flank pain, frequency, hematuria and urgency.    Musculoskeletal: Negative for arthralgias, back pain, gait problem, joint swelling and myalgias. Skin: Negative for color change and pallor. Neurological: Negative for dizziness, tremors, seizures, syncope, weakness, numbness and headaches. Hematological: Negative for adenopathy. Does not bruise/bleed easily. Psychiatric/Behavioral: Negative. Negative for sleep disturbance. The patient is not nervous/anxious. Objective: There were no vitals taken for this visit. Physical Exam  Vitals and nursing note reviewed. Constitutional:       Appearance: She is well-developed. Comments: Family in the wheelchair chronically ill lady schizophrenic lethargic sleepy   HENT:      Head: Normocephalic. Right Ear: External ear normal.      Left Ear: External ear normal.      Nose: Nose normal.      Mouth/Throat:      Pharynx: No oropharyngeal exudate. Eyes:      Conjunctiva/sclera: Conjunctivae normal.      Pupils: Pupils are equal, round, and reactive to light. Neck:      Thyroid: No thyromegaly. Cardiovascular:      Rate and Rhythm: Normal rate and regular rhythm. Heart sounds: Normal heart sounds. No murmur heard. No friction rub. No gallop. Comments: S1-S2 regular rhythm systolic murmur grade 1/6 extremities no edema  Pulmonary:      Effort: Pulmonary effort is normal. No respiratory distress. Breath sounds: Normal breath sounds. No wheezing or rales. Comments: Start clear no wheezing rales or rhonchi  Abdominal:      General: Bowel sounds are normal. There is no distension. Palpations: Abdomen is soft. There is no mass. Tenderness: There is no abdominal tenderness. There is no guarding or rebound. Musculoskeletal:         General: Normal range of motion. Cervical back: Normal range of motion and neck supple. Lymphadenopathy:      Cervical: No cervical adenopathy. Skin:     General: Skin is warm and dry.    Neurological:      Mental Status: She is alert and oriented to person, place, and time.    Psychiatric:         Behavior: Behavior normal.         Judgment: Judgment normal.

## 2023-09-27 NOTE — PATIENT INSTRUCTIONS
Take her to the local pharmacy to give her the COVID-19 booster  2 weeks later give her the flu vaccine  Otherwise no change in medications  We will see her back in 4 months

## 2023-10-04 DIAGNOSIS — K59.00 CONSTIPATION: ICD-10-CM

## 2023-10-04 DIAGNOSIS — I48.0 PAROXYSMAL A-FIB (HCC): ICD-10-CM

## 2023-10-04 RX ORDER — METOPROLOL SUCCINATE 50 MG/1
TABLET, EXTENDED RELEASE ORAL
Qty: 90 TABLET | Refills: 0 | Status: SHIPPED | OUTPATIENT
Start: 2023-10-04

## 2023-10-04 RX ORDER — DILTIAZEM HYDROCHLORIDE 180 MG/1
180 CAPSULE, COATED, EXTENDED RELEASE ORAL DAILY
Qty: 90 CAPSULE | Refills: 0 | Status: SHIPPED | OUTPATIENT
Start: 2023-10-04

## 2023-10-04 RX ORDER — AMIODARONE HYDROCHLORIDE 200 MG/1
200 TABLET ORAL DAILY
Qty: 90 TABLET | Refills: 0 | Status: SHIPPED | OUTPATIENT
Start: 2023-10-04

## 2023-11-07 NOTE — TELEPHONE ENCOUNTER
-Continue home med pepcid   Rachel Schilling called requesting script to be faxed         Please fax to #[phone]

## 2023-12-18 ENCOUNTER — TELEPHONE (OUTPATIENT)
Dept: OTHER | Facility: OTHER | Age: 88
End: 2023-12-18

## 2023-12-19 NOTE — TELEPHONE ENCOUNTER
Patient is calling regarding cancelling an appointment.    Date/Time: 12/19/23 10:40    Patient was rescheduled: YES [] NO [x]    Patient requesting call back to reschedule: YES [x] NO []

## 2023-12-28 DIAGNOSIS — K59.00 CONSTIPATION: ICD-10-CM

## 2023-12-28 DIAGNOSIS — I48.0 PAROXYSMAL A-FIB (HCC): ICD-10-CM

## 2023-12-28 RX ORDER — AMIODARONE HYDROCHLORIDE 200 MG/1
200 TABLET ORAL DAILY
Qty: 90 TABLET | Refills: 0 | Status: SHIPPED | OUTPATIENT
Start: 2023-12-28

## 2023-12-28 RX ORDER — METOPROLOL SUCCINATE 50 MG/1
TABLET, EXTENDED RELEASE ORAL
Qty: 90 TABLET | Refills: 0 | Status: SHIPPED | OUTPATIENT
Start: 2023-12-28

## 2023-12-28 RX ORDER — DILTIAZEM HYDROCHLORIDE 180 MG/1
180 CAPSULE, COATED, EXTENDED RELEASE ORAL DAILY
Qty: 90 CAPSULE | Refills: 0 | Status: SHIPPED | OUTPATIENT
Start: 2023-12-28

## 2024-02-01 ENCOUNTER — OFFICE VISIT (OUTPATIENT)
Dept: INTERNAL MEDICINE CLINIC | Facility: CLINIC | Age: 89
End: 2024-02-01
Payer: MEDICARE

## 2024-02-01 ENCOUNTER — HOSPITAL ENCOUNTER (EMERGENCY)
Facility: HOSPITAL | Age: 89
Discharge: HOME | End: 2024-02-01
Attending: EMERGENCY MEDICINE
Payer: MEDICARE

## 2024-02-01 VITALS
TEMPERATURE: 97.4 F | OXYGEN SATURATION: 95 % | HEART RATE: 54 BPM | DIASTOLIC BLOOD PRESSURE: 77 MMHG | RESPIRATION RATE: 20 BRPM | SYSTOLIC BLOOD PRESSURE: 183 MMHG

## 2024-02-01 VITALS
HEART RATE: 58 BPM | DIASTOLIC BLOOD PRESSURE: 50 MMHG | WEIGHT: 107 LBS | SYSTOLIC BLOOD PRESSURE: 110 MMHG | OXYGEN SATURATION: 92 % | TEMPERATURE: 97.6 F | BODY MASS INDEX: 22.46 KG/M2 | HEIGHT: 58 IN

## 2024-02-01 DIAGNOSIS — I48.0 PAROXYSMAL A-FIB (HCC): ICD-10-CM

## 2024-02-01 DIAGNOSIS — M81.0 AGE-RELATED OSTEOPOROSIS WITHOUT CURRENT PATHOLOGICAL FRACTURE: ICD-10-CM

## 2024-02-01 DIAGNOSIS — I95.89 HYPOTENSION DUE TO HYPOVOLEMIA: ICD-10-CM

## 2024-02-01 DIAGNOSIS — N18.31 STAGE 3A CHRONIC KIDNEY DISEASE (HCC): ICD-10-CM

## 2024-02-01 DIAGNOSIS — E86.1 HYPOTENSION DUE TO HYPOVOLEMIA: ICD-10-CM

## 2024-02-01 DIAGNOSIS — E78.2 MIXED HYPERLIPIDEMIA: ICD-10-CM

## 2024-02-01 DIAGNOSIS — N18.32 STAGE 3B CHRONIC KIDNEY DISEASE (HCC): ICD-10-CM

## 2024-02-01 DIAGNOSIS — E44.0 MODERATE PROTEIN-CALORIE MALNUTRITION (HCC): ICD-10-CM

## 2024-02-01 DIAGNOSIS — I48.0 PAF (PAROXYSMAL ATRIAL FIBRILLATION) (HCC): ICD-10-CM

## 2024-02-01 DIAGNOSIS — S02.31XA CLOSED FRACTURE OF RIGHT ORBITAL FLOOR, INITIAL ENCOUNTER (HCC): ICD-10-CM

## 2024-02-01 DIAGNOSIS — I10 ESSENTIAL HYPERTENSION: Primary | ICD-10-CM

## 2024-02-01 DIAGNOSIS — F20.5 RESIDUAL SCHIZOPHRENIA (HCC): Chronic | ICD-10-CM

## 2024-02-01 DIAGNOSIS — F41.9 ANXIETY: ICD-10-CM

## 2024-02-01 DIAGNOSIS — R00.1 BRADYCARDIA: ICD-10-CM

## 2024-02-01 DIAGNOSIS — R13.12 OROPHARYNGEAL DYSPHAGIA: ICD-10-CM

## 2024-02-01 DIAGNOSIS — R41.0 DELIRIUM: ICD-10-CM

## 2024-02-01 DIAGNOSIS — F03.90 MAJOR NEUROCOGNITIVE DISORDER (HCC): ICD-10-CM

## 2024-02-01 DIAGNOSIS — F03.C11 SEVERE DEMENTIA WITH AGITATION, UNSPECIFIED DEMENTIA TYPE (HCC): ICD-10-CM

## 2024-02-01 DIAGNOSIS — S02.31XS: ICD-10-CM

## 2024-02-01 DIAGNOSIS — N39.0 UTI (URINARY TRACT INFECTION): Primary | ICD-10-CM

## 2024-02-01 LAB
ANION GAP SERPL CALCULATED.3IONS-SCNC: 6 MMOL/L
ATRIAL RATE: 52 BPM
BACTERIA UR QL AUTO: ABNORMAL /HPF
BASOPHILS # BLD AUTO: 0.01 THOUSANDS/ÂΜL (ref 0–0.1)
BASOPHILS NFR BLD AUTO: 0 % (ref 0–1)
BILIRUB UR QL STRIP: NEGATIVE
BUN SERPL-MCNC: 38 MG/DL (ref 5–25)
CALCIUM SERPL-MCNC: 8.2 MG/DL (ref 8.4–10.2)
CARDIAC TROPONIN I PNL SERPL HS: 6 NG/L
CHLORIDE SERPL-SCNC: 111 MMOL/L (ref 96–108)
CLARITY UR: ABNORMAL
CO2 SERPL-SCNC: 23 MMOL/L (ref 21–32)
COLOR UR: YELLOW
CREAT SERPL-MCNC: 1.28 MG/DL (ref 0.6–1.3)
EOSINOPHIL # BLD AUTO: 0.13 THOUSAND/ÂΜL (ref 0–0.61)
EOSINOPHIL NFR BLD AUTO: 2 % (ref 0–6)
ERYTHROCYTE [DISTWIDTH] IN BLOOD BY AUTOMATED COUNT: 14.8 % (ref 11.6–15.1)
GFR SERPL CREATININE-BSD FRML MDRD: 36 ML/MIN/1.73SQ M
GLUCOSE SERPL-MCNC: 107 MG/DL (ref 65–140)
GLUCOSE UR STRIP-MCNC: NEGATIVE MG/DL
HCT VFR BLD AUTO: 34.2 % (ref 34.8–46.1)
HGB BLD-MCNC: 11 G/DL (ref 11.5–15.4)
HGB UR QL STRIP.AUTO: ABNORMAL
IMM GRANULOCYTES # BLD AUTO: 0.02 THOUSAND/UL (ref 0–0.2)
IMM GRANULOCYTES NFR BLD AUTO: 0 % (ref 0–2)
KETONES UR STRIP-MCNC: ABNORMAL MG/DL
LEUKOCYTE ESTERASE UR QL STRIP: ABNORMAL
LYMPHOCYTES # BLD AUTO: 1.05 THOUSANDS/ÂΜL (ref 0.6–4.47)
LYMPHOCYTES NFR BLD AUTO: 19 % (ref 14–44)
MCH RBC QN AUTO: 30.1 PG (ref 26.8–34.3)
MCHC RBC AUTO-ENTMCNC: 32.2 G/DL (ref 31.4–37.4)
MCV RBC AUTO: 94 FL (ref 82–98)
MONOCYTES # BLD AUTO: 0.4 THOUSAND/ÂΜL (ref 0.17–1.22)
MONOCYTES NFR BLD AUTO: 7 % (ref 4–12)
NEUTROPHILS # BLD AUTO: 4.01 THOUSANDS/ÂΜL (ref 1.85–7.62)
NEUTS SEG NFR BLD AUTO: 72 % (ref 43–75)
NITRITE UR QL STRIP: NEGATIVE
NON-SQ EPI CELLS URNS QL MICRO: ABNORMAL /HPF
NRBC BLD AUTO-RTO: 0 /100 WBCS
P AXIS: 58 DEGREES
PH UR STRIP.AUTO: 7.5 [PH] (ref 4.5–8)
PLATELET # BLD AUTO: 210 THOUSANDS/UL (ref 149–390)
PMV BLD AUTO: 10.5 FL (ref 8.9–12.7)
POTASSIUM SERPL-SCNC: 4.6 MMOL/L (ref 3.5–5.3)
PR INTERVAL: 234 MS
PROT UR STRIP-MCNC: >=300 MG/DL
QRS AXIS: 49 DEGREES
QRSD INTERVAL: 78 MS
QT INTERVAL: 464 MS
QTC INTERVAL: 431 MS
RBC # BLD AUTO: 3.65 MILLION/UL (ref 3.81–5.12)
RBC #/AREA URNS AUTO: ABNORMAL /HPF
SODIUM SERPL-SCNC: 140 MMOL/L (ref 135–147)
SP GR UR STRIP.AUTO: 1.02 (ref 1–1.03)
T WAVE AXIS: 72 DEGREES
UROBILINOGEN UR QL STRIP.AUTO: 1 E.U./DL
VENTRICULAR RATE: 52 BPM
WBC # BLD AUTO: 5.62 THOUSAND/UL (ref 4.31–10.16)
WBC #/AREA URNS AUTO: ABNORMAL /HPF
WBC CLUMPS # UR AUTO: PRESENT /UL

## 2024-02-01 PROCEDURE — 87186 SC STD MICRODIL/AGAR DIL: CPT

## 2024-02-01 PROCEDURE — 99214 OFFICE O/P EST MOD 30 MIN: CPT | Performed by: INTERNAL MEDICINE

## 2024-02-01 PROCEDURE — 36415 COLL VENOUS BLD VENIPUNCTURE: CPT | Performed by: EMERGENCY MEDICINE

## 2024-02-01 PROCEDURE — 99285 EMERGENCY DEPT VISIT HI MDM: CPT

## 2024-02-01 PROCEDURE — 93005 ELECTROCARDIOGRAM TRACING: CPT

## 2024-02-01 PROCEDURE — 85025 COMPLETE CBC W/AUTO DIFF WBC: CPT | Performed by: EMERGENCY MEDICINE

## 2024-02-01 PROCEDURE — 80048 BASIC METABOLIC PNL TOTAL CA: CPT | Performed by: EMERGENCY MEDICINE

## 2024-02-01 PROCEDURE — 84484 ASSAY OF TROPONIN QUANT: CPT | Performed by: EMERGENCY MEDICINE

## 2024-02-01 PROCEDURE — 99285 EMERGENCY DEPT VISIT HI MDM: CPT | Performed by: EMERGENCY MEDICINE

## 2024-02-01 PROCEDURE — 81001 URINALYSIS AUTO W/SCOPE: CPT

## 2024-02-01 PROCEDURE — 87086 URINE CULTURE/COLONY COUNT: CPT

## 2024-02-01 PROCEDURE — 87077 CULTURE AEROBIC IDENTIFY: CPT

## 2024-02-01 RX ORDER — CEPHALEXIN 250 MG/5ML
500 POWDER, FOR SUSPENSION ORAL EVERY 12 HOURS SCHEDULED
Qty: 140 ML | Refills: 0 | Status: SHIPPED | OUTPATIENT
Start: 2024-02-01 | End: 2024-02-08

## 2024-02-01 NOTE — DISCHARGE INSTRUCTIONS
Jose un seguimiento con el Dr. Bean el 7 de febrero griselda se indicó anteriormente.    Deje de rebecca diltiazem (cardizem) 180 mg y metoprolol (toprol) 50 mg; estos son medicamentos de liberación prolongada y no se pueden triturar.    Comience a rebecca diltiazem (cardizem) 30 mg keerthi veces al día y metoprolol (lopressor) 25 mg dos veces al día. Estos se pueden triturar.    Comience a rebecca cefalexina líquida dos veces al día sachi 7 días para tratar la infección del tracto urinario.      Follow up with Dr. Bean on February 7th as previously directed.    Stop taking the diltiazem (cardizem) 180 mg and the metoprolol (toprol) 50mg - these are extended release medications and cannot be crushed.    Start taking the diltiazem (cardizem) 30 mg three times a day and the metoprolol (lopressor) 25 mg twice a day.  These can be crushed.    Start taking the cephalexin liquid twice a day for 7 days to treat the urinary tract infection.

## 2024-02-01 NOTE — PROGRESS NOTES
Assessment/Plan: Appears lethargic unable to do an EKG combative in flexion contraction noncommunicative she needs to go to the ER to be evaluated she has not had lab work in quite a long time she is on amiodarone dose has to be adjusted she needs to see a cardiologist we will refer her to the ER  Appears to be dehydrated            Appears lethargic she is flexing and stiff she is bradycardic here we will going to do an EKG right away she has not had lab work in quite a long time        Schizophrenia follows up with a psychiatrist she is lethargic medications have to be change taking Remeron 15 mg  Lorazepam 0.5 mg as needed      Paroxysmal atrial fibrillation she is bradycardic she is on amiodarone 200 mg she is going to see the cardiologist soon she needs to have lab work done  Taking diltiazem extended 180 mg metoprolol succinate 50 mg and Hytrin 2 mg at bedtime she also takes amiodarone 200 mg      Takes duloxetine 30 mg for chronic pain.    Takes trazodone 50 mg at bedtime to help her sleep            Needs lab work done ordered September 27, 2023      Results for orders placed or performed in visit on 09/15/23   POCT Measure PVR   Result Value Ref Range    POST-VOID RESIDUAL VOLUME, ML POC 31 mL        No problem-specific Assessment & Plan notes found for this encounter.         Problem List Items Addressed This Visit    None        Subjective:      Patient ID: Denia Castañeda is a 89 y.o. female.    No chief complaint on file.        Current Outpatient Medications:     acetaminophen (TYLENOL) 325 mg tablet, Take 2 tablets (650 mg total) by mouth every 6 (six) hours as needed for mild pain, moderate pain or headaches, Disp: 30 tablet, Rfl: 0    amiodarone 200 mg tablet, TAKE 1 TABLET (200 MG TOTAL) BY MOUTH DAILY, Disp: 90 tablet, Rfl: 0    diltiazem (CARDIZEM CD) 180 mg 24 hr capsule, TAKE 1 CAPSULE (180 MG TOTAL) BY MOUTH DAILY, Disp: 90 capsule, Rfl: 0    DULoxetine (CYMBALTA) 30 mg delayed release capsule,  Take 30 mg by mouth daily at bedtime, Disp: , Rfl:     LORazepam (ATIVAN) 0.5 mg tablet, Take 0.5 mg by mouth , Disp: , Rfl:     melatonin 3 mg, , Disp: , Rfl:     metoprolol succinate (TOPROL-XL) 50 mg 24 hr tablet, TAKE 1 TABLET BY MOUTH DAILY, Disp: 90 tablet, Rfl: 0    mirtazapine (REMERON) 15 mg tablet, , Disp: , Rfl:     mirtazapine (REMERON) 7.5 MG tablet, , Disp: , Rfl:     OLANZapine (ZyPREXA) 5 mg tablet, Take 1 tablet (5 mg total) by mouth daily at bedtime, Disp: 30 tablet, Rfl: 1    Stool Softener 100 MG capsule, TAKE 1 CAPSULE BY MOUTH EVERY 12 HOURS FOR CONSTIPATION, Disp: 90 capsule, Rfl: 0    terazosin (HYTRIN) 2 mg capsule, Take 1 capsule (2 mg total) by mouth daily at bedtime, Disp: 90 capsule, Rfl: 3    traZODone (DESYREL) 50 mg tablet, Take 50 mg by mouth daily at bedtime, Disp: , Rfl:     HPI    The following portions of the patient's history were reviewed and updated as appropriate: allergies, current medications, past family history, past medical history, past social history, past surgical history, and problem list.    Review of Systems   Constitutional:  Positive for fatigue. Negative for activity change, appetite change, fever and unexpected weight change.   HENT:  Negative for congestion, ear pain, hearing loss, mouth sores, postnasal drip, rhinorrhea, sore throat, trouble swallowing and voice change.    Eyes:  Negative for pain, redness and visual disturbance.   Respiratory:  Negative for cough, chest tightness, shortness of breath and wheezing.    Cardiovascular:  Negative for chest pain, palpitations and leg swelling.   Gastrointestinal:  Negative for abdominal distention, abdominal pain, blood in stool, constipation, diarrhea and nausea.   Endocrine: Negative for cold intolerance, heat intolerance, polydipsia, polyphagia and polyuria.   Genitourinary:  Negative for difficulty urinating, dysuria, flank pain, frequency, hematuria and urgency.   Musculoskeletal:  Negative for arthralgias,  back pain, gait problem, joint swelling and myalgias.   Skin:  Negative for color change and pallor.   Neurological:  Negative for dizziness, tremors, seizures, syncope, weakness, numbness and headaches.   Hematological:  Negative for adenopathy. Does not bruise/bleed easily.   Psychiatric/Behavioral:  Positive for agitation, behavioral problems, confusion and hallucinations. Negative for sleep disturbance. The patient is not nervous/anxious.          Objective:    There were no vitals taken for this visit.     Physical Exam  Constitutional:       Appearance: She is ill-appearing and toxic-appearing.   Cardiovascular:      Rate and Rhythm: Bradycardia present.   Pulmonary:      Effort: No respiratory distress.      Comments: Lungs decreased breath sounds but clear  Neurological:      Comments: Bending over unable to examine her completely.   Psychiatric:         Speech: She is noncommunicative.         Behavior: Behavior is withdrawn and combative.         Thought Content: Thought content is delusional.         Cognition and Memory: Cognition is impaired. Memory is impaired. She exhibits impaired recent memory and impaired remote memory.         Judgment: Judgment is inappropriate.

## 2024-02-01 NOTE — ED PROVIDER NOTES
"History  Chief Complaint   Patient presents with    Medical Problem     Patient sent in by family doctor as he was unable to evaluate patient. Patient has dementia at baseline but per EMS, patient is more altered and combative. Patient also bradycardic.      89y F sent in from PCP for evaluation of bradycardia in the office today.  Pt/ w hx of severe dementia, known cardiac disease. No recent changes in meds. No reported f/c/s, no cough/congesiton, no v/d, no changes in bms or urination.  Came in b/c pcp \"couldn't do an EKG\" in the office today      History provided by:  Caregiver and relative   used: No    Medical Problem      Prior to Admission Medications   Prescriptions Last Dose Informant Patient Reported? Taking?   DULoxetine (CYMBALTA) 30 mg delayed release capsule  Child Yes No   Sig: Take 30 mg by mouth daily at bedtime   LORazepam (ATIVAN) 0.5 mg tablet  Child Yes No   Sig: Take 0.5 mg by mouth    OLANZapine (ZyPREXA) 5 mg tablet  Child No No   Sig: Take 1 tablet (5 mg total) by mouth daily at bedtime   Stool Softener 100 MG capsule  Child No No   Sig: TAKE 1 CAPSULE BY MOUTH EVERY 12 HOURS FOR CONSTIPATION   acetaminophen (TYLENOL) 325 mg tablet  Child No No   Sig: Take 2 tablets (650 mg total) by mouth every 6 (six) hours as needed for mild pain, moderate pain or headaches   amiodarone 200 mg tablet  Child No No   Sig: TAKE 1 TABLET (200 MG TOTAL) BY MOUTH DAILY   diltiazem (CARDIZEM CD) 180 mg 24 hr capsule  Child No No   Sig: TAKE 1 CAPSULE (180 MG TOTAL) BY MOUTH DAILY   melatonin 3 mg  Child Yes No   metoprolol succinate (TOPROL-XL) 50 mg 24 hr tablet  Child No No   Sig: TAKE 1 TABLET BY MOUTH DAILY   mirtazapine (REMERON) 15 mg tablet  Child Yes No   mirtazapine (REMERON) 7.5 MG tablet  Child Yes No   terazosin (HYTRIN) 2 mg capsule  Child No No   Sig: Take 1 capsule (2 mg total) by mouth daily at bedtime   traZODone (DESYREL) 50 mg tablet  Child Yes No   Sig: Take 50 mg by mouth " daily at bedtime      Facility-Administered Medications: None       Past Medical History:   Diagnosis Date    Anxiety     Cognitive impairment     COVID-19 virus infection 04/13/2020    Dementia (HCC)     Depression     Hallucination     Hyperlipidemia     Hypertension     Memory loss     Psychiatric illness     Psychosis (HCC)     Retention of urine     Schizoaffective disorder (HCC)     Sleep difficulties     Urinary tract infection        Past Surgical History:   Procedure Laterality Date    APPENDECTOMY      BLADDER SURGERY      CHOLECYSTECTOMY      SC OPTX FEM SHFT FX W/INSJ IMED IMPLT W/WO SCREW Right 7/15/2021    Procedure: INSERTION NAIL IM FEMUR ANTEGRADE (TROCHANTERIC) right;  Surgeon: Getachew Aguilar MD;  Location: [facility];  Service: Orthopedics    TOTAL ABDOMINAL HYSTERECTOMY W/ BILATERAL SALPINGOOPHORECTOMY      TOTAL KNEE ARTHROPLASTY Left        Family History   Problem Relation Age of Onset    Heart disease Mother         Cardiac disorder    Parkinsonism Father     Heart disease Sister         Cardiac disorder    Hypertension Sister     Diabetes Child         Diabetes Mellitus    Lung disease Child         Respiratory Disorder    Diabetes Son         Diabetes Mellitus     I have reviewed and agree with the history as documented.    E-Cigarette/Vaping    E-Cigarette Use Never User      E-Cigarette/Vaping Substances    Nicotine No     THC No     CBD No     Flavoring No     Other No     Unknown No      Social History     Tobacco Use    Smoking status: Never     Passive exposure: Never    Smokeless tobacco: Never    Tobacco comments:     Former smoker per [employer]   Vaping Use    Vaping status: Never Used   Substance Use Topics    Alcohol use: Never    Drug use: No       Review of Systems   All other systems reviewed and are negative.      Physical Exam  Physical Exam  Vitals and nursing note reviewed.   Constitutional:       General: She is not in acute distress.     Appearance: She is not  ill-appearing, toxic-appearing or diaphoretic.   HENT:      Mouth/Throat:      Mouth: Mucous membranes are moist.   Eyes:      Conjunctiva/sclera: Conjunctivae normal.   Cardiovascular:      Rate and Rhythm: Regular rhythm. Bradycardia present.   Pulmonary:      Effort: Pulmonary effort is normal.      Breath sounds: Normal breath sounds.   Abdominal:      Palpations: Abdomen is soft.   Musculoskeletal:         General: No tenderness.      Cervical back: Neck supple.   Skin:     General: Skin is warm.   Neurological:      Mental Status: She is alert. Mental status is at baseline.      Comments: Some contractures, combative   Psychiatric:         Behavior: Behavior is uncooperative.         Vital Signs  ED Triage Vitals   Temperature Pulse Respirations Blood Pressure SpO2   02/01/24 1503 02/01/24 1350 02/01/24 1350 02/01/24 1350 02/01/24 1350   (!) 97.4 °F (36.3 °C) (!) 53 20 133/56 97 %      Temp Source Heart Rate Source Patient Position - Orthostatic VS BP Location FiO2 (%)   02/01/24 1503 02/01/24 1350 02/01/24 1350 02/01/24 1350 --   Rectal Monitor Lying Left arm       Pain Score       --                  Vitals:    02/01/24 1350 02/01/24 1545 02/01/24 1613   BP: 133/56  (!) 183/77   Pulse: (!) 53 55 (!) 54   Patient Position - Orthostatic VS: Lying  Lying         Visual Acuity      ED Medications  Medications - No data to display    Diagnostic Studies  Results Reviewed       Procedure Component Value Units Date/Time    Urine Microscopic [[id number]]  (Abnormal) Collected: 02/01/24 1415    Lab Status: Final result Specimen: Urine, Other Updated: 02/01/24 1527     RBC, UA Innumerable /hpf      WBC, UA Innumerable /hpf      Epithelial Cells Occasional /hpf      Bacteria, UA Innumerable /hpf      WBC Clumps Present    Urine culture [[id number]] Collected: 02/01/24 1415    Lab Status: In process Specimen: Urine, Other Updated: 02/01/24 1527    Basic metabolic panel [[id number]]  (Abnormal) Collected: 02/01/24 1407     Lab Status: Final result Specimen: Blood from Hand, Left Updated: 02/01/24 1457     Sodium 140 mmol/L      Potassium 4.6 mmol/L      Chloride 111 mmol/L      CO2 23 mmol/L      ANION GAP 6 mmol/L      BUN 38 mg/dL      Creatinine 1.28 mg/dL      Glucose 107 mg/dL      Calcium 8.2 mg/dL      eGFR 36 ml/min/1.73sq m     Narrative:      National Kid[employer] Foundation guidelines for Chronic Kidney Disease (CKD):     Stage 1 with normal or high GFR (GFR > 90 mL/min/1.73 square meters)    Stage 2 Mild CKD (GFR = 60-89 mL/min/1.73 square meters)    Stage 3A Moderate CKD (GFR = 45-59 mL/min/1.73 square meters)    Stage 3B Moderate CKD (GFR = 30-44 mL/min/1.73 square meters)    Stage 4 Severe CKD (GFR = 15-29 mL/min/1.73 square meters)    Stage 5 End Stage CKD (GFR <15 mL/min/1.73 square meters)  Note: GFR calculation is accurate only with a steady state creatinine    HS Troponin 0hr (reflex protocol) [[id number]]  (Normal) Collected: 02/01/24 1407    Lab Status: Final result Specimen: Blood from Hand, Left Updated: 02/01/24 1445     hs TnI 0hr 6 ng/L     CBC and differential [[id number]]  (Abnormal) Collected: 02/01/24 1407    Lab Status: Final result Specimen: Blood from Hand, Left Updated: 02/01/24 1420     WBC 5.62 Thousand/uL      RBC 3.65 Million/uL      Hemoglobin 11.0 g/dL      Hematocrit 34.2 %      MCV 94 fL      MCH 30.1 pg      MCHC 32.2 g/dL      RDW 14.8 %      MPV 10.5 fL      Platelets 210 Thousands/uL      nRBC 0 /100 WBCs      Neutrophils Relative 72 %      Immat GRANS % 0 %      Lymphocytes Relative 19 %      Monocytes Relative 7 %      Eosinophils Relative 2 %      Basophils Relative 0 %      Neutrophils Absolute 4.01 Thousands/µL      Immature Grans Absolute 0.02 Thousand/uL      Lymphocytes Absolute 1.05 Thousands/µL      Monocytes Absolute 0.40 Thousand/µL      Eosinophils Absolute 0.13 Thousand/µL      Basophils Absolute 0.01 Thousands/µL     Urine Macroscopic, POC [[id number]]  (Abnormal) Collected:  02/01/24 1415    Lab Status: Final result Specimen: Urine Updated: 02/01/24 1417     Color, UA Yellow     Clarity, UA Turbid     pH, UA 7.5     Leukocytes, UA Moderate     Nitrite, UA Negative     Protein, UA >=300 mg/dl      Glucose, UA Negative mg/dl      Ketones, UA Trace mg/dl      Urobilinogen, UA 1.0 E.U./dl      Bilirubin, UA Negative     Occult Blood, UA Large     Specific Gravity, UA 1.025    Narrative:      CLINITEK RESULT                   No orders to display              Procedures  Procedures         ED Course  ED Course as of 02/01/24 1821   Thu Feb 01, 2024   1456 WBC: 5.62   1456 hs TnI 0hr: 6   1553 WBC, UA(!): Innumerable   1553 Bacteria, UA(!): Innumerable  Will treat   1613 D/w Dr. Maravilla, cardiology.    Recommends reducing the Cardizem to 120mg daily and keep f/u w/ Dr. Bean next week.                               SBIRT 22yo+      Flowsheet Row Most Recent Value   Initial Alcohol Screen: US AUDIT-C     1. How often do you have a drink containing alcohol? 0 Filed at: 02/01/2024 1351   2. How many drinks containing alcohol do you have on a typical day you are drinking?  0 Filed at: 02/01/2024 1351   3b. FEMALE Any Age, or MALE 65+: How often do you have 4 or more drinks on one occassion? 0 Filed at: 02/01/2024 1351   Audit-C Score 0 Filed at: 02/01/2024 1351   ROBERT: How many times in the past year have you...    Used an illegal drug or used a prescription medication for non-medical reasons? Never Filed at: 02/01/2024 1351                      Medical Decision Making  Amount and/or Complexity of Data Reviewed  Labs: ordered. Decision-making details documented in ED Course.    Risk  Prescription drug management.             Disposition  Final diagnoses:   UTI (urinary tract infection)   Bradycardia   Paroxysmal A-fib (HCC)     Time reflects when diagnosis was documented in both MDM as applicable and the Disposition within this note       Time User Action Codes Description Comment    2/1/2024   4:14 PM Corrine Morales Add [N39.0] UTI (urinary tract infection)     2/1/2024  4:14 PM Corrine Morales Add [R00.1] Bradycardia     2/1/2024  4:29 PM Corrine Morales Add [I48.0] Paroxysmal A-fib (HCC)           ED Disposition       ED Disposition   Discharge    Condition   Stable    Date/Time   Thu Feb 1, 2024 1614    Comment   Denia Castañeda discharge to home/self care.                   Follow-up Information       Follow up With Specialties Details Why Contact Info    Arnulfo Yun MD Internal Medicine Schedule an appointment as soon as possible for a visit  As needed [address] [place] [address]  [phone]              Discharge Medication List as of 2/1/2024  4:34 PM        START taking these medications    Details   cephalexin (KEFLEX) 250 mg/5 mL suspension Take 10 mL (500 mg total) by mouth every 12 (twelve) hours for 7 days, Starting Thu 2/1/2024, Until Thu 2/8/2024, Normal      diltiazem (CARDIZEM) 30 mg tablet Take 1 tablet (30 mg total) by mouth 3 (three) times a day, Starting Thu 2/1/2024, Until Sat 3/2/2024, Normal      metoprolol tartrate (LOPRESSOR) 25 mg tablet Take 1 tablet (25 mg total) by mouth 2 (two) times a day, Starting Thu 2/1/2024, Until Sat 3/2/2024, Normal           CONTINUE these medications which have NOT CHANGED    Details   acetaminophen (TYLENOL) 325 mg tablet Take 2 tablets (650 mg total) by mouth every 6 (six) hours as needed for mild pain, moderate pain or headaches, Starting Sun 8/23/2020, No Print      amiodarone 200 mg tablet TAKE 1 TABLET (200 MG TOTAL) BY MOUTH DAILY, Starting Thu 12/28/2023, Normal      DULoxetine (CYMBALTA) 30 mg delayed release capsule Take 30 mg by mouth daily at bedtime, Historical Med      LORazepam (ATIVAN) 0.5 mg tablet Take 0.5 mg by mouth , Historical Med      melatonin 3 mg Starting Mon 6/26/2023, Historical Med      !! mirtazapine (REMERON) 15 mg tablet Starting Mon 9/11/2023, Historical Med      !!  mirtazapine (REMERON) 7.5 MG tablet Starting Wed 5/31/2023, Historical Med      OLANZapine (ZyPREXA) 5 mg tablet Take 1 tablet (5 mg total) by mouth daily at bedtime, Starting Tue 5/26/2020, Normal      Stool Softener 100 MG capsule TAKE 1 CAPSULE BY MOUTH EVERY 12 HOURS FOR CONSTIPATION, Normal      terazosin (HYTRIN) 2 mg capsule Take 1 capsule (2 mg total) by mouth daily at bedtime, Starting Fri 9/15/2023, Normal      traZODone (DESYREL) 50 mg tablet Take 50 mg by mouth daily at bedtime, Historical Med       !! - Potential duplicate medications found. Please discuss with provider.        STOP taking these medications       diltiazem (CARDIZEM CD) 180 mg 24 hr capsule Comments:   Reason for Stopping:         metoprolol succinate (TOPROL-XL) 50 mg 24 hr tablet Comments:   Reason for Stopping:               No discharge procedures on file.    PDMP Review         Value Time User    PDMP Reviewed  Yes 7/16/2021  8:50 AM Azalea Robertson PA-C            ED Provider  Electronically Signed by             Corrine Morales DO  02/01/24 [id number]

## 2024-02-02 ENCOUNTER — VBI (OUTPATIENT)
Dept: INTERNAL MEDICINE CLINIC | Facility: CLINIC | Age: 89
End: 2024-02-02

## 2024-02-02 ENCOUNTER — TELEPHONE (OUTPATIENT)
Age: 89
End: 2024-02-02

## 2024-02-02 NOTE — TELEPHONE ENCOUNTER
Pt's son called stating he needed a note from Dr. Yun for His mom saying that her care giver has permission to ride with he on the bus for her appointments. Pts son had no information to fax that over to the company. Asked if you can please call him so he can pick it up. Please advise [phone]. Thank you.

## 2024-02-02 NOTE — TELEPHONE ENCOUNTER
02/02/24 11:29 AM    Patient contacted post ED visit, [facility] department spoke with patient/caregiver and outreach was successful.    Thank you.  Paola Morrison  [facility]

## 2024-02-03 LAB — BACTERIA UR CULT: ABNORMAL

## 2024-02-05 NOTE — TELEPHONE ENCOUNTER
Called son Logan left message letter requested has been completed and ready for , advised copy will be at

## 2024-02-14 ENCOUNTER — TELEPHONE (OUTPATIENT)
Age: 89
End: 2024-02-14

## 2024-02-22 ENCOUNTER — TELEPHONE (OUTPATIENT)
Age: 89
End: 2024-02-22

## 2024-02-22 DIAGNOSIS — I48.0 PAROXYSMAL A-FIB (HCC): ICD-10-CM

## 2024-02-22 NOTE — TELEPHONE ENCOUNTER
Duplicate order Patients pharmacy calling to refill Diltiazem and metoprolol states patient needs it by tomorrow.

## 2024-02-22 NOTE — TELEPHONE ENCOUNTER
Patients son called in stating his mother needs pre authorization for one of her refills but was unable to tell me which medication, he's going to have the pharmacy call to clarify

## 2024-02-26 ENCOUNTER — TELEPHONE (OUTPATIENT)
Age: 89
End: 2024-02-26

## 2024-02-26 NOTE — TELEPHONE ENCOUNTER
Patients son called stating that patients care giver made him aware that patient is sleeping too much and patients son is concerned.

## 2024-02-27 NOTE — TELEPHONE ENCOUNTER
Called patients son Logan and he advised patient has been sleeping more than normal and is very concerned. He wanted to know if the medication Diltiazem would make the patient sleepy. I advised him the Diltiazem is for her BP and it would not make her sleepy. I asked him if she was taking Mirtazapine 15mg and Melatonin 3mg together because if so then that is what could be causing for her to be very sleepy. I spoke with Dr. Yun and he stated if she is taking both those medications together she should not its either or and also the Mirtazapine should be cut in half to 7.5mg. I advised the son and he understood

## 2024-03-15 ENCOUNTER — OFFICE VISIT (OUTPATIENT)
Dept: CARDIOLOGY CLINIC | Facility: CLINIC | Age: 89
End: 2024-03-15
Payer: MEDICARE

## 2024-03-15 VITALS — OXYGEN SATURATION: 100 %

## 2024-03-15 DIAGNOSIS — I48.0 PAF (PAROXYSMAL ATRIAL FIBRILLATION) (HCC): ICD-10-CM

## 2024-03-15 DIAGNOSIS — I10 ESSENTIAL HYPERTENSION: Primary | ICD-10-CM

## 2024-03-15 PROCEDURE — 99212 OFFICE O/P EST SF 10 MIN: CPT | Performed by: INTERNAL MEDICINE

## 2024-03-15 NOTE — PROGRESS NOTES
Cardiology Follow Up    Denia Castañeda  11/20/1931  [id number]  [facility]  [address] [address]  [phone]    1. Essential hypertension        2. PAF (paroxysmal atrial fibrillation) (HCC)            Interval History: Cardiology follow-up.  Patient has not had any cardiac issues since I saw her last.  No report of syncope is mostly wheelchair-bound.  She she has sustained no falls.  Patient is nonverbal, uncooperative    Patient Active Problem List   Diagnosis    Essential hypertension    Anxiety    Hyperlipidemia    Hyponatremia    Acute encephalopathy    Bilateral hearing loss    Fall    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HCC)    Cellulitis of extremity    Paroxysmal A-fib (HCC)    Orthostatic hypertension    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HCC)    Ambulatory dysfunction    OMEGA (acute kidney injury) (HCC)    Elevated troponin    Urinary incontinence    PAF (paroxysmal atrial fibrillation) (HCC)    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HCC)    Major neurocognitive disorder (HCC)    Moderate protein-calorie malnutrition (HCC)    Age-related osteoporosis without current pathological fracture    Closed fracture of right orbital floor (HCC)    Fall from ground level    Closed right hip fracture (HCC)    Stage 3a chronic kidney disease (HCC)    Dementia (HCC)    ABLA (acute blood loss anemia)    Oropharyngeal dysphagia    Flank pain    Edema of right ankle    Acute urinary retention    Back pain    At risk for elder abuse    Stage 3b chronic kidney disease (HCC)     Past Medical History:   Diagnosis Date    Anxiety     Cognitive impairment     COVID-19 virus infection 04/13/2020    Dementia (HCC)     Depression     Hallucination     Hyperlipidemia     Hypertension     Memory loss     Psychiatric illness     Psychosis (HCC)     Retention of urine     Schizoaffective disorder (HCC)     Sleep difficulties     Urinary tract infection       Social History     Socioeconomic History    Marital status:      Spouse name: Not on file    Number of children: Not on file    Years of education: Not on file    Highest education level: Not on file   Occupational History    Not on file   Tobacco Use    Smoking status: Never     Passive exposure: Never    Smokeless tobacco: Never    Tobacco comments:     Former smoker per [employer]   Vaping Use    Vaping status: Never Used   Substance and Sexual Activity    Alcohol use: Never    Drug use: No    Sexual activity: Not on file   Other Topics Concern    Not on file   Social History Narrative    Drinks coffee     Social Determinants of Health     Financial Resource Strain: Not on file   Food Insecurity: No Food Insecurity (5/15/2023)    Hunger Vital Sign     Worried About Running Out of Food in the Last Year: Never true     Ran Out of Food in the Last Year: Never true   Transportation Needs: No Transportation Needs (5/15/2023)    PRAPARE - Transportation     Lack of Transportation (Medical): No     Lack of Transportation (Non-Medical): No   Physical Activity: Not on file   Stress: Not on file   Social Connections: Not on file   Intimate Partner Violence: Not on file   Housing Stability: Low Risk  (5/15/2023)    Housing Stability Vital Sign     Unable to Pay for Housing in the Last Year: No     Number of Places Lived in the Last Year: 1     Unstable Housing in the Last Year: No      Family History   Problem Relation Age of Onset    Heart disease Mother         Cardiac disorder    Parkinsonism Father     Heart disease Sister         Cardiac disorder    Hypertension Sister     Diabetes Child         Diabetes Mellitus    Lung disease Child         Respiratory Disorder    Diabetes Son         Diabetes Mellitus     Past Surgical History:   Procedure Laterality Date    APPENDECTOMY      BLADDER SURGERY      CHOLECYSTECTOMY      [place] OPTX FEM SHFT FX W/INSJ IMED IMPLT W/WO SCREW Right 7/15/2021    Procedure: INSERTION  NAIL IM FEMUR ANTEGRADE (TROCHANTERIC) right;  Surgeon: Getachew Aguilar MD;  Location: [facility];  Service: Orthopedics    TOTAL ABDOMINAL HYSTERECTOMY W/ BILATERAL SALPINGOOPHORECTOMY      TOTAL KNEE ARTHROPLASTY Left        Current Outpatient Medications:     acetaminophen (TYLENOL) 325 mg tablet, Take 2 tablets (650 mg total) by mouth every 6 (six) hours as needed for mild pain, moderate pain or headaches, Disp: 30 tablet, Rfl: 0    amiodarone 200 mg tablet, TAKE 1 TABLET (200 MG TOTAL) BY MOUTH DAILY, Disp: 90 tablet, Rfl: 0    diltiazem (CARDIZEM) 30 mg tablet, TAKE 1 TABLET (30 MG TOTAL) BY MOUTH 3 (THREE) TIMES A DAY, Disp: 270 tablet, Rfl: 1    DULoxetine (CYMBALTA) 30 mg delayed release capsule, Take 30 mg by mouth daily at bedtime, Disp: , Rfl:     LORazepam (ATIVAN) 0.5 mg tablet, Take 0.5 mg by mouth , Disp: , Rfl:     melatonin 3 mg, , Disp: , Rfl:     metoprolol tartrate (LOPRESSOR) 25 mg tablet, TAKE 1 TABLET (25 MG TOTAL) BY MOUTH 2 (TWO) TIMES A DAY, Disp: 180 tablet, Rfl: 1    mirtazapine (REMERON) 15 mg tablet, , Disp: , Rfl:     mirtazapine (REMERON) 7.5 MG tablet, , Disp: , Rfl:     OLANZapine (ZyPREXA) 5 mg tablet, Take 1 tablet (5 mg total) by mouth daily at bedtime, Disp: 30 tablet, Rfl: 1    Stool Softener 100 MG capsule, TAKE 1 CAPSULE BY MOUTH EVERY 12 HOURS FOR CONSTIPATION, Disp: 90 capsule, Rfl: 0    terazosin (HYTRIN) 2 mg capsule, Take 1 capsule (2 mg total) by mouth daily at bedtime, Disp: 90 capsule, Rfl: 3    metoprolol tartrate (LOPRESSOR) 25 mg tablet, Take 1 tablet (25 mg total) by mouth 2 (two) times a day (Patient not taking: Reported on 3/15/2024), Disp: 60 tablet, Rfl: 0    traZODone (DESYREL) 50 mg tablet, Take 50 mg by mouth daily at bedtime (Patient not taking: Reported on 3/15/2024), Disp: , Rfl:   No Known Allergies    Labs:  Admission on 02/01/2024, Discharged on 02/01/2024   Component Date Value    Ventricular Rate 02/01/2024 52     Atrial Rate 02/01/2024 52     TN  Interval 02/01/2024 234     QRSD Interval 02/01/2024 78     QT Interval 02/01/2024 464     QTC Interval 02/01/2024 431     P Axis 02/01/2024 58     QRS [place] 02/01/2024 49     T Wave [place] 02/01/2024 72     WBC 02/01/2024 5.62     RBC 02/01/2024 3.65 (L)     Hemoglobin 02/01/2024 11.0 (L)     Hematocrit 02/01/2024 34.2 (L)     MCV 02/01/2024 94     MCH 02/01/2024 30.1     MCHC 02/01/2024 32.2     RDW 02/01/2024 14.8     MPV 02/01/2024 10.5     Platelets 02/01/2024 210     nRBC 02/01/2024 0     Neutrophils Relative 02/01/2024 72     Immature Grans % 02/01/2024 0     Lymphocytes Relative 02/01/2024 19     Monocytes Relative 02/01/2024 7     Eosinophils Relative 02/01/2024 2     Basophils Relative 02/01/2024 0     Neutrophils Absolute 02/01/2024 4.01     Absolute Immature Grans 02/01/2024 0.02     Absolute Lymphocytes 02/01/2024 1.05     Absolute Monocytes 02/01/2024 0.40     Eosinophils Absolute 02/01/2024 0.13     Basophils Absolute 02/01/2024 0.01     Sodium 02/01/2024 140     Potassium 02/01/2024 4.6     Chloride 02/01/2024 111 (H)     CO2 02/01/2024 23     ANION GAP 02/01/2024 6     BUN 02/01/2024 38 (H)     Creatinine 02/01/2024 1.28     Glucose 02/01/2024 107     Calcium 02/01/2024 8.2 (L)     eGFR 02/01/2024 36     hs TnI 0hr 02/01/2024 6     Color, UA 02/01/2024 Yellow     Clarity, UA 02/01/2024 Turbid     pH, UA 02/01/2024 7.5     Leukocytes, UA 02/01/2024 Moderate (A)     Nitrite, UA 02/01/2024 Negative     Protein, UA 02/01/2024 >=300 (A)     Glucose, UA 02/01/2024 Negative     Ketones, UA 02/01/2024 Trace (A)     Urobilinogen, UA 02/01/2024 1.0     Bilirubin, UA 02/01/2024 Negative     Occult Blood, UA 02/01/2024 Large (A)     Specific [place], UA 02/01/2024 1.025     RBC, UA 02/01/2024 Innumerable (A)     WBC, UA 02/01/2024 Innumerable (A)     Epithelial Cells 02/01/2024 Occasional     Bacteria, UA 02/01/2024 Innumerable (A)     WBC Clumps 02/01/2024 Present     Urine Culture 02/01/2024 >100,000 cfu/ml  Proteus mirabilis (A)      Imaging: No results found.    Review of Systems:  Review of Systems   Unable to perform ROS: Dementia       Physical Exam:  Physical Exam  Constitutional:       Appearance: She is ill-appearing.   Cardiovascular:      Rate and Rhythm: Regular rhythm. Bradycardia present.      Heart sounds: No murmur heard.     No friction rub. No gallop.   Pulmonary:      Effort: Pulmonary effort is normal. No respiratory distress.      Breath sounds: Normal breath sounds. No stridor. No wheezing, rhonchi or rales.         Discussion/Summary:  Paroxysmal atrial fibrillation.  Remains clinically and electrocardiographically in sinus rhythm/sinus bradycardia, Holter monitor 2021, revealed resting sinus bradycardia with mild chronotropic incompetence but no high-grade block and no atrial fibrillation.  She is on low-dose amiodarone we did decrease the dose to 100 mg daily and discontinue calcium channel blocker at her last visit, somehow this was not done..  Continue beta-blocker.  Not a candidate for anticoagulation due to fall risk.  Not a candidate for any aggressive interventions.  No regular follow-ups have been scheduled, unless there are any cardiac issues.  Echocardiogram 2019, revealed  normal left-ventricular Systolic function left atrial enlargement and mild to moderate aortic insufficiency.       This note was completed in part utilizing [employer] direct voice recognition software.   Grammatical errors, random word insertion, spelling mistakes, and incomplete sentences may be an occasional consequence of the system secondary to software limitations, ambient noise and hardware issues. At the time of dictation, efforts were made to edit, clarify and /or correct errors.  Please read the chart carefully and recognize, using context, where substitutions have occurred.  If you have any questions or concerns about the context, text or information contained within the body of this dictation, please  contact myself, the provider, for further clarification.

## 2024-03-18 ENCOUNTER — OFFICE VISIT (OUTPATIENT)
Dept: RHEUMATOLOGY | Facility: CLINIC | Age: 89
End: 2024-03-18
Payer: MEDICARE

## 2024-03-18 VITALS
OXYGEN SATURATION: 98 % | TEMPERATURE: 98.9 F | SYSTOLIC BLOOD PRESSURE: 160 MMHG | HEART RATE: 54 BPM | HEIGHT: 58 IN | DIASTOLIC BLOOD PRESSURE: 80 MMHG | BODY MASS INDEX: 22.36 KG/M2

## 2024-03-18 DIAGNOSIS — M81.0 OSTEOPOROSIS, UNSPECIFIED OSTEOPOROSIS TYPE, UNSPECIFIED PATHOLOGICAL FRACTURE PRESENCE: Primary | ICD-10-CM

## 2024-03-18 DIAGNOSIS — N18.32 STAGE 3B CHRONIC KIDNEY DISEASE (HCC): ICD-10-CM

## 2024-03-18 DIAGNOSIS — S02.31XS: ICD-10-CM

## 2024-03-18 DIAGNOSIS — Z79.899 ENCOUNTER FOR MONITORING DENOSUMAB THERAPY: ICD-10-CM

## 2024-03-18 DIAGNOSIS — Z51.81 ENCOUNTER FOR MONITORING DENOSUMAB THERAPY: ICD-10-CM

## 2024-03-18 DIAGNOSIS — S72.001S CLOSED FRACTURE OF RIGHT HIP, SEQUELA: ICD-10-CM

## 2024-03-18 PROCEDURE — 99213 OFFICE O/P EST LOW 20 MIN: CPT | Performed by: PHYSICIAN ASSISTANT

## 2024-03-18 PROCEDURE — 96372 THER/PROPH/DIAG INJ SC/IM: CPT

## 2024-03-18 NOTE — PROGRESS NOTES
Assessment and Plan:   Ms. Castañeda is a 89-year-old female who presents for rheumatology follow-up of osteoporosis.    The patient remains stable on Prolia every 6 month subcutaneous injections since 8/10/2023.  No falls or fractures since last visit.  Received most recent Prolia injection today without incident. Reviewed recent BMP ordered by the primary team. Continue calcium 650 mg twice daily and vitamin D3 2000 units once daily. Continue routine labs as ordered by other providers.    Return in 6 months for next Prolia visit and then 6 months later for Prolia + provider visit.     Plan:  Diagnoses and all orders for this visit:    Osteoporosis, unspecified osteoporosis type, unspecified pathological fracture presence  -     denosumab (PROLIA) subcutaneous injection 60 mg    Encounter for monitoring denosumab therapy  -     denosumab (PROLIA) subcutaneous injection 60 mg    Closed fracture of right hip, sequela  -     denosumab (PROLIA) subcutaneous injection 60 mg    Closed fracture of right orbital floor, sequela (HCC)  -     denosumab (PROLIA) subcutaneous injection 60 mg    Stage 3b chronic kidney disease (HCC)        I have personally reviewed prior notes, recent laboratory results, and pertinent films in PACS.     Activities as tolerated.   Exercise: try to maintain a low impact exercise regimen as much as possible. Walk for 30 minutes a day for at least 3 days a week.   Continue other medications as prescribed by PCP and other specialists.       RTC in 6 mo    Rheumatic Disease Summary:  1.  Age-related osteoporosis  -DEXA 2/24/2023: LS T -3.3, left total hip T -2.7, left FN T -2.6, right forearm T -2.3.  -Initial visit 6/30/2023: Osteoporosis of multiple sites noted in the setting of multiple fractures.  CMP, vitamin D 25-hydroxy, phos within normal limits.  Bisphosphonates contraindicated due to oropharyngeal dysphagia and CKD stage IIIb.  Obtain prior auth for Prolia.  Recommend calcium 600 mg twice  daily and vitamin D 2000 units daily.  -Prolia #1 8/10/23, Prolia #2 3/18/24  -Next DEXA due 6/2025  2.  Other comorbidities: history of closed right hip fracture 2021, history of closed fracture of right orbital floor, oropharyngeal dysphagia, CKD stage IIIb, paroxysmal atrial fibrillation, dementia, and schizophrenia.       HPI  Ms. Castañeda is a 89-year-old female who presents for rheumatology follow-up of osteoporosis.    As per pt's son/caretaker, she has not had any falls or fractures since last visit.   Has good days/bad days with the dementia.   She is receiving vit D and calcium supplementation     The following portions of the patient's history were reviewed and updated as appropriate: allergies, current medications, past family history, past medical history, past social history, past surgical history and problem list.      Review of Systems  Not able to obtain ROS due to mental status      Past Medical History:   Diagnosis Date    Anxiety     Cognitive impairment     COVID-19 virus infection 04/13/2020    Dementia (HCC)     Depression     Hallucination     Hyperlipidemia     Hypertension     Memory loss     Psychiatric illness     Psychosis (HCC)     Retention of urine     Schizoaffective disorder (HCC)     Sleep difficulties     Urinary tract infection        Past Surgical History:   Procedure Laterality Date    APPENDECTOMY      BLADDER SURGERY      CHOLECYSTECTOMY      [place] OPTX FEM SHFT FX W/INSJ IMED IMPLT W/WO SCREW Right 7/15/2021    Procedure: INSERTION NAIL IM FEMUR ANTEGRADE (TROCHANTERIC) right;  Surgeon: Getachew Aguilar MD;  Location: [facility];  Service: Orthopedics    TOTAL ABDOMINAL HYSTERECTOMY W/ BILATERAL SALPINGOOPHORECTOMY      TOTAL KNEE ARTHROPLASTY Left        Social History     Socioeconomic History    Marital status:      Spouse name: Not on file    Number of children: Not on file    Years of education: Not on file    Highest education level: Not on file   Occupational  History    Not on file   Tobacco Use    Smoking status: Never     Passive exposure: Never    Smokeless tobacco: Never    Tobacco comments:     Former smoker per [employer]   Vaping Use    Vaping status: Never Used   Substance and Sexual Activity    Alcohol use: Never    Drug use: No    Sexual activity: Not on file   Other Topics Concern    Not on file   Social History Narrative    Drinks coffee     Social Determinants of Health     Financial Resource Strain: Not on file   Food Insecurity: No Food Insecurity (5/15/2023)    Hunger Vital Sign     Worried About Running Out of Food in the Last Year: Never true     Ran Out of Food in the Last Year: Never true   Transportation Needs: No Transportation Needs (5/15/2023)    PRAPARE - Transportation     Lack of Transportation (Medical): No     Lack of Transportation (Non-Medical): No   Physical Activity: Not on file   Stress: Not on file   Social Connections: Not on file   Intimate Partner Violence: Not on file   Housing Stability: Low Risk  (5/15/2023)    Housing Stability Vital Sign     Unable to Pay for Housing in the Last Year: No     Number of Places Lived in the Last Year: 1     Unstable Housing in the Last Year: No       Family History   Problem Relation Age of Onset    Heart disease Mother         Cardiac disorder    Parkinsonism Father     Heart disease Sister         Cardiac disorder    Hypertension Sister     Diabetes Child         Diabetes Mellitus    Lung disease Child         Respiratory Disorder    Diabetes Son         Diabetes Mellitus       No Known Allergies      Current Outpatient Medications:     acetaminophen (TYLENOL) 325 mg tablet, Take 2 tablets (650 mg total) by mouth every 6 (six) hours as needed for mild pain, moderate pain or headaches, Disp: 30 tablet, Rfl: 0    amiodarone 200 mg tablet, TAKE 1 TABLET (200 MG TOTAL) BY MOUTH DAILY, Disp: 90 tablet, Rfl: 0    diltiazem (CARDIZEM) 30 mg tablet, TAKE 1 TABLET (30 MG TOTAL) BY MOUTH 3 (THREE) TIMES A  "DAY, Disp: 270 tablet, Rfl: 1    DULoxetine (CYMBALTA) 30 mg delayed release capsule, Take 30 mg by mouth daily at bedtime, Disp: , Rfl:     LORazepam (ATIVAN) 0.5 mg tablet, Take 0.5 mg by mouth , Disp: , Rfl:     melatonin 3 mg, , Disp: , Rfl:     metoprolol tartrate (LOPRESSOR) 25 mg tablet, TAKE 1 TABLET (25 MG TOTAL) BY MOUTH 2 (TWO) TIMES A DAY, Disp: 180 tablet, Rfl: 1    metoprolol tartrate (LOPRESSOR) 25 mg tablet, Take 1 tablet (25 mg total) by mouth 2 (two) times a day (Patient not taking: Reported on 3/15/2024), Disp: 60 tablet, Rfl: 0    mirtazapine (REMERON) 15 mg tablet, , Disp: , Rfl:     mirtazapine (REMERON) 7.5 MG tablet, , Disp: , Rfl:     OLANZapine (ZyPREXA) 5 mg tablet, Take 1 tablet (5 mg total) by mouth daily at bedtime, Disp: 30 tablet, Rfl: 1    Stool Softener 100 MG capsule, TAKE 1 CAPSULE BY MOUTH EVERY 12 HOURS FOR CONSTIPATION, Disp: 90 capsule, Rfl: 0    terazosin (HYTRIN) 2 mg capsule, Take 1 capsule (2 mg total) by mouth daily at bedtime, Disp: 90 capsule, Rfl: 3    traZODone (DESYREL) 50 mg tablet, Take 50 mg by mouth daily at bedtime (Patient not taking: Reported on 3/15/2024), Disp: , Rfl:     Current Facility-Administered Medications:     denosumab (PROLIA) subcutaneous injection 60 mg, 60 mg, Subcutaneous, Q6 Months,       Objective:    Vitals:    03/18/24 1311   BP: 160/80   Pulse: (!) 54   Temp: 98.9 °F (37.2 °C)   SpO2: 98%   Height: 4' 10\" (1.473 m)       Physical Exam  General: Well appearing, well nourished, in no acute distress. Oriented x 3, normal mood and affect.  Ambulating without difficulty.  Skin: Good turgor, no rash, unusual bruising or prominent lesions.  Hair: Normal texture and distribution.  Nails: Normal color, no deformities.  HEENT:  Head: Normocephalic, atraumatic.  Eyes: Conjunctiva clear, sclera non-icteric, EOM intact.  Nose: No external lesions, mucosa non-inflamed.  Mouth: Mucous membranes moist, no mucosal lesions.  Neck: Supple, thyroid " non-enlarged and non-tender. No lymphadenopathy.   Heart: Regular rate and rhythm, no murmur or gallop.  Lungs: Clear to auscultation, no crackles or wheezing.   Abdomen: Soft, non-tender, non-distended, bowel sounds normal.   Extremities: No amputations or deformities, cyanosis, edema.  Musculoskeletal:   No asymmetry or deformities noted of bilateral upper and lower extremities.  No tenderness to palpation or swelling of joints bilaterally to include: shoulder, elbow, wrist, MCP I-V, PIP I-V, knee, ankle, MTP I-V.  No tender points.   Normal active and passive ROM of neck and bilateral upper and lower extremities.  Normal  strength bilaterally.  Negative squeeze test of bilateral MCPs and MTPs.  Neurologic: Alert and oriented. No focal neurological deficits appreciated.   Psychiatric: Normal mood and affect.       Leonard Lyn PA-C  Rheumatology

## 2024-03-29 NOTE — ASSESSMENT & PLAN NOTE
CLINICAL NUTRITION SERVICES - PEDIATRIC REASSESSMENT NOTE    Juan Pablo is a 13 year old who is being evaluated via a billable video visit.    Video Start Time: 12:38 PM    REASON FOR ASSESSMENT  Juan Pablo Torres is a 13 year old male seen by the dietitian in GI clinic for nutrition support. Patient is accompanied by mother.     RECOMMENDATIONS    Increase caloric provisions by giving an additional carton of [place] [employer] Pediatric Peptide 1.5 for 3 cartons daily to provide an additional 18 kcal/kg. This is preferred due to increased mineral provisions. If not tolerating increased volumes, give 1 tablespoon of oil 3 times per day to provide an additional 17 kcal/kg.  Give 1/4 teaspoon of salt daily to provide an additional 575 mg of Sodium to meet RDA.  Weight check in 3 months.    To schedule future appointment call [phone]. Recommended follow up in 6 months at next GI clinic appointment.       ANTHROPOMETRICS 03/29/24  Weight: 20.7 kg, -5.37 z score    Comments:  Weight: no change in weight since last visit    NUTRITION HISTORY  Juan Pablo is on a Formula diet at home. Patient takes in 100% nutrition via G-tube.    Typical oral intakes:  Sometimes stage 2 baby food purees    Home Regimen:  Route: G-tube  6.5 oz Compleat Pediatric Organic Blends (Chicken) + 3 oz water x 1-2 times per day  6.5 oz Real Food Blends (Eggs, Apple, Oats) + 3 oz water x 1 time per day  1 carton (8 oz) [place] [employer] Pediatric Peptide 1.5 + 2 oz water x 2 times per day  Flushes: 5 oz an hour before each feed and 30 mL after each feed = 720 mL daily  Provides 5292-7871 mL, ( mL/kg), 3191-4364 kcal, (58-70 kcal/kg), 40-48.4 g protein, (1.9-2.3 g/kg)  Meets % of kcal and 100% protein needs.  19-22.2 mcg Vitamin D   13.7-15.9 mg Iron  618-780 mg Sodium  Calcium, Phosphorus, and Potassium slightly below RDA for age    NUTRITION RELATED PHYSICAL FINDINGS  Unable to assess via video    NUTRITION RELATED LABS  Labs reviewed    NUTRITION RELATED  Unclear cause  CT lumbar spine, abdomen pelvis unremarkable for acute pathology  Reportedly has chronic pain  May be related to spasm  May be behavioral/functional  Supportive care, conservative measures Tylenol, medication, aqua K MEDICATIONS  Medications reviewed    ESTIMATED NUTRITION NEEDS:  Based on WHO equation x 1.4-1.5  Energy Needs: 65-75 kcal/kg  Protein Needs: 1-1.5 g/kg  Fluid Needs: 1500 mL   Micronutrient Needs: RDA for age    PEDIATRIC NUTRITION STATUS VALIDATION  Unable to assess at this time    EVALUATION OF PREVIOUS PLAN OF CARE:   Previous Goals:   Enteral Nutrition: Will tolerate Compleat Pediatric blends. - Met  Desired Growth Pattern: Growth parameters to trend closer to z-score of 0. - Not met    Previous Nutrition Diagnosis:   Predicted suboptimal energy intake related to reliance on tube feedings as evidenced by potential for feeding intolerance and interruption of feeds.  Evaluation: No change    NUTRITION DIAGNOSIS  Predicted suboptimal energy intake related to reliance on tube feedings as evidenced by potential for feeding intolerance and interruption of feeds.    INTERVENTIONS  Nutrition Prescription  Juan Pablo to meet 100% estimated needs via G-tube.    Nutrition Education:   Provided education on increasing calories in feeds due to limited weight gain. Discussed micronutrient provisions and recommended supplementation with salt for sodium.    Implementation:  Implementation: Collaboration with other providers- GI MD  Enteral Nutrition - Modify volume  Feeding tube flush- continue as above  Multivitamin/mineral supplement therapy- sodium    Goals  Weight z-score to increase above -3.    FOLLOW UP/MONITORING  Enteral and parenteral nutrition intake   Micronutrient intake   Anthropometric measurements     Spent 15 minutes in consult with Juan Pablo Torres and mother.    Video-Visit Details    Type of service:  Video Visit   Video End Time:12:53 PM  Originating Location (pt. Location): Home  Distant Location (provider location):  On-site  Platform used for Video Visit: Cj Jones, MS, RDN, LD  Pediatric Clinical Dietitian  Phone: [phone]

## 2024-04-08 ENCOUNTER — TELEPHONE (OUTPATIENT)
Age: 89
End: 2024-04-08

## 2024-04-08 NOTE — TELEPHONE ENCOUNTER
Received call from Geovanna at [facility] to verify that patient has a diagnosis of CKD and to see if there are any concerns with patient's care.    Informed that patient does have a dx of CKD 3 and does not currently see nephrology but does see cardiology, rheumatology and urology.  Also discussed patient's last OV and trip to the ER.    If any care needs are known or need assistance with Geovanna can be contacted at [phone]

## 2024-05-10 DIAGNOSIS — I48.0 PAROXYSMAL A-FIB (HCC): ICD-10-CM

## 2024-05-13 RX ORDER — AMIODARONE HYDROCHLORIDE 200 MG/1
200 TABLET ORAL DAILY
Qty: 90 TABLET | Refills: 1 | Status: SHIPPED | OUTPATIENT
Start: 2024-05-13

## 2024-05-15 NOTE — TELEPHONE ENCOUNTER
Pt son called into med refill line to follow up on med refill request. States pt is out of medication.

## 2024-05-16 RX ORDER — LORAZEPAM 0.5 MG/1
0.5 TABLET ORAL DAILY
Qty: 30 TABLET | Refills: 0 | Status: CANCELLED | OUTPATIENT
Start: 2024-05-16

## 2024-05-17 NOTE — TELEPHONE ENCOUNTER
Pt son called back regarding the medication, I read over the notes and relayed the message, pts son asked why no one has called to inform them, I told the pt that we are currently taking care of pt and he recently reviewed the message Son does not have phone number information for those who prescribed the anxiety medicine, and I asked him to contact the pharmacy but if we have that can you please call pt and advise [phone] thank you

## 2024-05-17 NOTE — TELEPHONE ENCOUNTER
I left a message on Logan's VM stating that BEV Chun has been prescribing Lorazepam for Denia.  He should call her office for the refill.

## 2024-05-24 ENCOUNTER — OFFICE VISIT (OUTPATIENT)
Dept: INTERNAL MEDICINE CLINIC | Facility: CLINIC | Age: 89
End: 2024-05-24
Payer: MEDICARE

## 2024-05-24 VITALS — SYSTOLIC BLOOD PRESSURE: 122 MMHG | HEART RATE: 64 BPM | OXYGEN SATURATION: 98 % | DIASTOLIC BLOOD PRESSURE: 80 MMHG

## 2024-05-24 DIAGNOSIS — E44.0 MODERATE PROTEIN-CALORIE MALNUTRITION (HCC): ICD-10-CM

## 2024-05-24 DIAGNOSIS — N18.32 STAGE 3B CHRONIC KIDNEY DISEASE (HCC): ICD-10-CM

## 2024-05-24 DIAGNOSIS — F03.C11 SEVERE DEMENTIA WITH AGITATION, UNSPECIFIED DEMENTIA TYPE (HCC): ICD-10-CM

## 2024-05-24 DIAGNOSIS — I48.0 PAF (PAROXYSMAL ATRIAL FIBRILLATION) (HCC): Primary | ICD-10-CM

## 2024-05-24 DIAGNOSIS — M81.0 AGE-RELATED OSTEOPOROSIS WITHOUT CURRENT PATHOLOGICAL FRACTURE: ICD-10-CM

## 2024-05-24 DIAGNOSIS — N17.9 AKI (ACUTE KIDNEY INJURY) (HCC): ICD-10-CM

## 2024-05-24 DIAGNOSIS — N39.45 CONTINUOUS LEAKAGE OF URINE: ICD-10-CM

## 2024-05-24 DIAGNOSIS — D64.9 NORMOCYTIC ANEMIA: ICD-10-CM

## 2024-05-24 DIAGNOSIS — I10 PRIMARY HYPERTENSION: ICD-10-CM

## 2024-05-24 DIAGNOSIS — F20.5 RESIDUAL SCHIZOPHRENIA (HCC): ICD-10-CM

## 2024-05-24 DIAGNOSIS — E78.2 MIXED HYPERLIPIDEMIA: ICD-10-CM

## 2024-05-24 PROCEDURE — G2211 COMPLEX E/M VISIT ADD ON: HCPCS | Performed by: STUDENT IN AN ORGANIZED HEALTH CARE EDUCATION/TRAINING PROGRAM

## 2024-05-24 PROCEDURE — 99214 OFFICE O/P EST MOD 30 MIN: CPT | Performed by: STUDENT IN AN ORGANIZED HEALTH CARE EDUCATION/TRAINING PROGRAM

## 2024-05-27 PROBLEM — F03.C11 SEVERE DEMENTIA WITH AGITATION (HCC): Status: ACTIVE | Noted: 2020-04-19

## 2024-05-27 PROBLEM — F20.3 UNDIFFERENTIATED SCHIZOPHRENIA (HCC): Chronic | Status: ACTIVE | Noted: 2018-07-24

## 2024-05-27 PROBLEM — N18.32 STAGE 3B CHRONIC KIDNEY DISEASE (HCC): Status: ACTIVE | Noted: 2021-07-15

## 2024-05-27 PROBLEM — F20.5 RESIDUAL SCHIZOPHRENIA (HCC): Status: ACTIVE | Noted: 2018-07-24

## 2024-05-27 PROBLEM — G30.9 SEVERE ALZHEIMER'S DEMENTIA (HCC): Status: ACTIVE | Noted: 2020-04-19

## 2024-05-27 PROBLEM — F02.C0 SEVERE ALZHEIMER'S DEMENTIA (HCC): Status: ACTIVE | Noted: 2020-04-19

## 2024-05-27 PROBLEM — Z91.81 PERSONAL HISTORY OF FALL: Status: ACTIVE | Noted: 2020-08-23

## 2024-05-27 PROBLEM — Z91.81 PERSONAL HISTORY OF FALL: Status: ACTIVE | Noted: 2018-10-11

## 2024-05-27 PROBLEM — F20.3 UNDIFFERENTIATED SCHIZOPHRENIA (HCC): Chronic | Status: ACTIVE | Noted: 2018-10-11

## 2024-05-27 PROBLEM — R79.89 ELEVATED TROPONIN: Status: RESOLVED | Noted: 2020-04-16 | Resolved: 2024-05-27

## 2024-05-27 PROBLEM — D64.9 NORMOCYTIC ANEMIA: Status: ACTIVE | Noted: 2021-07-16

## 2024-05-27 PROBLEM — G93.40 ACUTE ENCEPHALOPATHY: Status: RESOLVED | Noted: 2018-07-24 | Resolved: 2024-05-27

## 2024-05-27 PROBLEM — E87.1 HYPONATREMIA: Status: RESOLVED | Noted: 2018-07-24 | Resolved: 2024-05-27

## 2024-05-27 PROBLEM — R33.9 URINARY RETENTION: Status: ACTIVE | Noted: 2022-11-23

## 2024-05-27 PROBLEM — F03.90 DEMENTIA (HCC): Status: ACTIVE | Noted: 2020-04-19

## 2024-05-27 PROBLEM — L03.119 CELLULITIS OF EXTREMITY: Status: RESOLVED | Noted: 2018-12-10 | Resolved: 2024-05-27

## 2024-05-27 PROBLEM — I48.0 PAF (PAROXYSMAL ATRIAL FIBRILLATION) (HCC): Status: ACTIVE | Noted: 2019-03-18

## 2024-05-27 PROBLEM — Z87.81 H/O FRACTURE OF RIGHT HIP: Status: ACTIVE | Noted: 2021-07-15

## 2024-05-27 PROBLEM — S02.31XA CLOSED FRACTURE OF RIGHT ORBITAL FLOOR (HCC): Status: RESOLVED | Noted: 2020-08-23 | Resolved: 2024-05-27

## 2024-05-27 NOTE — PROGRESS NOTES
INTERNAL MEDICINE OFFICE VISIT NOTE  [facility] Internal Medicine [place]    NAME: Denia Castañeda  AGE: 89 y.o. SEX: female    DATE OF ENCOUNTER:       Chief Complaint   Patient presents with    Follow-up     3 mos check up.         Review of Systems  10 point ROS negative except per HPI    OBJECTIVE:  Vitals:    05/24/24 1424   BP: 122/80   BP Location: Right arm   Patient Position: Sitting   Cuff Size: Adult   Pulse: 64   SpO2: 98%       Physical Exam:   GENERAL: NAD, Normal appearance.    NEUROLOGIC:  Alert/oriented x3.  HEENT:  NC/AT, no scleral icterus  CARDIAC:  RRR, +S1/S2  PULMONARY:  non-labored breathing    ASSESSMENT/PLAN:    1. PAF (paroxysmal atrial fibrillation) ([facility])  Assessment & Plan:  Regular rhythm on exam  F/w cardiology  Currently  on amiodarone 200m daily, diltiazem 30mg TID, Lopressors 25mg BID  2. Primary hypertension  Assessment & Plan:  Controlled   Currenlty on diltiazem 30mg TID, metoprolol tartrate 25 mg BID and terazosin 2mg for h/o urinary retention   3. Severe dementia with agitation, unspecified dementia type ([facility])  Assessment & Plan:  Stable   Dependent for all ADLS, w/c dependent, non-verbal, receives 24 care by [facility] services  F/w Psychiatry, Dr. Alma Yun   Agitation managed with Zyprexa 5mg HS  Also on cymbalta, lorazepam - managed by her psychiatrist   Continue current regimen  4. Age-related osteoporosis without current pathological fracture  Assessment & Plan:  Lab Results   Component Value Date    CALCIUM 8.2 (L) 02/01/2024    CALCIUM 8.6 05/14/2023     Lab Results   Component Value Date    [id number] 31.7 01/20/2023    [id number] 39.1 04/29/2022       Currently on Prolia - managed by Rheumatology  Continue current regimen    Orders:  -     Comprehensive metabolic panel; Future  -     Vitamin D 25 hydroxy; Future  5. Moderate protein-calorie malnutrition (HCC)  Assessment & Plan:  There is no height or weight on file to calculate BMI.  Wt Readings from Last 3 Encounters:    02/01/24 48.5 kg (107 lb)   06/14/23 50.3 kg (111 lb)   05/12/23 57.1 kg (125 lb 14.1 oz)     Frail appearance, h/o dysphagia, Dementia with agitation  Son reports fair appetite  - Advised high caloric foods and protein drinks t/o the day as tolerated  6. Continuous leakage of urine  7. h/o OMEGA (acute kidney injury) ([facility])  Assessment & Plan:  Lab Results   Component Value Date    EGFR 36 02/01/2024    EGFR 35 05/14/2023    EGFR 42 05/13/2023    CREATININE 1.28 02/01/2024    CREATININE 1.30 05/14/2023    CREATININE 1.13 05/13/2023      Stable   Avoid nephrotoxic medications    8. Stage 3b chronic kidney disease (HCC)  Assessment & Plan:  Lab Results   Component Value Date    EGFR 36 02/01/2024    EGFR 35 05/14/2023    EGFR 42 05/13/2023    CREATININE 1.28 02/01/2024    CREATININE 1.30 05/14/2023    CREATININE 1.13 05/13/2023     Stable   Avoid nephrotoxic medications    9. 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 Alma Yun   Agitation managed with Zyprexa 5mg HS  Also on cymbalta, lorazepam - managed by her psychiatrist   Continue current regimen    10. Normocytic anemia  Assessment & Plan:  H/o Blood loss anemia, CKD3B  - CBC ordered    Orders:  -     CBC and Platelet; Future  11. Mixed hyperlipidemia  Assessment & Plan:  Lab Results   Component Value Date    LDLCALC 82 01/20/2023    LDLCALC 108 (H) 01/15/2022     Not on statin due to comorbidities and age       Return in about 4 months (around 9/24/2024).      Current Outpatient Medications:     acetaminophen (TYLENOL) 325 mg tablet, Take 2 tablets (650 mg total) by mouth every 6 (six) hours as needed for mild pain, moderate pain or headaches, Disp: 30 tablet, Rfl: 0    amiodarone 200 mg tablet, Take 1 tablet (200 mg total) by mouth daily, Disp: 90 tablet, Rfl: 1    diltiazem (CARDIZEM) 30 mg tablet, TAKE 1 TABLET (30 MG TOTAL) BY MOUTH 3 (THREE) TIMES A DAY, Disp: 270 tablet, Rfl: 1    DULoxetine (CYMBALTA) 30 mg delayed release  capsule, Take 30 mg by mouth daily at bedtime, Disp: , Rfl:     LORazepam (ATIVAN) 0.5 mg tablet, Take 0.5 mg by mouth , Disp: , Rfl:     melatonin 3 mg, , Disp: , Rfl:     metoprolol tartrate (LOPRESSOR) 25 mg tablet, TAKE 1 TABLET (25 MG TOTAL) BY MOUTH 2 (TWO) TIMES A DAY, Disp: 180 tablet, Rfl: 1    mirtazapine (REMERON) 15 mg tablet, , Disp: , Rfl:     mirtazapine (REMERON) 7.5 MG tablet, , Disp: , Rfl:     OLANZapine (ZyPREXA) 5 mg tablet, Take 1 tablet (5 mg total) by mouth daily at bedtime, Disp: 30 tablet, Rfl: 1    Stool Softener 100 MG capsule, TAKE 1 CAPSULE BY MOUTH EVERY 12 HOURS FOR CONSTIPATION, Disp: 90 capsule, Rfl: 0    terazosin (HYTRIN) 2 mg capsule, Take 1 capsule (2 mg total) by mouth daily at bedtime, Disp: 90 capsule, Rfl: 3    traZODone (DESYREL) 50 mg tablet, Take 50 mg by mouth daily at bedtime, Disp: , Rfl:     Current Facility-Administered Medications:     denosumab (PROLIA) subcutaneous injection 60 mg, 60 mg, Subcutaneous, Q6 Months, , 60 mg at 03/18/24 1330    Patient Active Problem List   Diagnosis    Primary hypertension    Hyperlipidemia    Bilateral hearing loss    Orthostatic hypertension    Ambulatory dysfunction    h/o OMEGA (acute kidney injury) (HCC)    Urinary incontinence    PAF (paroxysmal atrial fibrillation) (HCC)    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HCC)    Moderate protein-calorie malnutrition (HCC)    Age-related osteoporosis without current pathological fracture    Personal history of fall    H/O fracture of right hip    Severe dementia with agitation (HCC)    Normocytic anemia    Oropharyngeal dysphagia    Flank pain    Edema of right ankle    Urinary retention    Back pain    At risk for elder abuse    Stage 3b chronic kidney disease (HCC)           Jose M Castañeda MD  [facility] Internal Medicine [place]    * Please Note: This note was completed in part utilizing a dictation software.  Grammatical errors, random word insertions, spelling mistakes, and incomplete sentences  may be an occasional consequence of this system secondary to software limitations, ambient noise, and hardware issues.  If you have any questions or concerns about the content, text, or information contained within the body of this dictation, please contact the provider for clarification.**

## 2024-05-27 NOTE — ASSESSMENT & PLAN NOTE
Controlled   Claudiaenagustin on diltiazem 30mg TID, metoprolol tartrate 25 mg BID and terazosin 2mg for h/o urinary retention

## 2024-05-27 NOTE — ASSESSMENT & PLAN NOTE
Regular rhythm on exam  F/w cardiology  Currently  on amiodarone 200m daily, diltiazem 30mg TID, Lopressors 25mg BID

## 2024-05-28 NOTE — ASSESSMENT & PLAN NOTE
Lab Results   Component Value Date    EGFR 36 02/01/2024    EGFR 35 05/14/2023    EGFR 42 05/13/2023    CREATININE 1.28 02/01/2024    CREATININE 1.30 05/14/2023    CREATININE 1.13 05/13/2023     Stable   Avoid nephrotoxic medications

## 2024-05-28 NOTE — ASSESSMENT & PLAN NOTE
There is no height or weight on file to calculate BMI.  Wt Readings from Last 3 Encounters:   02/01/24 48.5 kg (107 lb)   06/14/23 50.3 kg (111 lb)   05/12/23 57.1 kg (125 lb 14.1 oz)     Frail appearance, h/o dysphagia, Dementia with agitation  Son reports fair appetite  - Advised high caloric foods and protein drinks t/o the day as tolerated

## 2024-05-28 NOTE — ASSESSMENT & PLAN NOTE
Lab Results   Component Value Date    LDLCALC 82 01/20/2023    LDLCALC 108 (H) 01/15/2022     Not on statin due to comorbidities and age

## 2024-05-28 NOTE — ASSESSMENT & PLAN NOTE
Stable   Dependent for all ADLS, w/c dependent, non-verbal, receives 24 care by [facility] services  F/w Psychiatry, Dr. Alma Yun   Agitation managed with Zyprexa 5mg HS  Also on cymbalta, lorazepam - managed by her psychiatrist   Continue current regimen

## 2024-05-28 NOTE — ASSESSMENT & PLAN NOTE
F/w Psychiatry, Dr. Alma Yun   Agitation managed with Zyprexa 5mg HS  Also on cymbalta, lorazepam - managed by her psychiatrist   Continue current regimen

## 2024-05-28 NOTE — ASSESSMENT & PLAN NOTE
Lab Results   Component Value Date    CALCIUM 8.2 (L) 02/01/2024    CALCIUM 8.6 05/14/2023     Lab Results   Component Value Date    [id number] 31.7 01/20/2023    [id number] 39.1 04/29/2022       Currently on Prolia - managed by Rheumatology  Continue current regimen

## 2024-06-10 ENCOUNTER — NURSE TRIAGE (OUTPATIENT)
Age: 89
End: 2024-06-10

## 2024-06-10 NOTE — TELEPHONE ENCOUNTER
"Appointment made for 6/18/24.     Reason for Disposition   Postmenopausal vaginal bleeding    Answer Assessment - Initial Assessment Questions  1. AMOUNT: \"Describe the bleeding that you are having.\" \"How much bleeding is there?\"     - SPOTTING: spotting, or pinkish / brownish mucous discharge; does not fill panti-liner or pad     - MILD:  less than 1 pad / hour; less than patient's usual menstrual bleeding    - MODERATE: 1-2 pads / hour; 1 menstrual cup every 6 hours; small-medium blood clots (e.g., pea, grape, small coin)    - SEVERE: soaking 2 or more pads/hour for 2 or more hours; 1 menstrual cup every 2 hours; bleeding not contained by pads or continuous red blood from vagina; large blood clots (e.g., golf ball, large coin)       Spotting to mild   2. ONSET: \"When did the bleeding begin?\" \"Is it continuing now?\"      Today   3. MENOPAUSE: \"When was your last menstrual period?\"       N/A  4. ABDOMINAL PAIN: \"Do you have any pain?\" \"How bad is the pain?\"  (e.g., Scale 1-10; mild, moderate, or severe)    - MILD (1-3): doesn't interfere with normal activities, abdomen soft and not tender to touch     - MODERATE (4-7): interferes with normal activities or awakens from sleep, tender to touch     - SEVERE (8-10): excruciating pain, doubled over, unable to do any normal activities       Unaware   5. BLOOD THINNERS: \"Do you take any blood thinners?\" (e.g., Coumadin/warfarin, Pradaxa/dabigatran, aspirin)      Denies   6. HORMONES: \"Are you taking any hormone medications, prescription or OTC?\" (e.g., birth control pills, estrogen)      Denies   7. CAUSE: \"What do you think is causing the bleeding?\" (e.g., recent gyn surgery, recent gyn procedure; known bleeding disorder, uterine cancer)        Unaware   8. HEMODYNAMIC STATUS: \"Are you weak or feeling lightheaded?\" If Yes, ask: \"Can you stand and walk normally?\"        Unaware   9. OTHER SYMPTOMS: \"What other symptoms are you having with the bleeding?\" (e.g., back pain, " burning with urination, fever)      Unaware    Protocols used: Vaginal Bleeding - Postmenopausal-ADULT-AH

## 2024-06-17 ENCOUNTER — TELEPHONE (OUTPATIENT)
Dept: INTERNAL MEDICINE CLINIC | Facility: CLINIC | Age: 89
End: 2024-06-17

## 2024-06-17 NOTE — TELEPHONE ENCOUNTER
Tried calling Logan back after we were disconnected and his phone is not taking messages regarding [employer].

## 2024-06-27 ENCOUNTER — TELEPHONE (OUTPATIENT)
Dept: INTERNAL MEDICINE CLINIC | Facility: CLINIC | Age: 89
End: 2024-06-27

## 2024-06-27 DIAGNOSIS — N95.0 POSTMENOPAUSAL BLEEDING: Primary | ICD-10-CM

## 2024-06-27 NOTE — TELEPHONE ENCOUNTER
Spoke to son regarding appointment made for tomorrow with complaint of vaginal bleeding/spotting. Per Logan, the bleeding has stopped according to patient's aide.     Dr. Castañeda recommends the patient schedule appointment with Gynecology (ph # provided), get labs done (home draw # provided).    Logan verbalized understanding the recommendation.

## 2024-06-28 ENCOUNTER — NURSE TRIAGE (OUTPATIENT)
Age: 89
End: 2024-06-28

## 2024-06-28 DIAGNOSIS — K59.00 CONSTIPATION: ICD-10-CM

## 2024-06-28 RX ORDER — DOCUSATE SODIUM 100 MG/1
100 CAPSULE, LIQUID FILLED ORAL 2 TIMES DAILY
Qty: 180 CAPSULE | Refills: 1 | Status: SHIPPED | OUTPATIENT
Start: 2024-06-28

## 2024-06-28 NOTE — TELEPHONE ENCOUNTER
Placed call to sonLogan (on medical communication consent form) to follow up on patient's symptoms. No answer. RN left a  requesting a return call to speak with a nurse.

## 2024-06-28 NOTE — TELEPHONE ENCOUNTER
Medication: Stool Softener 100 MG capsule    Dose/Frequency: TAKE 1 CAPSULE BY MOUTH EVERY 12 HOURS FOR CONSTIPATION    Quantity: -    Pharmacy: [facility]    Office:   [x] PCP/Provider -   [] Speciality/Provider -     Does the patient have enough for 3 days?   [] Yes   [x] No - Send as HP to POD

## 2024-06-28 NOTE — TELEPHONE ENCOUNTER
Regarding: pt son called and mom bled last month for 4 days and it stopped  ----- Message from Jessica LEIGH sent at 6/28/2024 10:47 AM EDT -----  Patients son called and he said his mom bled last month for 4 days and then it stopped.  Its been a month since she bled.  I did schedule her on 7/29 and I stressed if she starts to bleed again to please call us right away.  If you think she should be seen sooner than please call her back at [phone]. Thank you

## 2024-06-29 ENCOUNTER — HOSPITAL ENCOUNTER (INPATIENT)
Facility: HOSPITAL | Age: 89
LOS: 3 days | Discharge: HOME HEALTH | DRG: 065 | End: 2024-07-03
Attending: EMERGENCY MEDICINE | Admitting: INTERNAL MEDICINE
Payer: MEDICARE

## 2024-06-29 ENCOUNTER — APPOINTMENT (EMERGENCY)
Dept: CT IMAGING | Facility: HOSPITAL | Age: 89
DRG: 065 | End: 2024-06-29
Payer: MEDICARE

## 2024-06-29 DIAGNOSIS — N39.0 UTI (URINARY TRACT INFECTION): ICD-10-CM

## 2024-06-29 DIAGNOSIS — I63.9 CVA (CEREBRAL VASCULAR ACCIDENT) (HCC): ICD-10-CM

## 2024-06-29 DIAGNOSIS — R60.0 HAND EDEMA: Primary | ICD-10-CM

## 2024-06-29 DIAGNOSIS — N93.9 VAGINAL BLEEDING: ICD-10-CM

## 2024-06-29 DIAGNOSIS — I63.9 CEREBROVASCULAR ACCIDENT (CVA), UNSPECIFIED MECHANISM (HCC): ICD-10-CM

## 2024-06-29 DIAGNOSIS — I63.89 CEREBROVASCULAR ACCIDENT (CVA) DUE TO OTHER MECHANISM (HCC): ICD-10-CM

## 2024-06-29 LAB
ALBUMIN SERPL BCG-MCNC: 3.6 G/DL (ref 3.5–5)
ALP SERPL-CCNC: 93 U/L (ref 34–104)
ALT SERPL W P-5'-P-CCNC: 8 U/L (ref 7–52)
ANION GAP SERPL CALCULATED.3IONS-SCNC: 3 MMOL/L (ref 4–13)
AST SERPL W P-5'-P-CCNC: 16 U/L (ref 13–39)
BASOPHILS # BLD AUTO: 0 THOUSANDS/ÂΜL (ref 0–0.1)
BASOPHILS NFR BLD AUTO: 0 % (ref 0–1)
BILIRUB SERPL-MCNC: 0.38 MG/DL (ref 0.2–1)
BUN SERPL-MCNC: 38 MG/DL (ref 5–25)
CALCIUM SERPL-MCNC: 9.2 MG/DL (ref 8.4–10.2)
CHLORIDE SERPL-SCNC: 108 MMOL/L (ref 96–108)
CO2 SERPL-SCNC: 31 MMOL/L (ref 21–32)
CREAT SERPL-MCNC: 1.2 MG/DL (ref 0.6–1.3)
D DIMER PPP FEU-MCNC: 1.86 UG/ML FEU
EOSINOPHIL # BLD AUTO: 0.14 THOUSAND/ÂΜL (ref 0–0.61)
EOSINOPHIL NFR BLD AUTO: 3 % (ref 0–6)
ERYTHROCYTE [DISTWIDTH] IN BLOOD BY AUTOMATED COUNT: 14.1 % (ref 11.6–15.1)
GFR SERPL CREATININE-BSD FRML MDRD: 39 ML/MIN/1.73SQ M
GLUCOSE SERPL-MCNC: 97 MG/DL (ref 65–140)
HCT VFR BLD AUTO: 33.1 % (ref 34.8–46.1)
HGB BLD-MCNC: 10.6 G/DL (ref 11.5–15.4)
IMM GRANULOCYTES # BLD AUTO: 0.01 THOUSAND/UL (ref 0–0.2)
IMM GRANULOCYTES NFR BLD AUTO: 0 % (ref 0–2)
LYMPHOCYTES # BLD AUTO: 1.31 THOUSANDS/ÂΜL (ref 0.6–4.47)
LYMPHOCYTES NFR BLD AUTO: 25 % (ref 14–44)
MCH RBC QN AUTO: 29.6 PG (ref 26.8–34.3)
MCHC RBC AUTO-ENTMCNC: 32 G/DL (ref 31.4–37.4)
MCV RBC AUTO: 93 FL (ref 82–98)
MONOCYTES # BLD AUTO: 0.43 THOUSAND/ÂΜL (ref 0.17–1.22)
MONOCYTES NFR BLD AUTO: 8 % (ref 4–12)
NEUTROPHILS # BLD AUTO: 3.29 THOUSANDS/ÂΜL (ref 1.85–7.62)
NEUTS SEG NFR BLD AUTO: 64 % (ref 43–75)
NRBC BLD AUTO-RTO: 0 /100 WBCS
PLATELET # BLD AUTO: 189 THOUSANDS/UL (ref 149–390)
PMV BLD AUTO: 9.5 FL (ref 8.9–12.7)
POTASSIUM SERPL-SCNC: 4.9 MMOL/L (ref 3.5–5.3)
PROT SERPL-MCNC: 6.1 G/DL (ref 6.4–8.4)
RBC # BLD AUTO: 3.58 MILLION/UL (ref 3.81–5.12)
SODIUM SERPL-SCNC: 142 MMOL/L (ref 135–147)
WBC # BLD AUTO: 5.18 THOUSAND/UL (ref 4.31–10.16)

## 2024-06-29 PROCEDURE — 36415 COLL VENOUS BLD VENIPUNCTURE: CPT

## 2024-06-29 PROCEDURE — 71275 CT ANGIOGRAPHY CHEST: CPT

## 2024-06-29 PROCEDURE — 99284 EMERGENCY DEPT VISIT MOD MDM: CPT

## 2024-06-29 PROCEDURE — 99285 EMERGENCY DEPT VISIT HI MDM: CPT

## 2024-06-29 PROCEDURE — 80053 COMPREHEN METABOLIC PANEL: CPT

## 2024-06-29 PROCEDURE — 85379 FIBRIN DEGRADATION QUANT: CPT

## 2024-06-29 PROCEDURE — 85025 COMPLETE CBC W/AUTO DIFF WBC: CPT

## 2024-06-29 RX ADMIN — IOHEXOL 70 ML: 350 INJECTION, SOLUTION INTRAVENOUS at 22:03

## 2024-06-29 NOTE — ED PROVIDER NOTES
History  Chief Complaint   Patient presents with    Hand Swelling     Patient in the ED via EMS from home for right hand swelling since yesterday.     Patient is a 89-year-old Samoan-speaking female with severe dementia presenting emergency department via EMS for right hand swelling.  Patient nursing staff states that it appeared yesterday per the patient's son and she noticed it today at 4 PM.  Discussed with patient's son over the phone, he states he noticed that yesterday but did not pay it much mind.  But states progressively worsened over the 24 hours.  He states that he read online that if her hand ever gets want to bring her to the ED.  Patient son states that he also wanted to get her tested for cancer markers for her vaginal bleeding while she is here.  Discussed with son that typically we do not perform such blood work in the ED, and that the outpatient setting is more appropriate.  Patient's son denies her having any fever, body aches, chills, redness, spreading redness, change in coloration of the hand, numbness, or tingling of the hand.        Prior to Admission Medications   Prescriptions Last Dose Informant Patient Reported? Taking?   DULoxetine (CYMBALTA) 30 mg delayed release capsule  Child, Care Giver Yes No   Sig: Take 30 mg by mouth daily at bedtime   LORazepam (ATIVAN) 0.5 mg tablet  Child, Care Giver Yes No   Sig: Take 0.5 mg by mouth    OLANZapine (ZyPREXA) 5 mg tablet  Child, Care Giver No No   Sig: Take 1 tablet (5 mg total) by mouth daily at bedtime   acetaminophen (TYLENOL) 325 mg tablet  Child, Care Giver No No   Sig: Take 2 tablets (650 mg total) by mouth every 6 (six) hours as needed for mild pain, moderate pain or headaches   amiodarone 200 mg tablet   No No   Sig: Take 1 tablet (200 mg total) by mouth daily   diltiazem (CARDIZEM) 30 mg tablet  Child, Care Giver No No   Sig: TAKE 1 TABLET (30 MG TOTAL) BY MOUTH 3 (THREE) TIMES A DAY   docusate sodium (Stool Softener) 100 mg capsule    No No   Sig: Take 1 capsule (100 mg total) by mouth 2 (two) times a day   melatonin 3 mg  Child, Care Giver Yes No   metoprolol tartrate (LOPRESSOR) 25 mg tablet  Child, Care Giver No No   Sig: TAKE 1 TABLET (25 MG TOTAL) BY MOUTH 2 (TWO) TIMES A DAY   mirtazapine (REMERON) 15 mg tablet  Child, Care Giver Yes No   mirtazapine (REMERON) 7.5 MG tablet  Child, Care Giver Yes No   terazosin (HYTRIN) 2 mg capsule  Child, Care Giver No No   Sig: Take 1 capsule (2 mg total) by mouth daily at bedtime   traZODone (DESYREL) 50 mg tablet  Child, Care Giver Yes No   Sig: Take 50 mg by mouth daily at bedtime      Facility-Administered Medications Last Administration Doses Remaining   denosumab (PROLIA) subcutaneous injection 60 mg 3/18/2024  1:30 PM           Past Medical History:   Diagnosis Date    Anxiety     Closed fracture of right orbital floor (HCC) 08/23/2020    Cognitive impairment     COVID-19 virus infection 04/13/2020    Dementia ([facility])     Depression     Hallucination     Hyperlipidemia     Hypertension     Memory loss     Psychiatric illness     Psychosis (HCC)     Retention of urine     Schizoaffective disorder (HCC)     Sleep difficulties     Urinary tract infection        Past Surgical History:   Procedure Laterality Date    APPENDECTOMY      BLADDER SURGERY      CHOLECYSTECTOMY      WI OPTX FEM SHFT FX W/INSJ IMED IMPLT W/WO SCREW Right 7/15/2021    Procedure: INSERTION NAIL IM FEMUR ANTEGRADE (TROCHANTERIC) right;  Surgeon: Geatchew Aguilar MD;  Location: [facility];  Service: Orthopedics    TOTAL ABDOMINAL HYSTERECTOMY W/ BILATERAL SALPINGOOPHORECTOMY      TOTAL KNEE ARTHROPLASTY Left        Family History   Problem Relation Age of Onset    Heart disease Mother         Cardiac disorder    Parkinsonism Father     Heart disease Sister         Cardiac disorder    Hypertension Sister     Diabetes Child         Diabetes Mellitus    Lung disease Child         Respiratory Disorder    Diabetes Son         Diabetes  Mellitus     I have reviewed and agree with the history as documented.    E-Cigarette/Vaping    E-Cigarette Use Never User      E-Cigarette/Vaping Substances    Nicotine No     THC No     CBD No     Flavoring No     Other No     Unknown No      Social History     Tobacco Use    Smoking status: Never     Passive exposure: Never    Smokeless tobacco: Never    Tobacco comments:     Former smoker per [employer]   Vaping Use    Vaping status: Never Used   Substance Use Topics    Alcohol use: Never    Drug use: No       Review of Systems   Unable to perform ROS: Dementia       Physical Exam  Physical Exam  Constitutional:       General: She is not in acute distress.     Appearance: Normal appearance. She is not ill-appearing, toxic-appearing or diaphoretic.   HENT:      Head: Normocephalic and atraumatic.      Right Ear: Tympanic membrane, ear canal and external ear normal. There is no impacted cerumen.      Left Ear: Tympanic membrane, ear canal and external ear normal. There is no impacted cerumen.      Nose: Nose normal. No congestion or rhinorrhea.      Mouth/Throat:      Mouth: Mucous membranes are moist.      Pharynx: Oropharynx is clear. No oropharyngeal exudate or posterior oropharyngeal erythema.   Eyes:      General:         Right eye: No discharge.         Left eye: No discharge.      Extraocular Movements: Extraocular movements intact.      Pupils: Pupils are equal, round, and reactive to light.   Neck:      Vascular: No carotid bruit.   Cardiovascular:      Rate and Rhythm: Normal rate and regular rhythm.      Heart sounds: No murmur heard.     No friction rub. No gallop.   Pulmonary:      Effort: Pulmonary effort is normal. No respiratory distress.      Breath sounds: No stridor. No wheezing, rhonchi or rales.   Chest:      Chest wall: No tenderness.   Abdominal:      General: Abdomen is flat. There is no distension.      Palpations: Abdomen is soft. There is no mass.      Tenderness: There is no abdominal  tenderness. There is no right CVA tenderness, left CVA tenderness or guarding.      Hernia: No hernia is present.   Musculoskeletal:         General: Swelling present. No tenderness, deformity or signs of injury.      Cervical back: Normal range of motion and neck supple. No rigidity or tenderness.      Right lower leg: No edema.      Left lower leg: No edema.      Comments: Free fluid/edema of the upper right extremity, without erythema or tenderness of the hand or wrist.  Fluid collection noted from the fingers of the right hand all the way up to mid forearm.  Fluid collection extremely mobile.   Lymphadenopathy:      Cervical: No cervical adenopathy.   Skin:     General: Skin is warm and dry.      Capillary Refill: Capillary refill takes less than 2 seconds.      Coloration: Skin is not jaundiced or pale.      Findings: No bruising, erythema, lesion or rash.   Neurological:      Mental Status: She is alert.         Vital Signs  ED Triage Vitals [06/29/24 1850]   Temperature Pulse Respirations Blood Pressure SpO2   98.6 °F (37 °C) 90 16 139/90 96 %      Temp Source Heart Rate Source Patient Position - Orthostatic VS BP Location FiO2 (%)   Oral Monitor Lying Left arm --      Pain Score       No Pain           Vitals:    06/29/24 1901 06/29/24 1919 06/29/24 1949 06/29/24 2201   BP:  143/83 157/65 125/59   Pulse:  60 (!) 52 59   Patient Position - Orthostatic VS: Lying  Lying Lying         Visual Acuity  Visual Acuity      Flowsheet Row Most Recent Value   L Pupil Size (mm) 2   R Pupil Size (mm) 2            ED Medications  Medications   iohexol (OMNIPAQUE) 350 MG/ML injection (MULTI-DOSE) 70 mL (70 mL Intravenous Given 6/29/24 2203)       Diagnostic Studies  Results Reviewed       Procedure Component Value Units Date/Time    B-Type Natriuretic Peptide(BNP) [[id number]] Collected: 06/30/24 0147    Lab Status: No result Specimen: Blood from Arm, Left     FLU/RSV/COVID - if FLU/RSV clinically relevant [[id number]]  Collected: 06/30/24 0147    Lab Status: No result Specimen: Nares from Nose     D-Dimer [[id number]]  (Abnormal) Collected: 06/29/24 2031    Lab Status: Final result Specimen: Blood from Arm, Left Updated: 06/29/24 2058     D-Dimer, Quant 1.86 ug/ml FEU     Narrative:      In the evaluation for possible pulmonary embolism, in the appropriate (Well's Score of 4 or less) patient, the age adjusted d-dimer cutoff for this patient can be calculated as:    Age x 0.01 (in ug/mL) for Age-adjusted D-dimer exclusion threshold for a patient over 50 years.    Comprehensive metabolic panel [[id number]]  (Abnormal) Collected: 06/29/24 2031    Lab Status: Final result Specimen: Blood from Arm, Left Updated: 06/29/24 2056     Sodium 142 mmol/L      Potassium 4.9 mmol/L      Chloride 108 mmol/L      CO2 31 mmol/L      ANION GAP 3 mmol/L      BUN 38 mg/dL      Creatinine 1.20 mg/dL      Glucose 97 mg/dL      Calcium 9.2 mg/dL      AST 16 U/L      ALT 8 U/L      Alkaline Phosphatase 93 U/L      Total Protein 6.1 g/dL      Albumin 3.6 g/dL      Total Bilirubin 0.38 mg/dL      eGFR 39 ml/min/1.73sq m     Narrative:      National Kidney Disease Foundation guidelines for Chronic Kidney Disease (CKD):     Stage 1 with normal or high GFR (GFR > 90 mL/min/1.73 square meters)    Stage 2 Mild CKD (GFR = 60-89 mL/min/1.73 square meters)    Stage 3A Moderate CKD (GFR = 45-59 mL/min/1.73 square meters)    Stage 3B Moderate CKD (GFR = 30-44 mL/min/1.73 square meters)    Stage 4 Severe CKD (GFR = 15-29 mL/min/1.73 square meters)    Stage 5 End Stage CKD (GFR <15 mL/min/1.73 square meters)  Note: GFR calculation is accurate only with a steady state creatinine    CBC and differential [[id number]]  (Abnormal) Collected: 06/29/24 2031    Lab Status: Final result Specimen: Blood from Arm, Left Updated: 06/29/24 2036     WBC 5.18 Thousand/uL      RBC 3.58 Million/uL      Hemoglobin 10.6 g/dL      Hematocrit 33.1 %      MCV 93 fL      MCH 29.6 pg      MCHC  32.0 g/dL      RDW 14.1 %      MPV 9.5 fL      Platelets 189 Thousands/uL      nRBC 0 /100 WBCs      Segmented % 64 %      Immature Grans % 0 %      Lymphocytes % 25 %      Monocytes % 8 %      Eosinophils Relative 3 %      Basophils Relative 0 %      Absolute Neutrophils 3.29 Thousands/µL      Absolute Immature Grans 0.01 Thousand/uL      Absolute Lymphocytes 1.31 Thousands/µL      Absolute Monocytes 0.43 Thousand/µL      Eosinophils Absolute 0.14 Thousand/µL      Basophils Absolute 0.00 Thousands/µL                    CTA ED chest PE study   ED Interpretation by Glen Real PA-C (06/30 0128)   FINDINGS: Pulmonary arteries: Normal. No pulmonary emboli. Aorta: Unremarkable. No aortic aneurysm. No aortic dissection. Lungs: The lungs demonstrate patchy areas of groundglass opacities in bilateral upper lobes. This appearance is nonspecific in etiology (may represent areas of microatelectasis, mild/early pulmonary edema (including the possibility of noncardiogenic pulmonary edema or overhydration), versus early/developing infectious/inflammatory pneumonia/pneumonitis). Small pneumatocele noted on the left. Pleural spaces: Unremarkable. No pneumothorax. No pleural effusion. Heart: Moderate-sized pericardial effusion. Lymph nodes: Unremarkable. No enlarged lymph nodes. Bones/joints: Unremarkable. No acute fracture. Soft tissues: Unremarkable. IMPRESSION: 1. Moderate-sized pericardial effusion. 2. Ground-glass lung opacities; please see DDx above    Dictated and Authenticated by: Juan Dale MD 06/30/2024 12:19 AM Eastern Time ([place] & [place]                   Procedures  Procedures         ED Course                                   Wells' Criteria for PE      Flowsheet Row Most Recent Value   Wells' Criteria for PE    Clinical signs and symptoms of DVT 3 Filed at: 06/29/2024 2109   PE is primary diagnosis or equally likely 0 Filed at: 06/29/2024 2109   HR >100 0 Filed at: 06/29/2024 2109   Immobilization at least 3  days or Surgery in the previous 4 weeks 1.5 Filed at: 06/29/2024 2109   Previous, objectively diagnosed PE or DVT 0 Filed at: 06/29/2024 2109   Hemoptysis 0 Filed at: 06/29/2024 2109   Malignancy with treatment within 6 months or palliative 0 Filed at: 06/29/2024 2109   Wells' Criteria Total 4.5 Filed at: 06/29/2024 2109                  Medical Decision Making  Patient is a 89-year-old Serbian-speaking female with severe dementia presenting emergency department via EMS for right hand swelling.  Patient nursing staff states that it appeared yesterday per the patient's son and she noticed it today at 4 PM.  Discussed with patient's son over the phone, he states he noticed that yesterday but did not pay it much mind.  But states progressively worsened over the 24 hours. Physical exam showed free fluid/edema of the upper right extremity, without erythema or tenderness of the hand or wrist.  Fluid collection noted from the fingers of the right hand all the way up to mid forearm.  Fluid collection extremely mobile. DDx including but not limited to: Fluid accumulation secondary to improper positioning, contusion, fracture, sprain, strain, tendinitis, tenosynovitis, arthritis, carpal tunnel syndrome, cellulitis, upper extremity DVT, lymphangitis, osteomyelitis, ischemic limb, ganglion cyst, radiculopathy, gout, diabetic neuropathy, lyme disease; doubt septic arthritis.  Due to localized upper extremity swelling along with patient being mostly bed bound throughout the day DVT unable to be ruled out.  Due to lack of ultrasound in the emergency department at this time and patient having the inability to follow-up in outpatient setting tomorrow D-dimer was ordered.  D-dimer came back at 1.86 even with age adjustment is still elevated.  Due to patient having decrease in mentation and her inability to express having shortness of breath and CTA PE study was ordered.  Reevaluation of extremity on CT stable showed limited edema to  right hand to description fluid and discussion with CT tech contrast would not infiltrate the extremity during the PE study and to avoid further damage to patient's renal system no imaging of the hand was done.  CBC showed mild anemia and CMP shows protein deficiency consistent with past medical history.  CTA PE study showed no acute pulmonary embolism but did show bilateral groundglass opacities in the apices, as well as new moderate pericardial effusion when compared to prior CTs.  Would like to admit to observation for echocardiogram and ultrasound of upper extremity to rule out upper extremity DVT.  Due to abnormal findings on chest CT, BNP and COVID/flu/RSV testing. Discussed findings with Tamika Vang.  Patient admitted to observation for potential evaluation of pneumonia/new onset abnormal presentation of heart failure.  Patient placed on one-to-one observation due to patient trying to stand and melanite being unable to ambulate by herself.  Discussed findings with patient's son, son agrees with current plan.    Amount and/or Complexity of Data Reviewed  Labs: ordered.  Radiology: ordered.    Risk  Prescription drug management.             Disposition  Final diagnoses:   Hand edema     Time reflects when diagnosis was documented in both MDM as applicable and the Disposition within this note       Time User Action Codes Description Comment    6/30/2024  1:23 AM Glen Real Add [R60.0] Hand edema           ED Disposition       ED Disposition   Admit    Condition   Stable    Date/Time   Sun Jun 30, 2024 0124    Comment   Case was discussed with Kathy Vang PA-C and the patient's admission status was agreed to be Admission Status: observation status to the service of Dr. Macdonald.               Follow-up Information    None         Patient's Medications   Discharge Prescriptions    No medications on file       No discharge procedures on file.    PDMP Review         Value Time User    PDMP Reviewed  Yes  7/16/2021  8:50 AM Azalea Robertson PA-C            ED Provider  Electronically Signed by             Glen Real PA-C  06/30/24 0152

## 2024-06-29 NOTE — Clinical Note
Case was discussed with  and the patient's admission status was agreed to be Admission Status: observation status to the service of Dr. Macdonald.

## 2024-06-30 ENCOUNTER — APPOINTMENT (OUTPATIENT)
Dept: NON INVASIVE DIAGNOSTICS | Facility: HOSPITAL | Age: 89
DRG: 065 | End: 2024-06-30
Payer: MEDICARE

## 2024-06-30 ENCOUNTER — APPOINTMENT (OUTPATIENT)
Dept: RADIOLOGY | Facility: HOSPITAL | Age: 89
DRG: 065 | End: 2024-06-30
Payer: MEDICARE

## 2024-06-30 ENCOUNTER — APPOINTMENT (OUTPATIENT)
Dept: CT IMAGING | Facility: HOSPITAL | Age: 89
DRG: 065 | End: 2024-06-30
Payer: MEDICARE

## 2024-06-30 PROBLEM — I31.39 PERICARDIAL EFFUSION: Status: ACTIVE | Noted: 2024-06-30

## 2024-06-30 PROBLEM — R60.0 EDEMA OF RIGHT UPPER EXTREMITY: Status: ACTIVE | Noted: 2024-06-30

## 2024-06-30 PROBLEM — Q67.0 FACIAL ASYMMETRY: Status: ACTIVE | Noted: 2024-06-30

## 2024-06-30 LAB
AORTIC ROOT: 3.6 CM
ASCENDING AORTA: 3.2 CM
ATRIAL RATE: 67 BPM
BNP SERPL-MCNC: 118 PG/ML (ref 0–100)
BSA FOR ECHO PROCEDURE: 1.4 M2
CHOLEST SERPL-MCNC: 165 MG/DL
EST. AVERAGE GLUCOSE BLD GHB EST-MCNC: 97 MG/DL
FLUAV RNA RESP QL NAA+PROBE: NEGATIVE
FLUBV RNA RESP QL NAA+PROBE: NEGATIVE
FRACTIONAL SHORTENING: 30 (ref 28–44)
HBA1C MFR BLD: 5 %
HDLC SERPL-MCNC: 58 MG/DL
INTERVENTRICULAR SEPTUM IN DIASTOLE (PARASTERNAL SHORT AXIS VIEW): 1 CM
INTERVENTRICULAR SEPTUM: 1 CM (ref 0.6–1.1)
LDLC SERPL CALC-MCNC: 82 MG/DL (ref 0–100)
LEFT ATRIUM SIZE: 3.6 CM
LEFT INTERNAL DIMENSION IN SYSTOLE: 2.6 CM (ref 2.1–4)
LEFT VENTRICULAR INTERNAL DIMENSION IN DIASTOLE: 3.7 CM (ref 3.5–6)
LEFT VENTRICULAR POSTERIOR WALL IN END DIASTOLE: 1 CM
LEFT VENTRICULAR STROKE VOLUME: 32 ML
LVSV (TEICH): 32 ML
P AXIS: 20 DEGREES
PLATELET # BLD AUTO: 194 THOUSANDS/UL (ref 149–390)
PMV BLD AUTO: 10 FL (ref 8.9–12.7)
PR INTERVAL: 184 MS
PROCALCITONIN SERPL-MCNC: 0.07 NG/ML
QRS AXIS: 40 DEGREES
QRSD INTERVAL: 118 MS
QT INTERVAL: 456 MS
QTC INTERVAL: 481 MS
RSV RNA RESP QL NAA+PROBE: NEGATIVE
SARS-COV-2 RNA RESP QL NAA+PROBE: NEGATIVE
SL CV PED ECHO LEFT VENTRICLE DIASTOLIC VOLUME (MOD BIPLANE) 2D: 57 ML
SL CV PED ECHO LEFT VENTRICLE SYSTOLIC VOLUME (MOD BIPLANE) 2D: 25 ML
T WAVE AXIS: 47 DEGREES
TRIGL SERPL-MCNC: 124 MG/DL
VENTRICULAR RATE: 67 BPM

## 2024-06-30 PROCEDURE — 70450 CT HEAD/BRAIN W/O DYE: CPT

## 2024-06-30 PROCEDURE — 83036 HEMOGLOBIN GLYCOSYLATED A1C: CPT | Performed by: PHYSICIAN ASSISTANT

## 2024-06-30 PROCEDURE — 36415 COLL VENOUS BLD VENIPUNCTURE: CPT

## 2024-06-30 PROCEDURE — 85049 AUTOMATED PLATELET COUNT: CPT | Performed by: PHYSICIAN ASSISTANT

## 2024-06-30 PROCEDURE — 93306 TTE W/DOPPLER COMPLETE: CPT | Performed by: INTERNAL MEDICINE

## 2024-06-30 PROCEDURE — 99223 1ST HOSP IP/OBS HIGH 75: CPT | Performed by: INTERNAL MEDICINE

## 2024-06-30 PROCEDURE — 84145 PROCALCITONIN (PCT): CPT | Performed by: PHYSICIAN ASSISTANT

## 2024-06-30 PROCEDURE — 73130 X-RAY EXAM OF HAND: CPT

## 2024-06-30 PROCEDURE — 93010 ELECTROCARDIOGRAM REPORT: CPT | Performed by: INTERNAL MEDICINE

## 2024-06-30 PROCEDURE — 93306 TTE W/DOPPLER COMPLETE: CPT

## 2024-06-30 PROCEDURE — 83880 ASSAY OF NATRIURETIC PEPTIDE: CPT

## 2024-06-30 PROCEDURE — 93005 ELECTROCARDIOGRAM TRACING: CPT

## 2024-06-30 PROCEDURE — 0241U HB NFCT DS VIR RESP RNA 4 TRGT: CPT

## 2024-06-30 PROCEDURE — 80061 LIPID PANEL: CPT | Performed by: PHYSICIAN ASSISTANT

## 2024-06-30 PROCEDURE — 92610 EVALUATE SWALLOWING FUNCTION: CPT

## 2024-06-30 RX ORDER — ASPIRIN 300 MG/1
300 SUPPOSITORY RECTAL ONCE
Status: DISCONTINUED | OUTPATIENT
Start: 2024-06-30 | End: 2024-07-03 | Stop reason: HOSPADM

## 2024-06-30 RX ORDER — HEPARIN SODIUM 5000 [USP'U]/ML
5000 INJECTION, SOLUTION INTRAVENOUS; SUBCUTANEOUS EVERY 8 HOURS SCHEDULED
Status: DISCONTINUED | OUTPATIENT
Start: 2024-07-01 | End: 2024-07-03 | Stop reason: HOSPADM

## 2024-06-30 RX ORDER — AMIODARONE HYDROCHLORIDE 200 MG/1
200 TABLET ORAL DAILY
Status: DISCONTINUED | OUTPATIENT
Start: 2024-06-30 | End: 2024-07-03 | Stop reason: HOSPADM

## 2024-06-30 RX ORDER — HYDRALAZINE HYDROCHLORIDE 20 MG/ML
10 INJECTION INTRAMUSCULAR; INTRAVENOUS ONCE
Status: COMPLETED | OUTPATIENT
Start: 2024-06-30 | End: 2024-06-30

## 2024-06-30 RX ORDER — ATORVASTATIN CALCIUM 40 MG/1
40 TABLET, FILM COATED ORAL EVERY EVENING
Status: DISCONTINUED | OUTPATIENT
Start: 2024-06-30 | End: 2024-07-03 | Stop reason: HOSPADM

## 2024-06-30 RX ADMIN — DILTIAZEM HYDROCHLORIDE 30 MG: 30 TABLET, FILM COATED ORAL at 16:42

## 2024-06-30 RX ADMIN — HYDRALAZINE HYDROCHLORIDE 10 MG: 20 INJECTION, SOLUTION INTRAMUSCULAR; INTRAVENOUS at 04:30

## 2024-06-30 RX ADMIN — ATORVASTATIN CALCIUM 40 MG: 40 TABLET, FILM COATED ORAL at 17:08

## 2024-06-30 RX ADMIN — DILTIAZEM HYDROCHLORIDE 30 MG: 30 TABLET, FILM COATED ORAL at 09:45

## 2024-06-30 RX ADMIN — METOPROLOL TARTRATE 25 MG: 25 TABLET, FILM COATED ORAL at 17:08

## 2024-06-30 NOTE — ASSESSMENT & PLAN NOTE
"Chronic per family  Mental status at baseline  CTH performed showing  \"Stable subacute to chronic right occipital lobe infarct. Stable chronic left cerebellar lacunar infarct. Chronic microangiopathic changes.\"  Neurology consulted on possibility of subacute stroke  "

## 2024-06-30 NOTE — NURSING NOTE
Patient presently remains on a virtual 1:1 for safety. Refusing her medications. Attempted to administer morning meds crushed in applesauce, patient stating no, no. Pushing my hand away. Bed alarm activated.

## 2024-06-30 NOTE — ASSESSMENT & PLAN NOTE
Was normotensive in ed with acceleration upon arrival to floor  Continue afib meds but will hold for permissive htn given facial asymmetry and difficulty assessing right  in setting of edema and dementia

## 2024-06-30 NOTE — ASSESSMENT & PLAN NOTE
Nearly nonverbal per op documentation/nonsensical as well as Cambodian speaking barrier  Virtual observation, npo pending speech eval

## 2024-06-30 NOTE — ASSESSMENT & PLAN NOTE
W/?right facial droop in setting of poor dentition and prior orbital rim fx.  Upon d/w ed provider this was present on evaluation in ED.  Pt has been having intermittent vaginal bleeding and given nonverbal status as well as unable to clarify chronicity of s/sx last time of well is unknown (called son and VM left). Given difficulty opening right hand and edema this does raise concern for possible subacute or older cva as an etiology to hand edema  -check ct head given afib not on AC and elevated bp.  No cta today as already received contrast load.  -telemetry, if ct negative will give asa 300mg pr  -stroke pathway for now pending further evaluation clarification of s/sx chronicity w/family

## 2024-06-30 NOTE — CASE MANAGEMENT
Case Management Discharge Planning Note    Patient name Denia Castañeda  [facility] /[facility] -* MRN [id number]  : 1931 Date 2024       Current Admission Date: 2024  Current Admission Diagnosis:Facial asymmetry   Patient Active Problem List    Diagnosis Date Noted Date Diagnosed    Edema of right upper extremity 2024     Pericardial effusion 2024     Facial asymmetry 2024     Back pain 2023     At risk for elder abuse 2023     Urinary retention 2022     Edema of right ankle 10/04/2022     Flank pain 10/18/2021     Oropharyngeal dysphagia 2021     Normocytic anemia 2021     H/O fracture of right hip 07/15/2021     Stage 3b chronic kidney disease (HCC) 07/15/2021     Age-related osteoporosis without current pathological fracture 2020     Moderate protein-calorie malnutrition (HCC) 2020     Severe dementia with agitation ([facility]) 2020     Urinary incontinence 2020     Ambulatory dysfunction 04/10/2020     h/o OMEGA (acute kidney injury) ([facility]) 04/10/2020     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     PAF (paroxysmal atrial fibrillation) ([facility]) 2019     Bilateral hearing loss 10/11/2018     Personal history of fall 10/11/2018     Primary hypertension 2018     Hyperlipidemia 2018     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[facility]) 2018       LOS (days): 0  Geometric Mean LOS (GMLOS) (days):   Days to GMLOS:     OBJECTIVE:            Current admission status: Observation   Preferred Pharmacy:   [facility] - [place] - [address]  [address] [address]  Phone: [phone] Fax: [phone]    [facility] [place] - [address],  [address],  First [address] [facility] [address]  Phone: [phone] Fax: [phone]    [id number] - [place] - [address]  [address] [address]  Phone: [phone] Fax:  [phone]    Primary Care Provider: Jose M Castañeda MD    Primary Insurance: [employer] [facility]  Secondary Insurance: [facility]    DISCHARGE DETAILS:    Additional Comments: CM consult for ischemic stroke. CM left VM for Logan Colon (Son) [phone] requesting a return call to complete assessment and review discharge outcomes. Last comprehensive assessment completed May 2023. CM following to complete assessment and review recommendations with Pt's son. CM consult addressed.

## 2024-06-30 NOTE — PLAN OF CARE
Problem: Potential for Falls  Goal: Patient will remain free of falls  Description: INTERVENTIONS:  - Educate patient/family on patient safety including physical limitations  - Instruct patient to call for assistance with activity   - Consult OT/PT to assist with strengthening/mobility   - Keep Call bell within reach  - Keep bed low and locked with side rails adjusted as appropriate  - Keep care items and personal belongings within reach  - Initiate and maintain comfort rounds  - Make Fall Risk Sign visible to staff  - Offer Toileting every  Hours, in advance of need  - Initiate/Maintain alarm  - Obtain necessary fall risk management equipment:   - Apply yellow socks and bracelet for high fall risk patients  - Consider moving patient to room near nurses station  Outcome: Progressing     Problem: PAIN - ADULT  Goal: Verbalizes/displays adequate comfort level or baseline comfort level  Description: Interventions:  - Encourage patient to monitor pain and request assistance  - Assess pain using appropriate pain scale  - Administer analgesics based on type and severity of pain and evaluate response  - Implement non-pharmacological measures as appropriate and evaluate response  - Consider cultural and social influences on pain and pain management  - Notify physician/advanced practitioner if interventions unsuccessful or patient reports new pain  Outcome: Progressing     Problem: DISCHARGE PLANNING  Goal: Discharge to home or other facility with appropriate resources  Description: INTERVENTIONS:  - Identify barriers to discharge w/patient and caregiver  - Arrange for needed discharge resources and transportation as appropriate  - Identify discharge learning needs (meds, wound care, etc.)  - Arrange for interpretive services to assist at discharge as needed  - Refer to Case Management Department for coordinating discharge planning if the patient needs post-hospital services based on physician/advanced practitioner order  or complex needs related to functional status, cognitive ability, or social support system  Outcome: Progressing     Problem: Knowledge Deficit  Goal: Patient/family/caregiver demonstrates understanding of disease process, treatment plan, medications, and discharge instructions  Description: Complete learning assessment and assess knowledge base.  Interventions:  - Provide teaching at level of understanding  - Provide teaching via preferred learning methods  Outcome: Progressing     Problem: Neurological Deficit  Goal: Neurological status is stable or improving  Description: Interventions:  - Monitor and assess patient's level of consciousness, motor function, sensory function, and level of assistance needed for ADLs.   - Monitor and report changes from baseline. Collaborate with interdisciplinary team to initiate plan and implement interventions as ordered.   - Provide and maintain a safe environment.  - Consider seizure precautions.  - Consider fall precautions.  - Consider aspiration precautions.  - Consider bleeding precautions.  Outcome: Progressing     Problem: Activity Intolerance/Impaired Mobility  Goal: Mobility/activity is maintained at optimum level for patient  Description: Interventions:  - Assess and monitor patient  barriers to mobility and need for assistive/adaptive devices.  - Assess patient's emotional response to limitations.  - Collaborate with interdisciplinary team and initiate plans and interventions as ordered.  - Encourage independent activity per ability.  - Maintain proper body alignment.  - Perform active/passive rom as tolerated/ordered.  - Plan activities to conserve energy.  - Turn patient as appropriate  Outcome: Progressing     Problem: Communication Impairment  Goal: Ability to express needs and understand communication  Description: Assess patient's communication skills and ability to understand information.  Patient will demonstrate use of effective communication techniques,  alternative methods of communication and understanding even if not able to speak.     - Encourage communication and provide alternate methods of communication as needed.  - Collaborate with case management/ for discharge needs.  - Include patient/family/caregiver in decisions related to communication.  Outcome: Progressing     Problem: Potential for Aspiration  Goal: Non-ventilated patient's risk of aspiration is minimized  Description: Assess and monitor vital signs, respiratory status, and labs (WBC).  Monitor for signs of aspiration (tachypnea, cough, rales, wheezing, cyanosis, fever).    - Assess and monitor patient's ability to swallow.  - Place patient up in chair to eat if possible.  - HOB up at 90 degrees to eat if unable to get patient up into chair.  - Supervise patient during oral intake.   - Instruct patient/ family to take small bites.  - Instruct patient/ family to take small single sips when taking liquids.  - Follow patient-specific strategies generated by speech pathologist.  Outcome: Progressing  Goal: Ventilated patient's risk of aspiration is minimized  Description: Assess and monitor vital signs, respiratory status, airway cuff pressure, and labs (WBC).  Monitor for signs of aspiration (tachypnea, cough, rales, wheezing, cyanosis, fever).    - Elevate head of bed 30 degrees if patient has tube feeding.  - Monitor tube feeding.  Outcome: Progressing     Problem: Nutrition  Goal: Nutrition/Hydration status is improving  Description: Monitor and assess patient's nutrition/hydration status for malnutrition (ex- brittle hair, bruises, dry skin, pale skin and conjunctiva, muscle wasting, smooth red tongue, and disorientation). Collaborate with interdisciplinary team and initiate plan and interventions as ordered.  Monitor patient's weight and dietary intake as ordered or per policy. Utilize nutrition screening tool and intervene per policy. Determine patient's food preferences and  provide high-protein, high-caloric foods as appropriate.     - Assist patient with eating.  - Allow adequate time for meals.  - Encourage patient to take dietary supplement as ordered.  - Collaborate with clinical nutritionist.  - Include patient/family/caregiver in decisions related to nutrition.  Outcome: Progressing     Problem: CARDIOVASCULAR - ADULT  Goal: Maintains optimal cardiac output and hemodynamic stability  Description: INTERVENTIONS:  - Monitor I/O, vital signs and rhythm  - Monitor for S/S and trends of decreased cardiac output  - Administer and titrate ordered vasoactive medications to optimize hemodynamic stability  - Assess quality of pulses, skin color and temperature  - Assess for signs of decreased coronary artery perfusion  - Instruct patient to report change in severity of symptoms  Outcome: Progressing  Goal: Absence of cardiac dysrhythmias or at baseline rhythm  Description: INTERVENTIONS:  - Continuous cardiac monitoring, vital signs, obtain 12 lead EKG if ordered  - Administer antiarrhythmic and heart rate control medications as ordered  - Monitor electrolytes and administer replacement therapy as ordered  Outcome: Progressing     Problem: RESPIRATORY - ADULT  Goal: Achieves optimal ventilation and oxygenation  Description: INTERVENTIONS:  - Assess for changes in respiratory status  - Assess for changes in mentation and behavior  - Position to facilitate oxygenation and minimize respiratory effort  - Oxygen administered by appropriate delivery if ordered  - Initiate smoking cessation education as indicated  - Encourage broncho-pulmonary hygiene including cough, deep breathe, Incentive Spirometry  - Assess the need for suctioning and aspirate as needed  - Assess and instruct to report SOB or any respiratory difficulty  - Respiratory Therapy support as indicated  Outcome: Progressing     Problem: SKIN/TISSUE INTEGRITY - ADULT  Goal: Skin Integrity remains intact(Skin Breakdown  Prevention)  Description: Assess:  -Perform Liu assessment every   -Clean and moisturize skin every   -Inspect skin when repositioning, toileting, and assisting with ADLS  -Assess under medical devices such as  every   -Assess extremities for adequate circulation and sensation     Bed Management:  -Have minimal linens on bed & keep smooth, unwrinkled  -Change linens as needed when moist or perspiring  -Avoid sitting or lying in one position for more than  hours while in bed  -Keep HOB at degrees     Toileting:  -Offer bedside commode  -Assess for incontinence every   -Use incontinent care products after each incontinent episode such as     Activity:  -Mobilize patient  times a day  -Encourage activity and walks on unit  -Encourage or provide ROM exercises   -Turn and reposition patient every  Hours  -Use appropriate equipment to lift or move patient in bed  -Instruct/ Assist with weight shifting every  when out of bed in chair  -Consider limitation of chair time  hour intervals    Skin Care:  -Avoid use of baby powder, tape, friction and shearing, hot water or constrictive clothing  -Relieve pressure over bony prominences using   -Do not massage red bony areas    Next Steps:  -Teach patient strategies to minimize risks such as    -Consider consults to  interdisciplinary teams such as   Outcome: Progressing  Goal: Pressure injury heals and does not worsen  Description: Interventions:  - Implement low air loss mattress or specialty surface (Criteria met)  - Apply silicone foam dressing  - Instruct/assist with weight shifting every  minutes when in chair   - Limit chair time to hour intervals  - Use special pressure reducing interventions such as  when in chair   - Apply fecal or urinary incontinence containment device   - Perform passive or active ROM every   - Turn and reposition patient & offload bony prominences every  hours   - Utilize friction reducing device or surface for transfers   - Consider consults to   interdisciplinary teams such as   - Use incontinent care products after each incontinent episode such as   - Consider nutrition services referral as needed  Outcome: Progressing

## 2024-06-30 NOTE — ASSESSMENT & PLAN NOTE
Lab Results   Component Value Date    EGFR 39 06/29/2024    EGFR 36 02/01/2024    EGFR 35 05/14/2023    CREATININE 1.20 06/29/2024    CREATININE 1.28 02/01/2024    CREATININE 1.30 05/14/2023   Creatinine near baseline, gentle ivf with cta head/neck repeat bmp in am

## 2024-06-30 NOTE — H&P
[facility]  H&P  Name: Denia Castañeda 89 y.o. female I MRN: [id number]  Unit/Bed#: E4 -01 I Date of Admission: 6/29/2024   Date of Service: 6/30/2024 I Hospital Day: 0      Assessment & Plan   * Facial asymmetry  Assessment & Plan  W/?right eye ptosis in setting of poor dentition and prior orbital rim fx and ?right facial droop limited by participation/dentition.  Upon d/w ed provider this was present on evaluation in ED.  Son was unaware of any new s/sx but does not recall any asymmetry in her eyes/face and no prehospital documentation of this asymmetry   -no TNK as pt has been having intermittent vaginal bleeding and given nonverbal status as well as unable to clarify chronicity of s/sx last time of well is unknown (son has not noticed any new deficits but pt has 24 hour caregivers).    -check ct head given afib not on AC and elevated bp.  No cta today as already received contrast load.  -telemetry, if ct negative will give asa 300mg pr  -stroke pathway    Pericardial effusion  Assessment & Plan  Moderate on cta pe study performed for elevated ddimer  Heart tones are audible, no electrical alterans on EKG or s/sx to suggest tamponade  Check echo to formally assess degree of effusion    Edema of right upper extremity  Assessment & Plan  Unclear etiology. Initial complaint for presenting to ED.  Pt has severe dementia and is sometimes nonverbal.  Question if this is positional due to being frequently on right side.  Check venous duplex to r/o RUE DVT. Check xray hand to r/o fracture      Stage 3b chronic kidney disease (HCC)  Assessment & Plan  Lab Results   Component Value Date    EGFR 39 06/29/2024    EGFR 36 02/01/2024    EGFR 35 05/14/2023    CREATININE 1.20 06/29/2024    CREATININE 1.28 02/01/2024    CREATININE 1.30 05/14/2023   Creatinine near baseline, gentle ivf with cta head/neck repeat bmp in am    Oropharyngeal dysphagia  Assessment & Plan  Hx of.  Given ground glass  opacities in upper lungs will consult slp for bedside swallow eval.      Severe dementia with agitation (HCC)  Assessment & Plan  Nearly nonverbal per op documentation/nonsensical as well as Macedonian speaking barrier  Virtual observation, npo pending speech eval    PAF (paroxysmal atrial fibrillation) (HCC)  Assessment & Plan  Not on AC due to fall risk  Continue lopressor/cardizem/amiodarone    Primary hypertension  Assessment & Plan  Was normotensive in ed with acceleration upon arrival to floor  Continue afib meds but will hold for permissive htn given facial asymmetry and difficulty assessing right  in setting of edema and dementia             VTE Pharmacologic Prophylaxis:   High Risk (Score >/= 5) - Pharmacological DVT Prophylaxis Ordered: enoxaparin (Lovenox). Sequential Compression Devices Ordered.  Code Status: implied full code  Discussion with family: Attempted to update  (son) via phone. Left voicemail.     Anticipated Length of Stay: Patient will be admitted on an observation basis with an anticipated length of stay of less than 2 midnights secondary to right hand swelling ?right face droop.    Total Time Spent on Date of Encounter in care of patient:  mins. This time was spent on one or more of the following: performing physical exam; counseling and coordination of care; obtaining or reviewing history; documenting in the medical record; reviewing/ordering tests, medications or procedures; communicating with other healthcare professionals and discussing with patient's family/caregivers.    Chief Complaint: right hand swelling    History of Present Illness:  Denia Castañeda is a 89 y.o. female with a PMH of PMH of paroxysmal A-fib not on anticoagulation, hypertension severe dementia with history of agitation, nearly nonverbal status, mainly wheelchair-bound status who presents with right hand swelling.  Patient is English-speaking only.  HPI constructed by d/w ED provider and review of  prehospital ER at length.  Attempted to call son by telephone to clarify HPI but no response and voicemail was left.  Patient herself does not respond to any orientation questions or questions.  She is alert however.  Per ED provider pt had questionable right eye droop/ptosis and ?right facial droop upon arrival to ED although not noted in any recent office visits despite hx of orbit rim fx history and poor dentition.      She was sent in by son by EMS for right hand swelling x 2d.  No known trauma although right hand is clenched.  Pt grimaces on exam of right hand and becomes agitated.      Review of Systems:  Review of Systems   Unable to perform ROS: Dementia       Past Medical and Surgical History:   Past Medical History:   Diagnosis Date    Anxiety     Closed fracture of right orbital floor (HCC) 08/23/2020    Cognitive impairment     COVID-19 virus infection 04/13/2020    Dementia ([facility])     Depression     Hallucination     Hyperlipidemia     Hypertension     Memory loss     Psychiatric illness     Psychosis (HCC)     Retention of urine     Schizoaffective disorder ([facility])     Sleep difficulties     Urinary tract infection        Past Surgical History:   Procedure Laterality Date    APPENDECTOMY      BLADDER SURGERY      CHOLECYSTECTOMY      OK OPTX FEM SHFT FX W/INSJ IMED IMPLT W/WO SCREW Right 7/15/2021    Procedure: INSERTION NAIL IM FEMUR ANTEGRADE (TROCHANTERIC) right;  Surgeon: Getachew Aguilar MD;  Location: [facility];  Service: Orthopedics    TOTAL ABDOMINAL HYSTERECTOMY W/ BILATERAL SALPINGOOPHORECTOMY      TOTAL KNEE ARTHROPLASTY Left        Meds/Allergies:  Prior to Admission medications    Medication Sig Start Date End Date Taking? Authorizing Provider   acetaminophen (TYLENOL) 325 mg tablet Take 2 tablets (650 mg total) by mouth every 6 (six) hours as needed for mild pain, moderate pain or headaches 8/23/20   BEV Herman   amiodarone 200 mg tablet Take 1 tablet (200 mg total) by mouth daily  5/13/24   Jose M Castañeda MD   diltiazem (CARDIZEM) 30 mg tablet TAKE 1 TABLET (30 MG TOTAL) BY MOUTH 3 (THREE) TIMES A DAY 2/23/24 8/21/24  Arnulfo Yun MD   docusate sodium (Stool Softener) 100 mg capsule Take 1 capsule (100 mg total) by mouth 2 (two) times a day 6/28/24   Jose M Castañeda MD   DULoxetine (CYMBALTA) 30 mg delayed release capsule Take 30 mg by mouth daily at bedtime    Historical Provider, MD   LORazepam (ATIVAN) 0.5 mg tablet Take 0.5 mg by mouth     Historical Provider, MD   melatonin 3 mg  6/26/23   Historical Provider, MD   metoprolol tartrate (LOPRESSOR) 25 mg tablet TAKE 1 TABLET (25 MG TOTAL) BY MOUTH 2 (TWO) TIMES A DAY 2/23/24 8/21/24  Arnulfo Yun MD   mirtazapine (REMERON) 15 mg tablet  9/11/23   Historical Provider, MD   mirtazapine (REMERON) 7.5 MG tablet  5/31/23   Historical Provider, MD   OLANZapine (ZyPREXA) 5 mg tablet Take 1 tablet (5 mg total) by mouth daily at bedtime 5/26/20   Isaiah Nettles MD   terazosin (HYTRIN) 2 mg capsule Take 1 capsule (2 mg total) by mouth daily at bedtime 9/15/23   Steffen Louis PA-C   traZODone (DESYREL) 50 mg tablet Take 50 mg by mouth daily at bedtime    Historical Provider, MD     I have reveiwed home medications using records provided by [facility].    Allergies: No Known Allergies    Social History:  Marital Status:    Occupation:   Patient Pre-hospital Living Situation: w/home health services  Patient Pre-hospital Level of Mobility: mainly wc bound per op cardiology documentation  Patient Pre-hospital Diet Restrictions:   Substance Use History:   Social History     Substance and Sexual Activity   Alcohol Use Never     Social History     Tobacco Use   Smoking Status Never    Passive exposure: Never   Smokeless Tobacco Never   Tobacco Comments    Former smoker per [employer]     Social History     Substance and Sexual Activity   Drug Use No       Family History:  Family History   Problem Relation Age of Onset    Heart disease  Mother         Cardiac disorder    Parkinsonism Father     Heart disease Sister         Cardiac disorder    Hypertension Sister     Diabetes Child         Diabetes Mellitus    Lung disease Child         Respiratory Disorder    Diabetes Son         Diabetes Mellitus       Physical Exam:     Vitals:   Blood Pressure: (!) 182/64 (06/30/24 0503)  Pulse: 68 (06/30/24 0300)  Temperature: 97.8 °F (36.6 °C) (06/30/24 0300)  Temp Source: Temporal (06/30/24 0300)  Respirations: 18 (06/30/24 0300)  Weight - Scale: 49.2 kg (108 lb 7.5 oz) (06/30/24 0300)  SpO2: 97 % (06/30/24 0300)    Physical Exam  Vitals reviewed.   Constitutional:       General: She is not in acute distress.     Appearance: She is not ill-appearing, toxic-appearing or diaphoretic.   HENT:      Head: Normocephalic and atraumatic.      Right Ear: External ear normal.      Left Ear: External ear normal.      Nose: Nose normal.   Cardiovascular:      Rate and Rhythm: Normal rate and regular rhythm.      Heart sounds: No murmur heard.     No friction rub. No gallop.   Pulmonary:      Breath sounds: No wheezing, rhonchi or rales.   Abdominal:      General: There is no distension.      Palpations: There is no mass.      Tenderness: There is no abdominal tenderness. There is no guarding or rebound.      Hernia: No hernia is present.   Musculoskeletal:      Right lower leg: No edema.      Left lower leg: No edema.      Comments: Right hand is clenched w/difficulty opening w/PROM of phalanges.  Right hand is edematous and generates verbal agitation from patient.  ?slightly decreased  strength in RUE compared to LUE   Skin:     General: Skin is warm and dry.   Neurological:      Mental Status: She is alert.      Comments: Disoriented unable to answer orientation questions in Indian or in english.   strength ? Worsen in RUE than left.  There is ?right ptosis of eye although unclear chronicity.  Concern for right lower face droop likely on basis of poor  dentition and pt leaning to the right as appears equal looking forward.  Pt does not comply with facial expressions examination   Psychiatric:      Comments: Thought content and judgment impaired.  Behavior is calm but intermittently agitated.          Additional Data:     Lab Results:  Results from last 7 days   Lab Units 06/29/24 2031   WBC Thousand/uL 5.18   HEMOGLOBIN g/dL 10.6*   HEMATOCRIT % 33.1*   PLATELETS Thousands/uL 189   SEGS PCT % 64   LYMPHO PCT % 25   MONO PCT % 8   EOS PCT % 3     Results from last 7 days   Lab Units 06/29/24 2031   SODIUM mmol/L 142   POTASSIUM mmol/L 4.9   CHLORIDE mmol/L 108   CO2 mmol/L 31   BUN mg/dL 38*   CREATININE mg/dL 1.20   ANION GAP mmol/L 3*   CALCIUM mg/dL 9.2   ALBUMIN g/dL 3.6   TOTAL BILIRUBIN mg/dL 0.38   ALK PHOS U/L 93   ALT U/L 8   AST U/L 16   GLUCOSE RANDOM mg/dL 97             Lab Results   Component Value Date    HGBA1C 4.7 01/15/2022    HGBA1C 5.2 03/10/2021    HGBA1C 5.8 06/27/2019           Lines/Drains:  Invasive Devices       Peripheral Intravenous Line  Duration             Peripheral IV 06/29/24 Left Antecubital <1 day                        Imaging: Reviewed radiology reports from this admission including: chest CT scan  CTA ED chest PE study   ED Interpretation by [place] PA-C (06/30 0128)   FINDINGS: Pulmonary arteries: Normal. No pulmonary emboli. Aorta: Unremarkable. No aortic aneurysm. No aortic dissection. Lungs: The lungs demonstrate patchy areas of groundglass opacities in bilateral upper lobes. This appearance is nonspecific in etiology (may represent areas of microatelectasis, mild/early pulmonary edema (including the possibility of noncardiogenic pulmonary edema or overhydration), versus early/developing infectious/inflammatory pneumonia/pneumonitis). Small pneumatocele noted on the left. Pleural spaces: Unremarkable. No pneumothorax. No pleural effusion. Heart: Moderate-sized pericardial effusion. Lymph nodes: Unremarkable. No  enlarged lymph nodes. Bones/joints: Unremarkable. No acute fracture. Soft tissues: Unremarkable. IMPRESSION: 1. Moderate-sized pericardial effusion. 2. Ground-glass lung opacities; please see DDx above    Dictated and Authenticated by: Juan Dale MD 06/30/2024 12:19 AM Eastern Time ([place] & [place]        CT head wo contrast    (Results Pending)   XR hand 3+ vw right    (Results Pending)     Cta PE study films on my personal review is concerning for developing infiltrates in b/l lungs vs mild congestion    EKG and Other Studies Reviewed on Admission:   EKG: EKG on my personal interpretation is normal sinus rhythm w/o ectopy or arrhythmia.    ** Please Note: This note has been constructed using a voice recognition system. **

## 2024-06-30 NOTE — ASSESSMENT & PLAN NOTE
Moderate on cta pe study performed for elevated ddimer  Heart tones are audible, no electrical alterans on EKG or s/sx to suggest tamponade  Check echo to formally assess degree of effusion

## 2024-06-30 NOTE — UTILIZATION REVIEW
Initial Clinical Review    Admission: Date/Time/Statement:   Admission Orders (From admission, onward)       Ordered        06/30/24 0126  Place in Observation  Once                          Orders Placed This Encounter   Procedures    Place in Observation     Standing Status:   Standing     Number of Occurrences:   1     Order Specific Question:   Level of Care     Answer:   Med Surg [16]     ED Arrival Information       Expected   -    Arrival   6/29/2024 18:46    Acuity   Less Urgent              Means of arrival   Ambulance    Escorted by   [facility] ([facility])    Service   Hospitalist    Admission type   Emergency              Arrival complaint   medical prob             Chief Complaint   Patient presents with    Hand Swelling     Patient in the ED via EMS from home for right hand swelling since yesterday.       Initial Presentation: 89 y.o. female  with a PMH of PMH of PAF, HTN, CKD3, severe dementia, WC bound presented to the ED from  home via EMS w/ R hand swelling x 2 days.  In the ED, pt noted w/ right eye ptosis , poor dentition, R facial droop. no TNK given d/t intermittent vaginal bleeding, last time of well is unknown.   On exam, Right hand is clenched w/difficulty opening w/PROM of phalanges.  Right hand is edematous and generates verbal agitation from patient.  ?slightly decreased  strength in RUE compared to LUE. Disoriented. R ptosis of R eye, R lower facial droop, Thought content and judgment impaired. Behavior is calm but intermittently agitated.   CTA PE study showed moderate pericardial effusion. D-dimer elevated.     Admit as observation level of care for facial asymmetry., edema of RUE, pericardial effusion.  Plan: CT. Mon on Tele. Stroke pathway. Check echo. Check venous duplex to r/o RUE DVT. Check xray hand to r/o fracture     06/30  Speech Therapy Notes: Recommending full liq diet; thin liq; take PO/Meds only when fully alert and upright.     ED Triage Vitals [06/29/24  1850]   Temperature Pulse Respirations Blood Pressure SpO2 Pain Score   98.6 °F (37 °C) 90 16 139/90 96 % No Pain     Weight (last 2 days)       Date/Time Weight    06/30/24 1200 49 (108)    06/30/24 0300 49.2 (108.47)    06/29/24 1850 51 (112.43)            Vital Signs (last 3 days)       Date/Time Temp Pulse Resp BP MAP (mmHg) SpO2 O2 Device Patient Position - Orthostatic VS Monica Coma Scale Score Pain    06/30/24 1200 -- 99 -- 163/69 -- -- -- -- -- --    06/30/24 1118 -- -- -- 163/69 105 97 % -- Sitting -- --    06/30/24 1100 -- -- -- -- -- -- -- -- 14 --    06/30/24 0900 97.8 °F (36.6 °C) 99 20 180/78 -- 97 % None (Room air) Lying -- --    06/30/24 0800 98.2 °F (36.8 °C) 89 20 180/76 109 95 % None (Room air) Lying -- --    06/30/24 0700 98.4 °F (36.9 °C) 72 20 189/79 113 98 % None (Room air) Lying 14 No Pain    06/30/24 0600 98.1 °F (36.7 °C) 79 22 179/75 -- 97 % None (Room air) Lying -- --    06/30/24 0503 -- -- -- 182/64 -- -- -- Lying -- --    06/30/24 0430 -- -- -- 208/85 -- -- -- -- -- --    06/30/24 0325 -- -- -- 208/62 -- -- -- Lying -- --    06/30/24 0315 -- -- -- -- -- -- -- -- 12 --    06/30/24 0310 -- -- -- 202/78 -- -- -- Lying -- --    06/30/24 0300 97.8 °F (36.6 °C) 68 18 206/86 124 97 % None (Room air) Lying -- --    06/30/24 0103 -- 60 16 118/55 -- 93 % None (Room air) Lying -- No Pain    06/29/24 2212 -- -- -- -- -- -- -- -- -- No Pain    06/29/24 2201 98 °F (36.7 °C) 59 16 125/59 85 96 % None (Room air) Lying -- No Pain    06/29/24 1949 -- 52 16 157/65 94 97 % None (Room air) Lying -- No Pain    06/29/24 1919 -- 60 -- 143/83 110 97 % -- -- -- --    06/29/24 1904 -- -- -- -- -- -- None (Room air) -- -- --    06/29/24 1901 -- -- -- -- -- -- None (Room air) Lying 14 No Pain    06/29/24 1850 98.6 °F (37 °C) 90 16 139/90 -- 96 % None (Room air) Lying -- No Pain              Pertinent Labs/Diagnostic Test Results:   Radiology:  CT head wo contrast   Final Interpretation by Pallav N Shah, MD (06/30  0711)      No acute intracranial abnormality.      Generalized atrophy and mild to moderate chronic microangiopathic changes.      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                  Workstation performed: [id number]         CTA ED chest PE study   ED Interpretation by Glen Real PA-C (06/30 0128)   FINDINGS: Pulmonary arteries: Normal. No pulmonary emboli. Aorta: Unremarkable. No aortic aneurysm. No aortic dissection. Lungs: The lungs demonstrate patchy areas of groundglass opacities in bilateral upper lobes. This appearance is nonspecific in etiology (may represent areas of microatelectasis, mild/early pulmonary edema (including the possibility of noncardiogenic pulmonary edema or overhydration), versus early/developing infectious/inflammatory pneumonia/pneumonitis). Small pneumatocele noted on the left. Pleural spaces: Unremarkable. No pneumothorax. No pleural effusion. Heart: Moderate-sized pericardial effusion. Lymph nodes: Unremarkable. No enlarged lymph nodes. Bones/joints: Unremarkable. No acute fracture. Soft tissues: Unremarkable. IMPRESSION: 1. Moderate-sized pericardial effusion. 2. Ground-glass lung opacities; please see DDx above    Dictated and Authenticated by: Juan Dale MD 06/30/2024 12:19 AM Eastern Time ([place] & [place]        Final Interpretation by Peyman Campos MD (06/30 0801)      Cardiomegaly with small pericardial effusion. See echocardiogram report when available.      No acute pulmonary findings.      The major findings are in agreement with the preliminary report provided by [facility] which was provided shortly after completion of the exam.         Workstation performed: [id number]         XR hand 3+ vw right    (Results Pending)   VAS carotid complete study    (Results Pending)   VAS upper limb venous duplex scan, unilateral/limited    (Results Pending)   MRI inpatient order    (Results Pending)     Cardiology:  Echo  complete w/ contrast if indicated   Final Result by Osito Maravilla MD (06/30 9740)   Technically difficult transthoracic echocardiogram study due to restricted    mobility of patient, poor echo penetration and compliance of patient.      Mild concentric left ventricular hypertrophy, normal left ventricular    systolic function.  Ejection fraction is estimated around 60% or greater.     Indeterminate diastolic dysfunction grade.   Normal right ventricular size and systolic function.   Normal left and right atrial cavity size.   Aortic valve sclerosis, no aortic valve stenosis or regurgitation.   Mitral valve annular calcification.  Mitral valve leaflet sclerosis, trace    mitral valve regurgitation.   Trace tricuspid valve regurgitation.   No obvious pulmonary hypertension.   Varying degrees of trace to small circumferential pericardial effusion    without echocardiographic evidence of cardiac tamponade.      Compared to report of previous echocardiogram from 4/19/2019 pericardial    effusion finding is new.  Previously noted mild to moderate aortic valve    regurgitation is not currently appreciable.         ECG 12 lead   Final Result by Yusef Brandt DO (06/30 0636)   Normal sinus rhythm with sinus arrhythmia   Left ventricular hypertrophy with QRS widening   T wave abnormality, consider inferolateral ischemia   Prolonged QT   Abnormal ECG   When compared with ECG of 01-FEB-2024 13:35,   Significant changes have occurred   Confirmed by Yusef Brandt ([id number]) on 6/30/2024 6:36:00 AM        GI:  No orders to display       Results from last 7 days   Lab Units 06/30/24  0147   SARS-COV-2  Negative     Results from last 7 days   Lab Units 06/30/24 0627 06/29/24 2031   WBC Thousand/uL  --  5.18   HEMOGLOBIN g/dL  --  10.6*   HEMATOCRIT %  --  33.1*   PLATELETS Thousands/uL 194 189   TOTAL NEUT ABS Thousands/µL  --  3.29         Results from last 7 days   Lab Units 06/29/24 2031   SODIUM mmol/L 142   POTASSIUM  mmol/L 4.9   CHLORIDE mmol/L 108   CO2 mmol/L 31   ANION GAP mmol/L 3*   BUN mg/dL 38*   CREATININE mg/dL 1.20   EGFR ml/min/1.73sq m 39   CALCIUM mg/dL 9.2     Results from last 7 days   Lab Units 06/29/24 2031   AST U/L 16   ALT U/L 8   ALK PHOS U/L 93   TOTAL PROTEIN g/dL 6.1*   ALBUMIN g/dL 3.6   TOTAL BILIRUBIN mg/dL 0.38         Results from last 7 days   Lab Units 06/29/24 2031   GLUCOSE RANDOM mg/dL 97         Results from last 7 days   Lab Units 06/30/24 0627   HEMOGLOBIN A1C % 5.0   EAG mg/dl 97         Results from last 7 days   Lab Units 06/29/24 2031   D-DIMER QUANTITATIVE ug/ml FEU 1.86*             Results from last 7 days   Lab Units 06/30/24  0627   PROCALCITONIN ng/ml 0.07                 Results from last 7 days   Lab Units 06/30/24  0147   BNP pg/mL 118*             Results from last 7 days   Lab Units 06/30/24  0147   INFLUENZA A PCR  Negative   INFLUENZA B PCR  Negative   RSV PCR  Negative           ED Treatment-Medication Administration from 06/29/2024 1846 to 06/30/2024 0300         Date/Time Order Dose Route Action     06/29/2024 2203 iohexol (OMNIPAQUE) 350 MG/ML injection (MULTI-DOSE) 70 mL 70 mL Intravenous Given            Past Medical History:   Diagnosis Date    Anxiety     Closed fracture of right orbital floor (HCC) 08/23/2020    Cognitive impairment     COVID-19 virus infection 04/13/2020    Dementia ([facility])     Depression     Hallucination     Hyperlipidemia     Hypertension     Memory loss     Psychiatric illness     Psychosis (HCC)     Retention of urine     Schizoaffective disorder (HCC)     Sleep difficulties     Urinary tract infection      Present on Admission:   Severe dementia with agitation (HCC)   PAF (paroxysmal atrial fibrillation) ([facility])   Oropharyngeal dysphagia   Stage 3b chronic kidney disease (HCC)   Primary hypertension      Admitting Diagnosis: Hand edema [R60.0]  Age/Sex: 89 y.o. female  Admission Orders:  SCD  PT/OT/ST  Baseline NIH stroke scale on admission,  reassess Q24H x 2 D, Nursing dysphagia screen prior to staring diet, neuro checks.   Tele    Scheduled Medications:  amiodarone, 200 mg, Oral, Daily  [START ON 7/1/2024] aspirin, 81 mg, Oral, Daily  aspirin, 300 mg, Rectal, Once  atorvastatin, 40 mg, Oral, QPM  diltiazem, 30 mg, Oral, TID  [START ON 7/1/2024] heparin (porcine), 5,000 Units, Subcutaneous, Q8H LYNDSEY  metoprolol tartrate, 25 mg, Oral, BID      Continuous IV Infusions: none     PRN Meds:       IP CONSULT TO CASE MANAGEMENT  IP CONSULT TO NUTRITION SERVICES    Network Utilization Review Department  ATTENTION: Please call with any questions or concerns to [phone] and carefully listen to the prompts so that you are directed to the right person. All voicemails are confidential.   For Discharge needs, contact Care Management DC Support Team at [phone] opt. [phone]  Send all requests for admission clinical reviews, approved or denied determinations and any other requests to dedicated fax number below belonging to the [facility] where the patient is receiving treatment. List of dedicated fax numbers for the Facilities:  FACILITY NAME UR FAX NUMBER   ADMISSION DENIALS (Administrative/Medical Necessity) [phone]   DISCHARGE SUPPORT TEAM (NETWORK) [phone]   PARENT CHILD HEALTH (Maternity/NICU/Pediatrics) [phone]   [facility] [phone]   [facility] [phone]   [facility] [phone]   [facility] [phone]   [facility] [phone]   [facility] [phone]   [facility] [phone]   [facility] [phone]   [facility] [phone]   Critical access hospital [phone]   [facility] [phone]   [facility]  [phone]

## 2024-06-30 NOTE — CASE MANAGEMENT
Case Management Assessment & Discharge Planning Note    Patient name Denia Castañeda  Location [facility] 4 /E4 -* MRN [id number]  : 1931 Date 2024       Current Admission Date: 2024  Current Admission Diagnosis:Facial asymmetry   Patient Active Problem List    Diagnosis Date Noted Date Diagnosed    Edema of right upper extremity 2024     Pericardial effusion 2024     Facial asymmetry 2024     Back pain 2023     At risk for elder abuse 2023     Urinary retention 2022     Edema of right ankle 10/04/2022     Flank pain 10/18/2021     Oropharyngeal dysphagia 2021     Normocytic anemia 2021     H/O fracture of right hip 07/15/2021     Stage 3b chronic kidney disease (HCC) 07/15/2021     Age-related osteoporosis without current pathological fracture 2020     Moderate protein-calorie malnutrition (HCC) 2020     Severe dementia with agitation ([facility]) 2020     Urinary incontinence 2020     Ambulatory dysfunction 04/10/2020     h/o OMEGA (acute kidney injury) ([facility]) 04/10/2020     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     PAF (paroxysmal atrial fibrillation) ([facility]) 2019     Bilateral hearing loss 10/11/2018     Personal history of fall 10/11/2018     Primary hypertension 2018     Hyperlipidemia 2018     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[facility]) 2018       LOS (days): 0  Geometric Mean LOS (GMLOS) (days):   Days to GMLOS:     OBJECTIVE:              Current admission status: Observation       Preferred Pharmacy:   [facility] [place] - [address]  [address] [address]  Phone: [phone] Fax: [phone]    [facility] [place] - [address]  [address]  [address] [facility] [address]  Phone: [phone] Fax: [phone]    [id number] - [place] - [address]  [address] [address]  Phone:  [phone] Fax: [phone]    Primary Care Provider: Jose M Castañeda MD    Primary Insurance: [employer] MEDICARE [facility]  Secondary Insurance: [facility]    ASSESSMENT:  Active Health Care Proxies       Logan Stoner [facility] Representative - Child   Primary Phone: [phone] (Home)                 Readmission Root Cause  30 Day Readmission: No    Patient Information  Admitted from:: Home  Mental Status: Alert, Confused  During Assessment patient was accompanied by: Son (Katie)  Assessment information provided by:: Son  Primary Caregiver: Private caregiver  Caregiver's Name:: [facility] [phone] Francesca (Manager/)  Caregiver's Relationship to Patient:: Other (Specify) (24/7 Waiver A Home Care Agency)  Support Systems: Son, Family members  County of Residence: [place]  [place] [facility] do you live in?: [place]  Home entry access options. Select all that apply.: No steps to enter home, Elevator  Type of Current Residence: Apartment  Floor Level: 1  Upon entering residence, is there a bedroom on the main floor (no further steps)?: Yes  Upon entering residence, is there a bathroom on the main floor (no further steps)?: Yes  Living Arrangements: Lives Alone, Other (Comment) (w/ 24/7 Waiver HHAs rotating shifts)  Is patient a ?: No    Activities of Daily Living Prior to Admission  Functional Status: Total dependent  Completes ADLs independently?: No  Level of ADL dependence: Total Dependent  Ambulates independently?: No  Level of ambulatory dependence: Total Dependent (Wheelchair/bedbound)  Does patient use assisted devices?: Yes  Assisted Devices (DME) used: Wheelchair, Hospital Bed  Does patient currently own DME?: Yes  What DME does the patient currently own?: Wheelchair, Hospital Bed  Does patient have a history of Outpatient Therapy (PT/OT)?: No  Does the patient have a history of Short-Term Rehab?: No  Does patient have a history of HHC?: No  Does  "patient currently have HHC?: No (Has 24/7 Waiver HHAs via \"[facility]\")    Patient Information Continued  Income Source: Government aid  Does patient have prescription coverage?: Yes  Does patient receive dialysis treatments?: No  Does patient have a history of substance abuse?: No  Does patient have a history of Mental Health Diagnosis?: No    Means of Transportation  [facility] (and [facility])      Social Determinants of Health (SDOH)      Flowsheet Row Most Recent Value   Housing Stability    In the last 12 months, was there a time when you were not able to pay the mortgage or rent on time? N   In the past 12 months, how many times have you moved where you were living? 0   At any time in the past 12 months, were you homeless or living in a shelter (including now)? N   Transportation Needs    In the past 12 months, has lack of transportation kept you from medical appointments or from getting medications? no   In the past 12 months, has lack of transportation kept you from meetings, work, or from getting things needed for daily living? No   Food Insecurity    Within the past 12 months, you worried that your food would run out before you got the money to buy more. Never true   Within the past 12 months, the food you bought just didn't last and you didn't have money to get more. Never true   Utilities    In the past 12 months has the electric, gas, oil, or water company threatened to shut off services in your home? No            DISCHARGE DETAILS:    Discharge planning discussed with:: Logan Stoner (Son) [phone]  Freedom of Choice: Yes  Comments - Freedom of Choice: Son prefers Pt discharge to home w/ 24/7 HHAs through Waiver  CM contacted family/caregiver?: Yes  Were Treatment Team discharge recommendations reviewed with patient/caregiver?: Yes  Did patient/caregiver verbalize understanding of patient care needs?: Yes  Were patient/caregiver advised of the risks " "associated with not following Treatment Team discharge recommendations?: Yes    Contacts  Patient Contacts: Logan Stoner (Shahriar) [phone]  Relationship to Patient:: Family  Contact Method: In Person, Phone  Phone Number: Logan Goldstein) [phone]  Reason/Outcome: Continuity of Care, Emergency Contact, Referral, Discharge Planning    Requested Home Health Care         Is the patient interested in HHC at discharge?: No  DME Referral Provided  Referral made for DME?: No    Treatment Team Recommendation: Home (w/ 24/7 HHAs)  Transport at Discharge : Stretcher roz    Additional Comments: CM received return call from shahriar Ford, followed by in person visit when Logan and his Dtr (Pt's [facility]) came to Hospital. Assessment completed. [facility] agency updated. Pt continues to receive 24/7 care, but now through \"[facility]\" -main contact(/Manager) Francesca [phone]. CM to contact preferably day before discharge. Son restated his preference for diacharge to home as [facility] Son is requesting Pt transport home by stretcher as she cannot walk. CM noted. No other needs at this time. CM to follow Pt through discharge to home.    "

## 2024-06-30 NOTE — PHYSICAL THERAPY NOTE
PHYSICAL THERAPY NOTE          Patient Name: Denia Castañeda  Today's Date: 6/30/2024 06/30/24 1450   PT Last Visit   PT Visit Date 06/30/24   Note Type   Note type Screen   Additional Comments PT Eval received and chart reviewed. Patient reviewed with trina and patient demonstrates she is at her baseline status. Per chart patient has been assist of 2 for mobility since 2021 and continues to have 24 care at home as  verified by the chart. Will d/c PT services at this time. Thank you for this referral.     Ning Garcia PT, DPT, GCS

## 2024-06-30 NOTE — ED NOTES
SBAR sent to Charge Nurse, patient is off to the Unit, resp even and unlabored with no S$S of distress.        Martha Granado RN  06/30/24 5715

## 2024-06-30 NOTE — SPEECH THERAPY NOTE
Speech Language/Pathology  Speech/Language Pathology  Assessment    Patient Name: Denia Castañeda  Today's Date: 6/30/2024     Problem List  Principal Problem:    Facial asymmetry  Active Problems:    Primary hypertension    PAF (paroxysmal atrial fibrillation) ([facility])    Severe dementia with agitation (HCC)    Oropharyngeal dysphagia    Stage 3b chronic kidney disease (HCC)    Edema of right upper extremity    Pericardial effusion    Past Medical History  Past Medical History:   Diagnosis Date    Anxiety     Closed fracture of right orbital floor ([facility]) 08/23/2020    Cognitive impairment     COVID-19 virus infection 04/13/2020    Dementia (HCC)     Depression     Hallucination     Hyperlipidemia     Hypertension     Memory loss     Psychiatric illness     Psychosis (HCC)     Retention of urine     Schizoaffective disorder (HCC)     Sleep difficulties     Urinary tract infection      Past Surgical History  Past Surgical History:   Procedure Laterality Date    APPENDECTOMY      BLADDER SURGERY      CHOLECYSTECTOMY      AK OPTX FEM SHFT FX W/INSJ IMED IMPLT W/WO SCREW Right 7/15/2021    Procedure: INSERTION NAIL IM FEMUR ANTEGRADE (TROCHANTERIC) right;  Surgeon: Getachew Aguilar MD;  Location: [facility] OR;  Service: Orthopedics    TOTAL ABDOMINAL HYSTERECTOMY W/ BILATERAL SALPINGOOPHORECTOMY      TOTAL KNEE ARTHROPLASTY Left     Bedside Swallow Evaluation:    Summary:  Pt presented w/ mild oral dysphagia and WNL pharyngeal stage of swallow. Fully alert and positioned upright. 1:1 in place. Pt is limited by mental status intermittently calling out and restless in bed. Unable to follow commands. Pt did accept ice chips, thin liquids via straw with single/successive sips, and approx 1/2 tsp of pudding. Bolus control, formation, and transfer were WNL. Swallows appeared prompt. No overt s/s of aspiration. Pt decreased oral reception of trials via tsp as would push hand away of both ST and nurse, stated no, and increased  agitation. Unable to redirect for additional trials.     Recommendations:  Diet: Full liquids  Liquid:Thin  Meds:Crushed with puree  Supervision:Full Assist  Positioning:Upright  Strategies: slow rate, small sips  Pt to take PO/Meds only when fully alert and upright.   Oral care:  Aspiration precautions  Reflux precautions  Therapy Prognosis: guarded   Prognosis considerations:age, medical status, cognitive status  Frequency: 1-3 times weekly as indicated.       Consider consult w/:  Rehab  Nutrition  POC/GOC hx of dementia. Limited by mental status. Suspect due to behavior reduced oral intake    Goal(s):  Dysphagia LTG  -Patient will demonstrate safe and effective oral intake (without overt s/s significant oral/pharyngeal dysphagia including s/s penetration or aspiration) for the highest appropriate diet level.     1.Pt will tolerate least restrictive diet w/out s/s aspiration or oral/pharyngeal difficulties.   2.Pt will will effectively manipulate/masticate and transfer purees w/out s/s dysphagia/aspiration.   3.Pt will tolerate thin liquids w/out s/s aspiration.   -If indicated, patient will comply with a Video/Modified Barium Swallow study for more complete assessment of swallowing anatomy/physiology/aspiration risk and to assess efficacy of treatment techniques so as to best guide treatment plan .     H&P/Admit info/ pertinent provider notes: (PMH noted above)  Denia Castañeda is a 89 y.o. female with a PMH of PMH of paroxysmal A-fib not on anticoagulation, hypertension severe dementia with history of agitation, nearly nonverbal status, mainly wheelchair-bound status who presents with right hand swelling.  Patient is Afghan-speaking only.  HPI constructed by d/w ED provider and review of prehospital ER at length.  Attempted to call son by telephone to clarify HPI but no response and voicemail was left.  Patient herself does not respond to any orientation questions or questions.  She is alert however.  Per ED  provider pt had questionable right eye droop/ptosis and ?right facial droop upon arrival to ED although not noted in any recent office visits despite hx of orbit rim fx history and poor dentition.       She was sent in by son by EMS for right hand swelling x 2d.  No known trauma although right hand is clenched.  Pt grimaces on exam of right hand and becomes agitated.        Special Studies:  CT BRAIN - WITHOUT CONTRAST 06/30/2024  IMPRESSION:  No acute intracranial abnormality.  Generalized atrophy and mild to moderate chronic microangiopathic changes.  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  CTA - CHEST WITH IV CONTRAST - PULMONARY ANGIOGRAM 06/29/2024  IMPRESSION:  Cardiomegaly with small pericardial effusion. See echocardiogram report when available.  No acute pulmonary findings.  The major findings are in agreement with the preliminary report provided by [facility] which was provided shortly after completion of the exam.      Procalcitonin: 0.07                         06/30/2024   WBC: 5.18                     06/29/2024     Code Status Level 1 full code    Previous MBS: none     Patient's goal:none stated    Did the pt report pain? none  If yes, was nursing notified/was it addressed?n/a    Reason for consult:  R/o aspiration  Determine safest and least restrictive diet  Failed nursing dysphagia assessment  Change in mental status  Stroke protocol    Precautions:  Fall    Food Allergies: No known    Current Diet: NPO    Premorbid diet: Regular and thin liquids    O2 requirement: Room air    Social/Prior living Home with health services    Voice/Speech: WNL, [place] speaking    Follows commands: Unable    Cognitive status: Hx of dementia     Oral [facility] exam:  Dentition:Missing most   Lips (VII):ight facial droop   Tongue (XII):midline   Mandible (V):adequate ROM  Face/oral sensation (V):right facial droop   Velum (X):WNL      Items administered:  Puree, soft solid, hard  solid, honey thick liquid, nectar thick liquid, thin liquids, meds whole/crushed in applesauce/thin. Liquids were taken by straw/cup/tsp     Oral stage:  Lip closure:WNL  Mastication:DNT  Bolus formation:WNL  Bolus control:WNL  Transfer:WNL      Pharyngeal stage:  Swallow promptness:prompt  Laryngeal rise: adequate  No overt s/s aspiration    Esophageal stage:  No s/s reported  H/o GERD    Aspiration precautions posted    Results d/w:  Pt, nursing,, physician,

## 2024-06-30 NOTE — ASSESSMENT & PLAN NOTE
Unclear etiology. Initial complaint for presenting to ED.  Pt has severe dementia and is sometimes nonverbal.  Question if this is positional due to being frequently on right side.  Check venous duplex to r/o RUE DVT. Check xray hand to r/o fracture

## 2024-07-01 ENCOUNTER — APPOINTMENT (INPATIENT)
Dept: NON INVASIVE DIAGNOSTICS | Facility: HOSPITAL | Age: 89
DRG: 065 | End: 2024-07-01
Payer: MEDICARE

## 2024-07-01 ENCOUNTER — APPOINTMENT (INPATIENT)
Dept: CT IMAGING | Facility: HOSPITAL | Age: 89
DRG: 065 | End: 2024-07-01
Payer: MEDICARE

## 2024-07-01 PROCEDURE — 93882 EXTRACRANIAL UNI/LTD STUDY: CPT | Performed by: INTERNAL MEDICINE

## 2024-07-01 PROCEDURE — 93880 EXTRACRANIAL BILAT STUDY: CPT

## 2024-07-01 PROCEDURE — 99232 SBSQ HOSP IP/OBS MODERATE 35: CPT | Performed by: STUDENT IN AN ORGANIZED HEALTH CARE EDUCATION/TRAINING PROGRAM

## 2024-07-01 PROCEDURE — 70450 CT HEAD/BRAIN W/O DYE: CPT

## 2024-07-01 RX ORDER — HYDRALAZINE HYDROCHLORIDE 20 MG/ML
5 INJECTION INTRAMUSCULAR; INTRAVENOUS EVERY 6 HOURS PRN
Status: DISCONTINUED | OUTPATIENT
Start: 2024-07-01 | End: 2024-07-03 | Stop reason: HOSPADM

## 2024-07-01 RX ADMIN — HEPARIN SODIUM 5000 UNITS: 5000 INJECTION INTRAVENOUS; SUBCUTANEOUS at 13:25

## 2024-07-01 RX ADMIN — HEPARIN SODIUM 5000 UNITS: 5000 INJECTION INTRAVENOUS; SUBCUTANEOUS at 06:22

## 2024-07-01 RX ADMIN — HEPARIN SODIUM 5000 UNITS: 5000 INJECTION INTRAVENOUS; SUBCUTANEOUS at 22:59

## 2024-07-01 RX ADMIN — HYDRALAZINE HYDROCHLORIDE 5 MG: 20 INJECTION, SOLUTION INTRAMUSCULAR; INTRAVENOUS at 17:20

## 2024-07-01 NOTE — ASSESSMENT & PLAN NOTE
Nearly nonverbal per op documentation/nonsensical   Virtual observation  Regular diet with full liquids

## 2024-07-01 NOTE — PROGRESS NOTES
"[facility]  Progress Note  Name: Denia Castañeda I  MRN: [id number]  Unit/Bed#: E4 -01 I Date of Admission: 6/29/2024   Date of Service: 7/1/2024 I Hospital Day: 1    Assessment & Plan   Pericardial effusion  Assessment & Plan  Moderate on cta pe study performed for elevated ddimer  Heart tones are audible, no electrical alterans on EKG or s/sx to suggest tamponade  TTE showing trace to small circumferential pericardial effusion without echocardiographic evidence of cardiac tamponade.     Edema of right upper extremity  Assessment & Plan  Unclear etiology. Initial complaint for presenting to ED.  Pt has severe dementia and is sometimes nonverbal.  Question if this is positional due to being frequently on right side.  Check venous duplex to r/o RUE DVT.   XR RUE wnl      Stage 3b chronic kidney disease (HCC)  Assessment & Plan  Lab Results   Component Value Date    EGFR 39 06/29/2024    EGFR 36 02/01/2024    EGFR 35 05/14/2023    CREATININE 1.20 06/29/2024    CREATININE 1.28 02/01/2024    CREATININE 1.30 05/14/2023   Creatinine near baseline    Oropharyngeal dysphagia  Assessment & Plan  History of this  Speech evaluated patient and cleared patient for full liquids when awake/alert enough      Severe dementia with agitation ([facility])  Assessment & Plan  Nearly nonverbal per op documentation/nonsensical   Virtual observation  Regular diet with full liquids    PAF (paroxysmal atrial fibrillation) ([facility])  Assessment & Plan  Not on AC due to fall risk  Continue lopressor/cardizem/amiodarone    Primary hypertension  Assessment & Plan  Resumed PTA BP medications in absence of acute stroke      * Facial asymmetry  Assessment & Plan  Chronic per family  Mental status at baseline  CTH performed showing  \"Stable subacute to chronic right occipital lobe infarct. Stable chronic left cerebellar lacunar infarct. Chronic microangiopathic changes.\"  Neurology consulted on possibility of subacute " stroke         VTE Pharmacologic Prophylaxis:   Moderate Risk (Score 3-4) - Pharmacological DVT Prophylaxis Ordered: heparin.    Mobility:   Basic Mobility Inpatient Raw Score: 6  JH-HLM Goal: 2: Bed activities/Dependent transfer  JH-HLM Achieved: 1: Laying in bed  JH-HLM Goal NOT achieved. Continue with multidisciplinary rounding and encourage appropriate mobility to improve upon JH-HLM goals.    Patient Centered Rounds: I performed bedside rounds with nursing staff today.   Discussions with Specialists or Other Care Team Provider: case management and nursing    Education and Discussions with Family / Patient:  spoke to son over the phone and caregiver at bedside.     Total Time Spent on Date of Encounter in care of patient: 30 mins. This time was spent on one or more of the following: performing physical exam; counseling and coordination of care; obtaining or reviewing history; documenting in the medical record; reviewing/ordering tests, medications or procedures; communicating with other healthcare professionals and discussing with patient's family/caregivers.    Current Length of Stay: 1 day(s)  Current Patient Status: Inpatient   Certification Statement: The patient will continue to require additional inpatient hospital stay due to ongoing work-up  Discharge Plan: Anticipate discharge in 24-48 hrs to home with home services.    Code Status: Level 1 - Full Code    Subjective:   Patient seen and examined at bedside. Somnolent during time of exam however as per caregiver and son, this is patient's baseline. She would have extended periods of alertness alternating with extended periods of drowsiness. Son states that right upper extremity swelling prompted them to bring patient to ed however at the time of encounter, swelling already gone.     Objective:     Vitals:   Temp (24hrs), Av.6 °F (37 °C), Min:97.9 °F (36.6 °C), Max:99.1 °F (37.3 °C)    Temp:  [97.9 °F (36.6 °C)-99.1 °F (37.3 °C)] 99.1 °F (37.3 °C)  HR:   [74-79] 75  Resp:  [16-18] 18  BP: (111-172)/() 172/104  SpO2:  [92 %-97 %] 92 %  Body mass index is 22.57 kg/m².     Input and Output Summary (last 24 hours):     Intake/Output Summary (Last 24 hours) at 7/1/2024 1646  Last data filed at 6/30/2024 1900  Gross per 24 hour   Intake 240 ml   Output --   Net 240 ml       Physical Exam:   Physical Exam  Vitals and nursing note reviewed.   Constitutional:       General: She is not in acute distress.     Appearance: She is well-developed.      Comments: Somnolent and not verbally arousable   HENT:      Head: Normocephalic and atraumatic.   Eyes:      Conjunctiva/sclera: Conjunctivae normal.   Cardiovascular:      Rate and Rhythm: Normal rate and regular rhythm.      Heart sounds: No murmur heard.  Pulmonary:      Effort: Pulmonary effort is normal. No respiratory distress.      Breath sounds: Normal breath sounds.   Abdominal:      Palpations: Abdomen is soft.      Tenderness: There is no abdominal tenderness.   Musculoskeletal:         General: No swelling or tenderness.      Cervical back: Neck supple.      Right lower leg: No edema.      Left lower leg: No edema.   Skin:     General: Skin is warm and dry.      Capillary Refill: Capillary refill takes less than 2 seconds.   Neurological:      Mental Status: Mental status is at baseline. She is disoriented.   Psychiatric:         Mood and Affect: Mood normal.            Additional Data:     Labs:  Results from last 7 days   Lab Units 06/30/24 0627 06/29/24 2031   WBC Thousand/uL  --  5.18   HEMOGLOBIN g/dL  --  10.6*   HEMATOCRIT %  --  33.1*   PLATELETS Thousands/uL 194 189   SEGS PCT %  --  64   LYMPHO PCT %  --  25   MONO PCT %  --  8   EOS PCT %  --  3     Results from last 7 days   Lab Units 06/29/24 2031   SODIUM mmol/L 142   POTASSIUM mmol/L 4.9   CHLORIDE mmol/L 108   CO2 mmol/L 31   BUN mg/dL 38*   CREATININE mg/dL 1.20   ANION GAP mmol/L 3*   CALCIUM mg/dL 9.2   ALBUMIN g/dL 3.6   TOTAL BILIRUBIN  mg/dL 0.38   ALK PHOS U/L 93   ALT U/L 8   AST U/L 16   GLUCOSE RANDOM mg/dL 97             Results from last 7 days   Lab Units 06/30/24  0627   HEMOGLOBIN A1C % 5.0     Results from last 7 days   Lab Units 06/30/24  0627   PROCALCITONIN ng/ml 0.07       Lines/Drains:  Invasive Devices       Peripheral Intravenous Line  Duration             Peripheral IV 06/29/24 Left Antecubital 1 day                          Imaging: Reviewed radiology reports from this admission including: chest CT scan and CT head    Recent Cultures (last 7 days):         Last 24 Hours Medication List:   Current Facility-Administered Medications   Medication Dose Route Frequency Provider Last Rate    amiodarone  200 mg Oral Daily Kathy Vang PA-C      aspirin  81 mg Oral Daily Kathy Vang PA-C      aspirin  300 mg Rectal Once Kathy Vang PA-C      atorvastatin  40 mg Oral QPM Kathy Vang PA-C      diltiazem  30 mg Oral TID Kathy Vang PA-C      heparin (porcine)  5,000 Units Subcutaneous Q8H [facility] Kathy Vang PA-C      hydrALAZINE  5 mg Intravenous Q6H PRN Ernestine Toney MD      metoprolol tartrate  25 mg Oral BID Kathy Vang PA-C          Today, Patient Was Seen By: Ernestine Moreira MD    **Please Note: This note may have been constructed using a voice recognition system.**

## 2024-07-01 NOTE — ASSESSMENT & PLAN NOTE
Unclear etiology. Initial complaint for presenting to ED.  Pt has severe dementia and is sometimes nonverbal.  Question if this is positional due to being frequently on right side.  Check venous duplex to r/o RUE DVT.   XR RUE wnl

## 2024-07-01 NOTE — ASSESSMENT & PLAN NOTE
Moderate on cta pe study performed for elevated ddimer  Heart tones are audible, no electrical alterans on EKG or s/sx to suggest tamponade  TTE showing trace to small circumferential pericardial effusion without echocardiographic evidence of cardiac tamponade.

## 2024-07-01 NOTE — CASE MANAGEMENT
Case Management Discharge Planning Note    Patient name Denia Castañeda  Location East 4 /E4 -* MRN [id number]  : 1931 Date 2024       Current Admission Date: 2024  Current Admission Diagnosis:Facial asymmetry   Patient Active Problem List    Diagnosis Date Noted Date Diagnosed    Edema of right upper extremity 2024     Pericardial effusion 2024     Facial asymmetry 2024     Back pain 2023     At risk for elder abuse 2023     Urinary retention 2022     Edema of right ankle 10/04/2022     Flank pain 10/18/2021     Oropharyngeal dysphagia 2021     Normocytic anemia 2021     H/O fracture of right hip 07/15/2021     Stage 3b chronic kidney disease (HCC) 07/15/2021     Age-related osteoporosis without current pathological fracture 2020     Moderate protein-calorie malnutrition (HCC) 2020     Severe dementia with agitation ([facility]) 2020     Urinary incontinence 2020     Ambulatory dysfunction 04/10/2020     h/o OMEGA (acute kidney injury) ([facility]) 04/10/2020     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  [address] [facility] [address]  Phone: [phone] Fax: [phone]    [facility] - [place] - [address],  [address],  First [address] [facility] [address]  Phone: [phone] Fax: [phone]    [id number] - [place] - [address]  [address]  [place] [address]  Phone: [phone] Fax: [phone]    Primary Care Provider: Jose M Castañeda MD    Primary Insurance: [employer]ICARE [facility]  Secondary Insurance: [facility]    DISCHARGE DETAILS:    Discharge planning discussed with:: Logan Bess  Freedom of Choice: Yes     CM contacted family/caregiver?: Yes  Were Treatment Team discharge recommendations reviewed with patient/caregiver?: Yes  Did patient/caregiver verbalize understanding of patient care needs?: Yes  Were patient/caregiver advised of the risks associated with not following Treatment Team discharge recommendations?: Yes    Contacts  Patient Contacts: Logan Stoner (Shahriar) [phone]  Relationship to Patient:: Family  Contact Method: In Person, Phone  Phone Number: Logan Goldstein) [phone]  Reason/Outcome: Continuity of Care, Emergency Contact, Referral, Discharge Planning    Requested Home Health Care         Is the patient interested in HHC at discharge?: No    DME Referral Provided  Referral made for DME?: No    Other Referral/Resources/Interventions Provided:  Interventions: Transportation  Referral Comments: [facility] to resume HH aides upon DC    Would you like to participate in our [facility] service program?  : No - Declined    Treatment Team Recommendation: Home (with 24/7 HH aides)  Discharge Destination Plan:: Home (with 24-7 HH aides)  Transport at Discharge : [facility]  Dispatcher Contacted: Yes  Number/Name of Dispatcher: Contacted via [employer]  Transported by (Company and Unit #): [facility]  ETA of Transport (Date): 07/02/24  ETA of Transport (Time): 1200              IMM Given (Date):: 07/01/24  IMM Given to:: Family  Family notified:: Reviewed with patient's son via PC     IMM reviewed with patient's caregiver, patient's caregiver agrees with discharge determination.    CM placed [facility] to [facility] [phone], patient has 24/7 caregivers.  They  can resume care tomorrow 7.2.24 at 12:00pm.      CM spoke with patient's son via PC, he reports that he will be at patient's home tomorrow 7.2.24 at noon to let caregivers into the apartment.  Patient will need transportation home, transport company can call son to get access to the building.     CM arranged BLS transport via [employer]. [facility] claimed the ride for 7.2.24 at 12:00pm.  CM placed medical necessity form in patient's chart.  CM will continue to follow for DC coordination.

## 2024-07-01 NOTE — ASSESSMENT & PLAN NOTE
Lab Results   Component Value Date    EGFR 39 06/29/2024    EGFR 36 02/01/2024    EGFR 35 05/14/2023    CREATININE 1.20 06/29/2024    CREATININE 1.28 02/01/2024    CREATININE 1.30 05/14/2023   Creatinine near baseline

## 2024-07-01 NOTE — OCCUPATIONAL THERAPY NOTE
Occupational Therapy Screen:    Patient Name: Denia Castañeda  Today's Date: 7/1/2024    OT consult received. Chart reviewed. Per CM note: Pt requires total A for all ADLs and is wheelchair/bedbound at baseline. Pt has 24/7 caregivers. Spoke to RN. Pt mostly non-verbal and unable to follow commands to participate in therapy evaluations. Pt w/ plans to D/C home tomorrow, 7/2/24 with 24/7 care. Pt appears to be functioning near baseline level of performance. No acute skilled OT needs identified a this time. Will D/C OT and be available for re-consult as needed.    Kennedi Dodd, OTR/L

## 2024-07-01 NOTE — PLAN OF CARE
Problem: Potential for Falls  Goal: Patient will remain free of falls  Description: INTERVENTIONS:  - Educate patient/family on patient safety including physical limitations  - Instruct patient to call for assistance with activity   - Consult OT/PT to assist with strengthening/mobility   - Keep Call bell within reach  - Keep bed low and locked with side rails adjusted as appropriate  - Keep care items and personal belongings within reach  - Initiate and maintain comfort rounds  - Make Fall Risk Sign visible to staff  - Offer Toileting every 2 Hours, in advance of need  - Initiate/Maintain bed alarm  - Obtain necessary fall risk management equipment: alarm  - Apply yellow socks and bracelet for high fall risk patients  - Consider moving patient to room near nurses station  Outcome: Progressing     Problem: PAIN - ADULT  Goal: Verbalizes/displays adequate comfort level or baseline comfort level  Description: Interventions:  - Encourage patient to monitor pain and request assistance  - Assess pain using appropriate pain scale  - Administer analgesics based on type and severity of pain and evaluate response  - Implement non-pharmacological measures as appropriate and evaluate response  - Consider cultural and social influences on pain and pain management  - Notify physician/advanced practitioner if interventions unsuccessful or patient reports new pain  Outcome: Progressing     Problem: DISCHARGE PLANNING  Goal: Discharge to home or other facility with appropriate resources  Description: INTERVENTIONS:  - Identify barriers to discharge w/patient and caregiver  - Arrange for needed discharge resources and transportation as appropriate  - Identify discharge learning needs (meds, wound care, etc.)  - Arrange for interpretive services to assist at discharge as needed  - Refer to Case Management Department for coordinating discharge planning if the patient needs post-hospital services based on physician/advanced  practitioner order or complex needs related to functional status, cognitive ability, or social support system  Outcome: Progressing     Problem: Knowledge Deficit  Goal: Patient/family/caregiver demonstrates understanding of disease process, treatment plan, medications, and discharge instructions  Description: Complete learning assessment and assess knowledge base.  Interventions:  - Provide teaching at level of understanding  - Provide teaching via preferred learning methods  Outcome: Progressing     Problem: Neurological Deficit  Goal: Neurological status is stable or improving  Description: Interventions:  - Monitor and assess patient's level of consciousness, motor function, sensory function, and level of assistance needed for ADLs.   - Monitor and report changes from baseline. Collaborate with interdisciplinary team to initiate plan and implement interventions as ordered.   - Provide and maintain a safe environment.  - Consider seizure precautions.  - Consider fall precautions.  - Consider aspiration precautions.  - Consider bleeding precautions.  Outcome: Progressing     Problem: Activity Intolerance/Impaired Mobility  Goal: Mobility/activity is maintained at optimum level for patient  Description: Interventions:  - Assess and monitor patient  barriers to mobility and need for assistive/adaptive devices.  - Assess patient's emotional response to limitations.  - Collaborate with interdisciplinary team and initiate plans and interventions as ordered.  - Encourage independent activity per ability.  - Maintain proper body alignment.  - Perform active/passive rom as tolerated/ordered.  - Plan activities to conserve energy.  - Turn patient as appropriate  Outcome: Progressing     Problem: Communication Impairment  Goal: Ability to express needs and understand communication  Description: Assess patient's communication skills and ability to understand information.  Patient will demonstrate use of effective  communication techniques, alternative methods of communication and understanding even if not able to speak.     - Encourage communication and provide alternate methods of communication as needed.  - Collaborate with case management/ for discharge needs.  - Include patient/family/caregiver in decisions related to communication.  Outcome: Progressing     Problem: Potential for Aspiration  Goal: Non-ventilated patient's risk of aspiration is minimized  Description: Assess and monitor vital signs, respiratory status, and labs (WBC).  Monitor for signs of aspiration (tachypnea, cough, rales, wheezing, cyanosis, fever).    - Assess and monitor patient's ability to swallow.  - Place patient up in chair to eat if possible.  - HOB up at 90 degrees to eat if unable to get patient up into chair.  - Supervise patient during oral intake.   - Instruct patient/ family to take small bites.  - Instruct patient/ family to take small single sips when taking liquids.  - Follow patient-specific strategies generated by speech pathologist.  Outcome: Progressing  Goal: Ventilated patient's risk of aspiration is minimized  Description: Assess and monitor vital signs, respiratory status, airway cuff pressure, and labs (WBC).  Monitor for signs of aspiration (tachypnea, cough, rales, wheezing, cyanosis, fever).    - Elevate head of bed 30 degrees if patient has tube feeding.  - Monitor tube feeding.  Outcome: Progressing     Problem: Nutrition  Goal: Nutrition/Hydration status is improving  Description: Monitor and assess patient's nutrition/hydration status for malnutrition (ex- brittle hair, bruises, dry skin, pale skin and conjunctiva, muscle wasting, smooth red tongue, and disorientation). Collaborate with interdisciplinary team and initiate plan and interventions as ordered.  Monitor patient's weight and dietary intake as ordered or per policy. Utilize nutrition screening tool and intervene per policy. Determine patient's  food preferences and provide high-protein, high-caloric foods as appropriate.     - Assist patient with eating.  - Allow adequate time for meals.  - Encourage patient to take dietary supplement as ordered.  - Collaborate with clinical nutritionist.  - Include patient/family/caregiver in decisions related to nutrition.  Outcome: Progressing     Problem: CARDIOVASCULAR - ADULT  Goal: Maintains optimal cardiac output and hemodynamic stability  Description: INTERVENTIONS:  - Monitor I/O, vital signs and rhythm  - Monitor for S/S and trends of decreased cardiac output  - Administer and titrate ordered vasoactive medications to optimize hemodynamic stability  - Assess quality of pulses, skin color and temperature  - Assess for signs of decreased coronary artery perfusion  - Instruct patient to report change in severity of symptoms  Outcome: Progressing  Goal: Absence of cardiac dysrhythmias or at baseline rhythm  Description: INTERVENTIONS:  - Continuous cardiac monitoring, vital signs, obtain 12 lead EKG if ordered  - Administer antiarrhythmic and heart rate control medications as ordered  - Monitor electrolytes and administer replacement therapy as ordered  Outcome: Progressing     Problem: RESPIRATORY - ADULT  Goal: Achieves optimal ventilation and oxygenation  Description: INTERVENTIONS:  - Assess for changes in respiratory status  - Assess for changes in mentation and behavior  - Position to facilitate oxygenation and minimize respiratory effort  - Oxygen administered by appropriate delivery if ordered  - Initiate smoking cessation education as indicated  - Encourage broncho-pulmonary hygiene including cough, deep breathe, Incentive Spirometry  - Assess the need for suctioning and aspirate as needed  - Assess and instruct to report SOB or any respiratory difficulty  - Respiratory Therapy support as indicated  Outcome: Progressing     Problem: SKIN/TISSUE INTEGRITY - ADULT  Goal: Skin Integrity remains intact(Skin  Breakdown Prevention)  Description: Assess:  -Perform Liu assessment every 2  -Clean and moisturize skin every shift  -Inspect skin when repositioning, toileting, and assisting with ADLS  -    Problem: Prexisting or High Potential for Compromised Skin Integrity  Goal: Skin integrity is maintained or improved  Description: INTERVENTIONS:  - Identify patients at risk for skin breakdown  - Assess and monitor skin integrity  - Assess and monitor nutrition and hydration status  - Monitor labs   - Assess for incontinence   - Turn and reposition patient  - Assist with mobility/ambulation  - Relieve pressure over bony prominences  - Avoid friction and shearing  - Provide appropriate hygiene as needed including keeping skin clean and dry  - Evaluate need for skin moisturizer/barrier cream  - Collaborate with interdisciplinary team   - Patient/family teaching  - Consider wound care consult   Outcome: Progressing

## 2024-07-01 NOTE — SPEECH THERAPY NOTE
Speech Language/Pathology  Pt back from ,. Unable to alert enough to take any PO. 1:1 reported same earlier. Will f/u as able.

## 2024-07-01 NOTE — UTILIZATION REVIEW
Initial Clinical Review - Continued    SEE INITIAL REVIEW AT BOTTOM    Date: 6/30, 7/1                          Current Patient Class: Inpatient  Current Level of Care: Med/Surg    Admission Orders (From admission, onward)       Ordered        06/30/24 1651  INPATIENT ADMISSION  Once            06/30/24 0126  Place in Observation  Once                          Orders Placed This Encounter   Procedures    INPATIENT ADMISSION     Standing Status:   Standing     Number of Occurrences:   1     Order Specific Question:   Level of Care     Answer:   Med Surg [16]     Order Specific Question:   Bed Type     Answer:   [place] [4]     Order Specific Question:   Estimated length of stay     Answer:   More than 2 Midnights     Order Specific Question:   Certification     Answer:   I certify that inpatient services are medically necessary for this patient for a duration of greater than two midnights. See H&P and MD Progress Notes for additional information about the patient's course of treatment.         HPI:89 y.o. female initially admitted on 6/29 as OBS, changed to IP on 6/30 for ongoing evaluation of facial asymmetry.     Assessment/Plan:   6/30: Pt w/ swelling and spasm of R side. Pt is confused at baseline. Check XR R hand s/t swelling, R facial droop s/t prior orbital rim fx. Repeat CTH in 24 ours. Continual observation for safety s/t pt impulsivity and high fall risk. Echo. Trend labs, replete electrolytes as needed.      Date: 07/01/24  Day 3: Has surpassed a 2nd midnight with active treatments and services. Somnolent on exam, but family reports this is pt's baseline - periods of alertness alternating w/ drowsiness. On exam, unable to be aroused by voice, disoriented. Echo showed pericardial effusion. Plan: continual observation, continue current meds, supportive care, Trend labs, replete electrolytes as needed.      Vital Signs:   Vital Signs (last 3 days)       Date/Time Temp Pulse Resp BP MAP (mmHg) SpO2 O2 Device  Patient Position - Orthostatic VS Monica Coma Scale Score Pain    07/01/24 0813 99 °F (37.2 °C) 74 17 131/58 83 93 % None (Room air) Lying -- --    07/01/24 0241 99 °F (37.2 °C) 78 16 156/69 108 94 % -- Lying -- --    06/30/24 2329 97.9 °F (36.6 °C) 79 18 157/75 102 93 % -- Lying -- --    06/30/24 2138 -- -- -- 167/71 -- -- -- -- -- --    06/30/24 2000 -- -- -- -- -- -- -- -- 14 No Pain    06/30/24 1945 98 °F (36.7 °C) 77 18 111/59 65 97 % -- Lying -- --    06/30/24 1300 98 °F (36.7 °C) 89 20 168/70 -- 98 % -- Sitting -- --    06/30/24 1200 -- 99 -- 163/69 -- -- -- -- -- --    06/30/24 1118 -- -- -- 163/69 105 97 % -- Sitting -- --    06/30/24 1100 -- -- -- -- -- -- -- -- 14 --    06/30/24 0900 97.8 °F (36.6 °C) 99 20 180/78 -- 97 % None (Room air) Lying -- --    06/30/24 0800 98.2 °F (36.8 °C) 89 20 180/76 109 95 % None (Room air) Lying -- --    06/30/24 0700 98.4 °F (36.9 °C) 72 20 189/79 113 98 % None (Room air) Lying 14 No Pain    06/30/24 0600 98.1 °F (36.7 °C) 79 22 179/75 -- 97 % None (Room air) Lying -- --    06/30/24 0503 -- -- -- 182/64 -- -- -- Lying -- --    06/30/24 0430 -- -- -- 208/85 -- -- -- -- -- --    06/30/24 0325 -- -- -- 208/62 -- -- -- Lying -- --    06/30/24 0315 -- -- -- -- -- -- -- -- 12 --    06/30/24 0310 -- -- -- 202/78 -- -- -- Lying -- --    06/30/24 0300 97.8 °F (36.6 °C) 68 18 206/86 124 97 % None (Room air) Lying -- --    06/30/24 0103 -- 60 16 118/55 -- 93 % None (Room air) Lying -- No Pain    06/29/24 2212 -- -- -- -- -- -- -- -- -- No Pain    06/29/24 2201 98 °F (36.7 °C) 59 16 125/59 85 96 % None (Room air) Lying -- No Pain    06/29/24 1949 -- 52 16 157/65 94 97 % None (Room air) Lying -- No Pain    06/29/24 1919 -- 60 -- 143/83 110 97 % -- -- -- --    06/29/24 1904 -- -- -- -- -- -- None (Room air) -- -- --    06/29/24 1901 -- -- -- -- -- -- None (Room air) Lying 14 No Pain    06/29/24 1850 98.6 °F (37 °C) 90 16 139/90 -- 96 % None (Room air) Lying -- No Pain             Pertinent Labs/Diagnostic Results:   Results from last 7 days   Lab Units 06/30/24  0147   SARS-COV-2  Negative     Results from last 7 days   Lab Units 06/30/24 0627 06/29/24 2031   WBC Thousand/uL  --  5.18   HEMOGLOBIN g/dL  --  10.6*   HEMATOCRIT %  --  33.1*   PLATELETS Thousands/uL 194 189   TOTAL NEUT ABS Thousands/µL  --  3.29         Results from last 7 days   Lab Units 06/29/24 2031   SODIUM mmol/L 142   POTASSIUM mmol/L 4.9   CHLORIDE mmol/L 108   CO2 mmol/L 31   ANION GAP mmol/L 3*   BUN mg/dL 38*   CREATININE mg/dL 1.20   EGFR ml/min/1.73sq m 39   CALCIUM mg/dL 9.2     Results from last 7 days   Lab Units 06/29/24 2031   AST U/L 16   ALT U/L 8   ALK PHOS U/L 93   TOTAL PROTEIN g/dL 6.1*   ALBUMIN g/dL 3.6   TOTAL BILIRUBIN mg/dL 0.38         Results from last 7 days   Lab Units 06/29/24 2031   GLUCOSE RANDOM mg/dL 97         Results from last 7 days   Lab Units 06/30/24 0627   HEMOGLOBIN A1C % 5.0   EAG mg/dl 97      Results from last 7 days   Lab Units 06/29/24 2031   D-DIMER QUANTITATIVE ug/ml FEU 1.86*      Results from last 7 days   Lab Units 06/30/24 0627   PROCALCITONIN ng/ml 0.07      Results from last 7 days   Lab Units 06/30/24 0147   BNP pg/mL 118*      Results from last 7 days   Lab Units 06/30/24 0147   INFLUENZA A PCR  Negative   INFLUENZA B PCR  Negative   RSV PCR  Negative       Medications:   Scheduled Medications:  amiodarone, 200 mg, Oral, Daily  aspirin, 81 mg, Oral, Daily  aspirin, 300 mg, Rectal, Once  atorvastatin, 40 mg, Oral, QPM  diltiazem, 30 mg, Oral, TID  heparin (porcine), 5,000 Units, Subcutaneous, Q8H LYNDSEY  metoprolol tartrate, 25 mg, Oral, BID    Continuous IV Infusions: none    PRN Meds: none        Network Utilization Review Department  ATTENTION: Please call with any questions or concerns to [phone] and carefully listen to the prompts so that you are directed to the right person. All voicemails are confidential.   For Discharge needs, contact Care  Management DC Support Team at [phone] opt. [phone]  Send all requests for admission clinical reviews, approved or denied determinations and any other requests to dedicated fax number below belonging to the campus where the patient is receiving treatment. List of dedicated fax numbers for the Facilities:  FACILITY NAME UR FAX NUMBER   ADMISSION DENIALS (Administrative/Medical Necessity) [phone]   DISCHARGE SUPPORT TEAM (NETWORK) [phone]   PARENT CHILD HEALTH (Maternity/NICU/Pediatrics) [phone]   [facility] [phone]   [facility] [phone]   [facility] [phone]   [facility] [phone]   [facility] [phone]   [facility] [phone]   [facility] [phone]   [facility] [phone]   [facility] [phone]   [facility] [phone]   [facility] [phone]   [facility] [phone]

## 2024-07-01 NOTE — ASSESSMENT & PLAN NOTE
History of this  Speech evaluated patient and cleared patient for full liquids when awake/alert enough

## 2024-07-02 ENCOUNTER — APPOINTMENT (INPATIENT)
Dept: NON INVASIVE DIAGNOSTICS | Facility: HOSPITAL | Age: 89
DRG: 065 | End: 2024-07-02
Payer: MEDICARE

## 2024-07-02 PROBLEM — I63.9 STROKE (CEREBRUM) (HCC): Status: ACTIVE | Noted: 2024-07-02

## 2024-07-02 PROBLEM — N93.9 VAGINAL BLEEDING: Status: ACTIVE | Noted: 2024-07-02

## 2024-07-02 LAB
ANION GAP SERPL CALCULATED.3IONS-SCNC: 11 MMOL/L (ref 4–13)
BUN SERPL-MCNC: 37 MG/DL (ref 5–25)
CALCIUM SERPL-MCNC: 8.2 MG/DL (ref 8.4–10.2)
CHLORIDE SERPL-SCNC: 111 MMOL/L (ref 96–108)
CO2 SERPL-SCNC: 23 MMOL/L (ref 21–32)
CREAT SERPL-MCNC: 1.47 MG/DL (ref 0.6–1.3)
ERYTHROCYTE [DISTWIDTH] IN BLOOD BY AUTOMATED COUNT: 14.4 % (ref 11.6–15.1)
GFR SERPL CREATININE-BSD FRML MDRD: 30 ML/MIN/1.73SQ M
GLUCOSE SERPL-MCNC: 88 MG/DL (ref 65–140)
HCT VFR BLD AUTO: 33.4 % (ref 34.8–46.1)
HGB BLD-MCNC: 10.5 G/DL (ref 11.5–15.4)
MAGNESIUM SERPL-MCNC: 2.2 MG/DL (ref 1.9–2.7)
MCH RBC QN AUTO: 29.3 PG (ref 26.8–34.3)
MCHC RBC AUTO-ENTMCNC: 31.4 G/DL (ref 31.4–37.4)
MCV RBC AUTO: 93 FL (ref 82–98)
PLATELET # BLD AUTO: 203 THOUSANDS/UL (ref 149–390)
PMV BLD AUTO: 10.4 FL (ref 8.9–12.7)
POTASSIUM SERPL-SCNC: 4 MMOL/L (ref 3.5–5.3)
RBC # BLD AUTO: 3.58 MILLION/UL (ref 3.81–5.12)
SODIUM SERPL-SCNC: 145 MMOL/L (ref 135–147)
WBC # BLD AUTO: 5.99 THOUSAND/UL (ref 4.31–10.16)

## 2024-07-02 PROCEDURE — 80048 BASIC METABOLIC PNL TOTAL CA: CPT | Performed by: STUDENT IN AN ORGANIZED HEALTH CARE EDUCATION/TRAINING PROGRAM

## 2024-07-02 PROCEDURE — 85027 COMPLETE CBC AUTOMATED: CPT | Performed by: STUDENT IN AN ORGANIZED HEALTH CARE EDUCATION/TRAINING PROGRAM

## 2024-07-02 PROCEDURE — 99223 1ST HOSP IP/OBS HIGH 75: CPT | Performed by: PSYCHIATRY & NEUROLOGY

## 2024-07-02 PROCEDURE — 99233 SBSQ HOSP IP/OBS HIGH 50: CPT | Performed by: STUDENT IN AN ORGANIZED HEALTH CARE EDUCATION/TRAINING PROGRAM

## 2024-07-02 PROCEDURE — 83735 ASSAY OF MAGNESIUM: CPT | Performed by: STUDENT IN AN ORGANIZED HEALTH CARE EDUCATION/TRAINING PROGRAM

## 2024-07-02 RX ORDER — ATORVASTATIN CALCIUM 40 MG/1
40 TABLET, FILM COATED ORAL EVERY EVENING
Qty: 30 TABLET | Refills: 0 | Status: SHIPPED | OUTPATIENT
Start: 2024-07-02 | End: 2024-08-01

## 2024-07-02 RX ADMIN — HEPARIN SODIUM 5000 UNITS: 5000 INJECTION INTRAVENOUS; SUBCUTANEOUS at 13:54

## 2024-07-02 RX ADMIN — HEPARIN SODIUM 5000 UNITS: 5000 INJECTION INTRAVENOUS; SUBCUTANEOUS at 05:07

## 2024-07-02 RX ADMIN — DILTIAZEM HYDROCHLORIDE 30 MG: 30 TABLET, FILM COATED ORAL at 16:11

## 2024-07-02 RX ADMIN — AMIODARONE HYDROCHLORIDE 200 MG: 200 TABLET ORAL at 09:10

## 2024-07-02 RX ADMIN — HEPARIN SODIUM 5000 UNITS: 5000 INJECTION INTRAVENOUS; SUBCUTANEOUS at 21:39

## 2024-07-02 RX ADMIN — METOPROLOL TARTRATE 25 MG: 25 TABLET, FILM COATED ORAL at 09:10

## 2024-07-02 RX ADMIN — DILTIAZEM HYDROCHLORIDE 30 MG: 30 TABLET, FILM COATED ORAL at 09:10

## 2024-07-02 RX ADMIN — METOPROLOL TARTRATE 25 MG: 25 TABLET, FILM COATED ORAL at 16:11

## 2024-07-02 RX ADMIN — ASPIRIN 81 MG: 81 TABLET, COATED ORAL at 09:10

## 2024-07-02 RX ADMIN — DILTIAZEM HYDROCHLORIDE 30 MG: 30 TABLET, FILM COATED ORAL at 21:39

## 2024-07-02 RX ADMIN — ATORVASTATIN CALCIUM 40 MG: 40 TABLET, FILM COATED ORAL at 16:11

## 2024-07-02 NOTE — PLAN OF CARE
Problem: Potential for Falls  Goal: Patient will remain free of falls  Description: INTERVENTIONS:  - Educate patient/family on patient safety including physical limitations  - Instruct patient to call for assistance with activity   - Consult OT/PT to assist with strengthening/mobility   - Keep Call bell within reach  - Keep bed low and locked with side rails adjusted as appropriate  - Keep care items and personal belongings within reach  - Initiate and maintain comfort rounds  - Make Fall Risk Sign visible to staff  - Offer Toileting every  Hours, in advance of need  - Initiate/Maintain alarm  - Obtain necessary fall risk management equipment:   - Apply yellow socks and bracelet for high fall risk patients  - Consider moving patient to room near nurses station  Outcome: Progressing     Problem: PAIN - ADULT  Goal: Verbalizes/displays adequate comfort level or baseline comfort level  Description: Interventions:  - Encourage patient to monitor pain and request assistance  - Assess pain using appropriate pain scale  - Administer analgesics based on type and severity of pain and evaluate response  - Implement non-pharmacological measures as appropriate and evaluate response  - Consider cultural and social influences on pain and pain management  - Notify physician/advanced practitioner if interventions unsuccessful or patient reports new pain  Outcome: Progressing     Problem: DISCHARGE PLANNING  Goal: Discharge to home or other facility with appropriate resources  Description: INTERVENTIONS:  - Identify barriers to discharge w/patient and caregiver  - Arrange for needed discharge resources and transportation as appropriate  - Identify discharge learning needs (meds, wound care, etc.)  - Arrange for interpretive services to assist at discharge as needed  - Refer to Case Management Department for coordinating discharge planning if the patient needs post-hospital services based on physician/advanced practitioner order  or complex needs related to functional status, cognitive ability, or social support system  Outcome: Progressing     Problem: Knowledge Deficit  Goal: Patient/family/caregiver demonstrates understanding of disease process, treatment plan, medications, and discharge instructions  Description: Complete learning assessment and assess knowledge base.  Interventions:  - Provide teaching at level of understanding  - Provide teaching via preferred learning methods  Outcome: Progressing     Problem: Neurological Deficit  Goal: Neurological status is stable or improving  Description: Interventions:  - Monitor and assess patient's level of consciousness, motor function, sensory function, and level of assistance needed for ADLs.   - Monitor and report changes from baseline. Collaborate with interdisciplinary team to initiate plan and implement interventions as ordered.   - Provide and maintain a safe environment.  - Consider seizure precautions.  - Consider fall precautions.  - Consider aspiration precautions.  - Consider bleeding precautions.  Outcome: Progressing     Problem: Activity Intolerance/Impaired Mobility  Goal: Mobility/activity is maintained at optimum level for patient  Description: Interventions:  - Assess and monitor patient  barriers to mobility and need for assistive/adaptive devices.  - Assess patient's emotional response to limitations.  - Collaborate with interdisciplinary team and initiate plans and interventions as ordered.  - Encourage independent activity per ability.  - Maintain proper body alignment.  - Perform active/passive rom as tolerated/ordered.  - Plan activities to conserve energy.  - Turn patient as appropriate  Outcome: Progressing     Problem: Communication Impairment  Goal: Ability to express needs and understand communication  Description: Assess patient's communication skills and ability to understand information.  Patient will demonstrate use of effective communication techniques,  alternative methods of communication and understanding even if not able to speak.     - Encourage communication and provide alternate methods of communication as needed.  - Collaborate with case management/ for discharge needs.  - Include patient/family/caregiver in decisions related to communication.  Outcome: Progressing     Problem: Potential for Aspiration  Goal: Non-ventilated patient's risk of aspiration is minimized  Description: Assess and monitor vital signs, respiratory status, and labs (WBC).  Monitor for signs of aspiration (tachypnea, cough, rales, wheezing, cyanosis, fever).    - Assess and monitor patient's ability to swallow.  - Place patient up in chair to eat if possible.  - HOB up at 90 degrees to eat if unable to get patient up into chair.  - Supervise patient during oral intake.   - Instruct patient/ family to take small bites.  - Instruct patient/ family to take small single sips when taking liquids.  - Follow patient-specific strategies generated by speech pathologist.  Outcome: Progressing  Goal: Ventilated patient's risk of aspiration is minimized  Description: Assess and monitor vital signs, respiratory status, airway cuff pressure, and labs (WBC).  Monitor for signs of aspiration (tachypnea, cough, rales, wheezing, cyanosis, fever).    - Elevate head of bed 30 degrees if patient has tube feeding.  - Monitor tube feeding.  Outcome: Progressing     Problem: Nutrition  Goal: Nutrition/Hydration status is improving  Description: Monitor and assess patient's nutrition/hydration status for malnutrition (ex- brittle hair, bruises, dry skin, pale skin and conjunctiva, muscle wasting, smooth red tongue, and disorientation). Collaborate with interdisciplinary team and initiate plan and interventions as ordered.  Monitor patient's weight and dietary intake as ordered or per policy. Utilize nutrition screening tool and intervene per policy. Determine patient's food preferences and  provide high-protein, high-caloric foods as appropriate.     - Assist patient with eating.  - Allow adequate time for meals.  - Encourage patient to take dietary supplement as ordered.  - Collaborate with clinical nutritionist.  - Include patient/family/caregiver in decisions related to nutrition.  Outcome: Progressing     Problem: CARDIOVASCULAR - ADULT  Goal: Maintains optimal cardiac output and hemodynamic stability  Description: INTERVENTIONS:  - Monitor I/O, vital signs and rhythm  - Monitor for S/S and trends of decreased cardiac output  - Administer and titrate ordered vasoactive medications to optimize hemodynamic stability  - Assess quality of pulses, skin color and temperature  - Assess for signs of decreased coronary artery perfusion  - Instruct patient to report change in severity of symptoms  Outcome: Progressing  Goal: Absence of cardiac dysrhythmias or at baseline rhythm  Description: INTERVENTIONS:  - Continuous cardiac monitoring, vital signs, obtain 12 lead EKG if ordered  - Administer antiarrhythmic and heart rate control medications as ordered  - Monitor electrolytes and administer replacement therapy as ordered  Outcome: Progressing     Problem: RESPIRATORY - ADULT  Goal: Achieves optimal ventilation and oxygenation  Description: INTERVENTIONS:  - Assess for changes in respiratory status  - Assess for changes in mentation and behavior  - Position to facilitate oxygenation and minimize respiratory effort  - Oxygen administered by appropriate delivery if ordered  - Initiate smoking cessation education as indicated  - Encourage broncho-pulmonary hygiene including cough, deep breathe, Incentive Spirometry  - Assess the need for suctioning and aspirate as needed  - Assess and instruct to report SOB or any respiratory difficulty  - Respiratory Therapy support as indicated  Outcome: Progressing     Problem: SKIN/TISSUE INTEGRITY - ADULT  Goal: Skin Integrity remains intact(Skin Breakdown  Prevention)  Description: Assess:  -Perform Liu assessment every   -Clean and moisturize skin every   -Inspect skin when repositioning, toileting, and assisting with ADLS  -Assess under medical devices such as  every   -Assess extremities for adequate circulation and sensation     Bed Management:  -Have minimal linens on bed & keep smooth, unwrinkled  -Change linens as needed when moist or perspiring  -Avoid sitting or lying in one position for more than  hours while in bed  -Keep HOB at degrees     Toileting:  -Offer bedside commode  -Assess for incontinence every   -Use incontinent care products after each incontinent episode such as     Activity:  -Mobilize patient  times a day  -Encourage activity and walks on unit  -Encourage or provide ROM exercises   -Turn and reposition patient every  Hours  -Use appropriate equipment to lift or move patient in bed  -Instruct/ Assist with weight shifting every  when out of bed in chair  -Consider limitation of chair time  hour intervals    Skin Care:  -Avoid use of baby powder, tape, friction and shearing, hot water or constrictive clothing  -Relieve pressure over bony prominences using   -Do not massage red bony areas    Next Steps:  -Teach patient strategies to minimize risks such as    -Consider consults to  interdisciplinary teams such as   Outcome: Progressing  Goal: Pressure injury heals and does not worsen  Description: Interventions:  - Implement low air loss mattress or specialty surface (Criteria met)  - Apply silicone foam dressing  - Instruct/assist with weight shifting every  minutes when in chair   - Limit chair time to  hour intervals  - Use special pressure reducing interventions such as  when in chair   - Apply fecal or urinary incontinence containment device   - Perform passive or active ROM every   - Turn and reposition patient & offload bony prominences every  hours   - Utilize friction reducing device or surface for transfers   - Consider consults to   interdisciplinary teams such as   - Use incontinent care products after each incontinent episode such as   - Consider nutrition services referral as needed  Outcome: Progressing     Problem: Prexisting or High Potential for Compromised Skin Integrity  Goal: Skin integrity is maintained or improved  Description: INTERVENTIONS:  - Identify patients at risk for skin breakdown  - Assess and monitor skin integrity  - Assess and monitor nutrition and hydration status  - Monitor labs   - Assess for incontinence   - Turn and reposition patient  - Assist with mobility/ambulation  - Relieve pressure over bony prominences  - Avoid friction and shearing  - Provide appropriate hygiene as needed including keeping skin clean and dry  - Evaluate need for skin moisturizer/barrier cream  - Collaborate with interdisciplinary team   - Patient/family teaching  - Consider wound care consult   Outcome: Progressing     Problem: Nutrition/Hydration-ADULT  Goal: Nutrient/Hydration intake appropriate for improving, restoring or maintaining nutritional needs  Description: Monitor and assess patient's nutrition/hydration status for malnutrition. Collaborate with interdisciplinary team and initiate plan and interventions as ordered.  Monitor patient's weight and dietary intake as ordered or per policy. Utilize nutrition screening tool and intervene as necessary. Determine patient's food preferences and provide high-protein, high-caloric foods as appropriate.     INTERVENTIONS:  - Monitor oral intake, urinary output, labs, and treatment plans  - Assess nutrition and hydration status and recommend course of action  - Evaluate amount of meals eaten  - Assist patient with eating if necessary   - Allow adequate time for meals  - Recommend/ encourage appropriate diets, oral nutritional supplements, and vitamin/mineral supplements  - Order, calculate, and assess calorie counts as needed  - Recommend, monitor, and adjust tube feedings and TPN/PPN based  on assessed needs  - Assess need for intravenous fluids  - Provide specific nutrition/hydration education as appropriate  - Include patient/family/caregiver in decisions related to nutrition  Outcome: Progressing

## 2024-07-02 NOTE — NURSING NOTE
Assumed care of this patient at 1515. Patient resting comfortably in bed. Call bell within reach, bed alarm on. Virtual 1:1 observation in place.     Milena RIZZON, RN

## 2024-07-02 NOTE — ASSESSMENT & PLAN NOTE
Nurse noted vaginal bleeding on pad today  Consulted Gynecology who is recommending an ultrasound  TVS ordered and is pending  Discussed with son, if patient is unable to tolerate the ultrasound, will pursue further work-up in the outpatient setting   Low Risk (score 7-11)

## 2024-07-02 NOTE — NURSING NOTE
"The family has concerns that the pt will quickly fall out of bed and hurt herself. She was previously taken off a 1:1 due to the pt not showing any signs of trying to get out of bed. The family wanted me to place the pt back on a 1:1. I reached out to the provider and an order for a virtual 1:1 was placed.    The son called and said that she needs to be on an in-person 1:1. The son said to me \"you are fully responsible for what happens to my mother.' I reached out to the nursing supervisor about the issue. After reaching out with the family's concerns, the pt at this time will remain on a virtual 1:1.    Abiel Merino RN  "

## 2024-07-02 NOTE — DISCHARGE INSTR - AVS FIRST PAGE
Dear Denia Castañeda,     It was our pleasure to care for you here at [facility].  It is our hope that we were always able to exceed the expected standards for your care during your stay.  You were hospitalized due to right upper extremity swelling .  You were cared for on the 4th floor under the service of Ernestine Moreira MD with the [facility] who covers for your primary care physician (PCP), Jose M Castañeda MD, while you were hospitalized.  If you have any questions or concerns related to this hospitalization, you may contact us at .  For follow up as well as medication refills, we recommend that you follow up with your primary care physician.  A registered nurse will reach out to you by phone within a few days after your discharge to answer any additional questions that you may have after going home.  However, at this time we provide for you here, the most important instructions / recommendations at discharge:     Notable Medication Adjustments -   You will need to start taking aspirin and atorvastatin due to evidence of chronic stroke  Testing Required after Discharge -   To be determined on outpatient f/u with neurology  ** Please contact your PCP to request testing orders for any of the testing recommended here **  Important Follow Up Information -   Please see your PCP within 7 days of discharge  You were referred to neurology in the outpatient setting. You are to receive a call from them for an appointment  Other Instructions -   While you are stable for discharge home at this time, your symptoms may change. Please seek immediate medical attention for any concerning symptoms.  Please review this entire after visit summary as additional general instructions including medication list, appointments, activity, diet, any pertinent wound care, and other additional recommendations from your care team that may be provided  for you.      Sincerely,     Ernestine Moreira MD

## 2024-07-02 NOTE — ASSESSMENT & PLAN NOTE
Denia Castañeda is a 89yo Pakistani speaking female with Severe dementia, prior right orbital fracture, PAF not on AC, HTN, Stage 3B CKD, vaginal bleeding of unknown etiology and poor dentition who initially presented to the ED on 6/29 due to R hand swelling/fluid collection; however, was noted to have a right facial asymmetry which prompted CTH to be obtained revealing chronic left cerebellum and right occipital lobe strokes for which Neurology was consulted. At baseline patient is AP naïve.    Work-up:  CT C with contrast: Cardiomegaly with small pericardial effusion  Negative for PE  6/30 CTH:  No acute intracranial abnormality.  Generalized atrophy and mild to moderate chronic microangiopathic changes  Chronic focal infarctions in the left cerebellum and right occipital lobe.  7/1 Repeat CTH:  Stable subacute to chronic right occipital lobe infarct.  Stable chronic left cerebellar lacunar infarct.  Chronic microangiopathic changes.  Mild right maxillary and ethmoidal sinusitis.  Carotid duplex:  RUE: Unable to examine due to patient’s condition.  LUE: <50% in the L ICA.  LDL 82  A1C 5  Echo: EF 60%, normal atria size bilaterally, trace to small circumferential pericardial effusion without evidence of cardiac tamponade    Neuro exam is significantly limited due to patient's severe dementia and preference to lay on right side. R facial asymmetry evident, though no additional asymmetry appreciated.  Due to agitation, patient will not tolerate MRI. Will also hold off on CTA due to it being unlikely to  and high likelihood that this would cause the patient increased agitation. Based on exam and CT findings cannot rule out a small acute stroke in the setting of known A-fib not on anticoagulation.  Ideally would recommend anticoagulation with a DOAC; however due to vaginal bleeding patient is unlikely to be a good candidate for this at this time.  The interim, pending GYN evaluation/severity of bleeding,  can consider aspirin 81 mg daily.  If bleeding resolves and no other contraindication for anticoagulation, can consider discontinuing aspirin and starting DOAC.  As noted in chart, at baseline patient was not on anticoagulation due to fall risk; however unknown if patient has had excessive falls, and thus if this is a true contraindication. Recommendations as detailed below.  No additional inpatient neurologic recommendations.  Please call with any questions.    Plan:  Acute ischemic stroke protocol  Hold off on AC at this time due to patient having vaginal bleeding  S/p Aspirin 300mg load  Continue daily Aspirin 81mg in the meantime pending gyn evaluation/degree of vaginal bleeding  Atorvastatin 40mg qhs  Patient unable to tolerate MRI as thus repeat CTH completed  Hold off on CTA as this is unlike to  and will increase patient's agitation  Telemetry  Secondary stroke risk factor modification  Goal of normotension, normothermia and euglycemia  PT/OT/ST  Stroke Education  Frequent neurological checks  Stat CT head with worsening in neuro exam  Notify neurology with any changes in exam

## 2024-07-02 NOTE — PROGRESS NOTES
[facility]  Progress Note  Name: Denia Castñaeda I  MRN: [id number]  Unit/Bed#: E4 -01 I Date of Admission: 6/29/2024   Date of Service: 7/2/2024 I Hospital Day: 2    Assessment & Plan   Vaginal bleeding  Assessment & Plan  Nurse noted vaginal bleeding on pad today  Consulted Gynecology who is recommending an ultrasound  TVS ordered and is pending  Discussed with son, if patient is unable to tolerate the ultrasound, will pursue further work-up in the outpatient setting    Stroke (cerebrum) (HCC)  Assessment & Plan  Evidence of chronic strokes on CT scan  Patient unable to tolerated MRI  Was loaded with aspirin and continued on ASA 81 and Atorvastatin daily  TTE performed  Neurology consulted for further recs  Outpatient neurology referral sent    Pericardial effusion  Assessment & Plan  Moderate on CTA PE study performed for elevated DDimer  Heart tones are audible, no electrical alterans on EKG or s/sx to suggest tamponade  TTE showing trace to small circumferential pericardial effusion without echocardiographic evidence of cardiac tamponade     Edema of right upper extremity  Assessment & Plan  Possibly dependent edema as this resolved with repositioning  Pt has severe dementia and is sometimes nonverbal  Venous duplex unable to be performed due to patient aggression towards tech  XR RUE wnl      Stage 3b chronic kidney disease (HCC)  Assessment & Plan  Lab Results   Component Value Date    EGFR 30 07/02/2024    EGFR 39 06/29/2024    EGFR 36 02/01/2024    CREATININE 1.47 (H) 07/02/2024    CREATININE 1.20 06/29/2024    CREATININE 1.28 02/01/2024   Creatinine near baseline    Oropharyngeal dysphagia  Assessment & Plan  History of this  Speech evaluated patient and cleared patient for full liquids when awake/alert enough      Severe dementia with agitation (HCC)  Assessment & Plan  Nearly nonverbal per op documentation/nonsensical   Virtual observation  Regular diet with full  "liquids    PAF (paroxysmal atrial fibrillation) ([facility])  Assessment & Plan  Not on AC due to fall risk  Continue lopressor/cardizem/amiodarone    Primary hypertension  Assessment & Plan  Resumed PTA BP medications in absence of acute stroke      * Facial asymmetry  Assessment & Plan  Chronic per family  Mental status at baseline  CTH performed showing  \"Stable subacute to chronic right occipital lobe infarct. Stable chronic left cerebellar lacunar infarct. Chronic microangiopathic changes.\"  Continue ASA and statin on discharge given evidence of chronic CVA  Ambulatory referral to Neurology placed for further Neuro work-up in the outpatient setting as findings are chronic                 VTE Pharmacologic Prophylaxis:   Moderate Risk (Score 3-4) - Pharmacological DVT Prophylaxis Ordered: heparin.    Mobility:   Basic Mobility Inpatient Raw Score: 6  JH-HLM Goal: 2: Bed activities/Dependent transfer  JH-HLM Achieved: 1: Laying in bed  JH-HLM Goal NOT achieved. Continue with multidisciplinary rounding and encourage appropriate mobility to improve upon JH-HLM goals.    Patient Centered Rounds: I performed bedside rounds with nursing staff today.   Discussions with Specialists or Other Care Team Provider: neurology, gynecology    Education and Discussions with Family / Patient: Updated  (son) via phone.    Total Time Spent on Date of Encounter in care of patient: 60 mins. This time was spent on one or more of the following: performing physical exam; counseling and coordination of care; obtaining or reviewing history; documenting in the medical record; reviewing/ordering tests, medications or procedures; communicating with other healthcare professionals and discussing with patient's family/caregivers.    Current Length of Stay: 2 day(s)  Current Patient Status: Inpatient   Certification Statement: The patient will continue to require additional inpatient hospital stay due to ongoing work-up of vaginal " bleed  Discharge Plan: Anticipate discharge in 24-48 hrs to home with home services.    Code Status: Level 1 - Full Code    Subjective:   Patient seen and examined at bedside. Nonverbal at baseline however arousable to verbal stimuli    Objective:     Vitals:   Temp (24hrs), Av.8 °F (36.6 °C), Min:97.5 °F (36.4 °C), Max:98 °F (36.7 °C)    Temp:  [97.5 °F (36.4 °C)-98 °F (36.7 °C)] 97.5 °F (36.4 °C)  HR:  [85-97] 85  Resp:  [18-20] 18  BP: (127-149)/(70-76) 145/76  SpO2:  [98 %] 98 %  Body mass index is 22.57 kg/m².     Input and Output Summary (last 24 hours):     Intake/Output Summary (Last 24 hours) at 2024 1752  Last data filed at 2024 1455  Gross per 24 hour   Intake 620 ml   Output --   Net 620 ml       Physical Exam:   Physical Exam  Vitals and nursing note reviewed.   Constitutional:       General: She is not in acute distress.     Appearance: She is well-developed.   HENT:      Head: Normocephalic and atraumatic.   Eyes:      Conjunctiva/sclera: Conjunctivae normal.   Cardiovascular:      Rate and Rhythm: Normal rate and regular rhythm.      Heart sounds: No murmur heard.  Pulmonary:      Effort: Pulmonary effort is normal. No respiratory distress.      Breath sounds: Normal breath sounds.   Abdominal:      Palpations: Abdomen is soft.      Tenderness: There is no abdominal tenderness.   Musculoskeletal:         General: No swelling.      Cervical back: Neck supple.   Skin:     General: Skin is warm and dry.      Capillary Refill: Capillary refill takes less than 2 seconds.   Neurological:      Mental Status: She is alert. Mental status is at baseline. She is disoriented.   Psychiatric:         Mood and Affect: Mood normal.            Additional Data:     Labs:  Results from last 7 days   Lab Units 24  0529 24  0627 24  2031   WBC Thousand/uL 5.99  --  5.18   HEMOGLOBIN g/dL 10.5*  --  10.6*   HEMATOCRIT % 33.4*  --  33.1*   PLATELETS Thousands/uL 203   < > 189   SEGS PCT %  --    --  64   LYMPHO PCT %  --   --  25   MONO PCT %  --   --  8   EOS PCT %  --   --  3    < > = values in this interval not displayed.     Results from last 7 days   Lab Units 07/02/24  0529 06/29/24  2031   SODIUM mmol/L 145 142   POTASSIUM mmol/L 4.0 4.9   CHLORIDE mmol/L 111* 108   CO2 mmol/L 23 31   BUN mg/dL 37* 38*   CREATININE mg/dL 1.47* 1.20   ANION GAP mmol/L 11 3*   CALCIUM mg/dL 8.2* 9.2   ALBUMIN g/dL  --  3.6   TOTAL BILIRUBIN mg/dL  --  0.38   ALK PHOS U/L  --  93   ALT U/L  --  8   AST U/L  --  16   GLUCOSE RANDOM mg/dL 88 97             Results from last 7 days   Lab Units 06/30/24  0627   HEMOGLOBIN A1C % 5.0     Results from last 7 days   Lab Units 06/30/24  0627   PROCALCITONIN ng/ml 0.07       Lines/Drains:  Invasive Devices       None                         Imaging: No pertinent imaging reviewed.    Recent Cultures (last 7 days):         Last 24 Hours Medication List:   Current Facility-Administered Medications   Medication Dose Route Frequency Provider Last Rate    amiodarone  200 mg Oral Daily Kathy Vang PA-C      aspirin  81 mg Oral Daily Kathy Vang PA-C      aspirin  300 mg Rectal Once Kathy Vang PA-C      atorvastatin  40 mg Oral QPM Kathy Vang PA-C      diltiazem  30 mg Oral TID Kathy Vang PA-C      heparin (porcine)  5,000 Units Subcutaneous Q8H [facility] Kathy Vang PA-C      hydrALAZINE  5 mg Intravenous Q6H PRN Ernestine Toney MD      metoprolol tartrate  25 mg Oral BID Kathy Vang PA-C          Today, Patient Was Seen By: Ernestine Moreira MD    **Please Note: This note may have been constructed using a voice recognition system.**

## 2024-07-02 NOTE — CONSULTS
Consultation - Neurology   Denia Castañeda 89 y.o. female MRN: [id number]  Unit/Bed#: E4 -01 Encounter: [id number]      Assessment & Plan     Stroke (cerebrum) (Formerly [facility])  Assessment & Plan  Denia Castañeda is a 89yo Romansh speaking female with Severe dementia, prior right orbital fracture, PAF not on AC, HTN, Stage 3B CKD, vaginal bleeding of unknown etiology and poor dentition who initially presented to the ED on 6/29 due to R hand swelling/fluid collection; however, was noted to have a right facial asymmetry which prompted CTH to be obtained revealing chronic left cerebellum and right occipital lobe strokes for which Neurology was consulted. At baseline patient is AP naïve.    Work-up:  CT C with contrast: Cardiomegaly with small pericardial effusion  Negative for PE  6/30 CTH:  No acute intracranial abnormality.  Generalized atrophy and mild to moderate chronic microangiopathic changes  Chronic focal infarctions in the left cerebellum and right occipital lobe.  7/1 Repeat CTH:  Stable subacute to chronic right occipital lobe infarct.  Stable chronic left cerebellar lacunar infarct.  Chronic microangiopathic changes.  Mild right maxillary and ethmoidal sinusitis.  Carotid duplex:  RUE: Unable to examine due to patient’s condition.  LUE: <50% in the L ICA.  LDL 82  A1C 5  Echo: EF 60%, normal atria size bilaterally, trace to small circumferential pericardial effusion without evidence of cardiac tamponade    Neuro exam is significantly limited due to patient's severe dementia and preference to lay on right side. R facial asymmetry evident, though no additional asymmetry appreciated.  Due to agitation, patient will not tolerate MRI. Will also hold off on CTA due to it being unlikely to  and high likelihood that this would cause the patient increased agitation. Based on exam and CT findings cannot rule out a small acute stroke in the setting of known A-fib not on anticoagulation.  Ideally would  recommend anticoagulation with a DOAC; however due to vaginal bleeding patient is unlikely to be a good candidate for this at this time.  The interim, pending GYN evaluation/severity of bleeding, can consider aspirin 81 mg daily.  If bleeding resolves and no other contraindication for anticoagulation, can consider discontinuing aspirin and starting DOAC.  As noted in chart, at baseline patient was not on anticoagulation due to fall risk; however unknown if patient has had excessive falls, and thus if this is a true contraindication. Recommendations as detailed below.  No additional inpatient neurologic recommendations.  Please call with any questions.    Plan:  Acute ischemic stroke protocol  Hold off on AC at this time due to patient having vaginal bleeding  S/p Aspirin 300mg load  Continue daily Aspirin 81mg in the meantime pending gyn evaluation/degree of vaginal bleeding  Atorvastatin 40mg qhs  Patient unable to tolerate MRI as thus repeat CTH completed  Hold off on CTA as this is unlike to  and will increase patient's agitation  Telemetry  Secondary stroke risk factor modification  Goal of normotension, normothermia and euglycemia  PT/OT/ST  Stroke Education  Frequent neurological checks  Stat CT head with worsening in neuro exam  Notify neurology with any changes in exam    Primary hypertension  Assessment & Plan  Goal of normotension    PAF (paroxysmal atrial fibrillation) (HCC)  Assessment & Plan  EKG NSR  Not on AC at baseline reportedly due to fall risk  Patient also with unexplained vaginal bleeding that has not yet been worked up  See above for AC/AP recommendations    Severe dementia with agitation (HCC)  Assessment & Plan  Conservative management of delirium - minimize noise, maintain day/night cycle, provide frequent redirection, avoid restraints if possible, etc.       Denia Castañeda will need follow up in in 6 weeks with neurovascular attending or advance practitioner. She will not  require outpatient neurological testing.    History of Present Illness     Reason for Consult / Principal Problem: CVA  Hx and PE limited by: Severe dementia, near nonverbal  HPI: Denia Castañeda is a 89yo Slovak speaking female with Severe dementia, prior right orbital fracture, PAF not on AC, HTN, Stage 3B CKD, vaginal bleeding of unknown etiology and poor dentition who presented to the ED on 6/29 due to R hand swelling/fluid collection. During ED evaluation R facial asymmetry was noted, and despite known prior right orbital fracture, decision was made to obtain CTH which was negative for acute stroke but revealed chronic focal infarctions in the left cerebellum and right occipital lobe. BP as high as 208/85.  Neurology consulted for evaluation of subacute strokes.    Notified by primary team that patient attempted to bit US tech.  Additionally RN reports that patient continues to have vaginal bleeding.     Inpatient consult to Neurology  Consult performed by: Rosa Montero PA-C  Consult ordered by: Ernestine Toney MD        Review of Systems   Unable to perform ROS: Dementia        Historical Information   Past Medical History:   Diagnosis Date    Anxiety     Closed fracture of right orbital floor (HCC) 08/23/2020    Cognitive impairment     COVID-19 virus infection 04/13/2020    Dementia (HCC)     Depression     Hallucination     Hyperlipidemia     Hypertension     Memory loss     Psychiatric illness     Psychosis (HCC)     Retention of urine     Schizoaffective disorder (HCC)     Sleep difficulties     Urinary tract infection      Past Surgical History:   Procedure Laterality Date    APPENDECTOMY      BLADDER SURGERY      CHOLECYSTECTOMY      MN OPTX FEM SHFT FX W/INSJ IMED IMPLT W/WO SCREW Right 7/15/2021    Procedure: INSERTION NAIL IM FEMUR ANTEGRADE (TROCHANTERIC) right;  Surgeon: Getachew Aguilar MD;  Location: [facility];  Service: Orthopedics    TOTAL ABDOMINAL HYSTERECTOMY W/  "BILATERAL SALPINGOOPHORECTOMY      TOTAL KNEE ARTHROPLASTY Left      Social History   Social History     Substance and Sexual Activity   Alcohol Use Never     Social History     Substance and Sexual Activity   Drug Use No     E-Cigarette/Vaping    E-Cigarette Use Never User      E-Cigarette/Vaping Substances    Nicotine No     THC No     CBD No     Flavoring No     Other No     Unknown No      Social History     Tobacco Use   Smoking Status Never    Passive exposure: Never   Smokeless Tobacco Never   Tobacco Comments    Former smoker per [employer]     Family History:   Family History   Problem Relation Age of Onset    Heart disease Mother         Cardiac disorder    Parkinsonism Father     Heart disease Sister         Cardiac disorder    Hypertension Sister     Diabetes Child         Diabetes Mellitus    Lung disease Child         Respiratory Disorder    Diabetes Son         Diabetes Mellitus       Review of previous medical records was completed.     Meds/Allergies   all current active meds have been reviewed    No Known Allergies    Objective   Vitals:Blood pressure 145/76, pulse 85, temperature 97.5 °F (36.4 °C), temperature source Temporal, resp. rate 18, height 4' 10\" (1.473 m), weight 49 kg (108 lb), SpO2 98%.,Body mass index is 22.57 kg/m².    Intake/Output Summary (Last 24 hours) at 7/2/2024 1922  Last data filed at 7/2/2024 1900  Gross per 24 hour   Intake 860 ml   Output --   Net 860 ml       Invasive Devices:   Invasive Devices       None                   Physical Exam  Constitutional:       General: She is not in acute distress.     Appearance: She is ill-appearing (chronically). She is not toxic-appearing.      Comments: Elderly female laying on right side in bed, sleeping upon arrival.   HENT:      Head: Normocephalic and atraumatic.   Pulmonary:      Effort: Pulmonary effort is normal. No respiratory distress.       Neurologic Exam     Mental Status   Somnolent but wakes with repeated stimuli. No " command following. Nonverbal aside from yelling at examiner in Angolan when light noxious stimuli applied.     Cranial Nerves   Exam limited due to positioning; however lower right facial asymmetry appreciated as well as asymmetry around the right eye s/p known orbital fracture.  R eye closed with increased difficulty passively opening. L eye +BTT.     Motor Exam   No command following. Able to passively range without difficulty though minimal increased BUE tone appreciated, symmetric. No spontaneous or volitional antigravity movements though when elevated passively, arms are lowered symmetrically. When applying light noxious stim to bilateral feet patient pulls away from stimulus symmetrically.     Sensory Exam   Symmetric response to noxious stimuli in all extremities with patient becoming upset with examiner when applying minimal noxious stimuli to BUEs.       Lab Results: I have personally reviewed pertinent reports.    Imaging Studies: I have personally reviewed pertinent films in PACS  EKG, Pathology, and Other Studies: I have personally reviewed pertinent reports.    VTE Prophylaxis: Heparin    Code Status: Level 1 - Full Code

## 2024-07-02 NOTE — ASSESSMENT & PLAN NOTE
Lab Results   Component Value Date    EGFR 30 07/02/2024    EGFR 39 06/29/2024    EGFR 36 02/01/2024    CREATININE 1.47 (H) 07/02/2024    CREATININE 1.20 06/29/2024    CREATININE 1.28 02/01/2024   Creatinine near baseline

## 2024-07-02 NOTE — ASSESSMENT & PLAN NOTE
Moderate on CTA PE study performed for elevated DDimer  Heart tones are audible, no electrical alterans on EKG or s/sx to suggest tamponade  TTE showing trace to small circumferential pericardial effusion without echocardiographic evidence of cardiac tamponade

## 2024-07-02 NOTE — ASSESSMENT & PLAN NOTE
Possibly dependent edema as this resolved with repositioning  Pt has severe dementia and is sometimes nonverbal  Venous duplex unable to be performed due to patient aggression towards tech  XR MERCEDES rodriguez

## 2024-07-02 NOTE — ASSESSMENT & PLAN NOTE
"Chronic per family  Mental status at baseline  CTH performed showing  \"Stable subacute to chronic right occipital lobe infarct. Stable chronic left cerebellar lacunar infarct. Chronic microangiopathic changes.\"  Continue ASA and statin on discharge given evidence of chronic CVA  Ambulatory referral to Neurology placed for further Neuro work-up in the outpatient setting as findings are chronic    "

## 2024-07-02 NOTE — DISCHARGE SUMMARY
[facility]  Discharge- Denia Castañeda 11/20/1931, 89 y.o. female MRN: [id number]  Unit/Bed#: E4 -01 Encounter: [id number]  Primary Care Provider: Jose M Csatañeda MD   Date and time admitted to hospital: 6/29/2024  6:46 PM    Cystitis  Assessment & Plan  Patient with evidence of cystitis on pelvic ultrasound  She is unable to relay her symptoms given dementia  Will dc patient on 5 day course of keflex    Vaginal bleeding  Assessment & Plan  Nurse noted vaginal bleeding on pad today  Consulted Gynecology who is recommending an ultrasound  TVS showed possible cystitis, s/p TAHBSO  Patient with scheduled gyne appointment this month  Continue outpatient f/u    Stroke (cerebrum) ([facility])  Assessment & Plan  Evidence of chronic strokes on CT scan  Patient unable to tolerated MRI  Was loaded with aspirin and continued on ASA and statin  TTE performed  Neurology consulted for further recs  Outpatient neurology referral sent    Pericardial effusion  Assessment & Plan  Moderate on CTA PE study performed for elevated DDimer  Heart tones are audible, no electrical alterans on EKG or s/sx to suggest tamponade  TTE showing trace to small circumferential pericardial effusion without echocardiographic evidence of cardiac tamponade     Edema of right upper extremity  Assessment & Plan  Possibly dependent edema as this resolved with repositioning  Pt has severe dementia and is sometimes nonverbal  Venous duplex unable to be performed due to patient aggression towards tech  XR RUE wnl      Stage 3b chronic kidney disease (HCC)  Assessment & Plan  Lab Results   Component Value Date    EGFR 30 07/03/2024    EGFR 30 07/02/2024    EGFR 39 06/29/2024    CREATININE 1.48 (H) 07/03/2024    CREATININE 1.47 (H) 07/02/2024    CREATININE 1.20 06/29/2024   Creatinine near baseline    Oropharyngeal dysphagia  Assessment & Plan  History of this  Speech evaluated patient and cleared patient for pureed with  "thins      Severe dementia with agitation (HCC)  Assessment & Plan  Nearly nonverbal per op documentation/nonsensical   Virtual observation    PAF (paroxysmal atrial fibrillation) (HCC)  Assessment & Plan  Not on AC due to fall risk  Continue lopressor/cardizem/amiodarone    Primary hypertension  Assessment & Plan  Resumed PTA BP medications in absence of acute stroke      * Facial asymmetry  Assessment & Plan  Chronic per family  Mental status at baseline  CTH performed showing  \"Stable subacute to chronic right occipital lobe infarct. Stable chronic left cerebellar lacunar infarct. Chronic microangiopathic changes.\"  Continue ASA and statin on discharge given evidence of chronic CVA  Ambulatory referral to Neurology placed for further Neuro work-up in the outpatient setting as findings are chronic  Per Neurology patient should be on Eliquis however given vaginal bleeding, further discussion can be had in the outpatient setting per discussion with son        Medical Problems       Resolved Problems  Date Reviewed: 6/30/2024   None       Discharging Physician / Practitioner: Ernestine Moreira MD  PCP: Jose M Castañeda MD  Admission Date:   Admission Orders (From admission, onward)       Ordered        06/30/24 1651  INPATIENT ADMISSION  Once            06/30/24 0126  Place in Observation  Once                          Discharge Date: 07/03/24    Consultations During Hospital Stay:  Neuro    Procedures Performed:   none    Significant Findings / Test Results:   US pelvis complete w transvaginal   Final Result by Paola Aj MD (07/03 1126)      1. Mildly thickened bladder wall with layering debris within the bladder and increased bladder wall vascularity. Findings may represent cystitis in the appropriate clinical setting.      2. Post hysterectomy. No pelvic/adnexal mass seen on this exam.         The study was marked in EPIC for immediate notification.                  Resident: BAILEE" FLORINDA SIMON, the attending radiologist, have reviewed the images and agree with the final report above.      Workstation performed: [id number]         RADIOLOGY RESULTS   Final Result by  (07/02 1524)      VAS carotid complete study   Final Result by Eli Torres MD (07/01 1630)      CT head wo contrast   Final Result by Brenda Loyola MD (07/01 0911)      1.  Stable subacute to chronic right occipital lobe infarct.   2.  Stable chronic left cerebellar lacunar infarct.   3.  Chronic microangiopathic changes.   4.  Mild right maxillary and ethmoidal sinusitis.                  Workstation performed: [id number]         CT head wo contrast   Final Result by Pallav N Shah, MD (06/30 0711)      No acute intracranial abnormality.      Generalized atrophy and mild to moderate chronic microangiopathic changes.      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                  Workstation performed: [id number]         XR hand 3+ vw right   Final Result by Fred De La O MD (07/01 0745)      No acute osseous abnormality.      Degenerative changes as described.         Computerized Assisted Algorithm (CAA) may have been used to analyze all applicable images.      Workstation performed: [id number]         CTA ED chest PE study   ED Interpretation by Glen Real PA-C (06/30 0128)   FINDINGS: Pulmonary arteries: Normal. No pulmonary emboli. Aorta: Unremarkable. No aortic aneurysm. No aortic dissection. Lungs: The lungs demonstrate patchy areas of groundglass opacities in bilateral upper lobes. This appearance is nonspecific in etiology (may represent areas of microatelectasis, mild/early pulmonary edema (including the possibility of noncardiogenic pulmonary edema or overhydration), versus early/developing infectious/inflammatory pneumonia/pneumonitis). Small pneumatocele noted on the left. Pleural spaces: Unremarkable. No pneumothorax. No pleural effusion. Heart: Moderate-sized  pericardial effusion. Lymph nodes: Unremarkable. No enlarged lymph nodes. Bones/joints: Unremarkable. No acute fracture. Soft tissues: Unremarkable. IMPRESSION: 1. Moderate-sized pericardial effusion. 2. Ground-glass lung opacities; please see DDx above    Dictated and Authenticated by: Juan Dale MD 06/30/2024 12:19 AM Eastern Time ([place] & [place]        Final Result by Peyman Campos MD (06/30 0801)      Cardiomegaly with small pericardial effusion. See echocardiogram report when available.      No acute pulmonary findings.      The major findings are in agreement with the preliminary report provided by [employer] which was provided shortly after completion of the exam.         Workstation performed: [id number]               Incidental Findings:   N/A     Test Results Pending at Discharge (will require follow up):   none     Outpatient Tests Requested:  To be determined on outpatient Neurovascular work-up    Complications:  none    Reason for Admission: right upper extremity swelling    Hospital Course:   Denia Castañeda is a 89 y.o. female patient who originally presented to the hospital on 6/29/2024 due to right upper extremity swelling. Right upper extremity duplex ordered however unable to be performed as patient aggressively refusing given baseline dementia. During this admission, right upper extremity swelling resolved. Suspect that there is an element of dependent edema given patient's preference to lay on her right side.     On admission, there was some question as to whether patient had a stroke given facial asymmetry and patient being unable to provide a history given non-verbal status and advanced dementia. CT scan performed revealed chronic focal infarctions in the left cerebellum and right occipital lobe. Patient was loaded with  mg and was started on daily aspirin and atorvastatin. Confirmed with patient's son however that patient's daytime somnolence and facial asymmetry  "were baseline. Per son, patient is awake at odd hours during the day and asleep most of the day. As patient unable to tolerate MRI repeat CT scan performed showing chronic stroke.     Case discussed Neurology and asa and statin with outpatient Neuro follow-up recommended given chronicity of CVA. It was also recommended that she be on Eliquis however given patient being a fall risk and with evidence of bleeding, this was deferred and will be revisited in the outpatient setting.    Patient also noted to have vaginal bleeding for which TVS was performed. There was some evidence of cystitis for which patient will be discharged home on keflex however she also had TAHBSO. Patient will follow-up with her gynecologist.     Please see above list of diagnoses and related plan for additional information.     Condition at Discharge: stable    Discharge Day Visit / Exam:   Subjective:  Patient seen and examined at bedside. Alert and eating with speech therapist at bedside.     Vitals: Blood Pressure: 147/67 (07/03/24 0837)  Pulse: 81 (07/03/24 0837)  Temperature: 98.4 °F (36.9 °C) (07/03/24 0837)  Temp Source: Temporal (07/03/24 0837)  Respirations: 16 (07/03/24 0837)  Height: 4' 10\" (147.3 cm) (06/30/24 1200)  Weight - Scale: 49 kg (108 lb) (06/30/24 1200)  SpO2: 98 % (07/03/24 0837)  Exam:   Physical Exam  Vitals and nursing note reviewed.   Constitutional:       General: She is not in acute distress.     Appearance: She is well-developed.   HENT:      Head: Normocephalic and atraumatic.   Eyes:      Conjunctiva/sclera: Conjunctivae normal.   Cardiovascular:      Rate and Rhythm: Normal rate and regular rhythm.      Heart sounds: No murmur heard.  Pulmonary:      Effort: Pulmonary effort is normal. No respiratory distress.      Breath sounds: Normal breath sounds.   Abdominal:      Palpations: Abdomen is soft.      Tenderness: There is no abdominal tenderness.   Musculoskeletal:         General: No swelling.      Cervical " back: Neck supple.      Right lower leg: No edema.      Left lower leg: No edema.   Skin:     General: Skin is warm and dry.      Capillary Refill: Capillary refill takes less than 2 seconds.   Neurological:      Mental Status: She is alert. Mental status is at baseline. She is disoriented.      Comments: Nonverbal     Psychiatric:         Mood and Affect: Mood normal.            Discussion with Family: Updated  (son) via phone.    Discharge instructions/Information to patient and family:   See after visit summary for information provided to patient and family.      Provisions for Follow-Up Care:  See after visit summary for information related to follow-up care and any pertinent home health orders.      Mobility at time of Discharge:   Basic Mobility Inpatient Raw Score: 6  JH-HLM Goal: 2: Bed activities/Dependent transfer  JH-HLM Achieved: 1: Laying in bed  HLM Goal NOT achieved. Continue to encourage mobility in post discharge setting.     Disposition:   Home with VNA Services (Reminder: Complete face to face encounter)    Planned Readmission: none     Discharge Statement:  I spent 60 minutes discharging the patient. This time was spent on the day of discharge. I had direct contact with the patient on the day of discharge. Greater than 50% of the total time was spent examining patient, answering all patient questions, arranging and discussing plan of care with patient as well as directly providing post-discharge instructions.  Additional time then spent on discharge activities.    Discharge Medications:  See after visit summary for reconciled discharge medications provided to patient and/or family.      **Please Note: This note may have been constructed using a voice recognition system**

## 2024-07-02 NOTE — ASSESSMENT & PLAN NOTE
EKG NSR  Not on AC at baseline reportedly due to fall risk  Patient also with unexplained vaginal bleeding that has not yet been worked up  See above for AC/AP recommendations

## 2024-07-02 NOTE — PLAN OF CARE
Problem: Potential for Falls  Goal: Patient will remain free of falls  Description: INTERVENTIONS:  - Educate patient/family on patient safety including physical limitations  - Instruct patient to call for assistance with activity   - Consult OT/PT to assist with strengthening/mobility   - Keep Call bell within reach  - Keep bed low and locked with side rails adjusted as appropriate  - Keep care items and personal belongings within reach  - Initiate and maintain comfort rounds  - Make Fall Risk Sign visible to staff  - Offer Toileting every 2 Hours, in advance of need  - Initiate/Maintain bed alarm  - Obtain necessary fall risk management equipment: In place   - Apply yellow socks and bracelet for high fall risk patients  - Consider moving patient to room near nurses station  Outcome: Progressing     Problem: PAIN - ADULT  Goal: Verbalizes/displays adequate comfort level or baseline comfort level  Description: Interventions:  - Encourage patient to monitor pain and request assistance  - Assess pain using appropriate pain scale  - Administer analgesics based on type and severity of pain and evaluate response  - Implement non-pharmacological measures as appropriate and evaluate response  - Consider cultural and social influences on pain and pain management  - Notify physician/advanced practitioner if interventions unsuccessful or patient reports new pain  Outcome: Progressing     Problem: DISCHARGE PLANNING  Goal: Discharge to home or other facility with appropriate resources  Description: INTERVENTIONS:  - Identify barriers to discharge w/patient and caregiver  - Arrange for needed discharge resources and transportation as appropriate  - Identify discharge learning needs (meds, wound care, etc.)  - Arrange for interpretive services to assist at discharge as needed  - Refer to Case Management Department for coordinating discharge planning if the patient needs post-hospital services based on physician/advanced  practitioner order or complex needs related to functional status, cognitive ability, or social support system  Outcome: Progressing     Problem: Knowledge Deficit  Goal: Patient/family/caregiver demonstrates understanding of disease process, treatment plan, medications, and discharge instructions  Description: Complete learning assessment and assess knowledge base.  Interventions:  - Provide teaching at level of understanding  - Provide teaching via preferred learning methods  Outcome: Progressing     Problem: Neurological Deficit  Goal: Neurological status is stable or improving  Description: Interventions:  - Monitor and assess patient's level of consciousness, motor function, sensory function, and level of assistance needed for ADLs.   - Monitor and report changes from baseline. Collaborate with interdisciplinary team to initiate plan and implement interventions as ordered.   - Provide and maintain a safe environment.  - Consider seizure precautions.  - Consider fall precautions.  - Consider aspiration precautions.  - Consider bleeding precautions.  Outcome: Progressing     Problem: Activity Intolerance/Impaired Mobility  Goal: Mobility/activity is maintained at optimum level for patient  Description: Interventions:  - Assess and monitor patient  barriers to mobility and need for assistive/adaptive devices.  - Assess patient's emotional response to limitations.  - Collaborate with interdisciplinary team and initiate plans and interventions as ordered.  - Encourage independent activity per ability.  - Maintain proper body alignment.  - Perform active/passive rom as tolerated/ordered.  - Plan activities to conserve energy.  - Turn patient as appropriate  Outcome: Progressing     Problem: Communication Impairment  Goal: Ability to express needs and understand communication  Description: Assess patient's communication skills and ability to understand information.  Patient will demonstrate use of effective  communication techniques, alternative methods of communication and understanding even if not able to speak.     - Encourage communication and provide alternate methods of communication as needed.  - Collaborate with case management/ for discharge needs.  - Include patient/family/caregiver in decisions related to communication.  Outcome: Progressing     Problem: Potential for Aspiration  Goal: Non-ventilated patient's risk of aspiration is minimized  Description: Assess and monitor vital signs, respiratory status, and labs (WBC).  Monitor for signs of aspiration (tachypnea, cough, rales, wheezing, cyanosis, fever).    - Assess and monitor patient's ability to swallow.  - Place patient up in chair to eat if possible.  - HOB up at 90 degrees to eat if unable to get patient up into chair.  - Supervise patient during oral intake.   - Instruct patient/ family to take small bites.  - Instruct patient/ family to take small single sips when taking liquids.  - Follow patient-specific strategies generated by speech pathologist.  Outcome: Progressing  Goal: Ventilated patient's risk of aspiration is minimized  Description: Assess and monitor vital signs, respiratory status, airway cuff pressure, and labs (WBC).  Monitor for signs of aspiration (tachypnea, cough, rales, wheezing, cyanosis, fever).    - Elevate head of bed 30 degrees if patient has tube feeding.  - Monitor tube feeding.  Outcome: Progressing     Problem: Nutrition  Goal: Nutrition/Hydration status is improving  Description: Monitor and assess patient's nutrition/hydration status for malnutrition (ex- brittle hair, bruises, dry skin, pale skin and conjunctiva, muscle wasting, smooth red tongue, and disorientation). Collaborate with interdisciplinary team and initiate plan and interventions as ordered.  Monitor patient's weight and dietary intake as ordered or per policy. Utilize nutrition screening tool and intervene per policy. Determine patient's  food preferences and provide high-protein, high-caloric foods as appropriate.     - Assist patient with eating.  - Allow adequate time for meals.  - Encourage patient to take dietary supplement as ordered.  - Collaborate with clinical nutritionist.  - Include patient/family/caregiver in decisions related to nutrition.  Outcome: Progressing     Problem: CARDIOVASCULAR - ADULT  Goal: Maintains optimal cardiac output and hemodynamic stability  Description: INTERVENTIONS:  - Monitor I/O, vital signs and rhythm  - Monitor for S/S and trends of decreased cardiac output  - Administer and titrate ordered vasoactive medications to optimize hemodynamic stability  - Assess quality of pulses, skin color and temperature  - Assess for signs of decreased coronary artery perfusion  - Instruct patient to report change in severity of symptoms  Outcome: Progressing  Goal: Absence of cardiac dysrhythmias or at baseline rhythm  Description: INTERVENTIONS:  - Continuous cardiac monitoring, vital signs, obtain 12 lead EKG if ordered  - Administer antiarrhythmic and heart rate control medications as ordered  - Monitor electrolytes and administer replacement therapy as ordered  Outcome: Progressing     Problem: RESPIRATORY - ADULT  Goal: Achieves optimal ventilation and oxygenation  Description: INTERVENTIONS:  - Assess for changes in respiratory status  - Assess for changes in mentation and behavior  - Position to facilitate oxygenation and minimize respiratory effort  - Oxygen administered by appropriate delivery if ordered  - Initiate smoking cessation education as indicated  - Encourage broncho-pulmonary hygiene including cough, deep breathe, Incentive Spirometry  - Assess the need for suctioning and aspirate as needed  - Assess and instruct to report SOB or any respiratory difficulty  - Respiratory Therapy support as indicated  Outcome: Progressing     Problem: SKIN/TISSUE INTEGRITY - ADULT  Goal: Skin Integrity remains intact(Skin  Breakdown Prevention)  Description: Assess:  -Perform Liu assessment every shift   -Clean and moisturize skin every shift (or as needed)   -Inspect skin when repositioning, toileting, and assisting with ADLS  -Assess under medical devices such as pillows every 8 hours   -Assess extremities for adequate circulation and sensation     Bed Management:  -Have minimal linens on bed & keep smooth, unwrinkled  -Change linens as needed when moist or perspiring  -Avoid sitting or lying in one position for more than 2 hours while in bed  -Keep HOB at 30 degrees     Toileting:  -Offer bedside commode  -Assess for incontinence every 2 hours   -Use incontinent care products after each incontinent episode such as barrier powder     Activity:  -Mobilize patient 3 times a day  -Encourage activity and walks on unit  -Encourage or provide ROM exercises   -Turn and reposition patient every 2 Hours  -Use appropriate equipment to lift or move patient in bed  -Instruct/ Assist with weight shifting every 2 hours  when out of bed in chair  -Consider limitation of chair time to 2 hour intervals    Skin Care:  -Avoid use of baby powder, tape, friction and shearing, hot water or constrictive clothing  -Relieve pressure over bony prominences using pillows   -Do not massage red bony areas    Next Steps:  -Teach patient strategies to minimize risks such as pressure injuries    -Consider consults to  interdisciplinary teams such as wound care   Outcome: Progressing  Goal: Pressure injury heals and does not worsen  Description: Interventions:  - Implement low air loss mattress or specialty surface (Criteria met)  - Apply silicone foam dressing  - Instruct/assist with weight shifting every 60 minutes when in chair   - Limit chair time to 2 hour intervals  - Use special pressure reducing interventions such as pillows when in chair   - Apply fecal or urinary incontinence containment device   - Perform passive or active ROM every 8 hours   - Turn  and reposition patient & offload bony prominences every 2 hours   - Utilize friction reducing device or surface for transfers   - Consider consults to  interdisciplinary teams such as wound care   - Use incontinent care products after each incontinent episode such as barrier powder   - Consider nutrition services referral as needed  Outcome: Progressing     Problem: Prexisting or High Potential for Compromised Skin Integrity  Goal: Skin integrity is maintained or improved  Description: INTERVENTIONS:  - Identify patients at risk for skin breakdown  - Assess and monitor skin integrity  - Assess and monitor nutrition and hydration status  - Monitor labs   - Assess for incontinence   - Turn and reposition patient  - Assist with mobility/ambulation  - Relieve pressure over bony prominences  - Avoid friction and shearing  - Provide appropriate hygiene as needed including keeping skin clean and dry  - Evaluate need for skin moisturizer/barrier cream  - Collaborate with interdisciplinary team   - Patient/family teaching  - Consider wound care consult   Outcome: Progressing     Problem: Nutrition/Hydration-ADULT  Goal: Nutrient/Hydration intake appropriate for improving, restoring or maintaining nutritional needs  Description: Monitor and assess patient's nutrition/hydration status for malnutrition. Collaborate with interdisciplinary team and initiate plan and interventions as ordered.  Monitor patient's weight and dietary intake as ordered or per policy. Utilize nutrition screening tool and intervene as necessary. Determine patient's food preferences and provide high-protein, high-caloric foods as appropriate.     INTERVENTIONS:  - Monitor oral intake, urinary output, labs, and treatment plans  - Assess nutrition and hydration status and recommend course of action  - Evaluate amount of meals eaten  - Assist patient with eating if necessary   - Allow adequate time for meals  - Recommend/ encourage appropriate diets, oral  nutritional supplements, and vitamin/mineral supplements  - Order, calculate, and assess calorie counts as needed  - Recommend, monitor, and adjust tube feedings and TPN/PPN based on assessed needs  - Assess need for intravenous fluids  - Provide specific nutrition/hydration education as appropriate  - Include patient/family/caregiver in decisions related to nutrition  Outcome: Progressing

## 2024-07-02 NOTE — UTILIZATION REVIEW
Ceci Obrien RN   Registered Nurse  Specialty: Utilization Review     Utilization Review     Signed     Date of Service: 6/30/2024  4:12 PM     Signed       Expand All Collapse All    Initial Clinical Review     Admission: Date/Time/Statement:   Admission Orders (From admission, onward)          Ordered         06/30/24 0126   Place in Observation  Once                                     Orders Placed This Encounter   Procedures    Place in Observation       Standing Status:   Standing       Number of Occurrences:   1       Order Specific Question:   Level of Care       Answer:   Med Surg [16]      ED Arrival Information         Expected   -    Arrival   6/29/2024 18:46    Acuity   Less Urgent                 Means of arrival   Ambulance    Escorted by   [facility] ([facility])    Service   Hospitalist    Admission type   Emergency                 Arrival complaint   medical prob                     Chief Complaint   Patient presents with    Hand Swelling       Patient in the ED via EMS from home for right hand swelling since yesterday.         Initial Presentation: 89 y.o. female  with a PMH of PMH of PAF, HTN, CKD3, severe dementia, WC bound presented to the ED from  home via EMS w/ R hand swelling x 2 days.  In the ED, pt noted w/ right eye ptosis , poor dentition, R facial droop. no TNK given d/t intermittent vaginal bleeding, last time of well is unknown.   On exam, Right hand is clenched w/difficulty opening w/PROM of phalanges.  Right hand is edematous and generates verbal agitation from patient.  ?slightly decreased  strength in RUE compared to LUE. Disoriented. R ptosis of R eye, R lower facial droop, Thought content and judgment impaired. Behavior is calm but intermittently agitated.   CTA PE study showed moderate pericardial effusion. D-dimer elevated.     Admit as observation level of care for facial asymmetry., edema of RUE, pericardial effusion.  Plan: CTH. Mon on Tele.  Stroke pathway. Check echo. Check venous duplex to r/o RUE DVT. Check xray hand to r/o fracture      06/30  Speech Therapy Notes: Recommending full liq diet; thin liq; take PO/Meds only when fully alert and upright.              ED Triage Vitals [06/29/24 1850]   Temperature Pulse Respirations Blood Pressure SpO2 Pain Score   98.6 °F (37 °C) 90 16 139/90 96 % No Pain      Weight (last 2 days)         Date/Time Weight     06/30/24 1200 49 (108)     06/30/24 0300 49.2 (108.47)     06/29/24 1850 51 (112.43)                Vital Signs (last 3 days)         Date/Time Temp Pulse Resp BP MAP (mmHg) SpO2 O2 Device Patient Position - Orthostatic VS [place] Coma Scale Score Pain     06/30/24 1200 -- 99 -- 163/69 -- -- -- -- -- --     06/30/24 1118 -- -- -- 163/69 105 97 % -- Sitting -- --     06/30/24 1100 -- -- -- -- -- -- -- -- 14 --     06/30/24 0900 97.8 °F (36.6 °C) 99 20 180/78 -- 97 % None (Room air) Lying -- --     06/30/24 0800 98.2 °F (36.8 °C) 89 20 180/76 109 95 % None (Room air) Lying -- --     06/30/24 0700 98.4 °F (36.9 °C) 72 20 189/79 113 98 % None (Room air) Lying 14 No Pain     06/30/24 0600 98.1 °F (36.7 °C) 79 22 179/75 -- 97 % None (Room air) Lying -- --     06/30/24 0503 -- -- -- 182/64 -- -- -- Lying -- --     06/30/24 0430 -- -- -- 208/85 -- -- -- -- -- --     06/30/24 0325 -- -- -- 208/62 -- -- -- Lying -- --     06/30/24 0315 -- -- -- -- -- -- -- -- 12 --     06/30/24 0310 -- -- -- 202/78 -- -- -- Lying -- --     06/30/24 0300 97.8 °F (36.6 °C) 68 18 206/86 124 97 % None (Room air) Lying -- --     06/30/24 0103 -- 60 16 118/55 -- 93 % None (Room air) Lying -- No Pain     06/29/24 2212 -- -- -- -- -- -- -- -- -- No Pain     06/29/24 2201 98 °F (36.7 °C) 59 16 125/59 85 96 % None (Room air) Lying -- No Pain     06/29/24 1949 -- 52 16 157/65 94 97 % None (Room air) Lying -- No Pain     06/29/24 1919 -- 60 -- 143/83 110 97 % -- -- -- --     06/29/24 1904 -- -- -- -- -- -- None (Room air) -- -- --      06/29/24 1901 -- -- -- -- -- -- None (Room air) Lying 14 No Pain     06/29/24 1850 98.6 °F (37 °C) 90 16 139/90 -- 96 % None (Room air) Lying -- No Pain                   Pertinent Labs/Diagnostic Test Results:   Radiology:  CT head wo contrast   Final Interpretation by Pallav N Shah, MD (06/30 0711)       No acute intracranial abnormality.       Generalized atrophy and mild to moderate chronic microangiopathic changes.       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                       Workstation performed: [id number]           CTA ED chest PE study   ED Interpretation by Glen Real PA-C (06/30 0128)   FINDINGS: Pulmonary arteries: Normal. No pulmonary emboli. Aorta: Unremarkable. No aortic aneurysm. No aortic dissection. Lungs: The lungs demonstrate patchy areas of groundglass opacities in bilateral upper lobes. This appearance is nonspecific in etiology (may represent areas of microatelectasis, mild/early pulmonary edema (including the possibility of noncardiogenic pulmonary edema or overhydration), versus early/developing infectious/inflammatory pneumonia/pneumonitis). Small pneumatocele noted on the left. Pleural spaces: Unremarkable. No pneumothorax. No pleural effusion. Heart: Moderate-sized pericardial effusion. Lymph nodes: Unremarkable. No enlarged lymph nodes. Bones/joints: Unremarkable. No acute fracture. Soft tissues: Unremarkable. IMPRESSION: 1. Moderate-sized pericardial effusion. 2. Ground-glass lung opacities; please see DDx above     Dictated and Authenticated by: Juan Dale MD 06/30/2024 12:19 AM Eastern Time ([place] & [place]          Final Interpretation by Peyman Campos MD (06/30 0801)       Cardiomegaly with small pericardial effusion. See echocardiogram report when available.       No acute pulmonary findings.       The major findings are in agreement with the preliminary report provided by [facility] which was provided shortly after  completion of the exam.           Workstation performed: [id number]           XR hand 3+ vw right    (Results Pending)   VAS carotid complete study    (Results Pending)   VAS upper limb venous duplex scan, unilateral/limited    (Results Pending)   MRI inpatient order    (Results Pending)      Cardiology:  Echo complete w/ contrast if indicated   Final Result by Osito Maravilla MD (06/30 5180)   Technically difficult transthoracic echocardiogram study due to restricted    mobility of patient, poor echo penetration and compliance of patient.       Mild concentric left ventricular hypertrophy, normal left ventricular    systolic function.  Ejection fraction is estimated around 60% or greater.     Indeterminate diastolic dysfunction grade.   Normal right ventricular size and systolic function.   Normal left and right atrial cavity size.   Aortic valve sclerosis, no aortic valve stenosis or regurgitation.   Mitral valve annular calcification.  Mitral valve leaflet sclerosis, trace    mitral valve regurgitation.   Trace tricuspid valve regurgitation.   No obvious pulmonary hypertension.   Varying degrees of trace to small circumferential pericardial effusion    without echocardiographic evidence of cardiac tamponade.       Compared to report of previous echocardiogram from 4/19/2019 pericardial    effusion finding is new.  Previously noted mild to moderate aortic valve    regurgitation is not currently appreciable.           ECG 12 lead   Final Result by Yusef Brandt DO (06/30 0636)   Normal sinus rhythm with sinus arrhythmia   Left ventricular hypertrophy with QRS widening   T wave abnormality, consider inferolateral ischemia   Prolonged QT   Abnormal ECG   When compared with ECG of 01-FEB-2024 13:35,   Significant changes have occurred   Confirmed by Yusef Brandt ([id number]) on 6/30/2024 6:36:00 AM          GI:  No orders to display              Results from last 7 days   Lab Units 06/30/24  0147   SARS-COV-2    Negative            Results from last 7 days   Lab Units 06/30/24  0627 06/29/24 2031   WBC Thousand/uL  --  5.18   HEMOGLOBIN g/dL  --  10.6*   HEMATOCRIT %  --  33.1*   PLATELETS Thousands/uL 194 189   TOTAL NEUT ABS Thousands/µL  --  3.29               Results from last 7 days   Lab Units 06/29/24 2031   SODIUM mmol/L 142   POTASSIUM mmol/L 4.9   CHLORIDE mmol/L 108   CO2 mmol/L 31   ANION GAP mmol/L 3*   BUN mg/dL 38*   CREATININE mg/dL 1.20   EGFR ml/min/1.73sq m 39   CALCIUM mg/dL 9.2           Results from last 7 days   Lab Units 06/29/24 2031   AST U/L 16   ALT U/L 8   ALK PHOS U/L 93   TOTAL PROTEIN g/dL 6.1*   ALBUMIN g/dL 3.6   TOTAL BILIRUBIN mg/dL 0.38               Results from last 7 days   Lab Units 06/29/24 2031   GLUCOSE RANDOM mg/dL 97               Results from last 7 days   Lab Units 06/30/24 0627   HEMOGLOBIN A1C % 5.0   EAG mg/dl 97               Results from last 7 days   Lab Units 06/29/24  2031   D-DIMER QUANTITATIVE ug/ml FEU 1.86*                   Results from last 7 days   Lab Units 06/30/24  0627   PROCALCITONIN ng/ml 0.07                       Results from last 7 days   Lab Units 06/30/24  0147   BNP pg/mL 118*                   Results from last 7 days   Lab Units 06/30/24  0147   INFLUENZA A PCR   Negative   INFLUENZA B PCR   Negative   RSV PCR   Negative             ED Treatment-Medication Administration from 06/29/2024 1846 to 06/30/2024 0300           Date/Time Order Dose Route Action       06/29/2024 2203 iohexol (OMNIPAQUE) 350 MG/ML injection (MULTI-DOSE) 70 mL 70 mL Intravenous Given                Medical History        Past Medical History:   Diagnosis Date    Anxiety      Closed fracture of right orbital floor (HCC) 08/23/2020    Cognitive impairment      COVID-19 virus infection 04/13/2020    Dementia ([facility])      Depression      Hallucination      Hyperlipidemia      Hypertension      Memory loss      Psychiatric illness      Psychosis ([facility])      Retention of urine       Schizoaffective disorder (HCC)      Sleep difficulties      Urinary tract infection           Present on Admission:   Severe dementia with agitation (HCC)   PAF (paroxysmal atrial fibrillation) (HCC)   Oropharyngeal dysphagia   Stage 3b chronic kidney disease (HCC)   Primary hypertension        Admitting Diagnosis: Hand edema [R60.0]  Age/Sex: 89 y.o. female  Admission Orders:  SCD  PT/OT/ST  Baseline NIH stroke scale on admission, reassess Q24H x 2 D, Nursing dysphagia screen prior to staring diet, neuro checks.   Tele     Scheduled Medications:    Scheduled Medications ONLY (does not pull in infusions nor PRN medications order   amiodarone, 200 mg, Oral, Daily  [START ON 7/1/2024] aspirin, 81 mg, Oral, Daily  aspirin, 300 mg, Rectal, Once  atorvastatin, 40 mg, Oral, QPM  diltiazem, 30 mg, Oral, TID  [START ON 7/1/2024] heparin (porcine), 5,000 Units, Subcutaneous, Q8H LYNDSEY  metoprolol tartrate, 25 mg, Oral, BID         Continuous IV Infusions: none  Infusions Meds - Displays dose, route, & frequency only         PRN Meds:  PRN Medications - displays dose, route, and frequency            IP CONSULT TO CASE MANAGEMENT  IP CONSULT TO NUTRITION SERVICES     Network Utilization Review Department  ATTENTION: Please call with any questions or concerns to [phone] and carefully listen to the prompts so that you are directed to the right person. All voicemails are confidential.   For Discharge needs, contact Care Management DC Support Team at [phone] opt. [phone]  Send all requests for admission clinical reviews, approved or denied determinations and any other requests to dedicated fax number below belonging to the campus where the patient is receiving treatment. List of dedicated fax numbers for the Facilities:  FACILITY NAME UR FAX NUMBER   ADMISSION DENIALS (Administrative/Medical Necessity) [phone]   DISCHARGE SUPPORT TEAM (NETWORK) [phone]   PARENT CHILD HEALTH (Maternity/NICU/Pediatrics) [phone]    [facility] [phone]   [facility] [phone]   [facility] [phone]   [facility] [phone]   [facility] [phone]   [facility] [phone]   [facility] [phone]   [facility] [phone]   [facility] [phone]   [facility] [phone]   [facility] [phone]   [facility] [phone]

## 2024-07-02 NOTE — CASE MANAGEMENT
Case Management Discharge Planning Note    Patient name Denia Castañeda  Location [facility] 4 /E4 -* MRN [id number]  : 1931 Date 2024       Current Admission Date: 2024  Current Admission Diagnosis:Facial asymmetry   Patient Active Problem List    Diagnosis Date Noted Date Diagnosed    Stroke (cerebrum) (HCC) 2024     Edema of right upper extremity 2024     Pericardial effusion 2024     Facial asymmetry 2024     Back pain 2023     At risk for elder abuse 2023     Urinary retention 2022     Edema of right ankle 10/04/2022     Flank pain 10/18/2021     Oropharyngeal dysphagia 2021     Normocytic anemia 2021     H/O fracture of right hip 07/15/2021     Stage 3b chronic kidney disease (HCC) 07/15/2021     Age-related osteoporosis without current pathological fracture 2020     Moderate protein-calorie malnutrition ([facility]) 2020     Severe dementia with agitation ([facility]) 2020     Urinary incontinence 2020     Ambulatory dysfunction 04/10/2020     h/o OMEGA (acute kidney injury) ([facility]) 04/10/2020     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     PAF (paroxysmal atrial fibrillation) ([facility]) 2019     Bilateral hearing loss 10/11/2018     Personal history of fall 10/11/2018     Primary hypertension 2018     Hyperlipidemia 2018     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[facility]) 2018       LOS (days): 2  Geometric Mean LOS (GMLOS) (days):   Days to GMLOS:     OBJECTIVE:  Risk of Unplanned Readmission Score: 18.01         Current admission status: Inpatient   Preferred Pharmacy:   [facility] - [place] - [address]  [address] [address]  Phone: [phone] Fax: [phone]    [facility]  [place]  [address],  [address],  First [address] [facility] [address]  Phone: [phone] Fax: [phone]    [id number] [place] - [address]  [address] [facility] [address] [address]  Phone: [phone] Fax: [phone]    Primary Care Provider: Jose M Castañeda MD    Primary Insurance: [employer]ICARE [facility]  Secondary Insurance: [facility]    DISCHARGE DETAILS:    Discharge planning discussed with:: Son, HH agencyu  Freedom of Choice: Yes        Were Treatment Team discharge recommendations reviewed with patient/caregiver?: Yes  Did patient/caregiver verbalize understanding of patient care needs?: Yes  Were patient/caregiver advised of the risks associated with not following Treatment Team discharge recommendations?: Yes    Contacts  Patient Contacts: Logan Stoner (Shahriar) [phone]  Relationship to Patient:: Family  Contact Method: In Person, Phone  Phone Number: Logan Stoner (Shahriar) [phone]  Reason/Outcome: Continuity of Care, Emergency Contact, Referral, Discharge Planning    Requested Home Health Care         Is the patient interested in HHC at discharge?: No    DME Referral Provided  Referral made for DME?: No         Would you like to participate in our [facility] service program?  : No - Declined    Treatment Team Recommendation: Home (With 24/7 HH aides)  Discharge Destination Plan:: Home (With 24/7 HH aides)  Transport at Discharge : BLS Ambulance                    CM notified by [facility] that patient is no longer medically stable for DC.  When transportation company arrived patient had bleeding, transportation company planned to return at 2pm however patient will need to remain overnight for additional testing.     CM contacted [employer] and informed them that transportation needs to be cancelled for today. CM unable to cancel trip via [employer] as it is after scheduled time.     CM placed PC to patient's son, no answer.  CM left voicemail requesting call, patient requires additional testing and will remain inpatient overnight.    CM placed [facility] to [facility] [phone], CM explained patient will  now remain inpatient overnight.  Caregiver will be oncall for potential DC tomorrow.  CM should call [facility] in the morning.  CM will continue to follow for DC coordination.

## 2024-07-02 NOTE — ASSESSMENT & PLAN NOTE
Conservative management of delirium - minimize noise, maintain day/night cycle, provide frequent redirection, avoid restraints if possible, etc.

## 2024-07-03 ENCOUNTER — APPOINTMENT (INPATIENT)
Dept: ULTRASOUND IMAGING | Facility: HOSPITAL | Age: 89
DRG: 065 | End: 2024-07-03
Payer: MEDICARE

## 2024-07-03 VITALS
RESPIRATION RATE: 16 BRPM | HEIGHT: 58 IN | SYSTOLIC BLOOD PRESSURE: 147 MMHG | OXYGEN SATURATION: 98 % | DIASTOLIC BLOOD PRESSURE: 67 MMHG | WEIGHT: 108 LBS | HEART RATE: 81 BPM | BODY MASS INDEX: 22.67 KG/M2 | TEMPERATURE: 98.4 F

## 2024-07-03 PROBLEM — N30.90 CYSTITIS: Status: ACTIVE | Noted: 2024-07-03

## 2024-07-03 LAB
ANION GAP SERPL CALCULATED.3IONS-SCNC: 8 MMOL/L (ref 4–13)
BUN SERPL-MCNC: 38 MG/DL (ref 5–25)
CALCIUM SERPL-MCNC: 8.9 MG/DL (ref 8.4–10.2)
CHLORIDE SERPL-SCNC: 111 MMOL/L (ref 96–108)
CO2 SERPL-SCNC: 28 MMOL/L (ref 21–32)
CREAT SERPL-MCNC: 1.48 MG/DL (ref 0.6–1.3)
ERYTHROCYTE [DISTWIDTH] IN BLOOD BY AUTOMATED COUNT: 14.2 % (ref 11.6–15.1)
GFR SERPL CREATININE-BSD FRML MDRD: 30 ML/MIN/1.73SQ M
GLUCOSE SERPL-MCNC: 108 MG/DL (ref 65–140)
HCT VFR BLD AUTO: 31.6 % (ref 34.8–46.1)
HGB BLD-MCNC: 10.1 G/DL (ref 11.5–15.4)
MCH RBC QN AUTO: 29.4 PG (ref 26.8–34.3)
MCHC RBC AUTO-ENTMCNC: 32 G/DL (ref 31.4–37.4)
MCV RBC AUTO: 92 FL (ref 82–98)
PLATELET # BLD AUTO: 217 THOUSANDS/UL (ref 149–390)
PMV BLD AUTO: 10.5 FL (ref 8.9–12.7)
POTASSIUM SERPL-SCNC: 3.6 MMOL/L (ref 3.5–5.3)
RBC # BLD AUTO: 3.44 MILLION/UL (ref 3.81–5.12)
SODIUM SERPL-SCNC: 147 MMOL/L (ref 135–147)
WBC # BLD AUTO: 4.16 THOUSAND/UL (ref 4.31–10.16)

## 2024-07-03 PROCEDURE — 76830 TRANSVAGINAL US NON-OB: CPT

## 2024-07-03 PROCEDURE — 80048 BASIC METABOLIC PNL TOTAL CA: CPT | Performed by: STUDENT IN AN ORGANIZED HEALTH CARE EDUCATION/TRAINING PROGRAM

## 2024-07-03 PROCEDURE — 76856 US EXAM PELVIC COMPLETE: CPT

## 2024-07-03 PROCEDURE — 85027 COMPLETE CBC AUTOMATED: CPT | Performed by: STUDENT IN AN ORGANIZED HEALTH CARE EDUCATION/TRAINING PROGRAM

## 2024-07-03 PROCEDURE — 99239 HOSP IP/OBS DSCHRG MGMT >30: CPT | Performed by: STUDENT IN AN ORGANIZED HEALTH CARE EDUCATION/TRAINING PROGRAM

## 2024-07-03 PROCEDURE — 92526 ORAL FUNCTION THERAPY: CPT

## 2024-07-03 RX ORDER — POTASSIUM CHLORIDE 20MEQ/15ML
40 LIQUID (ML) ORAL ONCE
Status: COMPLETED | OUTPATIENT
Start: 2024-07-03 | End: 2024-07-03

## 2024-07-03 RX ORDER — CEPHALEXIN 500 MG/1
500 CAPSULE ORAL EVERY 6 HOURS SCHEDULED
Qty: 20 CAPSULE | Refills: 0 | Status: SHIPPED | OUTPATIENT
Start: 2024-07-03 | End: 2024-07-08

## 2024-07-03 RX ADMIN — HEPARIN SODIUM 5000 UNITS: 5000 INJECTION INTRAVENOUS; SUBCUTANEOUS at 13:29

## 2024-07-03 RX ADMIN — ASPIRIN 81 MG: 81 TABLET, COATED ORAL at 08:36

## 2024-07-03 RX ADMIN — DILTIAZEM HYDROCHLORIDE 30 MG: 30 TABLET, FILM COATED ORAL at 08:37

## 2024-07-03 RX ADMIN — METOPROLOL TARTRATE 25 MG: 25 TABLET, FILM COATED ORAL at 08:37

## 2024-07-03 RX ADMIN — HEPARIN SODIUM 5000 UNITS: 5000 INJECTION INTRAVENOUS; SUBCUTANEOUS at 05:01

## 2024-07-03 RX ADMIN — AMIODARONE HYDROCHLORIDE 200 MG: 200 TABLET ORAL at 08:36

## 2024-07-03 RX ADMIN — POTASSIUM CHLORIDE 40 MEQ: 1.5 SOLUTION ORAL at 08:35

## 2024-07-03 NOTE — CASE MANAGEMENT
Case Management Discharge Planning Note    Patient name eDnia Castañeda  Location [facility] 4 /E4 -* MRN [id number]  : 1931 Date 7/3/2024       Current Admission Date: 2024  Current Admission Diagnosis:Facial asymmetry   Patient Active Problem List    Diagnosis Date Noted Date Diagnosed    Stroke (cerebrum) (HCC) 2024     Vaginal bleeding 2024     Edema of right upper extremity 2024     Pericardial effusion 2024     Facial asymmetry 2024     Back pain 2023     At risk for elder abuse 2023     Urinary retention 2022     Edema of right ankle 10/04/2022     Flank pain 10/18/2021     Oropharyngeal dysphagia 2021     Normocytic anemia 2021     H/O fracture of right hip 07/15/2021     Stage 3b chronic kidney disease (HCC) 07/15/2021     Age-related osteoporosis without current pathological fracture 2020     Moderate protein-calorie malnutrition ([facility]) 2020     Severe dementia with agitation ([facility]) 2020     Urinary incontinence 2020     Ambulatory dysfunction 04/10/2020     h/o OMEGA (acute kidney injury) ([facility]) 04/10/2020     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     PAF (paroxysmal atrial fibrillation) ([facility]) 2019     Bilateral hearing loss 10/11/2018     Personal history of fall 10/11/2018     Primary hypertension 2018     Hyperlipidemia 2018     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  [address] [address]  Phone: [phone] Fax: [phone]    [facility] [place] - [address],  [address],  First [address] [facility] [address]  Phone: [phone] Fax: [phone]    [id number] [place] [address]  [address] [facility] [address]  Phone: [phone] Fax: [phone]    Primary Care Provider: Jose M Castañeda MD    Primary Insurance: [employer] MEDICARE [facility]  Secondary Insurance: [facility]    DISCHARGE DETAILS:    Discharge planning discussed with:: Son, HH agency  Freedom of Choice: Yes  Comments - Freedom of Choice: Home with resumption of 24/7 caregivers  CM contacted family/caregiver?: Yes  Were Treatment Team discharge recommendations reviewed with patient/caregiver?: Yes  Did patient/caregiver verbalize understanding of patient care needs?: Yes  Were patient/caregiver advised of the risks associated with not following Treatment Team discharge recommendations?: Yes    Contacts  Patient Contacts: Logan Stoner (Shahriar) [phone]  Relationship to Patient:: Family  Contact Method: In Person, Phone  Phone Number: Logan Stoner (Shahriar) [phone]  Reason/Outcome: Continuity of Care, Emergency Contact, Referral, Discharge Planning    Requested Home Health Care         Is the patient interested in HHC at discharge?: No    DME Referral Provided  Referral made for DME?: No    Other Referral/Resources/Interventions Provided:  Interventions: Transportation         Treatment Team Recommendation: Home (With 24/7 HH aides)  Discharge Destination Plan:: Home (24/7 HH aides)  Transport at Discharge : [facility]LS Ambulance  Dispatcher Contacted: Yes  Number/Name of Dispatcher: Contacted via [employer]  Transported by (Company and Unit #): [facility]  ETA of Transport (Date): 07/03/24  ETA of Transport (Time): 1640              IMM Given (Date):: 07/03/24  IMM Given to:: Family  Family notified:: Reviewed with patient's son via PC       IMM reviewed with patient's caregiver, patient's caregiver agrees with discharge determination.    CM placed PC to patient's son, patient will be cleared for DC later today. Patient's son will be  home to let in HH aides.      CM arranged BLS transportation via [employer].  [employer] claimed the ride for 7.3.24 at 4:40pm.  CM made SLIM, RN, and patient's son aware.  [place] form placed in patient's chart.    CM placed PC to [facility] [phone], patient's HH aide will be at the home by 4:30pm to resume care.  CM department remains available for DC coordination.

## 2024-07-03 NOTE — ASSESSMENT & PLAN NOTE
"Chronic per family  Mental status at baseline  CTH performed showing  \"Stable subacute to chronic right occipital lobe infarct. Stable chronic left cerebellar lacunar infarct. Chronic microangiopathic changes.\"  Continue ASA and statin on discharge given evidence of chronic CVA  Ambulatory referral to Neurology placed for further Neuro work-up in the outpatient setting as findings are chronic  Per Neurology patient should be on Eliquis however given vaginal bleeding, further discussion can be had in the outpatient setting per discussion with son    "

## 2024-07-03 NOTE — PLAN OF CARE
Problem: Potential for Falls  Goal: Patient will remain free of falls  Description: INTERVENTIONS:  - Educate patient/family on patient safety including physical limitations  - Instruct patient to call for assistance with activity   - Consult OT/PT to assist with strengthening/mobility   - Keep Call bell within reach  - Keep bed low and locked with side rails adjusted as appropriate  - Keep care items and personal belongings within reach  - Initiate and maintain comfort rounds  - Make Fall Risk Sign visible to staff  - Apply yellow socks and bracelet for high fall risk patients  - Consider moving patient to room near nurses station  Outcome: Progressing     Problem: PAIN - ADULT  Goal: Verbalizes/displays adequate comfort level or baseline comfort level  Description: Interventions:  - Encourage patient to monitor pain and request assistance  - Assess pain using appropriate pain scale  - Administer analgesics based on type and severity of pain and evaluate response  - Implement non-pharmacological measures as appropriate and evaluate response  - Consider cultural and social influences on pain and pain management  - Notify physician/advanced practitioner if interventions unsuccessful or patient reports new pain  Outcome: Progressing     Problem: DISCHARGE PLANNING  Goal: Discharge to home or other facility with appropriate resources  Description: INTERVENTIONS:  - Identify barriers to discharge w/patient and caregiver  - Arrange for needed discharge resources and transportation as appropriate  - Identify discharge learning needs (meds, wound care, etc.)  - Arrange for interpretive services to assist at discharge as needed  - Refer to Case Management Department for coordinating discharge planning if the patient needs post-hospital services based on physician/advanced practitioner order or complex needs related to functional status, cognitive ability, or social support system  Outcome: Progressing     Problem:  Knowledge Deficit  Goal: Patient/family/caregiver demonstrates understanding of disease process, treatment plan, medications, and discharge instructions  Description: Complete learning assessment and assess knowledge base.  Interventions:  - Provide teaching at level of understanding  - Provide teaching via preferred learning methods  Outcome: Progressing     Problem: Neurological Deficit  Goal: Neurological status is stable or improving  Description: Interventions:  - Monitor and assess patient's level of consciousness, motor function, sensory function, and level of assistance needed for ADLs.   - Monitor and report changes from baseline. Collaborate with interdisciplinary team to initiate plan and implement interventions as ordered.   - Provide and maintain a safe environment.  - Consider seizure precautions.  - Consider fall precautions.  - Consider aspiration precautions.  - Consider bleeding precautions.  Outcome: Progressing     Problem: Prexisting or High Potential for Compromised Skin Integrity  Goal: Skin integrity is maintained or improved  Description: INTERVENTIONS:  - Identify patients at risk for skin breakdown  - Assess and monitor skin integrity  - Assess and monitor nutrition and hydration status  - Monitor labs   - Assess for incontinence   - Turn and reposition patient  - Assist with mobility/ambulation  - Relieve pressure over bony prominences  - Avoid friction and shearing  - Provide appropriate hygiene as needed including keeping skin clean and dry  - Evaluate need for skin moisturizer/barrier cream  - Collaborate with interdisciplinary team   - Patient/family teaching  - Consider wound care consult   Outcome: Progressing

## 2024-07-03 NOTE — ASSESSMENT & PLAN NOTE
Lab Results   Component Value Date    EGFR 30 07/03/2024    EGFR 30 07/02/2024    EGFR 39 06/29/2024    CREATININE 1.48 (H) 07/03/2024    CREATININE 1.47 (H) 07/02/2024    CREATININE 1.20 06/29/2024   Creatinine near baseline

## 2024-07-03 NOTE — PLAN OF CARE
Problem: Potential for Falls  Goal: Patient will remain free of falls  Description: INTERVENTIONS:  - Educate patient/family on patient safety including physical limitations  - Instruct patient to call for assistance with activity   - Consult OT/PT to assist with strengthening/mobility   - Keep Call bell within reach  - Keep bed low and locked with side rails adjusted as appropriate  - Keep care items and personal belongings within reach  - Initiate and maintain comfort rounds  - Make Fall Risk Sign visible to staff  - Offer Toileting every 3 Hours, in advance of need  - Initiate/Maintain bed alarm  - Obtain necessary fall risk management equipment:   - Apply yellow socks and bracelet for high fall risk patients  - Consider moving patient to room near nurses station  Outcome: Progressing     Problem: PAIN - ADULT  Goal: Verbalizes/displays adequate comfort level or baseline comfort level  Description: Interventions:  - Encourage patient to monitor pain and request assistance  - Assess pain using appropriate pain scale  - Administer analgesics based on type and severity of pain and evaluate response  - Implement non-pharmacological measures as appropriate and evaluate response  - Consider cultural and social influences on pain and pain management  - Notify physician/advanced practitioner if interventions unsuccessful or patient reports new pain  Outcome: Progressing     Problem: DISCHARGE PLANNING  Goal: Discharge to home or other facility with appropriate resources  Description: INTERVENTIONS:  - Identify barriers to discharge w/patient and caregiver  - Arrange for needed discharge resources and transportation as appropriate  - Identify discharge learning needs (meds, wound care, etc.)  - Arrange for interpretive services to assist at discharge as needed  - Refer to Case Management Department for coordinating discharge planning if the patient needs post-hospital services based on physician/advanced practitioner  order or complex needs related to functional status, cognitive ability, or social support system  Outcome: Progressing     Problem: Knowledge Deficit  Goal: Patient/family/caregiver demonstrates understanding of disease process, treatment plan, medications, and discharge instructions  Description: Complete learning assessment and assess knowledge base.  Interventions:  - Provide teaching at level of understanding  - Provide teaching via preferred learning methods  Outcome: Progressing     Problem: Neurological Deficit  Goal: Neurological status is stable or improving  Description: Interventions:  - Monitor and assess patient's level of consciousness, motor function, sensory function, and level of assistance needed for ADLs.   - Monitor and report changes from baseline. Collaborate with interdisciplinary team to initiate plan and implement interventions as ordered.   - Provide and maintain a safe environment.  - Consider seizure precautions.  - Consider fall precautions.  - Consider aspiration precautions.  - Consider bleeding precautions.  Outcome: Progressing     Problem: Activity Intolerance/Impaired Mobility  Goal: Mobility/activity is maintained at optimum level for patient  Description: Interventions:  - Assess and monitor patient  barriers to mobility and need for assistive/adaptive devices.  - Assess patient's emotional response to limitations.  - Collaborate with interdisciplinary team and initiate plans and interventions as ordered.  - Encourage independent activity per ability.  - Maintain proper body alignment.  - Perform active/passive rom as tolerated/ordered.  - Plan activities to conserve energy.  - Turn patient as appropriate  Outcome: Progressing     Problem: Communication Impairment  Goal: Ability to express needs and understand communication  Description: Assess patient's communication skills and ability to understand information.  Patient will demonstrate use of effective communication  techniques, alternative methods of communication and understanding even if not able to speak.     - Encourage communication and provide alternate methods of communication as needed.  - Collaborate with case management/ for discharge needs.  - Include patient/family/caregiver in decisions related to communication.  Outcome: Progressing     Problem: Potential for Aspiration  Goal: Non-ventilated patient's risk of aspiration is minimized  Description: Assess and monitor vital signs, respiratory status, and labs (WBC).  Monitor for signs of aspiration (tachypnea, cough, rales, wheezing, cyanosis, fever).    - Assess and monitor patient's ability to swallow.  - Place patient up in chair to eat if possible.  - HOB up at 90 degrees to eat if unable to get patient up into chair.  - Supervise patient during oral intake.   - Instruct patient/ family to take small bites.  - Instruct patient/ family to take small single sips when taking liquids.  - Follow patient-specific strategies generated by speech pathologist.  Outcome: Progressing  Goal: Ventilated patient's risk of aspiration is minimized  Description: Assess and monitor vital signs, respiratory status, airway cuff pressure, and labs (WBC).  Monitor for signs of aspiration (tachypnea, cough, rales, wheezing, cyanosis, fever).    - Elevate head of bed 30 degrees if patient has tube feeding.  - Monitor tube feeding.  Outcome: Progressing     Problem: Nutrition  Goal: Nutrition/Hydration status is improving  Description: Monitor and assess patient's nutrition/hydration status for malnutrition (ex- brittle hair, bruises, dry skin, pale skin and conjunctiva, muscle wasting, smooth red tongue, and disorientation). Collaborate with interdisciplinary team and initiate plan and interventions as ordered.  Monitor patient's weight and dietary intake as ordered or per policy. Utilize nutrition screening tool and intervene per policy. Determine patient's food  preferences and provide high-protein, high-caloric foods as appropriate.     - Assist patient with eating.  - Allow adequate time for meals.  - Encourage patient to take dietary supplement as ordered.  - Collaborate with clinical nutritionist.  - Include patient/family/caregiver in decisions related to nutrition.  Outcome: Progressing     Problem: CARDIOVASCULAR - ADULT  Goal: Maintains optimal cardiac output and hemodynamic stability  Description: INTERVENTIONS:  - Monitor I/O, vital signs and rhythm  - Monitor for S/S and trends of decreased cardiac output  - Administer and titrate ordered vasoactive medications to optimize hemodynamic stability  - Assess quality of pulses, skin color and temperature  - Assess for signs of decreased coronary artery perfusion  - Instruct patient to report change in severity of symptoms  Outcome: Progressing  Goal: Absence of cardiac dysrhythmias or at baseline rhythm  Description: INTERVENTIONS:  - Continuous cardiac monitoring, vital signs, obtain 12 lead EKG if ordered  - Administer antiarrhythmic and heart rate control medications as ordered  - Monitor electrolytes and administer replacement therapy as ordered  Outcome: Progressing     Problem: RESPIRATORY - ADULT  Goal: Achieves optimal ventilation and oxygenation  Description: INTERVENTIONS:  - Assess for changes in respiratory status  - Assess for changes in mentation and behavior  - Position to facilitate oxygenation and minimize respiratory effort  - Oxygen administered by appropriate delivery if ordered  - Initiate smoking cessation education as indicated  - Encourage broncho-pulmonary hygiene including cough, deep breathe, Incentive Spirometry  - Assess the need for suctioning and aspirate as needed  - Assess and instruct to report SOB or any respiratory difficulty  - Respiratory Therapy support as indicated  Outcome: Progressing     Problem: SKIN/TISSUE INTEGRITY - ADULT  Goal: Skin Integrity remains intact(Skin  Breakdown Prevention)  Description: Assess:  -Perform Liu assessment every shift   -Clean and moisturize skin   -Inspect skin when repositioning, toileting, and assisting with ADLS  -Assess under medical devices   -Assess extremities for adequate circulation and sensation     Bed Management:  -Have minimal linens on bed & keep smooth, unwrinkled  -Change linens as needed when moist or perspiring  -Avoid sitting or lying in one position for more than 2 hours while in bed  -Keep HOB at 45 degrees     Toileting:  -Offer bedside commode  -Assess for incontinence every 2 hours  -Use incontinent care products after each incontinent episode     Activity:  -Mobilize patient 3 times a day  -Encourage activity and walks on unit  -Encourage or provide ROM exercises   -Turn and reposition patient every 2 Hours  -Use appropriate equipment to lift or move patient in bed  -Instruct/ Assist with weight shifting every 2 hours when out of bed in chair  -Consider limitation of chair time 3 hour intervals    Skin Care:  -Avoid use of baby powder, tape, friction and shearing, hot water or constrictive clothing  -Relieve pressure over bony prominences using pillows and wedges and allevyn   -Do not massage red bony areas    Next Steps:  -Teach patient strategies to minimize risks   -Consider consults to  interdisciplinary teams   Outcome: Progressing  Goal: Pressure injury heals and does not worsen  Description: Interventions:  - Implement low air loss mattress or specialty surface (Criteria met)  - Apply silicone foam dressing  - Instruct/assist with weight shifting every 30 minutes when in chair   - Limit chair time to 3 hour intervals  - Use special pressure reducing interventions when in chair   - Apply fecal or urinary incontinence containment device   - Perform passive or active ROM   - Turn and reposition patient & offload bony prominences every 2 hours   - Utilize friction reducing device or surface for transfers   - Consider  consults to  interdisciplinary teams  - Use incontinent care products after each incontinent episode  - Consider nutrition services referral as needed  Outcome: Progressing     Problem: Prexisting or High Potential for Compromised Skin Integrity  Goal: Skin integrity is maintained or improved  Description: INTERVENTIONS:  - Identify patients at risk for skin breakdown  - Assess and monitor skin integrity  - Assess and monitor nutrition and hydration status  - Monitor labs   - Assess for incontinence   - Turn and reposition patient  - Assist with mobility/ambulation  - Relieve pressure over bony prominences  - Avoid friction and shearing  - Provide appropriate hygiene as needed including keeping skin clean and dry  - Evaluate need for skin moisturizer/barrier cream  - Collaborate with interdisciplinary team   - Patient/family teaching  - Consider wound care consult   Outcome: Progressing     Problem: Nutrition/Hydration-ADULT  Goal: Nutrient/Hydration intake appropriate for improving, restoring or maintaining nutritional needs  Description: Monitor and assess patient's nutrition/hydration status for malnutrition. Collaborate with interdisciplinary team and initiate plan and interventions as ordered.  Monitor patient's weight and dietary intake as ordered or per policy. Utilize nutrition screening tool and intervene as necessary. Determine patient's food preferences and provide high-protein, high-caloric foods as appropriate.     INTERVENTIONS:  - Monitor oral intake, urinary output, labs, and treatment plans  - Assess nutrition and hydration status and recommend course of action  - Evaluate amount of meals eaten  - Assist patient with eating if necessary   - Allow adequate time for meals  - Recommend/ encourage appropriate diets, oral nutritional supplements, and vitamin/mineral supplements  - Order, calculate, and assess calorie counts as needed  - Recommend, monitor, and adjust tube feedings and TPN/PPN based on  assessed needs  - Assess need for intravenous fluids  - Provide specific nutrition/hydration education as appropriate  - Include patient/family/caregiver in decisions related to nutrition  Outcome: Progressing

## 2024-07-03 NOTE — ASSESSMENT & PLAN NOTE
Evidence of chronic strokes on CT scan  Patient unable to tolerated MRI  Was loaded with aspirin and continued on ASA and statin  TTE performed  Neurology consulted for further recs  Outpatient neurology referral sent

## 2024-07-03 NOTE — INCIDENTAL FINDINGS
The following findings require follow up:  Radiographic finding   Finding: Mildly thickened bladder wall with layering debris within the bladder and increased bladder wall vascularity. Findings may represent cystitis    Follow up required: with PCP   Follow up should be done within 1 week(s)    Please notify the following clinician to assist with the follow up:   Dr. Castañeda    Incidental finding results were discussed with the Patient's POA by Ernestine Moreira MD on 07/03/24.   They expressed understanding and all questions answered.

## 2024-07-03 NOTE — NURSING NOTE
IV removed. AVS given to transport team and sent with patient home. Patient discharged.    Milena RIZZON, RN

## 2024-07-03 NOTE — SPEECH THERAPY NOTE
Speech Language/Pathology    Speech/Language Pathology Progress Note    Patient Name: Denia Castañeda  Today's Date: 7/3/2024     Problem List  Principal Problem:    Facial asymmetry  Active Problems:    Primary hypertension    PAF (paroxysmal atrial fibrillation) ([facility])    Severe dementia with agitation (HCC)    Oropharyngeal dysphagia    Stage 3b chronic kidney disease (HCC)    Edema of right upper extremity    Pericardial effusion    Stroke (cerebrum) ([facility])    Vaginal bleeding       Past Medical History  Past Medical History:   Diagnosis Date    Anxiety     Closed fracture of right orbital floor ([facility]) 08/23/2020    Cognitive impairment     COVID-19 virus infection 04/13/2020    Dementia ([facility])     Depression     Hallucination     Hyperlipidemia     Hypertension     Memory loss     Psychiatric illness     Psychosis (HCC)     Retention of urine     Schizoaffective disorder (HCC)     Sleep difficulties     Urinary tract infection         Past Surgical History  Past Surgical History:   Procedure Laterality Date    APPENDECTOMY      BLADDER SURGERY      CHOLECYSTECTOMY      NV OPTX FEM SHFT FX W/INSJ IMED IMPLT W/WO SCREW Right 7/15/2021    Procedure: INSERTION NAIL IM FEMUR ANTEGRADE (TROCHANTERIC) right;  Surgeon: Getachew Aguilar MD;  Location: [facility];  Service: Orthopedics    TOTAL ABDOMINAL HYSTERECTOMY W/ BILATERAL SALPINGOOPHORECTOMY      TOTAL KNEE ARTHROPLASTY Left          Subjective:  Pt w/ eyes open. Nonverbal. Currently on RA.  Objective:  Seen for po tolerance, remains on full liquids. Seen w/ jello and ice cream, ensure liquid. Inconsistent mouth opening for presentations, including straw, spoon, and cup. Pt inconsistently opened mouth for presentations. Otherwise needed tactile prompts. Transfer and swallow were somewhat prompt to begin but slowed as session progressed. No cough or overt s/s difficulty. Pt remained nonverbal. On RA.  Assessment:  Alert today and tolerating PO. Reportedly alertness  fluctuates at home as well.   Plan/Recommendations:  Advance diet to puree w/ thin as tolerated. Pt to go home today.

## 2024-07-03 NOTE — ASSESSMENT & PLAN NOTE
Nurse noted vaginal bleeding on pad today  Consulted Gynecology who is recommending an ultrasound  TVS showed possible cystitis, s/p RENEA  Patient with scheduled gyne appointment this month  Continue outpatient f/u

## 2024-07-03 NOTE — ASSESSMENT & PLAN NOTE
Patient with evidence of cystitis on pelvic ultrasound  She is unable to relay her symptoms given dementia  Will dc patient on 5 day course of keflex

## 2024-07-03 NOTE — PLAN OF CARE
Problem: Potential for Falls  Goal: Patient will remain free of falls  Description: INTERVENTIONS:  - Educate patient/family on patient safety including physical limitations  - Instruct patient to call for assistance with activity   - Consult OT/PT to assist with strengthening/mobility   - Keep Call bell within reach  - Keep bed low and locked with side rails adjusted as appropriate  - Keep care items and personal belongings within reach  - Initiate and maintain comfort rounds  - Make Fall Risk Sign visible to staff  - Offer Toileting every 3 Hours, in advance of need  - Initiate/Maintain bed alarm  - Obtain necessary fall risk management equipment:   - Apply yellow socks and bracelet for high fall risk patients  - Consider moving patient to room near nurses station  7/3/2024 1559 by Milena Morrell  Outcome: Adequate for Discharge  7/3/2024 0914 by Milena Morrell  Outcome: Progressing     Problem: PAIN - ADULT  Goal: Verbalizes/displays adequate comfort level or baseline comfort level  Description: Interventions:  - Encourage patient to monitor pain and request assistance  - Assess pain using appropriate pain scale  - Administer analgesics based on type and severity of pain and evaluate response  - Implement non-pharmacological measures as appropriate and evaluate response  - Consider cultural and social influences on pain and pain management  - Notify physician/advanced practitioner if interventions unsuccessful or patient reports new pain  7/3/2024 1559 by Milena Morrell  Outcome: Adequate for Discharge  7/3/2024 0914 by Milnea Morrell  Outcome: Progressing     Problem: DISCHARGE PLANNING  Goal: Discharge to home or other facility with appropriate resources  Description: INTERVENTIONS:  - Identify barriers to discharge w/patient and caregiver  - Arrange for needed discharge resources and transportation as appropriate  - Identify discharge learning needs (meds, wound care, etc.)  - Arrange for interpretive  services to assist at discharge as needed  - Refer to Case Management Department for coordinating discharge planning if the patient needs post-hospital services based on physician/advanced practitioner order or complex needs related to functional status, cognitive ability, or social support system  7/3/2024 1559 by Milena Morrell  Outcome: Adequate for Discharge  7/3/2024 0914 by Milena Morrell  Outcome: Progressing     Problem: Knowledge Deficit  Goal: Patient/family/caregiver demonstrates understanding of disease process, treatment plan, medications, and discharge instructions  Description: Complete learning assessment and assess knowledge base.  Interventions:  - Provide teaching at level of understanding  - Provide teaching via preferred learning methods  7/3/2024 1559 by Milena Morrell  Outcome: Adequate for Discharge  7/3/2024 0914 by Milena Morrell  Outcome: Progressing     Problem: Neurological Deficit  Goal: Neurological status is stable or improving  Description: Interventions:  - Monitor and assess patient's level of consciousness, motor function, sensory function, and level of assistance needed for ADLs.   - Monitor and report changes from baseline. Collaborate with interdisciplinary team to initiate plan and implement interventions as ordered.   - Provide and maintain a safe environment.  - Consider seizure precautions.  - Consider fall precautions.  - Consider aspiration precautions.  - Consider bleeding precautions.  7/3/2024 1559 by Milena Morrell  Outcome: Adequate for Discharge  7/3/2024 0914 by Milena Morrell  Outcome: Progressing     Problem: Activity Intolerance/Impaired Mobility  Goal: Mobility/activity is maintained at optimum level for patient  Description: Interventions:  - Assess and monitor patient  barriers to mobility and need for assistive/adaptive devices.  - Assess patient's emotional response to limitations.  - Collaborate with interdisciplinary team and initiate plans and  interventions as ordered.  - Encourage independent activity per ability.  - Maintain proper body alignment.  - Perform active/passive rom as tolerated/ordered.  - Plan activities to conserve energy.  - Turn patient as appropriate  7/3/2024 1559 by Milena Morrell  Outcome: Adequate for Discharge  7/3/2024 0914 by Milena Morrell  Outcome: Progressing     Problem: Communication Impairment  Goal: Ability to express needs and understand communication  Description: Assess patient's communication skills and ability to understand information.  Patient will demonstrate use of effective communication techniques, alternative methods of communication and understanding even if not able to speak.     - Encourage communication and provide alternate methods of communication as needed.  - Collaborate with case management/ for discharge needs.  - Include patient/family/caregiver in decisions related to communication.  7/3/2024 1559 by Milena Morrell  Outcome: Adequate for Discharge  7/3/2024 0914 by Milena Morrell  Outcome: Progressing     Problem: Potential for Aspiration  Goal: Non-ventilated patient's risk of aspiration is minimized  Description: Assess and monitor vital signs, respiratory status, and labs (WBC).  Monitor for signs of aspiration (tachypnea, cough, rales, wheezing, cyanosis, fever).    - Assess and monitor patient's ability to swallow.  - Place patient up in chair to eat if possible.  - HOB up at 90 degrees to eat if unable to get patient up into chair.  - Supervise patient during oral intake.   - Instruct patient/ family to take small bites.  - Instruct patient/ family to take small single sips when taking liquids.  - Follow patient-specific strategies generated by speech pathologist.  7/3/2024 1559 by Milena Morrell  Outcome: Adequate for Discharge  7/3/2024 0914 by Milena Morrell  Outcome: Progressing  Goal: Ventilated patient's risk of aspiration is minimized  Description: Assess and  monitor vital signs, respiratory status, airway cuff pressure, and labs (WBC).  Monitor for signs of aspiration (tachypnea, cough, rales, wheezing, cyanosis, fever).    - Elevate head of bed 30 degrees if patient has tube feeding.  - Monitor tube feeding.  7/3/2024 1559 by Milena Morrell  Outcome: Adequate for Discharge  7/3/2024 0914 by Milena Morrell  Outcome: Progressing     Problem: Nutrition  Goal: Nutrition/Hydration status is improving  Description: Monitor and assess patient's nutrition/hydration status for malnutrition (ex- brittle hair, bruises, dry skin, pale skin and conjunctiva, muscle wasting, smooth red tongue, and disorientation). Collaborate with interdisciplinary team and initiate plan and interventions as ordered.  Monitor patient's weight and dietary intake as ordered or per policy. Utilize nutrition screening tool and intervene per policy. Determine patient's food preferences and provide high-protein, high-caloric foods as appropriate.     - Assist patient with eating.  - Allow adequate time for meals.  - Encourage patient to take dietary supplement as ordered.  - Collaborate with clinical nutritionist.  - Include patient/family/caregiver in decisions related to nutrition.  7/3/2024 1559 by Milena Morrell  Outcome: Adequate for Discharge  7/3/2024 0914 by Milena Morrell  Outcome: Progressing     Problem: CARDIOVASCULAR - ADULT  Goal: Maintains optimal cardiac output and hemodynamic stability  Description: INTERVENTIONS:  - Monitor I/O, vital signs and rhythm  - Monitor for S/S and trends of decreased cardiac output  - Administer and titrate ordered vasoactive medications to optimize hemodynamic stability  - Assess quality of pulses, skin color and temperature  - Assess for signs of decreased coronary artery perfusion  - Instruct patient to report change in severity of symptoms  7/3/2024 1559 by Milena Morrell  Outcome: Adequate for Discharge  7/3/2024 0914 by Milena Morrell  Outcome:  Progressing  Goal: Absence of cardiac dysrhythmias or at baseline rhythm  Description: INTERVENTIONS:  - Continuous cardiac monitoring, vital signs, obtain 12 lead EKG if ordered  - Administer antiarrhythmic and heart rate control medications as ordered  - Monitor electrolytes and administer replacement therapy as ordered  7/3/2024 1559 by Milena Morrell  Outcome: Adequate for Discharge  7/3/2024 0914 by Milena Morrell  Outcome: Progressing     Problem: RESPIRATORY - ADULT  Goal: Achieves optimal ventilation and oxygenation  Description: INTERVENTIONS:  - Assess for changes in respiratory status  - Assess for changes in mentation and behavior  - Position to facilitate oxygenation and minimize respiratory effort  - Oxygen administered by appropriate delivery if ordered  - Initiate smoking cessation education as indicated  - Encourage broncho-pulmonary hygiene including cough, deep breathe, Incentive Spirometry  - Assess the need for suctioning and aspirate as needed  - Assess and instruct to report SOB or any respiratory difficulty  - Respiratory Therapy support as indicated  7/3/2024 1559 by Milena Morrell  Outcome: Adequate for Discharge  7/3/2024 0914 by Milena Morrell  Outcome: Progressing     Problem: SKIN/TISSUE INTEGRITY - ADULT  Goal: Skin Integrity remains intact(Skin Breakdown Prevention)  Description: Assess:  -Perform Liu assessment every   -Clean and moisturize skin every   -Inspect skin when repositioning, toileting, and assisting with ADLS  -Assess under medical devices such as  every   -Assess extremities for adequate circulation and sensation     Bed Management:  -Have minimal linens on bed & keep smooth, unwrinkled  -Change linens as needed when moist or perspiring  -Avoid sitting or lying in one position for more than  hours while in bed  -Keep HOB at degrees     Toileting:  -Offer bedside commode  -Assess for incontinence every   -Use incontinent care products after each incontinent episode  such as     Activity:  -Mobilize patient  times a day  -Encourage activity and walks on unit  -Encourage or provide ROM exercises   -Turn and reposition patient every  Hours  -Use appropriate equipment to lift or move patient in bed  -Instruct/ Assist with weight shifting every  when out of bed in chair  -Consider limitation of chair time  hour intervals    Skin Care:  -Avoid use of baby powder, tape, friction and shearing, hot water or constrictive clothing  -Relieve pressure over bony prominences using   -Do not massage red bony areas    Next Steps:  -Teach patient strategies to minimize risks such as    -Consider consults to  interdisciplinary teams such as   7/3/2024 1559 by Milena Morrell  Outcome: Adequate for Discharge  7/3/2024 0914 by Milena Morrell  Outcome: Progressing  Goal: Pressure injury heals and does not worsen  Description: Interventions:  - Implement low air loss mattress or specialty surface (Criteria met)  - Apply silicone foam dressing  - Instruct/assist with weight shifting every  minutes when in chair   - Limit chair time to  hour intervals  - Use special pressure reducing interventions such as  when in chair   - Apply fecal or urinary incontinence containment device   - Perform passive or active ROM every   - Turn and reposition patient & offload bony prominences every  hours   - Utilize friction reducing device or surface for transfers   - Consider consults to  interdisciplinary teams such as   - Use incontinent care products after each incontinent episode   - Consider nutrition services referral as needed  7/3/2024 1559 by Milena Morrell  Outcome: Adequate for Discharge  7/3/2024 0914 by Milena Morrell  Outcome: Progressing     Problem: Prexisting or High Potential for Compromised Skin Integrity  Goal: Skin integrity is maintained or improved  Description: INTERVENTIONS:  - Identify patients at risk for skin breakdown  - Assess and monitor skin integrity  - Assess and monitor nutrition  and hydration status  - Monitor labs   - Assess for incontinence   - Turn and reposition patient  - Assist with mobility/ambulation  - Relieve pressure over bony prominences  - Avoid friction and shearing  - Provide appropriate hygiene as needed including keeping skin clean and dry  - Evaluate need for skin moisturizer/barrier cream  - Collaborate with interdisciplinary team   - Patient/family teaching  - Consider wound care consult   7/3/2024 1559 by Milena Morrell  Outcome: Adequate for Discharge  7/3/2024 0914 by Milena Morrell  Outcome: Progressing     Problem: Nutrition/Hydration-ADULT  Goal: Nutrient/Hydration intake appropriate for improving, restoring or maintaining nutritional needs  Description: Monitor and assess patient's nutrition/hydration status for malnutrition. Collaborate with interdisciplinary team and initiate plan and interventions as ordered.  Monitor patient's weight and dietary intake as ordered or per policy. Utilize nutrition screening tool and intervene as necessary. Determine patient's food preferences and provide high-protein, high-caloric foods as appropriate.     INTERVENTIONS:  - Monitor oral intake, urinary output, labs, and treatment plans  - Assess nutrition and hydration status and recommend course of action  - Evaluate amount of meals eaten  - Assist patient with eating if necessary   - Allow adequate time for meals  - Recommend/ encourage appropriate diets, oral nutritional supplements, and vitamin/mineral supplements  - Order, calculate, and assess calorie counts as needed  - Recommend, monitor, and adjust tube feedings and TPN/PPN based on assessed needs  - Assess need for intravenous fluids  - Provide specific nutrition/hydration education as appropriate  - Include patient/family/caregiver in decisions related to nutrition  7/3/2024 1559 by Milena Morrell  Outcome: Adequate for Discharge  7/3/2024 0914 by Milena Morrell  Outcome: Progressing

## 2024-07-05 ENCOUNTER — TRANSITIONAL CARE MANAGEMENT (OUTPATIENT)
Dept: INTERNAL MEDICINE CLINIC | Facility: CLINIC | Age: 89
End: 2024-07-05

## 2024-07-05 DIAGNOSIS — Z71.89 COMPLEX CARE COORDINATION: Primary | ICD-10-CM

## 2024-07-05 NOTE — UTILIZATION REVIEW
NOTIFICATION OF ADMISSION DISCHARGE   This is a Notification of Discharge from [facility]. Please be advised that this patient has been discharge from our facility. Below you will find the admission and discharge date and time including the patient’s disposition.   UTILIZATION REVIEW CONTACT:  Brittany Rodriguez MA  Utilization   Network Utilization Review Department  Phone: [phone] x carefully listen to the prompts. All voicemails are confidential.  Email: [email or web address]     ADMISSION INFORMATION  PRESENTATION DATE: 6/29/2024  6:46 PM  OBERVATION ADMISSION DATE: 06/30/2024 0126  INPATIENT ADMISSION DATE: 6/30/24  4:51 PM   DISCHARGE DATE: 7/3/2024  4:50 PM   DISPOSITION:Home with Home Health Care    Network Utilization Review Department  ATTENTION: Please call with any questions or concerns to [phone] and carefully listen to the prompts so that you are directed to the right person. All voicemails are confidential.   For Discharge needs, contact Care Management DC Support Team at [phone] opt. 2  Send all requests for admission clinical reviews, approved or denied determinations and any other requests to dedicated fax number below belonging to the campus where the patient is receiving treatment. List of dedicated fax numbers for the Facilities:  FACILITY NAME UR FAX NUMBER   ADMISSION DENIALS (Administrative/Medical Necessity) [phone]   DISCHARGE SUPPORT TEAM ([facility]) [phone]   PARENT CHILD HEALTH (Maternity/NICU/Pediatrics) [phone]   [facility] [phone]   [facility] [phone]   [facility] [phone]   [facility] [phone]   [facility] [phone]   [facility] [phone]   [facility] [phone]   [facility]  [facility] [phone]   [facility] [phone]   [facility] [phone]   [facility] [phone]   [facility] [phone]

## 2024-07-08 ENCOUNTER — PATIENT OUTREACH (OUTPATIENT)
Dept: INTERNAL MEDICINE CLINIC | Facility: CLINIC | Age: 89
End: 2024-07-08

## 2024-07-10 ENCOUNTER — RA CDI HCC (OUTPATIENT)
Dept: OTHER | Facility: HOSPITAL | Age: 89
End: 2024-07-10

## 2024-07-15 ENCOUNTER — PATIENT OUTREACH (OUTPATIENT)
Dept: INTERNAL MEDICINE CLINIC | Facility: CLINIC | Age: 89
End: 2024-07-15

## 2024-07-15 ENCOUNTER — TELEPHONE (OUTPATIENT)
Dept: NEUROLOGY | Facility: CLINIC | Age: 89
End: 2024-07-15

## 2024-07-15 NOTE — TELEPHONE ENCOUNTER
Post CVA Discharge Follow Up  Hospitalization: 6/29/24-7/3/24    Reviewed chart. Patient has dementia.     Called patient's son, Logan, who is listed on the patient's medical communication consent form. Since discharge, he denies the patient experiencing any new or worsening stroke-like symptoms.     Patient mobilizes with a wheelchair and her caregivers help provide assistance with ADLs.     Reviewed appointments - patient is scheduled for the following: PCP on 7/18/24, OBGYN on 7/29/24.     Offered to schedule stroke hospital follow up appointment, he is agreeable to this. Scheduled appointment for the next available. Mailed appointment reminder card to patient's primary address on file per his request.    Confirmed patient is taking aspirin as prescribed. He reports the patient's caregivers have not recently reported any signs of vaginal bleeding (patient had vagina bleeding while in the hospital). Encouraged close follow up with OBGYN.     He denies any concerns.    All of his questions were addressed. At the conclusion of the conversation, he denies having any further questions or concerns.

## 2024-07-15 NOTE — PROGRESS NOTES
Contacted patient's son for f/u post hospitalization for facial asymmetry.  Patient has hx of dementia and has 24/7 caregiver assistance.  She is nonverbal.  She is able to be out of bed in wheelchair.  Nutritional intake adequate.  Her medications are bubble packed by the pharmacy and she takes all as prescribed.  PCP f/u scheduled for 7/18, other appointments scheduled.  Son takes patient to all medical appointments. He reports patient is stable at this time and denies any needs.

## 2024-07-18 ENCOUNTER — OFFICE VISIT (OUTPATIENT)
Dept: INTERNAL MEDICINE CLINIC | Facility: CLINIC | Age: 89
End: 2024-07-18
Payer: MEDICARE

## 2024-07-18 VITALS
DIASTOLIC BLOOD PRESSURE: 51 MMHG | TEMPERATURE: 97.5 F | HEART RATE: 55 BPM | HEIGHT: 58 IN | BODY MASS INDEX: 22.57 KG/M2 | SYSTOLIC BLOOD PRESSURE: 132 MMHG

## 2024-07-18 DIAGNOSIS — I63.89 CEREBROVASCULAR ACCIDENT (CVA) DUE TO OTHER MECHANISM (HCC): ICD-10-CM

## 2024-07-18 DIAGNOSIS — D64.9 NORMOCYTIC ANEMIA: ICD-10-CM

## 2024-07-18 DIAGNOSIS — Z76.89 ENCOUNTER FOR SUPPORT AND COORDINATION OF TRANSITION OF CARE: Primary | ICD-10-CM

## 2024-07-18 DIAGNOSIS — N18.32 STAGE 3B CHRONIC KIDNEY DISEASE (HCC): ICD-10-CM

## 2024-07-18 DIAGNOSIS — I48.0 PAF (PAROXYSMAL ATRIAL FIBRILLATION) (HCC): ICD-10-CM

## 2024-07-18 PROCEDURE — 99495 TRANSJ CARE MGMT MOD F2F 14D: CPT | Performed by: STUDENT IN AN ORGANIZED HEALTH CARE EDUCATION/TRAINING PROGRAM

## 2024-07-18 RX ORDER — ATORVASTATIN CALCIUM 40 MG/1
40 TABLET, FILM COATED ORAL EVERY EVENING
Qty: 90 TABLET | Refills: 1 | Status: SHIPPED | OUTPATIENT
Start: 2024-07-18

## 2024-07-18 NOTE — ASSESSMENT & PLAN NOTE
Status post hospitalization secondary to suspected CVA  Presented with concerns of right facial asymmetry noted by her son, however it appears that this was chronic, likely due to history of orbital rim fracture  She was evaluated by neurology while inpatient  Deemed not a candidate for TNKase secondary to intermittent vaginal bleeding and unclear chronicity of symptoms  CT head imaging with evidence of subacute and chronic infarcts  Unfortunately unable to complete MRI due to patient's condition and history of claustrophobia  As per neurology recommendation she should be on anticoagulation which was previously withheld due to concerns of recurrent falls, however in the setting of intermittent vaginal bleeding this was withheld until further evaluation by gynecology. I discussed this possibility with her son today and we have agreed on discussing this further once the evaluation is completed by gynecology and neurology, both of which she is scheduled with in the next few weeks  Started on aspirin and continue on atorvastatin.  Both of which she received for few days in her bubble pack but not recently.  New prescription sent to her pharmacy.

## 2024-07-18 NOTE — ASSESSMENT & PLAN NOTE
On Diltiazem and Amiodarone. No AC per cardio with fall risk, age and dementia.   Status post hospitalization due to suspected CVA recurrence  She was evaluated by neurology while inpatient the recommended anticoagulation for further stroke prevention however this was withheld in the setting of intermittent vaginal bleeding.  Evaluation is pending by gynecology.  I discussed this with her son today and we have agreed on discussing this again after the evaluation is completed

## 2024-07-18 NOTE — PROGRESS NOTES
"INTERNAL MEDICINE OFFICE VISIT NOTE  [facility] [place]    NAME: Denia Castañeda  AGE: 89 y.o. SEX: female    DATE OF ENCOUNTER:       Chief Complaint   Patient presents with    Transition of Care Management     TCM D/C 7/3 Facial Asymmetry     Status post hospitalization secondary to suspected CVA    Review of Systems  10 point ROS negative except per HPI    OBJECTIVE:  Vitals:    07/18/24 1041   BP: 132/51   BP Location: Left arm   Patient Position: Sitting   Cuff Size: Standard   Pulse: 55   Temp: 97.5 °F (36.4 °C)   Height: 4' 10\" (1.473 m)       Physical Exam:   GENERAL: NAD, frail appearance.  Sitting on w/c  NEUROLOGIC:  Alert/oriented xself. Minimally verbal  HEENT:  NC/AT, no scleral icterus  CARDIAC:  RRR, +S1/S2  PULMONARY:  non-labored breathing, CTA  Abd, NT,NDBSx4    ASSESSMENT/PLAN:    1. Encounter for support and coordination of transition of care  2. Cerebrovascular accident (CVA) due to other mechanism (HCC)  Assessment & Plan:  Status post hospitalization secondary to suspected CVA  Presented with concerns of right facial asymmetry noted by her son, however it appears that this was chronic, likely due to history of orbital rim fracture  She was evaluated by neurology while inpatient  Deemed not a candidate for TNKase secondary to intermittent vaginal bleeding and unclear chronicity of symptoms  CT head imaging with evidence of subacute and chronic infarcts  Unfortunately unable to complete MRI due to patient's condition and history of claustrophobia  As per neurology recommendation she should be on anticoagulation which was previously withheld due to concerns of recurrent falls, however in the setting of intermittent vaginal bleeding this was withheld until further evaluation by gynecology. I discussed this possibility with her son today and we have agreed on discussing this further once the evaluation is completed by gynecology and neurology, both of which she is scheduled with " in the next few weeks  Started on aspirin and continue on atorvastatin.  Both of which she received for few days in her bubble pack but not recently.  New prescription sent to her pharmacy.  Orders:  -     atorvastatin (LIPITOR) 40 mg tablet; Take 1 tablet (40 mg total) by mouth every evening  -     aspirin (ECOTRIN LOW STRENGTH) 81 mg EC tablet; Take 1 tablet (81 mg total) by mouth daily  3. PAF (paroxysmal atrial fibrillation) (HCC)  Assessment & Plan:  On Diltiazem and Amiodarone. No AC per cardio with fall risk, age and dementia.   Status post hospitalization due to suspected CVA recurrence  She was evaluated by neurology while inpatient the recommended anticoagulation for further stroke prevention however this was withheld in the setting of intermittent vaginal bleeding.  Evaluation is pending by gynecology.  I discussed this with her son today and we have agreed on discussing this again after the evaluation is completed    4. Stage 3b chronic kidney disease (HCC)  -     Comprehensive metabolic panel; Future  5. Normocytic anemia  -     CBC and Platelet; Future  -     Iron Panel (Includes Ferritin, Iron Sat%, Iron, and TIBC); Future      Return in about 4 months (around 11/18/2024) for Annual physical.      Current Outpatient Medications:     acetaminophen (TYLENOL) 325 mg tablet, Take 2 tablets (650 mg total) by mouth every 6 (six) hours as needed for mild pain, moderate pain or headaches, Disp: 30 tablet, Rfl: 0    amiodarone 200 mg tablet, Take 1 tablet (200 mg total) by mouth daily, Disp: 90 tablet, Rfl: 1    aspirin (ECOTRIN LOW STRENGTH) 81 mg EC tablet, Take 1 tablet (81 mg total) by mouth daily, Disp: 90 tablet, Rfl: 1    atorvastatin (LIPITOR) 40 mg tablet, Take 1 tablet (40 mg total) by mouth every evening, Disp: 90 tablet, Rfl: 1    diltiazem (CARDIZEM) 30 mg tablet, TAKE 1 TABLET (30 MG TOTAL) BY MOUTH 3 (THREE) TIMES A DAY, Disp: 270 tablet, Rfl: 1    docusate sodium (Stool Softener) 100 mg  capsule, Take 1 capsule (100 mg total) by mouth 2 (two) times a day, Disp: 180 capsule, Rfl: 1    DULoxetine (CYMBALTA) 30 mg delayed release capsule, Take 30 mg by mouth daily at bedtime, Disp: , Rfl:     LORazepam (ATIVAN) 0.5 mg tablet, Take 0.5 mg by mouth , Disp: , Rfl:     melatonin 3 mg, , Disp: , Rfl:     metoprolol tartrate (LOPRESSOR) 25 mg tablet, TAKE 1 TABLET (25 MG TOTAL) BY MOUTH 2 (TWO) TIMES A DAY, Disp: 180 tablet, Rfl: 1    mirtazapine (REMERON) 15 mg tablet, , Disp: , Rfl:     OLANZapine (ZyPREXA) 5 mg tablet, Take 1 tablet (5 mg total) by mouth daily at bedtime, Disp: 30 tablet, Rfl: 1    terazosin (HYTRIN) 2 mg capsule, Take 1 capsule (2 mg total) by mouth daily at bedtime, Disp: 90 capsule, Rfl: 3    Current Facility-Administered Medications:     denosumab (PROLIA) subcutaneous injection 60 mg, 60 mg, Subcutaneous, Q6 Months, , 60 mg at 03/18/24 1330    Patient Active Problem List   Diagnosis    Primary hypertension    Hyperlipidemia    Bilateral hearing loss    Orthostatic hypertension    Ambulatory dysfunction    h/o OMEGA (acute kidney injury) (HCC)    Urinary incontinence    PAF (paroxysmal atrial fibrillation) (HCC)    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HCC)    Moderate protein-calorie malnutrition (HCC)    Age-related osteoporosis without current pathological fracture    Personal history of fall    H/O fracture of right hip    Severe dementia with agitation (HCC)    Normocytic anemia    Oropharyngeal dysphagia    Flank pain    Edema of right ankle    Urinary retention    Back pain    At risk for elder abuse    Stage 3b chronic kidney disease (HCC)    Edema of right upper extremity    Pericardial effusion    Facial asymmetry    Stroke (cerebrum) (HCC)    Vaginal bleeding    Cystitis       TCM Call       Date and time call was made  7/5/2024  3:13 PM    Hospital care reviewed  Records reviewed    Patient was hospitialized at  [facility]  admitted to [facility] 07/26 for rehab    Date  of Admission  06/29/24    Date of discharge  07/03/24    Diagnosis  tcm d/c 7/3/2024 Facial asymmetry    Disposition  Home    Were the patients medications reviewed and updated  Yes    Current Symptoms  None          TCM Call       Post hospital issues  None    Should patient be enrolled in anticoag monitoring?  No    Scheduled for follow up?  Yes    Not clinically warranted  D/C to  [place]    Did you obtain your prescribed medications  Yes    Do you need help managing your prescriptions or medications  No    Is transportation to your appointment needed  No    I have advised the patient to call PCP with any new or worsening symptoms  Jonna Griffith (MA)    Living Arrangements  Alone    Are you recieving any outpatient services  No    Are you recieving home care services  No    Have you fallen in the last 12 months  Yes    Interperter language line needed  Yes    Counseling  Caregiver               Jose M Castañeda MD  [facility] [place]    * Please Note: This note was completed in part utilizing a dictation software.  Grammatical errors, random word insertions, spelling mistakes, and incomplete sentences may be an occasional consequence of this system secondary to software limitations, ambient noise, and hardware issues.  If you have any questions or concerns about the content, text, or information contained within the body of this dictation, please contact the provider for clarification.**

## 2024-07-27 NOTE — PROGRESS NOTES
Patient and caregiver are primarily Samoan-speaking son used for translation at visit.  Son stepped out during vaginal exam [employer]  # [phone] used to translate during that time      Assessment/Plan:      Diagnoses and all orders for this visit:    S/P hysterectomy    Postmenopausal bleeding  -     Ambulatory Referral to Obstetrics / Gynecology      -Reassured son and caregiver no signs of vaginal abnormalities.  No lesions no sores  -Reviewed bleeding may have been from either rectum, urethra or even from a small vaginal laceration or tear which is no longer present.  - reviewed normal pelvic US s/p hysterectomy   -Signs and symptoms to report reviewed    RTO  PRN     Subjective:     Patient ID: Denia Csatañeda is a 89 y.o. female.    HPI here with complaints of bleeding .  Information was obtained by paid caregiver and patient's son.  Patient with dementia and nonverbal.  LMP - S/p hysterectomy in [place] many years ago.  Bleeding occurred while patient was in the hospital last month.  Was admitted with UTI.  Was noted to have vaginal bleeding in brief for about 3 to 4 days.  Difficult to assess quantity.  Difficult to assess actual area of bleeding.  Per patient caregiver was difficult to tell when brief was coming off where it came from does not have any vaginal issues.     Pelvic US 7/3/24  FINDINGS:  UTERUS:  The uterus is surgically absent. Unremarkable vaginal cuff.  OVARIES/ADNEXA:  Right ovary: Not visualized.  Left ovary: Not visualized.  OTHER:  Mildly thickened bladder wall with echogenic layering debris within the bladder lumen and increased bladder wall vascularity.  IMPRESSION:  1. Mildly thickened bladder wall with layering debris within the bladder and increased bladder wall vascularity. Findings may represent cystitis in the appropriate clinical setting.  2. Post hysterectomy. No pelvic/adnexal mass seen on this exam.      Pap smear prior to hysterectomy; denies history of abnormal  "pap smears    Last  mammogram 3/17/15 diagnostic mammogram with right breast ultrasound resulted BI-RADS 3 with recommendation for short-term follow-up.  CRC screening many years ago in [place]  DXA scan 2/24/23 osteoporosis    Review of Systems   Constitutional:  Negative for chills, fatigue and fever.   Respiratory: Negative.     Cardiovascular: Negative.    Genitourinary: Negative.    Neurological:  Negative for headaches.         Objective:  /80   Ht 4' 10\" (1.473 m)   BMI 22.57 kg/m²      Physical Exam  Vitals reviewed. Exam conducted with a chaperone present.   Constitutional:       Appearance: Normal appearance.   Genitourinary:     General: Normal vulva.      Labia:         Right: No rash, tenderness, lesion or injury.         Left: No rash, tenderness, lesion or injury.       Vagina: Normal.      Uterus: Absent.       Comments: Vaginal cuff intact.  No abnormalities of vulva or vagina  Neurological:      Mental Status: She is alert. She is disoriented.           "

## 2024-07-29 ENCOUNTER — OFFICE VISIT (OUTPATIENT)
Dept: OBGYN CLINIC | Facility: MEDICAL CENTER | Age: 89
End: 2024-07-29
Payer: MEDICARE

## 2024-07-29 VITALS — BODY MASS INDEX: 22.57 KG/M2 | HEIGHT: 58 IN | SYSTOLIC BLOOD PRESSURE: 120 MMHG | DIASTOLIC BLOOD PRESSURE: 80 MMHG

## 2024-07-29 DIAGNOSIS — Z90.710 S/P HYSTERECTOMY: Primary | ICD-10-CM

## 2024-07-29 DIAGNOSIS — N95.0 POSTMENOPAUSAL BLEEDING: ICD-10-CM

## 2024-07-29 PROCEDURE — 99203 OFFICE O/P NEW LOW 30 MIN: CPT | Performed by: NURSE PRACTITIONER

## 2024-08-22 DIAGNOSIS — I48.0 PAROXYSMAL A-FIB (HCC): ICD-10-CM

## 2024-08-22 RX ORDER — DILTIAZEM HYDROCHLORIDE 30 MG/1
30 TABLET, FILM COATED ORAL 3 TIMES DAILY
Qty: 90 TABLET | Refills: 1 | Status: SHIPPED | OUTPATIENT
Start: 2024-08-22 | End: 2025-02-18

## 2024-08-31 DIAGNOSIS — I48.0 PAROXYSMAL A-FIB (HCC): ICD-10-CM

## 2024-09-01 RX ORDER — METOPROLOL TARTRATE 25 MG/1
25 TABLET, FILM COATED ORAL 2 TIMES DAILY
Qty: 180 TABLET | Refills: 0 | Status: SHIPPED | OUTPATIENT
Start: 2024-09-01 | End: 2024-11-30

## 2024-09-26 ENCOUNTER — TELEPHONE (OUTPATIENT)
Age: 89
End: 2024-09-26

## 2024-09-26 NOTE — TELEPHONE ENCOUNTER
Patient's son calling to request orders for [facility] bed. Patient has 24/7 home nursing and they are unable to safely transfer the patient or perform ADLs due to the age and height of the bed. Son states it is very old and was donated to the patient years ago. He does not know of a specific DME company to use or who her insurance may say has to be used. Please advise and call karri Logan.

## 2024-10-01 NOTE — TELEPHONE ENCOUNTER
Patients son called to follow up on the request for the [facility] bed. Son does not know where he wants to get the bed from and stated that it doesn't matter who they get the bed from. Patients son is awaiting additional information and an order to be placed. Please return phone call.

## 2024-10-02 NOTE — TELEPHONE ENCOUNTER
Called patients karri Ford left message advising hospital bed can not be ordered until her 10/8/24 appt because is requires a Face-to-Face for the insurance. Advised if he has any other questions or concerns he can call the office

## 2024-10-08 ENCOUNTER — OFFICE VISIT (OUTPATIENT)
Dept: INTERNAL MEDICINE CLINIC | Facility: CLINIC | Age: 89
End: 2024-10-08
Payer: MEDICARE

## 2024-10-08 VITALS
HEART RATE: 58 BPM | TEMPERATURE: 96.9 F | BODY MASS INDEX: 22.57 KG/M2 | SYSTOLIC BLOOD PRESSURE: 131 MMHG | HEIGHT: 58 IN | DIASTOLIC BLOOD PRESSURE: 55 MMHG

## 2024-10-08 DIAGNOSIS — R26.2 AMBULATORY DYSFUNCTION: ICD-10-CM

## 2024-10-08 DIAGNOSIS — I48.0 PAF (PAROXYSMAL ATRIAL FIBRILLATION) (HCC): ICD-10-CM

## 2024-10-08 DIAGNOSIS — Z00.00 ANNUAL PHYSICAL EXAM: Primary | ICD-10-CM

## 2024-10-08 DIAGNOSIS — F03.C11 SEVERE DEMENTIA WITH AGITATION, UNSPECIFIED DEMENTIA TYPE (HCC): ICD-10-CM

## 2024-10-08 DIAGNOSIS — N93.9 VAGINAL BLEEDING: ICD-10-CM

## 2024-10-08 DIAGNOSIS — I63.9 CEREBROVASCULAR ACCIDENT (CVA), UNSPECIFIED MECHANISM (HCC): ICD-10-CM

## 2024-10-08 DIAGNOSIS — I10 PRIMARY HYPERTENSION: ICD-10-CM

## 2024-10-08 DIAGNOSIS — D64.9 NORMOCYTIC ANEMIA: ICD-10-CM

## 2024-10-08 PROCEDURE — 99214 OFFICE O/P EST MOD 30 MIN: CPT | Performed by: STUDENT IN AN ORGANIZED HEALTH CARE EDUCATION/TRAINING PROGRAM

## 2024-10-08 PROCEDURE — 99397 PER PM REEVAL EST PAT 65+ YR: CPT | Performed by: STUDENT IN AN ORGANIZED HEALTH CARE EDUCATION/TRAINING PROGRAM

## 2024-10-08 NOTE — ASSESSMENT & PLAN NOTE
On Diltiazem and Amiodarone.   As per cardiology, Not on AC due to h/o falls, age and dementia.

## 2024-10-08 NOTE — PROGRESS NOTES
INTERNAL MEDICINE OFFICE VISIT NOTE  [facility] Internal Medicine [place]    NAME: Denia Castañeda  AGE: 89 y.o. SEX: female    DATE OF ENCOUNTER:       Chief Complaint   Patient presents with    Follow-up     4 month follow up   Face to Face for Hospital Bed      Patient presents for a comprehensive physical exam    Diet/Nutrition: Patient is unable to self-feed.  Her son reports he and her caretaker make every effort to maintain a well-balanced diet  Exercise:  Unable to participate secondary to severe dementia, ambulatory dysfunction .   Hearing: decreased - bilateral.  Vision:  History of cataracts .   Dental:  No dental issues .         /GYN Health  Follows with gynecology? no .  Status post evaluation by gynecology due to vaginal bleeding  Patient is: postmenopausal    Depression Screening  History of severe dementia and schizophrenia      Labs reviewed.       Health Maintenance   Topic Date Due    Annual Physical  Never done    Zoster Vaccine (1 of 2) Never done    RSV Vaccine Age 60+ Years (1 - 1-dose 60+ series) Never done    PT PLAN OF CARE  01/01/2021    Depression Screening  10/11/2023    Influenza Vaccine (1) 09/01/2024    COVID-19 Vaccine (4 - 2023-24 season) 09/01/2024    Fall Risk  02/01/2025    DXA SCAN  02/24/2025    Urinary Incontinence Screening  07/18/2025    DTaP,Tdap,and Td Vaccines (2 - Td or Tdap) 09/28/2030    Hepatitis C Screening  Completed    Pneumococcal Vaccine: 65+ Years  Completed    RSV Vaccine age 0-20 Months  Aged Out    HIB Vaccine  Aged Out    IPV Vaccine  Aged Out    Hepatitis A Vaccine  Aged Out    Meningococcal ACWY Vaccine  Aged Out    HPV Vaccine  Aged Out       There are no preventive care reminders to display for this patient.    Assessment & Plan  Annual physical exam  Nutrition, exercise, sleep, hearing vision and dental health reviewed  Preventive medicine reviewed         Cerebrovascular accident (CVA), unspecified mechanism (HCC)  Unfortunately missed her visit  with neurology in August.  I advised her son today to reschedule   Continue ASA, statin   Of note, AC was previously withheld due to vaginal bleeding and due to h/o falls  No longer ambulatory or able to stand without maximal assistance by caretaker. Dependent for all ADLS, w/c dependent, non-verbal, receives 24 care by [facility] services  minimally mobile limiting to participate in bed mobility and pressure release for comfort or pressure ulcer prevention  She will benefit from hospital bed with adjustable head and leg along with bed rails to facilitate mobility and safety         PAF (paroxysmal atrial fibrillation) (HCC)  On Diltiazem and Amiodarone.   As per cardiology, Not on AC due to h/o falls, age and dementia.            Primary hypertension  BP within acceptable parameters for her age group and comorbidities  Will continue current regimen of diltiazem 30mg TID, metoprolol tartrate 25 mg BID and terazosin 2mg for h/o urinary retention               Severe dementia with agitation, unspecified dementia type (HCC)  Stable   Dependent for all ADLS, w/c dependent, non-verbal, receives 24 care by [facility] services  F/w Psychiatry, Dr. Alma Yun   Agitation managed with Zyprexa 5mg HS  Also on cymbalta, lorazepam - managed by her psychiatrist   Continue current regimen         Normocytic anemia  Lab Results   Component Value Date    HGB 10.1 (L) 07/03/2024    MCV 92 07/03/2024    FERRITIN 516 (H) 08/12/2021    IRON 62 08/12/2021    CONCFE 25 08/12/2021    TIBC 250 08/12/2021      CBC previously ordered but not completed.  Her son was advised to complete within the next few days.         Ambulatory dysfunction  History of multiple falls.  No longer ambulatory or able to stand without maximal assistance by caretaker. Dependent for all ADLS, w/c dependent, non-verbal, receives 24 care by [facility] services  minimally mobile limiting to participate in bed mobility and pressure release for comfort or pressure ulcer prevention  She  "will benefit from hospital bed with adjustable head and leg along with bed rails to facilitate mobility and safety         Vaginal bleeding  Resolved  Status post evaluation by gynecology           Return in about 6 months (around 4/8/2025).      OBJECTIVE:  Vitals:    10/08/24 0957   BP: 131/55   BP Location: Left arm   Patient Position: Sitting   Cuff Size: Standard   Pulse: 58   Temp: (!) 96.9 °F (36.1 °C)   Height: 4' 10\" (1.473 m)         Physical Exam:   GENERAL: NAD, Normal appearance.    NEUROLOGIC:  Alert/oriented x3.  HEENT:  NC/AT, no scleral icterus  CARDIAC:  RRR, +S1/S2  PULMONARY:  non-labored breathing        Current Outpatient Medications:     acetaminophen (TYLENOL) 325 mg tablet, Take 2 tablets (650 mg total) by mouth every 6 (six) hours as needed for mild pain, moderate pain or headaches, Disp: 30 tablet, Rfl: 0    amiodarone 200 mg tablet, Take 1 tablet (200 mg total) by mouth daily, Disp: 90 tablet, Rfl: 1    aspirin (ECOTRIN LOW STRENGTH) 81 mg EC tablet, Take 1 tablet (81 mg total) by mouth daily, Disp: 90 tablet, Rfl: 1    atorvastatin (LIPITOR) 40 mg tablet, Take 1 tablet (40 mg total) by mouth every evening, Disp: 90 tablet, Rfl: 1    diltiazem (CARDIZEM) 30 mg tablet, TAKE 1 TABLET (30 MG TOTAL) BY MOUTH 3 (THREE) TIMES A DAY, Disp: 90 tablet, Rfl: 1    docusate sodium (Stool Softener) 100 mg capsule, Take 1 capsule (100 mg total) by mouth 2 (two) times a day, Disp: 180 capsule, Rfl: 1    DULoxetine (CYMBALTA) 30 mg delayed release capsule, Take 30 mg by mouth daily at bedtime, Disp: , Rfl:     LORazepam (ATIVAN) 0.5 mg tablet, Take 0.5 mg by mouth , Disp: , Rfl:     melatonin 3 mg, , Disp: , Rfl:     metoprolol tartrate (LOPRESSOR) 25 mg tablet, TAKE 1 TABLET (25 MG TOTAL) BY MOUTH 2 (TWO) TIMES A DAY, Disp: 180 tablet, Rfl: 0    mirtazapine (REMERON) 15 mg tablet, , Disp: , Rfl:     OLANZapine (ZyPREXA) 5 mg tablet, Take 1 tablet (5 mg total) by mouth daily at bedtime, Disp: 30 tablet, " Rfl: 1    terazosin (HYTRIN) 2 mg capsule, Take 1 capsule (2 mg total) by mouth daily at bedtime, Disp: 90 capsule, Rfl: 3    Current Facility-Administered Medications:     denosumab (PROLIA) subcutaneous injection 60 mg, 60 mg, Subcutaneous, Q6 Months, , 60 mg at 03/18/24 1330    Patient Active Problem List   Diagnosis    Primary hypertension    Hyperlipidemia    Bilateral hearing loss    Orthostatic hypertension    Ambulatory dysfunction    h/o OMEGA (acute kidney injury) (HCC)    Urinary incontinence    PAF (paroxysmal atrial fibrillation) (HCC)    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HCC)    Moderate protein-calorie malnutrition (HCC)    Age-related osteoporosis without current pathological fracture    Personal history of fall    H/O fracture of right hip    Severe dementia with agitation (HCC)    Normocytic anemia    Oropharyngeal dysphagia    Flank pain    Edema of right ankle    Urinary retention    Back pain    At risk for elder abuse    Stage 3b chronic kidney disease (HCC)    Edema of right upper extremity    Pericardial effusion    Facial asymmetry    CVA (cerebral vascular accident) (HCC)    Vaginal bleeding    Cystitis           Jose M Castañeda MD  [facility] Internal Medicine [place]    * Please Note: This note was completed in part utilizing a dictation software.  Grammatical errors, random word insertions, spelling mistakes, and incomplete sentences may be an occasional consequence of this system secondary to software limitations, ambient noise, and hardware issues.  If you have any questions or concerns about the content, text, or information contained within the body of this dictation, please contact the provider for clarification.**      Depression Screening and Follow-up Plan: Patient not screened for depression due to medical reason (urgent/emergent situation, decreased capacity). Pt has a history of severe dementia. She is currently non-verbal and unable to participate in questionnaire.

## 2024-10-08 NOTE — ASSESSMENT & PLAN NOTE
History of multiple falls.  No longer ambulatory or able to stand without maximal assistance by caretaker. Dependent for all ADLS, w/c dependent, non-verbal, receives 24 care by [facility] services  minimally mobile limiting to participate in bed mobility and pressure release for comfort or pressure ulcer prevention  She will benefit from hospital bed with adjustable head and leg along with bed rails to facilitate mobility and safety

## 2024-10-08 NOTE — ASSESSMENT & PLAN NOTE
BP within acceptable parameters for her age group and comorbidities  Will continue current regimen of diltiazem 30mg TID, metoprolol tartrate 25 mg BID and terazosin 2mg for h/o urinary retention

## 2024-10-08 NOTE — ASSESSMENT & PLAN NOTE
Unfortunately missed her visit with neurology in August.  I advised her son today to reschedule   Continue ASA, statin   Of note, AC was previously withheld due to vaginal bleeding and due to h/o falls  No longer ambulatory or able to stand without maximal assistance by caretaker. Dependent for all ADLS, w/c dependent, non-verbal, receives 24 care by [facility] services  minimally mobile limiting to participate in bed mobility and pressure release for comfort or pressure ulcer prevention  She will benefit from hospital bed with adjustable head and leg along with bed rails to facilitate mobility and safety

## 2024-10-08 NOTE — ASSESSMENT & PLAN NOTE
Stable   Dependent for all ADLS, w/c dependent, non-verbal, receives 24 care by [facility] services  F/w Psychiatry, Dr. Alma Yun   Agitation managed with Zyprexa 5mg HS  Also on cymbalta, lorazepam - managed by her psychiatrist   Continue current regimen

## 2024-10-08 NOTE — ASSESSMENT & PLAN NOTE
Lab Results   Component Value Date    HGB 10.1 (L) 07/03/2024    MCV 92 07/03/2024    FERRITIN 516 (H) 08/12/2021    IRON 62 08/12/2021    CONCFE 25 08/12/2021    TIBC 250 08/12/2021      CBC previously ordered but not completed.  Her son was advised to complete within the next few days.

## 2024-10-11 LAB
DME PARACHUTE DELIVERY DATE ACTUAL: NORMAL
DME PARACHUTE DELIVERY DATE EXPECTED: NORMAL
DME PARACHUTE DELIVERY DATE REQUESTED: NORMAL
DME PARACHUTE ITEM DESCRIPTION: NORMAL
DME PARACHUTE ORDER STATUS: NORMAL
DME PARACHUTE SUPPLIER NAME: NORMAL
DME PARACHUTE SUPPLIER PHONE: NORMAL

## 2024-10-22 DIAGNOSIS — I48.0 PAROXYSMAL A-FIB (HCC): ICD-10-CM

## 2024-10-23 RX ORDER — DILTIAZEM HYDROCHLORIDE 30 MG/1
30 TABLET, FILM COATED ORAL 3 TIMES DAILY
Qty: 90 TABLET | Refills: 0 | Status: SHIPPED | OUTPATIENT
Start: 2024-10-23 | End: 2025-04-21

## 2024-10-24 DIAGNOSIS — R33.8 ACUTE URINARY RETENTION: ICD-10-CM

## 2024-10-24 RX ORDER — AMIODARONE HYDROCHLORIDE 200 MG/1
200 TABLET ORAL DAILY
Qty: 90 TABLET | Refills: 1 | Status: SHIPPED | OUTPATIENT
Start: 2024-10-24

## 2024-10-24 RX ORDER — TERAZOSIN 2 MG/1
2 CAPSULE ORAL
Qty: 90 CAPSULE | Refills: 3 | Status: SHIPPED | OUTPATIENT
Start: 2024-10-24

## 2024-10-24 NOTE — TELEPHONE ENCOUNTER
Patient's son called stating patient needs refills on medication. Terazosin 2 mg.  Patient will run out tomorrow.     Please send it to [facility]

## 2024-11-05 NOTE — ASSESSMENT & PLAN NOTE
Discussed discharge medications with Cardiology Dr Juany Barrow  Will continue Toprol XL 50 mg daily  Decreased Cardizem from 180 mg to 120 mg daily  Avoid hypotension
Lab Results   Component Value Date    EGFR 19 07/17/2021    EGFR 18 07/16/2021    EGFR 39 07/16/2021    CREATININE 2 27 (H) 07/17/2021    CREATININE 2 31 (H) 07/16/2021    CREATININE 1 23 07/16/2021     · Creatinine at baseline; Cr 1 0-1 2  · Nephrology consulted  · Worsening creatinine, 2 3 likely prerenal vs ATN with hypotension and ABLA  · IVFs
Lab Results   Component Value Date    EGFR 24 07/18/2021    EGFR 19 07/17/2021    EGFR 18 07/16/2021    CREATININE 1 84 (H) 07/18/2021    CREATININE 2 27 (H) 07/17/2021    CREATININE 2 31 (H) 07/16/2021     · Creatinine at baseline; Cr 1 0-1 2  · Nephrology consulted  · Likely prerenal vs ATN with hypotension and ABLA  · IVFs per Nephrology  · Renal functions improving
Lab Results   Component Value Date    EGFR 39 07/16/2021    EGFR 44 07/15/2021    EGFR 46 05/21/2021    CREATININE 1 23 07/16/2021    CREATININE 1 12 07/15/2021    CREATININE 1 08 05/21/2021     · Creatinine at baseline; Cr 1 0-1 2  · Avoid NSAIDs and nephrotoxins    · Outpatient follow-up with Nephrology
Lab Results   Component Value Date    EGFR 44 07/15/2021    EGFR 46 05/21/2021    EGFR 38 03/03/2021    CREATININE 1 12 07/15/2021    CREATININE 1 08 05/21/2021    CREATININE 1 26 03/03/2021     · Creatinine at baseline  Avoid NSAIDs and nephrotoxins    · Outpatient follow-up with Nephrology
Lab Results   Component Value Date    EGFR 62 07/22/2021    EGFR 39 07/19/2021    EGFR 24 07/18/2021    CREATININE 0 84 07/22/2021    CREATININE 1 24 07/19/2021    CREATININE 1 84 (H) 07/18/2021     · Stable and at baseline
Status post 1 unit PRBC on 07/18/2021  Stable with no further need for blood transfusion    Lab Results   Component Value Date    HGB 9 1 (L) 07/22/2021    HGB 8 4 (L) 07/19/2021    HGB 9 6 (L) 07/18/2021    HGB 6 2 (LL) 07/18/2021    HGB 8 6 (L) 07/17/2021
UA shows bacteria positive nitrates with no WBCs  Low suspicions for urinary tract infection  Given poor mental status and recent fall, start IV Rocephin plan for 3 days
UA shows bacteria positive nitrates with no WBCs  Low suspicions for urinary tract infection  Given poor mental status and recent fall, start IV Rocephin plan for 3 days
UA shows bacteria positive nitrates with no WBCs  Low suspicions for urinary tract infection  Monitor off antibiotics
· Currently rate controlled and in sinus  · Discussed with cardiology Dr Annamarie Kilpatrick  · She will be discharged on amiodarone 200 mg t i d  For 2 weeks then 200 mg once daily starting August 9, 2021   Instruction placed on her AVS/med rec
· Drop in hemoglobin from 12 to 8 7 overnight postoperatively  · No evidence of ongoing bleeding   · Hemoglobin 6 2 today, transfuse 1 unit  · Moderate H&H    Lab Results   Component Value Date    HGB 6 2 (LL) 07/18/2021    HGB 8 6 (L) 07/17/2021    HGB 8 6 (L) 07/16/2021    HGB 8 7 (L) 07/16/2021    HGB 12 0 07/15/2021
· Drop in hemoglobin from 12 to 8 7 overnight postoperatively  · No evidence of ongoing bleeding   · Monitor hemodynamics and H&H for need for transfusion  · Check Iron panel, IV iron    Lab Results   Component Value Date    HGB 8 6 (L) 07/17/2021    HGB 8 6 (L) 07/16/2021    HGB 8 7 (L) 07/16/2021    HGB 12 0 07/15/2021    HGB 12 1 05/21/2021
· Drop in hemoglobin from 12-8 7 overnight postoperatively  · No evidence of ongoing bleeding   · Monitor hemodynamics and H&H for need for transfusion     Lab Results   Component Value Date    HGB 8 7 (L) 07/16/2021    HGB 12 0 07/15/2021    HGB 12 1 05/21/2021    HGB 12 5 03/03/2021    HGB 12 4 11/09/2020
· Followed outpatient by ENT for bilateral hearing loss
· History of schizophrenia and dementia  · Has 24hr caregiver at home  · PT/OT consult   · Patient mental status decline further, poor appetite likely due to anesthesia and OMEGA versus UTI  · IV antibiotics  · Discontinue Ativan bedtime, decrease Zyprexa to 2 5 mg from 5 mg, continue Celexa  · Delirium precautions  · Mental status improving today, improved appetite
· History of schizophrenia and dementia  · Has 24hr caregiver at home  · PT/OT consult   · Stable on Cymbalta 30 mg at bedtime, Zyprexa 2 5 mg at bedtime
· History of schizophrenia and dementia  · Has 24hr caregiver at home  · Supportive care
· History of schizophrenia and dementia  · Has 24hr caregiver at home  · Supportive care  · PT/OT consult   · continue ativan, zyprexa, cymbalta   · PDMP reviewed   · Monitor for hospital delirium
· History of schizophrenia and dementia  · Has 24hr caregiver at home  · Supportive care  · PT/OT consult   · continue ativan, zyprexa, cymbalta   · PDMP reviewed   · Monitor for hospital delirium
· On Toprol, diltiazem and losartan  · Hold losartan for anticipated surgery
· On Toprol, diltiazem and losartan per home regimen   · Hold losartan - resume if blood pressure tolerates
· On Toprol, diltiazem and losartan per home regimen   · Hold losartan - resume if blood pressure tolerates   · Currently running low normal
· On Toprol, diltiazem and losartan per home regimen   · Multiple blood pressure medications held due to low blood pressure  · Hold losartan - resume if blood pressure tolerates
· PAF on rate control with diltiazem and Toprol
· PAF on rate control with diltiazem and Toprol XL  · No longer on anticoagulation due to fall risk given dementia and schizophrenia
· S/p fall  Preliminary XR shows right hip fracture; unable to obtain HPI due to dementia  As per ED resident, caregiver reported assisted fall  · NPO  · Pain control  · NWB RLE  · Start DVT prophylaxis postsurgery    Scheduled sq heparin to begin tonight 7/15 at 22:00  · PT OT consult  · May need post acute rehab, will consult Case Management for disposition  · Orthopedic surgery consult
· S/p fall when ambulating to the bathroom with assistance from caregiver      · XR showed right femoral neck fracture   · Status post intramedullary nail fixation right intertrochanteric femur fracture on 07/15/2021  · Continue Lovenox for 19 more days to complete 30 days postprocedure  · Pain control adequate  · Continue Lidoderm patch  · WBAT RLE   · Stable for rehab transfer today
· S/p fall when ambulating to the bathroom with assistance from caregiver    Reportedly tripped and had an assisted fall  · XR shows right femoral neck fracture   · Patient seen in consultation with Orthopedics status post intramedullary nail fixation right intertrochanteric femur fracture on 07/15/2021 by Dr Maggie Cook   · DVT prophylaxis will switch to lovenox   · Pain control  · WBAT RLE   · PT OT consult  · Likely will need post acute rehab
· S/p fall when ambulating to the bathroom with assistance from caregiver    Reportedly tripped and had an assisted fall  · XR shows right femoral neck fracture   · Status post intramedullary nail fixation right intertrochanteric femur fracture on 07/15/2021  · DVT prophylaxis will switch to lovenox   · Pain control  · WBAT RLE   · PT OT consult, recommending rehab
· S/p fall when ambulating to the bathroom with assistance from caregiver    Reportedly tripped and had an assisted fall  · XR shows right femoral neck fracture   · Status post intramedullary nail fixation right intertrochanteric femur fracture on 07/15/2021  · DVT prophylaxis will switch to lovenox   · Pain control  · WBAT RLE   · PT OT consult, recommending rehab
No

## 2024-11-30 DIAGNOSIS — K59.00 CONSTIPATION: ICD-10-CM

## 2024-11-30 DIAGNOSIS — I48.0 PAROXYSMAL A-FIB (HCC): ICD-10-CM

## 2024-12-02 RX ORDER — DILTIAZEM HYDROCHLORIDE 30 MG/1
30 TABLET, FILM COATED ORAL 3 TIMES DAILY
Qty: 270 TABLET | Refills: 1 | Status: SHIPPED | OUTPATIENT
Start: 2024-12-02 | End: 2025-05-31

## 2024-12-02 RX ORDER — DOCUSATE SODIUM 100 MG/1
100 CAPSULE, LIQUID FILLED ORAL 2 TIMES DAILY
Qty: 180 CAPSULE | Refills: 1 | Status: SHIPPED | OUTPATIENT
Start: 2024-12-02

## 2024-12-02 RX ORDER — METOPROLOL TARTRATE 25 MG/1
25 TABLET, FILM COATED ORAL 2 TIMES DAILY
Qty: 180 TABLET | Refills: 1 | Status: SHIPPED | OUTPATIENT
Start: 2024-12-02 | End: 2025-03-02

## 2025-01-23 DIAGNOSIS — I63.89 CEREBROVASCULAR ACCIDENT (CVA) DUE TO OTHER MECHANISM (HCC): ICD-10-CM

## 2025-01-23 RX ORDER — ATORVASTATIN CALCIUM 40 MG/1
40 TABLET, FILM COATED ORAL EVERY EVENING
Qty: 90 TABLET | Refills: 1 | Status: SHIPPED | OUTPATIENT
Start: 2025-01-23

## 2025-01-25 DIAGNOSIS — I63.89 CEREBROVASCULAR ACCIDENT (CVA) DUE TO OTHER MECHANISM (HCC): ICD-10-CM

## 2025-01-25 RX ORDER — ASPIRIN 81 MG/1
81 TABLET, COATED ORAL DAILY
Qty: 90 TABLET | Refills: 1 | Status: SHIPPED | OUTPATIENT
Start: 2025-01-25

## 2025-03-06 ENCOUNTER — APPOINTMENT (EMERGENCY)
Dept: CT IMAGING | Facility: HOSPITAL | Age: OVER 89
DRG: 176 | End: 2025-03-06
Payer: MEDICARE

## 2025-03-06 ENCOUNTER — HOSPITAL ENCOUNTER (INPATIENT)
Facility: HOSPITAL | Age: OVER 89
LOS: 7 days | Discharge: HOME | DRG: 176 | End: 2025-03-13
Attending: EMERGENCY MEDICINE | Admitting: HOSPITALIST
Payer: MEDICARE

## 2025-03-06 ENCOUNTER — APPOINTMENT (EMERGENCY)
Dept: RADIOLOGY | Facility: HOSPITAL | Age: OVER 89
DRG: 176 | End: 2025-03-06
Payer: MEDICARE

## 2025-03-06 DIAGNOSIS — I26.99 ACUTE PULMONARY EMBOLISM, UNSPECIFIED PULMONARY EMBOLISM TYPE, UNSPECIFIED WHETHER ACUTE COR PULMONALE PRESENT (HCC): ICD-10-CM

## 2025-03-06 DIAGNOSIS — M81.0 OSTEOPOROSIS, UNSPECIFIED OSTEOPOROSIS TYPE, UNSPECIFIED PATHOLOGICAL FRACTURE PRESENCE: ICD-10-CM

## 2025-03-06 DIAGNOSIS — S72.001S CLOSED FRACTURE OF RIGHT HIP, SEQUELA: ICD-10-CM

## 2025-03-06 DIAGNOSIS — I26.99 PULMONARY EMBOLISM (HCC): ICD-10-CM

## 2025-03-06 DIAGNOSIS — Z51.81 ENCOUNTER FOR MONITORING DENOSUMAB THERAPY: ICD-10-CM

## 2025-03-06 DIAGNOSIS — R41.82 ALTERED MENTAL STATUS: Primary | ICD-10-CM

## 2025-03-06 DIAGNOSIS — Z79.620 ENCOUNTER FOR MONITORING DENOSUMAB THERAPY: ICD-10-CM

## 2025-03-06 DIAGNOSIS — S02.31XS: ICD-10-CM

## 2025-03-06 PROBLEM — R29.90 STROKE-LIKE SYMPTOM: Status: ACTIVE | Noted: 2025-03-06

## 2025-03-06 PROBLEM — I48.91 ATRIAL FIBRILLATION (HCC): Status: ACTIVE | Noted: 2019-03-18

## 2025-03-06 LAB
2HR DELTA HS TROPONIN: 3 NG/L
4HR DELTA HS TROPONIN: 7 NG/L
ANION GAP SERPL CALCULATED.3IONS-SCNC: 9 MMOL/L (ref 4–13)
APTT PPP: 27 SECONDS (ref 23–34)
APTT PPP: 27 SECONDS (ref 23–34)
ATRIAL RATE: 66 BPM
ATRIAL RATE: 75 BPM
ATRIAL RATE: 77 BPM
BNP SERPL-MCNC: 182 PG/ML (ref 0–100)
BUN SERPL-MCNC: 27 MG/DL (ref 5–25)
CALCIUM SERPL-MCNC: 9.3 MG/DL (ref 8.4–10.2)
CARDIAC TROPONIN I PNL SERPL HS: 19 NG/L (ref ?–50)
CARDIAC TROPONIN I PNL SERPL HS: 22 NG/L (ref ?–50)
CARDIAC TROPONIN I PNL SERPL HS: 26 NG/L (ref ?–50)
CHLORIDE SERPL-SCNC: 108 MMOL/L (ref 96–108)
CO2 SERPL-SCNC: 27 MMOL/L (ref 21–32)
CREAT SERPL-MCNC: 1.52 MG/DL (ref 0.6–1.3)
ERYTHROCYTE [DISTWIDTH] IN BLOOD BY AUTOMATED COUNT: 16.6 % (ref 11.6–15.1)
FLUAV RNA RESP QL NAA+PROBE: NEGATIVE
FLUBV RNA RESP QL NAA+PROBE: NEGATIVE
GFR SERPL CREATININE-BSD FRML MDRD: 29 ML/MIN/1.73SQ M
GLUCOSE SERPL-MCNC: 120 MG/DL (ref 65–140)
GLUCOSE SERPL-MCNC: 124 MG/DL (ref 65–140)
HCT VFR BLD AUTO: 33.7 % (ref 34.8–46.1)
HGB BLD-MCNC: 10.8 G/DL (ref 11.5–15.4)
INR PPP: 1 (ref 0.85–1.19)
MCH RBC QN AUTO: 29.4 PG (ref 26.8–34.3)
MCHC RBC AUTO-ENTMCNC: 32 G/DL (ref 31.4–37.4)
MCV RBC AUTO: 92 FL (ref 82–98)
P AXIS: 56 DEGREES
P AXIS: 67 DEGREES
P AXIS: 70 DEGREES
PLATELET # BLD AUTO: 242 THOUSANDS/UL (ref 149–390)
PMV BLD AUTO: 9.6 FL (ref 8.9–12.7)
POTASSIUM SERPL-SCNC: 4.2 MMOL/L (ref 3.5–5.3)
PR INTERVAL: 172 MS
PR INTERVAL: 176 MS
PR INTERVAL: 178 MS
PROTHROMBIN TIME: 13.4 SECONDS (ref 12.3–15)
QRS AXIS: 60 DEGREES
QRS AXIS: 70 DEGREES
QRS AXIS: 71 DEGREES
QRSD INTERVAL: 114 MS
QRSD INTERVAL: 128 MS
QRSD INTERVAL: 128 MS
QT INTERVAL: 402 MS
QT INTERVAL: 418 MS
QT INTERVAL: 438 MS
QTC INTERVAL: 448 MS
QTC INTERVAL: 459 MS
QTC INTERVAL: 473 MS
RBC # BLD AUTO: 3.67 MILLION/UL (ref 3.81–5.12)
RSV RNA RESP QL NAA+PROBE: NEGATIVE
SARS-COV-2 RNA RESP QL NAA+PROBE: NEGATIVE
SODIUM SERPL-SCNC: 144 MMOL/L (ref 135–147)
T WAVE AXIS: -49 DEGREES
T WAVE AXIS: -68 DEGREES
T WAVE AXIS: -79 DEGREES
VENTRICULAR RATE: 66 BPM
VENTRICULAR RATE: 75 BPM
VENTRICULAR RATE: 77 BPM
WBC # BLD AUTO: 6.44 THOUSAND/UL (ref 4.31–10.16)

## 2025-03-06 PROCEDURE — 70496 CT ANGIOGRAPHY HEAD: CPT

## 2025-03-06 PROCEDURE — 82948 REAGENT STRIP/BLOOD GLUCOSE: CPT

## 2025-03-06 PROCEDURE — 99223 1ST HOSP IP/OBS HIGH 75: CPT | Performed by: HOSPITALIST

## 2025-03-06 PROCEDURE — 83880 ASSAY OF NATRIURETIC PEPTIDE: CPT | Performed by: EMERGENCY MEDICINE

## 2025-03-06 PROCEDURE — 36415 COLL VENOUS BLD VENIPUNCTURE: CPT | Performed by: EMERGENCY MEDICINE

## 2025-03-06 PROCEDURE — 93005 ELECTROCARDIOGRAM TRACING: CPT

## 2025-03-06 PROCEDURE — 93010 ELECTROCARDIOGRAM REPORT: CPT | Performed by: INTERNAL MEDICINE

## 2025-03-06 PROCEDURE — 71275 CT ANGIOGRAPHY CHEST: CPT

## 2025-03-06 PROCEDURE — 85730 THROMBOPLASTIN TIME PARTIAL: CPT | Performed by: EMERGENCY MEDICINE

## 2025-03-06 PROCEDURE — 99285 EMERGENCY DEPT VISIT HI MDM: CPT

## 2025-03-06 PROCEDURE — 85027 COMPLETE CBC AUTOMATED: CPT | Performed by: EMERGENCY MEDICINE

## 2025-03-06 PROCEDURE — 85610 PROTHROMBIN TIME: CPT | Performed by: EMERGENCY MEDICINE

## 2025-03-06 PROCEDURE — 84484 ASSAY OF TROPONIN QUANT: CPT | Performed by: EMERGENCY MEDICINE

## 2025-03-06 PROCEDURE — 70498 CT ANGIOGRAPHY NECK: CPT

## 2025-03-06 PROCEDURE — 99291 CRITICAL CARE FIRST HOUR: CPT | Performed by: EMERGENCY MEDICINE

## 2025-03-06 PROCEDURE — 0241U HB NFCT DS VIR RESP RNA 4 TRGT: CPT | Performed by: EMERGENCY MEDICINE

## 2025-03-06 PROCEDURE — 71045 X-RAY EXAM CHEST 1 VIEW: CPT

## 2025-03-06 PROCEDURE — 80048 BASIC METABOLIC PNL TOTAL CA: CPT | Performed by: EMERGENCY MEDICINE

## 2025-03-06 PROCEDURE — 99291 CRITICAL CARE FIRST HOUR: CPT | Performed by: PHYSICIAN ASSISTANT

## 2025-03-06 RX ORDER — DOCUSATE SODIUM 100 MG/1
100 CAPSULE, LIQUID FILLED ORAL 2 TIMES DAILY
Status: DISCONTINUED | OUTPATIENT
Start: 2025-03-06 | End: 2025-03-13 | Stop reason: HOSPADM

## 2025-03-06 RX ORDER — ATORVASTATIN CALCIUM 40 MG/1
40 TABLET, FILM COATED ORAL EVERY EVENING
Status: DISCONTINUED | OUTPATIENT
Start: 2025-03-06 | End: 2025-03-13 | Stop reason: HOSPADM

## 2025-03-06 RX ORDER — METOPROLOL TARTRATE 25 MG/1
25 TABLET, FILM COATED ORAL 2 TIMES DAILY
Status: DISCONTINUED | OUTPATIENT
Start: 2025-03-06 | End: 2025-03-13 | Stop reason: HOSPADM

## 2025-03-06 RX ORDER — AMIODARONE HYDROCHLORIDE 200 MG/1
200 TABLET ORAL DAILY
Status: DISCONTINUED | OUTPATIENT
Start: 2025-03-06 | End: 2025-03-13 | Stop reason: HOSPADM

## 2025-03-06 RX ORDER — ASPIRIN 81 MG/1
81 TABLET ORAL DAILY
Status: DISCONTINUED | OUTPATIENT
Start: 2025-03-06 | End: 2025-03-13 | Stop reason: HOSPADM

## 2025-03-06 RX ORDER — HEPARIN SODIUM 10000 [USP'U]/100ML
3-30 INJECTION, SOLUTION INTRAVENOUS
Status: DISCONTINUED | OUTPATIENT
Start: 2025-03-06 | End: 2025-03-06

## 2025-03-06 RX ORDER — DULOXETIN HYDROCHLORIDE 30 MG/1
30 CAPSULE, DELAYED RELEASE ORAL
Status: DISCONTINUED | OUTPATIENT
Start: 2025-03-06 | End: 2025-03-13 | Stop reason: HOSPADM

## 2025-03-06 RX ORDER — TERAZOSIN 1 MG/1
2 CAPSULE ORAL
Status: DISCONTINUED | OUTPATIENT
Start: 2025-03-06 | End: 2025-03-13 | Stop reason: HOSPADM

## 2025-03-06 RX ORDER — ACETAMINOPHEN 325 MG/1
650 TABLET ORAL EVERY 6 HOURS PRN
Status: DISCONTINUED | OUTPATIENT
Start: 2025-03-06 | End: 2025-03-13 | Stop reason: HOSPADM

## 2025-03-06 RX ORDER — DILTIAZEM HYDROCHLORIDE 30 MG/1
30 TABLET, FILM COATED ORAL 3 TIMES DAILY
Status: DISCONTINUED | OUTPATIENT
Start: 2025-03-06 | End: 2025-03-13 | Stop reason: HOSPADM

## 2025-03-06 RX ADMIN — IOHEXOL 85 ML: 350 INJECTION, SOLUTION INTRAVENOUS at 08:19

## 2025-03-06 RX ADMIN — IOHEXOL 70 ML: 350 INJECTION, SOLUTION INTRAVENOUS at 08:53

## 2025-03-06 RX ADMIN — HEPARIN SODIUM 18 UNITS/KG/HR: 10000 INJECTION, SOLUTION INTRAVENOUS at 10:32

## 2025-03-06 NOTE — PLAN OF CARE
Problem: PAIN - ADULT  Goal: Verbalizes/displays adequate comfort level or baseline comfort level  Description: Interventions:  - Encourage patient to monitor pain and request assistance  - Assess pain using appropriate pain scale  - Administer analgesics based on type and severity of pain and evaluate response  - Implement non-pharmacological measures as appropriate and evaluate response  - Consider cultural and social influences on pain and pain management  - Notify physician/advanced practitioner if interventions unsuccessful or patient reports new pain  Outcome: Progressing     Problem: INFECTION - ADULT  Goal: Absence or prevention of progression during hospitalization  Description: INTERVENTIONS:  - Assess and monitor for signs and symptoms of infection  - Monitor lab/diagnostic results  - Monitor all insertion sites, i.e. indwelling lines, tubes, and drains  - Monitor endotracheal if appropriate and nasal secretions for changes in amount and color  - [place] appropriate cooling/warming therapies per order  - Administer medications as ordered  - Instruct and encourage patient and family to use good hand hygiene technique  - Identify and instruct in appropriate isolation precautions for identified infection/condition  Outcome: Progressing  Goal: Absence of fever/infection during neutropenic period  Description: INTERVENTIONS:  - Monitor WBC    Outcome: Progressing     Problem: SAFETY ADULT  Goal: Patient will remain free of falls  Description: INTERVENTIONS:  - Educate patient/family on patient safety including physical limitations  - Instruct patient to call for assistance with activity   - Consult OT/PT to assist with strengthening/mobility   - Keep Call bell within reach  - Keep bed low and locked with side rails adjusted as appropriate  - Keep care items and personal belongings within reach  - Initiate and maintain comfort rounds  - Make Fall Risk Sign visible to staff  - Offer Toileting every 2 Hours,  in advance of need  - Initiate/Maintain bed alarm  - Obtain necessary fall risk management equipment: In place   - Apply yellow socks and bracelet for high fall risk patients  - Consider moving patient to room near nurses station  Outcome: Progressing  Goal: Maintain or return to baseline ADL function  Description: INTERVENTIONS:  -  Assess patient's ability to carry out ADLs; assess patient's baseline for ADL function and identify physical deficits which impact ability to perform ADLs (bathing, care of mouth/teeth, toileting, grooming, dressing, etc.)  - Assess/evaluate cause of self-care deficits   - Assess range of motion  - Assess patient's mobility; develop plan if impaired  - Assess patient's need for assistive devices and provide as appropriate  - Encourage maximum independence but intervene and supervise when necessary  - Involve family in performance of ADLs  - Assess for home care needs following discharge   - Consider OT consult to assist with ADL evaluation and planning for discharge  - Provide patient education as appropriate  Outcome: Progressing  Goal: Maintains/Returns to pre admission functional level  Description: INTERVENTIONS:  - Perform AM-PAC 6 Click Basic Mobility/ Daily Activity assessment daily.  - Set and communicate daily mobility goal to care team and patient/family/caregiver.   - Collaborate with rehabilitation services on mobility goals if consulted  - Perform Range of Motion 3 times a day.  - Reposition patient every 2 hours.  - Dangle patient 3 times a day  - Stand patient 3 times a day  - Ambulate patient 3 times a day  - Out of bed to chair 3 times a day   - Out of bed for meals 3 times a day  - Out of bed for toileting  - Record patient progress and toleration of activity level   Outcome: Progressing     Problem: DISCHARGE PLANNING  Goal: Discharge to home or other facility with appropriate resources  Description: INTERVENTIONS:  - Identify barriers to discharge w/patient and  caregiver  - Arrange for needed discharge resources and transportation as appropriate  - Identify discharge learning needs (meds, wound care, etc.)  - Arrange for interpretive services to assist at discharge as needed  - Refer to Case Management Department for coordinating discharge planning if the patient needs post-hospital services based on physician/advanced practitioner order or complex needs related to functional status, cognitive ability, or social support system  Outcome: Progressing     Problem: Knowledge Deficit  Goal: Patient/family/caregiver demonstrates understanding of disease process, treatment plan, medications, and discharge instructions  Description: Complete learning assessment and assess knowledge base.  Interventions:  - Provide teaching at level of understanding  - Provide teaching via preferred learning methods  Outcome: Progressing

## 2025-03-06 NOTE — H&P
H&P - Hospitalist   Name: Denia Castañeda 89 y.o. female I MRN: [id number]  Unit/Bed#: ED-19 I Date of Admission: 3/6/2025   Date of Service: 3/6/2025 I Hospital Day: 0     Assessment & Plan  Acute pulmonary embolism (HCC)  Likely from being bedbound    Continue heparin drip for 48 hours then transition over to Eliquis  Atrial fibrillation (HCC)  Continue diltiazem, metoprolol and amiodarone.  Stroke-like symptom              Chief Complaint:     CVA/TIA-like Symptoms (Pt arrives via ems from home, found unresponsive by caregiver, history of stroke and dementia)    History of Present Illness  Denia Castañeda is a 89 y.o. female with a PMH of chronic atrial fibrillation, dementia, nonverbal, bedbound, prior history of strokes who presents with change in mental status.  She is nonverbal and bedbound with underlying dementia.  But caregiver today felt she was much less interactive than normal so they brought the hospital.  Initially called a stroke alert but was not felt to be a stroke.  They did pan CTs.  Here and found acute pulmonary emboli.  She cannot give me any complaints with her dementia and nonverbal status.  Does not answer any questions.  She is quite a bit sleepy.  But she is protecting her airway..    Review of Systems   Unable to perform ROS: Dementia       Past Medical and Surgical History:   Past Medical History:   Diagnosis Date    Anxiety     Closed fracture of right orbital floor (HCC) 08/23/2020    Cognitive impairment     COVID-19 virus infection 04/13/2020    Dementia (HCC)     Depression     Hallucination     Hyperlipidemia     Hypertension     Memory loss     Psychiatric illness     Psychosis (HCC)     Retention of urine     Schizoaffective disorder (HCC)     Sleep difficulties     Urinary tract infection      Past Surgical History:   Procedure Laterality Date    APPENDECTOMY      BLADDER SURGERY      CHOLECYSTECTOMY      SC OPTX FEM SHFT FX W/INSJ IMED IMPLT W/WO SCREW Right 7/15/2021     Procedure: INSERTION NAIL IM FEMUR ANTEGRADE (TROCHANTERIC) right;  Surgeon: Getachew Aguilar MD;  Location: [facility];  Service: Orthopedics    TOTAL ABDOMINAL HYSTERECTOMY W/ BILATERAL SALPINGOOPHORECTOMY      TOTAL KNEE ARTHROPLASTY Left      Meds/Allergies:  Allergies: No Known Allergies  Prior to Admission Medications   Prescriptions Last Dose Informant Patient Reported? Taking?   DULoxetine (CYMBALTA) 30 mg delayed release capsule  Child, Care Giver Yes No   Sig: Take 30 mg by mouth daily at bedtime   LORazepam (ATIVAN) 0.5 mg tablet  Child, Care Giver Yes No   Sig: Take 0.5 mg by mouth    OLANZapine (ZyPREXA) 5 mg tablet  Child, Care Giver No No   Sig: Take 1 tablet (5 mg total) by mouth daily at bedtime   acetaminophen (TYLENOL) 325 mg tablet  Child, Care Giver No No   Sig: Take 2 tablets (650 mg total) by mouth every 6 (six) hours as needed for mild pain, moderate pain or headaches   amiodarone 200 mg tablet   No No   Sig: TAKE 1 TABLET (200 MG TOTAL) BY MOUTH DAILY   aspirin (Aspirin Low Dose) 81 mg EC tablet   No No   Sig: TAKE 1 TABLET (81 MG TOTAL) BY MOUTH DAILY   atorvastatin (LIPITOR) 40 mg tablet   No No   Sig: TAKE 1 TABLET (40 MG TOTAL) BY MOUTH EVERY EVENING   diltiazem (CARDIZEM) 30 mg tablet   No No   Sig: TAKE 1 TABLET (30 MG TOTAL) BY MOUTH 3 (THREE) TIMES A DAY   docusate sodium (COLACE) 100 mg capsule   No No   Sig: TAKE 1 CAPSULE (100 MG TOTAL) BY MOUTH 2 (TWO) TIMES A DAY   melatonin 3 mg  Child, Care Giver Yes No   metoprolol tartrate (LOPRESSOR) 25 mg tablet   No No   Sig: TAKE 1 TABLET (25 MG TOTAL) BY MOUTH 2 (TWO) TIMES A DAY   mirtazapine (REMERON) 15 mg tablet  Child, Care Giver Yes No   terazosin (HYTRIN) 2 mg capsule   No No   Sig: Take 1 capsule (2 mg total) by mouth daily at bedtime      Facility-Administered Medications Last Administration Doses Remaining   denosumab (PROLIA) subcutaneous injection 60 mg 3/18/2024  1:30 PM         Social History:     Social History      Socioeconomic History    Marital status:      Spouse name: Not on file    Number of children: Not on file    Years of education: Not on file    Highest education level: Not on file   Occupational History    Not on file   Tobacco Use    Smoking status: Never     Passive exposure: Never    Smokeless tobacco: Never    Tobacco comments:     Former smoker per [employer]   Vaping Use    Vaping status: Never Used   Substance and Sexual Activity    Alcohol use: Never    Drug use: No    Sexual activity: Not on file   Other Topics Concern    Not on file   Social History Narrative    Drinks coffee     Social Drivers of Health     Financial Resource Strain: Not on file   Food Insecurity: No Food Insecurity (6/30/2024)    Nursing - Inadequate Food Risk Classification     Worried About Running Out of Food in the Last Year: Never true     Ran Out of Food in the Last Year: Never true     Ran Out of Food in the Last Year: Not on file   Transportation Needs: No Transportation Needs (6/30/2024)    PRAPARE - Transportation     Lack of Transportation (Medical): No     Lack of Transportation (Non-Medical): No   Physical Activity: Not on file   Stress: Not on file   Social Connections: Not on file   Intimate Partner Violence: Not on file   Housing Stability: Low Risk  (6/30/2024)    Housing Stability Vital Sign     Unable to Pay for Housing in the Last Year: No     Number of Times Moved in the Last Year: 0     Homeless in the Last Year: No         Objective   Vitals:   Blood Pressure: 140/63 (03/06/25 1200)  Pulse: 60 (03/06/25 1200)  Temperature: 99.5 °F (37.5 °C) (03/06/25 0811)  Temp Source: Rectal (03/06/25 0811)  Respirations: 14 (03/06/25 1200)  SpO2: 100 % (03/06/25 1200)    Physical Exam  Vitals and nursing note reviewed.   Constitutional:       General: She is not in acute distress.     Appearance: She is well-developed.   HENT:      Head: Normocephalic and atraumatic.   Eyes:      Conjunctiva/sclera: Conjunctivae  normal.   Cardiovascular:      Rate and Rhythm: Normal rate and regular rhythm.      Heart sounds: No murmur heard.  Pulmonary:      Effort: Pulmonary effort is normal. No respiratory distress.      Breath sounds: Normal breath sounds.   Abdominal:      Palpations: Abdomen is soft.      Tenderness: There is no abdominal tenderness.   Musculoskeletal:         General: No swelling.      Cervical back: Neck supple.      Right lower leg: No edema.      Left lower leg: No edema.   Skin:     General: Skin is warm and dry.      Capillary Refill: Capillary refill takes less than 2 seconds.   Neurological:      Mental Status: She is alert.   Psychiatric:         Mood and Affect: Mood normal.         Additional Data:   Lab Results: I have reviewed the following results:  Results from last 7 days   Lab Units 03/06/25  0813   WBC Thousand/uL 6.44   HEMOGLOBIN g/dL 10.8*   HEMATOCRIT % 33.7*   PLATELETS Thousands/uL 242     Results from last 7 days   Lab Units 03/06/25  0813   SODIUM mmol/L 144   POTASSIUM mmol/L 4.2   CHLORIDE mmol/L 108   CO2 mmol/L 27   ANION GAP mmol/L 9   BUN mg/dL 27*   CREATININE mg/dL 1.52*   CALCIUM mg/dL 9.3   EGFR ml/min/1.73sq m 29   GLUCOSE RANDOM mg/dL 120     Results from last 7 days   Lab Units 03/06/25  0813   INR  1.00               EKG, Pathology, and Other Studies Reviewed on Admission:   EKG        ** Please Note: This note has been constructed using a voice recognition system. **

## 2025-03-06 NOTE — ASSESSMENT & PLAN NOTE
89 year old with past medical history of schizoaffective disorder, depression, cognitive impairment/severe dementia with agitation, prior stroke, hx of right facial droop, right orbital fracutre, hypertension, hyperlipidemia, paroxysmal atrial fibrillation not on anticoagulation reports of vaginal bleeding.  , CKD who presents to [facility] as a stroke alert.   It is reported the patient is nonverbal and bed bound, she has a severe dementia.  It was ported the patient was last known well sometime last evening, the patient was found by caregiver at around 7:30 AM 3/6/2025 more lethargic/encephalopathic, less responsive, more contracted and drooling.  A stroke alert was activated, the patient NIH was a 26.      CT of the head - Small age-indeterminate, likely subacute to chronic right cerebellar infarct. Additional old bilateral cerebellar lacunar infarcts.Stable old right occipital lobe infarct. Stable old lacunar infarcts in the left anterior insula/external capsule.Chronic microangiopathic change.Right maxillary sinus disease with frothy secretions suggesting acute sinusitis.  CTA of head and neck -  Incidentally noted pulmonary emboli within the interlobar and segmental branches of the right pulmonary artery.No intracranial large vessel occlusion or critical stenosis. 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 Moderate stenosis at the origin.Coronary artery atherosclerotic disease.    Neurology examination - the patient NIH is 26, the patient was nonverbal did not follow commands, did not attempt speech, the patient's eyes were midline pupils were equal and minimally reactive, 1 mm bilaterally, gaze was midline without a gaze preference or palsy no disconjugate gaze noted no fixed gaze noted, there was a right facial droop upper and lower face, she did not blink to threat  bilaterally, corneals were intact, she did have increased tone in the bilateral upper extremities she did have a slight contraction deformity of the right upper extremity with slight tremor, however she did withdrawal bilateral upper extremities equally, she also had bilateral lower extremity increased tone but did withdrawal equally to stimuli.  To sensory testing to gentle nailbed pressure the patient withdrew and in Czech, withdrew equally.  No seizure-like activity noted.  Grossly nonfocal neurological examination in the motor testing aspect.  Reflexes were symmetric decreased, toes are mute but does withdrawal. Paratonia through out.    The patient was not a tnk candidate as the patient is out of the window for TNK, there was no large vessel occlusion seen for thrombectomy.     Strokelike episode, suspect this is recrudescence/TME, as well as CTA showed incidental pulmonary embolism, less likely acute stroke but does have risk with her hx of afib and not being on anticoagulation, her examination is similar to prior neurological examinations, however, today she is more encephalopathic per review, there is documented facial droop on the right in the past.     -Further workup for PE/TME, per ED CT of the chest is pending  -If confirmed PE on further imaging, management per ED/medicine and would defer stroke pathway, as reviewed with attending, as this is more likely TME/recrudescence.  The patient is reported to be claustrophobic and could not have MRI of brain in past.   -The patient was not a tnk or thrombectomy candidate, as noted above  -Neurological checks notify neurology with any focal changes on examination  -Low threshold for EEG if she developed seizure like activity, currently no evidence to suspect the patient is seizing or seizure activity.  -Reviewed case with attending physician, plan of care per attending physician, please see attestation for further details.

## 2025-03-06 NOTE — ED PROVIDER NOTES
Time reflects when diagnosis was documented in both MDM as applicable and the Disposition within this note       Time User Action Codes Description Comment    3/6/2025  8:10 AM Jeanette Guo Add [R41.82] Altered mental status     3/6/2025 10:18 AM SariShirasa Add [I26.99] Pulmonary embolism (HCC)     3/6/2025  1:12 PM PrechtelKana Add [M81.0] Osteoporosis, unspecified osteoporosis type, unspecified pathological fracture presence     3/6/2025  1:12 PM Prechtel, Kana Add [Z51.81,  Z79.899] Encounter for monitoring denosumab therapy     3/6/2025  1:12 PM Prechtel, Kana Add [S72.001S] Closed fracture of right hip, sequela     3/6/2025  1:12 PM PrechtelKana Add [S02.31XS] Closed fracture of right orbital floor, sequela (HCC)           ED Disposition       ED Disposition   Admit    Condition   Stable    Date/Time   u Mar 6, 2025 10:18 AM    Comment   Case was discussed with ESTER and the patient's admission status was agreed to be Admission Status: inpatient status to the service of Dr. Cha.               Assessment & Plan       Medical Decision Making  A 89 yo female BIBA for altered mental status.  Unclear baseline at this time, last known well some time last night.  Pt noted to open eyes and spontaneously move bilateral upper extremities.  Differential includes CVA, ICH, seizure and toxic metabolic encephalopathy.  Given concern for CVA and prior history, a stroke alert was called.  Will proceed with labs and imaging for further evaluation.  Pt is not a TNK candidate due to last known well.    Amount and/or Complexity of Data Reviewed  Labs: ordered. Decision-making details documented in ED Course.  Radiology: ordered and independent interpretation performed. Decision-making details documented in ED Course.    Risk  Prescription drug management.  Decision regarding hospitalization.      Critical Care Time Statement: Upon my evaluation, this patient had a high probability of imminent or  life-threatening deterioration due to pulmonary embolism, acute encephalopathy, which required my direct attention, intervention, and personal management.  I spent a total of 30 minutes directly providing critical care services, including titration of continuous IV medications (drips). This time is exclusive of procedures, teaching, treating other patients, family meetings, and any prior time recorded by providers other than myself.        ED Course as of 03/06/25 1542   Thu Mar 06, 2025   0821 Spoke with Dr. Thibodeaux, neuro, reviewing pt's history, presentation and work up.  Neuro AP at bedside to complete exam.   0831 Spoke with radiology, CTH negative for acute CNS pathology.  Partially visualized PE's, recommending CT PE study.  Neuro updated on CT findings, suspect AMS may be toxic metabolic encephalopathy and less likely acute ischemic event.  Neuro agrees with anticoagulation for PE's   0845 Pt's son at bedside, confirmed pt is bed bound and nonverbal.  No residual deficits from prior CVA's.   0846 Creatinine(!): 1.52  Stable    0847 CTA stroke alert (head/neck)  1.) Incidentally noted pulmonary emboli within the interlobar and segmental branches of the right pulmonary artery.  2.) No intracranial large vessel occlusion or critical stenosis. No aneurysm.  3.) No significant stenosis in the cervical carotid or vertebral arteries.  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 Moderate stenosis at the origin.  5.) Coronary artery atherosclerotic disease.   0847 CT stroke alert brain  1.) Small age-indeterminate, likely subacute to chronic right cerebellar infarct. Additional old bilateral cerebellar lacunar infarcts.  2.) Stable old right occipital lobe infarct. Stable old lacunar infarcts in the left anterior insula/external capsule.  3.) Chronic microangiopathic change.  4.) Right maxillary sinus disease with frothy secretions suggesting acute  sinusitis.   0933 CTA chest pe study  1.) Findings consistent with right-sided pulmonary embolism at the lobar/segmental levels; overall clot burden is small. Measured RV/LV ratio is within normal limits at less than 0.9; there is no evidence for right heart strain.  2.) Small to moderate pericardial effusion.   0946 FLU/RSV/COVID - if FLU/RSV clinically relevant (2hr TAT)  Negative    1003 Attempted to call son to discuss goals of care however number goes straight to voicemail.  Will start heparin gtt for now and proceed with admission.   1017 Pt's son at bedside, reviewed CT findings. He would like to proceed with anticoagulation, agreeable to admission.   1038 Repeat EKG, interpreted by myself -  NSR at 66, persistent ST changes inferior-laterally       Medications   iohexol (OMNIPAQUE) 350 MG/ML injection (SINGLE-DOSE) 85 mL (85 mL Intravenous Given 3/6/25 0819)   iohexol (OMNIPAQUE) 350 MG/ML injection (SINGLE-DOSE) 70 mL (70 mL Intravenous Given 3/6/25 0853)       ED Risk Strat Scores                            SBIRT 22yo+      Flowsheet Row Most Recent Value   Initial Alcohol Screen: US AUDIT-C     1. How often do you have a drink containing alcohol? 0 Filed at: 03/06/2025 0842   2. How many drinks containing alcohol do you have on a typical day you are drinking?  0 Filed at: 03/06/2025 0842   3b. FEMALE Any Age, or MALE 65+: How often do you have 4 or more drinks on one occassion? 0 Filed at: 03/06/2025 0842   Audit-C Score 0 Filed at: 03/06/2025 0842   ROBERT: How many times in the past year have you...    Used an illegal drug or used a prescription medication for non-medical reasons? Never Filed at: 03/06/2025 0842                            History of Present Illness       Chief Complaint   Patient presents with    CVA/TIA-like Symptoms     Pt arrives via ems from home, found unresponsive by caregiver, history of stroke and dementia       Past Medical History:   Diagnosis Date    Anxiety     Closed fracture  of right orbital floor (HCC) 08/23/2020    Cognitive impairment     COVID-19 virus infection 04/13/2020    Dementia (HCC)     Depression     Hallucination     Hyperlipidemia     Hypertension     Memory loss     Psychiatric illness     Psychosis (HCC)     Retention of urine     Schizoaffective disorder (HCC)     Sleep difficulties     Urinary tract infection       Past Surgical History:   Procedure Laterality Date    APPENDECTOMY      BLADDER SURGERY      CHOLECYSTECTOMY      [place] OPTX FEM SHFT FX W/INSJ IMED IMPLT W/WO SCREW Right 7/15/2021    Procedure: INSERTION NAIL IM FEMUR ANTEGRADE (TROCHANTERIC) right;  Surgeon: Getachew Aguilar MD;  Location: [facility];  Service: Orthopedics    TOTAL ABDOMINAL HYSTERECTOMY W/ BILATERAL SALPINGOOPHORECTOMY      TOTAL KNEE ARTHROPLASTY Left       Family History   Problem Relation Age of Onset    Heart disease Mother         Cardiac disorder    Parkinsonism Father     Heart disease Sister         Cardiac disorder    Hypertension Sister     Diabetes Child         Diabetes Mellitus    Lung disease Child         Respiratory Disorder    Diabetes Son         Diabetes Mellitus      Social History     Tobacco Use    Smoking status: Never     Passive exposure: Never    Smokeless tobacco: Never    Tobacco comments:     Former smoker per [employer]   Vaping Use    Vaping status: Never Used   Substance Use Topics    Alcohol use: Never    Drug use: No      E-Cigarette/Vaping    E-Cigarette Use Never User       E-Cigarette/Vaping Substances    Nicotine No     THC No     CBD No     Flavoring No     Other No     Unknown No       I have reviewed and agree with the history as documented.     A 89 yo female with pmhx of anxiety, dementia, depression, HLD, HTN, CKD, CVA, paroxsymal afib and schizoaffective disorder; BIBA for altered mental status.  Per EMS, pt's caregiver arrived to the home around 7:30 today and found the patient less responsive and drooling.  Caregiver was unable to provide a  baseline mental status.  Suspect last known well last night sometime.  On arrival to the ED, pt arousable to voice.  Complete history and ROS are unobtainable from the patient.     On review of records, pt is nonverbal and uncooperative at baseline.      History provided by:  Medical records, relative and EMS personnel      Review of Systems   Unable to perform ROS: Mental status change       Objective       ED Triage Vitals   Temperature Pulse Blood Pressure Respirations SpO2 Patient Position - Orthostatic VS   03/06/25 0811 03/06/25 0809 03/06/25 0811 03/06/25 0809 03/06/25 0809 03/06/25 0811   99.5 °F (37.5 °C) 83 104/56 18 95 % Lying      Temp Source Heart Rate Source BP Location FiO2 (%) Pain Score    03/06/25 0811 03/06/25 0809 03/06/25 0811 -- --    Rectal Monitor Right arm        Vitals      Date and Time Temp Pulse SpO2 Resp BP Pain Score FACES Pain Rating User   03/06/25 1522 -- 68 99 % 20 105/52 -- -- DF   03/06/25 1400 -- 72 99 % 20 184/97 -- --    03/06/25 1200 -- 60 100 % 14 140/63 -- --    03/06/25 1130 -- 62 100 % 15 129/62 -- --    03/06/25 1100 -- 64 99 % 14 140/72 -- --    03/06/25 1030 -- 68 93 % 17 116/61 -- --    03/06/25 1000 -- 66 96 % 18 129/70 -- --    03/06/25 0930 -- 70 98 % 18 140/62 -- --    03/06/25 0835 -- 77 96 % 20 161/71 -- --    03/06/25 0811 99.5 °F (37.5 °C) -- -- -- 104/56 -- --    03/06/25 0809 -- 83 95 % 18 -- -- --             Physical Exam  General Appearance: drowsy, arousable to voice.  Ill appearing.  Skin:  Warm, dry, intact.  No cyanosis  HEENT: Atraumatic, normocephalic.  No eye drainage.  Normal hearing.  Moist mucous membranes.    Neck: Supple, trachea midline  Cardiac: RRR; no murmurs, rub, gallops.  No pedal edema, 2+ pulses  Pulmonary: lungs CTAB; no wheezes, rales, rhonchi  Gastrointestinal: abdomen soft, nontender, nondistended; no guarding or rebound tenderness; good bowel sounds, no mass or bruits  Extremities:  No deformities.  No calf  tenderness, no clubbing  Neuro:  nonverbal.  Intermittently opens eyes.  Right facial droop appreciated.  Spontaneous movements to the bilateral upper extremities noted.  Withdrawals to pain in bilateral upper and lower extremities.  Psych:  unable to assess       Results Reviewed       Procedure Component Value Units Date/Time    HS Troponin I 4hr [[id number]]  (Normal) Collected: 03/06/25 1248    Lab Status: Final result Specimen: Blood from Arm, Left Updated: 03/06/25 1315     hs TnI 4hr 26 ng/L      Delta 4hr hsTnI 7 ng/L     B-Type Natriuretic Peptide(BNP) [[id number]]  (Abnormal) Collected: 03/06/25 0813    Lab Status: Final result Specimen: Blood from Arm, Left Updated: 03/06/25 1138      pg/mL     HS Troponin I 2hr [[id number]]  (Normal) Collected: 03/06/25 1031    Lab Status: Final result Specimen: Blood from Arm, Left Updated: 03/06/25 1102     hs TnI 2hr 22 ng/L      Delta 2hr hsTnI 3 ng/L     APTT [[id number]]  (Normal) Collected: 03/06/25 1031    Lab Status: Final result Specimen: Blood from Arm, Left Updated: 03/06/25 1051     PTT 27 seconds     FLU/RSV/COVID - if FLU/RSV clinically relevant (2hr TAT) [[id number]]  (Normal) Collected: 03/06/25 0845    Lab Status: Final result Specimen: Nares from Nose Updated: 03/06/25 0946     SARS-CoV-2 Negative     INFLUENZA A PCR Negative     INFLUENZA B PCR Negative     RSV PCR Negative    Narrative:      This test has been performed using the CoV-2/Flu/RSV plus assay on the [employer] GeneXpert platform. This test has been validated by the  and verified by the performing laboratory.     This test is designed to amplify and detect the following: nucleocapsid (N), envelope (E), and RNA-dependent RNA polymerase (RdRP) genes of the SARS-CoV-2 genome; matrix (M), basic polymerase (PB2), and acidic protein (PA) segments of the influenza A genome; matrix (M) and non-structural protein (NS) segments of the influenza B genome, and the nucleocapsid genes of RSV  A and RSV B.     Positive results are indicative of the presence of Flu A, Flu B, RSV, and/or SARS-CoV-2 RNA. Positive results for SARS-CoV-2 or suspected novel influenza should be reported to state, local, or federal health departments according to local reporting requirements.      All results should be assessed in conjunction with clinical presentation and other laboratory markers for clinical management.     FOR PEDIATRIC PATIENTS - copy/paste COVID Guidelines URL to browser: [email or web address]       HS Troponin 0hr (reflex protocol) [[id number]]  (Normal) Collected: 03/06/25 0813    Lab Status: Final result Specimen: Blood from Arm, Left Updated: 03/06/25 0840     hs TnI 0hr 19 ng/L     Basic metabolic panel [[id number]]  (Abnormal) Collected: 03/06/25 0813    Lab Status: Final result Specimen: Blood from Arm, Left Updated: 03/06/25 0834     Sodium 144 mmol/L      Potassium 4.2 mmol/L      Chloride 108 mmol/L      CO2 27 mmol/L      ANION GAP 9 mmol/L      BUN 27 mg/dL      Creatinine 1.52 mg/dL      Glucose 120 mg/dL      Calcium 9.3 mg/dL      eGFR 29 ml/min/1.73sq m     Narrative:      National Kid[employer] Disease Foundation guidelines for Chronic Kidney Disease (CKD):     Stage 1 with normal or high GFR (GFR > 90 mL/min/1.73 square meters)    Stage 2 Mild CKD (GFR = 60-89 mL/min/1.73 square meters)    Stage 3A Moderate CKD (GFR = 45-59 mL/min/1.73 square meters)    Stage 3B Moderate CKD (GFR = 30-44 mL/min/1.73 square meters)    Stage 4 Severe CKD (GFR = 15-29 mL/min/1.73 square meters)    Stage 5 End Stage CKD (GFR <15 mL/min/1.73 square meters)  Note: GFR calculation is accurate only with a steady state creatinine    Protime-INR [[id number]]  (Normal) Collected: 03/06/25 0813    Lab Status: Final result Specimen: Blood from Arm, Left Updated: 03/06/25 0832     Protime 13.4 seconds      INR 1.00    Narrative:      INR Therapeutic Range    Indication                                              INR Range      Atrial Fibrillation                                               2.0-3.0  Hypercoagulable State                                    2.0.2.3  Left Ventricular Asist Device                            2.0-3.0  Mechanical Heart Valve                                  -    Aortic(with afib, MI, embolism, HF, LA enlargement,    and/or coagulopathy)                                     2.0-3.0 (2.5-3.5)     Mitral                                                             2.5-3.5  Prosthetic/Bioprosthetic Heart Valve               2.0-3.0  Venous thromboembolism (VTE: VT, PE        2.0-3.0    APTT [[id number]]  (Normal) Collected: 03/06/25 0813    Lab Status: Final result Specimen: Blood from Arm, Left Updated: 03/06/25 0832     PTT 27 seconds     CBC and Platelet [[id number]]  (Abnormal) Collected: 03/06/25 0813    Lab Status: Final result Specimen: Blood from Arm, Left Updated: 03/06/25 0819     WBC 6.44 Thousand/uL      RBC 3.67 Million/uL      Hemoglobin 10.8 g/dL      Hematocrit 33.7 %      MCV 92 fL      MCH 29.4 pg      MCHC 32.0 g/dL      RDW 16.6 %      Platelets 242 Thousands/uL      MPV 9.6 fL     Fingerstick Glucose (POCT) [[id number]]  (Normal) Collected: 03/06/25 0806    Lab Status: Final result Specimen: Blood Updated: 03/06/25 0807     POC Glucose 124 mg/dl             X-ray chest 1 view portable   ED Interpretation by Jeanette Guo DO (03/06 0935)   No acute disease.  Scoliosis noted    Image independently interpreted by myself       Final Interpretation by Glen Neff MD (03/06 6096)      No focal consolidation, pleural effusion, or pneumothorax.            Workstation performed: [id number]         CTA chest pe study   Final Interpretation by Huseyin Quevedo MD (03/06 0926)      Findings consistent with right-sided pulmonary embolism at the lobar/segmental levels; overall clot burden is small. Measured RV/LV ratio is within normal limits at less than 0.9;  there is no evidence for right heart strain.      Small to moderate pericardial effusion. The study was marked in EPIC for immediate notification.                  Workstation performed: [id number]         CTA stroke alert (head/neck)   Final Interpretation by Brenda Loyola MD (03/06 0844)      1.  Incidentally noted pulmonary emboli within the interlobar and segmental branches of the right pulmonary artery.   2.  No intracranial large vessel occlusion or critical stenosis. No aneurysm.   3.  No significant stenosis in the cervical carotid or vertebral arteries.   4.  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 Moderate stenosis at the origin.   5.  Coronary artery atherosclerotic disease.                        Findings were directly discussed with Trae Thibodeaux at 8:34 a.m. Findings were also discussed with provider in the emergency department JEANETTE GUO on 3/6/2025 8:30 AM.      Workstation performed: [id number]         CT stroke alert brain   Final Interpretation by Brenda Loyola MD (03/06 0843)      1.  Small age-indeterminate, likely subacute to chronic right cerebellar infarct. Additional old bilateral cerebellar lacunar infarcts.   2.  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 Stable old lacunar infarcts in the left anterior insula/external capsule.   3.  Chronic microangiopathic change.   4.  Right maxillary sinus disease with frothy secretions suggesting acute sinusitis.               Findings were directly discussed with Trae Thibodeaux at approximately 8:34 a.m      Workstation performed: [id number]             Procedures  ECG 12 Lead Documentation  Date/Time: today/date: 3/6/2025  Performed by: Jeanette Guo    ECG reviewed by me, the ED Provider: yes    Patient location:  ED   Previous ECG:  Compared to current, no change   Rate:  80  ECG rate assessment: normal    Rhythm: sinus rhythm    Ectopy:  none    QRS axis:  Normal  Intervals:  normal   Q waves: None   ST segments:  ST depression in II, III, aVF, V4-V6.    T waves: T wave inversions in II, III, aVF, V4-V6      Impression: NSR with ST changes inferior and laterally      ED Medication and Procedure Management   Prior to Admission Medications   Prescriptions Last Dose Informant Patient Reported? Taking?   DULoxetine (CYMBALTA) 30 mg delayed release capsule  Child, Care Giver Yes No   Sig: Take 30 mg by mouth daily at bedtime   LORazepam (ATIVAN) 0.5 mg tablet  Child, Care Giver Yes No   Sig: Take 0.5 mg by mouth    OLANZapine (ZyPREXA) 5 mg tablet  Child, Care Giver No No   Sig: Take 1 tablet (5 mg total) by mouth daily at bedtime   acetaminophen (TYLENOL) 325 mg tablet  Child, Care Giver No No   Sig: Take 2 tablets (650 mg total) by mouth every 6 (six) hours as needed for mild pain, moderate pain or headaches   amiodarone 200 mg tablet   No No   Sig: TAKE 1 TABLET (200 MG TOTAL) BY MOUTH DAILY   aspirin (Aspirin Low Dose) 81 mg EC tablet   No No   Sig: TAKE 1 TABLET (81 MG TOTAL) BY MOUTH DAILY   atorvastatin (LIPITOR) 40 mg tablet   No No   Sig: TAKE 1 TABLET (40 MG TOTAL) BY MOUTH EVERY EVENING   diltiazem (CARDIZEM) 30 mg tablet   No No   Sig: TAKE 1 TABLET (30 MG TOTAL) BY MOUTH 3 (THREE) TIMES A DAY   docusate sodium (COLACE) 100 mg capsule   No No   Sig: TAKE 1 CAPSULE (100 MG TOTAL) BY MOUTH 2 (TWO) TIMES A DAY   melatonin 3 mg  Child, Care Giver Yes No   metoprolol tartrate (LOPRESSOR) 25 mg tablet   No No   Sig: TAKE 1 TABLET (25 MG TOTAL) BY MOUTH 2 (TWO) TIMES A DAY   mirtazapine (REMERON) 15 mg tablet  Child, Care Giver Yes No   terazosin (HYTRIN) 2 mg capsule   No No   Sig: Take 1 capsule (2 mg total) by mouth daily at bedtime      Facility-Administered Medications Last Administration Doses Remaining   denosumab (PROLIA) subcutaneous injection 60 mg 3/18/2024  1:30 PM         Current Discharge Medication List        CONTINUE these medications which have NOT CHANGED    Details    acetaminophen (TYLENOL) 325 mg tablet Take 2 tablets (650 mg total) by mouth every 6 (six) hours as needed for mild pain, moderate pain or headaches  Qty: 30 tablet, Refills: 0    Associated Diagnoses: Closed fracture of right orbital floor with routine healing, subsequent encounter      amiodarone 200 mg tablet TAKE 1 TABLET (200 MG TOTAL) BY MOUTH DAILY  Qty: 90 tablet, Refills: 1    Associated Diagnoses: Paroxysmal A-fib (HCC)      aspirin (Aspirin Low Dose) 81 mg EC tablet TAKE 1 TABLET (81 MG TOTAL) BY MOUTH DAILY  Qty: 90 tablet, Refills: 1    Associated Diagnoses: Cerebrovascular accident (CVA) due to other mechanism (HCC)      atorvastatin (LIPITOR) 40 mg tablet TAKE 1 TABLET (40 MG TOTAL) BY MOUTH EVERY EVENING  Qty: 90 tablet, Refills: 1    Associated Diagnoses: Cerebrovascular accident (CVA) due to other mechanism (HCC)      diltiazem (CARDIZEM) 30 mg tablet TAKE 1 TABLET (30 MG TOTAL) BY MOUTH 3 (THREE) TIMES A DAY  Qty: 270 tablet, Refills: 1    Associated Diagnoses: Paroxysmal A-fib (HCC)      docusate sodium (COLACE) 100 mg capsule TAKE 1 CAPSULE (100 MG TOTAL) BY MOUTH 2 (TWO) TIMES A DAY  Qty: 180 capsule, Refills: 1    Associated Diagnoses: Constipation      DULoxetine (CYMBALTA) 30 mg delayed release capsule Take 30 mg by mouth daily at bedtime      LORazepam (ATIVAN) 0.5 mg tablet Take 0.5 mg by mouth       melatonin 3 mg       metoprolol tartrate (LOPRESSOR) 25 mg tablet TAKE 1 TABLET (25 MG TOTAL) BY MOUTH 2 (TWO) TIMES A DAY  Qty: 180 tablet, Refills: 1    Associated Diagnoses: Paroxysmal A-fib (HCC)      mirtazapine (REMERON) 15 mg tablet       OLANZapine (ZyPREXA) 5 mg tablet Take 1 tablet (5 mg total) by mouth daily at bedtime  Qty: 30 tablet, Refills: 1    Associated Diagnoses: 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HCC)      terazosin (HYTRIN) 2 mg capsule Take 1 capsule (2 mg total) by mouth daily at bedtime  Qty: 90 capsule, Refills: 3    Associated Diagnoses: Acute urinary retention           No  discharge procedures on file.  ED SEPSIS DOCUMENTATION   Time reflects when diagnosis was documented in both MDM as applicable and the Disposition within this note       Time User Action Codes Description Comment    3/6/2025  8:10 AM Jeanette Guo Add [R41.82] Altered mental status     3/6/2025 10:18 AM Jeanette Guo [I26.99] Pulmonary embolism (HCC)     3/6/2025  1:12 PM Prechtel, Kana Add [M81.0] Osteoporosis, unspecified osteoporosis type, unspecified pathological fracture presence     3/6/2025  1:12 PM Prechtel, Kana Add [Z51.81,  Z79.899] Encounter for monitoring denosumab therapy     3/6/2025  1:12 PM Prechtel, Kana Add [S72.001S] Closed fracture of right hip, sequela     3/6/2025  1:12 PM Prechtel, Kana Add [S02.31XS] Closed fracture of right orbital floor, sequela (HCC)                  Jeanette Guo DO  03/06/25 154

## 2025-03-06 NOTE — CONSULTS
Consultation - Neurology   Name: Denia Castañeda 89 y.o. female I MRN: [id number]  Unit/Bed#: ED-19 I Date of Admission: 3/6/2025   Date of Service: 3/6/2025 I Hospital Day: 0   Consult to Neurology  Consult performed by: Jared Rivera PA-C  Consult ordered by: Jeanette Guo DO        Physician Requesting Evaluation: Jeanette Guo DO   Reason for Evaluation / Principal Problem: Stroke Alert.     Assessment & Plan  Stroke-like symptom  89 year old with past medical history of schizoaffective disorder, depression, cognitive impairment/severe dementia with agitation, prior stroke, hx of right facial droop, right orbital fracutre, hypertension, hyperlipidemia, paroxysmal atrial fibrillation not on anticoagulation reports of vaginal bleeding.  , CKD who presents to [facility] as a stroke alert.   It is reported the patient is nonverbal and bed bound, she has a severe dementia.  It was ported the patient was last known well sometime last evening, the patient was found by caregiver at around 7:30 AM 3/6/2025 more lethargic/encephalopathic, less responsive, more contracted and drooling.  A stroke alert was activated, the patient NIH was a 26.      CT of the head - Small age-indeterminate, likely subacute to chronic right cerebellar infarct. Additional old bilateral cerebellar lacunar infarcts.Stable old right occipital lobe infarct. Stable old lacunar infarcts in the left anterior insula/external capsule.Chronic microangiopathic change.Right maxillary sinus disease with frothy secretions suggesting acute sinusitis.  CTA of head and neck -  Incidentally noted pulmonary emboli within the interlobar and segmental branches of the right pulmonary artery.No intracranial large vessel occlusion or critical stenosis. 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 Moderate stenosis at the  origin.Coronary artery atherosclerotic disease.    Neurology examination - the patient NIH is 26, the patient was nonverbal did not follow commands, did not attempt speech, the patient's eyes were midline pupils were equal and minimally reactive, 1 mm bilaterally, gaze was midline without a gaze preference or palsy no disconjugate gaze noted no fixed gaze noted, there was a right facial droop upper and lower face, she did not blink to threat bilaterally, corneals were intact, she did have increased tone in the bilateral upper extremities she did have a slight contraction deformity of the right upper extremity with slight tremor, however she did withdrawal bilateral upper extremities equally, she also had bilateral lower extremity increased tone but did withdrawal equally to stimuli.  To sensory testing to gentle nailbed pressure the patient withdrew and in [place], withdrew equally.  No seizure-like activity noted.  Grossly nonfocal neurological examination in the motor testing aspect.  Reflexes were symmetric decreased, toes are mute but does withdrawal. Paratonia through out.    The patient was not a tnk candidate as the patient is out of the window for TNK, there was no large vessel occlusion seen for thrombectomy.     Strokelike episode, suspect this is recrudescence/TME, as well as CTA showed incidental pulmonary embolism, less likely acute stroke but does have risk with her hx of afib and not being on anticoagulation, her examination is similar to prior neurological examinations, however, today she is more encephalopathic per review, there is documented facial droop on the right in the past.     -Further workup for PE/TME, per ED CT of the chest is pending  -If confirmed PE on further imaging, management per ED/medicine and would defer stroke pathway, as reviewed with attending, as this is more likely TME/recrudescence.  The patient is reported to be claustrophobic and could not have MRI of brain in past.    -The patient was not a tnk or thrombectomy candidate, as noted above  -Neurological checks notify neurology with any focal changes on examination  -Low threshold for EEG if she developed seizure like activity, currently no evidence to suspect the patient is seizing or seizure activity.  -Reviewed case with attending physician, plan of care per attending physician, please see attestation for further details.        History of Present Illness   Hx and PE limited by: Luxembour[place] speaking, dementia, non verbal.   Patient last known well:   Stroke alert called: 811AM  Neurology time of arrival: Immediate by phone  HPI: Denia Castañeda is a 89 y.o. female with past medical history of schizoaffective disorder, depression, cognitive impairment/severe dementia with agitation, prior stroke, hx of right facial droop, right orbital fracutre, hypertension, hyperlipidemia, paroxysmal atrial fibrillation not on anticoagulation reports of vaginal bleeding.  , CKD who presents to [facility] as a stroke alert.   It is reported the patient is nonverbal and bed bound, she has a severe dementia.  It was ported the patient was last known well sometime last evening, the patient was found by caregiver at around 7:30 AM 3/6/2025 more lethargic/encephalopathic, less responsive, more contracted and drooling.  A stroke alert was activated, the patient NIH was a 26.      CT of the head - Small age-indeterminate, likely subacute to chronic right cerebellar infarct. Additional old bilateral cerebellar lacunar infarcts.Stable old right occipital lobe infarct. Stable old lacunar infarcts in the left anterior insula/external capsule.Chronic microangiopathic change.Right maxillary sinus disease with frothy secretions suggesting acute sinusitis.  CTA of head and neck -  Incidentally noted pulmonary emboli within the interlobar and segmental branches of the right pulmonary artery.No intracranial large vessel occlusion or critical stenosis. No aneurysm.No  significant stenosis in the cervical carotid or vertebral arteries.Left vertebral artery arises from the aortic arch and is relatively hypoplastic (developmental variant). Moderate stenosis at the origin.Coronary artery atherosclerotic disease.     Blood sugar was 124  BMP with a BUN of 27 creatinine 1.52 GFR 29  CBC with a hemoglobin of 10.8 low  INR normal  APTT normal  Flu/COVID pending  BNP pending    Neurology examination - the patient NIH is 26, the patient was nonverbal did not follow commands, did not attempt speech, the patient's eyes were midline pupils were equal and minimally reactive, 1 mm bilaterally, gaze was midline without a gaze preference or palsy no disconjugate gaze noted no fixed gaze noted, there was a right facial droop upper and lower face, she did not blink to threat bilaterally, corneals were intact, she did have increased tone in the bilateral upper extremities she did have a slight contraction deformity of the right upper extremity with slight tremor, however she did withdrawal bilateral upper extremities equally, she also had bilateral lower extremity increased tone but did withdrawal equally to stimuli.  To sensory testing to gentle nailbed pressure the patient withdrew and in Lithuanian, withdrew equally.  No seizure-like activity noted.  Grossly nonfocal neurological examination in the motor testing aspect.  Reflexes were symmetric decreased, toes are mute but does withdrawal. Paratonia through out.    The patient was not a tnk candidate as the patient is out of the window for TNK, there was no large vessel occlusion seen for thrombectomy.     All information is from chart review and review with the ED.     The patient was seen by neurology in July 2024 -for right facial droop, it was unclear if this was truly related to CVA early to prior trauma, was unclear with acuity of the symptoms.  They want an MRI of the brain completed but the patient was severely claustrophobic, it was  recommend the patient will eventually need AC with her hx of afib, there is reports the patient has a hx of bleeding.  (Please see note for full details, reviewed examination from that date for comparison from today and there is reported facial droop and increased tone in the bilateral uppers and lowers, the patient was nonverbal.     Review of Systems  Historical Information   Past Medical History:   Diagnosis Date    Anxiety     Closed fracture of right orbital floor (HCC) 08/23/2020    Cognitive impairment     COVID-19 virus infection 04/13/2020    Dementia (HCC)     Depression     Hallucination     Hyperlipidemia     Hypertension     Memory loss     Psychiatric illness     Psychosis (HCC)     Retention of urine     Schizoaffective disorder (HCC)     Sleep difficulties     Urinary tract infection      Past Surgical History:   Procedure Laterality Date    APPENDECTOMY      BLADDER SURGERY      CHOLECYSTECTOMY      MO OPTX FEM SHFT FX W/INSJ IMED IMPLT W/WO SCREW Right 7/15/2021    Procedure: INSERTION NAIL IM FEMUR ANTEGRADE (TROCHANTERIC) right;  Surgeon: Getachew Aguilar MD;  Location: [facility];  Service: Orthopedics    TOTAL ABDOMINAL HYSTERECTOMY W/ BILATERAL SALPINGOOPHORECTOMY      TOTAL KNEE ARTHROPLASTY Left      Social History     Tobacco Use    Smoking status: Never     Passive exposure: Never    Smokeless tobacco: Never    Tobacco comments:     Former smoker per [employer]   Vaping Use    Vaping status: Never Used   Substance and Sexual Activity    Alcohol use: Never    Drug use: No    Sexual activity: Not on file     E-Cigarette/Vaping    E-Cigarette Use Never User      E-Cigarette/Vaping Substances    Nicotine No     THC No     CBD No     Flavoring No     Other No     Unknown No      Family History   Problem Relation Age of Onset    Heart disease Mother         Cardiac disorder    Parkinsonism Father     Heart disease Sister         Cardiac disorder    Hypertension Sister     Diabetes Child          Diabetes Mellitus    Lung disease Child         Respiratory Disorder    Diabetes Son         Diabetes Mellitus     Social History     Tobacco Use    Smoking status: Never     Passive exposure: Never    Smokeless tobacco: Never    Tobacco comments:     Former smoker per [employer]   Vaping Use    Vaping status: Never Used   Substance and Sexual Activity    Alcohol use: Never    Drug use: No    Sexual activity: Not on file       Current Facility-Administered Medications:     denosumab (PROLIA) subcutaneous injection 60 mg, Q6 Months  Prior to Admission Medications   Prescriptions Last Dose Informant Patient Reported? Taking?   DULoxetine (CYMBALTA) 30 mg delayed release capsule  Child, Care Giver Yes No   Sig: Take 30 mg by mouth daily at bedtime   LORazepam (ATIVAN) 0.5 mg tablet  Child, Care Giver Yes No   Sig: Take 0.5 mg by mouth    OLANZapine (ZyPREXA) 5 mg tablet  Child, Care Giver No No   Sig: Take 1 tablet (5 mg total) by mouth daily at bedtime   acetaminophen (TYLENOL) 325 mg tablet  Child, Care Giver No No   Sig: Take 2 tablets (650 mg total) by mouth every 6 (six) hours as needed for mild pain, moderate pain or headaches   amiodarone 200 mg tablet   No No   Sig: TAKE 1 TABLET (200 MG TOTAL) BY MOUTH DAILY   aspirin (Aspirin Low Dose) 81 mg EC tablet   No No   Sig: TAKE 1 TABLET (81 MG TOTAL) BY MOUTH DAILY   atorvastatin (LIPITOR) 40 mg tablet   No No   Sig: TAKE 1 TABLET (40 MG TOTAL) BY MOUTH EVERY EVENING   diltiazem (CARDIZEM) 30 mg tablet   No No   Sig: TAKE 1 TABLET (30 MG TOTAL) BY MOUTH 3 (THREE) TIMES A DAY   docusate sodium (COLACE) 100 mg capsule   No No   Sig: TAKE 1 CAPSULE (100 MG TOTAL) BY MOUTH 2 (TWO) TIMES A DAY   melatonin 3 mg  Child, Care Giver Yes No   metoprolol tartrate (LOPRESSOR) 25 mg tablet   No No   Sig: TAKE 1 TABLET (25 MG TOTAL) BY MOUTH 2 (TWO) TIMES A DAY   mirtazapine (REMERON) 15 mg tablet  Child, Care Giver Yes No   terazosin (HYTRIN) 2 mg capsule   No No   Sig: Take 1  capsule (2 mg total) by mouth daily at bedtime      Facility-Administered Medications Last Administration Doses Remaining   denosumab (PROLIA) subcutaneous injection 60 mg 3/18/2024  1:30 PM           Objective :  Temp:  [99.5 °F (37.5 °C)] 99.5 °F (37.5 °C)  HR:  [77-83] 77  BP: (104-161)/(56-71) 161/71  Resp:  [18-20] 20  SpO2:  [95 %-96 %] 96 %  O2 Device: None (Room air)    Please see HPI/above for details of neurology examination.  This was compared to examination completed by neurology in July of 2024.    NIHSS:  1a.Level of Consciousness: 2 = Not alert, requires repeated stimulation to attend   1b. LOC Questions: 2 = Answers neither correctly   1c. LOC Commands: 2 = Obeys neither correctly   2. Best Gaze: 0 = Normal   3. Visual: 0 = No visual field loss   4. Facial Palsy: 3=Complete paralysis of one or both sides (absence of facial movement in the upper and lower face)   5a. Motor Right Arm: 3=No effort against gravity, limb falls   5b. Motor Left Arm: 3=No effort against gravity, limb falls   6a. Motor Right Leg: 3=No effort against gravity, limb falls   6b. Motor Left Leg: 3=No effort against gravity, limb falls   7. Limb Ataxia:  0=Absent   8. Sensory: 0=Normal; no sensory loss   9. Best Language:  3=Mute, global aphasia; no usable speech or auditory comprehension   10. Dysarthria: 2=Severe; patient speech is so slurred as to be unintelligible in the absence of or our of proportion to any dysphagia, or is mute/anarthric   11. Extinction and Inattention (formerly Neglect): 0=No abnormality   Total Score: 26     Time NIHSS was completed: At time of  stroke meenu       Lab Results: I have reviewed the following results:  Recent Results (from the past 24 hours)   Fingerstick Glucose (POCT)    Collection Time: 03/06/25  8:06 AM   Result Value Ref Range    POC Glucose 124 65 - 140 mg/dl   Basic metabolic panel    Collection Time: 03/06/25  8:13 AM   Result Value Ref Range    Sodium 144 135 - 147 mmol/L     "Potassium 4.2 3.5 - 5.3 mmol/L    Chloride 108 96 - 108 mmol/L    CO2 27 21 - 32 mmol/L    ANION GAP 9 4 - 13 mmol/L    BUN 27 (H) 5 - 25 mg/dL    Creatinine 1.52 (H) 0.60 - 1.30 mg/dL    Glucose 120 65 - 140 mg/dL    Calcium 9.3 8.4 - 10.2 mg/dL    eGFR 29 ml/min/1.73sq m   CBC and Platelet    Collection Time: 03/06/25  8:13 AM   Result Value Ref Range    WBC 6.44 4.31 - 10.16 Thousand/uL    RBC 3.67 (L) 3.81 - 5.12 Million/uL    Hemoglobin 10.8 (L) 11.5 - 15.4 g/dL    Hematocrit 33.7 (L) 34.8 - 46.1 %    MCV 92 82 - 98 fL    MCH 29.4 26.8 - 34.3 pg    MCHC 32.0 31.4 - 37.4 g/dL    RDW 16.6 (H) 11.6 - 15.1 %    Platelets 242 149 - 390 Thousands/uL    MPV 9.6 8.9 - 12.7 fL   Protime-INR    Collection Time: 03/06/25  8:13 AM   Result Value Ref Range    Protime 13.4 12.3 - 15.0 seconds    INR 1.00 0.85 - 1.19   APTT    Collection Time: 03/06/25  8:13 AM   Result Value Ref Range    PTT 27 23 - 34 seconds   HS Troponin 0hr (reflex protocol)    Collection Time: 03/06/25  8:13 AM   Result Value Ref Range    hs TnI 0hr 19 \"Refer to ACS Flowchart\"- see link ng/L   ECG 12 lead    Collection Time: 03/06/25  8:38 AM   Result Value Ref Range    Ventricular Rate 77 BPM    Atrial Rate 77 BPM    ND Interval 178 ms    QRSD Interval 128 ms    QT Interval 418 ms    QTC Interval 473 ms    P Axis 70 degrees    QRS Axis 71 degrees    T Wave Axis -68 degrees     Procedure: CTA stroke alert (head/neck)  Result Date: 3/6/2025  Narrative: CTA NECK AND BRAIN WITH CONTRAST INDICATION: Stroke Alert COMPARISON:   None. TECHNIQUE:  Post contrast imaging was performed after administration of iodinated contrast through the neck and brain. Post contrast axial 0.625 mm images timed to opacify the arterial system.  3D rendering was performed on an independent workstation.   MIP reconstructions performed. Coronal and sagittal reconstructions were performed of the non contrast portion of the brain. Radiation dose length product (DLP) for this visit:  " 562 mGy-cm .  This examination, like all CT scans performed in the [facility], was performed utilizing techniques to minimize radiation dose exposure, including the use of iterative reconstruction and automated exposure control. IV Contrast:  85 mL of iohexol IMAGE QUALITY:   Diagnostic FINDINGS: CTA NECK ARCH AND GREAT VESSELS: Atherosclerotic changes. Great vessel origins are patent without severe stenosis. RIGHT VERTEBRAL ARTERY: Patent origin. Extracranial segments are patent. LEFT VERTEBRAL ARTERY: The vessel arises from the aortic arch and is relatively hypoplastic (developmental variant). Moderate atherosclerotic stenosis at the origin.  Patent extracranial segments. RIGHT CAROTID: Mild atherosclerotic changes in the carotid bifurcation. No stenosis. LEFT CAROTID: Partially calcified atherosclerotic plaque primarily involving the carotid bifurcation. No stenosis. NASCET criteria was used to determine the degree of internal carotid artery diameter stenosis. CTA BRAIN: INTERNAL CAROTID ARTERIES: Atherosclerotic changes involving bilateral cavernous and supraclinoid segments. No high-grade stenosis. ANTERIOR CIRCULATION:  No significant stenosis. MIDDLE CEREBRAL ARTERY CIRCULATION: No significant stenosis. DISTAL VERTEBRAL ARTERIES: No significant stenosis.   Normal right PICA origin. Left PICA arises from the basilar artery (developmental variant BASILAR ARTERY:No significant stenosis. POSTERIOR CEREBRAL ARTERIES: Fetal-type left posterior cerebral artery (developmental variant). No high-grade stenosis. Absent/hypoplastic right posterior communicating artery. Normal left posterior communicating artery. VENOUS STRUCTURES:  Normal. NON VASCULAR ANATOMY BONY STRUCTURES:  No acute osseous abnormality. SOFT TISSUES OF THE NECK: Multinodular thyroid (right greater than left). THORACIC INLET: Incidentally noted pulmonary emboli within the interlobar and segmental branches of the right pulmonary  artery. Coronary artery atherosclerotic disease. 2 mm right upper lobe nodule, stable from CTA 5/12/2023 favoring benign etiology (series 2 image 210)     Impression: 1.  Incidentally noted pulmonary emboli within the interlobar and segmental branches of the right pulmonary artery. 2.  No intracranial large vessel occlusion or critical stenosis. No aneurysm. 3.  No significant stenosis in the cervical carotid or vertebral arteries. 4.  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 Moderate stenosis at the origin. 5.  Coronary artery atherosclerotic disease. Findings were directly discussed with Trae Thibodeaux at 8:34 a.m. Findings were also discussed with provider in the emergency department RACHEL RAMOS on 3/6/2025 8:30 AM. Workstation performed: [id number]     Procedure: CT stroke alert brain  Result Date: 3/6/2025  Narrative: CT BRAIN - STROKE ALERT PROTOCOL INDICATION:   Stroke Alert. COMPARISON: Head CT 7/1/2024 TECHNIQUE:  CT examination of the brain was performed.  In addition to axial images, coronal reformatted images were created and submitted for interpretation. Radiation dose length product (DLP) for this visit:  858 mGy-cm .  This examination, like all CT scans performed in the [facility] beds at the [facility], was performed utilizing techniques to minimize radiation dose exposure, including the use of iterative reconstruction and automated exposure control. IMAGE QUALITY:  Diagnostic. FINDINGS: PARENCHYMA: Small age-indeterminate likely subacute to chronic in the right cerebellum (series 2 image 8). Additional old lacunar infarcts noted in bilateral cerebellar hemispheres. 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 Stable old lacunar infarcts in the left anterior insula/external capsule. Nonspecific  decreased attenuation involving periventricular and subcortical white matter, most compatible with mild to moderate microangiopathic change. No  intracranial mass, mass effect or midline shift.  No acute parenchymal hemorrhage. VENTRICLES AND EXTRA-AXIAL SPACES:  Normal for the patient's age. VISUALIZED ORBITS: Prior bilateral lens replacement. PARANASAL SINUSES: Moderate mucosal thickening with frothy secretions in the right maxillary sinus. Minimal ethmoidal sinus mucosal thickening. CALVARIUM AND EXTRACRANIAL SOFT TISSUES:   Normal.     Impression: 1.  Small age-indeterminate, likely subacute to chronic right cerebellar infarct. Additional old bilateral cerebellar lacunar infarcts. 2.  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 Stable old lacunar infarcts in the left anterior insula/external capsule. 3.  Chronic microangiopathic change. 4.  Right maxillary sinus disease with frothy secretions suggesting acute sinusitis. Findings were directly discussed with Trae Thibodeaux at approximately 8:34 a.m Workstation performed: [id number]        40 minutes of critical care time spent on this case, reviewing records, physical examination, reviewing imaging, reviewing plan of care with ED staff.     Reviewed case with neurology attending, history and physical examination, labs and imaging completed, plan of care as per attending physician.  Please see attestation for further details.

## 2025-03-07 ENCOUNTER — APPOINTMENT (INPATIENT)
Dept: NON INVASIVE DIAGNOSTICS | Facility: HOSPITAL | Age: OVER 89
DRG: 176 | End: 2025-03-07
Payer: MEDICARE

## 2025-03-07 LAB
ANION GAP SERPL CALCULATED.3IONS-SCNC: 11 MMOL/L (ref 4–13)
APTT PPP: 134 SECONDS (ref 23–34)
APTT PPP: 197 SECONDS (ref 23–34)
APTT PPP: 25 SECONDS (ref 23–34)
BASOPHILS # BLD AUTO: 0.02 THOUSANDS/ÂΜL (ref 0–0.1)
BASOPHILS NFR BLD AUTO: 0 % (ref 0–1)
BSA FOR ECHO PROCEDURE: 1.4 M2
BUN SERPL-MCNC: 33 MG/DL (ref 5–25)
CALCIUM SERPL-MCNC: 8.7 MG/DL (ref 8.4–10.2)
CHLORIDE SERPL-SCNC: 108 MMOL/L (ref 96–108)
CO2 SERPL-SCNC: 24 MMOL/L (ref 21–32)
CREAT SERPL-MCNC: 1.56 MG/DL (ref 0.6–1.3)
DME PARACHUTE DELIVERY DATE REQUESTED: NORMAL
DME PARACHUTE ITEM DESCRIPTION: NORMAL
DME PARACHUTE ORDER STATUS: NORMAL
DME PARACHUTE SUPPLIER NAME: NORMAL
DME PARACHUTE SUPPLIER PHONE: NORMAL
EOSINOPHIL # BLD AUTO: 0.16 THOUSAND/ÂΜL (ref 0–0.61)
EOSINOPHIL NFR BLD AUTO: 2 % (ref 0–6)
ERYTHROCYTE [DISTWIDTH] IN BLOOD BY AUTOMATED COUNT: 16.6 % (ref 11.6–15.1)
GFR SERPL CREATININE-BSD FRML MDRD: 28 ML/MIN/1.73SQ M
GLUCOSE SERPL-MCNC: 72 MG/DL (ref 65–140)
HCT VFR BLD AUTO: 30.3 % (ref 34.8–46.1)
HGB BLD-MCNC: 9.5 G/DL (ref 11.5–15.4)
IMM GRANULOCYTES # BLD AUTO: 0.02 THOUSAND/UL (ref 0–0.2)
IMM GRANULOCYTES NFR BLD AUTO: 0 % (ref 0–2)
INR PPP: 1.01 (ref 0.85–1.19)
LV EF US.2D.A4C+ESTIMATED: 56 %
LYMPHOCYTES # BLD AUTO: 1.82 THOUSANDS/ÂΜL (ref 0.6–4.47)
LYMPHOCYTES NFR BLD AUTO: 26 % (ref 14–44)
MAGNESIUM SERPL-MCNC: 2 MG/DL (ref 1.9–2.7)
MCH RBC QN AUTO: 29.8 PG (ref 26.8–34.3)
MCHC RBC AUTO-ENTMCNC: 31.4 G/DL (ref 31.4–37.4)
MCV RBC AUTO: 95 FL (ref 82–98)
MONOCYTES # BLD AUTO: 0.52 THOUSAND/ÂΜL (ref 0.17–1.22)
MONOCYTES NFR BLD AUTO: 7 % (ref 4–12)
NEUTROPHILS # BLD AUTO: 4.61 THOUSANDS/ÂΜL (ref 1.85–7.62)
NEUTS SEG NFR BLD AUTO: 65 % (ref 43–75)
NRBC BLD AUTO-RTO: 0 /100 WBCS
PLATELET # BLD AUTO: 211 THOUSANDS/UL (ref 149–390)
PMV BLD AUTO: 10.3 FL (ref 8.9–12.7)
POTASSIUM SERPL-SCNC: 4.3 MMOL/L (ref 3.5–5.3)
PROTHROMBIN TIME: 13.5 SECONDS (ref 12.3–15)
RBC # BLD AUTO: 3.19 MILLION/UL (ref 3.81–5.12)
RIGHT VENTRICLE ID DIMENSION: 3 CM
SODIUM SERPL-SCNC: 143 MMOL/L (ref 135–147)
TRICUSPID ANNULAR PLANE SYSTOLIC EXCURSION: 1.7 CM
WBC # BLD AUTO: 7.15 THOUSAND/UL (ref 4.31–10.16)

## 2025-03-07 PROCEDURE — 93970 EXTREMITY STUDY: CPT

## 2025-03-07 PROCEDURE — 85025 COMPLETE CBC W/AUTO DIFF WBC: CPT | Performed by: HOSPITALIST

## 2025-03-07 PROCEDURE — 85730 THROMBOPLASTIN TIME PARTIAL: CPT | Performed by: HOSPITALIST

## 2025-03-07 PROCEDURE — 85730 THROMBOPLASTIN TIME PARTIAL: CPT

## 2025-03-07 PROCEDURE — 92610 EVALUATE SWALLOWING FUNCTION: CPT

## 2025-03-07 PROCEDURE — 93306 TTE W/DOPPLER COMPLETE: CPT | Performed by: INTERNAL MEDICINE

## 2025-03-07 PROCEDURE — 83735 ASSAY OF MAGNESIUM: CPT | Performed by: HOSPITALIST

## 2025-03-07 PROCEDURE — 93970 EXTREMITY STUDY: CPT | Performed by: SURGERY

## 2025-03-07 PROCEDURE — 80048 BASIC METABOLIC PNL TOTAL CA: CPT | Performed by: HOSPITALIST

## 2025-03-07 PROCEDURE — 93306 TTE W/DOPPLER COMPLETE: CPT

## 2025-03-07 PROCEDURE — 85610 PROTHROMBIN TIME: CPT

## 2025-03-07 PROCEDURE — 99232 SBSQ HOSP IP/OBS MODERATE 35: CPT | Performed by: HOSPITALIST

## 2025-03-07 RX ORDER — HEPARIN SODIUM 10000 [USP'U]/100ML
3-30 INJECTION, SOLUTION INTRAVENOUS
Status: DISCONTINUED | OUTPATIENT
Start: 2025-03-07 | End: 2025-03-11

## 2025-03-07 RX ORDER — SODIUM CHLORIDE, SODIUM GLUCONATE, SODIUM ACETATE, POTASSIUM CHLORIDE, MAGNESIUM CHLORIDE, SODIUM PHOSPHATE, DIBASIC, AND POTASSIUM PHOSPHATE .53; .5; .37; .037; .03; .012; .00082 G/100ML; G/100ML; G/100ML; G/100ML; G/100ML; G/100ML; G/100ML
50 INJECTION, SOLUTION INTRAVENOUS CONTINUOUS
Status: DISPENSED | OUTPATIENT
Start: 2025-03-07 | End: 2025-03-08

## 2025-03-07 RX ORDER — HEPARIN SODIUM 1000 [USP'U]/ML
1800 INJECTION, SOLUTION INTRAVENOUS; SUBCUTANEOUS EVERY 6 HOURS PRN
Status: DISCONTINUED | OUTPATIENT
Start: 2025-03-07 | End: 2025-03-11

## 2025-03-07 RX ORDER — HEPARIN SODIUM 1000 [USP'U]/ML
3600 INJECTION, SOLUTION INTRAVENOUS; SUBCUTANEOUS EVERY 6 HOURS PRN
Status: DISCONTINUED | OUTPATIENT
Start: 2025-03-07 | End: 2025-03-11

## 2025-03-07 RX ORDER — HEPARIN SODIUM 1000 [USP'U]/ML
3600 INJECTION, SOLUTION INTRAVENOUS; SUBCUTANEOUS ONCE
Status: COMPLETED | OUTPATIENT
Start: 2025-03-07 | End: 2025-03-07

## 2025-03-07 RX ADMIN — HEPARIN SODIUM 18 UNITS/KG/HR: 10000 INJECTION, SOLUTION INTRAVENOUS at 02:11

## 2025-03-07 RX ADMIN — SODIUM CHLORIDE, SODIUM GLUCONATE, SODIUM ACETATE, POTASSIUM CHLORIDE, MAGNESIUM CHLORIDE, SODIUM PHOSPHATE, DIBASIC, AND POTASSIUM PHOSPHATE 50 ML/HR: .53; .5; .37; .037; .03; .012; .00082 INJECTION, SOLUTION INTRAVENOUS at 22:50

## 2025-03-07 RX ADMIN — HEPARIN SODIUM 3600 UNITS: 1000 INJECTION, SOLUTION INTRAVENOUS; SUBCUTANEOUS at 02:11

## 2025-03-07 NOTE — ASSESSMENT & PLAN NOTE
Lab Results   Component Value Date    EGFR 28 03/07/2025    EGFR 29 03/06/2025    EGFR 30 07/03/2024    CREATININE 1.56 (H) 03/07/2025    CREATININE 1.52 (H) 03/06/2025    CREATININE 1.48 (H) 07/03/2024   Baseline appears to be 1.3-1.4  Creatinine stable  Avoid hypotension, nephrotoxic agents  Monitor Renal function

## 2025-03-07 NOTE — UTILIZATION REVIEW
Initial Clinical Review    Admission: Date/Time/Statement:   Admission Orders (From admission, onward)       Ordered        03/06/25 1018  INPATIENT ADMISSION  Once                          Orders Placed This Encounter   Procedures    INPATIENT ADMISSION     Standing Status:   Standing     Number of Occurrences:   1     Level of Care:   Med Surg [16]     Estimated length of stay:   More than 2 Midnights     Certification:   I certify that inpatient services are medically necessary for this patient for a duration of greater than two midnights. See H&P and MD Progress Notes for additional information about the patient's course of treatment.     ED Arrival Information       Expected   3/6/2025     Arrival   3/6/2025 08:02    Acuity   Emergent              Means of arrival   Ambulance    Escorted by   [facility] ([facility])    Service   Hospitalist    Admission type   Emergency              Arrival complaint   stroke like symtoms             Chief Complaint   Patient presents with    CVA/TIA-like Symptoms     Pt arrives via ems from home, found unresponsive by caregiver, history of stroke and dementia       Initial Presentation: 89 y.o. female presents to the ED via EMS from home with c/o unresponsiveness, change in mental status.  Initially stroke alert called but canceled. PMH: chronic A fib, dementia, nonverbal, bedbound, h/o CVAs.  In the ED VS stable.  Treated with Heparin drip.  Labs - elevated BUN/Cr, BNP.  ECG - NSR w/ T wave changes, LBBB.  Imaging - old infarcts, R max sinus disease; PE in R pulm artery lobar/segmental levels RV:LV < 0.9, small to mod pericardial effusion. On exam normal breath sounds, no resp distress.  Admitted to INPATIENT status with Acute PE, A fib, stroke like symptoms - PLAN: Heparin drip x 48 hr w/ plan to transition to Eliquis, home Cardizem, Metoprolol, Amlodipine, BLE venous duplex pending.      Date: 3/7   Day 2:   Acute PE, A fib, stroke like symptoms - remains on  Heparin drip today, VS stable, afebrile, Venous duplex BLE - Superficial thrombophlebitis noted in the great saphenous vein at the proximal thigh to the proximal calf and is measured to be 1.5 cm away from the junctionP. Softer BP today. Elevated BUN/CR. On exam today no acute distress.      ED Treatment-Medication Administration from 03/06/2025 0802 to 03/06/2025 1445         Date/Time Order Dose Route Action     03/06/2025 0819 iohexol (OMNIPAQUE) 350 MG/ML injection (SINGLE-DOSE) 85 mL 85 mL Intravenous Given     03/06/2025 0853 iohexol (OMNIPAQUE) 350 MG/ML injection (SINGLE-DOSE) 70 mL 70 mL Intravenous Given     03/06/2025 1032 heparin (porcine) 25,000 units in 0.45% NaCl 250 mL infusion (premix) 18 Units/kg/hr Intravenous New Bag            Scheduled Medications:  amiodarone, 200 mg, Oral, Daily  aspirin, 81 mg, Oral, Daily  atorvastatin, 40 mg, Oral, QPM  diltiazem, 30 mg, Oral, TID  docusate sodium, 100 mg, Oral, BID  DULoxetine, 30 mg, Oral, HS  metoprolol tartrate, 25 mg, Oral, BID  terazosin, 2 mg, Oral, HS      Continuous IV Infusions:  heparin (porcine), 3-30 Units/kg/hr (Order-Specific), Intravenous, Titrated      PRN Meds:  acetaminophen, 650 mg, Oral, Q6H PRN  heparin (porcine), 1,800 Units, Intravenous, Q6H PRN  heparin (porcine), 3,600 Units, Intravenous, Q6H PRN      ED Triage Vitals   Temperature Pulse Respirations Blood Pressure SpO2 Pain Score   03/06/25 0811 03/06/25 0809 03/06/25 0809 03/06/25 0811 03/06/25 0809 --   99.5 °F (37.5 °C) 83 18 104/56 95 %      Weight (last 2 days)       Date/Time Weight    03/07/25 1345 49 (108)            Vital Signs (last 3 days)       Date/Time Temp Pulse Resp BP MAP (mmHg) SpO2 Calculated FIO2 (%) - Nasal Cannula Nasal Cannula O2 Flow Rate (L/min) O2 Device Patient Position - Orthostatic VS Monica Coma Scale Score    03/07/25 1505 97.7 °F (36.5 °C) 91 18 95/54 70 100 % 28 2 L/min Nasal cannula Lying --    03/07/25 1345 -- 88 -- 117/59 -- -- -- -- -- -- --     03/07/25 1117 97.6 °F (36.4 °C) 89 18 117/59 82 100 % 28 2 L/min Nasal cannula Lying --    03/07/25 0915 -- -- -- -- -- -- -- -- -- -- 8    03/07/25 0739 -- 83 16 101/44 56 96 % 28 2 L/min Nasal cannula Lying --    03/07/25 0340 -- -- -- 108/46 63 -- -- -- -- -- --    03/07/25 0334 98 °F (36.7 °C) 76 18 96/47 63 98 % 28 2 L/min Nasal cannula Lying --    03/07/25 0330 -- -- -- -- -- -- -- -- -- -- 9 03/07/25 0100 -- -- -- 91/50 55 -- -- -- -- Lying --    03/07/25 0057 -- -- -- 87/54 -- -- -- -- -- -- --    03/07/25 0025 97.1 °F (36.2 °C) 87 18 87/38 50 100 % -- -- None (Room air) Lying --    03/06/25 2330 -- -- -- -- -- -- -- -- -- -- 9 03/06/25 1930 -- -- -- -- -- -- -- -- None (Room air) -- 9 03/06/25 1900 97.4 °F (36.3 °C) 77 18 144/93 108 98 % -- -- Nasal cannula Lying --    03/06/25 1522 -- 68 20 105/52 75 99 % 28 2 L/min Nasal cannula Lying --    03/06/25 1500 -- -- -- -- -- -- -- -- -- -- 11    03/06/25 1400 -- 72 20 184/97 135 99 % 28 2 L/min Nasal cannula Lying --    03/06/25 1200 -- 60 14 140/63 85 100 % -- -- None (Room air) Lying --    03/06/25 1130 -- 62 15 129/62 92 100 % 28 2 L/min Nasal cannula Lying --    03/06/25 1100 -- 64 14 140/72 108 99 % 28 2 L/min Nasal cannula Lying --    03/06/25 1030 -- 68 17 116/61 83 93 % 28 2 L/min Nasal cannula Lying --    03/06/25 1000 -- 66 18 129/70 93 96 % 28 2 L/min Nasal cannula Lying --    03/06/25 0930 -- 70 18 140/62 95 98 % 28 2 L/min Nasal cannula Lying --    03/06/25 0900 -- -- -- -- -- -- -- -- -- -- 11 03/06/25 0845 -- -- -- -- -- -- -- -- -- -- 11 03/06/25 0835 -- 77 20 161/71 -- 96 % -- -- None (Room air) Lying --    03/06/25 0830 -- -- -- -- -- -- -- -- -- -- 11 03/06/25 0815 -- -- -- -- -- -- -- -- -- -- 11 03/06/25 0811 99.5 °F (37.5 °C) -- -- 104/56 -- -- -- -- -- Lying --    03/06/25 0809 -- 83 18 -- -- 95 % -- -- None (Room air) -- --              Pertinent Labs/Diagnostic Test Results:   Radiology:  X-ray chest 1 view  portable   ED Interpretation by Jeanette Guo DO (03/06 0981)   No acute disease.  Scoliosis noted    Image independently interpreted by myself       Final Interpretation by Glen Neff MD (03/06 0910)      No focal consolidation, pleural effusion, or pneumothorax.            Workstation performed: [id number]         CTA chest pe study   Final Interpretation by Huseyin Quevedo MD (03/06 0926)      Findings consistent with right-sided pulmonary embolism at the lobar/segmental levels; overall clot burden is small. Measured RV/LV ratio is within normal limits at less than 0.9; there is no evidence for right heart strain.      Small to moderate pericardial effusion. The study was marked in EPIC for immediate notification.                  Workstation performed: [id number]         CTA stroke alert (head/neck)   Final Interpretation by Brenda Loyola MD (03/06 0844)      1.  Incidentally noted pulmonary emboli within the interlobar and segmental branches of the right pulmonary artery.   2.  No intracranial large vessel occlusion or critical stenosis. No aneurysm.   3.  No significant stenosis in the cervical carotid or vertebral arteries.   4.  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 Moderate stenosis at the origin.   5.  Coronary artery atherosclerotic disease.                        Findings were directly discussed with Trae Thibodeaux at 8:34 a.m. Findings were also discussed with provider in the emergency department JEANETTE GUO on 3/6/2025 8:30 AM.      Workstation performed: [id number]         CT stroke alert brain   Final Interpretation by Brenda Loyola MD (03/06 0880)      1.  Small age-indeterminate, likely subacute to chronic right cerebellar infarct. Additional old bilateral cerebellar lacunar infarcts.   2.  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 Stable old lacunar infarcts in the left anterior insula/external capsule.   3.  Chronic  microangiopathic change.   4.  Right maxillary sinus disease with frothy secretions suggesting acute sinusitis.               Findings were directly discussed with Trae Thibodeaux at approximately 8:34 a.m      Workstation performed: [id number]          VAS VENOUS DUPLEX - LOWER LIMB BILATERAL       Impression:  RIGHT LOWER LIMB:  No evidence of acute or chronic deep vein thrombosis.  No evidence of superficial thrombophlebitis noted.  Doppler evaluation shows a normal response to augmentation maneuvers..  Popliteal, posterior tibial and anterior tibial arterial Doppler waveforms are  triphasic.     LEFT LOWER LIMB:  No evidence of acute or chronic deep vein thrombosis.  Superficial thrombophlebitis noted in the great saphenous vein at the proximal  thigh to the proximal calf and is measured to be 1.5 cm away from the junction.  Doppler evaluation shows a normal response to augmentation maneuvers.  Popliteal, posterior tibial and anterior tibial arterial Doppler waveforms are  biphasic.     Technically difficult/limited study. Some segments may be poorly visualized on  today's exam.     Cardiology:  ECG 12 lead   Final Result by Troy Sanchez DO (03/06 4522)   Age and gender specific ECG analysis    Normal sinus rhythm   Left bundle branch block   T wave abnormality, consider inferolateral ischemia   Abnormal ECG   When compared with ECG of 06-Mar-2025 10:31,   No significant change was found   Confirmed by Troy Sanchez ([id number]) on 3/6/2025 5:52:03 PM      ECG 12 lead   Final Result by Parminder Munoz MD (03/06 8776)   Age and gender specific ECG analysis    Normal sinus rhythm   Septal infarct (cited on or before 06-Mar-2025)   ST & T wave abnormality, consider inferior ischemia   Abnormal ECG   When compared with ECG of 06-Mar-2025 08:38, (unconfirmed)   No significant change was found   Confirmed by Parminder Munoz ([id number]) on 3/6/2025 12:35:15 PM      ECG 12 lead   Final Result by Parminder Munoz MD (03/06 3468)    Age and gender specific ECG analysis    Normal sinus rhythm   Non-specific intra-ventricular conduction block   T wave abnormality, consider inferior ischemia   Abnormal ECG   When compared with ECG of 30-Jun-2024 04:13,   Inverted T waves have replaced nonspecific T wave abnormality in Inferior    leads   T wave inversion less evident in Lateral leads   Confirmed by Parminder Munoz ([id number]) on 3/6/2025 12:32:37 PM        GI:  No orders to display       Results from last 7 days   Lab Units 03/06/25  0845   SARS-COV-2  Negative     Results from last 7 days   Lab Units 03/07/25  0517 03/06/25  0813   WBC Thousand/uL 7.15 6.44   HEMOGLOBIN g/dL 9.5* 10.8*   HEMATOCRIT % 30.3* 33.7*   PLATELETS Thousands/uL 211 242   TOTAL NEUT ABS Thousands/µL 4.61  --          Results from last 7 days   Lab Units 03/07/25  0233 03/07/25  0233 03/06/25  0813   SODIUM mmol/L  --  143 144   POTASSIUM mmol/L  --  4.3 4.2   CHLORIDE mmol/L  --  108 108   CO2 mmol/L  --  24 27   ANION GAP mmol/L  --  11 9   BUN mg/dL  --  33* 27*   CREATININE mg/dL  --  1.56* 1.52*   EGFR ml/min/1.73sq m  --  28 29   CALCIUM mg/dL  --  8.7 9.3   MAGNESIUM mg/dL 2.0  --   --          Results from last 7 days   Lab Units 03/06/25  0806   POC GLUCOSE mg/dl 124     Results from last 7 days   Lab Units 03/07/25  0233 03/06/25  0813   GLUCOSE RANDOM mg/dL 72 120           Results from last 7 days   Lab Units 03/06/25  1248 03/06/25  1031 03/06/25  0813   HS TNI 0HR ng/L  --   --  19   HS TNI 2HR ng/L  --  22  --    HSTNI D2 ng/L  --  3  --    HS TNI 4HR ng/L 26  --   --    HSTNI D4 ng/L 7  --   --          Results from last 7 days   Lab Units 03/07/25  1002 03/07/25  0200 03/06/25  1031 03/06/25  0813   PROTIME seconds  --  13.5  --  13.4   INR   --  1.01  --  1.00   PTT seconds 197* 25 27 27           Results from last 7 days   Lab Units 03/06/25  0813   BNP pg/mL 182*       Results from last 7 days   Lab Units 03/06/25  0845   INFLUENZA A PCR  Negative    INFLUENZA B PCR  Negative   RSV PCR  Negative     Past Medical History:   Diagnosis Date    Anxiety     Closed fracture of right orbital floor (HCC) 08/23/2020    Cognitive impairment     COVID-19 virus infection 04/13/2020    Dementia (HCC)     Depression     Hallucination     Hyperlipidemia     Hypertension     Memory loss     Psychiatric illness     Psychosis (HCC)     Retention of urine     Schizoaffective disorder (HCC)     Sleep difficulties     Urinary tract infection      Present on Admission:   Stroke-like symptom   Acute pulmonary embolism (HCC)   Atrial fibrillation (HCC)   Primary hypertension   Hyperlipidemia   Severe dementia with agitation (HCC)   Stage 3b chronic kidney disease (HCC)   Pericardial effusion      Admitting Diagnosis: Altered mental status [R41.82]  Pulmonary embolism (HCC) [I26.99]  Closed fracture of right hip, sequela [S72.001S]  Stroke-like symptom [R29.90]  Encounter for monitoring denosumab therapy [Z51.81, Z79.899]  Closed fracture of right orbital floor, sequela (HCC) [S02.31XS]  Osteoporosis, unspecified osteoporosis type, unspecified pathological fracture presence [M81.0]  Age/Sex: 89 y.o. female    Network Utilization Review Department  ATTENTION: Please call with any questions or concerns to [phone] and carefully listen to the prompts so that you are directed to the right person. All voicemails are confidential.   For Discharge needs, contact Care Management DC Support Team at [phone] opt. [phone]  Send all requests for admission clinical reviews, approved or denied determinations and any other requests to dedicated fax number below belonging to the campus where the patient is receiving treatment. List of dedicated fax numbers for the Facilities:  FACILITY NAME UR FAX NUMBER   ADMISSION DENIALS (Administrative/Medical Necessity) [phone]   DISCHARGE SUPPORT TEAM (NETWORK) [phone]   PARENT CHILD HEALTH (Maternity/NICU/Pediatrics) [phone]   [facility]  [facility] [phone]   [facility] [phone]   [facility] [phone]   [facility] [phone]   Critical access hospital [phone]   [facility] [phone]   [facility] [phone]   [facility] [phone]   [facility] [phone]   [facility] [phone]   [facility] [phone]   [facility] [phone]       BNP

## 2025-03-07 NOTE — ASSESSMENT & PLAN NOTE
Continue diltiazem, metoprolol and amiodarone.  Not on anticoagulation prior to admission due to hx of dementia, risk for fall  Plan to transition to DOAC prior to discharge, family to discuss risk and benefits with PCP regarding maintaining patient on lower dose AC

## 2025-03-07 NOTE — CASE MANAGEMENT
Case Management Assessment & Discharge Planning Note    Patient name Denia Castañeda  Location [facility] /[facility]4 -* MRN [id number]  : 1931 Date 3/7/2025       Current Admission Date: 3/6/2025  Current Admission Diagnosis:Acute pulmonary embolism (HCC)   Patient Active Problem List    Diagnosis Date Noted Date Diagnosed    Stroke-like symptom 2025     Acute pulmonary embolism (HCC) 2025     Cystitis 2024     CVA (cerebral vascular accident) (HCC) 2024     Vaginal bleeding 2024     Edema of right upper extremity 2024     Pericardial effusion 2024     Facial asymmetry 2024     Back pain 2023     At risk for elder abuse 2023     Urinary retention 2022     Edema of right ankle 10/04/2022     Flank pain 10/18/2021     Oropharyngeal dysphagia 2021     Normocytic anemia 2021     H/O fracture of right hip 07/15/2021     Stage 3b chronic kidney disease (HCC) 07/15/2021     Age-related osteoporosis without current pathological fracture 2020     Moderate protein-calorie malnutrition (HCC) 2020     Severe dementia with agitation ([facility]) 2020     Urinary incontinence 2020     Ambulatory dysfunction 04/10/2020     h/o OMEGA (acute kidney injury) ([facility]) 04/10/2020     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 [address]  Phone: [phone]  Fax: [phone]    [facility] [place]  [address]  [facility],  [address]  [facility],  First [address] [place]  [facility] [address]  Phone: [phone] Fax: [phone]    [id number] - [address] - [address]  [address] [facility] [address]  Phone: [phone] Fax: [phone]    Primary Care Provider: Jose M Castañeda MD    Primary Insurance: [employer] [facility]  Secondary Insurance: [facility]    ASSESSMENT:  Active Health Care Proxies       Logan Stoner Health Care Representative - Child   Primary Phone: [phone] (Mobile)  Home Phone: [phone]                 Advance Directives  Does patient have a Health Care POA?: Yes  Does patient have Advance Directives?: Yes  Advance Directives: Power of  for health care  Primary Contact: Logan Stoner (Child)  [phone] (Mobile)         Readmission Root Cause  30 Day Readmission: No    Patient Information  Admitted from:: Home  Mental Status: Confused  During Assessment patient was accompanied by: Not accompanied during assessment  Assessment information provided by:: Son  Primary Caregiver: Family (24/7 caregivers via waiver)  Caregiver's Name:: Logan Stoner (Child)  [phone] (Mobile), and [facility] [phone]  Caregiver's Relationship to Patient:: Other (Specify) (24/7 waiver)  Caregiver's Telephone Number:: Logan Stoner (Child) [phone] (Mobile), and [facility] [address] [phone]  Support Systems: Son, Family members  County of Residence: [place]  What city do you live in?: [place]  Home entry access options. Select all that apply.: Elevator  Type of Current Residence: Apartment  Floor Level: 3  Upon entering residence, is there a bedroom on the main floor (no further steps)?: Yes  Upon entering residence, is there a bathroom on the main floor (no further steps)?: Yes  Living Arrangements: Lives Alone, Other (Comment)  Is patient a ?:  No    Activities of Daily Living Prior to Admission  Functional Status: Total dependent  Completes ADLs independently?: No  Level of ADL dependence: Total Dependent  Ambulates independently?: No  Level of ambulatory dependence: Total Dependent  Does patient use assisted devices?: Yes  Assisted Devices (DME) used: Wheelchair, Hospital Bed, Bedside Commode, Shower Chair  Does patient currently own DME?: Yes  What DME does the patient currently own?: Bedside Commode, Shower Chair, Walker, Wheelchair, Hospital Bed         Patient Information Continued  Income Source: Pension/detention  Does patient have prescription coverage?: Yes  Does patient receive dialysis treatments?: No  Does patient have a history of substance abuse?: No  Does patient have a history of Mental Health Diagnosis?: Yes (anxiety)  Has patient received inpatient treatment related to mental health in the last 2 years?: No         Means of Transportation  Means of Transport to Appts:: Other (Comment) (family uses transport that is provided by the insurance)          DISCHARGE DETAILS:    Discharge planning discussed with:: Logan Bess  Freedom of Choice: Yes  Comments - Freedom of Choice: plan to return home  CM contacted family/caregiver?: Yes  Were Treatment Team discharge recommendations reviewed with patient/caregiver?: Yes  Did patient/caregiver verbalize understanding of patient care needs?: Yes  Were patient/caregiver advised of the risks associated with not following Treatment Team discharge recommendations?: Yes    Contacts  Patient Contacts: Logan Stoner (Shahriar) [phone]  Relationship to Patient:: Family  Contact Method: Phone  Phone Number: Logan Goldstein) [phone]  Reason/Outcome: Continuity of Care, Emergency Contact, Referral, Discharge Planning                   Would you like to participate in our [facility] service program?  : No - Declined    Treatment Team Recommendation: Home with Home Health Care  Discharge Destination  Plan:: Home with Home Health Care                                         Additional Comments: CM was able to speak to the son, Logan via phone.  He was made aware of the CM role and assessment was done.  Pt has hx of dementia and can not answer questions for assessment. Per son the pt still lives in her 3rd floor apt with elevator access.  She lives alone but receives 24/7 HHA via Waiver program.  They use [facility] for HHA.  He is aware that she is being treated for a PE and will need to be on a blood thinner.  Currently the plan is to asses if it would be safe for her to go on Eliquis.  Son stated that the HHA provide total care and get her OOB into the wc but the one she has is old (>5yrs) and she not had any other new DME ordered.  He did request another a WC if insurance would be able to cover.  Plan will be to go home with 24 caregivers and she will need transport home.  CM to follow as needed.

## 2025-03-07 NOTE — PLAN OF CARE
Problem: PAIN - ADULT  Goal: Verbalizes/displays adequate comfort level or baseline comfort level  Description: Interventions:  - Encourage patient to monitor pain and request assistance  - Assess pain using appropriate pain scale  - Administer analgesics based on type and severity of pain and evaluate response  - Implement non-pharmacological measures as appropriate and evaluate response  - Consider cultural and social influences on pain and pain management  - Notify physician/advanced practitioner if interventions unsuccessful or patient reports new pain  Outcome: Progressing     Problem: INFECTION - ADULT  Goal: Absence or prevention of progression during hospitalization  Description: INTERVENTIONS:  - Assess and monitor for signs and symptoms of infection  - Monitor lab/diagnostic results  - Monitor all insertion sites, i.e. indwelling lines, tubes, and drains  - Monitor endotracheal if appropriate and nasal secretions for changes in amount and color  - Silas appropriate cooling/warming therapies per order  - Administer medications as ordered  - Instruct and encourage patient and family to use good hand hygiene technique  - Identify and instruct in appropriate isolation precautions for identified infection/condition  Outcome: Progressing  Goal: Absence of fever/infection during neutropenic period  Description: INTERVENTIONS:  - Monitor WBC    Outcome: Progressing     Problem: SAFETY ADULT  Goal: Patient will remain free of falls  Description: INTERVENTIONS:  - Educate patient/family on patient safety including physical limitations  - Instruct patient to call for assistance with activity   - Consult OT/PT to assist with strengthening/mobility   - Keep Call bell within reach  - Keep bed low and locked with side rails adjusted as appropriate  - Keep care items and personal belongings within reach  - Initiate and maintain comfort rounds  - Make Fall Risk Sign visible to staff  - Offer Toileting every  Hours,  in advance of need  - Initiate/Maintain alarm  - Obtain necessary fall risk management equipment:   - Apply yellow socks and bracelet for high fall risk patients  - Consider moving patient to room near nurses station  Outcome: Progressing  Goal: Maintain or return to baseline ADL function  Description: INTERVENTIONS:  -  Assess patient's ability to carry out ADLs; assess patient's baseline for ADL function and identify physical deficits which impact ability to perform ADLs (bathing, care of mouth/teeth, toileting, grooming, dressing, etc.)  - Assess/evaluate cause of self-care deficits   - Assess range of motion  - Assess patient's mobility; develop plan if impaired  - Assess patient's need for assistive devices and provide as appropriate  - Encourage maximum independence but intervene and supervise when necessary  - Involve family in performance of ADLs  - Assess for home care needs following discharge   - Consider OT consult to assist with ADL evaluation and planning for discharge  - Provide patient education as appropriate  Outcome: Progressing  Goal: Maintains/Returns to pre admission functional level  Description: INTERVENTIONS:  - Perform AM-PAC 6 Click Basic Mobility/ Daily Activity assessment daily.  - Set and communicate daily mobility goal to care team and patient/family/caregiver.   - Collaborate with rehabilitation services on mobility goals if consulted  - Perform Range of Motion  times a day.  - Reposition patient every  hours.  - Dangle patient  times a day  - Stand patient  times a day  - Ambulate patient  times a day  - Out of bed to chair  times a day   - Out of bed for meals times a day  - Out of bed for toileting  - Record patient progress and toleration of activity level   Outcome: Progressing     Problem: DISCHARGE PLANNING  Goal: Discharge to home or other facility with appropriate resources  Description: INTERVENTIONS:  - Identify barriers to discharge w/patient and caregiver  - Arrange for  needed discharge resources and transportation as appropriate  - Identify discharge learning needs (meds, wound care, etc.)  - Arrange for interpretive services to assist at discharge as needed  - Refer to Case Management Department for coordinating discharge planning if the patient needs post-hospital services based on physician/advanced practitioner order or complex needs related to functional status, cognitive ability, or social support system  Outcome: Progressing     Problem: Knowledge Deficit  Goal: Patient/family/caregiver demonstrates understanding of disease process, treatment plan, medications, and discharge instructions  Description: Complete learning assessment and assess knowledge base.  Interventions:  - Provide teaching at level of understanding  - Provide teaching via preferred learning methods  Outcome: Progressing     Problem: Prexisting or High Potential for Compromised Skin Integrity  Goal: Skin integrity is maintained or improved  Description: INTERVENTIONS:  - Identify patients at risk for skin breakdown  - Assess and monitor skin integrity  - Assess and monitor nutrition and hydration status  - Monitor labs   - Assess for incontinence   - Turn and reposition patient  - Assist with mobility/ambulation  - Relieve pressure over bony prominences  - Avoid friction and shearing  - Provide appropriate hygiene as needed including keeping skin clean and dry  - Evaluate need for skin moisturizer/barrier cream  - Collaborate with interdisciplinary team   - Patient/family teaching  - Consider wound care consult   Outcome: Progressing

## 2025-03-07 NOTE — SPEECH THERAPY NOTE
Speech Language/Pathology    Spoke with Dr. Moseley regarding the pts swallowing dysfunction.  The son is apparently insisting that PO be ordered for the pt.  Reviewed that all PO is anteriorly leaked at this time and the pts cognition limits her ability to transfer PO and swallow.      Continue to recommend Most restrictive diet of NPO with nonoral medications. Least restrictive diet:  Puree/Dysphagia level 1 and Nectar thick liquids via spoon.  Will defer to MD regarding diet order.     Will follow pt for swallow therapy.

## 2025-03-07 NOTE — ASSESSMENT & PLAN NOTE
Likely from being bed bound, patient with history of afib not on anticoagulation prior to admission  CT Scan reveals:   Findings consistent with right-sided pulmonary embolism at the lobar/segmental levels; overall clot burden is small. Measured RV/LV ratio is within normal limits at less than 0.9; there is no evidence for right heart strain.   On 3L supplemental O2, continue maintain SpO2 >92%  Continue heparin drip for 48 hours then transition over to DOAC  Price check sent for Eliquis  Bilateral venous duplex ordered

## 2025-03-07 NOTE — SPEECH THERAPY NOTE
Speech Language/Pathology  Speech-Language Pathology Bedside Swallow Evaluation      Patient Name: Denia Castañeda    Today's Date: 3/7/2025     Problem List  Principal Problem:    Acute pulmonary embolism (HCC)  Active Problems:    Primary hypertension    Hyperlipidemia    Atrial fibrillation (HCC)    Severe dementia with agitation (HCC)    Stage 3b chronic kidney disease (HCC)    Pericardial effusion    Stroke-like symptom      Past Medical History  Past Medical History:   Diagnosis Date    Anxiety     Closed fracture of right orbital floor (HCC) 08/23/2020    Cognitive impairment     COVID-19 virus infection 04/13/2020    Dementia (HCC)     Depression     Hallucination     Hyperlipidemia     Hypertension     Memory loss     Psychiatric illness     Psychosis (HCC)     Retention of urine     Schizoaffective disorder (HCC)     Sleep difficulties     Urinary tract infection        Past Surgical History  Past Surgical History:   Procedure Laterality Date    APPENDECTOMY      BLADDER SURGERY      CHOLECYSTECTOMY      ND OPTX FEM SHFT FX W/INSJ IMED IMPLT W/WO SCREW Right 7/15/2021    Procedure: INSERTION NAIL IM FEMUR ANTEGRADE (TROCHANTERIC) right;  Surgeon: Getachew Aguilar MD;  Location: [facility];  Service: Orthopedics    TOTAL ABDOMINAL HYSTERECTOMY W/ BILATERAL SALPINGOOPHORECTOMY      TOTAL KNEE ARTHROPLASTY Left        Summary   The pt presents with severe oropharyngeal dysphagia due to high confusion and inability to follow commands.  Anterior leakage of all items noted.  Minimal amount propelled and swallowed.  Pt with consistent talking during trials as well.  Nurse affirms that she is not following commands in Italian and speech is confused/nonsensical. Maximal support provided.  Not safe for PO diet due to limited awareness.  High aspiration risk noted.  Will follow for swallowing therapy.      Recommended Diet: NPO   Recommended Form of Meds: Nonoral medications.  Other Recommendations: Continue frequent  oral care as pt will allow and with suctioning.    Current Medical Status  Per charting, Pt is a 89 y.o. female who presented to [facility] with pmhx of anxiety, dementia, depression, HLD, HTN, CKD, CVA, paroxsymal afib and schizoaffective disorder; BIBA for altered mental status. .    Current Precautions:  Fall  Aspiration  Delirium    Allergies:  No known food allergies    Past medical history:  Please see H&P for details    Special Studies:  CXR 3/6 IMPRESSION:No focal consolidation, pleural effusion, or pneumothorax.    Social/Education/Vocational Hx:  Previous notes indicate that pt lives at home alone.  Appreciate care coordination notes which state that she lives alone but with home health care.  Dementia at baseline.      Swallow Information   Current Risks for Dysphagia & Aspiration: AMS  Current Symptoms/Concerns:  Anterior leakage with PO, decreased awareness.   Current Diet: NPO   Baseline Diet:  Uncertain what pts baseline diet is at this time.  The pt is known to Speech Therapy from 7/2024 admission where puree and thin liquids was recommended. She was also seen in 2023 and 2021.     Baseline Assessment   Behavior/Cognition: alert and significant confusion.  Pt did not follow commands at this time (followed close eyes for Nurse-only command).  Pt does begin verbalizing fluently and consistently when spoken to in Georgian.    Speech/Language Status: not able to to follow commands and confused speech  Patient Positioning: upright in bed  Pain Status/Interventions/Response to Interventions: No report of or nonverbal indications of pain.  FLACC 2- minimally restless and consistent speech     Swallow Mechanism Exam  Facial:  Open mouth posture.  No obvious facial weakness.   Labial: unable to test 2/2 limited command following  Lingual: unable to test 2/2 limited command following  Velum: unable to visualize  Mandible: unable to test 2/2 limited command following  Dentition:  Pt appears to have one  lower natural tooth.  Vocal quality: Clear voice but unintelligible speech/confused.      Volitional Cough: unable to initiate volitional cough   Respiratory Status: on RA       Consistencies Assessed and Performance   Consistencies Administered: Attempted 3 bites of applesauce; nectar via spoon, cup, and straw; thins via spoon, cup, and straw; small ice chip.    Oral Stage: severe  Pt with anterior leakage out of her mouth past her chin with approximately 90% of all boluses. Gown covered and pt cleaned after this occurs.  She does not attempt to initiate use of straw or drinking from cup despite cues.  She has mostly tongue thrust behavior when spoon is at mouth.  Limited to no awareness identified.  Slight amount of applesauce appears to be the only bolus that is transferred (less than 1/4 tsp) despite maximal cues and strategies (in Mongolian).   Suspect reduced oral control. Pt with confused speech throughout assessment as well.     Pharyngeal Stage:   The pts oral stage significantly impacts her pharyngeal stage. Palpated delayed swallow after applesauce only.  Likely minimal swallowed due to the high amount anteriorly leaked.  Reduced laryngeal movement.  No coughing, throat clearing, change in vocal quality or respiratory status noted but amount was significantly limited.     *Reviewed with nurse that providing medications orally will likely result in mostly anteriorly leaked meds/PO and limit amount taken due to the severity of her cognition.  Recommended Nonoral medications.     Esophageal Concerns: Cannot fully assess but no signs noted.     Summary and Recommendations (see above)    Results Reviewed with: patient, RN, and MD     Treatment Recommended: Yes, dysphagia therapy     Frequency of treatment: 2-3x/week    Patient Stated Goal:  Pt is confused and speech is unclear.  Nurse agrees- fluent in Turkish.     Dysphagia LTG  -Patient will demonstrate safe and effective oral intake (without overt s/s  significant oral/pharyngeal dysphagia including s/s penetration or aspiration) for the highest appropriate diet level.     Short Term Goals:  -Patient will tolerate trials of upgraded food and/or liquid texture with no significant s/s of oral or pharyngeal dysphagia including aspiration across 1-3 diagnostic sessions           Speech Therapy Prognosis   Prognosis: fair    Prognosis Considerations: medical status, prior medical history, and cognitive status

## 2025-03-07 NOTE — ASSESSMENT & PLAN NOTE
Nonverbal and bed bound at baseline  Was maintained in zyprexa, remeron in the past appears patient is no longer taking  Delirium precautions  Fall precautions

## 2025-03-07 NOTE — PROGRESS NOTES
Progress Note - Hospitalist   Name: Denia Castañeda 89 y.o. female I MRN: [id number]  Unit/Bed#: E4 -01 I Date of Admission: 3/6/2025   Date of Service: 3/7/2025 I Hospital Day: 1    Assessment & Plan  Acute pulmonary embolism (HCC)  Likely from being bed bound, patient with history of afib not on anticoagulation prior to admission  CT Scan reveals:   Findings consistent with right-sided pulmonary embolism at the lobar/segmental levels; overall clot burden is small. Measured RV/LV ratio is within normal limits at less than 0.9; there is no evidence for right heart strain.   On 3L supplemental O2, continue maintain SpO2 >92%  Continue heparin drip for 48 hours then transition over to DOAC  Price check sent for Eliquis  Bilateral venous duplex ordered  Atrial fibrillation (HCC)  Continue diltiazem, metoprolol and amiodarone.  Not on anticoagulation prior to admission due to hx of dementia, risk for fall  Plan to transition to DOAC prior to discharge, family to discuss risk and benefits with PCP regarding maintaining patient on lower dose AC  Stroke-like symptom  Patient brought in due to change in mental status. Found to be lethargic/encephalopathic, less responsive, more contracted and drooling. Has history of facial droop  Stroke alert in ED  CTA/CT Head without signs of acute stroke  Patient appears to be back to baseline mentation  Patient was seen and evaluated by Neurology in ED  Symptoms likely due to TME/recrudescence   Stroke pathway not indicated at this time  Continue Neuro checks  Primary hypertension  BP soft today  Continue POA metoprolol  Monitor  Hyperlipidemia  Continue statin  Severe dementia with agitation (HCC)  Nonverbal and bed bound at baseline  Was maintained in zyprexa, remeron in the past appears patient is no longer taking  Delirium precautions  Fall precautions  Stage 3b chronic kidney disease (HCC)  Lab Results   Component Value Date    EGFR 28 03/07/2025    EGFR 29 03/06/2025    EGFR  30 07/03/2024    CREATININE 1.56 (H) 03/07/2025    CREATININE 1.52 (H) 03/06/2025    CREATININE 1.48 (H) 07/03/2024   Baseline appears to be 1.3-1.4  Creatinine stable  Avoid hypotension, nephrotoxic agents  Monitor Renal function  Pericardial effusion  Noted on CT scan  Echo ordered  Pending results will consult Cardiology    VTE Pharmacologic Prophylaxis:   Moderate Risk (Score 3-4) - Pharmacological DVT Prophylaxis Ordered: heparin drip.    Mobility:   Basic Mobility Inpatient Raw Score: 6  JH-HLM Goal: 2: Bed activities/Dependent transfer  JH-HLM Achieved: 2: Bed activities/Dependent transfer  JH-HLM Goal achieved. Continue to encourage appropriate mobility.    Patient Centered Rounds: I performed bedside rounds with nursing staff today.   Discussions with Specialists or Other Care Team Provider:     Education and Discussions with Family / Patient: Attempted to update  (son) via phone. Left voicemail.     Current Length of Stay: 1 day(s)  Current Patient Status: Inpatient   Certification Statement: The patient will continue to require additional inpatient hospital stay due to need for IV anticoagulation  Discharge Plan: Anticipate discharge in 24-48 hrs to home.    Code Status: Level 1 - Full Code    Subjective   Patient seen and examined resting comfortably in bed. Alert. ROS limited as patient with advanced dementia. No acute distress noted.     Objective :  Temp:  [97.1 °F (36.2 °C)-98 °F (36.7 °C)] 97.7 °F (36.5 °C)  HR:  [76-91] 91  BP: ()/(38-93) 95/54  Resp:  [16-18] 18  SpO2:  [96 %-100 %] 100 %  O2 Device: Nasal cannula  Nasal Cannula O2 Flow Rate (L/min):  [2 L/min] 2 L/min    Body mass index is 22.57 kg/m².     Input and Output Summary (last 24 hours):   No intake or output data in the 24 hours ending 03/07/25 1534    Physical Exam  Vitals and nursing note reviewed.   Constitutional:       General: She is not in acute distress.     Appearance: She is underweight.   HENT:      Head:  Normocephalic and atraumatic.   Eyes:      Conjunctiva/sclera: Conjunctivae normal.   Cardiovascular:      Rate and Rhythm: Normal rate and regular rhythm.      Pulses: Normal pulses.      Heart sounds: Normal heart sounds.   Pulmonary:      Effort: Pulmonary effort is normal. No respiratory distress.      Breath sounds: Normal breath sounds.   Abdominal:      General: Abdomen is flat.      Tenderness: There is no abdominal tenderness.   Musculoskeletal:         General: No swelling.      Cervical back: Neck supple.   Skin:     General: Skin is warm and dry.      Capillary Refill: Capillary refill takes less than 2 seconds.   Neurological:      Mental Status: She is alert. Mental status is at baseline.      Motor: Weakness present.   Psychiatric:         Mood and Affect: Mood normal.         Speech: She is noncommunicative.         Cognition and Memory: Cognition is impaired.           Lines/Drains:        Telemetry:  Telemetry Orders (From admission, onward)               24 Hour Telemetry Monitoring  Continuous x 24 Hours (Telem)        Expiring   Question:  Reason for 24 Hour Telemetry  Answer:  Pulmonary Embolism                     Telemetry Reviewed: Normal Sinus Rhythm  Indication for Continued Telemetry Use: PE               Lab Results: I have reviewed the following results:   Results from last 7 days   Lab Units 03/07/25  0517   WBC Thousand/uL 7.15   HEMOGLOBIN g/dL 9.5*   HEMATOCRIT % 30.3*   PLATELETS Thousands/uL 211   SEGS PCT % 65   LYMPHO PCT % 26   MONO PCT % 7   EOS PCT % 2     Results from last 7 days   Lab Units 03/07/25  0233   SODIUM mmol/L 143   POTASSIUM mmol/L 4.3   CHLORIDE mmol/L 108   CO2 mmol/L 24   BUN mg/dL 33*   CREATININE mg/dL 1.56*   ANION GAP mmol/L 11   CALCIUM mg/dL 8.7   GLUCOSE RANDOM mg/dL 72     Results from last 7 days   Lab Units 03/07/25  0200   INR  1.01     Results from last 7 days   Lab Units 03/06/25  0806   POC GLUCOSE mg/dl 124               Recent Cultures (last 7  days):         Imaging Results Review: No pertinent imaging studies reviewed.  Other Study Results Review: No additional pertinent studies reviewed.    Last 24 Hours Medication List:     Current Facility-Administered Medications:     acetaminophen (TYLENOL) tablet 650 mg, Q6H PRN    amiodarone tablet 200 mg, Daily    aspirin (ECOTRIN LOW STRENGTH) EC tablet 81 mg, Daily    atorvastatin (LIPITOR) tablet 40 mg, QPM    diltiazem (CARDIZEM) tablet 30 mg, TID    docusate sodium (COLACE) capsule 100 mg, BID    DULoxetine (CYMBALTA) delayed release capsule 30 mg, HS    heparin (porcine) 25,000 units in 0.45% NaCl 250 mL infusion (premix), Titrated, Last Rate: 15 Units/kg/hr (03/07/25 1209)    heparin (porcine) injection 1,800 Units, Q6H PRN    heparin (porcine) injection 3,600 Units, Q6H PRN    metoprolol tartrate (LOPRESSOR) tablet 25 mg, BID    terazosin (HYTRIN) capsule 2 mg, HS    Administrative Statements   Today, Patient Was Seen By: BEV Gar  I have spent a total time of 35 minutes in caring for this patient on the day of the visit/encounter including Diagnostic results, Instructions for management, Impressions, Counseling / Coordination of care, Documenting in the medical record, Reviewing/placing orders in the medical record (including tests, medications, and/or procedures), Obtaining or reviewing history  , and Communicating with other healthcare professionals .    **Please Note: This note may have been constructed using a voice recognition system.**

## 2025-03-07 NOTE — PLAN OF CARE
Problem: PAIN - ADULT  Goal: Verbalizes/displays adequate comfort level or baseline comfort level  Description: Interventions:  - Encourage patient to monitor pain and request assistance  - Assess pain using appropriate pain scale  - Administer analgesics based on type and severity of pain and evaluate response  - Implement non-pharmacological measures as appropriate and evaluate response  - Consider cultural and social influences on pain and pain management  - Notify physician/advanced practitioner if interventions unsuccessful or patient reports new pain  Outcome: Progressing     Problem: INFECTION - ADULT  Goal: Absence or prevention of progression during hospitalization  Description: INTERVENTIONS:  - Assess and monitor for signs and symptoms of infection  - Monitor lab/diagnostic results  - Monitor all insertion sites, i.e. indwelling lines, tubes, and drains  - Monitor endotracheal if appropriate and nasal secretions for changes in amount and color  - [place] appropriate cooling/warming therapies per order  - Administer medications as ordered  - Instruct and encourage patient and family to use good hand hygiene technique  - Identify and instruct in appropriate isolation precautions for identified infection/condition  Outcome: Progressing  Goal: Absence of fever/infection during neutropenic period  Description: INTERVENTIONS:  - Monitor WBC    Outcome: Progressing     Problem: SAFETY ADULT  Goal: Patient will remain free of falls  Description: INTERVENTIONS:  - Educate patient/family on patient safety including physical limitations  - Instruct patient to call for assistance with activity   - Consult OT/PT to assist with strengthening/mobility   - Keep Call bell within reach  - Keep bed low and locked with side rails adjusted as appropriate  - Keep care items and personal belongings within reach  - Initiate and maintain comfort rounds  - Make Fall Risk Sign visible to staff  - Offer Toileting every 2 Hours,  in advance of need  - Initiate/Maintain bed alarm  - Obtain necessary fall risk management equipment:   - Apply yellow socks and bracelet for high fall risk patients  - Consider moving patient to room near nurses station  Outcome: Progressing  Goal: Maintain or return to baseline ADL function  Description: INTERVENTIONS:  -  Assess patient's ability to carry out ADLs; assess patient's baseline for ADL function and identify physical deficits which impact ability to perform ADLs (bathing, care of mouth/teeth, toileting, grooming, dressing, etc.)  - Assess/evaluate cause of self-care deficits   - Assess range of motion  - Assess patient's mobility; develop plan if impaired  - Assess patient's need for assistive devices and provide as appropriate  - Encourage maximum independence but intervene and supervise when necessary  - Involve family in performance of ADLs  - Assess for home care needs following discharge   - Consider OT consult to assist with ADL evaluation and planning for discharge  - Provide patient education as appropriate  Outcome: Progressing  Goal: Maintains/Returns to pre admission functional level  Description: INTERVENTIONS:  - Perform AM-PAC 6 Click Basic Mobility/ Daily Activity assessment daily.  - Set and communicate daily mobility goal to care team and patient/family/caregiver.   - Collaborate with rehabilitation services on mobility goals if consulted  - Perform Range of Motion 3 times a day.  - Reposition patient every 2 hours.  - Dangle patient 3 times a day  - Stand patient 3 times a day  - Ambulate patient 3 times a day  - Out of bed to chair 3 times a day   - Out of bed for meals 3 times a day  - Out of bed for toileting  - Record patient progress and toleration of activity level   Outcome: Progressing     Problem: DISCHARGE PLANNING  Goal: Discharge to home or other facility with appropriate resources  Description: INTERVENTIONS:  - Identify barriers to discharge w/patient and caregiver  -  Arrange for needed discharge resources and transportation as appropriate  - Identify discharge learning needs (meds, wound care, etc.)  - Arrange for interpretive services to assist at discharge as needed  - Refer to Case Management Department for coordinating discharge planning if the patient needs post-hospital services based on physician/advanced practitioner order or complex needs related to functional status, cognitive ability, or social support system  Outcome: Progressing     Problem: Knowledge Deficit  Goal: Patient/family/caregiver demonstrates understanding of disease process, treatment plan, medications, and discharge instructions  Description: Complete learning assessment and assess knowledge base.  Interventions:  - Provide teaching at level of understanding  - Provide teaching via preferred learning methods  Outcome: Progressing     Problem: Prexisting or High Potential for Compromised Skin Integrity  Goal: Skin integrity is maintained or improved  Description: INTERVENTIONS:  - Identify patients at risk for skin breakdown  - Assess and monitor skin integrity  - Assess and monitor nutrition and hydration status  - Monitor labs   - Assess for incontinence   - Turn and reposition patient  - Assist with mobility/ambulation  - Relieve pressure over bony prominences  - Avoid friction and shearing  - Provide appropriate hygiene as needed including keeping skin clean and dry  - Evaluate need for skin moisturizer/barrier cream  - Collaborate with interdisciplinary team   - Patient/family teaching  - Consider wound care consult   Outcome: Progressing

## 2025-03-07 NOTE — ASSESSMENT & PLAN NOTE
Patient brought in due to change in mental status. Found to be lethargic/encephalopathic, less responsive, more contracted and drooling. Has history of facial droop  Stroke alert in ED  CTA/CT Head without signs of acute stroke  Patient appears to be back to baseline mentation  Patient was seen and evaluated by Neurology in ED  Symptoms likely due to TME/recrudescence   Stroke pathway not indicated at this time  Continue Neuro checks

## 2025-03-08 ENCOUNTER — TELEPHONE (OUTPATIENT)
Dept: OTHER | Facility: OTHER | Age: OVER 89
End: 2025-03-08

## 2025-03-08 LAB
ANION GAP SERPL CALCULATED.3IONS-SCNC: 16 MMOL/L (ref 4–13)
APTT PPP: 67 SECONDS (ref 23–34)
APTT PPP: 83 SECONDS (ref 23–34)
APTT PPP: 91 SECONDS (ref 23–34)
BUN SERPL-MCNC: 48 MG/DL (ref 5–25)
CALCIUM SERPL-MCNC: 8.4 MG/DL (ref 8.4–10.2)
CHLORIDE SERPL-SCNC: 108 MMOL/L (ref 96–108)
CO2 SERPL-SCNC: 20 MMOL/L (ref 21–32)
CREAT SERPL-MCNC: 2.33 MG/DL (ref 0.6–1.3)
ERYTHROCYTE [DISTWIDTH] IN BLOOD BY AUTOMATED COUNT: 16.5 % (ref 11.6–15.1)
GFR SERPL CREATININE-BSD FRML MDRD: 17 ML/MIN/1.73SQ M
GLUCOSE SERPL-MCNC: 71 MG/DL (ref 65–140)
GLUCOSE SERPL-MCNC: 77 MG/DL (ref 65–140)
HCT VFR BLD AUTO: 29.8 % (ref 34.8–46.1)
HGB BLD-MCNC: 9.3 G/DL (ref 11.5–15.4)
MCH RBC QN AUTO: 29.4 PG (ref 26.8–34.3)
MCHC RBC AUTO-ENTMCNC: 31.2 G/DL (ref 31.4–37.4)
MCV RBC AUTO: 94 FL (ref 82–98)
PLATELET # BLD AUTO: 237 THOUSANDS/UL (ref 149–390)
PMV BLD AUTO: 10.2 FL (ref 8.9–12.7)
POTASSIUM SERPL-SCNC: 4 MMOL/L (ref 3.5–5.3)
RBC # BLD AUTO: 3.16 MILLION/UL (ref 3.81–5.12)
SODIUM SERPL-SCNC: 144 MMOL/L (ref 135–147)
WBC # BLD AUTO: 5.27 THOUSAND/UL (ref 4.31–10.16)

## 2025-03-08 PROCEDURE — 82948 REAGENT STRIP/BLOOD GLUCOSE: CPT

## 2025-03-08 PROCEDURE — 99232 SBSQ HOSP IP/OBS MODERATE 35: CPT | Performed by: PHYSICIAN ASSISTANT

## 2025-03-08 PROCEDURE — 85027 COMPLETE CBC AUTOMATED: CPT | Performed by: HOSPITALIST

## 2025-03-08 PROCEDURE — 80048 BASIC METABOLIC PNL TOTAL CA: CPT | Performed by: HOSPITALIST

## 2025-03-08 PROCEDURE — 85730 THROMBOPLASTIN TIME PARTIAL: CPT | Performed by: HOSPITALIST

## 2025-03-08 RX ORDER — SODIUM CHLORIDE, SODIUM GLUCONATE, SODIUM ACETATE, POTASSIUM CHLORIDE, MAGNESIUM CHLORIDE, SODIUM PHOSPHATE, DIBASIC, AND POTASSIUM PHOSPHATE .53; .5; .37; .037; .03; .012; .00082 G/100ML; G/100ML; G/100ML; G/100ML; G/100ML; G/100ML; G/100ML
50 INJECTION, SOLUTION INTRAVENOUS CONTINUOUS
Status: DISPENSED | OUTPATIENT
Start: 2025-03-08 | End: 2025-03-08

## 2025-03-08 RX ORDER — METOPROLOL TARTRATE 1 MG/ML
2.5 INJECTION, SOLUTION INTRAVENOUS EVERY 6 HOURS PRN
Status: DISCONTINUED | OUTPATIENT
Start: 2025-03-08 | End: 2025-03-13 | Stop reason: HOSPADM

## 2025-03-08 RX ADMIN — HEPARIN SODIUM 10 UNITS/KG/HR: 10000 INJECTION, SOLUTION INTRAVENOUS at 19:13

## 2025-03-08 RX ADMIN — AMIODARONE HYDROCHLORIDE 200 MG: 200 TABLET ORAL at 12:42

## 2025-03-08 RX ADMIN — SODIUM CHLORIDE, SODIUM GLUCONATE, SODIUM ACETATE, POTASSIUM CHLORIDE, MAGNESIUM CHLORIDE, SODIUM PHOSPHATE, DIBASIC, AND POTASSIUM PHOSPHATE 50 ML/HR: .53; .5; .37; .037; .03; .012; .00082 INJECTION, SOLUTION INTRAVENOUS at 10:27

## 2025-03-08 NOTE — TELEPHONE ENCOUNTER
"Patient's son stated, \"I am returning call from my mother's doctor office and she is in the hospital.\"    Made him aware that we are unable to facilitate any communication for pt's admission.    "

## 2025-03-08 NOTE — ASSESSMENT & PLAN NOTE
Noted on CT scan  Echo without any significant changes, redemonstrated small circumferential pericardial effusion without evidence of tamponade

## 2025-03-08 NOTE — ASSESSMENT & PLAN NOTE
Lab Results   Component Value Date    CREATININE 2.33 (H) 03/08/2025    CREATININE 1.56 (H) 03/07/2025    CREATININE 1.52 (H) 03/06/2025     Admission creatinine 1.5  Current creatinine 2.3  Suspect in setting of poor p.o. intake/hypovolemia and contrast use  Start IVF, trend BMP  If continues to worsen will consult nephrology

## 2025-03-08 NOTE — ASSESSMENT & PLAN NOTE
Lab Results   Component Value Date    EGFR 17 03/08/2025    EGFR 28 03/07/2025    EGFR 29 03/06/2025    CREATININE 2.33 (H) 03/08/2025    CREATININE 1.56 (H) 03/07/2025    CREATININE 1.52 (H) 03/06/2025   Baseline appears to be 1.3-1.4  Creatinine stable  Avoid hypotension, nephrotoxic agents  Monitor Renal function

## 2025-03-08 NOTE — ASSESSMENT & PLAN NOTE
Likely from being bed bound, patient with history of afib not on anticoagulation prior to admission  CT Scan reveals:   Findings consistent with right-sided pulmonary embolism at the lobar/segmental levels; overall clot burden is small. Measured RV/LV ratio is within normal limits at less than 0.9; there is no evidence for right heart strain.   On 2L supplemental O2, continue maintain SpO2 >92%  Continue heparin drip for 48 hours then transition over to DOAC  Price check sent for Eliquis  Bilateral venous duplex with no DVT.  Right superficial thrombophlebitis.

## 2025-03-08 NOTE — PROGRESS NOTES
Progress Note - Hospitalist   Name: Denia Castañeda 89 y.o. female I MRN: [id number]  Unit/Bed#: E4 -01 I Date of Admission: 3/6/2025   Date of Service: 3/8/2025 I Hospital Day: 2    Assessment & Plan  Acute pulmonary embolism (HCC)  Likely from being bed bound, patient with history of afib not on anticoagulation prior to admission  CT Scan reveals:   Findings consistent with right-sided pulmonary embolism at the lobar/segmental levels; overall clot burden is small. Measured RV/LV ratio is within normal limits at less than 0.9; there is no evidence for right heart strain.   On 2L supplemental O2, continue maintain SpO2 >92%  Continue heparin drip for 48 hours then transition over to DOAC  Price check sent for Eliquis  Bilateral venous duplex with no DVT.  Right superficial thrombophlebitis.  OMEGA (acute kidney injury) (Formerly [facility])  Lab Results   Component Value Date    CREATININE 2.33 (H) 03/08/2025    CREATININE 1.56 (H) 03/07/2025    CREATININE 1.52 (H) 03/06/2025     Admission creatinine 1.5  Current creatinine 2.3  Suspect in setting of poor p.o. intake/hypovolemia and contrast use  Start IVF, trend BMP  If continues to worsen will consult nephrology  Atrial fibrillation (HCC)  Continue diltiazem, metoprolol and amiodarone.  Not on anticoagulation prior to admission due to hx of dementia, risk for fall  Plan to transition to DOAC prior to discharge, family to discuss risk and benefits with PCP regarding maintaining patient on lower dose AC  Stroke-like symptom  Patient brought in due to change in mental status. Found to be lethargic/encephalopathic, less responsive, more contracted and drooling. Has history of facial droop  Stroke alert in ED  CTA/CT Head without signs of acute stroke  Patient appears to be back to baseline mentation  Patient was seen and evaluated by Neurology in ED  Symptoms likely due to TME/recrudescence   Stroke pathway not indicated at this time  Continue Neuro checks  Primary hypertension  BP  soft today  Continue POA metoprolol  Monitor  Hyperlipidemia  Continue statin  Severe dementia with agitation (HCC)  Nonverbal and bed bound at baseline  Was maintained in zyprexa, remeron in the past appears patient is no longer taking  Delirium precautions  Fall precautions  Stage 3b chronic kidney disease (HCC)  Lab Results   Component Value Date    EGFR 17 03/08/2025    EGFR 28 03/07/2025    EGFR 29 03/06/2025    CREATININE 2.33 (H) 03/08/2025    CREATININE 1.56 (H) 03/07/2025    CREATININE 1.52 (H) 03/06/2025   Baseline appears to be 1.3-1.4  Creatinine stable  Avoid hypotension, nephrotoxic agents  Monitor Renal function  Pericardial effusion  Noted on CT scan  Echo without any significant changes, redemonstrated small circumferential pericardial effusion without evidence of tamponade    VTE Pharmacologic Prophylaxis:   Moderate Risk (Score 3-4) - Pharmacological DVT Prophylaxis Ordered: heparin drip.    Mobility:   Basic Mobility Inpatient Raw Score: 6  JH-HLM Goal: 2: Bed activities/Dependent transfer  JH-HLM Achieved: 2: Bed activities/Dependent transfer  JH-HLM Goal achieved. Continue to encourage appropriate mobility.    Patient Centered Rounds: I performed bedside rounds with nursing staff today.   Discussions with Specialists or Other Care Team Provider: None    Education and Discussions with Family / Patient: Attempted to update  (son) via phone. Left voicemail.     Current Length of Stay: 2 day(s)  Current Patient Status: Inpatient   Certification Statement: The patient will continue to require additional inpatient hospital stay due to OMEGA, PE  Discharge Plan: Anticipate discharge in 48-72 hrs to home with home services.    Code Status: Level 1 - Full Code    Subjective   Patient very lethargic.  Will open her eyes, however not answering questions.    Objective :  Temp:  [97.6 °F (36.4 °C)-98.4 °F (36.9 °C)] 98.1 °F (36.7 °C)  HR:  [] 78  BP: ()/(54-65) 136/57  Resp:  [18]  18  SpO2:  [91 %-100 %] 100 %  O2 Device: Nasal cannula  Nasal Cannula O2 Flow Rate (L/min):  [2 L/min] 2 L/min    Body mass index is 22.57 kg/m².     Input and Output Summary (last 24 hours):   No intake or output data in the 24 hours ending 03/08/25 1107    Physical Exam  Vitals and nursing note reviewed.   Constitutional:       Appearance: Normal appearance.   HENT:      Head: Normocephalic and atraumatic.      Mouth/Throat:      Mouth: Mucous membranes are moist.      Pharynx: Oropharynx is clear. No oropharyngeal exudate.   Eyes:      Extraocular Movements: Extraocular movements intact.   Cardiovascular:      Rate and Rhythm: Normal rate and regular rhythm.      Pulses: Normal pulses.      Heart sounds: Normal heart sounds. No murmur heard.     No friction rub. No gallop.   Pulmonary:      Effort: Pulmonary effort is normal. No respiratory distress.      Breath sounds: Normal breath sounds. No stridor. No wheezing or rales.   Abdominal:      General: Abdomen is flat. There is no distension.      Palpations: Abdomen is soft.   Skin:     General: Skin is warm and dry.   Neurological:      General: No focal deficit present.      Mental Status: She is alert and oriented to person, place, and time.      Comments: Unable to complete neurologic exam         Lines/Drains:              Lab Results: I have reviewed the following results:   Results from last 7 days   Lab Units 03/08/25  0325 03/07/25  0517   WBC Thousand/uL 5.27 7.15   HEMOGLOBIN g/dL 9.3* 9.5*   HEMATOCRIT % 29.8* 30.3*   PLATELETS Thousands/uL 237 211   SEGS PCT %  --  65   LYMPHO PCT %  --  26   MONO PCT %  --  7   EOS PCT %  --  2     Results from last 7 days   Lab Units 03/08/25  0325   SODIUM mmol/L 144   POTASSIUM mmol/L 4.0   CHLORIDE mmol/L 108   CO2 mmol/L 20*   BUN mg/dL 48*   CREATININE mg/dL 2.33*   ANION GAP mmol/L 16*   CALCIUM mg/dL 8.4   GLUCOSE RANDOM mg/dL 77     Results from last 7 days   Lab Units 03/07/25  0200   INR  1.01      Results from last 7 days   Lab Units 03/08/25  0748 03/06/25  0806   POC GLUCOSE mg/dl 71 124               Recent Cultures (last 7 days):         Imaging Results Review: No pertinent imaging studies reviewed.  Other Study Results Review: No additional pertinent studies reviewed.    Last 24 Hours Medication List:     Current Facility-Administered Medications:     acetaminophen (TYLENOL) tablet 650 mg, Q6H PRN    amiodarone tablet 200 mg, Daily    aspirin (ECOTRIN LOW STRENGTH) EC tablet 81 mg, Daily    atorvastatin (LIPITOR) tablet 40 mg, QPM    diltiazem (CARDIZEM) tablet 30 mg, TID    docusate sodium (COLACE) capsule 100 mg, BID    DULoxetine (CYMBALTA) delayed release capsule 30 mg, HS    heparin (porcine) 25,000 units in 0.45% NaCl 250 mL infusion (premix), Titrated, Last Rate: 12 Units/kg/hr (03/07/25 2007)    heparin (porcine) injection 1,800 Units, Q6H PRN    heparin (porcine) injection 3,600 Units, Q6H PRN    metoprolol tartrate (LOPRESSOR) tablet 25 mg, BID    multi-electrolyte (Plasmalyte-A/Isolyte-S PH 7.4/Normosol-R) IV solution, Continuous, Last Rate: 50 mL/hr (03/08/25 1027)    terazosin (HYTRIN) capsule 2 mg, HS    Administrative Statements   Today, Patient Was Seen By: Chente Burrell PA-C      **Please Note: This note may have been constructed using a voice recognition system.**

## 2025-03-08 NOTE — PLAN OF CARE
Problem: PAIN - ADULT  Goal: Verbalizes/displays adequate comfort level or baseline comfort level  Description: Interventions:  - Encourage patient to monitor pain and request assistance  - Assess pain using appropriate pain scale  - Administer analgesics based on type and severity of pain and evaluate response  - Implement non-pharmacological measures as appropriate and evaluate response  - Consider cultural and social influences on pain and pain management  - Notify physician/advanced practitioner if interventions unsuccessful or patient reports new pain  Outcome: Progressing     Problem: INFECTION - ADULT  Goal: Absence or prevention of progression during hospitalization  Description: INTERVENTIONS:  - Assess and monitor for signs and symptoms of infection  - Monitor lab/diagnostic results  - Monitor all insertion sites, i.e. indwelling lines, tubes, and drains  - Monitor endotracheal if appropriate and nasal secretions for changes in amount and color  - [place] appropriate cooling/warming therapies per order  - Administer medications as ordered  - Instruct and encourage patient and family to use good hand hygiene technique  - Identify and instruct in appropriate isolation precautions for identified infection/condition  Outcome: Progressing  Goal: Absence of fever/infection during neutropenic period  Description: INTERVENTIONS:  - Monitor WBC    Outcome: Progressing     Problem: SAFETY ADULT  Goal: Patient will remain free of falls  Description: INTERVENTIONS:  - Educate patient/family on patient safety including physical limitations  - Instruct patient to call for assistance with activity   - Consult OT/PT to assist with strengthening/mobility   - Keep Call bell within reach  - Keep bed low and locked with side rails adjusted as appropriate  - Keep care items and personal belongings within reach  - Initiate and maintain comfort rounds  - Make Fall Risk Sign visible to staff  - Offer Toileting every 2 Hours,  in advance of need  - Initiate/Maintain bed alarm  - Obtain necessary fall risk management equipment:   - Apply yellow socks and bracelet for high fall risk patients  - Consider moving patient to room near nurses station  Outcome: Progressing  Goal: Maintain or return to baseline ADL function  Description: INTERVENTIONS:  -  Assess patient's ability to carry out ADLs; assess patient's baseline for ADL function and identify physical deficits which impact ability to perform ADLs (bathing, care of mouth/teeth, toileting, grooming, dressing, etc.)  - Assess/evaluate cause of self-care deficits   - Assess range of motion  - Assess patient's mobility; develop plan if impaired  - Assess patient's need for assistive devices and provide as appropriate  - Encourage maximum independence but intervene and supervise when necessary  - Involve family in performance of ADLs  - Assess for home care needs following discharge   - Consider OT consult to assist with ADL evaluation and planning for discharge  - Provide patient education as appropriate  Outcome: Progressing  Goal: Maintains/Returns to pre admission functional level  Description: INTERVENTIONS:  - Perform AM-PAC 6 Click Basic Mobility/ Daily Activity assessment daily.  - Set and communicate daily mobility goal to care team and patient/family/caregiver.   - Collaborate with rehabilitation services on mobility goals if consulted  - Perform Range of Motion 3 times a day.  - Reposition patient every 2 hours.  - Dangle patient 3 times a day  - Stand patient 3 times a day  - Ambulate patient 3 times a day  - Out of bed to chair 3 times a day   - Out of bed for meals 3 times a day  - Out of bed for toileting  - Record patient progress and toleration of activity level   Outcome: Progressing     Problem: DISCHARGE PLANNING  Goal: Discharge to home or other facility with appropriate resources  Description: INTERVENTIONS:  - Identify barriers to discharge w/patient and caregiver  -  Arrange for needed discharge resources and transportation as appropriate  - Identify discharge learning needs (meds, wound care, etc.)  - Arrange for interpretive services to assist at discharge as needed  - Refer to Case Management Department for coordinating discharge planning if the patient needs post-hospital services based on physician/advanced practitioner order or complex needs related to functional status, cognitive ability, or social support system  Outcome: Progressing     Problem: Knowledge Deficit  Goal: Patient/family/caregiver demonstrates understanding of disease process, treatment plan, medications, and discharge instructions  Description: Complete learning assessment and assess knowledge base.  Interventions:  - Provide teaching at level of understanding  - Provide teaching via preferred learning methods  Outcome: Progressing     Problem: Prexisting or High Potential for Compromised Skin Integrity  Goal: Skin integrity is maintained or improved  Description: INTERVENTIONS:  - Identify patients at risk for skin breakdown  - Assess and monitor skin integrity  - Assess and monitor nutrition and hydration status  - Monitor labs   - Assess for incontinence   - Turn and reposition patient  - Assist with mobility/ambulation  - Relieve pressure over bony prominences  - Avoid friction and shearing  - Provide appropriate hygiene as needed including keeping skin clean and dry  - Evaluate need for skin moisturizer/barrier cream  - Collaborate with interdisciplinary team   - Patient/family teaching  - Consider wound care consult   Outcome: Progressing     Problem: Nutrition/Hydration-ADULT  Goal: Nutrient/Hydration intake appropriate for improving, restoring or maintaining nutritional needs  Description: Monitor and assess patient's nutrition/hydration status for malnutrition. Collaborate with interdisciplinary team and initiate plan and interventions as ordered.  Monitor patient's weight and dietary intake as  ordered or per policy. Utilize nutrition screening tool and intervene as necessary. Determine patient's food preferences and provide high-protein, high-caloric foods as appropriate.     INTERVENTIONS:  - Monitor oral intake, urinary output, labs, and treatment plans  - Assess nutrition and hydration status and recommend course of action  - Evaluate amount of meals eaten  - Assist patient with eating if necessary   - Allow adequate time for meals  - Recommend/ encourage appropriate diets, oral nutritional supplements, and vitamin/mineral supplements  - Order, calculate, and assess calorie counts as needed  - Recommend, monitor, and adjust tube feedings and TPN/PPN based on assessed needs  - Assess need for intravenous fluids  - Provide specific nutrition/hydration education as appropriate  - Include patient/family/caregiver in decisions related to nutrition  Outcome: Progressing

## 2025-03-09 LAB
ANION GAP SERPL CALCULATED.3IONS-SCNC: 12 MMOL/L (ref 4–13)
APTT PPP: 64 SECONDS (ref 23–34)
BASOPHILS # BLD AUTO: 0.01 THOUSANDS/ÂΜL (ref 0–0.1)
BASOPHILS NFR BLD AUTO: 0 % (ref 0–1)
BUN SERPL-MCNC: 52 MG/DL (ref 5–25)
CALCIUM SERPL-MCNC: 8.7 MG/DL (ref 8.4–10.2)
CHLORIDE SERPL-SCNC: 112 MMOL/L (ref 96–108)
CO2 SERPL-SCNC: 23 MMOL/L (ref 21–32)
CREAT SERPL-MCNC: 2.4 MG/DL (ref 0.6–1.3)
EOSINOPHIL # BLD AUTO: 0.13 THOUSAND/ÂΜL (ref 0–0.61)
EOSINOPHIL NFR BLD AUTO: 3 % (ref 0–6)
ERYTHROCYTE [DISTWIDTH] IN BLOOD BY AUTOMATED COUNT: 16.4 % (ref 11.6–15.1)
GFR SERPL CREATININE-BSD FRML MDRD: 16 ML/MIN/1.73SQ M
GLUCOSE SERPL-MCNC: 103 MG/DL (ref 65–140)
HCT VFR BLD AUTO: 30 % (ref 34.8–46.1)
HGB BLD-MCNC: 9.4 G/DL (ref 11.5–15.4)
IMM GRANULOCYTES # BLD AUTO: 0.01 THOUSAND/UL (ref 0–0.2)
IMM GRANULOCYTES NFR BLD AUTO: 0 % (ref 0–2)
LYMPHOCYTES # BLD AUTO: 1.36 THOUSANDS/ÂΜL (ref 0.6–4.47)
LYMPHOCYTES NFR BLD AUTO: 28 % (ref 14–44)
MCH RBC QN AUTO: 29.3 PG (ref 26.8–34.3)
MCHC RBC AUTO-ENTMCNC: 31.3 G/DL (ref 31.4–37.4)
MCV RBC AUTO: 94 FL (ref 82–98)
MONOCYTES # BLD AUTO: 0.46 THOUSAND/ÂΜL (ref 0.17–1.22)
MONOCYTES NFR BLD AUTO: 9 % (ref 4–12)
NEUTROPHILS # BLD AUTO: 2.92 THOUSANDS/ÂΜL (ref 1.85–7.62)
NEUTS SEG NFR BLD AUTO: 60 % (ref 43–75)
NRBC BLD AUTO-RTO: 0 /100 WBCS
PLATELET # BLD AUTO: 216 THOUSANDS/UL (ref 149–390)
PMV BLD AUTO: 9.8 FL (ref 8.9–12.7)
POTASSIUM SERPL-SCNC: 3.6 MMOL/L (ref 3.5–5.3)
RBC # BLD AUTO: 3.21 MILLION/UL (ref 3.81–5.12)
SODIUM SERPL-SCNC: 147 MMOL/L (ref 135–147)
WBC # BLD AUTO: 4.89 THOUSAND/UL (ref 4.31–10.16)

## 2025-03-09 PROCEDURE — 92526 ORAL FUNCTION THERAPY: CPT

## 2025-03-09 PROCEDURE — 85025 COMPLETE CBC W/AUTO DIFF WBC: CPT | Performed by: PHYSICIAN ASSISTANT

## 2025-03-09 PROCEDURE — 80048 BASIC METABOLIC PNL TOTAL CA: CPT | Performed by: PHYSICIAN ASSISTANT

## 2025-03-09 PROCEDURE — 85730 THROMBOPLASTIN TIME PARTIAL: CPT | Performed by: HOSPITALIST

## 2025-03-09 PROCEDURE — 99232 SBSQ HOSP IP/OBS MODERATE 35: CPT | Performed by: PHYSICIAN ASSISTANT

## 2025-03-09 RX ORDER — SODIUM CHLORIDE 9 MG/ML
75 INJECTION, SOLUTION INTRAVENOUS CONTINUOUS
Status: DISCONTINUED | OUTPATIENT
Start: 2025-03-09 | End: 2025-03-10

## 2025-03-09 RX ADMIN — SODIUM CHLORIDE 75 ML/HR: 0.9 INJECTION, SOLUTION INTRAVENOUS at 19:47

## 2025-03-09 NOTE — SPEECH THERAPY NOTE
Speech Language/Pathology    Speech/Language Pathology Progress Note    Patient Name: Denia Castañeda  Today's Date: 3/9/2025     Problem List  Principal Problem:    Acute pulmonary embolism (HCC)  Active Problems:    Primary hypertension    Hyperlipidemia    OMEGA (acute kidney injury) (HCC)    Atrial fibrillation (HCC)    Severe dementia with agitation (HCC)    Stage 3b chronic kidney disease (HCC)    Pericardial effusion    Stroke-like symptom       Past Medical History  Past Medical History:   Diagnosis Date    Anxiety     Closed fracture of right orbital floor (HCC) 08/23/2020    Cognitive impairment     COVID-19 virus infection 04/13/2020    Dementia (HCC)     Depression     Hallucination     Hyperlipidemia     Hypertension     Memory loss     Psychiatric illness     Psychosis (HCC)     Retention of urine     Schizoaffective disorder (HCC)     Sleep difficulties     Urinary tract infection         Past Surgical History  Past Surgical History:   Procedure Laterality Date    APPENDECTOMY      BLADDER SURGERY      CHOLECYSTECTOMY      AR OPTX FEM SHFT FX W/INSJ IMED IMPLT W/WO SCREW Right 7/15/2021    Procedure: INSERTION NAIL IM FEMUR ANTEGRADE (TROCHANTERIC) right;  Surgeon: Getachew Aguilar MD;  Location: [facility];  Service: Orthopedics    TOTAL ABDOMINAL HYSTERECTOMY W/ BILATERAL SALPINGOOPHORECTOMY      TOTAL KNEE ARTHROPLASTY Left          Subjective:  Pt eyes remained mostly closed opened eyes x1 briefly. Positioned upright. Nonverbal.  Objective:  Pt was seen for f/u dysphagia therapy for lunch. Spoke with CNA reported ongoing lethargy. Suspect may wax and wane.  Pt eyes remained mostly closed throughout session. ST placed ice chip at lips pt did open mouth to retrieve provided verbal cues. After transfer and swallow pt would inconsistently open mouth to receive additional intake. Fed by ST half a yogurt and approx 3oz of ntl. Initially unable to attend straw however as trials progressed formed adequate  suction. Bolus control and formation were WNL. Transfer and swallow initiation appeared slow overall. No overt s/s of aspiration. Benefited from frequent verbal cues and stimuli.  Decreased intake overall.   Assessment:  Continues with ongoing lethargy. Benefits from frequent verbal/tactile/thermal stimuli. Tolerated min intake of po.  Plan/Recommendations:  Recommend continue dysphagia 1 pureed and ntl  Ensure good oral care  Aspiration precautions   Consider GOC   ST will f/u as indicated

## 2025-03-09 NOTE — UTILIZATION REVIEW
SEE INITIAL REVIEW AT BOTTOM BY BERNARDO BETANCOURT RN  Continued Stay Review    Date: 3/9/25                          Current Patient Class: Inpatient  Current Level of Care: med-surg    HPI:89 y.o. female initially admitted on 3/6   Current Diagnosis:ACUTE PE/OMEGA    Assessment/Plan: Date: 3/8  Day 3: Has surpassed a 2nd midnight with active treatments and services.No acute events overnight.  Patient is still very lethargic difficult to arouse. Does not appear to be in any pain. PTT 91-64-67.CO2 20. ANION GAP 16. BUN 48, CREAT 2.33.HGB 9.3, HCT 29.8.       Medications:   Scheduled Medications:  amiodarone, 200 mg, Oral, Daily  aspirin, 81 mg, Oral, Daily  atorvastatin, 40 mg, Oral, QPM  diltiazem, 30 mg, Oral, TID  docusate sodium, 100 mg, Oral, BID  DULoxetine, 30 mg, Oral, HS  metoprolol tartrate, 25 mg, Oral, BID  terazosin, 2 mg, Oral, HS      Continuous IV Infusions:  heparin (porcine), 3-30 Units/kg/hr (Order-Specific), Intravenous, Titrated      PRN Meds:  acetaminophen, 650 mg, Oral, Q6H PRN  heparin (porcine), 1,800 Units, Intravenous, Q6H PRN  heparin (porcine), 3,600 Units, Intravenous, Q6H PRN  metoprolol, 2.5 mg, Intravenous, Q6H PRN      Discharge Plan: TBD    Vital Signs (last 3 days)       Date/Time Temp Pulse Resp BP MAP (mmHg) SpO2 Calculated FIO2 (%) - Nasal Cannula Nasal Cannula O2 Flow Rate (L/min) O2 Device Patient Position - Orthostatic VS [place] Coma Scale Score Pain    03/09/25 0956 -- -- -- -- -- -- -- -- -- -- -- No Pain    03/09/25 0852 98.1 °F (36.7 °C) 88 16 152/74 106 97 % 28 2 L/min Nasal cannula Lying -- --    03/08/25 2346 98.2 °F (36.8 °C) 84 18 129/60 87 99 % 28 2 L/min Nasal cannula Lying -- --    03/08/25 1930 -- -- -- -- -- -- 28 2 L/min Nasal cannula -- 9 --    03/08/25 1900 97.9 °F (36.6 °C) 87 18 107/51 -- 99 % 28 2 L/min Nasal cannula Lying -- --    03/08/25 1520 98 °F (36.7 °C) 85 16 138/59 85 97 % 28 2 L/min Nasal cannula Lying -- --    03/08/25 0830 -- -- -- -- -- -- -- --  -- -- 9 No Pain    03/08/25 0723 98.1 °F (36.7 °C) 78 18 136/57 82 100 % 28 2 L/min Nasal cannula Lying -- --    03/08/25 0330 -- -- -- -- -- -- -- -- -- -- 11 --    03/08/25 0303 98.4 °F (36.9 °C) 84 18 128/61 88 100 % 28 2 L/min Nasal cannula Lying -- --    03/07/25 2330 -- -- -- -- -- -- -- -- -- -- 11 --    03/07/25 2326 98 °F (36.7 °C) 85 18 148/65 94 98 % 28 2 L/min Nasal cannula Lying -- --    03/07/25 1930 -- -- -- -- -- -- -- -- Nasal cannula -- 11 No Pain    03/07/25 1910 98.4 °F (36.9 °C) 102 18 107/55 78 91 % 28 2 L/min Nasal cannula Lying -- --    03/07/25 1505 97.7 °F (36.5 °C) 91 18 95/54 70 100 % 28 2 L/min Nasal cannula Lying -- --    03/07/25 1345 -- 88 -- 117/59 -- -- -- -- -- -- -- --    03/07/25 1117 97.6 °F (36.4 °C) 89 18 117/59 82 100 % 28 2 L/min Nasal cannula Lying -- --    03/07/25 0915 -- -- -- -- -- -- -- -- -- -- 8 --    03/07/25 0739 -- 83 16 101/44 56 96 % 28 2 L/min Nasal cannula Lying -- --    03/07/25 0340 -- -- -- 108/46 63 -- -- -- -- -- -- --    03/07/25 0334 98 °F (36.7 °C) 76 18 96/47 63 98 % 28 2 L/min Nasal cannula Lying -- --    03/07/25 0330 -- -- -- -- -- -- -- -- -- -- 9 --    03/07/25 0100 -- -- -- 91/50 55 -- -- -- -- Lying -- --    03/07/25 0057 -- -- -- 87/54 -- -- -- -- -- -- -- --    03/07/25 0025 97.1 °F (36.2 °C) 87 18 87/38 50 100 % -- -- None (Room air) Lying -- --    03/06/25 2330 -- -- -- -- -- -- -- -- -- -- 9 --    03/06/25 1930 -- -- -- -- -- -- -- -- None (Room air) -- 9 --    03/06/25 1900 97.4 °F (36.3 °C) 77 18 144/93 108 98 % -- -- Nasal cannula Lying -- --    03/06/25 1522 -- 68 20 105/52 75 99 % 28 2 L/min Nasal cannula Lying -- --    03/06/25 1500 -- -- -- -- -- -- -- -- -- -- 11 --    03/06/25 1400 -- 72 20 184/97 135 99 % 28 2 L/min Nasal cannula Lying -- --    03/06/25 1200 -- 60 14 140/63 85 100 % -- -- None (Room air) Lying -- --    03/06/25 1130 -- 62 15 129/62 92 100 % 28 2 L/min Nasal cannula Lying -- --    03/06/25 1100 -- 64 14 140/72 108  99 % 28 2 L/min Nasal cannula Lying -- --    03/06/25 1030 -- 68 17 116/61 83 93 % 28 2 L/min Nasal cannula Lying -- --    03/06/25 1000 -- 66 18 129/70 93 96 % 28 2 L/min Nasal cannula Lying -- --    03/06/25 0930 -- 70 18 140/62 95 98 % 28 2 L/min Nasal cannula Lying -- --    03/06/25 0900 -- -- -- -- -- -- -- -- -- -- 11 --    03/06/25 0845 -- -- -- -- -- -- -- -- -- -- 11 --    03/06/25 0835 -- 77 20 161/71 -- 96 % -- -- None (Room air) Lying -- --    03/06/25 0830 -- -- -- -- -- -- -- -- -- -- 11 --    03/06/25 0815 -- -- -- -- -- -- -- -- -- -- 11 --    03/06/25 0811 99.5 °F (37.5 °C) -- -- 104/56 -- -- -- -- -- Lying -- --    03/06/25 0809 -- 83 18 -- -- 95 % -- -- None (Room air) -- -- --          Weight (last 2 days)       Date/Time Weight    03/07/25 1345 49 (108)            Pertinent Labs/Diagnostic Results:   Radiology:   VAS VENOUS DUPLEX - LOWER LIMB BILATERAL   Final Interpretation by Candelario Shin DO (03/07 1424)      X-ray chest 1 view portable   ED Interpretation by Jeanette Guo DO (03/06 0935)   No acute disease.  Scoliosis noted    Image independently interpreted by myself       Final Interpretation by Glen Neff MD (03/06 0694)      No focal consolidation, pleural effusion, or pneumothorax.            Workstation performed: [id number]         CTA chest pe study   Final Interpretation by Huseyin Quevedo MD (03/06 0926)      Findings consistent with right-sided pulmonary embolism at the lobar/segmental levels; overall clot burden is small. Measured RV/LV ratio is within normal limits at less than 0.9; there is no evidence for right heart strain.      Small to moderate pericardial effusion. The study was marked in EPIC for immediate notification.                  Workstation performed: [id number]         CTA stroke alert (head/neck)   Final Interpretation by Brenda Loyola MD (03/06 0844)      1.  Incidentally noted pulmonary emboli within the interlobar and segmental branches of the right  pulmonary artery.   2.  No intracranial large vessel occlusion or critical stenosis. No aneurysm.   3.  No significant stenosis in the cervical carotid or vertebral arteries.   4.  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 Moderate stenosis at the origin.   5.  Coronary artery atherosclerotic disease.                        Findings were directly discussed with Trae Thibodeaux at 8:34 a.m. Findings were also discussed with provider in the emergency department RACHEL RAMOS on 3/6/2025 8:30 AM.      Workstation performed: [id number]         CT stroke alert brain   Final Interpretation by Brenda Loyola MD (03/06 0843)      1.  Small age-indeterminate, likely subacute to chronic right cerebellar infarct. Additional old bilateral cerebellar lacunar infarcts.   2.  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 Stable old lacunar infarcts in the left anterior insula/external capsule.   3.  Chronic microangiopathic change.   4.  Right maxillary sinus disease with frothy secretions suggesting acute sinusitis.               Findings were directly discussed with Trae Thibodeaux at approximately 8:34 a.m      Workstation performed: [id number]           Cardiology:  Echo complete w/ contrast if indicated   Final Result by Osito Maravilla MD (03/07 0688)   Technically difficult limited transthoracic echocardiogram study.     Endocardial and other structure visualization was suboptimal and poor.      Mild to moderate concentric left ventricular hypertrophy, normal left    ventricular systolic function.  Indeterminate diastolic function.   Right ventricle was not well-visualized.  Right ventricular size and    function appear to be normal.  No evidence of right ventricular pressure    or volume overload.   Normal left atrial cavity size.  Right atrium was not well-visualized.   Aortic valve was not well-visualized.  Aortic valve sclerosis and trace to    mild aortic valve  regurgitation by visual assessment.   Mitral valve annular calcification.  Mitral valve leaflet sclerosis, trace    mitral valve regurgitation.   Tricuspid and pulmonic valve regurgitation.   No obvious pulmonary hypertension.   Small circumferential pericardial effusion with prominent collection in    the apical and lateral aspect of left ventricle and behind the right    atrium.  There is no echocardiographic evidence of cardiac tamponade.      Compared to report of previous echocardiogram from 6/13/2024 there is    overall no significant change.  Pericardial effusion was noted previously    also.         ECG 12 lead   Final Result by Troy Sanchez DO (03/06 9332)   Age and gender specific ECG analysis    Normal sinus rhythm   Left bundle branch block   T wave abnormality, consider inferolateral ischemia   Abnormal ECG   When compared with ECG of 06-Mar-2025 10:31,   No significant change was found   Confirmed by Troy Sanchez ([id number]) on 3/6/2025 5:52:03 PM      ECG 12 lead   Final Result by Parminder Munoz MD (03/06 1235)   Age and gender specific ECG analysis    Normal sinus rhythm   Septal infarct (cited on or before 06-Mar-2025)   ST & T wave abnormality, consider inferior ischemia   Abnormal ECG   When compared with ECG of 06-Mar-2025 08:38, (unconfirmed)   No significant change was found   Confirmed by Parminder Munoz ([id number]) on 3/6/2025 12:35:15 PM      ECG 12 lead   Final Result by Parminder Munoz MD (03/06 6523)   Age and gender specific ECG analysis    Normal sinus rhythm   Non-specific intra-ventricular conduction block   T wave abnormality, consider inferior ischemia   Abnormal ECG   When compared with ECG of 30-Jun-2024 04:13,   Inverted T waves have replaced nonspecific T wave abnormality in Inferior    leads   T wave inversion less evident in Lateral leads   Confirmed by Parminder Munoz ([id number]) on 3/6/2025 12:32:37 PM        GI:  No orders to display       Results from last 7 days   Lab Units  03/06/25  0845   SARS-COV-2  Negative     Results from last 7 days   Lab Units 03/09/25  0848 03/08/25  0325 03/07/25  0517 03/06/25  0813   WBC Thousand/uL 4.89 5.27 7.15 6.44   HEMOGLOBIN g/dL 9.4* 9.3* 9.5* 10.8*   HEMATOCRIT % 30.0* 29.8* 30.3* 33.7*   PLATELETS Thousands/uL 216 237 211 242   TOTAL NEUT ABS Thousands/µL 2.92  --  4.61  --          Results from last 7 days   Lab Units 03/09/25  0848 03/08/25  0325 03/07/25  0233 03/07/25  0233 03/06/25  0813   SODIUM mmol/L 147 144  --  143 144   POTASSIUM mmol/L 3.6 4.0  --  4.3 4.2   CHLORIDE mmol/L 112* 108  --  108 108   CO2 mmol/L 23 20*  --  24 27   ANION GAP mmol/L 12 16*  --  11 9   BUN mg/dL 52* 48*  --  33* 27*   CREATININE mg/dL 2.40* 2.33*  --  1.56* 1.52*   EGFR ml/min/1.73sq m 16 17  --  28 29   CALCIUM mg/dL 8.7 8.4  --  8.7 9.3   MAGNESIUM mg/dL  --   --  2.0  --   --          Results from last 7 days   Lab Units 03/08/25  0748 03/06/25  0806   POC GLUCOSE mg/dl 71 124     Results from last 7 days   Lab Units 03/09/25  0848 03/08/25  0325 03/07/25  0233 03/06/25  0813   GLUCOSE RANDOM mg/dL 103 77 72 120       Results from last 7 days   Lab Units 03/06/25  1248 03/06/25  1031 03/06/25  0813   HS TNI 0HR ng/L  --   --  19   HS TNI 2HR ng/L  --  22  --    HSTNI D2 ng/L  --  3  --    HS TNI 4HR ng/L 26  --   --    HSTNI D4 ng/L 7  --   --          Results from last 7 days   Lab Units 03/09/25  0455 03/08/25  2031 03/08/25  1022 03/07/25  1002 03/07/25  0200 03/06/25  1031 03/06/25  0813   PROTIME seconds  --   --   --   --  13.5  --  13.4   INR   --   --   --   --  1.01  --  1.00   PTT seconds 64* 67* 91*   < > 25   < > 27    < > = values in this interval not displayed.       Results from last 7 days   Lab Units 03/06/25  0813   BNP pg/mL 182*       Results from last 7 days   Lab Units 03/06/25  0845   INFLUENZA A PCR  Negative   INFLUENZA B PCR  Negative   RSV PCR  Negative       Network Utilization Review Department  ATTENTION: Please call with any  questions or concerns to [phone] and carefully listen to the prompts so that you are directed to the right person. All voicemails are confidential.   For Discharge needs, contact Care Management DC Support Team at [phone] opt. [phone]  Send all requests for admission clinical reviews, approved or denied determinations and any other requests to dedicated fax number below belonging to the [facility] where the patient is receiving treatment. List of dedicated fax numbers for the Facilities:  FACILITY NAME UR FAX NUMBER   ADMISSION DENIALS (Administrative/Medical Necessity) [phone]   DISCHARGE SUPPORT TEAM (NETWORK) [phone]   PARENT CHILD HEALTH (Maternity/NICU/Pediatrics) [phone]   [facility] [phone]   [facility] [phone]   [facility] [phone]   [facility] [phone]   [facility] [phone]   [facility] [phone]   [facility] [phone]   [facility] [phone]   [facility] [phone]   [facility] [phone]   [facility] [phone]   [facility] [phone]

## 2025-03-09 NOTE — PLAN OF CARE
Problem: PAIN - ADULT  Goal: Verbalizes/displays adequate comfort level or baseline comfort level  Description: Interventions:  - Encourage patient to monitor pain and request assistance  - Assess pain using appropriate pain scale  - Administer analgesics based on type and severity of pain and evaluate response  - Implement non-pharmacological measures as appropriate and evaluate response  - Consider cultural and social influences on pain and pain management  - Notify physician/advanced practitioner if interventions unsuccessful or patient reports new pain  Outcome: Progressing     Problem: INFECTION - ADULT  Goal: Absence or prevention of progression during hospitalization  Description: INTERVENTIONS:  - Assess and monitor for signs and symptoms of infection  - Monitor lab/diagnostic results  - Monitor all insertion sites, i.e. indwelling lines, tubes, and drains  - Monitor endotracheal if appropriate and nasal secretions for changes in amount and color  - Ross appropriate cooling/warming therapies per order  - Administer medications as ordered  - Instruct and encourage patient and family to use good hand hygiene technique  - Identify and instruct in appropriate isolation precautions for identified infection/condition  Outcome: Progressing  Goal: Absence of fever/infection during neutropenic period  Description: INTERVENTIONS:  - Monitor WBC    Outcome: Progressing     Problem: SAFETY ADULT  Goal: Patient will remain free of falls  Description: INTERVENTIONS:  - Educate patient/family on patient safety including physical limitations  - Instruct patient to call for assistance with activity   - Consult OT/PT to assist with strengthening/mobility   - Keep Call bell within reach  - Keep bed low and locked with side rails adjusted as appropriate  - Keep care items and personal belongings within reach  - Initiate and maintain comfort rounds  - Make Fall Risk Sign visible to staff  - Offer Toileting every 2 Hours,  in advance of need  - Initiate/Maintain bed alarm  - Obtain necessary fall risk management equipment:   - Apply yellow socks and bracelet for high fall risk patients  - Consider moving patient to room near nurses station  Outcome: Progressing  Goal: Maintain or return to baseline ADL function  Description: INTERVENTIONS:  -  Assess patient's ability to carry out ADLs; assess patient's baseline for ADL function and identify physical deficits which impact ability to perform ADLs (bathing, care of mouth/teeth, toileting, grooming, dressing, etc.)  - Assess/evaluate cause of self-care deficits   - Assess range of motion  - Assess patient's mobility; develop plan if impaired  - Assess patient's need for assistive devices and provide as appropriate  - Encourage maximum independence but intervene and supervise when necessary  - Involve family in performance of ADLs  - Assess for home care needs following discharge   - Consider OT consult to assist with ADL evaluation and planning for discharge  - Provide patient education as appropriate  Outcome: Progressing  Goal: Maintains/Returns to pre admission functional level  Description: INTERVENTIONS:  - Perform AM-PAC 6 Click Basic Mobility/ Daily Activity assessment daily.  - Set and communicate daily mobility goal to care team and patient/family/caregiver.   - Collaborate with rehabilitation services on mobility goals if consulted  - Perform Range of Motion 3 times a day.  - Reposition patient every 2 hours.  - Dangle patient 3 times a day  - Stand patient 3 times a day  - Ambulate patient 3 times a day  - Out of bed to chair 3 times a day   - Out of bed for meals 3 times a day  - Out of bed for toileting  - Record patient progress and toleration of activity level   Outcome: Progressing     Problem: DISCHARGE PLANNING  Goal: Discharge to home or other facility with appropriate resources  Description: INTERVENTIONS:  - Identify barriers to discharge w/patient and caregiver  -  Arrange for needed discharge resources and transportation as appropriate  - Identify discharge learning needs (meds, wound care, etc.)  - Arrange for interpretive services to assist at discharge as needed  - Refer to Case Management Department for coordinating discharge planning if the patient needs post-hospital services based on physician/advanced practitioner order or complex needs related to functional status, cognitive ability, or social support system  Outcome: Progressing     Problem: Knowledge Deficit  Goal: Patient/family/caregiver demonstrates understanding of disease process, treatment plan, medications, and discharge instructions  Description: Complete learning assessment and assess knowledge base.  Interventions:  - Provide teaching at level of understanding  - Provide teaching via preferred learning methods  Outcome: Progressing     Problem: Prexisting or High Potential for Compromised Skin Integrity  Goal: Skin integrity is maintained or improved  Description: INTERVENTIONS:  - Identify patients at risk for skin breakdown  - Assess and monitor skin integrity  - Assess and monitor nutrition and hydration status  - Monitor labs   - Assess for incontinence   - Turn and reposition patient  - Assist with mobility/ambulation  - Relieve pressure over bony prominences  - Avoid friction and shearing  - Provide appropriate hygiene as needed including keeping skin clean and dry  - Evaluate need for skin moisturizer/barrier cream  - Collaborate with interdisciplinary team   - Patient/family teaching  - Consider wound care consult   Outcome: Progressing     Problem: Nutrition/Hydration-ADULT  Goal: Nutrient/Hydration intake appropriate for improving, restoring or maintaining nutritional needs  Description: Monitor and assess patient's nutrition/hydration status for malnutrition. Collaborate with interdisciplinary team and initiate plan and interventions as ordered.  Monitor patient's weight and dietary intake as  ordered or per policy. Utilize nutrition screening tool and intervene as necessary. Determine patient's food preferences and provide high-protein, high-caloric foods as appropriate.     INTERVENTIONS:  - Monitor oral intake, urinary output, labs, and treatment plans  - Assess nutrition and hydration status and recommend course of action  - Evaluate amount of meals eaten  - Assist patient with eating if necessary   - Allow adequate time for meals  - Recommend/ encourage appropriate diets, oral nutritional supplements, and vitamin/mineral supplements  - Order, calculate, and assess calorie counts as needed  - Recommend, monitor, and adjust tube feedings and TPN/PPN based on assessed needs  - Assess need for intravenous fluids  - Provide specific nutrition/hydration education as appropriate  - Include patient/family/caregiver in decisions related to nutrition  Outcome: Progressing

## 2025-03-09 NOTE — ASSESSMENT & PLAN NOTE
Lab Results   Component Value Date    CREATININE 2.40 (H) 03/09/2025    CREATININE 2.33 (H) 03/08/2025    CREATININE 1.56 (H) 03/07/2025     Admission creatinine 1.5  Current creatinine 2.3  Suspect in setting of poor p.o. intake/hypovolemia and contrast use  Continue IVF and trend BMP, if worsens tomorrow we will consult nephrology

## 2025-03-09 NOTE — ASSESSMENT & PLAN NOTE
Lab Results   Component Value Date    EGFR 16 03/09/2025    EGFR 17 03/08/2025    EGFR 28 03/07/2025    CREATININE 2.40 (H) 03/09/2025    CREATININE 2.33 (H) 03/08/2025    CREATININE 1.56 (H) 03/07/2025   Baseline appears to be 1.3-1.4  Now with OMEGA suspected secondary to contrast use

## 2025-03-09 NOTE — ASSESSMENT & PLAN NOTE
Likely from being bed bound, patient with history of afib not on anticoagulation prior to admission  CT Scan reveals:   Findings consistent with right-sided pulmonary embolism at the lobar/segmental levels; overall clot burden is small. Measured RV/LV ratio is within normal limits at less than 0.9; there is no evidence for right heart strain.   On 2L supplemental O2, continue maintain SpO2 >92%  Continue heparin drip, will change to Eliquis once patient is more alert  Price check sent for Eliquis  Bilateral venous duplex with no DVT.  Right superficial thrombophlebitis.

## 2025-03-09 NOTE — PROGRESS NOTES
Progress Note - Hospitalist   Name: Denia Castañeda 89 y.o. female I MRN: [id number]  Unit/Bed#: E4 -01 I Date of Admission: 3/6/2025   Date of Service: 3/9/2025 I Hospital Day: 3    Assessment & Plan  Acute pulmonary embolism (HCC)  Likely from being bed bound, patient with history of afib not on anticoagulation prior to admission  CT Scan reveals:   Findings consistent with right-sided pulmonary embolism at the lobar/segmental levels; overall clot burden is small. Measured RV/LV ratio is within normal limits at less than 0.9; there is no evidence for right heart strain.   On 2L supplemental O2, continue maintain SpO2 >92%  Continue heparin drip, will change to Eliquis once patient is more alert  Price check sent for Eliquis  Bilateral venous duplex with no DVT.  Right superficial thrombophlebitis.  OMEGA (acute kidney injury) (Formerly [facility])  Lab Results   Component Value Date    CREATININE 2.40 (H) 03/09/2025    CREATININE 2.33 (H) 03/08/2025    CREATININE 1.56 (H) 03/07/2025     Admission creatinine 1.5  Current creatinine 2.3  Suspect in setting of poor p.o. intake/hypovolemia and contrast use  Continue IVF and trend BMP, if worsens tomorrow we will consult nephrology  Atrial fibrillation (HCC)  Continue diltiazem, metoprolol and amiodarone.  Not on anticoagulation prior to admission due to hx of dementia, risk for fall  Plan to transition to DOAC prior to discharge, family to discuss risk and benefits with PCP regarding maintaining patient on lower dose AC  Stroke-like symptom  Patient brought in due to change in mental status. Found to be lethargic/encephalopathic, less responsive, more contracted and drooling. Has history of facial droop  Stroke alert in ED  CTA/CT Head without signs of acute stroke  Patient appears to be back to baseline mentation  Patient was seen and evaluated by Neurology in ED  Symptoms likely due to TME/recrudescence   Stroke pathway not indicated at this time  Continue Neuro checks  Primary  hypertension  BP soft today  Continue POA metoprolol  Monitor  Hyperlipidemia  Continue statin  Severe dementia with agitation (HCC)  Nonverbal and bed bound at baseline  Was maintained in zyprexa, remeron in the past appears patient is no longer taking  Delirium precautions  Fall precautions  Stage 3b chronic kidney disease (HCC)  Lab Results   Component Value Date    EGFR 16 03/09/2025    EGFR 17 03/08/2025    EGFR 28 03/07/2025    CREATININE 2.40 (H) 03/09/2025    CREATININE 2.33 (H) 03/08/2025    CREATININE 1.56 (H) 03/07/2025   Baseline appears to be 1.3-1.4  Now with OMEGA suspected secondary to contrast use  Pericardial effusion  Noted on CT scan  Echo without any significant changes, redemonstrated small circumferential pericardial effusion without evidence of tamponade    VTE Pharmacologic Prophylaxis:   Moderate Risk (Score 3-4) - Pharmacological DVT Prophylaxis Ordered: heparin drip.    Mobility:   Basic Mobility Inpatient Raw Score: 6  JH-HLM Goal: 2: Bed activities/Dependent transfer  JH-HLM Achieved: 1: Laying in bed  JH-HLM Goal NOT achieved. Continue with multidisciplinary rounding and encourage appropriate mobility to improve upon JH-HLM goals.    Patient Centered Rounds: I performed bedside rounds with nursing staff today.   Discussions with Specialists or Other Care Team Provider: None    Education and Discussions with Family / Patient: Updated  (son) via phone.    Current Length of Stay: 3 day(s)  Current Patient Status: Inpatient   Certification Statement: The patient will continue to require additional inpatient hospital stay due to OMEGA  Discharge Plan: Anticipate discharge in >72 hrs to home.    Code Status: Level 1 - Full Code    Subjective   No acute events overnight.  Patient is still very lethargic difficult to arouse. Does not appear to be in any pain.    Objective :  Temp:  [97.9 °F (36.6 °C)-98.2 °F (36.8 °C)] 98.1 °F (36.7 °C)  HR:  [84-88] 88  BP: (107-152)/(51-74)  152/74  Resp:  [16-18] 16  SpO2:  [97 %-99 %] 97 %  O2 Device: Nasal cannula  Nasal Cannula O2 Flow Rate (L/min):  [2 L/min] 2 L/min    Body mass index is 22.57 kg/m².     Input and Output Summary (last 24 hours):   No intake or output data in the 24 hours ending 03/09/25 1240    Physical Exam  Vitals and nursing note reviewed.   Constitutional:       Appearance: Normal appearance.   HENT:      Head: Normocephalic and atraumatic.      Mouth/Throat:      Mouth: Mucous membranes are moist.      Pharynx: Oropharynx is clear. No oropharyngeal exudate.   Eyes:      Extraocular Movements: Extraocular movements intact.   Cardiovascular:      Rate and Rhythm: Normal rate and regular rhythm.      Pulses: Normal pulses.      Heart sounds: Normal heart sounds. No murmur heard.     No friction rub. No gallop.   Pulmonary:      Effort: Pulmonary effort is normal. No respiratory distress.      Breath sounds: Normal breath sounds. No stridor. No wheezing or rales.   Abdominal:      General: Abdomen is flat. Bowel sounds are normal. There is no distension.      Palpations: Abdomen is soft.      Tenderness: There is no abdominal tenderness.   Musculoskeletal:      Right lower leg: No edema.      Left lower leg: No edema.   Skin:     General: Skin is warm and dry.   Neurological:      General: No focal deficit present.      Mental Status: She is alert and oriented to person, place, and time.      Comments: Lethargic, however arousable to calling out her name very loudly           Lines/Drains:              Lab Results: I have reviewed the following results:   Results from last 7 days   Lab Units 03/09/25  0848   WBC Thousand/uL 4.89   HEMOGLOBIN g/dL 9.4*   HEMATOCRIT % 30.0*   PLATELETS Thousands/uL 216   SEGS PCT % 60   LYMPHO PCT % 28   MONO PCT % 9   EOS PCT % 3     Results from last 7 days   Lab Units 03/09/25  0848   SODIUM mmol/L 147   POTASSIUM mmol/L 3.6   CHLORIDE mmol/L 112*   CO2 mmol/L 23   BUN mg/dL 52*   CREATININE  mg/dL 2.40*   ANION GAP mmol/L 12   CALCIUM mg/dL 8.7   GLUCOSE RANDOM mg/dL 103     Results from last 7 days   Lab Units 03/07/25  0200   INR  1.01     Results from last 7 days   Lab Units 03/08/25  0748 03/06/25  0806   POC GLUCOSE mg/dl 71 124               Recent Cultures (last 7 days):         Imaging Results Review: No pertinent imaging studies reviewed.  Other Study Results Review: No additional pertinent studies reviewed.    Last 24 Hours Medication List:     Current Facility-Administered Medications:     acetaminophen (TYLENOL) tablet 650 mg, Q6H PRN    amiodarone tablet 200 mg, Daily    aspirin (ECOTRIN LOW STRENGTH) EC tablet 81 mg, Daily    atorvastatin (LIPITOR) tablet 40 mg, QPM    diltiazem (CARDIZEM) tablet 30 mg, TID    [Held by provider] docusate sodium (COLACE) capsule 100 mg, BID    DULoxetine (CYMBALTA) delayed release capsule 30 mg, HS    heparin (porcine) 25,000 units in 0.45% NaCl 250 mL infusion (premix), Titrated, Last Rate: 10 Units/kg/hr (03/08/25 1913)    heparin (porcine) injection 1,800 Units, Q6H PRN    heparin (porcine) injection 3,600 Units, Q6H PRN    metoprolol (LOPRESSOR) injection 2.5 mg, Q6H PRN    metoprolol tartrate (LOPRESSOR) tablet 25 mg, BID    terazosin (HYTRIN) capsule 2 mg, HS    Administrative Statements   Today, Patient Was Seen By: Chente Burrell PA-C      **Please Note: This note may have been constructed using a voice recognition system.**

## 2025-03-10 PROBLEM — E87.0 HYPERNATREMIA: Status: ACTIVE | Noted: 2025-03-10

## 2025-03-10 LAB
ANION GAP SERPL CALCULATED.3IONS-SCNC: 10 MMOL/L (ref 4–13)
APTT PPP: 71 SECONDS (ref 23–34)
BASOPHILS # BLD AUTO: 0.01 THOUSANDS/ÂΜL (ref 0–0.1)
BASOPHILS NFR BLD AUTO: 0 % (ref 0–1)
BUN SERPL-MCNC: 52 MG/DL (ref 5–25)
CALCIUM SERPL-MCNC: 8.3 MG/DL (ref 8.4–10.2)
CHLORIDE SERPL-SCNC: 115 MMOL/L (ref 96–108)
CO2 SERPL-SCNC: 23 MMOL/L (ref 21–32)
CREAT SERPL-MCNC: 2.11 MG/DL (ref 0.6–1.3)
EOSINOPHIL # BLD AUTO: 0.13 THOUSAND/ÂΜL (ref 0–0.61)
EOSINOPHIL NFR BLD AUTO: 4 % (ref 0–6)
ERYTHROCYTE [DISTWIDTH] IN BLOOD BY AUTOMATED COUNT: 16.2 % (ref 11.6–15.1)
GFR SERPL CREATININE-BSD FRML MDRD: 19 ML/MIN/1.73SQ M
GLUCOSE SERPL-MCNC: 83 MG/DL (ref 65–140)
HCT VFR BLD AUTO: 28.7 % (ref 34.8–46.1)
HGB BLD-MCNC: 8.8 G/DL (ref 11.5–15.4)
IMM GRANULOCYTES # BLD AUTO: 0.01 THOUSAND/UL (ref 0–0.2)
IMM GRANULOCYTES NFR BLD AUTO: 0 % (ref 0–2)
LYMPHOCYTES # BLD AUTO: 1.01 THOUSANDS/ÂΜL (ref 0.6–4.47)
LYMPHOCYTES NFR BLD AUTO: 29 % (ref 14–44)
MCH RBC QN AUTO: 28.9 PG (ref 26.8–34.3)
MCHC RBC AUTO-ENTMCNC: 30.7 G/DL (ref 31.4–37.4)
MCV RBC AUTO: 94 FL (ref 82–98)
MONOCYTES # BLD AUTO: 0.3 THOUSAND/ÂΜL (ref 0.17–1.22)
MONOCYTES NFR BLD AUTO: 9 % (ref 4–12)
NEUTROPHILS # BLD AUTO: 2.03 THOUSANDS/ÂΜL (ref 1.85–7.62)
NEUTS SEG NFR BLD AUTO: 58 % (ref 43–75)
NRBC BLD AUTO-RTO: 0 /100 WBCS
PLATELET # BLD AUTO: 212 THOUSANDS/UL (ref 149–390)
PMV BLD AUTO: 9.4 FL (ref 8.9–12.7)
POTASSIUM SERPL-SCNC: 3.6 MMOL/L (ref 3.5–5.3)
RBC # BLD AUTO: 3.04 MILLION/UL (ref 3.81–5.12)
SODIUM SERPL-SCNC: 148 MMOL/L (ref 135–147)
WBC # BLD AUTO: 3.49 THOUSAND/UL (ref 4.31–10.16)

## 2025-03-10 PROCEDURE — 85730 THROMBOPLASTIN TIME PARTIAL: CPT | Performed by: HOSPITALIST

## 2025-03-10 PROCEDURE — 85025 COMPLETE CBC W/AUTO DIFF WBC: CPT | Performed by: PHYSICIAN ASSISTANT

## 2025-03-10 PROCEDURE — 99232 SBSQ HOSP IP/OBS MODERATE 35: CPT | Performed by: PHYSICIAN ASSISTANT

## 2025-03-10 PROCEDURE — 80048 BASIC METABOLIC PNL TOTAL CA: CPT | Performed by: PHYSICIAN ASSISTANT

## 2025-03-10 RX ORDER — POTASSIUM CHLORIDE 14.9 MG/ML
20 INJECTION INTRAVENOUS ONCE
Status: COMPLETED | OUTPATIENT
Start: 2025-03-10 | End: 2025-03-10

## 2025-03-10 RX ORDER — DEXTROSE MONOHYDRATE AND SODIUM CHLORIDE 5; .45 G/100ML; G/100ML
100 INJECTION, SOLUTION INTRAVENOUS CONTINUOUS
Status: DISCONTINUED | OUTPATIENT
Start: 2025-03-10 | End: 2025-03-12

## 2025-03-10 RX ADMIN — DILTIAZEM HYDROCHLORIDE 30 MG: 30 TABLET, FILM COATED ORAL at 20:55

## 2025-03-10 RX ADMIN — DEXTROSE AND SODIUM CHLORIDE 100 ML/HR: 5; .45 INJECTION, SOLUTION INTRAVENOUS at 08:52

## 2025-03-10 RX ADMIN — POTASSIUM CHLORIDE 20 MEQ: 14.9 INJECTION, SOLUTION INTRAVENOUS at 10:59

## 2025-03-10 RX ADMIN — DULOXETINE HYDROCHLORIDE 30 MG: 30 CAPSULE, DELAYED RELEASE ORAL at 21:01

## 2025-03-10 RX ADMIN — DEXTROSE AND SODIUM CHLORIDE 100 ML/HR: 5; .45 INJECTION, SOLUTION INTRAVENOUS at 19:51

## 2025-03-10 RX ADMIN — TERAZOSIN HYDROCHLORIDE 2 MG: 1 CAPSULE ORAL at 21:01

## 2025-03-10 NOTE — PROGRESS NOTES
Progress Note - Hospitalist   Name: Denia Castañeda 89 y.o. female I MRN: [id number]  Unit/Bed#: E4 -01 I Date of Admission: 3/6/2025   Date of Service: 3/10/2025 I Hospital Day: 4    Assessment & Plan  Acute pulmonary embolism (HCC)  Likely from being bed bound, patient with history of afib not on anticoagulation prior to admission  CT Scan reveals:   Findings consistent with right-sided pulmonary embolism at the lobar/segmental levels; overall clot burden is small. Measured RV/LV ratio is within normal limits at less than 0.9; there is no evidence for right heart strain.   On 2L supplemental O2, continue maintain SpO2 >92%  Continue heparin drip, will change to Eliquis once patient is more alert  Price check sent for Eliquis - $0  Bilateral venous duplex with no DVT.  Right superficial thrombophlebitis.  Hypernatremia  Na+ 148, rising today  Switch fluids to D5 and half-normal saline  Renal function improving  Trend BMP  OMEGA (acute kidney injury) (HCC)  Lab Results   Component Value Date    CREATININE 2.11 (H) 03/10/2025    CREATININE 2.40 (H) 03/09/2025    CREATININE 2.33 (H) 03/08/2025     Admission creatinine 1.5  Current creatinine 2.3  Suspect in setting of poor p.o. intake/hypovolemia and contrast use  Improving, continue IVF  Atrial fibrillation (HCC)  Continue diltiazem, metoprolol and amiodarone.  Not on anticoagulation prior to admission due to hx of dementia, risk for fall  Plan to transition to DOAC prior to discharge, family to discuss risk and benefits with PCP regarding maintaining patient on lower dose AC  Stroke-like symptom  Patient brought in due to change in mental status. Found to be lethargic/encephalopathic, less responsive, more contracted and drooling. Has history of facial droop  Stroke alert in ED  CTA/CT Head without signs of acute stroke  Patient appears to be back to baseline mentation  Patient was seen and evaluated by Neurology in ED  Symptoms likely due to TME/recrudescence    Stroke pathway not indicated at this time  Continue Neuro checks  Primary hypertension  Initially with soft BP, now improved  Continue POA metoprolol  Monitor  Hyperlipidemia  Continue statin  Severe dementia with agitation (HCC)  Nonverbal and bed bound at baseline  Was maintained in zyprexa, remeron in the past appears patient is no longer taking  Delirium precautions  Fall precautions  Stage 3b chronic kidney disease (HCC)  Lab Results   Component Value Date    EGFR 19 03/10/2025    EGFR 16 03/09/2025    EGFR 17 03/08/2025    CREATININE 2.11 (H) 03/10/2025    CREATININE 2.40 (H) 03/09/2025    CREATININE 2.33 (H) 03/08/2025   Baseline appears to be 1.3-1.4  Now with OMEGA suspected secondary to contrast use  Pericardial effusion  Noted on CT scan  Echo without any significant changes, redemonstrated small circumferential pericardial effusion without evidence of tamponade    VTE Pharmacologic Prophylaxis:   Moderate Risk (Score 3-4) - Pharmacological DVT Prophylaxis Ordered: heparin drip.    Mobility:   Basic Mobility Inpatient Raw Score: 6  JH-HLM Goal: 2: Bed activities/Dependent transfer  JH-HLM Achieved: 1: Laying in bed  JH-HLM Goal NOT achieved. Continue with multidisciplinary rounding and encourage appropriate mobility to improve upon JH-HLM goals.    Patient Centered Rounds: I performed bedside rounds with nursing staff today.   Discussions with Specialists or Other Care Team Provider: None    Education and Discussions with Family / Patient: Updated  (son) via phone.    Current Length of Stay: 4 day(s)  Current Patient Status: Inpatient   Certification Statement: The patient will continue to require additional inpatient hospital stay due to OMEGA, hyponatremia requiring IVF  Discharge Plan: Anticipate discharge in 48-72 hrs to home.    Code Status: Level 1 - Full Code    Subjective   Patient remains significantly lethargic.  She does arouse to physical stimulus.    Objective :  Temp:  [97.8 °F  (36.6 °C)-98.6 °F (37 °C)] 97.8 °F (36.6 °C)  HR:  [70-78] 78  BP: (123-141)/(60-75) 126/60  Resp:  [16-18] 18  SpO2:  [99 %-100 %] 99 %  O2 Device: Nasal cannula  Nasal Cannula O2 Flow Rate (L/min):  [2 L/min] 2 L/min    Body mass index is 22.57 kg/m².     Input and Output Summary (last 24 hours):     Intake/Output Summary (Last 24 hours) at 3/10/2025 1015  Last data filed at 3/10/2025 0701  Gross per 24 hour   Intake 1003.6 ml   Output --   Net 1003.6 ml       Physical Exam  Vitals and nursing note reviewed.   Constitutional:       Appearance: Normal appearance.   HENT:      Head: Normocephalic and atraumatic.      Mouth/Throat:      Mouth: Mucous membranes are moist.      Pharynx: Oropharynx is clear. No oropharyngeal exudate.   Eyes:      Extraocular Movements: Extraocular movements intact.   Cardiovascular:      Rate and Rhythm: Normal rate and regular rhythm.      Pulses: Normal pulses.      Heart sounds: Normal heart sounds. No murmur heard.     No friction rub. No gallop.   Pulmonary:      Effort: Pulmonary effort is normal. No respiratory distress.      Breath sounds: Normal breath sounds. No stridor. No wheezing or rales.   Abdominal:      General: Abdomen is flat. Bowel sounds are normal. There is no distension.      Palpations: Abdomen is soft.      Tenderness: There is no abdominal tenderness.   Musculoskeletal:      Right lower leg: No edema.      Left lower leg: No edema.   Skin:     General: Skin is warm and dry.   Neurological:      General: No focal deficit present.      Mental Status: She is alert.      Comments: Lethargic, unable to obtain neuro eval however noted to move all of her extremities in her sleep today           Lines/Drains:              Lab Results: I have reviewed the following results:   Results from last 7 days   Lab Units 03/10/25  0424   WBC Thousand/uL 3.49*   HEMOGLOBIN g/dL 8.8*   HEMATOCRIT % 28.7*   PLATELETS Thousands/uL 212   SEGS PCT % 58   LYMPHO PCT % 29   MONO PCT % 9    EOS PCT % 4     Results from last 7 days   Lab Units 03/10/25  0424   SODIUM mmol/L 148*   POTASSIUM mmol/L 3.6   CHLORIDE mmol/L 115*   CO2 mmol/L 23   BUN mg/dL 52*   CREATININE mg/dL 2.11*   ANION GAP mmol/L 10   CALCIUM mg/dL 8.3*   GLUCOSE RANDOM mg/dL 83     Results from last 7 days   Lab Units 03/07/25  0200   INR  1.01     Results from last 7 days   Lab Units 03/08/25  0748 03/06/25  0806   POC GLUCOSE mg/dl 71 124               Recent Cultures (last 7 days):         Imaging Results Review: No pertinent imaging studies reviewed.  Other Study Results Review: No additional pertinent studies reviewed.    Last 24 Hours Medication List:     Current Facility-Administered Medications:     acetaminophen (TYLENOL) tablet 650 mg, Q6H PRN    amiodarone tablet 200 mg, Daily    aspirin (ECOTRIN LOW STRENGTH) EC tablet 81 mg, Daily    atorvastatin (LIPITOR) tablet 40 mg, QPM    dextrose 5 % and sodium chloride 0.45 % infusion, Continuous, Last Rate: 100 mL/hr (03/10/25 0852)    diltiazem (CARDIZEM) tablet 30 mg, TID    docusate sodium (COLACE) capsule 100 mg, BID    DULoxetine (CYMBALTA) delayed release capsule 30 mg, HS    heparin (porcine) 25,000 units in 0.45% NaCl 250 mL infusion (premix), Titrated, Last Rate: 10 Units/kg/hr (03/08/25 1913)    heparin (porcine) injection 1,800 Units, Q6H PRN    heparin (porcine) injection 3,600 Units, Q6H PRN    metoprolol (LOPRESSOR) injection 2.5 mg, Q6H PRN    metoprolol tartrate (LOPRESSOR) tablet 25 mg, BID    potassium chloride 20 mEq IVPB (premix), Once    terazosin (HYTRIN) capsule 2 mg, HS    Administrative Statements   Today, Patient Was Seen By: Chente Burrell PA-C      **Please Note: This note may have been constructed using a voice recognition system.**

## 2025-03-10 NOTE — PLAN OF CARE
Problem: PAIN - ADULT  Goal: Verbalizes/displays adequate comfort level or baseline comfort level  Description: Interventions:  - Encourage patient to monitor pain and request assistance  - Assess pain using appropriate pain scale  - Administer analgesics based on type and severity of pain and evaluate response  - Implement non-pharmacological measures as appropriate and evaluate response  - Consider cultural and social influences on pain and pain management  - Notify physician/advanced practitioner if interventions unsuccessful or patient reports new pain  Outcome: Progressing     Problem: INFECTION - ADULT  Goal: Absence or prevention of progression during hospitalization  Description: INTERVENTIONS:  - Assess and monitor for signs and symptoms of infection  - Monitor lab/diagnostic results  - Monitor all insertion sites, i.e. indwelling lines, tubes, and drains  - Monitor endotracheal if appropriate and nasal secretions for changes in amount and color  - [place] appropriate cooling/warming therapies per order  - Administer medications as ordered  - Instruct and encourage patient and family to use good hand hygiene technique  - Identify and instruct in appropriate isolation precautions for identified infection/condition  Outcome: Progressing  Goal: Absence of fever/infection during neutropenic period  Description: INTERVENTIONS:  - Monitor WBC    Outcome: Progressing     Problem: SAFETY ADULT  Goal: Patient will remain free of falls  Description: INTERVENTIONS:  - Educate patient/family on patient safety including physical limitations  - Instruct patient to call for assistance with activity   - Consult OT/PT to assist with strengthening/mobility   - Keep Call bell within reach  - Keep bed low and locked with side rails adjusted as appropriate  - Keep care items and personal belongings within reach  - Initiate and maintain comfort rounds  - Make Fall Risk Sign visible to staff  - Offer Toileting every 2 Hours,  in advance of need  - Initiate/Maintain bed alarm  - Obtain necessary fall risk management equipment  - Apply yellow socks and bracelet for high fall risk patients  - Consider moving patient to room near nurses station  Outcome: Progressing  Goal: Maintain or return to baseline ADL function  Description: INTERVENTIONS:  -  Assess patient's ability to carry out ADLs; assess patient's baseline for ADL function and identify physical deficits which impact ability to perform ADLs (bathing, care of mouth/teeth, toileting, grooming, dressing, etc.)  - Assess/evaluate cause of self-care deficits   - Assess range of motion  - Assess patient's mobility; develop plan if impaired  - Assess patient's need for assistive devices and provide as appropriate  - Encourage maximum independence but intervene and supervise when necessary  - Involve family in performance of ADLs  - Assess for home care needs following discharge   - Consider OT consult to assist with ADL evaluation and planning for discharge  - Provide patient education as appropriate  Outcome: Progressing  Goal: Maintains/Returns to pre admission functional level  Description: INTERVENTIONS:  - Perform AM-PAC 6 Click Basic Mobility/ Daily Activity assessment daily.  - Set and communicate daily mobility goal to care team and patient/family/caregiver.   - Collaborate with rehabilitation services on mobility goals if consulted  - Perform Range of Motion 3 times a day.  - Reposition patient every 2 hours.  - Dangle patient 3 times a day  - Stand patient 3 times a day  - Ambulate patient 3 times a day  - Out of bed to chair 3 times a day   - Out of bed for meals 3 times a day  - Out of bed for toileting  - Record patient progress and toleration of activity level   Outcome: Progressing     Problem: DISCHARGE PLANNING  Goal: Discharge to home or other facility with appropriate resources  Description: INTERVENTIONS:  - Identify barriers to discharge w/patient and caregiver  -  Arrange for needed discharge resources and transportation as appropriate  - Identify discharge learning needs (meds, wound care, etc.)  - Arrange for interpretive services to assist at discharge as needed  - Refer to Case Management Department for coordinating discharge planning if the patient needs post-hospital services based on physician/advanced practitioner order or complex needs related to functional status, cognitive ability, or social support system  Outcome: Progressing     Problem: Knowledge Deficit  Goal: Patient/family/caregiver demonstrates understanding of disease process, treatment plan, medications, and discharge instructions  Description: Complete learning assessment and assess knowledge base.  Interventions:  - Provide teaching at level of understanding  - Provide teaching via preferred learning methods  Outcome: Progressing     Problem: Prexisting or High Potential for Compromised Skin Integrity  Goal: Skin integrity is maintained or improved  Description: INTERVENTIONS:  - Identify patients at risk for skin breakdown  - Assess and monitor skin integrity  - Assess and monitor nutrition and hydration status  - Monitor labs   - Assess for incontinence   - Turn and reposition patient  - Assist with mobility/ambulation  - Relieve pressure over bony prominences  - Avoid friction and shearing  - Provide appropriate hygiene as needed including keeping skin clean and dry  - Evaluate need for skin moisturizer/barrier cream  - Collaborate with interdisciplinary team   - Patient/family teaching  - Consider wound care consult   Outcome: Progressing     Problem: Nutrition/Hydration-ADULT  Goal: Nutrient/Hydration intake appropriate for improving, restoring or maintaining nutritional needs  Description: Monitor and assess patient's nutrition/hydration status for malnutrition. Collaborate with interdisciplinary team and initiate plan and interventions as ordered.  Monitor patient's weight and dietary intake as  ordered or per policy. Utilize nutrition screening tool and intervene as necessary. Determine patient's food preferences and provide high-protein, high-caloric foods as appropriate.     INTERVENTIONS:  - Monitor oral intake, urinary output, labs, and treatment plans  - Assess nutrition and hydration status and recommend course of action  - Evaluate amount of meals eaten  - Assist patient with eating if necessary   - Allow adequate time for meals  - Recommend/ encourage appropriate diets, oral nutritional supplements, and vitamin/mineral supplements  - Order, calculate, and assess calorie counts as needed  - Recommend, monitor, and adjust tube feedings and TPN/PPN based on assessed needs  - Assess need for intravenous fluids  - Provide specific nutrition/hydration education as appropriate  - Include patient/family/caregiver in decisions related to nutrition  Outcome: Progressing

## 2025-03-10 NOTE — ASSESSMENT & PLAN NOTE
Na+ 148, rising today  Switch fluids to D5 and half-normal saline  Renal function improving  Trend BMP

## 2025-03-10 NOTE — ASSESSMENT & PLAN NOTE
Lab Results   Component Value Date    EGFR 19 03/10/2025    EGFR 16 03/09/2025    EGFR 17 03/08/2025    CREATININE 2.11 (H) 03/10/2025    CREATININE 2.40 (H) 03/09/2025    CREATININE 2.33 (H) 03/08/2025   Baseline appears to be 1.3-1.4  Now with OMEGA suspected secondary to contrast use

## 2025-03-10 NOTE — ASSESSMENT & PLAN NOTE
Lab Results   Component Value Date    CREATININE 2.11 (H) 03/10/2025    CREATININE 2.40 (H) 03/09/2025    CREATININE 2.33 (H) 03/08/2025     Admission creatinine 1.5  Current creatinine 2.3  Suspect in setting of poor p.o. intake/hypovolemia and contrast use  Improving, continue IVF

## 2025-03-10 NOTE — UTILIZATION REVIEW
NOTIFICATION OF INPATIENT ADMISSION   AUTHORIZATION REQUEST   SERVICING FACILITY:   [place] [address]  Tax ID: [id number]  NPI: [id number] ATTENDING PROVIDER:  Attending Name and NPI#: Billy Menchaca Do [[id number]]  Address: [address] [place] [address]  Phone: [phone]     ADMISSION INFORMATION:  Place of Service: Inpatient [facility]  Place of Service Code: 21  Inpatient Admission Date/Time: 3/6/25 10:19 AM  Discharge Date/Time: No discharge date for patient encounter.  Admitting Diagnosis Code/Description:  Altered mental status [R41.82]  Pulmonary embolism (HCC) [I26.99]  Closed fracture of right hip, sequela [S72.001S]  Stroke-like symptom [R29.90]  Encounter for monitoring denosumab therapy [Z51.81, Z79.899]  Closed fracture of right orbital floor, sequela (HCC) [S02.31XS]  Osteoporosis, unspecified osteoporosis type, unspecified pathological fracture presence [M81.0]     UTILIZATION REVIEW CONTACT:  Brittany Rodriguez, Utilization   Network Utilization Review Department  Phone: [phone]  Fax [phone]  Email: [email or web address]  Contact for approvals/pending authorizations, clinical reviews, and discharge.     PHYSICIAN ADVISORY SERVICES:  Medical Necessity Denial & [place]cay-xe-Tumv Review  Phone: [phone]  Fax: [phone]  Email: [email or web address]     DISCHARGE SUPPORT TEAM:  For Patients Discharge Needs & Updates  Phone: [phone] opt. 2 Fax: [phone]  Email: [email or web address]            Merlene Avila RN   Registered Nurse  Specialty: Utilization Review     Utilization Review     Addendum     Date of Service: 3/7/2025 11:17 AM     Expand All Collapse All    Initial Clinical Review     Admission: Date/Time/Statement:   Admission Orders (From admission, onward)          Ordered         03/06/25 1018   INPATIENT ADMISSION  Once                                      Orders Placed This Encounter   Procedures    INPATIENT ADMISSION       Standing Status:   Standing       Number of Occurrences:   1       Level of Care:   Med Surg [16]       Estimated length of stay:   More than 2 Midnights       Certification:   I certify that inpatient services are medically necessary for this patient for a duration of greater than two midnights. See H&P and MD Progress Notes for additional information about the patient's course of treatment.      ED Arrival Information         Expected   3/6/2025     Arrival   3/6/2025 08:02    Acuity   Emergent                 Means of arrival   Ambulance    Escorted by   [facility] ([facility])    Service   Hospitalist    Admission type   Emergency                 Arrival complaint   stroke like symtoms                     Chief Complaint   Patient presents with    CVA/TIA-like Symptoms       Pt arrives via ems from home, found unresponsive by caregiver, history of stroke and dementia         Initial Presentation: 89 y.o. female presents to the ED via EMS from home with c/o unresponsiveness, change in mental status.  Initially stroke alert called but canceled. PMH: chronic A fib, dementia, nonverbal, bedbound, h/o CVAs.  In the ED VS stable.  Treated with Heparin drip.  Labs - elevated BUN/Cr, BNP.  ECG - NSR w/ T wave changes, LBBB.  Imaging - old infarcts, R max sinus disease; PE in R pulm artery lobar/segmental levels RV:LV < 0.9, small to mod pericardial effusion. On exam normal breath sounds, no resp distress.  Admitted to INPATIENT status with Acute PE, A fib, stroke like symptoms - PLAN: Heparin drip x 48 hr w/ plan to transition to Eliquis, home Cardizem, Metoprolol, Amlodipine, BLE venous duplex pending.       Date: 3/7   Day 2:   Acute PE, A fib, stroke like symptoms - remains on Heparin drip today, VS stable, afebrile, Venous duplex BLE - Superficial thrombophlebitis noted in the great saphenous vein at the proximal thigh  to the proximal calf and is measured to be 1.5 cm away from the junctionP. Softer BP today. Elevated BUN/CR. On exam today no acute distress.       ED Treatment-Medication Administration from 03/06/2025 0802 to 03/06/2025 1445           Date/Time Order Dose Route Action       03/06/2025 0819 iohexol (OMNIPAQUE) 350 MG/ML injection (SINGLE-DOSE) 85 mL 85 mL Intravenous Given       03/06/2025 0853 iohexol (OMNIPAQUE) 350 MG/ML injection (SINGLE-DOSE) 70 mL 70 mL Intravenous Given       03/06/2025 1032 heparin (porcine) 25,000 units in 0.45% NaCl 250 mL infusion (premix) 18 Units/kg/hr Intravenous New Bag                Scheduled Medications:    Scheduled Medications ONLY (does not pull in infusions nor PRN medications order   amiodarone, 200 mg, Oral, Daily  aspirin, 81 mg, Oral, Daily  atorvastatin, 40 mg, Oral, QPM  diltiazem, 30 mg, Oral, TID  docusate sodium, 100 mg, Oral, BID  DULoxetine, 30 mg, Oral, HS  metoprolol tartrate, 25 mg, Oral, BID  terazosin, 2 mg, Oral, HS         Continuous IV Infusions:    Infusions Meds - Displays dose, route, & frequency only   heparin (porcine), 3-30 Units/kg/hr (Order-Specific), Intravenous, Titrated         PRN Meds:  acetaminophen, 650 mg, Oral, Q6H PRN  heparin (porcine), 1,800 Units, Intravenous, Q6H PRN  heparin (porcine), 3,600 Units, Intravenous, Q6H PRN                ED Triage Vitals   Temperature Pulse Respirations Blood Pressure SpO2 Pain Score   03/06/25 0811 03/06/25 0809 03/06/25 0809 03/06/25 0811 03/06/25 0809 --   99.5 °F (37.5 °C) 83 18 104/56 95 %        Weight (last 2 days)         Date/Time Weight     03/07/25 1345 49 (108)                Vital Signs (last 3 days)         Date/Time Temp Pulse Resp BP MAP (mmHg) SpO2 Calculated FIO2 (%) - Nasal Cannula Nasal Cannula O2 Flow Rate (L/min) O2 Device Patient Position - Orthostatic VS Monica Coma Scale Score     03/07/25 1505 97.7 °F (36.5 °C) 91 18 95/54 70 100 % 28 2 L/min Nasal cannula Lying --     03/07/25  1345 -- 88 -- 117/59 -- -- -- -- -- -- --     03/07/25 1117 97.6 °F (36.4 °C) 89 18 117/59 82 100 % 28 2 L/min Nasal cannula Lying --     03/07/25 0915 -- -- -- -- -- -- -- -- -- -- 8     03/07/25 0739 -- 83 16 101/44 56 96 % 28 2 L/min Nasal cannula Lying --     03/07/25 0340 -- -- -- 108/46 63 -- -- -- -- -- --     03/07/25 0334 98 °F (36.7 °C) 76 18 96/47 63 98 % 28 2 L/min Nasal cannula Lying --     03/07/25 0330 -- -- -- -- -- -- -- -- -- -- 9 03/07/25 0100 -- -- -- 91/50 55 -- -- -- -- Lying --     03/07/25 0057 -- -- -- 87/54 -- -- -- -- -- -- --     03/07/25 0025 97.1 °F (36.2 °C) 87 18 87/38 50 100 % -- -- None (Room air) Lying --     03/06/25 2330 -- -- -- -- -- -- -- -- -- -- 9 03/06/25 1930 -- -- -- -- -- -- -- -- None (Room air) -- 9 03/06/25 1900 97.4 °F (36.3 °C) 77 18 144/93 108 98 % -- -- Nasal cannula Lying --     03/06/25 1522 -- 68 20 105/52 75 99 % 28 2 L/min Nasal cannula Lying --     03/06/25 1500 -- -- -- -- -- -- -- -- -- -- 11     03/06/25 1400 -- 72 20 184/97 135 99 % 28 2 L/min Nasal cannula Lying --     03/06/25 1200 -- 60 14 140/63 85 100 % -- -- None (Room air) Lying --     03/06/25 1130 -- 62 15 129/62 92 100 % 28 2 L/min Nasal cannula Lying --     03/06/25 1100 -- 64 14 140/72 108 99 % 28 2 L/min Nasal cannula Lying --     03/06/25 1030 -- 68 17 116/61 83 93 % 28 2 L/min Nasal cannula Lying --     03/06/25 1000 -- 66 18 129/70 93 96 % 28 2 L/min Nasal cannula Lying --     03/06/25 0930 -- 70 18 140/62 95 98 % 28 2 L/min Nasal cannula Lying --     03/06/25 0900 -- -- -- -- -- -- -- -- -- -- 11 03/06/25 0845 -- -- -- -- -- -- -- -- -- -- 11 03/06/25 0835 -- 77 20 161/71 -- 96 % -- -- None (Room air) Lying --     03/06/25 0830 -- -- -- -- -- -- -- -- -- -- 11 03/06/25 0815 -- -- -- -- -- -- -- -- -- -- 11 03/06/25 0811 99.5 °F (37.5 °C) -- -- 104/56 -- -- -- -- -- Lying --     03/06/25 0809 -- 83 18 -- -- 95 % -- -- None (Room air) -- --                    Pertinent Labs/Diagnostic Test Results:   Radiology:  X-ray chest 1 view portable   ED Interpretation by Jeanette Guo DO (03/06 0935)   No acute disease.  Scoliosis noted     Image independently interpreted by myself        Final Interpretation by Glen Neff MD (03/06 8951)       No focal consolidation, pleural effusion, or pneumothorax.               Workstation performed: [id number]           CTA chest pe study   Final Interpretation by Huseyin Quevedo MD (03/06 0926)       Findings consistent with right-sided pulmonary embolism at the lobar/segmental levels; overall clot burden is small. Measured RV/LV ratio is within normal limits at less than 0.9; there is no evidence for right heart strain.       Small to moderate pericardial effusion. The study was marked in EPIC for immediate notification.                       Workstation performed: [id number]           CTA stroke alert (head/neck)   Final Interpretation by Brenda Loyola MD (03/06 0844)       1.  Incidentally noted pulmonary emboli within the interlobar and segmental branches of the right pulmonary artery.   2.  No intracranial large vessel occlusion or critical stenosis. No aneurysm.   3.  No significant stenosis in the cervical carotid or vertebral arteries.   4.  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 Moderate stenosis at the origin.   5.  Coronary artery atherosclerotic disease.                               Findings were directly discussed with Trae Thibodeaux at 8:34 a.m. Findings were also discussed with provider in the emergency department JEANETTE GUO on 3/6/2025 8:30 AM.       Workstation performed: [id number]           CT stroke alert brain   Final Interpretation by Brenda Loyola MD (03/06 0856)       1.  Small age-indeterminate, likely subacute to chronic right cerebellar infarct. Additional old bilateral cerebellar lacunar infarcts.   2.  Stable old right occipital lobe  infarct. Stable old lacunar infarcts in the left anterior insula/external capsule.   3.  Chronic microangiopathic change.   4.  Right maxillary sinus disease with frothy secretions suggesting acute sinusitis.                   Findings were directly discussed with Trae Thibodeaux at approximately 8:34 a.m       Workstation performed: [id number]            VAS VENOUS DUPLEX - LOWER LIMB BILATERAL       Impression:  RIGHT LOWER LIMB:  No evidence of acute or chronic deep vein thrombosis.  No evidence of superficial thrombophlebitis noted.  Doppler evaluation shows a normal response to augmentation maneuvers..  Popliteal, posterior tibial and anterior tibial arterial Doppler waveforms are  triphasic.     LEFT LOWER LIMB:  No evidence of acute or chronic deep vein thrombosis.  Superficial thrombophlebitis noted in the great saphenous vein at the proximal  thigh to the proximal calf and is measured to be 1.5 cm away from the junction.  Doppler evaluation shows a normal response to augmentation maneuvers.  Popliteal, posterior tibial and anterior tibial arterial Doppler waveforms are  biphasic.     Technically difficult/limited study. Some segments may be poorly visualized on  today's exam.      Cardiology:  ECG 12 lead   Final Result by Troy Sanchez DO (03/06 3314)   Age and gender specific ECG analysis    Normal sinus rhythm   Left bundle branch block   T wave abnormality, consider inferolateral ischemia   Abnormal ECG   When compared with ECG of 06-Mar-2025 10:31,   No significant change was found   Confirmed by Troy Sanchez ([id number]) on 3/6/2025 5:52:03 PM       ECG 12 lead   Final Result by Parminder Munoz MD (03/06 7185)   Age and gender specific ECG analysis    Normal sinus rhythm   Septal infarct (cited on or before 06-Mar-2025)   ST & T wave abnormality, consider inferior ischemia   Abnormal ECG   When compared with ECG of 06-Mar-2025 08:38, (unconfirmed)   No significant change was found   Confirmed by Tammy  Parminder ([id number]) on 3/6/2025 12:35:15 PM       ECG 12 lead   Final Result by Parminder Munoz MD (03/06 1232)   Age and gender specific ECG analysis    Normal sinus rhythm   Non-specific intra-ventricular conduction block   T wave abnormality, consider inferior ischemia   Abnormal ECG   When compared with ECG of 30-Jun-2024 04:13,   Inverted T waves have replaced nonspecific T wave abnormality in Inferior    leads   T wave inversion less evident in Lateral leads   Confirmed by Parminder Munoz ([id number]) on 3/6/2025 12:32:37 PM          GI:  No orders to display              Results from last 7 days   Lab Units 03/06/25  0845   SARS-COV-2   Negative            Results from last 7 days   Lab Units 03/07/25  0517 03/06/25  0813   WBC Thousand/uL 7.15 6.44   HEMOGLOBIN g/dL 9.5* 10.8*   HEMATOCRIT % 30.3* 33.7*   PLATELETS Thousands/uL 211 242   TOTAL NEUT ABS Thousands/µL 4.61  --                  Results from last 7 days   Lab Units 03/07/25  0233 03/07/25  0233 03/06/25  0813   SODIUM mmol/L  --  143 144   POTASSIUM mmol/L  --  4.3 4.2   CHLORIDE mmol/L  --  108 108   CO2 mmol/L  --  24 27   ANION GAP mmol/L  --  11 9   BUN mg/dL  --  33* 27*   CREATININE mg/dL  --  1.56* 1.52*   EGFR ml/min/1.73sq m  --  28 29   CALCIUM mg/dL  --  8.7 9.3   MAGNESIUM mg/dL 2.0  --   --                Results from last 7 days   Lab Units 03/06/25  0806   POC GLUCOSE mg/dl 124            Results from last 7 days   Lab Units 03/07/25  0233 03/06/25  0813   GLUCOSE RANDOM mg/dL 72 120                  Results from last 7 days   Lab Units 03/06/25  1248 03/06/25  1031 03/06/25  0813   HS TNI 0HR ng/L  --   --  19   HS TNI 2HR ng/L  --  22  --    HSTNI D2 ng/L  --  3  --    HS TNI 4HR ng/L 26  --   --    HSTNI D4 ng/L 7  --   --                   Results from last 7 days   Lab Units 03/07/25  1002 03/07/25  0200 03/06/25  1031 03/06/25  0813   PROTIME seconds  --  13.5  --  13.4   INR    --  1.01  --  1.00   PTT seconds 197* 25 27 64                  Results from last 7 days   Lab Units 03/06/25  0813   BNP pg/mL 182*            Results from last 7 days   Lab Units 03/06/25  0845   INFLUENZA A PCR   Negative   INFLUENZA B PCR   Negative   RSV PCR   Negative      Medical History        Past Medical History:   Diagnosis Date    Anxiety      Closed fracture of right orbital floor (HCC) 08/23/2020    Cognitive impairment      COVID-19 virus infection 04/13/2020    Dementia (HCC)      Depression      Hallucination      Hyperlipidemia      Hypertension      Memory loss      Psychiatric illness      Psychosis (HCC)      Retention of urine      Schizoaffective disorder (HCC)      Sleep difficulties      Urinary tract infection           Present on Admission:   Stroke-like symptom   Acute pulmonary embolism (HCC)   Atrial fibrillation (HCC)   Primary hypertension   Hyperlipidemia   Severe dementia with agitation (HCC)   Stage 3b chronic kidney disease (HCC)   Pericardial effusion        Admitting Diagnosis: Altered mental status [R41.82]  Pulmonary embolism (HCC) [I26.99]  Closed fracture of right hip, sequela [S72.001S]  Stroke-like symptom [R29.90]  Encounter for monitoring denosumab therapy [Z51.81, Z79.899]  Closed fracture of right orbital floor, sequela (HCC) [S02.31XS]  Osteoporosis, unspecified osteoporosis type, unspecified pathological fracture presence [M81.0]  Age/Sex: 89 y.o. female     Network Utilization Review Department  ATTENTION: Please call with any questions or concerns to [phone] and carefully listen to the prompts so that you are directed to the right person. All voicemails are confidential.   For Discharge needs, contact Care Management DC Support Team at [phone] opt. 2  Send all requests for admission clinical reviews, approved or denied determinations and any other requests to dedicated fax number below belonging to the campus where the patient is receiving treatment. List of dedicated fax numbers for the Facilities:  FACILITY NAME  UR FAX NUMBER   ADMISSION DENIALS (Administrative/Medical Necessity) [phone]   DISCHARGE SUPPORT TEAM (NETWORK) [phone]   PARENT CHILD HEALTH (Maternity/NICU/Pediatrics) [phone]   [facility] [phone]   [facility] [phone]   [facility] [phone]   [facility] [phone]   [facility] [phone]   [facility] [phone]   [facility] [phone]   [facility] [phone]   [facility] [phone]   [facility] [phone]   [facility] [phone]   [facility] [phone]         BNP        Revision History

## 2025-03-10 NOTE — ASSESSMENT & PLAN NOTE
Likely from being bed bound, patient with history of afib not on anticoagulation prior to admission  CT Scan reveals:   Findings consistent with right-sided pulmonary embolism at the lobar/segmental levels; overall clot burden is small. Measured RV/LV ratio is within normal limits at less than 0.9; there is no evidence for right heart strain.   On 2L supplemental O2, continue maintain SpO2 >92%  Continue heparin drip, will change to Eliquis once patient is more alert  Price check sent for Eliquis - $0  Bilateral venous duplex with no DVT.  Right superficial thrombophlebitis.

## 2025-03-11 LAB
ALBUMIN SERPL BCG-MCNC: 2.9 G/DL (ref 3.5–5)
ALP SERPL-CCNC: 65 U/L (ref 34–104)
ALT SERPL W P-5'-P-CCNC: 8 U/L (ref 7–52)
ANION GAP SERPL CALCULATED.3IONS-SCNC: 6 MMOL/L (ref 4–13)
APTT PPP: 62 SECONDS (ref 23–34)
AST SERPL W P-5'-P-CCNC: 18 U/L (ref 13–39)
BASOPHILS # BLD AUTO: 0.01 THOUSANDS/ÂΜL (ref 0–0.1)
BASOPHILS NFR BLD AUTO: 0 % (ref 0–1)
BILIRUB DIRECT SERPL-MCNC: 0.1 MG/DL (ref 0–0.2)
BILIRUB SERPL-MCNC: 0.4 MG/DL (ref 0.2–1)
BUN SERPL-MCNC: 39 MG/DL (ref 5–25)
CALCIUM SERPL-MCNC: 7.4 MG/DL (ref 8.4–10.2)
CHLORIDE SERPL-SCNC: 115 MMOL/L (ref 96–108)
CO2 SERPL-SCNC: 23 MMOL/L (ref 21–32)
CREAT SERPL-MCNC: 1.55 MG/DL (ref 0.6–1.3)
EOSINOPHIL # BLD AUTO: 0.17 THOUSAND/ÂΜL (ref 0–0.61)
EOSINOPHIL NFR BLD AUTO: 5 % (ref 0–6)
ERYTHROCYTE [DISTWIDTH] IN BLOOD BY AUTOMATED COUNT: 15.9 % (ref 11.6–15.1)
FERRITIN SERPL-MCNC: 29 NG/ML (ref 11–307)
GFR SERPL CREATININE-BSD FRML MDRD: 28 ML/MIN/1.73SQ M
GLUCOSE SERPL-MCNC: 137 MG/DL (ref 65–140)
HCT VFR BLD AUTO: 26.3 % (ref 34.8–46.1)
HGB BLD-MCNC: 8.1 G/DL (ref 11.5–15.4)
IMM GRANULOCYTES # BLD AUTO: 0.01 THOUSAND/UL (ref 0–0.2)
IMM GRANULOCYTES NFR BLD AUTO: 0 % (ref 0–2)
IRON SATN MFR SERPL: 19 % (ref 15–50)
IRON SERPL-MCNC: 44 UG/DL (ref 50–212)
LYMPHOCYTES # BLD AUTO: 0.87 THOUSANDS/ÂΜL (ref 0.6–4.47)
LYMPHOCYTES NFR BLD AUTO: 26 % (ref 14–44)
MCH RBC QN AUTO: 29 PG (ref 26.8–34.3)
MCHC RBC AUTO-ENTMCNC: 30.8 G/DL (ref 31.4–37.4)
MCV RBC AUTO: 94 FL (ref 82–98)
MONOCYTES # BLD AUTO: 0.26 THOUSAND/ÂΜL (ref 0.17–1.22)
MONOCYTES NFR BLD AUTO: 8 % (ref 4–12)
NEUTROPHILS # BLD AUTO: 2.05 THOUSANDS/ÂΜL (ref 1.85–7.62)
NEUTS SEG NFR BLD AUTO: 61 % (ref 43–75)
NRBC BLD AUTO-RTO: 0 /100 WBCS
PLATELET # BLD AUTO: 181 THOUSANDS/UL (ref 149–390)
PMV BLD AUTO: 9.9 FL (ref 8.9–12.7)
POTASSIUM SERPL-SCNC: 3.6 MMOL/L (ref 3.5–5.3)
PROT SERPL-MCNC: 4.9 G/DL (ref 6.4–8.4)
RBC # BLD AUTO: 2.79 MILLION/UL (ref 3.81–5.12)
SODIUM SERPL-SCNC: 144 MMOL/L (ref 135–147)
TIBC SERPL-MCNC: 233.8 UG/DL (ref 250–450)
TRANSFERRIN SERPL-MCNC: 167 MG/DL (ref 203–362)
UIBC SERPL-MCNC: 190 UG/DL (ref 155–355)
WBC # BLD AUTO: 3.37 THOUSAND/UL (ref 4.31–10.16)

## 2025-03-11 PROCEDURE — 80076 HEPATIC FUNCTION PANEL: CPT | Performed by: PHYSICIAN ASSISTANT

## 2025-03-11 PROCEDURE — 85730 THROMBOPLASTIN TIME PARTIAL: CPT | Performed by: INTERNAL MEDICINE

## 2025-03-11 PROCEDURE — 82728 ASSAY OF FERRITIN: CPT | Performed by: PHYSICIAN ASSISTANT

## 2025-03-11 PROCEDURE — 83550 IRON BINDING TEST: CPT | Performed by: PHYSICIAN ASSISTANT

## 2025-03-11 PROCEDURE — 83540 ASSAY OF IRON: CPT | Performed by: PHYSICIAN ASSISTANT

## 2025-03-11 PROCEDURE — 99232 SBSQ HOSP IP/OBS MODERATE 35: CPT | Performed by: PHYSICIAN ASSISTANT

## 2025-03-11 PROCEDURE — 85025 COMPLETE CBC W/AUTO DIFF WBC: CPT | Performed by: PHYSICIAN ASSISTANT

## 2025-03-11 PROCEDURE — 80048 BASIC METABOLIC PNL TOTAL CA: CPT | Performed by: PHYSICIAN ASSISTANT

## 2025-03-11 RX ADMIN — APIXABAN 10 MG: 5 TABLET, FILM COATED ORAL at 09:09

## 2025-03-11 RX ADMIN — DEXTROSE AND SODIUM CHLORIDE 100 ML/HR: 5; .45 INJECTION, SOLUTION INTRAVENOUS at 16:48

## 2025-03-11 RX ADMIN — TERAZOSIN HYDROCHLORIDE 2 MG: 1 CAPSULE ORAL at 22:03

## 2025-03-11 RX ADMIN — ASPIRIN 81 MG: 81 TABLET, COATED ORAL at 08:08

## 2025-03-11 RX ADMIN — APIXABAN 10 MG: 5 TABLET, FILM COATED ORAL at 17:02

## 2025-03-11 RX ADMIN — METOPROLOL TARTRATE 25 MG: 25 TABLET, FILM COATED ORAL at 17:02

## 2025-03-11 RX ADMIN — DILTIAZEM HYDROCHLORIDE 30 MG: 30 TABLET, FILM COATED ORAL at 08:08

## 2025-03-11 RX ADMIN — METOPROLOL TARTRATE 25 MG: 25 TABLET, FILM COATED ORAL at 08:08

## 2025-03-11 RX ADMIN — DEXTROSE AND SODIUM CHLORIDE 100 ML/HR: 5; .45 INJECTION, SOLUTION INTRAVENOUS at 06:12

## 2025-03-11 RX ADMIN — DULOXETINE HYDROCHLORIDE 30 MG: 30 CAPSULE, DELAYED RELEASE ORAL at 22:03

## 2025-03-11 RX ADMIN — DILTIAZEM HYDROCHLORIDE 30 MG: 30 TABLET, FILM COATED ORAL at 16:50

## 2025-03-11 RX ADMIN — DILTIAZEM HYDROCHLORIDE 30 MG: 30 TABLET, FILM COATED ORAL at 22:03

## 2025-03-11 RX ADMIN — AMIODARONE HYDROCHLORIDE 200 MG: 200 TABLET ORAL at 08:08

## 2025-03-11 RX ADMIN — HEPARIN SODIUM 10 UNITS/KG/HR: 10000 INJECTION, SOLUTION INTRAVENOUS at 00:55

## 2025-03-11 RX ADMIN — DOCUSATE SODIUM 100 MG: 100 CAPSULE, LIQUID FILLED ORAL at 17:02

## 2025-03-11 RX ADMIN — DOCUSATE SODIUM 100 MG: 100 CAPSULE, LIQUID FILLED ORAL at 08:08

## 2025-03-11 RX ADMIN — ATORVASTATIN CALCIUM 40 MG: 40 TABLET, FILM COATED ORAL at 17:02

## 2025-03-11 NOTE — UTILIZATION REVIEW
Continued Stay Review    Date: 3/11                          Current Patient Class: Inpatient  Current Level of Care: MS    HPI:89 y.o. female initially admitted on 3/6   Current Diagnosis: Acute PE, hypernatremia, OMEGA, A fib, stroke like symptoms- back to baseline, Right superficial thrombophlebitis.      Assessment/Plan:   Pt is off Heparin drip since this AM.  Is on IV fluids @ 100 cc/hr.  Started on Eliquis. Renal function improved.  Creat 1.33.  on exam pt more alert today. Is responding in Georgian to some questions. Normal heart and lung sounds.  Remains on 2L NC oxygen.      Medications:   Scheduled Medications:  amiodarone, 200 mg, Oral, Daily  apixaban, 10 mg, Oral, BID  aspirin, 81 mg, Oral, Daily  atorvastatin, 40 mg, Oral, QPM  diltiazem, 30 mg, Oral, TID  docusate sodium, 100 mg, Oral, BID  DULoxetine, 30 mg, Oral, HS  metoprolol tartrate, 25 mg, Oral, BID  terazosin, 2 mg, Oral, HS      Continuous IV Infusions:  dextrose 5 % and sodium chloride 0.45 %, 100 mL/hr, Intravenous, Continuous      PRN Meds:  acetaminophen, 650 mg, Oral, Q6H PRN  metoprolol, 2.5 mg, Intravenous, Q6H PRN      Discharge Plan: TBD    Vital Signs (last 3 days)       Date/Time Temp Pulse Resp BP MAP (mmHg) SpO2 Calculated FIO2 (%) - Nasal Cannula Nasal Cannula O2 Flow Rate (L/min) O2 Device Patient Position - Orthostatic VS [place] Coma Scale Score Pain    03/11/25 0759 -- -- -- -- -- -- -- -- -- -- 11 --    03/11/25 0745 97.8 °F (36.6 °C) 59 20 120/59 85 100 % 28 2 L/min Nasal cannula Lying -- --    03/10/25 2307 98.2 °F (36.8 °C) 61 20 123/57 -- 100 % -- -- Nasal cannula Lying -- --    03/10/25 2059 -- -- -- -- -- -- -- -- -- -- 11 --    03/10/25 2055 -- 68 -- 139/62 -- 100 % -- -- -- Lying -- --    03/10/25 1620 97.4 °F (36.3 °C) 76 20 147/58 -- 96 % -- -- Nasal cannula Lying -- --    03/10/25 0921 -- -- -- -- -- -- -- -- -- -- -- No Pain    03/10/25 0729 97.8 °F (36.6 °C) 78 18 126/60 87 99 % 28 2 L/min Nasal cannula Lying --  --    03/10/25 0023 97.9 °F (36.6 °C) 78 18 141/67 96 100 % 28 2 L/min Nasal cannula Lying -- --    03/09/25 1958 -- -- -- -- -- -- -- -- -- -- 8 --    03/09/25 1808 98.6 °F (37 °C) 70 16 123/75 95 100 % 28 2 L/min Nasal cannula Lying -- --    03/09/25 0956 -- -- -- -- -- -- -- -- -- -- -- No Pain    03/09/25 0852 98.1 °F (36.7 °C) 88 16 152/74 106 97 % 28 2 L/min Nasal cannula Lying -- --    03/08/25 2346 98.2 °F (36.8 °C) 84 18 129/60 87 99 % 28 2 L/min Nasal cannula Lying -- --    03/08/25 1930 -- -- -- -- -- -- 28 2 L/min Nasal cannula -- 9 --    03/08/25 1900 97.9 °F (36.6 °C) 87 18 107/51 -- 99 % 28 2 L/min Nasal cannula Lying -- --    03/08/25 1520 98 °F (36.7 °C) 85 16 138/59 85 97 % 28 2 L/min Nasal cannula Lying -- --    03/08/25 0830 -- -- -- -- -- -- -- -- -- -- 9 No Pain    03/08/25 0723 98.1 °F (36.7 °C) 78 18 136/57 82 100 % 28 2 L/min Nasal cannula Lying -- --    03/08/25 0330 -- -- -- -- -- -- -- -- -- -- 11 --    03/08/25 0303 98.4 °F (36.9 °C) 84 18 128/61 88 100 % 28 2 L/min Nasal cannula Lying -- --          Weight (last 2 days)       None            Pertinent Labs/Diagnostic Results:   Radiology:   VAS VENOUS DUPLEX - LOWER LIMB BILATERAL   Final Interpretation by Candelario Shin DO (03/07 1424)      X-ray chest 1 view portable   ED Interpretation by Jeanette Guo DO (03/06 0935)   No acute disease.  Scoliosis noted    Image independently interpreted by myself       Final Interpretation by Glen Neff MD (03/06 4763)      No focal consolidation, pleural effusion, or pneumothorax.            Workstation performed: [id number]         CTA chest pe study   Final Interpretation by Huseyin Quevedo MD (03/06 0926)      Findings consistent with right-sided pulmonary embolism at the lobar/segmental levels; overall clot burden is small. Measured RV/LV ratio is within normal limits at less than 0.9; there is no evidence for right heart strain.      Small to moderate pericardial effusion. The study  was marked in EPIC for immediate notification.                  Workstation performed: [id number]         CTA stroke alert (head/neck)   Final Interpretation by Brenda Loyola MD (03/06 0844)      1.  Incidentally noted pulmonary emboli within the interlobar and segmental branches of the right pulmonary artery.   2.  No intracranial large vessel occlusion or critical stenosis. No aneurysm.   3.  No significant stenosis in the cervical carotid or vertebral arteries.   4.  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 Moderate stenosis at the origin.   5.  Coronary artery atherosclerotic disease.                        Findings were directly discussed with Trae Thibodeaux at 8:34 a.m. Findings were also discussed with provider in the emergency department RACHEL RAMOS on 3/6/2025 8:30 AM.      Workstation performed: [id number]         CT stroke alert brain   Final Interpretation by Brenda Loyola MD (03/06 0843)      1.  Small age-indeterminate, likely subacute to chronic right cerebellar infarct. Additional old bilateral cerebellar lacunar infarcts.   2.  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 Stable old lacunar infarcts in the left anterior insula/external capsule.   3.  Chronic microangiopathic change.   4.  Right maxillary sinus disease with frothy secretions suggesting acute sinusitis.               Findings were directly discussed with Trae Thibodeaux at approximately 8:34 a.m      Workstation performed: [id number]           Cardiology:  Echo complete w/ contrast if indicated   Final Result by Osito Maravilla MD (03/07 8178)   Technically difficult limited transthoracic echocardiogram study.     Endocardial and other structure visualization was suboptimal and poor.      Mild to moderate concentric left ventricular hypertrophy, normal left    ventricular systolic function.  Indeterminate diastolic function.   Right ventricle was not well-visualized.  Right  ventricular size and    function appear to be normal.  No evidence of right ventricular pressure    or volume overload.   Normal left atrial cavity size.  Right atrium was not well-visualized.   Aortic valve was not well-visualized.  Aortic valve sclerosis and trace to    mild aortic valve regurgitation by visual assessment.   Mitral valve annular calcification.  Mitral valve leaflet sclerosis, trace    mitral valve regurgitation.   Tricuspid and pulmonic valve regurgitation.   No obvious pulmonary hypertension.   Small circumferential pericardial effusion with prominent collection in    the apical and lateral aspect of left ventricle and behind the right    atrium.  There is no echocardiographic evidence of cardiac tamponade.      Compared to report of previous echocardiogram from 6/13/2024 there is    overall no significant change.  Pericardial effusion was noted previously    also.         ECG 12 lead   Final Result by Troy Sanchez DO (03/06 9740)   Age and gender specific ECG analysis    Normal sinus rhythm   Left bundle branch block   T wave abnormality, consider inferolateral ischemia   Abnormal ECG   When compared with ECG of 06-Mar-2025 10:31,   No significant change was found   Confirmed by Troy Sanchez ([id number]) on 3/6/2025 5:52:03 PM      ECG 12 lead   Final Result by Parminder Munoz MD (03/06 9209)   Age and gender specific ECG analysis    Normal sinus rhythm   Septal infarct (cited on or before 06-Mar-2025)   ST & T wave abnormality, consider inferior ischemia   Abnormal ECG   When compared with ECG of 06-Mar-2025 08:38, (unconfirmed)   No significant change was found   Confirmed by Parminder Munoz ([id number]) on 3/6/2025 12:35:15 PM      ECG 12 lead   Final Result by Parminder Munoz MD (03/06 6653)   Age and gender specific ECG analysis    Normal sinus rhythm   Non-specific intra-ventricular conduction block   T wave abnormality, consider inferior ischemia   Abnormal ECG   When compared with ECG of  30-Jun-2024 04:13,   Inverted T waves have replaced nonspecific T wave abnormality in Inferior    leads   T wave inversion less evident in Lateral leads   Confirmed by Parminder Munoz ([id number]) on 3/6/2025 12:32:37 PM        GI:  No orders to display       Results from last 7 days   Lab Units 03/06/25  0845   SARS-COV-2  Negative     Results from last 7 days   Lab Units 03/11/25  0422 03/10/25  0424 03/09/25  0848 03/08/25  0325 03/07/25  0517   WBC Thousand/uL 3.37* 3.49* 4.89 5.27 7.15   HEMOGLOBIN g/dL 8.1* 8.8* 9.4* 9.3* 9.5*   HEMATOCRIT % 26.3* 28.7* 30.0* 29.8* 30.3*   PLATELETS Thousands/uL 181 212 216 237 211   TOTAL NEUT ABS Thousands/µL 2.05 2.03 2.92  --  4.61         Results from last 7 days   Lab Units 03/11/25  0422 03/10/25  0424 03/09/25  0848 03/08/25 0325 03/07/25  0233 03/07/25  0233   SODIUM mmol/L 144 148* 147 144  --  143   POTASSIUM mmol/L 3.6 3.6 3.6 4.0  --  4.3   CHLORIDE mmol/L 115* 115* 112* 108  --  108   CO2 mmol/L 23 23 23 20*  --  24   ANION GAP mmol/L 6 10 12 16*  --  11   BUN mg/dL 39* 52* 52* 48*  --  33*   CREATININE mg/dL 1.55* 2.11* 2.40* 2.33*  --  1.56*   EGFR ml/min/1.73sq m 28 19 16 17  --  28   CALCIUM mg/dL 7.4* 8.3* 8.7 8.4  --  8.7   MAGNESIUM mg/dL  --   --   --   --  2.0  --      Results from last 7 days   Lab Units 03/11/25 0422   AST U/L 18   ALT U/L 8   ALK PHOS U/L 65   TOTAL PROTEIN g/dL 4.9*   ALBUMIN g/dL 2.9*   TOTAL BILIRUBIN mg/dL 0.40   BILIRUBIN DIRECT mg/dL 0.10     Results from last 7 days   Lab Units 03/08/25  0748 03/06/25  0806   POC GLUCOSE mg/dl 71 124     Results from last 7 days   Lab Units 03/11/25  0422 03/10/25  0424 03/09/25  0848 03/08/25  0325 03/07/25  0233 03/06/25  0813   GLUCOSE RANDOM mg/dL 137 83 103 77 72 120         Results from last 7 days   Lab Units 03/06/25  1248 03/06/25  1031 03/06/25  0813   HS TNI 0HR ng/L  --   --  19   HS TNI 2HR ng/L  --  22  --    HSTNI D2 ng/L  --  3  --    HS TNI 4HR ng/L 26  --   --    HSTNI D4 ng/L 7  --    --          Results from last 7 days   Lab Units 03/11/25  0422 03/10/25  0424 03/09/25  0455 03/07/25  1002 03/07/25  0200 03/06/25  1031 03/06/25  0813   PROTIME seconds  --   --   --   --  13.5  --  13.4   INR   --   --   --   --  1.01  --  1.00   PTT seconds 62* 71* 64*   < > 25   < > 27    < > = values in this interval not displayed.           Results from last 7 days   Lab Units 03/06/25  0813   BNP pg/mL 182*                   Results from last 7 days   Lab Units 03/06/25  0845   INFLUENZA A PCR  Negative   INFLUENZA B PCR  Negative   RSV PCR  Negative             Network Utilization Review Department  ATTENTION: Please call with any questions or concerns to [phone] and carefully listen to the prompts so that you are directed to the right person. All voicemails are confidential.   For Discharge needs, contact Care Management DC Support Team at [phone] opt. 2  Send all requests for admission clinical reviews, approved or denied determinations and any other requests to dedicated fax number below belonging to the campus where the patient is receiving treatment. List of dedicated fax numbers for the Facilities:  FACILITY NAME UR FAX NUMBER   ADMISSION DENIALS (Administrative/Medical Necessity) [phone]   DISCHARGE SUPPORT TEAM (NETWORK) [phone]   PARENT CHILD HEALTH (Maternity/NICU/Pediatrics) [phone]   [facility] [phone]   [facility] [phone]   [facility] [phone]   General acute hospital [phone]   [facility] [phone]   [facility] [phone]   [facility] [phone]   [facility] [phone]   [facility] [phone]   [facility] [phone]   [facility] [phone]    [facility] [phone]

## 2025-03-11 NOTE — ASSESSMENT & PLAN NOTE
Lab Results   Component Value Date    CREATININE 1.55 (H) 03/11/2025    CREATININE 2.11 (H) 03/10/2025    CREATININE 2.40 (H) 03/09/2025     Admission creatinine 1.5  Current creatinine 2.3  Suspect in setting of poor p.o. intake/hypovolemia and contrast use  Resolved

## 2025-03-11 NOTE — ASSESSMENT & PLAN NOTE
Continue diltiazem, metoprolol and amiodarone.  Not on anticoagulation prior to admission due to hx of dementia, risk for fall  Now on Eliquis for PE

## 2025-03-11 NOTE — PLAN OF CARE
Problem: PAIN - ADULT  Goal: Verbalizes/displays adequate comfort level or baseline comfort level  Description: Interventions:  - Encourage patient to monitor pain and request assistance  - Assess pain using appropriate pain scale  - Administer analgesics based on type and severity of pain and evaluate response  - Implement non-pharmacological measures as appropriate and evaluate response  - Consider cultural and social influences on pain and pain management  - Notify physician/advanced practitioner if interventions unsuccessful or patient reports new pain  Outcome: Progressing     Problem: INFECTION - ADULT  Goal: Absence or prevention of progression during hospitalization  Description: INTERVENTIONS:  - Assess and monitor for signs and symptoms of infection  - Monitor lab/diagnostic results  - Monitor all insertion sites, i.e. indwelling lines, tubes, and drains  - Monitor endotracheal if appropriate and nasal secretions for changes in amount and color  - [place] appropriate cooling/warming therapies per order  - Administer medications as ordered  - Instruct and encourage patient and family to use good hand hygiene technique  - Identify and instruct in appropriate isolation precautions for identified infection/condition  Outcome: Progressing  Goal: Absence of fever/infection during neutropenic period  Description: INTERVENTIONS:  - Monitor WBC    Outcome: Progressing     Problem: SAFETY ADULT  Goal: Patient will remain free of falls  Description: INTERVENTIONS:  - Educate patient/family on patient safety including physical limitations  - Instruct patient to call for assistance with activity   - Consult OT/PT to assist with strengthening/mobility   - Keep Call bell within reach  - Keep bed low and locked with side rails adjusted as appropriate  - Keep care items and personal belongings within reach  - Initiate and maintain comfort rounds  - Make Fall Risk Sign visible to staff  - Offer Toileting every 2 Hours,  in advance of need  - Initiate/Maintain bed alarm  - Obtain necessary fall risk management equipment  - Apply yellow socks and bracelet for high fall risk patients  - Consider moving patient to room near nurses station  Outcome: Progressing  Goal: Maintain or return to baseline ADL function  Description: INTERVENTIONS:  -  Assess patient's ability to carry out ADLs; assess patient's baseline for ADL function and identify physical deficits which impact ability to perform ADLs (bathing, care of mouth/teeth, toileting, grooming, dressing, etc.)  - Assess/evaluate cause of self-care deficits   - Assess range of motion  - Assess patient's mobility; develop plan if impaired  - Assess patient's need for assistive devices and provide as appropriate  - Encourage maximum independence but intervene and supervise when necessary  - Involve family in performance of ADLs  - Assess for home care needs following discharge   - Consider OT consult to assist with ADL evaluation and planning for discharge  - Provide patient education as appropriate  Outcome: Progressing  Goal: Maintains/Returns to pre admission functional level  Description: INTERVENTIONS:  - Perform AM-PAC 6 Click Basic Mobility/ Daily Activity assessment daily.  - Set and communicate daily mobility goal to care team and patient/family/caregiver.   - Collaborate with rehabilitation services on mobility goals if consulted  - Perform Range of Motion 3 times a day.  - Reposition patient every 2 hours.  - Dangle patient 3 times a day  - Stand patient 3 times a day  - Ambulate patient 3 times a day  - Out of bed to chair 3 times a day   - Out of bed for meals 3 times a day  - Out of bed for toileting  - Record patient progress and toleration of activity level   Outcome: Progressing     Problem: DISCHARGE PLANNING  Goal: Discharge to home or other facility with appropriate resources  Description: INTERVENTIONS:  - Identify barriers to discharge w/patient and caregiver  -  Arrange for needed discharge resources and transportation as appropriate  - Identify discharge learning needs (meds, wound care, etc.)  - Arrange for interpretive services to assist at discharge as needed  - Refer to Case Management Department for coordinating discharge planning if the patient needs post-hospital services based on physician/advanced practitioner order or complex needs related to functional status, cognitive ability, or social support system  Outcome: Progressing     Problem: Knowledge Deficit  Goal: Patient/family/caregiver demonstrates understanding of disease process, treatment plan, medications, and discharge instructions  Description: Complete learning assessment and assess knowledge base.  Interventions:  - Provide teaching at level of understanding  - Provide teaching via preferred learning methods  Outcome: Progressing     Problem: Nutrition/Hydration-ADULT  Goal: Nutrient/Hydration intake appropriate for improving, restoring or maintaining nutritional needs  Description: Monitor and assess patient's nutrition/hydration status for malnutrition. Collaborate with interdisciplinary team and initiate plan and interventions as ordered.  Monitor patient's weight and dietary intake as ordered or per policy. Utilize nutrition screening tool and intervene as necessary. Determine patient's food preferences and provide high-protein, high-caloric foods as appropriate.     INTERVENTIONS:  - Monitor oral intake, urinary output, labs, and treatment plans  - Assess nutrition and hydration status and recommend course of action  - Evaluate amount of meals eaten  - Assist patient with eating if necessary   - Allow adequate time for meals  - Recommend/ encourage appropriate diets, oral nutritional supplements, and vitamin/mineral supplements  - Order, calculate, and assess calorie counts as needed  - Recommend, monitor, and adjust tube feedings and TPN/PPN based on assessed needs  - Assess need for intravenous  fluids  - Provide specific nutrition/hydration education as appropriate  - Include patient/family/caregiver in decisions related to nutrition  Outcome: Progressing     Problem: Prexisting or High Potential for Compromised Skin Integrity  Goal: Skin integrity is maintained or improved  Description: INTERVENTIONS:  - Identify patients at risk for skin breakdown  - Assess and monitor skin integrity  - Assess and monitor nutrition and hydration status  - Monitor labs   - Assess for incontinence   - Turn and reposition patient  - Assist with mobility/ambulation  - Relieve pressure over bony prominences  - Avoid friction and shearing  - Provide appropriate hygiene as needed including keeping skin clean and dry  - Evaluate need for skin moisturizer/barrier cream  - Collaborate with interdisciplinary team   - Patient/family teaching  - Consider wound care consult   Outcome: Progressing

## 2025-03-11 NOTE — QUICK NOTE
QUICK NOTE - Stat ANJELICA Castañeda 89 y.o. female MRN: [id number]  Unit/Bed#: E4 -01 Encounter: [id number]    Date Paged: 03/10/25  Time Paged:   Room #: 460  Arrival Time:   Deterioration index score at time of page: 72.48  %  Spoke with Azalea Piedra RN from primary team      PROBLEMS resulting in high DI score:   GCS, age, supplemental oxygen    PLAN:    Per primary RN pt's mental status is improved tonight, back to baseline. Now able to tolerate PO intake.     Please contact critical care via [employer] with any questions or concerns.     Vitals:   Vitals:    03/10/25 0023 03/10/25 0729 03/10/25 1620 03/10/25 2055   BP: 141/67 126/60 147/58 139/62   BP Location: Right arm Right arm Right arm Left arm   Pulse: 78 78 76 68   Resp: 18 18 20    Temp: 97.9 °F (36.6 °C) 97.8 °F (36.6 °C) (!) 97.4 °F (36.3 °C)    TempSrc: Temporal Temporal Temporal    SpO2: 100% 99% 96% 100%   Weight:       Height:           Respiratory:  SpO2: SpO2: 100 %, SpO2 Activity: SpO2 Activity: At Rest, SpO2 Device: O2 Device: Nasal cannula  Nasal Cannula O2 Flow Rate (L/min): 2 L/min    Temperature: Temp (24hrs), Av.7 °F (36.5 °C), Min:97.4 °F (36.3 °C), Max:97.9 °F (36.6 °C)  Current: Temperature: (!) 97.4 °F (36.3 °C)    Code Status: Level 1 - Full Code

## 2025-03-11 NOTE — PROGRESS NOTES
Progress Note - Hospitalist   Name: Denia Castañeda 89 y.o. female I MRN: [id number]  Unit/Bed#: E4 -01 I Date of Admission: 3/6/2025   Date of Service: 3/11/2025 I Hospital Day: 5    Assessment & Plan  Acute pulmonary embolism (HCC)  Likely from being bed bound, patient with history of afib not on anticoagulation prior to admission  CT Scan reveals:   Findings consistent with right-sided pulmonary embolism at the lobar/segmental levels; overall clot burden is small. Measured RV/LV ratio is within normal limits at less than 0.9; there is no evidence for right heart strain.   On 2L supplemental O2, continue maintain SpO2 >92%  Continue heparin drip, will change to Eliquis once patient is more alert  Price check sent for Eliquis - $0  Bilateral venous duplex with no DVT.  Right superficial thrombophlebitis.  Hypernatremia  Na+ 148, rising today  Switch fluids to D5 and half-normal saline  Renal function improving  Improving  OMEGA (acute kidney injury) (HCC)  Lab Results   Component Value Date    CREATININE 1.55 (H) 03/11/2025    CREATININE 2.11 (H) 03/10/2025    CREATININE 2.40 (H) 03/09/2025     Admission creatinine 1.5  Current creatinine 2.3  Suspect in setting of poor p.o. intake/hypovolemia and contrast use  Resolved  Atrial fibrillation (HCC)  Continue diltiazem, metoprolol and amiodarone.  Not on anticoagulation prior to admission due to hx of dementia, risk for fall  Now on Eliquis for PE  Stroke-like symptom  Patient brought in due to change in mental status. Found to be lethargic/encephalopathic, less responsive, more contracted and drooling. Has history of facial droop  Stroke alert in ED  CTA/CT Head without signs of acute stroke  Patient appears to be back to baseline mentation  Patient was seen and evaluated by Neurology in ED  Symptoms likely due to TME/recrudescence   Stroke pathway not indicated at this time  Continue Neuro checks  Primary hypertension  Initially with soft BP, now  "improved  Continue POA metoprolol  Monitor  Hyperlipidemia  Continue statin  Severe dementia with agitation (HCC)  Nonverbal and bed bound at baseline  Was maintained in zyprexa, remeron in the past appears patient is no longer taking  Delirium precautions  Fall precautions  Stage 3b chronic kidney disease (HCC)  Lab Results   Component Value Date    EGFR 28 03/11/2025    EGFR 19 03/10/2025    EGFR 16 03/09/2025    CREATININE 1.55 (H) 03/11/2025    CREATININE 2.11 (H) 03/10/2025    CREATININE 2.40 (H) 03/09/2025   Baseline appears to be 1.3-1.4  Now with OMEGA suspected secondary to contrast use  Pericardial effusion  Noted on CT scan  Echo without any significant changes, redemonstrated small circumferential pericardial effusion without evidence of tamponade    VTE Pharmacologic Prophylaxis:   Moderate Risk (Score 3-4) - Pharmacological DVT Prophylaxis Ordered: apixaban (Eliquis).    Mobility:   Basic Mobility Inpatient Raw Score: 6  JH-HLM Goal: 2: Bed activities/Dependent transfer  JH-HLM Achieved: 1: Laying in bed  JH-HLM Goal NOT achieved. Continue with multidisciplinary rounding and encourage appropriate mobility to improve upon JH-HLM goals.    Patient Centered Rounds: I performed bedside rounds with nursing staff today.   Discussions with Specialists or Other Care Team Provider: None    Education and Discussions with Family / Patient: Updated  (son) at bedside.    Current Length of Stay: 5 day(s)  Current Patient Status: Inpatient   Certification Statement: The patient will continue to require additional inpatient hospital stay due to IV fluids  Discharge Plan: Anticipate discharge in 48 hrs to home.    Code Status: Level 1 - Full Code    Subjective   No acute events overnight.  Patient is more alert today.  She was able to respond to me when I asked her if she wanted to eat or drink, responded \"no quiero\".    Objective :  Temp:  [97.4 °F (36.3 °C)-98.2 °F (36.8 °C)] 97.8 °F (36.6 °C)  HR:  " [59-76] 59  BP: (120-147)/(57-62) 120/59  Resp:  [20] 20  SpO2:  [96 %-100 %] 100 %  O2 Device: Nasal cannula  Nasal Cannula O2 Flow Rate (L/min):  [2 L/min] 2 L/min    Body mass index is 22.57 kg/m².     Input and Output Summary (last 24 hours):     Intake/Output Summary (Last 24 hours) at 3/11/2025 1429  Last data filed at 3/11/2025 1245  Gross per 24 hour   Intake 2281.14 ml   Output --   Net 2281.14 ml       Physical Exam  Vitals and nursing note reviewed.   Constitutional:       Appearance: Normal appearance.   HENT:      Head: Normocephalic and atraumatic.      Mouth/Throat:      Mouth: Mucous membranes are moist.      Pharynx: Oropharynx is clear. No oropharyngeal exudate.   Eyes:      Extraocular Movements: Extraocular movements intact.   Cardiovascular:      Rate and Rhythm: Normal rate and regular rhythm.      Pulses: Normal pulses.      Heart sounds: Normal heart sounds. No murmur heard.     No friction rub. No gallop.   Pulmonary:      Effort: Pulmonary effort is normal. No respiratory distress.      Breath sounds: Normal breath sounds. No stridor. No wheezing or rales.   Abdominal:      General: Abdomen is flat. Bowel sounds are normal. There is no distension.      Palpations: Abdomen is soft.      Tenderness: There is no abdominal tenderness.   Musculoskeletal:      Right lower leg: No edema.      Left lower leg: No edema.   Skin:     General: Skin is warm and dry.   Neurological:      General: No focal deficit present.      Mental Status: She is alert.      Comments: Lethargic, however improving.  Arousable to sound.           Lines/Drains:  Lines/Drains/Airways       Active Status       Name Placement date Placement time Site Days    External Urinary Catheter 03/11/25  1300  -- less than 1                            Lab Results: I have reviewed the following results:   Results from last 7 days   Lab Units 03/11/25  0422   WBC Thousand/uL 3.37*   HEMOGLOBIN g/dL 8.1*   HEMATOCRIT % 26.3*   PLATELETS  Thousands/uL 181   SEGS PCT % 61   LYMPHO PCT % 26   MONO PCT % 8   EOS PCT % 5     Results from last 7 days   Lab Units 03/11/25  0422   SODIUM mmol/L 144   POTASSIUM mmol/L 3.6   CHLORIDE mmol/L 115*   CO2 mmol/L 23   BUN mg/dL 39*   CREATININE mg/dL 1.55*   ANION GAP mmol/L 6   CALCIUM mg/dL 7.4*   ALBUMIN g/dL 2.9*   TOTAL BILIRUBIN mg/dL 0.40   ALK PHOS U/L 65   ALT U/L 8   AST U/L 18   GLUCOSE RANDOM mg/dL 137     Results from last 7 days   Lab Units 03/07/25  0200   INR  1.01     Results from last 7 days   Lab Units 03/08/25  0748 03/06/25  0806   POC GLUCOSE mg/dl 71 124               Recent Cultures (last 7 days):         Imaging Results Review: No pertinent imaging studies reviewed.  Other Study Results Review: No additional pertinent studies reviewed.    Last 24 Hours Medication List:     Current Facility-Administered Medications:     acetaminophen (TYLENOL) tablet 650 mg, Q6H PRN    amiodarone tablet 200 mg, Daily    apixaban (ELIQUIS) tablet 10 mg, BID    aspirin (ECOTRIN LOW STRENGTH) EC tablet 81 mg, Daily    atorvastatin (LIPITOR) tablet 40 mg, QPM    dextrose 5 % and sodium chloride 0.45 % infusion, Continuous, Last Rate: 100 mL/hr (03/11/25 0612)    diltiazem (CARDIZEM) tablet 30 mg, TID    docusate sodium (COLACE) capsule 100 mg, BID    DULoxetine (CYMBALTA) delayed release capsule 30 mg, HS    metoprolol (LOPRESSOR) injection 2.5 mg, Q6H PRN    metoprolol tartrate (LOPRESSOR) tablet 25 mg, BID    terazosin (HYTRIN) capsule 2 mg, HS    Administrative Statements   Today, Patient Was Seen By: Chente Burrell PA-C      **Please Note: This note may have been constructed using a voice recognition system.**

## 2025-03-11 NOTE — PLAN OF CARE
Problem: PAIN - ADULT  Goal: Verbalizes/displays adequate comfort level or baseline comfort level  Description: Interventions:  - Encourage patient to monitor pain and request assistance  - Assess pain using appropriate pain scale  - Administer analgesics based on type and severity of pain and evaluate response  - Implement non-pharmacological measures as appropriate and evaluate response  - Consider cultural and social influences on pain and pain management  - Notify physician/advanced practitioner if interventions unsuccessful or patient reports new pain  Outcome: Progressing     Problem: INFECTION - ADULT  Goal: Absence or prevention of progression during hospitalization  Description: INTERVENTIONS:  - Assess and monitor for signs and symptoms of infection  - Monitor lab/diagnostic results  - Monitor all insertion sites, i.e. indwelling lines, tubes, and drains  - Monitor endotracheal if appropriate and nasal secretions for changes in amount and color  - [place] appropriate cooling/warming therapies per order  - Administer medications as ordered  - Instruct and encourage patient and family to use good hand hygiene technique  - Identify and instruct in appropriate isolation precautions for identified infection/condition  Outcome: Progressing  Goal: Absence of fever/infection during neutropenic period  Description: INTERVENTIONS:  - Monitor WBC    Outcome: Progressing     Problem: SAFETY ADULT  Goal: Patient will remain free of falls  Description: INTERVENTIONS:  - Educate patient/family on patient safety including physical limitations  - Instruct patient to call for assistance with activity   - Consult OT/PT to assist with strengthening/mobility   - Keep Call bell within reach  - Keep bed low and locked with side rails adjusted as appropriate  - Keep care items and personal belongings within reach  - Initiate and maintain comfort rounds  - Make Fall Risk Sign visible to staff  - Offer Toileting every 2 Hours,  in advance of need  - Initiate/Maintain bed alarm  - Obtain necessary fall risk management equipment  - Apply yellow socks and bracelet for high fall risk patients  - Consider moving patient to room near nurses station  Outcome: Progressing  Goal: Maintain or return to baseline ADL function  Description: INTERVENTIONS:  -  Assess patient's ability to carry out ADLs; assess patient's baseline for ADL function and identify physical deficits which impact ability to perform ADLs (bathing, care of mouth/teeth, toileting, grooming, dressing, etc.)  - Assess/evaluate cause of self-care deficits   - Assess range of motion  - Assess patient's mobility; develop plan if impaired  - Assess patient's need for assistive devices and provide as appropriate  - Encourage maximum independence but intervene and supervise when necessary  - Involve family in performance of ADLs  - Assess for home care needs following discharge   - Consider OT consult to assist with ADL evaluation and planning for discharge  - Provide patient education as appropriate  Outcome: Progressing  Goal: Maintains/Returns to pre admission functional level  Description: INTERVENTIONS:  - Perform AM-PAC 6 Click Basic Mobility/ Daily Activity assessment daily.  - Set and communicate daily mobility goal to care team and patient/family/caregiver.   - Collaborate with rehabilitation services on mobility goals if consulted  - Perform Range of Motion 3 times a day.  - Reposition patient every 2 hours.  - Dangle patient 3 times a day  - Stand patient 3 times a day  - Ambulate patient 3 times a day  - Out of bed to chair 3 times a day   - Out of bed for meals 3 times a day  - Out of bed for toileting  - Record patient progress and toleration of activity level   Outcome: Progressing     Problem: DISCHARGE PLANNING  Goal: Discharge to home or other facility with appropriate resources  Description: INTERVENTIONS:  - Identify barriers to discharge w/patient and caregiver  -  Arrange for needed discharge resources and transportation as appropriate  - Identify discharge learning needs (meds, wound care, etc.)  - Arrange for interpretive services to assist at discharge as needed  - Refer to Case Management Department for coordinating discharge planning if the patient needs post-hospital services based on physician/advanced practitioner order or complex needs related to functional status, cognitive ability, or social support system  Outcome: Progressing     Problem: Knowledge Deficit  Goal: Patient/family/caregiver demonstrates understanding of disease process, treatment plan, medications, and discharge instructions  Description: Complete learning assessment and assess knowledge base.  Interventions:  - Provide teaching at level of understanding  - Provide teaching via preferred learning methods  Outcome: Progressing     Problem: Prexisting or High Potential for Compromised Skin Integrity  Goal: Skin integrity is maintained or improved  Description: INTERVENTIONS:  - Identify patients at risk for skin breakdown  - Assess and monitor skin integrity  - Assess and monitor nutrition and hydration status  - Monitor labs   - Assess for incontinence   - Turn and reposition patient  - Assist with mobility/ambulation  - Relieve pressure over bony prominences  - Avoid friction and shearing  - Provide appropriate hygiene as needed including keeping skin clean and dry  - Evaluate need for skin moisturizer/barrier cream  - Collaborate with interdisciplinary team   - Patient/family teaching  - Consider wound care consult   Outcome: Progressing     Problem: Nutrition/Hydration-ADULT  Goal: Nutrient/Hydration intake appropriate for improving, restoring or maintaining nutritional needs  Description: Monitor and assess patient's nutrition/hydration status for malnutrition. Collaborate with interdisciplinary team and initiate plan and interventions as ordered.  Monitor patient's weight and dietary intake as  ordered or per policy. Utilize nutrition screening tool and intervene as necessary. Determine patient's food preferences and provide high-protein, high-caloric foods as appropriate.     INTERVENTIONS:  - Monitor oral intake, urinary output, labs, and treatment plans  - Assess nutrition and hydration status and recommend course of action  - Evaluate amount of meals eaten  - Assist patient with eating if necessary   - Allow adequate time for meals  - Recommend/ encourage appropriate diets, oral nutritional supplements, and vitamin/mineral supplements  - Order, calculate, and assess calorie counts as needed  - Recommend, monitor, and adjust tube feedings and TPN/PPN based on assessed needs  - Assess need for intravenous fluids  - Provide specific nutrition/hydration education as appropriate  - Include patient/family/caregiver in decisions related to nutrition  Outcome: Progressing

## 2025-03-11 NOTE — ASSESSMENT & PLAN NOTE
Na+ 148, rising today  Switch fluids to D5 and half-normal saline  Renal function improving  Improving

## 2025-03-11 NOTE — ASSESSMENT & PLAN NOTE
Lab Results   Component Value Date    EGFR 28 03/11/2025    EGFR 19 03/10/2025    EGFR 16 03/09/2025    CREATININE 1.55 (H) 03/11/2025    CREATININE 2.11 (H) 03/10/2025    CREATININE 2.40 (H) 03/09/2025   Baseline appears to be 1.3-1.4  Now with OMEGA suspected secondary to contrast use

## 2025-03-12 LAB
ANION GAP SERPL CALCULATED.3IONS-SCNC: 6 MMOL/L (ref 4–13)
BASOPHILS # BLD AUTO: 0 THOUSANDS/ÂΜL (ref 0–0.1)
BASOPHILS NFR BLD AUTO: 0 % (ref 0–1)
BUN SERPL-MCNC: 28 MG/DL (ref 5–25)
CALCIUM SERPL-MCNC: 8.2 MG/DL (ref 8.4–10.2)
CHLORIDE SERPL-SCNC: 111 MMOL/L (ref 96–108)
CO2 SERPL-SCNC: 24 MMOL/L (ref 21–32)
CREAT SERPL-MCNC: 1.24 MG/DL (ref 0.6–1.3)
EOSINOPHIL # BLD AUTO: 0.13 THOUSAND/ÂΜL (ref 0–0.61)
EOSINOPHIL NFR BLD AUTO: 5 % (ref 0–6)
ERYTHROCYTE [DISTWIDTH] IN BLOOD BY AUTOMATED COUNT: 15.4 % (ref 11.6–15.1)
GFR SERPL CREATININE-BSD FRML MDRD: 37 ML/MIN/1.73SQ M
GLUCOSE SERPL-MCNC: 139 MG/DL (ref 65–140)
HCT VFR BLD AUTO: 30.9 % (ref 34.8–46.1)
HGB BLD-MCNC: 9.6 G/DL (ref 11.5–15.4)
IMM GRANULOCYTES # BLD AUTO: 0 THOUSAND/UL (ref 0–0.2)
IMM GRANULOCYTES NFR BLD AUTO: 0 % (ref 0–2)
LYMPHOCYTES # BLD AUTO: 1.03 THOUSANDS/ÂΜL (ref 0.6–4.47)
LYMPHOCYTES NFR BLD AUTO: 36 % (ref 14–44)
MCH RBC QN AUTO: 29.5 PG (ref 26.8–34.3)
MCHC RBC AUTO-ENTMCNC: 31.1 G/DL (ref 31.4–37.4)
MCV RBC AUTO: 95 FL (ref 82–98)
MONOCYTES # BLD AUTO: 0.26 THOUSAND/ÂΜL (ref 0.17–1.22)
MONOCYTES NFR BLD AUTO: 9 % (ref 4–12)
NEUTROPHILS # BLD AUTO: 1.45 THOUSANDS/ÂΜL (ref 1.85–7.62)
NEUTS SEG NFR BLD AUTO: 50 % (ref 43–75)
NRBC BLD AUTO-RTO: 0 /100 WBCS
PLATELET # BLD AUTO: 192 THOUSANDS/UL (ref 149–390)
PMV BLD AUTO: 10.2 FL (ref 8.9–12.7)
POTASSIUM SERPL-SCNC: 3.7 MMOL/L (ref 3.5–5.3)
RBC # BLD AUTO: 3.25 MILLION/UL (ref 3.81–5.12)
SODIUM SERPL-SCNC: 141 MMOL/L (ref 135–147)
WBC # BLD AUTO: 2.87 THOUSAND/UL (ref 4.31–10.16)

## 2025-03-12 PROCEDURE — 85025 COMPLETE CBC W/AUTO DIFF WBC: CPT | Performed by: PHYSICIAN ASSISTANT

## 2025-03-12 PROCEDURE — 80048 BASIC METABOLIC PNL TOTAL CA: CPT | Performed by: PHYSICIAN ASSISTANT

## 2025-03-12 PROCEDURE — 92526 ORAL FUNCTION THERAPY: CPT

## 2025-03-12 PROCEDURE — 99232 SBSQ HOSP IP/OBS MODERATE 35: CPT | Performed by: PHYSICIAN ASSISTANT

## 2025-03-12 RX ADMIN — METOPROLOL TARTRATE 25 MG: 25 TABLET, FILM COATED ORAL at 17:11

## 2025-03-12 RX ADMIN — TERAZOSIN HYDROCHLORIDE 2 MG: 1 CAPSULE ORAL at 22:03

## 2025-03-12 RX ADMIN — ATORVASTATIN CALCIUM 40 MG: 40 TABLET, FILM COATED ORAL at 17:11

## 2025-03-12 RX ADMIN — DILTIAZEM HYDROCHLORIDE 30 MG: 30 TABLET, FILM COATED ORAL at 09:25

## 2025-03-12 RX ADMIN — DEXTROSE AND SODIUM CHLORIDE 100 ML/HR: 5; .45 INJECTION, SOLUTION INTRAVENOUS at 02:53

## 2025-03-12 RX ADMIN — DULOXETINE HYDROCHLORIDE 30 MG: 30 CAPSULE, DELAYED RELEASE ORAL at 22:03

## 2025-03-12 RX ADMIN — DILTIAZEM HYDROCHLORIDE 30 MG: 30 TABLET, FILM COATED ORAL at 22:03

## 2025-03-12 RX ADMIN — DILTIAZEM HYDROCHLORIDE 30 MG: 30 TABLET, FILM COATED ORAL at 16:32

## 2025-03-12 RX ADMIN — IRON SUCROSE 200 MG: 20 INJECTION, SOLUTION INTRAVENOUS at 10:26

## 2025-03-12 RX ADMIN — AMIODARONE HYDROCHLORIDE 200 MG: 200 TABLET ORAL at 09:25

## 2025-03-12 RX ADMIN — ASPIRIN 81 MG: 81 TABLET, COATED ORAL at 09:25

## 2025-03-12 RX ADMIN — APIXABAN 10 MG: 5 TABLET, FILM COATED ORAL at 17:11

## 2025-03-12 RX ADMIN — METOPROLOL TARTRATE 25 MG: 25 TABLET, FILM COATED ORAL at 09:25

## 2025-03-12 RX ADMIN — APIXABAN 10 MG: 5 TABLET, FILM COATED ORAL at 09:25

## 2025-03-12 NOTE — ASSESSMENT & PLAN NOTE
Patient brought in due to change in mental status. Found to be lethargic/encephalopathic, less responsive, more contracted and drooling. Has history of facial droop  Stroke alert in ED  CTA/CT Head without signs of acute stroke  Patient appears to be back to baseline mentation  Patient was seen and evaluated by Neurology in ED  Symptoms likely due to TME/recrudescence   Stroke pathway not indicated at this time  More alert today, starting to eat.  Hopeful discharge tomorrow.

## 2025-03-12 NOTE — CASE MANAGEMENT
Case Management Discharge Planning Note    Patient name Denia Castañeda  Location [facility] /E4 -* MRN [id number]  : 1931 Date 3/12/2025       Current Admission Date: 3/6/2025  Current Admission Diagnosis:Acute pulmonary embolism (HCC)   Patient Active Problem List    Diagnosis Date Noted Date Diagnosed    Hypernatremia 03/10/2025     Stroke-like symptom 2025     Acute pulmonary embolism (HCC) 2025     Cystitis 2024     CVA (cerebral vascular accident) ([facility]) 2024     Vaginal bleeding 2024     Edema of right upper extremity 2024     Pericardial effusion 2024     Facial asymmetry 2024     Back pain 2023     At risk for elder abuse 2023     Urinary retention 2022     Edema of right ankle 10/04/2022     Flank pain 10/18/2021     Oropharyngeal dysphagia 2021     Normocytic anemia 2021     H/O fracture of right hip 07/15/2021     Stage 3b chronic kidney disease ([facility]) 07/15/2021     Age-related osteoporosis without current pathological fracture 2020     Moderate protein-calorie malnutrition ([facility]) 2020     Severe dementia with agitation ([facility]) 2020     Urinary incontinence 2020     Ambulatory dysfunction 04/10/2020     OMEGA (acute kidney injury) ([facility]) 04/10/2020     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     Atrial fibrillation ([facility]) 2019     Bilateral hearing loss 10/11/2018     Personal history of fall 10/11/2018     Primary hypertension 2018     Hyperlipidemia 2018     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     Chronic constipation 2015     Cataract of both eyes 2014       LOS (days): 6  Geometric Mean LOS (GMLOS) (days):   Days to GMLOS:     OBJECTIVE:  Risk of Unplanned Readmission Score: 21.17         Current admission status: Inpatient   Preferred Pharmacy:   [facility] - [place] - [address]  [address] [address]  Phone:  [phone] Fax: [phone]    [facility] - [place] - [address]  [facility],  [address] [facility]  [address]  [facility] [address]  Phone: [phone] Fax: [phone]    [id number] - [place] - [address]  [address] [facility] [address]  Phone: [phone] Fax: [phone]    Primary Care Provider: Jose M Castañeda MD    Primary Insurance: [employer]  REP  Secondary Insurance: [facility]    DISCHARGE DETAILS:                                                    Treatment Team Recommendation: Home with Home Health Care  Discharge Destination Plan:: Home with Home Health Care                                IMM Given (Date):: 03/12/25 (verbally reviewed on the phone wit the son.  He was made aware of their rights to appeal and he verbalized undertanding.  Copyu will be provided for the son and fomr placed in scan bin.)        Additional Comments: CM spoke with sonLogan.  He is aware of potential dc in am.  Will need to assess if Home would be needed and son will need to know when when dc is arranged.  Once that is done he can get HHA in to meet her at home.  Son also concnered that his mother may not be eating ok.  Nursing made aware to assess O2 need and oral intake.   CM to follow.

## 2025-03-12 NOTE — ASSESSMENT & PLAN NOTE
Na+ 148, down to 141  Switch fluids to D5 and half-normal saline  Renal function improving  Improving

## 2025-03-12 NOTE — PLAN OF CARE
Problem: PAIN - ADULT  Goal: Verbalizes/displays adequate comfort level or baseline comfort level  Description: Interventions:  - Encourage patient to monitor pain and request assistance  - Assess pain using appropriate pain scale  - Administer analgesics based on type and severity of pain and evaluate response  - Implement non-pharmacological measures as appropriate and evaluate response  - Consider cultural and social influences on pain and pain management  - Notify physician/advanced practitioner if interventions unsuccessful or patient reports new pain  Outcome: Progressing     Problem: INFECTION - ADULT  Goal: Absence or prevention of progression during hospitalization  Description: INTERVENTIONS:  - Assess and monitor for signs and symptoms of infection  - Monitor lab/diagnostic results  - Monitor all insertion sites, i.e. indwelling lines, tubes, and drains  - Monitor endotracheal if appropriate and nasal secretions for changes in amount and color  - [place] appropriate cooling/warming therapies per order  - Administer medications as ordered  - Instruct and encourage patient and family to use good hand hygiene technique  - Identify and instruct in appropriate isolation precautions for identified infection/condition  Outcome: Progressing  Goal: Absence of fever/infection during neutropenic period  Description: INTERVENTIONS:  - Monitor WBC    Outcome: Progressing     Problem: SAFETY ADULT  Goal: Patient will remain free of falls  Description: INTERVENTIONS:  - Educate patient/family on patient safety including physical limitations  - Instruct patient to call for assistance with activity   - Consult OT/PT to assist with strengthening/mobility   - Keep Call bell within reach  - Keep bed low and locked with side rails adjusted as appropriate  - Keep care items and personal belongings within reach  - Initiate and maintain comfort rounds  - Make Fall Risk Sign visible to staff  - Offer Toileting every 2 Hours,  in advance of need  - Initiate/Maintain bed alarm  - Obtain necessary fall risk management equipment  - Apply yellow socks and bracelet for high fall risk patients  - Consider moving patient to room near nurses station  Outcome: Progressing  Goal: Maintain or return to baseline ADL function  Description: INTERVENTIONS:  -  Assess patient's ability to carry out ADLs; assess patient's baseline for ADL function and identify physical deficits which impact ability to perform ADLs (bathing, care of mouth/teeth, toileting, grooming, dressing, etc.)  - Assess/evaluate cause of self-care deficits   - Assess range of motion  - Assess patient's mobility; develop plan if impaired  - Assess patient's need for assistive devices and provide as appropriate  - Encourage maximum independence but intervene and supervise when necessary  - Involve family in performance of ADLs  - Assess for home care needs following discharge   - Consider OT consult to assist with ADL evaluation and planning for discharge  - Provide patient education as appropriate  Outcome: Progressing  Goal: Maintains/Returns to pre admission functional level  Description: INTERVENTIONS:  - Perform AM-PAC 6 Click Basic Mobility/ Daily Activity assessment daily.  - Set and communicate daily mobility goal to care team and patient/family/caregiver.   - Collaborate with rehabilitation services on mobility goals if consulted  - Perform Range of Motion 3 times a day.  - Reposition patient every 2 hours.  - Dangle patient 3 times a day  - Stand patient 3 times a day  - Ambulate patient 3 times a day  - Out of bed to chair 3 times a day   - Out of bed for meals 3 times a day  - Out of bed for toileting  - Record patient progress and toleration of activity level   Outcome: Progressing     Problem: DISCHARGE PLANNING  Goal: Discharge to home or other facility with appropriate resources  Description: INTERVENTIONS:  - Identify barriers to discharge w/patient and caregiver  -  Arrange for needed discharge resources and transportation as appropriate  - Identify discharge learning needs (meds, wound care, etc.)  - Arrange for interpretive services to assist at discharge as needed  - Refer to Case Management Department for coordinating discharge planning if the patient needs post-hospital services based on physician/advanced practitioner order or complex needs related to functional status, cognitive ability, or social support system  Outcome: Progressing     Problem: Knowledge Deficit  Goal: Patient/family/caregiver demonstrates understanding of disease process, treatment plan, medications, and discharge instructions  Description: Complete learning assessment and assess knowledge base.  Interventions:  - Provide teaching at level of understanding  - Provide teaching via preferred learning methods  Outcome: Progressing     Problem: Prexisting or High Potential for Compromised Skin Integrity  Goal: Skin integrity is maintained or improved  Description: INTERVENTIONS:  - Identify patients at risk for skin breakdown  - Assess and monitor skin integrity  - Assess and monitor nutrition and hydration status  - Monitor labs   - Assess for incontinence   - Turn and reposition patient  - Assist with mobility/ambulation  - Relieve pressure over bony prominences  - Avoid friction and shearing  - Provide appropriate hygiene as needed including keeping skin clean and dry  - Evaluate need for skin moisturizer/barrier cream  - Collaborate with interdisciplinary team   - Patient/family teaching  - Consider wound care consult   Outcome: Progressing     Problem: Nutrition/Hydration-ADULT  Goal: Nutrient/Hydration intake appropriate for improving, restoring or maintaining nutritional needs  Description: Monitor and assess patient's nutrition/hydration status for malnutrition. Collaborate with interdisciplinary team and initiate plan and interventions as ordered.  Monitor patient's weight and dietary intake as  ordered or per policy. Utilize nutrition screening tool and intervene as necessary. Determine patient's food preferences and provide high-protein, high-caloric foods as appropriate.     INTERVENTIONS:  - Monitor oral intake, urinary output, labs, and treatment plans  - Assess nutrition and hydration status and recommend course of action  - Evaluate amount of meals eaten  - Assist patient with eating if necessary   - Allow adequate time for meals  - Recommend/ encourage appropriate diets, oral nutritional supplements, and vitamin/mineral supplements  - Order, calculate, and assess calorie counts as needed  - Recommend, monitor, and adjust tube feedings and TPN/PPN based on assessed needs  - Assess need for intravenous fluids  - Provide specific nutrition/hydration education as appropriate  - Include patient/family/caregiver in decisions related to nutrition  Outcome: Progressing

## 2025-03-12 NOTE — ASSESSMENT & PLAN NOTE
Likely from being bed bound, patient with history of afib not on anticoagulation prior to admission  CT Scan reveals:   Findings consistent with right-sided pulmonary embolism at the lobar/segmental levels; overall clot burden is small. Measured RV/LV ratio is within normal limits at less than 0.9; there is no evidence for right heart strain.   On 2L supplemental O2, continue maintain SpO2 >92%  On Eliquis  Price check sent for Eliquis - $0  Bilateral venous duplex with no DVT.  Right superficial thrombophlebitis.

## 2025-03-12 NOTE — ASSESSMENT & PLAN NOTE
Lab Results   Component Value Date    CREATININE 1.24 03/12/2025    CREATININE 1.55 (H) 03/11/2025    CREATININE 2.11 (H) 03/10/2025     Admission creatinine 1.5  Current creatinine 2.3  Suspect in setting of poor p.o. intake/hypovolemia and contrast use  Resolved

## 2025-03-12 NOTE — PLAN OF CARE
Problem: PAIN - ADULT  Goal: Verbalizes/displays adequate comfort level or baseline comfort level  Description: Interventions:  - Encourage patient to monitor pain and request assistance  - Assess pain using appropriate pain scale  - Administer analgesics based on type and severity of pain and evaluate response  - Implement non-pharmacological measures as appropriate and evaluate response  - Consider cultural and social influences on pain and pain management  - Notify physician/advanced practitioner if interventions unsuccessful or patient reports new pain  Outcome: Progressing     Problem: INFECTION - ADULT  Goal: Absence or prevention of progression during hospitalization  Description: INTERVENTIONS:  - Assess and monitor for signs and symptoms of infection  - Monitor lab/diagnostic results  - Monitor all insertion sites, i.e. indwelling lines, tubes, and drains  - Monitor endotracheal if appropriate and nasal secretions for changes in amount and color  - [place] appropriate cooling/warming therapies per order  - Administer medications as ordered  - Instruct and encourage patient and family to use good hand hygiene technique  - Identify and instruct in appropriate isolation precautions for identified infection/condition  Outcome: Progressing  Goal: Absence of fever/infection during neutropenic period  Description: INTERVENTIONS:  - Monitor WBC    Outcome: Progressing     Problem: SAFETY ADULT  Goal: Patient will remain free of falls  Description: INTERVENTIONS:  - Educate patient/family on patient safety including physical limitations  - Instruct patient to call for assistance with activity   - Consult OT/PT to assist with strengthening/mobility   - Keep Call bell within reach  - Keep bed low and locked with side rails adjusted as appropriate  - Keep care items and personal belongings within reach  - Initiate and maintain comfort rounds  - Make Fall Risk Sign visible to staff  - Offer Toileting every 2 Hours,  in advance of need  - Initiate/Maintain bed alarm  - Obtain necessary fall risk management equipment  - Apply yellow socks and bracelet for high fall risk patients  - Consider moving patient to room near nurses station  Outcome: Progressing  Goal: Maintain or return to baseline ADL function  Description: INTERVENTIONS:  -  Assess patient's ability to carry out ADLs; assess patient's baseline for ADL function and identify physical deficits which impact ability to perform ADLs (bathing, care of mouth/teeth, toileting, grooming, dressing, etc.)  - Assess/evaluate cause of self-care deficits   - Assess range of motion  - Assess patient's mobility; develop plan if impaired  - Assess patient's need for assistive devices and provide as appropriate  - Encourage maximum independence but intervene and supervise when necessary  - Involve family in performance of ADLs  - Assess for home care needs following discharge   - Consider OT consult to assist with ADL evaluation and planning for discharge  - Provide patient education as appropriate  Outcome: Progressing  Goal: Maintains/Returns to pre admission functional level  Description: INTERVENTIONS:  - Perform AM-PAC 6 Click Basic Mobility/ Daily Activity assessment daily.  - Set and communicate daily mobility goal to care team and patient/family/caregiver.   - Collaborate with rehabilitation services on mobility goals if consulted  - Perform Range of Motion 3 times a day.  - Reposition patient every 2 hours.  - Dangle patient 3 times a day  - Stand patient 3 times a day  - Ambulate patient 3 times a day  - Out of bed to chair 3 times a day   - Out of bed for meals 3 times a day  - Out of bed for toileting  - Record patient progress and toleration of activity level   Outcome: Progressing     Problem: DISCHARGE PLANNING  Goal: Discharge to home or other facility with appropriate resources  Description: INTERVENTIONS:  - Identify barriers to discharge w/patient and caregiver  -  Arrange for needed discharge resources and transportation as appropriate  - Identify discharge learning needs (meds, wound care, etc.)  - Arrange for interpretive services to assist at discharge as needed  - Refer to Case Management Department for coordinating discharge planning if the patient needs post-hospital services based on physician/advanced practitioner order or complex needs related to functional status, cognitive ability, or social support system  Outcome: Progressing     Problem: Knowledge Deficit  Goal: Patient/family/caregiver demonstrates understanding of disease process, treatment plan, medications, and discharge instructions  Description: Complete learning assessment and assess knowledge base.  Interventions:  - Provide teaching at level of understanding  - Provide teaching via preferred learning methods  Outcome: Progressing     Problem: Prexisting or High Potential for Compromised Skin Integrity  Goal: Skin integrity is maintained or improved  Description: INTERVENTIONS:  - Identify patients at risk for skin breakdown  - Assess and monitor skin integrity  - Assess and monitor nutrition and hydration status  - Monitor labs   - Assess for incontinence   - Turn and reposition patient  - Assist with mobility/ambulation  - Relieve pressure over bony prominences  - Avoid friction and shearing  - Provide appropriate hygiene as needed including keeping skin clean and dry  - Evaluate need for skin moisturizer/barrier cream  - Collaborate with interdisciplinary team   - Patient/family teaching  - Consider wound care consult   Outcome: Progressing     Problem: Nutrition/Hydration-ADULT  Goal: Nutrient/Hydration intake appropriate for improving, restoring or maintaining nutritional needs  Description: Monitor and assess patient's nutrition/hydration status for malnutrition. Collaborate with interdisciplinary team and initiate plan and interventions as ordered.  Monitor patient's weight and dietary intake as  ordered or per policy. Utilize nutrition screening tool and intervene as necessary. Determine patient's food preferences and provide high-protein, high-caloric foods as appropriate.     INTERVENTIONS:  - Monitor oral intake, urinary output, labs, and treatment plans  - Assess nutrition and hydration status and recommend course of action  - Evaluate amount of meals eaten  - Assist patient with eating if necessary   - Allow adequate time for meals  - Recommend/ encourage appropriate diets, oral nutritional supplements, and vitamin/mineral supplements  - Order, calculate, and assess calorie counts as needed  - Recommend, monitor, and adjust tube feedings and TPN/PPN based on assessed needs  - Assess need for intravenous fluids  - Provide specific nutrition/hydration education as appropriate  - Include patient/family/caregiver in decisions related to nutrition  Outcome: Progressing

## 2025-03-12 NOTE — SPEECH THERAPY NOTE
Speech Language/Pathology    Speech/Language Pathology Progress Note    Patient Name: Denia Castañeda  Today's Date: 3/12/2025     Problem List  Principal Problem:    Acute pulmonary embolism (HCC)  Active Problems:    Primary hypertension    Hyperlipidemia    OMEGA (acute kidney injury) (HCC)    Atrial fibrillation (HCC)    Severe dementia with agitation (HCC)    Stage 3b chronic kidney disease (HCC)    Pericardial effusion    Stroke-like symptom    Hypernatremia       Past Medical History  Past Medical History:   Diagnosis Date    Anxiety     Closed fracture of right orbital floor (HCC) 08/23/2020    Cognitive impairment     COVID-19 virus infection 04/13/2020    Dementia (HCC)     Depression     Hallucination     Hyperlipidemia     Hypertension     Memory loss     Psychiatric illness     Psychosis (HCC)     Retention of urine     Schizoaffective disorder (HCC)     Sleep difficulties     Urinary tract infection         Past Surgical History  Past Surgical History:   Procedure Laterality Date    APPENDECTOMY      BLADDER SURGERY      CHOLECYSTECTOMY      IA OPTX FEM SHFT FX W/INSJ IMED IMPLT W/WO SCREW Right 7/15/2021    Procedure: INSERTION NAIL IM FEMUR ANTEGRADE (TROCHANTERIC) right;  Surgeon: Getachew Aguilar MD;  Location: [facility];  Service: Orthopedics    TOTAL ABDOMINAL HYSTERECTOMY W/ BILATERAL SALPINGOOPHORECTOMY      TOTAL KNEE ARTHROPLASTY Left          Subjective:  Eyes closed, mumbled in Turk[place] end of session. Alertness and po acceptance fluctuating per nursing.   Objective:  Pt seen in am for po tolerance and additional recommendations. Tolerated 100% pureed item (finishing item as I entered). Inconsistent mouth opening/acceptance. Mildly prolonged oral manipulation, Mild delay in transfer. Swallow appeared prompt. No cough. Trialed nectar thick by cup. Poor seal, + spill, + cough. Trialed by tsp. Inconsistent opening and acceptance but was able to take 4 ounces over time. Suspect reduced bolus control  and formation. Can not r/o spill to the pharynx.Swallows appeared fairly prompt. No cough or wet vocal quality.   Assessment:  Fluctuations in mental status/alertness and po acceptance.   Plan/Recommendations:  Puree w/ nectar thick as tolerated when fully alert. ? GOC. Noted full code. F/u 1-3x/wk as able and indicated.

## 2025-03-12 NOTE — ASSESSMENT & PLAN NOTE
Lab Results   Component Value Date    EGFR 37 03/12/2025    EGFR 28 03/11/2025    EGFR 19 03/10/2025    CREATININE 1.24 03/12/2025    CREATININE 1.55 (H) 03/11/2025    CREATININE 2.11 (H) 03/10/2025   Baseline appears to be 1.3-1.4  Now with OMEGA suspected secondary to contrast use

## 2025-03-12 NOTE — PROGRESS NOTES
Progress Note - Hospitalist   Name: Denia Castañeda 89 y.o. female I MRN: [id number]  Unit/Bed#: E4 -01 I Date of Admission: 3/6/2025   Date of Service: 3/12/2025 I Hospital Day: 6    Assessment & Plan  Acute pulmonary embolism (HCC)  Likely from being bed bound, patient with history of afib not on anticoagulation prior to admission  CT Scan reveals:   Findings consistent with right-sided pulmonary embolism at the lobar/segmental levels; overall clot burden is small. Measured RV/LV ratio is within normal limits at less than 0.9; there is no evidence for right heart strain.   On 2L supplemental O2, continue maintain SpO2 >92%  On Eliquis  Price check sent for Eliquis - $0  Bilateral venous duplex with no DVT.  Right superficial thrombophlebitis.  Hypernatremia  Na+ 148, down to 141  Switch fluids to D5 and half-normal saline  Renal function improving  Improving  OMEGA (acute kidney injury) (HCC)  Lab Results   Component Value Date    CREATININE 1.24 03/12/2025    CREATININE 1.55 (H) 03/11/2025    CREATININE 2.11 (H) 03/10/2025     Admission creatinine 1.5  Current creatinine 2.3  Suspect in setting of poor p.o. intake/hypovolemia and contrast use  Resolved  Atrial fibrillation (HCC)  Continue diltiazem, metoprolol and amiodarone.  Not on anticoagulation prior to admission due to hx of dementia, risk for fall  Now on Eliquis for PE  Stroke-like symptom  Patient brought in due to change in mental status. Found to be lethargic/encephalopathic, less responsive, more contracted and drooling. Has history of facial droop  Stroke alert in ED  CTA/CT Head without signs of acute stroke  Patient appears to be back to baseline mentation  Patient was seen and evaluated by Neurology in ED  Symptoms likely due to TME/recrudescence   Stroke pathway not indicated at this time  More alert today, starting to eat.  Hopeful discharge tomorrow.    Primary hypertension  Initially with soft BP, now improved  Continue POA  metoprolol  Monitor  Hyperlipidemia  Continue statin  Severe dementia with agitation (HCC)  Nonverbal and bed bound at baseline  Was maintained in zyprexa, remeron in the past appears patient is no longer taking  Delirium precautions  Fall precautions  Stage 3b chronic kidney disease (HCC)  Lab Results   Component Value Date    EGFR 37 03/12/2025    EGFR 28 03/11/2025    EGFR 19 03/10/2025    CREATININE 1.24 03/12/2025    CREATININE 1.55 (H) 03/11/2025    CREATININE 2.11 (H) 03/10/2025   Baseline appears to be 1.3-1.4  Now with OMEGA suspected secondary to contrast use  Pericardial effusion  Noted on CT scan  Echo without any significant changes, redemonstrated small circumferential pericardial effusion without evidence of tamponade    VTE Pharmacologic Prophylaxis:   High Risk (Score >/= 5) - Pharmacological DVT Prophylaxis Ordered: apixaban (Eliquis). Sequential Compression Devices Ordered.    Mobility:   Basic Mobility Inpatient Raw Score: 6  JH-HLM Goal: 2: Bed activities/Dependent transfer  JH-HLM Achieved: 1: Laying in bed  JH-HLM Goal NOT achieved. Continue with multidisciplinary rounding and encourage appropriate mobility to improve upon JH-HLM goals.    Patient Centered Rounds: I performed bedside rounds with nursing staff today.   Discussions with Specialists or Other Care Team Provider: Speech therapy    Education and Discussions with Family / Patient: Updated  (son) at bedside.    Current Length of Stay: 6 day(s)  Current Patient Status: Inpatient   Certification Statement: The patient will continue to require additional inpatient hospital stay due to WBC monitoring  Discharge Plan: Anticipate discharge tomorrow to home.    Code Status: Level 1 - Full Code    Subjective   No acute events overnight.  Patient is more alert now that she was transferred out of bed into chair.  Family brought in food for her to try and eat.    Objective :  Temp:  [96.1 °F (35.6 °C)-98.3 °F (36.8 °C)] 98.3 °F (36.8  °C)  HR:  [55-96] 55  BP: (129-149)/(60-66) 149/66  Resp:  [16-20] 16  SpO2:  [93 %-100 %] 100 %  O2 Device: Nasal cannula  Nasal Cannula O2 Flow Rate (L/min):  [2 L/min] 2 L/min    Body mass index is 22.57 kg/m².     Input and Output Summary (last 24 hours):     Intake/Output Summary (Last 24 hours) at 3/12/2025 1358  Last data filed at 3/12/2025 1315  Gross per 24 hour   Intake 120 ml   Output 500 ml   Net -380 ml       Physical Exam  Vitals and nursing note reviewed.   Constitutional:       Appearance: Normal appearance.   HENT:      Head: Normocephalic and atraumatic.      Mouth/Throat:      Mouth: Mucous membranes are moist.      Pharynx: Oropharynx is clear. No oropharyngeal exudate.   Eyes:      Extraocular Movements: Extraocular movements intact.   Cardiovascular:      Rate and Rhythm: Normal rate and regular rhythm.      Pulses: Normal pulses.      Heart sounds: Normal heart sounds. No murmur heard.     No friction rub. No gallop.   Pulmonary:      Effort: Pulmonary effort is normal. No respiratory distress.      Breath sounds: Normal breath sounds. No stridor. No wheezing or rales.   Abdominal:      General: Abdomen is flat. Bowel sounds are normal. There is no distension.      Palpations: Abdomen is soft.      Tenderness: There is no abdominal tenderness.   Musculoskeletal:      Right lower leg: No edema.      Left lower leg: No edema.   Skin:     General: Skin is warm and dry.   Neurological:      General: No focal deficit present.      Mental Status: She is alert. Mental status is at baseline.           Lines/Drains:  Lines/Drains/Airways       Active Status       Name Placement date Placement time Site Days    External Urinary Catheter 03/11/25  1300  -- 1                            Lab Results: I have reviewed the following results:   Results from last 7 days   Lab Units 03/12/25  0449   WBC Thousand/uL 2.87*   HEMOGLOBIN g/dL 9.6*   HEMATOCRIT % 30.9*   PLATELETS Thousands/uL 192   SEGS PCT % 50    LYMPHO PCT % 36   MONO PCT % 9   EOS PCT % 5     Results from last 7 days   Lab Units 03/12/25  0449 03/11/25  0422   SODIUM mmol/L 141 144   POTASSIUM mmol/L 3.7 3.6   CHLORIDE mmol/L 111* 115*   CO2 mmol/L 24 23   BUN mg/dL 28* 39*   CREATININE mg/dL 1.24 1.55*   ANION GAP mmol/L 6 6   CALCIUM mg/dL 8.2* 7.4*   ALBUMIN g/dL  --  2.9*   TOTAL BILIRUBIN mg/dL  --  0.40   ALK PHOS U/L  --  65   ALT U/L  --  8   AST U/L  --  18   GLUCOSE RANDOM mg/dL 139 137     Results from last 7 days   Lab Units 03/07/25  0200   INR  1.01     Results from last 7 days   Lab Units 03/08/25  0748 03/06/25  0806   POC GLUCOSE mg/dl 71 124               Recent Cultures (last 7 days):         Imaging Results Review: No pertinent imaging studies reviewed.  Other Study Results Review: No additional pertinent studies reviewed.    Last 24 Hours Medication List:     Current Facility-Administered Medications:     acetaminophen (TYLENOL) tablet 650 mg, Q6H PRN    amiodarone tablet 200 mg, Daily    apixaban (ELIQUIS) tablet 10 mg, BID    aspirin (ECOTRIN LOW STRENGTH) EC tablet 81 mg, Daily    atorvastatin (LIPITOR) tablet 40 mg, QPM    diltiazem (CARDIZEM) tablet 30 mg, TID    docusate sodium (COLACE) capsule 100 mg, BID    DULoxetine (CYMBALTA) delayed release capsule 30 mg, HS    metoprolol (LOPRESSOR) injection 2.5 mg, Q6H PRN    metoprolol tartrate (LOPRESSOR) tablet 25 mg, BID    terazosin (HYTRIN) capsule 2 mg, HS    Administrative Statements   Today, Patient Was Seen By: Chente Burrell PA-C      **Please Note: This note may have been constructed using a voice recognition system.**

## 2025-03-13 VITALS
WEIGHT: 108 LBS | OXYGEN SATURATION: 93 % | BODY MASS INDEX: 22.67 KG/M2 | RESPIRATION RATE: 20 BRPM | TEMPERATURE: 97.9 F | DIASTOLIC BLOOD PRESSURE: 74 MMHG | HEART RATE: 66 BPM | HEIGHT: 58 IN | SYSTOLIC BLOOD PRESSURE: 164 MMHG

## 2025-03-13 LAB
ANION GAP SERPL CALCULATED.3IONS-SCNC: 5 MMOL/L (ref 4–13)
BASOPHILS # BLD AUTO: 0.02 THOUSANDS/ÂΜL (ref 0–0.1)
BASOPHILS NFR BLD AUTO: 1 % (ref 0–1)
BUN SERPL-MCNC: 24 MG/DL (ref 5–25)
CALCIUM SERPL-MCNC: 8.6 MG/DL (ref 8.4–10.2)
CHLORIDE SERPL-SCNC: 111 MMOL/L (ref 96–108)
CO2 SERPL-SCNC: 25 MMOL/L (ref 21–32)
CREAT SERPL-MCNC: 1.13 MG/DL (ref 0.6–1.3)
EOSINOPHIL # BLD AUTO: 0.2 THOUSAND/ÂΜL (ref 0–0.61)
EOSINOPHIL NFR BLD AUTO: 5 % (ref 0–6)
ERYTHROCYTE [DISTWIDTH] IN BLOOD BY AUTOMATED COUNT: 15 % (ref 11.6–15.1)
GFR SERPL CREATININE-BSD FRML MDRD: 41 ML/MIN/1.73SQ M
GLUCOSE SERPL-MCNC: 90 MG/DL (ref 65–140)
HCT VFR BLD AUTO: 29.6 % (ref 34.8–46.1)
HGB BLD-MCNC: 9.3 G/DL (ref 11.5–15.4)
IMM GRANULOCYTES # BLD AUTO: 0.01 THOUSAND/UL (ref 0–0.2)
IMM GRANULOCYTES NFR BLD AUTO: 0 % (ref 0–2)
LYMPHOCYTES # BLD AUTO: 0.96 THOUSANDS/ÂΜL (ref 0.6–4.47)
LYMPHOCYTES NFR BLD AUTO: 26 % (ref 14–44)
MCH RBC QN AUTO: 28.8 PG (ref 26.8–34.3)
MCHC RBC AUTO-ENTMCNC: 31.4 G/DL (ref 31.4–37.4)
MCV RBC AUTO: 92 FL (ref 82–98)
MONOCYTES # BLD AUTO: 0.35 THOUSAND/ÂΜL (ref 0.17–1.22)
MONOCYTES NFR BLD AUTO: 10 % (ref 4–12)
NEUTROPHILS # BLD AUTO: 2.13 THOUSANDS/ÂΜL (ref 1.85–7.62)
NEUTS SEG NFR BLD AUTO: 58 % (ref 43–75)
NRBC BLD AUTO-RTO: 0 /100 WBCS
PLATELET # BLD AUTO: 210 THOUSANDS/UL (ref 149–390)
PMV BLD AUTO: 10.4 FL (ref 8.9–12.7)
POTASSIUM SERPL-SCNC: 3.7 MMOL/L (ref 3.5–5.3)
RBC # BLD AUTO: 3.23 MILLION/UL (ref 3.81–5.12)
SODIUM SERPL-SCNC: 141 MMOL/L (ref 135–147)
WBC # BLD AUTO: 3.67 THOUSAND/UL (ref 4.31–10.16)

## 2025-03-13 PROCEDURE — 80048 BASIC METABOLIC PNL TOTAL CA: CPT | Performed by: PHYSICIAN ASSISTANT

## 2025-03-13 PROCEDURE — 99239 HOSP IP/OBS DSCHRG MGMT >30: CPT | Performed by: INTERNAL MEDICINE

## 2025-03-13 PROCEDURE — 85025 COMPLETE CBC W/AUTO DIFF WBC: CPT | Performed by: PHYSICIAN ASSISTANT

## 2025-03-13 RX ADMIN — AMIODARONE HYDROCHLORIDE 200 MG: 200 TABLET ORAL at 09:19

## 2025-03-13 RX ADMIN — DILTIAZEM HYDROCHLORIDE 30 MG: 30 TABLET, FILM COATED ORAL at 09:19

## 2025-03-13 RX ADMIN — DOCUSATE SODIUM 100 MG: 100 CAPSULE, LIQUID FILLED ORAL at 09:19

## 2025-03-13 RX ADMIN — METOPROLOL TARTRATE 2.5 MG: 5 INJECTION INTRAVENOUS at 00:10

## 2025-03-13 RX ADMIN — APIXABAN 10 MG: 5 TABLET, FILM COATED ORAL at 09:19

## 2025-03-13 RX ADMIN — METOPROLOL TARTRATE 25 MG: 25 TABLET, FILM COATED ORAL at 09:19

## 2025-03-13 RX ADMIN — ASPIRIN 81 MG: 81 TABLET, COATED ORAL at 09:19

## 2025-03-13 NOTE — NURSING NOTE
Miguel Andres notified via phone call of rx to pharmacy and AVS at the back nurse's station. Stated he will notify the patient's son Logan.

## 2025-03-13 NOTE — ASSESSMENT & PLAN NOTE
Lab Results   Component Value Date    CREATININE 1.13 03/13/2025    CREATININE 1.24 03/12/2025    CREATININE 1.55 (H) 03/11/2025     Admission creatinine 1.5  Current creatinine 2.3  Suspect in setting of poor p.o. intake/hypovolemia and contrast use  Resolved

## 2025-03-13 NOTE — NURSING NOTE
Patient has been discharged, transported via [facility]. Discharged instructions at nurse's station, will notify son to .

## 2025-03-13 NOTE — CASE MANAGEMENT
Case Management Discharge Planning Note    Patient name Denia Castañeda  Location [facility] /E4 -* MRN [id number]  : 1931 Date 3/13/2025       Current Admission Date: 3/6/2025  Current Admission Diagnosis:Acute pulmonary embolism (HCC)   Patient Active Problem List    Diagnosis Date Noted Date Diagnosed    Hypernatremia 03/10/2025     Stroke-like symptom 2025     Acute pulmonary embolism (HCC) 2025     Cystitis 2024     CVA (cerebral vascular accident) ([facility]) 2024     Vaginal bleeding 2024     Edema of right upper extremity 2024     Pericardial effusion 2024     Facial asymmetry 2024     Back pain 2023     At risk for elder abuse 2023     Urinary retention 2022     Edema of right ankle 10/04/2022     Flank pain 10/18/2021     Oropharyngeal dysphagia 2021     Normocytic anemia 2021     H/O fracture of right hip 07/15/2021     Stage 3b chronic kidney disease ([facility]) 07/15/2021     Age-related osteoporosis without current pathological fracture 2020     Moderate protein-calorie malnutrition ([facility]) 2020     Severe dementia with agitation ([facility]) 2020     Urinary incontinence 2020     Ambulatory dysfunction 04/10/2020     OMEGA (acute kidney injury) ([facility]) 04/10/2020     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  [address] [facility] [address] [address]  Phone:  [phone] Fax: [phone]    [facility] - [place] - [address],  [address],  [address]  [facility] [address]  Phone: [phone] Fax: [phone]    [id number] [place] [facility] [address]  [address] [address]  Phone: [phone] Fax: [phone]    Primary Care Provider: Jose M Castañeda MD    Primary Insurance: [employer]  Secondary Insurance: [facility]    DISCHARGE DETAILS:       Transport at Discharge : [facility]     Number/Name of Dispatcher: [phone]  Transported by (Company and Unit #): [facility]  ETA of Transport (Date): 03/13/25  ETA of Transport (Time): 1430        Additional Comments: Pt is cleared for discharge. CM called [facility] notified them of pt's discharge.  A caregiver can be at pt's home any time after 2PM. CM arranged [facility] transport for 2:30PM via [facility]. [facility] will provide transport. MEdical Necessity completed and placed in pt binder.  Pt's son also updated.

## 2025-03-13 NOTE — ASSESSMENT & PLAN NOTE
Lab Results   Component Value Date    EGFR 41 03/13/2025    EGFR 37 03/12/2025    EGFR 28 03/11/2025    CREATININE 1.13 03/13/2025    CREATININE 1.24 03/12/2025    CREATININE 1.55 (H) 03/11/2025   Baseline appears to be 1.3-1.4  Now with OMEGA suspected secondary to contrast use

## 2025-03-13 NOTE — DISCHARGE SUMMARY
Discharge Summary - Hospitalist   Name: Denia Castañeda 89 y.o. female I MRN: [id number]  Unit/Bed#: E4 -01 I Date of Admission: 3/6/2025   Date of Service: 3/13/2025 I Hospital Day: 7         Admitting Provider:  Kana Moseley DO  Discharge Provider:  Eulogio Hernandez DO  Admission Date: 3/6/2025       Discharge Date: 03/13/25   LOS: 7  Primary Care Physician at Discharge: Jose M Castañeda [phone]    HOSPITAL COURSE:  Denia Castañeda is a 89 y.o. female who presented to the emergency room with altered mental status.  She initially presented as a stroke alert.  Her past medical history is notable for schizoaffective disorder, depression, severe dementia with agitation, history of prior stroke and history of chronic right facial droop as well as right orbital fracture.  Also has a history of atrial fibrillation not on anticoagulation due to vaginal bleeding in the past.    Was noted by caregiver to be less responsive, more contracted and drooling.  NIH stroke score was calculated to be 26.  She was urgently evaluated by neurology, she was found to have small age-indeterminate likely subacute to chronic right cerebellar infarct.  There were also multiple other infarcts in the brain.    Additional workup for the patient's altered mental status demonstrated pulmonary embolism, there was no evidence of right heart strain and echo showed no additional thrombus or right ventricular abnormalities.    She was started on Eliquis and tolerated this with no evidence of any further bleeding.  Her hospitalization was complicated by hyponatremia which resolved with IV fluid administration.    Patient was subsequently discharged home with planned outpatient follow-up with PCP and pulmonary    It was not felt that the patient had additional stroke but likely this was related to recrudesce of previous stroke in the setting of pulmonary embolism.      At the time of discharge the patient was tolerating oral diet they  "were without acute complaint and they were medically cleared for discharge.  All questions were answered the patient's satisfaction and they were in agreement with the discharge plan.    Addendum:  89 y/o F with severe dementia and prior CVA presented with stroke-like symptoms     Neurology Consult (3/6 @0812): \"…nonverbal and bed bound, she has a severe dementia…The deficits identified seem to be chronic, excepting her diminished responsiveness, which is possibly explained by identified PE…\"     Hospitalist (3/9 @1240): \"…Stroke-like symptom…CTA/CT Head without signs of acute stroke…Her mental status is waxing and waning.  We can't get her home until she is consistently taking pills…\"     Nursing Flowsheets -> PCA Daily Care/Safety:   3/7 @0915: \"…Level of assistance: Maximal assist…Needs assistance [for repositioning capability]…Positioning frequency: Every 2 hours…\"  3/8 @5884-4728: \"…Hygiene: Maximum assist…Feeding: Total assist…\"     Interventions: IVF, CT chest/head, Neuro consult, pureed diet with nectar thick liquids, supportive care     Based on your medical judgment of the clinical indicators outlined above, please clarify if you are monitoring or treating:  -- Functional quadriplegia ruled in    DISCHARGE DIAGNOSES  Hypernatremia  Assessment & Plan  Na+ 148, down to 141  Switch fluids to D5 and half-normal saline  Renal function improving  Improving, given history of dementia unlikely to be a candidate for PEG tube placement  Given age and other comorbidities it is only a matter of time before her sodium decompensates in the future    Stroke-like symptom  Assessment & Plan  Patient brought in due to change in mental status. Found to be lethargic/encephalopathic, less responsive, more contracted and drooling. Has history of facial droop  Stroke alert in ED  CTA/CT Head without signs of acute stroke  Patient appears to be back to baseline mentation  Patient was seen and evaluated by Neurology in " ED  Symptoms likely due to TME/recrudescence       Pericardial effusion  Assessment & Plan  Noted on CT scan  Echo without any significant changes, redemonstrated small circumferential pericardial effusion without evidence of tamponade    Stage 3b chronic kidney disease (HCC)  Assessment & Plan  Lab Results   Component Value Date    EGFR 41 03/13/2025    EGFR 37 03/12/2025    EGFR 28 03/11/2025    CREATININE 1.13 03/13/2025    CREATININE 1.24 03/12/2025    CREATININE 1.55 (H) 03/11/2025   Baseline appears to be 1.3-1.4  Now with OMEGA suspected secondary to contrast use    Severe dementia with agitation ([facility])  Assessment & Plan  Nonverbal and bed bound at baseline  Was maintained in zyprexa, remeron in the past appears patient is no longer taking  Delirium precautions  Fall precautions    Atrial fibrillation ([facility])  Assessment & Plan  Continue diltiazem, metoprolol and amiodarone.  Not on anticoagulation prior to admission due to hx of dementia, risk for fall  Now on Eliquis for PE    OMEGA (acute kidney injury) ([facility])  Assessment & Plan  Lab Results   Component Value Date    CREATININE 1.13 03/13/2025    CREATININE 1.24 03/12/2025    CREATININE 1.55 (H) 03/11/2025     Admission creatinine 1.5  Current creatinine 2.3  Suspect in setting of poor p.o. intake/hypovolemia and contrast use  Resolved    Hyperlipidemia  Assessment & Plan  Continue statin    Primary hypertension  Assessment & Plan  Initially with soft BP, now improved  Continue POA metoprolol  Monitor    * Acute pulmonary embolism (HCC)  Assessment & Plan  Likely from being bed bound, patient with history of afib not on anticoagulation prior to admission  CT Scan reveals:   Findings consistent with right-sided pulmonary embolism at the lobar/segmental levels; overall clot burden is small. Measured RV/LV ratio is within normal limits at less than 0.9; there is no evidence for right heart strain.   On 2L supplemental O2, continue maintain SpO2 >92%  On  Eliquis  Price check sent for Eliquis - $0  Bilateral venous duplex with no DVT.  Right superficial thrombophlebitis.  Outpatient pulmonary follow-up      CONSULTING PROVIDERS   IP CONSULT TO NEUROLOGY    PROCEDURES PERFORMED  * No surgery found *    RADIOLOGY RESULTS  X-ray chest 1 view portable  Result Date: 3/6/2025  Impression: No focal consolidation, pleural effusion, or pneumothorax. Workstation performed: [id number]     CTA chest pe study  Result Date: 3/6/2025  Impression: Findings consistent with right-sided pulmonary embolism at the lobar/segmental levels; overall clot burden is small. Measured RV/LV ratio is within normal limits at less than 0.9; there is no evidence for right heart strain. Small to moderate pericardial effusion. The study was marked in EPIC for immediate notification. Workstation performed: [id number]     CTA stroke alert (head/neck)  Result Date: 3/6/2025  Impression: 1.  Incidentally noted pulmonary emboli within the interlobar and segmental branches of the right pulmonary artery. 2.  No intracranial large vessel occlusion or critical stenosis. No aneurysm. 3.  No significant stenosis in the cervical carotid or vertebral arteries. 4.  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 Moderate stenosis at the origin. 5.  Coronary artery atherosclerotic disease. Findings were directly discussed with Trae Thibodeaux at 8:34 a.m. Findings were also discussed with provider in the emergency department RACHEL RAMOS on 3/6/2025 8:30 AM. Workstation performed: [id number]     CT stroke alert brain  Result Date: 3/6/2025  Impression: 1.  Small age-indeterminate, likely subacute to chronic right cerebellar infarct. Additional old bilateral cerebellar lacunar infarcts. 2.  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 Stable old lacunar infarcts in the left anterior insula/external capsule. 3.  Chronic microangiopathic change. 4.  Right maxillary  "sinus disease with frothy secretions suggesting acute sinusitis. Findings were directly discussed with Trae Thibodeaux at approximately 8:34 a.m Workstation performed: [id number]       LABS  Results from last 7 days   Lab Units 03/13/25  0552 03/12/25 0449 03/11/25 0422 03/10/25  0424 03/09/25  0848 03/08/25  0325 03/07/25  0517 03/07/25  0200   WBC Thousand/uL 3.67* 2.87* 3.37* 3.49* 4.89 5.27 7.15  --    HEMOGLOBIN g/dL 9.3* 9.6* 8.1* 8.8* 9.4* 9.3* 9.5*  --    HEMATOCRIT % 29.6* 30.9* 26.3* 28.7* 30.0* 29.8* 30.3*  --    MCV fL 92 95 94 94 94 94 95  --    PLATELETS Thousands/uL 210 192 181 212 216 237 211  --    INR   --   --   --   --   --   --   --  1.01     Results from last 7 days   Lab Units 03/13/25  0552 03/12/25 0449 03/11/25 0422 03/10/25  0424 03/09/25  0848 03/08/25  0325 03/07/25  0233   SODIUM mmol/L 141 141 144 148* 147 144 143   POTASSIUM mmol/L 3.7 3.7 3.6 3.6 3.6 4.0 4.3   CHLORIDE mmol/L 111* 111* 115* 115* 112* 108 108   CO2 mmol/L 25 24 23 23 23 20* 24   BUN mg/dL 24 28* 39* 52* 52* 48* 33*   CREATININE mg/dL 1.13 1.24 1.55* 2.11* 2.40* 2.33* 1.56*   CALCIUM mg/dL 8.6 8.2* 7.4* 8.3* 8.7 8.4 8.7   ALBUMIN g/dL  --   --  2.9*  --   --   --   --    TOTAL BILIRUBIN mg/dL  --   --  0.40  --   --   --   --    ALK PHOS U/L  --   --  65  --   --   --   --    ALT U/L  --   --  8  --   --   --   --    AST U/L  --   --  18  --   --   --   --    EGFR ml/min/1.73sq m 41 37 28 19 16 17 28   GLUCOSE RANDOM mg/dL 90 139 137 83 103 77 72     Results from last 7 days   Lab Units 03/06/25  1248 03/06/25  1031   HS TNI 2HR ng/L  --  22   HS TNI 4HR ng/L 26  --               Results from last 7 days   Lab Units 03/08/25  0748   POC GLUCOSE mg/dl 71                       Cultures:         Invalid input(s): \"URIBILINOGEN\"        Results from last 7 days   Lab Units 03/06/25  0845   INFLUENZA A PCR  Negative     Results from last 7 days   Lab Units 03/06/25  0845   SARS-COV-2  Negative   INFLUENZA A PCR  " "Negative   INFLUENZA B PCR  Negative   RSV PCR  Negative         PHYSICAL EXAM:  Vitals:   Blood Pressure: 164/74 (03/13/25 0730)  Pulse: 66 (03/13/25 0730)  Temperature: 97.9 °F (36.6 °C) (03/13/25 0730)  Temp Source: Temporal (03/13/25 0730)  Respirations: 20 (03/13/25 0730)  Height: 4' 10\" (147.3 cm) (03/07/25 1345)  Weight - Scale: 49 kg (108 lb) (03/07/25 1345)  SpO2: 93 % (03/13/25 0730)      General: well appearing, no acute distress  HEENT: atraumatic, PERRLA, moist mucosa, normal pharynx, normal tonsils and adenoids, normal tongue, no fluid in sinuses  Neck: Trachea midline, no carotid bruit, no masses  Respiratory: normal chest wall expansion, CTA B  Cardiovascular: RRR, no m/r/g, Normal S1 and S2  Abdomen: Soft, non-tender, non-distended, normal bowel sounds in all quadrants, no hepatosplenomegaly, no tympany  Rectal: deferred  Musculoskeletal: Moves all  Integumentary: warm, dry, and pink, with no visible rash, purpura, or petechia  Heme/Lymph: no lymphadenopathy, no bruises  Neurological: Cranial Nerves II-XII grossly intact  Psychiatric: Nonverbal apparent dementia      Discharge Disposition: Home with Home Health Care    AM-PAC Basic Mobility:  Basic Mobility Inpatient Raw Score: 6    JH-HLM Achieved: 1: Laying in bed  JH-HLM Goal: 2: Bed activities/Dependent transfer    HLM Goal listed above. Continue with ongoing physical therapy and encourage appropriate mobility to improve upon HLM goals.      Test Results Pending at Discharge:           Medications   Summary of Medication Adjustments made as a result of this hospitalization: See discharge summary and AVS for medication changes  Medication Dosing Tapers - Please refer to Discharge Medication List for details on any medication dosing tapers (if applicable to patient).  Discharge Medication List: See after visit summary for reconciled discharge medications.     Diet restrictions:         Diet Orders   (From admission, onward)                 Start   "   Ordered    03/11/25 1623  Dietary nutrition supplements  Once        Question Answer Comment   Select Supplement: MightyShake - Vanilla    Frequency Lunch, Dinner        03/11/25 1622    03/07/25 1844  Diet Dysphagia/Modified Consistency; Dysphagia 1-Pureed; Nectar Thick Liquid; Dysphagia 1-Pureed  Diet effective now        References:    Adult Nutrition Support Algorithm    RD Therapeutic Diet Order Protocol   Question Answer Comment   Diet Type Dysphagia/Modified Consistency    Dysphagia/Modified Consistency Dysphagia 1-Pureed    Liquid Modifier Nectar Thick Liquid    Other Restriction(s): Dysphagia 1-Pureed    RD to adjust diet per protocol? Yes        03/07/25 1844                  Activity restrictions: No strenuous activity  Discharge Condition: stable    Outpatient Follow-Up and Discharge Instructions  See after visit summary section titled Discharge Instructions for information provided to patient and family.      Code Status: Level 1 - Full Code  Discharge Statement   I spent 86 minutes discharging the patient. This time was spent on the day of discharge. Greater than 50% of total time was spent with the patient and / or family counseling and / or coordination of care.    ** Please Note: This note was completed in part utilizing [employer] Dragon [employer] One Software.  Grammatical errors, random word insertions, spelling mistakes, and incomplete sentences may be an occasional consequence of this system secondary to software limitations, ambient noise, and hardware issues.  If you have any questions or concerns about the content, text, or information contained within the body of this dictation, please contact the provider for clarification.**

## 2025-03-13 NOTE — DISCHARGE INSTR - AVS FIRST PAGE
Dear Denia Castañeda,     It was our pleasure to care for you here at [facility].  It is our hope that we were always able to exceed the expected standards for your care during your stay.  You were hospitalized due to unresponsive episode, found to have pulmonary embolism.  You were cared for on the fourth floor by Eulogio Hernandez DO with the [facility] who covers for your primary care physician (PCP), Jose M Castañeda MD, while you were hospitalized.  If you have any questions or concerns related to this hospitalization, you may contact us at .  For follow up as well as any medication refills, we recommend that you follow up with your primary care physician.  A registered nurse will reach out to you by phone within a few days after your discharge to answer any additional questions that you may have after going home.  However, at this time we provide for you here, the most important instructions / recommendations at discharge:     Notable Medication Adjustments -   Eliquis which is a blood thinner, please follow the instructions for administration  Testing Required after Discharge -   None  Important follow up information -   PCP follow-up in 1 week  Referral sent to pulmonary for continued care  Other Instructions -   You have been discharged on Eliquis  -If you have any unexplained bleed, worst headache of your life, strike your head or any other body part in any manner, you need to go the Emergency room.   Please do not participate in any high risk or contact sports while you are on a blood thinner.  Please review this entire after visit summary as additional general instructions including medication list, appointments, activity, diet, any pertinent wound care, and other additional recommendations from your care team that may be provided for you.      Sincerely,     Eulogio Hernandez DO and Nurse Coleman

## 2025-03-13 NOTE — ASSESSMENT & PLAN NOTE
Likely from being bed bound, patient with history of afib not on anticoagulation prior to admission  CT Scan reveals:   Findings consistent with right-sided pulmonary embolism at the lobar/segmental levels; overall clot burden is small. Measured RV/LV ratio is within normal limits at less than 0.9; there is no evidence for right heart strain.   On 2L supplemental O2, continue maintain SpO2 >92%  On Eliquis  Price check sent for Eliquis - $0  Bilateral venous duplex with no DVT.  Right superficial thrombophlebitis.  Outpatient pulmonary follow-up

## 2025-03-13 NOTE — PLAN OF CARE
Problem: PAIN - ADULT  Goal: Verbalizes/displays adequate comfort level or baseline comfort level  Description: Interventions:  - Encourage patient to monitor pain and request assistance  - Assess pain using appropriate pain scale  - Administer analgesics based on type and severity of pain and evaluate response  - Implement non-pharmacological measures as appropriate and evaluate response  - Consider cultural and social influences on pain and pain management  - Notify physician/advanced practitioner if interventions unsuccessful or patient reports new pain  Outcome: Completed     Problem: INFECTION - ADULT  Goal: Absence or prevention of progression during hospitalization  Description: INTERVENTIONS:  - Assess and monitor for signs and symptoms of infection  - Monitor lab/diagnostic results  - Monitor all insertion sites, i.e. indwelling lines, tubes, and drains  - Monitor endotracheal if appropriate and nasal secretions for changes in amount and color  - [place] appropriate cooling/warming therapies per order  - Administer medications as ordered  - Instruct and encourage patient and family to use good hand hygiene technique  - Identify and instruct in appropriate isolation precautions for identified infection/condition  Outcome: Completed  Goal: Absence of fever/infection during neutropenic period  Description: INTERVENTIONS:  - Monitor WBC    Outcome: Completed     Problem: SAFETY ADULT  Goal: Patient will remain free of falls  Description: INTERVENTIONS:  - Educate patient/family on patient safety including physical limitations  - Instruct patient to call for assistance with activity   - Consult OT/PT to assist with strengthening/mobility   - Keep Call bell within reach  - Keep bed low and locked with side rails adjusted as appropriate  - Keep care items and personal belongings within reach  - Initiate and maintain comfort rounds  - Make Fall Risk Sign visible to staff  - Offer Toileting every 2 Hours, in  advance of need  - Initiate/Maintain bed alarm  - Obtain necessary fall risk management equipment  - Apply yellow socks and bracelet for high fall risk patients  - Consider moving patient to room near nurses station  Outcome: Completed  Goal: Maintain or return to baseline ADL function  Description: INTERVENTIONS:  -  Assess patient's ability to carry out ADLs; assess patient's baseline for ADL function and identify physical deficits which impact ability to perform ADLs (bathing, care of mouth/teeth, toileting, grooming, dressing, etc.)  - Assess/evaluate cause of self-care deficits   - Assess range of motion  - Assess patient's mobility; develop plan if impaired  - Assess patient's need for assistive devices and provide as appropriate  - Encourage maximum independence but intervene and supervise when necessary  - Involve family in performance of ADLs  - Assess for home care needs following discharge   - Consider OT consult to assist with ADL evaluation and planning for discharge  - Provide patient education as appropriate  Outcome: Completed  Goal: Maintains/Returns to pre admission functional level  Description: INTERVENTIONS:  - Perform AM-PAC 6 Click Basic Mobility/ Daily Activity assessment daily.  - Set and communicate daily mobility goal to care team and patient/family/caregiver.   - Collaborate with rehabilitation services on mobility goals if consulted  - Perform Range of Motion 3 times a day.  - Reposition patient every 2 hours.  - Dangle patient 3 times a day  - Stand patient 3 times a day  - Ambulate patient 3 times a day  - Out of bed to chair 3 times a day   - Out of bed for meals 3 times a day  - Out of bed for toileting  - Record patient progress and toleration of activity level   Outcome: Completed     Problem: DISCHARGE PLANNING  Goal: Discharge to home or other facility with appropriate resources  Description: INTERVENTIONS:  - Identify barriers to discharge w/patient and caregiver  - Arrange for  needed discharge resources and transportation as appropriate  - Identify discharge learning needs (meds, wound care, etc.)  - Arrange for interpretive services to assist at discharge as needed  - Refer to Case Management Department for coordinating discharge planning if the patient needs post-hospital services based on physician/advanced practitioner order or complex needs related to functional status, cognitive ability, or social support system  Outcome: Completed     Problem: Knowledge Deficit  Goal: Patient/family/caregiver demonstrates understanding of disease process, treatment plan, medications, and discharge instructions  Description: Complete learning assessment and assess knowledge base.  Interventions:  - Provide teaching at level of understanding  - Provide teaching via preferred learning methods  Outcome: Completed     Problem: Prexisting or High Potential for Compromised Skin Integrity  Goal: Skin integrity is maintained or improved  Description: INTERVENTIONS:  - Identify patients at risk for skin breakdown  - Assess and monitor skin integrity  - Assess and monitor nutrition and hydration status  - Monitor labs   - Assess for incontinence   - Turn and reposition patient  - Assist with mobility/ambulation  - Relieve pressure over bony prominences  - Avoid friction and shearing  - Provide appropriate hygiene as needed including keeping skin clean and dry  - Evaluate need for skin moisturizer/barrier cream  - Collaborate with interdisciplinary team   - Patient/family teaching  - Consider wound care consult   Outcome: Completed     Problem: Nutrition/Hydration-ADULT  Goal: Nutrient/Hydration intake appropriate for improving, restoring or maintaining nutritional needs  Description: Monitor and assess patient's nutrition/hydration status for malnutrition. Collaborate with interdisciplinary team and initiate plan and interventions as ordered.  Monitor patient's weight and dietary intake as ordered or per  policy. Utilize nutrition screening tool and intervene as necessary. Determine patient's food preferences and provide high-protein, high-caloric foods as appropriate.     INTERVENTIONS:  - Monitor oral intake, urinary output, labs, and treatment plans  - Assess nutrition and hydration status and recommend course of action  - Evaluate amount of meals eaten  - Assist patient with eating if necessary   - Allow adequate time for meals  - Recommend/ encourage appropriate diets, oral nutritional supplements, and vitamin/mineral supplements  - Order, calculate, and assess calorie counts as needed  - Recommend, monitor, and adjust tube feedings and TPN/PPN based on assessed needs  - Assess need for intravenous fluids  - Provide specific nutrition/hydration education as appropriate  - Include patient/family/caregiver in decisions related to nutrition  Outcome: Completed

## 2025-03-13 NOTE — ASSESSMENT & PLAN NOTE
Patient brought in due to change in mental status. Found to be lethargic/encephalopathic, less responsive, more contracted and drooling. Has history of facial droop  Stroke alert in ED  CTA/CT Head without signs of acute stroke  Patient appears to be back to baseline mentation  Patient was seen and evaluated by Neurology in ED  Symptoms likely due to TME/recrudescence

## 2025-03-13 NOTE — ASSESSMENT & PLAN NOTE
Na+ 148, down to 141  Switch fluids to D5 and half-normal saline  Renal function improving  Improving, given history of dementia unlikely to be a candidate for PEG tube placement  Given age and other comorbidities it is only a matter of time before her sodium decompensates in the future

## 2025-03-14 ENCOUNTER — TRANSITIONAL CARE MANAGEMENT (OUTPATIENT)
Dept: INTERNAL MEDICINE CLINIC | Facility: CLINIC | Age: OVER 89
End: 2025-03-14

## 2025-03-14 ENCOUNTER — PATIENT OUTREACH (OUTPATIENT)
Dept: CASE MANAGEMENT | Facility: HOSPITAL | Age: OVER 89
End: 2025-03-14

## 2025-03-14 ENCOUNTER — PATIENT OUTREACH (OUTPATIENT)
Dept: CASE MANAGEMENT | Facility: OTHER | Age: OVER 89
End: 2025-03-14

## 2025-03-14 ENCOUNTER — TELEPHONE (OUTPATIENT)
Dept: INTERNAL MEDICINE CLINIC | Facility: CLINIC | Age: OVER 89
End: 2025-03-14

## 2025-03-14 ENCOUNTER — TELEPHONE (OUTPATIENT)
Age: OVER 89
End: 2025-03-14

## 2025-03-14 NOTE — UTILIZATION REVIEW
NOTIFICATION OF ADMISSION DISCHARGE   This is a Notification of Discharge from [facility]. Please be advised that this patient has been discharge from our facility. Below you will find the admission and discharge date and time including the patient’s disposition.   UTILIZATION REVIEW CONTACT:  Brittany Rodriguez MA  Utilization   Network Utilization Review Department  Phone: [phone] x carefully listen to the prompts. All voicemails are confidential.  Email: [email or web address]     ADMISSION INFORMATION  PRESENTATION DATE: 3/6/2025  8:02 AM  OBERVATION ADMISSION DATE: N/A  INPATIENT ADMISSION DATE: 3/6/25 10:19 AM   DISCHARGE DATE: 3/13/2025  2:51 PM   DISPOSITION:Home/Self Care    Network Utilization Review Department  ATTENTION: Please call with any questions or concerns to [phone] and carefully listen to the prompts so that you are directed to the right person. All voicemails are confidential.   For Discharge needs, contact Care Management DC Support Team at [phone] opt. 2  Send all requests for admission clinical reviews, approved or denied determinations and any other requests to dedicated fax number below belonging to the campus where the patient is receiving treatment. List of dedicated fax numbers for the Facilities:  FACILITY NAME UR FAX NUMBER   ADMISSION DENIALS (Administrative/Medical Necessity) [phone]   DISCHARGE SUPPORT TEAM ([facility]) [phone]   PARENT CHILD HEALTH (Maternity/NICU/Pediatrics) [phone]   [facility] [phone]   [facility] [phone]   [facility] [phone]   [facility] [phone]   [facility] [phone]   [facility] [phone]   [facility] [phone]   [facility]  [phone]   [facility] [phone]   [facility] [phone]   [facility] [phone]   [facility] [phone]

## 2025-03-14 NOTE — TELEPHONE ENCOUNTER
Patients son called to schedule patient a TCM. Upon warm transferring to office clinical the line the patients son had been on was only air and I was unable to speak with patients son to warm transfer successfully. Office clinical stated they will reach out to patients son to schedule TCM.

## 2025-03-14 NOTE — PROGRESS NOTES
This RNCM received call back from patients son Logan. He states he received the AVS that was sent via e-mail. He states he spoke with the pharmacist who is very concerned about discontinuing patients medications listed since they are all psychiatric medications. He will continue to give patient the Zyprexa and Remeron and Ativan as directed prior. He will follow up with psychiatry on Monday.

## 2025-03-14 NOTE — PROGRESS NOTES
HRR referral received via IB. Chart reviewed. Patient admitted to IP at [facility] 3/6-3/13 with PE. Caregiver noted patient was less responsive, more contacted and drooling. She was urgently evaluated by neurology, she was found to have small age-indeterminate likely subacute to chronic right cerebellar infarct. There were also multiple other infarcts in the brain. It was not felt that the patient had additional stroke but likely this was related to recrudesce of previous stroke in the setting of pulmonary embolism. CT: Findings consistent with right-sided pulmonary embolism at the lobar/segmental levels; overall clot burden is small. Measured RV/LV ratio is within normal limits at less than 0.9; there is no evidence for right heart strain.     PMH: AF, severe dementia, schizoaffective disorder, CKD3b, pericardial effusion, OMEGA, HTN, HLD, PE    Patient has HHA 24/7 through [facility] [employer]. Patient is total care and has the following DME: hospital bed, WC, walker, SC and BSC. Patient has transportation through insurance    Apt's:   PCP EVELYN: 3/21  Pulmonary: 4/14    Call placed to patients karri Ford. He states patient is doing well and her breathing is good. He confirmed patient has services noted above. He says that patient gets her medications in bubble packs and the HHA's make sure she takes them. He says he is aware of the Eliquis that she was started on and they have it. We reviewed the medications that were discontinued and he was unsure of these. He says he will go by the house and make sure that they are removed. He will also check with the pharmacy regarding the changes. He says they forgot the paperwork from the hospital and requested it be sent via e-mail. He says he doesn't have access to patients My Chart.    He is agreeable to a follow up call next week.    RNCM to follow.    AVS sent to patients karri Ford at: [email or web address]

## 2025-03-14 NOTE — TELEPHONE ENCOUNTER
[facility] tried patching son through to schedule TCM for Denia, but the son did not respond when merging the calls. I called the son back later to schedule appt.

## 2025-03-18 ENCOUNTER — PATIENT OUTREACH (OUTPATIENT)
Dept: CASE MANAGEMENT | Facility: HOSPITAL | Age: OVER 89
End: 2025-03-18

## 2025-03-18 NOTE — PROGRESS NOTES
Chart reviewed. Patient has PCP EVELYN apt scheduled on 3/21. Call placed to patients son Logan for follow up on medications. No answer and VM left with request for CB.

## 2025-03-21 ENCOUNTER — OFFICE VISIT (OUTPATIENT)
Dept: INTERNAL MEDICINE CLINIC | Facility: CLINIC | Age: OVER 89
End: 2025-03-21
Payer: MEDICARE

## 2025-03-21 VITALS
HEIGHT: 58 IN | HEART RATE: 66 BPM | BODY MASS INDEX: 22.57 KG/M2 | DIASTOLIC BLOOD PRESSURE: 53 MMHG | SYSTOLIC BLOOD PRESSURE: 108 MMHG | TEMPERATURE: 98 F

## 2025-03-21 DIAGNOSIS — R29.90 STROKE-LIKE SYMPTOM: ICD-10-CM

## 2025-03-21 DIAGNOSIS — I48.0 PAROXYSMAL A-FIB (HCC): ICD-10-CM

## 2025-03-21 DIAGNOSIS — I26.99 ACUTE PULMONARY EMBOLISM, UNSPECIFIED PULMONARY EMBOLISM TYPE, UNSPECIFIED WHETHER ACUTE COR PULMONALE PRESENT (HCC): ICD-10-CM

## 2025-03-21 DIAGNOSIS — Z76.89 ENCOUNTER FOR SUPPORT AND COORDINATION OF TRANSITION OF CARE: Primary | ICD-10-CM

## 2025-03-21 DIAGNOSIS — I10 PRIMARY HYPERTENSION: ICD-10-CM

## 2025-03-21 DIAGNOSIS — F20.5 RESIDUAL SCHIZOPHRENIA (HCC): ICD-10-CM

## 2025-03-21 PROCEDURE — 99495 TRANSJ CARE MGMT MOD F2F 14D: CPT | Performed by: STUDENT IN AN ORGANIZED HEALTH CARE EDUCATION/TRAINING PROGRAM

## 2025-03-21 RX ORDER — DILTIAZEM HYDROCHLORIDE 30 MG/1
15 TABLET, FILM COATED ORAL 3 TIMES DAILY
Qty: 135 TABLET | Refills: 1 | Status: SHIPPED | OUTPATIENT
Start: 2025-03-21

## 2025-03-21 NOTE — ASSESSMENT & PLAN NOTE
F/w Psychiatry, Dr. Alma Yun   No longer on Zyprexa or lorazepam  Currently only on Cymbalta 30 mg daily that is being prescribed by her psychiatrist

## 2025-03-21 NOTE — PROGRESS NOTES
"INTERNAL MEDICINE OFFICE VISIT NOTE  [facility] Internal Medicine [place]    NAME: Denia Castañeda  AGE: 89 y.o. SEX: female    DATE OF ENCOUNTER:       Chief Complaint   Patient presents with    Transition of Care Management     [facility] D/C 3/13 Acute Pulmonary Embolism          Assessment & Plan  Encounter for support and coordination of transition of care         Acute pulmonary embolism, unspecified pulmonary embolism type, unspecified whether acute cor pulmonale present (HCC)  Status post hospitalization secondary to enter mental status  Diagnosed with a right-sided pulmonary embolism while in the hospital  Started on Eliquis 5 mg twice a day  Her son reports no episodes of bleeding   Will continue Eliquis 5 mg twice a day         Stroke-like symptom  Status post hospitalization secondary to changes in mental status deemed to be likely related to recrudescence of previous strokes  She is accompanied today by her son who reports she is back to her baseline mental status         Paroxysmal A-fib ([facility])    Orders:    diltiazem (CARDIZEM) 30 mg tablet; Take 0.5 tablets (15 mg total) by mouth 3 (three) times a day    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 Alma Yun   No longer on Zyprexa or lorazepam  Currently only on Cymbalta 30 mg daily that is being prescribed by her psychiatrist           Primary hypertension  BP today low normal particularly considering her age group  She is currently on diltiazem 30mg TID, metoprolol tartrate 25 mg BID and terazosin 2mg for h/o urinary retention   Will reduce her diltiazem to 15 mg 3 times daily and continue metoprolol and terazosin at her current dose                  Return in about 4 months (around 7/7/2025).      OBJECTIVE:  Vitals:    03/21/25 1446   BP: 108/53   BP Location: Left arm   Patient Position: Sitting   Cuff Size: Standard   Pulse: 66   Temp: 98 °F (36.7 °C)   Height: 4' 10\" (1.473 m)         Physical Exam:   GENERAL: NAD, Normal appearance.  "   NEUROLOGIC:  Alert/oriented x3.  HEENT:  NC/AT, no scleral icterus  CARDIAC:  RRR, +S1/S2  PULMONARY:  non-labored breathing        Current Outpatient Medications:     acetaminophen (TYLENOL) 325 mg tablet, Take 2 tablets (650 mg total) by mouth every 6 (six) hours as needed for mild pain, moderate pain or headaches, Disp: 30 tablet, Rfl: 0    amiodarone 200 mg tablet, TAKE 1 TABLET (200 MG TOTAL) BY MOUTH DAILY, Disp: 90 tablet, Rfl: 1    apixaban (Eliquis) 5 mg, Take 2 tablets (10 mg total) by mouth 2 (two) times a day for 7 days, THEN 1 tablet (5 mg total) 2 (two) times a day for 23 days., Disp: 74 tablet, Rfl: 0    aspirin (Aspirin Low Dose) 81 mg EC tablet, TAKE 1 TABLET (81 MG TOTAL) BY MOUTH DAILY, Disp: 90 tablet, Rfl: 1    atorvastatin (LIPITOR) 40 mg tablet, TAKE 1 TABLET (40 MG TOTAL) BY MOUTH EVERY EVENING, Disp: 90 tablet, Rfl: 1    diltiazem (CARDIZEM) 30 mg tablet, Take 0.5 tablets (15 mg total) by mouth 3 (three) times a day, Disp: 135 tablet, Rfl: 1    docusate sodium (COLACE) 100 mg capsule, TAKE 1 CAPSULE (100 MG TOTAL) BY MOUTH 2 (TWO) TIMES A DAY, Disp: 180 capsule, Rfl: 1    DULoxetine (CYMBALTA) 30 mg delayed release capsule, Take 30 mg by mouth daily at bedtime, Disp: , Rfl:     metoprolol tartrate (LOPRESSOR) 25 mg tablet, TAKE 1 TABLET (25 MG TOTAL) BY MOUTH 2 (TWO) TIMES A DAY, Disp: 180 tablet, Rfl: 1    terazosin (HYTRIN) 2 mg capsule, Take 1 capsule (2 mg total) by mouth daily at bedtime, Disp: 90 capsule, Rfl: 3    Current Facility-Administered Medications:     denosumab (PROLIA) subcutaneous injection 60 mg, 60 mg, Subcutaneous, Q6 Months, , 60 mg at 03/18/24 1330    Patient Active Problem List   Diagnosis    Primary hypertension    Hyperlipidemia    Bilateral hearing loss    Orthostatic hypertension    Ambulatory dysfunction    OMEGA (acute kidney injury) (HCC)    Urinary incontinence    Atrial fibrillation (HCC)    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HCC)    Moderate protein-calorie  malnutrition (HCC)    Age-related osteoporosis without current pathological fracture    Personal history of fall    H/O fracture of right hip    Severe dementia with agitation (HCC)    Normocytic anemia    Oropharyngeal dysphagia    Flank pain    Edema of right ankle    Urinary retention    Back pain    At risk for elder abuse    Stage 3b chronic kidney disease (HCC)    Edema of right upper extremity    Pericardial effusion    Facial asymmetry    CVA (cerebral vascular accident) (HCC)    Vaginal bleeding    Cystitis    Cataract of both eyes    Chronic constipation    Stroke-like symptom    Acute pulmonary embolism (HCC)    Hypernatremia       TCM Call (since 3/7/2025)       Date and time call was made  3/14/2025 11:57 AM    Patient was hospitialized at  [facility]    Date of Admission  03/06/25    Date of discharge  03/13/25    Diagnosis  Acute pulmonary embolism (HCC)    Disposition  Home    Were the patients medications reviewed and updated  Yes    Current Symptoms  None          TCM Call (since 3/7/2025)       Post hospital issues  None    Scheduled for follow up?  Yes    Did you obtain your prescribed medications  No    Do you need help managing your prescriptions or medications  No    Is transportation to your appointment needed  No    I have advised the patient to call PCP with any new or worsening symptoms  HELENA Ibrahim MD  [facility] Internal Medicine [place]    * Please Note: This note was completed in part utilizing a dictation software.  Grammatical errors, random word insertions, spelling mistakes, and incomplete sentences may be an occasional consequence of this system secondary to software limitations, ambient noise, and hardware issues.  If you have any questions or concerns about the content, text, or information contained within the body of this dictation, please contact the provider for clarification.**

## 2025-03-21 NOTE — ASSESSMENT & PLAN NOTE
BP today low normal particularly considering her age group  She is currently on diltiazem 30mg TID, metoprolol tartrate 25 mg BID and terazosin 2mg for h/o urinary retention   Will reduce her diltiazem to 15 mg 3 times daily and continue metoprolol and terazosin at her current dose

## 2025-03-21 NOTE — ASSESSMENT & PLAN NOTE
Status post hospitalization secondary to enter mental status  Diagnosed with a right-sided pulmonary embolism while in the hospital  Started on Eliquis 5 mg twice a day  Her son reports no episodes of bleeding   Will continue Eliquis 5 mg twice a day

## 2025-03-21 NOTE — ASSESSMENT & PLAN NOTE
Status post hospitalization secondary to changes in mental status deemed to be likely related to recrudescence of previous strokes  She is accompanied today by her son who reports she is back to her baseline mental status

## 2025-03-23 ENCOUNTER — HOSPITAL ENCOUNTER (INPATIENT)
Facility: HOSPITAL | Age: OVER 89
LOS: 2 days | Discharge: HOME | End: 2025-03-25
Attending: EMERGENCY MEDICINE | Admitting: STUDENT IN AN ORGANIZED HEALTH CARE EDUCATION/TRAINING PROGRAM
Payer: MEDICARE

## 2025-03-23 ENCOUNTER — APPOINTMENT (EMERGENCY)
Dept: CT IMAGING | Facility: HOSPITAL | Age: OVER 89
End: 2025-03-23
Payer: MEDICARE

## 2025-03-23 ENCOUNTER — APPOINTMENT (EMERGENCY)
Dept: RADIOLOGY | Facility: HOSPITAL | Age: OVER 89
End: 2025-03-23
Payer: MEDICARE

## 2025-03-23 DIAGNOSIS — D64.9 ACUTE ANEMIA: Primary | ICD-10-CM

## 2025-03-23 DIAGNOSIS — D64.9 SYMPTOMATIC ANEMIA: ICD-10-CM

## 2025-03-23 DIAGNOSIS — F03.C11: ICD-10-CM

## 2025-03-23 PROBLEM — Z86.711 HISTORY OF PULMONARY EMBOLISM: Status: ACTIVE | Noted: 2025-03-06

## 2025-03-23 PROBLEM — D53.9 MACROCYTIC ANEMIA: Status: ACTIVE | Noted: 2021-07-16

## 2025-03-23 PROBLEM — R79.89 ELEVATED SERUM CREATININE: Status: ACTIVE | Noted: 2020-04-10

## 2025-03-23 LAB
2HR DELTA HS TROPONIN: -3 NG/L
ABO GROUP BLD: NORMAL
ALBUMIN SERPL BCG-MCNC: 3.8 G/DL (ref 3.5–5)
ALP SERPL-CCNC: 93 U/L (ref 34–104)
ALT SERPL W P-5'-P-CCNC: 11 U/L (ref 7–52)
ANION GAP SERPL CALCULATED.3IONS-SCNC: 6 MMOL/L (ref 4–13)
APTT PPP: 35 SECONDS (ref 23–34)
AST SERPL W P-5'-P-CCNC: 18 U/L (ref 13–39)
ATRIAL RATE: 52 BPM
ATRIAL RATE: 53 BPM
BASOPHILS # BLD AUTO: 0.01 THOUSANDS/ÂΜL (ref 0–0.1)
BASOPHILS NFR BLD AUTO: 0 % (ref 0–1)
BILIRUB SERPL-MCNC: 0.69 MG/DL (ref 0.2–1)
BLD GP AB SCN SERPL QL: NEGATIVE
BNP SERPL-MCNC: 379 PG/ML (ref 0–100)
BUN SERPL-MCNC: 34 MG/DL (ref 5–25)
CALCIUM SERPL-MCNC: 9.1 MG/DL (ref 8.4–10.2)
CARDIAC TROPONIN I PNL SERPL HS: 12 NG/L (ref ?–50)
CARDIAC TROPONIN I PNL SERPL HS: 15 NG/L (ref ?–50)
CHLORIDE SERPL-SCNC: 108 MMOL/L (ref 96–108)
CO2 SERPL-SCNC: 31 MMOL/L (ref 21–32)
CREAT SERPL-MCNC: 1.38 MG/DL (ref 0.6–1.3)
EOSINOPHIL # BLD AUTO: 0.15 THOUSAND/ÂΜL (ref 0–0.61)
EOSINOPHIL NFR BLD AUTO: 3 % (ref 0–6)
ERYTHROCYTE [DISTWIDTH] IN BLOOD BY AUTOMATED COUNT: 17.2 % (ref 11.6–15.1)
FLUAV RNA RESP QL NAA+PROBE: NEGATIVE
FLUBV RNA RESP QL NAA+PROBE: NEGATIVE
FOLATE SERPL-MCNC: 7.7 NG/ML
GFR SERPL CREATININE-BSD FRML MDRD: 32 ML/MIN/1.73SQ M
GLUCOSE SERPL-MCNC: 98 MG/DL (ref 65–140)
HCT VFR BLD AUTO: 20.2 % (ref 34.8–46.1)
HCT VFR BLD AUTO: 24.8 % (ref 34.8–46.1)
HGB BLD-MCNC: 6.3 G/DL (ref 11.5–15.4)
HGB BLD-MCNC: 7.5 G/DL (ref 11.5–15.4)
IMM GRANULOCYTES # BLD AUTO: 0.02 THOUSAND/UL (ref 0–0.2)
IMM GRANULOCYTES NFR BLD AUTO: 0 % (ref 0–2)
INR PPP: 1.4 (ref 0.85–1.19)
LYMPHOCYTES # BLD AUTO: 0.96 THOUSANDS/ÂΜL (ref 0.6–4.47)
LYMPHOCYTES NFR BLD AUTO: 18 % (ref 14–44)
MCH RBC QN AUTO: 30 PG (ref 26.8–34.3)
MCHC RBC AUTO-ENTMCNC: 30.2 G/DL (ref 31.4–37.4)
MCV RBC AUTO: 99 FL (ref 82–98)
MONOCYTES # BLD AUTO: 0.33 THOUSAND/ÂΜL (ref 0.17–1.22)
MONOCYTES NFR BLD AUTO: 6 % (ref 4–12)
NEUTROPHILS # BLD AUTO: 3.79 THOUSANDS/ÂΜL (ref 1.85–7.62)
NEUTS SEG NFR BLD AUTO: 73 % (ref 43–75)
NRBC BLD AUTO-RTO: 0 /100 WBCS
P AXIS: 57 DEGREES
P AXIS: 60 DEGREES
PLATELET # BLD AUTO: 287 THOUSANDS/UL (ref 149–390)
PMV BLD AUTO: 10.2 FL (ref 8.9–12.7)
POTASSIUM SERPL-SCNC: 5.2 MMOL/L (ref 3.5–5.3)
PR INTERVAL: 182 MS
PR INTERVAL: 188 MS
PROT SERPL-MCNC: 6.3 G/DL (ref 6.4–8.4)
PROTHROMBIN TIME: 17.2 SECONDS (ref 12.3–15)
QRS AXIS: 41 DEGREES
QRS AXIS: 42 DEGREES
QRSD INTERVAL: 100 MS
QRSD INTERVAL: 104 MS
QT INTERVAL: 480 MS
QT INTERVAL: 496 MS
QTC INTERVAL: 446 MS
QTC INTERVAL: 465 MS
RBC # BLD AUTO: 2.5 MILLION/UL (ref 3.81–5.12)
RH BLD: NEGATIVE
RSV RNA RESP QL NAA+PROBE: NEGATIVE
SARS-COV-2 RNA RESP QL NAA+PROBE: NEGATIVE
SODIUM SERPL-SCNC: 145 MMOL/L (ref 135–147)
SPECIMEN EXPIRATION DATE: NORMAL
T WAVE AXIS: 157 DEGREES
T WAVE AXIS: 178 DEGREES
VENTRICULAR RATE: 52 BPM
VENTRICULAR RATE: 53 BPM
VIT B12 SERPL-MCNC: 520 PG/ML (ref 180–914)
WBC # BLD AUTO: 5.26 THOUSAND/UL (ref 4.31–10.16)

## 2025-03-23 PROCEDURE — 93005 ELECTROCARDIOGRAM TRACING: CPT

## 2025-03-23 PROCEDURE — 82746 ASSAY OF FOLIC ACID SERUM: CPT | Performed by: INTERNAL MEDICINE

## 2025-03-23 PROCEDURE — 73100 X-RAY EXAM OF WRIST: CPT

## 2025-03-23 PROCEDURE — 84484 ASSAY OF TROPONIN QUANT: CPT

## 2025-03-23 PROCEDURE — 85018 HEMOGLOBIN: CPT

## 2025-03-23 PROCEDURE — 93010 ELECTROCARDIOGRAM REPORT: CPT

## 2025-03-23 PROCEDURE — P9016 RBC LEUKOCYTES REDUCED: HCPCS

## 2025-03-23 PROCEDURE — 99285 EMERGENCY DEPT VISIT HI MDM: CPT

## 2025-03-23 PROCEDURE — 82607 VITAMIN B-12: CPT | Performed by: INTERNAL MEDICINE

## 2025-03-23 PROCEDURE — 83880 ASSAY OF NATRIURETIC PEPTIDE: CPT

## 2025-03-23 PROCEDURE — 73120 X-RAY EXAM OF HAND: CPT

## 2025-03-23 PROCEDURE — 30233N1 TRANSFUSION OF NONAUTOLOGOUS RED BLOOD CELLS INTO PERIPHERAL VEIN, PERCUTANEOUS APPROACH: ICD-10-PCS | Performed by: STUDENT IN AN ORGANIZED HEALTH CARE EDUCATION/TRAINING PROGRAM

## 2025-03-23 PROCEDURE — 0241U HB NFCT DS VIR RESP RNA 4 TRGT: CPT

## 2025-03-23 PROCEDURE — 99222 1ST HOSP IP/OBS MODERATE 55: CPT | Performed by: INTERNAL MEDICINE

## 2025-03-23 PROCEDURE — 74176 CT ABD & PELVIS W/O CONTRAST: CPT

## 2025-03-23 PROCEDURE — 70450 CT HEAD/BRAIN W/O DYE: CPT

## 2025-03-23 PROCEDURE — 85610 PROTHROMBIN TIME: CPT

## 2025-03-23 PROCEDURE — 85025 COMPLETE CBC W/AUTO DIFF WBC: CPT

## 2025-03-23 PROCEDURE — 36430 TRANSFUSION BLD/BLD COMPNT: CPT

## 2025-03-23 PROCEDURE — 86850 RBC ANTIBODY SCREEN: CPT

## 2025-03-23 PROCEDURE — 86923 COMPATIBILITY TEST ELECTRIC: CPT

## 2025-03-23 PROCEDURE — 36415 COLL VENOUS BLD VENIPUNCTURE: CPT

## 2025-03-23 PROCEDURE — 71275 CT ANGIOGRAPHY CHEST: CPT

## 2025-03-23 PROCEDURE — 85014 HEMATOCRIT: CPT

## 2025-03-23 PROCEDURE — 99223 1ST HOSP IP/OBS HIGH 75: CPT | Performed by: STUDENT IN AN ORGANIZED HEALTH CARE EDUCATION/TRAINING PROGRAM

## 2025-03-23 PROCEDURE — 85730 THROMBOPLASTIN TIME PARTIAL: CPT

## 2025-03-23 PROCEDURE — 86901 BLOOD TYPING SEROLOGIC RH(D): CPT

## 2025-03-23 PROCEDURE — 86900 BLOOD TYPING SEROLOGIC ABO: CPT

## 2025-03-23 PROCEDURE — 80053 COMPREHEN METABOLIC PANEL: CPT

## 2025-03-23 RX ORDER — PANTOPRAZOLE SODIUM 40 MG/10ML
40 INJECTION, POWDER, LYOPHILIZED, FOR SOLUTION INTRAVENOUS EVERY 12 HOURS SCHEDULED
Status: DISCONTINUED | OUTPATIENT
Start: 2025-03-23 | End: 2025-03-25 | Stop reason: HOSPADM

## 2025-03-23 RX ORDER — AMIODARONE HYDROCHLORIDE 200 MG/1
200 TABLET ORAL DAILY
Status: DISCONTINUED | OUTPATIENT
Start: 2025-03-23 | End: 2025-03-25 | Stop reason: HOSPADM

## 2025-03-23 RX ORDER — METOPROLOL TARTRATE 25 MG/1
25 TABLET, FILM COATED ORAL 2 TIMES DAILY
Status: DISCONTINUED | OUTPATIENT
Start: 2025-03-23 | End: 2025-03-25 | Stop reason: HOSPADM

## 2025-03-23 RX ORDER — ATORVASTATIN CALCIUM 40 MG/1
40 TABLET, FILM COATED ORAL EVERY EVENING
Status: DISCONTINUED | OUTPATIENT
Start: 2025-03-23 | End: 2025-03-25 | Stop reason: HOSPADM

## 2025-03-23 RX ORDER — ASPIRIN 81 MG/1
81 TABLET ORAL DAILY
Status: DISCONTINUED | OUTPATIENT
Start: 2025-03-24 | End: 2025-03-24

## 2025-03-23 RX ORDER — HYDRALAZINE HYDROCHLORIDE 20 MG/ML
10 INJECTION INTRAMUSCULAR; INTRAVENOUS EVERY 6 HOURS PRN
Status: DISCONTINUED | OUTPATIENT
Start: 2025-03-23 | End: 2025-03-25 | Stop reason: HOSPADM

## 2025-03-23 RX ORDER — DULOXETIN HYDROCHLORIDE 30 MG/1
30 CAPSULE, DELAYED RELEASE ORAL
Status: DISCONTINUED | OUTPATIENT
Start: 2025-03-23 | End: 2025-03-25 | Stop reason: HOSPADM

## 2025-03-23 RX ORDER — DILTIAZEM HYDROCHLORIDE 30 MG/1
15 TABLET, FILM COATED ORAL 3 TIMES DAILY
Status: DISCONTINUED | OUTPATIENT
Start: 2025-03-23 | End: 2025-03-25 | Stop reason: HOSPADM

## 2025-03-23 RX ORDER — DOCUSATE SODIUM 100 MG/1
100 CAPSULE, LIQUID FILLED ORAL 2 TIMES DAILY
Status: DISCONTINUED | OUTPATIENT
Start: 2025-03-23 | End: 2025-03-25 | Stop reason: HOSPADM

## 2025-03-23 RX ORDER — ACETAMINOPHEN 325 MG/1
650 TABLET ORAL EVERY 6 HOURS PRN
Status: DISCONTINUED | OUTPATIENT
Start: 2025-03-23 | End: 2025-03-25 | Stop reason: HOSPADM

## 2025-03-23 RX ORDER — TERAZOSIN 1 MG/1
2 CAPSULE ORAL
Status: DISCONTINUED | OUTPATIENT
Start: 2025-03-23 | End: 2025-03-25 | Stop reason: HOSPADM

## 2025-03-23 RX ORDER — HYDRALAZINE HYDROCHLORIDE 20 MG/ML
5 INJECTION INTRAMUSCULAR; INTRAVENOUS EVERY 6 HOURS PRN
Status: DISCONTINUED | OUTPATIENT
Start: 2025-03-23 | End: 2025-03-23

## 2025-03-23 RX ORDER — SODIUM CHLORIDE, SODIUM GLUCONATE, SODIUM ACETATE, POTASSIUM CHLORIDE, MAGNESIUM CHLORIDE, SODIUM PHOSPHATE, DIBASIC, AND POTASSIUM PHOSPHATE .53; .5; .37; .037; .03; .012; .00082 G/100ML; G/100ML; G/100ML; G/100ML; G/100ML; G/100ML; G/100ML
100 INJECTION, SOLUTION INTRAVENOUS CONTINUOUS
Status: DISCONTINUED | OUTPATIENT
Start: 2025-03-23 | End: 2025-03-24

## 2025-03-23 RX ADMIN — SODIUM CHLORIDE, SODIUM GLUCONATE, SODIUM ACETATE, POTASSIUM CHLORIDE, MAGNESIUM CHLORIDE, SODIUM PHOSPHATE, DIBASIC, AND POTASSIUM PHOSPHATE 100 ML/HR: .53; .5; .37; .037; .03; .012; .00082 INJECTION, SOLUTION INTRAVENOUS at 12:37

## 2025-03-23 RX ADMIN — PANTOPRAZOLE SODIUM 40 MG: 40 INJECTION, POWDER, LYOPHILIZED, FOR SOLUTION INTRAVENOUS at 12:38

## 2025-03-23 RX ADMIN — PANTOPRAZOLE SODIUM 40 MG: 40 INJECTION, POWDER, LYOPHILIZED, FOR SOLUTION INTRAVENOUS at 21:36

## 2025-03-23 RX ADMIN — SODIUM CHLORIDE, SODIUM GLUCONATE, SODIUM ACETATE, POTASSIUM CHLORIDE, MAGNESIUM CHLORIDE, SODIUM PHOSPHATE, DIBASIC, AND POTASSIUM PHOSPHATE 100 ML/HR: .53; .5; .37; .037; .03; .012; .00082 INJECTION, SOLUTION INTRAVENOUS at 21:39

## 2025-03-23 RX ADMIN — IOHEXOL 70 ML: 350 INJECTION, SOLUTION INTRAVENOUS at 03:49

## 2025-03-23 NOTE — H&P
H&P - Hospitalist   Name: Denia Castañeda 89 y.o. female I MRN: [id number]  Unit/Bed#: E4 -01 I Date of Admission: 3/23/2025   Date of Service: 3/23/2025 I Hospital Day: 0     Assessment & Plan  Symptomatic anemia  89-year-old female with hypertension, hyperlipidemia, atrial fibrillation, recent pulmonary embolism who was brought in here after she was found to have shortness of breath and shaking earlier today approximately 1 in the morning.  Workup so far showing significant anemia with no evidence of an acute bleeding etiology.  GI recommendations appreciated  Unfortunately, given lethargy will have to put her on n.p.o. and continue IV fluids for now  Speech evaluation pending  Continue PPI  Spoke to his son Logan over the phone who confirmed that she is currently DO NOT RESUSCITATE/DO NOT INTUBATE    Results from last 7 days   Lab Units 03/23/25  0452 03/23/25  0429 03/23/25  0222   HEMOGLOBIN g/dL 6.3*  --  7.5*   MCV fL  --   --  99*   RDW %  --   --  17.2*   FOLATE ng/mL  --  7.7  --    VITAMIN B 12 pg/mL  --  520  --      Elevated serum creatinine  Continue monitoring. IV hydration overnight.     Recent Labs     03/23/25  0222   BUN 34*   CREATININE 1.38*   EGFR 32     Primary hypertension  Controlled  Continue Metoprolol and Cardizem if able to safely take it  PRN hydralazine for now  Hyperlipidemia  Continue statin  Atrial fibrillation (HCC)  On digoxin, metoprolol  AC on hold given anemia  Severe dementia with agitation (HCC)  Continue supportive care  CVA (cerebral vascular accident) (HCC)    History of pulmonary embolism  Diagnosed last 3/6/2025. Eliquis on hold given anemia       VTE Pharmacologic Prophylaxis:   c/I due to symptomatic anemia  Code Status: Level 3 - DNAR and DNI   Discussion with family: called    Anticipated Length of Stay: Patient will be admitted on an inpatient basis with an anticipated length of stay of greater than 2 midnights secondary to symptomatic anemia,  transfusion.    History of Present Illness   Chief Complaint: shortness of breath.    Denia Castañeda is a 89 y.o. female with a PMH of pulmonary embolism, atrial fibrillation, hyperlipidemia,  who presents with shortness of breath..    History was obtained from ED providers, chart review, and as much as can be gleaned from patient though she is a poor historian given her underlying dementia.  Her caregivers indicated to the ED providers that they feel she is at her normal state of health.    Denia Castañeda is a 89 y.o. female with PMHx of recently diagnosed low-burden PE prescribed Eliquis, severe dementia with agitation, schizophrenia, CVA, HTN, HLD, urinary incontinence, protein-calorie malnutrition, and A-fib who presented with SOB and pallor.  On 3/6/2025 she presented to the ED with AMS and underwent CVA rule out.  She was incidentally found to have a low burden PE and was started on Eliquis.  Given her acute SOB, patient received repeat CTA chest in the ED which showed resolution of previously identified PE though distal segmental and subsegmental arteries are poorly evaluated secondary to respiratory motion artifact, and no signs of hemorrhage.  However, she was noted to have a decrease in her Hb from 9.3 (baseline 10 days ago) to 7.5 (3/23/2025) to 6.3 (3/23/2025).  There are no signs or reports of dark or bloody stool.  There is some suspicion for a fall and injury to hand though XR hand shows no obvious fracture, and her hand was splinted.  The ED ordered 2U PRBCs.    ADDENDUM:  Finally got a hold of Logan. States that her caregiver told him that 1AM today she was shaking and short of breath. They decided to take her to the hospital for further evaluation.     Review of Systems   Unable to perform ROS: Dementia       Historical Information   Past Medical History:   Diagnosis Date    Anxiety     Closed fracture of right orbital floor (HCC) 08/23/2020    Cognitive impairment     COVID-19 virus infection  04/13/2020    Dementia (HCC)     Depression     Hallucination     Hyperlipidemia     Hypertension     Memory loss     Psychiatric illness     Psychosis (HCC)     Retention of urine     Schizoaffective disorder (HCC)     Sleep difficulties     Urinary tract infection      Past Surgical History:   Procedure Laterality Date    APPENDECTOMY      BLADDER SURGERY      CHOLECYSTECTOMY      MI OPTX FEM SHFT FX W/INSJ IMED IMPLT W/WO SCREW Right 7/15/2021    Procedure: INSERTION NAIL IM FEMUR ANTEGRADE (TROCHANTERIC) right;  Surgeon: Getachew Aguilar MD;  Location: [facility];  Service: Orthopedics    TOTAL ABDOMINAL HYSTERECTOMY W/ BILATERAL SALPINGOOPHORECTOMY      TOTAL KNEE ARTHROPLASTY Left      Social History     Tobacco Use    Smoking status: Never     Passive exposure: Never    Smokeless tobacco: Never    Tobacco comments:     Former smoker per [employer]   Vaping Use    Vaping status: Never Used   Substance and Sexual Activity    Alcohol use: Never    Drug use: No    Sexual activity: Not on file     E-Cigarette/Vaping    E-Cigarette Use Never User      E-Cigarette/Vaping Substances    Nicotine No     THC No     CBD No     Flavoring No     Other No     Unknown No      Family History   Problem Relation Age of Onset    Heart disease Mother         Cardiac disorder    Parkinsonism Father     Heart disease Sister         Cardiac disorder    Hypertension Sister     Diabetes Child         Diabetes Mellitus    Lung disease Child         Respiratory Disorder    Diabetes Son         Diabetes Mellitus     Social History:  Marital Status:      Meds/Allergies   I have reviewed home medications using recent Epic encounter.  Prior to Admission medications    Medication Sig Start Date End Date Taking? Authorizing Provider   acetaminophen (TYLENOL) 325 mg tablet Take 2 tablets (650 mg total) by mouth every 6 (six) hours as needed for mild pain, moderate pain or headaches 8/23/20   BEV Herman   amiodarone 200 mg tablet  TAKE 1 TABLET (200 MG TOTAL) BY MOUTH DAILY 10/24/24   Jose M Castañeda MD   apixaban (Eliquis) 5 mg Take 2 tablets (10 mg total) by mouth 2 (two) times a day for 7 days, THEN 1 tablet (5 mg total) 2 (two) times a day for 23 days. 3/7/25 4/6/25  BEV Barnhart   aspirin (Aspirin Low Dose) 81 mg EC tablet TAKE 1 TABLET (81 MG TOTAL) BY MOUTH DAILY 1/25/25   Jose M Castañeda MD   atorvastatin (LIPITOR) 40 mg tablet TAKE 1 TABLET (40 MG TOTAL) BY MOUTH EVERY EVENING 1/23/25   Jose M Castañeda MD   diltiazem (CARDIZEM) 30 mg tablet Take 0.5 tablets (15 mg total) by mouth 3 (three) times a day 3/21/25   Jose M Castañeda MD   docusate sodium (COLACE) 100 mg capsule TAKE 1 CAPSULE (100 MG TOTAL) BY MOUTH 2 (TWO) TIMES A DAY 12/2/24   Jose M Castañeda MD   DULoxetine (CYMBALTA) 30 mg delayed release capsule Take 30 mg by mouth daily at bedtime    Historical Provider, MD   metoprolol tartrate (LOPRESSOR) 25 mg tablet TAKE 1 TABLET (25 MG TOTAL) BY MOUTH 2 (TWO) TIMES A DAY 12/2/24 3/21/25  Jose M Castañeda MD   terazosin (HYTRIN) 2 mg capsule Take 1 capsule (2 mg total) by mouth daily at bedtime 10/24/24   Annalee Jensen PA-C     No Known Allergies    Objective :  Temp:  [96.6 °F (35.9 °C)-97.7 °F (36.5 °C)] 97.4 °F (36.3 °C)  HR:  [50-64] 62  BP: (124-222)/(56-81) 178/70  Resp:  [14-21] 16  SpO2:  [95 %-100 %] 100 %  O2 Device: Nasal cannula  Nasal Cannula O2 Flow Rate (L/min):  [1.5 L/min-2 L/min] 1.5 L/min    Physical Exam  Vitals reviewed.   Constitutional:       General: She is not in acute distress.  HENT:      Head: Normocephalic.      Nose: Nose normal.      Mouth/Throat:      Mouth: Mucous membranes are moist.   Eyes:      General: No scleral icterus.  Cardiovascular:      Rate and Rhythm: Normal rate.   Pulmonary:      Effort: Pulmonary effort is normal. No respiratory distress.      Breath sounds: No wheezing.   Abdominal:      Palpations: Abdomen is soft.      Tenderness: There is no abdominal  tenderness.   Skin:     General: Skin is warm.   Neurological:      Comments: lethargic       Lines/Drains:            Lab Results: I have reviewed the following results:  Results from last 7 days   Lab Units 03/23/25  0452 03/23/25 0222   WBC Thousand/uL  --  5.26   HEMOGLOBIN g/dL 6.3* 7.5*   HEMATOCRIT % 20.2* 24.8*   PLATELETS Thousands/uL  --  287   SEGS PCT %  --  73   LYMPHO PCT %  --  18   MONO PCT %  --  6   EOS PCT %  --  3     Results from last 7 days   Lab Units 03/23/25 0222   SODIUM mmol/L 145   POTASSIUM mmol/L 5.2   CHLORIDE mmol/L 108   CO2 mmol/L 31   BUN mg/dL 34*   CREATININE mg/dL 1.38*   ANION GAP mmol/L 6   CALCIUM mg/dL 9.1   ALBUMIN g/dL 3.8   TOTAL BILIRUBIN mg/dL 0.69   ALK PHOS U/L 93   ALT U/L 11   AST U/L 18   GLUCOSE RANDOM mg/dL 98     Results from last 7 days   Lab Units 03/23/25 0222   INR  1.40*         Lab Results   Component Value Date    HGBA1C 5.0 06/30/2024    HGBA1C 4.7 01/15/2022    HGBA1C 5.2 03/10/2021           Cta pe study, abdomen pelvis  ECG: NSR, TWI inferior leads    ** Please Note: This note has been constructed using a voice recognition system. **

## 2025-03-23 NOTE — ED NOTES
Patient repositioned at this time. Brief dry. Bed in lowest position. Bed alarm on     Radhika Reinoso RN  03/23/25 0778

## 2025-03-23 NOTE — ASSESSMENT & PLAN NOTE
89-year-old female with hypertension, hyperlipidemia, atrial fibrillation, recent pulmonary embolism who was brought in here after she was found to have shortness of breath and shaking earlier today approximately 1 in the morning.  Workup so far showing significant anemia with no evidence of an acute bleeding etiology.  GI recommendations appreciated  Unfortunately, given lethargy will have to put her on n.p.o. and continue IV fluids for now  Speech evaluation pending  Continue PPI  Spoke to his son Logan over the phone who confirmed that she is currently DO NOT RESUSCITATE/DO NOT INTUBATE    Results from last 7 days   Lab Units 03/23/25  0452 03/23/25  0429 03/23/25  0222   HEMOGLOBIN g/dL 6.3*  --  7.5*   MCV fL  --   --  99*   RDW %  --   --  17.2*   FOLATE ng/mL  --  7.7  --    VITAMIN B 12 pg/mL  --  520  --

## 2025-03-23 NOTE — ED PROVIDER NOTES
ED Disposition       None          Assessment & Plan       Medical Decision Making  Will obtain EKG, troponin to evaluate for ACS.  Will obtain CTA given report of dyspnea, though patient in no respiratory distress at this time.  Will obtain CBC to evaluate for leukocytosis, anemia.  Will obtain CMP to evaluate kidney function, for electrolyte disturbance.  Will obtain coags.  Will obtain COVID/flu/RSV testing.  Patient mildly hypothermic, several warm blankets placed on patient.    Hgb 7.5, was 9.3 at discharge.  Creatinine elevated from baseline, is 1.3, baseline 1.1.  BNP also mildly elevated, nonspecific.  CTA without acute findings.  On temperature recheck, patient's hypothermia is worse.  Will recheck hemoglobin this patient is pale appearing.    Repeat hemoglobin 6.3.  Findings discussed with patient's son, Logan, who does consent for the patient to receive blood.  Reports patient is DNR/DNI.  In the setting of anemia of unknown etiology, and right hand swelling, concern for possible trauma as cause of anemia. Will obtain CT head, CT abdomen and pelvis. PRBCs, type and screen ordered. Questionable wrist fracture on XR, pending official read. Splint placed     Care turned over to Sejal Martinez PA-C pending CT and dispo    Problems Addressed:  Acute anemia: acute illness or injury    Amount and/or Complexity of Data Reviewed  External Data Reviewed: labs, radiology, ECG and notes.  Labs: ordered. Decision-making details documented in ED Course.  Radiology: ordered. Decision-making details documented in ED Course.    Risk  Prescription drug management.      EKG sinus bradycardia 52 bpm, , QTc 446, normal axis, nonspecific ST and T wave abnormality, no STEMI as interpreted by me    Repeat EKG sinus bradycardia 53 bpm, normal axis, , QTc 465, nonspecific T wave abnormality, no STEMI as interpreted by me           Medications   iohexol (OMNIPAQUE) 350 MG/ML injection (MULTI-DOSE) 70 mL (70 mL  "Intravenous Given 3/23/25 0349)       ED Risk Strat Scores          SBIRT 20yo+      Flowsheet Row Most Recent Value   Initial Alcohol Screen: US AUDIT-C     1. How often do you have a drink containing alcohol? 0 Filed at: 03/23/2025 0228   2. How many drinks containing alcohol do you have on a typical day you are drinking?  0 Filed at: 03/23/2025 0228   3b. FEMALE Any Age, or MALE 65+: How often do you have 4 or more drinks on one occassion? 0 Filed at: 03/23/2025 0228   Audit-C Score 0 Filed at: 03/23/2025 0228   ROBERT: How many times in the past year have you...    Used an illegal drug or used a prescription medication for non-medical reasons? Never Filed at: 03/23/2025 0228                    History of Present Illness       Chief Complaint   Patient presents with    Medical Problem     Pt arrives via EMS w/ hx of dementia. Per EMS, pt's daughter called \"911\" d/t pt having increased SOB and flu like symptoms x1 day. EMS reports pt is at baseline mentation. Pt disoriented x4       Past Medical History:   Diagnosis Date    Anxiety     Closed fracture of right orbital floor (HCC) 08/23/2020    Cognitive impairment     COVID-19 virus infection 04/13/2020    Dementia (HCC)     Depression     Hallucination     Hyperlipidemia     Hypertension     Memory loss     Psychiatric illness     Psychosis (HCC)     Retention of urine     Schizoaffective disorder (HCC)     Sleep difficulties     Urinary tract infection       Past Surgical History:   Procedure Laterality Date    APPENDECTOMY      BLADDER SURGERY      CHOLECYSTECTOMY      ND OPTX FEM SHFT FX W/INSJ IMED IMPLT W/WO SCREW Right 7/15/2021    Procedure: INSERTION NAIL IM FEMUR ANTEGRADE (TROCHANTERIC) right;  Surgeon: Getachew Aguilar MD;  Location: [facility];  Service: Orthopedics    TOTAL ABDOMINAL HYSTERECTOMY W/ BILATERAL SALPINGOOPHORECTOMY      TOTAL KNEE ARTHROPLASTY Left       Family History   Problem Relation Age of Onset    Heart disease Mother         Cardiac " disorder    Parkinsonism Father     Heart disease Sister         Cardiac disorder    Hypertension Sister     Diabetes Child         Diabetes Mellitus    Lung disease Child         Respiratory Disorder    Diabetes Son         Diabetes Mellitus      Social History     Tobacco Use    Smoking status: Never     Passive exposure: Never    Smokeless tobacco: Never    Tobacco comments:     Former smoker per [employer]   Vaping Use    Vaping status: Never Used   Substance Use Topics    Alcohol use: Never    Drug use: No      E-Cigarette/Vaping    E-Cigarette Use Never User       E-Cigarette/Vaping Substances    Nicotine No     THC No     CBD No     Flavoring No     Other No     Unknown No       I have reviewed and agree with the history as documented.     The patient is a 89-year-old female, hx CKD, afib, dementia, schizophrenia, CVA, and recent admission for AMS during which she was noted to have a PE and was started on Eliquis. She presents to the ED tonight after her caretaker noted she seemed to be short of breath tonight.  Patient is a poor historian, and unable to provide any history.  Caretaker at bedside reports patient appears to be at her baseline mental status.  She states that the patient did receive her nighttime meds, which do typically cause additional sedation.  Caretaker reports patient has been without vomiting, diarrhea, hematochezia, hematuria, decreased urine output, fever, cough.  They do report patient's right hand has been swollen for the past 2 days.  They suspect the patient may have struck the hand against the side of the bed, denies any falls or other known trauma.        Review of Systems   Unable to perform ROS: Dementia           Objective       ED Triage Vitals   Temperature Pulse Blood Pressure Respirations SpO2 Patient Position - Orthostatic VS   03/23/25 0204 03/23/25 0139 03/23/25 0139 03/23/25 0139 03/23/25 0139 03/23/25 0139   (S) (!) 97 °F (36.1 °C) 64 153/70 20 95 % Lying      Temp  Source Heart Rate Source BP Location FiO2 (%) Pain Score    03/23/25 0204 03/23/25 0139 03/23/25 0139 -- --    Rectal Monitor Left arm        Vitals      Date and Time Temp Pulse SpO2 Resp BP Pain Score FACES Pain Rating User   03/23/25 0439 96.6 °F (35.9 °C) -- -- -- -- -- -- CFW   03/23/25 0430 -- 55 100 % 20 128/56 -- -- CFW   03/23/25 0350 -- -- 99 % -- -- -- -- CFW   03/23/25 0204  97 °F (36.1 °C) warm blankets provided -- -- -- -- -- -- AB   03/23/25 0139 -- 64 95 % 20 153/70 -- -- AB            Physical Exam  Vitals and nursing note reviewed.   Constitutional:       General: She is not in acute distress.     Appearance: She is well-developed. She is ill-appearing (chronically).   HENT:      Head: Normocephalic and atraumatic.   Eyes:      Conjunctiva/sclera: Conjunctivae normal.   Cardiovascular:      Rate and Rhythm: Normal rate and regular rhythm.      Heart sounds: No murmur heard.  Pulmonary:      Effort: Pulmonary effort is normal. No respiratory distress.      Breath sounds: Normal breath sounds.   Abdominal:      Palpations: Abdomen is soft.      Tenderness: There is no abdominal tenderness.   Musculoskeletal:         General: Swelling present.      Right forearm: No swelling or deformity.      Right wrist: No deformity. Normal pulse.      Right hand: Swelling present. No deformity or bony tenderness. Normal capillary refill. Normal pulse.      Cervical back: Neck supple.   Skin:     General: Skin is warm and dry.      Capillary Refill: Capillary refill takes less than 2 seconds.      Coloration: Skin is pale.   Neurological:      Mental Status: She is alert.   Psychiatric:         Mood and Affect: Mood normal.         Results Reviewed       Procedure Component Value Units Date/Time    Hemoglobin and hematocrit, blood [[id number]]  (Abnormal) Collected: 03/23/25 0452    Lab Status: Final result Specimen: Blood from Arm, Left Updated: 03/23/25 0457     Hemoglobin 6.3 g/dL      Hematocrit 20.2 %     HS  Troponin I 2hr [[id number]]  (Normal) Collected: 03/23/25 0429    Lab Status: Final result Specimen: Blood from Arm, Left Updated: 03/23/25 0455     hs TnI 2hr 12 ng/L      Delta 2hr hsTnI -3 ng/L     FLU/RSV/COVID - if FLU/RSV clinically relevant (2hr TAT) [[id number]]  (Normal) Collected: 03/23/25 0222    Lab Status: Final result Specimen: Nares from Nose Updated: 03/23/25 0310     SARS-CoV-2 Negative     INFLUENZA A PCR Negative     INFLUENZA B PCR Negative     RSV PCR Negative    Narrative:      This test has been performed using the CoV-2/Flu/RSV plus assay on the [employer] platform. This test has been validated by the  and verified by the performing laboratory.     This test is designed to amplify and detect the following: nucleocapsid (N), envelope (E), and RNA-dependent RNA polymerase (RdRP) genes of the SARS-CoV-2 genome; matrix (M), basic polymerase (PB2), and acidic protein (PA) segments of the influenza A genome; matrix (M) and non-structural protein (NS) segments of the influenza B genome, and the nucleocapsid genes of RSV A and RSV B.     Positive results are indicative of the presence of Flu A, Flu B, RSV, and/or SARS-CoV-2 RNA. Positive results for SARS-CoV-2 or suspected novel influenza should be reported to state, local, or federal health departments according to local reporting requirements.      All results should be assessed in conjunction with clinical presentation and other laboratory markers for clinical management.     FOR PEDIATRIC PATIENTS - copy/paste COVID Guidelines URL to browser: [email or web address]       HS Troponin 0hr (reflex protocol) [[id number]]  (Normal) Collected: 03/23/25 0222    Lab Status: Final result Specimen: Blood from Arm, Left Updated: 03/23/25 0255     hs TnI 0hr 15 ng/L     B-Type Natriuretic Peptide(BNP) [[id number]]  (Abnormal) Collected: 03/23/25 0222    Lab Status: Final result Specimen:  Blood from Arm, Left Updated: 03/23/25 0255      pg/mL     Comprehensive metabolic panel [[id number]]  (Abnormal) Collected: 03/23/25 0222    Lab Status: Final result Specimen: Blood from Arm, Left Updated: 03/23/25 0252     Sodium 145 mmol/L      Potassium 5.2 mmol/L      Chloride 108 mmol/L      CO2 31 mmol/L      ANION GAP 6 mmol/L      BUN 34 mg/dL      Creatinine 1.38 mg/dL      Glucose 98 mg/dL      Calcium 9.1 mg/dL      AST 18 U/L      ALT 11 U/L      Alkaline Phosphatase 93 U/L      Total Protein 6.3 g/dL      Albumin 3.8 g/dL      Total Bilirubin 0.69 mg/dL      eGFR 32 ml/min/1.73sq m     Narrative:      National Kid[employer] Disease Foundation guidelines for Chronic Kidney Disease (CKD):     Stage 1 with normal or high GFR (GFR > 90 mL/min/1.73 square meters)    Stage 2 Mild CKD (GFR = 60-89 mL/min/1.73 square meters)    Stage 3A Moderate CKD (GFR = 45-59 mL/min/1.73 square meters)    Stage 3B Moderate CKD (GFR = 30-44 mL/min/1.73 square meters)    Stage 4 Severe CKD (GFR = 15-29 mL/min/1.73 square meters)    Stage 5 End Stage CKD (GFR <15 mL/min/1.73 square meters)  Note: GFR calculation is accurate only with a steady state creatinine    Protime-INR [[id number]]  (Abnormal) Collected: 03/23/25 0222    Lab Status: Final result Specimen: Blood from Arm, Left Updated: 03/23/25 0248     Protime 17.2 seconds      INR 1.40    Narrative:      INR Therapeutic Range    Indication                                             INR Range      Atrial Fibrillation                                               2.0-3.0  Hypercoagulable State                                    2.0.2.3  Left Ventricular Asist Device                            2.0-3.0  Mechanical Heart Valve                                  -    Aortic(with afib, MI, embolism, HF, LA enlargement,    and/or coagulopathy)                                     2.0-3.0 (2.5-3.5)     Mitral                                                              2.5-3.5  Prosthetic/Bioprosthetic Heart Valve               2.0-3.0  Venous thromboembolism (VTE: VT, PE        2.0-3.0    APTT [[id number]]  (Abnormal) Collected: 03/23/25 0222    Lab Status: Final result Specimen: Blood from Arm, Left Updated: 03/23/25 0248     PTT 35 seconds     CBC and differential [[id number]]  (Abnormal) Collected: 03/23/25 0222    Lab Status: Final result Specimen: Blood from Arm, Left Updated: 03/23/25 0234     WBC 5.26 Thousand/uL      RBC 2.50 Million/uL      Hemoglobin 7.5 g/dL      Hematocrit 24.8 %      MCV 99 fL      MCH 30.0 pg      MCHC 30.2 g/dL      RDW 17.2 %      MPV 10.2 fL      Platelets 287 Thousands/uL      nRBC 0 /100 WBCs      Segmented % 73 %      Immature Grans % 0 %      Lymphocytes % 18 %      Monocytes % 6 %      Eosinophils Relative 3 %      Basophils Relative 0 %      Absolute Neutrophils 3.79 Thousands/µL      Absolute Immature Grans 0.02 Thousand/uL      Absolute Lymphocytes 0.96 Thousands/µL      Absolute Monocytes 0.33 Thousand/µL      Eosinophils Absolute 0.15 Thousand/µL      Basophils Absolute 0.01 Thousands/µL             CTA chest pe study   Final Interpretation by Marques Wisdom MD (03/23 0452)      Previously described right-sided pulmonary emboli seen on CT 3/6/2025 are no longer appreciated. No new pulmonary emboli are identified, noting distal segmental and subsegmental arteries are poorly evaluated secondary to respiratory motion artifact.      No acute findings in the chest.                  Workstation performed: [id number]         XR hand 2 vw left    (Results Pending)   XR wrist 2 vw left    (Results Pending)   CT head wo contrast    (Results Pending)   CT abdomen pelvis with contrast    (Results Pending)       Splint application    Date/Time: 3/23/2025 6:20 AM    Performed by: Francesca Stout PA-C  Authorized by: Francesca Stout PA-C    Other Assisting Provider: Yes (comment) (ED Tech)    Verbal consent obtained?: No    Emergent  situation    Procedure details:     Laterality:  Right    Location:  Wrist    Splint composition: static      Splint type:  Wrist  Post-procedure details:     Patient tolerance of procedure:  Tolerated well, no immediate complications      ED Medication and Procedure Management   Prior to Admission Medications   Prescriptions Last Dose Informant Patient Reported? Taking?   DULoxetine (CYMBALTA) 30 mg delayed release capsule  Child, Care Giver Yes No   Sig: Take 30 mg by mouth daily at bedtime   acetaminophen (TYLENOL) 325 mg tablet  Child, Care Giver No No   Sig: Take 2 tablets (650 mg total) by mouth every 6 (six) hours as needed for mild pain, moderate pain or headaches   amiodarone 200 mg tablet   No No   Sig: TAKE 1 TABLET (200 MG TOTAL) BY MOUTH DAILY   apixaban (Eliquis) 5 mg   No No   Sig: Take 2 tablets (10 mg total) by mouth 2 (two) times a day for 7 days, THEN 1 tablet (5 mg total) 2 (two) times a day for 23 days.   aspirin (Aspirin Low Dose) 81 mg EC tablet   No No   Sig: TAKE 1 TABLET (81 MG TOTAL) BY MOUTH DAILY   atorvastatin (LIPITOR) 40 mg tablet   No No   Sig: TAKE 1 TABLET (40 MG TOTAL) BY MOUTH EVERY EVENING   diltiazem (CARDIZEM) 30 mg tablet   No No   Sig: Take 0.5 tablets (15 mg total) by mouth 3 (three) times a day   docusate sodium (COLACE) 100 mg capsule   No No   Sig: TAKE 1 CAPSULE (100 MG TOTAL) BY MOUTH 2 (TWO) TIMES A DAY   metoprolol tartrate (LOPRESSOR) 25 mg tablet   No No   Sig: TAKE 1 TABLET (25 MG TOTAL) BY MOUTH 2 (TWO) TIMES A DAY   terazosin (HYTRIN) 2 mg capsule   No No   Sig: Take 1 capsule (2 mg total) by mouth daily at bedtime      Facility-Administered Medications Last Administration Doses Remaining   denosumab (PROLIA) subcutaneous injection 60 mg 3/18/2024  1:30 PM         Patient's Medications   Discharge Prescriptions    No medications on file     No discharge procedures on file.  ED SEPSIS DOCUMENTATION            Francesca Stout PA-C  03/23/25 0631

## 2025-03-23 NOTE — PROGRESS NOTES
Deterioration Index Critical Care Recommendations  Room #: 437  Deterioration index score: 63.19%    Critical Care recommends nothing.  Patient presented to the medical surgical unit after receiving 2 units of packed red cells for a hemoglobin of 6.3 a repeat hemoglobin is ordered for the morning.  Patient's baseline is nonverbal and.  The patient's son confirmed that this is her baseline.  She remains a level 1 CODE STATUS    Spoke with   Yoana Bird RN       from primary team    Brief summary:   Critical care was brought to the patient's bedside via deterioration index alert. The alert was concerning for hypertension which was erroneous and anemia which has been treated..     Please contact critical care via [facility] with any questions or concerns.

## 2025-03-23 NOTE — ASSESSMENT & PLAN NOTE
Continue monitoring. IV hydration overnight.     Recent Labs     03/23/25  0222   BUN 34*   CREATININE 1.38*   EGFR 32

## 2025-03-23 NOTE — ED CARE HANDOFF
Emergency Department Sign Out Note        Sign out and transfer of care from Francesca Stout PA-C. See Separate Emergency Department note.     The patient, Denia Castañeda, was evaluated by the previous provider for AMS.    Workup Completed:  Results Reviewed       Procedure Component Value Units Date/Time    Hemoglobin and hematocrit, blood [[id number]]  (Abnormal) Collected: 03/23/25 0452    Lab Status: Final result Specimen: Blood from Arm, Left Updated: 03/23/25 0457     Hemoglobin 6.3 g/dL      Hematocrit 20.2 %     HS Troponin I 2hr [[id number]]  (Normal) Collected: 03/23/25 0429    Lab Status: Final result Specimen: Blood from Arm, Left Updated: 03/23/25 0455     hs TnI 2hr 12 ng/L      Delta 2hr hsTnI -3 ng/L     FLU/RSV/COVID - if FLU/RSV clinically relevant (2hr TAT) [[id number]]  (Normal) Collected: 03/23/25 0222    Lab Status: Final result Specimen: Nares from Nose Updated: 03/23/25 0310     SARS-CoV-2 Negative     INFLUENZA A PCR Negative     INFLUENZA B PCR Negative     RSV PCR Negative    Narrative:      This test has been performed using the CoV-2/Flu/RSV plus assay on the [employer] GeneXpert platform. This test has been validated by the  and verified by the performing laboratory.     This test is designed to amplify and detect the following: nucleocapsid (N), envelope (E), and RNA-dependent RNA polymerase (RdRP) genes of the SARS-CoV-2 genome; matrix (M), basic polymerase (PB2), and acidic protein (PA) segments of the influenza A genome; matrix (M) and non-structural protein (NS) segments of the influenza B genome, and the nucleocapsid genes of RSV A and RSV B.     Positive results are indicative of the presence of Flu A, Flu B, RSV, and/or SARS-CoV-2 RNA. Positive results for SARS-CoV-2 or suspected novel influenza should be reported to state, local, or federal health departments according to local reporting requirements.      All results should be assessed in conjunction with clinical  presentation and other laboratory markers for clinical management.     FOR PEDIATRIC PATIENTS - copy/paste COVID Guidelines URL to browser: [email or web address]       HS Troponin 0hr (reflex protocol) [[id number]]  (Normal) Collected: 03/23/25 0222    Lab Status: Final result Specimen: Blood from Arm, Left Updated: 03/23/25 0255     hs TnI 0hr 15 ng/L     B-Type Natriuretic Peptide(BNP) [[id number]]  (Abnormal) Collected: 03/23/25 0222    Lab Status: Final result Specimen: Blood from Arm, Left Updated: 03/23/25 0255      pg/mL     Comprehensive metabolic panel [[id number]]  (Abnormal) Collected: 03/23/25 0222    Lab Status: Final result Specimen: Blood from Arm, Left Updated: 03/23/25 0252     Sodium 145 mmol/L      Potassium 5.2 mmol/L      Chloride 108 mmol/L      CO2 31 mmol/L      ANION GAP 6 mmol/L      BUN 34 mg/dL      Creatinine 1.38 mg/dL      Glucose 98 mg/dL      Calcium 9.1 mg/dL      AST 18 U/L      ALT 11 U/L      Alkaline Phosphatase 93 U/L      Total Protein 6.3 g/dL      Albumin 3.8 g/dL      Total Bilirubin 0.69 mg/dL      eGFR 32 ml/min/1.73sq m     Narrative:      National Kidney Disease Foundation guidelines for Chronic Kidney Disease (CKD):     Stage 1 with normal or high GFR (GFR > 90 mL/min/1.73 square meters)    Stage 2 Mild CKD (GFR = 60-89 mL/min/1.73 square meters)    Stage 3A Moderate CKD (GFR = 45-59 mL/min/1.73 square meters)    Stage 3B Moderate CKD (GFR = 30-44 mL/min/1.73 square meters)    Stage 4 Severe CKD (GFR = 15-29 mL/min/1.73 square meters)    Stage 5 End Stage CKD (GFR <15 mL/min/1.73 square meters)  Note: GFR calculation is accurate only with a steady state creatinine    Protime-INR [[id number]]  (Abnormal) Collected: 03/23/25 0222    Lab Status: Final result Specimen: Blood from Arm, Left Updated: 03/23/25 0248     Protime 17.2 seconds      INR 1.40    Narrative:      INR Therapeutic Range    Indication                                              INR Range      Atrial Fibrillation                                               2.0-3.0  Hypercoagulable State                                    2.0.2.3  Left Ventricular Asist Device                            2.0-3.0  Mechanical Heart Valve                                  -    Aortic(with afib, MI, embolism, HF, LA enlargement,    and/or coagulopathy)                                     2.0-3.0 (2.5-3.5)     Mitral                                                             2.5-3.5  Prosthetic/Bioprosthetic Heart Valve               2.0-3.0  Venous thromboembolism (VTE: VT, PE        2.0-3.0    APTT [[id number]]  (Abnormal) Collected: 03/23/25 0222    Lab Status: Final result Specimen: Blood from Arm, Left Updated: 03/23/25 0248     PTT 35 seconds     CBC and differential [[id number]]  (Abnormal) Collected: 03/23/25 0222    Lab Status: Final result Specimen: Blood from Arm, Left Updated: 03/23/25 0234     WBC 5.26 Thousand/uL      RBC 2.50 Million/uL      Hemoglobin 7.5 g/dL      Hematocrit 24.8 %      MCV 99 fL      MCH 30.0 pg      MCHC 30.2 g/dL      RDW 17.2 %      MPV 10.2 fL      Platelets 287 Thousands/uL      nRBC 0 /100 WBCs      Segmented % 73 %      Immature Grans % 0 %      Lymphocytes % 18 %      Monocytes % 6 %      Eosinophils Relative 3 %      Basophils Relative 0 %      Absolute Neutrophils 3.79 Thousands/µL      Absolute Immature Grans 0.02 Thousand/uL      Absolute Lymphocytes 0.96 Thousands/µL      Absolute Monocytes 0.33 Thousand/µL      Eosinophils Absolute 0.15 Thousand/µL      Basophils Absolute 0.01 Thousands/µL           CT head wo contrast   Final Result by Parminder Osuna MD (03/23 0809)      No acute intracranial abnormality.  Stable chronic microangiopathic changes within the brain. Multifocal encephalomalacia appears unchanged.                  Workstation performed: [id number]         CT abdomen pelvis wo contrast   Final Result by Steffen  Rosenda Albarado MD (03/23 0841)      No evidence of acute posttraumatic abdominopelvic process allowing for limitations as above.      No definitive acute fracture.      Minimal bladder debris. Correlate with clinical findings to exclude cystitis.      Colonic diverticulosis.      Additional chronic findings and negatives as above.      Workstation performed: [id number]         CTA chest pe study   Final Result by Marques Wisdom MD (03/23 0452)      Previously described right-sided pulmonary emboli seen on CT 3/6/2025 are no longer appreciated. No new pulmonary emboli are identified, noting distal segmental and subsegmental arteries are poorly evaluated secondary to respiratory motion artifact.      No acute findings in the chest.                  Workstation performed: [id number]         XR hand 2 vw left    (Results Pending)   XR wrist 2 vw left    (Results Pending)         ED Course / Workup Pending (followup):      ED Course as of 03/23/25 0857   Sun Mar 23, 2025   0604 S/O from [facility]: recent PE on eliquis now. Hgb drop. Pending CT scans and then admit. Unclear etiology of bleeding. DNR/DNI.    0812 CT head wo contrast  No acute intracranial abnormality.  Stable chronic microangiopathic changes within the brain. Multifocal encephalomalacia appears unchanged.   0854 CT abdomen pelvis wo contrast  No evidence of acute posttraumatic abdominopelvic process allowing for limitations as above.     No definitive acute fracture.     Minimal bladder debris. Correlate with clinical findings to exclude cystitis.     Colonic diverticulosis.     0855 Message sent to [facility]     Procedures  Medical Decision Making  Pt admitted for further work up and treatment     Amount and/or Complexity of Data Reviewed  Labs: ordered.  Radiology: ordered. Decision-making details documented in ED Course.    Risk  Prescription drug management.  Decision regarding hospitalization.            Disposition  Final diagnoses:   Acute anemia      Time reflects when diagnosis was documented in both MDM as applicable and the Disposition within this note       Time User Action Codes Description Comment    3/23/2025  6:27 AM Francesca Stout Add [D64.9] Acute anemia           ED Disposition       ED Disposition   Admit    Condition   Stable    Date/Time   Sun Mar 23, 2025  8:55 AM    Comment   Case was discussed with ESTER and the patient's admission status was agreed to be Admission Status: inpatient status to the service of Dr. Carlos .               Follow-up Information    None       Patient's Medications   Discharge Prescriptions    No medications on file     No discharge procedures on file.       ED Provider  Electronically Signed by     Sejal Martinez PA-C  03/23/25 0857

## 2025-03-23 NOTE — CONSULTS
Consultation - Gastroenterology   Name: Denia Castañeda 89 y.o. female I MRN: [id number]  Unit/Bed#: ED-15 I Date of Admission: 3/23/2025   Date of Service: 3/23/2025 I Hospital Day: 0   Inpatient consult to gastroenterology  Consult performed by: Neda Baig DO  Consult ordered by: Parminder Carlos MD        Physician Requesting Evaluation: Parminder Carlos MD   Reason for Evaluation / Principal Problem: Anemia    Assessment & Plan  Macrocytic anemia  Denia Castañeda is a 89 y.o. female with PMH hypertension, hld, schizoaffective disorder, severe dementia with agitation, osteoporosis, CVA, recent PE on eliquis, atrial fibrillation, CKD who presented to [facility] after her daughter called due to worsening SOB and flu like symptoms. Patient is unable to provide any subjective input at time of my evaluation. Hgb baseline 10 with recent downtrending over her last hospitalization from 3/7 through 3/13.  Hemoglobin was 9.3 at time of discharge.  Hemoglobin at time of presentation was 7.5 with recheck of 6.3. Receiving 1u PRBC at time of evaluation. No overt bleeding reported by team or in documentation from ED physician. Unable to reach family for collateral information at time of evaluation. No prior EGD/Colonoscopy available for review.   Unclear source of anemia with no overt bleeding reported - patient does have history of vaginal bleeding and recent trauma to arm. CT scan AP was completed without contrast. Downtrend noted after starting AC in setting of known history with anticoagulants with vaginal bleeding previously.  Unable to reach family to obtain more information and discuss their preferences in regard to management  For now, agree with transfusion and resuscitation  Reasonable to continue IV PPI BID for now  Okay for diet from GI perspective  Monitor stool output and serial hgb  Maintain active T/S  Favor heparin drip if AC needed at this time  Would initiate overall GOC discussion and review risks/benefits of  anticoagulation  Will hold on any plans for endoscopic evaluation until we can discuss with patient's family. Would recommend conservative management overall given age and comorbidities.    History of pulmonary embolism  Recently diagnosed this month and initiated on Eliquis  Severe dementia with agitation (HCC)  Unable to participate in interview at time of evaluation.   CVA (cerebral vascular accident) (HCC)  Hx of multiple CVA's on imaging in setting of baseline dementia  Atrial fibrillation (HCC)  History of atrial fibrillation not on anticoagulation due to a history of vaginal bleeding and fall risk.      History of Present Illness   HPI:  Denia Castañeda is a 89 y.o. female with PMH hypertension, hld, schizoaffective disorder, severe dementia with agitation, osteoporosis, CVA, recent PE on eliquis, atrial fibrillation, CKD who presented to [facility] after her daughter called due to worsening SOB and flu like symptoms. Patient is unable to provide any subjective input at time of my evaluation.    Per chart review, patient presented to the ED due to increased shortness of breath.  Caretaker at time presentation reported that patient was at baseline mental status.  Per ED note caretaker reported patient has been without vomiting, diarrhea, hematochezia, hematuria, decreased urine output, fever, cough    Attempted to contact patient's healthcare representative listed in chart - Logan Colon via [employer] [id number]. Unfortunately no answer, and message left on machine.     Review of Systems   Unable to perform ROS: Dementia     Medical History Review: I have reviewed the patient's PMH, PSH, Social History, Family History, Meds, and Allergies     Objective :  Temp:  [96.6 °F (35.9 °C)-97.7 °F (36.5 °C)] 97.6 °F (36.4 °C)  HR:  [50-64] 50  BP: (124-189)/(56-74) 141/58  Resp:  [14-21] 16  SpO2:  [95 %-100 %] 99 %  O2 Device: Nasal cannula  Nasal Cannula O2 Flow Rate (L/min):  [2 L/min] 2 L/min    Physical  Exam  Vitals and nursing note reviewed.   Constitutional:       General: She is not in acute distress.     Appearance: She is not ill-appearing.      Comments: lethargic   HENT:      Head: Normocephalic and atraumatic.      Nose: Nose normal.      Mouth/Throat:      Mouth: Mucous membranes are moist.   Cardiovascular:      Rate and Rhythm: Normal rate.   Pulmonary:      Effort: Pulmonary effort is normal. No respiratory distress.   Abdominal:      General: Bowel sounds are normal. There is no distension.      Palpations: Abdomen is soft. There is no mass.      Tenderness: There is no abdominal tenderness. There is no guarding or rebound.      Hernia: No hernia is present.   Musculoskeletal:         General: Swelling and tenderness present.      Right lower leg: No edema.      Left lower leg: No edema.      Comments: Prominent sternum   Skin:     General: Skin is warm and dry.      Coloration: Skin is not jaundiced.      Findings: No bruising.   Neurological:      Comments: Unable to evaluate 2/2 mental status   Psychiatric:      Comments: Unable to evaluate 2/2 mental status           Lab Results: I have reviewed the following results:

## 2025-03-23 NOTE — ASSESSMENT & PLAN NOTE
Denia Castañeda is a 89 y.o. female with PMH hypertension, hld, schizoaffective disorder, severe dementia with agitation, osteoporosis, CVA, recent PE on eliquis, atrial fibrillation, CKD who presented to [facility] after her daughter called due to worsening SOB and flu like symptoms. Patient is unable to provide any subjective input at time of my evaluation. Hgb baseline 10 with recent downtrending over her last hospitalization from 3/7 through 3/13.  Hemoglobin was 9.3 at time of discharge.  Hemoglobin at time of presentation was 7.5 with recheck of 6.3. Receiving 1u PRBC at time of evaluation. No overt bleeding reported by team or in documentation from ED physician. Unable to reach family for collateral information at time of evaluation. No prior EGD/Colonoscopy available for review.   Unclear source of anemia with no overt bleeding reported - patient does have history of vaginal bleeding and recent trauma to arm. CT scan AP was completed without contrast. Downtrend noted after starting AC in setting of known history with anticoagulants with vaginal bleeding previously.  Unable to reach family to obtain more information and discuss their preferences in regard to management  For now, agree with transfusion and resuscitation  Reasonable to continue IV PPI BID for now  Okay for diet from GI perspective  Monitor stool output and serial hgb  Maintain active T/S  Favor heparin drip if AC needed at this time  Would initiate overall GOC discussion and review risks/benefits of anticoagulation  Will hold on any plans for endoscopic evaluation until we can discuss with patient's family. Would recommend conservative management overall given age and comorbidities.

## 2025-03-23 NOTE — ASSESSMENT & PLAN NOTE
History of atrial fibrillation not on anticoagulation due to a history of vaginal bleeding and fall risk.

## 2025-03-24 ENCOUNTER — PATIENT OUTREACH (OUTPATIENT)
Dept: CASE MANAGEMENT | Facility: HOSPITAL | Age: OVER 89
End: 2025-03-24

## 2025-03-24 LAB
ABO GROUP BLD BPU: NORMAL
ABO GROUP BLD BPU: NORMAL
ANION GAP SERPL CALCULATED.3IONS-SCNC: 7 MMOL/L (ref 4–13)
BPU ID: NORMAL
BPU ID: NORMAL
BUN SERPL-MCNC: 28 MG/DL (ref 5–25)
CALCIUM SERPL-MCNC: 8.1 MG/DL (ref 8.4–10.2)
CHLORIDE SERPL-SCNC: 108 MMOL/L (ref 96–108)
CO2 SERPL-SCNC: 27 MMOL/L (ref 21–32)
CREAT SERPL-MCNC: 1.17 MG/DL (ref 0.6–1.3)
CROSSMATCH: NORMAL
CROSSMATCH: NORMAL
ERYTHROCYTE [DISTWIDTH] IN BLOOD BY AUTOMATED COUNT: 17.4 % (ref 11.6–15.1)
GFR SERPL CREATININE-BSD FRML MDRD: 40 ML/MIN/1.73SQ M
GLUCOSE SERPL-MCNC: 65 MG/DL (ref 65–140)
HCT VFR BLD AUTO: 31.1 % (ref 34.8–46.1)
HGB BLD-MCNC: 9.9 G/DL (ref 11.5–15.4)
MAGNESIUM SERPL-MCNC: 2.2 MG/DL (ref 1.9–2.7)
MCH RBC QN AUTO: 30.3 PG (ref 26.8–34.3)
MCHC RBC AUTO-ENTMCNC: 31.8 G/DL (ref 31.4–37.4)
MCV RBC AUTO: 95 FL (ref 82–98)
PLATELET # BLD AUTO: 198 THOUSANDS/UL (ref 149–390)
PMV BLD AUTO: 9.8 FL (ref 8.9–12.7)
POTASSIUM SERPL-SCNC: 4.2 MMOL/L (ref 3.5–5.3)
RBC # BLD AUTO: 3.27 MILLION/UL (ref 3.81–5.12)
SODIUM SERPL-SCNC: 142 MMOL/L (ref 135–147)
UNIT DISPENSE STATUS: NORMAL
UNIT DISPENSE STATUS: NORMAL
UNIT PRODUCT CODE: NORMAL
UNIT PRODUCT CODE: NORMAL
UNIT PRODUCT VOLUME: 350 ML
UNIT PRODUCT VOLUME: 350 ML
UNIT RH: NORMAL
UNIT RH: NORMAL
WBC # BLD AUTO: 5.27 THOUSAND/UL (ref 4.31–10.16)

## 2025-03-24 PROCEDURE — 92610 EVALUATE SWALLOWING FUNCTION: CPT

## 2025-03-24 PROCEDURE — 99232 SBSQ HOSP IP/OBS MODERATE 35: CPT | Performed by: STUDENT IN AN ORGANIZED HEALTH CARE EDUCATION/TRAINING PROGRAM

## 2025-03-24 PROCEDURE — 80048 BASIC METABOLIC PNL TOTAL CA: CPT | Performed by: STUDENT IN AN ORGANIZED HEALTH CARE EDUCATION/TRAINING PROGRAM

## 2025-03-24 PROCEDURE — 85027 COMPLETE CBC AUTOMATED: CPT | Performed by: STUDENT IN AN ORGANIZED HEALTH CARE EDUCATION/TRAINING PROGRAM

## 2025-03-24 PROCEDURE — 83735 ASSAY OF MAGNESIUM: CPT | Performed by: STUDENT IN AN ORGANIZED HEALTH CARE EDUCATION/TRAINING PROGRAM

## 2025-03-24 RX ADMIN — METOPROLOL TARTRATE 25 MG: 25 TABLET, FILM COATED ORAL at 10:15

## 2025-03-24 RX ADMIN — ACETAMINOPHEN 650 MG: 325 TABLET, FILM COATED ORAL at 22:30

## 2025-03-24 RX ADMIN — ATORVASTATIN CALCIUM 40 MG: 40 TABLET, FILM COATED ORAL at 16:47

## 2025-03-24 RX ADMIN — DOCUSATE SODIUM 100 MG: 100 CAPSULE, LIQUID FILLED ORAL at 10:14

## 2025-03-24 RX ADMIN — ASPIRIN 81 MG: 81 TABLET, COATED ORAL at 10:15

## 2025-03-24 RX ADMIN — TERAZOSIN HYDROCHLORIDE 2 MG: 1 CAPSULE ORAL at 20:52

## 2025-03-24 RX ADMIN — DILTIAZEM HYDROCHLORIDE 15 MG: 30 TABLET, FILM COATED ORAL at 16:46

## 2025-03-24 RX ADMIN — AMIODARONE HYDROCHLORIDE 200 MG: 200 TABLET ORAL at 10:15

## 2025-03-24 RX ADMIN — DULOXETINE HYDROCHLORIDE 30 MG: 30 CAPSULE, DELAYED RELEASE ORAL at 20:52

## 2025-03-24 RX ADMIN — DILTIAZEM HYDROCHLORIDE 15 MG: 30 TABLET, FILM COATED ORAL at 10:14

## 2025-03-24 RX ADMIN — METOPROLOL TARTRATE 25 MG: 25 TABLET, FILM COATED ORAL at 16:48

## 2025-03-24 RX ADMIN — PANTOPRAZOLE SODIUM 40 MG: 40 INJECTION, POWDER, LYOPHILIZED, FOR SOLUTION INTRAVENOUS at 20:52

## 2025-03-24 RX ADMIN — PANTOPRAZOLE SODIUM 40 MG: 40 INJECTION, POWDER, LYOPHILIZED, FOR SOLUTION INTRAVENOUS at 10:14

## 2025-03-24 RX ADMIN — DILTIAZEM HYDROCHLORIDE 15 MG: 30 TABLET, FILM COATED ORAL at 20:52

## 2025-03-24 NOTE — SPEECH THERAPY NOTE
Speech Language/Pathology  Speech/Language Pathology  Assessment    Patient Name: Denia Castañeda  Today's Date: 3/24/2025     Problem List  Principal Problem:    Symptomatic anemia  Active Problems:    Primary hypertension    Hyperlipidemia    Elevated serum creatinine    Urinary incontinence    Atrial fibrillation (HCC)    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HCC)    Moderate protein-calorie malnutrition (HCC)    Age-related osteoporosis without current pathological fracture    Severe dementia with agitation (HCC)    CVA (cerebral vascular accident) (HCC)    History of pulmonary embolism    Past Medical History  Past Medical History:   Diagnosis Date    Anxiety     Closed fracture of right orbital floor (HCC) 08/23/2020    Cognitive impairment     COVID-19 virus infection 04/13/2020    Dementia (HCC)     Depression     Hallucination     Hyperlipidemia     Hypertension     Memory loss     Psychiatric illness     Psychosis (HCC)     Retention of urine     Schizoaffective disorder (HCC)     Sleep difficulties     Urinary tract infection      Past Surgical History  Past Surgical History:   Procedure Laterality Date    APPENDECTOMY      BLADDER SURGERY      CHOLECYSTECTOMY      WA OPTX FEM SHFT FX W/INSJ IMED IMPLT W/WO SCREW Right 7/15/2021    Procedure: INSERTION NAIL IM FEMUR ANTEGRADE (TROCHANTERIC) right;  Surgeon: Getachew Aguilar MD;  Location: [facility] OR;  Service: Orthopedics    TOTAL ABDOMINAL HYSTERECTOMY W/ BILATERAL SALPINGOOPHORECTOMY      TOTAL KNEE ARTHROPLASTY Left           Bedside Swallow Evaluation:    Summary:  Pt presented nonverbal. Known to me from previous admits. Allowed oral care, was sucking on the swab. Presented pt w/ nectar by tsp and then by straw 2* prompt transfer and  swallow. Needed to be paced. Tolerated 4 ounces. Also tolerated ~ 6 ounces thin water by straw, again paced as pt took large sips quickly. No cough or wet vocal quality. Consumed 4 ounces puree by tsp also w/ prompt transfer and  swallow. Trialed small piece of soft solid. Did not appear to masticate at all. Trial dcd. Mod oral stage, no pharyngeal s/s today.     Recommendations:  Diet:Puree  Liquid:thin. Pace w/ straw, no more than 3 sips at a time.   Meds: crush  Supervision: full/feed  Positioning:Upright  Strategies: slow rate, pace, straw as able  Pt to take PO/Meds only when fully alert and upright.   Oral care  Aspiration precautions  Reflux precautions  Therapy Prognosis:fair  Prognosis considerations: status fluctuates  Frequency:? 1-2xs as indicated.     Consider consult w/:  Nutrition    Goal(s):  Dysphagia LTG  -Patient will demonstrate safe and effective oral intake (without overt s/s significant oral/pharyngeal dysphagia including s/s penetration or aspiration) for the highest appropriate diet level.     1.Pt will tolerate least restrictive diet w/out s/s aspiration or oral/pharyngeal difficulties.   2.Pt will will effectively manipulate/masticate and transfer purees/solids w/out s/s dysphagia/aspiration.   3.Pt will tolerate thin liquids w/out s/s aspiration.   -If indicated, patient will comply with a Video/Modified Barium Swallow study for more complete assessment of swallowing anatomy/physiology/aspiration risk and to assess efficacy of treatment techniques so as to best guide treatment plan     H&P/Admit info/ pertinent provider notes: (PMH noted above)  Chief Complaint: shortness of breath.  Denia Castañeda is a 89 y.o. female with a PMH of pulmonary embolism, atrial fibrillation, hyperlipidemia,  who presents with shortness of breath..  History was obtained from ED providers, chart review, and as much as can be gleaned from patient though she is a poor historian given her underlying dementia.  Her caregivers indicated to the ED providers that they feel she is at her normal state of health.  Denia Castañeda is a 89 y.o. female with PMHx of recently diagnosed low-burden PE prescribed Eliquis, severe dementia with agitation,  schizophrenia, CVA, HTN, HLD, urinary incontinence, protein-calorie malnutrition, and A-fib who presented with SOB and pallor.  On 3/6/2025 she presented to the ED with AMS and underwent CVA rule out.  She was incidentally found to have a low burden PE and was started on Eliquis.  Given her acute SOB, patient received repeat CTA chest in the ED which showed resolution of previously identified PE though distal segmental and subsegmental arteries are poorly evaluated secondary to respiratory motion artifact, and no signs of hemorrhage.  However, she was noted to have a decrease in her Hb from 9.3 (baseline 10 days ago) to 7.5 (3/23/2025) to 6.3 (3/23/2025).  There are no signs or reports of dark or bloody stool.  There is some suspicion for a fall and injury to hand though XR hand shows no obvious fracture, and her hand was splinted.  The ED ordered 2U PRBCs.  ADDENDUM:  Finally got a hold of Logan. States that her caregiver told him that 1AM today she was shaking and short of breath. They decided to take her to the hospital for further evaluation.    Per GI 3/23:  Attestation signed by Valery Baer DO at 3/23/2025 12:55 PM  Attending Attestation::  I was the supervising physician and personally saw/examined the patient on 3/23/2025.  I agree with the Resident/Fellow's note with the following additions/exceptions:  89-year-old female with severe dementia with agitation, schizoaffective disorder, osteoporosis, CVA, PE on Eliquis, A-fib, who presented due to worsening shortness of breath and flulike symptoms.  No history can be obtained from the patient.  GI was consulted due to downtrending hemoglobin.  Recent admission with  d/c on 3/13 at that time hemoglobin 9.3.  Hemoglobin here down to 6.3 in the absence of any obvious overt GI bleeding.  She has been new to anticoagulation since last admission.  There was concern previously of starting AC due to question of vaginal  bleeding.  Assessment/recommendations:  1) macrocytic anemia in the absence of overt GI bleeding.  Continue workup for etiology of anemia.  Low ferritin as of 3/11/2025.  Check B12 and folate.  Monitor for any evidence of ongoing vaginal bleeding.  In the absence of overt GI bleeding no plans for endoscopic evaluation.  Even if this were to occur given advanced age and comorbidities, patient would be very high risk for anesthesia and significant goals of care discussion would be required.  Highly recommend conservative management in her care and would encourage ongoing goals of care discussion.    Special Studies:  3/23/25 ct abd/pelvis:  No evidence of acute posttraumatic abdominopelvic process allowing for limitations as above.  No definitive acute fracture.  Minimal bladder debris. Correlate with clinical findings to exclude cystitis.  Colonic diverticulosis.  Additional chronic findings and negatives as above.  3/23 CT head:  No acute intracranial abnormality. Stable chronic microangiopathic changes within the brain. Multifocal encephalomalacia appears unchanged   3/23 CTA chest pe study:  IMPRESSION:  Previously described right-sided pulmonary emboli seen on CT 3/6/2025 are no longer appreciated. No new pulmonary emboli are identified, noting distal segmental and subsegmental arteries are poorly evaluated secondary to respiratory motion artifact.  No acute findings in the chest.    Procalcitonin<=0.25ng/ml    WBC  4.31-10.16 Thousand/uL   5.27     BUN 28  3/24    Previous MBS:  None in epic    Patient's goal: nonverbal    Did the pt report pain? No, no nonverbal indication when seated upright  If yes, was nursing notified/was it addressed?    Reason for consult:  R/o aspiration  Determine safest and least restrictive diet  H/o neurological disease  fluctuating intake  weight loss   h/o dysphagia     Precautions:  Aspiration  Fall    Food Allergies:  nkfa   Current Diet:  Npo on admit due to lethargy   Premorbid  diet:  Puree w/nectar when here recently   O2 requirement:  RA   Social/Prior living  Home w/ family   Voice/Speech: Nonverbal. Spoke to pt in [place].    Follows commands:  no   Cognitive status:  alert     Oral mech exam:  Dentition:? 1 tooth  Lips (VII):wfl at rest/no asymmetry  Tongue (XII): grossly wfl. Did not follow oral mech requests  Secretion management: wnl    Esophageal stage:  No s/s reported    Aspiration precautions posted    Results d/w:  Pt, nursing,physician

## 2025-03-24 NOTE — CASE MANAGEMENT
Case Management Assessment & Discharge Planning Note    Patient name Denia Castañeda  Location [facility] 4 /E4 -* MRN [id number]  : 1931 Date 3/24/2025       Current Admission Date: 3/23/2025  Current Admission Diagnosis:Symptomatic anemia   Patient Active Problem List    Diagnosis Date Noted Date Diagnosed    Hypernatremia 03/10/2025     Stroke-like symptom 2025     History of pulmonary embolism 2025     Cystitis 2024     CVA (cerebral vascular accident) (HCC) 2024     Vaginal bleeding 2024     Edema of right upper extremity 2024     Pericardial effusion 2024     Facial asymmetry 2024     Back pain 2023     At risk for elder abuse 2023     Urinary retention 2022     Edema of right ankle 10/04/2022     Flank pain 10/18/2021     Oropharyngeal dysphagia 2021     Symptomatic anemia 2021     H/O fracture of right hip 07/15/2021     Stage 3b chronic kidney disease (HCC) 07/15/2021     Age-related osteoporosis without current pathological fracture 2020     Moderate protein-calorie malnutrition (HCC) 2020     Severe dementia with agitation ([facility]) 2020     Urinary incontinence 2020     Ambulatory dysfunction 04/10/2020     Elevated serum creatinine 04/10/2020     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     Atrial fibrillation (HCC) 2019     Bilateral hearing loss 10/11/2018     Personal history of fall 10/11/2018     Primary hypertension 2018     Hyperlipidemia 2018     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HCC) 2018     Chronic constipation 2015     Cataract of both eyes 2014       LOS (days): 1  Geometric Mean LOS (GMLOS) (days):   Days to GMLOS:     OBJECTIVE:  PATIENT READMITTED TO HOSPITAL  Risk of Unplanned Readmission Score: 21.63         Current admission status: Inpatient       Preferred Pharmacy:   [facility] - [place] - [id number] [facility]  [facility] [address]  Phone: [phone] Fax: [phone]    [facility] - [place] - [address]  [facility],  [address]  [facility],  First Floor [facility] [address]  Phone: [phone] Fax: [phone]    [id number] - [place] - [address]  [address] [facility] [address]  Phone: [phone] Fax: [phone]    Primary Care Provider: Jose M Castañeda MD    Primary Insurance: [employer]ICARE [facility]  Secondary Insurance: [facility]    ASSESSMENT:  Active Health Care Proxies       Logan Stoner Health Care Representative - Child   Primary Phone: [phone] (Mobile)  Home Phone: [phone]                 Advance Directives  Does patient have a Health Care POA?: Yes  Does patient have Advance Directives?: Yes  Advance Directives: Living will, Power of  for health care  Primary Contact: Logan, [phone] son         Readmission Root Cause  30 Day Readmission: Yes  During your hospital stay, did someone (provider, nurse, ) explain your care to you in a way you could understand?: Yes  Did you feel medically stable to leave the hospital?: Yes  Were you able to pay for your medication at the pharmacy?: Yes  Did you have reliable transportation to take you to your appointments?: Yes  During previous admission, was a post-acute recommendation made?: No  Patient was readmitted due to: SOB, Anemia  Action Plan: GI consult/Speech    Patient Information  Admitted from:: Home  Mental Status: Confused  During Assessment patient was accompanied by: Not accompanied during assessment  Assessment information provided by:: Son  Primary Caregiver: Other (Comment)  Caregiver's Name:: [facility] [employer] 24-7 [employer]  Caregiver's Relationship to Patient:: Other (Specify)  Caregiver's Telephone Number:: [employer]  Support Systems: Private Caregivers, Family members  [facility]  Residence: [place]  What [facility] do you live in?: [place]  Home entry access options. Select all that apply.: Elevator  Type of Current Residence: Apartment  Floor Level: 3  Upon entering residence, is there a bedroom on the main floor (no further steps)?: Yes  Upon entering residence, is there a bathroom on the main floor (no further steps)?: Yes  Living Arrangements: Other (Comment) ([employer] Caregivers)  Is patient a ?: No    Activities of Daily Living Prior to Admission  Functional Status: Total dependent  Completes ADLs independently?: No  Level of ADL dependence: Total Dependent  Ambulates independently?: No  Level of ambulatory dependence: Total Dependent  Does patient use assisted devices?: Yes  Assisted Devices (DME) used: Hospital Bed, Wheelchair, Bedside Commode, Shower Chair, Walker  Does patient currently own DME?: Yes  What DME does the patient currently own?: Bedside Commode, Wheelchair, Shower Chair, Hospital Bed, Walker  Does patient have a history of Outpatient Therapy (PT/OT)?: Yes  Does the patient have a history of Short-Term Rehab?: No  Does patient have a history of HHC?: No  Does patient currently have HHC?: No         Patient Information Continued  Income Source: SSI/SSD  Does patient have prescription coverage?: Yes  Can the patient afford their medications and any related supplies (such as glucometers or test strips)?: Yes  Does patient receive dialysis treatments?: No  Does patient have a history of substance abuse?: No  Does patient have a history of Mental Health Diagnosis?: Yes (Anxiety)  Is patient receiving treatment for mental health?: Yes  Has patient received inpatient treatment related to mental health in the last 2 years?: No         Means of Transportation  [facility] ([facility] (Medical transport)          DISCHARGE DETAILS:    Discharge planning discussed with:: Patient's son  Freedom of Choice: Yes  Comments - Freedom of Choice: Return home with 24/7  caregivers  CM contacted family/caregiver?: Yes  Were Treatment Team discharge recommendations reviewed with patient/caregiver?: Yes  Did patient/caregiver verbalize understanding of patient care needs?: Yes  Were patient/caregiver advised of the risks associated with not following Treatment Team discharge recommendations?: Yes    Contacts  Patient Contacts: Logan Stoner (Son) [phone]  Relationship to Patient:: Family  Contact Method: Phone  Phone Number: Logan Stoner (Son) [phone]  Reason/Outcome: Continuity of Care, Emergency Contact, Referral, Discharge Planning    Requested Home Health Care         Is the patient interested in HHC at discharge?: No    DME Referral Provided  Referral made for DME?: No         Would you like to participate in our [facility] service program?  : No - Declined    Treatment Team Recommendation: Home  Discharge Destination Plan:: Home                  CM spoke with patient's son via PC to introduce self and role with DC planning.  Patient resides alone in a 3rd floor apartment with elevator access.  Patient has 24/7 waiver funded caregivers through [employer] [facility] [employer], son will call for them to resume care at time of DC.  Son was interested in home hospice but concerned [facility] would no longer provide HHA's.  CM will call coordinator to discuss.    CM spoke with [facility] coordinator Whitney [phone].  [facility] will not allow hospice services/ visits at the same time a HHA is present.  CM explaiend that hospice services is not the same as HHA's and would be billed under patient's medicare coverage. [facility] will still not allow HHA's to be present and patient requires 24/7 HHAs.  CM discussed with patient's son, he does not want to jeopardize 24/7 caregivers, will not pursue hospice services at this time.  SLIM made aware.

## 2025-03-24 NOTE — ASSESSMENT & PLAN NOTE
89-year-old female with hypertension, hyperlipidemia, atrial fibrillation, recent pulmonary embolism who was brought in here after she was found to have shortness of breath and shaking prior to admission  Hemoglobin is found to be 6.6 on admission  Suspect likely in the setting of small bleed with recent PE on Eliquis  Transfused with 2 units PRBC  H&H improved to 9.9, no evidence of bleeding overnight  GI evaluated, did not recommend endoscopy  Discussions with son Logan, would not pursue aggressive treatment  Unable to return home with hospice due to 24-hour care with [facility] caregivers  Will plan to discharge home tomorrow if stable with DNR/DNI POLST and outpatient palliative care referral

## 2025-03-24 NOTE — PROGRESS NOTES
Progress Note - Hospitalist   Name: Denia Castañeda 89 y.o. female I MRN: [id number]  Unit/Bed#: E4 -01 I Date of Admission: 3/23/2025   Date of Service: 3/24/2025 I Hospital Day: 1     Assessment & Plan  Symptomatic anemia  89-year-old female with hypertension, hyperlipidemia, atrial fibrillation, recent pulmonary embolism who was brought in here after she was found to have shortness of breath and shaking prior to admission  Hemoglobin is found to be 6.6 on admission  Suspect likely in the setting of small bleed with recent PE on Eliquis  Transfused with 2 units PRBC  H&H improved to 9.9, no evidence of bleeding overnight  GI evaluated, did not recommend endoscopy  Discussions with son Logan, would not pursue aggressive treatment  Unable to return home with hospice due to 24-hour care with [facility] caregivers  Will plan to discharge home tomorrow if stable with DNR/DNI POLST and outpatient palliative care referral    Elevated serum creatinine  Continue monitoring. IV hydration overnight.     Recent Labs     03/23/25  0222 03/24/25  0547   BUN 34* 28*   CREATININE 1.38* 1.17   EGFR 32 40     Primary hypertension  Controlled  Continue Metoprolol and Cardizem with BP hold parameters  Hyperlipidemia  Continue statin  Atrial fibrillation (HCC)  On digoxin, metoprolol  Discontinue Eliquis in setting of anemia  Severe dementia with agitation (HCC)  Continue supportive care  History of pulmonary embolism  Diagnosed last 3/6/2025. Eliquis on discontinue    VTE Pharmacologic Prophylaxis:   Pharmacologic: none  Mechanical VTE Prophylaxis in Place: Yes    Current Length of Stay: 1 day(s)    Current Patient Status: Inpatient   Certification Statement: The patient will continue to require additional inpatient hospital stay due to monitor H/H    Discharge Plan: 24 hours    Code Status: Level 3 - DNAR and DNI      Subjective:   No events overnight. No reports of bleeding. Discussions with son regarding no aggressive  treatments.    Objective:     Vitals:   Temp (24hrs), Av.7 °F (36.5 °C), Min:97.3 °F (36.3 °C), Max:98.3 °F (36.8 °C)    Temp:  [97.3 °F (36.3 °C)-98.3 °F (36.8 °C)] 97.7 °F (36.5 °C)  HR:  [60-80] 63  Resp:  [16-18] 16  BP: (132-176)/(60-75) 160/68  SpO2:  [93 %-96 %] 95 %  There is no height or weight on file to calculate BMI.     Input and Output Summary (last 24 hours):       Intake/Output Summary (Last 24 hours) at 3/24/2025 1531  Last data filed at 3/24/2025 1255  Gross per 24 hour   Intake 1090 ml   Output --   Net 1090 ml       Physical Exam:     Physical Exam  Vitals and nursing note reviewed.   Constitutional:       Appearance: She is ill-appearing.   HENT:      Head: Normocephalic.   Cardiovascular:      Rate and Rhythm: Normal rate.   Pulmonary:      Effort: Pulmonary effort is normal. No respiratory distress.   Abdominal:      Palpations: Abdomen is soft.   Musculoskeletal:         General: No swelling.      Right lower leg: No edema.      Left lower leg: No edema.   Skin:     General: Skin is warm and dry.   Neurological:      Mental Status: She is alert. Mental status is at baseline.         Additional Data:     Labs:    Results from last 7 days   Lab Units 25  0547 25  0452 25  0222   WBC Thousand/uL 5.27  --  5.26   HEMOGLOBIN g/dL 9.9*   < > 7.5*   HEMATOCRIT % 31.1*   < > 24.8*   PLATELETS Thousands/uL 198  --  287   SEGS PCT %  --   --  73   LYMPHO PCT %  --   --  18   MONO PCT %  --   --  6   EOS PCT %  --   --  3    < > = values in this interval not displayed.     Results from last 7 days   Lab Units 25  0547 25  0222   SODIUM mmol/L 142 145   POTASSIUM mmol/L 4.2 5.2   CHLORIDE mmol/L 108 108   CO2 mmol/L 27 31   BUN mg/dL 28* 34*   CREATININE mg/dL 1.17 1.38*   ANION GAP mmol/L 7 6   CALCIUM mg/dL 8.1* 9.1   ALBUMIN g/dL  --  3.8   TOTAL BILIRUBIN mg/dL  --  0.69   ALK PHOS U/L  --  93   ALT U/L  --  11   AST U/L  --  18   GLUCOSE RANDOM mg/dL 65 98      Results from last 7 days   Lab Units 03/23/25  0222   INR  1.40*                       * I Have Reviewed All Lab Data Listed Above.  * Additional Pertinent Lab Tests Reviewed: All Labs For Current Hospital Admission Reviewed    Mobility:  Basic Mobility Inpatient Raw Score: 6  -[facility] Goal: 2: Bed activities/Dependent transfer  -HL Achieved: 1: Laying in bed    Lines:     Invasive Devices       Peripheral Intravenous Line  Duration             Peripheral IV 03/23/25 Left Antecubital 1 day                       Imaging:    Imaging Reports Reviewed Today Include:     XR hand 2 vw left  Result Date: 3/24/2025  Impression: No definitive acute osseous abnormality. Computerized Assisted Algorithm (CAA) may have been used to analyze all applicable images. Workstation performed: [id number]     XR wrist 2 vw left  Result Date: 3/24/2025  Impression: No definitive acute osseous abnormality. Degenerative changes as described. Computerized Assisted Algorithm (CAA) may have been used to analyze all applicable images. Workstation performed: [id number]     CT abdomen pelvis wo contrast  Result Date: 3/23/2025  Impression: No evidence of acute posttraumatic abdominopelvic process allowing for limitations as above. No definitive acute fracture. Minimal bladder debris. Correlate with clinical findings to exclude cystitis. Colonic diverticulosis. Additional chronic findings and negatives as above. Workstation performed: [id number]     CT head wo contrast  Result Date: 3/23/2025  Impression: No acute intracranial abnormality.  Stable chronic microangiopathic changes within the brain. Multifocal encephalomalacia appears unchanged. Workstation performed: [id number]     CTA chest pe study  Result Date: 3/23/2025  Impression: Previously described right-sided pulmonary emboli seen on CT 3/6/2025 are no longer appreciated. No new pulmonary emboli are identified, noting distal segmental and subsegmental arteries are poorly evaluated  secondary to respiratory motion artifact. No acute findings in the chest. Workstation performed: [id number]       Recent Cultures (last 7 days):           Last 24 Hours Medication List:   Current Facility-Administered Medications   Medication Dose Route Frequency Provider Last Rate    acetaminophen  650 mg Oral Q6H PRN Parminder Carlos MD      amiodarone  200 mg Oral Daily Parminder Carlos MD      atorvastatin  40 mg Oral QPM Parminder Carlos MD      diltiazem  15 mg Oral TID Parminder Carlos MD      docusate sodium  100 mg Oral BID Parminder Carlos MD      DULoxetine  30 mg Oral HS Parminder Carlos MD      hydrALAZINE  10 mg Intravenous Q6H PRN Parminder Carlos MD      metoprolol tartrate  25 mg Oral BID Parminder Carlos MD      pantoprazole  40 mg Intravenous Q12H LYNDSEY Parminder Carlos MD      terazosin  2 mg Oral HS Parminder Carlos MD          Today, Patient Was Seen By: Babatunde Singletary MD    ** Please Note: Dictation voice to text software may have been used in the creation of this document. **

## 2025-03-24 NOTE — PROGRESS NOTES
ADT Alert received via IB. Chart reviewed. Patient admitted to IP at [facility] on 3/23 with symptomatic anemia. Pt with c/o SOB and shaking.    RNCM to follow.

## 2025-03-24 NOTE — PLAN OF CARE
Problem: Potential for Falls  Goal: Patient will remain free of falls  Description: INTERVENTIONS:- Educate patient/family on patient safety including physical limitations- Instruct patient to call for assistance with activity - Consult OT/PT to assist with strengthening/mobility - Keep Call bell within reach- Keep bed low and locked with side rails adjusted as appropriate- Keep care items and personal belongings within reach- Initiate and maintain comfort rounds- Make Fall Risk Sign visible to staff- Offer Toileting every 2 Hours, in advance of need- Initiate/Maintain bed alarm- Obtain necessary fall risk management equipment: bed alarm- Apply yellow socks and bracelet for high fall risk patients- Consider moving patient to room near nurses station  Outcome: Progressing     Problem: Prexisting or High Potential for Compromised Skin Integrity  Goal: Skin integrity is maintained or improved  Description: INTERVENTIONS:- Identify patients at risk for skin breakdown- Assess and monitor skin integrity- Assess and monitor nutrition and hydration status- Monitor labs - Assess for incontinence - Turn and reposition patient- Assist with mobility/ambulation- Relieve pressure over bony prominences- Avoid friction and shearing- Provide appropriate hygiene as needed including keeping skin clean and dry- Evaluate need for skin moisturizer/barrier cream- Collaborate with interdisciplinary team - Patient/family teaching- Consider wound care consult   Outcome: Progressing     Problem: PAIN - ADULT  Goal: Verbalizes/displays adequate comfort level or baseline comfort level  Description: Interventions:- Encourage patient to monitor pain and request assistance- Assess pain using appropriate pain scale- Administer analgesics based on type and severity of pain and evaluate response- Implement non-pharmacological measures as appropriate and evaluate response- Consider cultural and social influences on pain and pain management- Notify  physician/advanced practitioner if interventions unsuccessful or patient reports new pain  Outcome: Progressing     Problem: INFECTION - ADULT  Goal: Absence or prevention of progression during hospitalization  Description: INTERVENTIONS:- Assess and monitor for signs and symptoms of infection- Monitor lab/diagnostic results- Monitor all insertion sites, i.e. indwelling lines, tubes, and drains- Monitor endotracheal if appropriate and nasal secretions for changes in amount and color- Glidden appropriate cooling/warming therapies per order- Administer medications as ordered- Instruct and encourage patient and family to use good hand hygiene technique- Identify and instruct in appropriate isolation precautions for identified infection/condition  Outcome: Progressing  Goal: Absence of fever/infection during neutropenic period  Description: INTERVENTIONS:- Monitor WBC  Outcome: Progressing     Problem: SAFETY ADULT  Goal: Maintain or return to baseline ADL function  Description: INTERVENTIONS:-  Assess patient's ability to carry out ADLs; assess patient's baseline for ADL function and identify physical deficits which impact ability to perform ADLs (bathing, care of mouth/teeth, toileting, grooming, dressing, etc.)- Assess/evaluate cause of self-care deficits - Assess range of motion- Assess patient's mobility; develop plan if impaired- Assess patient's need for assistive devices and provide as appropriate- Encourage maximum independence but intervene and supervise when necessary- Involve family in performance of ADLs- Assess for home care needs following discharge - Consider OT consult to assist with ADL evaluation and planning for discharge- Provide patient education as appropriate  Outcome: Progressing  Goal: Maintains/Returns to pre admission functional level  Description: INTERVENTIONS:- Perform AM-PAC 6 Click Basic Mobility/ Daily Activity assessment daily.- Set and communicate daily mobility goal to care team  and patient/family/caregiver. - Collaborate with rehabilitation services on mobility goals if consulted- Perform Range of Motion 3 times a day.- Reposition patient every 2 hours.- Dangle patient 3 times a day- Stand patient 3 times a day- Ambulate patient 3 times a day- Out of bed to chair 3 times a day - Out of bed for meals 3 times a day- Out of bed for toileting- Record patient progress and toleration of activity level   Outcome: Progressing     Problem: DISCHARGE PLANNING  Goal: Discharge to home or other facility with appropriate resources  Description: INTERVENTIONS:- Identify barriers to discharge w/patient and caregiver- Arrange for needed discharge resources and transportation as appropriate- Identify discharge learning needs (meds, wound care, etc.)- Arrange for interpretive services to assist at discharge as needed- Refer to Case Management Department for coordinating discharge planning if the patient needs post-hospital services based on physician/advanced practitioner order or complex needs related to functional status, cognitive ability, or social support system  Outcome: Progressing     Problem: Knowledge Deficit  Goal: Patient/family/caregiver demonstrates understanding of disease process, treatment plan, medications, and discharge instructions  Description: Complete learning assessment and assess knowledge base.Interventions:- Provide teaching at level of understanding- Provide teaching via preferred learning methods  Outcome: Progressing     Problem: NEUROSENSORY - ADULT  Goal: Achieves stable or improved neurological status  Description: INTERVENTIONS- Monitor and report changes in neurological status- Monitor vital signs such as temperature, blood pressure, glucose, and any other labs ordered - Initiate measures to prevent increased intracranial pressure- Monitor for seizure activity and implement precautions if appropriate    Outcome: Progressing     Problem: CARDIOVASCULAR - ADULT  Goal:  Maintains optimal cardiac output and hemodynamic stability  Description: INTERVENTIONS:- Monitor I/O, vital signs and rhythm- Monitor for S/S and trends of decreased cardiac output- Administer and titrate ordered vasoactive medications to optimize hemodynamic stability- Assess quality of pulses, skin color and temperature- Assess for signs of decreased coronary artery perfusion- Instruct patient to report change in severity of symptoms  Outcome: Progressing  Goal: Absence of cardiac dysrhythmias or at baseline rhythm  Description: INTERVENTIONS:- Continuous cardiac monitoring, vital signs, obtain 12 lead EKG if ordered- Administer antiarrhythmic and heart rate control medications as ordered- Monitor electrolytes and administer replacement therapy as ordered  Outcome: Progressing     Problem: METABOLIC, FLUID AND ELECTROLYTES - ADULT  Goal: Electrolytes maintained within normal limits  Description: INTERVENTIONS:- Monitor labs and assess patient for signs and symptoms of electrolyte imbalances- Administer electrolyte replacement as ordered- Monitor response to electrolyte replacements, including repeat lab results as appropriate- Instruct patient on fluid and nutrition as appropriate  Outcome: Progressing  Goal: Fluid balance maintained  Description: INTERVENTIONS:- Monitor labs - Monitor I/O and WT- Instruct patient on fluid and nutrition as appropriate- Assess for signs & symptoms of volume excess or deficit  Outcome: Progressing     Problem: HEMATOLOGIC - ADULT  Goal: Maintains hematologic stability  Description: INTERVENTIONS- Assess for signs and symptoms of bleeding or hemorrhage- Monitor labs- Administer supportive blood products/factors as ordered and appropriate  Outcome: Progressing

## 2025-03-24 NOTE — UTILIZATION REVIEW
"Initial Clinical Review    Admission: Date/Time/Statement:   Admission Orders (From admission, onward)       Ordered        03/23/25 0856  INPATIENT ADMISSION  Once                          Orders Placed This Encounter   Procedures    INPATIENT ADMISSION     Standing Status:   Standing     Number of Occurrences:   1     Level of Care:   Med Surg [16]     Estimated length of stay:   More than 2 Midnights     Certification:   I certify that inpatient services are medically necessary for this patient for a duration of greater than two midnights. See H&P and MD Progress Notes for additional information about the patient's course of treatment.     ED Arrival Information       Expected   -    Arrival   3/23/2025 01:29    Acuity   Urgent              Means of arrival   Ambulance    Escorted by   [facility] ([facility])    Service   Hospitalist    Admission type   Emergency              Arrival complaint   SOB             Chief Complaint   Patient presents with    Medical Problem     Pt arrives via EMS w/ hx of dementia. Per EMS, pt's daughter called \"911\" d/t pt having increased SOB and flu like symptoms x1 day. EMS reports pt is at baseline mentation. Pt disoriented x4       Initial Presentation: 89 y.o. female presents to the ED via EMS from home with c/o shaking, SOB and pallor.  PMH: recent PE on AC, A fib, HLD, severe dementia w/ agitation, schizophrenia, CVA< HTN, HLD, urinary incontinence, protein-calorie malnutrition.  In the ED temp 97F, stable VS.  Labs -  Hgb 7.5 down to 6.3, elevated BUN/Cr, BNP, INR.  ECG - Sinus angelika.  Imaging - no acute disease.  On exam no acute distress, no resp distress, no wheezing, soft nontender abd, lethargy, warm skin.  Admitted to INPATIENT status with Symptomatic anemia, elevated creat - PLAN: NPO, IV fluids, SLP eval, continue IV PPI, pt is DNR/DNI, GI Consult, transfusion 2 U PRBC, trend BMP, PRN IV Hydralazine, continue Metoprolol, Cardizem when taking PO safely, hold " AC.      Anticipated Length of Stay/Certification Statement:  Patient will be admitted on an inpatient basis with an anticipated length of stay of greater than 2 midnights secondary to symptomatic anemia, transfusion.     3/23 GI Consult - worsening shortness of breath and flulike symptoms. Low Hgb, w/o overt GI Bleeding, new on AC.  Conservative mgmt,  macrocytic anemia in the absence of overt GI bleeding, workup for etiology of anemia.  Low ferritin as of 3/11/2025.  Check B12 and folate.  Monitor for any evidence of ongoing vaginal bleeding.  No plans for endoscopic evaluation.    Date: 3/24   Day 2:   Symptomatic anemia likely d/t small bleed w/ recent PE on Eliquis, elevated creat - hgb today is 9.9.  Creat WNL, BUN downtrending.  Remains on IV PPI.  Afebrile, intermittent HTN.  On 1.5 L NC oxygen.  IV fluids d/c today.  No further bleeding today.      Date: 3/25  Day 3: Has surpassed a 2nd midnight with active treatments and services.  Symptomatic anemia likely d/t small bleed w/ recent PE on Eliquis, elevated creat - Hgb 10.0 today.  Pt has 24/7 care at home.  She will be d/c home in stable condition today.        ED Treatment-Medication Administration from 03/23/2025 0129 to 03/23/2025 1120         Date/Time Order Dose Route Action     03/23/2025 0349 iohexol (OMNIPAQUE) 350 MG/ML injection (MULTI-DOSE) 70 mL 70 mL Intravenous Given            Scheduled Medications:  amiodarone, 200 mg, Oral, Daily  atorvastatin, 40 mg, Oral, QPM  diltiazem, 15 mg, Oral, TID  docusate sodium, 100 mg, Oral, BID  DULoxetine, 30 mg, Oral, HS  metoprolol tartrate, 25 mg, Oral, BID  pantoprazole, 40 mg, Intravenous, Q12H LYNDSEY  terazosin, 2 mg, Oral, HS      Continuous IV Infusions:    IV fluids @ 100 cc/hr - d/c 3/24     PRN Meds:  acetaminophen, 650 mg, Oral, Q6H PRN - x 1 3/24  hydrALAZINE, 10 mg, Intravenous, Q6H PRN      ED Triage Vitals   Temperature Pulse Respirations Blood Pressure SpO2 Pain Score   03/23/25 0204 03/23/25  0139 03/23/25 0139 03/23/25 0139 03/23/25 0139 03/24/25 0721   (S) (!) 97 °F (36.1 °C) 64 20 153/70 95 % No Pain     Weight (last 2 days)       None            Vital Signs (last 3 days)       Date/Time Temp Pulse Resp BP MAP (mmHg) SpO2 Calculated FIO2 (%) - Nasal Cannula Nasal Cannula O2 Flow Rate (L/min) O2 Device Patient Position - Orthostatic VS Bertrand Coma Scale Score Pain    03/25/25 0956 -- -- -- -- -- 96 % -- -- None (Room air) -- -- --    03/25/25 0930 -- -- -- -- -- 96 % -- -- None (Room air) -- -- --    03/25/25 0900 -- -- -- -- -- 97 % -- -- None (Room air) -- -- --    03/25/25 0755 -- 53 -- 140/57 65 96 % 24 1 L/min Nasal cannula -- -- --    03/25/25 0739 97.5 °F (36.4 °C) 58 16 151/65 93 96 % 24 1 L/min Nasal cannula Lying -- --    03/25/25 0000 98.4 °F (36.9 °C) -- -- -- -- -- -- -- -- -- -- --    03/24/25 2222 101 °F (38.3 °C) 64 16 114/50 63 95 % 24 1 L/min Nasal cannula Lying -- --    03/24/25 2052 -- -- -- 156/64 -- -- -- -- -- -- -- --    03/24/25 2000 -- -- -- -- -- -- -- -- -- -- 10 No Pain    03/24/25 1645 -- 65 -- 127/59 85 -- -- -- -- -- -- --    03/24/25 1530 97.7 °F (36.5 °C) 63 16 160/68 104 95 % 24 1 L/min Nasal cannula Lying -- --    03/24/25 1014 -- -- -- 132/60 -- -- -- -- -- -- -- --    03/24/25 0800 -- -- -- -- -- -- -- -- -- -- 10 --    03/24/25 0721 97.3 °F (36.3 °C) 73 16 139/64 93 95 % 26 1.5 L/min Nasal cannula Lying -- No Pain    03/24/25 0207 97.5 °F (36.4 °C) 60 18 150/67 97 96 % 26 1.5 L/min Nasal cannula Lying -- --    03/23/25 2335 98.3 °F (36.8 °C) 64 18 176/75 102 93 % 26 1.5 L/min Nasal cannula Lying -- --    03/23/25 1955 -- -- -- -- -- -- -- -- -- -- 10 --    03/23/25 1908 97.9 °F (36.6 °C) 80 16 160/72 -- 96 % 26 1.5 L/min Nasal cannula Lying -- --    03/23/25 1528 -- -- -- 178/70 -- -- -- -- -- -- -- --    03/23/25 1523 -- -- -- 222/72 93 -- -- -- -- -- -- --    03/23/25 1518 97.4 °F (36.3 °C) 62 16 216/81 114 100 % 26 1.5 L/min Nasal cannula Lying -- --     03/23/25 1250 -- -- -- 168/60 -- -- -- -- -- Lying -- --    03/23/25 1138 97.6 °F (36.4 °C) 61 18 196/73 -- 100 % -- -- None (Room air) Lying -- --    03/23/25 1045 -- 50 15 134/62 89 99 % 28 2 L/min Nasal cannula -- -- --    03/23/25 1000 -- 50 16 141/58 84 99 % 28 2 L/min Nasal cannula -- -- --    03/23/25 0945 -- 52 15 150/65 94 99 % -- -- None (Room air) -- -- --    03/23/25 0940 97.6 °F (36.4 °C) 51 16 189/74 -- 99 % 28 2 L/min Nasal cannula -- -- --    03/23/25 0926 97.6 °F (36.4 °C) 64 16 183/74 -- -- -- -- -- -- -- --    03/23/25 0921 97.7 °F (36.5 °C) 56 16 156/63 -- -- -- -- -- -- -- --    03/23/25 0900 -- 62 16 172/68 98 99 % 28 2 L/min Nasal cannula -- -- --    03/23/25 0845 97.4 °F (36.3 °C) 62 14 141/66 -- 98 % 28 2 L/min Nasal cannula -- -- --    03/23/25 0700 -- 57 14 149/66 95 99 % -- -- -- -- -- --    03/23/25 0645 97.5 °F (36.4 °C) 55 20 134/61 -- 99 % -- -- -- -- -- --    03/23/25 0636 97.5 °F (36.4 °C) 58 21 124/58 -- 99 % 28 2 L/min Nasal cannula Lying -- --    03/23/25 0631 97.5 °F (36.4 °C) 58 21 124/58 -- -- -- -- -- -- -- --    03/23/25 0439 96.6 °F (35.9 °C) -- -- -- -- -- -- -- -- -- -- --    03/23/25 0430 -- 55 20 128/56 -- 100 % -- -- Nasal cannula Lying -- --    03/23/25 0350 -- -- -- -- -- 99 % -- -- None (Room air) -- -- --    03/23/25 0228 -- -- -- -- -- -- -- -- -- -- 12 --    03/23/25 0204 97 °F (36.1 °C)  -- -- -- -- -- -- -- -- -- -- --    03/23/25 0139 -- 64 20 153/70 -- 95 % -- -- None (Room air) Lying -- --              Pertinent Labs/Diagnostic Test Results:   Radiology:  CT head wo contrast   Final Interpretation by Parminder Osuna MD (03/23 0809)      No acute intracranial abnormality.  Stable chronic microangiopathic changes within the brain. Multifocal encephalomalacia appears unchanged.                  Workstation performed: [id number]         CT abdomen pelvis wo contrast   Final Interpretation by Steffen Albarado MD (03/23 0841)      No evidence of  acute posttraumatic abdominopelvic process allowing for limitations as above.      No definitive acute fracture.      Minimal bladder debris. Correlate with clinical findings to exclude cystitis.      Colonic diverticulosis.      Additional chronic findings and negatives as above.      Workstation performed: [id number]         CTA chest pe study   Final Interpretation by Marques Wisdom MD (03/23 0452)      Previously described right-sided pulmonary emboli seen on CT 3/6/2025 are no longer appreciated. No new pulmonary emboli are identified, noting distal segmental and subsegmental arteries are poorly evaluated secondary to respiratory motion artifact.      No acute findings in the chest.                  Workstation performed: [id number]         XR hand 2 vw left   Final Interpretation by Steffen Albarado MD (03/24 0632)      No definitive acute osseous abnormality.         Computerized Assisted Algorithm (CAA) may have been used to analyze all applicable images.         Workstation performed: [id number]         XR wrist 2 vw left   Final Interpretation by Steffen Albarado MD (03/24 0631)      No definitive acute osseous abnormality.      Degenerative changes as described.         Computerized Assisted Algorithm (CAA) may have been used to analyze all applicable images.            Workstation performed: [id number]           Cardiology:  ECG 12 lead   Final Result by Shayan Cohen DO (03/23 2026)   Sinus bradycardia   Nonspecific T wave abnormality   Abnormal ECG   Confirmed by Shayan Cohen ([id number]) on 3/23/2025 8:26:53 PM      ECG 12 lead   Final Result by Shayan Cohen DO (03/23 2026)   Sinus bradycardia   Nonspecific ST and T wave abnormality   Abnormal ECG   Confirmed by Shayan Cohen ([id number]) on 3/23/2025 8:26:45 PM        GI:  No orders to display       Results from last 7 days   Lab Units 03/23/25  0222   SARS-COV-2  Negative     Results from last 7 days   Lab Units  03/25/25  0549 03/24/25  0547 03/23/25  0452 03/23/25 0222   WBC Thousand/uL 5.00 5.27  --  5.26   HEMOGLOBIN g/dL 10.0* 9.9* 6.3* 7.5*   HEMATOCRIT % 32.2* 31.1* 20.2* 24.8*   PLATELETS Thousands/uL 197 198  --  287   TOTAL NEUT ABS Thousands/µL  --   --   --  3.79         Results from last 7 days   Lab Units 03/25/25  0549 03/24/25  0547 03/23/25 0222   SODIUM mmol/L 142 142 145   POTASSIUM mmol/L 4.2 4.2 5.2   CHLORIDE mmol/L 108 108 108   CO2 mmol/L 29 27 31   ANION GAP mmol/L 5 7 6   BUN mg/dL 33* 28* 34*   CREATININE mg/dL 1.48* 1.17 1.38*   EGFR ml/min/1.73sq m 30 40 32   CALCIUM mg/dL 8.3* 8.1* 9.1   MAGNESIUM mg/dL  --  2.2  --      Results from last 7 days   Lab Units 03/23/25 0222   AST U/L 18   ALT U/L 11   ALK PHOS U/L 93   TOTAL PROTEIN g/dL 6.3*   ALBUMIN g/dL 3.8   TOTAL BILIRUBIN mg/dL 0.69         Results from last 7 days   Lab Units 03/25/25  0549 03/24/25  0547 03/23/25 0222   GLUCOSE RANDOM mg/dL 87 65 98       Results from last 7 days   Lab Units 03/23/25  0429 03/23/25 0222   HS TNI 0HR ng/L  --  15   HS TNI 2HR ng/L 12  --    HSTNI D2 ng/L -3  --          Results from last 7 days   Lab Units 03/23/25 0222   PROTIME seconds 17.2*   INR  1.40*   PTT seconds 35*           Results from last 7 days   Lab Units 03/23/25 0222   BNP pg/mL 379*             Results from last 7 days   Lab Units 03/24/25 0530   UNIT PRODUCT CODE  [id number]  [id number]   UNIT NUMBER  [id number]  [id number]   UNITABO  O  O   UNITRH  NEG  NEG   CROSSMATCH  Compatible  Compatible   UNIT DISPENSE STATUS  Presumed Trans  Presumed Trans   UNIT PRODUCT VOL ml 350  350       Results from last 7 days   Lab Units 03/23/25  0222   INFLUENZA A PCR  Negative   INFLUENZA B PCR  Negative   RSV PCR  Negative       Past Medical History:   Diagnosis Date    Anxiety     Closed fracture of right orbital floor ([facility]) 08/23/2020    Cognitive impairment     COVID-19 virus infection 04/13/2020    Dementia ([facility])      Depression     Hallucination     Hyperlipidemia     Hypertension     Memory loss     Psychiatric illness     Psychosis (HCC)     Retention of urine     Schizoaffective disorder (HCC)     Sleep difficulties     Urinary tract infection      Present on Admission:   Primary hypertension   Hyperlipidemia   Elevated serum creatinine   Urinary incontinence   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HCC)   Moderate protein-calorie malnutrition (HCC)   Age-related osteoporosis without current pathological fracture   Severe dementia with agitation (HCC)   Symptomatic anemia   CVA (cerebral vascular accident) (HCC)   Atrial fibrillation (HCC)      Admitting Diagnosis: SOB (shortness of breath) [R06.02]  Acute anemia [D64.9]  Age/Sex: 89 y.o. female    Network Utilization Review Department  ATTENTION: Please call with any questions or concerns to [phone] and carefully listen to the prompts so that you are directed to the right person. All voicemails are confidential.   For Discharge needs, contact Care Management DC Support Team at [phone] opt. [phone]  Send all requests for admission clinical reviews, approved or denied determinations and any other requests to dedicated fax number below belonging to the campus where the patient is receiving treatment. List of dedicated fax numbers for the Facilities:  FACILITY NAME UR FAX NUMBER   ADMISSION DENIALS (Administrative/Medical Necessity) [phone]   DISCHARGE SUPPORT TEAM (NETWORK) [phone]   PARENT CHILD HEALTH (Maternity/NICU/Pediatrics) [phone]   [facility] [phone]   [facility] [phone]   [facility] [phone]   [facility] [phone]   [facility] [phone]   [facility] [phone]   [facility] [phone]   [facility] [phone]   [facility]  [facility] [phone]   Counts include 234 beds at the [facility] [phone]   [facility] [phone]   [facility] [phone]

## 2025-03-24 NOTE — ASSESSMENT & PLAN NOTE
Continue monitoring. IV hydration overnight.     Recent Labs     03/23/25  0222 03/24/25  0547   BUN 34* 28*   CREATININE 1.38* 1.17   EGFR 32 40

## 2025-03-24 NOTE — PROGRESS NOTES
Progress Note - Gastroenterology   Name: Denia Castañeda 89 y.o. female I MRN: [id number]  Unit/Bed#: E4 -01 I Date of Admission: 3/23/2025   Date of Service: 3/24/2025 I Hospital Day: 1    Assessment & Plan  Symptomatic anemia  Denia Castañeda is a 89 y.o. female with PMH hypertension, hld, schizoaffective disorder, severe dementia with agitation, osteoporosis, CVA, recent PE on eliquis, atrial fibrillation, CKD who presented to [facility] after her daughter called due to worsening SOB and flu like symptoms. Patient is unable to provide any subjective input at time of my evaluation. Hgb baseline 10 with recent downtrending over her last hospitalization from 3/7 through 3/13.  Hemoglobin was 9.3 at time of discharge.  Hemoglobin at time of presentation was 7.5 with recheck of 6.3. Receiving 1u PRBC at time of evaluation. No overt bleeding reported by team or in documentation from ED physician.  No prior EGD/Colonoscopy available for review.  Fortunately, patient's hemoglobin increased from 6.3-9.9 with 2 units of PRBC.  Continues without overt bleeding.    Discussed with patient's son and when at bedside today who confirms no prior EGD or colonoscopy.  We reviewed that endoscopy and colonoscopy could be done for evaluation of anemia, however, given her age and frailty recommend conservative management.  We discussed that should she be diagnosed with any form of malignancy they would not pursue surgical or chemotherapy given unlikely to improve her quality of life.  Logan discloses that he really wants his mom to be comfortable without any significant invasive procedures.  He wants her to have a dignified death and remain at home at any cost.    Unclear source of anemia with no overt bleeding reported - patient does have history of vaginal bleeding and recent trauma to arm. CT scan AP was completed without contrast. Downtrend noted after starting AC in setting of known history with anticoagulants with vaginal bleeding  previously.  No plans for endoscopic evaluation at this time.  Recommend discussion by primary team with family in regards to resuming anticoagulation given risk/benefit  Also advised of our conversation about patient's son being more focused on comfort with continued medical therapy without significant escalation.  They will involve case management for further discussion.  Continue with medical management with transfusion as needed to maintain hgb >7.0  Can transition to daily PO PPI  Okay for diet from GI perspective  Monitor stool output and serial hgb  Maintain active T/S  No further inpatient GI recommendations at this time.  GI will follow from the periphery but remain available for additional questions.  Please reach out to on-call provider with any changes in clinical status    History of pulmonary embolism  Recently diagnosed this month and initiated on Eliquis  Severe dementia with agitation (HCC)  Unable to participate in interview at time of evaluation.   CVA (cerebral vascular accident) (HCC)  Hx of multiple CVA's on imaging in setting of baseline dementia  Atrial fibrillation (HCC)  History of atrial fibrillation not on anticoagulation due to a history of vaginal bleeding and fall risk.        Subjective   Unable to provide subjective input given her severe dementia. Resting comfortably in bed    Objective :  Temp:  [97.3 °F (36.3 °C)-98.3 °F (36.8 °C)] 97.3 °F (36.3 °C)  HR:  [50-80] 73  BP: (134-222)/(58-81) 139/64  Resp:  [14-18] 16  SpO2:  [93 %-100 %] 95 %  O2 Device: Nasal cannula  Nasal Cannula O2 Flow Rate (L/min):  [1.5 L/min-2 L/min] 1.5 L/min    Physical Exam  Vitals and nursing note reviewed.   Constitutional:       Appearance: She is ill-appearing (chronically).   HENT:      Mouth/Throat:      Mouth: Mucous membranes are dry.   Cardiovascular:      Rate and Rhythm: Normal rate and regular rhythm.   Pulmonary:      Effort: Pulmonary effort is normal. No respiratory distress.   Abdominal:       General: Bowel sounds are normal. There is no distension.      Palpations: Abdomen is soft.      Tenderness: There is no abdominal tenderness.   Musculoskeletal:      Right lower leg: No edema.      Left lower leg: No edema.   Skin:     General: Skin is warm and dry.   Neurological:      Mental Status: She is disoriented.      Motor: Weakness present.           Lab Results: I have reviewed the following results:

## 2025-03-24 NOTE — PLAN OF CARE
Problem: Potential for Falls  Goal: Patient will remain free of falls  Description: INTERVENTIONS:- Educate patient/family on patient safety including physical limitations- Instruct patient to call for assistance with activity - Consult OT/PT to assist with strengthening/mobility - Keep Call bell within reach- Keep bed low and locked with side rails adjusted as appropriate- Keep care items and personal belongings within reach- Initiate and maintain comfort rounds- Make Fall Risk Sign visible to staff- Offer Toileting every 2 Hours, in advance of need- Initiate/Maintain bed alarm- Obtain necessary fall risk management equipment: bed alarm- Apply yellow socks and bracelet for high fall risk patients- Consider moving patient to room near nurses station  Outcome: Progressing

## 2025-03-24 NOTE — ASSESSMENT & PLAN NOTE
Denia Castañeda is a 89 y.o. female with PMH hypertension, hld, schizoaffective disorder, severe dementia with agitation, osteoporosis, CVA, recent PE on eliquis, atrial fibrillation, CKD who presented to [facility] after her daughter called due to worsening SOB and flu like symptoms. Patient is unable to provide any subjective input at time of my evaluation. Hgb baseline 10 with recent downtrending over her last hospitalization from 3/7 through 3/13.  Hemoglobin was 9.3 at time of discharge.  Hemoglobin at time of presentation was 7.5 with recheck of 6.3. Receiving 1u PRBC at time of evaluation. No overt bleeding reported by team or in documentation from ED physician.  No prior EGD/Colonoscopy available for review.  Fortunately, patient's hemoglobin increased from 6.3-9.9 with 2 units of PRBC.  Continues without overt bleeding.    Discussed with patient's son and when at bedside today who confirms no prior EGD or colonoscopy.  We reviewed that endoscopy and colonoscopy could be done for evaluation of anemia, however, given her age and frailty recommend conservative management.  We discussed that should she be diagnosed with any form of malignancy they would not pursue surgical or chemotherapy given unlikely to improve her quality of life.  Logan discloses that he really wants his mom to be comfortable without any significant invasive procedures.  He wants her to have a dignified death and remain at home at any cost.    Unclear source of anemia with no overt bleeding reported - patient does have history of vaginal bleeding and recent trauma to arm. CT scan AP was completed without contrast. Downtrend noted after starting AC in setting of known history with anticoagulants with vaginal bleeding previously.  No plans for endoscopic evaluation at this time.  Recommend discussion by primary team with family in regards to resuming anticoagulation given risk/benefit  Also advised of our conversation about patient's son being more  focused on comfort with continued medical therapy without significant escalation.  They will involve case management for further discussion.  Continue with medical management with transfusion as needed to maintain hgb >7.0  Can transition to daily PO PPI  Okay for diet from GI perspective  Monitor stool output and serial hgb  Maintain active T/S  No further inpatient GI recommendations at this time.  GI will follow from the periphery but remain available for additional questions.  Please reach out to on-call provider with any changes in clinical status

## 2025-03-25 ENCOUNTER — TELEPHONE (OUTPATIENT)
Dept: INTERNAL MEDICINE CLINIC | Facility: CLINIC | Age: OVER 89
End: 2025-03-25

## 2025-03-25 ENCOUNTER — TRANSITIONAL CARE MANAGEMENT (OUTPATIENT)
Dept: INTERNAL MEDICINE CLINIC | Facility: CLINIC | Age: OVER 89
End: 2025-03-25

## 2025-03-25 VITALS
SYSTOLIC BLOOD PRESSURE: 140 MMHG | DIASTOLIC BLOOD PRESSURE: 57 MMHG | HEART RATE: 53 BPM | OXYGEN SATURATION: 96 % | RESPIRATION RATE: 16 BRPM | TEMPERATURE: 97.5 F

## 2025-03-25 LAB
ANION GAP SERPL CALCULATED.3IONS-SCNC: 5 MMOL/L (ref 4–13)
BUN SERPL-MCNC: 33 MG/DL (ref 5–25)
CALCIUM SERPL-MCNC: 8.3 MG/DL (ref 8.4–10.2)
CHLORIDE SERPL-SCNC: 108 MMOL/L (ref 96–108)
CO2 SERPL-SCNC: 29 MMOL/L (ref 21–32)
CREAT SERPL-MCNC: 1.48 MG/DL (ref 0.6–1.3)
ERYTHROCYTE [DISTWIDTH] IN BLOOD BY AUTOMATED COUNT: 17.3 % (ref 11.6–15.1)
GFR SERPL CREATININE-BSD FRML MDRD: 30 ML/MIN/1.73SQ M
GLUCOSE SERPL-MCNC: 87 MG/DL (ref 65–140)
HCT VFR BLD AUTO: 32.2 % (ref 34.8–46.1)
HGB BLD-MCNC: 10 G/DL (ref 11.5–15.4)
MCH RBC QN AUTO: 29.6 PG (ref 26.8–34.3)
MCHC RBC AUTO-ENTMCNC: 31.1 G/DL (ref 31.4–37.4)
MCV RBC AUTO: 95 FL (ref 82–98)
PLATELET # BLD AUTO: 197 THOUSANDS/UL (ref 149–390)
PMV BLD AUTO: 9.9 FL (ref 8.9–12.7)
POTASSIUM SERPL-SCNC: 4.2 MMOL/L (ref 3.5–5.3)
RBC # BLD AUTO: 3.38 MILLION/UL (ref 3.81–5.12)
SODIUM SERPL-SCNC: 142 MMOL/L (ref 135–147)
WBC # BLD AUTO: 5 THOUSAND/UL (ref 4.31–10.16)

## 2025-03-25 PROCEDURE — 85027 COMPLETE CBC AUTOMATED: CPT | Performed by: STUDENT IN AN ORGANIZED HEALTH CARE EDUCATION/TRAINING PROGRAM

## 2025-03-25 PROCEDURE — 80048 BASIC METABOLIC PNL TOTAL CA: CPT | Performed by: STUDENT IN AN ORGANIZED HEALTH CARE EDUCATION/TRAINING PROGRAM

## 2025-03-25 PROCEDURE — 99239 HOSP IP/OBS DSCHRG MGMT >30: CPT | Performed by: STUDENT IN AN ORGANIZED HEALTH CARE EDUCATION/TRAINING PROGRAM

## 2025-03-25 RX ADMIN — DILTIAZEM HYDROCHLORIDE 15 MG: 30 TABLET, FILM COATED ORAL at 08:17

## 2025-03-25 RX ADMIN — PANTOPRAZOLE SODIUM 40 MG: 40 INJECTION, POWDER, LYOPHILIZED, FOR SOLUTION INTRAVENOUS at 08:18

## 2025-03-25 RX ADMIN — METOPROLOL TARTRATE 25 MG: 25 TABLET, FILM COATED ORAL at 08:15

## 2025-03-25 NOTE — CASE MANAGEMENT
Case Management Discharge Planning Note    Patient name Denia Castañeda  Location East 2 /E2 -* MRN [id number]  : 1931 Date 3/25/2025       Current Admission Date: 3/23/2025  Current Admission Diagnosis:Symptomatic anemia   Patient Active Problem List    Diagnosis Date Noted Date Diagnosed    Hypernatremia 03/10/2025     Stroke-like symptom 2025     History of pulmonary embolism 2025     Cystitis 2024     CVA (cerebral vascular accident) (HCC) 2024     Vaginal bleeding 2024     Edema of right upper extremity 2024     Pericardial effusion 2024     Facial asymmetry 2024     Back pain 2023     At risk for elder abuse 2023     Urinary retention 2022     Edema of right ankle 10/04/2022     Flank pain 10/18/2021     Oropharyngeal dysphagia 2021     Symptomatic anemia 2021     H/O fracture of right hip 07/15/2021     Stage 3b chronic kidney disease (HCC) 07/15/2021     Age-related osteoporosis without current pathological fracture 2020     Moderate protein-calorie malnutrition (HCC) 2020     Severe dementia with agitation ([facility]) 2020     Urinary incontinence 2020     Ambulatory dysfunction 04/10/2020     Elevated serum creatinine 04/10/2020     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     Atrial fibrillation (HCC) 2019     Bilateral hearing loss 10/11/2018     Personal history of fall 10/11/2018     Primary hypertension 2018     Hyperlipidemia 2018     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HCC) 2018     Chronic constipation 2015     Cataract of both eyes 2014       LOS (days): 2  Geometric Mean LOS (GMLOS) (days):   Days to GMLOS:     OBJECTIVE:  Risk of Unplanned Readmission Score: 23.07         Current admission status: Inpatient   Preferred Pharmacy:   [facility]  [address] [address] [facility] [address]  Phone: [phone] Fax:  [phone]    [facility] [place] [address]  [facility],  [address]  [facility],  First Floor [place]  [facility] [address]  Phone: [phone] Fax: [phone]    [id number] [place] - [address]  [address] [facility] [address]  Phone: [phone] Fax: [phone]    Primary Care Provider: Jose M Castañeda MD    Primary Insurance: [employer]ARE [facility]  Secondary Insurance: [facility]    DISCHARGE DETAILS:                                                    Treatment Team Recommendation: Home  Discharge Destination Plan:: Home  Transport at Discharge : BLS Ambulance     Number/Name of Dispatcher: SLETs  Transported by (Company and Unit #): [facility] [phone]  ETA of Transport (Date): 03/25/25  ETA of Transport (Time): 1400              IMM Given (Date):: 03/25/25  IMM Given to:: Family     Additional Comments: CM met with pt and son at bedside to discuss discharge. Sons questions were answered by Dr Singletary. Transportation was set up via [employer]. CM called pts caregivers at [phone] to make aware of transport time. Caregiver will be at residence. IMM was discussed and expressed understanding. Son made aware of transport time. PCS placed in chart. CM will continue to provide support until d/c.

## 2025-03-25 NOTE — PHYSICAL THERAPY NOTE
Physical Therapy Screen    Patient Name: Denia Castañeda    Today's Date: 3/25/2025     Problem List  Principal Problem:    Symptomatic anemia  Active Problems:    Primary hypertension    Hyperlipidemia    Elevated serum creatinine    Urinary incontinence    Atrial fibrillation (HCC)    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HCC)    Moderate protein-calorie malnutrition (HCC)    Age-related osteoporosis without current pathological fracture    Severe dementia with agitation (HCC)    CVA (cerebral vascular accident) (HCC)    History of pulmonary embolism       Past Medical History  Past Medical History:   Diagnosis Date    Anxiety     Closed fracture of right orbital floor (HCC) 08/23/2020    Cognitive impairment     COVID-19 virus infection 04/13/2020    Dementia (HCC)     Depression     Hallucination     Hyperlipidemia     Hypertension     Memory loss     Psychiatric illness     Psychosis (HCC)     Retention of urine     Schizoaffective disorder (HCC)     Sleep difficulties     Urinary tract infection         Past Surgical History  Past Surgical History:   Procedure Laterality Date    APPENDECTOMY      BLADDER SURGERY      CHOLECYSTECTOMY      CA OPTX FEM SHFT FX W/INSJ IMED IMPLT W/WO SCREW Right 7/15/2021    Procedure: INSERTION NAIL IM FEMUR ANTEGRADE (TROCHANTERIC) right;  Surgeon: Getachew Aguilar MD;  Location: [facility];  Service: Orthopedics    TOTAL ABDOMINAL HYSTERECTOMY W/ BILATERAL SALPINGOOPHORECTOMY      TOTAL KNEE ARTHROPLASTY Left      Per OTR (Jennifer), pt has 24/7 care at home PTA and is dependent for all functional mobility PTA. No skilled inpt PT services needed at this time, DC pt from skilled inpt PT.

## 2025-03-25 NOTE — NURSING NOTE
Pt p/u by transport via stretcher. Brace applied to R wrist, clothes on and AVS sent. No new rx's.

## 2025-03-25 NOTE — PLAN OF CARE
Problem: Potential for Falls  Goal: Patient will remain free of falls  Description: INTERVENTIONS:- Educate patient/family on patient safety including physical limitations- Instruct patient to call for assistance with activity - Consult OT/PT to assist with strengthening/mobility - Keep Call bell within reach- Keep bed low and locked with side rails adjusted as appropriate- Keep care items and personal belongings within reach- Initiate and maintain comfort rounds- Make Fall Risk Sign visible to staff- Offer Toileting every 2 Hours, in advance of need- Initiate/Maintain bed alarm- Obtain necessary fall risk management equipment: bed alarm- Apply yellow socks and bracelet for high fall risk patients- Consider moving patient to room near nurses station  Outcome: Progressing     Problem: Prexisting or High Potential for Compromised Skin Integrity  Goal: Skin integrity is maintained or improved  Description: INTERVENTIONS:- Identify patients at risk for skin breakdown- Assess and monitor skin integrity- Assess and monitor nutrition and hydration status- Monitor labs - Assess for incontinence - Turn and reposition patient- Assist with mobility/ambulation- Relieve pressure over bony prominences- Avoid friction and shearing- Provide appropriate hygiene as needed including keeping skin clean and dry- Evaluate need for skin moisturizer/barrier cream- Collaborate with interdisciplinary team - Patient/family teaching- Consider wound care consult   Outcome: Progressing     Problem: PAIN - ADULT  Goal: Verbalizes/displays adequate comfort level or baseline comfort level  Description: Interventions:- Encourage patient to monitor pain and request assistance- Assess pain using appropriate pain scale- Administer analgesics based on type and severity of pain and evaluate response- Implement non-pharmacological measures as appropriate and evaluate response- Consider cultural and social influences on pain and pain management- Notify  physician/advanced practitioner if interventions unsuccessful or patient reports new pain  Outcome: Progressing

## 2025-03-25 NOTE — ASSESSMENT & PLAN NOTE
Continue monitoring. IV hydration overnight.     Recent Labs     03/23/25  0222 03/24/25  0547 03/25/25  0549   BUN 34* 28* 33*   CREATININE 1.38* 1.17 1.48*   EGFR 32 40 30

## 2025-03-25 NOTE — OCCUPATIONAL THERAPY NOTE
Occupational Therapy         Patient Name: Denia Castañeda  Today's Date: 3/25/2025                 03/25/25 1150   OT Last Visit   OT Visit Date 03/25/25   Note Type   Note type Screen   Additional Comments OT Consult received. Chart reviewed. Screen completed. Per EMR pt is dependent for all ADLs, transfers and mobility. Has 24 hour caregivers at home. Son involved. No skilled IP needs. DC OT.     Anjana Vazquez, OT

## 2025-03-25 NOTE — ASSESSMENT & PLAN NOTE
89-year-old female with hypertension, hyperlipidemia, atrial fibrillation, recent pulmonary embolism who was brought in here after she was found to have shortness of breath and shaking prior to admission  Hemoglobin is found to be 6.6 on admission  Suspect likely in the setting of small bleed with recent PE on Eliquis  Transfused with 2 units PRBC  H&H improved to 9.9, no evidence of bleeding overnight  GI evaluated, did not recommend endoscopy  Discussions with son Logan, would not pursue aggressive treatment  Unable to return home with hospice due to 24-hour care with [facility] caregivers  Discontinue Eliquis, aspirin on discharge  Discharge home with palliative care outpatient referral

## 2025-03-25 NOTE — DISCHARGE SUMMARY
Discharge Summary - Hospitalist   Name: Denia Castañeda 89 y.o. female I MRN: [id number]  Unit/Bed#: E2 -01 I Date of Admission: 3/23/2025   Date of Service: 3/25/2025 I Hospital Day: 2     Assessment & Plan  Symptomatic anemia  89-year-old female with hypertension, hyperlipidemia, atrial fibrillation, recent pulmonary embolism who was brought in here after she was found to have shortness of breath and shaking prior to admission  Hemoglobin is found to be 6.6 on admission  Suspect likely in the setting of small bleed with recent PE on Eliquis  Transfused with 2 units PRBC  H&H improved to 9.9, no evidence of bleeding overnight  GI evaluated, did not recommend endoscopy  Discussions with son Logan, would not pursue aggressive treatment  Unable to return home with hospice due to 24-hour care with [facility] caregivers  Discontinue Eliquis, aspirin on discharge  Discharge home with palliative care outpatient referral    Elevated serum creatinine  Continue monitoring. IV hydration overnight.     Recent Labs     03/23/25  0222 03/24/25  0547 03/25/25  0549   BUN 34* 28* 33*   CREATININE 1.38* 1.17 1.48*   EGFR 32 40 30     Primary hypertension  Controlled  Continue Metoprolol and Cardizem with BP hold parameters  Hyperlipidemia  Continue statin  Atrial fibrillation (HCC)  On digoxin, metoprolol  Discontinue Eliquis in setting of anemia  Severe dementia with agitation (HCC)  Continue supportive care  History of pulmonary embolism  Diagnosed last 3/6/2025. Eliquis on discontinue     Discharging Physician / Practitioner: Babatunde Singletary MD  PCP: Jose M Castañeda MD  Admission Date:   Admission Orders (From admission, onward)       Ordered        03/23/25 0856  INPATIENT ADMISSION  Once                          Discharge Date: 03/25/25    Medical Problems       Resolved Problems  Date Reviewed: 3/21/2025   None         Consultations During Hospital Stay:  Gastroenterology    Procedures Performed:   none    Significant Findings  / Test Results:   XR hand 2 vw left  Result Date: 3/24/2025  Impression: No definitive acute osseous abnormality. Computerized Assisted Algorithm (CAA) may have been used to analyze all applicable images. Workstation performed: [id number]     XR wrist 2 vw left  Result Date: 3/24/2025  Impression: No definitive acute osseous abnormality. Degenerative changes as described. Computerized Assisted Algorithm (CAA) may have been used to analyze all applicable images. Workstation performed: [id number]     CT abdomen pelvis wo contrast  Result Date: 3/23/2025  Impression: No evidence of acute posttraumatic abdominopelvic process allowing for limitations as above. No definitive acute fracture. Minimal bladder debris. Correlate with clinical findings to exclude cystitis. Colonic diverticulosis. Additional chronic findings and negatives as above. Workstation performed: [id number]     CT head wo contrast  Result Date: 3/23/2025  Impression: No acute intracranial abnormality.  Stable chronic microangiopathic changes within the brain. Multifocal encephalomalacia appears unchanged. Workstation performed: [id number]     CTA chest pe study  Result Date: 3/23/2025  Impression: Previously described right-sided pulmonary emboli seen on CT 3/6/2025 are no longer appreciated. No new pulmonary emboli are identified, noting distal segmental and subsegmental arteries are poorly evaluated secondary to respiratory motion artifact. No acute findings in the chest. Workstation performed: [id number]         Incidental Findings:   none     Test Results Pending at Discharge (will require follow up):   none     Outpatient Tests Requested:  none    Complications:  none    Reason for Admission: Symptomatic Anemia    Hospital Course:     Denia Castañeda is a 89 y.o. female patient who originally presented to the hospital on 3/23/2025 due to shortness of breath.  Patient was found to be anemic with hemoglobin of 6.6.  She was transfused with 2 units of PRBC  with hemoglobin improving.  Suspected likely in setting of GI bleed, with recent addition of Eliquis.  Previously diagnosed with PE in February 2025.  Given patient's advanced age, dementia and poor prognosis, GI did not recommend endoscopic evaluation.  Had discussion with home and home hospice and son was agreeable.  Due to patient having 24-hour caregivers by [facility], unable to provide dual coverage with hospice.  Patient may potentially lose the 24-hour caregivers if she goes on hospice.  Therefore after discussion with son, will discharge home to follow-up with palliative care outpatient.  Discontinue Eliquis and aspirin on discharge due to risk of bleeding.  If patient were to decline further, some will consider hospice at that time.    Please see above list of diagnoses and related plan for additional information.     Condition at Discharge: fair     Discharge Day Visit / Exam:     Subjective:    No events overnight.  Has had poor appetite but has been tolerating dysphagia diet.  Son at bedside, discussed current tentative plan and agreeable with discharge home today.  Vitals: Blood Pressure: 140/57 (03/25/25 0755)  Pulse: (!) 53 (03/25/25 0755)  Temperature: 97.5 °F (36.4 °C) (03/25/25 0739)  Temp Source: Axillary (03/25/25 0739)  Respirations: 16 (03/25/25 0739)  SpO2: 96 % (03/25/25 0956)  Exam:   Physical Exam  Vitals and nursing note reviewed.   Constitutional:       Appearance: She is ill-appearing.   HENT:      Head: Normocephalic.   Cardiovascular:      Rate and Rhythm: Normal rate.   Pulmonary:      Effort: Pulmonary effort is normal. No respiratory distress.   Abdominal:      Palpations: Abdomen is soft.   Musculoskeletal:         General: No swelling.      Right lower leg: No edema.      Left lower leg: No edema.   Skin:     General: Skin is warm and dry.   Neurological:      Mental Status: She is alert. Mental status is at baseline.         Discharge instructions/Information to patient and family:    See after visit summary for information provided to patient and family.      Provisions for Follow-Up Care:  See after visit summary for information related to follow-up care and any pertinent home health orders.      Disposition:     Home    Discharge Statement:  I spent 40 minutes discharging the patient. This time was spent on the day of discharge. I had direct contact with the patient on the day of discharge. Greater than 50% of the total time was spent examining patient, answering all patient questions, arranging and discussing plan of care with patient as well as directly providing post-discharge instructions.  Additional time then spent on discharge activities.    Discharge Medications:  See after visit summary for reconciled discharge medications provided to patient and family.      ** Please Note: This note has been constructed using a voice recognition system **

## 2025-03-25 NOTE — RESTORATIVE TECHNICIAN NOTE
Restorative Technician Note      Patient Name: Denia Castañeda     Restorative Tech Visit Date: 03/25/25  Note Type: Mobility  Patient Position Upon Consult: Supine  Activity Performed: Range of motion  Patient Position at End of Consult: All needs within reach; Supine

## 2025-03-26 ENCOUNTER — TRANSITIONAL CARE MANAGEMENT (OUTPATIENT)
Dept: INTERNAL MEDICINE CLINIC | Facility: CLINIC | Age: OVER 89
End: 2025-03-26

## 2025-03-26 ENCOUNTER — PATIENT OUTREACH (OUTPATIENT)
Dept: CASE MANAGEMENT | Facility: HOSPITAL | Age: OVER 89
End: 2025-03-26

## 2025-03-26 ENCOUNTER — TELEPHONE (OUTPATIENT)
Age: OVER 89
End: 2025-03-26

## 2025-03-26 NOTE — LETTER
Fecha: 03/26/25  Estimado/a Denia Castañeda:  Mi nombre es Randi Sainz. Soy un enfermero registrado administrador de atención de  No pude comunicarme con usted y me gustaría programar un horario para que hablemos por teléfono o personalmente.  Me dedico a ayudar a los pacientes que tienen afecciones médicas complejas a obtener la atención que necesitan. [place] comprende a pacientes que pueden estar en el hospital o en rj david de emergencias.  Adjunto información para usted. Si tiene preguntas, no dude en llamarme. Espero crowell llamado.  Atentamente.  Randi Sainz  [phone]  Gerente de atención ambulatoria    [facility]  CARE MANAGEMENT [address] [address].

## 2025-03-26 NOTE — PROGRESS NOTES
ADT Alert received via IB. Chart reviewed. Patient admitted to IP at [facility] 3/23-3/25 with symptomatic anemia. Patient presented to ER with SOB and shaking. Hgb was 6.6 on admission. Per notes patients son was considering hospice but was concerned patient would no longer qualify for HHA/CG through [facility]. Patient discharged home with prior care 24/7 through [facility].    Chart reviewed. Call placed to patients son Logan. No answer and VM left with request for CB.     Outreach #2 and an UTR letter sent via My Chart.

## 2025-03-26 NOTE — TELEPHONE ENCOUNTER
FYI:  Pts son calling back to discuss Palliative Care and what its about.    Spoke to pt to inform him of this service and that someone from [facility] will be reaching out to him to discuss further the services they have and the pts options at this time and what might the pts needs be as well.    He was very appreciative of the information shared with him at this time and will wait to speak directly to the Palliative Care Team.

## 2025-03-26 NOTE — UTILIZATION REVIEW
NOTIFICATION OF ADMISSION DISCHARGE   This is a Notification of Discharge from [facility]. Please be advised that this patient has been discharge from our facility. Below you will find the admission and discharge date and time including the patient’s disposition.   UTILIZATION REVIEW CONTACT:  Utilization Review Assistants  Network Utilization Review Department  Phone: [phone] x carefully listen to the prompts. All voicemails are confidential.  Email: [email or web address]     ADMISSION INFORMATION  PRESENTATION DATE: 3/23/2025  1:29 AM  OBERVATION ADMISSION DATE: N/A  INPATIENT ADMISSION DATE: 3/23/25  8:56 AM   DISCHARGE DATE: 3/25/2025  3:42 PM   DISPOSITION:Home/Self Care    Network Utilization Review Department  ATTENTION: Please call with any questions or concerns to [phone] and carefully listen to the prompts so that you are directed to the right person. All voicemails are confidential.   For Discharge needs, contact Care Management DC Support Team at [phone] opt. 2  Send all requests for admission clinical reviews, approved or denied determinations and any other requests to dedicated fax number below belonging to the campus where the patient is receiving treatment. List of dedicated fax numbers for the Facilities:  FACILITY NAME UR FAX NUMBER   ADMISSION DENIALS (Administrative/Medical Necessity) [phone]   DISCHARGE SUPPORT TEAM ([facility]) [phone]   PARENT CHILD HEALTH (Maternity/NICU/Pediatrics) [phone]   [facility] [phone]   [facility] [phone]   [facility] [phone]   [facility] [phone]   [facility] [phone]   [facility] [phone]   [facility] [phone]   [facility] [phone]   [facility]  [facility] [phone]   [facility] [phone]   [facility] [phone]   [facility] [phone]

## 2025-03-26 NOTE — TELEPHONE ENCOUNTER
Pt son would like a call back to discuss what is palliative care and what does it clinically entail for the pt. He would like a call back at [phone]. Thank you.

## 2025-03-26 NOTE — TELEPHONE ENCOUNTER
FYI:  Outreach to pt son to discuss Palliative Care.  LMOM to call back to [facility] number given.  Await his call back to discuss

## 2025-03-31 ENCOUNTER — OFFICE VISIT (OUTPATIENT)
Dept: INTERNAL MEDICINE CLINIC | Facility: CLINIC | Age: OVER 89
End: 2025-03-31
Payer: MEDICARE

## 2025-03-31 ENCOUNTER — TELEPHONE (OUTPATIENT)
Dept: LAB | Facility: HOSPITAL | Age: OVER 89
End: 2025-03-31

## 2025-03-31 VITALS
DIASTOLIC BLOOD PRESSURE: 61 MMHG | HEIGHT: 58 IN | HEART RATE: 50 BPM | SYSTOLIC BLOOD PRESSURE: 143 MMHG | TEMPERATURE: 97.8 F | BODY MASS INDEX: 22.57 KG/M2

## 2025-03-31 DIAGNOSIS — I63.9 CEREBROVASCULAR ACCIDENT (CVA), UNSPECIFIED MECHANISM (HCC): ICD-10-CM

## 2025-03-31 DIAGNOSIS — I48.91 ATRIAL FIBRILLATION, UNSPECIFIED TYPE (HCC): ICD-10-CM

## 2025-03-31 DIAGNOSIS — F03.C11 SEVERE DEMENTIA WITH AGITATION, UNSPECIFIED DEMENTIA TYPE (HCC): ICD-10-CM

## 2025-03-31 DIAGNOSIS — Z76.89 ENCOUNTER FOR SUPPORT AND COORDINATION OF TRANSITION OF CARE: Primary | ICD-10-CM

## 2025-03-31 DIAGNOSIS — D64.9 SYMPTOMATIC ANEMIA: ICD-10-CM

## 2025-03-31 DIAGNOSIS — F20.5 RESIDUAL SCHIZOPHRENIA (HCC): ICD-10-CM

## 2025-03-31 PROCEDURE — 99215 OFFICE O/P EST HI 40 MIN: CPT | Performed by: STUDENT IN AN ORGANIZED HEALTH CARE EDUCATION/TRAINING PROGRAM

## 2025-03-31 RX ORDER — LORAZEPAM 0.5 MG/1
0.5 TABLET ORAL
COMMUNITY

## 2025-03-31 RX ORDER — MIRTAZAPINE 15 MG/1
15 TABLET, ORALLY DISINTEGRATING ORAL
COMMUNITY

## 2025-03-31 RX ORDER — ASPIRIN 81 MG/1
81 TABLET, CHEWABLE ORAL DAILY
Qty: 90 TABLET | Refills: 1 | Status: SHIPPED | OUTPATIENT
Start: 2025-03-31

## 2025-03-31 RX ORDER — OLANZAPINE 5 MG/1
TABLET ORAL
COMMUNITY
Start: 2025-03-31

## 2025-03-31 NOTE — PROGRESS NOTES
INTERNAL MEDICINE OFFICE VISIT NOTE  [facility] Internal [facility] [place]    NAME: Denia Castañeda  AGE: 89 y.o. SEX: female    DATE OF ENCOUNTER:       Chief Complaint   Patient presents with    Transition of Care Management     [facility] D/C 3/25 Symptomatic Anemia         Assessment & Plan  Encounter for support and coordination of transition of care         Symptomatic anemia  Status post hospitalization  Presented with notable shortness of breath and shaking prior to admission  Hemoglobin is found to be 6.6 on admission  Suspect likely in the setting of small bleed with recent PE on Eliquis, however no source was identified.  She was evaluated by GI but no further evaluation was recommended considering her son's desire to minimize aggressive treatments  Transfused with 2 units PRBC  H&H improved to 9.9  Discharged home with palliative care outpatient referral  We have agreed on restarting her on aspirin considering history of CVA  Will monitor her CBC over the next month every 2 weeks to ensure stability of her hemoglobin.  In the meantime, her age will monitor for any sources of bleeding including her stool.  If there is any concerns for recurrence we will discontinue aspirin    Orders:    CBC and Platelet; Future    CBC and Platelet; Future    CBC and Platelet; Future    Cerebrovascular accident (CVA), unspecified mechanism (HCC)  Status post hospitalization secondary to symptomatic anemia with hemoglobin 6.6  She had been on Xarelto which was discontinued during hospitalization  Considering history of CVA, I discussed with her son the reinitiation of aspirin and have agreed to proceed  Additionally we will monitor her hemoglobin every 2 weeks for the next month and her age will monitor her evidence of bleeding including her stool  Will continue Lipitor 40 mg daily      Orders:    aspirin 81 mg chewable tablet; Chew 1 tablet (81 mg total) daily    Atrial fibrillation, unspecified type (HCC)  Status post  "hospitalization secondary to symptomatic anemia with hemoglobin of 6.6  Eliquis was discontinued considering suspected small volume bleeding however no source was identified, her medical history and her sons decided not to pursue aggressive treatments  Will maintain her on very low-dose diltiazem, amiodarone 200 mg daily and Lopressor 25 mg twice a day  I am also starting her on aspirin consider history of CVA         Severe dementia with agitation, unspecified dementia type (HCC)  Status post hospitalization  On further review of her medication list that was confirmed with her pharmacy, it appears that she has been maintained on lorazepam 0.5 mg at bedtime, Zyprexa 5 mg at bedtime, melatonin 3 mg at bedtime, and duloxetine 30 mg daily -regimen has been managed by her psychiatrist, Dr. Alma Yun.   I advised her son today to further discuss her regimen with her psychiatrist and consider down titration.  He voiced understanding and will discuss this with Dr. Yun.         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HCC)  Status post hospitalization  On further review of her medication list that was confirmed with her pharmacy, it appears that she has been maintained on lorazepam 0.5 mg at bedtime, Zyprexa 5 mg at bedtime, melatonin 3 mg at bedtime, and duloxetine 30 mg daily -regimen has been managed by her psychiatrist, Dr. Alma Yun.   I advised her son today to further discuss her regimen with her psychiatrist and consider down titration.  He voiced understanding and will discuss this with Dr. Yun.           No follow-ups on file.      OBJECTIVE:  Vitals:    03/31/25 1037   BP: 143/61   BP Location: Left arm   Patient Position: Sitting   Cuff Size: Standard   Pulse: (!) 50   Temp: 97.8 °F (36.6 °C)   Height: 4' 10\" (1.473 m)         Physical Exam:   GENERAL: NAD, frail appearance.  Sitting in a wheelchair  NEUROLOGIC:  Alert/oriented x0.  HEENT:  NC/AT, no scleral icterus  CARDIAC:  RRR, +S1/S2  PULMONARY:  non-labored " breathing, lungs clear to auscultation without wheezing, crackles, rhonchi  No peripheral edema        Current Outpatient Medications:     acetaminophen (TYLENOL) 325 mg tablet, Take 2 tablets (650 mg total) by mouth every 6 (six) hours as needed for mild pain, moderate pain or headaches, Disp: 30 tablet, Rfl: 0    amiodarone 200 mg tablet, TAKE 1 TABLET (200 MG TOTAL) BY MOUTH DAILY, Disp: 90 tablet, Rfl: 1    aspirin 81 mg chewable tablet, Chew 1 tablet (81 mg total) daily, Disp: 90 tablet, Rfl: 1    atorvastatin (LIPITOR) 40 mg tablet, TAKE 1 TABLET (40 MG TOTAL) BY MOUTH EVERY EVENING, Disp: 90 tablet, Rfl: 1    diltiazem (CARDIZEM) 30 mg tablet, Take 0.5 tablets (15 mg total) by mouth 3 (three) times a day, Disp: 135 tablet, Rfl: 1    docusate sodium (COLACE) 100 mg capsule, TAKE 1 CAPSULE (100 MG TOTAL) BY MOUTH 2 (TWO) TIMES A DAY, Disp: 180 capsule, Rfl: 1    DULoxetine (CYMBALTA) 30 mg delayed release capsule, Take 30 mg by mouth daily at bedtime, Disp: , Rfl:     LORazepam (ATIVAN) 0.5 mg tablet, Take 0.5 mg by mouth daily at bedtime, Disp: , Rfl:     melatonin 3 mg, Take by mouth daily at bedtime, Disp: , Rfl:     metoprolol tartrate (LOPRESSOR) 25 mg tablet, TAKE 1 TABLET (25 MG TOTAL) BY MOUTH 2 (TWO) TIMES A DAY, Disp: 180 tablet, Rfl: 1    mirtazapine (REMERON SOL-TAB) 15 mg disintegrating tablet, Take 15 mg by mouth daily at bedtime, Disp: , Rfl:     OLANZapine (ZyPREXA) 5 mg tablet, , Disp: , Rfl:     terazosin (HYTRIN) 2 mg capsule, Take 1 capsule (2 mg total) by mouth daily at bedtime, Disp: 90 capsule, Rfl: 3    Current Facility-Administered Medications:     denosumab (PROLIA) subcutaneous injection 60 mg, 60 mg, Subcutaneous, Q6 Months, , 60 mg at 03/18/24 1330    Patient Active Problem List   Diagnosis    Primary hypertension    Hyperlipidemia    Bilateral hearing loss    Orthostatic hypertension    Ambulatory dysfunction    Elevated serum creatinine    Urinary incontinence    Atrial  fibrillation (HCC)    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HCC)    Moderate protein-calorie malnutrition (HCC)    Age-related osteoporosis without current pathological fracture    Personal history of fall    H/O fracture of right hip    Severe dementia with agitation (HCC)    Symptomatic anemia    Oropharyngeal dysphagia    Flank pain    Edema of right ankle    Urinary retention    Back pain    At risk for elder abuse    Stage 3b chronic kidney disease (HCC)    Edema of right upper extremity    Pericardial effusion    Facial asymmetry    CVA (cerebral vascular accident) (HCC)    Vaginal bleeding    Cystitis    Cataract of both eyes    Chronic constipation    Stroke-like symptom    History of pulmonary embolism    Hypernatremia       TCM Call (since 3/17/2025)       Date and time call was made  3/26/2025  8:12 AM    Hospital care reviewed  Other diagnostic studies pending    Patient was hospitialized at  [facility]    Date of Admission  03/23/25    Date of discharge  03/25/25    Diagnosis  Symptomatic Anemia    Disposition  Home    Were the patients medications reviewed and updated  Yes    Current Symptoms  None          TCM Call (since 3/17/2025)       Post hospital issues  None    Scheduled for follow up?  Yes    Did you obtain your prescribed medications  Yes    Do you need help managing your prescriptions or medications  No    Is transportation to your appointment needed  No    I have advised the patient to call PCP with any new or worsening symptoms  HELENA Headley MD  [facility] Internal Medicine [place]    * Please Note: This note was completed in part utilizing a dictation software.  Grammatical errors, random word insertions, spelling mistakes, and incomplete sentences may be an occasional consequence of this system secondary to software limitations, ambient noise, and hardware issues.  If you have any questions or concerns about the content, text, or information contained  within the body of this dictation, please contact the provider for clarification.**

## 2025-03-31 NOTE — ASSESSMENT & PLAN NOTE
Status post hospitalization secondary to symptomatic anemia with hemoglobin of 6.6  Eliquis was discontinued considering suspected small volume bleeding however no source was identified, her medical history and her sons decided not to pursue aggressive treatments  Will maintain her on very low-dose diltiazem, amiodarone 200 mg daily and Lopressor 25 mg twice a day  I am also starting her on aspirin consider history of CVA

## 2025-03-31 NOTE — ASSESSMENT & PLAN NOTE
Status post hospitalization secondary to symptomatic anemia with hemoglobin 6.6  She had been on Xarelto which was discontinued during hospitalization  Considering history of CVA, I discussed with her son the reinitiation of aspirin and have agreed to proceed  Additionally we will monitor her hemoglobin every 2 weeks for the next month and her age will monitor her evidence of bleeding including her stool  Will continue Lipitor 40 mg daily      Orders:    aspirin 81 mg chewable tablet; Chew 1 tablet (81 mg total) daily

## 2025-03-31 NOTE — ASSESSMENT & PLAN NOTE
Status post hospitalization  Presented with notable shortness of breath and shaking prior to admission  Hemoglobin is found to be 6.6 on admission  Suspect likely in the setting of small bleed with recent PE on Eliquis, however no source was identified.  She was evaluated by GI but no further evaluation was recommended considering her son's desire to minimize aggressive treatments  Transfused with 2 units PRBC  H&H improved to 9.9  Discharged home with palliative care outpatient referral  We have agreed on restarting her on aspirin considering history of CVA  Will monitor her CBC over the next month every 2 weeks to ensure stability of her hemoglobin.  In the meantime, her age will monitor for any sources of bleeding including her stool.  If there is any concerns for recurrence we will discontinue aspirin    Orders:    CBC and Platelet; Future    CBC and Platelet; Future    CBC and Platelet; Future

## 2025-03-31 NOTE — ASSESSMENT & PLAN NOTE
Status post hospitalization  On further review of her medication list that was confirmed with her pharmacy, it appears that she has been maintained on lorazepam 0.5 mg at bedtime, Zyprexa 5 mg at bedtime, melatonin 3 mg at bedtime, and duloxetine 30 mg daily -regimen has been managed by her psychiatrist, Dr. Alma Yun.   I advised her son today to further discuss her regimen with her psychiatrist and consider down titration.  He voiced understanding and will discuss this with Dr. Yun.

## 2025-04-03 ENCOUNTER — TELEPHONE (OUTPATIENT)
Dept: LAB | Facility: HOSPITAL | Age: OVER 89
End: 2025-04-03

## 2025-04-04 ENCOUNTER — RESULTS FOLLOW-UP (OUTPATIENT)
Dept: INTERNAL MEDICINE CLINIC | Facility: CLINIC | Age: OVER 89
End: 2025-04-04

## 2025-04-04 ENCOUNTER — APPOINTMENT (OUTPATIENT)
Dept: LAB | Facility: HOSPITAL | Age: OVER 89
End: 2025-04-04
Attending: STUDENT IN AN ORGANIZED HEALTH CARE EDUCATION/TRAINING PROGRAM
Payer: MEDICARE

## 2025-04-04 DIAGNOSIS — D64.9 SYMPTOMATIC ANEMIA: ICD-10-CM

## 2025-04-04 LAB
ERYTHROCYTE [DISTWIDTH] IN BLOOD BY AUTOMATED COUNT: 15.9 % (ref 11.6–15.1)
HCT VFR BLD AUTO: 35.7 % (ref 34.8–46.1)
HGB BLD-MCNC: 11.3 G/DL (ref 11.5–15.4)
MCH RBC QN AUTO: 29.9 PG (ref 26.8–34.3)
MCHC RBC AUTO-ENTMCNC: 31.7 G/DL (ref 31.4–37.4)
MCV RBC AUTO: 94 FL (ref 82–98)
PLATELET # BLD AUTO: 216 THOUSANDS/UL (ref 149–390)
PMV BLD AUTO: 12.4 FL (ref 8.9–12.7)
RBC # BLD AUTO: 3.78 MILLION/UL (ref 3.81–5.12)
WBC # BLD AUTO: 4.76 THOUSAND/UL (ref 4.31–10.16)

## 2025-04-04 PROCEDURE — 36415 COLL VENOUS BLD VENIPUNCTURE: CPT

## 2025-04-04 PROCEDURE — 85027 COMPLETE CBC AUTOMATED: CPT

## 2025-04-04 NOTE — QUICK NOTE
A prefabricated short arm splint was applied to the left wrist, not right wrist, as previously documented. Swelling was to left wrist

## 2025-04-14 ENCOUNTER — CONSULT (OUTPATIENT)
Dept: PULMONOLOGY | Facility: CLINIC | Age: OVER 89
End: 2025-04-14
Payer: MEDICARE

## 2025-04-14 VITALS
TEMPERATURE: 97.6 F | HEIGHT: 58 IN | HEART RATE: 66 BPM | OXYGEN SATURATION: 97 % | SYSTOLIC BLOOD PRESSURE: 122 MMHG | DIASTOLIC BLOOD PRESSURE: 74 MMHG | BODY MASS INDEX: 22.57 KG/M2

## 2025-04-14 DIAGNOSIS — I26.99 PULMONARY EMBOLISM (HCC): ICD-10-CM

## 2025-04-14 DIAGNOSIS — Z86.711 HISTORY OF PULMONARY EMBOLISM: ICD-10-CM

## 2025-04-14 DIAGNOSIS — F03.C11 SEVERE DEMENTIA WITH AGITATION, UNSPECIFIED DEMENTIA TYPE (HCC): ICD-10-CM

## 2025-04-14 DIAGNOSIS — I48.91 ATRIAL FIBRILLATION, UNSPECIFIED TYPE (HCC): Primary | ICD-10-CM

## 2025-04-14 PROCEDURE — 99204 OFFICE O/P NEW MOD 45 MIN: CPT | Performed by: INTERNAL MEDICINE

## 2025-04-14 NOTE — ASSESSMENT & PLAN NOTE
This is difficult situation, she had a PE on 3/6/2025 that was small burden with no RV failure, most likely due to immobilization, started on Eliquis but within 2 weeks she probably had GI bleed with blood loss anemia requiring 2 units of blood and Eliquis was stopped.  During that admission her CT scan showed resolution of the small PE.    I had a long discussion with the son and explained to him that she is high risk for bleeding with recent severe anemia requiring transfusion specially that no GI workup was done, and at the same time she has risk of PE that could be life-threatening especially with her recent PE and lifestyle.  Ideally she would have been treated for 3 months with Eliquis.  On the other hand she is a candidate for Eliquis therapy given her A-fib and history of strokes but was considered high risk for fall and bleeding and taken off Eliquis by cardiology in the past.    After discussion with the patient's son I decided to keep patient off anticoagulation for now and encouraged him to seek medical help with any development of shortness of breath, the son understands that PE can be life-threatening also, I would have done D-dimer for follow-up in the future but I noticed that last year she had elevated D-dimer and at that time her CTA did not show PE.

## 2025-04-14 NOTE — ASSESSMENT & PLAN NOTE
Currently on amiodarone, continue to follow with PCP and cardiology as needed, was considered not a candidate for anticoagulation by cardiology due to risk of bleeding/fall

## 2025-04-14 NOTE — ASSESSMENT & PLAN NOTE
Severe dementia, spoke to the son about goals of care, it seems that he discussed that before and he wants his mother to be DNR/DNI and notify the hospital.  Will defer to PCP to fill out POLST form.

## 2025-04-14 NOTE — PROGRESS NOTES
Consultation - Pulmonary Medicine   Denia Castañeda 89 y.o. female MRN: [id number]    Physician Requesting Consult:      Eulogio Hernandez DO     Reason for Consult: Recent PE    History of pulmonary embolism  This is difficult situation, she had a PE on 3/6/2025 that was small burden with no RV failure, most likely due to immobilization, started on Eliquis but within 2 weeks she probably had GI bleed with blood loss anemia requiring 2 units of blood and Eliquis was stopped.  During that admission her CT scan showed resolution of the small PE.    I had a long discussion with the son and explained to him that she is high risk for bleeding with recent severe anemia requiring transfusion specially that no GI workup was done, and at the same time she has risk of PE that could be life-threatening especially with her recent PE and lifestyle.  Ideally she would have been treated for 3 months with Eliquis.  On the other hand she is a candidate for Eliquis therapy given her A-fib and history of strokes but was considered high risk for fall and bleeding and taken off Eliquis by cardiology in the past.    After discussion with the patient's son I decided to keep patient off anticoagulation for now and encouraged him to seek medical help with any development of shortness of breath, the son understands that PE can be life-threatening also, I would have done D-dimer for follow-up in the future but I noticed that last year she had elevated D-dimer and at that time her CTA did not show PE.      Atrial fibrillation (HCC)  Currently on amiodarone, continue to follow with PCP and cardiology as needed, was considered not a candidate for anticoagulation by cardiology due to risk of bleeding/fall    Severe dementia with agitation (HCC)  Severe dementia, spoke to the son about goals of care, it seems that he discussed that before and he wants his mother to be DNR/DNI and notify the hospital.  Will defer to PCP to fill out POLST  form.      Return if symptoms worsen or fail to improve.    Diagnoses and all orders for this visit:    Atrial fibrillation, unspecified type (HCC)    Pulmonary embolism (HCC)  -     Ambulatory referral to Pulmonology    History of pulmonary embolism    Severe dementia with agitation, unspecified dementia type (HCC)      ______________________________________________________________________    HPI:    Denia Castañeda is a 89 y.o. female who presents for evaluation of recent pulmonary embolism.    Patient has advanced dementia, she lives at home currently with a caregiver 24 /7 at baseline she has difficulty communicating and mumbles unrecognized words as per son and caregiver.  She is disoriented and probably recognizes some family members.  She was hospitalized last month due to shortness of breath and was found to have small PE, was started on Eliquis and she was hospitalized again with worsening symptoms and found to have acute blood loss anemia, autofused units of blood Eliquis was stopped, was seen by GI and was not offered any workup given her advanced age and dementia.    Patient has atrial fibrillation on amiodarone and beta-blocker, follows with cardiology, not on anticoagulation due to risk of bleeding from frequent falls as per cardiology documentation.  She has history of strokes.    Patient is bed bound/wheelchair bound, due to weakness.  According to son and caregiver she does not have dysphagia or aspiration or choking with food.  Never smoked cigarettes.  No history of pulmonary disease.  At baseline no shortness of breath.  Patient is unable to complain if she has any pain or something other than if shortness of breath is noticed by her caregiver.  No legs edema.  He is currently at baseline and appears comfortable.    Review of Systems:  Review of Systems   Unable to perform ROS: Dementia     Aside from what is mentioned in the HPI, the review of systems otherwise negative.    Current  Medications:    Current Outpatient Medications:     acetaminophen (TYLENOL) 325 mg tablet, Take 2 tablets (650 mg total) by mouth every 6 (six) hours as needed for mild pain, moderate pain or headaches, Disp: 30 tablet, Rfl: 0    amiodarone 200 mg tablet, TAKE 1 TABLET (200 MG TOTAL) BY MOUTH DAILY, Disp: 90 tablet, Rfl: 1    aspirin 81 mg chewable tablet, Chew 1 tablet (81 mg total) daily, Disp: 90 tablet, Rfl: 1    atorvastatin (LIPITOR) 40 mg tablet, TAKE 1 TABLET (40 MG TOTAL) BY MOUTH EVERY EVENING, Disp: 90 tablet, Rfl: 1    diltiazem (CARDIZEM) 30 mg tablet, Take 0.5 tablets (15 mg total) by mouth 3 (three) times a day, Disp: 135 tablet, Rfl: 1    docusate sodium (COLACE) 100 mg capsule, TAKE 1 CAPSULE (100 MG TOTAL) BY MOUTH 2 (TWO) TIMES A DAY, Disp: 180 capsule, Rfl: 1    DULoxetine (CYMBALTA) 30 mg delayed release capsule, Take 30 mg by mouth daily at bedtime, Disp: , Rfl:     LORazepam (ATIVAN) 0.5 mg tablet, Take 0.5 mg by mouth daily at bedtime, Disp: , Rfl:     melatonin 3 mg, Take by mouth daily at bedtime, Disp: , Rfl:     mirtazapine (REMERON SOL-TAB) 15 mg disintegrating tablet, Take 15 mg by mouth daily at bedtime, Disp: , Rfl:     OLANZapine (ZyPREXA) 5 mg tablet, , Disp: , Rfl:     terazosin (HYTRIN) 2 mg capsule, Take 1 capsule (2 mg total) by mouth daily at bedtime, Disp: 90 capsule, Rfl: 3    metoprolol tartrate (LOPRESSOR) 25 mg tablet, TAKE 1 TABLET (25 MG TOTAL) BY MOUTH 2 (TWO) TIMES A DAY, Disp: 180 tablet, Rfl: 1    Current Facility-Administered Medications:     denosumab (PROLIA) subcutaneous injection 60 mg, 60 mg, Subcutaneous, Q6 Months, , 60 mg at 03/18/24 1330    Historical Information   Past Medical History:   Diagnosis Date    Anxiety     Closed fracture of right orbital floor (HCC) 08/23/2020    Cognitive impairment     COVID-19 virus infection 04/13/2020    Dementia ([facility])     Depression     Hallucination     Hyperlipidemia     Hypertension     Memory loss     Psychiatric  "illness     Psychosis (HCC)     Retention of urine     Schizoaffective disorder (HCC)     Sleep difficulties     Urinary tract infection      Past Surgical History:   Procedure Laterality Date    APPENDECTOMY      BLADDER SURGERY      CHOLECYSTECTOMY      NM OPTX FEM SHFT FX W/INSJ IMED IMPLT W/WO SCREW Right 7/15/2021    Procedure: INSERTION NAIL IM FEMUR ANTEGRADE (TROCHANTERIC) right;  Surgeon: Getachew Aguilar MD;  Location: [facility];  Service: Orthopedics    TOTAL ABDOMINAL HYSTERECTOMY W/ BILATERAL SALPINGOOPHORECTOMY      TOTAL KNEE ARTHROPLASTY Left      Social History   Social History     Tobacco Use   Smoking Status Never    Passive exposure: Never   Smokeless Tobacco Never   Tobacco Comments    Former smoker per [employer]       Occupational history:  No occupational exposure    Family History:   Family History   Problem Relation Age of Onset    Heart disease Mother         Cardiac disorder    Parkinsonism Father     Heart disease Sister         Cardiac disorder    Hypertension Sister     Diabetes Child         Diabetes Mellitus    Lung disease Child         Respiratory Disorder    Diabetes Son         Diabetes Mellitus         PhysicalExamination:  Vitals:   /74 (BP Location: Right arm, Patient Position: Sitting, Cuff Size: Standard)   Pulse 66   Temp 97.6 °F (36.4 °C) (Tympanic)   Ht 4' 10\" (1.473 m)   SpO2 97%   BMI 22.57 kg/m²     Gen:  Comfortable on room air.  No conversational dyspnea  HEENT:  Conjugate gaze.  sclerae anicteric.  Oropharynx moist  Neck: Trachea is midline. No JVD. No adenopathy  Chest: Poor effort but clear to auscultation bilaterally, no crackles or wheezing  Cardiac: S1-S2 regular. no murmur  Abdomen:  benign  Extremities: No edema  Neuro:  Normal speech and mentation      Diagnostic Data:  Labs:  I personally reviewed the most recent laboratory data pertinent to today's visit    Lab Results   Component Value Date    WBC 4.76 04/04/2025    HGB 11.3 (L) 04/04/2025    " "HCT 35.7 04/04/2025    MCV 94 04/04/2025     04/04/2025     Lab Results   Component Value Date    GLUCOSE 114 07/29/2014    CALCIUM 8.3 (L) 03/25/2025     07/29/2014    K 4.2 03/25/2025    CO2 29 03/25/2025     03/25/2025    BUN 33 (H) 03/25/2025    CREATININE 1.48 (H) 03/25/2025     No results found for: \"IGE\"  Lab Results   Component Value Date    ALT 11 03/23/2025    AST 18 03/23/2025    GGT 16 02/12/2020    ALKPHOS 93 03/23/2025    BILITOT 0.49 06/22/2014         Imaging:  I personally reviewed the images on the PAC system pertinent to today's visit    Chest CT scan from 3/23/2025 reviewed in PACS: Clear lung parenchyma, resolution of filling defect/pulmonary emboli seen on prior CT scan.    Chest CT scan from 3/6/2025 reviewed in PACS: Clear lung parenchyma with no suspicious lesions, small filling defects in the right lung especially the right middle lobe and lower lobe    Lower Ext Duplex 3/7/2025:  RIGHT LOWER LIMB:  No evidence of acute or chronic deep vein thrombosis.  No evidence of superficial thrombophlebitis noted.  LEFT LOWER LIMB:  No evidence of acute or chronic deep vein thrombosis.  Superficial thrombophlebitis noted in the great saphenous vein at the proximal  thigh to the proximal calf and is measured to be 1.5 cm away from the junction.  Doppler evaluation shows a normal response to augmentation maneuvers.  Popliteal, posterior tibial and anterior tibial arterial Doppler waveforms are  biphasic.    Other studies:  Echocardiogram: LVEF normal with mild to moderate concentric left ventricular hypertrophy, RV not well-visualized but appeared to have normal systolic function        Gilma Pina MD  "

## 2025-04-17 ENCOUNTER — TELEPHONE (OUTPATIENT)
Dept: LAB | Facility: HOSPITAL | Age: OVER 89
End: 2025-04-17

## 2025-04-19 DIAGNOSIS — I48.0 PAROXYSMAL A-FIB (HCC): ICD-10-CM

## 2025-04-21 RX ORDER — AMIODARONE HYDROCHLORIDE 200 MG/1
200 TABLET ORAL DAILY
Qty: 90 TABLET | Refills: 1 | Status: SHIPPED | OUTPATIENT
Start: 2025-04-21

## 2025-04-23 ENCOUNTER — APPOINTMENT (OUTPATIENT)
Dept: LAB | Facility: HOSPITAL | Age: OVER 89
End: 2025-04-23
Payer: MEDICARE

## 2025-04-23 ENCOUNTER — RESULTS FOLLOW-UP (OUTPATIENT)
Dept: INTERNAL MEDICINE CLINIC | Facility: CLINIC | Age: OVER 89
End: 2025-04-23

## 2025-04-23 DIAGNOSIS — N18.32 STAGE 3B CHRONIC KIDNEY DISEASE (HCC): ICD-10-CM

## 2025-04-23 DIAGNOSIS — D64.9 SYMPTOMATIC ANEMIA: Primary | ICD-10-CM

## 2025-05-07 ENCOUNTER — RESULTS FOLLOW-UP (OUTPATIENT)
Dept: INTERNAL MEDICINE CLINIC | Facility: CLINIC | Age: OVER 89
End: 2025-05-07

## 2025-05-07 ENCOUNTER — APPOINTMENT (OUTPATIENT)
Dept: LAB | Facility: HOSPITAL | Age: OVER 89
End: 2025-05-07
Payer: MEDICARE

## 2025-05-07 DIAGNOSIS — D64.9 SYMPTOMATIC ANEMIA: ICD-10-CM

## 2025-05-07 DIAGNOSIS — N18.32 STAGE 3B CHRONIC KIDNEY DISEASE (HCC): ICD-10-CM

## 2025-05-07 LAB
ALBUMIN SERPL BCG-MCNC: 3.7 G/DL (ref 3.5–5)
ALP SERPL-CCNC: 104 U/L (ref 34–104)
ALT SERPL W P-5'-P-CCNC: 10 U/L (ref 7–52)
ANION GAP SERPL CALCULATED.3IONS-SCNC: 11 MMOL/L (ref 4–13)
AST SERPL W P-5'-P-CCNC: 21 U/L (ref 13–39)
BILIRUB SERPL-MCNC: 0.59 MG/DL (ref 0.2–1)
BUN SERPL-MCNC: 30 MG/DL (ref 5–25)
CALCIUM SERPL-MCNC: 8.5 MG/DL (ref 8.4–10.2)
CHLORIDE SERPL-SCNC: 108 MMOL/L (ref 96–108)
CO2 SERPL-SCNC: 24 MMOL/L (ref 21–32)
CREAT SERPL-MCNC: 1.09 MG/DL (ref 0.6–1.3)
ERYTHROCYTE [DISTWIDTH] IN BLOOD BY AUTOMATED COUNT: 15.5 % (ref 11.6–15.1)
GFR SERPL CREATININE-BSD FRML MDRD: 43 ML/MIN/1.73SQ M
GLUCOSE P FAST SERPL-MCNC: 155 MG/DL (ref 65–99)
HCT VFR BLD AUTO: 32.5 % (ref 34.8–46.1)
HGB BLD-MCNC: 10.6 G/DL (ref 11.5–15.4)
MCH RBC QN AUTO: 31 PG (ref 26.8–34.3)
MCHC RBC AUTO-ENTMCNC: 32.6 G/DL (ref 31.4–37.4)
MCV RBC AUTO: 95 FL (ref 82–98)
PLATELET # BLD AUTO: 261 THOUSANDS/UL (ref 149–390)
PMV BLD AUTO: 11.3 FL (ref 8.9–12.7)
POTASSIUM SERPL-SCNC: 4.2 MMOL/L (ref 3.5–5.3)
PROT SERPL-MCNC: 6 G/DL (ref 6.4–8.4)
RBC # BLD AUTO: 3.42 MILLION/UL (ref 3.81–5.12)
SODIUM SERPL-SCNC: 143 MMOL/L (ref 135–147)
WBC # BLD AUTO: 4.62 THOUSAND/UL (ref 4.31–10.16)

## 2025-05-07 PROCEDURE — 85027 COMPLETE CBC AUTOMATED: CPT

## 2025-05-07 PROCEDURE — 36415 COLL VENOUS BLD VENIPUNCTURE: CPT

## 2025-05-07 PROCEDURE — 80053 COMPREHEN METABOLIC PANEL: CPT

## 2025-05-08 NOTE — TELEPHONE ENCOUNTER
Called patients son Logan left detailed message regarding results. Advised to call the questions with any questions or concerns     ----- Message from Jose M Castañeda MD sent at 5/7/2025 10:12 PM EDT -----  Please call the patient regarding her  result. Anemia is improving. Kidney function is stable.

## 2025-05-17 DIAGNOSIS — I48.0 PAROXYSMAL A-FIB (HCC): ICD-10-CM

## 2025-05-18 RX ORDER — METOPROLOL TARTRATE 25 MG/1
25 TABLET, FILM COATED ORAL 2 TIMES DAILY
Qty: 90 TABLET | Refills: 1 | Status: SHIPPED | OUTPATIENT
Start: 2025-05-18 | End: 2025-08-16

## 2025-05-21 ENCOUNTER — APPOINTMENT (OUTPATIENT)
Dept: LAB | Facility: HOSPITAL | Age: OVER 89
End: 2025-05-21
Payer: MEDICARE

## 2025-05-21 DIAGNOSIS — N18.32 STAGE 3B CHRONIC KIDNEY DISEASE (HCC): ICD-10-CM

## 2025-05-21 DIAGNOSIS — D64.9 SYMPTOMATIC ANEMIA: ICD-10-CM

## 2025-05-21 DIAGNOSIS — D64.9 SYMPTOMATIC ANEMIA: Primary | ICD-10-CM

## 2025-05-21 LAB
ALBUMIN SERPL BCG-MCNC: 3.5 G/DL (ref 3.5–5)
ALP SERPL-CCNC: 95 U/L (ref 34–104)
ALT SERPL W P-5'-P-CCNC: 10 U/L (ref 7–52)
ANION GAP SERPL CALCULATED.3IONS-SCNC: 6 MMOL/L (ref 4–13)
AST SERPL W P-5'-P-CCNC: 21 U/L (ref 13–39)
BILIRUB SERPL-MCNC: 0.55 MG/DL (ref 0.2–1)
BUN SERPL-MCNC: 28 MG/DL (ref 5–25)
CALCIUM SERPL-MCNC: 8.6 MG/DL (ref 8.4–10.2)
CHLORIDE SERPL-SCNC: 110 MMOL/L (ref 96–108)
CO2 SERPL-SCNC: 29 MMOL/L (ref 21–32)
CREAT SERPL-MCNC: 0.99 MG/DL (ref 0.6–1.3)
ERYTHROCYTE [DISTWIDTH] IN BLOOD BY AUTOMATED COUNT: 14.8 % (ref 11.6–15.1)
GFR SERPL CREATININE-BSD FRML MDRD: 49 ML/MIN/1.73SQ M
GLUCOSE P FAST SERPL-MCNC: 83 MG/DL (ref 65–99)
HCT VFR BLD AUTO: 30.2 % (ref 34.8–46.1)
HGB BLD-MCNC: 9.7 G/DL (ref 11.5–15.4)
MCH RBC QN AUTO: 30.3 PG (ref 26.8–34.3)
MCHC RBC AUTO-ENTMCNC: 32.1 G/DL (ref 31.4–37.4)
MCV RBC AUTO: 94 FL (ref 82–98)
PLATELET # BLD AUTO: 210 THOUSANDS/UL (ref 149–390)
PMV BLD AUTO: 11.3 FL (ref 8.9–12.7)
POTASSIUM SERPL-SCNC: 4.1 MMOL/L (ref 3.5–5.3)
PROT SERPL-MCNC: 5.8 G/DL (ref 6.4–8.4)
RBC # BLD AUTO: 3.2 MILLION/UL (ref 3.81–5.12)
SODIUM SERPL-SCNC: 145 MMOL/L (ref 135–147)
WBC # BLD AUTO: 4.28 THOUSAND/UL (ref 4.31–10.16)

## 2025-05-21 PROCEDURE — 36415 COLL VENOUS BLD VENIPUNCTURE: CPT

## 2025-05-21 PROCEDURE — 85027 COMPLETE CBC AUTOMATED: CPT

## 2025-05-21 PROCEDURE — 80053 COMPREHEN METABOLIC PANEL: CPT

## 2025-05-22 ENCOUNTER — RESULTS FOLLOW-UP (OUTPATIENT)
Dept: INTERNAL MEDICINE CLINIC | Facility: CLINIC | Age: OVER 89
End: 2025-05-22

## 2025-06-09 NOTE — PLAN OF CARE
Problem: PHYSICAL THERAPY ADULT  Goal: Performs mobility at highest level of function for planned discharge setting  See evaluation for individualized goals  Treatment/Interventions: Functional transfer training, LE strengthening/ROM, Therapeutic exercise, Endurance training, Patient/family training, Equipment eval/education, Bed mobility, Gait training, Compensatory technique education, Spoke to nursing  Equipment Recommended: Other (Comment) (RW, has rollator and SPC at home)       See flowsheet documentation for full assessment, interventions and recommendations  Outcome: Progressing  Prognosis: Good  Problem List: Decreased strength, Decreased endurance, Impaired balance, Decreased range of motion, Decreased mobility, Decreased cognition, Impaired judgement, Decreased safety awareness, Impaired hearing (posture)  Assessment: Pt seen for progression of therapy  Improve overall mobility noted  Less assistance for bed mobilty, transfers and ambulation  Improved gait stabiltiy wiht use of RW and would continue to recommend use for fall reduction  Improved tolerance to ambualtion distances despite reports of dizziness  No overt LOB with ambulation  using RW but gait with inconsistencies in dustin and foot clearance  Despite progress may continue to benefit from STR prior to home ALONE  Family desire for pt to d/c to home  Defer to OT for level of supervision  At best home PT rec if family continues to refuse rhb  PT will cont to follow and progress  RW recommended  Barriers to Discharge: Decreased caregiver support  Barriers to Discharge Comments: Lives alone  Recommendation: Short-term skilled PT     PT - OK to Discharge: Yes    See flowsheet documentation for full assessment  No

## 2025-06-25 NOTE — PLAN OF CARE
Problem: PAIN - ADULT  Goal: Verbalizes/displays adequate comfort level or baseline comfort level  Description: Interventions:  - Encourage patient to monitor pain and request assistance  - Assess pain using appropriate pain scale  - Administer analgesics based on type and severity of pain and evaluate response  - Implement non-pharmacological measures as appropriate and evaluate response  - Consider cultural and social influences on pain and pain management  - Notify physician/advanced practitioner if interventions unsuccessful or patient reports new pain  Outcome: Progressing     Problem: INFECTION - ADULT  Goal: Absence or prevention of progression during hospitalization  Description: INTERVENTIONS:  - Assess and monitor for signs and symptoms of infection  - Monitor lab/diagnostic results  - Monitor all insertion sites, i.e. indwelling lines, tubes, and drains  - Monitor endotracheal if appropriate and nasal secretions for changes in amount and color  - Holden appropriate cooling/warming therapies per order  - Administer medications as ordered  - Instruct and encourage patient and family to use good hand hygiene technique  - Identify and instruct in appropriate isolation precautions for identified infection/condition  Outcome: Progressing  Goal: Absence of fever/infection during neutropenic period  Description: INTERVENTIONS:  - Monitor WBC    Outcome: Progressing     Problem: SAFETY ADULT  Goal: Patient will remain free of falls  Description: INTERVENTIONS:  - Educate patient/family on patient safety including physical limitations  - Instruct patient to call for assistance with activity   - Consult OT/PT to assist with strengthening/mobility   - Keep Call bell within reach  - Keep bed low and locked with side rails adjusted as appropriate  - Keep care items and personal belongings within reach  - Initiate and maintain comfort rounds  - Make Fall Risk Sign visible to staff  - Offer Toileting every  Hours,  in advance of need  - Initiate/Maintain alarm  - Obtain necessary fall risk management equipment:   - Apply yellow socks and bracelet for high fall risk patients  - Consider moving patient to room near nurses station  Outcome: Progressing  Goal: Maintain or return to baseline ADL function  Description: INTERVENTIONS:  -  Assess patient's ability to carry out ADLs; assess patient's baseline for ADL function and identify physical deficits which impact ability to perform ADLs (bathing, care of mouth/teeth, toileting, grooming, dressing, etc.)  - Assess/evaluate cause of self-care deficits   - Assess range of motion  - Assess patient's mobility; develop plan if impaired  - Assess patient's need for assistive devices and provide as appropriate  - Encourage maximum independence but intervene and supervise when necessary  - Involve family in performance of ADLs  - Assess for home care needs following discharge   - Consider OT consult to assist with ADL evaluation and planning for discharge  - Provide patient education as appropriate  Outcome: Progressing  Goal: Maintains/Returns to pre admission functional level  Description: INTERVENTIONS:  - Perform AM-PAC 6 Click Basic Mobility/ Daily Activity assessment daily.  - Set and communicate daily mobility goal to care team and patient/family/caregiver.   - Collaborate with rehabilitation services on mobility goals if consulted  - Perform Range of Motion  times a day.  - Reposition patient every hours.  - Dangle patient  times a day  - Stand patient  times a day  - Ambulate patient  times a day  - Out of bed to chair  times a day   - Out of bed for meals  times a day  - Out of bed for toileting  - Record patient progress and toleration of activity level   Outcome: Progressing     Problem: DISCHARGE PLANNING  Goal: Discharge to home or other facility with appropriate resources  Description: INTERVENTIONS:  - Identify barriers to discharge w/patient and caregiver  - Arrange for  needed discharge resources and transportation as appropriate  - Identify discharge learning needs (meds, wound care, etc.)  - Arrange for interpretive services to assist at discharge as needed  - Refer to Case Management Department for coordinating discharge planning if the patient needs post-hospital services based on physician/advanced practitioner order or complex needs related to functional status, cognitive ability, or social support system  Outcome: Progressing     Problem: Knowledge Deficit  Goal: Patient/family/caregiver demonstrates understanding of disease process, treatment plan, medications, and discharge instructions  Description: Complete learning assessment and assess knowledge base.  Interventions:  - Provide teaching at level of understanding  - Provide teaching via preferred learning methods  Outcome: Progressing     Problem: Prexisting or High Potential for Compromised Skin Integrity  Goal: Skin integrity is maintained or improved  Description: INTERVENTIONS:  - Identify patients at risk for skin breakdown  - Assess and monitor skin integrity  - Assess and monitor nutrition and hydration status  - Monitor labs   - Assess for incontinence   - Turn and reposition patient  - Assist with mobility/ambulation  - Relieve pressure over bony prominences  - Avoid friction and shearing  - Provide appropriate hygiene as needed including keeping skin clean and dry  - Evaluate need for skin moisturizer/barrier cream  - Collaborate with interdisciplinary team   - Patient/family teaching  - Consider wound care consult   Outcome: Progressing     Problem: Nutrition/Hydration-ADULT  Goal: Nutrient/Hydration intake appropriate for improving, restoring or maintaining nutritional needs  Description: Monitor and assess patient's nutrition/hydration status for malnutrition. Collaborate with interdisciplinary team and initiate plan and interventions as ordered.  Monitor patient's weight and dietary intake as ordered or  per policy. Utilize nutrition screening tool and intervene as necessary. Determine patient's food preferences and provide high-protein, high-caloric foods as appropriate.     INTERVENTIONS:  - Monitor oral intake, urinary output, labs, and treatment plans  - Assess nutrition and hydration status and recommend course of action  - Evaluate amount of meals eaten  - Assist patient with eating if necessary   - Allow adequate time for meals  - Recommend/ encourage appropriate diets, oral nutritional supplements, and vitamin/mineral supplements  - Order, calculate, and assess calorie counts as needed  - Recommend, monitor, and adjust tube feedings and TPN/PPN based on assessed needs  - Assess need for intravenous fluids  - Provide specific nutrition/hydration education as appropriate  - Include patient/family/caregiver in decisions related to nutrition  Outcome: Progressing      No

## 2025-07-19 DIAGNOSIS — I63.89 CEREBROVASCULAR ACCIDENT (CVA) DUE TO OTHER MECHANISM (HCC): ICD-10-CM

## 2025-07-21 RX ORDER — ATORVASTATIN CALCIUM 40 MG/1
40 TABLET, FILM COATED ORAL EVERY EVENING
Qty: 30 TABLET | Refills: 0 | Status: SHIPPED | OUTPATIENT
Start: 2025-07-21

## 2025-07-22 ENCOUNTER — OFFICE VISIT (OUTPATIENT)
Dept: INTERNAL MEDICINE CLINIC | Facility: CLINIC | Age: OVER 89
End: 2025-07-22
Payer: MEDICARE

## 2025-07-22 ENCOUNTER — TELEPHONE (OUTPATIENT)
Age: OVER 89
End: 2025-07-22

## 2025-07-22 VITALS
HEIGHT: 58 IN | SYSTOLIC BLOOD PRESSURE: 126 MMHG | TEMPERATURE: 96.9 F | BODY MASS INDEX: 22.67 KG/M2 | WEIGHT: 108 LBS | DIASTOLIC BLOOD PRESSURE: 60 MMHG

## 2025-07-22 DIAGNOSIS — N18.32 STAGE 3B CHRONIC KIDNEY DISEASE (HCC): ICD-10-CM

## 2025-07-22 DIAGNOSIS — D64.9 SYMPTOMATIC ANEMIA: ICD-10-CM

## 2025-07-22 DIAGNOSIS — F02.C11 SEVERE DEMENTIA ASSOCIATED WITH OTHER UNDERLYING DISEASE, WITH AGITATION (HCC): Primary | ICD-10-CM

## 2025-07-22 DIAGNOSIS — I10 PRIMARY HYPERTENSION: ICD-10-CM

## 2025-07-22 DIAGNOSIS — M81.0 AGE-RELATED OSTEOPOROSIS WITHOUT CURRENT PATHOLOGICAL FRACTURE: ICD-10-CM

## 2025-07-22 PROCEDURE — G2211 COMPLEX E/M VISIT ADD ON: HCPCS | Performed by: STUDENT IN AN ORGANIZED HEALTH CARE EDUCATION/TRAINING PROGRAM

## 2025-07-22 PROCEDURE — 99214 OFFICE O/P EST MOD 30 MIN: CPT | Performed by: STUDENT IN AN ORGANIZED HEALTH CARE EDUCATION/TRAINING PROGRAM

## 2025-07-22 NOTE — ASSESSMENT & PLAN NOTE
Lab Results   Component Value Date    HGB 9.7 (L) 05/21/2025    HGB 10.6 (L) 05/07/2025    HGB 9.8 (L) 04/23/2025     CBC and iron panel ordered to be completed before next visit      Orders:    CBC and Platelet; Future    Iron Panel (Includes Ferritin, Iron Sat%, Iron, and TIBC); Future

## 2025-07-22 NOTE — ASSESSMENT & PLAN NOTE
Severe dementia 9 history of schizophrenia  Following with psychiatry, Dr. Alma Yun and maintain on duloxetine 30 mg at bedtime, mirtazapine 15 mg at bedtime, olanzapine 5 mg and lorazepam 0.5 mg at bedtime  POLST form and manage medical directives form provided to her son today

## 2025-07-22 NOTE — PROGRESS NOTES
INTERNAL MEDICINE OFFICE VISIT NOTE  [facility] [place]    NAME: Denia Castañeda  AGE: 89 y.o. SEX: female    DATE OF ENCOUNTER:       Chief Complaint   Patient presents with    Follow-up     4 month         Assessment & Plan  Severe dementia associated with other underlying disease, with agitation (HCC)  Severe dementia 9 history of schizophrenia  Following with psychiatry, Dr. Alma Yun and maintain on duloxetine 30 mg at bedtime, mirtazapine 15 mg at bedtime, olanzapine 5 mg and lorazepam 0.5 mg at bedtime  POLST form and manage medical directives form provided to her son today         Symptomatic anemia  Lab Results   Component Value Date    HGB 9.7 (L) 05/21/2025    HGB 10.6 (L) 05/07/2025    HGB 9.8 (L) 04/23/2025     CBC and iron panel ordered to be completed before next visit      Orders:    CBC and Platelet; Future    Iron Panel (Includes Ferritin, Iron Sat%, Iron, and TIBC); Future    Primary hypertension  BP within acceptable parameters for her age group and comorbidities  Will continue current regimen of diltiazem 30mg TID, metoprolol tartrate 25 mg BID and terazosin 2mg for h/o urinary retention             Stage 3b chronic kidney disease (HCC)  Lab Results   Component Value Date    EGFR 49 05/21/2025    EGFR 43 05/07/2025    EGFR 51 04/23/2025    CREATININE 0.99 05/21/2025    CREATININE 1.09 05/07/2025    CREATININE 0.95 04/23/2025       Orders:    Comprehensive metabolic panel; Future    Iron Panel (Includes Ferritin, Iron Sat%, Iron, and TIBC); Future    Age-related osteoporosis without current pathological fracture    Orders:    Vitamin D 25 hydroxy; Future    Comprehensive metabolic panel; Future      Return in about 3 months (around 10/9/2025) for Annual physical.      OBJECTIVE:  Vitals:    07/22/25 1107   BP: 126/60   BP Location: Left arm   Patient Position: Sitting   Cuff Size: Standard   Temp: (!) 96.9 °F (36.1 °C)   TempSrc: Temporal   Weight: 49 kg (108 lb)   Height: 4'  "10\" (1.473 m)         Physical Exam:   GENERAL: NAD, Normal appearance.    NEUROLOGIC:  Alert/oriented x3.  HEENT:  NC/AT, no scleral icterus  CARDIAC:  RRR, +S1/S2  PULMONARY:  non-labored breathing      Current Medications[1]    Problem List[2]          Jose M Castañeda MD  [facility] [place]    * Please Note: This note was completed in part utilizing a dictation software.  Grammatical errors, random word insertions, spelling mistakes, and incomplete sentences may be an occasional consequence of this system secondary to software limitations, ambient noise, and hardware issues.  If you have any questions or concerns about the content, text, or information contained within the body of this dictation, please contact the provider for clarification.**           [1]   Current Outpatient Medications:     acetaminophen (TYLENOL) 325 mg tablet, Take 2 tablets (650 mg total) by mouth every 6 (six) hours as needed for mild pain, moderate pain or headaches, Disp: 30 tablet, Rfl: 0    amiodarone 200 mg tablet, TAKE 1 TABLET (200 MG TOTAL) BY MOUTH DAILY, Disp: 90 tablet, Rfl: 1    aspirin 81 mg chewable tablet, Chew 1 tablet (81 mg total) daily, Disp: 90 tablet, Rfl: 1    atorvastatin (LIPITOR) 40 mg tablet, TAKE 1 TABLET (40 MG TOTAL) BY MOUTH EVERY EVENING, Disp: 30 tablet, Rfl: 0    diltiazem (CARDIZEM) 30 mg tablet, Take 0.5 tablets (15 mg total) by mouth 3 (three) times a day, Disp: 135 tablet, Rfl: 1    docusate sodium (COLACE) 100 mg capsule, TAKE 1 CAPSULE (100 MG TOTAL) BY MOUTH 2 (TWO) TIMES A DAY, Disp: 180 capsule, Rfl: 1    DULoxetine (CYMBALTA) 30 mg delayed release capsule, Take 30 mg by mouth daily at bedtime, Disp: , Rfl:     LORazepam (ATIVAN) 0.5 mg tablet, Take 0.5 mg by mouth daily at bedtime, Disp: , Rfl:     melatonin 3 mg, Take by mouth daily at bedtime, Disp: , Rfl:     metoprolol tartrate (LOPRESSOR) 25 mg tablet, TAKE 1 TABLET (25 MG TOTAL) BY MOUTH 2 (TWO) TIMES A DAY, Disp: 90 " tablet, Rfl: 1    mirtazapine (REMERON SOL-TAB) 15 mg disintegrating tablet, Take 15 mg by mouth daily at bedtime, Disp: , Rfl:     OLANZapine (ZyPREXA) 5 mg tablet, , Disp: , Rfl:     terazosin (HYTRIN) 2 mg capsule, Take 1 capsule (2 mg total) by mouth daily at bedtime, Disp: 90 capsule, Rfl: 3    Current Facility-Administered Medications:     denosumab (PROLIA) subcutaneous injection 60 mg, 60 mg, Subcutaneous, Q6 Months, , 60 mg at 03/18/24 1330  [2]   Patient Active Problem List  Diagnosis    Primary hypertension    Hyperlipidemia    Bilateral hearing loss    Orthostatic hypertension    Ambulatory dysfunction    Elevated serum creatinine    Urinary incontinence    Atrial fibrillation (HCC)    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HCC)    Moderate protein-calorie malnutrition (HCC)    Age-related osteoporosis without current pathological fracture    Personal history of fall    H/O fracture of right hip    Severe dementia with agitation (HCC)    Symptomatic anemia    Oropharyngeal dysphagia    Flank pain    Edema of right ankle    Urinary retention    Back pain    At risk for elder abuse    Stage 3b chronic kidney disease (HCC)    Edema of right upper extremity    Pericardial effusion    Facial asymmetry    CVA (cerebral vascular accident) (HCC)    Vaginal bleeding    Cystitis    Cataract of both eyes    Chronic constipation    Stroke-like symptom    History of pulmonary embolism    Hypernatremia

## 2025-07-22 NOTE — ASSESSMENT & PLAN NOTE
Lab Results   Component Value Date    EGFR 49 05/21/2025    EGFR 43 05/07/2025    EGFR 51 04/23/2025    CREATININE 0.99 05/21/2025    CREATININE 1.09 05/07/2025    CREATININE 0.95 04/23/2025       Orders:    Comprehensive metabolic panel; Future    Iron Panel (Includes Ferritin, Iron Sat%, Iron, and TIBC); Future

## 2025-07-31 DIAGNOSIS — K59.00 CONSTIPATION: ICD-10-CM

## 2025-07-31 RX ORDER — DOCUSATE SODIUM 100 MG/1
100 CAPSULE, LIQUID FILLED ORAL 2 TIMES DAILY
Qty: 180 CAPSULE | Refills: 1 | Status: SHIPPED | OUTPATIENT
Start: 2025-07-31

## (undated) DEVICE — OCCLUSIVE GAUZE STRIP,3% BISMUTH TRIBROMOPHENATE IN PETROLATUM BLEND: Brand: XEROFORM

## (undated) DEVICE — SUT VICRYL 2-0 CT-1 27 IN J259H

## (undated) DEVICE — SPONGE LAP 18 X 18 IN STRL RFD

## (undated) DEVICE — MEDI-VAC YANKAUER SUCTION HANDLE W/BULBOUS AND CONTROL VENT: Brand: CARDINAL HEALTH

## (undated) DEVICE — 4.2MM THREE-FLUTED DRILL BIT QC/NEEDLE POINT/145MM

## (undated) DEVICE — TUBING SUCTION 5MM X 12 FT

## (undated) DEVICE — 3M™ STERI-DRAPE™ U-DRAPE 1015: Brand: STERI-DRAPE™

## (undated) DEVICE — INTENDED FOR TISSUE SEPARATION, AND OTHER PROCEDURES THAT REQUIRE A SHARP SURGICAL BLADE TO PUNCTURE OR CUT.: Brand: BARD-PARKER SAFETY BLADES SIZE 10, STERILE

## (undated) DEVICE — 3.2MM GUIDE WIRE 400MM

## (undated) DEVICE — LIGHT GLOVE GREEN

## (undated) DEVICE — HEAVY DUTY TABLE COVER: Brand: CONVERTORS

## (undated) DEVICE — GLOVE INDICATOR PI UNDERGLOVE SZ 8 BLUE

## (undated) DEVICE — PROXIMATE SKIN STAPLERS (35 WIDE) CONTAINS 35 STAINLESS STEEL STAPLES (FIXED HEAD): Brand: PROXIMATE

## (undated) DEVICE — BULB SYRINGE,IRRIGATION WITH PROTECTIVE CAP: Brand: DOVER

## (undated) DEVICE — SCD SEQUENTIAL COMPRESSION COMFORT SLEEVE MEDIUM KNEE LENGTH: Brand: KENDALL SCD

## (undated) DEVICE — POSITIONER HANA TABLE PACK

## (undated) DEVICE — 1820 FOAM BLOCK NEEDLE COUNTER: Brand: DEVON

## (undated) DEVICE — DRAPE C-ARM X-RAY

## (undated) DEVICE — ABDUCTION PILLOW FOAM POSITIONER: Brand: CARDINAL HEALTH

## (undated) DEVICE — BOWL: 16OZ PEELPOUCH 75/CS 16/PLT: Brand: MEDEGEN MEDICAL PRODUCTS, LLC

## (undated) DEVICE — 6617 IOBAN II PATIENT ISOLATION DRAPE 5/BX,4BX/CS: Brand: STERI-DRAPE™ IOBAN™ 2

## (undated) DEVICE — CHLORAPREP HI-LITE 26ML ORANGE

## (undated) DEVICE — SKIN MARKER DUAL TIP WITH RULER CAP, FLEXIBLE RULER AND LABELS: Brand: DEVON

## (undated) DEVICE — SURGICAL GOWN, XL SMARTSLEEVE: Brand: CONVERTORS

## (undated) DEVICE — 3M™ STERI-STRIP™ REINFORCED ADHESIVE SKIN CLOSURES, R1547, 1/2 IN X 4 IN (12 MM X 100 MM), 6 STRIPS/ENVELOPE: Brand: 3M™ STERI-STRIP™

## (undated) DEVICE — 3M™ TEGADERM™ TRANSPARENT FILM DRESSING FRAME STYLE, 1628, 6 IN X 8 IN (15 CM X 20 CM), 10/CT 8CT/CASE: Brand: 3M™ TEGADERM™

## (undated) DEVICE — GLOVE PI ULTRA TOUCH SZ.8.0

## (undated) DEVICE — SUT MONOCRYL 3-0 PS-2 27 IN Y427H

## (undated) DEVICE — DRAPE C-ARMOUR

## (undated) DEVICE — 2.5MM REAMING ROD WITH BALL TIP/950MM-STERILE